# Patient Record
Sex: FEMALE | Race: WHITE | NOT HISPANIC OR LATINO | Employment: OTHER | URBAN - METROPOLITAN AREA
[De-identification: names, ages, dates, MRNs, and addresses within clinical notes are randomized per-mention and may not be internally consistent; named-entity substitution may affect disease eponyms.]

---

## 2017-01-12 ENCOUNTER — HOSPITAL ENCOUNTER (OUTPATIENT)
Dept: ONCOLOGY | Facility: CLINIC | Age: 77
Discharge: HOME | End: 2017-01-12
Attending: INTERNAL MEDICINE | Admitting: INTERNAL MEDICINE

## 2017-02-09 ENCOUNTER — HOSPITAL ENCOUNTER (OUTPATIENT)
Dept: ONCOLOGY | Facility: CLINIC | Age: 77
Discharge: HOME | End: 2017-02-09
Attending: INTERNAL MEDICINE | Admitting: INTERNAL MEDICINE

## 2017-02-09 LAB
ALBUMIN SERPL-MCNC: 3.8 G/DL (ref 3.5–4.8)
ALBUMIN/GLOB SERPL: 1 {RATIO} (ref 1–1.7)
ALP SERPL-CCNC: 69 IU/L (ref 32–91)
ALT SERPL-CCNC: 16 IU/L (ref 14–54)
ANION GAP SERPL CALC-SCNC: 14 MMOL/L (ref 10–20)
AST SERPL-CCNC: 20 IU/L (ref 15–41)
BILIRUB SERPL-MCNC: 0.8 MG/DL (ref 0.3–1.2)
BUN SERPL-MCNC: 11 MG/DL (ref 8–20)
BUN/CREAT SERPL: 11 (ref 5.4–26.2)
CALCIUM SERPL-MCNC: 10 MG/DL (ref 8.9–10.3)
CHLORIDE SERPL-SCNC: 104 MMOL/L (ref 101–111)
CONV CO2: 29 MMOL/L (ref 22–32)
CONV TOTAL PROTEIN: 7.7 G/DL (ref 6.1–7.9)
CREAT UR-MCNC: 1 MG/DL (ref 0.4–1)
GLOBULIN UR ELPH-MCNC: 3.9 G/DL (ref 2.5–3.8)
GLUCOSE SERPL-MCNC: 93 MG/DL (ref 65–99)
MAGNESIUM SERPL-MCNC: 2 MG/DL (ref 1.8–2.5)
POTASSIUM SERPL-SCNC: 4 MMOL/L (ref 3.6–5.1)
SODIUM SERPL-SCNC: 143 MMOL/L (ref 136–144)

## 2017-03-16 ENCOUNTER — HOSPITAL ENCOUNTER (OUTPATIENT)
Dept: ONCOLOGY | Facility: CLINIC | Age: 77
Discharge: HOME | End: 2017-03-16
Attending: NURSE PRACTITIONER | Admitting: NURSE PRACTITIONER

## 2017-03-21 ENCOUNTER — HOSPITAL ENCOUNTER (OUTPATIENT)
Dept: ONCOLOGY | Facility: CLINIC | Age: 77
Discharge: HOME | End: 2017-03-21
Attending: INTERNAL MEDICINE | Admitting: INTERNAL MEDICINE

## 2017-04-05 ENCOUNTER — HOSPITAL ENCOUNTER (OUTPATIENT)
Dept: ONCOLOGY | Facility: CLINIC | Age: 77
Discharge: HOME | End: 2017-04-05
Attending: INTERNAL MEDICINE | Admitting: INTERNAL MEDICINE

## 2017-04-10 ENCOUNTER — HOSPITAL ENCOUNTER (OUTPATIENT)
Dept: ONCOLOGY | Facility: CLINIC | Age: 77
Discharge: HOME | End: 2017-04-10
Attending: INTERNAL MEDICINE | Admitting: INTERNAL MEDICINE

## 2017-04-18 ENCOUNTER — HOSPITAL ENCOUNTER (OUTPATIENT)
Dept: ONCOLOGY | Facility: CLINIC | Age: 77
Discharge: HOME | End: 2017-04-18
Attending: INTERNAL MEDICINE | Admitting: INTERNAL MEDICINE

## 2017-04-21 ENCOUNTER — HOSPITAL ENCOUNTER (OUTPATIENT)
Dept: ONCOLOGY | Facility: CLINIC | Age: 77
Discharge: HOME | End: 2017-04-21
Attending: INTERNAL MEDICINE | Admitting: INTERNAL MEDICINE

## 2017-04-26 ENCOUNTER — HOSPITAL ENCOUNTER (OUTPATIENT)
Dept: ONCOLOGY | Facility: CLINIC | Age: 77
Discharge: HOME | End: 2017-04-26
Attending: INTERNAL MEDICINE | Admitting: INTERNAL MEDICINE

## 2017-05-04 ENCOUNTER — HOSPITAL ENCOUNTER (OUTPATIENT)
Dept: ONCOLOGY | Facility: CLINIC | Age: 77
Discharge: HOME | End: 2017-05-04
Attending: INTERNAL MEDICINE | Admitting: INTERNAL MEDICINE

## 2017-05-11 ENCOUNTER — HOSPITAL ENCOUNTER (OUTPATIENT)
Dept: ONCOLOGY | Facility: CLINIC | Age: 77
Discharge: HOME | End: 2017-05-11
Attending: INTERNAL MEDICINE | Admitting: INTERNAL MEDICINE

## 2017-05-17 ENCOUNTER — HOSPITAL ENCOUNTER (OUTPATIENT)
Dept: ONCOLOGY | Facility: CLINIC | Age: 77
Discharge: HOME | End: 2017-05-17
Attending: INTERNAL MEDICINE | Admitting: INTERNAL MEDICINE

## 2017-05-24 ENCOUNTER — HOSPITAL ENCOUNTER (OUTPATIENT)
Dept: ONCOLOGY | Facility: CLINIC | Age: 77
Discharge: HOME | End: 2017-05-24
Attending: INTERNAL MEDICINE | Admitting: INTERNAL MEDICINE

## 2017-05-31 ENCOUNTER — HOSPITAL ENCOUNTER (OUTPATIENT)
Dept: ONCOLOGY | Facility: CLINIC | Age: 77
Discharge: HOME | End: 2017-05-31
Attending: INTERNAL MEDICINE | Admitting: INTERNAL MEDICINE

## 2017-06-08 ENCOUNTER — HOSPITAL ENCOUNTER (OUTPATIENT)
Dept: ONCOLOGY | Facility: CLINIC | Age: 77
Discharge: HOME | End: 2017-06-08
Attending: INTERNAL MEDICINE | Admitting: INTERNAL MEDICINE

## 2017-07-07 ENCOUNTER — HOSPITAL ENCOUNTER (OUTPATIENT)
Dept: ONCOLOGY | Facility: CLINIC | Age: 77
Discharge: HOME | End: 2017-07-07
Attending: INTERNAL MEDICINE | Admitting: INTERNAL MEDICINE

## 2017-07-20 ENCOUNTER — HOSPITAL ENCOUNTER (OUTPATIENT)
Dept: ONCOLOGY | Facility: CLINIC | Age: 77
Discharge: HOME | End: 2017-07-20
Attending: INTERNAL MEDICINE | Admitting: INTERNAL MEDICINE

## 2017-07-20 LAB
ALBUMIN SERPL-MCNC: 3.8 G/DL (ref 3.5–4.8)
ALBUMIN SERPL-MCNC: 4.3 G/DL (ref 3.5–4.8)
ALBUMIN/GLOB SERPL: 1 {RATIO} (ref 1–1.7)
ALP SERPL-CCNC: 70 IU/L (ref 32–91)
ALPHA1 GLOB FLD ELPH-MCNC: 0.3 GM/DL (ref 0.1–0.4)
ALPHA2 GLOB SERPL ELPH-MCNC: 1.1 GM/DL (ref 0.5–1)
ALT SERPL-CCNC: 14 IU/L (ref 14–54)
ANION GAP SERPL CALC-SCNC: 13.8 MMOL/L (ref 10–20)
AST SERPL-CCNC: 20 IU/L (ref 15–41)
B-GLOBULIN SERPL ELPH-MCNC: 0.9 GM/DL (ref 0.7–1.4)
BILIRUB SERPL-MCNC: 1 MG/DL (ref 0.3–1.2)
BUN SERPL-MCNC: 18 MG/DL (ref 8–20)
BUN/CREAT SERPL: 20 (ref 5.4–26.2)
CALCIUM SERPL-MCNC: 10.2 MG/DL (ref 8.9–10.3)
CHLORIDE SERPL-SCNC: 100 MMOL/L (ref 101–111)
CONV CO2: 26 MMOL/L (ref 22–32)
CONV TOTAL PROTEIN: 7.5 G/DL (ref 6.1–7.9)
CONV TOTAL PROTEIN: 8.4 G/DL (ref 6.1–7.9)
CREAT UR-MCNC: 0.9 MG/DL (ref 0.4–1)
GAMMA GLOB SERPL ELPH-MCNC: 1.5 GM/DL (ref 0.6–1.6)
GLOBULIN UR ELPH-MCNC: 4.1 G/DL (ref 2.5–3.8)
GLUCOSE SERPL-MCNC: 87 MG/DL (ref 65–99)
INSULIN SERPL-ACNC: ABNORMAL U[IU]/ML
INSULIN SERPL-ACNC: ABNORMAL U[IU]/ML
MAGNESIUM SERPL-MCNC: 2.1 MG/DL (ref 1.8–2.5)
POTASSIUM SERPL-SCNC: 3.8 MMOL/L (ref 3.6–5.1)
SODIUM SERPL-SCNC: 136 MMOL/L (ref 136–144)

## 2017-08-17 ENCOUNTER — HOSPITAL ENCOUNTER (OUTPATIENT)
Dept: ONCOLOGY | Facility: CLINIC | Age: 77
Discharge: HOME | End: 2017-08-17
Attending: INTERNAL MEDICINE | Admitting: INTERNAL MEDICINE

## 2017-09-15 ENCOUNTER — CLINICAL SUPPORT (OUTPATIENT)
Dept: ONCOLOGY | Facility: HOSPITAL | Age: 77
End: 2017-09-15

## 2017-09-15 ENCOUNTER — HOSPITAL ENCOUNTER (OUTPATIENT)
Dept: ONCOLOGY | Facility: HOSPITAL | Age: 77
Discharge: HOME | End: 2017-09-15
Attending: INTERNAL MEDICINE | Admitting: INTERNAL MEDICINE

## 2017-09-15 ENCOUNTER — HOSPITAL ENCOUNTER (OUTPATIENT)
Dept: ONCOLOGY | Facility: CLINIC | Age: 77
Setting detail: INFUSION SERIES
Discharge: HOME | End: 2017-09-15
Attending: INTERNAL MEDICINE | Admitting: INTERNAL MEDICINE

## 2017-09-15 NOTE — PROGRESS NOTES
PATIENTS ONCOLOGY RECORD LOCATED IN [facility]      Subjective     Name:  MARS SANCHEZ     Date:  09/15/2017  Address:  [address] [place] [address] [place] [address]  Home: [phone]  :  1940 AGE:  77 y.o.        RECORDS OBTAINED:  Patients Oncology Record is located in [facility]

## 2017-09-22 ENCOUNTER — HOSPITAL ENCOUNTER (OUTPATIENT)
Dept: ONCOLOGY | Facility: HOSPITAL | Age: 77
Discharge: HOME | End: 2017-09-22
Attending: INTERNAL MEDICINE | Admitting: INTERNAL MEDICINE

## 2017-09-22 ENCOUNTER — HOSPITAL ENCOUNTER (OUTPATIENT)
Dept: ONCOLOGY | Facility: CLINIC | Age: 77
Setting detail: INFUSION SERIES
Discharge: HOME | End: 2017-09-22
Attending: INTERNAL MEDICINE | Admitting: INTERNAL MEDICINE

## 2017-09-22 ENCOUNTER — CLINICAL SUPPORT (OUTPATIENT)
Dept: ONCOLOGY | Facility: HOSPITAL | Age: 77
End: 2017-09-22

## 2017-09-22 NOTE — PROGRESS NOTES
PATIENTS ONCOLOGY RECORD LOCATED IN [facility]      Subjective     Name:  MARS SANCHEZ     Date:  2017  Address:  [address] [place] [address] [place] [address]  Home: [phone]  :  1940 AGE:  77 y.o.        RECORDS OBTAINED:  Patients Oncology Record is located in [facility]

## 2017-09-29 ENCOUNTER — HOSPITAL ENCOUNTER (OUTPATIENT)
Dept: ONCOLOGY | Facility: CLINIC | Age: 77
Setting detail: INFUSION SERIES
Discharge: HOME | End: 2017-09-29
Attending: INTERNAL MEDICINE | Admitting: INTERNAL MEDICINE

## 2017-09-29 ENCOUNTER — HOSPITAL ENCOUNTER (OUTPATIENT)
Dept: ONCOLOGY | Facility: HOSPITAL | Age: 77
Discharge: HOME | End: 2017-09-29
Attending: INTERNAL MEDICINE | Admitting: INTERNAL MEDICINE

## 2017-09-29 ENCOUNTER — CLINICAL SUPPORT (OUTPATIENT)
Dept: ONCOLOGY | Facility: HOSPITAL | Age: 77
End: 2017-09-29

## 2017-09-29 NOTE — PROGRESS NOTES
PATIENTS ONCOLOGY RECORD LOCATED IN [facility]      Subjective     Name:  MARS SANCHEZ     Date:  2017  Address:  [address] [place] [address] [place] [address]  Home: [phone]  :  1940 AGE:  77 y.o.        RECORDS OBTAINED:  Patients Oncology Record is located in [facility]

## 2017-10-27 ENCOUNTER — HOSPITAL ENCOUNTER (OUTPATIENT)
Dept: ONCOLOGY | Facility: CLINIC | Age: 77
Setting detail: INFUSION SERIES
Discharge: HOME | End: 2017-10-27
Attending: INTERNAL MEDICINE | Admitting: INTERNAL MEDICINE

## 2017-10-27 ENCOUNTER — HOSPITAL ENCOUNTER (OUTPATIENT)
Dept: ONCOLOGY | Facility: HOSPITAL | Age: 77
Discharge: HOME | End: 2017-10-27
Attending: INTERNAL MEDICINE | Admitting: INTERNAL MEDICINE

## 2017-10-27 ENCOUNTER — CLINICAL SUPPORT (OUTPATIENT)
Dept: ONCOLOGY | Facility: HOSPITAL | Age: 77
End: 2017-10-27

## 2017-10-27 NOTE — PROGRESS NOTES
PATIENTS ONCOLOGY RECORD LOCATED IN [facility]      Subjective     Name:  MARS SANCHEZ     Date:  10/27/2017  Address:  [address] [place] [address] [place] [address]  Home: [phone]  :  1940 AGE:  77 y.o.        RECORDS OBTAINED:  Patients Oncology Record is located in [facility]

## 2017-11-27 ENCOUNTER — CLINICAL SUPPORT (OUTPATIENT)
Dept: ONCOLOGY | Facility: HOSPITAL | Age: 77
End: 2017-11-27

## 2017-11-27 ENCOUNTER — HOSPITAL ENCOUNTER (OUTPATIENT)
Dept: ONCOLOGY | Facility: HOSPITAL | Age: 77
Discharge: HOME | End: 2017-11-27
Attending: INTERNAL MEDICINE | Admitting: INTERNAL MEDICINE

## 2017-11-27 ENCOUNTER — HOSPITAL ENCOUNTER (OUTPATIENT)
Dept: ONCOLOGY | Facility: CLINIC | Age: 77
Setting detail: INFUSION SERIES
Discharge: HOME | End: 2017-11-27
Attending: INTERNAL MEDICINE | Admitting: INTERNAL MEDICINE

## 2017-11-27 NOTE — PROGRESS NOTES
PATIENTS ONCOLOGY RECORD LOCATED IN [facility]      Subjective     Name:  MARS SANCHEZ     Date:  2017  Address:  [address] [place] [address] [place] [address]  Home: [phone]  :  1940 AGE:  77 y.o.        RECORDS OBTAINED:  Patients Oncology Record is located in [facility]

## 2017-12-28 ENCOUNTER — HOSPITAL ENCOUNTER (OUTPATIENT)
Dept: ONCOLOGY | Facility: HOSPITAL | Age: 77
Discharge: HOME | End: 2017-12-28
Attending: INTERNAL MEDICINE | Admitting: INTERNAL MEDICINE

## 2017-12-28 ENCOUNTER — HOSPITAL ENCOUNTER (OUTPATIENT)
Dept: ONCOLOGY | Facility: CLINIC | Age: 77
Setting detail: INFUSION SERIES
Discharge: HOME | End: 2017-12-28
Attending: INTERNAL MEDICINE | Admitting: INTERNAL MEDICINE

## 2017-12-28 ENCOUNTER — CLINICAL SUPPORT (OUTPATIENT)
Dept: ONCOLOGY | Facility: HOSPITAL | Age: 77
End: 2017-12-28

## 2017-12-28 NOTE — PROGRESS NOTES
PATIENTS ONCOLOGY RECORD LOCATED IN [facility]      Subjective     Name:  MARS SANCHEZ     Date:  2017  Address:  [address] [place] [address] [place] [address]  Home: [phone]  :  1940 AGE:  77 y.o.        RECORDS OBTAINED:  Patients Oncology Record is located in [facility]

## 2018-01-25 ENCOUNTER — CLINICAL SUPPORT (OUTPATIENT)
Dept: ONCOLOGY | Facility: HOSPITAL | Age: 78
End: 2018-01-25

## 2018-01-25 ENCOUNTER — HOSPITAL ENCOUNTER (OUTPATIENT)
Dept: ONCOLOGY | Facility: CLINIC | Age: 78
Setting detail: INFUSION SERIES
Discharge: HOME | End: 2018-01-25
Attending: NURSE PRACTITIONER | Admitting: NURSE PRACTITIONER

## 2018-01-25 NOTE — PROGRESS NOTES
PATIENTS ONCOLOGY RECORD LOCATED IN [facility]      Subjective     Name:  MARS SANCHEZ     Date:  2018  Address:  [address] [place] [address] [place] [address]  Home: [phone]  :  1940 AGE:  77 y.o.        RECORDS OBTAINED:  Patients Oncology Record is located in [facility]

## 2018-02-23 ENCOUNTER — HOSPITAL ENCOUNTER (OUTPATIENT)
Dept: ONCOLOGY | Facility: HOSPITAL | Age: 78
Discharge: HOME | End: 2018-02-23
Attending: INTERNAL MEDICINE | Admitting: INTERNAL MEDICINE

## 2018-02-23 ENCOUNTER — HOSPITAL ENCOUNTER (OUTPATIENT)
Dept: ONCOLOGY | Facility: CLINIC | Age: 78
Setting detail: INFUSION SERIES
Discharge: HOME | End: 2018-02-23
Attending: INTERNAL MEDICINE | Admitting: INTERNAL MEDICINE

## 2018-02-23 ENCOUNTER — CLINICAL SUPPORT (OUTPATIENT)
Dept: ONCOLOGY | Facility: HOSPITAL | Age: 78
End: 2018-02-23

## 2018-02-23 LAB
ALBUMIN SERPL-MCNC: 3.8 G/DL (ref 3.5–4.8)
ALBUMIN/GLOB SERPL: 1.1 {RATIO} (ref 1–1.7)
ALP SERPL-CCNC: 58 IU/L (ref 32–91)
ALT SERPL-CCNC: 13 IU/L (ref 14–54)
ANION GAP SERPL CALC-SCNC: 11.3 MMOL/L (ref 10–20)
AST SERPL-CCNC: 18 IU/L (ref 15–41)
BILIRUB SERPL-MCNC: 0.7 MG/DL (ref 0.3–1.2)
BUN SERPL-MCNC: 18 MG/DL (ref 8–20)
BUN/CREAT SERPL: 16.4 (ref 5.4–26.2)
CALCIUM SERPL-MCNC: 9.6 MG/DL (ref 8.9–10.3)
CHLORIDE SERPL-SCNC: 104 MMOL/L (ref 101–111)
CONV CO2: 28 MMOL/L (ref 22–32)
CONV TOTAL PROTEIN: 7.2 G/DL (ref 6.1–7.9)
CREAT UR-MCNC: 1.1 MG/DL (ref 0.4–1)
GLOBULIN UR ELPH-MCNC: 3.4 G/DL (ref 2.5–3.8)
GLUCOSE SERPL-MCNC: 98 MG/DL (ref 65–99)
POTASSIUM SERPL-SCNC: 3.3 MMOL/L (ref 3.6–5.1)
SODIUM SERPL-SCNC: 140 MMOL/L (ref 136–144)

## 2018-02-23 NOTE — PROGRESS NOTES
PATIENTS ONCOLOGY RECORD LOCATED IN [facility]      Subjective     Name:  MARS SANCHEZ     Date:  2018  Address:  [address] [place] [address] [place] [address]  Home: [phone]  :  1940 AGE:  78 y.o.        RECORDS OBTAINED:  Patients Oncology Record is located in [facility]

## 2018-03-23 ENCOUNTER — CLINICAL SUPPORT (OUTPATIENT)
Dept: ONCOLOGY | Facility: HOSPITAL | Age: 78
End: 2018-03-23

## 2018-03-23 ENCOUNTER — HOSPITAL ENCOUNTER (OUTPATIENT)
Dept: ONCOLOGY | Facility: CLINIC | Age: 78
Setting detail: INFUSION SERIES
Discharge: HOME | End: 2018-03-23
Attending: INTERNAL MEDICINE | Admitting: INTERNAL MEDICINE

## 2018-03-23 NOTE — PROGRESS NOTES
PATIENTS ONCOLOGY RECORD LOCATED IN [facility]      Subjective     Name:  MARS SANCHEZ     Date:  2018  Address:  [address] [place] [address] [place] [address]  Home: [phone]  :  1940 AGE:  78 y.o.        RECORDS OBTAINED:  Patients Oncology Record is located in [facility]

## 2018-04-20 ENCOUNTER — HOSPITAL ENCOUNTER (OUTPATIENT)
Dept: ONCOLOGY | Facility: CLINIC | Age: 78
Setting detail: INFUSION SERIES
Discharge: HOME | End: 2018-04-20
Attending: INTERNAL MEDICINE | Admitting: INTERNAL MEDICINE

## 2018-04-20 ENCOUNTER — CLINICAL SUPPORT (OUTPATIENT)
Dept: ONCOLOGY | Facility: HOSPITAL | Age: 78
End: 2018-04-20

## 2018-04-20 NOTE — PROGRESS NOTES
PATIENTS ONCOLOGY RECORD LOCATED IN [facility]      Subjective     Name:  MARS SANCHEZ     Date:  2018  Address:  [address] [place] [address] [place] [address]  Home: [phone]  :  1940 AGE:  78 y.o.        RECORDS OBTAINED:  Patients Oncology Record is located in [facility]

## 2018-05-22 ENCOUNTER — CLINICAL SUPPORT (OUTPATIENT)
Dept: ONCOLOGY | Facility: HOSPITAL | Age: 78
End: 2018-05-22

## 2018-05-22 ENCOUNTER — HOSPITAL ENCOUNTER (OUTPATIENT)
Dept: ONCOLOGY | Facility: CLINIC | Age: 78
Setting detail: INFUSION SERIES
Discharge: HOME | End: 2018-05-22
Attending: INTERNAL MEDICINE | Admitting: INTERNAL MEDICINE

## 2018-05-22 NOTE — PROGRESS NOTES
PATIENTS ONCOLOGY RECORD LOCATED IN [facility]      Subjective     Name:  MARS SANCHEZ     Date:  2018  Address:  [address] [place] [address] [place] [address]  Home: [phone]  :  1940 AGE:  78 y.o.        RECORDS OBTAINED:  Patients Oncology Record is located in [facility]

## 2018-06-21 ENCOUNTER — HOSPITAL ENCOUNTER (OUTPATIENT)
Dept: ONCOLOGY | Facility: CLINIC | Age: 78
Setting detail: INFUSION SERIES
Discharge: HOME | End: 2018-06-21
Attending: INTERNAL MEDICINE | Admitting: INTERNAL MEDICINE

## 2018-06-21 ENCOUNTER — CLINICAL SUPPORT (OUTPATIENT)
Dept: ONCOLOGY | Facility: HOSPITAL | Age: 78
End: 2018-06-21

## 2018-06-21 NOTE — PROGRESS NOTES
PATIENTS ONCOLOGY RECORD LOCATED IN [facility]      Subjective     Name:  MARS SANCHEZ     Date:  2018  Address:  [address] [place] [address] [place] [address]  Home: [phone]  :  1940 AGE:  78 y.o.        RECORDS OBTAINED:  Patients Oncology Record is located in [facility]

## 2018-07-26 ENCOUNTER — HOSPITAL ENCOUNTER (OUTPATIENT)
Dept: ONCOLOGY | Facility: CLINIC | Age: 78
Setting detail: INFUSION SERIES
Discharge: HOME | End: 2018-07-26
Attending: INTERNAL MEDICINE | Admitting: INTERNAL MEDICINE

## 2018-07-26 ENCOUNTER — CLINICAL SUPPORT (OUTPATIENT)
Dept: ONCOLOGY | Facility: HOSPITAL | Age: 78
End: 2018-07-26

## 2018-07-26 ENCOUNTER — HOSPITAL ENCOUNTER (OUTPATIENT)
Dept: ONCOLOGY | Facility: HOSPITAL | Age: 78
Discharge: HOME | End: 2018-07-26
Attending: INTERNAL MEDICINE | Admitting: INTERNAL MEDICINE

## 2018-07-26 LAB
ALBUMIN SERPL-MCNC: 3.8 G/DL (ref 3.5–4.8)
ALBUMIN/GLOB SERPL: 1.2 {RATIO} (ref 1–1.7)
ALP SERPL-CCNC: 70 IU/L (ref 32–91)
ALT SERPL-CCNC: 13 IU/L (ref 14–54)
ANION GAP SERPL CALC-SCNC: 12.4 MMOL/L (ref 10–20)
AST SERPL-CCNC: 17 IU/L (ref 15–41)
BILIRUB SERPL-MCNC: 0.7 MG/DL (ref 0.3–1.2)
BUN SERPL-MCNC: 18 MG/DL (ref 8–20)
BUN/CREAT SERPL: 13.8 (ref 5.4–26.2)
CALCIUM SERPL-MCNC: 9.3 MG/DL (ref 8.9–10.3)
CHLORIDE SERPL-SCNC: 99 MMOL/L (ref 101–111)
CONV CO2: 26 MMOL/L (ref 22–32)
CONV TOTAL PROTEIN: 7 G/DL (ref 6.1–7.9)
CREAT UR-MCNC: 1.3 MG/DL (ref 0.4–1)
GLOBULIN UR ELPH-MCNC: 3.2 G/DL (ref 2.5–3.8)
GLUCOSE SERPL-MCNC: 77 MG/DL (ref 65–99)
MAGNESIUM SERPL-MCNC: 2 MG/DL (ref 1.8–2.5)
POTASSIUM SERPL-SCNC: 3.4 MMOL/L (ref 3.6–5.1)
SODIUM SERPL-SCNC: 134 MMOL/L (ref 136–144)

## 2018-07-26 NOTE — PROGRESS NOTES
PATIENTS ONCOLOGY RECORD LOCATED IN [facility]      Subjective     Name:  MARS SANCHEZ     Date:  2018  Address:  [address] [place] [address] [place] [address]  Home: [phone]  :  1940 AGE:  78 y.o.        RECORDS OBTAINED:  Patients Oncology Record is located in [facility]

## 2018-08-23 ENCOUNTER — HOSPITAL ENCOUNTER (OUTPATIENT)
Dept: ONCOLOGY | Facility: CLINIC | Age: 78
Setting detail: INFUSION SERIES
Discharge: HOME | End: 2018-08-23
Attending: INTERNAL MEDICINE | Admitting: INTERNAL MEDICINE

## 2018-08-23 ENCOUNTER — CLINICAL SUPPORT (OUTPATIENT)
Dept: ONCOLOGY | Facility: HOSPITAL | Age: 78
End: 2018-08-23

## 2018-08-23 ENCOUNTER — HOSPITAL ENCOUNTER (OUTPATIENT)
Dept: ONCOLOGY | Facility: HOSPITAL | Age: 78
Discharge: HOME | End: 2018-08-23
Attending: NURSE PRACTITIONER | Admitting: NURSE PRACTITIONER

## 2018-08-23 LAB
ANION GAP SERPL CALC-SCNC: 12.5 MMOL/L (ref 10–20)
BUN SERPL-MCNC: 20 MG/DL (ref 8–20)
BUN/CREAT SERPL: 16.7 (ref 5.4–26.2)
CALCIUM SERPL-MCNC: 9.8 MG/DL (ref 8.9–10.3)
CHLORIDE SERPL-SCNC: 99 MMOL/L (ref 101–111)
CONV CO2: 28 MMOL/L (ref 22–32)
CREAT UR-MCNC: 1.2 MG/DL (ref 0.4–1)
GLUCOSE SERPL-MCNC: 96 MG/DL (ref 65–99)
POTASSIUM SERPL-SCNC: 3.5 MMOL/L (ref 3.6–5.1)
SODIUM SERPL-SCNC: 136 MMOL/L (ref 136–144)

## 2018-08-23 NOTE — PROGRESS NOTES
PATIENTS ONCOLOGY RECORD LOCATED IN [facility]      Subjective     Name:  MARS SANCHEZ     Date:  2018  Address:  [address] [place] [address] [place] [address]  Home: [phone]  :  1940 AGE:  78 y.o.        RECORDS OBTAINED:  Patients Oncology Record is located in [facility]

## 2018-08-30 ENCOUNTER — CLINICAL SUPPORT (OUTPATIENT)
Dept: ONCOLOGY | Facility: HOSPITAL | Age: 78
End: 2018-08-30

## 2018-08-30 ENCOUNTER — HOSPITAL ENCOUNTER (OUTPATIENT)
Dept: ONCOLOGY | Facility: CLINIC | Age: 78
Setting detail: INFUSION SERIES
Discharge: HOME | End: 2018-08-30
Attending: INTERNAL MEDICINE | Admitting: INTERNAL MEDICINE

## 2018-08-30 NOTE — PROGRESS NOTES
PATIENTS ONCOLOGY RECORD LOCATED IN [facility]      Subjective     Name:  MARS SANCHEZ     Date:  2018  Address:  [address] [place] [address] [place] [address]  Home: [phone]  :  1940 AGE:  78 y.o.        RECORDS OBTAINED:  Patients Oncology Record is located in [facility]

## 2018-09-06 ENCOUNTER — CLINICAL SUPPORT (OUTPATIENT)
Dept: ONCOLOGY | Facility: HOSPITAL | Age: 78
End: 2018-09-06

## 2018-09-06 ENCOUNTER — HOSPITAL ENCOUNTER (OUTPATIENT)
Dept: ONCOLOGY | Facility: CLINIC | Age: 78
Setting detail: INFUSION SERIES
Discharge: HOME | End: 2018-09-06
Attending: INTERNAL MEDICINE | Admitting: INTERNAL MEDICINE

## 2018-10-04 ENCOUNTER — CLINICAL SUPPORT (OUTPATIENT)
Dept: ONCOLOGY | Facility: HOSPITAL | Age: 78
End: 2018-10-04

## 2018-10-04 ENCOUNTER — HOSPITAL ENCOUNTER (OUTPATIENT)
Dept: ONCOLOGY | Facility: CLINIC | Age: 78
Setting detail: INFUSION SERIES
Discharge: HOME | End: 2018-10-04
Attending: INTERNAL MEDICINE | Admitting: INTERNAL MEDICINE

## 2018-10-04 NOTE — PROGRESS NOTES
PATIENTS ONCOLOGY RECORD LOCATED IN [facility]      Subjective     Name:  MARS SANCHEZ     Date:  10/04/2018  Address:  [address] [place] [address] [place] [address]  Home: [phone]  :  1940 AGE:  78 y.o.        RECORDS OBTAINED:  Patients Oncology Record is located in [facility]

## 2018-10-11 ENCOUNTER — CLINICAL SUPPORT (OUTPATIENT)
Dept: ONCOLOGY | Facility: HOSPITAL | Age: 78
End: 2018-10-11

## 2018-10-11 ENCOUNTER — HOSPITAL ENCOUNTER (OUTPATIENT)
Dept: ONCOLOGY | Facility: CLINIC | Age: 78
Setting detail: INFUSION SERIES
Discharge: HOME | End: 2018-10-11
Attending: INTERNAL MEDICINE | Admitting: INTERNAL MEDICINE

## 2018-10-11 NOTE — PROGRESS NOTES
PATIENTS ONCOLOGY RECORD LOCATED IN [facility]      Subjective     Name:  MARS SANCHEZ     Date:  10/11/2018  Address:  [address] [place] [address] [place] [address]  Home: [phone]  :  1940 AGE:  78 y.o.        RECORDS OBTAINED:  Patients Oncology Record is located in [facility]

## 2018-10-18 ENCOUNTER — HOSPITAL ENCOUNTER (OUTPATIENT)
Dept: ONCOLOGY | Facility: CLINIC | Age: 78
Setting detail: INFUSION SERIES
Discharge: HOME | End: 2018-10-18
Attending: INTERNAL MEDICINE | Admitting: INTERNAL MEDICINE

## 2018-10-18 ENCOUNTER — CLINICAL SUPPORT (OUTPATIENT)
Dept: ONCOLOGY | Facility: HOSPITAL | Age: 78
End: 2018-10-18

## 2018-10-18 NOTE — PROGRESS NOTES
PATIENTS ONCOLOGY RECORD LOCATED IN [facility]      Subjective     Name:  MARS SANCHEZ     Date:  10/18/2018  Address:  [address] [place] [address] [place] [address]  Home: [phone]  :  1940 AGE:  78 y.o.        RECORDS OBTAINED:  Patients Oncology Record is located in [facility]

## 2018-10-25 ENCOUNTER — HOSPITAL ENCOUNTER (OUTPATIENT)
Dept: ONCOLOGY | Facility: CLINIC | Age: 78
Setting detail: INFUSION SERIES
Discharge: HOME | End: 2018-10-25
Attending: INTERNAL MEDICINE | Admitting: INTERNAL MEDICINE

## 2018-10-25 ENCOUNTER — CLINICAL SUPPORT (OUTPATIENT)
Dept: ONCOLOGY | Facility: HOSPITAL | Age: 78
End: 2018-10-25

## 2018-10-25 NOTE — PROGRESS NOTES
PATIENTS ONCOLOGY RECORD LOCATED IN [facility]      Subjective     Name:  MARS SANCHEZ     Date:  10/25/2018  Address:  [address] [place] [address] [place] [address]  Home: [phone]  :  1940 AGE:  78 y.o.        RECORDS OBTAINED:  Patients Oncology Record is located in [facility]

## 2018-11-29 ENCOUNTER — HOSPITAL ENCOUNTER (OUTPATIENT)
Dept: ONCOLOGY | Facility: CLINIC | Age: 78
Setting detail: INFUSION SERIES
Discharge: HOME | End: 2018-11-29
Attending: INTERNAL MEDICINE | Admitting: INTERNAL MEDICINE

## 2018-11-29 ENCOUNTER — CLINICAL SUPPORT (OUTPATIENT)
Dept: ONCOLOGY | Facility: HOSPITAL | Age: 78
End: 2018-11-29

## 2018-11-29 NOTE — PROGRESS NOTES
PATIENTS ONCOLOGY RECORD LOCATED IN [facility]      Subjective     Name:  MARS SANCHEZ     Date:  2018  Address:  [address] [place] [address]  Home: [unfilled]  :  1940 AGE:  78 y.o.        RECORDS OBTAINED:  Patients Oncology Record is located in [facility]

## 2018-12-27 ENCOUNTER — HOSPITAL ENCOUNTER (OUTPATIENT)
Dept: ONCOLOGY | Facility: CLINIC | Age: 78
Setting detail: INFUSION SERIES
Discharge: HOME | End: 2018-12-27
Attending: INTERNAL MEDICINE | Admitting: INTERNAL MEDICINE

## 2018-12-27 ENCOUNTER — CLINICAL SUPPORT (OUTPATIENT)
Dept: ONCOLOGY | Facility: HOSPITAL | Age: 78
End: 2018-12-27

## 2018-12-27 NOTE — PROGRESS NOTES
PATIENTS ONCOLOGY RECORD LOCATED IN [facility]      Subjective     Name:  MARS SANCHEZ     Date:  2018  Address:  [address] [place] [address]  Home: [unfilled]  :  1940 AGE:  78 y.o.        RECORDS OBTAINED:  Patients Oncology Record is located in [facility]

## 2019-01-24 ENCOUNTER — CLINICAL SUPPORT (OUTPATIENT)
Dept: ONCOLOGY | Facility: HOSPITAL | Age: 79
End: 2019-01-24

## 2019-01-24 ENCOUNTER — HOSPITAL ENCOUNTER (OUTPATIENT)
Dept: ONCOLOGY | Facility: CLINIC | Age: 79
Setting detail: INFUSION SERIES
Discharge: HOME | End: 2019-01-24
Attending: NURSE PRACTITIONER | Admitting: NURSE PRACTITIONER

## 2019-01-24 ENCOUNTER — HOSPITAL ENCOUNTER (OUTPATIENT)
Dept: ONCOLOGY | Facility: HOSPITAL | Age: 79
Discharge: HOME | End: 2019-01-24
Attending: NURSE PRACTITIONER | Admitting: NURSE PRACTITIONER

## 2019-01-24 LAB
ALBUMIN SERPL-MCNC: 3.9 G/DL (ref 3.5–4.8)
ALBUMIN/GLOB SERPL: 1.3 {RATIO} (ref 1–1.7)
ALP SERPL-CCNC: 66 IU/L (ref 32–91)
ALT SERPL-CCNC: 13 IU/L (ref 14–54)
ANION GAP SERPL CALC-SCNC: 14.6 MMOL/L (ref 10–20)
AST SERPL-CCNC: 18 IU/L (ref 15–41)
BILIRUB SERPL-MCNC: 0.6 MG/DL (ref 0.3–1.2)
BUN SERPL-MCNC: 18 MG/DL (ref 8–20)
BUN/CREAT SERPL: 15 (ref 5.4–26.2)
CALCIUM SERPL-MCNC: 9.5 MG/DL (ref 8.9–10.3)
CHLORIDE SERPL-SCNC: 97 MMOL/L (ref 101–111)
CONV CO2: 28 MMOL/L (ref 22–32)
CONV TOTAL PROTEIN: 6.9 G/DL (ref 6.1–7.9)
CREAT UR-MCNC: 1.2 MG/DL (ref 0.4–1)
GLOBULIN UR ELPH-MCNC: 3 G/DL (ref 2.5–3.8)
GLUCOSE SERPL-MCNC: 91 MG/DL (ref 65–99)
POTASSIUM SERPL-SCNC: 3.6 MMOL/L (ref 3.6–5.1)
SODIUM SERPL-SCNC: 136 MMOL/L (ref 136–144)

## 2019-01-24 NOTE — PROGRESS NOTES
PATIENTS ONCOLOGY RECORD LOCATED IN [facility]      Subjective     Name:  MARS SANCHEZ     Date:  2019  Address:  [address] [place] [address]  Home: [unfilled]  :  1940 AGE:  78 y.o.        RECORDS OBTAINED:  Patients Oncology Record is located in [facility]

## 2019-02-26 ENCOUNTER — HOSPITAL ENCOUNTER (OUTPATIENT)
Dept: ONCOLOGY | Facility: CLINIC | Age: 79
Setting detail: INFUSION SERIES
Discharge: HOME | End: 2019-02-26
Attending: INTERNAL MEDICINE | Admitting: INTERNAL MEDICINE

## 2019-02-26 ENCOUNTER — HOSPITAL ENCOUNTER (OUTPATIENT)
Dept: ONCOLOGY | Facility: HOSPITAL | Age: 79
Discharge: HOME | End: 2019-02-26

## 2019-03-27 ENCOUNTER — CLINICAL SUPPORT (OUTPATIENT)
Dept: ONCOLOGY | Facility: HOSPITAL | Age: 79
End: 2019-03-27

## 2019-03-27 ENCOUNTER — HOSPITAL ENCOUNTER (OUTPATIENT)
Dept: ONCOLOGY | Facility: CLINIC | Age: 79
Setting detail: INFUSION SERIES
Discharge: HOME | End: 2019-03-27
Attending: INTERNAL MEDICINE | Admitting: INTERNAL MEDICINE

## 2019-04-04 ENCOUNTER — HOSPITAL ENCOUNTER (OUTPATIENT)
Dept: ONCOLOGY | Facility: CLINIC | Age: 79
Setting detail: INFUSION SERIES
Discharge: HOME | End: 2019-04-04
Attending: INTERNAL MEDICINE | Admitting: INTERNAL MEDICINE

## 2019-04-04 ENCOUNTER — CLINICAL SUPPORT (OUTPATIENT)
Dept: ONCOLOGY | Facility: HOSPITAL | Age: 79
End: 2019-04-04

## 2019-04-04 NOTE — PROGRESS NOTES
PATIENTS ONCOLOGY RECORD LOCATED IN [facility]      Subjective     Name:  MARS SANCHEZ     Date:  2019  Address:  [address] [place] [address]  Home: [unfilled]  :  1940 AGE:  79 y.o.        RECORDS OBTAINED:  Patients Oncology Record is located in [facility]

## 2019-04-11 ENCOUNTER — HOSPITAL ENCOUNTER (OUTPATIENT)
Dept: ONCOLOGY | Facility: CLINIC | Age: 79
Setting detail: INFUSION SERIES
Discharge: HOME | End: 2019-04-11
Attending: INTERNAL MEDICINE | Admitting: INTERNAL MEDICINE

## 2019-04-11 ENCOUNTER — CLINICAL SUPPORT (OUTPATIENT)
Dept: ONCOLOGY | Facility: HOSPITAL | Age: 79
End: 2019-04-11

## 2019-04-11 NOTE — PROGRESS NOTES
DDDDDDDDDDDDDDDDDDDDDDDDDDDDDDDDDDDDDDDDDDDDDDDDDDDDDPATIENTS ONCOLOGY RECORD LOCATED IN [facility]      Subjective     Name:  MARS SANCHEZ     Date:  2019  Address:  [address] [place] [address]  Home: [unfilled]  :  1940 AGE:  79 y.o.        RECORDS OBTAINED:  Patients Oncology Record is located in [facility]

## 2019-05-21 ENCOUNTER — HOSPITAL ENCOUNTER (OUTPATIENT)
Dept: ONCOLOGY | Facility: CLINIC | Age: 79
Setting detail: INFUSION SERIES
Discharge: HOME | End: 2019-05-21
Attending: INTERNAL MEDICINE | Admitting: INTERNAL MEDICINE

## 2019-05-21 ENCOUNTER — CLINICAL SUPPORT (OUTPATIENT)
Dept: ONCOLOGY | Facility: HOSPITAL | Age: 79
End: 2019-05-21

## 2019-05-21 NOTE — PROGRESS NOTES
PATIENTS ONCOLOGY RECORD LOCATED IN [facility]      Subjective     Name:  MARS SANCHEZ     Date:  2019  Address:  [address] [place] [address]  Home: [unfilled]  :  1940 AGE:  79 y.o.        RECORDS OBTAINED:  Patients Oncology Record is located in [facility]

## 2019-06-11 DIAGNOSIS — I82.513 CHRONIC DEEP VEIN THROMBOSIS (DVT) OF FEMORAL VEIN OF BOTH LOWER EXTREMITIES (HCC): Primary | ICD-10-CM

## 2019-06-20 ENCOUNTER — LAB (OUTPATIENT)
Dept: LAB | Facility: HOSPITAL | Age: 79
End: 2019-06-20

## 2019-06-20 ENCOUNTER — HOSPITAL ENCOUNTER (OUTPATIENT)
Dept: ONCOLOGY | Facility: HOSPITAL | Age: 79
Discharge: HOME | End: 2019-06-20
Admitting: INTERNAL MEDICINE

## 2019-06-20 VITALS
HEIGHT: 64 IN | RESPIRATION RATE: 18 BRPM | HEART RATE: 78 BPM | TEMPERATURE: 97.4 F | SYSTOLIC BLOOD PRESSURE: 211 MMHG | BODY MASS INDEX: 27.31 KG/M2 | DIASTOLIC BLOOD PRESSURE: 91 MMHG | WEIGHT: 160 LBS

## 2019-06-20 DIAGNOSIS — I82.513 CHRONIC DEEP VEIN THROMBOSIS (DVT) OF FEMORAL VEIN OF BOTH LOWER EXTREMITIES (HCC): ICD-10-CM

## 2019-06-20 DIAGNOSIS — I26.99 BILATERAL PULMONARY EMBOLISM (HCC): Primary | ICD-10-CM

## 2019-06-20 LAB
INR PPP: 2.7
PROTHROMBIN TIME: 33.4 SECONDS (ref 11–15)

## 2019-06-20 PROCEDURE — 85610 PROTHROMBIN TIME: CPT

## 2019-07-18 PROBLEM — Z17.0 MALIGNANT NEOPLASM OF LEFT BREAST IN FEMALE, ESTROGEN RECEPTOR POSITIVE: Status: ACTIVE | Noted: 2019-07-18

## 2019-07-18 PROBLEM — C50.912 MALIGNANT NEOPLASM OF LEFT BREAST IN FEMALE, ESTROGEN RECEPTOR POSITIVE: Status: ACTIVE | Noted: 2019-07-18

## 2019-07-24 PROBLEM — E87.6 HYPOKALEMIA: Status: ACTIVE | Noted: 2019-07-24

## 2019-07-24 PROBLEM — N18.30 CKD (CHRONIC KIDNEY DISEASE), STAGE III (HCC): Status: ACTIVE | Noted: 2019-07-24

## 2019-07-24 PROBLEM — M54.50 LOW BACK PAIN: Status: ACTIVE | Noted: 2019-07-24

## 2019-07-24 PROBLEM — I26.99 BILATERAL PULMONARY EMBOLISM: Status: ACTIVE | Noted: 2019-07-24

## 2019-07-24 PROBLEM — I82.403 DEEP VEIN THROMBOSIS OF BILATERAL LOWER EXTREMITIES (HCC): Status: ACTIVE | Noted: 2019-07-24

## 2019-07-24 NOTE — PROGRESS NOTES
Hematology/Oncology Outpatient Follow Up    PATIENT NAME:Gabrielle Lyles  :1940  MRN: [id number]  PRIMARY CARE PHYSICIAN: Paul Granados MD  REFERRING PHYSICIAN: Paul Granados MD    Chief Complaint   Patient presents with   • Follow-up     for breast cancer         HISTORY OF PRESENT ILLNESS:  1. Left upper lobular breast cancer Stage IA diagnosis established 2011.  • She had a mammogram on 6/15/70 at [facility] and was found to have a 6 mm focus of architectural distortion in the left breast superior to the nipple and was called in for additional films. Another mammogram was performed this year on 11 at [facility] where there was scattered fibroglandular densities bilaterally.  A 20 mm asymmetric density in the upper left breast, 6 cm from the nipple, and a 5 mm asymmetric density in the upper outer quadrant of the right breast, 9 cm from the nipple, were seen. The left breast density was thought to have changed slightly on follow up films and ultrasound. In the right breast the density was compatible with a small intramammary lymph node. An MRI of the breast was recommended, which was performed on 10/10/11 revealing a suspicious finding in the left breast corresponding to the mammographic abnormality. The patient was referred to Dr. Jackie Uriostegui and had a stereotactic left breast biopsy performed by Dr. Cande Daniel on 11 revealing invasive lobular carcinoma. She then had wide needle localization of the left breast on 11 followed by a partial mastectomy and left axillary sentinel lymph node injection and biopsy by Dr. Uriostegui with pathology revealing a 1 cm invasive lobular carcinoma with tumor extending to the inferior surgical margin. Four lymph nodes excised were negative for metastatic carcinoma. The tumor was estrogen and progesterone receptor positive and HER-2/inez negative.  The patient claimed to have recovered well from surgery.  She saw   Galen Ramsey on 1/16/12 discussing the role of radiation therapy in this setting and the plan is to treat the patient with a short course of radiation therapy to a total of 42.56 Gy in 16 fractions to the left breast. She was seen at [facility] for further evaluation initially on 1/17/12.  • 1/17/12 - Chest x-ray revealed calcified granulomatous disease is identified.  No acute disease.  • 1/ 17/12 - CYP2D6 revealed responsiveness to the Tamoxifen, CA 27-29 27 (N).  • 1/18/12 - DEXA scan revealed osteopenia in the spine and normal hip.  • 2/6/12 - 2/28/12 - Patient treated with radiation therapy to the left breast. Total dose 42.56 GY.  • 9/13/11 - Mammogram negative.  • 3/21/12 - Order written for patient to start on Tamoxifen 10 mg twice a day.  • 10/1/12 - Mammogram negative.  • 4/24/13 -.  It was discontinued while she was in the hospital in held for 6 months due to it was thought to cause an interaction with the Coumadin. Orders written to restart tamoxifen as directed  • 10/2/13 - Mammogram was negative.  • 12/22/14 - Patient underwent biopsy of the left breast by Dr. Rutledge.  Pathology revealed previous excision site with marked fibrosis, giant cell formation, fat necrosis, and dystrophic calcifications.  No evidence of malignancy.  • 4/7/15 - Basic metabolic panel normal.   • 6/2/15 - Comprehensive metabolic panel normal.   • 12/2/15 - Unilateral left breast mammogram with tomography revealed architectural distortion having the appearance of scar related to excisional biopsy. Follow-up mammogram was recommended in six months.   • 12/29/15 - Bilateral screening mammography with tomography revealed probable postoperative density and distortion in the central left breast.   • 3/17/16 - Comprehensive metabolic panel with potassium 3.5 (3.6-5.1). Potassium replaced orally.    • 7/8/16 - Left diagnostic unilateral mammogram with tomography revealed benign findings.   • 11/17/16 - WBC 9.1,  hemoglobin 14.9, platelet count 176,000. Patient complaining of shortness of breath on exertion. Chest x-ray showed no acute cardiopulmonary abnormality. Findings suspicious for emphysema. CMP unremarkable. Breast cancer index ordered. Insurance coverage denied. Patient refuses to pay for testing which was estimated to be $5000.   • 1/22/17 - Breast cancer index with high likelihood of benefit from extended hormonal therapy with risk of recurrence at 27% reduced to 10.5% with extended endocrine therapy.   • 2/9/17 - CMP unremarkable. Magnesium 2.0 (N).   • 5/18/17 - Screening mammogram with decreased density at the lumpectomy site since 2015 and no mammographic evidence of malignancy in either breast.   • 7/20/17 - WBC 8, hemoglobin 15, platelet count 199,000. Patient complains of bilateral knee pains. Wants a refill on Potassium. Discussed duration of Tamoxifen. Breast cancer index results not available. Will make decision based on that.   • 5/24/18 - Screening bilateral mammogram with tomography BI-RADS 2: Benign findings.   • 7/26/18 - Discussed results of breast cancer index. Patient would like to try different hormonal agent. Prescription given for Arimidex 1 mg p.o. daily. Potassium 3.4. Instructed to increase potassium to 30 mEq p.o. daily. Increase oral fluids for a creatinine of 1.3.   • 8/23/18 - Rechecked BMP that showed a creatinine of 1.2, potassium 3.5.   • 1/24/19 - Patient reports she never started taking her Arimidex that was ordered last visit. She never received it from the pharmacy. She has not been taking her Tamoxifen either. Agreeable to resume Tamoxifen. Tamoxifen 20 mg daily prescribed.   • 5/28/19 Bilateral screening mammogram showed no mammographic signs of malignancy.LEft breast lumpectomy and radiation therapy changes. DEXA showed osteopenia.   2. Osteopenia diagnosis established January 2012.  • 1/18/12 - DEXA scan revealed osteopenia of the spine and normal hip. Order written for  patient to be started on Calcium with Vitamin D 600 mg twice a day.  • 7/16/14 - DEXA scan normal.  • 8/3/16 - DEXA scan normal.   • 3/16/17 - Patient reports she is not taking Calcium Plus Vitamin D. Advised to resume calcium plus vitamin D supplementation.   • 7/20/17 - Bilateral knee pain. X-ray showed no acute osseous abnormal. Mild to moderate degenerative changes are present.   • 5/28/19 DEXA scan with osteopenia.  Max T score -1.1 in left hip total.  3. Anemia diagnosis established May 2012.  • 5/22/12 - Hemoglobin 11.6. Serum protein electrophoresis with no monoclonal proteins detected. Reticulocyte count 0.8% (N), iron 43 (N), TIBC 278 (N), % saturation 15 (N), ferritin 380 (H), vitamin B12 294 (N), serum folate > 24 (N) haptoglobin 173 (N).  • 7/18/12 -  (N).  • 4/2/14 - hemoglobin 12.6, hematocrit 37.8 and MCV 85.3.  • 6/9/14 - 6/15/14 - patient was admitted to [facility] due to acute large bilateral pulmonary emboli.  ferritin 290 (N), haptoglobin 298 (H), iron 22 (L), TIBC 230 (N), iron sat 10 (L), vitamin B12 320 (N), folate 20.7 (N),  (N),   • 08/05/14 - Hemoglobin 13.1, hematocrit 40.2, MCV 85.4.    • 6/2/15 - Hemoglobin 14.5, hematocrit 43.0, MCV 85.7. Anemia resolved.   • 7/20/17 - CMP remarkable for protein 8.4 (H), globulin 4.1 (H). SPEP with increased alpha-2 globulin, non-specific change seen in the setting of acute inflammation.   4.   Bilateral lower extremity DVT’s and bilateral pulmonary emboli diagnosed in March of 2013.  • 3/18/13 - The patient was admitted overnight as presenting to the ER with some tenderness and inflammation of the left lower extremity and was diagnosed with deep vein thrombosis.  The patient was started on Lovenox and Coumadin 5 mg q.d.  • 11/19/13 - The patient states that she is no longer taking a Coumadin and was advised by Dr. Granados and Dr. Cast to stop the Coumadin since it has interaction with the tamoxifen.  • 12/3/13 - Venous  Doppler no thrombus in right lower extremity, residual thrombus in left lower extremity, no acute finding.  • 4/2/14 - d-dimer 6.2 (H)  • 6/9/14 - 6/15/14 - patient was admitted to [facility] due to acute large bilateral pulmonary emboli.  IVC filter placed by Dr. Card.  Echocardiogram showed ejection fraction of 55%.  Abnormal left ventricle diastolic function.  Moderate tricuspid valve insufficiency.  Right ventricle systolic pressure was elevated at greater than 60mmHg. The right ventricle is mildly dilated.  Moderate right ventricle free wall hypokinesia.  Small pericardial effusion.  There was trace mitral regurgitation.  Mild anterior wall hypokinesis, normal left ventricular systolic function.  Normal left ventricular structure in size.  Mitral valve leaflets appear thickened but open well.  Bilateral lower extremity venous Doppler showed deep vein thrombosis involving the right femoral, popliteal, posterior tibial, and gastroc veins.  CT PE protocol showed a bilateral pulmonary emboli present.  Large thrombi were present within the right main and left main pulmonary arteries.  There was extensive clot burden seen in both lungs.  Artherosclerotic vascular calcifications are not including involvement of the coronary arteries.  Emphysema with some mild scarring noted in the pulmonary apices.  There were no peripheral areas of consolidation to indicate pulmonary and fractions.  The patient was started on Lovenox injections while inpatient the hospital.  The INR became therapeutic on 6/14/14 she was then continued on Coumadin.  On 6/15 INR was elevated at 3.2 Coumadin was held and patient was notified to follow up with hematologist the next day.  ATIII 75.8(N), fibrinogen 547 (H), ferritin 290 (N), haptoglobin 298 (H), iron 22 (L), TIBC 230 (N), iron sat 10 (L), vitamin B12 320 (N), folate 20.7 (N), prothrombin gene negative, factor V Leiden 3.1 (N), lupus anticoagulation evaluation: IgG <14 (N),  IgM  <12 (N), DRVVT 49 (H), aPTT LA screen 44 (H),  (N), protein C activity 80.6 (N), protein S activity 83.1 (N)  • 6/16/14 - Patient was in the office for PT/INR.  INR was 2.0, advised patient to restart Coumadin 5 mg daily and follow INR protocol.    • 9/2/14 - Discussed that follow up testing will be done in three months to monitor the DVT and will discussed the Coumadin recommendation in six months. Advised the patient to continue following her INR protocol at this time.    • 9/8/15 - D-dimer 0.7 (>0.45).   • 3/3/15 - Fibrinogen 407 (197 to 425) and a thrombin 3 of 289 at 99% (80 to 120).  • 3/17/16 - INR 2.3 on 5 mg of Coumadin a day. Patient continued on protocol.  • 11/17/16 - INR 2.8 on 5.5 mg Coumadin daily. D-dimer 5.32 (H).     Past Medical History:   Diagnosis Date   • Anemia 05/2012   • History of DVT (deep vein thrombosis) 03/2013    bilateral lower extremity   • History of pneumonia 06/2012    community acquired pneumonia and right pleural effusion;hospitalized at [facility]   • History of pulmonary embolism 03/2013    bilateral PE   • Hypertension 2001   • Insomnia     on Ambien 5mg at [facility]   • Lobular breast cancer, left (CMS/HCC) 11/2011    Stage IA left upper lobular breast cancer   • Osteopenia 2012       Past Surgical History:   Procedure Laterality Date   • CATARACT EXTRACTION  2015   • IVC FILTER RETRIEVAL  06/2014    IVC filter placement by Dr. Card   • MAMMO STEREOTACTIC BREAST BX SURGICAL ADD UNI Left 11/01/2011    invasive lobular carcinoma-Dr. Cande Daniel   • MASTECTOMY, PARTIAL Left 12/01/2011    and left axillary sentinel lymph node biopsy by Dr. Uriostegui   • TUBAL ABDOMINAL LIGATION  1984         Current Outpatient Medications:   •  calcium carbonate-vitamin d (CALCIUM 600+D) 600-400 MG-UNIT per tablet, CALCIUM 600+D 600-400 MG-UNIT TABS, Disp: , Rfl:   •  fluticasone (FLONASE) 50 MCG/ACT nasal spray, 2 sprays into the nostril(s) as directed by provider Daily., Disp: , Rfl:    •  lisinopril-hydrochlorothiazide (PRINZIDE,ZESTORETIC) 20-25 MG per tablet, TK 2 TS PO D, Disp: , Rfl: 2  •  potassium chloride ER (K-TAB) 20 MEQ tablet controlled-release ER tablet, POTASSIUM CHLORIDE ER 20 MEQ CR-TABS, Disp: , Rfl:   •  SYMBICORT 80-4.5 MCG/ACT inhaler, INL 2 PFS PO BID IN THE MORNING AND IN THE JOSAFAT, Disp: , Rfl: 5  •  VENTOLIN  (90 Base) MCG/ACT inhaler, INL 2 PFS PO Q 4 TO 6 H PRN, Disp: , Rfl: 5  •  warfarin (COUMADIN) 1 MG tablet, Take 1 mg by mouth Daily., Disp: , Rfl: 3  •  warfarin (COUMADIN) 4 MG tablet, Take 4 mg by mouth Daily., Disp: , Rfl: 3    No Known Allergies    Family History   Problem Relation Age of Onset   • Lung cancer Brother        Cancer-related family history includes Lung cancer in her brother.    Social History     Tobacco Use   • Smoking status: Former Smoker     Types: Cigarettes     Last attempt to quit:      Years since quittin.5   • Tobacco comment: smoked 6 cigarettes a day from 13 years of age to 56 years of age when she quit in    Substance Use Topics   • Alcohol use: No     Frequency: Never   • Drug use: No       I have reviewed the history of present illness, past medical history, family history, social history, lab results, all notes and other records since the patient was last seen on 19.    SUBJECTIVE: Patient is here for follow up of bilateral lower extremity DVT and PE’s and Stage I breast cancer in the left breast. Reports that she is getting over a cold.      JUNIOR Carreon present during office visit.         REVIEW OF SYSTEMS:  Review of Systems   Constitutional: Negative for chills and fever.   HENT: Negative for ear pain, mouth sores, nosebleeds and sore throat.    Eyes: Negative for photophobia and visual disturbance.   Respiratory: Negative for wheezing and stridor.    Cardiovascular: Negative for chest pain and palpitations.   Gastrointestinal: Negative for abdominal pain, diarrhea, nausea and vomiting.  "  Endocrine: Negative for cold intolerance and heat intolerance.   Genitourinary: Negative for dysuria and hematuria.   Musculoskeletal: Negative for joint swelling and neck stiffness.   Skin: Negative for color change and rash.   Neurological: Negative for seizures and syncope.   Hematological: Negative for adenopathy.        No obvious bleeding   Psychiatric/Behavioral: Negative for agitation, confusion and hallucinations.       OBJECTIVE:    Vitals:    07/25/19 1015   BP: 180/98  Comment: manual bp   Pulse: 75   Resp: 18   Temp: 97.5 °F (36.4 °C)   Weight: 72.1 kg (159 lb)   Height: 162.6 cm (64\")   PainSc: 0-No pain       ECOG  (1) Restricted in physically strenuous activity, ambulatory and able to do work of light nature    Physical Exam   Constitutional: She is oriented to person, place, and time. No distress.   HENT:   Head: Normocephalic and atraumatic.   Dentures.    Eyes: Conjunctivae and EOM are normal. Right eye exhibits no discharge. Left eye exhibits no discharge. No scleral icterus.   Neck: Normal range of motion. Neck supple. No thyromegaly present.   Cardiovascular: Normal rate, regular rhythm and normal heart sounds. Exam reveals no gallop and no friction rub.   Pulmonary/Chest: Effort normal. No stridor. No respiratory distress. She has no wheezes.   3 x 4 cm soft tissue prominence in the left upper chest wall. Scar in upper left breast looks okay. No masses palpable bilaterally.    Abdominal: Soft. Bowel sounds are normal. She exhibits no mass. There is no tenderness. There is no rebound and no guarding.   Musculoskeletal: Normal range of motion. She exhibits no tenderness.   Lymphadenopathy:     She has no cervical adenopathy.   Neurological: She is alert and oriented to person, place, and time. She exhibits normal muscle tone.   Skin: Skin is warm. No rash noted. She is not diaphoretic. No erythema.   Psychiatric: She has a normal mood and affect. Her behavior is normal.   Nursing note and " vitals reviewed.      RECENT LABS  WBC   Date Value Ref Range Status   07/25/2019 10.41 3.40 - 10.80 10*3/mm3 Final     RBC   Date Value Ref Range Status   07/25/2019 5.05 3.77 - 5.28 10*6/mm3 Final     Hemoglobin   Date Value Ref Range Status   07/25/2019 14.3 12.0 - 15.9 g/dL Final     Hematocrit   Date Value Ref Range Status   07/25/2019 44.6 34.0 - 46.6 % Final     MCV   Date Value Ref Range Status   07/25/2019 88.3 79.0 - 97.0 fL Final     MCH   Date Value Ref Range Status   07/25/2019 28.3 26.6 - 33.0 pg Final     MCHC   Date Value Ref Range Status   07/25/2019 32.1 31.5 - 35.7 g/dL Final     RDW   Date Value Ref Range Status   07/25/2019 14.2 12.3 - 15.4 % Final     RDW-SD   Date Value Ref Range Status   07/25/2019 45.2 37.0 - 54.0 fl Final     MPV   Date Value Ref Range Status   07/25/2019 9.9 6.0 - 12.0 fL Final     Platelets   Date Value Ref Range Status   07/25/2019 239 140 - 450 10*3/mm3 Final     Neutrophil %   Date Value Ref Range Status   07/25/2019 72.2 42.7 - 76.0 % Final     Lymphocyte %   Date Value Ref Range Status   07/25/2019 15.2 (L) 19.6 - 45.3 % Final     Monocyte %   Date Value Ref Range Status   07/25/2019 8.5 5.0 - 12.0 % Final     Eosinophil %   Date Value Ref Range Status   07/25/2019 3.5 0.3 - 6.2 % Final     Basophil %   Date Value Ref Range Status   07/25/2019 0.6 0.0 - 1.5 % Final     Neutrophils, Absolute   Date Value Ref Range Status   07/25/2019 7.52 (H) 1.70 - 7.00 10*3/mm3 Final     Lymphocytes, Absolute   Date Value Ref Range Status   07/25/2019 1.58 0.70 - 3.10 10*3/mm3 Final     Monocytes, Absolute   Date Value Ref Range Status   07/25/2019 0.89 0.10 - 0.90 10*3/mm3 Final     Eosinophils, Absolute   Date Value Ref Range Status   07/25/2019 0.36 0.00 - 0.40 10*3/mm3 Final     Basophils, Absolute   Date Value Ref Range Status   07/25/2019 0.06 0.00 - 0.20 10*3/mm3 Final       Lab Results   Component Value Date    GLUCOSE 91 01/24/2019    BUN 18 01/24/2019    CREATININE 1.2 (H)  01/24/2019    BCR 15.0 01/24/2019    K 3.6 01/24/2019    CO2 28 01/24/2019    CALCIUM 9.5 01/24/2019    ALBUMIN 3.9 01/24/2019    LABIL2 1.3 01/24/2019    AST 18 01/24/2019    ALT 13 (L) 01/24/2019         Assessment/Plan     Chronic deep vein thrombosis (DVT) of femoral vein of both lower extremities (CMS/HCC)  - Protime-INR, Fingerstick  - POC INR    Deep vein thrombosis (DVT) of proximal vein of both lower extremities, unspecified chronicity (CMS/HCC)  - CBC & Differential  - CBC Auto Differential  - POC INR    Bilateral pulmonary embolism (CMS/HCC)    Malignant neoplasm of overlapping sites of left breast in female, estrogen receptor positive (CMS/HCC)      ASSESSMENT:  Patient claims to have felt the swelling on the left upper chest wall for a couple of weeks which on examination has a soft feel to it in an area of 3 x 4 cm.  She is taking tamoxifen 20 mg p.o. daily and tolerating it well.  Discussed the results of her DEXA scan.  She is on calcium 600 mg which she forgets to take daily and have encouraged her to take it regularly.  She is also on vitamin D 2000 units p.o. daily.  Her INRs are being monitored and she continues on warfarin.  She is up-to-date on mammogram.    PLAN:  Continue Tamoxifen 20 mg po daily. Will refill today.    Refill Coumadin 5 mg.  Continue Calcium 600 mg p.o. daily and Vitamin D 2000 units p.o. daily.    Ultrasound left chest at [facility].   CMP next visit.         I have reviewed labs results, imaging, vitals, and medications with the patient today. Will follow up in 6 months with NP.    Patient verbalized understanding and is in agreement of the above plan.    I have reviewed and validated the information above.   Rafy Rodriguez M.D., F.A.C.P.    Much of the above report is an electronic transcription//translation of the spoken language to printed text using Dragon Software. As such, the subtleties and finesse of the spoken language may permit erroneous, or at times,  nonsensical words or phrases to be inadvertently transcribed; thus changes may be made at a later date to rectify these errors.

## 2019-07-25 ENCOUNTER — OFFICE VISIT (OUTPATIENT)
Dept: LAB | Facility: HOSPITAL | Age: 79
End: 2019-07-25

## 2019-07-25 ENCOUNTER — OFFICE VISIT (OUTPATIENT)
Dept: ONCOLOGY | Facility: CLINIC | Age: 79
End: 2019-07-25

## 2019-07-25 VITALS
WEIGHT: 159 LBS | HEIGHT: 64 IN | TEMPERATURE: 97.5 F | DIASTOLIC BLOOD PRESSURE: 98 MMHG | HEART RATE: 75 BPM | SYSTOLIC BLOOD PRESSURE: 180 MMHG | BODY MASS INDEX: 27.14 KG/M2 | RESPIRATION RATE: 18 BRPM

## 2019-07-25 DIAGNOSIS — I82.4Y3 DEEP VEIN THROMBOSIS (DVT) OF PROXIMAL VEIN OF BOTH LOWER EXTREMITIES, UNSPECIFIED CHRONICITY (HCC): ICD-10-CM

## 2019-07-25 DIAGNOSIS — I82.4Y3 DEEP VEIN THROMBOSIS (DVT) OF PROXIMAL VEIN OF BOTH LOWER EXTREMITIES, UNSPECIFIED CHRONICITY (HCC): Primary | ICD-10-CM

## 2019-07-25 DIAGNOSIS — Z17.0 MALIGNANT NEOPLASM OF OVERLAPPING SITES OF LEFT BREAST IN FEMALE, ESTROGEN RECEPTOR POSITIVE (HCC): ICD-10-CM

## 2019-07-25 DIAGNOSIS — I26.99 BILATERAL PULMONARY EMBOLISM (HCC): Primary | ICD-10-CM

## 2019-07-25 DIAGNOSIS — C50.812 MALIGNANT NEOPLASM OF OVERLAPPING SITES OF LEFT BREAST IN FEMALE, ESTROGEN RECEPTOR POSITIVE (HCC): ICD-10-CM

## 2019-07-25 DIAGNOSIS — I82.513 CHRONIC DEEP VEIN THROMBOSIS (DVT) OF FEMORAL VEIN OF BOTH LOWER EXTREMITIES (HCC): ICD-10-CM

## 2019-07-25 LAB
BASOPHILS # BLD AUTO: 0.06 10*3/MM3 (ref 0–0.2)
BASOPHILS NFR BLD AUTO: 0.6 % (ref 0–1.5)
DEPRECATED RDW RBC AUTO: 45.2 FL (ref 37–54)
EOSINOPHIL # BLD AUTO: 0.36 10*3/MM3 (ref 0–0.4)
EOSINOPHIL NFR BLD AUTO: 3.5 % (ref 0.3–6.2)
ERYTHROCYTE [DISTWIDTH] IN BLOOD BY AUTOMATED COUNT: 14.2 % (ref 12.3–15.4)
HCT VFR BLD AUTO: 44.6 % (ref 34–46.6)
HGB BLD-MCNC: 14.3 G/DL (ref 12–15.9)
INR PPP: 2.6 (ref 2–3)
INR PPP: 2.6 (ref 2–3)
LYMPHOCYTES # BLD AUTO: 1.58 10*3/MM3 (ref 0.7–3.1)
LYMPHOCYTES NFR BLD AUTO: 15.2 % (ref 19.6–45.3)
MCH RBC QN AUTO: 28.3 PG (ref 26.6–33)
MCHC RBC AUTO-ENTMCNC: 32.1 G/DL (ref 31.5–35.7)
MCV RBC AUTO: 88.3 FL (ref 79–97)
MONOCYTES # BLD AUTO: 0.89 10*3/MM3 (ref 0.1–0.9)
MONOCYTES NFR BLD AUTO: 8.5 % (ref 5–12)
NEUTROPHILS # BLD AUTO: 7.52 10*3/MM3 (ref 1.7–7)
NEUTROPHILS NFR BLD AUTO: 72.2 % (ref 42.7–76)
PLATELET # BLD AUTO: 239 10*3/MM3 (ref 140–450)
PMV BLD AUTO: 9.9 FL (ref 6–12)
RBC # BLD AUTO: 5.05 10*6/MM3 (ref 3.77–5.28)
WBC NRBC COR # BLD: 10.41 10*3/MM3 (ref 3.4–10.8)

## 2019-07-25 PROCEDURE — 85610 PROTHROMBIN TIME: CPT | Performed by: INTERNAL MEDICINE

## 2019-07-25 PROCEDURE — 36416 COLLJ CAPILLARY BLOOD SPEC: CPT | Performed by: INTERNAL MEDICINE

## 2019-07-25 PROCEDURE — 99214 OFFICE O/P EST MOD 30 MIN: CPT | Performed by: INTERNAL MEDICINE

## 2019-07-25 PROCEDURE — 85025 COMPLETE CBC W/AUTO DIFF WBC: CPT | Performed by: INTERNAL MEDICINE

## 2019-07-25 PROCEDURE — 36415 COLL VENOUS BLD VENIPUNCTURE: CPT | Performed by: INTERNAL MEDICINE

## 2019-07-25 RX ORDER — POTASSIUM CHLORIDE 1500 MG/1
TABLET, FILM COATED, EXTENDED RELEASE ORAL
COMMUNITY
Start: 2014-08-07 | End: 2019-11-07 | Stop reason: SDUPTHER

## 2019-07-25 RX ORDER — DILTIAZEM HYDROCHLORIDE 60 MG/1
2 TABLET, FILM COATED ORAL
Refills: 5 | COMMUNITY
Start: 2019-05-14

## 2019-07-25 RX ORDER — ALBUTEROL SULFATE 90 UG/1
2 AEROSOL, METERED RESPIRATORY (INHALATION) EVERY 4 HOURS PRN
Refills: 5 | COMMUNITY
Start: 2019-05-13

## 2019-07-25 RX ORDER — LISINOPRIL AND HYDROCHLOROTHIAZIDE 25; 20 MG/1; MG/1
TABLET ORAL
Refills: 2 | COMMUNITY
Start: 2019-05-31 | End: 2021-09-10

## 2019-07-25 RX ORDER — WARFARIN SODIUM 4 MG/1
4 TABLET ORAL DAILY
Refills: 3 | COMMUNITY
Start: 2019-06-27 | End: 2022-10-27 | Stop reason: HOSPADM

## 2019-07-25 RX ORDER — WARFARIN SODIUM 1 MG/1
1 TABLET ORAL DAILY
Refills: 3 | COMMUNITY
Start: 2019-06-27 | End: 2022-10-25

## 2019-07-25 RX ORDER — TAMOXIFEN CITRATE 20 MG/1
20 TABLET ORAL DAILY
Qty: 90 TABLET | Refills: 3 | Status: SHIPPED | OUTPATIENT
Start: 2019-07-25 | End: 2021-09-10

## 2019-07-25 RX ORDER — FLUTICASONE PROPIONATE 50 MCG
2 SPRAY, SUSPENSION (ML) NASAL DAILY
COMMUNITY
End: 2020-02-01

## 2019-07-25 RX ORDER — WARFARIN SODIUM 5 MG/1
TABLET ORAL
Qty: 30 TABLET | Refills: 3 | Status: SHIPPED | OUTPATIENT
Start: 2019-07-25 | End: 2022-10-25

## 2019-08-22 ENCOUNTER — HOSPITAL ENCOUNTER (OUTPATIENT)
Dept: ONCOLOGY | Facility: HOSPITAL | Age: 79
Discharge: HOME | End: 2019-08-22
Admitting: INTERNAL MEDICINE

## 2019-08-22 ENCOUNTER — APPOINTMENT (OUTPATIENT)
Dept: LAB | Facility: HOSPITAL | Age: 79
End: 2019-08-22

## 2019-08-22 VITALS — HEIGHT: 64 IN | BODY MASS INDEX: 26.98 KG/M2 | RESPIRATION RATE: 18 BRPM | WEIGHT: 158 LBS | TEMPERATURE: 98.1 F

## 2019-08-22 DIAGNOSIS — I82.4Y3 DEEP VEIN THROMBOSIS (DVT) OF PROXIMAL VEIN OF BOTH LOWER EXTREMITIES, UNSPECIFIED CHRONICITY (HCC): Primary | ICD-10-CM

## 2019-08-22 LAB — INR PPP: 5.1

## 2019-08-22 PROCEDURE — 85610 PROTHROMBIN TIME: CPT | Performed by: INTERNAL MEDICINE

## 2019-08-23 ENCOUNTER — HOSPITAL ENCOUNTER (OUTPATIENT)
Dept: ONCOLOGY | Facility: HOSPITAL | Age: 79
Discharge: HOME | End: 2019-08-23
Admitting: INTERNAL MEDICINE

## 2019-08-23 ENCOUNTER — LAB (OUTPATIENT)
Dept: LAB | Facility: HOSPITAL | Age: 79
End: 2019-08-23

## 2019-08-23 VITALS
DIASTOLIC BLOOD PRESSURE: 76 MMHG | HEIGHT: 64 IN | WEIGHT: 158 LBS | SYSTOLIC BLOOD PRESSURE: 140 MMHG | HEART RATE: 76 BPM | RESPIRATION RATE: 18 BRPM | BODY MASS INDEX: 26.98 KG/M2 | TEMPERATURE: 98 F

## 2019-08-23 DIAGNOSIS — I82.513 CHRONIC DEEP VEIN THROMBOSIS (DVT) OF FEMORAL VEIN OF BOTH LOWER EXTREMITIES (HCC): ICD-10-CM

## 2019-08-23 DIAGNOSIS — I82.4Y3 DEEP VEIN THROMBOSIS (DVT) OF PROXIMAL VEIN OF BOTH LOWER EXTREMITIES, UNSPECIFIED CHRONICITY (HCC): Primary | ICD-10-CM

## 2019-08-23 LAB — INR PPP: 3.1

## 2019-08-23 PROCEDURE — 85610 PROTHROMBIN TIME: CPT

## 2019-08-26 ENCOUNTER — LAB (OUTPATIENT)
Dept: LAB | Facility: HOSPITAL | Age: 79
End: 2019-08-26

## 2019-08-26 ENCOUNTER — HOSPITAL ENCOUNTER (OUTPATIENT)
Dept: ONCOLOGY | Facility: HOSPITAL | Age: 79
Discharge: HOME | End: 2019-08-26
Admitting: INTERNAL MEDICINE

## 2019-08-26 VITALS
DIASTOLIC BLOOD PRESSURE: 73 MMHG | HEIGHT: 64 IN | RESPIRATION RATE: 18 BRPM | BODY MASS INDEX: 27.14 KG/M2 | SYSTOLIC BLOOD PRESSURE: 172 MMHG | TEMPERATURE: 97.6 F | WEIGHT: 159 LBS | HEART RATE: 79 BPM

## 2019-08-26 DIAGNOSIS — I82.513 CHRONIC DEEP VEIN THROMBOSIS (DVT) OF FEMORAL VEIN OF BOTH LOWER EXTREMITIES (HCC): ICD-10-CM

## 2019-08-26 DIAGNOSIS — I82.4Y3 DEEP VEIN THROMBOSIS (DVT) OF PROXIMAL VEIN OF BOTH LOWER EXTREMITIES, UNSPECIFIED CHRONICITY (HCC): Primary | ICD-10-CM

## 2019-08-26 LAB — INR PPP: 2.4

## 2019-08-26 PROCEDURE — 85610 PROTHROMBIN TIME: CPT

## 2019-09-09 ENCOUNTER — HOSPITAL ENCOUNTER (OUTPATIENT)
Dept: ONCOLOGY | Facility: HOSPITAL | Age: 79
Discharge: HOME | End: 2019-09-09
Admitting: INTERNAL MEDICINE

## 2019-09-09 ENCOUNTER — LAB (OUTPATIENT)
Dept: LAB | Facility: HOSPITAL | Age: 79
End: 2019-09-09

## 2019-09-09 VITALS
RESPIRATION RATE: 18 BRPM | WEIGHT: 158 LBS | TEMPERATURE: 97.5 F | BODY MASS INDEX: 26.98 KG/M2 | DIASTOLIC BLOOD PRESSURE: 73 MMHG | HEART RATE: 73 BPM | SYSTOLIC BLOOD PRESSURE: 162 MMHG | HEIGHT: 64 IN

## 2019-09-09 DIAGNOSIS — I82.513 CHRONIC DEEP VEIN THROMBOSIS (DVT) OF FEMORAL VEIN OF BOTH LOWER EXTREMITIES (HCC): ICD-10-CM

## 2019-09-09 DIAGNOSIS — I82.4Y3 DEEP VEIN THROMBOSIS (DVT) OF PROXIMAL VEIN OF BOTH LOWER EXTREMITIES, UNSPECIFIED CHRONICITY (HCC): Primary | ICD-10-CM

## 2019-09-09 LAB — INR PPP: 2.4

## 2019-09-09 PROCEDURE — 85610 PROTHROMBIN TIME: CPT

## 2019-10-10 ENCOUNTER — LAB (OUTPATIENT)
Dept: LAB | Facility: HOSPITAL | Age: 79
End: 2019-10-10

## 2019-10-10 ENCOUNTER — HOSPITAL ENCOUNTER (OUTPATIENT)
Dept: ONCOLOGY | Facility: HOSPITAL | Age: 79
Discharge: HOME | End: 2019-10-10
Admitting: INTERNAL MEDICINE

## 2019-10-10 VITALS
RESPIRATION RATE: 18 BRPM | WEIGHT: 158 LBS | HEART RATE: 72 BPM | BODY MASS INDEX: 26.98 KG/M2 | TEMPERATURE: 97.6 F | HEIGHT: 64 IN | SYSTOLIC BLOOD PRESSURE: 160 MMHG | DIASTOLIC BLOOD PRESSURE: 70 MMHG

## 2019-10-10 DIAGNOSIS — I82.513 CHRONIC DEEP VEIN THROMBOSIS (DVT) OF FEMORAL VEIN OF BOTH LOWER EXTREMITIES (HCC): ICD-10-CM

## 2019-10-10 DIAGNOSIS — I82.4Y3 DEEP VEIN THROMBOSIS (DVT) OF PROXIMAL VEIN OF BOTH LOWER EXTREMITIES, UNSPECIFIED CHRONICITY (HCC): Primary | ICD-10-CM

## 2019-10-10 LAB
INR PPP: 2.5
INR PPP: 2.5 (ref 2–3)

## 2019-10-10 PROCEDURE — 85610 PROTHROMBIN TIME: CPT

## 2019-10-10 PROCEDURE — 36416 COLLJ CAPILLARY BLOOD SPEC: CPT

## 2019-11-07 ENCOUNTER — LAB (OUTPATIENT)
Dept: LAB | Facility: HOSPITAL | Age: 79
End: 2019-11-07

## 2019-11-07 ENCOUNTER — HOSPITAL ENCOUNTER (OUTPATIENT)
Dept: ONCOLOGY | Facility: HOSPITAL | Age: 79
Discharge: HOME | End: 2019-11-07
Admitting: INTERNAL MEDICINE

## 2019-11-07 VITALS
WEIGHT: 159 LBS | RESPIRATION RATE: 18 BRPM | DIASTOLIC BLOOD PRESSURE: 80 MMHG | HEART RATE: 82 BPM | BODY MASS INDEX: 27.14 KG/M2 | TEMPERATURE: 97.8 F | HEIGHT: 64 IN | SYSTOLIC BLOOD PRESSURE: 160 MMHG

## 2019-11-07 DIAGNOSIS — I82.4Y3 DEEP VEIN THROMBOSIS (DVT) OF PROXIMAL VEIN OF BOTH LOWER EXTREMITIES, UNSPECIFIED CHRONICITY (HCC): Primary | ICD-10-CM

## 2019-11-07 DIAGNOSIS — I82.513 CHRONIC DEEP VEIN THROMBOSIS (DVT) OF FEMORAL VEIN OF BOTH LOWER EXTREMITIES (HCC): ICD-10-CM

## 2019-11-07 LAB
INR PPP: 2.6
INR PPP: 2.6 (ref 2–3)

## 2019-11-07 PROCEDURE — 85610 PROTHROMBIN TIME: CPT

## 2019-11-07 PROCEDURE — 36416 COLLJ CAPILLARY BLOOD SPEC: CPT

## 2019-11-07 RX ORDER — POTASSIUM CHLORIDE 1500 MG/1
TABLET, FILM COATED, EXTENDED RELEASE ORAL
Qty: 60 TABLET | Refills: 1 | Status: SHIPPED | OUTPATIENT
Start: 2019-11-07 | End: 2019-11-07 | Stop reason: SDUPTHER

## 2019-11-07 RX ORDER — POTASSIUM CHLORIDE 1500 MG/1
TABLET, FILM COATED, EXTENDED RELEASE ORAL
Qty: 90 TABLET | Refills: 1 | Status: SHIPPED | OUTPATIENT
Start: 2019-11-07 | End: 2020-02-01

## 2019-12-05 ENCOUNTER — HOSPITAL ENCOUNTER (OUTPATIENT)
Dept: ONCOLOGY | Facility: HOSPITAL | Age: 79
Discharge: HOME | End: 2019-12-05
Admitting: INTERNAL MEDICINE

## 2019-12-05 ENCOUNTER — LAB (OUTPATIENT)
Dept: LAB | Facility: HOSPITAL | Age: 79
End: 2019-12-05

## 2019-12-05 VITALS
DIASTOLIC BLOOD PRESSURE: 70 MMHG | SYSTOLIC BLOOD PRESSURE: 180 MMHG | TEMPERATURE: 97.8 F | WEIGHT: 157 LBS | HEIGHT: 64 IN | HEART RATE: 72 BPM | BODY MASS INDEX: 26.8 KG/M2

## 2019-12-05 DIAGNOSIS — I82.4Y3 DEEP VEIN THROMBOSIS (DVT) OF PROXIMAL VEIN OF BOTH LOWER EXTREMITIES, UNSPECIFIED CHRONICITY (HCC): Primary | ICD-10-CM

## 2019-12-05 DIAGNOSIS — I82.513 CHRONIC DEEP VEIN THROMBOSIS (DVT) OF FEMORAL VEIN OF BOTH LOWER EXTREMITIES (HCC): ICD-10-CM

## 2019-12-05 LAB — INR PPP: 3.7

## 2019-12-05 PROCEDURE — 85610 PROTHROMBIN TIME: CPT

## 2019-12-06 ENCOUNTER — LAB (OUTPATIENT)
Dept: LAB | Facility: HOSPITAL | Age: 79
End: 2019-12-06

## 2019-12-06 ENCOUNTER — HOSPITAL ENCOUNTER (OUTPATIENT)
Dept: ONCOLOGY | Facility: HOSPITAL | Age: 79
Discharge: HOME | End: 2019-12-06
Admitting: INTERNAL MEDICINE

## 2019-12-06 DIAGNOSIS — I82.513 CHRONIC DEEP VEIN THROMBOSIS (DVT) OF FEMORAL VEIN OF BOTH LOWER EXTREMITIES (HCC): ICD-10-CM

## 2019-12-06 DIAGNOSIS — I82.4Y3 DEEP VEIN THROMBOSIS (DVT) OF PROXIMAL VEIN OF BOTH LOWER EXTREMITIES, UNSPECIFIED CHRONICITY (HCC): Primary | ICD-10-CM

## 2019-12-06 LAB — INR PPP: 2.5

## 2019-12-06 PROCEDURE — 85610 PROTHROMBIN TIME: CPT

## 2019-12-13 ENCOUNTER — HOSPITAL ENCOUNTER (OUTPATIENT)
Dept: ONCOLOGY | Facility: HOSPITAL | Age: 79
Discharge: HOME | End: 2019-12-13
Admitting: NURSE PRACTITIONER

## 2019-12-13 ENCOUNTER — LAB (OUTPATIENT)
Dept: LAB | Facility: HOSPITAL | Age: 79
End: 2019-12-13

## 2019-12-13 VITALS
WEIGHT: 154 LBS | RESPIRATION RATE: 18 BRPM | HEIGHT: 64 IN | BODY MASS INDEX: 26.29 KG/M2 | TEMPERATURE: 97.7 F | HEART RATE: 68 BPM | DIASTOLIC BLOOD PRESSURE: 73 MMHG | SYSTOLIC BLOOD PRESSURE: 166 MMHG

## 2019-12-13 DIAGNOSIS — I82.4Y3 DEEP VEIN THROMBOSIS (DVT) OF PROXIMAL VEIN OF BOTH LOWER EXTREMITIES, UNSPECIFIED CHRONICITY (HCC): Primary | ICD-10-CM

## 2019-12-13 DIAGNOSIS — I82.513 CHRONIC DEEP VEIN THROMBOSIS (DVT) OF FEMORAL VEIN OF BOTH LOWER EXTREMITIES (HCC): Primary | ICD-10-CM

## 2019-12-13 LAB — INR PPP: 1.9

## 2019-12-13 PROCEDURE — 85610 PROTHROMBIN TIME: CPT

## 2019-12-20 ENCOUNTER — LAB (OUTPATIENT)
Dept: LAB | Facility: HOSPITAL | Age: 79
End: 2019-12-20

## 2019-12-20 ENCOUNTER — HOSPITAL ENCOUNTER (OUTPATIENT)
Dept: ONCOLOGY | Facility: HOSPITAL | Age: 79
Discharge: HOME | End: 2019-12-20
Admitting: NURSE PRACTITIONER

## 2019-12-20 VITALS
BODY MASS INDEX: 26.46 KG/M2 | HEART RATE: 73 BPM | RESPIRATION RATE: 18 BRPM | DIASTOLIC BLOOD PRESSURE: 92 MMHG | TEMPERATURE: 97.6 F | SYSTOLIC BLOOD PRESSURE: 184 MMHG | HEIGHT: 64 IN | WEIGHT: 155 LBS

## 2019-12-20 DIAGNOSIS — I82.513 CHRONIC DEEP VEIN THROMBOSIS (DVT) OF FEMORAL VEIN OF BOTH LOWER EXTREMITIES (HCC): ICD-10-CM

## 2019-12-20 DIAGNOSIS — I82.4Y3 DEEP VEIN THROMBOSIS (DVT) OF PROXIMAL VEIN OF BOTH LOWER EXTREMITIES, UNSPECIFIED CHRONICITY (HCC): Primary | ICD-10-CM

## 2019-12-20 LAB — INR PPP: 2.8

## 2019-12-20 PROCEDURE — 85610 PROTHROMBIN TIME: CPT

## 2019-12-26 PROCEDURE — 87086 URINE CULTURE/COLONY COUNT: CPT | Performed by: NURSE PRACTITIONER

## 2019-12-27 ENCOUNTER — LAB (OUTPATIENT)
Dept: LAB | Facility: HOSPITAL | Age: 79
End: 2019-12-27

## 2019-12-27 ENCOUNTER — HOSPITAL ENCOUNTER (OUTPATIENT)
Dept: ONCOLOGY | Facility: HOSPITAL | Age: 79
Discharge: HOME | End: 2019-12-27
Admitting: NURSE PRACTITIONER

## 2019-12-27 VITALS
HEIGHT: 64 IN | BODY MASS INDEX: 26.29 KG/M2 | RESPIRATION RATE: 18 BRPM | SYSTOLIC BLOOD PRESSURE: 160 MMHG | WEIGHT: 154 LBS | TEMPERATURE: 98.1 F | HEART RATE: 84 BPM | DIASTOLIC BLOOD PRESSURE: 84 MMHG

## 2019-12-27 DIAGNOSIS — I82.4Y3 DEEP VEIN THROMBOSIS (DVT) OF PROXIMAL VEIN OF BOTH LOWER EXTREMITIES, UNSPECIFIED CHRONICITY (HCC): Primary | ICD-10-CM

## 2019-12-27 DIAGNOSIS — I82.513 CHRONIC DEEP VEIN THROMBOSIS (DVT) OF FEMORAL VEIN OF BOTH LOWER EXTREMITIES (HCC): ICD-10-CM

## 2019-12-27 LAB — INR PPP: 2.2

## 2019-12-27 PROCEDURE — 85610 PROTHROMBIN TIME: CPT

## 2019-12-28 ENCOUNTER — TELEPHONE (OUTPATIENT)
Dept: URGENT CARE | Facility: CLINIC | Age: 79
End: 2019-12-28

## 2020-01-20 ENCOUNTER — TELEPHONE (OUTPATIENT)
Dept: ONCOLOGY | Facility: CLINIC | Age: 80
End: 2020-01-20

## 2020-01-20 NOTE — TELEPHONE ENCOUNTER
Called and spoke to patient. She stated she had an appt with  NP this coming Thursday and that she was going to follow him  EL

## 2020-01-23 ENCOUNTER — APPOINTMENT (OUTPATIENT)
Dept: ONCOLOGY | Facility: HOSPITAL | Age: 80
End: 2020-01-23

## 2020-01-23 ENCOUNTER — APPOINTMENT (OUTPATIENT)
Dept: LAB | Facility: HOSPITAL | Age: 80
End: 2020-01-23

## 2020-02-01 PROCEDURE — 87086 URINE CULTURE/COLONY COUNT: CPT | Performed by: FAMILY MEDICINE

## 2020-07-16 DIAGNOSIS — I26.99 BILATERAL PULMONARY EMBOLISM (HCC): ICD-10-CM

## 2020-07-16 DIAGNOSIS — I82.513 CHRONIC DEEP VEIN THROMBOSIS (DVT) OF FEMORAL VEIN OF BOTH LOWER EXTREMITIES (HCC): ICD-10-CM

## 2020-07-16 RX ORDER — WARFARIN SODIUM 5 MG/1
TABLET ORAL
Qty: 30 TABLET | Refills: 3 | OUTPATIENT
Start: 2020-07-16

## 2020-07-27 RX ORDER — WARFARIN SODIUM 1 MG/1
TABLET ORAL
Qty: 90 TABLET | OUTPATIENT
Start: 2020-07-27

## 2020-12-23 PROCEDURE — U0003 INFECTIOUS AGENT DETECTION BY NUCLEIC ACID (DNA OR RNA); SEVERE ACUTE RESPIRATORY SYNDROME CORONAVIRUS 2 (SARS-COV-2) (CORONAVIRUS DISEASE [COVID-19]), AMPLIFIED PROBE TECHNIQUE, MAKING USE OF HIGH THROUGHPUT TECHNOLOGIES AS DESCRIBED BY CMS-2020-01-R: HCPCS | Performed by: NURSE PRACTITIONER

## 2021-01-10 PROCEDURE — 87086 URINE CULTURE/COLONY COUNT: CPT | Performed by: FAMILY MEDICINE

## 2021-01-10 PROCEDURE — 87088 URINE BACTERIA CULTURE: CPT | Performed by: FAMILY MEDICINE

## 2021-01-10 PROCEDURE — 87186 SC STD MICRODIL/AGAR DIL: CPT | Performed by: FAMILY MEDICINE

## 2021-06-21 ENCOUNTER — HOSPITAL ENCOUNTER (EMERGENCY)
Facility: HOSPITAL | Age: 81
Discharge: HOME | End: 2021-06-21
Attending: EMERGENCY MEDICINE | Admitting: EMERGENCY MEDICINE

## 2021-06-21 VITALS
OXYGEN SATURATION: 95 % | RESPIRATION RATE: 14 BRPM | HEIGHT: 64 IN | HEART RATE: 75 BPM | SYSTOLIC BLOOD PRESSURE: 140 MMHG | TEMPERATURE: 97.6 F | BODY MASS INDEX: 24.46 KG/M2 | WEIGHT: 143.3 LBS | DIASTOLIC BLOOD PRESSURE: 63 MMHG

## 2021-06-21 DIAGNOSIS — I10 HYPERTENSION, UNSPECIFIED TYPE: ICD-10-CM

## 2021-06-21 DIAGNOSIS — M54.30 SCIATICA, UNSPECIFIED LATERALITY: Primary | ICD-10-CM

## 2021-06-21 LAB
ANION GAP SERPL CALCULATED.3IONS-SCNC: 14 MMOL/L (ref 5–15)
BASOPHILS # BLD AUTO: 0.1 10*3/MM3 (ref 0–0.2)
BASOPHILS NFR BLD AUTO: 1.2 % (ref 0–1.5)
BUN SERPL-MCNC: 14 MG/DL (ref 8–23)
BUN/CREAT SERPL: 14.4 (ref 7–25)
CALCIUM SPEC-SCNC: 10.1 MG/DL (ref 8.6–10.5)
CHLORIDE SERPL-SCNC: 100 MMOL/L (ref 98–107)
CO2 SERPL-SCNC: 25 MMOL/L (ref 22–29)
CREAT SERPL-MCNC: 0.97 MG/DL (ref 0.57–1)
DEPRECATED RDW RBC AUTO: 45.1 FL (ref 37–54)
EOSINOPHIL # BLD AUTO: 0.2 10*3/MM3 (ref 0–0.4)
EOSINOPHIL NFR BLD AUTO: 2.4 % (ref 0.3–6.2)
ERYTHROCYTE [DISTWIDTH] IN BLOOD BY AUTOMATED COUNT: 15 % (ref 12.3–15.4)
GFR SERPL CREATININE-BSD FRML MDRD: 55 ML/MIN/1.73
GLUCOSE SERPL-MCNC: 107 MG/DL (ref 65–99)
HCT VFR BLD AUTO: 46.6 % (ref 34–46.6)
HGB BLD-MCNC: 16 G/DL (ref 12–15.9)
INR PPP: 2.07 (ref 2–3)
LYMPHOCYTES # BLD AUTO: 1.4 10*3/MM3 (ref 0.7–3.1)
LYMPHOCYTES NFR BLD AUTO: 13.2 % (ref 19.6–45.3)
MCH RBC QN AUTO: 29.4 PG (ref 26.6–33)
MCHC RBC AUTO-ENTMCNC: 34.4 G/DL (ref 31.5–35.7)
MCV RBC AUTO: 85.4 FL (ref 79–97)
MONOCYTES # BLD AUTO: 0.8 10*3/MM3 (ref 0.1–0.9)
MONOCYTES NFR BLD AUTO: 8.2 % (ref 5–12)
NEUTROPHILS NFR BLD AUTO: 7.7 10*3/MM3 (ref 1.7–7)
NEUTROPHILS NFR BLD AUTO: 75 % (ref 42.7–76)
NRBC BLD AUTO-RTO: 0.2 /100 WBC (ref 0–0.2)
PLATELET # BLD AUTO: 219 10*3/MM3 (ref 140–450)
PMV BLD AUTO: 7.8 FL (ref 6–12)
POTASSIUM SERPL-SCNC: 3.5 MMOL/L (ref 3.5–5.2)
PROTHROMBIN TIME: 21.8 SECONDS (ref 19.4–28.5)
RBC # BLD AUTO: 5.46 10*6/MM3 (ref 3.77–5.28)
SODIUM SERPL-SCNC: 139 MMOL/L (ref 136–145)
TROPONIN T SERPL-MCNC: <0.01 NG/ML (ref 0–0.03)
TSH SERPL DL<=0.05 MIU/L-ACNC: 6.39 UIU/ML (ref 0.27–4.2)
WBC # BLD AUTO: 10.3 10*3/MM3 (ref 3.4–10.8)

## 2021-06-21 PROCEDURE — 99283 EMERGENCY DEPT VISIT LOW MDM: CPT

## 2021-06-21 PROCEDURE — 93005 ELECTROCARDIOGRAM TRACING: CPT

## 2021-06-21 PROCEDURE — 93005 ELECTROCARDIOGRAM TRACING: CPT | Performed by: EMERGENCY MEDICINE

## 2021-06-21 PROCEDURE — 84484 ASSAY OF TROPONIN QUANT: CPT | Performed by: EMERGENCY MEDICINE

## 2021-06-21 PROCEDURE — 85025 COMPLETE CBC W/AUTO DIFF WBC: CPT | Performed by: EMERGENCY MEDICINE

## 2021-06-21 PROCEDURE — 84443 ASSAY THYROID STIM HORMONE: CPT | Performed by: EMERGENCY MEDICINE

## 2021-06-21 PROCEDURE — 36415 COLL VENOUS BLD VENIPUNCTURE: CPT | Performed by: EMERGENCY MEDICINE

## 2021-06-21 PROCEDURE — 80048 BASIC METABOLIC PNL TOTAL CA: CPT | Performed by: EMERGENCY MEDICINE

## 2021-06-21 PROCEDURE — 85610 PROTHROMBIN TIME: CPT | Performed by: EMERGENCY MEDICINE

## 2021-06-21 RX ORDER — CLONIDINE HYDROCHLORIDE 0.1 MG/1
0.1 TABLET ORAL 2 TIMES DAILY PRN
Qty: 15 TABLET | Refills: 0 | Status: SHIPPED | OUTPATIENT
Start: 2021-06-21

## 2021-06-21 RX ORDER — OXYCODONE HYDROCHLORIDE AND ACETAMINOPHEN 5; 325 MG/1; MG/1
1 TABLET ORAL EVERY 6 HOURS PRN
Qty: 20 TABLET | Refills: 0 | Status: SHIPPED | OUTPATIENT
Start: 2021-06-21 | End: 2021-09-10

## 2021-06-21 RX ORDER — CLONIDINE HYDROCHLORIDE 0.1 MG/1
0.1 TABLET ORAL ONCE
Status: COMPLETED | OUTPATIENT
Start: 2021-06-21 | End: 2021-06-21

## 2021-06-21 RX ORDER — OXYCODONE HYDROCHLORIDE 5 MG/1
5 TABLET ORAL EVERY 4 HOURS PRN
Status: DISCONTINUED | OUTPATIENT
Start: 2021-06-21 | End: 2021-06-21 | Stop reason: HOSPADM

## 2021-06-21 RX ADMIN — CLONIDINE HYDROCHLORIDE 0.1 MG: 0.1 TABLET ORAL at 01:26

## 2021-06-21 RX ADMIN — OXYCODONE 5 MG: 5 TABLET ORAL at 04:42

## 2021-06-21 NOTE — ED PROVIDER NOTES
Subjective   81-year-old female with elevated blood pressure at home.  Patient has felt jittery, otherwise patient has been asymptomatic specifically, no headache, no dizziness, no chest pain or shortness of air.  Patient has some chronic back pain which has been worse here lately which she thinks may be aggravating her blood pressure.  Patient has difficulty controlling her blood pressure at baseline.  No clear aggravating alleviating factors.  Blood pressure elevation is moderate.          Review of Systems   Musculoskeletal: Positive for back pain.   Psychiatric/Behavioral: The patient is nervous/anxious.    All other systems reviewed and are negative.      Past Medical History:   Diagnosis Date   • Anemia 05/2012   • History of DVT (deep vein thrombosis) 03/2013    bilateral lower extremity   • History of pneumonia 06/2012    community acquired pneumonia and right pleural effusion;hospitalized at [facility]   • History of pulmonary embolism 03/2013    bilateral PE   • Hypertension 2001   • Insomnia     on Ambien 5mg at    • Lobular breast cancer, left (CMS/HCC) 11/2011    Stage IA left upper lobular breast cancer   • Osteopenia 2012       No Known Allergies    Past Surgical History:   Procedure Laterality Date   • CATARACT EXTRACTION  2015   • IVC FILTER RETRIEVAL  06/2014    IVC filter placement by Dr. Card   • MAMMO STEREOTACTIC BREAST BX SURGICAL ADD UNI Left 11/01/2011    invasive lobular carcinoma-Dr. Cande Daniel   • MASTECTOMY, PARTIAL Left 12/01/2011    and left axillary sentinel lymph node biopsy by Dr. Uriostegui   • TUBAL ABDOMINAL LIGATION  1984       Family History   Problem Relation Age of Onset   • Lung cancer Brother        Social History     Socioeconomic History   • Marital status:      Spouse name: Not on file   • Number of children: Not on file   • Years of education: Not on file   • Highest education level: Not on file   Tobacco Use   • Smoking status: Former Smoker     Types:  Cigarettes     Quit date:      Years since quittin.4   • Smokeless tobacco: Current User   • Tobacco comment: smoked 6 cigarettes a day from 13 years of age to 56 years of age when she quit in    Vaping Use   • Vaping Use: Never used   Substance and Sexual Activity   • Alcohol use: No   • Drug use: No   • Sexual activity: Defer           Objective   Physical Exam  Constitutional:       Appearance: Normal appearance.   HENT:      Head: Normocephalic and atraumatic.      Mouth/Throat:      Mouth: Mucous membranes are moist.      Pharynx: Oropharynx is clear.   Eyes:      Conjunctiva/sclera: Conjunctivae normal.      Pupils: Pupils are equal, round, and reactive to light.   Cardiovascular:      Rate and Rhythm: Normal rate and regular rhythm.      Heart sounds: Normal heart sounds.   Pulmonary:      Effort: Pulmonary effort is normal.      Breath sounds: Normal breath sounds.   Abdominal:      General: Bowel sounds are normal. There is no distension.      Palpations: Abdomen is soft.      Tenderness: There is no abdominal tenderness.   Musculoskeletal:         General: No swelling.   Skin:     General: Skin is warm and dry.      Capillary Refill: Capillary refill takes less than 2 seconds.   Neurological:      General: No focal deficit present.      Mental Status: She is alert and oriented to person, place, and time.   Psychiatric:         Mood and Affect: Mood normal.         Behavior: Behavior normal.         Procedures           ED Course  ED Course as of  043   0101 EKG interpretation: Normal sinus rhythm, rate 70, no acute ST change    [JR]      ED Course User Index  [JR] Juan C Thomas MD                                           [facility]  Number of Diagnoses or Management Options  Hypertension, unspecified type  Sciatica, unspecified laterality  Diagnosis management comments: Results for orders placed or performed during the hospital encounter of  06/21/21  -Protime-INR  Specimen: Blood       Result                      Value             Ref Range           Protime                     21.8              19.4 - 28.5 *       INR                         2.07              2.00 - 3.00    -Basic Metabolic Panel  Specimen: Blood       Result                      Value             Ref Range           Glucose                     107 (H)           65 - 99 mg/dL       BUN                         14                8 - 23 mg/dL        Creatinine                  0.97              0.57 - 1.00 *       Sodium                      139               136 - 145 mm*       Potassium                   3.5               3.5 - 5.2 mm*       Chloride                    100               98 - 107 mmo*       CO2                         25.0              22.0 - 29.0 *       Calcium                     10.1              8.6 - 10.5 m*       eGFR Non  Amer       55 (L)            >60 mL/min/1*       BUN/Creatinine Ratio        14.4              7.0 - 25.0          Anion Gap                   14.0              5.0 - 15.0 m*  -Troponin  Specimen: Blood       Result                      Value             Ref Range           Troponin T                  <0.010            0.000 - 0.03*  -CBC Auto Differential  Specimen: Blood       Result                      Value             Ref Range           WBC                         10.30             3.40 - 10.80*       RBC                         5.46 (H)          3.77 - 5.28 *       Hemoglobin                  16.0 (H)          12.0 - 15.9 *       Hematocrit                  46.6              34.0 - 46.6 %       MCV                         85.4              79.0 - 97.0 *       MCH                         29.4              26.6 - 33.0 *       MCHC                        34.4              31.5 - 35.7 *       RDW                         15.0              12.3 - 15.4 %       RDW-SD                      45.1              37.0 - 54.0 *       MPV                          7.8               6.0 - 12.0 fL       Platelets                   219               140 - 450 10*       Neutrophil %                75.0              42.7 - 76.0 %       Lymphocyte %                13.2 (L)          19.6 - 45.3 %       Monocyte %                  8.2               5.0 - 12.0 %        Eosinophil %                2.4               0.3 - 6.2 %         Basophil %                  1.2               0.0 - 1.5 %         Neutrophils, Absolute       7.70 (H)          1.70 - 7.00 *       Lymphocytes, Absolute       1.40              0.70 - 3.10 *       Monocytes, Absolute         0.80              0.10 - 0.90 *       Eosinophils, Absolute       0.20              0.00 - 0.40 *       Basophils, Absolute         0.10              0.00 - 0.20 *       nRBC                        0.2               0.0 - 0.2 /1*  -TSH  Specimen: Blood       Result                      Value             Ref Range           TSH                         6.390 (H)         0.270 - 4.20*    Blood pressure improved, patient feels better.  Patient does complain of chronic sciatica pain with flare here recently which may be exacerbating her blood pressure.  Patient has done well on oxycodone in the past.  Inspect reviewed.  Patient will be prescribed several clonidine for as needed exacerbations of blood pressure.  Patient understands to take this only after she is taken her normal blood pressure medicine and given it several hours to take effect.  Otherwise patient is to follow-up closely with her PCP.       Amount and/or Complexity of Data Reviewed  Clinical lab tests: ordered and reviewed  Tests in the medicine section of CPT®: ordered and reviewed  Decide to obtain previous medical records or to obtain history from someone other than the patient: yes        Final diagnoses:   Hypertension, unspecified type   Sciatica, unspecified laterality       ED Disposition  ED Disposition     ED Disposition Condition Comment     Discharge Stable           Paul Granados MD  [address] [place] [address] [place] [address] [phone]    Schedule an appointment as soon as possible for a visit            Medication List      New Prescriptions    cloNIDine 0.1 MG tablet  Commonly known as: CATAPRES  Take 1 tablet by mouth 2 (Two) Times a Day As Needed for High Blood Pressure (SBP greater than 170).     oxyCODONE-acetaminophen 5-325 MG per tablet  Commonly known as: PERCOCET  Take 1 tablet by mouth Every 6 (Six) Hours As Needed for Moderate Pain .           Where to Get Your Medications      These medications were sent to [id number] - [place], [place] - [address] - [phone] PH - [phone] FX  [address] [place] [address]    Phone: [phone]   · cloNIDine 0.1 MG tablet  · oxyCODONE-acetaminophen 5-325 MG per tablet          Juan C Thomas MD  06/21/21 5509

## 2021-06-21 NOTE — ED NOTES
Attempted to draw labs on pt via IV insert but was unsuccessful. Attempted to draw via butterfly unsuccessfully. Phlebotomy was contacted and also unsuccessful. Was advised by  that another technician would attempt again     Jodie De La Torre RN  06/21/21 5138

## 2021-08-19 ENCOUNTER — LAB REQUISITION (OUTPATIENT)
Dept: LAB | Facility: HOSPITAL | Age: 81
End: 2021-08-19

## 2021-08-19 DIAGNOSIS — C50.919 MALIGNANT NEOPLASM OF UNSPECIFIED SITE OF UNSPECIFIED FEMALE BREAST (HCC): ICD-10-CM

## 2021-08-19 LAB
ALBUMIN SERPL-MCNC: 4.7 G/DL (ref 3.5–5.2)
ALBUMIN/GLOB SERPL: 1.5 G/DL
ALP SERPL-CCNC: 71 U/L (ref 39–117)
ALT SERPL W P-5'-P-CCNC: 14 U/L (ref 1–33)
ANION GAP SERPL CALCULATED.3IONS-SCNC: 11 MMOL/L (ref 5–15)
AST SERPL-CCNC: 16 U/L (ref 1–32)
BILIRUB SERPL-MCNC: 0.4 MG/DL (ref 0–1.2)
BUN SERPL-MCNC: 15 MG/DL (ref 8–23)
BUN/CREAT SERPL: 14.6 (ref 7–25)
CALCIUM SPEC-SCNC: 10.1 MG/DL (ref 8.6–10.5)
CHLORIDE SERPL-SCNC: 100 MMOL/L (ref 98–107)
CO2 SERPL-SCNC: 30 MMOL/L (ref 22–29)
CREAT SERPL-MCNC: 1.03 MG/DL (ref 0.57–1)
GFR SERPL CREATININE-BSD FRML MDRD: 52 ML/MIN/1.73
GLOBULIN UR ELPH-MCNC: 3.1 GM/DL
GLUCOSE SERPL-MCNC: 66 MG/DL (ref 65–99)
POTASSIUM SERPL-SCNC: 4.2 MMOL/L (ref 3.5–5.2)
PROT SERPL-MCNC: 7.8 G/DL (ref 6–8.5)
SODIUM SERPL-SCNC: 141 MMOL/L (ref 136–145)

## 2021-08-19 PROCEDURE — 80053 COMPREHEN METABOLIC PANEL: CPT | Performed by: NURSE PRACTITIONER

## 2021-09-07 ENCOUNTER — TELEPHONE (OUTPATIENT)
Dept: FAMILY MEDICINE CLINIC | Facility: CLINIC | Age: 81
End: 2021-09-07

## 2021-09-07 NOTE — TELEPHONE ENCOUNTER
Caller: Gabrielle Lyles    Relationship to patient: Self    Best call back number: [phone]    Patient is needing: PATIENT IS RETURNING A CALL TO [facility] . ABOUT RESCHEDULING APPOINTMENT .

## 2021-09-10 ENCOUNTER — OFFICE VISIT (OUTPATIENT)
Dept: FAMILY MEDICINE CLINIC | Facility: CLINIC | Age: 81
End: 2021-09-10

## 2021-09-10 VITALS
HEART RATE: 72 BPM | TEMPERATURE: 97.7 F | OXYGEN SATURATION: 88 % | SYSTOLIC BLOOD PRESSURE: 183 MMHG | HEIGHT: 64 IN | DIASTOLIC BLOOD PRESSURE: 82 MMHG | WEIGHT: 142 LBS | BODY MASS INDEX: 24.24 KG/M2

## 2021-09-10 DIAGNOSIS — I82.4Y3 DEEP VEIN THROMBOSIS (DVT) OF PROXIMAL VEIN OF BOTH LOWER EXTREMITIES, UNSPECIFIED CHRONICITY (HCC): ICD-10-CM

## 2021-09-10 DIAGNOSIS — M54.16 LUMBAR RADICULOPATHY: Primary | ICD-10-CM

## 2021-09-10 DIAGNOSIS — I10 ESSENTIAL HYPERTENSION: ICD-10-CM

## 2021-09-10 DIAGNOSIS — I26.99 BILATERAL PULMONARY EMBOLISM (HCC): ICD-10-CM

## 2021-09-10 PROBLEM — R11.0 NAUSEA: Status: ACTIVE | Noted: 2017-04-03

## 2021-09-10 PROBLEM — J11.1 INFLUENZA: Status: ACTIVE | Noted: 2017-04-03

## 2021-09-10 PROBLEM — K11.8 PAROTID DUCT OBSTRUCTION: Status: ACTIVE | Noted: 2017-12-19

## 2021-09-10 PROCEDURE — 99204 OFFICE O/P NEW MOD 45 MIN: CPT | Performed by: FAMILY MEDICINE

## 2021-09-10 RX ORDER — AMLODIPINE BESYLATE 5 MG/1
1 TABLET ORAL DAILY
COMMUNITY
Start: 2021-08-26 | End: 2023-02-24 | Stop reason: SDUPTHER

## 2021-09-10 RX ORDER — LOSARTAN POTASSIUM 100 MG/1
100 TABLET ORAL DAILY
Qty: 90 TABLET | Refills: 3 | Status: SHIPPED | OUTPATIENT
Start: 2021-09-10 | End: 2022-09-13 | Stop reason: ALTCHOICE

## 2021-09-10 NOTE — PROGRESS NOTES
Arnulfo Lyles is a 81 y.o. female.     She comes in as a new patient to get established  She is complaining of sciatica for the last year that radiates down the left side into her leg and foot  She says that she has done physical therapy, is seeing a chiropractor, and has done acupuncture with no significant benefit  [facility]ent [facility]   She does a lot of decorating and climbs a stool (against her family's wishes)  Dr. Multani follows her inr and her breast cancer  Daughter is with her today  bp is not staying well controlled even at home  She was prev seeing Dr. Granados       The following portions of the patient's history were reviewed and updated as appropriate: allergies, current medications, past family history, past medical history, past social history, past surgical history, and problem list.  Past Medical History:   Diagnosis Date   • Anemia 05/2012   • History of DVT (deep vein thrombosis) 03/2013    bilateral lower extremity   • History of pneumonia 06/2012    community acquired pneumonia and right pleural effusion;hospitalized at [facility]   • History of pulmonary embolism 03/2013    bilateral PE   • Hypertension 2001   • Insomnia     on Ambien 5mg at    • Lobular breast cancer, left (CMS/HCC) 11/2011    Stage IA left upper lobular breast cancer   • Osteopenia 2012     Past Surgical History:   Procedure Laterality Date   • CATARACT EXTRACTION  2015   • IVC FILTER RETRIEVAL  06/2014    IVC filter placement by Dr. Card   • MAMMO STEREOTACTIC BREAST BX SURGICAL ADD UNI Left 11/01/2011    invasive lobular carcinoma-Dr. Cande Daniel   • MASTECTOMY, PARTIAL Left 12/01/2011    and left axillary sentinel lymph node biopsy by Dr. Uriostegui   • TUBAL ABDOMINAL LIGATION  1984     Family History   Problem Relation Age of Onset   • Lung cancer Brother      Social History     Socioeconomic History   • Marital status:      Spouse name: Not on file   • Number of children: Not on  file   • Years of education: Not on file   • Highest education level: Not on file   Tobacco Use   • Smoking status: Former Smoker     Types: Cigarettes     Quit date:      Years since quittin.7   • Smokeless tobacco: Current User   • Tobacco comment: smoked 6 cigarettes a day from 13 years of age to 56 years of age when she quit in    Vaping Use   • Vaping Use: Never used   Substance and Sexual Activity   • Alcohol use: No   • Drug use: No   • Sexual activity: Defer         Current Outpatient Medications:   •  amLODIPine (NORVASC) 5 MG tablet, Take 1 tablet by mouth Daily., Disp: , Rfl:   •  calcium carbonate-vitamin d (CALCIUM 600+D) 600-400 MG-UNIT per tablet, CALCIUM 600+D 600-400 MG-UNIT TABS, Disp: , Rfl:   •  cloNIDine (CATAPRES) 0.1 MG tablet, Take 1 tablet by mouth 2 (Two) Times a Day As Needed for High Blood Pressure (SBP greater than 170)., Disp: 15 tablet, Rfl: 0  •  fluticasone (FLONASE) 50 MCG/ACT nasal spray, inhale 1 spray by intranasal route  every day in each nostril, Disp: , Rfl:   •  hydroCHLOROthiazide (HYDRODIURIL) 25 MG tablet, , Disp: , Rfl:   •  Potassium 99 MG tablet, Take  by mouth., Disp: , Rfl:   •  SYMBICORT 80-4.5 MCG/ACT inhaler, INL 2 PFS PO BID IN THE MORNING AND IN THE JOSAFAT, Disp: , Rfl: 5  •  VENTOLIN  (90 Base) MCG/ACT inhaler, INL 2 PFS PO Q 4 TO 6 H PRN, Disp: , Rfl: 5  •  warfarin (COUMADIN) 1 MG tablet, Take 1 mg by mouth Daily., Disp: , Rfl: 3  •  warfarin (COUMADIN) 4 MG tablet, Take 4 mg by mouth Daily., Disp: , Rfl: 3  •  warfarin (COUMADIN) 5 MG tablet, 1 po daily, Disp: 30 tablet, Rfl: 3  •  losartan (Cozaar) 100 MG tablet, Take 1 tablet by mouth Daily., Disp: 90 tablet, Rfl: 3    Review of Systems   Constitutional: Negative.    Respiratory: Negative.    Cardiovascular: Negative.    Musculoskeletal: Positive for back pain.   Neurological: Positive for numbness. Negative for syncope, light-headedness and headache.     BP (!) 183/82 (BP Location: Right  "arm, Patient Position: Sitting, Cuff Size: Large Adult)   Pulse 72   Temp 97.7 °F (36.5 °C) (Temporal)   Ht 162.6 cm (64\")   Wt 64.4 kg (142 lb)   SpO2 (!) 88%   Breastfeeding No   BMI 24.37 kg/m²       Objective   Physical Exam  Vitals and nursing note reviewed.   Constitutional:       General: She is not in acute distress.     Appearance: She is well-developed.   HENT:      Head: Normocephalic and atraumatic.   Neck:      Thyroid: No thyromegaly.   Cardiovascular:      Rate and Rhythm: Normal rate and regular rhythm.      Heart sounds: Normal heart sounds. No murmur heard.   No friction rub. No gallop.    Pulmonary:      Effort: Pulmonary effort is normal. No respiratory distress.      Breath sounds: Normal breath sounds. No wheezing or rales.   Musculoskeletal:      Cervical back: Neck supple.      Thoracic back: Normal.      Lumbar back: Normal.      Right lower leg: No edema.      Left lower leg: No edema.      Comments: Tender over the left sciatic outlet   Lymphadenopathy:      Cervical: No cervical adenopathy.   Skin:     General: Skin is warm and dry.   Neurological:      Mental Status: She is alert.      Comments: LE strength and DTRs are normal   Psychiatric:         Mood and Affect: Mood normal.           Assessment/Plan   Problems Addressed this Visit        Cardiac and Vasculature    Hypertension    Relevant Medications    amLODIPine (NORVASC) 5 MG tablet    losartan (Cozaar) 100 MG tablet       Coag and Thromboembolic    Deep vein thrombosis of bilateral lower extremities (CMS/HCC)    Bilateral pulmonary embolism (CMS/HCC)       Neuro    Lumbar radiculopathy - Primary    Relevant Orders    CT Lumbar Spine Without Contrast      Diagnoses       Codes Comments    Lumbar radiculopathy    -  Primary ICD-10-CM: M54.16  ICD-9-CM: 724.4     Essential hypertension     ICD-10-CM: I10  ICD-9-CM: 401.9     Deep vein thrombosis (DVT) of proximal vein of both lower extremities, unspecified chronicity " (CMS/HCC)     ICD-10-CM: I82.4Y3  ICD-9-CM: 453.41     Bilateral pulmonary embolism (CMS/HCC)     ICD-10-CM: I26.99  ICD-9-CM: 415.19         Will order CT lumbar spine to further eval her radiculopathy  She has a IVC filter and cannot have an MRI  She has failed other treatments  Would like to start her on a prednisone taper but her bp is too high  bp is poorly controlled  Will have her stop the 40mg lisinopril and will start her on 100mg losartan  Will see her back in one month  She has a h/o bilat PE/DVT and has her inr followed by Dr. Rodriguez

## 2021-09-17 ENCOUNTER — HOSPITAL ENCOUNTER (OUTPATIENT)
Dept: CT IMAGING | Facility: HOSPITAL | Age: 81
Discharge: HOME | End: 2021-09-17
Admitting: FAMILY MEDICINE

## 2021-09-17 DIAGNOSIS — M54.16 LUMBAR RADICULOPATHY: ICD-10-CM

## 2021-09-17 PROCEDURE — 72131 CT LUMBAR SPINE W/O DYE: CPT

## 2021-09-18 PROBLEM — M51.36 BULGING LUMBAR DISC: Status: ACTIVE | Noted: 2021-09-18

## 2021-09-18 PROBLEM — I71.9 AORTIC ANEURYSM (HCC): Status: ACTIVE | Noted: 2021-09-18

## 2021-09-18 PROBLEM — M48.061 SPINAL STENOSIS OF LUMBAR REGION: Status: ACTIVE | Noted: 2021-09-18

## 2021-09-20 ENCOUNTER — TELEPHONE (OUTPATIENT)
Dept: FAMILY MEDICINE CLINIC | Facility: CLINIC | Age: 81
End: 2021-09-20

## 2021-09-20 DIAGNOSIS — M54.16 LUMBAR RADICULOPATHY: Primary | ICD-10-CM

## 2021-09-20 DIAGNOSIS — M51.36 BULGING LUMBAR DISC: ICD-10-CM

## 2021-09-28 PROCEDURE — 87086 URINE CULTURE/COLONY COUNT: CPT | Performed by: FAMILY MEDICINE

## 2021-10-08 ENCOUNTER — OFFICE VISIT (OUTPATIENT)
Dept: FAMILY MEDICINE CLINIC | Facility: CLINIC | Age: 81
End: 2021-10-08

## 2021-10-08 VITALS
DIASTOLIC BLOOD PRESSURE: 77 MMHG | HEART RATE: 81 BPM | OXYGEN SATURATION: 97 % | WEIGHT: 147 LBS | HEIGHT: 64 IN | SYSTOLIC BLOOD PRESSURE: 187 MMHG | BODY MASS INDEX: 25.1 KG/M2

## 2021-10-08 DIAGNOSIS — I10 PRIMARY HYPERTENSION: Primary | ICD-10-CM

## 2021-10-08 PROCEDURE — 99213 OFFICE O/P EST LOW 20 MIN: CPT | Performed by: FAMILY MEDICINE

## 2021-10-08 RX ORDER — HYDROCHLOROTHIAZIDE 12.5 MG/1
12.5 TABLET ORAL DAILY
Qty: 90 TABLET | Refills: 3 | Status: SHIPPED | OUTPATIENT
Start: 2021-10-08 | End: 2022-10-25

## 2021-10-08 NOTE — PROGRESS NOTES
Arnulfo Lyles is a 81 y.o. female.     For follow-up after her blood pressure was uncontrolled at her previous visit so her lisinopril 40 mg was stopped and she was started on losartan 100 mg in addition to amlodipine 5 mg  Still in pain and has appt with neurosurgeon next month  bp running 140-170 at home  144/76 yest at home  Needs refill on her 12.5 mg hydrochlorothiazide  She says she has 25 mg hydrochlorothiazide at home but never takes it because it is too strong  She has clonidine point 1 mg that she can take if her blood pressure significantly elevated but chooses not to       The following portions of the patient's history were reviewed and updated as appropriate: allergies, current medications, past family history, past medical history, past social history, past surgical history, and problem list.  Past Medical History:   Diagnosis Date   • Anemia 05/2012   • History of DVT (deep vein thrombosis) 03/2013    bilateral lower extremity   • History of pneumonia 06/2012    community acquired pneumonia and right pleural effusion;hospitalized at [facility]   • History of pulmonary embolism 03/2013    bilateral PE   • Hypertension 2001   • Insomnia     on Ambien 5mg at    • Lobular breast cancer, left (HCC) 11/2011    Stage IA left upper lobular breast cancer   • Osteopenia 2012     Past Surgical History:   Procedure Laterality Date   • CATARACT EXTRACTION  2015   • IVC FILTER RETRIEVAL  06/2014    IVC filter placement by Dr. Card   • MAMMO STEREOTACTIC BREAST BX SURGICAL ADD UNI Left 11/01/2011    invasive lobular carcinoma-Dr. Cande Daniel   • MASTECTOMY, PARTIAL Left 12/01/2011    and left axillary sentinel lymph node biopsy by Dr. Uriostegui   • TUBAL ABDOMINAL LIGATION  1984     Family History   Problem Relation Age of Onset   • Lung cancer Brother      Social History     Socioeconomic History   • Marital status:      Spouse name: Not on file   • Number of children: Not on file    • Years of education: Not on file   • Highest education level: Not on file   Tobacco Use   • Smoking status: Former Smoker     Types: Cigarettes     Quit date:      Years since quittin.7   • Smokeless tobacco: Current User   • Tobacco comment: smoked 6 cigarettes a day from 13 years of age to 56 years of age when she quit in    Vaping Use   • Vaping Use: Never used   Substance and Sexual Activity   • Alcohol use: No   • Drug use: No   • Sexual activity: Defer         Current Outpatient Medications:   •  amLODIPine (NORVASC) 5 MG tablet, Take 1 tablet by mouth Daily., Disp: , Rfl:   •  calcium carbonate-vitamin d (CALCIUM 600+D) 600-400 MG-UNIT per tablet, CALCIUM 600+D 600-400 MG-UNIT TABS, Disp: , Rfl:   •  cefdinir (OMNICEF) 300 MG capsule, Take 1 capsule by mouth 2 (Two) Times a Day., Disp: 10 capsule, Rfl: 0  •  cloNIDine (CATAPRES) 0.1 MG tablet, Take 1 tablet by mouth 2 (Two) Times a Day As Needed for High Blood Pressure (SBP greater than 170)., Disp: 15 tablet, Rfl: 0  •  fluconazole (DIFLUCAN) 200 MG tablet, Take 1 tablet by mouth Daily., Disp: 1 tablet, Rfl: 0  •  fluticasone (FLONASE) 50 MCG/ACT nasal spray, inhale 1 spray by intranasal route  every day in each nostril, Disp: , Rfl:   •  hydroCHLOROthiazide (HYDRODIURIL) 12.5 MG tablet, Take 1 tablet by mouth Daily., Disp: 90 tablet, Rfl: 3  •  losartan (Cozaar) 100 MG tablet, Take 1 tablet by mouth Daily., Disp: 90 tablet, Rfl: 3  •  phenazopyridine (Pyridium) 200 MG tablet, 1 tablet p.o. 3 times daily before meals, Disp: 6 tablet, Rfl: 0  •  Potassium 99 MG tablet, Take  by mouth., Disp: , Rfl:   •  SYMBICORT 80-4.5 MCG/ACT inhaler, INL 2 PFS PO BID IN THE MORNING AND IN THE JOSAFAT, Disp: , Rfl: 5  •  VENTOLIN  (90 Base) MCG/ACT inhaler, INL 2 PFS PO Q 4 TO 6 H PRN, Disp: , Rfl: 5  •  warfarin (COUMADIN) 1 MG tablet, Take 1 mg by mouth Daily., Disp: , Rfl: 3  •  warfarin (COUMADIN) 4 MG tablet, Take 4 mg by mouth Daily., Disp: , Rfl:  "3  •  warfarin (COUMADIN) 5 MG tablet, 1 po daily, Disp: 30 tablet, Rfl: 3    Review of Systems   Constitutional: Negative for diaphoresis, fatigue, fever, unexpected weight gain and unexpected weight loss.   Respiratory: Negative for cough, chest tightness and shortness of breath.    Cardiovascular: Negative for chest pain, palpitations and leg swelling.   Gastrointestinal: Negative for nausea and vomiting.   Neurological: Negative for dizziness, syncope and headache.     BP (!) 187/77 (BP Location: Right arm, Patient Position: Sitting, Cuff Size: Adult)   Pulse 81   Ht 162.6 cm (64\")   Wt 66.7 kg (147 lb)   SpO2 97%   BMI 25.23 kg/m²       Objective   Physical Exam  Vitals and nursing note reviewed.   Constitutional:       General: She is not in acute distress.     Appearance: She is well-developed.   HENT:      Head: Normocephalic and atraumatic.   Neck:      Thyroid: No thyromegaly.   Cardiovascular:      Rate and Rhythm: Normal rate and regular rhythm.      Heart sounds: Normal heart sounds. No murmur heard.   No friction rub. No gallop.    Pulmonary:      Effort: Pulmonary effort is normal. No respiratory distress.      Breath sounds: Normal breath sounds. No wheezing or rales.   Musculoskeletal:      Cervical back: Neck supple.      Right lower leg: No edema.      Left lower leg: No edema.   Lymphadenopathy:      Cervical: No cervical adenopathy.   Skin:     General: Skin is warm and dry.   Neurological:      Mental Status: She is alert.           Assessment/Plan   Problems Addressed this Visit        Cardiac and Vasculature    Hypertension - Primary    Relevant Medications    hydroCHLOROthiazide (HYDRODIURIL) 12.5 MG tablet      Diagnoses       Codes Comments    Primary hypertension    -  Primary ICD-10-CM: I10  ICD-9-CM: 401.9         Blood pressure still not adequately controlled the patient does not want me to adjust any of her medications at this time  She will keep her follow-up with the " neurosurgeon for next month with the hope that that will help decrease her pain  I sent a refill on the hydrochlorothiazide 12.5

## 2022-08-29 ENCOUNTER — TRANSCRIBE ORDERS (OUTPATIENT)
Dept: ADMINISTRATIVE | Facility: HOSPITAL | Age: 82
End: 2022-08-29

## 2022-08-29 ENCOUNTER — HOSPITAL ENCOUNTER (OUTPATIENT)
Dept: CARDIOLOGY | Facility: HOSPITAL | Age: 82
Discharge: HOME | End: 2022-08-29
Admitting: INTERNAL MEDICINE

## 2022-08-29 DIAGNOSIS — R00.9 ABNORMAL HEART RATE: Primary | ICD-10-CM

## 2022-08-29 DIAGNOSIS — R00.9 ABNORMAL HEART RATE: ICD-10-CM

## 2022-08-29 PROCEDURE — 93005 ELECTROCARDIOGRAM TRACING: CPT | Performed by: INTERNAL MEDICINE

## 2022-08-29 PROCEDURE — 93010 ELECTROCARDIOGRAM REPORT: CPT | Performed by: INTERNAL MEDICINE

## 2022-09-01 LAB — QT INTERVAL: 375 MS

## 2022-09-07 ENCOUNTER — TRANSCRIBE ORDERS (OUTPATIENT)
Dept: ADMINISTRATIVE | Facility: HOSPITAL | Age: 82
End: 2022-09-07

## 2022-09-07 ENCOUNTER — LAB (OUTPATIENT)
Dept: LAB | Facility: HOSPITAL | Age: 82
End: 2022-09-07

## 2022-09-07 DIAGNOSIS — N18.31 CHRONIC KIDNEY DISEASE (CKD) STAGE G3A/A1, MODERATELY DECREASED GLOMERULAR FILTRATION RATE (GFR) BETWEEN 45-59 ML/MIN/1.73 SQUARE METER AND ALBUMINURIA CREATININE RATIO LESS THAN 30 MG/G (CMS/H*: ICD-10-CM

## 2022-09-07 DIAGNOSIS — I10 ESSENTIAL HYPERTENSION: ICD-10-CM

## 2022-09-07 DIAGNOSIS — I10 ESSENTIAL HYPERTENSION: Primary | ICD-10-CM

## 2022-09-07 DIAGNOSIS — E78.2 MIXED HYPERLIPIDEMIA: ICD-10-CM

## 2022-09-11 NOTE — PROGRESS NOTES
Encounter Date:09/13/2022      Patient ID: Gabrielle Lyles is a 82 y.o. female.    Chief Complaint:      History of Present Illness  82-year-old woman with a history of hypertension, DVT/PE on chronic Anticoagulation, former smoker has been referred to cardiology due to abnormal heart rate and shortness of breath.  She has a history of breast cancer as well as extensive smoking in the past.  She carries a diagnosis of COPD however she has not had any pulmonary function test done in the past.  She reports worsening shortness of breath over the last 2 to 3 months and it is difficult for her to do her normal activities without feeling significant shortness of breath.  She is also noted elevated blood pressures, leg pain.  She has a history of DVT and PE in the past and is on chronic anticoagulation with warfarin. She recently had some blood work done which showed elevated D-dimer.  Which was followed by CT scan of the chest the report of which is not available to me.  She reports palpitations/tachycardia along with shortness of breath.  She is accompanied by her granddaughter who is a critical care nurse.  They believe she may have atrial fibrillation and heart failure.  They would like better blood pressure control and a thorough cardiac work-up.    The following portions of the patient's history were reviewed and updated as appropriate: allergies, current medications, past family history, past medical history, past social history, past surgical history and problem list.    Review of Systems   Constitutional: Negative for fever and malaise/fatigue.   Cardiovascular: Positive for leg swelling. Negative for chest pain, dyspnea on exertion and palpitations.   Respiratory: Negative for cough and shortness of breath.    Skin: Negative for rash.   Gastrointestinal: Negative for abdominal pain, nausea and vomiting.   Neurological: Negative for focal weakness and headaches.   All other systems reviewed and are  negative.        Current Outpatient Medications:   •  amLODIPine (NORVASC) 5 MG tablet, Take 1 tablet by mouth Daily., Disp: , Rfl:   •  calcium carbonate-vitamin d 600-400 MG-UNIT per tablet, CALCIUM 600+D 600-400 MG-UNIT TABS, Disp: , Rfl:   •  fluticasone (FLONASE) 50 MCG/ACT nasal spray, inhale 1 spray by intranasal route  every day in each nostril, Disp: , Rfl:   •  Potassium 99 MG tablet, Take  by mouth., Disp: , Rfl:   •  SYMBICORT 80-4.5 MCG/ACT inhaler, INL 2 PFS PO BID IN THE MORNING AND IN THE JOSAFAT, Disp: , Rfl: 5  •  VENTOLIN  (90 Base) MCG/ACT inhaler, INL 2 PFS PO Q 4 TO 6 H PRN, Disp: , Rfl: 5  •  vitamin B-12 (CYANOCOBALAMIN) 1000 MCG tablet, Take 1,000 mcg by mouth Daily., Disp: , Rfl:   •  warfarin (COUMADIN) 5 MG tablet, 1 po daily, Disp: 30 tablet, Rfl: 3  •  cloNIDine (CATAPRES) 0.1 MG tablet, Take 1 tablet by mouth 2 (Two) Times a Day As Needed for High Blood Pressure (SBP greater than 170)., Disp: 15 tablet, Rfl: 0  •  hydrALAZINE (APRESOLINE) 10 MG tablet, Take 10 mg by mouth 2 (Two) Times a Day With Meals., Disp: , Rfl:   •  hydroCHLOROthiazide (HYDRODIURIL) 12.5 MG tablet, Take 1 tablet by mouth Daily., Disp: 90 tablet, Rfl: 3  •  lisinopril (PRINIVIL,ZESTRIL) 40 MG tablet, , Disp: , Rfl:   •  warfarin (COUMADIN) 1 MG tablet, Take 1 mg by mouth Daily., Disp: , Rfl: 3  •  warfarin (COUMADIN) 4 MG tablet, Take 4 mg by mouth Daily., Disp: , Rfl: 3    Allergies   Allergen Reactions   • Celebrex [Celecoxib] Dizziness     Made her dizziness and fatigue       Family History   Problem Relation Age of Onset   • Lung cancer Brother        Past Surgical History:   Procedure Laterality Date   • CATARACT EXTRACTION  2015   • IVC FILTER RETRIEVAL  06/2014    IVC filter placement by Dr. Card   • MAMMO STEREOTACTIC BREAST BX SURGICAL ADD UNI Left 11/01/2011    invasive lobular carcinoma-Dr. Cande Daniel   • MASTECTOMY, PARTIAL Left 12/01/2011    and left axillary sentinel lymph node biopsy by   "Moody   • TUBAL ABDOMINAL LIGATION         Past Medical History:   Diagnosis Date   • Anemia 2012   • History of DVT (deep vein thrombosis) 2013    bilateral lower extremity   • History of pneumonia 2012    community acquired pneumonia and right pleural effusion;hospitalized at [facility]   • History of pulmonary embolism 2013    bilateral PE   • Hypertension    • Insomnia     on Ambien 5mg at HS   • Lobular breast cancer, left (HCC) 2011    Stage IA left upper lobular breast cancer   • Osteopenia        Family History   Problem Relation Age of Onset   • Lung cancer Brother        Social History     Socioeconomic History   • Marital status:    Tobacco Use   • Smoking status: Former Smoker     Types: Cigarettes     Quit date:      Years since quittin.7   • Smokeless tobacco: Current User   • Tobacco comment: smoked 6 cigarettes a day from 13 years of age to 56 years of age when she quit in    Vaping Use   • Vaping Use: Never used   Substance and Sexual Activity   • Alcohol use: No   • Drug use: No   • Sexual activity: Defer           ECG 12 Lead    Date/Time: 2022 9:39 AM  Performed by: Richardson Willis MD  Authorized by: Richardson Willis MD   Comparison: compared with previous ECG from 2022  Similar to previous ECG  Comments: ECG in the office shows sinus rhythm, right axis deviation  Heart rate 76 bpm, NH interval 189 ms, QRS duration 91 ms,  ms.              Objective:       Physical Exam    BP (!) 186/77   Pulse 86   Ht 162.6 cm (64\")   Wt 64.4 kg (142 lb)   SpO2 90%   BMI 24.37 kg/m²   The patient is alert, oriented and in no distress.    Vital signs as noted above.    Head and neck revealed no carotid bruits or jugular venous distension.  No thyromegaly or lymphadenopathy is present.    Lungs clear.  No wheezing.  Breath sounds are normal bilaterally.    Heart normal first and second heart sounds.  No murmur..  No pericardial rub is present.  " No gallop is present.    Abdomen soft and nontender.  No organomegaly is present.    Extremities revealed good peripheral pulses without any pedal edema.    Skin warm and dry.    Musculoskeletal system is grossly normal.    CNS grossly normal.           Diagnosis Plan   1. Abnormal heart rate  ECG 12 Lead    Mobile Cardiac Outpatient Telemetry   2. SOB (shortness of breath)  ECG 12 Lead    Adult Transthoracic Echo Complete W/ Cont if Necessary Per Protocol    Stress Test With Myocardial Perfusion One Day    [facility] Outpatient Telemetry   3. Primary hypertension     4. Aortic aneurysm without rupture, unspecified portion of aorta (HCC)     5. Deep vein thrombosis (DVT) of proximal vein of both lower extremities, unspecified chronicity (HCC)     6. Stage 3 chronic kidney disease, unspecified whether stage 3a or 3b CKD (HCC)     7. Palpitations  [facility] Cardiac Outpatient Telemetry   LAB RESULTS (LAST 7 DAYS)    CBC        BMP        CMP         BNP        TROPONIN        CoAg        Creatinine Clearance  CrCl cannot be calculated (Patient's most recent lab result is older than the maximum 30 days allowed.).    ABG        Radiology  No radiology results for the last day        Assessment and Plan       Diagnoses and all orders for this visit:    1. Abnormal heart rate (Primary)  -     ECG 12 Lead  -     [facility] Card[facility] Outpatient Telemetry  She is at a risk of atrial fibrillation and I will proceed with M cot for 2 weeks.  If she is diagnosed with atrial fibrillation just I will also start her on a beta-blocker    2. SOB (shortness of breath)  -     ECG 12 Lead  -     Adult Transthoracic Echo Complete W/ Cont if Necessary Per Protocol  -     Stress Test With Myocardial Perfusion One Day  -     [facility] Cardiac Outpatient Telemetry  She has multiple cardiovascular risk factors for coronary disease, HFpEF and HFrEF  I will start with an echocardiogram as well as a stress test.  Referral to pulmonology.  The  granddaughter prefers [facility] for PFTs.    3. Primary hypertension  She is currently on lisinopril 40 mg, hydralazine 10 mg p.o. 3 times daily, amlodipine 5 mg p.o. daily  Ideally I would like to discontinue hydralazine and start Coreg however I will first need to see a blood pressure/heart rate log from the patient.  They will provide me 2 weeks blood pressure/heart rate log after which I will make a decision about her antihypertensive regimen    4. Aortic aneurysm without rupture, unspecified portion of aorta (HCC)  Unclear where this diagnosis came from however I will await the result of CT scan which was performed last week    5. Deep vein thrombosis (DVT) of proximal vein of both lower extremities, unspecified chronicity (HCC)  She has a remote history of DVT/PE in the setting of trauma to her leg.  She also has an IVC filter and is chronically on warfarin.  Follow-up on the result of CT scan     6. Stage 3 chronic kidney disease, unspecified whether stage 3a or 3b CKD (HCC)  avoiding contrast and nephrotoxic medication/agent.  She is tolerating lisinopril well    7. Palpitations  -     Mobile Cardiac Outpatient Telemetry  At risk of atrial fibrillation.  Follow-up on results of MCOT.

## 2022-09-13 ENCOUNTER — OFFICE VISIT (OUTPATIENT)
Dept: CARDIOLOGY | Facility: CLINIC | Age: 82
End: 2022-09-13

## 2022-09-13 VITALS
HEART RATE: 86 BPM | WEIGHT: 142 LBS | SYSTOLIC BLOOD PRESSURE: 186 MMHG | BODY MASS INDEX: 24.24 KG/M2 | DIASTOLIC BLOOD PRESSURE: 77 MMHG | HEIGHT: 64 IN | OXYGEN SATURATION: 90 %

## 2022-09-13 DIAGNOSIS — I71.9 AORTIC ANEURYSM WITHOUT RUPTURE, UNSPECIFIED PORTION OF AORTA: ICD-10-CM

## 2022-09-13 DIAGNOSIS — R00.2 PALPITATIONS: ICD-10-CM

## 2022-09-13 DIAGNOSIS — R06.02 SOB (SHORTNESS OF BREATH): ICD-10-CM

## 2022-09-13 DIAGNOSIS — I82.4Y3 DEEP VEIN THROMBOSIS (DVT) OF PROXIMAL VEIN OF BOTH LOWER EXTREMITIES, UNSPECIFIED CHRONICITY: ICD-10-CM

## 2022-09-13 DIAGNOSIS — I10 PRIMARY HYPERTENSION: ICD-10-CM

## 2022-09-13 DIAGNOSIS — N18.30 STAGE 3 CHRONIC KIDNEY DISEASE, UNSPECIFIED WHETHER STAGE 3A OR 3B CKD: ICD-10-CM

## 2022-09-13 DIAGNOSIS — R00.9 ABNORMAL HEART RATE: Primary | ICD-10-CM

## 2022-09-13 PROCEDURE — 93000 ELECTROCARDIOGRAM COMPLETE: CPT | Performed by: INTERNAL MEDICINE

## 2022-09-13 PROCEDURE — 99204 OFFICE O/P NEW MOD 45 MIN: CPT | Performed by: INTERNAL MEDICINE

## 2022-09-13 RX ORDER — LANOLIN ALCOHOL/MO/W.PET/CERES
1000 CREAM (GRAM) TOPICAL DAILY
COMMUNITY
End: 2022-10-25

## 2022-09-13 RX ORDER — HYDRALAZINE HYDROCHLORIDE 10 MG/1
10 TABLET, FILM COATED ORAL 2 TIMES DAILY WITH MEALS
COMMUNITY
Start: 2022-08-15 | End: 2022-12-13

## 2022-09-13 RX ORDER — LISINOPRIL 40 MG/1
40 TABLET ORAL DAILY
COMMUNITY
Start: 2022-09-06

## 2022-09-14 ENCOUNTER — PATIENT ROUNDING (BHMG ONLY) (OUTPATIENT)
Dept: CARDIOLOGY | Facility: CLINIC | Age: 82
End: 2022-09-14

## 2022-09-14 NOTE — PROGRESS NOTES
A My-Chart message has been sent to the patient [place] [facility] [place]

## 2022-09-17 ENCOUNTER — TELEPHONE (OUTPATIENT)
Dept: CARDIOLOGY | Facility: CLINIC | Age: 82
End: 2022-09-17

## 2022-09-27 ENCOUNTER — TELEPHONE (OUTPATIENT)
Dept: CARDIOLOGY | Facility: CLINIC | Age: 82
End: 2022-09-27

## 2022-09-27 NOTE — TELEPHONE ENCOUNTER
Caller: Gabrielle Lyles    Relationship: Self    Best call back number: [phone]    What is the best time to reach you: ANYTIME     Who are you requesting to speak with (clinical staff, provider,  specific staff member): ANYONE     What was the call regarding: PATIENT WOULD LIKE TO RESCHEDULE TOMORROWS STRESS TEST. STATES SHE HAS A COUGH AND IS NOT FEELING WELL.     Do you require a callback: YES

## 2022-09-28 ENCOUNTER — APPOINTMENT (OUTPATIENT)
Dept: CARDIOLOGY | Facility: HOSPITAL | Age: 82
End: 2022-09-28

## 2022-09-28 NOTE — TELEPHONE ENCOUNTER
DESTINY reviewed. Short runs of A fib, NSR with PACs  She is already on warfarin and beta blocker. No new recommendations. F/u with me as scheduled.

## 2022-10-11 ENCOUNTER — HOSPITAL ENCOUNTER (OUTPATIENT)
Dept: CARDIOLOGY | Facility: HOSPITAL | Age: 82
Discharge: HOME | End: 2022-10-11

## 2022-10-11 ENCOUNTER — HOSPITAL ENCOUNTER (OUTPATIENT)
Dept: CARDIOLOGY | Facility: HOSPITAL | Age: 82
End: 2022-10-11

## 2022-10-11 VITALS
SYSTOLIC BLOOD PRESSURE: 167 MMHG | HEIGHT: 64 IN | DIASTOLIC BLOOD PRESSURE: 114 MMHG | BODY MASS INDEX: 24.24 KG/M2 | WEIGHT: 142 LBS

## 2022-10-11 PROCEDURE — 93306 TTE W/DOPPLER COMPLETE: CPT | Performed by: INTERNAL MEDICINE

## 2022-10-11 PROCEDURE — 93306 TTE W/DOPPLER COMPLETE: CPT

## 2022-10-13 LAB
BH CV ECHO MEAS - AO MAX PG: 10.8 MMHG
BH CV ECHO MEAS - AO MEAN PG: 6.2 MMHG
BH CV ECHO MEAS - AO ROOT DIAM: 2.9 CM
BH CV ECHO MEAS - AO V2 MAX: 164.6 CM/SEC
BH CV ECHO MEAS - AO V2 VTI: 32 CM
BH CV ECHO MEAS - AVA(I,D): 1.54 CM2
BH CV ECHO MEAS - EDV(CUBED): 56 ML
BH CV ECHO MEAS - EDV(MOD-SP4): 40.7 ML
BH CV ECHO MEAS - EF(MOD-BP): 72 %
BH CV ECHO MEAS - EF(MOD-SP4): 71.9 %
BH CV ECHO MEAS - ESV(CUBED): 17.2 ML
BH CV ECHO MEAS - ESV(MOD-SP4): 11.4 ML
BH CV ECHO MEAS - FS: 32.6 %
BH CV ECHO MEAS - IVS/LVPW: 1.09 CM
BH CV ECHO MEAS - IVSD: 1.02 CM
BH CV ECHO MEAS - LA A2CS (ATRIAL LENGTH): 3.7 CM
BH CV ECHO MEAS - LV MASS(C)D: 114.9 GRAMS
BH CV ECHO MEAS - LV MAX PG: 5.6 MMHG
BH CV ECHO MEAS - LV MEAN PG: 3 MMHG
BH CV ECHO MEAS - LV V1 MAX: 117.9 CM/SEC
BH CV ECHO MEAS - LV V1 VTI: 23.8 CM
BH CV ECHO MEAS - LVIDD: 3.8 CM
BH CV ECHO MEAS - LVIDS: 2.6 CM
BH CV ECHO MEAS - LVOT AREA: 2.07 CM2
BH CV ECHO MEAS - LVOT DIAM: 1.62 CM
BH CV ECHO MEAS - LVPWD: 0.94 CM
BH CV ECHO MEAS - MV A MAX VEL: 88.6 CM/SEC
BH CV ECHO MEAS - MV DEC SLOPE: 718.2 CM/SEC2
BH CV ECHO MEAS - MV DEC TIME: 0.15 MSEC
BH CV ECHO MEAS - MV E MAX VEL: 110.7 CM/SEC
BH CV ECHO MEAS - MV E/A: 1.25
BH CV ECHO MEAS - MV MAX PG: 4.8 MMHG
BH CV ECHO MEAS - MV MEAN PG: 2.48 MMHG
BH CV ECHO MEAS - MV V2 VTI: 25.5 CM
BH CV ECHO MEAS - MVA(VTI): 1.94 CM2
BH CV ECHO MEAS - PULM A REVS DUR: 0.14 SEC
BH CV ECHO MEAS - PULM A REVS VEL: 27.8 CM/SEC
BH CV ECHO MEAS - PULM DIAS VEL: 51.5 CM/SEC
BH CV ECHO MEAS - PULM S/D: 1.1
BH CV ECHO MEAS - PULM SYS VEL: 56.4 CM/SEC
BH CV ECHO MEAS - RAP SYSTOLE: 3 MMHG
BH CV ECHO MEAS - RVDD: 2.9 CM
BH CV ECHO MEAS - RVSP: 48.4 MMHG
BH CV ECHO MEAS - SV(LVOT): 49.3 ML
BH CV ECHO MEAS - SV(MOD-SP4): 29.3 ML
BH CV ECHO MEAS - TR MAX PG: 45.4 MMHG
BH CV ECHO MEAS - TR MAX VEL: 336.8 CM/SEC
LV EF 2D ECHO EST: 70 %
MAXIMAL PREDICTED HEART RATE: 139 BPM
STRESS TARGET HR: 118 BPM

## 2022-10-19 ENCOUNTER — HOSPITAL ENCOUNTER (OUTPATIENT)
Dept: NUCLEAR MEDICINE | Facility: HOSPITAL | Age: 82
Discharge: HOME | End: 2022-10-19

## 2022-10-19 LAB
BH CV NUCLEAR PRIOR STUDY: 3
BH CV REST NUCLEAR ISOTOPE DOSE: 8.6 MCI
BH CV STRESS BP STAGE 1: NORMAL
BH CV STRESS COMMENTS STAGE 1: NORMAL
BH CV STRESS DOSE REGADENOSON STAGE 1: 0.4
BH CV STRESS DURATION MIN STAGE 1: 4
BH CV STRESS DURATION SEC STAGE 1: 10
BH CV STRESS HR STAGE 1: 90
BH CV STRESS NUCLEAR ISOTOPE DOSE: 31.7 MCI
BH CV STRESS PROTOCOL 1: NORMAL
BH CV STRESS RECOVERY BP: NORMAL MMHG
BH CV STRESS RECOVERY HR: 86 BPM
BH CV STRESS STAGE 1: 1
LV EF NUC BP: 78 %
MAXIMAL PREDICTED HEART RATE: 139 BPM
PERCENT MAX PREDICTED HR: 64.75 %
STRESS BASELINE BP: NORMAL MMHG
STRESS BASELINE HR: 78 BPM
STRESS PERCENT HR: 76 %
STRESS POST PEAK BP: NORMAL MMHG
STRESS POST PEAK HR: 90 BPM
STRESS TARGET HR: 118 BPM

## 2022-10-19 PROCEDURE — A9502 TC99M TETROFOSMIN: HCPCS | Performed by: INTERNAL MEDICINE

## 2022-10-19 PROCEDURE — 78452 HT MUSCLE IMAGE SPECT MULT: CPT

## 2022-10-19 PROCEDURE — 0 TECHNETIUM TETROFOSMIN KIT: Performed by: INTERNAL MEDICINE

## 2022-10-19 PROCEDURE — 78452 HT MUSCLE IMAGE SPECT MULT: CPT | Performed by: INTERNAL MEDICINE

## 2022-10-19 PROCEDURE — 93018 CV STRESS TEST I&R ONLY: CPT | Performed by: INTERNAL MEDICINE

## 2022-10-19 PROCEDURE — 93017 CV STRESS TEST TRACING ONLY: CPT

## 2022-10-19 PROCEDURE — 25010000002 REGADENOSON 0.4 MG/5ML SOLUTION: Performed by: INTERNAL MEDICINE

## 2022-10-19 RX ADMIN — TETROFOSMIN 1 DOSE: 1.38 INJECTION, POWDER, LYOPHILIZED, FOR SOLUTION INTRAVENOUS at 14:15

## 2022-10-19 RX ADMIN — TETROFOSMIN 1 DOSE: 1.38 INJECTION, POWDER, LYOPHILIZED, FOR SOLUTION INTRAVENOUS at 13:08

## 2022-10-19 RX ADMIN — REGADENOSON 0.4 MG: 0.08 INJECTION, SOLUTION INTRAVENOUS at 14:15

## 2022-10-25 ENCOUNTER — HOSPITAL ENCOUNTER (OUTPATIENT)
Facility: HOSPITAL | Age: 82
Setting detail: OBSERVATION
Discharge: HOME | End: 2022-10-27
Attending: INTERNAL MEDICINE | Admitting: FAMILY MEDICINE

## 2022-10-25 ENCOUNTER — APPOINTMENT (OUTPATIENT)
Dept: CT IMAGING | Facility: HOSPITAL | Age: 82
End: 2022-10-25

## 2022-10-25 ENCOUNTER — APPOINTMENT (OUTPATIENT)
Dept: GENERAL RADIOLOGY | Facility: HOSPITAL | Age: 82
End: 2022-10-25

## 2022-10-25 DIAGNOSIS — G45.9 TIA (TRANSIENT ISCHEMIC ATTACK): Primary | ICD-10-CM

## 2022-10-25 LAB
ALBUMIN SERPL-MCNC: 4.2 G/DL (ref 3.5–5.2)
ALBUMIN/GLOB SERPL: 1.1 G/DL
ALP SERPL-CCNC: 70 U/L (ref 39–117)
ALT SERPL W P-5'-P-CCNC: 14 U/L (ref 1–33)
ANION GAP SERPL CALCULATED.3IONS-SCNC: 15 MMOL/L (ref 5–15)
AST SERPL-CCNC: 13 U/L (ref 1–32)
BACTERIA UR QL AUTO: NORMAL /HPF
BASOPHILS # BLD AUTO: 0.1 10*3/MM3 (ref 0–0.2)
BASOPHILS NFR BLD AUTO: 0.8 % (ref 0–1.5)
BILIRUB SERPL-MCNC: 0.6 MG/DL (ref 0–1.2)
BILIRUB UR QL STRIP: NEGATIVE
BUN SERPL-MCNC: 14 MG/DL (ref 8–23)
BUN/CREAT SERPL: 15.4 (ref 7–25)
CALCIUM SPEC-SCNC: 9.8 MG/DL (ref 8.6–10.5)
CHLORIDE SERPL-SCNC: 101 MMOL/L (ref 98–107)
CLARITY UR: CLEAR
CO2 SERPL-SCNC: 24 MMOL/L (ref 22–29)
COLOR UR: YELLOW
CREAT SERPL-MCNC: 0.91 MG/DL (ref 0.57–1)
CRP SERPL-MCNC: 0.62 MG/DL (ref 0–0.5)
DEPRECATED RDW RBC AUTO: 49 FL (ref 37–54)
EGFRCR SERPLBLD CKD-EPI 2021: 63.5 ML/MIN/1.73
EOSINOPHIL # BLD AUTO: 0.2 10*3/MM3 (ref 0–0.4)
EOSINOPHIL NFR BLD AUTO: 2.5 % (ref 0.3–6.2)
ERYTHROCYTE [DISTWIDTH] IN BLOOD BY AUTOMATED COUNT: 15.8 % (ref 12.3–15.4)
ERYTHROCYTE [SEDIMENTATION RATE] IN BLOOD: 56 MM/HR (ref 0–30)
GLOBULIN UR ELPH-MCNC: 3.9 GM/DL
GLUCOSE BLDC GLUCOMTR-MCNC: 125 MG/DL (ref 70–105)
GLUCOSE SERPL-MCNC: 116 MG/DL (ref 65–99)
GLUCOSE UR STRIP-MCNC: NEGATIVE MG/DL
HCT VFR BLD AUTO: 48.5 % (ref 34–46.6)
HGB BLD-MCNC: 15.4 G/DL (ref 12–15.9)
HGB UR QL STRIP.AUTO: NEGATIVE
HYALINE CASTS UR QL AUTO: NORMAL /LPF
INR PPP: 1.22 (ref 2–3)
KETONES UR QL STRIP: NEGATIVE
LEUKOCYTE ESTERASE UR QL STRIP.AUTO: NEGATIVE
LYMPHOCYTES # BLD AUTO: 1.4 10*3/MM3 (ref 0.7–3.1)
LYMPHOCYTES NFR BLD AUTO: 14.7 % (ref 19.6–45.3)
MCH RBC QN AUTO: 28.7 PG (ref 26.6–33)
MCHC RBC AUTO-ENTMCNC: 31.8 G/DL (ref 31.5–35.7)
MCV RBC AUTO: 90.2 FL (ref 79–97)
MONOCYTES # BLD AUTO: 0.7 10*3/MM3 (ref 0.1–0.9)
MONOCYTES NFR BLD AUTO: 6.9 % (ref 5–12)
NEUTROPHILS NFR BLD AUTO: 7.2 10*3/MM3 (ref 1.7–7)
NEUTROPHILS NFR BLD AUTO: 75.1 % (ref 42.7–76)
NITRITE UR QL STRIP: NEGATIVE
NRBC BLD AUTO-RTO: 0.1 /100 WBC (ref 0–0.2)
PH UR STRIP.AUTO: 6 [PH] (ref 5–8)
PLATELET # BLD AUTO: 355 10*3/MM3 (ref 140–450)
PMV BLD AUTO: 7.5 FL (ref 6–12)
POTASSIUM SERPL-SCNC: 3.7 MMOL/L (ref 3.5–5.2)
PROT SERPL-MCNC: 8.1 G/DL (ref 6–8.5)
PROT UR QL STRIP: ABNORMAL
PROTHROMBIN TIME: 12.4 SECONDS (ref 19.4–28.5)
RBC # BLD AUTO: 5.38 10*6/MM3 (ref 3.77–5.28)
RBC # UR STRIP: NORMAL /HPF
REF LAB TEST METHOD: NORMAL
SODIUM SERPL-SCNC: 140 MMOL/L (ref 136–145)
SP GR UR STRIP: 1.01 (ref 1–1.03)
SQUAMOUS #/AREA URNS HPF: NORMAL /HPF
UROBILINOGEN UR QL STRIP: ABNORMAL
WBC # UR STRIP: NORMAL /HPF
WBC NRBC COR # BLD: 9.6 10*3/MM3 (ref 3.4–10.8)

## 2022-10-25 PROCEDURE — G0378 HOSPITAL OBSERVATION PER HR: HCPCS

## 2022-10-25 PROCEDURE — 70450 CT HEAD/BRAIN W/O DYE: CPT

## 2022-10-25 PROCEDURE — 36415 COLL VENOUS BLD VENIPUNCTURE: CPT

## 2022-10-25 PROCEDURE — P9612 CATHETERIZE FOR URINE SPEC: HCPCS

## 2022-10-25 PROCEDURE — 86140 C-REACTIVE PROTEIN: CPT | Performed by: NURSE PRACTITIONER

## 2022-10-25 PROCEDURE — 85652 RBC SED RATE AUTOMATED: CPT | Performed by: NURSE PRACTITIONER

## 2022-10-25 PROCEDURE — 99285 EMERGENCY DEPT VISIT HI MDM: CPT

## 2022-10-25 PROCEDURE — 93005 ELECTROCARDIOGRAM TRACING: CPT | Performed by: NURSE PRACTITIONER

## 2022-10-25 PROCEDURE — 94761 N-INVAS EAR/PLS OXIMETRY MLT: CPT

## 2022-10-25 PROCEDURE — 83036 HEMOGLOBIN GLYCOSYLATED A1C: CPT | Performed by: NURSE PRACTITIONER

## 2022-10-25 PROCEDURE — 80053 COMPREHEN METABOLIC PANEL: CPT | Performed by: NURSE PRACTITIONER

## 2022-10-25 PROCEDURE — 94799 UNLISTED PULMONARY SVC/PX: CPT

## 2022-10-25 PROCEDURE — 94640 AIRWAY INHALATION TREATMENT: CPT

## 2022-10-25 PROCEDURE — 81001 URINALYSIS AUTO W/SCOPE: CPT | Performed by: NURSE PRACTITIONER

## 2022-10-25 PROCEDURE — 82962 GLUCOSE BLOOD TEST: CPT

## 2022-10-25 PROCEDURE — 84443 ASSAY THYROID STIM HORMONE: CPT | Performed by: NURSE PRACTITIONER

## 2022-10-25 PROCEDURE — 85025 COMPLETE CBC W/AUTO DIFF WBC: CPT | Performed by: NURSE PRACTITIONER

## 2022-10-25 PROCEDURE — 80061 LIPID PANEL: CPT | Performed by: NURSE PRACTITIONER

## 2022-10-25 PROCEDURE — 85610 PROTHROMBIN TIME: CPT | Performed by: NURSE PRACTITIONER

## 2022-10-25 PROCEDURE — 71045 X-RAY EXAM CHEST 1 VIEW: CPT

## 2022-10-25 RX ORDER — WARFARIN SODIUM 4 MG/1
4 TABLET ORAL
Status: DISCONTINUED | OUTPATIENT
Start: 2022-10-25 | End: 2022-10-27

## 2022-10-25 RX ORDER — ALBUTEROL SULFATE 90 UG/1
2 AEROSOL, METERED RESPIRATORY (INHALATION) EVERY 4 HOURS PRN
Status: DISCONTINUED | OUTPATIENT
Start: 2022-10-25 | End: 2022-10-27 | Stop reason: HOSPADM

## 2022-10-25 RX ORDER — ACETAMINOPHEN 500 MG
1000 TABLET ORAL ONCE
Status: COMPLETED | OUTPATIENT
Start: 2022-10-25 | End: 2022-10-25

## 2022-10-25 RX ORDER — LISINOPRIL 20 MG/1
40 TABLET ORAL
Status: DISCONTINUED | OUTPATIENT
Start: 2022-10-25 | End: 2022-10-27 | Stop reason: HOSPADM

## 2022-10-25 RX ORDER — AMLODIPINE BESYLATE 5 MG/1
5 TABLET ORAL DAILY
Status: DISCONTINUED | OUTPATIENT
Start: 2022-10-25 | End: 2022-10-27 | Stop reason: HOSPADM

## 2022-10-25 RX ORDER — SODIUM CHLORIDE 0.9 % (FLUSH) 0.9 %
10 SYRINGE (ML) INJECTION AS NEEDED
Status: DISCONTINUED | OUTPATIENT
Start: 2022-10-25 | End: 2022-10-27 | Stop reason: HOSPADM

## 2022-10-25 RX ORDER — BUDESONIDE AND FORMOTEROL FUMARATE DIHYDRATE 80; 4.5 UG/1; UG/1
2 AEROSOL RESPIRATORY (INHALATION)
Status: DISCONTINUED | OUTPATIENT
Start: 2022-10-25 | End: 2022-10-27 | Stop reason: HOSPADM

## 2022-10-25 RX ORDER — CLONIDINE HYDROCHLORIDE 0.1 MG/1
0.1 TABLET ORAL 2 TIMES DAILY
Status: DISCONTINUED | OUTPATIENT
Start: 2022-10-25 | End: 2022-10-27 | Stop reason: HOSPADM

## 2022-10-25 RX ORDER — HYDRALAZINE HYDROCHLORIDE 10 MG/1
10 TABLET, FILM COATED ORAL 2 TIMES DAILY
Status: DISCONTINUED | OUTPATIENT
Start: 2022-10-25 | End: 2022-10-27 | Stop reason: HOSPADM

## 2022-10-25 RX ADMIN — WARFARIN 4 MG: 4 TABLET ORAL at 22:13

## 2022-10-25 RX ADMIN — HYDRALAZINE HYDROCHLORIDE 10 MG: 10 TABLET, FILM COATED ORAL at 21:37

## 2022-10-25 RX ADMIN — ACETAMINOPHEN 1000 MG: 500 TABLET ORAL at 19:50

## 2022-10-25 RX ADMIN — BUDESONIDE AND FORMOTEROL FUMARATE DIHYDRATE 2 PUFF: 80; 4.5 AEROSOL RESPIRATORY (INHALATION) at 21:18

## 2022-10-26 ENCOUNTER — APPOINTMENT (OUTPATIENT)
Dept: CT IMAGING | Facility: HOSPITAL | Age: 82
End: 2022-10-26

## 2022-10-26 ENCOUNTER — APPOINTMENT (OUTPATIENT)
Dept: MRI IMAGING | Facility: HOSPITAL | Age: 82
End: 2022-10-26

## 2022-10-26 ENCOUNTER — APPOINTMENT (OUTPATIENT)
Dept: CARDIOLOGY | Facility: HOSPITAL | Age: 82
End: 2022-10-26

## 2022-10-26 LAB
BH CV XLRA MEAS LEFT DIST ICA EDV: -26 CM/SEC
BH CV XLRA MEAS LEFT DIST ICA PSV: -86.6 CM/SEC
BH CV XLRA MEAS LEFT PROX CCA EDV: 15.9 CM/SEC
BH CV XLRA MEAS LEFT PROX CCA PSV: 79.6 CM/SEC
BH CV XLRA MEAS LEFT PROX ICA EDV: -32.9 CM/SEC
BH CV XLRA MEAS LEFT PROX ICA PSV: -181 CM/SEC
BH CV XLRA MEAS LEFT VERTEBRAL A EDV: -16.5 CM/SEC
BH CV XLRA MEAS LEFT VERTEBRAL A PSV: -86.4 CM/SEC
BH CV XLRA MEAS RIGHT DIST CCA EDV: 18.2 CM/SEC
BH CV XLRA MEAS RIGHT DIST CCA PSV: 74.2 CM/SEC
BH CV XLRA MEAS RIGHT DIST ICA EDV: -13.3 CM/SEC
BH CV XLRA MEAS RIGHT DIST ICA PSV: -91.1 CM/SEC
BH CV XLRA MEAS RIGHT PROX CCA EDV: 10.3 CM/SEC
BH CV XLRA MEAS RIGHT PROX CCA PSV: 66.8 CM/SEC
BH CV XLRA MEAS RIGHT PROX ECA EDV: -11.5 CM/SEC
BH CV XLRA MEAS RIGHT PROX ECA PSV: -86.7 CM/SEC
BH CV XLRA MEAS RIGHT PROX ICA EDV: -17.6 CM/SEC
BH CV XLRA MEAS RIGHT PROX ICA PSV: -91.1 CM/SEC
BH CV XLRA MEAS RIGHT PROX SCLA PSV: 79.6 CM/SEC
BH CV XLRA MEAS RIGHT VERTEBRAL A EDV: 8.2 CM/SEC
BH CV XLRA MEAS RIGHT VERTEBRAL A PSV: 66.4 CM/SEC
CHOLEST SERPL-MCNC: 238 MG/DL (ref 0–200)
HBA1C MFR BLD: 5.8 % (ref 3.5–5.6)
HDLC SERPL-MCNC: 46 MG/DL (ref 40–60)
INR PPP: 1.33 (ref 2–3)
LDLC SERPL CALC-MCNC: 162 MG/DL (ref 0–100)
LDLC/HDLC SERPL: 3.45 {RATIO}
MAXIMAL PREDICTED HEART RATE: 139 BPM
PROTHROMBIN TIME: 13.5 SECONDS (ref 19.4–28.5)
STRESS TARGET HR: 118 BPM
TRIGL SERPL-MCNC: 166 MG/DL (ref 0–150)
TSH SERPL DL<=0.05 MIU/L-ACNC: 4.85 UIU/ML (ref 0.27–4.2)
VIT B12 BLD-MCNC: 486 PG/ML (ref 211–946)
VLDLC SERPL-MCNC: 30 MG/DL (ref 5–40)

## 2022-10-26 PROCEDURE — 99214 OFFICE O/P EST MOD 30 MIN: CPT | Performed by: NURSE PRACTITIONER

## 2022-10-26 PROCEDURE — 93880 EXTRACRANIAL BILAT STUDY: CPT

## 2022-10-26 PROCEDURE — 94799 UNLISTED PULMONARY SVC/PX: CPT

## 2022-10-26 PROCEDURE — G0378 HOSPITAL OBSERVATION PER HR: HCPCS

## 2022-10-26 PROCEDURE — 25010000002 ENOXAPARIN PER 10 MG: Performed by: FAMILY MEDICINE

## 2022-10-26 PROCEDURE — 96372 THER/PROPH/DIAG INJ SC/IM: CPT

## 2022-10-26 PROCEDURE — 70551 MRI BRAIN STEM W/O DYE: CPT

## 2022-10-26 PROCEDURE — 70544 MR ANGIOGRAPHY HEAD W/O DYE: CPT

## 2022-10-26 PROCEDURE — 82607 VITAMIN B-12: CPT | Performed by: NURSE PRACTITIONER

## 2022-10-26 PROCEDURE — 85610 PROTHROMBIN TIME: CPT | Performed by: FAMILY MEDICINE

## 2022-10-26 RX ORDER — ONDANSETRON 4 MG/1
4 TABLET, FILM COATED ORAL EVERY 6 HOURS PRN
Status: DISCONTINUED | OUTPATIENT
Start: 2022-10-26 | End: 2022-10-27 | Stop reason: HOSPADM

## 2022-10-26 RX ORDER — SODIUM CHLORIDE 0.9 % (FLUSH) 0.9 %
3-10 SYRINGE (ML) INJECTION AS NEEDED
Status: DISCONTINUED | OUTPATIENT
Start: 2022-10-26 | End: 2022-10-27 | Stop reason: HOSPADM

## 2022-10-26 RX ORDER — MAGNESIUM SULFATE HEPTAHYDRATE 40 MG/ML
4 INJECTION, SOLUTION INTRAVENOUS AS NEEDED
Status: DISCONTINUED | OUTPATIENT
Start: 2022-10-26 | End: 2022-10-27 | Stop reason: HOSPADM

## 2022-10-26 RX ORDER — POTASSIUM CHLORIDE 20 MEQ/1
40 TABLET, EXTENDED RELEASE ORAL AS NEEDED
Status: DISCONTINUED | OUTPATIENT
Start: 2022-10-26 | End: 2022-10-27 | Stop reason: HOSPADM

## 2022-10-26 RX ORDER — POTASSIUM CHLORIDE 1.5 G/1.77G
40 POWDER, FOR SOLUTION ORAL AS NEEDED
Status: DISCONTINUED | OUTPATIENT
Start: 2022-10-26 | End: 2022-10-27 | Stop reason: HOSPADM

## 2022-10-26 RX ORDER — SODIUM CHLORIDE 0.9 % (FLUSH) 0.9 %
3 SYRINGE (ML) INJECTION EVERY 12 HOURS SCHEDULED
Status: DISCONTINUED | OUTPATIENT
Start: 2022-10-26 | End: 2022-10-27 | Stop reason: HOSPADM

## 2022-10-26 RX ORDER — MAGNESIUM SULFATE HEPTAHYDRATE 40 MG/ML
2 INJECTION, SOLUTION INTRAVENOUS AS NEEDED
Status: DISCONTINUED | OUTPATIENT
Start: 2022-10-26 | End: 2022-10-27 | Stop reason: HOSPADM

## 2022-10-26 RX ORDER — PANTOPRAZOLE SODIUM 40 MG/10ML
40 INJECTION, POWDER, LYOPHILIZED, FOR SOLUTION INTRAVENOUS
Status: DISCONTINUED | OUTPATIENT
Start: 2022-10-26 | End: 2022-10-27

## 2022-10-26 RX ORDER — ONDANSETRON 2 MG/ML
4 INJECTION INTRAMUSCULAR; INTRAVENOUS EVERY 6 HOURS PRN
Status: DISCONTINUED | OUTPATIENT
Start: 2022-10-26 | End: 2022-10-27 | Stop reason: HOSPADM

## 2022-10-26 RX ORDER — NITROGLYCERIN 0.4 MG/1
0.4 TABLET SUBLINGUAL
Status: DISCONTINUED | OUTPATIENT
Start: 2022-10-26 | End: 2022-10-27 | Stop reason: HOSPADM

## 2022-10-26 RX ORDER — WARFARIN SODIUM 4 MG/1
4 TABLET ORAL ONCE
Status: COMPLETED | OUTPATIENT
Start: 2022-10-26 | End: 2022-10-26

## 2022-10-26 RX ORDER — ROSUVASTATIN CALCIUM 10 MG/1
20 TABLET, COATED ORAL NIGHTLY
Status: DISCONTINUED | OUTPATIENT
Start: 2022-10-26 | End: 2022-10-27 | Stop reason: HOSPADM

## 2022-10-26 RX ORDER — ENOXAPARIN SODIUM 100 MG/ML
60 INJECTION SUBCUTANEOUS 2 TIMES DAILY
Status: DISCONTINUED | OUTPATIENT
Start: 2022-10-26 | End: 2022-10-27

## 2022-10-26 RX ORDER — ACETAMINOPHEN 325 MG/1
650 TABLET ORAL EVERY 4 HOURS PRN
Status: DISCONTINUED | OUTPATIENT
Start: 2022-10-26 | End: 2022-10-27 | Stop reason: HOSPADM

## 2022-10-26 RX ORDER — POTASSIUM CHLORIDE 7.45 MG/ML
10 INJECTION INTRAVENOUS
Status: DISCONTINUED | OUTPATIENT
Start: 2022-10-26 | End: 2022-10-27 | Stop reason: HOSPADM

## 2022-10-26 RX ADMIN — HYDRALAZINE HYDROCHLORIDE 10 MG: 10 TABLET, FILM COATED ORAL at 08:56

## 2022-10-26 RX ADMIN — HYDRALAZINE HYDROCHLORIDE 10 MG: 10 TABLET, FILM COATED ORAL at 21:03

## 2022-10-26 RX ADMIN — ENOXAPARIN SODIUM 60 MG: 60 INJECTION SUBCUTANEOUS at 21:03

## 2022-10-26 RX ADMIN — Medication 3 ML: at 21:03

## 2022-10-26 RX ADMIN — LISINOPRIL 40 MG: 20 TABLET ORAL at 08:56

## 2022-10-26 RX ADMIN — WARFARIN 4 MG: 4 TABLET ORAL at 18:28

## 2022-10-26 RX ADMIN — BUDESONIDE AND FORMOTEROL FUMARATE DIHYDRATE 2 PUFF: 80; 4.5 AEROSOL RESPIRATORY (INHALATION) at 19:08

## 2022-10-26 RX ADMIN — AMLODIPINE BESYLATE 5 MG: 5 TABLET ORAL at 08:56

## 2022-10-27 ENCOUNTER — APPOINTMENT (OUTPATIENT)
Dept: CT IMAGING | Facility: HOSPITAL | Age: 82
End: 2022-10-27

## 2022-10-27 ENCOUNTER — READMISSION MANAGEMENT (OUTPATIENT)
Dept: CALL CENTER | Facility: HOSPITAL | Age: 82
End: 2022-10-27

## 2022-10-27 VITALS
TEMPERATURE: 98 F | HEART RATE: 93 BPM | SYSTOLIC BLOOD PRESSURE: 140 MMHG | RESPIRATION RATE: 19 BRPM | HEIGHT: 64 IN | OXYGEN SATURATION: 94 % | BODY MASS INDEX: 24.35 KG/M2 | WEIGHT: 142.64 LBS | DIASTOLIC BLOOD PRESSURE: 81 MMHG

## 2022-10-27 LAB
ANION GAP SERPL CALCULATED.3IONS-SCNC: 9 MMOL/L (ref 5–15)
BASOPHILS # BLD AUTO: 0.1 10*3/MM3 (ref 0–0.2)
BASOPHILS NFR BLD AUTO: 0.8 % (ref 0–1.5)
BUN SERPL-MCNC: 15 MG/DL (ref 8–23)
BUN/CREAT SERPL: 17.4 (ref 7–25)
CALCIUM SPEC-SCNC: 9.1 MG/DL (ref 8.6–10.5)
CHLORIDE SERPL-SCNC: 106 MMOL/L (ref 98–107)
CO2 SERPL-SCNC: 25 MMOL/L (ref 22–29)
CREAT SERPL-MCNC: 0.86 MG/DL (ref 0.57–1)
D DIMER PPP FEU-MCNC: 0.72 MG/L (FEU) (ref 0–0.59)
DEPRECATED RDW RBC AUTO: 49.9 FL (ref 37–54)
EGFRCR SERPLBLD CKD-EPI 2021: 68 ML/MIN/1.73
EOSINOPHIL # BLD AUTO: 0.4 10*3/MM3 (ref 0–0.4)
EOSINOPHIL NFR BLD AUTO: 4.3 % (ref 0.3–6.2)
ERYTHROCYTE [DISTWIDTH] IN BLOOD BY AUTOMATED COUNT: 15.8 % (ref 12.3–15.4)
GLUCOSE SERPL-MCNC: 104 MG/DL (ref 65–99)
HCT VFR BLD AUTO: 41.2 % (ref 34–46.6)
HGB BLD-MCNC: 13.1 G/DL (ref 12–15.9)
INR PPP: 1.53 (ref 2–3)
LYMPHOCYTES # BLD AUTO: 1.7 10*3/MM3 (ref 0.7–3.1)
LYMPHOCYTES NFR BLD AUTO: 16.4 % (ref 19.6–45.3)
MCH RBC QN AUTO: 28.8 PG (ref 26.6–33)
MCHC RBC AUTO-ENTMCNC: 31.8 G/DL (ref 31.5–35.7)
MCV RBC AUTO: 90.6 FL (ref 79–97)
MONOCYTES # BLD AUTO: 0.9 10*3/MM3 (ref 0.1–0.9)
MONOCYTES NFR BLD AUTO: 8.5 % (ref 5–12)
NEUTROPHILS NFR BLD AUTO: 7.2 10*3/MM3 (ref 1.7–7)
NEUTROPHILS NFR BLD AUTO: 70 % (ref 42.7–76)
NRBC BLD AUTO-RTO: 0 /100 WBC (ref 0–0.2)
PLATELET # BLD AUTO: 299 10*3/MM3 (ref 140–450)
PMV BLD AUTO: 7.7 FL (ref 6–12)
POTASSIUM SERPL-SCNC: 3.9 MMOL/L (ref 3.5–5.2)
PROTHROMBIN TIME: 15.4 SECONDS (ref 19.4–28.5)
RBC # BLD AUTO: 4.54 10*6/MM3 (ref 3.77–5.28)
SODIUM SERPL-SCNC: 140 MMOL/L (ref 136–145)
WBC NRBC COR # BLD: 10.3 10*3/MM3 (ref 3.4–10.8)

## 2022-10-27 PROCEDURE — G0378 HOSPITAL OBSERVATION PER HR: HCPCS

## 2022-10-27 PROCEDURE — 0 IOPAMIDOL PER 1 ML: Performed by: INTERNAL MEDICINE

## 2022-10-27 PROCEDURE — 85025 COMPLETE CBC W/AUTO DIFF WBC: CPT

## 2022-10-27 PROCEDURE — 25010000002 ENOXAPARIN PER 10 MG: Performed by: FAMILY MEDICINE

## 2022-10-27 PROCEDURE — 36415 COLL VENOUS BLD VENIPUNCTURE: CPT | Performed by: FAMILY MEDICINE

## 2022-10-27 PROCEDURE — 85379 FIBRIN DEGRADATION QUANT: CPT | Performed by: NURSE PRACTITIONER

## 2022-10-27 PROCEDURE — 85610 PROTHROMBIN TIME: CPT | Performed by: FAMILY MEDICINE

## 2022-10-27 PROCEDURE — 80048 BASIC METABOLIC PNL TOTAL CA: CPT

## 2022-10-27 PROCEDURE — 96372 THER/PROPH/DIAG INJ SC/IM: CPT

## 2022-10-27 PROCEDURE — 71275 CT ANGIOGRAPHY CHEST: CPT

## 2022-10-27 RX ORDER — ROSUVASTATIN CALCIUM 20 MG/1
20 TABLET, COATED ORAL NIGHTLY
Qty: 90 TABLET | Refills: 0 | Status: SHIPPED | OUTPATIENT
Start: 2022-10-27 | End: 2022-12-13

## 2022-10-27 RX ADMIN — IOPAMIDOL 100 ML: 755 INJECTION, SOLUTION INTRAVENOUS at 13:52

## 2022-10-27 RX ADMIN — LISINOPRIL 40 MG: 20 TABLET ORAL at 09:11

## 2022-10-27 RX ADMIN — HYDRALAZINE HYDROCHLORIDE 10 MG: 10 TABLET, FILM COATED ORAL at 09:11

## 2022-10-27 RX ADMIN — Medication 3 ML: at 09:13

## 2022-10-27 RX ADMIN — AMLODIPINE BESYLATE 5 MG: 5 TABLET ORAL at 09:11

## 2022-10-27 RX ADMIN — ENOXAPARIN SODIUM 60 MG: 60 INJECTION SUBCUTANEOUS at 09:13

## 2022-10-28 NOTE — OUTREACH NOTE
Prep Survey    Flowsheet Row Responses   [facility] facility patient discharged from? [facility]   Is LACE score < 7 ? No   Emergency Room discharge w/ pulse ox? No   Eligibility Readm Mgmt   Discharge diagnosis TIA   Does the patient have one of the following disease processes/diagnoses(primary or secondary)? Stroke   Does the patient have Home health ordered? No   Is there a DME ordered? Yes   What DME was ordered? has O2 through [place]   Prep survey completed? Yes          TR FAGAN - Registered Nurse

## 2022-10-30 LAB — QT INTERVAL: 370 MS

## 2022-10-31 ENCOUNTER — READMISSION MANAGEMENT (OUTPATIENT)
Dept: CALL CENTER | Facility: HOSPITAL | Age: 82
End: 2022-10-31

## 2022-10-31 NOTE — OUTREACH NOTE
Stroke Week 1 Survey    Flowsheet Row Responses   [facility] patient discharged from? [facility]   Does the patient have one of the following disease processes/diagnoses(primary or secondary)? Stroke   Week 1 attempt successful? Yes   Call start time 0954   Call end time 1008   Discharge diagnosis TIA   Medication alerts for this patient ELIQUIS--bleeding precautions advised    Meds reviewed with patient/caregiver? Yes   Is the patient having any side effects they believe may be caused by any medication additions or changes? Yes   Side effects comments  reports she had a nose bleed she felt was caused by Eliquis, called her MD who had her hold Eliquis for one day--has resumed and is tolerating at this time   Does the patient have all medications ordered at discharge? No   What is keeping the patient from filling the prescriptions? --  [Patient declined use of Crestor and did not --reports MD was aware]   Nursing Interventions No intervention needed   Is the patient taking all medications as directed (includes completed medication regime)? Yes   Comments regarding appointments Patient has an oncology appt today 10/31/22   Does the patient have a primary care provider?  Yes   Does the patient have an appointment with their PCP within 7 days of discharge? Greater than 7 days   What is preventing the patient from scheduling follow up appointments within 7 days of discharge? Earlier appointment not available   Nursing Interventions Advised patient to make appointment   Has the patient kept scheduled appointments due by today? N/A   What DME was ordered? has O2 through [place]   DME comments Encouraged to use walker as she discusses she has painful gout to right foot today---v/u   Does the patient require any assistance with activities of daily living such as eating, bathing, dressing, walking, etc.? No   Does the patient have any residual symptoms from stroke/TIA? No   Did the patient receive a copy of their  discharge instructions? Yes   Nursing interventions Reviewed instructions with patient   What is the patient's perception of their health status since discharge? New symptoms unrelated to diagnosis  [Patient reports her stroke type s/s resolved--no issues w/speech.  Reports her concerns today due to painful gout to right foot--will see onvology today and hopeful to obtain medication for relief.]   Nursing interventions Nurse provided patient education   Is the patient/caregiver able to teach back the risk factors for a stroke? High blood pressure-goal below 120/80, History of TIAs   Is the patient/caregiver able to teach back signs and symptoms related to disease process for when to call PCP? Yes   Is the patient/caregiver able to teach back signs and symptoms related to disease process for when to call 911? Yes   If the patient is a current smoker, are they able to teach back resources for cessation? Not a smoker   Is the patient able to teach back FAST for Stroke? B alance: Watch for sudden loss of balance, E yes: Check for vision loss, F ace: Look for an uneven smile, A rm: Check if one arm is weak, S peech: Listen for slurred speech, T fran: Call 9-1-1 right away  [Reviewed]   Week 1 call completed? Yes          GEOVANNY FAGAN - Registered Nurse

## 2022-11-10 ENCOUNTER — READMISSION MANAGEMENT (OUTPATIENT)
Dept: CALL CENTER | Facility: HOSPITAL | Age: 82
End: 2022-11-10

## 2022-11-10 NOTE — OUTREACH NOTE
Stroke Week 2 Survey    Flowsheet Row Responses   [facility] facility patient discharged from? [facility]   Does the patient have one of the following disease processes/diagnoses(primary or secondary)? Stroke   Week 2 attempt successful? No   Unsuccessful attempts Attempt 1          KEEGAN OVALLES - Registered Nurse

## 2022-11-13 PROCEDURE — 87186 SC STD MICRODIL/AGAR DIL: CPT | Performed by: NURSE PRACTITIONER

## 2022-11-13 PROCEDURE — 87077 CULTURE AEROBIC IDENTIFY: CPT | Performed by: NURSE PRACTITIONER

## 2022-11-13 PROCEDURE — 87086 URINE CULTURE/COLONY COUNT: CPT | Performed by: NURSE PRACTITIONER

## 2022-11-14 ENCOUNTER — TELEPHONE (OUTPATIENT)
Dept: URGENT CARE | Facility: CLINIC | Age: 82
End: 2022-11-14

## 2022-11-15 ENCOUNTER — READMISSION MANAGEMENT (OUTPATIENT)
Dept: CALL CENTER | Facility: HOSPITAL | Age: 82
End: 2022-11-15

## 2022-11-15 NOTE — OUTREACH NOTE
Stroke Week 2 Survey    Flowsheet Row Responses   [facility] facility patient discharged from? [facility]   Does the patient have one of the following disease processes/diagnoses(primary or secondary)? Stroke   Week 2 attempt successful? No   Unsuccessful attempts Attempt 2          DUANE LEBLANC - Licensed Nurse

## 2022-11-22 ENCOUNTER — READMISSION MANAGEMENT (OUTPATIENT)
Dept: CALL CENTER | Facility: HOSPITAL | Age: 82
End: 2022-11-22

## 2022-11-22 NOTE — OUTREACH NOTE
Stroke Week 3 Survey    Flowsheet Row Responses   [facility] patient discharged from? [facility]   Does the patient have one of the following disease processes/diagnoses(primary or secondary)? Stroke   Week 3 attempt successful? Yes   Call start time 1705   Call end time 1713   Discharge diagnosis TIA   Person spoke with today (if not patient) and relationship patient   Meds reviewed with patient/caregiver? Yes   Is the patient taking all medications as directed (includes completed medication regime)? Yes   Medication comments Patient concerned over cost of eliquis. She is going to contact [facility] to discuss.    Does the patient have a primary care provider?  Yes   Has the patient kept scheduled appointments due by today? Yes   Has home health visited the patient within 72 hours of discharge? N/A   Does the patient require any assistance with activities of daily living such as eating, bathing, dressing, walking, etc.? No   Does the patient have any residual symptoms from stroke/TIA? No   What is the patient's perception of their health status since discharge? Improving   Is the patient/caregiver able to teach back the risk factors for a stroke? History of Afib, Carotid or other artery disease   Is the patient/caregiver able to teach back signs and symptoms related to disease process for when to call PCP? Yes   Is the patient/caregiver able to teach back signs and symptoms related to disease process for when to call 911? Yes   Is the patient/caregiver able to teach back the hierarchy of who to call/visit for symptoms/problems? PCP, Specialist, Home health nurse, Urgent Care, ED, 911 Yes   Week 3 call completed? Yes          ASHLEY HORTON - Registered Nurse

## 2022-12-12 PROCEDURE — 87086 URINE CULTURE/COLONY COUNT: CPT | Performed by: FAMILY MEDICINE

## 2022-12-12 NOTE — PROGRESS NOTES
Encounter Date:12/13/2022      Patient ID: Gabrielle Lyles is a 82 y.o. female.    Chief Complaint:      History of Present Illness  82-year-old woman with a history of hypertension, DVT/PE on chronic Anticoagulation, former smoker has been referred to cardiology due to abnormal heart rate and shortness of breath.  She has a history of breast cancer as well as extensive smoking in the past.  She carries a diagnosis of COPD however she has not had any pulmonary function test done in the past.  She reports worsening shortness of breath over the last 2 to 3 months and it is difficult for her to do her normal activities without feeling significant shortness of breath.  She is also noted elevated blood pressures, leg pain.  She has a history of DVT and PE in the past and is on chronic anticoagulation with warfarin. She recently had some blood work done which showed elevated D-dimer.  Which was followed by CT scan of the chest the report of which is not available to me.  She reports palpitations/tachycardia along with shortness of breath.  She is accompanied by her granddaughter who is a critical care nurse.  They believe she may have atrial fibrillation and heart failure.      They would like better blood pressure control and a thorough cardiac work-up.    • • 40 events were transmitted. 33 patient triggered; 7 auto triggered • Patient monitored for 10d 22h 42m • AF occurred 47 time(s) with HR range of 50 - 119; Total AF [place] 2% • Heart Block occurred 3 time(s) the most severe 1°; slowest 87 BPM • 14,275 PACs with PAC burden of 1% • 19,228 PVCs with PVC burden of 2%  •   Estimated left ventricular EF = 70% Estimated left ventricular EF was in agreement with the calculated left ventricular EF. Left ventricular systolic function is normal.  •  Left ventricular diastolic function is consistent with (grade II w/high LAP) pseudonormalization.    •  Left ventricular ejection fraction is hyperdynamic (Calculated EF > 70%).  .  •  Myocardial perfusion imaging indicates a normal myocardial perfusion study with no evidence of ischemia.  •  Impressions are consistent with a low risk study.  •  Findings consistent with a normal ECG stress test.    Today she comes in with complaints of shortness of breath.  She denies any chest pain.  She was recently seen by pulmonology where during her 6-minute walk test her oxygen saturation dropped below 88 and she was recommended to use home oxygen.    The following portions of the patient's history were reviewed and updated as appropriate: allergies, current medications, past family history, past medical history, past social history, past surgical history and problem list.    Review of Systems   Constitutional: Negative for malaise/fatigue.   Cardiovascular: Negative for chest pain, leg swelling and palpitations.   Respiratory: Positive for shortness of breath.    Skin: Negative for rash.   Neurological: Negative for dizziness, light-headedness and numbness.         Current Outpatient Medications:   •  amLODIPine (NORVASC) 5 MG tablet, Take 1 tablet by mouth Daily., Disp: , Rfl:   •  apixaban (ELIQUIS) 5 MG tablet tablet, Take 1 tablet by mouth Every 12 (Twelve) Hours. Indications: Other - full anticoagulation, history of DVT/PE, Disp: 60 tablet, Rfl: 2  •  cefdinir (OMNICEF) 300 MG capsule, Take 1 capsule by mouth 2 (Two) Times a Day., Disp: 10 capsule, Rfl: 0  •  Cholecalciferol (Vitamin D3) 50 MCG (2000 UT) capsule, Take 1 capsule by mouth Daily., Disp: , Rfl:   •  cloNIDine (CATAPRES) 0.1 MG tablet, Take 1 tablet by mouth 2 (Two) Times a Day As Needed for High Blood Pressure (SBP greater than 170)., Disp: 15 tablet, Rfl: 0  •  Folbic 2.5-25-2 MG tablet tablet, Take 1 tablet by mouth Daily., Disp: , Rfl:   •  lisinopril (PRINIVIL,ZESTRIL) 40 MG tablet, Take 1 tablet by mouth Daily., Disp: , Rfl:   •  SYMBICORT 80-4.5 MCG/ACT inhaler, Inhale 2 puffs 2 (Two) Times a Day., Disp: , Rfl: 5  •  VENTOLIN   (90 Base) MCG/ACT inhaler, Inhale 2 puffs Every 4 (Four) Hours As Needed., Disp: , Rfl: 5  •  carvedilol (COREG) 12.5 MG tablet, Take 1 tablet by mouth 2 (Two) Times a Day With Meals., Disp: 180 tablet, Rfl: 3  •  famotidine (PEPCID) 20 MG tablet, Take 1 tablet by mouth 2 (Two) Times a Day., Disp: , Rfl:   •  rosuvastatin (CRESTOR) 10 MG tablet, Take 1 tablet by mouth Daily., Disp: 90 tablet, Rfl: 3    Current outpatient and discharge medications have been reconciled for the patient.  Reviewed by: Richardson Willis MD       Allergies   Allergen Reactions   • Celebrex [Celecoxib] Dizziness     Made her dizziness and fatigue       Family History   Problem Relation Age of Onset   • Lung cancer Brother        Past Surgical History:   Procedure Laterality Date   • CATARACT EXTRACTION     • IVC FILTER RETRIEVAL  2014    IVC filter placement by Dr. Card   • MAMMO STEREOTACTIC BREAST BX SURGICAL ADD UNI Left 2011    invasive lobular carcinoma-Dr. Cande Daniel   • MASTECTOMY, PARTIAL Left 2011    and left axillary sentinel lymph node biopsy by Dr. Uriostegui   • TUBAL ABDOMINAL LIGATION         Past Medical History:   Diagnosis Date   • Anemia 2012   • History of DVT (deep vein thrombosis) 2013    bilateral lower extremity   • History of pneumonia 2012    community acquired pneumonia and right pleural effusion;hospitalized at [facility]   • History of pulmonary embolism 2013    bilateral PE   • Hypertension    • Insomnia     on Ambien 5mg at    • Lobular breast cancer, left (HCC) 2011    Stage IA left upper lobular breast cancer   • Osteopenia        Family History   Problem Relation Age of Onset   • Lung cancer Brother        Social History     Socioeconomic History   • Marital status:    Tobacco Use   • Smoking status: Former     Types: Cigarettes     Quit date:      Years since quittin.9     Passive exposure: Never   • Smokeless tobacco: Never   •  "Tobacco comments:     smoked 6 cigarettes a day from 13 years of age to 56 years of age when she quit in 1996   Vaping Use   • Vaping Use: Never used   Substance and Sexual Activity   • Alcohol use: Not Currently   • Drug use: No   • Sexual activity: Not Currently     Partners: Male         Procedures      Objective:       Physical Exam    /71   Pulse 83   Ht 162.6 cm (64\")   Wt 64 kg (141 lb)   SpO2 92%   BMI 24.20 kg/m²   The patient is alert, oriented and in no distress.    Vital signs as noted above.    Head and neck revealed no carotid bruits or jugular venous distension.  No thyromegaly or lymphadenopathy is present.    Lungs clear.  No wheezing.  Breath sounds are normal bilaterally.    Heart normal first and second heart sounds.  No murmur..  No pericardial rub is present.  No gallop is present.    Abdomen soft and nontender.  No organomegaly is present.    Extremities revealed good peripheral pulses without any pedal edema.    Skin warm and dry.    Musculoskeletal system is grossly normal.    CNS grossly normal.           Diagnosis Plan   1. Abnormal heart rate        2. SOB (shortness of breath)        3. Primary hypertension  carvedilol (COREG) 12.5 MG tablet      4. Aortic aneurysm without rupture, unspecified portion of aorta ([facility])        5. Deep vein thrombosis (DVT) of proximal vein of both lower extremities, unspecified chronicity ([facility])        6. Stage 3 chronic kidney disease, unspecified whether stage 3a or 3b CKD ([facility])        7. Palpitations        8. TIA (transient ischemic attack)        9. PAD (peripheral artery disease) ([facility])  rosuvastatin (CRESTOR) 10 MG tablet      10. AF (paroxysmal atrial fibrillation) ([facility])  carvedilol (COREG) 12.5 MG tablet      LAB RESULTS (LAST 7 DAYS)    CBC        BMP        CMP         BNP        TROPONIN        CoAg        Creatinine Clearance  CrCl cannot be calculated (Patient's most recent lab result is older than the maximum 30 days " allowed.).    ABG        Radiology  No radiology results for the last day    Negative for acute pulmonary embolus.  2. Chronic pulmonary embolus involving right lower lobe anterior basilar  segment decreased from prior prior study from 06/09/2014.  3. 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  4. Emphysema and additional chronic findings above.      Assessment and Plan       Diagnoses and all orders for this visit:    1. Abnormal heart rate (Primary)    2. SOB (shortness of breath)    3. Primary hypertension  -     carvedilol (COREG) 12.5 MG tablet; Take 1 tablet by mouth 2 (Two) Times a Day With Meals.  Dispense: 180 tablet; Refill: 3    4. Aortic aneurysm without rupture, unspecified portion of aorta ([facility])    5. Deep vein thrombosis (DVT) of proximal vein of both lower extremities, unspecified chronicity ([facility])    6. Stage 3 chronic kidney disease, unspecified whether stage 3a or 3b CKD ([facility])    7. Palpitations    8. TIA (transient ischemic attack)    9. PAD (peripheral artery disease) ([facility])  -     rosuvastatin (CRESTOR) 10 MG tablet; Take 1 tablet by mouth Daily.  Dispense: 90 tablet; Refill: 3    10. AF (paroxysmal atrial fibrillation) ([facility])  -     carvedilol (COREG) 12.5 MG tablet; Take 1 tablet by mouth 2 (Two) Times a Day With Meals.  Dispense: 180 tablet; Refill: 3         1.  Atrial fibrillation  She was diagnosed with atrial fibrillation on the recent [facility]OT and has been started on full dose anticoagulation   I will start her on a beta-blocker today.  I will start her on Coreg for heart rate and blood pressure control  MCOT showed 2% atrial fibrillation burden    2. SOB (shortness of breath)  Echocardiogram and stress test completed.  She has diastolic heart failure and stress test negative for any ischemia.  EF is 70%, grade  2 diastolic dysfunction  She completed a 6-minute walk test with pulmonology and was recommended to start on oxygen especially at nighttime.  We will use diuretics as needed, she is currently euvolemic.  Chronic pulmonary embolism is probably contributing    3. Primary hypertension  She is currently on lisinopril 40 mg, hydralazine 10 mg p.o. 3 times daily, amlodipine 5 mg p.o. daily.  I will discontinue hydralazine and start her on Coreg 12.5 mg p.o. twice daily.  She will send me a message through [employer] regarding her blood pressure and if it remains elevated I will increase her Coreg to 25 mg p.o. twice daily     4. Aortic aneurysm without rupture, unspecified portion of aorta (HCC)  Unclear where this diagnosis came from  Recent CT scan did not show any evidence of aortic aneurysm     5. Deep vein thrombosis (DVT) of proximal vein of both lower extremities, unspecified chronicity (HCC)  She has a remote history of DVT/PE in the setting of trauma to her leg.  She also has an IVC filter and is chronically on warfarin.  CT did not show any acute PE however chronic PE seen involving the right lower lobe anterior basilar segment decreased when compared to previous study in 2014.     6. Stage 3 chronic kidney disease, unspecified whether stage 3a or 3b CKD (HCC)  avoiding contrast and nephrotoxic medication/agent.  She is tolerating lisinopril well     7. Palpitations/atrial fibrillation  Diagnosis of atrial fibrillation has been confirmed.  Continue Coreg and apixaban for heart rate control and stroke prevention respectively    8.  Peripheral arterial disease   CT scan showed extensive atherosclerotic disease involving coronary arteries, thoracic aortic arch with chronic occlusion of proximal left subclavian artery.  I have prescribed her Crestor.  She is already on Eliquis

## 2022-12-13 ENCOUNTER — OFFICE VISIT (OUTPATIENT)
Dept: CARDIOLOGY | Facility: CLINIC | Age: 82
End: 2022-12-13

## 2022-12-13 VITALS
WEIGHT: 141 LBS | DIASTOLIC BLOOD PRESSURE: 71 MMHG | HEART RATE: 83 BPM | BODY MASS INDEX: 24.07 KG/M2 | SYSTOLIC BLOOD PRESSURE: 168 MMHG | HEIGHT: 64 IN | OXYGEN SATURATION: 92 %

## 2022-12-13 DIAGNOSIS — I82.4Y3 DEEP VEIN THROMBOSIS (DVT) OF PROXIMAL VEIN OF BOTH LOWER EXTREMITIES, UNSPECIFIED CHRONICITY: ICD-10-CM

## 2022-12-13 DIAGNOSIS — N18.30 STAGE 3 CHRONIC KIDNEY DISEASE, UNSPECIFIED WHETHER STAGE 3A OR 3B CKD: ICD-10-CM

## 2022-12-13 DIAGNOSIS — I73.9 PAD (PERIPHERAL ARTERY DISEASE): ICD-10-CM

## 2022-12-13 DIAGNOSIS — G45.9 TIA (TRANSIENT ISCHEMIC ATTACK): ICD-10-CM

## 2022-12-13 DIAGNOSIS — R00.9 ABNORMAL HEART RATE: Primary | ICD-10-CM

## 2022-12-13 DIAGNOSIS — I48.0 AF (PAROXYSMAL ATRIAL FIBRILLATION): ICD-10-CM

## 2022-12-13 DIAGNOSIS — I10 PRIMARY HYPERTENSION: ICD-10-CM

## 2022-12-13 DIAGNOSIS — I71.9 AORTIC ANEURYSM WITHOUT RUPTURE, UNSPECIFIED PORTION OF AORTA: ICD-10-CM

## 2022-12-13 DIAGNOSIS — R06.02 SOB (SHORTNESS OF BREATH): ICD-10-CM

## 2022-12-13 DIAGNOSIS — R00.2 PALPITATIONS: ICD-10-CM

## 2022-12-13 PROCEDURE — 99214 OFFICE O/P EST MOD 30 MIN: CPT | Performed by: INTERNAL MEDICINE

## 2022-12-13 RX ORDER — CARVEDILOL 12.5 MG/1
12.5 TABLET ORAL 2 TIMES DAILY WITH MEALS
Qty: 180 TABLET | Refills: 3 | Status: SHIPPED | OUTPATIENT
Start: 2022-12-13

## 2022-12-13 RX ORDER — ROSUVASTATIN CALCIUM 10 MG/1
10 TABLET, COATED ORAL DAILY
Qty: 90 TABLET | Refills: 3 | Status: SHIPPED | OUTPATIENT
Start: 2022-12-13

## 2022-12-29 ENCOUNTER — TELEPHONE (OUTPATIENT)
Dept: CARDIOLOGY | Facility: CLINIC | Age: 82
End: 2022-12-29
Payer: MEDICARE

## 2022-12-29 NOTE — TELEPHONE ENCOUNTER
Patient sent in a list of B/P readings since being started on Coreg 12.5mg BID at LOV with Dr. Willis on 12-13-22.    12/14 10:30am 140/69; HR-89 (took pills at 10am)   12/14   6:30pm  154/66; HR-71  12/15 9:30am     131/55; HR 66 ( TOOK PILL AT 9AM)  12/16   11AM   123/59; HR 68  12/16    9PM   129/66; HR-66 (was sick with the flu)  12/17  11:30AM 92/46; HR 64  12/17   2:00pm   118/58; HR 63  12/18  10:00AM  118/53; HR 63  12/19  10:35AM   124/54; HR 60  12/19   7:30PM   152/66; HR 59  12/19     7:45PM  99/78; HR-64 (rechecked 15min later)  12/20   1:30PM  127/59; HR 62  12/21   10:30AM   137/67; HR 57  12/22   11:09AM    129/58; HR 58        Per office note from 12/13/22:  Progress Notes  Richardson Willis MD (Physician) • • Cardiology      3. Primary hypertension  She is currently on lisinopril 40 mg, hydralazine 10 mg p.o. 3 times daily, amlodipine 5 mg p.o. daily.  I will discontinue hydralazine and start her on Coreg 12.5 mg p.o. twice daily.  She will send me a message through [employer] regarding her blood pressure and if it remains elevated I will increase her Coreg to 25 mg p.o. twice daily        Please advise,    Thanks Cande

## 2022-12-29 NOTE — TELEPHONE ENCOUNTER
Placed a call to patient and left VM with recommendations per JOAQUÍN Kelley and advised patient to call back with any further questions/concerns.

## 2022-12-29 NOTE — TELEPHONE ENCOUNTER
Please advise patient to continue taking Coreg 12.5 mg twice daily as prescribed at last office visit.

## 2023-02-23 NOTE — PROGRESS NOTES
Encounter Date:02/24/2023        Patient ID: Gabrielle Lyles is a 83 y.o. female.      Chief Complaint:      History of Present Illness  83-year-old woman with a history of hypertension, DVT/PE on chronic Anticoagulation, former smoker initially referred to cardiology due to abnormal heart rate and shortness of breath.  She has a history of breast cancer as well as extensive smoking in the past.  She carries a diagnosis of COPD however she has not had any pulmonary function test done in the past.  She reports worsening shortness of breath over the last 2 to 3 months and it is difficult for her to do her normal activities without feeling significant shortness of breath.  She is also noted elevated blood pressures, leg pain.  She has a history of DVT and PE in the past and is on chronic anticoagulation with warfarin. She recently had some blood work done which showed elevated D-dimer.  Which was followed by CT scan of the chest the report of which is not available to me.  She reports palpitations/tachycardia along with shortness of breath.  Her granddaughter is a critical care nurse.  They believe she may have atrial fibrillation and heart failure.       They would like better blood pressure control and a thorough cardiac work-up.  Holter monitor showed  • • 40 events were transmitted. 33 patient triggered; 7 auto triggered • Patient monitored for 10d 22h 42m • AF occurred 47 time(s) with HR range of 50 - 119; Total AF Dyke 2% • Heart Block occurred 3 time(s) the most severe 1°; slowest 87 BPM • 14,275 PACs with PAC burden of 1% • 19,228 PVCs with PVC burden of 2%    Echocardiogram showed  Estimated left ventricular EF = 70% Estimated left ventricular EF was in agreement with the calculated left ventricular EF. Left ventricular systolic function is normal.  •  Left ventricular diastolic function is consistent with (grade II w/high LAP) pseudonormalization.    nuclear stress test demonstrated  •  Left ventricular  ejection fraction is hyperdynamic (Calculated EF > 70%). .  •  Myocardial perfusion imaging indicates a normal myocardial perfusion study with no evidence of ischemia.  •  Impressions are consistent with a low risk study.  •  Findings consistent with a normal ECG stress test.     Today she comes in with complaints of shortness of breath.  She denies any chest pain.  She was recently seen by pulmonology where during her 6-minute walk test her oxygen saturation dropped below 88 and she was recommended to use home oxygen.  She has been using home oxygen however she continues to be short of breath and now reports significant leg edema.  She also has whitecoat hypertension and reports fluctuation in her blood pressure    The following portions of the patient's history were reviewed and updated as appropriate: allergies, current medications, past family history, past medical history, past social history, past surgical history and problem list.    Review of Systems   Constitutional: Positive for malaise/fatigue.   Cardiovascular: Positive for dyspnea on exertion and leg swelling. Negative for chest pain and palpitations.   Respiratory: Positive for shortness of breath. Negative for cough.    Gastrointestinal: Negative for abdominal pain, nausea and vomiting.   Neurological: Positive for dizziness, light-headedness and numbness. Negative for focal weakness and headaches.   All other systems reviewed and are negative.        Current Outpatient Medications:   •  amLODIPine (NORVASC) 10 MG tablet, Take 1 tablet by mouth Daily., Disp: 90 tablet, Rfl: 3  •  apixaban (ELIQUIS) 5 MG tablet tablet, Take 1 tablet by mouth Every 12 (Twelve) Hours. Indications: Other - full anticoagulation, history of DVT/PE, Disp: 60 tablet, Rfl: 2  •  carvedilol (COREG) 12.5 MG tablet, Take 1 tablet by mouth 2 (Two) Times a Day With Meals., Disp: 180 tablet, Rfl: 3  •  Cholecalciferol (Vitamin D3) 50 MCG (2000 UT) capsule, Take 1 capsule by mouth  Daily., Disp: , Rfl:   •  cloNIDine (CATAPRES) 0.1 MG tablet, Take 1 tablet by mouth 2 (Two) Times a Day As Needed for High Blood Pressure (SBP greater than 170)., Disp: 15 tablet, Rfl: 0  •  famotidine (PEPCID) 20 MG tablet, Take 20 mg by mouth As Needed., Disp: , Rfl:   •  Folbic 2.5-25-2 MG tablet tablet, Take 1 tablet by mouth Daily., Disp: , Rfl:   •  lisinopril (PRINIVIL,ZESTRIL) 40 MG tablet, Take 1 tablet by mouth Daily., Disp: , Rfl:   •  SYMBICORT 80-4.5 MCG/ACT inhaler, Inhale 2 puffs 2 (Two) Times a Day., Disp: , Rfl: 5  •  VENTOLIN  (90 Base) MCG/ACT inhaler, Inhale 2 puffs Every 4 (Four) Hours As Needed., Disp: , Rfl: 5  •  furosemide (LASIX) 40 MG tablet, Take 1 tablet by mouth Daily., Disp: 90 tablet, Rfl: 3  •  potassium chloride (K-DUR,KLOR-CON) 10 MEQ CR tablet, Take 1 tablet by mouth Daily., Disp: 90 tablet, Rfl: 3  •  rosuvastatin (CRESTOR) 10 MG tablet, Take 1 tablet by mouth Daily., Disp: 90 tablet, Rfl: 3    Current outpatient and discharge medications have been reconciled for the patient.  Reviewed by: Richardson Willis MD       Allergies   Allergen Reactions   • Celebrex [Celecoxib] Dizziness     Made her dizziness and fatigue       Family History   Problem Relation Age of Onset   • Lung cancer Brother        Past Surgical History:   Procedure Laterality Date   • CATARACT EXTRACTION  2015   • IVC FILTER RETRIEVAL  06/2014    IVC filter placement by Dr. Card   • MAMMO STEREOTACTIC BREAST BX SURGICAL ADD UNI Left 11/01/2011    invasive lobular carcinoma-Dr. Cande Daniel   • MASTECTOMY, PARTIAL Left 12/01/2011    and left axillary sentinel lymph node biopsy by Dr. Uriostegui   • TUBAL ABDOMINAL LIGATION  1984       Past Medical History:   Diagnosis Date   • Anemia 05/2012   • History of DVT (deep vein thrombosis) 03/2013    bilateral lower extremity   • History of pneumonia 06/2012    community acquired pneumonia and right pleural effusion;hospitalized at [facility]   • History of  "pulmonary embolism 2013    bilateral PE   • Hypertension    • Insomnia     on Ambien 5mg at HS   • Lobular breast cancer, left (HCC) 2011    Stage IA left upper lobular breast cancer   • Osteopenia        Family History   Problem Relation Age of Onset   • Lung cancer Brother        Social History     Socioeconomic History   • Marital status:    Tobacco Use   • Smoking status: Former     Types: Cigarettes     Quit date:      Years since quittin.1     Passive exposure: Never   • Smokeless tobacco: Never   • Tobacco comments:     smoked 6 cigarettes a day from 13 years of age to 56 years of age when she quit in    Vaping Use   • Vaping Use: Never used   Substance and Sexual Activity   • Alcohol use: Not Currently   • Drug use: No   • Sexual activity: Not Currently     Partners: Male               Objective:       Physical Exam    /64 (BP Location: Right arm, Patient Position: Sitting)   Pulse 74   Ht 162.6 cm (64\")   Wt 63.5 kg (140 lb)   SpO2 91% Comment: ON   BMI 24.03 kg/m²   The patient is alert, oriented and in no distress.    Vital signs as noted above.    Head and neck revealed no carotid bruits or jugular venous distension.  No thyromegaly or lymphadenopathy is present.    Lungs clear.  No wheezing.  Breath sounds are normal bilaterally.    Heart normal first and second heart sounds.  No murmur..  No pericardial rub is present.  No gallop is present.    Abdomen soft and nontender.  No organomegaly is present.    Extremities revealed good peripheral pulses without any pedal edema.    Skin warm and dry.    Musculoskeletal system is grossly normal.    CNS grossly normal.           Diagnosis Plan   1. AF (paroxysmal atrial fibrillation) (HCC)        2. SOB (shortness of breath)  furosemide (LASIX) 40 MG tablet    potassium chloride (K-DUR,KLOR-CON) 10 MEQ CR tablet    Case Request Cath Lab: Left and Right Heart Cath with Coronary Angiography    CBC (No Diff)    " Basic Metabolic Panel    XR Chest 2 View    Ambulatory Referral to Pulmonology      3. Deep vein thrombosis (DVT) of proximal vein of both lower extremities, unspecified chronicity (HCC)        4. Aortic aneurysm without rupture, unspecified portion of aorta (HCC)        5. Primary hypertension  amLODIPine (NORVASC) 10 MG tablet      6. Stage 3 chronic kidney disease, unspecified whether stage 3a or 3b CKD (HCC)        7. PAD (peripheral artery disease) ([facility])        8. TIA (transient ischemic attack)        9. Palpitations        10. Bilateral leg edema  furosemide (LASIX) 40 MG tablet    potassium chloride (K-DUR,KLOR-CON) 10 MEQ CR tablet    Case Request Cath Lab: Left and Right Heart Cath with Coronary Angiography    CBC (No Diff)    Basic Metabolic Panel    XR Chest 2 View      LAB RESULTS (LAST 7 DAYS)    CBC        BMP        CMP         BNP        TROPONIN        CoAg        Creatinine Clearance  CrCl cannot be calculated (Patient's most recent lab result is older than the maximum 30 days allowed.).    ABG        Radiology  No radiology results for the last day    EKG  Procedures    Stress test  Results for orders placed in visit on 09/13/22    Stress Test With Myocardial Perfusion One Day    Interpretation Summary  •  Left ventricular ejection fraction is hyperdynamic (Calculated EF > 70%). .  •  Myocardial perfusion imaging indicates a normal myocardial perfusion study with no evidence of ischemia.  •  Impressions are consistent with a low risk study.  •  Findings consistent with a normal ECG stress test.      Echocardiogram  Results for orders placed in visit on 09/13/22    Adult Transthoracic Echo Complete W/ Cont if Necessary Per Protocol    Interpretation Summary  •  Estimated left ventricular EF = 70% Estimated left ventricular EF was in agreement with the calculated left ventricular EF. Left ventricular systolic function is normal.  •  Left ventricular diastolic function is consistent with (grade II  w/high LAP) pseudonormalization.      Cardiac catheterization  No results found for this or any previous visit.          Assessment and Plan       Diagnoses and all orders for this visit:    1. AF (paroxysmal atrial fibrillation) ([facility]) (Primary)    2. SOB (shortness of breath)  -     furosemide (LASIX) 40 MG tablet; Take 1 tablet by mouth Daily.  Dispense: 90 tablet; Refill: 3  -     potassium chloride (K-DUR,KLOR-CON) 10 MEQ CR tablet; Take 1 tablet by mouth Daily.  Dispense: 90 tablet; Refill: 3  -     Case Request Cath Lab: Left and Right Heart Cath with Coronary Angiography  -     CBC (No Diff); Future  -     Basic Metabolic Panel; Future  -     XR Chest 2 View; Future  -     Ambulatory Referral to Pulmonology    3. Deep vein thrombosis (DVT) of proximal vein of both lower extremities, unspecified chronicity ([facility])    4. Aortic aneurysm without rupture, unspecified portion of aorta ([facility])    5. Primary hypertension  -     amLODIPine (NORVASC) 10 MG tablet; Take 1 tablet by mouth Daily.  Dispense: 90 tablet; Refill: 3    6. Stage 3 chronic kidney disease, unspecified whether stage 3a or 3b CKD ([facility])    7. PAD (peripheral artery disease) ([facility])    8. TIA (transient ischemic attack)    9. Palpitations    10. Bilateral leg edema  Overview:  Added automatically from request for surgery [id number]    Orders:  -     furosemide (LASIX) 40 MG tablet; Take 1 tablet by mouth Daily.  Dispense: 90 tablet; Refill: 3  -     potassium chloride (K-DUR,KLOR-CON) 10 MEQ CR tablet; Take 1 tablet by mouth Daily.  Dispense: 90 tablet; Refill: 3  -     Case Request Cath Lab: Left and Right Heart Cath with Coronary Angiography  -     CBC (No Diff); Future  -     Basic Metabolic Panel; Future  -     XR Chest 2 View; Future       1.  Atrial fibrillation  She was diagnosed with atrial fibrillation on the recent MCOT and has been started on full dose anticoagulation   Heart rate is well controlled on Coreg 12.5 mg p.o. twice daily  MCOT showed  2% atrial fibrillation burden     2. SOB (shortness of breath)  She has preserved LV function however she has grade 2 diastolic dysfunction.  Shortness of breath could be secondary to COPD however underlying cardiac issues cannot be ruled out as she has significant volume overload with leg edema.  I will start her on diuretics and potassium supplementation.  We will proceed with right and left heart cath.  She has multiple cardiovascular risk factors including more than 50-pack-year history of smoking.  Ambulatory referral to pulmonology as she is now requiring home oxygen     3. Primary hypertension  She is currently on lisinopril 40 mg, amlodipine 5, Coreg 12.5 mg p.o. twice daily.  I will increase amlodipine to 10 mg p.o. daily.  Blood pressure remains elevated Coreg will be increased to 25 mg p.o. twice daily  She does have whitecoat hypertension.,  And her home readings are much better with systolics in the 140s.     4. Aortic aneurysm without rupture, unspecified portion of aorta (HCC)  Unclear where this diagnosis came from  Recent CT scan did not show any evidence of aortic aneurysm     5. Deep vein thrombosis (DVT) of proximal vein of both lower extremities, unspecified chronicity (HCC)  She has a remote history of DVT/PE in the setting of trauma to her leg.  She also has an IVC filter and is chronically on Eliquis  CT did not show any acute PE however chronic PE seen involving the right lower lobe anterior basilar segment decreased when compared to previous study in 2014.     6. Stage 3 chronic kidney disease, unspecified whether stage 3a or 3b CKD (HCC)  avoiding contrast and nephrotoxic medication/agent.  She is tolerating lisinopril well     7. Palpitations/atrial fibrillation  Diagnosis of atrial fibrillation has been confirmed.  Continue Coreg and apixaban for heart rate control and stroke prevention respectively     8.  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  I have prescribed her Crestor.  She is already on Eliquis

## 2023-02-24 ENCOUNTER — TELEPHONE (OUTPATIENT)
Dept: CARDIOLOGY | Facility: CLINIC | Age: 83
End: 2023-02-24

## 2023-02-24 ENCOUNTER — OFFICE VISIT (OUTPATIENT)
Dept: CARDIOLOGY | Facility: CLINIC | Age: 83
End: 2023-02-24
Payer: MEDICARE

## 2023-02-24 VITALS
HEART RATE: 74 BPM | OXYGEN SATURATION: 91 % | BODY MASS INDEX: 23.9 KG/M2 | SYSTOLIC BLOOD PRESSURE: 150 MMHG | HEIGHT: 64 IN | WEIGHT: 140 LBS | DIASTOLIC BLOOD PRESSURE: 64 MMHG

## 2023-02-24 DIAGNOSIS — R00.2 PALPITATIONS: ICD-10-CM

## 2023-02-24 DIAGNOSIS — R60.0 BILATERAL LEG EDEMA: ICD-10-CM

## 2023-02-24 DIAGNOSIS — I10 PRIMARY HYPERTENSION: ICD-10-CM

## 2023-02-24 DIAGNOSIS — R06.02 SOB (SHORTNESS OF BREATH): ICD-10-CM

## 2023-02-24 DIAGNOSIS — I82.4Y3 DEEP VEIN THROMBOSIS (DVT) OF PROXIMAL VEIN OF BOTH LOWER EXTREMITIES, UNSPECIFIED CHRONICITY: ICD-10-CM

## 2023-02-24 DIAGNOSIS — I48.0 AF (PAROXYSMAL ATRIAL FIBRILLATION): Primary | ICD-10-CM

## 2023-02-24 DIAGNOSIS — G45.9 TIA (TRANSIENT ISCHEMIC ATTACK): ICD-10-CM

## 2023-02-24 DIAGNOSIS — N18.30 STAGE 3 CHRONIC KIDNEY DISEASE, UNSPECIFIED WHETHER STAGE 3A OR 3B CKD: ICD-10-CM

## 2023-02-24 DIAGNOSIS — I71.9 AORTIC ANEURYSM WITHOUT RUPTURE, UNSPECIFIED PORTION OF AORTA: ICD-10-CM

## 2023-02-24 DIAGNOSIS — I73.9 PAD (PERIPHERAL ARTERY DISEASE): ICD-10-CM

## 2023-02-24 PROCEDURE — 99214 OFFICE O/P EST MOD 30 MIN: CPT | Performed by: INTERNAL MEDICINE

## 2023-02-24 RX ORDER — POTASSIUM CHLORIDE 750 MG/1
10 TABLET, EXTENDED RELEASE ORAL DAILY
Qty: 90 TABLET | Refills: 3 | Status: ON HOLD | OUTPATIENT
Start: 2023-02-24

## 2023-02-24 RX ORDER — FUROSEMIDE 40 MG/1
40 TABLET ORAL DAILY
Qty: 90 TABLET | Refills: 3 | Status: ON HOLD | OUTPATIENT
Start: 2023-02-24

## 2023-02-24 RX ORDER — AMLODIPINE BESYLATE 10 MG/1
10 TABLET ORAL DAILY
Qty: 90 TABLET | Refills: 3 | Status: ON HOLD | OUTPATIENT
Start: 2023-02-24

## 2023-02-24 NOTE — TELEPHONE ENCOUNTER
Caller: Gabrielle Lyles    Relationship to patient: Self    Best call back number: [phone]    Patient is needing: PT IS SCHEDULED FOR HEART CATH ON 3.3.23 AND FORGOT TO INFORM DR FERNANDEZ THAT A FEW YEARS AGO THAT SHE HAD BLOT CLOTS IN HER LEG AND SHE IS WORRIED ABOUT THE HEART CATH BECAUSE OF THAT.

## 2023-02-24 NOTE — TELEPHONE ENCOUNTER
Returned call to patient and spoke with patient  informing her that  is aware about blood clots/DVT in her legs noted in his office note today.  Patient verbalizes understanding.    ..........................................................      Progress Notes  Richardson Willis MD (Physician) • • Cardiology  Expand All Collapse All[]Expand All by Default  Encounter Date:02/24/2023        Subjective       Patient ID: Gabrielle Lyles is a 83 y.o. female.        Chief Complaint:        History of Present Illness  83-year-old woman with a history of hypertension, DVT/PE on chronic Anticoagulation, former smoker initially referred to cardiology due to abnormal heart rate and shortness of breath.  She has a history of breast cancer as well as extensive smoking in the past.  She carries a diagnosis of COPD however she has not had any pulmonary function test done in the past.  She reports worsening shortness of breath over the last 2 to 3 months and it is difficult for her to do her normal activities without feeling significant shortness of breath.  She is also noted elevated blood pressures, leg pain.  She has a history of DVT and PE in the past and is on chronic anticoagulation with warfarin. She recently had some blood work done which showed elevated D-dimer.  Which was followed by CT scan of the chest the report of which is not available to me.  She reports palpitations/tachycardia along with shortness of breath.  Her granddaughter is a critical care nurse.  They believe she may have atrial fibrillation and heart failure.

## 2023-02-28 ENCOUNTER — TRANSCRIBE ORDERS (OUTPATIENT)
Dept: ADMINISTRATIVE | Facility: HOSPITAL | Age: 83
End: 2023-02-28
Payer: MEDICARE

## 2023-02-28 DIAGNOSIS — I82.403 DVT, RECURRENT, LOWER EXTREMITY, ACUTE, BILATERAL: ICD-10-CM

## 2023-02-28 DIAGNOSIS — R60.0 BILATERAL LEG EDEMA: Primary | ICD-10-CM

## 2023-03-01 ENCOUNTER — HOSPITAL ENCOUNTER (OUTPATIENT)
Dept: GENERAL RADIOLOGY | Facility: HOSPITAL | Age: 83
Discharge: HOME | End: 2023-03-01
Payer: MEDICARE

## 2023-03-01 ENCOUNTER — TELEPHONE (OUTPATIENT)
Dept: CARDIOLOGY | Facility: CLINIC | Age: 83
End: 2023-03-01
Payer: MEDICARE

## 2023-03-01 ENCOUNTER — HOSPITAL ENCOUNTER (OUTPATIENT)
Dept: CARDIOLOGY | Facility: HOSPITAL | Age: 83
Discharge: HOME | End: 2023-03-01
Payer: MEDICARE

## 2023-03-01 ENCOUNTER — LAB (OUTPATIENT)
Dept: LAB | Facility: HOSPITAL | Age: 83
End: 2023-03-01
Payer: MEDICARE

## 2023-03-01 DIAGNOSIS — R60.0 BILATERAL LEG EDEMA: ICD-10-CM

## 2023-03-01 DIAGNOSIS — I82.403 DVT, RECURRENT, LOWER EXTREMITY, ACUTE, BILATERAL: ICD-10-CM

## 2023-03-01 DIAGNOSIS — R06.02 SOB (SHORTNESS OF BREATH): ICD-10-CM

## 2023-03-01 LAB
ANION GAP SERPL CALCULATED.3IONS-SCNC: 10.8 MMOL/L (ref 5–15)
BH CV LOWER VASCULAR LEFT COMMON FEMORAL AUGMENT: NORMAL
BH CV LOWER VASCULAR LEFT COMMON FEMORAL COMPETENT: NORMAL
BH CV LOWER VASCULAR LEFT COMMON FEMORAL COMPRESS: NORMAL
BH CV LOWER VASCULAR LEFT COMMON FEMORAL PHASIC: NORMAL
BH CV LOWER VASCULAR LEFT COMMON FEMORAL SPONT: NORMAL
BH CV LOWER VASCULAR LEFT DISTAL FEMORAL COMPRESS: NORMAL
BH CV LOWER VASCULAR LEFT GASTRONEMIUS COMPRESS: NORMAL
BH CV LOWER VASCULAR LEFT GREATER SAPH AK COMPRESS: NORMAL
BH CV LOWER VASCULAR LEFT GREATER SAPH BK COMPRESS: NORMAL
BH CV LOWER VASCULAR LEFT LESSER SAPH COMPRESS: NORMAL
BH CV LOWER VASCULAR LEFT MID FEMORAL AUGMENT: NORMAL
BH CV LOWER VASCULAR LEFT MID FEMORAL COMPETENT: NORMAL
BH CV LOWER VASCULAR LEFT MID FEMORAL COMPRESS: NORMAL
BH CV LOWER VASCULAR LEFT MID FEMORAL PHASIC: NORMAL
BH CV LOWER VASCULAR LEFT MID FEMORAL SPONT: NORMAL
BH CV LOWER VASCULAR LEFT PERONEAL COMPRESS: NORMAL
BH CV LOWER VASCULAR LEFT POPLITEAL AUGMENT: NORMAL
BH CV LOWER VASCULAR LEFT POPLITEAL COMPETENT: NORMAL
BH CV LOWER VASCULAR LEFT POPLITEAL COMPRESS: NORMAL
BH CV LOWER VASCULAR LEFT POPLITEAL PHASIC: NORMAL
BH CV LOWER VASCULAR LEFT POPLITEAL SPONT: NORMAL
BH CV LOWER VASCULAR LEFT POSTERIOR TIBIAL COMPRESS: NORMAL
BH CV LOWER VASCULAR LEFT PROXIMAL FEMORAL COMPRESS: NORMAL
BH CV LOWER VASCULAR LEFT SAPHENOFEMORAL JUNCTION COMPRESS: NORMAL
BH CV LOWER VASCULAR RIGHT COMMON FEMORAL AUGMENT: NORMAL
BH CV LOWER VASCULAR RIGHT COMMON FEMORAL COMPETENT: NORMAL
BH CV LOWER VASCULAR RIGHT COMMON FEMORAL COMPRESS: NORMAL
BH CV LOWER VASCULAR RIGHT COMMON FEMORAL PHASIC: NORMAL
BH CV LOWER VASCULAR RIGHT COMMON FEMORAL SPONT: NORMAL
BH CV LOWER VASCULAR RIGHT DISTAL FEMORAL COMPRESS: NORMAL
BH CV LOWER VASCULAR RIGHT GASTRONEMIUS COMPRESS: NORMAL
BH CV LOWER VASCULAR RIGHT GREATER SAPH AK COMPRESS: NORMAL
BH CV LOWER VASCULAR RIGHT GREATER SAPH BK COMPRESS: NORMAL
BH CV LOWER VASCULAR RIGHT LESSER SAPH COMPRESS: NORMAL
BH CV LOWER VASCULAR RIGHT MID FEMORAL AUGMENT: NORMAL
BH CV LOWER VASCULAR RIGHT MID FEMORAL COMPETENT: NORMAL
BH CV LOWER VASCULAR RIGHT MID FEMORAL COMPRESS: NORMAL
BH CV LOWER VASCULAR RIGHT MID FEMORAL PHASIC: NORMAL
BH CV LOWER VASCULAR RIGHT MID FEMORAL SPONT: NORMAL
BH CV LOWER VASCULAR RIGHT PERONEAL COMPRESS: NORMAL
BH CV LOWER VASCULAR RIGHT POPLITEAL AUGMENT: NORMAL
BH CV LOWER VASCULAR RIGHT POPLITEAL COMPETENT: NORMAL
BH CV LOWER VASCULAR RIGHT POPLITEAL COMPRESS: NORMAL
BH CV LOWER VASCULAR RIGHT POPLITEAL PHASIC: NORMAL
BH CV LOWER VASCULAR RIGHT POPLITEAL SPONT: NORMAL
BH CV LOWER VASCULAR RIGHT POSTERIOR TIBIAL COMPRESS: NORMAL
BH CV LOWER VASCULAR RIGHT PROXIMAL FEMORAL COMPRESS: NORMAL
BH CV LOWER VASCULAR RIGHT SAPHENOFEMORAL JUNCTION COMPRESS: NORMAL
BH CV VAS PRELIMINARY FINDINGS SCRIPTING: 1
BUN SERPL-MCNC: 12 MG/DL (ref 8–23)
BUN/CREAT SERPL: 13.3 (ref 7–25)
CALCIUM SPEC-SCNC: 10.7 MG/DL (ref 8.6–10.5)
CHLORIDE SERPL-SCNC: 97 MMOL/L (ref 98–107)
CO2 SERPL-SCNC: 31.2 MMOL/L (ref 22–29)
CREAT SERPL-MCNC: 0.9 MG/DL (ref 0.57–1)
DEPRECATED RDW RBC AUTO: 45.4 FL (ref 37–54)
EGFRCR SERPLBLD CKD-EPI 2021: 64 ML/MIN/1.73
ERYTHROCYTE [DISTWIDTH] IN BLOOD BY AUTOMATED COUNT: 14.4 % (ref 12.3–15.4)
GLUCOSE SERPL-MCNC: 90 MG/DL (ref 65–99)
HCT VFR BLD AUTO: 44.9 % (ref 34–46.6)
HGB BLD-MCNC: 14.9 G/DL (ref 12–15.9)
MAXIMAL PREDICTED HEART RATE: 138 BPM
MCH RBC QN AUTO: 28.9 PG (ref 26.6–33)
MCHC RBC AUTO-ENTMCNC: 33.2 G/DL (ref 31.5–35.7)
MCV RBC AUTO: 87 FL (ref 79–97)
PLATELET # BLD AUTO: 368 10*3/MM3 (ref 140–450)
PMV BLD AUTO: 10 FL (ref 6–12)
POTASSIUM SERPL-SCNC: 4.5 MMOL/L (ref 3.5–5.2)
RBC # BLD AUTO: 5.16 10*6/MM3 (ref 3.77–5.28)
SODIUM SERPL-SCNC: 139 MMOL/L (ref 136–145)
STRESS TARGET HR: 117 BPM
WBC NRBC COR # BLD: 10.17 10*3/MM3 (ref 3.4–10.8)

## 2023-03-01 PROCEDURE — 36415 COLL VENOUS BLD VENIPUNCTURE: CPT

## 2023-03-01 PROCEDURE — 93970 EXTREMITY STUDY: CPT

## 2023-03-01 PROCEDURE — 85027 COMPLETE CBC AUTOMATED: CPT

## 2023-03-01 PROCEDURE — 71046 X-RAY EXAM CHEST 2 VIEWS: CPT

## 2023-03-01 PROCEDURE — 80048 BASIC METABOLIC PNL TOTAL CA: CPT

## 2023-03-01 NOTE — TELEPHONE ENCOUNTER
Caller: Gabrielle Lyles    Relationship to patient: Self    Best call back number: [phone]    Patient is needing: PT CALLED IN SHE HAD LABS CHEST XR AND VENOUS DOPPLER. ADVISED NO BLOOD CLOTS.     SHE DID STAT THAT FOR Friday SHE WANTED A PICK LINE PUT IN BECAUSE IT TOOK THEM 4 TRIES TO GET BLOOD TODAY FROM HER.     CALL WITH ANY QUESTIONS.     THANK YOU.

## 2023-03-01 NOTE — TELEPHONE ENCOUNTER
Returned call to patient and she wanted us to know that she had her labs/CXR ordered by Dr. Willis and a venous doppler(showed no clots) done today.  Patient is scheduled for a cardiac cath by Dr. Willis on Fri 3/3/23 and she said she is a very hard IV stick-patient stated it took 4 sticks today to get her blood and they can only use 1 arm due to prior breast surgery.  Informed patient to notify the preop team when she arrives at the cath lab this Friday that she is a difficult IV stick.  Patient verbalizes understanding.

## 2023-03-03 ENCOUNTER — HOSPITAL ENCOUNTER (OUTPATIENT)
Facility: HOSPITAL | Age: 83
Setting detail: HOSPITAL OUTPATIENT SURGERY
Discharge: HOME | End: 2023-03-03
Attending: INTERNAL MEDICINE | Admitting: INTERNAL MEDICINE
Payer: MEDICARE

## 2023-03-03 VITALS
TEMPERATURE: 97.5 F | HEART RATE: 68 BPM | BODY MASS INDEX: 24.14 KG/M2 | DIASTOLIC BLOOD PRESSURE: 61 MMHG | SYSTOLIC BLOOD PRESSURE: 144 MMHG | RESPIRATION RATE: 19 BRPM | OXYGEN SATURATION: 95 % | WEIGHT: 136.24 LBS | HEIGHT: 63 IN

## 2023-03-03 DIAGNOSIS — R06.02 SOB (SHORTNESS OF BREATH): ICD-10-CM

## 2023-03-03 DIAGNOSIS — R60.0 BILATERAL LEG EDEMA: ICD-10-CM

## 2023-03-03 DIAGNOSIS — I27.20 PULMONARY HYPERTENSION: Primary | ICD-10-CM

## 2023-03-03 LAB
ARTERIAL PATENCY WRIST A: ABNORMAL
ARTERIAL PATENCY WRIST A: ABNORMAL
ATMOSPHERIC PRESS: ABNORMAL MM[HG]
ATMOSPHERIC PRESS: ABNORMAL MM[HG]
BASE EXCESS BLDA CALC-SCNC: 2.4 MMOL/L (ref 0–3)
BASE EXCESS BLDA CALC-SCNC: 2.9 MMOL/L (ref 0–3)
BDY SITE: ABNORMAL
BDY SITE: ABNORMAL
CO2 BLDA-SCNC: 29.1 MMOL/L (ref 22–29)
CO2 BLDA-SCNC: 29.5 MMOL/L (ref 22–29)
HCO3 BLDA-SCNC: 27.7 MMOL/L (ref 21–28)
HCO3 BLDA-SCNC: 28.1 MMOL/L (ref 21–28)
HEMODILUTION: NO
HEMODILUTION: NO
INHALED O2 CONCENTRATION: 28 %
INHALED O2 CONCENTRATION: 28 %
MODALITY: ABNORMAL
MODALITY: ABNORMAL
PCO2 BLDA: 44.2 MM HG (ref 35–48)
PCO2 BLDA: 44.3 MM HG (ref 35–48)
PH BLDA: 7.4 PH UNITS (ref 7.35–7.45)
PH BLDA: 7.41 PH UNITS (ref 7.35–7.45)
PO2 BLDA: 42.5 MM HG (ref 83–108)
PO2 BLDA: 63.7 MM HG (ref 83–108)
SAO2 % BLDCOA: 78.2 % (ref 94–98)
SAO2 % BLDCOA: 91.9 % (ref 94–98)

## 2023-03-03 PROCEDURE — 25510000001 IOPAMIDOL PER 1 ML: Performed by: INTERNAL MEDICINE

## 2023-03-03 PROCEDURE — 0 LIDOCAINE 1 % SOLUTION: Performed by: INTERNAL MEDICINE

## 2023-03-03 PROCEDURE — 99152 MOD SED SAME PHYS/QHP 5/>YRS: CPT | Performed by: INTERNAL MEDICINE

## 2023-03-03 PROCEDURE — C1894 INTRO/SHEATH, NON-LASER: HCPCS | Performed by: INTERNAL MEDICINE

## 2023-03-03 PROCEDURE — 25010000002 MIDAZOLAM PER 1 MG: Performed by: INTERNAL MEDICINE

## 2023-03-03 PROCEDURE — 93460 R&L HRT ART/VENTRICLE ANGIO: CPT | Performed by: INTERNAL MEDICINE

## 2023-03-03 PROCEDURE — C1751 CATH, INF, PER/CENT/MIDLINE: HCPCS | Performed by: INTERNAL MEDICINE

## 2023-03-03 PROCEDURE — C1760 CLOSURE DEV, VASC: HCPCS | Performed by: INTERNAL MEDICINE

## 2023-03-03 PROCEDURE — C1769 GUIDE WIRE: HCPCS | Performed by: INTERNAL MEDICINE

## 2023-03-03 PROCEDURE — 25010000002 FENTANYL CITRATE (PF) 100 MCG/2ML SOLUTION: Performed by: INTERNAL MEDICINE

## 2023-03-03 PROCEDURE — 36600 WITHDRAWAL OF ARTERIAL BLOOD: CPT

## 2023-03-03 PROCEDURE — 82803 BLOOD GASES ANY COMBINATION: CPT

## 2023-03-03 RX ORDER — DIPHENHYDRAMINE HCL 25 MG
25 CAPSULE ORAL EVERY 6 HOURS PRN
Status: DISCONTINUED | OUTPATIENT
Start: 2023-03-03 | End: 2023-03-03 | Stop reason: HOSPADM

## 2023-03-03 RX ORDER — ONDANSETRON 2 MG/ML
4 INJECTION INTRAMUSCULAR; INTRAVENOUS EVERY 6 HOURS PRN
Status: DISCONTINUED | OUTPATIENT
Start: 2023-03-03 | End: 2023-03-03 | Stop reason: HOSPADM

## 2023-03-03 RX ORDER — ACETAMINOPHEN 325 MG/1
650 TABLET ORAL EVERY 4 HOURS PRN
Status: DISCONTINUED | OUTPATIENT
Start: 2023-03-03 | End: 2023-03-03 | Stop reason: HOSPADM

## 2023-03-03 RX ORDER — ONDANSETRON 4 MG/1
4 TABLET, FILM COATED ORAL EVERY 6 HOURS PRN
Status: DISCONTINUED | OUTPATIENT
Start: 2023-03-03 | End: 2023-03-03 | Stop reason: HOSPADM

## 2023-03-03 RX ORDER — SODIUM CHLORIDE 9 MG/ML
30 INJECTION, SOLUTION INTRAVENOUS CONTINUOUS
Status: DISCONTINUED | OUTPATIENT
Start: 2023-03-03 | End: 2023-03-03 | Stop reason: HOSPADM

## 2023-03-03 RX ORDER — MIDAZOLAM HYDROCHLORIDE 1 MG/ML
INJECTION INTRAMUSCULAR; INTRAVENOUS
Status: DISCONTINUED | OUTPATIENT
Start: 2023-03-03 | End: 2023-03-03 | Stop reason: HOSPADM

## 2023-03-03 RX ORDER — LIDOCAINE HYDROCHLORIDE 10 MG/ML
INJECTION, SOLUTION INFILTRATION; PERINEURAL
Status: DISCONTINUED | OUTPATIENT
Start: 2023-03-03 | End: 2023-03-03 | Stop reason: HOSPADM

## 2023-03-03 RX ORDER — SODIUM CHLORIDE 9 MG/ML
INJECTION, SOLUTION INTRAVENOUS
Status: COMPLETED | OUTPATIENT
Start: 2023-03-03 | End: 2023-03-03

## 2023-03-03 RX ORDER — FENTANYL CITRATE 50 UG/ML
INJECTION, SOLUTION INTRAMUSCULAR; INTRAVENOUS
Status: DISCONTINUED | OUTPATIENT
Start: 2023-03-03 | End: 2023-03-03 | Stop reason: HOSPADM

## 2023-03-03 NOTE — DISCHARGE INSTRUCTIONS
Post Cath Instructions    Call Dr. Willis’s office to schedule a follow up appointment at [phone].  Specific Physician Instructions:    Drink plenty of fluids for the next 24 hours.  This helps to eliminate the dye used in your procedure through urination.  You may resume a normal diet; however, try to avoid foods that would cause gas or constipation.    Sedative medication given to you during your catheterization may decrease your judgement and reaction time for up to 24-48 hours.  Therefore:  DO NOT drive or operate hazardous machinery (48 hours)  DO NOT consume alcoholic beverages  DO NOT make any important/legal decisions  Have someone stay with you for at least 24 hours    To allow proper healing and prevent bleeding, the following activities are to be strictly avoided for the next 24-48 hours:  Excessive bending at wound site  Straining (anything that would tense up muscles around the affected puncture site)  Lifting objects greater than 5 pounds, pushing, or pulling for 5 days    For Groin Cases:  Refrain from sexual activity  Refrain from running or vigorous walking  No prolonged sitting or standing  Limit stair climbing as much as possible    Keep the puncture site clean and dry.  You may remove the dressing tomorrow and replace it with a band-aid for at least one additional day.  Gently clean the site with mild soap and water.  No scrubbing/rubbing and lightly pat the area dry.  Showers are acceptable; however, avoid submerging in water (tub baths, hot tubs, swimming pools, dishwater, etc…) for at least one week.  The site should be completely healed before resuming these activities to reduce the risk of infection.  Check the site often.  Watch for signs and symptoms of infection and notify your physician if any of the following occur:  Bleeding or an increase in swelling at the puncture site  Fever  Increased soreness around puncture site  Foul odor or significant drainage from the puncture  site  Swelling, redness, or warmth at the puncture site    **A bruise or small “pea sized” lump under the skin at the puncture site is not unusual.  This should disappear within 3-4 weeks.**  CONTACT YOUR PHYSICIAN OR CALL 911 IF YOU EXPERIENCE ANY OF THE FOLLOWING:  Increased angina (chest pain) or frequent sensations of pressure, burning, pain, or other discomfort in the chest, arm, jaws, or stomach  Lightheadedness, dizziness, faint feeling, sweating, or difficulty breathing  Odd sensation changes like numbness, tingling, coldness, or pain in the arm or leg in which the catheter was inserted  Limb in which the catheter was inserted becomes pale/bluish in color    IMPORTANT:  Although this occurs very rarely, if you should develop bright red or excessive bleeding, feel a “pop” inside at the insertion site, or notice a sudden increase in swelling larger than a walnut, you should call 911.  Hold continuous firm pressure to the access site until emergency personnel arrive.  It is best if someone else can do this for you.

## 2023-03-20 ENCOUNTER — TRANSCRIBE ORDERS (OUTPATIENT)
Dept: ADMINISTRATIVE | Facility: HOSPITAL | Age: 83
End: 2023-03-20
Payer: MEDICARE

## 2023-03-20 DIAGNOSIS — Z12.31 SCREENING MAMMOGRAM, ENCOUNTER FOR: ICD-10-CM

## 2023-03-20 DIAGNOSIS — M85.80 OSTEOPENIA, UNSPECIFIED LOCATION: Primary | ICD-10-CM

## 2023-04-08 ENCOUNTER — HOSPITAL ENCOUNTER (OUTPATIENT)
Facility: HOSPITAL | Age: 83
Setting detail: OBSERVATION
Discharge: HOME | End: 2023-04-10
Attending: EMERGENCY MEDICINE | Admitting: INTERNAL MEDICINE
Payer: MEDICARE

## 2023-04-08 ENCOUNTER — APPOINTMENT (OUTPATIENT)
Dept: GENERAL RADIOLOGY | Facility: HOSPITAL | Age: 83
End: 2023-04-08
Payer: MEDICARE

## 2023-04-08 DIAGNOSIS — R09.02 HYPOXEMIA: ICD-10-CM

## 2023-04-08 DIAGNOSIS — J20.9 ACUTE BRONCHITIS, UNSPECIFIED ORGANISM: ICD-10-CM

## 2023-04-08 DIAGNOSIS — J44.1 ACUTE EXACERBATION OF CHRONIC OBSTRUCTIVE PULMONARY DISEASE (COPD): Primary | ICD-10-CM

## 2023-04-08 DIAGNOSIS — I73.9 PAD (PERIPHERAL ARTERY DISEASE): ICD-10-CM

## 2023-04-08 LAB
ALBUMIN SERPL-MCNC: 4.6 G/DL (ref 3.5–5.2)
ALBUMIN/GLOB SERPL: 1.4 G/DL
ALP SERPL-CCNC: 88 U/L (ref 39–117)
ALT SERPL W P-5'-P-CCNC: 13 U/L (ref 1–33)
ANION GAP SERPL CALCULATED.3IONS-SCNC: 12 MMOL/L (ref 5–15)
AST SERPL-CCNC: 16 U/L (ref 1–32)
B PARAPERT DNA SPEC QL NAA+PROBE: NOT DETECTED
B PERT DNA SPEC QL NAA+PROBE: NOT DETECTED
BASOPHILS # BLD AUTO: 0.1 10*3/MM3 (ref 0–0.2)
BASOPHILS NFR BLD AUTO: 0.8 % (ref 0–1.5)
BILIRUB SERPL-MCNC: 0.7 MG/DL (ref 0–1.2)
BUN SERPL-MCNC: 15 MG/DL (ref 8–23)
BUN/CREAT SERPL: 15.6 (ref 7–25)
C PNEUM DNA NPH QL NAA+NON-PROBE: NOT DETECTED
CALCIUM SPEC-SCNC: 10 MG/DL (ref 8.6–10.5)
CHLORIDE SERPL-SCNC: 96 MMOL/L (ref 98–107)
CO2 SERPL-SCNC: 29 MMOL/L (ref 22–29)
CREAT SERPL-MCNC: 0.96 MG/DL (ref 0.57–1)
D-LACTATE SERPL-SCNC: 0.5 MMOL/L (ref 0.3–2)
DEPRECATED RDW RBC AUTO: 48.6 FL (ref 37–54)
EGFRCR SERPLBLD CKD-EPI 2021: 59.2 ML/MIN/1.73
EOSINOPHIL # BLD AUTO: 0.2 10*3/MM3 (ref 0–0.4)
EOSINOPHIL NFR BLD AUTO: 1.7 % (ref 0.3–6.2)
ERYTHROCYTE [DISTWIDTH] IN BLOOD BY AUTOMATED COUNT: 15.9 % (ref 12.3–15.4)
FLUAV SUBTYP SPEC NAA+PROBE: NOT DETECTED
FLUAV SUBTYP SPEC NAA+PROBE: NOT DETECTED
FLUBV RNA ISLT QL NAA+PROBE: NOT DETECTED
FLUBV RNA ISLT QL NAA+PROBE: NOT DETECTED
GLOBULIN UR ELPH-MCNC: 3.4 GM/DL
GLUCOSE SERPL-MCNC: 112 MG/DL (ref 65–99)
HADV DNA SPEC NAA+PROBE: NOT DETECTED
HCOV 229E RNA SPEC QL NAA+PROBE: NOT DETECTED
HCOV HKU1 RNA SPEC QL NAA+PROBE: NOT DETECTED
HCOV NL63 RNA SPEC QL NAA+PROBE: NOT DETECTED
HCOV OC43 RNA SPEC QL NAA+PROBE: NOT DETECTED
HCT VFR BLD AUTO: 47.7 % (ref 34–46.6)
HGB BLD-MCNC: 15.6 G/DL (ref 12–15.9)
HMPV RNA NPH QL NAA+NON-PROBE: DETECTED
HPIV1 RNA ISLT QL NAA+PROBE: NOT DETECTED
HPIV2 RNA SPEC QL NAA+PROBE: NOT DETECTED
HPIV3 RNA NPH QL NAA+PROBE: NOT DETECTED
HPIV4 P GENE NPH QL NAA+PROBE: NOT DETECTED
LYMPHOCYTES # BLD AUTO: 1.2 10*3/MM3 (ref 0.7–3.1)
LYMPHOCYTES NFR BLD AUTO: 13.6 % (ref 19.6–45.3)
M PNEUMO IGG SER IA-ACNC: NOT DETECTED
MCH RBC QN AUTO: 28.9 PG (ref 26.6–33)
MCHC RBC AUTO-ENTMCNC: 32.8 G/DL (ref 31.5–35.7)
MCV RBC AUTO: 88.1 FL (ref 79–97)
MONOCYTES # BLD AUTO: 0.6 10*3/MM3 (ref 0.1–0.9)
MONOCYTES NFR BLD AUTO: 7.1 % (ref 5–12)
NEUTROPHILS NFR BLD AUTO: 6.8 10*3/MM3 (ref 1.7–7)
NEUTROPHILS NFR BLD AUTO: 76.8 % (ref 42.7–76)
NRBC BLD AUTO-RTO: 0.1 /100 WBC (ref 0–0.2)
NT-PROBNP SERPL-MCNC: 836.7 PG/ML (ref 0–1800)
PLATELET # BLD AUTO: 265 10*3/MM3 (ref 140–450)
PMV BLD AUTO: 8.1 FL (ref 6–12)
POTASSIUM SERPL-SCNC: 4 MMOL/L (ref 3.5–5.2)
PROCALCITONIN SERPL-MCNC: 0.05 NG/ML (ref 0–0.25)
PROT SERPL-MCNC: 8 G/DL (ref 6–8.5)
RBC # BLD AUTO: 5.41 10*6/MM3 (ref 3.77–5.28)
RHINOVIRUS RNA SPEC NAA+PROBE: NOT DETECTED
RSV RNA NPH QL NAA+NON-PROBE: NOT DETECTED
SARS-COV-2 RNA NPH QL NAA+NON-PROBE: NOT DETECTED
SARS-COV-2 RNA RESP QL NAA+PROBE: NOT DETECTED
SODIUM SERPL-SCNC: 137 MMOL/L (ref 136–145)
TROPONIN T SERPL HS-MCNC: 9 NG/L
WBC NRBC COR # BLD: 8.8 10*3/MM3 (ref 3.4–10.8)

## 2023-04-08 PROCEDURE — 96365 THER/PROPH/DIAG IV INF INIT: CPT

## 2023-04-08 PROCEDURE — 87040 BLOOD CULTURE FOR BACTERIA: CPT | Performed by: EMERGENCY MEDICINE

## 2023-04-08 PROCEDURE — 84484 ASSAY OF TROPONIN QUANT: CPT | Performed by: EMERGENCY MEDICINE

## 2023-04-08 PROCEDURE — 83605 ASSAY OF LACTIC ACID: CPT

## 2023-04-08 PROCEDURE — 83880 ASSAY OF NATRIURETIC PEPTIDE: CPT | Performed by: EMERGENCY MEDICINE

## 2023-04-08 PROCEDURE — 80053 COMPREHEN METABOLIC PANEL: CPT | Performed by: EMERGENCY MEDICINE

## 2023-04-08 PROCEDURE — 94799 UNLISTED PULMONARY SVC/PX: CPT

## 2023-04-08 PROCEDURE — G0378 HOSPITAL OBSERVATION PER HR: HCPCS

## 2023-04-08 PROCEDURE — 94664 DEMO&/EVAL PT USE INHALER: CPT

## 2023-04-08 PROCEDURE — 25010000002 CEFTRIAXONE PER 250 MG: Performed by: INTERNAL MEDICINE

## 2023-04-08 PROCEDURE — 25010000002 METHYLPREDNISOLONE PER 125 MG: Performed by: EMERGENCY MEDICINE

## 2023-04-08 PROCEDURE — 94640 AIRWAY INHALATION TREATMENT: CPT

## 2023-04-08 PROCEDURE — 0202U NFCT DS 22 TRGT SARS-COV-2: CPT | Performed by: INTERNAL MEDICINE

## 2023-04-08 PROCEDURE — 99285 EMERGENCY DEPT VISIT HI MDM: CPT

## 2023-04-08 PROCEDURE — 87636 SARSCOV2 & INF A&B AMP PRB: CPT | Performed by: EMERGENCY MEDICINE

## 2023-04-08 PROCEDURE — 96375 TX/PRO/DX INJ NEW DRUG ADDON: CPT

## 2023-04-08 PROCEDURE — 84145 PROCALCITONIN (PCT): CPT | Performed by: INTERNAL MEDICINE

## 2023-04-08 PROCEDURE — 36415 COLL VENOUS BLD VENIPUNCTURE: CPT

## 2023-04-08 PROCEDURE — 71045 X-RAY EXAM CHEST 1 VIEW: CPT

## 2023-04-08 PROCEDURE — 85025 COMPLETE CBC W/AUTO DIFF WBC: CPT | Performed by: EMERGENCY MEDICINE

## 2023-04-08 PROCEDURE — C9803 HOPD COVID-19 SPEC COLLECT: HCPCS

## 2023-04-08 PROCEDURE — 93005 ELECTROCARDIOGRAM TRACING: CPT

## 2023-04-08 RX ORDER — IPRATROPIUM BROMIDE AND ALBUTEROL SULFATE 2.5; .5 MG/3ML; MG/3ML
3 SOLUTION RESPIRATORY (INHALATION) ONCE
Status: COMPLETED | OUTPATIENT
Start: 2023-04-08 | End: 2023-04-08

## 2023-04-08 RX ORDER — FUROSEMIDE 40 MG/1
40 TABLET ORAL DAILY
Status: DISCONTINUED | OUTPATIENT
Start: 2023-04-09 | End: 2023-04-10 | Stop reason: HOSPADM

## 2023-04-08 RX ORDER — CHOLECALCIFEROL (VITAMIN D3) 125 MCG
5 CAPSULE ORAL NIGHTLY PRN
Status: DISCONTINUED | OUTPATIENT
Start: 2023-04-08 | End: 2023-04-10 | Stop reason: HOSPADM

## 2023-04-08 RX ORDER — NITROGLYCERIN 0.4 MG/1
0.4 TABLET SUBLINGUAL
Status: DISCONTINUED | OUTPATIENT
Start: 2023-04-08 | End: 2023-04-10 | Stop reason: HOSPADM

## 2023-04-08 RX ORDER — METHYLPREDNISOLONE SODIUM SUCCINATE 125 MG/2ML
125 INJECTION, POWDER, LYOPHILIZED, FOR SOLUTION INTRAMUSCULAR; INTRAVENOUS ONCE
Status: COMPLETED | OUTPATIENT
Start: 2023-04-08 | End: 2023-04-08

## 2023-04-08 RX ORDER — ALBUTEROL SULFATE 90 UG/1
2 AEROSOL, METERED RESPIRATORY (INHALATION) ONCE
Status: COMPLETED | OUTPATIENT
Start: 2023-04-08 | End: 2023-04-08

## 2023-04-08 RX ORDER — AMLODIPINE BESYLATE 5 MG/1
10 TABLET ORAL DAILY
Status: DISCONTINUED | OUTPATIENT
Start: 2023-04-09 | End: 2023-04-10 | Stop reason: HOSPADM

## 2023-04-08 RX ORDER — LISINOPRIL 20 MG/1
40 TABLET ORAL DAILY
Status: DISCONTINUED | OUTPATIENT
Start: 2023-04-09 | End: 2023-04-10 | Stop reason: HOSPADM

## 2023-04-08 RX ORDER — SODIUM CHLORIDE 0.9 % (FLUSH) 0.9 %
3 SYRINGE (ML) INJECTION EVERY 12 HOURS SCHEDULED
Status: DISCONTINUED | OUTPATIENT
Start: 2023-04-08 | End: 2023-04-10 | Stop reason: HOSPADM

## 2023-04-08 RX ORDER — ONDANSETRON 2 MG/ML
4 INJECTION INTRAMUSCULAR; INTRAVENOUS EVERY 6 HOURS PRN
Status: DISCONTINUED | OUTPATIENT
Start: 2023-04-08 | End: 2023-04-10 | Stop reason: HOSPADM

## 2023-04-08 RX ORDER — POTASSIUM CHLORIDE 20 MEQ/1
10 TABLET, EXTENDED RELEASE ORAL DAILY
Status: DISCONTINUED | OUTPATIENT
Start: 2023-04-09 | End: 2023-04-10 | Stop reason: HOSPADM

## 2023-04-08 RX ORDER — CARVEDILOL 6.25 MG/1
12.5 TABLET ORAL 2 TIMES DAILY WITH MEALS
Status: DISCONTINUED | OUTPATIENT
Start: 2023-04-08 | End: 2023-04-10 | Stop reason: HOSPADM

## 2023-04-08 RX ORDER — IPRATROPIUM BROMIDE AND ALBUTEROL SULFATE 2.5; .5 MG/3ML; MG/3ML
3 SOLUTION RESPIRATORY (INHALATION)
Status: DISCONTINUED | OUTPATIENT
Start: 2023-04-08 | End: 2023-04-10 | Stop reason: HOSPADM

## 2023-04-08 RX ORDER — SODIUM CHLORIDE 0.9 % (FLUSH) 0.9 %
3-10 SYRINGE (ML) INJECTION AS NEEDED
Status: DISCONTINUED | OUTPATIENT
Start: 2023-04-08 | End: 2023-04-10 | Stop reason: HOSPADM

## 2023-04-08 RX ORDER — SODIUM CHLORIDE 9 MG/ML
40 INJECTION, SOLUTION INTRAVENOUS AS NEEDED
Status: DISCONTINUED | OUTPATIENT
Start: 2023-04-08 | End: 2023-04-10 | Stop reason: HOSPADM

## 2023-04-08 RX ORDER — BUDESONIDE AND FORMOTEROL FUMARATE DIHYDRATE 160; 4.5 UG/1; UG/1
2 AEROSOL RESPIRATORY (INHALATION)
Status: DISCONTINUED | OUTPATIENT
Start: 2023-04-08 | End: 2023-04-10 | Stop reason: HOSPADM

## 2023-04-08 RX ORDER — ROSUVASTATIN CALCIUM 10 MG/1
10 TABLET, COATED ORAL DAILY
Status: DISCONTINUED | OUTPATIENT
Start: 2023-04-08 | End: 2023-04-10 | Stop reason: HOSPADM

## 2023-04-08 RX ORDER — GUAIFENESIN 600 MG/1
600 TABLET, EXTENDED RELEASE ORAL EVERY 12 HOURS SCHEDULED
Status: DISCONTINUED | OUTPATIENT
Start: 2023-04-08 | End: 2023-04-10 | Stop reason: HOSPADM

## 2023-04-08 RX ORDER — DOXYCYCLINE 100 MG/1
100 TABLET ORAL EVERY 12 HOURS SCHEDULED
Status: DISCONTINUED | OUTPATIENT
Start: 2023-04-09 | End: 2023-04-09

## 2023-04-08 RX ORDER — FAMOTIDINE 20 MG/1
20 TABLET, FILM COATED ORAL DAILY
Status: DISCONTINUED | OUTPATIENT
Start: 2023-04-09 | End: 2023-04-10 | Stop reason: HOSPADM

## 2023-04-08 RX ORDER — ACETAMINOPHEN 325 MG/1
650 TABLET ORAL EVERY 4 HOURS PRN
Status: DISCONTINUED | OUTPATIENT
Start: 2023-04-08 | End: 2023-04-10 | Stop reason: HOSPADM

## 2023-04-08 RX ADMIN — ALBUTEROL SULFATE 2 PUFF: 108 INHALANT RESPIRATORY (INHALATION) at 18:25

## 2023-04-08 RX ADMIN — CEFTRIAXONE 1 G: 1 INJECTION, POWDER, FOR SOLUTION INTRAMUSCULAR; INTRAVENOUS at 20:19

## 2023-04-08 RX ADMIN — BUDESONIDE AND FORMOTEROL FUMARATE DIHYDRATE 2 PUFF: 160; 4.5 AEROSOL RESPIRATORY (INHALATION) at 20:30

## 2023-04-08 RX ADMIN — GUAIFENESIN 600 MG: 600 TABLET, EXTENDED RELEASE ORAL at 20:21

## 2023-04-08 RX ADMIN — IPRATROPIUM BROMIDE AND ALBUTEROL SULFATE 3 ML: 2.5; .5 SOLUTION RESPIRATORY (INHALATION) at 19:55

## 2023-04-08 RX ADMIN — METHYLPREDNISOLONE SODIUM SUCCINATE 125 MG: 125 INJECTION, POWDER, FOR SOLUTION INTRAMUSCULAR; INTRAVENOUS at 17:44

## 2023-04-08 RX ADMIN — DOXYCYCLINE 100 MG: 100 INJECTION, POWDER, LYOPHILIZED, FOR SOLUTION INTRAVENOUS at 22:23

## 2023-04-08 RX ADMIN — NITROGLYCERIN 1 INCH: 20 OINTMENT TOPICAL at 17:40

## 2023-04-08 RX ADMIN — Medication 3 ML: at 20:21

## 2023-04-08 RX ADMIN — APIXABAN 5 MG: 5 TABLET, FILM COATED ORAL at 22:23

## 2023-04-08 NOTE — ED PROVIDER NOTES
Subjective   History of Present Illness  83-year-old female with the onset of increasing shortness of breath in the last 72 hours.  She reports that she has not had orthopnea but has had a cough productive of greenish sputum and has had some wheezing.  She reports that she has a history of COPD and is generally on 4 L at home continuously.  She reports that she had a cardiac ablation for A-fib within the last couple of months she states she has had some lower extremity puffiness.  She reports subjective fever but no chills in the last 24 hours.  She reports that she has had some pleuritic chest discomfort.        Review of Systems   Constitutional: Positive for fatigue. Negative for chills and fever.   HENT: Negative for sore throat and trouble swallowing.    Eyes: Negative for discharge.   Respiratory: Positive for cough, chest tightness and shortness of breath.    Cardiovascular: Positive for chest pain, palpitations and leg swelling.   Gastrointestinal: Negative for nausea.   Skin: Negative for rash.   Hematological: Does not bruise/bleed easily.   All other systems reviewed and are negative.      Past Medical History:   Diagnosis Date   • Anemia 05/2012   • History of DVT (deep vein thrombosis) 03/2013    bilateral lower extremity   • History of pneumonia 06/2012    community acquired pneumonia and right pleural effusion;hospitalized at [facility]   • History of pulmonary embolism 03/2013    bilateral PE   • Hypertension 2001   • Insomnia     on Ambien 5mg at    • Lobular breast cancer, left 11/2011    Stage IA left upper lobular breast cancer   • Osteopenia 2012       Allergies   Allergen Reactions   • Celebrex [Celecoxib] Dizziness     Made her dizziness and fatigue       Past Surgical History:   Procedure Laterality Date   • CARDIAC CATHETERIZATION N/A 3/3/2023    Procedure: Left and Right Heart Cath with Coronary Angiography;  Surgeon: Richardson Willis MD;  Location: [facility] INVASIVE LOCATION;   Service: Cardiovascular;  Laterality: N/A;   • CATARACT EXTRACTION     • IVC FILTER RETRIEVAL  2014    IVC filter placement by Dr. Card   • MAMMO STEREOTACTIC BREAST BX SURGICAL ADD UNI Left 2011    invasive lobular carcinoma-Dr. Cande Daniel   • MASTECTOMY, PARTIAL Left 2011    and left axillary sentinel lymph node biopsy by Dr. Uriostegui   • TUBAL ABDOMINAL LIGATION         Family History   Problem Relation Age of Onset   • Lung cancer Brother        Social History     Socioeconomic History   • Marital status:    Tobacco Use   • Smoking status: Former     Types: Cigarettes     Quit date:      Years since quittin.2     Passive exposure: Never   • Smokeless tobacco: Never   • Tobacco comments:     smoked 6 cigarettes a day from 13 years of age to 56 years of age when she quit in    Vaping Use   • Vaping Use: Never used   Substance and Sexual Activity   • Alcohol use: Not Currently   • Drug use: No   • Sexual activity: Not Currently     Partners: Male     No recent unusual food water travel or activity      Objective   Physical Exam  Alert Gia Coma Scale 15   HEENT: Pupils equal and reactive to light. Conjunctivae are not injected. Normal tympanic membranes. Oropharynx and nares are normal.   Neck: Supple. Midline trachea. No JVD. No goiter.   Chest: Extensive rhonchi and expiratory wheezes bilaterally and equal breath sounds bilaterally, regular rate and rhythm without murmur or rub.  Sinus rhythm to sinus tachycardia   Abdomen: Positive bowel sounds, nontender, nondistended. No rebound or peritoneal signs. No CVA tenderness.   Extremities no clubbing. cyanosis trace of pedal puffiness. Motor sensory exam is normal. The full range of motion is intact   Skin: Warm and dry, no rashes or petechia.   Lymphatic: No regional lymphadenopathy. No calf pain, swelling or Homans sign    Procedures           ED Course      Labs Reviewed   COMPREHENSIVE METABOLIC PANEL - Abnormal;  Notable for the following components:       Result Value    Glucose 112 (*)     Chloride 96 (*)     eGFR 59.2 (*)     All other components within normal limits    Narrative:     GFR Normal >60  Chronic Kidney Disease <60  Kidney Failure <15    The GFR formula is only valid for adults with stable renal function between ages 18 and 70.   CBC WITH AUTO DIFFERENTIAL - Abnormal; Notable for the following components:    RBC 5.41 (*)     Hematocrit 47.7 (*)     RDW 15.9 (*)     Neutrophil % 76.8 (*)     Lymphocyte % 13.6 (*)     All other components within normal limits   COVID-19 AND FLU A/B, NP SWAB IN TRANSPORT MEDIA 8-12 HR TAT - Normal    Narrative:     Fact sheet for providers: https://www.fda.gov/media/954992/download    Fact sheet for patients: https://www.fda.gov/media/236572/download    Test performed by PCR.   BNP (IN-HOUSE) - Normal    Narrative:     Among patients with dyspnea, NT-proBNP is highly sensitive for the detection of acute congestive heart failure. In addition NT-proBNP of <300 pg/ml effectively rules out acute congestive heart failure with 99% negative predictive value.     SINGLE HSTROPONIN T - Normal    Narrative:     High Sensitive Troponin T Reference Range:  <10.0 ng/L- Negative Female for AMI  <15.0 ng/L- Negative Male for AMI  >=10 - Abnormal Female indicating possible myocardial injury.  >=15 - Abnormal Male indicating possible myocardial injury.   Clinicians would have to utilize clinical acumen, EKG, Troponin, and serial changes to determine if it is an Acute Myocardial Infarction or myocardial injury due to an underlying chronic condition.        POC LACTATE - Normal   BLOOD CULTURE   BLOOD CULTURE   POC LACTATE   CBC AND DIFFERENTIAL    Narrative:     The following orders were created for panel order CBC & Differential.  Procedure                               Abnormality         Status                     ---------                               -----------         ------                      CBC Auto Differential[[id number]]        Abnormal            Final result                 Please view results for these tests on the individual orders.     Medications   cefTRIAXone (ROCEPHIN) 1 g in sodium chloride 0.9 % 100 mL IVPB (has no administration in time range)   doxycycline (VIBRAMYCIN) 100 mg in sodium chloride 0.9 % 100 mL IVPB (has no administration in time range)   ipratropium-albuterol (DUO-NEB) nebulizer solution 3 mL (has no administration in time range)   albuterol sulfate HFA (PROVENTIL HFA;VENTOLIN HFA;PROAIR HFA) inhaler 2 puff (2 puffs Inhalation Given 4/8/23 1825)   methylPREDNISolone sodium succinate (SOLU-Medrol) injection 125 mg (125 mg Intravenous Given 4/8/23 1744)   nitroglycerin (NITROSTAT) ointment 1 inch (1 inch Topical Given 4/8/23 1740)     Chest x-ray shows right pleural effusion and a chronic scarring.  Early left lower lobe infiltrate cannot be excluded                                     Medical Decision Making  The patient was sent from hospitals urgent care [place].  The patient will be admitted we will continue intravenous antibiotics and steroids as well as inhaled bronchodilators.  The patient was agreeable to this plan of treatment there did not appear r to be significant pulmonary edema identified    Amount and/or Complexity of Data Reviewed  Independent Historian:      Details: Family  External Data Reviewed: labs and notes.  Labs: ordered. Decision-making details documented in ED Course.  Radiology: ordered and independent interpretation performed.     Details: Left pleural effusion and chronic scarring were identified and early left lower lobe infiltrate cannot be excluded      Risk  Prescription drug management.  Decision regarding hospitalization.    Risk Details: Risk of progression to respiratory failure exists        Final diagnoses:   Acute exacerbation of chronic obstructive pulmonary disease (COPD)   Hypoxemia   Acute bronchitis, unspecified organism       ED  Disposition  ED Disposition     ED Disposition   Decision to Admit    Condition   --    Comment   Level of Care: Telemetry [5]   Diagnosis: Acute exacerbation of chronic obstructive pulmonary disease (COPD) [184884]   Admitting Physician: RUHS WATERMAN [[id number]]   Attending Physician: RUSH WATERMAN [[id number]]               No follow-up provider specified.       Medication List      No changes were made to your prescriptions during this visit.          Alan Baumann MD  04/08/23 1943

## 2023-04-09 LAB
BACTERIA SPEC RESP CULT: NORMAL
GRAM STN SPEC: NORMAL
QT INTERVAL: 445 MS

## 2023-04-09 PROCEDURE — 87205 SMEAR GRAM STAIN: CPT | Performed by: INTERNAL MEDICINE

## 2023-04-09 PROCEDURE — 63710000001 PREDNISONE PER 1 MG: Performed by: INTERNAL MEDICINE

## 2023-04-09 PROCEDURE — 97162 PT EVAL MOD COMPLEX 30 MIN: CPT

## 2023-04-09 PROCEDURE — 94799 UNLISTED PULMONARY SVC/PX: CPT

## 2023-04-09 PROCEDURE — G0378 HOSPITAL OBSERVATION PER HR: HCPCS

## 2023-04-09 PROCEDURE — 94761 N-INVAS EAR/PLS OXIMETRY MLT: CPT

## 2023-04-09 PROCEDURE — 80048 BASIC METABOLIC PNL TOTAL CA: CPT | Performed by: INTERNAL MEDICINE

## 2023-04-09 RX ORDER — PREDNISONE 20 MG/1
40 TABLET ORAL DAILY
Status: DISCONTINUED | OUTPATIENT
Start: 2023-04-09 | End: 2023-04-10 | Stop reason: HOSPADM

## 2023-04-09 RX ADMIN — LISINOPRIL 40 MG: 20 TABLET ORAL at 08:28

## 2023-04-09 RX ADMIN — APIXABAN 5 MG: 5 TABLET, FILM COATED ORAL at 08:29

## 2023-04-09 RX ADMIN — BUDESONIDE AND FORMOTEROL FUMARATE DIHYDRATE 2 PUFF: 160; 4.5 AEROSOL RESPIRATORY (INHALATION) at 07:49

## 2023-04-09 RX ADMIN — APIXABAN 5 MG: 5 TABLET, FILM COATED ORAL at 20:53

## 2023-04-09 RX ADMIN — GUAIFENESIN 600 MG: 600 TABLET, EXTENDED RELEASE ORAL at 08:28

## 2023-04-09 RX ADMIN — BUDESONIDE AND FORMOTEROL FUMARATE DIHYDRATE 2 PUFF: 160; 4.5 AEROSOL RESPIRATORY (INHALATION) at 20:12

## 2023-04-09 RX ADMIN — FAMOTIDINE 20 MG: 20 TABLET, FILM COATED ORAL at 08:28

## 2023-04-09 RX ADMIN — IPRATROPIUM BROMIDE AND ALBUTEROL SULFATE 3 ML: 2.5; .5 SOLUTION RESPIRATORY (INHALATION) at 20:09

## 2023-04-09 RX ADMIN — DOXYCYCLINE 100 MG: 100 TABLET ORAL at 08:28

## 2023-04-09 RX ADMIN — GUAIFENESIN 600 MG: 600 TABLET, EXTENDED RELEASE ORAL at 20:53

## 2023-04-09 RX ADMIN — Medication 3 ML: at 08:29

## 2023-04-09 RX ADMIN — FUROSEMIDE 40 MG: 40 TABLET ORAL at 08:28

## 2023-04-09 RX ADMIN — AMLODIPINE BESYLATE 10 MG: 5 TABLET ORAL at 08:29

## 2023-04-09 RX ADMIN — POTASSIUM CHLORIDE 10 MEQ: 1500 TABLET, EXTENDED RELEASE ORAL at 08:29

## 2023-04-09 RX ADMIN — Medication 3 ML: at 20:53

## 2023-04-09 RX ADMIN — PREDNISONE 40 MG: 20 TABLET ORAL at 11:53

## 2023-04-09 RX ADMIN — IPRATROPIUM BROMIDE AND ALBUTEROL SULFATE 3 ML: 2.5; .5 SOLUTION RESPIRATORY (INHALATION) at 07:53

## 2023-04-09 RX ADMIN — IPRATROPIUM BROMIDE AND ALBUTEROL SULFATE 3 ML: 2.5; .5 SOLUTION RESPIRATORY (INHALATION) at 15:17

## 2023-04-09 RX ADMIN — CARVEDILOL 12.5 MG: 6.25 TABLET, FILM COATED ORAL at 08:29

## 2023-04-09 RX ADMIN — CARVEDILOL 12.5 MG: 6.25 TABLET, FILM COATED ORAL at 17:49

## 2023-04-09 NOTE — PLAN OF CARE
Goal Outcome Evaluation:  Plan of Care Reviewed With: patient        Progress: improving  Outcome Evaluation: Patient shows no s/s of distress and vitals are stable. Pt voiced no concerns. Patient remains on home dose of 4L NC. Call light within reach. Will continue to observe.

## 2023-04-09 NOTE — H&P
Patient Care Team:  Shaylee Mcdaniels MD as PCP - General (Family Medicine)  Richardson Willis MD as Cardiologist (Cardiology)  Rafy Rodriguez MD as Consulting Physician (Hematology and Oncology)    Chief complaint shortness of breath    Subjective     Patient is a 83 y.o. female with COPD who presents with worsening shortness of breath for 1 week.  She had nasal congestion and cough.  Symptoms progressed so that she had extensive wheezing that was unrelieved with breathing treatments at home.  She was seen at [facility] yesterday and referred here due to persistent hypoxemia and significant wheezing on exam.  Since admission she has tested positive for human metapneumovirus.  She feels better this morning after having received steroids, supplemental oxygen and bronchodilators overnight.  She has home oxygen that previously she used only as needed but has used it fairly consistently within the last week.    Review of Systems   Constitutional: Positive for activity change, appetite change and fatigue. Negative for chills, diaphoresis, fever and unexpected weight change.   HENT: Negative for facial swelling.    Eyes: Negative for visual disturbance.   Respiratory: Positive for cough, shortness of breath and wheezing.    Cardiovascular: Negative for chest pain, palpitations and leg swelling.   Gastrointestinal: Negative for abdominal pain, constipation, diarrhea, nausea and vomiting.   Endocrine: Negative for polyuria.   Genitourinary: Negative for dysuria.   Musculoskeletal: Negative for arthralgias, gait problem and joint swelling.   Skin: Negative for rash and wound.   Neurological: Negative for dizziness, tremors, weakness, light-headedness and headaches.   Psychiatric/Behavioral: Negative for confusion.          History  Past Medical History:   Diagnosis Date   • COPD (chronic obstructive pulmonary disease)    • History of DVT (deep vein thrombosis) 03/2013    bilateral lower extremity   • History of  pneumonia 2012    community acquired pneumonia and right pleural effusion;hospitalized at [facility]   • History of pulmonary embolism 2013    bilateral PE   • Hypertension    • Insomnia     on Ambien 5mg at    • Lobular breast cancer, left 2011    Stage IA left upper lobular breast cancer   • Osteopenia      Past Surgical History:   Procedure Laterality Date   • CARDIAC CATHETERIZATION N/A 3/3/2023    Procedure: Left and Right Heart Cath with Coronary Angiography;  Surgeon: Richardson Willis MD;  Location: [facility] INVASIVE LOCATION;  Service: Cardiovascular;  Laterality: N/A;   • CATARACT EXTRACTION     • IVC FILTER RETRIEVAL  2014    IVC filter placement by Dr. Card   • MAMMO STEREOTACTIC BREAST BX SURGICAL ADD UNI Left 2011    invasive lobular carcinoma-Dr. Cande Daniel   • MASTECTOMY, PARTIAL Left 2011    and left axillary sentinel lymph node biopsy by Dr. Uriostegui   • TUBAL ABDOMINAL LIGATION       Family History   Problem Relation Age of Onset   • Lung cancer Brother      Social History     Tobacco Use   • Smoking status: Former     Types: Cigarettes     Quit date:      Years since quittin.2     Passive exposure: Never   • Smokeless tobacco: Never   • Tobacco comments:     smoked 6 cigarettes a day from 13 years of age to 56 years of age when she quit in    Vaping Use   • Vaping Use: Never used   Substance Use Topics   • Alcohol use: Not Currently   • Drug use: No     Medications Prior to Admission   Medication Sig Dispense Refill Last Dose   • amLODIPine (NORVASC) 10 MG tablet Take 1 tablet by mouth Daily. 90 tablet 3 2023   • apixaban (ELIQUIS) 5 MG tablet tablet Take 1 tablet by mouth Every 12 (Twelve) Hours. Indications: Other - full anticoagulation, history of DVT/PE 60 tablet 2 2023   • carvedilol (COREG) 12.5 MG tablet Take 1 tablet by mouth 2 (Two) Times a Day With Meals. 180 tablet 3 2023   • Cholecalciferol (Vitamin D3) 50 MCG  (2000 UT) capsule Take 1 capsule by mouth Daily.   4/8/2023   • cloNIDine (CATAPRES) 0.1 MG tablet Take 1 tablet by mouth 2 (Two) Times a Day As Needed for High Blood Pressure (SBP greater than 170). (Patient taking differently: Take 1 tablet by mouth As Needed for High Blood Pressure (SBP greater than 170).) 15 tablet 0 Past Month   • famotidine (PEPCID) 20 MG tablet Take 1 tablet by mouth As Needed.   Past Month   • Folbic 2.5-25-2 MG tablet tablet Take 1 tablet by mouth Daily.   4/8/2023   • furosemide (LASIX) 40 MG tablet Take 1 tablet by mouth Daily. 90 tablet 3 4/8/2023   • lisinopril (PRINIVIL,ZESTRIL) 40 MG tablet Take 1 tablet by mouth Daily.   4/8/2023   • potassium chloride (K-DUR,KLOR-CON) 10 MEQ CR tablet Take 1 tablet by mouth Daily. 90 tablet 3 4/8/2023   • SYMBICORT 80-4.5 MCG/ACT inhaler Inhale 2 puffs 2 (Two) Times a Day.  5 4/8/2023   • VENTOLIN  (90 Base) MCG/ACT inhaler Inhale 2 puffs Every 4 (Four) Hours As Needed.  5 4/8/2023     Allergies:  Patient has no known allergies.    Objective     Vital Signs  Temp:  [97.1 °F (36.2 °C)-98 °F (36.7 °C)] 98 °F (36.7 °C)  Heart Rate:  [58-85] 84  Resp:  [15-28] 15  BP: (132-154)/(46-84) 148/53     Physical Exam:      General Appearance:    Alert, cooperative, in no acute distress   Head:    Normocephalic, without obvious abnormality, atraumatic   Eyes:            Lids and lashes normal, conjunctivae and sclerae normal, no   icterus, no pallor, corneas clear, PERRLA   Ears:    Ears appear intact with no abnormalities noted   Throat:   No oral lesions, no thrush, oral mucosa moist   Neck:   No adenopathy, supple, trachea midline, no thyromegaly, no   carotid bruit, no JVD   Lungs:    Mild diffuse wheezing    Heart:    Regular rhythm and normal rate, normal S1 and S2, no            murmur, no gallop, no rub, no click   Chest Wall:    No abnormalities observed   Abdomen:     Normal bowel sounds, no masses, no organomegaly, soft        non-tender,  non-distended, no guarding, no rebound                tenderness   Extremities:   Moves all extremities well, no edema, no cyanosis, no             redness   Pulses:   Pulses palpable and equal bilaterally   Skin:   No bleeding, bruising or rash   Lymph nodes:   No palpable adenopathy   Neurologic:   Cranial nerves 2 - 12 grossly intact, sensation intact, DTR       present and equal bilaterally       Results Review:     Imaging Results (Last 24 Hours)     Procedure Component Value Units Date/Time    XR Chest 1 View [[id number]] Collected: 04/08/23 1942     Updated: 04/08/23 1946    Narrative:      XR CHEST 1 VW    Date of Exam: 4/8/2023 7:04 PM EDT    Indication: dyspnea.    Comparison: 3/1/2023 and prior    Findings:  Study is limited by overlying support and monitoring apparatus. Heart size is within normal limits. Coarsening of interstitial markings noted diffusely. More focal patchy opacity is noted dependently at the left lung base. Osseous structures are   unremarkable      Impression:      Impression:  Dependent opacity at the left lung base which may represent atelectasis, asymmetric pulmonary edema or pneumonia.    Electronically Signed: Ronan Medina    4/8/2023 7:43 PM EDT    Workstation ID: [id number]           Lab Results (last 24 hours)     Procedure Component Value Units Date/Time    Respiratory Culture - Sputum, Cough [[id number]] Collected: 04/09/23 0832    Specimen: Sputum from Cough Updated: 04/09/23 1044     Respiratory Culture Rejected     Gram Stain Moderate (3+) WBCs per low power field      Moderate (3+) Epithelial cells per low power field      Moderate (3+) Mixed bacterial morphotypes seen on Gram Stain    Narrative:      Specimen rejected due to oropharyngeal contamination. Please reorder and recollect specimen if clinically necessary.    Respiratory Panel PCR w/COVID-19(SARS-CoV-2) MARIA ELENA/SARIAH/BRANDIN/PAD/COR/MAD/LILLIAN In-House, NP Swab in UTM/VTM, 3-4 HR TAT - Swab, Nasopharynx [[id number]]   (Abnormal) Collected: 04/08/23 2042    Specimen: Swab from Nasopharynx Updated: 04/08/23 2140     ADENOVIRUS, PCR Not Detected     Coronavirus 229E Not Detected     Coronavirus HKU1 Not Detected     Coronavirus NL63 Not Detected     Coronavirus OC43 Not Detected     COVID19 Not Detected     Human Metapneumovirus Detected     Human Rhinovirus/Enterovirus Not Detected     Influenza A PCR Not Detected     Influenza B PCR Not Detected     Parainfluenza Virus 1 Not Detected     Parainfluenza Virus 2 Not Detected     Parainfluenza Virus 3 Not Detected     Parainfluenza Virus 4 Not Detected     RSV, PCR Not Detected     Bordetella pertussis pcr Not Detected     Bordetella parapertussis PCR Not Detected     Chlamydophila pneumoniae PCR Not Detected     Mycoplasma pneumo by PCR Not Detected    Narrative:      In the setting of a positive respiratory panel with a viral infection PLUS a negative procalcitonin without other underlying concern for bacterial infection, consider observing off antibiotics or discontinuation of antibiotics and continue supportive care. If the respiratory panel is positive for atypical bacterial infection (Bordetella pertussis, Chlamydophila pneumoniae, or Mycoplasma pneumoniae), consider antibiotic de-escalation to target atypical bacterial infection.    Procalcitonin [[id number]]  (Normal) Collected: 04/08/23 1743    Specimen: Blood Updated: 04/08/23 2045     Procalcitonin 0.05 ng/mL     Narrative:      As a Marker for Sepsis (Non-Neonates):    1. <0.5 ng/mL represents a low risk of severe sepsis and/or septic shock.  2. >2 ng/mL represents a high risk of severe sepsis and/or septic shock.    As a Marker for Lower Respiratory Tract Infections that require antibiotic therapy:    PCT on Admission    Antibiotic Therapy       6-12 Hrs later    >0.5                Strongly Recommended  >0.25 - <0.5        Recommended   0.1 - 0.25          Discouraged              Remeasure/reassess PCT  <0.1               "  Strongly Discouraged     Remeasure/reassess PCT    As 28 day mortality risk marker: \"Change in Procalcitonin Result\" (>80% or <=80%) if Day 0 (or Day 1) and Day 4 values are available. Refer to http://www.[facility]-pct-calculator.com    Change in PCT <=80%  A decrease of PCT levels below or equal to 80% defines a positive change in PCT test result representing a higher risk for 28-day all-cause mortality of patients diagnosed with severe sepsis for septic shock.    Change in PCT >80%  A decrease of PCT levels of more than 80% defines a negative change in PCT result representing a lower risk for 28-day all-cause mortality of patients diagnosed with severe sepsis or septic shock.       Blood Culture - Blood, Arm, Right [[id number]] Collected: 04/08/23 1902    Specimen: Blood from Arm, Right Updated: 04/08/23 1904    Comprehensive Metabolic Panel [[id number]]  (Abnormal) Collected: 04/08/23 1743    Specimen: Blood Updated: 04/08/23 1815     Glucose 112 mg/dL      BUN 15 mg/dL      Creatinine 0.96 mg/dL      Sodium 137 mmol/L      Potassium 4.0 mmol/L      Chloride 96 mmol/L      CO2 29.0 mmol/L      Calcium 10.0 mg/dL      Total Protein 8.0 g/dL      Albumin 4.6 g/dL      ALT (SGPT) 13 U/L      AST (SGOT) 16 U/L      Alkaline Phosphatase 88 U/L      Total Bilirubin 0.7 mg/dL      Globulin 3.4 gm/dL      A/G Ratio 1.4 g/dL      BUN/Creatinine Ratio 15.6     Anion Gap 12.0 mmol/L      eGFR 59.2 mL/min/1.73     Narrative:      GFR Normal >60  Chronic Kidney Disease <60  Kidney Failure <15    The GFR formula is only valid for adults with stable renal function between ages 18 and 70.    BNP [[id number]]  (Normal) Collected: 04/08/23 1743    Specimen: Blood Updated: 04/08/23 1815     proBNP 836.7 pg/mL     Narrative:      Among patients with dyspnea, NT-proBNP is highly sensitive for the detection of acute congestive heart failure. In addition NT-proBNP of <300 pg/ml effectively rules out acute congestive heart failure with 99% " negative predictive value.      Single High Sensitivity Troponin T [[id number]]  (Normal) Collected: 04/08/23 1743    Specimen: Blood Updated: 04/08/23 1815     HS Troponin T 9 ng/L     Narrative:      High Sensitive Troponin T Reference Range:  <10.0 ng/L- Negative Female for AMI  <15.0 ng/L- Negative Male for AMI  >=10 - Abnormal Female indicating possible myocardial injury.  >=15 - Abnormal Male indicating possible myocardial injury.   Clinicians would have to utilize clinical acumen, EKG, Troponin, and serial changes to determine if it is an Acute Myocardial Infarction or myocardial injury due to an underlying chronic condition.         COVID-19 and FLU A/B PCR - Swab, Nasopharynx [[id number]]  (Normal) Collected: 04/08/23 1739    Specimen: Swab from Nasopharynx Updated: 04/08/23 1812     COVID19 Not Detected     Influenza A PCR Not Detected     Influenza B PCR Not Detected    Narrative:      Fact sheet for providers: https://www.fda.gov/media/397855/download    Fact sheet for patients: https://www.fda.gov/media/039443/download    Test performed by PCR.    CBC & Differential [[id number]]  (Abnormal) Collected: 04/08/23 1743    Specimen: Blood Updated: 04/08/23 1756    Narrative:      The following orders were created for panel order CBC & Differential.  Procedure                               Abnormality         Status                     ---------                               -----------         ------                     CBC Auto Differential[[id number]]        Abnormal            Final result                 Please view results for these tests on the individual orders.    CBC Auto Differential [[id number]]  (Abnormal) Collected: 04/08/23 1743    Specimen: Blood Updated: 04/08/23 1756     WBC 8.80 10*3/mm3      RBC 5.41 10*6/mm3      Hemoglobin 15.6 g/dL      Hematocrit 47.7 %      MCV 88.1 fL      MCH 28.9 pg      MCHC 32.8 g/dL      RDW 15.9 %      RDW-SD 48.6 fl      MPV 8.1 fL      Platelets 265 10*3/mm3       Neutrophil % 76.8 %      Lymphocyte % 13.6 %      Monocyte % 7.1 %      Eosinophil % 1.7 %      Basophil % 0.8 %      Neutrophils, Absolute 6.80 10*3/mm3      Lymphocytes, Absolute 1.20 10*3/mm3      Monocytes, Absolute 0.60 10*3/mm3      Eosinophils, Absolute 0.20 10*3/mm3      Basophils, Absolute 0.10 10*3/mm3      nRBC 0.1 /100 WBC     POC Lactate [[id number]]  (Normal) Collected: 04/08/23 1752    Specimen: Blood Updated: 04/08/23 1754     Lactate 0.5 mmol/L      Comment: Serial Number: [id number]:  [id number]       Blood Culture - Blood, Arm, Right [[id number]] Collected: 04/08/23 1743    Specimen: Blood from Arm, Right Updated: 04/08/23 1751           I reviewed the patient's new clinical results.    Assessment & Plan       Acute exacerbation of chronic obstructive pulmonary disease (COPD)  Viral URI -treat symptomatically with steroids, bronchodilators, Mucinex, pulmonary toilet  Acute hypoxemia-wean oxygen as tolerated  History of bilateral pulmonary embolism-continue Eliquis  Essential hypertension-continue amlodipine, carvedilol, lisinopril  Chronic diastolic congestive heart failure-continue Lasix, lisinopril  History of breast cancer  GERD-famotidine  Hyperlipidemia-Crestor      I discussed the patient's findings and my recommendations with patient.     Rosamaria Jin MD  04/09/23  11:38 EDT

## 2023-04-10 ENCOUNTER — READMISSION MANAGEMENT (OUTPATIENT)
Dept: CALL CENTER | Facility: HOSPITAL | Age: 83
End: 2023-04-10
Payer: MEDICARE

## 2023-04-10 VITALS
RESPIRATION RATE: 18 BRPM | HEART RATE: 90 BPM | BODY MASS INDEX: 24.2 KG/M2 | HEIGHT: 64 IN | OXYGEN SATURATION: 99 % | TEMPERATURE: 97.4 F | DIASTOLIC BLOOD PRESSURE: 58 MMHG | WEIGHT: 141.76 LBS | SYSTOLIC BLOOD PRESSURE: 130 MMHG

## 2023-04-10 LAB
ANION GAP SERPL CALCULATED.3IONS-SCNC: 9 MMOL/L (ref 5–15)
BASOPHILS # BLD AUTO: 0 10*3/MM3 (ref 0–0.2)
BASOPHILS NFR BLD AUTO: 0.3 % (ref 0–1.5)
BUN SERPL-MCNC: 25 MG/DL (ref 8–23)
BUN/CREAT SERPL: 22.3 (ref 7–25)
CALCIUM SPEC-SCNC: 9.8 MG/DL (ref 8.6–10.5)
CHLORIDE SERPL-SCNC: 99 MMOL/L (ref 98–107)
CO2 SERPL-SCNC: 29 MMOL/L (ref 22–29)
CREAT SERPL-MCNC: 1.12 MG/DL (ref 0.57–1)
DEPRECATED RDW RBC AUTO: 47.3 FL (ref 37–54)
EGFRCR SERPLBLD CKD-EPI 2021: 49.2 ML/MIN/1.73
EOSINOPHIL # BLD AUTO: 0 10*3/MM3 (ref 0–0.4)
EOSINOPHIL NFR BLD AUTO: 0 % (ref 0.3–6.2)
ERYTHROCYTE [DISTWIDTH] IN BLOOD BY AUTOMATED COUNT: 15.5 % (ref 12.3–15.4)
GLUCOSE SERPL-MCNC: 148 MG/DL (ref 65–99)
HCT VFR BLD AUTO: 43.8 % (ref 34–46.6)
HGB BLD-MCNC: 13.9 G/DL (ref 12–15.9)
LYMPHOCYTES # BLD AUTO: 0.8 10*3/MM3 (ref 0.7–3.1)
LYMPHOCYTES NFR BLD AUTO: 4.5 % (ref 19.6–45.3)
MCH RBC QN AUTO: 28.1 PG (ref 26.6–33)
MCHC RBC AUTO-ENTMCNC: 31.8 G/DL (ref 31.5–35.7)
MCV RBC AUTO: 88.2 FL (ref 79–97)
MONOCYTES # BLD AUTO: 0.6 10*3/MM3 (ref 0.1–0.9)
MONOCYTES NFR BLD AUTO: 3.4 % (ref 5–12)
NEUTROPHILS NFR BLD AUTO: 16.1 10*3/MM3 (ref 1.7–7)
NEUTROPHILS NFR BLD AUTO: 91.8 % (ref 42.7–76)
NRBC BLD AUTO-RTO: 0 /100 WBC (ref 0–0.2)
PLATELET # BLD AUTO: 275 10*3/MM3 (ref 140–450)
PMV BLD AUTO: 8.4 FL (ref 6–12)
POTASSIUM SERPL-SCNC: 4.3 MMOL/L (ref 3.5–5.2)
RBC # BLD AUTO: 4.96 10*6/MM3 (ref 3.77–5.28)
SODIUM SERPL-SCNC: 137 MMOL/L (ref 136–145)
WBC NRBC COR # BLD: 17.5 10*3/MM3 (ref 3.4–10.8)

## 2023-04-10 PROCEDURE — 94761 N-INVAS EAR/PLS OXIMETRY MLT: CPT

## 2023-04-10 PROCEDURE — 94799 UNLISTED PULMONARY SVC/PX: CPT

## 2023-04-10 PROCEDURE — 63710000001 PREDNISONE PER 1 MG: Performed by: INTERNAL MEDICINE

## 2023-04-10 PROCEDURE — G0378 HOSPITAL OBSERVATION PER HR: HCPCS

## 2023-04-10 PROCEDURE — 94664 DEMO&/EVAL PT USE INHALER: CPT

## 2023-04-10 PROCEDURE — 85025 COMPLETE CBC W/AUTO DIFF WBC: CPT | Performed by: INTERNAL MEDICINE

## 2023-04-10 RX ORDER — PREDNISONE 20 MG/1
40 TABLET ORAL DAILY
Qty: 2 TABLET | Refills: 0 | Status: SHIPPED | OUTPATIENT
Start: 2023-04-11

## 2023-04-10 RX ORDER — GUAIFENESIN 600 MG/1
600 TABLET, EXTENDED RELEASE ORAL EVERY 12 HOURS SCHEDULED
Qty: 30 TABLET | Refills: 1 | Status: SHIPPED | OUTPATIENT
Start: 2023-04-10

## 2023-04-10 RX ORDER — ROSUVASTATIN CALCIUM 10 MG/1
10 TABLET, COATED ORAL DAILY
Qty: 90 TABLET | Refills: 3 | Status: SHIPPED | OUTPATIENT
Start: 2023-04-10 | End: 2023-04-10

## 2023-04-10 RX ADMIN — PREDNISONE 40 MG: 20 TABLET ORAL at 09:00

## 2023-04-10 RX ADMIN — FUROSEMIDE 40 MG: 40 TABLET ORAL at 09:00

## 2023-04-10 RX ADMIN — GUAIFENESIN 600 MG: 600 TABLET, EXTENDED RELEASE ORAL at 09:00

## 2023-04-10 RX ADMIN — IPRATROPIUM BROMIDE AND ALBUTEROL SULFATE 3 ML: 2.5; .5 SOLUTION RESPIRATORY (INHALATION) at 07:50

## 2023-04-10 RX ADMIN — POTASSIUM CHLORIDE 10 MEQ: 1500 TABLET, EXTENDED RELEASE ORAL at 09:00

## 2023-04-10 RX ADMIN — APIXABAN 5 MG: 5 TABLET, FILM COATED ORAL at 09:00

## 2023-04-10 RX ADMIN — AMLODIPINE BESYLATE 10 MG: 5 TABLET ORAL at 09:00

## 2023-04-10 RX ADMIN — CARVEDILOL 12.5 MG: 6.25 TABLET, FILM COATED ORAL at 09:00

## 2023-04-10 RX ADMIN — Medication 3 ML: at 09:02

## 2023-04-10 RX ADMIN — IPRATROPIUM BROMIDE AND ALBUTEROL SULFATE 3 ML: 2.5; .5 SOLUTION RESPIRATORY (INHALATION) at 11:00

## 2023-04-10 RX ADMIN — LISINOPRIL 40 MG: 20 TABLET ORAL at 09:00

## 2023-04-10 RX ADMIN — BUDESONIDE AND FORMOTEROL FUMARATE DIHYDRATE 2 PUFF: 160; 4.5 AEROSOL RESPIRATORY (INHALATION) at 07:50

## 2023-04-10 NOTE — THERAPY EVALUATION
Patient Name: Gabrielle Lyles  : 1940    MRN: [id number]                              Today's Date: 2023       Admit Date: 2023    Visit Dx:     ICD-10-CM ICD-9-CM   1. Acute exacerbation of chronic obstructive pulmonary disease (COPD)  J44.1 491.21   2. Hypoxemia  R09.02 799.02   3. Acute bronchitis, unspecified organism  J20.9 466.0     Patient Active Problem List   Diagnosis   • Malignant neoplasm of left breast in female, estrogen receptor positive   • Deep vein thrombosis of bilateral lower extremities   • Bilateral pulmonary embolism   • Hypokalemia   • CKD (chronic kidney disease), stage III   • Low back pain   • Osteopenia   • Allergic reaction   • Chronic obstructive pulmonary disease   • Gastroesophageal reflux disease   • Gout   • History of pulmonary embolism   • Hypertension   • Influenza   • Lumbar radiculopathy   • Nausea   • Parotid duct obstruction   • Personal history of malignant neoplasm of breast   • Shortness of breath   • Aortic aneurysm   • Bulging lumbar disc   • Spinal stenosis of lumbar region   • TIA (transient ischemic attack)   • Bilateral leg edema   • Acute exacerbation of chronic obstructive pulmonary disease (COPD)     Past Medical History:   Diagnosis Date   • COPD (chronic obstructive pulmonary disease)    • History of DVT (deep vein thrombosis) 2013    bilateral lower extremity   • History of pneumonia 2012    community acquired pneumonia and right pleural effusion;hospitalized at [facility]   • History of pulmonary embolism 2013    bilateral PE   • Hypertension    • Insomnia     on Ambien 5mg at    • Lobular breast cancer, left 2011    Stage IA left upper lobular breast cancer   • Osteopenia      Past Surgical History:   Procedure Laterality Date   • CARDIAC CATHETERIZATION N/A 3/3/2023    Procedure: Left and Right Heart Cath with Coronary Angiography;  Surgeon: Richardson Willis MD;  Location: [facility] INVASIVE LOCATION;  Service:  Cardiovascular;  Laterality: N/A;   • CATARACT EXTRACTION  2015   • IVC FILTER RETRIEVAL  06/2014    IVC filter placement by Dr. Card   • MAMMO STEREOTACTIC BREAST BX SURGICAL ADD UNI Left 11/01/2011    invasive lobular carcinoma-Dr. Cande Daniel   • MASTECTOMY, PARTIAL Left 12/01/2011    and left axillary sentinel lymph node biopsy by Dr. Uriostegui   • TUBAL ABDOMINAL LIGATION  1984      General Information     Row Name 04/09/23 2139          Physical Therapy Time and Intention    Document Type evaluation  -PG     Mode of Treatment physical therapy  -PG     Row Name 04/09/23 2139          General Information    Patient Profile Reviewed yes  -PG     Prior Level of Function independent:  -PG     Existing Precautions/Restrictions oxygen therapy device and L/min  -PG     Barriers to Rehab none identified  -PG     Row Name 04/09/23 2139          Living Environment    People in Home alone  -PG     Row Name 04/09/23 2139          Cognition    Orientation Status (Cognition) oriented x 4  -PG     Row Name 04/09/23 2139          Safety Issues, Functional Mobility    Safety Issues Affecting Function (Mobility) safety precaution awareness  -PG     Impairments Affecting Function (Mobility) balance;postural/trunk control;endurance/activity tolerance  -PG           User Key  (r) = Recorded By, (t) = Taken By, (c) = Cosigned By    Initials Name Provider Type    PG Nataliya Hebert, PT Physical Therapist               Mobility     Row Name 04/09/23 2140          Bed Mobility    Bed Mobility bed mobility (all) activities  -PG     All Activities, [place] (Bed Mobility) modified independence  -PG     Row Name 04/09/23 2140          Sit-Stand Transfer    Sit-Stand [place] (Transfers) independent  -PG     Row Name 04/09/23 2140          Gait/Stairs (Locomotion)    [place] Level (Gait) independent  -PG     Distance in Feet (Gait) 20ft  -PG           User Key  (r) = Recorded By, (t) = Taken By, (c) = Cosigned By    Initials  Name Provider Type    Nataliya Wells PT Physical Therapist               Obj/Interventions     Row Name 04/09/23 2140          Range of Motion Comprehensive    General Range of Motion bilateral upper extremity ROM WFL;bilateral lower extremity ROM WFL  -PG     Row Name 04/09/23 2140          Strength Comprehensive (MMT)    General Manual Muscle Testing (MMT) Assessment no strength deficits identified  -PG     Row Name 04/09/23 2140          Balance    Balance Assessment sitting static balance;sitting dynamic balance;sit to stand dynamic balance;standing dynamic balance  -PG     Static Sitting Balance modified independence  -PG     Dynamic Sitting Balance modified independence  -PG     Position, Sitting Balance sitting edge of bed;unsupported  -PG     Sit to Stand Dynamic Balance independent  -PG     Dynamic Standing Balance independent  -PG           User Key  (r) = Recorded By, (t) = Taken By, (c) = Cosigned By    Initials Name Provider Type    Nataliya Wells PT Physical Therapist               Goals/Plan     Row Name 04/09/23 2145          Bed Mobility Goal 1 (PT)    Activity/Assistive Device (Bed Mobility Goal 1, PT) bed mobility activities, all  -PG     [place] Level/Cues Needed (Bed Mobility Goal 1, PT) independent  -PG     Time Frame (Bed Mobility Goal 1, PT) long term goal (LTG);2 weeks  -PG     Row Name 04/09/23 2145          Gait Training Goal 1 (PT)    Activity/Assistive Device (Gait Training Goal 1, PT) gait (walking locomotion)  -PG     [place] Level (Gait Training Goal 1, PT) independent  -PG     Distance (Gait Training Goal 1, PT) 200ft  -PG     Time Frame (Gait Training Goal 1, PT) long term goal (LTG);2 weeks  -PG     Row Name 04/09/23 2145          Stairs Goal 1 (PT)    Activity/Assistive Device (Stairs Goal 1, PT) stairs, all skills  -PG     [place] Level/Cues Needed (Stairs Goal 1, PT) modified independence  -PG     Number of Stairs (Stairs Goal 1, PT) 2  -PG     Time  Frame (Stairs Goal 1, PT) long term goal (LTG);2 weeks  -PG     Row Name 04/09/23 2145          Therapy Assessment/Plan (PT)    Planned Therapy Interventions (PT) balance training;bed mobility training;gait training;home exercise program;neuromuscular re-education;patient/family education;postural re-education;transfer training;stretching;strengthening;stair training;ROM (range of motion)  -PG           User Key  (r) = Recorded By, (t) = Taken By, (c) = Cosigned By    Initials Name Provider Type    PG Nataliya Hebert, PT Physical Therapist               Clinical Impression     Row Name 04/09/23 2140          Pain    Pretreatment Pain Rating 0/10 - no pain  -PG     Posttreatment Pain Rating 0/10 - no pain  -PG     Row Name 04/09/23 2140          Plan of Care Review    Plan of Care Reviewed With patient  -PG     Outcome Evaluation Patient 84 yo female admitted to the hospital with the complaint of shortness of breath. Recent Dx includes Acute exacerbation COPD,  Hypoxemia,  Acute bronchitis, Viral URI, Acute hypoxemia. At baseline, patient resides alone in a house and is independent in mobility  and ADLs. As per today's evaluation, patient is Mod Ind in Bed Mob, Ind in transfers and ambulation of around 20ft. Patient was short of breath at the end of session. Once medically stable, PT recommend HHPT at d/c.  -PG     Row Name 04/09/23 2140          Therapy Assessment/Plan (PT)    Patient/Family Therapy Goals Statement (PT) to improve endurance  -PG     Rehab Potential (PT) good, to achieve stated therapy goals  -PG     Criteria for Skilled Interventions Met (PT) yes;skilled treatment is necessary  -PG     Therapy Frequency (PT) 2 times/wk  -PG     Predicted Duration of Therapy Intervention (PT) until d/c  -PG     Row Name 04/09/23 2140          Vital Signs    Pretreatment Heart Rate (beats/min) 97  -PG     Posttreatment Heart Rate (beats/min) 86  -PG     Pre SpO2 (%) 90  -PG     O2 Delivery Pre Treatment nasal cannula   -PG     Post SpO2 (%) 88  -PG     O2 Delivery Post Treatment nasal cannula  -PG     Row Name 04/09/23 2140          Positioning and Restraints    Pre-Treatment Position in bed  -PG     Post Treatment Position bed  -PG     In Bed notified nsg;call light within reach;encouraged to call for assist  -PG           User Key  (r) = Recorded By, (t) = Taken By, (c) = Cosigned By    Initials Name Provider Type    Nataliya Wells, ROGELIO Physical Therapist               Outcome Measures     Row Name 04/09/23 2146 04/09/23 2015       How much help from another person do you currently need...    Turning from your back to your side while in flat bed without using bedrails? 4  -PG 4  -AH    Moving from lying on back to sitting on the side of a flat bed without bedrails? 3  -PG 4  -AH    Moving to and from a bed to a chair (including a wheelchair)? 4  -PG 4  -AH    Standing up from a chair using your arms (e.g., wheelchair, bedside chair)? 4  -PG 4  -AH    Climbing 3-5 steps with a railing? 3  -PG 3  -AH    To walk in hospital room? 4  -PG 3  -AH    AM-PAC 6 Clicks Score (PT) 22  -PG 22  -AH    Highest level of mobility 7 --> Walked 25 feet or more  -PG 7 --> Walked 25 feet or more  -AH    Row Name 04/09/23 2146          Functional Assessment    Outcome Measure Options AM-PAC 6 Clicks Basic Mobility (PT)  -PG           User Key  (r) = Recorded By, (t) = Taken By, (c) = Cosigned By    Initials Name Provider Type     Jeimy Giron LPN Licensed Nurse    Nataliya Wells, PT Physical Therapist                             Physical Therapy Education     Title: PT OT SLP Therapies (Done)     Topic: Physical Therapy (Done)     Point: Mobility training (Done)     Learning Progress Summary           Patient Acceptance, E, VU by PG at 4/9/2023 2147                   Point: Precautions (Done)     Learning Progress Summary           Patient Acceptance, E, VU by PG at 4/9/2023 2147                               User Key     Initials  Effective Dates Name Provider Type Discipline    PG 09/01/22 -  Nataliya Hebert PT Physical Therapist PT              PT Recommendation and Plan  Planned Therapy Interventions (PT): balance training, bed mobility training, gait training, home exercise program, neuromuscular re-education, patient/family education, postural re-education, transfer training, stretching, strengthening, stair training, ROM (range of motion)  Plan of Care Reviewed With: patient  Outcome Evaluation: Patient 82 yo female admitted to the hospital with the complaint of shortness of breath. Recent Dx includes Acute exacerbation COPD,  Hypoxemia,  Acute bronchitis, Viral URI, Acute hypoxemia. At baseline, patient resides alone in a house and is independent in mobility  and ADLs. As per today's evaluation, patient is Mod Ind in Bed Mob, Ind in transfers and ambulation of around 20ft. Patient was short of breath at the end of session. Once medically stable, PT recommend HHPT at d/c.     Time Calculation:    PT Charges     Row Name 04/09/23 2147             Time Calculation    Start Time 1714  -PG      Stop Time 1724  -PG      Time Calculation (min) 10 min  -PG      PT Received On 04/09/23  -PG      PT - Next Appointment 04/10/23  -PG      PT Goal Re-Cert Due Date 04/23/23  -PG         Time Calculation- PT    Total Timed Code Minutes- PT 0 minute(s)  -PG            User Key  (r) = Recorded By, (t) = Taken By, (c) = Cosigned By    Initials Name Provider Type    PG Nataliya Hebert PT Physical Therapist              Therapy Charges for Today     Code Description Service Date Service Provider Modifiers Qty    63378886227 HC PT EVAL MOD COMPLEXITY 3 4/9/2023 Nataliya Hebert PT GP 1          PT G-Codes  Outcome Measure Options: AM-PAC 6 Clicks Basic Mobility (PT)  AM-PAC 6 Clicks Score (PT): 22  PT Discharge Summary  Anticipated Discharge Disposition (PT): home with assist, home with home health    Nataliya Hebert PT  4/9/2023

## 2023-04-10 NOTE — PLAN OF CARE
Problem: Adult Inpatient Plan of Care  Goal: Plan of Care Review  Outcome: Ongoing, Not Progressing  Flowsheets (Taken 4/10/2023 0218)  Progress: no change  Plan of Care Reviewed With: patient  Goal: Patient-Specific Goal (Individualized)  Outcome: Ongoing, Not Progressing  Goal: Absence of Hospital-Acquired Illness or Injury  Outcome: Ongoing, Not Progressing  Intervention: Identify and Manage Fall Risk  Recent Flowsheet Documentation  Taken 4/10/2023 0215 by Jeimy Giron LPN  Safety Promotion/Fall Prevention:   assistive device/personal items within reach   clutter free environment maintained   nonskid shoes/slippers when out of bed   room organization consistent   safety round/check completed  Taken 4/10/2023 0015 by Jeimy Giron LPN  Safety Promotion/Fall Prevention:   assistive device/personal items within reach   clutter free environment maintained   nonskid shoes/slippers when out of bed   room organization consistent   safety round/check completed  Taken 4/9/2023 2230 by Jeimy Giron LPN  Safety Promotion/Fall Prevention:   assistive device/personal items within reach   clutter free environment maintained   nonskid shoes/slippers when out of bed   room organization consistent   safety round/check completed  Taken 4/9/2023 2015 by Jeimy Giron LPN  Safety Promotion/Fall Prevention:   assistive device/personal items within reach   clutter free environment maintained   nonskid shoes/slippers when out of bed   room organization consistent   safety round/check completed  Intervention: Prevent Skin Injury  Recent Flowsheet Documentation  Taken 4/9/2023 2015 by Jeimy Giron LPN  Body Position:   position changed independently   supine  Intervention: Prevent and Manage VTE (Venous Thromboembolism) Risk  Recent Flowsheet Documentation  Taken 4/9/2023 2015 by Jeimy Giron LPN  Activity Management: up ad maribell  VTE Prevention/Management: sequential compression devices off  Range of Motion: active ROM  (range of motion) encouraged  Intervention: Prevent Infection  Recent Flowsheet Documentation  Taken 4/10/2023 0215 by Jeimy Giron LPN  Infection Prevention:   environmental surveillance performed   hand hygiene promoted   personal protective equipment utilized   rest/sleep promoted   single patient room provided  Taken 4/10/2023 0015 by Jeimy Giron LPN  Infection Prevention:   environmental surveillance performed   hand hygiene promoted   personal protective equipment utilized   single patient room provided  Taken 4/9/2023 2230 by Jeimy Giron LPN  Infection Prevention:   environmental surveillance performed   hand hygiene promoted   personal protective equipment utilized   single patient room provided  Taken 4/9/2023 2015 by Jeimy Giron LPN  Infection Prevention:   environmental surveillance performed   hand hygiene promoted   personal protective equipment utilized   single patient room provided   visitors restricted/screened  Goal: Optimal Comfort and Wellbeing  Outcome: Ongoing, Not Progressing  Intervention: Provide Person-Centered Care  Recent Flowsheet Documentation  Taken 4/9/2023 2015 by Jeimy Giron LPN  Trust Relationship/Rapport:   care explained   choices provided   questions answered  Goal: Readiness for Transition of Care  Outcome: Ongoing, Not Progressing     Problem: Fall Injury Risk  Goal: Absence of Fall and Fall-Related Injury  Outcome: Ongoing, Not Progressing  Intervention: Identify and Manage Contributors  Recent Flowsheet Documentation  Taken 4/10/2023 0015 by Jeimy Giron LPN  Medication Review/Management:   medications reviewed   high-risk medications identified  Taken 4/9/2023 2230 by Jeimy Giron LPN  Medication Review/Management:   medications reviewed   high-risk medications identified  Taken 4/9/2023 2015 by Jeimy Giron LPN  Medication Review/Management:   medications reviewed   high-risk medications identified  Self-Care Promotion: independence  encouraged  Intervention: Promote Injury-Free Environment  Recent Flowsheet Documentation  Taken 4/10/2023 0215 by Jeimy Giron LPN  Safety Promotion/Fall Prevention:   assistive device/personal items within reach   clutter free environment maintained   nonskid shoes/slippers when out of bed   room organization consistent   safety round/check completed  Taken 4/10/2023 0015 by Jeimy Giron LPN  Safety Promotion/Fall Prevention:   assistive device/personal items within reach   clutter free environment maintained   nonskid shoes/slippers when out of bed   room organization consistent   safety round/check completed  Taken 4/9/2023 2230 by Jeimy Giron LPN  Safety Promotion/Fall Prevention:   assistive device/personal items within reach   clutter free environment maintained   nonskid shoes/slippers when out of bed   room organization consistent   safety round/check completed  Taken 4/9/2023 2015 by Jeimy Giron LPN  Safety Promotion/Fall Prevention:   assistive device/personal items within reach   clutter free environment maintained   nonskid shoes/slippers when out of bed   room organization consistent   safety round/check completed     Problem: Adjustment to Illness COPD (Chronic Obstructive Pulmonary Disease)  Goal: Optimal Chronic Illness Coping  Outcome: Ongoing, Not Progressing  Intervention: Support and Optimize Psychosocial Response  Recent Flowsheet Documentation  Taken 4/9/2023 2015 by Jeimy Giron LPN  Supportive Measures: active listening utilized  Family/Support System Care:   self-care encouraged   support provided     Problem: Functional Ability Impaired COPD (Chronic Obstructive Pulmonary Disease)  Goal: Optimal Level of Functional [place]  Outcome: Ongoing, Not Progressing  Intervention: Optimize Functional Ability  Recent Flowsheet Documentation  Taken 4/9/2023 2015 by Jeimy Giron LPN  Activity Management: up ad maribell  Environmental Support: calm environment promoted  Self-Care  Promotion: independence encouraged     Problem: Infection COPD (Chronic Obstructive Pulmonary Disease)  Goal: Absence of Infection Signs and Symptoms  Outcome: Ongoing, Not Progressing  Intervention: Prevent or Manage Infection  Recent Flowsheet Documentation  Taken 4/10/2023 0215 by Jeimy Giron LPN  Isolation Precautions:   precautions maintained   contact   droplet  Taken 4/10/2023 0015 by Jeimy Giron LPN  Isolation Precautions:   precautions maintained   contact   droplet  Taken 4/9/2023 2230 by Jeimy Giron LPN  Isolation Precautions:   precautions maintained   contact   droplet  Taken 4/9/2023 2015 by Jeimy Giron LPN  Isolation Precautions:   precautions maintained   contact   droplet     Problem: Oral Intake Inadequate COPD (Chronic Obstructive Pulmonary Disease)  Goal: Improved Nutrition Intake  Outcome: Ongoing, Not Progressing     Problem: Respiratory Compromise COPD (Chronic Obstructive Pulmonary Disease)  Goal: Effective Oxygenation and Ventilation  Outcome: Ongoing, Not Progressing  Intervention: Promote Airway Secretion Clearance  Recent Flowsheet Documentation  Taken 4/10/2023 0015 by Jeimy Giron LPN  Breathing Techniques/Airway Clearance: deep/controlled cough encouraged  Cough And Deep Breathing: done independently per patient  Taken 4/9/2023 2015 by Jeimy Giron LPN  Activity Management: up ad maribell  Breathing Techniques/Airway Clearance: deep/controlled cough encouraged  Cough And Deep Breathing: done independently per patient  Intervention: Optimize Oxygenation and Ventilation  Recent Flowsheet Documentation  Taken 4/10/2023 0015 by Jeimy Giron LPN  Airway/Ventilation Management: airway patency maintained  Taken 4/9/2023 2015 by Jeimy Giron LPN  Head of Bed (HOB) Positioning: HOB at 60-90 degrees  Airway/Ventilation Management: airway patency maintained     Problem: COPD (Chronic Obstructive Pulmonary Disease) Comorbidity  Goal: Maintenance of COPD Symptom  Control  Outcome: Ongoing, Not Progressing  Intervention: Maintain COPD-Symptom Control  Recent Flowsheet Documentation  Taken 4/10/2023 0015 by Jeimy Giron LPN  Medication Review/Management:   medications reviewed   high-risk medications identified  Taken 4/9/2023 2230 by Jeimy Giron LPN  Medication Review/Management:   medications reviewed   high-risk medications identified  Taken 4/9/2023 2015 by Jeimy Giron LPN  Supportive Measures: active listening utilized  Medication Review/Management:   medications reviewed   high-risk medications identified   Goal Outcome Evaluation:  Plan of Care Reviewed With: patient        Progress: no change   Alert and oriented x 4. Able to verbalize needs and wants. Takes medication whole and tolerates well. Continent of bowel and bladder. Independent for transfers/ambulation. O2 therapy titrated from 4 LPM via NC to 3 LPM via NC which is baseline for patient. No c/o pain/discomfort/SOB noted. Currently in bed, awake. Call bell in reach.

## 2023-04-10 NOTE — PLAN OF CARE
Goal Outcome Evaluation:  Plan of Care Reviewed With: patient           Outcome Evaluation: Patient 82 yo female admitted to the hospital with the complaint of shortness of breath. Recent Dx includes Acute exacerbation COPD,  Hypoxemia,  Acute bronchitis, Viral URI, Acute hypoxemia. At baseline, patient resides alone in a house and is independent in mobility  and ADLs. As per today's evaluation, patient is Mod Ind in Bed Mob, Ind in transfers and ambulation of around 20ft. Patient was short of breath at the end of session. Once medically stable, PT recommend HHPT at d/c.

## 2023-04-10 NOTE — DISCHARGE SUMMARY
Date of Discharge:  4/10/2023    Discharge Diagnosis:   Acute exacerbation of chronic obstructive pulmonary disease  Viral upper respiratory infection  Acute hypoxemia  History of bilateral pulmonary emboli on Eliquis  Essential hypertension  Chronic diastolic congestive heart failure  History of breast cancer  GERD  Hyperlipidemia    Presenting Problem/History of Present Illness  Active Hospital Problems    Diagnosis  POA   • **Acute exacerbation of chronic obstructive pulmonary disease (COPD) [J44.1]  Yes      Resolved Hospital Problems   No resolved problems to display.          Hospital Course    Patient is a 83 y.o. female presented with COPD who presents with worsening shortness of breath for 1 week. Symptoms progressed so that she had extensive wheezing that was unrelieved with breathing treatments at home. She tested positive for human metapneumovirus. She is feeling much improved after supportive care with steroids, supplemental oxygen and bronchodilators. She has home oxygen that previously she used only as needed but has used it fairly consistently within the last week. She will follow up with PCP next week.     Procedures Performed         Consults:   Consults     No orders found from 3/10/2023 to 4/9/2023.          Pertinent Test Results:    Lab Results (most recent)     Procedure Component Value Units Date/Time    Basic Metabolic Panel [[id number]]  (Abnormal) Collected: 04/09/23 2336    Specimen: Blood Updated: 04/10/23 0517     Glucose 148 mg/dL      BUN 25 mg/dL      Creatinine 1.12 mg/dL      Sodium 137 mmol/L      Potassium 4.3 mmol/L      Comment: Slight hemolysis detected by analyzer. Results may be affected.        Chloride 99 mmol/L      CO2 29.0 mmol/L      Calcium 9.8 mg/dL      BUN/Creatinine Ratio 22.3     Anion Gap 9.0 mmol/L      eGFR 49.2 mL/min/1.73     Narrative:      GFR Normal >60  Chronic Kidney Disease <60  Kidney Failure <15    The GFR formula is only valid for adults with  stable renal function between ages 18 and 70.    CBC & Differential [[id number]]  (Abnormal) Collected: 04/10/23 0349    Specimen: Blood Updated: 04/10/23 0449    Narrative:      The following orders were created for panel order CBC & Differential.  Procedure                               Abnormality         Status                     ---------                               -----------         ------                     CBC Auto Differential[[id number]]        Abnormal            Final result                 Please view results for these tests on the individual orders.    CBC Auto Differential [[id number]]  (Abnormal) Collected: 04/10/23 0349    Specimen: Blood Updated: 04/10/23 0449     WBC 17.50 10*3/mm3      RBC 4.96 10*6/mm3      Hemoglobin 13.9 g/dL      Hematocrit 43.8 %      MCV 88.2 fL      MCH 28.1 pg      MCHC 31.8 g/dL      RDW 15.5 %      RDW-SD 47.3 fl      MPV 8.4 fL      Platelets 275 10*3/mm3      Neutrophil % 91.8 %      Lymphocyte % 4.5 %      Monocyte % 3.4 %      Eosinophil % 0.0 %      Basophil % 0.3 %      Neutrophils, Absolute 16.10 10*3/mm3      Lymphocytes, Absolute 0.80 10*3/mm3      Monocytes, Absolute 0.60 10*3/mm3      Eosinophils, Absolute 0.00 10*3/mm3      Basophils, Absolute 0.00 10*3/mm3      nRBC 0.0 /100 WBC     Blood Culture - Blood, Arm, Right [[id number]]  (Normal) Collected: 04/08/23 1902    Specimen: Blood from Arm, Right Updated: 04/09/23 1916     Blood Culture No growth at 24 hours    Blood Culture - Blood, Arm, Right [[id number]]  (Normal) Collected: 04/08/23 1743    Specimen: Blood from Arm, Right Updated: 04/09/23 1801     Blood Culture No growth at 24 hours    Respiratory Culture - Sputum, Cough [[id number]] Collected: 04/09/23 0832    Specimen: Sputum from Cough Updated: 04/09/23 1044     Respiratory Culture Rejected     Gram Stain Moderate (3+) WBCs per low power field      Moderate (3+) Epithelial cells per low power field      Moderate (3+) Mixed bacterial morphotypes  seen on Gram Stain    Narrative:      Specimen rejected due to oropharyngeal contamination. Please reorder and recollect specimen if clinically necessary.    Respiratory Panel PCR w/COVID-19(SARS-CoV-2) MARIA ELENA/SARIAH/BRANDIN/PAD/COR/MAD/LILLIAN In-House, NP Swab in UTM/VTM, 3-4 HR TAT - Swab, Nasopharynx [[id number]]  (Abnormal) Collected: 04/08/23 2042    Specimen: Swab from Nasopharynx Updated: 04/08/23 2140     ADENOVIRUS, PCR Not Detected     Coronavirus 229E Not Detected     Coronavirus HKU1 Not Detected     Coronavirus NL63 Not Detected     Coronavirus OC43 Not Detected     COVID19 Not Detected     Human Metapneumovirus Detected     Human Rhinovirus/Enterovirus Not Detected     Influenza A PCR Not Detected     Influenza B PCR Not Detected     Parainfluenza Virus 1 Not Detected     Parainfluenza Virus 2 Not Detected     Parainfluenza Virus 3 Not Detected     Parainfluenza Virus 4 Not Detected     RSV, PCR Not Detected     Bordetella pertussis pcr Not Detected     Bordetella parapertussis PCR Not Detected     Chlamydophila pneumoniae PCR Not Detected     Mycoplasma pneumo by PCR Not Detected    Narrative:      In the setting of a positive respiratory panel with a viral infection PLUS a negative procalcitonin without other underlying concern for bacterial infection, consider observing off antibiotics or discontinuation of antibiotics and continue supportive care. If the respiratory panel is positive for atypical bacterial infection (Bordetella pertussis, Chlamydophila pneumoniae, or Mycoplasma pneumoniae), consider antibiotic de-escalation to target atypical bacterial infection.    Procalcitonin [[id number]]  (Normal) Collected: 04/08/23 1743    Specimen: Blood Updated: 04/08/23 2045     Procalcitonin 0.05 ng/mL     Narrative:      As a Marker for Sepsis (Non-Neonates):    1. <0.5 ng/mL represents a low risk of severe sepsis and/or septic shock.  2. >2 ng/mL represents a high risk of severe sepsis and/or septic shock.    As a  "Marker for Lower Respiratory Tract Infections that require antibiotic therapy:    PCT on Admission    Antibiotic Therapy       6-12 Hrs later    >0.5                Strongly Recommended  >0.25 - <0.5        Recommended   0.1 - 0.25          Discouraged              Remeasure/reassess PCT  <0.1                Strongly Discouraged     Remeasure/reassess PCT    As 28 day mortality risk marker: \"Change in Procalcitonin Result\" (>80% or <=80%) if Day 0 (or Day 1) and Day 4 values are available. Refer to http://www.[facility] [place]    Change in PCT <=80%  A decrease of PCT levels below or equal to 80% defines a positive change in PCT test result representing a higher risk for 28-day all-cause mortality of patients diagnosed with severe sepsis for septic shock.    Change in PCT >80%  A decrease of PCT levels of more than 80% defines a negative change in PCT result representing a lower risk for 28-day all-cause mortality of patients diagnosed with severe sepsis or septic shock.       Comprehensive Metabolic Panel [[id number]]  (Abnormal) Collected: 04/08/23 1743    Specimen: Blood Updated: 04/08/23 1815     Glucose 112 mg/dL      BUN 15 mg/dL      Creatinine 0.96 mg/dL      Sodium 137 mmol/L      Potassium 4.0 mmol/L      Chloride 96 mmol/L      CO2 29.0 mmol/L      Calcium 10.0 mg/dL      Total Protein 8.0 g/dL      Albumin 4.6 g/dL      ALT (SGPT) 13 U/L      AST (SGOT) 16 U/L      Alkaline Phosphatase 88 U/L      Total Bilirubin 0.7 mg/dL      Globulin 3.4 gm/dL      A/G Ratio 1.4 g/dL      BUN/Creatinine Ratio 15.6     Anion Gap 12.0 mmol/L      eGFR 59.2 mL/min/1.73     Narrative:      GFR Normal >60  Chronic Kidney Disease <60  Kidney Failure <15    The GFR formula is only valid for adults with stable renal function between ages 18 and 70.    BNP [[id number]]  (Normal) Collected: 04/08/23 1743    Specimen: Blood Updated: 04/08/23 1815     proBNP 836.7 pg/mL     Narrative:      Among patients with dyspnea, " NT-proBNP is highly sensitive for the detection of acute congestive heart failure. In addition NT-proBNP of <300 pg/ml effectively rules out acute congestive heart failure with 99% negative predictive value.      Single High Sensitivity Troponin T [[id number]]  (Normal) Collected: 04/08/23 1743    Specimen: Blood Updated: 04/08/23 1815     HS Troponin T 9 ng/L     Narrative:      High Sensitive Troponin T Reference Range:  <10.0 ng/L- Negative Female for AMI  <15.0 ng/L- Negative Male for AMI  >=10 - Abnormal Female indicating possible myocardial injury.  >=15 - Abnormal Male indicating possible myocardial injury.   Clinicians would have to utilize clinical acumen, EKG, Troponin, and serial changes to determine if it is an Acute Myocardial Infarction or myocardial injury due to an underlying chronic condition.         COVID-19 and FLU A/B PCR - Swab, Nasopharynx [[id number]]  (Normal) Collected: 04/08/23 1739    Specimen: Swab from Nasopharynx Updated: 04/08/23 1812     COVID19 Not Detected     Influenza A PCR Not Detected     Influenza B PCR Not Detected    Narrative:      Fact sheet for providers: https://www.fda.gov/media/722487/download    Fact sheet for patients: https://www.fda.gov/media/542684/download    Test performed by PCR.    CBC & Differential [[id number]]  (Abnormal) Collected: 04/08/23 1743    Specimen: Blood Updated: 04/08/23 1756    Narrative:      The following orders were created for panel order CBC & Differential.  Procedure                               Abnormality         Status                     ---------                               -----------         ------                     CBC Auto Differential[[id number]]        Abnormal            Final result                 Please view results for these tests on the individual orders.    CBC Auto Differential [[id number]]  (Abnormal) Collected: 04/08/23 1743    Specimen: Blood Updated: 04/08/23 1756     WBC 8.80 10*3/mm3      RBC 5.41 10*6/mm3       Hemoglobin 15.6 g/dL      Hematocrit 47.7 %      MCV 88.1 fL      MCH 28.9 pg      MCHC 32.8 g/dL      RDW 15.9 %      RDW-SD 48.6 fl      MPV 8.1 fL      Platelets 265 10*3/mm3      Neutrophil % 76.8 %      Lymphocyte % 13.6 %      Monocyte % 7.1 %      Eosinophil % 1.7 %      Basophil % 0.8 %      Neutrophils, Absolute 6.80 10*3/mm3      Lymphocytes, Absolute 1.20 10*3/mm3      Monocytes, Absolute 0.60 10*3/mm3      Eosinophils, Absolute 0.20 10*3/mm3      Basophils, Absolute 0.10 10*3/mm3      nRBC 0.1 /100 WBC     POC Lactate [[id number]]  (Normal) Collected: 04/08/23 1752    Specimen: Blood Updated: 04/08/23 1754     Lactate 0.5 mmol/L      Comment: Serial Number: [id number]:  [id number]              Results for orders placed in visit on 09/13/22    Adult Transthoracic Echo Complete W/ Cont if Necessary Per Protocol    Interpretation Summary  •  Estimated left ventricular EF = 70% Estimated left ventricular EF was in agreement with the calculated left ventricular EF. Left ventricular systolic function is normal.  •  Left ventricular diastolic function is consistent with (grade II w/high LAP) pseudonormalization.         Condition on Discharge:  Stable    Vital Signs  Temp:  [97.4 °F (36.3 °C)-98.2 °F (36.8 °C)] 97.4 °F (36.3 °C)  Heart Rate:  [59-90] 90  Resp:  [16-25] 18  BP: (122-143)/(52-67) 130/58      Physical Exam  Vitals reviewed.   Constitutional:       Appearance: She is not ill-appearing.   HENT:      Head: Normocephalic and atraumatic.      Right Ear: External ear normal.      Left Ear: External ear normal.      Nose: Nose normal.      Mouth/Throat:      Mouth: Mucous membranes are moist.   Eyes:      General:         Right eye: No discharge.         Left eye: No discharge.   Cardiovascular:      Rate and Rhythm: Normal rate and regular rhythm.      Pulses: Normal pulses.      Heart sounds: Normal heart sounds.   Pulmonary:      Effort: Pulmonary effort is normal.      Breath sounds: Normal  breath sounds.   Abdominal:      General: Bowel sounds are normal.      Palpations: Abdomen is soft.   Musculoskeletal:         General: Normal range of motion.      Cervical back: Normal range of motion.   Skin:     General: Skin is warm and dry.   Neurological:      Mental Status: She is alert and oriented to person, place, and time.   Psychiatric:         Behavior: Behavior normal.              Discharge Disposition  Home or Self Care    Discharge Medications     Discharge Medications      New Medications      Instructions Start Date   guaiFENesin 600 MG 12 hr tablet  Commonly known as: MUCINEX   600 mg, Oral, Every 12 Hours Scheduled      predniSONE 20 MG tablet  Commonly known as: DELTASONE   40 mg, Oral, Daily   Start Date: April 11, 2023        Changes to Medications      Instructions Start Date   cloNIDine 0.1 MG tablet  Commonly known as: CATAPRES  What changed: when to take this   0.1 mg, Oral, 2 Times Daily PRN         Continue These Medications      Instructions Start Date   amLODIPine 10 MG tablet  Commonly known as: NORVASC   10 mg, Oral, Daily      carvedilol 12.5 MG tablet  Commonly known as: COREG   12.5 mg, Oral, 2 Times Daily With Meals      Eliquis 5 MG tablet tablet  Generic drug: apixaban   5 mg, Oral, Every 12 Hours Scheduled      famotidine 20 MG tablet  Commonly known as: PEPCID   20 mg, Oral, As Needed      Folbic 2.5-25-2 MG tablet tablet  Generic drug: folic acid-pyridoxine-cyanocobalamin   1 tablet, Oral, Daily      furosemide 40 MG tablet  Commonly known as: LASIX   40 mg, Oral, Daily      lisinopril 40 MG tablet  Commonly known as: PRINIVIL,ZESTRIL   40 mg, Oral, Daily      potassium chloride 10 MEQ CR tablet  Commonly known as: K-DUR,KLOR-CON   10 mEq, Oral, Daily      rosuvastatin 10 MG tablet  Commonly known as: CRESTOR   10 mg, Oral, Daily      Symbicort 80-4.5 MCG/ACT inhaler  Generic drug: budesonide-formoterol   Inhale 2 puffs 2 (Two) Times a Day.      Ventolin  (90 Base)  MCG/ACT inhaler  Generic drug: albuterol sulfate HFA   Inhale 2 puffs Every 4 (Four) Hours As Needed.      Vitamin D3 50 MCG (2000 UT) capsule   2,000 Units, Oral, Daily             Discharge Diet:   Diet Instructions     Diet: Cardiac Diets; Healthy Heart (2-3 Na+); Regular Texture (IDDSI 7); Thin (IDDSI 0)      Discharge Diet: Cardiac Diets    Cardiac Diet: Healthy Heart (2-3 Na+)    Texture: Regular Texture (IDDSI 7)    Fluid Consistency: Thin (IDDSI 0)          Activity at Discharge:   Activity Instructions     Gradually Increase Activity Until at Pre-Hospitalization Level            Follow-up Appointments  Future Appointments   Date Time Provider Department Center   8/25/2023 12:45 PM Richardson Willis MD [facility]     Additional Instructions for the Follow-ups that You Need to Schedule     Discharge Follow-up with PCP   As directed       Currently Documented PCP:    Shaylee Mcdaniels MD    PCP Phone Number:    [phone]     Follow Up Details: 4/12 at 1pm               Test Results Pending at Discharge  Pending Labs     Order Current Status    Blood Culture - Blood, Arm, Right Preliminary result    Blood Culture - Blood, Arm, Right Preliminary result           JOAQUÍN Rogers  04/10/23  13:04 EDT    Time: Discharge 25 min

## 2023-04-10 NOTE — CASE MANAGEMENT/SOCIAL WORK
Case Management Discharge Note      Final Note: Routine home.    Selected Continued Care - Discharged on 4/10/2023 Admission date: 4/8/2023 - Discharge disposition: Home or Self Care     Transportation Services  Private: Car    Final Discharge Disposition Code: 01 - home or self-care

## 2023-04-10 NOTE — CASE MANAGEMENT/SOCIAL WORK
Discharge Planning Assessment  [facility]     Patient Name: Gabrielle Lyles  MRN: [id number]  Today's Date: 4/10/2023    Admit Date: 4/8/2023    Plan: Routine home. Home O2 PRN w/ [facility].   Discharge Needs Assessment     Row Name 04/10/23 1420       Living Environment    People in Home alone    Current Living Arrangements home    Potentially Unsafe Housing Conditions none    Primary Care Provided by self    Provides Primary Care For no one    Family Caregiver if Needed child(blake), adult    Family Caregiver Names DaughterRula    Quality of Family Relationships helpful;involved;supportive    Able to Return to Prior Arrangements yes       Resource/Environmental Concerns    Resource/Environmental Concerns none    Transportation Concerns none       Transition Planning    Patient/Family Anticipates Transition to home;home with family    Patient/Family Anticipated Services at Transition none    Transportation Anticipated car, drives self;family or friend will provide       Discharge Needs Assessment    Readmission Within the Last 30 Days no previous admission in last 30 days    Equipment Currently Used at Home bp cuff;pulse ox;oxygen    Concerns to be Addressed denies needs/concerns at this time;no discharge needs identified    Anticipated Changes Related to Illness none    Equipment Needed After Discharge none    Provided Post Acute Provider List? N/A    Provided Post Acute Provider Quality & Resource List? N/A               Discharge Plan     Row Name 04/10/23 1431       Plan    Plan Routine home. Home O2 PRN w/ [facility].    Patient/Family in Agreement with Plan yes    Plan Comments CM met with patient at bedside. Pt lives at home alone, drives, and is independent with ADL's. PCP and pharmacy confirmed. DME includes BP cuff, pulse ox, inhaler, and 3L O2 PRN supplied by [facility]. Pt is not current with any HHC/PT services. No issues affording medications. Demetrio Pastor to provide transportation at discharge.               Expected Discharge Date and Time     Expected Discharge Date Expected Discharge Time    Apr 10, 2023          Demographic Summary     Row Name 04/10/23 1420       General Information    Admission Type observation    Arrived From emergency department    Referral Source admission list    Reason for Consult discharge planning;care coordination/care conference    Preferred Language English       Contact Information    Permission Granted to Share Info With                Functional Status     Row Name 04/10/23 1420       Functional Status    Usual Activity Tolerance good    Current Activity Tolerance good       Functional Status, IADL    Medications independent    Meal Preparation independent    Housekeeping independent    Laundry independent    Shopping independent              Met with patient in room wearing PPE: mask.    Maintained distance greater than six feet and spent less than 15 minutes in the room.      Megan Naegele, RN      Office Phone: [phone]  Office Cell: [phone]

## 2023-04-11 NOTE — OUTREACH NOTE
Prep Survey    Flowsheet Row Responses   [facility]? [facility]   Is LACE score < 7 ? No   Eligibility Readm Mgmt   Discharge diagnosis COPD   Does the patient have one of the following disease processes/diagnoses(primary or secondary)? COPD   Does the patient have Home health ordered? No   Is there a DME ordered? Yes   What DME was ordered? has O2 - [facility]   Prep survey completed? Yes          Fanny ABDULLAHI - Registered Nurse

## 2023-04-12 ENCOUNTER — READMISSION MANAGEMENT (OUTPATIENT)
Dept: CALL CENTER | Facility: HOSPITAL | Age: 83
End: 2023-04-12
Payer: MEDICARE

## 2023-04-12 NOTE — OUTREACH NOTE
COPD/PN Week 1 Survey    Flowsheet Row Responses   [facility] patient discharged from? [facility]   Does the patient have one of the following disease processes/diagnoses(primary or secondary)? COPD   Week 1 attempt successful? Yes   Call start time 0920   Call end time 0930   General alerts for this patient Granddaughter is an RN   Discharge diagnosis COPD   Meds reviewed with patient/caregiver? Yes   Does the patient have all medications ordered at discharge? Yes   Is the patient taking all medications as directed (includes completed medication regime)? Yes   Does the patient have a primary care provider?  Yes   Does the patient have an appointment with their PCP or specialist within 7 days of discharge? Yes   Comments regarding PCP 4/12/23   Has the patient kept scheduled appointments due by today? N/A   Pulse Ox monitoring Intermittent   O2 Sat comments 95% on 2.5 L of O2    Psychosocial issues? No   Did the patient receive a copy of their discharge instructions? Yes   Nursing interventions Reviewed instructions with patient   What is the patient's perception of their health status since discharge? Same   Are the patient's immunizations up to date?  Yes   Nursing interventions Advised patient to discuss with provider at next visit   Is the patient/caregiver able to teach back the hierarchy of who to call/visit for symptoms/problems? PCP, Specialist, Home health nurse, Urgent Care, ED, 911 Yes   Patient reports what zone on this call? Green Zone   Green Zone Reports doing well   Green Zone interventions: At all times avoid cigarette smoking, vaping and inhaled irritants, Take daily medications, Use oxygen as prescribed   Week 1 call completed? Yes   Is the patient interested in additional calls from an ambulatory ?  NOTE:  applies to high risk patients requiring additional follow-up. No          Mary GUSMAN - Registered Nurse

## 2023-04-13 LAB
BACTERIA SPEC AEROBE CULT: NORMAL
BACTERIA SPEC AEROBE CULT: NORMAL

## 2023-05-02 ENCOUNTER — HOSPITAL ENCOUNTER (INPATIENT)
Facility: HOSPITAL | Age: 83
LOS: 8 days | Discharge: HOME | DRG: 190 | End: 2023-05-12
Attending: EMERGENCY MEDICINE | Admitting: INTERNAL MEDICINE
Payer: MEDICARE

## 2023-05-02 ENCOUNTER — APPOINTMENT (OUTPATIENT)
Dept: GENERAL RADIOLOGY | Facility: HOSPITAL | Age: 83
DRG: 190 | End: 2023-05-02
Payer: MEDICARE

## 2023-05-02 DIAGNOSIS — I27.20 SEVERE PULMONARY HYPERTENSION: ICD-10-CM

## 2023-05-02 DIAGNOSIS — R09.02 HYPOXEMIA: ICD-10-CM

## 2023-05-02 DIAGNOSIS — J20.9 ACUTE BRONCHITIS, UNSPECIFIED ORGANISM: ICD-10-CM

## 2023-05-02 DIAGNOSIS — J44.1 ACUTE EXACERBATION OF CHRONIC OBSTRUCTIVE PULMONARY DISEASE (COPD): Primary | ICD-10-CM

## 2023-05-02 DIAGNOSIS — J96.21 ACUTE ON CHRONIC RESPIRATORY FAILURE WITH HYPOXIA: ICD-10-CM

## 2023-05-02 DIAGNOSIS — J44.1 CHRONIC OBSTRUCTIVE PULMONARY DISEASE WITH ACUTE EXACERBATION: Chronic | ICD-10-CM

## 2023-05-02 LAB
ALBUMIN SERPL-MCNC: 3.9 G/DL (ref 3.5–5.2)
ALBUMIN/GLOB SERPL: 1.1 G/DL
ALP SERPL-CCNC: 83 U/L (ref 39–117)
ALT SERPL W P-5'-P-CCNC: 17 U/L (ref 1–33)
ANION GAP SERPL CALCULATED.3IONS-SCNC: 13 MMOL/L (ref 5–15)
AST SERPL-CCNC: 14 U/L (ref 1–32)
BASOPHILS # BLD AUTO: 0.1 10*3/MM3 (ref 0–0.2)
BASOPHILS NFR BLD AUTO: 0.9 % (ref 0–1.5)
BILIRUB SERPL-MCNC: 0.7 MG/DL (ref 0–1.2)
BUN SERPL-MCNC: 18 MG/DL (ref 8–23)
BUN/CREAT SERPL: 19.4 (ref 7–25)
CALCIUM SPEC-SCNC: 10.1 MG/DL (ref 8.6–10.5)
CHLORIDE SERPL-SCNC: 100 MMOL/L (ref 98–107)
CO2 SERPL-SCNC: 27 MMOL/L (ref 22–29)
CREAT SERPL-MCNC: 0.93 MG/DL (ref 0.57–1)
D DIMER PPP FEU-MCNC: 0.49 MG/L (FEU) (ref 0–0.82)
D-LACTATE SERPL-SCNC: 0.7 MMOL/L (ref 0.3–2)
DEPRECATED RDW RBC AUTO: 53.8 FL (ref 37–54)
EGFRCR SERPLBLD CKD-EPI 2021: 61.5 ML/MIN/1.73
EOSINOPHIL # BLD AUTO: 0.3 10*3/MM3 (ref 0–0.4)
EOSINOPHIL NFR BLD AUTO: 2.3 % (ref 0.3–6.2)
ERYTHROCYTE [DISTWIDTH] IN BLOOD BY AUTOMATED COUNT: 16.5 % (ref 12.3–15.4)
FLUAV SUBTYP SPEC NAA+PROBE: NOT DETECTED
FLUBV RNA ISLT QL NAA+PROBE: NOT DETECTED
GLOBULIN UR ELPH-MCNC: 3.7 GM/DL
GLUCOSE SERPL-MCNC: 100 MG/DL (ref 65–99)
HCT VFR BLD AUTO: 46.2 % (ref 34–46.6)
HGB BLD-MCNC: 15.3 G/DL (ref 12–15.9)
HOLD SPECIMEN: NORMAL
LYMPHOCYTES # BLD AUTO: 1.5 10*3/MM3 (ref 0.7–3.1)
LYMPHOCYTES NFR BLD AUTO: 12 % (ref 19.6–45.3)
MCH RBC QN AUTO: 29 PG (ref 26.6–33)
MCHC RBC AUTO-ENTMCNC: 33.1 G/DL (ref 31.5–35.7)
MCV RBC AUTO: 87.7 FL (ref 79–97)
MONOCYTES # BLD AUTO: 0.7 10*3/MM3 (ref 0.1–0.9)
MONOCYTES NFR BLD AUTO: 5.7 % (ref 5–12)
NEUTROPHILS NFR BLD AUTO: 79.1 % (ref 42.7–76)
NEUTROPHILS NFR BLD AUTO: 9.8 10*3/MM3 (ref 1.7–7)
NRBC BLD AUTO-RTO: 0.1 /100 WBC (ref 0–0.2)
NT-PROBNP SERPL-MCNC: 1133 PG/ML (ref 0–1800)
PLATELET # BLD AUTO: 199 10*3/MM3 (ref 140–450)
PMV BLD AUTO: 8 FL (ref 6–12)
POTASSIUM SERPL-SCNC: 3.5 MMOL/L (ref 3.5–5.2)
PROT SERPL-MCNC: 7.6 G/DL (ref 6–8.5)
RBC # BLD AUTO: 5.26 10*6/MM3 (ref 3.77–5.28)
SARS-COV-2 RNA RESP QL NAA+PROBE: NOT DETECTED
SODIUM SERPL-SCNC: 140 MMOL/L (ref 136–145)
TROPONIN T SERPL HS-MCNC: 12 NG/L
WBC NRBC COR # BLD: 12.4 10*3/MM3 (ref 3.4–10.8)

## 2023-05-02 PROCEDURE — 71045 X-RAY EXAM CHEST 1 VIEW: CPT

## 2023-05-02 PROCEDURE — G0378 HOSPITAL OBSERVATION PER HR: HCPCS

## 2023-05-02 PROCEDURE — 94761 N-INVAS EAR/PLS OXIMETRY MLT: CPT

## 2023-05-02 PROCEDURE — 25010000002 CEFTRIAXONE PER 250 MG: Performed by: EMERGENCY MEDICINE

## 2023-05-02 PROCEDURE — 80053 COMPREHEN METABOLIC PANEL: CPT | Performed by: EMERGENCY MEDICINE

## 2023-05-02 PROCEDURE — 25010000002 METHYLPREDNISOLONE PER 125 MG: Performed by: EMERGENCY MEDICINE

## 2023-05-02 PROCEDURE — 85379 FIBRIN DEGRADATION QUANT: CPT | Performed by: EMERGENCY MEDICINE

## 2023-05-02 PROCEDURE — 94799 UNLISTED PULMONARY SVC/PX: CPT

## 2023-05-02 PROCEDURE — 94640 AIRWAY INHALATION TREATMENT: CPT

## 2023-05-02 PROCEDURE — 93005 ELECTROCARDIOGRAM TRACING: CPT

## 2023-05-02 PROCEDURE — 94664 DEMO&/EVAL PT USE INHALER: CPT

## 2023-05-02 PROCEDURE — 99285 EMERGENCY DEPT VISIT HI MDM: CPT

## 2023-05-02 PROCEDURE — 84484 ASSAY OF TROPONIN QUANT: CPT | Performed by: EMERGENCY MEDICINE

## 2023-05-02 PROCEDURE — 87040 BLOOD CULTURE FOR BACTERIA: CPT | Performed by: EMERGENCY MEDICINE

## 2023-05-02 PROCEDURE — 87636 SARSCOV2 & INF A&B AMP PRB: CPT | Performed by: EMERGENCY MEDICINE

## 2023-05-02 PROCEDURE — 83880 ASSAY OF NATRIURETIC PEPTIDE: CPT | Performed by: EMERGENCY MEDICINE

## 2023-05-02 PROCEDURE — 83605 ASSAY OF LACTIC ACID: CPT

## 2023-05-02 PROCEDURE — 85025 COMPLETE CBC W/AUTO DIFF WBC: CPT | Performed by: EMERGENCY MEDICINE

## 2023-05-02 RX ORDER — BISACODYL 10 MG
10 SUPPOSITORY, RECTAL RECTAL DAILY PRN
Status: DISCONTINUED | OUTPATIENT
Start: 2023-05-02 | End: 2023-05-11

## 2023-05-02 RX ORDER — FUROSEMIDE 40 MG/1
40 TABLET ORAL DAILY
Status: DISCONTINUED | OUTPATIENT
Start: 2023-05-03 | End: 2023-05-03

## 2023-05-02 RX ORDER — SODIUM CHLORIDE 9 MG/ML
40 INJECTION, SOLUTION INTRAVENOUS AS NEEDED
Status: DISCONTINUED | OUTPATIENT
Start: 2023-05-02 | End: 2023-05-12 | Stop reason: HOSPADM

## 2023-05-02 RX ORDER — ROSUVASTATIN CALCIUM 10 MG/1
10 TABLET, COATED ORAL DAILY
Status: DISCONTINUED | OUTPATIENT
Start: 2023-05-03 | End: 2023-05-12 | Stop reason: HOSPADM

## 2023-05-02 RX ORDER — ONDANSETRON 2 MG/ML
4 INJECTION INTRAMUSCULAR; INTRAVENOUS EVERY 6 HOURS PRN
Status: DISCONTINUED | OUTPATIENT
Start: 2023-05-02 | End: 2023-05-12 | Stop reason: HOSPADM

## 2023-05-02 RX ORDER — POTASSIUM CHLORIDE 750 MG/1
10 TABLET, FILM COATED, EXTENDED RELEASE ORAL DAILY
Status: DISCONTINUED | OUTPATIENT
Start: 2023-05-03 | End: 2023-05-12 | Stop reason: HOSPADM

## 2023-05-02 RX ORDER — ACETAMINOPHEN 325 MG/1
650 TABLET ORAL EVERY 4 HOURS PRN
Status: DISCONTINUED | OUTPATIENT
Start: 2023-05-02 | End: 2023-05-12 | Stop reason: HOSPADM

## 2023-05-02 RX ORDER — IPRATROPIUM BROMIDE AND ALBUTEROL SULFATE 2.5; .5 MG/3ML; MG/3ML
3 SOLUTION RESPIRATORY (INHALATION) ONCE
Status: COMPLETED | OUTPATIENT
Start: 2023-05-02 | End: 2023-05-02

## 2023-05-02 RX ORDER — METHYLPREDNISOLONE SODIUM SUCCINATE 125 MG/2ML
125 INJECTION, POWDER, LYOPHILIZED, FOR SOLUTION INTRAMUSCULAR; INTRAVENOUS ONCE
Status: COMPLETED | OUTPATIENT
Start: 2023-05-02 | End: 2023-05-02

## 2023-05-02 RX ORDER — SODIUM CHLORIDE 0.9 % (FLUSH) 0.9 %
10 SYRINGE (ML) INJECTION EVERY 12 HOURS SCHEDULED
Status: DISCONTINUED | OUTPATIENT
Start: 2023-05-02 | End: 2023-05-12 | Stop reason: HOSPADM

## 2023-05-02 RX ORDER — POLYETHYLENE GLYCOL 3350 17 G/17G
17 POWDER, FOR SOLUTION ORAL DAILY PRN
Status: DISCONTINUED | OUTPATIENT
Start: 2023-05-02 | End: 2023-05-11

## 2023-05-02 RX ORDER — LISINOPRIL 20 MG/1
40 TABLET ORAL DAILY
Status: DISCONTINUED | OUTPATIENT
Start: 2023-05-03 | End: 2023-05-12 | Stop reason: HOSPADM

## 2023-05-02 RX ORDER — AMOXICILLIN 250 MG
2 CAPSULE ORAL 2 TIMES DAILY
Status: DISCONTINUED | OUTPATIENT
Start: 2023-05-02 | End: 2023-05-11

## 2023-05-02 RX ORDER — AZITHROMYCIN 250 MG/1
500 TABLET, FILM COATED ORAL ONCE
Status: COMPLETED | OUTPATIENT
Start: 2023-05-02 | End: 2023-05-02

## 2023-05-02 RX ORDER — ALBUTEROL SULFATE 90 UG/1
2 AEROSOL, METERED RESPIRATORY (INHALATION) EVERY 4 HOURS PRN
Status: DISCONTINUED | OUTPATIENT
Start: 2023-05-02 | End: 2023-05-12 | Stop reason: HOSPADM

## 2023-05-02 RX ORDER — IPRATROPIUM BROMIDE AND ALBUTEROL SULFATE 2.5; .5 MG/3ML; MG/3ML
3 SOLUTION RESPIRATORY (INHALATION)
Status: DISCONTINUED | OUTPATIENT
Start: 2023-05-02 | End: 2023-05-08 | Stop reason: SDUPTHER

## 2023-05-02 RX ORDER — BISACODYL 5 MG/1
5 TABLET, DELAYED RELEASE ORAL DAILY PRN
Status: DISCONTINUED | OUTPATIENT
Start: 2023-05-02 | End: 2023-05-11

## 2023-05-02 RX ORDER — METHYLPREDNISOLONE SODIUM SUCCINATE 40 MG/ML
40 INJECTION, POWDER, LYOPHILIZED, FOR SOLUTION INTRAMUSCULAR; INTRAVENOUS EVERY 12 HOURS
Status: DISCONTINUED | OUTPATIENT
Start: 2023-05-03 | End: 2023-05-03

## 2023-05-02 RX ORDER — CLONIDINE HYDROCHLORIDE 0.1 MG/1
0.1 TABLET ORAL 2 TIMES DAILY PRN
Status: DISCONTINUED | OUTPATIENT
Start: 2023-05-02 | End: 2023-05-12 | Stop reason: HOSPADM

## 2023-05-02 RX ORDER — CARVEDILOL 6.25 MG/1
12.5 TABLET ORAL 2 TIMES DAILY WITH MEALS
Status: DISCONTINUED | OUTPATIENT
Start: 2023-05-02 | End: 2023-05-12 | Stop reason: HOSPADM

## 2023-05-02 RX ORDER — ONDANSETRON 4 MG/1
4 TABLET, FILM COATED ORAL EVERY 6 HOURS PRN
Status: DISCONTINUED | OUTPATIENT
Start: 2023-05-02 | End: 2023-05-12 | Stop reason: HOSPADM

## 2023-05-02 RX ORDER — SODIUM CHLORIDE 0.9 % (FLUSH) 0.9 %
10 SYRINGE (ML) INJECTION AS NEEDED
Status: DISCONTINUED | OUTPATIENT
Start: 2023-05-02 | End: 2023-05-12 | Stop reason: HOSPADM

## 2023-05-02 RX ORDER — AMLODIPINE BESYLATE 5 MG/1
10 TABLET ORAL DAILY
Status: DISCONTINUED | OUTPATIENT
Start: 2023-05-03 | End: 2023-05-12 | Stop reason: HOSPADM

## 2023-05-02 RX ORDER — BUDESONIDE AND FORMOTEROL FUMARATE DIHYDRATE 160; 4.5 UG/1; UG/1
2 AEROSOL RESPIRATORY (INHALATION)
Status: DISCONTINUED | OUTPATIENT
Start: 2023-05-02 | End: 2023-05-08

## 2023-05-02 RX ADMIN — APIXABAN 5 MG: 5 TABLET, FILM COATED ORAL at 21:04

## 2023-05-02 RX ADMIN — IPRATROPIUM BROMIDE AND ALBUTEROL SULFATE 3 ML: 2.5; .5 SOLUTION RESPIRATORY (INHALATION) at 20:10

## 2023-05-02 RX ADMIN — METHYLPREDNISOLONE SODIUM SUCCINATE 125 MG: 125 INJECTION, POWDER, FOR SOLUTION INTRAMUSCULAR; INTRAVENOUS at 15:03

## 2023-05-02 RX ADMIN — CEFTRIAXONE 2 G: 2 INJECTION, POWDER, FOR SOLUTION INTRAMUSCULAR; INTRAVENOUS at 18:53

## 2023-05-02 RX ADMIN — DOCUSATE SODIUM 50 MG AND SENNOSIDES 8.6 MG 2 TABLET: 8.6; 5 TABLET, FILM COATED ORAL at 21:04

## 2023-05-02 RX ADMIN — Medication 10 ML: at 21:05

## 2023-05-02 RX ADMIN — IPRATROPIUM BROMIDE AND ALBUTEROL SULFATE 3 ML: .5; 3 SOLUTION RESPIRATORY (INHALATION) at 13:50

## 2023-05-02 RX ADMIN — AZITHROMYCIN MONOHYDRATE 500 MG: 250 TABLET ORAL at 18:53

## 2023-05-02 RX ADMIN — IPRATROPIUM BROMIDE AND ALBUTEROL SULFATE 3 ML: .5; 3 SOLUTION RESPIRATORY (INHALATION) at 17:18

## 2023-05-02 RX ADMIN — CARVEDILOL 12.5 MG: 6.25 TABLET, FILM COATED ORAL at 21:04

## 2023-05-02 RX ADMIN — BUDESONIDE AND FORMOTEROL FUMARATE DIHYDRATE 2 PUFF: 160; 4.5 AEROSOL RESPIRATORY (INHALATION) at 20:10

## 2023-05-02 NOTE — ED NOTES
Have tried to get IV with ultrasound machine twice. Patient now refusing for anyone to try minus IV team.

## 2023-05-02 NOTE — ED NOTES
Pt. Has been wearing 3L Nasal Canula since getting sick in November.    Spoke with Jamilah in Endocrine Office [phone] who states there is nothing sooner than patient's 6/17/2021 appointment.  She has added him to the wait list also.   Thanks.       (order was changed to stat instead of asap but next appt is still the same)   Please advise if okay to wait until 06/17/2021

## 2023-05-02 NOTE — ED PROVIDER NOTES
Subjective   History of Present Illness  83-year-old female presents with increasing shortness of breath and dyspnea on exertion over the last 2 days she reports that after feeling better from recent pneumonia she has now developed feelings of subjective fever and chills again.  She states her cough is generally nonproductive but if she coughs really hard she can bring up some yellowish sputum.  She reports no leg pain or swelling.  She denies night sweats or hemoptysis.  The patient states she is kept her home oxygen at 3 L although she does not think it is helping very much and reports that she does not have a home nebulizer        Review of Systems   Constitutional: Positive for chills, fatigue and fever.   Respiratory: Positive for cough, chest tightness and shortness of breath.    Hematological: Does not bruise/bleed easily.       Past Medical History:   Diagnosis Date   • COPD (chronic obstructive pulmonary disease)    • History of DVT (deep vein thrombosis) 03/2013    bilateral lower extremity   • History of pneumonia 06/2012    community acquired pneumonia and right pleural effusion;hospitalized at [facility]   • History of pulmonary embolism 03/2013    bilateral PE   • Hypertension 2001   • Insomnia     on Ambien 5mg at    • Lobular breast cancer, left 11/2011    Stage IA left upper lobular breast cancer   • Osteopenia 2012       No Known Allergies    Past Surgical History:   Procedure Laterality Date   • CARDIAC CATHETERIZATION N/A 3/3/2023    Procedure: Left and Right Heart Cath with Coronary Angiography;  Surgeon: Richardson Willis MD;  Location: [facility] INVASIVE LOCATION;  Service: Cardiovascular;  Laterality: N/A;   • CATARACT EXTRACTION  2015   • IVC FILTER RETRIEVAL  06/2014    IVC filter placement by Dr. Card   • MAMMO STEREOTACTIC BREAST BX SURGICAL ADD UNI Left 11/01/2011    invasive lobular carcinoma-Dr. Cande Daniel   • MASTECTOMY, PARTIAL Left 12/01/2011    and left axillary sentinel  lymph node biopsy by Dr. Uriostegui   • TUBAL ABDOMINAL LIGATION         Family History   Problem Relation Age of Onset   • Lung cancer Brother        Social History     Socioeconomic History   • Marital status:    Tobacco Use   • Smoking status: Former     Types: Cigarettes     Quit date:      Years since quittin.3     Passive exposure: Never   • Smokeless tobacco: Never   • Tobacco comments:     smoked 6 cigarettes a day from 13 years of age to 56 years of age when she quit in    Vaping Use   • Vaping Use: Never used   Substance and Sexual Activity   • Alcohol use: Not Currently   • Drug use: No   • Sexual activity: Not Currently     Partners: Male     Does not have home nebulizer  Objective   Physical Exam  Alert Gia Coma Scale 15   HEENT: Pupils equal and reactive to light. Conjunctivae are not injected. Normal tympanic membranes. Oropharynx and nares are normal.   Neck: Supple. Midline trachea. No JVD. No goiter.   Chest: Since the rhonchi expiratory wheezes are noted bilaterally and equal breath sounds bilaterally, regular rate and rhythm without murmur or rub.   Abdomen: Positive bowel sounds, nontender, nondistended. No rebound or peritoneal signs. No CVA tenderness.   Extremities no clubbing. cyanosis or edema. Motor sensory exam is normal. The full range of motion is intact   Skin: Warm and dry, no rashes or petechia.   Lymphatic: No regional lymphadenopathy. No calf pain, swelling or Homans sign    Procedures           ED Course  ED Course as of 23 1713   e May 02, 2023   1414 At 1 hour and 21 minutes none of the laboratory studies have been collected [TH]   1657 ER stay protracted by patient's refusal to allow additional IV access   [TH]      ED Course User Index  [TH] Alan Baumann MD           O2 saturations were consistently in the 91 to 93% on 3 L.  However when the patient stood to do the transfer to the bedside commode the patient saturations rapidly decreased  into the middle 80s on 3 L                      Labs Reviewed   COMPREHENSIVE METABOLIC PANEL - Abnormal; Notable for the following components:       Result Value    Glucose 100 (*)     All other components within normal limits    Narrative:     GFR Normal >60  Chronic Kidney Disease <60  Kidney Failure <15    The GFR formula is only valid for adults with stable renal function between ages 18 and 70.   SINGLE HSTROPONIN T - Abnormal; Notable for the following components:    HS Troponin T 12 (*)     All other components within normal limits    Narrative:     High Sensitive Troponin T Reference Range:  <10.0 ng/L- Negative Female for AMI  <15.0 ng/L- Negative Male for AMI  >=10 - Abnormal Female indicating possible myocardial injury.  >=15 - Abnormal Male indicating possible myocardial injury.   Clinicians would have to utilize clinical acumen, EKG, Troponin, and serial changes to determine if it is an Acute Myocardial Infarction or myocardial injury due to an underlying chronic condition.        CBC WITH AUTO DIFFERENTIAL - Abnormal; Notable for the following components:    WBC 12.40 (*)     RDW 16.5 (*)     Neutrophil % 79.1 (*)     Lymphocyte % 12.0 (*)     Neutrophils, Absolute 9.80 (*)     All other components within normal limits   COVID-19 AND FLU A/B, NP SWAB IN TRANSPORT MEDIA 8-12 HR TAT - Normal    Narrative:     Fact sheet for providers: https://www.fda.gov/media/561252/download    Fact sheet for patients: https://www.fda.gov/media/802283/download    Test performed by PCR.   D-DIMER, QUANTITATIVE - Normal    Narrative:     According to the assay 's published package insert, a normal (<0.50 mg/L (FEU)) D-dimer result in conjunction with a non-high clinical probability assessment, excludes deep vein thrombosis (DVT) and pulmonary embolism (PE) with high sensitivity.    D-dimer values increase with age and this can make VTE exclusion of an older population difficult. To address this, the American  "College of Physicians, based on best available evidence and recent guidelines, recommends that clinicians use age-adjusted D-dimer thresholds in patients greater than 50 years of age with: a) a low probability of PE who do not meet all Pulmonary Embolism Rule Out Criteria, or b) in those with intermediate probability of PE.   The formula for an age-adjusted D-dimer cut-off is \"age/100\".  For example, a 60 year old patient would have an age-adjusted cut-off of 0.60 mg/L (FEU) and an 80 year old 0.80 mg/L (FEU).   BNP (IN-HOUSE) - Normal    Narrative:     Among patients with dyspnea, NT-proBNP is highly sensitive for the detection of acute congestive heart failure. In addition NT-proBNP of <300 pg/ml effectively rules out acute congestive heart failure with 99% negative predictive value.    Results may be falsely decreased if patient taking Biotin.     POC LACTATE - Normal   BLOOD CULTURE   BLOOD CULTURE   POC LACTATE   CBC AND DIFFERENTIAL    Narrative:     The following orders were created for panel order CBC & Differential.  Procedure                               Abnormality         Status                     ---------                               -----------         ------                     CBC Auto Differential[[id number]]        Abnormal            Final result                 Please view results for these tests on the individual orders.   EXTRA TUBES    Narrative:     The following orders were created for panel order Extra Tubes.  Procedure                               Abnormality         Status                     ---------                               -----------         ------                     Gold Top - SST[[id number]]                                   Final result                 Please view results for these tests on the individual orders.   GOLD TOP - SST     Medications   ipratropium-albuterol (DUO-NEB) nebulizer solution 3 mL (has no administration in time range)   methylPREDNISolone sodium " succinate (SOLU-Medrol) injection 40 mg (has no administration in time range)   ipratropium-albuterol (DUO-NEB) nebulizer solution 3 mL (3 mL Nebulization Given 5/2/23 1350)   methylPREDNISolone sodium succinate (SOLU-Medrol) injection 125 mg (125 mg Intravenous Given 5/2/23 1503)     XR Chest 1 View    Result Date: 5/2/2023  Impression: 1. Near complete resolution of left base opacity and left effusion. Electronically Signed: Daniel Riddle  5/2/2023 2:46 PM EDT  Workstation ID: [id number]              Medical Decision Making  Amount and/or Complexity of Data Reviewed  Labs: ordered.  Radiology: ordered.  Discussion of management or test interpretation with external provider(s): Case was discussed with on-call [facility] primary care    Risk  OTC drugs.  Prescription drug management.  Decision regarding hospitalization.    Risk Details: Risk of respiratory failure risk of sepsis        Final diagnoses:   Acute exacerbation of chronic obstructive pulmonary disease (COPD)   Acute bronchitis, unspecified organism   Hypoxemia       ED Disposition  ED Disposition     ED Disposition   Decision to Admit    Condition   --    Comment   --             No follow-up provider specified.       Medication List      No changes were made to your prescriptions during this visit.          Alan Baumann MD  05/02/23 1438

## 2023-05-02 NOTE — ED NOTES
PT got up to bedside toilet, oxygen dropped into the upper 80's, I mentioned to the PT about using a purewick and she declined.

## 2023-05-03 LAB
ANION GAP SERPL CALCULATED.3IONS-SCNC: 12 MMOL/L (ref 5–15)
B PARAPERT DNA SPEC QL NAA+PROBE: NOT DETECTED
B PERT DNA SPEC QL NAA+PROBE: NOT DETECTED
BASOPHILS # BLD AUTO: 0 10*3/MM3 (ref 0–0.2)
BASOPHILS NFR BLD AUTO: 0.3 % (ref 0–1.5)
BUN SERPL-MCNC: 26 MG/DL (ref 8–23)
BUN/CREAT SERPL: 20.8 (ref 7–25)
C PNEUM DNA NPH QL NAA+NON-PROBE: NOT DETECTED
CALCIUM SPEC-SCNC: 9.4 MG/DL (ref 8.6–10.5)
CHLORIDE SERPL-SCNC: 101 MMOL/L (ref 98–107)
CO2 SERPL-SCNC: 28 MMOL/L (ref 22–29)
CREAT SERPL-MCNC: 1.25 MG/DL (ref 0.57–1)
DEPRECATED RDW RBC AUTO: 49.4 FL (ref 37–54)
EGFRCR SERPLBLD CKD-EPI 2021: 43.1 ML/MIN/1.73
EOSINOPHIL # BLD AUTO: 0 10*3/MM3 (ref 0–0.4)
EOSINOPHIL NFR BLD AUTO: 0 % (ref 0.3–6.2)
ERYTHROCYTE [DISTWIDTH] IN BLOOD BY AUTOMATED COUNT: 16.1 % (ref 12.3–15.4)
FLUAV SUBTYP SPEC NAA+PROBE: NOT DETECTED
FLUBV RNA ISLT QL NAA+PROBE: NOT DETECTED
GLUCOSE SERPL-MCNC: 141 MG/DL (ref 65–99)
HADV DNA SPEC NAA+PROBE: NOT DETECTED
HCOV 229E RNA SPEC QL NAA+PROBE: NOT DETECTED
HCOV HKU1 RNA SPEC QL NAA+PROBE: NOT DETECTED
HCOV NL63 RNA SPEC QL NAA+PROBE: NOT DETECTED
HCOV OC43 RNA SPEC QL NAA+PROBE: NOT DETECTED
HCT VFR BLD AUTO: 43 % (ref 34–46.6)
HGB BLD-MCNC: 14.1 G/DL (ref 12–15.9)
HMPV RNA NPH QL NAA+NON-PROBE: NOT DETECTED
HPIV1 RNA ISLT QL NAA+PROBE: NOT DETECTED
HPIV2 RNA SPEC QL NAA+PROBE: NOT DETECTED
HPIV3 RNA NPH QL NAA+PROBE: DETECTED
HPIV4 P GENE NPH QL NAA+PROBE: NOT DETECTED
LYMPHOCYTES # BLD AUTO: 0.6 10*3/MM3 (ref 0.7–3.1)
LYMPHOCYTES NFR BLD AUTO: 8.4 % (ref 19.6–45.3)
M PNEUMO IGG SER IA-ACNC: NOT DETECTED
MCH RBC QN AUTO: 29.4 PG (ref 26.6–33)
MCHC RBC AUTO-ENTMCNC: 32.8 G/DL (ref 31.5–35.7)
MCV RBC AUTO: 89.7 FL (ref 79–97)
MONOCYTES # BLD AUTO: 0.1 10*3/MM3 (ref 0.1–0.9)
MONOCYTES NFR BLD AUTO: 1.1 % (ref 5–12)
NEUTROPHILS NFR BLD AUTO: 6.8 10*3/MM3 (ref 1.7–7)
NEUTROPHILS NFR BLD AUTO: 90.2 % (ref 42.7–76)
NRBC BLD AUTO-RTO: 0 /100 WBC (ref 0–0.2)
PLATELET # BLD AUTO: 189 10*3/MM3 (ref 140–450)
PMV BLD AUTO: 8.5 FL (ref 6–12)
POTASSIUM SERPL-SCNC: 4.1 MMOL/L (ref 3.5–5.2)
QT INTERVAL: 428 MS
RBC # BLD AUTO: 4.8 10*6/MM3 (ref 3.77–5.28)
RHINOVIRUS RNA SPEC NAA+PROBE: NOT DETECTED
RSV RNA NPH QL NAA+NON-PROBE: NOT DETECTED
SARS-COV-2 RNA NPH QL NAA+NON-PROBE: NOT DETECTED
SODIUM SERPL-SCNC: 141 MMOL/L (ref 136–145)
WBC NRBC COR # BLD: 7.5 10*3/MM3 (ref 3.4–10.8)

## 2023-05-03 PROCEDURE — 94664 DEMO&/EVAL PT USE INHALER: CPT

## 2023-05-03 PROCEDURE — 80048 BASIC METABOLIC PNL TOTAL CA: CPT | Performed by: NURSE PRACTITIONER

## 2023-05-03 PROCEDURE — 94799 UNLISTED PULMONARY SVC/PX: CPT

## 2023-05-03 PROCEDURE — 0202U NFCT DS 22 TRGT SARS-COV-2: CPT | Performed by: INTERNAL MEDICINE

## 2023-05-03 PROCEDURE — 25010000002 CEFTRIAXONE PER 250 MG: Performed by: INTERNAL MEDICINE

## 2023-05-03 PROCEDURE — 94761 N-INVAS EAR/PLS OXIMETRY MLT: CPT

## 2023-05-03 PROCEDURE — 25010000002 METHYLPREDNISOLONE PER 40 MG: Performed by: NURSE PRACTITIONER

## 2023-05-03 PROCEDURE — G0378 HOSPITAL OBSERVATION PER HR: HCPCS

## 2023-05-03 PROCEDURE — 85025 COMPLETE CBC W/AUTO DIFF WBC: CPT | Performed by: NURSE PRACTITIONER

## 2023-05-03 RX ORDER — GUAIFENESIN 600 MG/1
600 TABLET, EXTENDED RELEASE ORAL EVERY 12 HOURS SCHEDULED
Status: DISCONTINUED | OUTPATIENT
Start: 2023-05-03 | End: 2023-05-08

## 2023-05-03 RX ORDER — PREDNISONE 20 MG/1
40 TABLET ORAL
Status: DISCONTINUED | OUTPATIENT
Start: 2023-05-04 | End: 2023-05-07

## 2023-05-03 RX ORDER — SODIUM CHLORIDE 9 MG/ML
75 INJECTION, SOLUTION INTRAVENOUS CONTINUOUS
Status: DISCONTINUED | OUTPATIENT
Start: 2023-05-03 | End: 2023-05-04

## 2023-05-03 RX ADMIN — BUDESONIDE AND FORMOTEROL FUMARATE DIHYDRATE 2 PUFF: 160; 4.5 AEROSOL RESPIRATORY (INHALATION) at 18:35

## 2023-05-03 RX ADMIN — POTASSIUM CHLORIDE 10 MEQ: 750 TABLET, EXTENDED RELEASE ORAL at 08:49

## 2023-05-03 RX ADMIN — DOCUSATE SODIUM 50 MG AND SENNOSIDES 8.6 MG 2 TABLET: 8.6; 5 TABLET, FILM COATED ORAL at 08:49

## 2023-05-03 RX ADMIN — FOLIC ACID-PYRIDOXINE-CYANOCOBALAMIN TAB 2.5-25-2 MG 1 TABLET: 2.5-25-2 TAB at 08:49

## 2023-05-03 RX ADMIN — GUAIFENESIN 600 MG: 600 TABLET, EXTENDED RELEASE ORAL at 12:36

## 2023-05-03 RX ADMIN — CARVEDILOL 12.5 MG: 6.25 TABLET, FILM COATED ORAL at 08:49

## 2023-05-03 RX ADMIN — METHYLPREDNISOLONE SODIUM SUCCINATE 40 MG: 40 INJECTION, POWDER, FOR SOLUTION INTRAMUSCULAR; INTRAVENOUS at 04:43

## 2023-05-03 RX ADMIN — GUAIFENESIN 600 MG: 600 TABLET, EXTENDED RELEASE ORAL at 20:31

## 2023-05-03 RX ADMIN — ACETAMINOPHEN 650 MG: 325 TABLET, FILM COATED ORAL at 09:43

## 2023-05-03 RX ADMIN — SODIUM CHLORIDE 75 ML/HR: 9 INJECTION, SOLUTION INTRAVENOUS at 12:36

## 2023-05-03 RX ADMIN — IPRATROPIUM BROMIDE AND ALBUTEROL SULFATE 3 ML: 2.5; .5 SOLUTION RESPIRATORY (INHALATION) at 18:35

## 2023-05-03 RX ADMIN — APIXABAN 5 MG: 5 TABLET, FILM COATED ORAL at 20:31

## 2023-05-03 RX ADMIN — Medication 10 ML: at 20:31

## 2023-05-03 RX ADMIN — CEFTRIAXONE 1 G: 1 INJECTION, POWDER, FOR SOLUTION INTRAMUSCULAR; INTRAVENOUS at 17:52

## 2023-05-03 RX ADMIN — IPRATROPIUM BROMIDE AND ALBUTEROL SULFATE 3 ML: 2.5; .5 SOLUTION RESPIRATORY (INHALATION) at 06:35

## 2023-05-03 RX ADMIN — FUROSEMIDE 40 MG: 40 TABLET ORAL at 08:49

## 2023-05-03 RX ADMIN — BUDESONIDE AND FORMOTEROL FUMARATE DIHYDRATE 2 PUFF: 160; 4.5 AEROSOL RESPIRATORY (INHALATION) at 06:40

## 2023-05-03 RX ADMIN — LISINOPRIL 40 MG: 20 TABLET ORAL at 08:49

## 2023-05-03 RX ADMIN — ROSUVASTATIN 10 MG: 10 TABLET, FILM COATED ORAL at 08:49

## 2023-05-03 RX ADMIN — DOCUSATE SODIUM 50 MG AND SENNOSIDES 8.6 MG 2 TABLET: 8.6; 5 TABLET, FILM COATED ORAL at 20:31

## 2023-05-03 RX ADMIN — CARVEDILOL 12.5 MG: 6.25 TABLET, FILM COATED ORAL at 17:53

## 2023-05-03 RX ADMIN — APIXABAN 5 MG: 5 TABLET, FILM COATED ORAL at 08:49

## 2023-05-03 RX ADMIN — IPRATROPIUM BROMIDE AND ALBUTEROL SULFATE 3 ML: 2.5; .5 SOLUTION RESPIRATORY (INHALATION) at 10:50

## 2023-05-03 RX ADMIN — AMLODIPINE BESYLATE 10 MG: 5 TABLET ORAL at 08:49

## 2023-05-03 RX ADMIN — Medication 10 ML: at 08:50

## 2023-05-03 NOTE — CASE MANAGEMENT/SOCIAL WORK
Discharge Planning Assessment  [facility]     Patient Name: Gabrielle Lyles  MRN: [id number]  Today's Date: 5/3/2023    Admit Date: 5/2/2023    Plan: D/C plan: Anticipate home. Home O2, 3L, [place]. Watch for increased needs.   Discharge Needs Assessment     Row Name 05/03/23 1411       Living Environment    People in Home alone    Current Living Arrangements home    Primary Care Provided by self    Provides Primary Care For no one    Family Caregiver if Needed child(blake), adult    Family Caregiver Names Demetrio Hampton    Quality of Family Relationships unable to assess    Able to Return to Prior Arrangements yes       Resource/Environmental Concerns    Resource/Environmental Concerns none    Transportation Concerns none       Transition Planning    Patient/Family Anticipates Transition to home    Patient/Family Anticipated Services at Transition none    Transportation Anticipated family or friend will provide       Discharge Needs Assessment    Readmission Within the Last 30 Days previous discharge plan unsuccessful    Equipment Currently Used at Home oxygen    Concerns to be Addressed discharge planning    Anticipated Changes Related to Illness none    Equipment Needed After Discharge none    Provided Post Acute Provider List? N/A               Discharge Plan     Row Name 05/03/23 2057       Plan    Plan D/C plan: Anticipate home. Home O2, 3L, [place]. Watch for increased needs.    Patient/Family in Agreement with Plan yes    Plan Comments Met with pt at bedside. Pt lives at home alone, is IADL, including driving. Demetrio Hampton to transport at d/c. Pt uses oxygen at home, 3L, has a concentrator and portable tanks, supplied by [facility]. PCP and pharmacy confirmed. No financial barriers to meds. No current use of HHC.              Continued Care and Services - Admitted Since 5/2/2023     Durable Medical Equipment     Service Provider Request Status Selected Services Address Phone Fax Patient Preferred    [facility] Pending - Request Sent N/A [address], [facility] [address] [phone] --                    Demographic Summary     Row Name 05/03/23 1410       General Information    Admission Type observation    Arrived From emergency department;urgent care    Required Notices Provided Observation Status Notice    Referral Source admission list    Reason for Consult discharge planning    Preferred Language English               Functional Status     Row Name 05/03/23 1411       Functional Status    Usual Activity Tolerance good    Current Activity Tolerance moderate       Functional Status, IADL    Medications independent    Meal Preparation independent    Housekeeping independent    Laundry independent    Shopping independent                      Patient Forms     Row Name 05/03/23 1410       Patient Forms    Important Message from Medicare (IMM) --  LIZ 5/3    Patient Observation Letter Delivered  LIZ 5/3    Delivered to Patient    Method of delivery In person              Met with patient in room     Maintained distance greater than six feet and spent less than 15 minutes in the room.            Tom Sanders RN

## 2023-05-03 NOTE — PROGRESS NOTES
LOS: 0 days   Patient Care Team:  Shaylee Mcdaniels MD as PCP - General (Family Medicine)  Richardson Willis MD as Cardiologist (Cardiology)  Rafy Rodriguez MD as Consulting Physician (Hematology and Oncology)  Christianne Og LPN as Licensed Practical Nurse    Subjective     Interval History: Slightly better,     Patient Complaints: Cough and wheezing have improved    History taken from: patient    Review of Systems   Constitutional: Positive for activity change, appetite change and fatigue. Negative for chills, diaphoresis and fever.   HENT: Negative for facial swelling.    Eyes: Negative for visual disturbance.   Respiratory: Positive for cough, shortness of breath and wheezing.    Cardiovascular: Negative for chest pain, palpitations and leg swelling.   Gastrointestinal: Negative for abdominal pain, constipation, diarrhea, nausea and vomiting.   Genitourinary: Negative for hematuria.   Musculoskeletal: Negative for arthralgias.   Skin: Negative for rash.   Neurological: Positive for weakness.   Psychiatric/Behavioral: Negative for confusion.           Objective     Vital Signs  Temp:  [97.5 °F (36.4 °C)-97.6 °F (36.4 °C)] 97.5 °F (36.4 °C)  Heart Rate:  [68-91] 79  Resp:  [14-20] 16  BP: (119-143)/(49-69) 119/66    Physical Exam:     General Appearance:    Alert, cooperative, in no acute distress,   Head:    Normocephalic, without obvious abnormality, atraumatic   Eyes:            Lids and lashes normal, conjunctivae and sclerae normal, no   icterus, no pallor, corneas clear, PERRLA   Ears:    Ears appear intact with no abnormalities noted   Throat:   No oral lesions, no thrush, oral mucosa moist   Neck:   No adenopathy, supple, trachea midline, no thyromegaly, no   carotid bruit, no JVD   Lungs:     Faint, scattered wheezes    Heart:    Regular rhythm and normal rate, normal S1 and S2, no            murmur, no gallop, no rub, no click   Chest Wall:    No abnormalities observed   Abdomen:     Normal bowel  sounds, no masses, no organomegaly, soft        Non-tender non-distended, no guarding,   Extremities:   Moves all extremities well, no edema, no cyanosis, no             Redness   Pulses:   Pulses palpable and equal bilaterally   Skin:   No bleeding, bruising or rash   Lymph nodes:   No palpable adenopathy   Neurologic:   Cranial nerves 2 - 12 grossly intact, sensation intact, DTR       present and equal bilaterally        Results Review:    Lab Results (last 24 hours)     Procedure Component Value Units Date/Time    Blood Culture - Blood, Arm, Right [[id number]]  (Normal) Collected: 05/02/23 1638    Specimen: Blood from Arm, Right Updated: 05/03/23 1645     Blood Culture No growth at 24 hours    Narrative:      Less than seven (7) mL's of blood was collected.  Insufficient quantity may yield false negative results.    Blood Culture - Blood, Arm, Right [[id number]]  (Normal) Collected: 05/02/23 1456    Specimen: Blood from Arm, Right Updated: 05/03/23 1515     Blood Culture No growth at 24 hours    Narrative:      Less than seven (7) mL's of blood was collected.  Insufficient quantity may yield false negative results.    Respiratory Panel PCR w/COVID-19(SARS-CoV-2) [facility]/SARIAH/BRANDIN/PAD/COR/MAD/LILLIAN In-House, NP Swab in UTM/VTM, 3-4 HR TAT - Swab, Nasopharynx [[id number]]  (Abnormal) Collected: 05/03/23 1028    Specimen: Swab from Nasopharynx Updated: 05/03/23 1137     ADENOVIRUS, PCR Not Detected     Coronavirus 229E Not Detected     Coronavirus HKU1 Not Detected     Coronavirus NL63 Not Detected     Coronavirus OC43 Not Detected     COVID19 Not Detected     Human Metapneumovirus Not Detected     Human Rhinovirus/Enterovirus Not Detected     Influenza A PCR Not Detected     Influenza B PCR Not Detected     Parainfluenza Virus 1 Not Detected     Parainfluenza Virus 2 Not Detected     Parainfluenza Virus 3 Detected     Parainfluenza Virus 4 Not Detected     RSV, PCR Not Detected     Bordetella pertussis pcr Not Detected      Bordetella parapertussis PCR Not Detected     Chlamydophila pneumoniae PCR Not Detected     Mycoplasma pneumo by PCR Not Detected    Narrative:      In the setting of a positive respiratory panel with a viral infection PLUS a negative procalcitonin without other underlying concern for bacterial infection, consider observing off antibiotics or discontinuation of antibiotics and continue supportive care. If the respiratory panel is positive for atypical bacterial infection (Bordetella pertussis, Chlamydophila pneumoniae, or Mycoplasma pneumoniae), consider antibiotic de-escalation to target atypical bacterial infection.    Basic Metabolic Panel [[id number]]  (Abnormal) Collected: 05/03/23 0413    Specimen: Blood from Arm, Right Updated: 05/03/23 0636     Glucose 141 mg/dL      BUN 26 mg/dL      Creatinine 1.25 mg/dL      Sodium 141 mmol/L      Potassium 4.1 mmol/L      Chloride 101 mmol/L      CO2 28.0 mmol/L      Calcium 9.4 mg/dL      BUN/Creatinine Ratio 20.8     Anion Gap 12.0 mmol/L      eGFR 43.1 mL/min/1.73     Narrative:      GFR Normal >60  Chronic Kidney Disease <60  Kidney Failure <15    The GFR formula is only valid for adults with stable renal function between ages 18 and 70.    CBC & Differential [[id number]]  (Abnormal) Collected: 05/03/23 0413    Specimen: Blood from Arm, Right Updated: 05/03/23 0608    Narrative:      The following orders were created for panel order CBC & Differential.  Procedure                               Abnormality         Status                     ---------                               -----------         ------                     CBC Auto Differential[[id number]]        Abnormal            Final result                 Please view results for these tests on the individual orders.    CBC Auto Differential [[id number]]  (Abnormal) Collected: 05/03/23 0413    Specimen: Blood from Arm, Right Updated: 05/03/23 0608     WBC 7.50 10*3/mm3      RBC 4.80 10*6/mm3      Hemoglobin 14.1  g/dL      Hematocrit 43.0 %      MCV 89.7 fL      MCH 29.4 pg      MCHC 32.8 g/dL      RDW 16.1 %      RDW-SD 49.4 fl      MPV 8.5 fL      Platelets 189 10*3/mm3      Neutrophil % 90.2 %      Lymphocyte % 8.4 %      Monocyte % 1.1 %      Eosinophil % 0.0 %      Basophil % 0.3 %      Neutrophils, Absolute 6.80 10*3/mm3      Lymphocytes, Absolute 0.60 10*3/mm3      Monocytes, Absolute 0.10 10*3/mm3      Eosinophils, Absolute 0.00 10*3/mm3      Basophils, Absolute 0.00 10*3/mm3      nRBC 0.0 /100 WBC            Imaging Results (Last 24 Hours)     ** No results found for the last 24 hours. **               I reviewed the patient's new clinical results.    Medication Review:   Scheduled Meds:amLODIPine, 10 mg, Oral, Daily  apixaban, 5 mg, Oral, Q12H  budesonide-formoterol, 2 puff, Inhalation, BID - RT  carvedilol, 12.5 mg, Oral, BID With Meals  cefTRIAXone, 1 g, Intravenous, Q24H  folic acid-pyridoxine-cyanocobalamin, 1 tablet, Oral, Daily  guaiFENesin, 600 mg, Oral, Q12H  ipratropium-albuterol, 3 mL, Nebulization, Q6H While Awake - RT  lisinopril, 40 mg, Oral, Daily  potassium chloride, 10 mEq, Oral, Daily  [START ON 5/4/2023] predniSONE, 40 mg, Oral, Daily With Breakfast  rosuvastatin, 10 mg, Oral, Daily  senna-docusate sodium, 2 tablet, Oral, BID  sodium chloride, 10 mL, Intravenous, Q12H      Continuous Infusions:sodium chloride, 75 mL/hr, Last Rate: 75 mL/hr (05/03/23 1236)      PRN Meds:.•  acetaminophen  •  albuterol sulfate HFA  •  senna-docusate sodium **AND** polyethylene glycol **AND** bisacodyl **AND** bisacodyl  •  cloNIDine  •  ondansetron **OR** ondansetron  •  sodium chloride  •  sodium chloride     Assessment & Plan       Acute exacerbation of chronic obstructive pulmonary disease (COPD)  - continue steroids, bronchodilators, supplemental oxygen, Mucinex; continue Rocephin for possible secondary pneumonia; ask pulmonary service to see as this is her second COPD exacerbation requiring hospitalization this  year.  She is followed as outpatient by Dr. Flynn.  Acute viral URI (parainfluenza)- supportive care  Elevated creatinine - may be due to recent steroids; avoid nephrotoxins as able; gently hydration  HTN - amlodipine, carvedilol, lisinopril.  H/o DVT/PE - Eliquis  HLD - Crestor        Plan for disposition:Likely home at discharge    Rosamaria Jin MD  05/03/23  19:12 EDT

## 2023-05-03 NOTE — DISCHARGE PLACEMENT REQUEST
"Gabrielle Sanchez (83 y.o. Female)     Date of Birth   1940    Social Security Number       Address   [address] [place] [address] [place] [address]    Home Phone   [phone]    MRN   [id number]       Episcopal   [employer]    Marital Status                               Admission Date   5/2/23    Admission Type   Emergency    Admitting Provider   Rosamaria Jin MD    Attending Provider   Rosamaria Jin MD    Department, Room/Bed   [facility] 3A MEDICAL INPATIENT, 303/1       Discharge Date       Discharge Disposition       Discharge Destination                               Attending Provider: Rosamaria Jin MD    Allergies: No Known Allergies    Isolation: Contact Drop   Infection: Other (05/03/23)   Code Status: CPR    Ht: 162.6 cm (64\")   Wt: 61.5 kg (135 lb 9.3 oz)    Admission Cmt: None   Principal Problem: Acute exacerbation of chronic obstructive pulmonary disease (COPD) [J44.1]                 Active Insurance as of 5/2/2023     Primary Coverage     Payor Plan Insurance Group Employer/Plan Group    HUMANA MEDICARE REPLACEMENT HU[employer]NA MEDICARE REPLACEMENT [id number]     Payor Plan Address Payor Plan Phone Number Payor Plan Fax Number Effective Dates    [address] [address] [phone]  1/1/2018 - None Entered    [facility] [address]       Subscriber Name Subscriber Birth Date Member ID       GABRIELLE SANCHEZ 1940 [id number]                 Emergency Contacts      (Rel.) Home Phone Work Phone Mobile Phone    MARVIN DURANT (Daughter) [phone] -- --              "

## 2023-05-03 NOTE — CONSULTS
Group: [facility]         CONSULT NOTE    Patient Identification:  Gabrielle Lyles  83 y.o.  female  1940  [id number]            Requesting physician: Attending physician    Reason for Consultation:   COPD      History of Present Illness:     83-year-old female presented with increasing shortness of breath and dyspnea on exertion recent treatment for pneumonia, complaining of subjective fever and chills and nonproductive cough,     past medical history significant for COPD   Chronic kidney disease stage 3   GERD   Hypertension  Breast cancer  Pulmonary embolism and DVT    Assessment:    Severe pulmonary hypertension mean PA pressure 44   RHC March 2023  RA 6/5, 4 mmHg  RV 74/3, 5 mmHg  PA 71/25, 44 mmHg  PCW 12 mmHg  AO Sat 92%  PA Sat 78%  Jose CO: 7.89 L/min  Jose CI: 4.69 L/min/m²  LHC   LV: 134/3, 20 mmHg  AO: 131/56, 87 mmHg  No significant gradient across the aortic valve during pullback of JR4 catheter.     Severe calcified tortuous nonobstructive coronary artery disease     Patient had CT with IV contrast in October 2022 showed no pulmonary embolism  COPD   Chronic kidney disease stage 3   GERD   Hypertension  Breast cancer  Pulmonary embolism and DVT      Recommendations:    Work-up for pulmonary hypertension   antibiotics  Rocphine   prednisone 40 mg daily   Oxygen titration           Review of Sytems:  Review of Systems   Respiratory: Positive for cough and shortness of breath. Negative for wheezing and stridor.    Cardiovascular: Negative for chest pain, palpitations and leg swelling.       Past Medical History:  Past Medical History:   Diagnosis Date   • COPD (chronic obstructive pulmonary disease)    • History of DVT (deep vein thrombosis) 03/2013    bilateral lower extremity   • History of pneumonia 06/2012    community acquired pneumonia and right pleural effusion;hospitalized at [facility]   • History of pulmonary embolism 03/2013    bilateral PE   • Hypertension 2001   •  Insomnia     on Ambien 5mg at HS   • Lobular breast cancer, left 11/2011    Stage IA left upper lobular breast cancer   • Osteopenia 2012       Past Surgical History:  Past Surgical History:   Procedure Laterality Date   • CARDIAC CATHETERIZATION N/A 3/3/2023    Procedure: Left and Right Heart Cath with Coronary Angiography;  Surgeon: Richardson Willis MD;  Location: [facility] CATH INVASIVE LOCATION;  Service: Cardiovascular;  Laterality: N/A;   • CATARACT EXTRACTION  2015   • IVC FILTER RETRIEVAL  06/2014    IVC filter placement by Dr. Card   • MAMMO STEREOTACTIC BREAST BX SURGICAL ADD UNI Left 11/01/2011    invasive lobular carcinoma-Dr. Cande Daniel   • MASTECTOMY, PARTIAL Left 12/01/2011    and left axillary sentinel lymph node biopsy by Dr. Uriostegui   • TUBAL ABDOMINAL LIGATION  1984        Home Meds:  Medications Prior to Admission   Medication Sig Dispense Refill Last Dose   • amLODIPine (NORVASC) 10 MG tablet Take 1 tablet by mouth Daily. 90 tablet 3 5/2/2023   • apixaban (ELIQUIS) 5 MG tablet tablet Take 1 tablet by mouth Every 12 (Twelve) Hours. Indications: Other - full anticoagulation, history of DVT/PE 60 tablet 2 5/2/2023   • carvedilol (COREG) 12.5 MG tablet Take 1 tablet by mouth 2 (Two) Times a Day With Meals. 180 tablet 3 5/2/2023   • Cholecalciferol (Vitamin D3) 50 MCG (2000 UT) capsule Take 1 capsule by mouth Daily.   5/2/2023   • famotidine (PEPCID) 20 MG tablet Take 1 tablet by mouth As Needed.   Past Month   • Folbic 2.5-25-2 MG tablet tablet Take 1 tablet by mouth Daily.   5/2/2023   • furosemide (LASIX) 40 MG tablet Take 1 tablet by mouth Daily. 90 tablet 3 5/2/2023   • lisinopril (PRINIVIL,ZESTRIL) 40 MG tablet Take 1 tablet by mouth Daily.   5/2/2023   • potassium chloride (K-DUR,KLOR-CON) 10 MEQ CR tablet Take 1 tablet by mouth Daily. 90 tablet 3 5/2/2023   • SYMBICORT 80-4.5 MCG/ACT inhaler Inhale 2 puffs 2 (Two) Times a Day.  5 5/2/2023   • VENTOLIN  (90 Base) MCG/ACT inhaler Inhale 2  "puffs Every 4 (Four) Hours As Needed.  5 2023   • cloNIDine (CATAPRES) 0.1 MG tablet Take 1 tablet by mouth 2 (Two) Times a Day As Needed for High Blood Pressure (SBP greater than 170). 15 tablet 0 More than a month       Allergies:  No Known Allergies    Social History:   Social History     Socioeconomic History   • Marital status:    Tobacco Use   • Smoking status: Former     Types: Cigarettes     Quit date:      Years since quittin.3     Passive exposure: Never   • Smokeless tobacco: Never   • Tobacco comments:     smoked 6 cigarettes a day from 13 years of age to 56 years of age when she quit in    Vaping Use   • Vaping Use: Never used   Substance and Sexual Activity   • Alcohol use: Not Currently   • Drug use: No   • Sexual activity: Not Currently     Partners: Male       Family History:  Family History   Problem Relation Age of Onset   • Lung cancer Brother        Physical Exam:  /64 (BP Location: Right arm, Patient Position: Lying)   Pulse 75   Temp 97.5 °F (36.4 °C) (Oral)   Resp 16   Ht 162.6 cm (64\")   Wt 61.5 kg (135 lb 9.3 oz)   SpO2 91%   BMI 23.27 kg/m²  Body mass index is 23.27 kg/m². 91% 61.5 kg (135 lb 9.3 oz)  Physical Exam  Cardiovascular:      Heart sounds: No murmur heard.    No gallop.   Pulmonary:      Effort: No respiratory distress.      Breath sounds: No stridor. Rhonchi and rales present. No wheezing.   Chest:      Chest wall: No tenderness.         LABS:  Lab Results   Component Value Date    CALCIUM 9.4 2023     Results from last 7 days   Lab Units 23  0413 23  1456   SODIUM mmol/L 141 140   POTASSIUM mmol/L 4.1 3.5   CHLORIDE mmol/L 101 100   CO2 mmol/L 28.0 27.0   BUN mg/dL 26* 18   CREATININE mg/dL 1.25* 0.93   GLUCOSE mg/dL 141* 100*   CALCIUM mg/dL 9.4 10.1   WBC 10*3/mm3 7.50 12.40*   HEMOGLOBIN g/dL 14.1 15.3   PLATELETS 10*3/mm3 189 199   ALT (SGPT) U/L  --  17   AST (SGOT) U/L  --  14   PROBNP pg/mL  --  1,133.0     Lab " Results   Component Value Date    TROPONINT 12 (H) 05/02/2023     Results from last 7 days   Lab Units 05/02/23  1456   HSTROP T ng/L 12*         Results from last 7 days   Lab Units 05/02/23  1500   LACTATE mmol/L 0.7         Results from last 7 days   Lab Units 05/03/23  1028   ADENOVIRUS DETECTION BY PCR  Not Detected   CORONAVIRUS 229E  Not Detected   CORONAVIRUS HKU1  Not Detected   CORONAVIRUS NL63  Not Detected   CORONAVIRUS OC43  Not Detected   HUMAN METAPNEUMOVIRUS  Not Detected   HUMAN RHINOVIRUS/ENTEROVIRUS  Not Detected   INFLUENZA B PCR  Not Detected   PARAINFLUENZA 1  Not Detected   PARAINFLUENZA VIRUS 2  Not Detected   PARAINFLUENZA VIRUS 3  Detected*   PARAINFLUENZA VIRUS 4  Not Detected   BORDETELLA PERTUSSIS PCR  Not Detected   CHLAMYDOPHILA PNEUMONIAE PCR  Not Detected   MYCOPLAMA PNEUMO PCR  Not Detected   INFLUENZA A PCR  Not Detected   RSV, PCR  Not Detected             Lab Results   Component Value Date    TSH 4.850 (H) 10/25/2022     Estimated Creatinine Clearance: 33.7 mL/min (A) (by C-G formula based on SCr of 1.25 mg/dL (H)).         Imaging:  Imaging Results (Last 24 Hours)     Procedure Component Value Units Date/Time    XR Chest 1 View [[id number]] Collected: 05/02/23 1443     Updated: 05/02/23 1448    Narrative:      XR CHEST 1 VW    Date of Exam: 5/2/2023 2:25 PM EDT    Indication: dyspnea    Comparison: 4/8/2023    Findings:  Interval decrease in left base consolidation. There is minimal residual left base consolidation and a small left effusion. Right lung is clear. Cardiac and mediastinal contours are within normal limits. Regional skeleton is unremarkable.      Impression:      Impression:    1. Near complete resolution of left base opacity and left effusion.      Electronically Signed: Daniel Riddle    5/2/2023 2:46 PM EDT    Workstation ID: [id number]            Current Meds:   SCHEDULE  amLODIPine, 10 mg, Oral, Daily  apixaban, 5 mg, Oral, Q12H  budesonide-formoterol, 2 puff,  Inhalation, BID - RT  carvedilol, 12.5 mg, Oral, BID With Meals  cefTRIAXone, 1 g, Intravenous, Q24H  folic acid-pyridoxine-cyanocobalamin, 1 tablet, Oral, Daily  guaiFENesin, 600 mg, Oral, Q12H  ipratropium-albuterol, 3 mL, Nebulization, Q6H While Awake - RT  lisinopril, 40 mg, Oral, Daily  methylPREDNISolone sodium succinate, 40 mg, Intravenous, Q12H  potassium chloride, 10 mEq, Oral, Daily  rosuvastatin, 10 mg, Oral, Daily  senna-docusate sodium, 2 tablet, Oral, BID  sodium chloride, 10 mL, Intravenous, Q12H      Infusions  sodium chloride, 75 mL/hr, Last Rate: 75 mL/hr (05/03/23 1236)      PRNs  •  acetaminophen  •  albuterol sulfate HFA  •  senna-docusate sodium **AND** polyethylene glycol **AND** bisacodyl **AND** bisacodyl  •  cloNIDine  •  ondansetron **OR** ondansetron  •  sodium chloride  •  sodium chloride        Carlie Roy MD  5/3/2023  14:38 EDT      Much of this encounter note is an electronic transcription/translation of spoken language to printed text using Dragon Software.

## 2023-05-03 NOTE — H&P
Patient Care Team:  Shaylee Mcdaniels MD as PCP - General (Family Medicine)  Richardson Willis MD as Cardiologist (Cardiology)  Rafy Rodriguez MD as Consulting Physician (Hematology and Oncology)  Christianne Og LPN as Licensed Practical Nurse    Chief complaint shortness of breath     Subjective     Patient is a 83 y.o. female with history of COPD, CKD stage 3, GERD, HTN, breast cancer, pulmonary emboli and DVT who presents with complaints of increased shortness of breath, worse with exertion over the last 2 days. She reports recently getting over a bout of pneumonia, and now feels like she has had a fever again along with chills. Complains of nonproductive cough. Denies any leg pain or swelling, chest pian, dizziness, syncope, diaphoresis. She wears 3L nasal cannula at home and has not increased although she doesn't feel like it's helping much.     In the ER EKG showed sinus rd rate 57, CXR with near complete resolution of left base opacity and left effusion, Flu and COVID 19 negative, started on Azithomycin and rocephin IV started.       Onset of symptoms was 2 days    Review of Systems   Constitutional: Positive for fatigue and fever.   HENT: Negative.    Eyes: Negative.    Respiratory: Positive for cough and shortness of breath.    Cardiovascular: Negative.    Gastrointestinal: Negative.    Endocrine: Negative.    Genitourinary: Negative.    Musculoskeletal: Negative.    Skin: Negative.    Neurological: Negative.    Psychiatric/Behavioral: Negative.           History  Past Medical History:   Diagnosis Date   • COPD (chronic obstructive pulmonary disease)    • History of DVT (deep vein thrombosis) 03/2013    bilateral lower extremity   • History of pneumonia 06/2012    community acquired pneumonia and right pleural effusion;hospitalized at [facility]   • History of pulmonary embolism 03/2013    bilateral PE   • Hypertension 2001   • Insomnia     on Ambien 5mg at    • Lobular breast cancer, left  2011    Stage IA left upper lobular breast cancer   • Osteopenia      Past Surgical History:   Procedure Laterality Date   • CARDIAC CATHETERIZATION N/A 3/3/2023    Procedure: Left and Right Heart Cath with Coronary Angiography;  Surgeon: Richardson Willis MD;  Location: [facility] CATH INVASIVE LOCATION;  Service: Cardiovascular;  Laterality: N/A;   • CATARACT EXTRACTION     • IVC FILTER RETRIEVAL  2014    IVC filter placement by Dr. Card   • MAMMO STEREOTACTIC BREAST BX SURGICAL ADD UNI Left 2011    invasive lobular carcinoma-Dr. Cande Daniel   • MASTECTOMY, PARTIAL Left 2011    and left axillary sentinel lymph node biopsy by Dr. Uriostegui   • TUBAL ABDOMINAL LIGATION       Family History   Problem Relation Age of Onset   • Lung cancer Brother      Social History     Tobacco Use   • Smoking status: Former     Types: Cigarettes     Quit date:      Years since quittin.3     Passive exposure: Never   • Smokeless tobacco: Never   • Tobacco comments:     smoked 6 cigarettes a day from 13 years of age to 56 years of age when she quit in    Vaping Use   • Vaping Use: Never used   Substance Use Topics   • Alcohol use: Not Currently   • Drug use: No     Medications Prior to Admission   Medication Sig Dispense Refill Last Dose   • amLODIPine (NORVASC) 10 MG tablet Take 1 tablet by mouth Daily. 90 tablet 3 2023   • apixaban (ELIQUIS) 5 MG tablet tablet Take 1 tablet by mouth Every 12 (Twelve) Hours. Indications: Other - full anticoagulation, history of DVT/PE 60 tablet 2 2023   • carvedilol (COREG) 12.5 MG tablet Take 1 tablet by mouth 2 (Two) Times a Day With Meals. 180 tablet 3 2023   • Cholecalciferol (Vitamin D3) 50 MCG (2000 UT) capsule Take 1 capsule by mouth Daily.   2023   • famotidine (PEPCID) 20 MG tablet Take 1 tablet by mouth As Needed.   Past Month   • Folbic 2.5-25-2 MG tablet tablet Take 1 tablet by mouth Daily.   2023   • furosemide (LASIX) 40 MG tablet  Take 1 tablet by mouth Daily. 90 tablet 3 5/2/2023   • lisinopril (PRINIVIL,ZESTRIL) 40 MG tablet Take 1 tablet by mouth Daily.   5/2/2023   • potassium chloride (K-DUR,KLOR-CON) 10 MEQ CR tablet Take 1 tablet by mouth Daily. 90 tablet 3 5/2/2023   • SYMBICORT 80-4.5 MCG/ACT inhaler Inhale 2 puffs 2 (Two) Times a Day.  5 5/2/2023   • VENTOLIN  (90 Base) MCG/ACT inhaler Inhale 2 puffs Every 4 (Four) Hours As Needed.  5 5/2/2023   • cloNIDine (CATAPRES) 0.1 MG tablet Take 1 tablet by mouth 2 (Two) Times a Day As Needed for High Blood Pressure (SBP greater than 170). 15 tablet 0 More than a month     Allergies:  Patient has no known allergies.    Objective     Vital Signs  Temp:  [97.6 °F (36.4 °C)-98.3 °F (36.8 °C)] 97.6 °F (36.4 °C)  Heart Rate:  [55-91] 91  Resp:  [16-23] 20  BP: (120-152)/(49-70) 136/63     Physical Exam:      General Appearance:    Alert, cooperative, in no acute distress   Head:    Normocephalic, without obvious abnormality, atraumatic   Eyes:            Lids and lashes normal, conjunctivae and sclerae normal, no   icterus, no pallor, corneas clear, PERRLA   Ears:    Ears appear intact with no abnormalities noted   Throat:   No oral lesions, no thrush, oral mucosa moist   Neck:   No adenopathy, supple, trachea midline, no thyromegaly, no   carotid bruit, no JVD   Lungs:     Rhonchi and expiratory wheezing present ,respirations regular, even and unlabored    Heart:    Regular rhythm and normal rate, normal S1 and S2, no            murmur, no gallop, no rub, no click   Chest Wall:    No abnormalities observed   Abdomen:     Normal bowel sounds, no masses, no organomegaly, soft        non-tender, non-distended, no guarding, no rebound                tenderness   Extremities:   Moves all extremities well, no edema, no cyanosis, no             redness   Pulses:   Pulses palpable and equal bilaterally   Skin:   No bleeding, bruising or rash   Lymph nodes:   No palpable adenopathy   Neurologic:    No focal deficits noted       Results Review:     Imaging Results (Last 24 Hours)     Procedure Component Value Units Date/Time    XR Chest 1 View [[id number]] Collected: 05/02/23 1443     Updated: 05/02/23 1448    Narrative:      XR CHEST 1 VW    Date of Exam: 5/2/2023 2:25 PM EDT    Indication: dyspnea    Comparison: 4/8/2023    Findings:  Interval decrease in left base consolidation. There is minimal residual left base consolidation and a small left effusion. Right lung is clear. Cardiac and mediastinal contours are within normal limits. Regional skeleton is unremarkable.      Impression:      Impression:    1. Near complete resolution of left base opacity and left effusion.      Electronically Signed: Daniel Riddle    5/2/2023 2:46 PM EDT    Workstation ID: [id number]           Lab Results (last 24 hours)     Procedure Component Value Units Date/Time    Blood Culture - Blood, Arm, Right [[id number]] Collected: 05/02/23 1638    Specimen: Blood from Arm, Right Updated: 05/02/23 1642    Extra Tubes [[id number]] Collected: 05/02/23 1456    Specimen: Blood, Venous Line Updated: 05/02/23 1600    Narrative:      The following orders were created for panel order Extra Tubes.  Procedure                               Abnormality         Status                     ---------                               -----------         ------                     Gold Top - SST[[id number]]                                   Final result                 Please view results for these tests on the individual orders.    Gold Top - SST [[id number]] Collected: 05/02/23 1456    Specimen: Blood Updated: 05/02/23 1600     Extra Tube Hold for add-ons.     Comment: Auto resulted.       COVID-19 and FLU A/B PCR - Swab, Nasopharynx [[id number]]  (Normal) Collected: 05/02/23 1509    Specimen: Swab from Nasopharynx Updated: 05/02/23 1541     COVID19 Not Detected     Influenza A PCR Not Detected     Influenza B PCR Not Detected    Narrative:      Fact sheet for  providers: https://www.fda.gov/media/900614/download    Fact sheet for patients: https://www.fda.gov/media/551801/download    Test performed by PCR.    Comprehensive Metabolic Panel [[id number]]  (Abnormal) Collected: 05/02/23 1456    Specimen: Blood Updated: 05/02/23 1539     Glucose 100 mg/dL      BUN 18 mg/dL      Creatinine 0.93 mg/dL      Sodium 140 mmol/L      Potassium 3.5 mmol/L      Comment: Slight hemolysis detected by analyzer. Results may be affected.        Chloride 100 mmol/L      CO2 27.0 mmol/L      Calcium 10.1 mg/dL      Total Protein 7.6 g/dL      Albumin 3.9 g/dL      ALT (SGPT) 17 U/L      AST (SGOT) 14 U/L      Alkaline Phosphatase 83 U/L      Total Bilirubin 0.7 mg/dL      Globulin 3.7 gm/dL      A/G Ratio 1.1 g/dL      BUN/Creatinine Ratio 19.4     Anion Gap 13.0 mmol/L      eGFR 61.5 mL/min/1.73     Narrative:      GFR Normal >60  Chronic Kidney Disease <60  Kidney Failure <15    The GFR formula is only valid for adults with stable renal function between ages 18 and 70.    BNP [[id number]]  (Normal) Collected: 05/02/23 1456    Specimen: Blood Updated: 05/02/23 1539     proBNP 1,133.0 pg/mL     Narrative:      Among patients with dyspnea, NT-proBNP is highly sensitive for the detection of acute congestive heart failure. In addition NT-proBNP of <300 pg/ml effectively rules out acute congestive heart failure with 99% negative predictive value.    Results may be falsely decreased if patient taking Biotin.      Single High Sensitivity Troponin T [[id number]]  (Abnormal) Collected: 05/02/23 1456    Specimen: Blood Updated: 05/02/23 1539     HS Troponin T 12 ng/L     Narrative:      High Sensitive Troponin T Reference Range:  <10.0 ng/L- Negative Female for AMI  <15.0 ng/L- Negative Male for AMI  >=10 - Abnormal Female indicating possible myocardial injury.  >=15 - Abnormal Male indicating possible myocardial injury.   Clinicians would have to utilize clinical acumen, EKG, Troponin, and serial  "changes to determine if it is an Acute Myocardial Infarction or myocardial injury due to an underlying chronic condition.         D-dimer, Quantitative [[id number]]  (Normal) Collected: 05/02/23 1456    Specimen: Blood Updated: 05/02/23 1523     D-Dimer, Quantitative 0.49 mg/L (FEU)     Narrative:      According to the assay 's published package insert, a normal (<0.50 mg/L (FEU)) D-dimer result in conjunction with a non-high clinical probability assessment, excludes deep vein thrombosis (DVT) and pulmonary embolism (PE) with high sensitivity.    D-dimer values increase with age and this can make VTE exclusion of an older population difficult. To address this, the [employer] College [employer], based on best available evidence and recent guidelines, recommends that clinicians use age-adjusted D-dimer thresholds in patients greater than 50 years of age with: a) a low probability of PE who do not meet all Pulmonary Embolism Rule Out Criteria, or b) in those with intermediate probability of PE.   The formula for an age-adjusted D-dimer cut-off is \"age/100\".  For example, a 60 year old patient would have an age-adjusted cut-off of 0.60 mg/L (FEU) and an 80 year old 0.80 mg/L (FEU).    CBC & Differential [[id number]]  (Abnormal) Collected: 05/02/23 1456    Specimen: Blood Updated: 05/02/23 1509    Narrative:      The following orders were created for panel order CBC & Differential.  Procedure                               Abnormality         Status                     ---------                               -----------         ------                     CBC Auto Differential[[id number]]        Abnormal            Final result                 Please view results for these tests on the individual orders.    CBC Auto Differential [[id number]]  (Abnormal) Collected: 05/02/23 1456    Specimen: Blood Updated: 05/02/23 1509     WBC 12.40 10*3/mm3      RBC 5.26 10*6/mm3      Hemoglobin 15.3 g/dL      Hematocrit 46.2 %  "     MCV 87.7 fL      MCH 29.0 pg      MCHC 33.1 g/dL      RDW 16.5 %      RDW-SD 53.8 fl      MPV 8.0 fL      Platelets 199 10*3/mm3      Neutrophil % 79.1 %      Lymphocyte % 12.0 %      Monocyte % 5.7 %      Eosinophil % 2.3 %      Basophil % 0.9 %      Neutrophils, Absolute 9.80 10*3/mm3      Lymphocytes, Absolute 1.50 10*3/mm3      Monocytes, Absolute 0.70 10*3/mm3      Eosinophils, Absolute 0.30 10*3/mm3      Basophils, Absolute 0.10 10*3/mm3      nRBC 0.1 /100 WBC     Blood Culture - Blood, Arm, Right [[id number]] Collected: 05/02/23 1456    Specimen: Blood from Arm, Right Updated: 05/02/23 1505    POC Lactate [[id number]]  (Normal) Collected: 05/02/23 1500    Specimen: Blood Updated: 05/02/23 1501     Lactate 0.7 mmol/L      Comment: Serial Number: [id number]:  [id number]              I reviewed the patient's new clinical results.    Assessment & Plan       Acute exacerbation of chronic obstructive pulmonary disease (COPD)  -CXR  -BNP 1,133  -EKG sinus rd rate 57  -given lasix, nebs, solumedrol  -HS troponin 12    Acute bronchitis  -started on Azithromycin and rocephin  Hypoxemia  -wears 3L NC at home, on 5L now  -titrate as needed  -WBC 12.4  -COVID 19 and flu negative      DVT prophylaxis- SCD's  GI prophylaxis- protonix    I discussed the patient's findings and my recommendations with patient.     Tina Alvarez, JOAQUÍN  05/02/23  23:22 EDT

## 2023-05-04 PROCEDURE — 63710000001 PREDNISONE PER 1 MG: Performed by: INTERNAL MEDICINE

## 2023-05-04 PROCEDURE — 94799 UNLISTED PULMONARY SVC/PX: CPT

## 2023-05-04 PROCEDURE — 94761 N-INVAS EAR/PLS OXIMETRY MLT: CPT

## 2023-05-04 PROCEDURE — 94664 DEMO&/EVAL PT USE INHALER: CPT

## 2023-05-04 PROCEDURE — 25010000002 CEFTRIAXONE PER 250 MG: Performed by: INTERNAL MEDICINE

## 2023-05-04 RX ORDER — SILDENAFIL CITRATE 20 MG/1
20 TABLET ORAL EVERY 8 HOURS SCHEDULED
Status: DISCONTINUED | OUTPATIENT
Start: 2023-05-04 | End: 2023-05-12 | Stop reason: HOSPADM

## 2023-05-04 RX ADMIN — CARVEDILOL 12.5 MG: 6.25 TABLET, FILM COATED ORAL at 17:48

## 2023-05-04 RX ADMIN — CARVEDILOL 12.5 MG: 6.25 TABLET, FILM COATED ORAL at 08:52

## 2023-05-04 RX ADMIN — GUAIFENESIN 600 MG: 600 TABLET, EXTENDED RELEASE ORAL at 08:52

## 2023-05-04 RX ADMIN — DOCUSATE SODIUM 50 MG AND SENNOSIDES 8.6 MG 2 TABLET: 8.6; 5 TABLET, FILM COATED ORAL at 08:52

## 2023-05-04 RX ADMIN — APIXABAN 5 MG: 5 TABLET, FILM COATED ORAL at 08:53

## 2023-05-04 RX ADMIN — IPRATROPIUM BROMIDE AND ALBUTEROL SULFATE 3 ML: 2.5; .5 SOLUTION RESPIRATORY (INHALATION) at 11:36

## 2023-05-04 RX ADMIN — IPRATROPIUM BROMIDE AND ALBUTEROL SULFATE 3 ML: 2.5; .5 SOLUTION RESPIRATORY (INHALATION) at 21:00

## 2023-05-04 RX ADMIN — Medication 10 ML: at 08:53

## 2023-05-04 RX ADMIN — POTASSIUM CHLORIDE 10 MEQ: 750 TABLET, EXTENDED RELEASE ORAL at 08:53

## 2023-05-04 RX ADMIN — BUDESONIDE AND FORMOTEROL FUMARATE DIHYDRATE 2 PUFF: 160; 4.5 AEROSOL RESPIRATORY (INHALATION) at 07:20

## 2023-05-04 RX ADMIN — FOLIC ACID-PYRIDOXINE-CYANOCOBALAMIN TAB 2.5-25-2 MG 1 TABLET: 2.5-25-2 TAB at 08:51

## 2023-05-04 RX ADMIN — SILDENAFIL CITRATE 20 MG: 20 TABLET ORAL at 09:02

## 2023-05-04 RX ADMIN — AMLODIPINE BESYLATE 10 MG: 5 TABLET ORAL at 08:52

## 2023-05-04 RX ADMIN — GUAIFENESIN 600 MG: 600 TABLET, EXTENDED RELEASE ORAL at 21:06

## 2023-05-04 RX ADMIN — IPRATROPIUM BROMIDE AND ALBUTEROL SULFATE 3 ML: 2.5; .5 SOLUTION RESPIRATORY (INHALATION) at 07:16

## 2023-05-04 RX ADMIN — BUDESONIDE AND FORMOTEROL FUMARATE DIHYDRATE 2 PUFF: 160; 4.5 AEROSOL RESPIRATORY (INHALATION) at 21:06

## 2023-05-04 RX ADMIN — LISINOPRIL 40 MG: 20 TABLET ORAL at 08:52

## 2023-05-04 RX ADMIN — SILDENAFIL CITRATE 20 MG: 20 TABLET ORAL at 21:06

## 2023-05-04 RX ADMIN — CEFTRIAXONE 1 G: 1 INJECTION, POWDER, FOR SOLUTION INTRAMUSCULAR; INTRAVENOUS at 17:49

## 2023-05-04 RX ADMIN — APIXABAN 5 MG: 5 TABLET, FILM COATED ORAL at 21:06

## 2023-05-04 RX ADMIN — Medication 10 ML: at 21:07

## 2023-05-04 RX ADMIN — PREDNISONE 40 MG: 20 TABLET ORAL at 08:51

## 2023-05-04 NOTE — CASE MANAGEMENT/SOCIAL WORK
Continued Stay Note   Kenny     Patient Name: Gabrielle Lyles  MRN: [id number]  Today's Date: 5/4/2023    Admit Date: 5/2/2023    Plan: D/C plan: Anticipate home. Home O2 3L, [place]. Watch for increased oxygen needs.   Discharge Plan     Row Name 05/04/23 1029       Plan    Plan D/C plan: Anticipate home. Home O2 3L, [place]. Watch for increased oxygen needs.    Patient/Family in Agreement with Plan yes    Plan Comments Barrier to d/c: 5L O2, IVF, Nebs, IV abx.              Phone communication or documentation only - no physical contact with patient or family.      Tom Sanders RN

## 2023-05-04 NOTE — PLAN OF CARE
Goal Outcome Evaluation:              Outcome Evaluation: Patient pleasant and cooperative. IVF infusing. Remains on 5L O2. No complaints at this time. Possible discharge tomorrow. Patient refusing labs to be taken. MD aware.

## 2023-05-04 NOTE — PLAN OF CARE
Goal Outcome Evaluation:  Plan of Care Reviewed With: patient        Progress: no change   Pt currently resting abed. No complaints or issues this shift. VSS will cont to monitor.

## 2023-05-04 NOTE — PROGRESS NOTES
LOS: 0 days   Patient Care Team:  Shaylee Mcdaniels MD as PCP - General (Family Medicine)  Richardson Willis MD as Cardiologist (Cardiology)  Rafy Rodriguez MD as Consulting Physician (Hematology and Oncology)  Christianne Og LPN as Licensed Practical Nurse    Subjective     Patient continues with shortness of breath    Review of Systems   Constitutional: Positive for activity change and fatigue.   HENT: Negative.    Respiratory: Positive for shortness of breath.    Cardiovascular: Negative.    Gastrointestinal: Negative.    Genitourinary: Negative.    Musculoskeletal: Negative.    Neurological: Negative.    Psychiatric/Behavioral: Negative.            Objective     Vital Signs  Temp:  [97.4 °F (36.3 °C)-98 °F (36.7 °C)] 98 °F (36.7 °C)  Heart Rate:  [61-83] 66  Resp:  [14-20] 18  BP: (112-142)/(55-66) 131/64      Physical Exam  Vitals reviewed.   Constitutional:       Appearance: She is not ill-appearing.   HENT:      Head: Normocephalic and atraumatic.      Right Ear: External ear normal.      Left Ear: External ear normal.      Nose: Nose normal.      Mouth/Throat:      Mouth: Mucous membranes are moist.   Eyes:      General:         Right eye: No discharge.         Left eye: No discharge.   Cardiovascular:      Rate and Rhythm: Normal rate and regular rhythm.      Pulses: Normal pulses.      Heart sounds: Normal heart sounds.   Pulmonary:      Effort: Pulmonary effort is normal.      Breath sounds: Normal breath sounds.   Abdominal:      General: Bowel sounds are normal.      Palpations: Abdomen is soft.   Musculoskeletal:         General: Normal range of motion.      Cervical back: Normal range of motion.   Skin:     General: Skin is warm.   Neurological:      Mental Status: She is alert and oriented to person, place, and time.   Psychiatric:         Behavior: Behavior normal.              Results Review:    Lab Results (last 24 hours)     Procedure Component Value Units Date/Time    Blood Culture -  Blood, Arm, Right [[id number]]  (Normal) Collected: 05/02/23 1638    Specimen: Blood from Arm, Right Updated: 05/03/23 1645     Blood Culture No growth at 24 hours    Narrative:      Less than seven (7) mL's of blood was collected.  Insufficient quantity may yield false negative results.    Blood Culture - Blood, Arm, Right [[id number]]  (Normal) Collected: 05/02/23 1456    Specimen: Blood from Arm, Right Updated: 05/03/23 1515     Blood Culture No growth at 24 hours    Narrative:      Less than seven (7) mL's of blood was collected.  Insufficient quantity may yield false negative results.           Imaging Results (Last 24 Hours)     ** No results found for the last 24 hours. **               I reviewed the patient's new clinical results.    Medication Review:   Scheduled Meds:amLODIPine, 10 mg, Oral, Daily  apixaban, 5 mg, Oral, Q12H  budesonide-formoterol, 2 puff, Inhalation, BID - RT  carvedilol, 12.5 mg, Oral, BID With Meals  cefTRIAXone, 1 g, Intravenous, Q24H  folic acid-pyridoxine-cyanocobalamin, 1 tablet, Oral, Daily  guaiFENesin, 600 mg, Oral, Q12H  ipratropium-albuterol, 3 mL, Nebulization, Q6H While Awake - RT  lisinopril, 40 mg, Oral, Daily  potassium chloride, 10 mEq, Oral, Daily  predniSONE, 40 mg, Oral, Daily With Breakfast  rosuvastatin, 10 mg, Oral, Daily  senna-docusate sodium, 2 tablet, Oral, BID  sildenafil, 20 mg, Oral, Q8H  sodium chloride, 10 mL, Intravenous, Q12H      Continuous Infusions:sodium chloride, 75 mL/hr, Last Rate: 75 mL/hr (05/03/23 2036)      PRN Meds:.•  acetaminophen  •  albuterol sulfate HFA  •  senna-docusate sodium **AND** polyethylene glycol **AND** bisacodyl **AND** bisacodyl  •  cloNIDine  •  ondansetron **OR** ondansetron  •  sodium chloride  •  sodium chloride     Interval History:    Assessment & Plan     Severe pulmonary hypertension mean PA pressure 44  -started on revation today  Acute exacerbation of chronic obstructive pulmonary disease (COPD)  - continue steroids,  bronchodilators, supplemental oxygen, Mucinex; continue Rocephin   Acute viral URI (parainfluenza)- supportive care  Elevated creatinine - may be due to recent steroids; avoid nephrotoxins as able; gently hydration  HTN - amlodipine, carvedilol, lisinopril.  H/o DVT/PE - Eliquis  HLD - Crestor        Plan for disposition:Home poss tomorrow    Mansi Becerril, JOAQUÍN  05/04/23  11:51 EDT

## 2023-05-04 NOTE — PROGRESS NOTES
Daily Progress Note        Acute exacerbation of chronic obstructive pulmonary disease (COPD)         Assessment:     Severe pulmonary hypertension mean PA pressure 44   RHC March 2023  RA 6/5, 4 mmHg  RV 74/3, 5 mmHg  PA 71/25, 44 mmHg  PCW 12 mmHg  AO Sat 92%  PA Sat 78%  Jose CO: 7.89 L/min  Jose CI: 4.69 L/min/m²  LHC   LV: 134/3, 20 mmHg  AO: 131/56, 87 mmHg  No significant gradient across the aortic valve during pullback of JR4 catheter.     Severe calcified tortuous nonobstructive coronary artery disease    Patient had CT with IV contrast in October 2022 showed no pulmonary embolism    Chronic kidney disease stage 3   GERD   Hypertension  Breast cancer  Pulmonary embolism and DVT        Recommendations:     Work-up for pulmonary hypertension will include  Work-up for connective tissue disease, REN, ANCA, scleroderma panel  Pulmonary function test  Sleep apnea test    Initiate Revatio and monitor hemodynamics  Avoid nitrates    antibiotics  Rocphine   prednisone 40 mg daily   Oxygen titration              LOS: 0 days     Subjective         Objective     Vital signs for last 24 hours:  Vitals:    05/04/23 0719 05/04/23 0720 05/04/23 0722 05/04/23 0741   BP:    131/64   BP Location:       Patient Position:       Pulse: 74 73 69    Resp: 18 18 18 14   Temp:    98 °F (36.7 °C)   TempSrc:       SpO2: 91% 91% 92% 91%   Weight:       Height:           Intake/Output last 3 shifts:  I/O last 3 completed shifts:  In: 1920 [P.O.:1920]  Out: -   Intake/Output this shift:  I/O this shift:  In: 480 [P.O.:480]  Out: -       Radiology  Imaging Results (Last 24 Hours)     ** No results found for the last 24 hours. **          Labs:  Results from last 7 days   Lab Units 05/03/23  0413   WBC 10*3/mm3 7.50   HEMOGLOBIN g/dL 14.1   HEMATOCRIT % 43.0   PLATELETS 10*3/mm3 189     Results from last 7 days   Lab Units 05/03/23  0413 05/02/23  1456   SODIUM mmol/L 141 140   POTASSIUM mmol/L 4.1 3.5   CHLORIDE mmol/L 101 100   CO2  mmol/L 28.0 27.0   BUN mg/dL 26* 18   CREATININE mg/dL 1.25* 0.93   CALCIUM mg/dL 9.4 10.1   BILIRUBIN mg/dL  --  0.7   ALK PHOS U/L  --  83   ALT (SGPT) U/L  --  17   AST (SGOT) U/L  --  14   GLUCOSE mg/dL 141* 100*         Results from last 7 days   Lab Units 05/02/23  1456   ALBUMIN g/dL 3.9     Results from last 7 days   Lab Units 05/02/23  1456   HSTROP T ng/L 12*                           Meds:   SCHEDULE  amLODIPine, 10 mg, Oral, Daily  apixaban, 5 mg, Oral, Q12H  budesonide-formoterol, 2 puff, Inhalation, BID - RT  carvedilol, 12.5 mg, Oral, BID With Meals  cefTRIAXone, 1 g, Intravenous, Q24H  folic acid-pyridoxine-cyanocobalamin, 1 tablet, Oral, Daily  guaiFENesin, 600 mg, Oral, Q12H  ipratropium-albuterol, 3 mL, Nebulization, Q6H While Awake - RT  lisinopril, 40 mg, Oral, Daily  potassium chloride, 10 mEq, Oral, Daily  predniSONE, 40 mg, Oral, Daily With Breakfast  rosuvastatin, 10 mg, Oral, Daily  senna-docusate sodium, 2 tablet, Oral, BID  sodium chloride, 10 mL, Intravenous, Q12H      Infusions  sodium chloride, 75 mL/hr, Last Rate: 75 mL/hr (05/03/23 2036)      PRNs  •  acetaminophen  •  albuterol sulfate HFA  •  senna-docusate sodium **AND** polyethylene glycol **AND** bisacodyl **AND** bisacodyl  •  cloNIDine  •  ondansetron **OR** ondansetron  •  sodium chloride  •  sodium chloride    Physical Exam:  Physical Exam  Cardiovascular:      Heart sounds: Murmur heard.     No gallop.   Pulmonary:      Effort: No respiratory distress.      Breath sounds: No stridor. Rhonchi and rales present. No wheezing.   Chest:      Chest wall: No tenderness.         ROS  Review of Systems   Respiratory: Positive for cough and shortness of breath. Negative for wheezing and stridor.    Cardiovascular: Negative for chest pain, palpitations and leg swelling.             Total time spent with patient greater than: 45 Minutes

## 2023-05-05 LAB
ANION GAP SERPL CALCULATED.3IONS-SCNC: 13 MMOL/L (ref 5–15)
BASOPHILS # BLD AUTO: 0.1 10*3/MM3 (ref 0–0.2)
BASOPHILS NFR BLD AUTO: 0.9 % (ref 0–1.5)
BUN SERPL-MCNC: 25 MG/DL (ref 8–23)
BUN/CREAT SERPL: 25.8 (ref 7–25)
CALCIUM SPEC-SCNC: 9.7 MG/DL (ref 8.6–10.5)
CHLORIDE SERPL-SCNC: 101 MMOL/L (ref 98–107)
CO2 SERPL-SCNC: 24 MMOL/L (ref 22–29)
CREAT SERPL-MCNC: 0.97 MG/DL (ref 0.57–1)
DEPRECATED RDW RBC AUTO: 56.4 FL (ref 37–54)
EGFRCR SERPLBLD CKD-EPI 2021: 58.5 ML/MIN/1.73
EOSINOPHIL # BLD AUTO: 0.2 10*3/MM3 (ref 0–0.4)
EOSINOPHIL NFR BLD AUTO: 1.7 % (ref 0.3–6.2)
ERYTHROCYTE [DISTWIDTH] IN BLOOD BY AUTOMATED COUNT: 17.3 % (ref 12.3–15.4)
GLUCOSE SERPL-MCNC: 172 MG/DL (ref 65–99)
HCT VFR BLD AUTO: 47.7 % (ref 34–46.6)
HGB BLD-MCNC: 15 G/DL (ref 12–15.9)
LYMPHOCYTES # BLD AUTO: 0.6 10*3/MM3 (ref 0.7–3.1)
LYMPHOCYTES NFR BLD AUTO: 4.3 % (ref 19.6–45.3)
MCH RBC QN AUTO: 29.1 PG (ref 26.6–33)
MCHC RBC AUTO-ENTMCNC: 31.5 G/DL (ref 31.5–35.7)
MCV RBC AUTO: 92.3 FL (ref 79–97)
MONOCYTES # BLD AUTO: 0.2 10*3/MM3 (ref 0.1–0.9)
MONOCYTES NFR BLD AUTO: 1.2 % (ref 5–12)
NEUTROPHILS NFR BLD AUTO: 12.7 10*3/MM3 (ref 1.7–7)
NEUTROPHILS NFR BLD AUTO: 91.9 % (ref 42.7–76)
NRBC BLD AUTO-RTO: 0 /100 WBC (ref 0–0.2)
PLATELET # BLD AUTO: 248 10*3/MM3 (ref 140–450)
PMV BLD AUTO: 8.6 FL (ref 6–12)
POTASSIUM SERPL-SCNC: 4.5 MMOL/L (ref 3.5–5.2)
RBC # BLD AUTO: 5.17 10*6/MM3 (ref 3.77–5.28)
SODIUM SERPL-SCNC: 138 MMOL/L (ref 136–145)
WBC NRBC COR # BLD: 13.8 10*3/MM3 (ref 3.4–10.8)

## 2023-05-05 PROCEDURE — 80048 BASIC METABOLIC PNL TOTAL CA: CPT | Performed by: NURSE PRACTITIONER

## 2023-05-05 PROCEDURE — 94799 UNLISTED PULMONARY SVC/PX: CPT

## 2023-05-05 PROCEDURE — 25010000002 CEFTRIAXONE PER 250 MG: Performed by: INTERNAL MEDICINE

## 2023-05-05 PROCEDURE — 36415 COLL VENOUS BLD VENIPUNCTURE: CPT | Performed by: NURSE PRACTITIONER

## 2023-05-05 PROCEDURE — 85025 COMPLETE CBC W/AUTO DIFF WBC: CPT | Performed by: NURSE PRACTITIONER

## 2023-05-05 PROCEDURE — 94664 DEMO&/EVAL PT USE INHALER: CPT

## 2023-05-05 PROCEDURE — 94761 N-INVAS EAR/PLS OXIMETRY MLT: CPT

## 2023-05-05 PROCEDURE — 63710000001 PREDNISONE PER 1 MG: Performed by: INTERNAL MEDICINE

## 2023-05-05 RX ORDER — MONTELUKAST SODIUM 10 MG/1
10 TABLET ORAL NIGHTLY
Status: DISCONTINUED | OUTPATIENT
Start: 2023-05-05 | End: 2023-05-12 | Stop reason: HOSPADM

## 2023-05-05 RX ADMIN — GUAIFENESIN 600 MG: 600 TABLET, EXTENDED RELEASE ORAL at 08:59

## 2023-05-05 RX ADMIN — CEFTRIAXONE 1 G: 1 INJECTION, POWDER, FOR SOLUTION INTRAMUSCULAR; INTRAVENOUS at 17:51

## 2023-05-05 RX ADMIN — FOLIC ACID-PYRIDOXINE-CYANOCOBALAMIN TAB 2.5-25-2 MG 1 TABLET: 2.5-25-2 TAB at 08:59

## 2023-05-05 RX ADMIN — APIXABAN 5 MG: 5 TABLET, FILM COATED ORAL at 22:31

## 2023-05-05 RX ADMIN — SILDENAFIL CITRATE 20 MG: 20 TABLET ORAL at 13:22

## 2023-05-05 RX ADMIN — LISINOPRIL 40 MG: 20 TABLET ORAL at 08:59

## 2023-05-05 RX ADMIN — SILDENAFIL CITRATE 20 MG: 20 TABLET ORAL at 22:31

## 2023-05-05 RX ADMIN — CARVEDILOL 12.5 MG: 6.25 TABLET, FILM COATED ORAL at 17:51

## 2023-05-05 RX ADMIN — APIXABAN 5 MG: 5 TABLET, FILM COATED ORAL at 08:59

## 2023-05-05 RX ADMIN — POTASSIUM CHLORIDE 10 MEQ: 750 TABLET, EXTENDED RELEASE ORAL at 08:59

## 2023-05-05 RX ADMIN — AMLODIPINE BESYLATE 10 MG: 5 TABLET ORAL at 08:59

## 2023-05-05 RX ADMIN — IPRATROPIUM BROMIDE AND ALBUTEROL SULFATE 3 ML: 2.5; .5 SOLUTION RESPIRATORY (INHALATION) at 11:08

## 2023-05-05 RX ADMIN — CARVEDILOL 12.5 MG: 6.25 TABLET, FILM COATED ORAL at 08:59

## 2023-05-05 RX ADMIN — PREDNISONE 40 MG: 20 TABLET ORAL at 08:59

## 2023-05-05 RX ADMIN — BUDESONIDE AND FORMOTEROL FUMARATE DIHYDRATE 2 PUFF: 160; 4.5 AEROSOL RESPIRATORY (INHALATION) at 19:00

## 2023-05-05 RX ADMIN — MONTELUKAST 10 MG: 10 TABLET, FILM COATED ORAL at 22:31

## 2023-05-05 RX ADMIN — GUAIFENESIN 600 MG: 600 TABLET, EXTENDED RELEASE ORAL at 22:31

## 2023-05-05 RX ADMIN — Medication 10 ML: at 22:31

## 2023-05-05 RX ADMIN — SILDENAFIL CITRATE 20 MG: 20 TABLET ORAL at 06:20

## 2023-05-05 RX ADMIN — Medication 10 ML: at 09:00

## 2023-05-05 RX ADMIN — IPRATROPIUM BROMIDE AND ALBUTEROL SULFATE 3 ML: 2.5; .5 SOLUTION RESPIRATORY (INHALATION) at 18:50

## 2023-05-05 NOTE — CASE MANAGEMENT/SOCIAL WORK
Continued Stay Note  LIDYA Cox     Patient Name: Gabrielle Lyles  MRN: [id number]  Today's Date: 5/5/2023    Admit Date: 5/2/2023    Plan: D/C plan: Anticipate home. Baseline O2 3L, [place]. Watch for increased needs.   Discharge Plan     Row Name 05/05/23 1443       Plan    Plan D/C plan: Anticipate home. Baseline O2 3L, [place]. Watch for increased needs.    Patient/Family in Agreement with Plan yes    Plan Comments Barrier to d/c: 5L O2, IV abx, nebs.                   Phone communication or documentation only - no physical contact with patient or family.      Tom Sanders RN

## 2023-05-05 NOTE — PLAN OF CARE
Problem: Adult Inpatient Plan of Care  Goal: Plan of Care Review  Outcome: Ongoing, Progressing  Goal: Patient-Specific Goal (Individualized)  Outcome: Ongoing, Progressing  Goal: Absence of Hospital-Acquired Illness or Injury  Outcome: Ongoing, Progressing  Intervention: Identify and Manage Fall Risk  Recent Flowsheet Documentation  Taken 5/5/2023 1836 by Layne Juarez, RN  Safety Promotion/Fall Prevention: safety round/check completed  Taken 5/5/2023 1600 by Layne Juarez, RN  Safety Promotion/Fall Prevention: safety round/check completed  Goal: Optimal Comfort and Wellbeing  Outcome: Ongoing, Progressing  Goal: Readiness for Transition of Care  Outcome: Ongoing, Progressing     Problem: COPD (Chronic Obstructive Pulmonary Disease) Comorbidity  Goal: Maintenance of COPD Symptom Control  Outcome: Ongoing, Progressing     Problem: Hypertension Comorbidity  Goal: Blood Pressure in Desired Range  Outcome: Ongoing, Progressing   Goal Outcome Evaluation:

## 2023-05-05 NOTE — PLAN OF CARE
Goal Outcome Evaluation:  Plan of Care Reviewed With: patient        Progress: no change   Pt rested on and off through the night. Pt back to 5LNC she was on 3L but was sat in 87. No complaints or distress noted. VSS will cont to monitor.

## 2023-05-05 NOTE — PROGRESS NOTES
Daily Progress Note        Acute exacerbation of chronic obstructive pulmonary disease (COPD)         Assessment:     Severe pulmonary hypertension mean PA pressure 44   RHC March 2023  RA 6/5, 4 mmHg  RV 74/3, 5 mmHg  PA 71/25, 44 mmHg  PCW 12 mmHg  AO Sat 92%  PA Sat 78%  Jose CO: 7.89 L/min  Jose CI: 4.69 L/min/m²  LHC   LV: 134/3, 20 mmHg  AO: 131/56, 87 mmHg  No significant gradient across the aortic valve during pullback of JR4 catheter.     Severe calcified tortuous nonobstructive coronary artery disease    Patient had CT with IV contrast in October 2022 showed no pulmonary embolism    Chronic kidney disease stage 3   GERD   Hypertension  Breast cancer  Pulmonary embolism and DVT        Recommendations:     cont Revatio and monitor hemodynamics  Avoid nitrates    Work-up for pulmonary hypertension ongoing  Work-up for connective tissue disease, REN, ANCA, scleroderma panel  Pulmonary function test  Sleep apnea test    Singulair    antibiotics  Rocphine   prednisone 40 mg daily   Oxygen titration              LOS: 1 day     Subjective         Objective     Vital signs for last 24 hours:  Vitals:    05/04/23 2113 05/04/23 2338 05/05/23 0315 05/05/23 0800   BP:  132/58 136/54 119/47   BP Location:  Left arm Right arm    Patient Position:  Lying Sitting    Pulse: 72 83 66 72   Resp: 18 14 17 20   Temp:  97.7 °F (36.5 °C)  97.6 °F (36.4 °C)   TempSrc:  Oral  Oral   SpO2: 94% 95% 92% 92%   Weight:       Height:           Intake/Output last 3 shifts:  I/O last 3 completed shifts:  In: 1080 [P.O.:1080]  Out: -   Intake/Output this shift:  No intake/output data recorded.      Radiology  Imaging Results (Last 24 Hours)     ** No results found for the last 24 hours. **          Labs:  Results from last 7 days   Lab Units 05/03/23  0413   WBC 10*3/mm3 7.50   HEMOGLOBIN g/dL 14.1   HEMATOCRIT % 43.0   PLATELETS 10*3/mm3 189     Results from last 7 days   Lab Units 05/03/23  0413 05/02/23  1456   SODIUM mmol/L 141 140    POTASSIUM mmol/L 4.1 3.5   CHLORIDE mmol/L 101 100   CO2 mmol/L 28.0 27.0   BUN mg/dL 26* 18   CREATININE mg/dL 1.25* 0.93   CALCIUM mg/dL 9.4 10.1   BILIRUBIN mg/dL  --  0.7   ALK PHOS U/L  --  83   ALT (SGPT) U/L  --  17   AST (SGOT) U/L  --  14   GLUCOSE mg/dL 141* 100*         Results from last 7 days   Lab Units 05/02/23  1456   ALBUMIN g/dL 3.9     Results from last 7 days   Lab Units 05/02/23  1456   HSTROP T ng/L 12*                           Meds:   SCHEDULE  amLODIPine, 10 mg, Oral, Daily  apixaban, 5 mg, Oral, Q12H  budesonide-formoterol, 2 puff, Inhalation, BID - RT  carvedilol, 12.5 mg, Oral, BID With Meals  cefTRIAXone, 1 g, Intravenous, Q24H  folic acid-pyridoxine-cyanocobalamin, 1 tablet, Oral, Daily  guaiFENesin, 600 mg, Oral, Q12H  ipratropium-albuterol, 3 mL, Nebulization, Q6H While Awake - RT  lisinopril, 40 mg, Oral, Daily  potassium chloride, 10 mEq, Oral, Daily  predniSONE, 40 mg, Oral, Daily With Breakfast  rosuvastatin, 10 mg, Oral, Daily  senna-docusate sodium, 2 tablet, Oral, BID  sildenafil, 20 mg, Oral, Q8H  sodium chloride, 10 mL, Intravenous, Q12H      Infusions     PRNs  •  acetaminophen  •  albuterol sulfate HFA  •  senna-docusate sodium **AND** polyethylene glycol **AND** bisacodyl **AND** bisacodyl  •  cloNIDine  •  ondansetron **OR** ondansetron  •  sodium chloride  •  sodium chloride    Physical Exam:  Physical Exam  Cardiovascular:      Heart sounds: Murmur heard.     No gallop.   Pulmonary:      Effort: No respiratory distress.      Breath sounds: No stridor. Rhonchi and rales present. No wheezing.   Chest:      Chest wall: No tenderness.         ROS  Review of Systems   Respiratory: Positive for cough and shortness of breath. Negative for wheezing and stridor.    Cardiovascular: Negative for chest pain, palpitations and leg swelling.             Total time spent with patient greater than: 45 Minutes

## 2023-05-05 NOTE — PROGRESS NOTES
LOS: 1 day   Patient Care Team:  Shaylee Mcdaniels MD as PCP - General (Family Medicine)  Richardson Willis MD as Cardiologist (Cardiology)  Rafy Rodriguez MD as Consulting Physician (Hematology and Oncology)  Christianne Og LPN as Licensed Practical Nurse    Subjective     Patient continues with shortness of breath    Review of Systems   Constitutional: Positive for activity change and fatigue.   HENT: Negative.    Respiratory: Positive for shortness of breath.    Cardiovascular: Negative.    Gastrointestinal: Negative.    Genitourinary: Negative.    Musculoskeletal: Negative.    Neurological: Negative.    Psychiatric/Behavioral: Negative.            Objective     Vital Signs  Temp:  [97.6 °F (36.4 °C)-97.7 °F (36.5 °C)] 97.6 °F (36.4 °C)  Heart Rate:  [61-83] 72  Resp:  [13-20] 20  BP: (119-136)/(47-58) 119/47      Physical Exam  Vitals reviewed.   Constitutional:       Appearance: She is not ill-appearing.   HENT:      Head: Normocephalic and atraumatic.      Right Ear: External ear normal.      Left Ear: External ear normal.      Nose: Nose normal.      Mouth/Throat:      Mouth: Mucous membranes are moist.   Eyes:      General:         Right eye: No discharge.         Left eye: No discharge.   Cardiovascular:      Rate and Rhythm: Normal rate and regular rhythm.      Pulses: Normal pulses.      Heart sounds: Normal heart sounds.   Pulmonary:      Effort: Pulmonary effort is normal.      Breath sounds: Normal breath sounds.   Abdominal:      General: Bowel sounds are normal.      Palpations: Abdomen is soft.   Musculoskeletal:         General: Normal range of motion.      Cervical back: Normal range of motion.   Skin:     General: Skin is warm.   Neurological:      Mental Status: She is alert and oriented to person, place, and time.   Psychiatric:         Behavior: Behavior normal.              Results Review:    Lab Results (last 24 hours)     Procedure Component Value Units Date/Time    Basic Metabolic  Panel [[id number]] Updated: 05/05/23 1020    Specimen: Blood     CBC & Differential [[id number]] Collected: 05/05/23 0923    Specimen: Blood Updated: 05/05/23 1009    Narrative:      The following orders were created for panel order CBC & Differential.  Procedure                               Abnormality         Status                     ---------                               -----------         ------                     CBC Auto Differential[[id number]]                                                       Scan Slide[[id number]]                                                                    Please view results for these tests on the individual orders.    CBC Auto Differential [[id number]] Updated: 05/05/23 1009    Specimen: Blood     Blood Culture - Blood, Arm, Right [[id number]]  (Normal) Collected: 05/02/23 1638    Specimen: Blood from Arm, Right Updated: 05/04/23 1645     Blood Culture No growth at 2 days    Narrative:      Less than seven (7) mL's of blood was collected.  Insufficient quantity may yield false negative results.    Blood Culture - Blood, Arm, Right [[id number]]  (Normal) Collected: 05/02/23 1456    Specimen: Blood from Arm, Right Updated: 05/04/23 1515     Blood Culture No growth at 2 days    Narrative:      Less than seven (7) mL's of blood was collected.  Insufficient quantity may yield false negative results.           Imaging Results (Last 24 Hours)     ** No results found for the last 24 hours. **               I reviewed the patient's new clinical results.    Medication Review:   Scheduled Meds:amLODIPine, 10 mg, Oral, Daily  apixaban, 5 mg, Oral, Q12H  budesonide-formoterol, 2 puff, Inhalation, BID - RT  carvedilol, 12.5 mg, Oral, BID With Meals  cefTRIAXone, 1 g, Intravenous, Q24H  folic acid-pyridoxine-cyanocobalamin, 1 tablet, Oral, Daily  guaiFENesin, 600 mg, Oral, Q12H  ipratropium-albuterol, 3 mL, Nebulization, Q6H While Awake - RT  lisinopril, 40 mg, Oral, Daily  potassium chloride,  10 mEq, Oral, Daily  predniSONE, 40 mg, Oral, Daily With Breakfast  rosuvastatin, 10 mg, Oral, Daily  senna-docusate sodium, 2 tablet, Oral, BID  sildenafil, 20 mg, Oral, Q8H  sodium chloride, 10 mL, Intravenous, Q12H      Continuous Infusions:   PRN Meds:.•  acetaminophen  •  albuterol sulfate HFA  •  senna-docusate sodium **AND** polyethylene glycol **AND** bisacodyl **AND** bisacodyl  •  cloNIDine  •  ondansetron **OR** ondansetron  •  sodium chloride  •  sodium chloride     Interval History:    Assessment & Plan     Severe pulmonary hypertension mean PA pressure 44  -started on revation today  -Avoid nitrates     Work-up for pulmonary hypertension ongoing  Work-up for connective tissue disease, REN, ANCA, scleroderma panel  Pulmonary function test  Sleep apnea test  Acute exacerbation of chronic obstructive pulmonary disease (COPD)  - continue steroids, bronchodilators, supplemental oxygen, Mucinex; continue Rocephin   Acute viral URI (parainfluenza)- supportive care  Elevated creatinine - may be due to recent steroids; avoid nephrotoxins as able; gently hydration  HTN - amlodipine, carvedilol, lisinopril.  H/o DVT/PE - Eliquis  HLD - Crestor        Plan for disposition:Home poss tomorrow    JOAQUÍN Rogers  05/05/23  11:15 EDT

## 2023-05-06 LAB
BASOPHILS # BLD AUTO: 0 10*3/MM3 (ref 0–0.2)
BASOPHILS NFR BLD AUTO: 0.1 % (ref 0–1.5)
DEPRECATED RDW RBC AUTO: 52.9 FL (ref 37–54)
EOSINOPHIL # BLD AUTO: 0 10*3/MM3 (ref 0–0.4)
EOSINOPHIL NFR BLD AUTO: 0 % (ref 0.3–6.2)
ERYTHROCYTE [DISTWIDTH] IN BLOOD BY AUTOMATED COUNT: 16.9 % (ref 12.3–15.4)
HCT VFR BLD AUTO: 46.6 % (ref 34–46.6)
HGB BLD-MCNC: 14.8 G/DL (ref 12–15.9)
LYMPHOCYTES # BLD AUTO: 0.6 10*3/MM3 (ref 0.7–3.1)
LYMPHOCYTES NFR BLD AUTO: 5.3 % (ref 19.6–45.3)
MCH RBC QN AUTO: 28.9 PG (ref 26.6–33)
MCHC RBC AUTO-ENTMCNC: 31.8 G/DL (ref 31.5–35.7)
MCV RBC AUTO: 91.1 FL (ref 79–97)
MONOCYTES # BLD AUTO: 0.3 10*3/MM3 (ref 0.1–0.9)
MONOCYTES NFR BLD AUTO: 2.5 % (ref 5–12)
NEUTROPHILS NFR BLD AUTO: 9.9 10*3/MM3 (ref 1.7–7)
NEUTROPHILS NFR BLD AUTO: 92.1 % (ref 42.7–76)
NRBC BLD AUTO-RTO: 0.1 /100 WBC (ref 0–0.2)
PLATELET # BLD AUTO: 268 10*3/MM3 (ref 140–450)
PMV BLD AUTO: 7.9 FL (ref 6–12)
RBC # BLD AUTO: 5.12 10*6/MM3 (ref 3.77–5.28)
WBC NRBC COR # BLD: 10.8 10*3/MM3 (ref 3.4–10.8)

## 2023-05-06 PROCEDURE — 94799 UNLISTED PULMONARY SVC/PX: CPT

## 2023-05-06 PROCEDURE — 63710000001 PREDNISONE PER 1 MG: Performed by: INTERNAL MEDICINE

## 2023-05-06 PROCEDURE — 25010000002 CEFTRIAXONE PER 250 MG: Performed by: INTERNAL MEDICINE

## 2023-05-06 PROCEDURE — 85025 COMPLETE CBC W/AUTO DIFF WBC: CPT | Performed by: NURSE PRACTITIONER

## 2023-05-06 PROCEDURE — 94761 N-INVAS EAR/PLS OXIMETRY MLT: CPT

## 2023-05-06 PROCEDURE — 94664 DEMO&/EVAL PT USE INHALER: CPT

## 2023-05-06 RX ORDER — FAMOTIDINE 20 MG/1
20 TABLET, FILM COATED ORAL DAILY
Status: DISCONTINUED | OUTPATIENT
Start: 2023-05-06 | End: 2023-05-12 | Stop reason: HOSPADM

## 2023-05-06 RX ORDER — FUROSEMIDE 20 MG/1
20 TABLET ORAL DAILY
Status: DISCONTINUED | OUTPATIENT
Start: 2023-05-06 | End: 2023-05-06

## 2023-05-06 RX ORDER — POTASSIUM CHLORIDE 750 MG/1
10 TABLET, FILM COATED, EXTENDED RELEASE ORAL DAILY
Status: DISCONTINUED | OUTPATIENT
Start: 2023-05-06 | End: 2023-05-06

## 2023-05-06 RX ORDER — FUROSEMIDE 20 MG/1
20 TABLET ORAL DAILY
Status: DISCONTINUED | OUTPATIENT
Start: 2023-05-07 | End: 2023-05-12 | Stop reason: HOSPADM

## 2023-05-06 RX ADMIN — APIXABAN 5 MG: 5 TABLET, FILM COATED ORAL at 07:26

## 2023-05-06 RX ADMIN — FOLIC ACID-PYRIDOXINE-CYANOCOBALAMIN TAB 2.5-25-2 MG 1 TABLET: 2.5-25-2 TAB at 07:26

## 2023-05-06 RX ADMIN — Medication 10 ML: at 20:50

## 2023-05-06 RX ADMIN — SILDENAFIL CITRATE 20 MG: 20 TABLET ORAL at 13:51

## 2023-05-06 RX ADMIN — BUDESONIDE AND FORMOTEROL FUMARATE DIHYDRATE 2 PUFF: 160; 4.5 AEROSOL RESPIRATORY (INHALATION) at 18:42

## 2023-05-06 RX ADMIN — CARVEDILOL 12.5 MG: 6.25 TABLET, FILM COATED ORAL at 07:26

## 2023-05-06 RX ADMIN — Medication 10 ML: at 07:26

## 2023-05-06 RX ADMIN — CEFTRIAXONE 1 G: 1 INJECTION, POWDER, FOR SOLUTION INTRAMUSCULAR; INTRAVENOUS at 17:07

## 2023-05-06 RX ADMIN — IPRATROPIUM BROMIDE AND ALBUTEROL SULFATE 3 ML: 2.5; .5 SOLUTION RESPIRATORY (INHALATION) at 11:35

## 2023-05-06 RX ADMIN — LISINOPRIL 40 MG: 20 TABLET ORAL at 07:26

## 2023-05-06 RX ADMIN — IPRATROPIUM BROMIDE AND ALBUTEROL SULFATE 3 ML: 2.5; .5 SOLUTION RESPIRATORY (INHALATION) at 18:38

## 2023-05-06 RX ADMIN — APIXABAN 5 MG: 5 TABLET, FILM COATED ORAL at 20:50

## 2023-05-06 RX ADMIN — MONTELUKAST 10 MG: 10 TABLET, FILM COATED ORAL at 20:50

## 2023-05-06 RX ADMIN — AMLODIPINE BESYLATE 10 MG: 5 TABLET ORAL at 07:25

## 2023-05-06 RX ADMIN — CARVEDILOL 12.5 MG: 6.25 TABLET, FILM COATED ORAL at 17:08

## 2023-05-06 RX ADMIN — SILDENAFIL CITRATE 20 MG: 20 TABLET ORAL at 05:41

## 2023-05-06 RX ADMIN — SILDENAFIL CITRATE 20 MG: 20 TABLET ORAL at 20:50

## 2023-05-06 RX ADMIN — PREDNISONE 40 MG: 20 TABLET ORAL at 07:26

## 2023-05-06 RX ADMIN — GUAIFENESIN 600 MG: 600 TABLET, EXTENDED RELEASE ORAL at 20:50

## 2023-05-06 RX ADMIN — POTASSIUM CHLORIDE 10 MEQ: 750 TABLET, EXTENDED RELEASE ORAL at 07:25

## 2023-05-06 RX ADMIN — IPRATROPIUM BROMIDE AND ALBUTEROL SULFATE 3 ML: 2.5; .5 SOLUTION RESPIRATORY (INHALATION) at 06:48

## 2023-05-06 RX ADMIN — GUAIFENESIN 600 MG: 600 TABLET, EXTENDED RELEASE ORAL at 07:26

## 2023-05-06 RX ADMIN — BUDESONIDE AND FORMOTEROL FUMARATE DIHYDRATE 2 PUFF: 160; 4.5 AEROSOL RESPIRATORY (INHALATION) at 06:53

## 2023-05-06 NOTE — PLAN OF CARE
Goal Outcome Evaluation:               Spoke to Dr. Roy in regards to plans. He states the patients new medication will take a few days to work and the patient is okay to go home. Patient and family given the above information. Patient is not comfortable going home tonight and states she does not have a way home. Spoke with Kasey who wants to check a cbc and is okay with patient going home tomorrow. Patient remains stable without new complaints.

## 2023-05-06 NOTE — PROGRESS NOTES
Daily Progress Note        Acute exacerbation of chronic obstructive pulmonary disease (COPD)         Assessment:     Severe pulmonary hypertension mean PA pressure 44   RHC March 2023  RA 6/5, 4 mmHg  RV 74/3, 5 mmHg  PA 71/25, 44 mmHg  PCW 12 mmHg  AO Sat 92%  PA Sat 78%  Jose CO: 7.89 L/min  Jose CI: 4.69 L/min/m²  LHC   LV: 134/3, 20 mmHg  AO: 131/56, 87 mmHg  No significant gradient across the aortic valve during pullback of JR4 catheter.     Severe calcified tortuous nonobstructive coronary artery disease    Patient had CT with IV contrast in October 2022 showed no pulmonary embolism    Chronic kidney disease stage 3   GERD   Hypertension  Breast cancer  Pulmonary embolism and DVT        Recommendations:     cont Revatio and monitor hemodynamics  Avoid nitrates    Work-up for pulmonary hypertension ongoing  Work-up for connective tissue disease, REN, ANCA, scleroderma panel  Pulmonary function test  Sleep apnea test    Singulair    antibiotics  Rocphine   prednisone 40 mg daily   Oxygen titration              LOS: 2 days     Subjective         Objective     Vital signs for last 24 hours:  Vitals:    05/06/23 0652 05/06/23 0653 05/06/23 0656 05/06/23 0811   BP:    164/44   BP Location:    Right arm   Patient Position:    Lying   Pulse: 67 71 77 75   Resp: 16 16 16 21   Temp:    97.3 °F (36.3 °C)   TempSrc:    Oral   SpO2: 94% 97% 93% 91%   Weight:       Height:           Intake/Output last 3 shifts:  I/O last 3 completed shifts:  In: 840 [P.O.:840]  Out: -   Intake/Output this shift:  I/O this shift:  In: 240 [P.O.:240]  Out: -       Radiology  Imaging Results (Last 24 Hours)     ** No results found for the last 24 hours. **          Labs:  Results from last 7 days   Lab Units 05/05/23  1546   WBC 10*3/mm3 13.80*   HEMOGLOBIN g/dL 15.0   HEMATOCRIT % 47.7*   PLATELETS 10*3/mm3 248     Results from last 7 days   Lab Units 05/05/23  1737 05/03/23  0413 05/02/23  1456   SODIUM mmol/L 138   < > 140   POTASSIUM  mmol/L 4.5   < > 3.5   CHLORIDE mmol/L 101   < > 100   CO2 mmol/L 24.0   < > 27.0   BUN mg/dL 25*   < > 18   CREATININE mg/dL 0.97   < > 0.93   CALCIUM mg/dL 9.7   < > 10.1   BILIRUBIN mg/dL  --   --  0.7   ALK PHOS U/L  --   --  83   ALT (SGPT) U/L  --   --  17   AST (SGOT) U/L  --   --  14   GLUCOSE mg/dL 172*   < > 100*    < > = values in this interval not displayed.         Results from last 7 days   Lab Units 05/02/23  1456   ALBUMIN g/dL 3.9     Results from last 7 days   Lab Units 05/02/23  1456   HSTROP T ng/L 12*                           Meds:   SCHEDULE  amLODIPine, 10 mg, Oral, Daily  apixaban, 5 mg, Oral, Q12H  budesonide-formoterol, 2 puff, Inhalation, BID - RT  carvedilol, 12.5 mg, Oral, BID With Meals  cefTRIAXone, 1 g, Intravenous, Q24H  folic acid-pyridoxine-cyanocobalamin, 1 tablet, Oral, Daily  guaiFENesin, 600 mg, Oral, Q12H  ipratropium-albuterol, 3 mL, Nebulization, Q6H While Awake - RT  lisinopril, 40 mg, Oral, Daily  montelukast, 10 mg, Oral, Nightly  potassium chloride, 10 mEq, Oral, Daily  predniSONE, 40 mg, Oral, Daily With Breakfast  rosuvastatin, 10 mg, Oral, Daily  senna-docusate sodium, 2 tablet, Oral, BID  sildenafil, 20 mg, Oral, Q8H  sodium chloride, 10 mL, Intravenous, Q12H      Infusions     PRNs  •  acetaminophen  •  albuterol sulfate HFA  •  senna-docusate sodium **AND** polyethylene glycol **AND** bisacodyl **AND** bisacodyl  •  cloNIDine  •  ondansetron **OR** ondansetron  •  sodium chloride  •  sodium chloride    Physical Exam:  Physical Exam  Cardiovascular:      Heart sounds: Murmur heard.     No gallop.   Pulmonary:      Effort: No respiratory distress.      Breath sounds: No stridor. Rhonchi and rales present. No wheezing.   Chest:      Chest wall: No tenderness.         ROS  Review of Systems   Respiratory: Positive for cough and shortness of breath. Negative for wheezing and stridor.    Cardiovascular: Negative for chest pain, palpitations and leg swelling.              Total time spent with patient greater than: 45 Minutes

## 2023-05-06 NOTE — PROGRESS NOTES
"     LOS: 2 days   Patient Care Team:  Shaylee Mcdaniels MD as PCP - General (Family Medicine)  Richardson Willis MD as Cardiologist (Cardiology)  Rafy Rodriguez MD as Consulting Physician (Hematology and Oncology)  Christianne Og LPN as Licensed Practical Nurse    Subjective:  Pt seen and examined at bedside.   No new complaints and has had symptom improvement.   Pt states she will not discharge today because she has no way home because all her family is at a [place] party, and 'Dr Roy did not say anything to me about discharging today.\"    Explained to pt I spoke with Dr Roy and he felt she was fine for discharge today.     Will hold DC today and try again Sunday.       Review of Systems:   Review of Systems   Constitutional: Positive for activity change. Negative for appetite change, fatigue and fever.   HENT: Negative for trouble swallowing.    Eyes: Negative for visual disturbance.   Respiratory: Positive for cough and shortness of breath.    Cardiovascular: Negative.    Gastrointestinal: Negative.    Endocrine: Negative.    Genitourinary: Negative for dysuria.   Musculoskeletal: Negative.    Skin: Negative.    Neurological: Positive for weakness.   Hematological: Negative.    Psychiatric/Behavioral: Positive for agitation.           Vital Signs  Temp:  [97.3 °F (36.3 °C)-97.9 °F (36.6 °C)] 97.3 °F (36.3 °C)  Heart Rate:  [] 97  Resp:  [15-22] 20  BP: (119-164)/(44-70) 139/70    Physical Exam:  Physical Exam  Constitutional:       General: She is not in acute distress.     Appearance: She is not ill-appearing.   HENT:      Head: Normocephalic and atraumatic.      Nose: Nose normal.      Mouth/Throat:      Mouth: Mucous membranes are moist.      Pharynx: Oropharynx is clear.   Eyes:      General: No scleral icterus.     Conjunctiva/sclera: Conjunctivae normal.   Cardiovascular:      Rate and Rhythm: Normal rate and regular rhythm.      Pulses: Normal pulses.      Heart sounds: Normal heart sounds. "   Pulmonary:      Effort: Pulmonary effort is normal. No respiratory distress.      Breath sounds: Normal breath sounds. No wheezing, rhonchi or rales.   Abdominal:      General: Bowel sounds are normal. There is no distension.      Palpations: Abdomen is soft.      Tenderness: There is no abdominal tenderness. There is no guarding.   Musculoskeletal:      Cervical back: Normal range of motion and neck supple.      Right lower leg: No edema.      Left lower leg: No edema.      Comments: Able to move all extremities at side of bed   Skin:     General: Skin is warm and dry.      Capillary Refill: Capillary refill takes less than 2 seconds.   Neurological:      Mental Status: She is alert and oriented to person, place, and time.   Psychiatric:         Mood and Affect: Mood normal.         Behavior: Behavior normal.          Radiology:  XR Chest 1 View    Result Date: 5/2/2023  Impression: 1. Near complete resolution of left base opacity and left effusion. Electronically Signed: Daniel Riddle  5/2/2023 2:46 PM EDT  Workstation ID: [id number]         Results Review:     I reviewed the patient's new clinical results.  I reviewed the patient's new imaging results and agree with the interpretation.    Medication Review:   Scheduled Meds:amLODIPine, 10 mg, Oral, Daily  apixaban, 5 mg, Oral, Q12H  budesonide-formoterol, 2 puff, Inhalation, BID - RT  carvedilol, 12.5 mg, Oral, BID With Meals  cefTRIAXone, 1 g, Intravenous, Q24H  folic acid-pyridoxine-cyanocobalamin, 1 tablet, Oral, Daily  guaiFENesin, 600 mg, Oral, Q12H  ipratropium-albuterol, 3 mL, Nebulization, Q6H While Awake - RT  lisinopril, 40 mg, Oral, Daily  montelukast, 10 mg, Oral, Nightly  potassium chloride, 10 mEq, Oral, Daily  predniSONE, 40 mg, Oral, Daily With Breakfast  rosuvastatin, 10 mg, Oral, Daily  senna-docusate sodium, 2 tablet, Oral, BID  sildenafil, 20 mg, Oral, Q8H  sodium chloride, 10 mL, Intravenous, Q12H      Continuous Infusions:   PRN Meds:.•   acetaminophen  •  albuterol sulfate HFA  •  senna-docusate sodium **AND** polyethylene glycol **AND** bisacodyl **AND** bisacodyl  •  cloNIDine  •  ondansetron **OR** ondansetron  •  sodium chloride  •  sodium chloride    Labs:    CBC    Results from last 7 days   Lab Units 05/05/23  1546 05/03/23  0413 05/02/23  1456   WBC 10*3/mm3 13.80* 7.50 12.40*   HEMOGLOBIN g/dL 15.0 14.1 15.3   PLATELETS 10*3/mm3 248 189 199     BMP   Results from last 7 days   Lab Units 05/05/23  1737 05/03/23  0413 05/02/23  1456   SODIUM mmol/L 138 141 140   POTASSIUM mmol/L 4.5 4.1 3.5   CHLORIDE mmol/L 101 101 100   CO2 mmol/L 24.0 28.0 27.0   BUN mg/dL 25* 26* 18   CREATININE mg/dL 0.97 1.25* 0.93   GLUCOSE mg/dL 172* 141* 100*     Cr Clearance Estimated Creatinine Clearance: 45.2 mL/min (by C-G formula based on SCr of 0.97 mg/dL).  Coag     HbA1C   Lab Results   Component Value Date    HGBA1C 5.8 (H) 10/25/2022     Blood Glucose No results found for: POCGLU  Infection   Results from last 7 days   Lab Units 05/02/23  1638 05/02/23  1456   BLOODCX  No growth at 3 days No growth at 4 days     CMP   Results from last 7 days   Lab Units 05/05/23  1737 05/03/23  0413 05/02/23  1456   SODIUM mmol/L 138 141 140   POTASSIUM mmol/L 4.5 4.1 3.5   CHLORIDE mmol/L 101 101 100   CO2 mmol/L 24.0 28.0 27.0   BUN mg/dL 25* 26* 18   CREATININE mg/dL 0.97 1.25* 0.93   GLUCOSE mg/dL 172* 141* 100*   ALBUMIN g/dL  --   --  3.9   BILIRUBIN mg/dL  --   --  0.7   ALK PHOS U/L  --   --  83   AST (SGOT) U/L  --   --  14   ALT (SGPT) U/L  --   --  17     UA      Radiology(recent) No radiology results for the last day   Assessment/Plan:    Leukocytosis:   -WBC 13.8 5/5/2023- likely due to steroid use  -No labs as of yet for 5/6/2023.     COPD Exacerbation:   -Appreciate input from pulmonary  -Continue current treatment  -Improved    Severe Pulmonary Hypertension:   -sildenafil per pulmonary  -work-up on-going per pulmonary    Acute Parainfluenza:    -stable  -Improved    HTN:   Stable    HLD:   -tolerates stating    History of DVT/PE:   -longterm Eliquis    VTE Prophylaxis:  On Eliquis therapy longterm  GI Prophylaxis: Pepcid    DC PLAN:  Routine to home.  Patient declined discharge today and agreeable to DC on 5/7/2023.           Kasey Diane, APRN  05/06/23  16:19 EDT

## 2023-05-06 NOTE — PLAN OF CARE
Pt resting well. Pt very pleasant. Pt requests not to have crestor in the morning or stool softeners at night. Will pass this along to dayshift nurse. Vitals are stable. Will continue to monitor.   Problem: Adult Inpatient Plan of Care  Goal: Absence of Hospital-Acquired Illness or Injury  Intervention: Identify and Manage Fall Risk  Recent Flowsheet Documentation  Taken 5/6/2023 0200 by Guanakito Card LPN  Safety Promotion/Fall Prevention: safety round/check completed  Taken 5/6/2023 0000 by Guanakito Card LPN  Safety Promotion/Fall Prevention:   activity supervised   assistive device/personal items within reach   clutter free environment maintained   fall prevention program maintained   lighting adjusted   mobility aid in reach   nonskid shoes/slippers when out of bed   room organization consistent  Taken 5/5/2023 2200 by Guanakito Card LPN  Safety Promotion/Fall Prevention: safety round/check completed  Taken 5/5/2023 2000 by Guanakito Card LPN  Safety Promotion/Fall Prevention:   activity supervised   assistive device/personal items within reach   clutter free environment maintained   fall prevention program maintained   mobility aid in reach   lighting adjusted   nonskid shoes/slippers when out of bed   room organization consistent   safety round/check completed  Intervention: Prevent Skin Injury  Recent Flowsheet Documentation  Taken 5/6/2023 0000 by Guanakito Card LPN  Body Position: position changed independently  Taken 5/5/2023 2000 by Guanakito Card LPN  Body Position: position changed independently  Intervention: Prevent and Manage VTE (Venous Thromboembolism) Risk  Recent Flowsheet Documentation  Taken 5/5/2023 2000 by Guanakito Card LPN  Activity Management: up ad maribell  Intervention: Prevent Infection  Recent Flowsheet Documentation  Taken 5/6/2023 0000 by Guanakito Card LPN  Infection Prevention:   visitors restricted/screened   single patient room provided   rest/sleep  promoted   personal protective equipment utilized   hand hygiene promoted  Taken 5/5/2023 2000 by Guanakito Card LPN  Infection Prevention:   visitors restricted/screened   single patient room provided   rest/sleep promoted   hand hygiene promoted   personal protective equipment utilized  Goal: Optimal Comfort and Wellbeing  Intervention: Monitor Pain and Promote Comfort  Recent Flowsheet Documentation  Taken 5/6/2023 0000 by Guanakito Card LPN  Pain Management Interventions:   see MAR   quiet environment facilitated   pain management plan reviewed with patient/caregiver  Taken 5/5/2023 2000 by Guanakito Card LPN  Pain Management Interventions:   see MAR   quiet environment facilitated   pain management plan reviewed with patient/caregiver  Intervention: Provide Person-Centered Care  Recent Flowsheet Documentation  Taken 5/5/2023 2000 by Guanakito Card LPN  Trust Relationship/Rapport:   care explained   questions answered   questions encouraged   Goal Outcome Evaluation:

## 2023-05-07 PROBLEM — B34.8 PARAINFLUENZA INFECTION: Status: ACTIVE | Noted: 2023-05-03

## 2023-05-07 PROBLEM — J11.1 INFLUENZA: Status: RESOLVED | Noted: 2017-04-03 | Resolved: 2023-05-07

## 2023-05-07 PROBLEM — I26.99 BILATERAL PULMONARY EMBOLISM: Status: RESOLVED | Noted: 2019-07-24 | Resolved: 2023-05-07

## 2023-05-07 PROBLEM — Z86.73 HISTORY OF TIA (TRANSIENT ISCHEMIC ATTACK): Status: ACTIVE | Noted: 2022-10-25

## 2023-05-07 PROBLEM — R60.0 BILATERAL LEG EDEMA: Status: RESOLVED | Noted: 2023-02-24 | Resolved: 2023-05-07

## 2023-05-07 PROBLEM — I27.20 SEVERE PULMONARY HYPERTENSION: Status: ACTIVE | Noted: 2023-03-03

## 2023-05-07 PROBLEM — J96.21 ACUTE ON CHRONIC RESPIRATORY FAILURE WITH HYPOXIA: Status: ACTIVE | Noted: 2023-05-07

## 2023-05-07 PROBLEM — Z17.0 MALIGNANT NEOPLASM OF LEFT BREAST IN FEMALE, ESTROGEN RECEPTOR POSITIVE: Status: RESOLVED | Noted: 2019-07-18 | Resolved: 2023-05-07

## 2023-05-07 PROBLEM — N18.31 STAGE 3A CHRONIC KIDNEY DISEASE: Chronic | Status: ACTIVE | Noted: 2019-07-24

## 2023-05-07 PROBLEM — C50.912 MALIGNANT NEOPLASM OF LEFT BREAST IN FEMALE, ESTROGEN RECEPTOR POSITIVE: Status: RESOLVED | Noted: 2019-07-18 | Resolved: 2023-05-07

## 2023-05-07 PROBLEM — I10 PRIMARY HYPERTENSION: Chronic | Status: ACTIVE | Noted: 2021-09-10

## 2023-05-07 PROBLEM — E87.6 HYPOKALEMIA: Status: RESOLVED | Noted: 2019-07-24 | Resolved: 2023-05-07

## 2023-05-07 PROBLEM — I82.403: Status: RESOLVED | Noted: 2019-07-24 | Resolved: 2023-05-07

## 2023-05-07 LAB
BACTERIA SPEC AEROBE CULT: NORMAL
BACTERIA SPEC AEROBE CULT: NORMAL

## 2023-05-07 PROCEDURE — 94799 UNLISTED PULMONARY SVC/PX: CPT

## 2023-05-07 PROCEDURE — 99223 1ST HOSP IP/OBS HIGH 75: CPT | Performed by: INTERNAL MEDICINE

## 2023-05-07 PROCEDURE — 94761 N-INVAS EAR/PLS OXIMETRY MLT: CPT

## 2023-05-07 PROCEDURE — 25010000002 CEFTRIAXONE PER 250 MG: Performed by: INTERNAL MEDICINE

## 2023-05-07 PROCEDURE — 94664 DEMO&/EVAL PT USE INHALER: CPT

## 2023-05-07 PROCEDURE — 63710000001 ONDANSETRON PER 8 MG: Performed by: NURSE PRACTITIONER

## 2023-05-07 PROCEDURE — 63710000001 PREDNISONE PER 1 MG: Performed by: INTERNAL MEDICINE

## 2023-05-07 RX ORDER — PREDNISONE 10 MG/1
10 TABLET ORAL DAILY
Status: DISCONTINUED | OUTPATIENT
Start: 2023-05-12 | End: 2023-05-11

## 2023-05-07 RX ORDER — PREDNISONE 20 MG/1
20 TABLET ORAL DAILY
Status: COMPLETED | OUTPATIENT
Start: 2023-05-10 | End: 2023-05-11

## 2023-05-07 RX ADMIN — IPRATROPIUM BROMIDE AND ALBUTEROL SULFATE 3 ML: 2.5; .5 SOLUTION RESPIRATORY (INHALATION) at 07:03

## 2023-05-07 RX ADMIN — CEFTRIAXONE 1 G: 1 INJECTION, POWDER, FOR SOLUTION INTRAMUSCULAR; INTRAVENOUS at 17:09

## 2023-05-07 RX ADMIN — CARVEDILOL 12.5 MG: 6.25 TABLET, FILM COATED ORAL at 07:23

## 2023-05-07 RX ADMIN — IPRATROPIUM BROMIDE AND ALBUTEROL SULFATE 3 ML: 2.5; .5 SOLUTION RESPIRATORY (INHALATION) at 11:10

## 2023-05-07 RX ADMIN — POTASSIUM CHLORIDE 10 MEQ: 750 TABLET, EXTENDED RELEASE ORAL at 07:23

## 2023-05-07 RX ADMIN — PREDNISONE 40 MG: 20 TABLET ORAL at 07:22

## 2023-05-07 RX ADMIN — CARVEDILOL 12.5 MG: 6.25 TABLET, FILM COATED ORAL at 17:09

## 2023-05-07 RX ADMIN — SILDENAFIL CITRATE 20 MG: 20 TABLET ORAL at 20:30

## 2023-05-07 RX ADMIN — MONTELUKAST 10 MG: 10 TABLET, FILM COATED ORAL at 20:30

## 2023-05-07 RX ADMIN — SILDENAFIL CITRATE 20 MG: 20 TABLET ORAL at 05:23

## 2023-05-07 RX ADMIN — IPRATROPIUM BROMIDE AND ALBUTEROL SULFATE 3 ML: 2.5; .5 SOLUTION RESPIRATORY (INHALATION) at 20:41

## 2023-05-07 RX ADMIN — BUDESONIDE AND FORMOTEROL FUMARATE DIHYDRATE 2 PUFF: 160; 4.5 AEROSOL RESPIRATORY (INHALATION) at 07:07

## 2023-05-07 RX ADMIN — FOLIC ACID-PYRIDOXINE-CYANOCOBALAMIN TAB 2.5-25-2 MG 1 TABLET: 2.5-25-2 TAB at 07:21

## 2023-05-07 RX ADMIN — ONDANSETRON HYDROCHLORIDE 4 MG: 4 TABLET, FILM COATED ORAL at 05:26

## 2023-05-07 RX ADMIN — SILDENAFIL CITRATE 20 MG: 20 TABLET ORAL at 13:20

## 2023-05-07 RX ADMIN — LISINOPRIL 40 MG: 20 TABLET ORAL at 07:23

## 2023-05-07 RX ADMIN — APIXABAN 5 MG: 5 TABLET, FILM COATED ORAL at 20:30

## 2023-05-07 RX ADMIN — Medication 10 ML: at 07:21

## 2023-05-07 RX ADMIN — Medication 10 ML: at 20:30

## 2023-05-07 RX ADMIN — BUDESONIDE AND FORMOTEROL FUMARATE DIHYDRATE 2 PUFF: 160; 4.5 AEROSOL RESPIRATORY (INHALATION) at 20:50

## 2023-05-07 RX ADMIN — APIXABAN 5 MG: 5 TABLET, FILM COATED ORAL at 07:23

## 2023-05-07 RX ADMIN — FUROSEMIDE 20 MG: 20 TABLET ORAL at 07:22

## 2023-05-07 RX ADMIN — GUAIFENESIN 600 MG: 600 TABLET, EXTENDED RELEASE ORAL at 20:30

## 2023-05-07 RX ADMIN — GUAIFENESIN 600 MG: 600 TABLET, EXTENDED RELEASE ORAL at 07:22

## 2023-05-07 RX ADMIN — AMLODIPINE BESYLATE 10 MG: 5 TABLET ORAL at 07:22

## 2023-05-07 NOTE — PROGRESS NOTES
LOS: 3 days   Patient Care Team:  Shaylee Mcdaniels MD as PCP - General (Family Medicine)  Richardson Willis MD as Cardiologist (Cardiology)  Rafy Rodriguez MD as Consulting Physician (Hematology and Oncology)  Christianne Og LPN as Licensed Practical Nurse    Subjective:  Pt seen and examined at bedside.  Dgt and granddaughter are present.   Pt continues to be stable and improved.   Reports she is not able to discharge home today due to no electricity.   Plan will be to discharge home on Monday.     Cardiology was consulted due to patient concerns of her heart.  Prior cardiac cath showed non-obstructive CAD and severe pulmonary hypertension, for which cardio instructed for pulmonary follow up.  10/2022 ECHO:  LVEF 70%    Pulmonary, Dr Roy, continues to be ok with pt discharge to home as is cardiology.        Review of Systems:   Review of Systems   Constitutional: Positive for activity change. Negative for appetite change, fatigue and fever.   HENT: Negative for trouble swallowing.    Eyes: Negative for visual disturbance.   Respiratory: Positive for shortness of breath.    Cardiovascular: Negative.    Gastrointestinal: Negative.    Endocrine: Negative.    Genitourinary: Negative for dysuria.   Musculoskeletal: Positive for gait problem.   Skin: Negative.    Neurological: Positive for weakness.   Hematological: Bruises/bleeds easily.        Chronic anticoagulation   Psychiatric/Behavioral: Negative.            Vital Signs  Temp:  [97.1 °F (36.2 °C)-98.2 °F (36.8 °C)] 97.1 °F (36.2 °C)  Heart Rate:  [] 76  Resp:  [17-23] 18  BP: ()/(54-84) 98/55    Physical Exam:  Physical Exam  Constitutional:       General: She is not in acute distress.     Comments: Thin  Frail  Chronically-ill appearing   HENT:      Head: Normocephalic and atraumatic.      Nose: Nose normal.      Mouth/Throat:      Mouth: Mucous membranes are moist.      Pharynx: Oropharynx is clear.   Eyes:      General: No scleral  icterus.     Conjunctiva/sclera: Conjunctivae normal.   Cardiovascular:      Rate and Rhythm: Normal rate and regular rhythm.      Pulses: Normal pulses.      Heart sounds: Normal heart sounds. No murmur heard.    No friction rub. No gallop.   Pulmonary:      Effort: Pulmonary effort is normal. No respiratory distress.      Breath sounds: Normal breath sounds. No wheezing, rhonchi or rales.      Comments: No conversational dyspnea  CTA bilateral  No cough or SOA noted during exam  Abdominal:      General: Bowel sounds are normal. There is no distension.      Palpations: Abdomen is soft.      Tenderness: There is no abdominal tenderness. There is no guarding.   Musculoskeletal:      Cervical back: Normal range of motion and neck supple.      Right lower leg: No edema.      Left lower leg: No edema.      Comments: Able to move all extremities on side of bed and in bed   Skin:     General: Skin is warm and dry.      Capillary Refill: Capillary refill takes less than 2 seconds.   Neurological:      Mental Status: She is alert and oriented to person, place, and time.   Psychiatric:         Mood and Affect: Mood normal.         Behavior: Behavior normal.          Radiology:  XR Chest 1 View    Result Date: 5/2/2023  Impression: 1. Near complete resolution of left base opacity and left effusion. Electronically Signed: Daniel Riddle  5/2/2023 2:46 PM EDT  Workstation ID: [id number]         Results Review:     I reviewed the patient's new clinical results.  I reviewed the patient's new imaging results and agree with the interpretation.    Medication Review:   Scheduled Meds:amLODIPine, 10 mg, Oral, Daily  apixaban, 5 mg, Oral, Q12H  budesonide-formoterol, 2 puff, Inhalation, BID - RT  carvedilol, 12.5 mg, Oral, BID With Meals  cefTRIAXone, 1 g, Intravenous, Q24H  famotidine, 20 mg, Oral, Daily  folic acid-pyridoxine-cyanocobalamin, 1 tablet, Oral, Daily  furosemide, 20 mg, Oral, Daily  guaiFENesin, 600 mg, Oral,  Q12H  ipratropium-albuterol, 3 mL, Nebulization, Q6H While Awake - RT  lisinopril, 40 mg, Oral, Daily  montelukast, 10 mg, Oral, Nightly  potassium chloride, 10 mEq, Oral, Daily  [START ON 5/8/2023] predniSONE, 30 mg, Oral, Daily   Followed by  [START ON 5/10/2023] predniSONE, 20 mg, Oral, Daily   Followed by  [START ON 5/12/2023] predniSONE, 10 mg, Oral, Daily  rosuvastatin, 10 mg, Oral, Daily  senna-docusate sodium, 2 tablet, Oral, BID  sildenafil, 20 mg, Oral, Q8H  sodium chloride, 10 mL, Intravenous, Q12H      Continuous Infusions:   PRN Meds:.•  acetaminophen  •  albuterol sulfate HFA  •  senna-docusate sodium **AND** polyethylene glycol **AND** bisacodyl **AND** bisacodyl  •  cloNIDine  •  ondansetron **OR** ondansetron  •  sodium chloride  •  sodium chloride    Labs:    CBC    Results from last 7 days   Lab Units 05/06/23  1640 05/05/23  1546 05/03/23  0413 05/02/23  1456   WBC 10*3/mm3 10.80 13.80* 7.50 12.40*   HEMOGLOBIN g/dL 14.8 15.0 14.1 15.3   PLATELETS 10*3/mm3 268 248 189 199     BMP   Results from last 7 days   Lab Units 05/05/23  1737 05/03/23  0413 05/02/23  1456   SODIUM mmol/L 138 141 140   POTASSIUM mmol/L 4.5 4.1 3.5   CHLORIDE mmol/L 101 101 100   CO2 mmol/L 24.0 28.0 27.0   BUN mg/dL 25* 26* 18   CREATININE mg/dL 0.97 1.25* 0.93   GLUCOSE mg/dL 172* 141* 100*     Cr Clearance Estimated Creatinine Clearance: 45.2 mL/min (by C-G formula based on SCr of 0.97 mg/dL).  Coag     HbA1C   Lab Results   Component Value Date    HGBA1C 5.8 (H) 10/25/2022     Blood Glucose No results found for: POCGLU  Infection   Results from last 7 days   Lab Units 05/02/23  1638 05/02/23  1456   BLOODCX  No growth at 4 days No growth at 5 days     CMP   Results from last 7 days   Lab Units 05/05/23  1737 05/03/23  0413 05/02/23  1456   SODIUM mmol/L 138 141 140   POTASSIUM mmol/L 4.5 4.1 3.5   CHLORIDE mmol/L 101 101 100   CO2 mmol/L 24.0 28.0 27.0   BUN mg/dL 25* 26* 18   CREATININE mg/dL 0.97 1.25* 0.93   GLUCOSE  mg/dL 172* 141* 100*   ALBUMIN g/dL  --   --  3.9   BILIRUBIN mg/dL  --   --  0.7   ALK PHOS U/L  --   --  83   AST (SGOT) U/L  --   --  14   ALT (SGPT) U/L  --   --  17     UA      Radiology(recent) No radiology results for the last day   Assessment/Plan:  Leukocytosis:   -Resolved and felt due to steroid therapy  -Remains afebrile and stable     COPD Exacerbation (parainfluenza virus):   -Appreciate input from pulmonary  -Continue current treatment  -Improved  -Pt requested nebulizer and nebulized mediations that pulmonary, Dr Roy, has recommended.  Will need to order at time of discharge to home.  Uses [facility] for DME.      Severe Pulmonary Hypertension:   -This is chronic in nature and stable.   -sildenafil per pulmonary  -work-up on-going per pulmonary     Acute Parainfluenza:   -stable  -Improved    Afib:   -anticoagulated on Eliquis  -Asymptomatic  -continue follow up with cardio as outpatient  -h/o IVC filter     HTN:   Stable    HFpEF:   -Stable    CKD 3A:   -Stable    CAD:   -tolerates statin therapy  -non-obstructive disease per last heart cath     HLD:   -tolerates stating     History of DVT/PE:   -longterm Eliquis  -h/o IVC filter     VTE Prophylaxis:  On Eliquis therapy longterm  GI Prophylaxis: Pepcid     DC PLAN:  Routine to home.  Patient declined discharge today and agreeable to DC on 5/8/2023.  Pt reports she has no electricity and she is not able to stay with her dgt or granddaughter.  Pt will need nebulizer (I ordered on 5/7/2023), and nebulized medications for home use.   Current with [facility] for DME.             Kasey Diane, APRN  05/07/23  15:56 EDT

## 2023-05-07 NOTE — PROGRESS NOTES
Daily Progress Note        Acute exacerbation of chronic obstructive pulmonary disease (COPD)         Assessment:     Severe pulmonary hypertension mean PA pressure 44   RHC March 2023  RA 6/5, 4 mmHg  RV 74/3, 5 mmHg  PA 71/25, 44 mmHg  PCW 12 mmHg  AO Sat 92%  PA Sat 78%  Jose CO: 7.89 L/min  Jose CI: 4.69 L/min/m²  LHC   LV: 134/3, 20 mmHg  AO: 131/56, 87 mmHg  No significant gradient across the aortic valve during pullback of JR4 catheter.     Severe calcified tortuous nonobstructive coronary artery disease    Patient had CT with IV contrast in October 2022 showed no pulmonary embolism    Chronic kidney disease stage 3   GERD   Hypertension  Breast cancer  Pulmonary embolism and DVT        Recommendations:     cont Revatio , Avoid nitrates    Work-up for pulmonary hypertension ongoing  Work-up for connective tissue disease, REN, ANCA, scleroderma panel ordered, pending results  Pulmonary function test as an outpatient  Sleep apnea test as an outpatient  Singulair    antibiotics  Rocphine will be completed on 5/8/2023    Steroids wean, DC prednisone 40 mg daily, start 6-day taper    Oxygen titration, patient has home oxygen    As an outpatient other treatment for pulmonary hypertension will be considered such as Uptravi              LOS: 3 days     Subjective         Objective     Vital signs for last 24 hours:  Vitals:    05/07/23 0706 05/07/23 0707 05/07/23 0708 05/07/23 0752   BP:    147/54   BP Location:    Right arm   Patient Position:    Lying   Pulse: 82 74 75 74   Resp: 18 18 18 18   Temp:    97.1 °F (36.2 °C)   TempSrc:    Oral   SpO2: 92% 96% 98% 98%   Weight:       Height:           Intake/Output last 3 shifts:  I/O last 3 completed shifts:  In: 1440 [P.O.:1440]  Out: -   Intake/Output this shift:  No intake/output data recorded.      Radiology  Imaging Results (Last 24 Hours)     ** No results found for the last 24 hours. **          Labs:  Results from last 7 days   Lab Units 05/06/23  1640   WBC  10*3/mm3 10.80   HEMOGLOBIN g/dL 14.8   HEMATOCRIT % 46.6   PLATELETS 10*3/mm3 268     Results from last 7 days   Lab Units 05/05/23  1737 05/03/23  0413 05/02/23  1456   SODIUM mmol/L 138   < > 140   POTASSIUM mmol/L 4.5   < > 3.5   CHLORIDE mmol/L 101   < > 100   CO2 mmol/L 24.0   < > 27.0   BUN mg/dL 25*   < > 18   CREATININE mg/dL 0.97   < > 0.93   CALCIUM mg/dL 9.7   < > 10.1   BILIRUBIN mg/dL  --   --  0.7   ALK PHOS U/L  --   --  83   ALT (SGPT) U/L  --   --  17   AST (SGOT) U/L  --   --  14   GLUCOSE mg/dL 172*   < > 100*    < > = values in this interval not displayed.         Results from last 7 days   Lab Units 05/02/23  1456   ALBUMIN g/dL 3.9     Results from last 7 days   Lab Units 05/02/23  1456   HSTROP T ng/L 12*                           Meds:   SCHEDULE  amLODIPine, 10 mg, Oral, Daily  apixaban, 5 mg, Oral, Q12H  budesonide-formoterol, 2 puff, Inhalation, BID - RT  carvedilol, 12.5 mg, Oral, BID With Meals  cefTRIAXone, 1 g, Intravenous, Q24H  famotidine, 20 mg, Oral, Daily  folic acid-pyridoxine-cyanocobalamin, 1 tablet, Oral, Daily  furosemide, 20 mg, Oral, Daily  guaiFENesin, 600 mg, Oral, Q12H  ipratropium-albuterol, 3 mL, Nebulization, Q6H While Awake - RT  lisinopril, 40 mg, Oral, Daily  montelukast, 10 mg, Oral, Nightly  potassium chloride, 10 mEq, Oral, Daily  predniSONE, 40 mg, Oral, Daily With Breakfast  rosuvastatin, 10 mg, Oral, Daily  senna-docusate sodium, 2 tablet, Oral, BID  sildenafil, 20 mg, Oral, Q8H  sodium chloride, 10 mL, Intravenous, Q12H      Infusions     PRNs  •  acetaminophen  •  albuterol sulfate HFA  •  senna-docusate sodium **AND** polyethylene glycol **AND** bisacodyl **AND** bisacodyl  •  cloNIDine  •  ondansetron **OR** ondansetron  •  sodium chloride  •  sodium chloride    Physical Exam:  Physical Exam  Cardiovascular:      Heart sounds: Murmur heard.     No gallop.   Pulmonary:      Effort: No respiratory distress.      Breath sounds: No stridor. Rhonchi and rales  present. No wheezing.   Chest:      Chest wall: No tenderness.         ROS  Review of Systems   Respiratory: Positive for cough and shortness of breath. Negative for wheezing and stridor.    Cardiovascular: Negative for chest pain, palpitations and leg swelling.             Total time spent with patient greater than: 45 Minutes

## 2023-05-07 NOTE — CONSULTS
Referring Provider: Rosamaria Jin MD    Reason for Consultation: Pulmonary hypertension      Patient Care Team:  Shaylee Mcdaniels MD as PCP - General (Family Medicine)  Richardson Willis MD as Cardiologist (Cardiology)  Rafy Rodriguez MD as Consulting Physician (Hematology and Oncology)  Christianne Og LPN as Licensed Practical Nurse      SUBJECTIVE     Chief Complaint: Dyspnea with exertion and shortness of breath    History of present illness:  Gabrielle Lyles is a 83 y.o. female with history of breast cancer, pulmonary embolism, DVT on chronic anticoagulation, chronic kidney disease stage IIIa, COPD, chronic hypoxic respiratory failure and severe pulmonary hypertension who presented to the emergency room with complaints of shortness of breath and dyspnea on exertion.  Work-up in the emergency room revealed acute COPD exacerbation with acute bronchitis and hypoxemia.  Cardiology has been consulted in association to work with pulmonology.  The patient currently denies any chest pain however she reports that her shortness of breath persist.  She also reports dyspnea on exertion which has been getting worse.  She is having to wear oxygen continuously now.  She denies any other complaints.  Her daughter and granddaughter are at bedside.    REVIEW OF SYSTEMS:    Constitutional: No weakness,fatigue, fever, rigors, chills   Eyes: No vision changes, eye pain   ENT/oropharynx: No difficulty swallowing, sore throat, epistaxis, changes in hearing   Cardiovascular: No chest pain, chest tightness, palpitations, paroxysmal nocturnal dyspnea, orthopnea, diaphoresis, dizziness / syncopal episode   Respiratory: + shortness of breath, + dyspnea on exertion, cough, wheezing hemoptysis   Gastrointestinal: No abdominal pain, nausea, vomiting, diarrhea, bloody stools   Genitourinary: No hematuria, dysuria   Neurological: No headache, tremors, numbness,  one-sided weakness    Musculoskeletal: No cramps, myalgias,  joint pain,  joint swelling   Integument: No rash, edema       Personal History:      Past Medical History:   Diagnosis Date   • COPD (chronic obstructive pulmonary disease)    • Deep vein thrombosis of bilateral lower extremities 2019   • History of DVT (deep vein thrombosis) 2013    bilateral lower extremity   • History of pneumonia 2012    community acquired pneumonia and right pleural effusion;hospitalized at [facility]   • History of pulmonary embolism 2013    bilateral PE   • Hypertension    • Insomnia     on Ambien 5mg at    • Lobular breast cancer, left 2011    Stage IA left upper lobular breast cancer   • Malignant neoplasm of left breast in female, estrogen receptor positive 2019   • Osteopenia    • Severe pulmonary hypertension 3/3/2023   • Stage 3a chronic kidney disease 2019   • TIA (transient ischemic attack) 10/25/2022       Past Surgical History:   Procedure Laterality Date   • CARDIAC CATHETERIZATION N/A 3/3/2023    Procedure: Left and Right Heart Cath with Coronary Angiography;  Surgeon: Richardson Willis MD;  Location: [facility] INVASIVE LOCATION;  Service: Cardiovascular;  Laterality: N/A;   • CATARACT EXTRACTION     • IVC FILTER RETRIEVAL  2014    IVC filter placement by Dr. Card   • MAMMO STEREOTACTIC BREAST BX SURGICAL ADD UNI Left 2011    invasive lobular carcinoma-Dr. Cande Daniel   • MASTECTOMY, PARTIAL Left 2011    and left axillary sentinel lymph node biopsy by Dr. Uriostegui   • TUBAL ABDOMINAL LIGATION         Family History   Problem Relation Age of Onset   • Lung cancer Brother        Social History     Tobacco Use   • Smoking status: Former     Types: Cigarettes     Quit date:      Years since quittin.3     Passive exposure: Never   • Smokeless tobacco: Never   • Tobacco comments:     smoked 6 cigarettes a day from 13 years of age to 56 years of age when she quit in    Vaping Use   • Vaping Use: Never used   Substance Use  Topics   • Alcohol use: Not Currently   • Drug use: No        Medications Prior to Admission   Medication Sig Dispense Refill Last Dose   • amLODIPine (NORVASC) 10 MG tablet Take 1 tablet by mouth Daily. 90 tablet 3 5/2/2023   • apixaban (ELIQUIS) 5 MG tablet tablet Take 1 tablet by mouth Every 12 (Twelve) Hours. Indications: Other - full anticoagulation, history of DVT/PE 60 tablet 2 5/2/2023   • carvedilol (COREG) 12.5 MG tablet Take 1 tablet by mouth 2 (Two) Times a Day With Meals. 180 tablet 3 5/2/2023   • Cholecalciferol (Vitamin D3) 50 MCG (2000 UT) capsule Take 1 capsule by mouth Daily.   5/2/2023   • famotidine (PEPCID) 20 MG tablet Take 1 tablet by mouth As Needed.   Past Month   • Folbic 2.5-25-2 MG tablet tablet Take 1 tablet by mouth Daily.   5/2/2023   • furosemide (LASIX) 40 MG tablet Take 1 tablet by mouth Daily. 90 tablet 3 5/2/2023   • lisinopril (PRINIVIL,ZESTRIL) 40 MG tablet Take 1 tablet by mouth Daily.   5/2/2023   • potassium chloride (K-DUR,KLOR-CON) 10 MEQ CR tablet Take 1 tablet by mouth Daily. 90 tablet 3 5/2/2023   • SYMBICORT 80-4.5 MCG/ACT inhaler Inhale 2 puffs 2 (Two) Times a Day.  5 5/2/2023   • VENTOLIN  (90 Base) MCG/ACT inhaler Inhale 2 puffs Every 4 (Four) Hours As Needed.  5 5/2/2023   • cloNIDine (CATAPRES) 0.1 MG tablet Take 1 tablet by mouth 2 (Two) Times a Day As Needed for High Blood Pressure (SBP greater than 170). 15 tablet 0 More than a month        Allergies:     Patient has no known allergies.    Scheduled Meds:amLODIPine, 10 mg, Oral, Daily  apixaban, 5 mg, Oral, Q12H  budesonide-formoterol, 2 puff, Inhalation, BID - RT  carvedilol, 12.5 mg, Oral, BID With Meals  cefTRIAXone, 1 g, Intravenous, Q24H  famotidine, 20 mg, Oral, Daily  folic acid-pyridoxine-cyanocobalamin, 1 tablet, Oral, Daily  furosemide, 20 mg, Oral, Daily  guaiFENesin, 600 mg, Oral, Q12H  ipratropium-albuterol, 3 mL, Nebulization, Q6H While Awake - RT  lisinopril, 40 mg, Oral,  "Daily  montelukast, 10 mg, Oral, Nightly  potassium chloride, 10 mEq, Oral, Daily  [START ON 5/8/2023] predniSONE, 30 mg, Oral, Daily   Followed by  [START ON 5/10/2023] predniSONE, 20 mg, Oral, Daily   Followed by  [START ON 5/12/2023] predniSONE, 10 mg, Oral, Daily  rosuvastatin, 10 mg, Oral, Daily  senna-docusate sodium, 2 tablet, Oral, BID  sildenafil, 20 mg, Oral, Q8H  sodium chloride, 10 mL, Intravenous, Q12H      Continuous Infusions:   PRN Meds:•  acetaminophen  •  albuterol sulfate HFA  •  senna-docusate sodium **AND** polyethylene glycol **AND** bisacodyl **AND** bisacodyl  •  cloNIDine  •  ondansetron **OR** ondansetron  •  sodium chloride  •  sodium chloride      OBJECTIVE    Vital Signs  Vitals:    05/07/23 0708 05/07/23 0752 05/07/23 1110 05/07/23 1115   BP:  147/54     BP Location:  Right arm     Patient Position:  Lying     Pulse: 75 74 74 76   Resp: 18 18 18 18   Temp:  97.1 °F (36.2 °C)     TempSrc:  Oral     SpO2: 98% 98% 92% 97%   Weight:       Height:           Flowsheet Rows    Flowsheet Row First Filed Value   Admission Height 162.6 cm (64\") Documented at 05/02/2023 1310   Admission Weight 61.5 kg (135 lb 9.3 oz) Documented at 05/02/2023 1310            Intake/Output Summary (Last 24 hours) at 5/7/2023 1223  Last data filed at 5/6/2023 2013  Gross per 24 hour   Intake 720 ml   Output --   Net 720 ml        Telemetry: Sinus rhythm    Physical Exam:  The patient is alert, oriented and in no distress.  Vital signs as noted above.  Head and neck revealed no carotid bruits or jugular venous distention.  No thyromegaly or lymph adenopathy is present  Distant breath sounds, on 4 L nasal cannula.  Heart normal first and second heart sounds.No murmur.  No precordial rub is present.  No gallop is present.  Abdomen soft and nontender.  No organomegaly is present.  Extremities with good peripheral pulses without any pedal edema.  Skin warm and dry.  Musculoskeletal system is grossly normal  CNS grossly " normal    The patient is alert, oriented and in no distress.  Vital signs as noted above.  Head and neck revealed no carotid bruits or jugular venous distention.  No thyromegaly or lymph adenopathy is present  Breathing unlabored.  Bilateral lower lobes diminished.  No wheezing.  O2 @ 4 L per NC.  Heart normal first and second heart sounds. No murmur.  No precordial rub is present.  No gallop is present.  Abdomen soft and nontender.  No organomegaly is present.  Extremities with good peripheral pulses without any pedal edema.  Skin warm and dry.  Musculoskeletal system is grossly normal.  CNS grossly normal.       Results Review:  I have personally reviewed the results from the time of this admission to 5/7/2023 12:23 EDT and agree with these findings:  [x]  Laboratory  [x]  Microbiology  [x]  Radiology  [x]  EKG/Telemetry   [x]  Cardiology/Vascular   []  Pathology  [x]  Old records  []  Other:    Most notable findings include:     Lab Results (last 24 hours)     Procedure Component Value Units Date/Time    CBC & Differential [[id number]]  (Abnormal) Collected: 05/06/23 1640    Specimen: Blood Updated: 05/06/23 1701    Narrative:      The following orders were created for panel order CBC & Differential.  Procedure                               Abnormality         Status                     ---------                               -----------         ------                     CBC Auto Differential[[id number]]        Abnormal            Final result                 Please view results for these tests on the individual orders.    CBC Auto Differential [[id number]]  (Abnormal) Collected: 05/06/23 1640    Specimen: Blood Updated: 05/06/23 1701     WBC 10.80 10*3/mm3      RBC 5.12 10*6/mm3      Hemoglobin 14.8 g/dL      Hematocrit 46.6 %      MCV 91.1 fL      MCH 28.9 pg      MCHC 31.8 g/dL      RDW 16.9 %      RDW-SD 52.9 fl      MPV 7.9 fL      Platelets 268 10*3/mm3      Neutrophil % 92.1 %      Lymphocyte % 5.3 %       Monocyte % 2.5 %      Eosinophil % 0.0 %      Basophil % 0.1 %      Neutrophils, Absolute 9.90 10*3/mm3      Lymphocytes, Absolute 0.60 10*3/mm3      Monocytes, Absolute 0.30 10*3/mm3      Eosinophils, Absolute 0.00 10*3/mm3      Basophils, Absolute 0.00 10*3/mm3      nRBC 0.1 /100 WBC     Blood Culture - Blood, Arm, Right [[id number]]  (Normal) Collected: 05/02/23 1638    Specimen: Blood from Arm, Right Updated: 05/06/23 1645     Blood Culture No growth at 4 days    Narrative:      Less than seven (7) mL's of blood was collected.  Insufficient quantity may yield false negative results.    Blood Culture - Blood, Arm, Right [[id number]]  (Normal) Collected: 05/02/23 1456    Specimen: Blood from Arm, Right Updated: 05/06/23 1515     Blood Culture No growth at 4 days    Narrative:      Less than seven (7) mL's of blood was collected.  Insufficient quantity may yield false negative results.          Imaging Results (Last 24 Hours)     ** No results found for the last 24 hours. **          LAB RESULTS (LAST 7 DAYS)    CBC  Results from last 7 days   Lab Units 05/06/23  1640 05/05/23  1546 05/03/23  0413 05/02/23  1456   WBC 10*3/mm3 10.80 13.80* 7.50 12.40*   RBC 10*6/mm3 5.12 5.17 4.80 5.26   HEMOGLOBIN g/dL 14.8 15.0 14.1 15.3   HEMATOCRIT % 46.6 47.7* 43.0 46.2   MCV fL 91.1 92.3 89.7 87.7   PLATELETS 10*3/mm3 268 248 189 199       BMP  Results from last 7 days   Lab Units 05/05/23  1737 05/03/23  0413 05/02/23  1456   SODIUM mmol/L 138 141 140   POTASSIUM mmol/L 4.5 4.1 3.5   CHLORIDE mmol/L 101 101 100   CO2 mmol/L 24.0 28.0 27.0   BUN mg/dL 25* 26* 18   CREATININE mg/dL 0.97 1.25* 0.93   GLUCOSE mg/dL 172* 141* 100*       CMP   Results from last 7 days   Lab Units 05/05/23  1737 05/03/23  0413 05/02/23  1456   SODIUM mmol/L 138 141 140   POTASSIUM mmol/L 4.5 4.1 3.5   CHLORIDE mmol/L 101 101 100   CO2 mmol/L 24.0 28.0 27.0   BUN mg/dL 25* 26* 18   CREATININE mg/dL 0.97 1.25* 0.93   GLUCOSE mg/dL 172* 141* 100*    ALBUMIN g/dL  --   --  3.9   BILIRUBIN mg/dL  --   --  0.7   ALK PHOS U/L  --   --  83   AST (SGOT) U/L  --   --  14   ALT (SGPT) U/L  --   --  17       BNP        TROPONIN  Results from last 7 days   Lab Units 05/02/23  1456   HSTROP T ng/L 12*       CoAg        Creatinine Clearance  Estimated Creatinine Clearance: 45.2 mL/min (by C-G formula based on SCr of 0.97 mg/dL).    ABG          Radiology  No radiology results for the last day      EKG  I personally viewed and interpreted the patient's EKG/Telemetry data:  ECG 12 Lead Dyspnea   Final Result   HEART RATE= 57  bpm   RR Interval= 1044  ms   MT Interval= 193  ms   P Horizontal Axis= 119  deg   P Front Axis= 0  deg   QRSD Interval= 95  ms   QT Interval= 428  ms   QRS Axis= 94  deg   T Wave Axis= 61  deg   - OTHERWISE NORMAL ECG -   Sinus bradycardia- previous sinus 4-8-23    Right axis deviation   Low voltage, precordial leads   When compared with ECG of 08-Apr-2023 17:03:30,   non specific ST-T wave changes   Artifact   Electronically Signed By: Alan Baumann (Tin) 03-May-2023 05:46:13   Date and Time of Study: 2023-05-02 13:23:59      SCANNED - TELEMETRY     Final Result      SCANNED - TELEMETRY     Final Result      SCANNED - TELEMETRY     Final Result      SCANNED - TELEMETRY     Final Result      SCANNED - TELEMETRY     Final Result      SCANNED - TELEMETRY     Final Result      SCANNED - TELEMETRY     Final Result      SCANNED - TELEMETRY     Final Result      SCANNED - TELEMETRY     Final Result      SCANNED - TELEMETRY     Final Result      SCANNED - TELEMETRY     Final Result            Echocardiogram:    Results for orders placed in visit on 09/13/22    Adult Transthoracic Echo Complete W/ Cont if Necessary Per Protocol    Interpretation Summary  •  Estimated left ventricular EF = 70% Estimated left ventricular EF was in agreement with the calculated left ventricular EF. Left ventricular systolic function is normal.  •  Left ventricular diastolic  function is consistent with (grade II w/high LAP) pseudonormalization.        Stress Test:  Results for orders placed in visit on 09/13/22    Stress Test With Myocardial Perfusion One Day    Interpretation Summary  •  Left ventricular ejection fraction is hyperdynamic (Calculated EF > 70%). .  •  Myocardial perfusion imaging indicates a normal myocardial perfusion study with no evidence of ischemia.  •  Impressions are consistent with a low risk study.  •  Findings consistent with a normal ECG stress test.        Cardiac Catheterization:  Results for orders placed during the hospital encounter of 03/03/23    Cardiac Catheterization/Vascular Study    Narrative  OPERATORS  Richardson Willis M.D. (Attending Cardiologist)      PROCEDURE PERFORMED  Ultrasound guided vascular access  Right heart catheterization  Coronary Angiogram  Left Heart Catheterization 25840  Moderate Sedation    INDICATIONS FOR PROCEDURE  83-year-old woman with multiple cardiovascular risk factors, abnormal stress test presented with worsening shortness of breath.  After discussing the risk and benefit of the procedure she was brought in for elective right and left heart cath.    PROCEDURE IN DETAIL  Informed consent was obtained from the patient after explaining the risks, benefits, and alternative options of the procedure. After obtaining informed consent, the patient was brought to the cath lab and was prepped in a sterile fashion. Lidocaine 2% was used for local anesthesia into the right femoral venous access site. Right femoral vein was accessed using the micropuncture needle under ultrasound guidance and micropuncture wire advanced under flouroscopy. A 7 [place] vascular sheath was put into place percutaneously over guide-wire. Guide wires were removed. A 6Fr swan sheryl catheter was advanced to wedge position. RA, RV and PA and wedge pressures were recorded.  PA sat and arterial sats recorded.  The patient tolerated the procedure well without any  complications.    Lidocaine 2% was used for local anesthesia into the right femoral arterial access site. The right femoral artery was accessed with a micropuncture needle via modified Seldinger technique under ultrasound guidance. A 6F was inserted successfully.  Afterwards, 6F JR4 and JL4 diagnostic catheters were advanced over a wire into the ascending aorta and were used to engage the ostia of the left main and RCA respectively. JR4 used to cross the AV and obtain LV pressures and gradient across the AV measured via pullback technique. Images of the right and left coronary systems were obtained. All the catheters were exchanged over a wire and subsequently removed. Angiogram of the femoral access site was obtained and did not show complications. The patient tolerated the procedure well without any complications. The pictures were reviewed at the end of the procedure. A Mynx closure device was applied.    HEMODYNAMICS    RHC  RA 6/5, 4 mmHg  RV 74/3, 5 mmHg  PA 71/25, 44 mmHg  PCW 12 mmHg  AO Sat 92%  PA Sat 78%    Jose CO: 7.89 L/min    Jose CI: 4.69 L/min/m²    LHC  LV: 134/3, 20 mmHg  AO: 131/56, 87 mmHg  No significant gradient across the aortic valve during pullback of JR4 catheter.    FINDINGS  Coronary Angiogram  All vessels are heavily calcified  She also has heavily calcified peripheral arterial disease.  Right dominant circulation    Left main: Left main is a large caliber vessel which gives rise to the Left Anterior Descending and the Left circumflex.  Left main is angiographically free from any significant disease.    Left Anterior Descending Artery: LAD is a heavily calcified medium caliber vessel which gives rise to diagonals.  The vessel is highly tortuous and has 50 to 60% mid vessel stenosis best seen in French cranial view.    Left Circumflex: Heavily calcified vessel with 50% proximal segment stenosis.    Right Coronary Artery: The RCA is a large caliber vessel which is heavily calcified and  tortuous.  It has diffuse luminal irregularities and 30 to 40% stenosis in the midsegment around the bend.    ESTIMATED BLOOD LOSS:  10 ml    COMPLICATIONS:  None    PROCEDURE DATA:  Sedation Time: 25 minutes    IMPRESSIONS  Heavily calcified, tortuous nonobstructive coronary disease  Severe pulmonary hypertension with PA systolic pressure in the 70s  Mean PA pressure 44 mmHg    RECOMMENDATIONS  -Continue aggressive medical management of CAD  -Referral to pulmonology for treatment of pulmonary hypertension        Other:      ASSESSMENT & PLAN:    Principal Problem:    Acute exacerbation of chronic obstructive pulmonary disease (COPD)  Active Problems:    Stage 3a chronic kidney disease    History of venous thrombosis and embolism    Primary hypertension    History of TIA (transient ischemic attack)    Parainfluenza infection    Severe pulmonary hypertension    Acute on chronic respiratory failure with hypoxia    Acute exacerbation of chronic obstructive pulmonary disease   Parainfluenza infection  Pulmonology consultation.  She has been started on bronchodilators and steroids, Singulair.  She has been started on antibiotics    Acute on chronic respiratory failure with hypoxia  Acute exacerbation of COPD with acute bronchitis secondary to parainfluenza.  Respiratory status is tenuous due to severe pulmonary hypertension.  Continue supplemental oxygen and treatment of COPD exacerbation as noted above.    Severe pulmonary hypertension  She has been started on Aubagio during this admission.  Work-up has been initiated for connective tissue disease.  Plan to obtain PFTs and sleep study.  She may benefit from Uptravi if deemed suitable by pulmonology.    Stage 3a chronic kidney disease  Close eye on the renal function  Current creatinine less than 1    Atrial fibrillation  MCOT review as an outpatient showed evidence of atrial fibrillation.  She will continue to be on full dose anticoagulation with Eliquis for A-fib and  for history of DVT/PE.  Rate and rhythm controlled with beta-blocker.    History of venous thrombosis and embolism  Continue Eliquis 5 mg p.o. twice daily  She also has an IVC filter in place    Primary hypertension, chronic  Blood pressures well controlled.  Previous echocardiogram showed preserved LV function with grade 2 diastolic dysfunction.  Continue home antihypertensives including amlodipine, Coreg, lisinopril.  Lasix for HFpEF.  Monitor I's and O's and replace electrolytes as needed    Coronary artery disease  Continue high intensity statin and beta-blocker  No aspirin as she is already on anticoagulation.  Previous cardiac catheterization showed heavily calcified coronaries but nonobstructive.    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

## 2023-05-07 NOTE — PLAN OF CARE
Pt  resting tonight. Pt at the beginning of shift expressed concerns about being discharged. Will pass this information along to day shift. Vitals are stable. Will continue to monitor.   Problem: Adult Inpatient Plan of Care  Goal: Absence of Hospital-Acquired Illness or Injury  Intervention: Identify and Manage Fall Risk  Recent Flowsheet Documentation  Taken 5/7/2023 0419 by Guanakito Card LPN  Safety Promotion/Fall Prevention:   activity supervised   assistive device/personal items within reach   clutter free environment maintained   fall prevention program maintained   lighting adjusted   mobility aid in reach   nonskid shoes/slippers when out of bed   room organization consistent   safety round/check completed  Taken 5/7/2023 0200 by Guanakito Card LPN  Safety Promotion/Fall Prevention: safety round/check completed  Taken 5/7/2023 0000 by Guanakito Card LPN  Safety Promotion/Fall Prevention:   activity supervised   assistive device/personal items within reach   clutter free environment maintained   fall prevention program maintained   lighting adjusted   mobility aid in reach   nonskid shoes/slippers when out of bed   room organization consistent   safety round/check completed  Taken 5/6/2023 2200 by Guanakito Card LPN  Safety Promotion/Fall Prevention: safety round/check completed  Taken 5/6/2023 2000 by Guanakito Card LPN  Safety Promotion/Fall Prevention:   activity supervised   assistive device/personal items within reach   clutter free environment maintained   fall prevention program maintained   lighting adjusted   mobility aid in reach   nonskid shoes/slippers when out of bed   room organization consistent   safety round/check completed  Intervention: Prevent Skin Injury  Recent Flowsheet Documentation  Taken 5/7/2023 0419 by Guanakito Card LPN  Body Position: position changed independently  Taken 5/7/2023 0000 by Guanakito Card LPN  Body Position: position changed  independently  Taken 5/6/2023 2000 by Guanakito Card LPN  Body Position: position changed independently  Intervention: Prevent and Manage VTE (Venous Thromboembolism) Risk  Recent Flowsheet Documentation  Taken 5/6/2023 2000 by Guanakito Card LPN  Activity Management: up ad maribell  Intervention: Prevent Infection  Recent Flowsheet Documentation  Taken 5/7/2023 0419 by Guanakito Card LPN  Infection Prevention:   visitors restricted/screened   single patient room provided   rest/sleep promoted   personal protective equipment utilized   hand hygiene promoted  Taken 5/7/2023 0000 by Guanakito Card LPN  Infection Prevention:   visitors restricted/screened   single patient room provided   rest/sleep promoted   personal protective equipment utilized   hand hygiene promoted  Taken 5/6/2023 2000 by Guanakito Card LPN  Infection Prevention:   visitors restricted/screened   single patient room provided   rest/sleep promoted   personal protective equipment utilized   hand hygiene promoted  Goal: Optimal Comfort and Wellbeing  Intervention: Monitor Pain and Promote Comfort  Recent Flowsheet Documentation  Taken 5/7/2023 0419 by Guanakito Card LPN  Pain Management Interventions:   quiet environment facilitated   pain management plan reviewed with patient/caregiver  Taken 5/7/2023 0000 by Guanakito Card LPN  Pain Management Interventions:   see MAR   quiet environment facilitated   pain management plan reviewed with patient/caregiver  Taken 5/6/2023 2000 by Guanakito Card LPN  Pain Management Interventions:   see MAR   quiet environment facilitated   pain management plan reviewed with patient/caregiver  Intervention: Provide Person-Centered Care  Recent Flowsheet Documentation  Taken 5/6/2023 2000 by Guanakito Card LPN  Trust Relationship/Rapport:   care explained   questions answered   questions encouraged   Goal Outcome Evaluation:

## 2023-05-07 NOTE — DISCHARGE PLACEMENT REQUEST
"[facility] 3A MEDICAL INPATIENT  [address] [place] [address]  Dept. Phone:  [phone]  Dept. Fax:  [phone] Date Ordered: May 7, 2023         Patient:  Gabrielle Lyles MRN:  [id number]   [address] [facility] [place] [address] :  1940  SSN:    Phone: [phone] Sex:  F     Weight: 64 kg (141 lb 1.5 oz)         Ht Readings from Last 1 Encounters:   23 162.6 cm (64\")         Home Nebulizer          (Order ID: [id number])    Diagnosis:  Acute on chronic respiratory failure with hypoxia (J96.21 [ICD-10-CM] 518.84,799.02 [ICD-9-CM])  Severe pulmonary hypertension (I27.20 [ICD-10-CM] 416.8 [ICD-9-CM])  Chronic obstructive pulmonary disease with acute exacerbation (J44.1 [ICD-10-CM] 491.21 [ICD-9-CM])   Quantity:  1  Comments: Pt will need home nebulizer machine through [employer].    She will need nebulized medications sent to pharmacy at time of discharge.   This was recommended per Dr Roy     Nebulizer Equipment:  Nebulizer w/ Compressor  Nebulizer Accessories:  Nebulizer Kit - Administration Set, Non-Disposable  The face to face evaluation was performed on: 2023  Additional providers who completed a face to face evaluation of the patient: ROSIE ROY [[id number]]  Length of Need (99 Months = Lifetime): 99 Months = Lifetime        Authorizing Provider's Phone: [phone]  Authorizing Provider:Kasey Diane APRN  Authorizing Provider's NPI: [id number]  Order Entered By: Kasey Diane APRN 2023  4:10 PM     Electronically signed by: Kasey Diane APRN 2023  4:10 PM         Gabrielle Lyles (83 y.o. Female)     Date of Birth   1940    Social Security Number       Address   [address] [place] [address]    Home Phone   [phone]    [facility]   [id number]       Sikh   Lutheran    Marital Status                               Admission Date   23    Admission Type   Emergency    Admitting Provider " "  Rosamaria Jin MD    Attending Provider   Rosamaria Jin MD    Department, Room/Bed   [facility] 3A MEDICAL INPATIENT, 303/1       Discharge Date       Discharge Disposition       Discharge Destination                               Attending Provider: Rosamaria Jin MD    Allergies: No Known Allergies    Isolation: Contact Drop   Infection: Other (05/03/23)   Code Status: CPR    Ht: 162.6 cm (64\")   Wt: 64 kg (141 lb 1.5 oz)    Admission Cmt: None   Principal Problem: Acute exacerbation of chronic obstructive pulmonary disease (COPD) [J44.1]                 Active Insurance as of 5/2/2023     Primary Coverage     Payor Plan Insurance Group Employer/Plan Group    HUMANA MEDICARE REPLACEMENT HUMANA MEDICARE REPLACEMENT [id number]     Payor Plan Address Payor Plan Phone Number Payor Plan Fax Number Effective Dates    [address] [address] [phone]  1/1/2018 - None Entered    [facility] [address]       Subscriber Name Subscriber Birth Date Member ID       MARS SANCHEZ 1940 [id number]                 Emergency Contacts      (Rel.) Home Phone Work Phone Mobile Phone    MARVIN DURANT (Daughter) [phone] -- --               History & Physical      Tina Alvarez, APRN at 05/02/23 2322     Attestation signed by Rosamaria Jin MD at 05/03/23 1911    I have reviewed this documentation and agree.                      Patient Care Team:  Shaylee Mcdaniels MD as PCP - General (Family Medicine)  Richardson Willis MD as Cardiologist (Cardiology)  Rafy Rodriguez MD as Consulting Physician (Hematology and Oncology)  Christianne Og LPN as Licensed Practical Nurse    Chief complaint shortness of breath     Subjective      Patient is a 83 y.o. female with history of COPD, CKD stage 3, GERD, HTN, breast cancer, pulmonary emboli and DVT who presents with complaints of increased shortness of breath, worse with exertion over the last 2 days. She reports recently getting over a bout of pneumonia, and now feels " like she has had a fever again along with chills. Complains of nonproductive cough. Denies any leg pain or swelling, chest pian, dizziness, syncope, diaphoresis. She wears 3L nasal cannula at home and has not increased although she doesn't feel like it's helping much.     In the ER EKG showed sinus rd rate 57, CXR with near complete resolution of left base opacity and left effusion, Flu and COVID 19 negative, started on Azithomycin and rocephin IV started.       Onset of symptoms was 2 days    Review of Systems   Constitutional: Positive for fatigue and fever.   HENT: Negative.    Eyes: Negative.    Respiratory: Positive for cough and shortness of breath.    Cardiovascular: Negative.    Gastrointestinal: Negative.    Endocrine: Negative.    Genitourinary: Negative.    Musculoskeletal: Negative.    Skin: Negative.    Neurological: Negative.    Psychiatric/Behavioral: Negative.           History  Past Medical History:   Diagnosis Date   • COPD (chronic obstructive pulmonary disease)    • History of DVT (deep vein thrombosis) 03/2013    bilateral lower extremity   • History of pneumonia 06/2012    community acquired pneumonia and right pleural effusion;hospitalized at [facility]   • History of pulmonary embolism 03/2013    bilateral PE   • Hypertension 2001   • Insomnia     on Ambien 5mg at    • Lobular breast cancer, left 11/2011    Stage IA left upper lobular breast cancer   • Osteopenia 2012     Past Surgical History:   Procedure Laterality Date   • CARDIAC CATHETERIZATION N/A 3/3/2023    Procedure: Left and Right Heart Cath with Coronary Angiography;  Surgeon: Richardson Willis MD;  Location: [facility] INVASIVE LOCATION;  Service: Cardiovascular;  Laterality: N/A;   • CATARACT EXTRACTION  2015   • IVC FILTER RETRIEVAL  06/2014    IVC filter placement by Dr. Card   • MAMMO STEREOTACTIC BREAST BX SURGICAL ADD UNI Left 11/01/2011    invasive lobular carcinoma-Dr. Cande Daniel   • MASTECTOMY, PARTIAL Left  2011    and left axillary sentinel lymph node biopsy by Dr. Uriostegui   • TUBAL ABDOMINAL LIGATION       Family History   Problem Relation Age of Onset   • Lung cancer Brother      Social History     Tobacco Use   • Smoking status: Former     Types: Cigarettes     Quit date:      Years since quittin.3     Passive exposure: Never   • Smokeless tobacco: Never   • Tobacco comments:     smoked 6 cigarettes a day from 13 years of age to 56 years of age when she quit in    Vaping Use   • Vaping Use: Never used   Substance Use Topics   • Alcohol use: Not Currently   • Drug use: No     Medications Prior to Admission   Medication Sig Dispense Refill Last Dose   • amLODIPine (NORVASC) 10 MG tablet Take 1 tablet by mouth Daily. 90 tablet 3 2023   • apixaban (ELIQUIS) 5 MG tablet tablet Take 1 tablet by mouth Every 12 (Twelve) Hours. Indications: Other - full anticoagulation, history of DVT/PE 60 tablet 2 2023   • carvedilol (COREG) 12.5 MG tablet Take 1 tablet by mouth 2 (Two) Times a Day With Meals. 180 tablet 3 2023   • Cholecalciferol (Vitamin D3) 50 MCG (2000 UT) capsule Take 1 capsule by mouth Daily.   2023   • famotidine (PEPCID) 20 MG tablet Take 1 tablet by mouth As Needed.   Past Month   • Folbic 2.5-25-2 MG tablet tablet Take 1 tablet by mouth Daily.   2023   • furosemide (LASIX) 40 MG tablet Take 1 tablet by mouth Daily. 90 tablet 3 2023   • lisinopril (PRINIVIL,ZESTRIL) 40 MG tablet Take 1 tablet by mouth Daily.   2023   • potassium chloride (K-DUR,KLOR-CON) 10 MEQ CR tablet Take 1 tablet by mouth Daily. 90 tablet 3 2023   • SYMBICORT 80-4.5 MCG/ACT inhaler Inhale 2 puffs 2 (Two) Times a Day.  5 2023   • VENTOLIN  (90 Base) MCG/ACT inhaler Inhale 2 puffs Every 4 (Four) Hours As Needed.  5 2023   • cloNIDine (CATAPRES) 0.1 MG tablet Take 1 tablet by mouth 2 (Two) Times a Day As Needed for High Blood Pressure (SBP greater than 170). 15 tablet 0 More  than a month     Allergies:  Patient has no known allergies.    Objective      Vital Signs  Temp:  [97.6 °F (36.4 °C)-98.3 °F (36.8 °C)] 97.6 °F (36.4 °C)  Heart Rate:  [55-91] 91  Resp:  [16-23] 20  BP: (120-152)/(49-70) 136/63     Physical Exam:      General Appearance:    Alert, cooperative, in no acute distress   Head:    Normocephalic, without obvious abnormality, atraumatic   Eyes:            Lids and lashes normal, conjunctivae and sclerae normal, no   icterus, no pallor, corneas clear, PERRLA   Ears:    Ears appear intact with no abnormalities noted   Throat:   No oral lesions, no thrush, oral mucosa moist   Neck:   No adenopathy, supple, trachea midline, no thyromegaly, no   carotid bruit, no JVD   Lungs:     Rhonchi and expiratory wheezing present ,respirations regular, even and unlabored    Heart:    Regular rhythm and normal rate, normal S1 and S2, no            murmur, no gallop, no rub, no click   Chest Wall:    No abnormalities observed   Abdomen:     Normal bowel sounds, no masses, no organomegaly, soft        non-tender, non-distended, no guarding, no rebound                tenderness   Extremities:   Moves all extremities well, no edema, no cyanosis, no             redness   Pulses:   Pulses palpable and equal bilaterally   Skin:   No bleeding, bruising or rash   Lymph nodes:   No palpable adenopathy   Neurologic:   No focal deficits noted       Results Review:     Imaging Results (Last 24 Hours)     Procedure Component Value Units Date/Time    XR Chest 1 View [[id number]] Collected: 05/02/23 1443     Updated: 05/02/23 1448    Narrative:      XR CHEST 1 VW    Date of Exam: 5/2/2023 2:25 PM EDT    Indication: dyspnea    Comparison: 4/8/2023    Findings:  Interval decrease in left base consolidation. There is minimal residual left base consolidation and a small left effusion. Right lung is clear. Cardiac and mediastinal contours are within normal limits. Regional skeleton is unremarkable.       Impression:      Impression:    1. Near complete resolution of left base opacity and left effusion.      Electronically Signed: Daniel Riddle    5/2/2023 2:46 PM EDT    Workstation ID: [id number]           Lab Results (last 24 hours)     Procedure Component Value Units Date/Time    Blood Culture - Blood, Arm, Right [[id number]] Collected: 05/02/23 1638    Specimen: Blood from Arm, Right Updated: 05/02/23 1642    Extra Tubes [[id number]] Collected: 05/02/23 1456    Specimen: Blood, Venous Line Updated: 05/02/23 1600    Narrative:      The following orders were created for panel order Extra Tubes.  Procedure                               Abnormality         Status                     ---------                               -----------         ------                     Gold Top - SST[[id number]]                                   Final result                 Please view results for these tests on the individual orders.    Gold Top - SST [[id number]] Collected: 05/02/23 1456    Specimen: Blood Updated: 05/02/23 1600     Extra Tube Hold for add-ons.     Comment: Auto resulted.       COVID-19 and FLU A/B PCR - Swab, Nasopharynx [[id number]]  (Normal) Collected: 05/02/23 1509    Specimen: Swab from Nasopharynx Updated: 05/02/23 1541     COVID19 Not Detected     Influenza A PCR Not Detected     Influenza B PCR Not Detected    Narrative:      Fact sheet for providers: https://www.fda.gov/media/610740/download    Fact sheet for patients: https://www.fda.gov/media/289812/download    Test performed by PCR.    Comprehensive Metabolic Panel [[id number]]  (Abnormal) Collected: 05/02/23 1456    Specimen: Blood Updated: 05/02/23 1539     Glucose 100 mg/dL      BUN 18 mg/dL      Creatinine 0.93 mg/dL      Sodium 140 mmol/L      Potassium 3.5 mmol/L      Comment: Slight hemolysis detected by analyzer. Results may be affected.        Chloride 100 mmol/L      CO2 27.0 mmol/L      Calcium 10.1 mg/dL      Total Protein 7.6 g/dL      Albumin 3.9  g/dL      ALT (SGPT) 17 U/L      AST (SGOT) 14 U/L      Alkaline Phosphatase 83 U/L      Total Bilirubin 0.7 mg/dL      Globulin 3.7 gm/dL      A/G Ratio 1.1 g/dL      BUN/Creatinine Ratio 19.4     Anion Gap 13.0 mmol/L      eGFR 61.5 mL/min/1.73     Narrative:      GFR Normal >60  Chronic Kidney Disease <60  Kidney Failure <15    The GFR formula is only valid for adults with stable renal function between ages 18 and 70.    BNP [[id number]]  (Normal) Collected: 05/02/23 1456    Specimen: Blood Updated: 05/02/23 1539     proBNP 1,133.0 pg/mL     Narrative:      Among patients with dyspnea, NT-proBNP is highly sensitive for the detection of acute congestive heart failure. In addition NT-proBNP of <300 pg/ml effectively rules out acute congestive heart failure with 99% negative predictive value.    Results may be falsely decreased if patient taking Biotin.      Single High Sensitivity Troponin T [[id number]]  (Abnormal) Collected: 05/02/23 1456    Specimen: Blood Updated: 05/02/23 1539     HS Troponin T 12 ng/L     Narrative:      High Sensitive Troponin T Reference Range:  <10.0 ng/L- Negative Female for AMI  <15.0 ng/L- Negative Male for AMI  >=10 - Abnormal Female indicating possible myocardial injury.  >=15 - Abnormal Male indicating possible myocardial injury.   Clinicians would have to utilize clinical acumen, EKG, Troponin, and serial changes to determine if it is an Acute Myocardial Infarction or myocardial injury due to an underlying chronic condition.         D-dimer, Quantitative [[id number]]  (Normal) Collected: 05/02/23 1456    Specimen: Blood Updated: 05/02/23 1523     D-Dimer, Quantitative 0.49 mg/L (FEU)     Narrative:      According to the assay 's published package insert, a normal (<0.50 mg/L (FEU)) D-dimer result in conjunction with a non-high clinical probability assessment, excludes deep vein thrombosis (DVT) and pulmonary embolism (PE) with high sensitivity.    D-dimer values increase  "with age and this can make VTE exclusion of an older population difficult. To address this, the American College of Physicians, based on best available evidence and recent guidelines, recommends that clinicians use age-adjusted D-dimer thresholds in patients greater than 50 years of age with: a) a low probability of PE who do not meet all Pulmonary Embolism Rule Out Criteria, or b) in those with intermediate probability of PE.   The formula for an age-adjusted D-dimer cut-off is \"age/100\".  For example, a 60 year old patient would have an age-adjusted cut-off of 0.60 mg/L (FEU) and an 80 year old 0.80 mg/L (FEU).    CBC & Differential [[id number]]  (Abnormal) Collected: 05/02/23 1456    Specimen: Blood Updated: 05/02/23 1509    Narrative:      The following orders were created for panel order CBC & Differential.  Procedure                               Abnormality         Status                     ---------                               -----------         ------                     CBC Auto Differential[[id number]]        Abnormal            Final result                 Please view results for these tests on the individual orders.    CBC Auto Differential [[id number]]  (Abnormal) Collected: 05/02/23 1456    Specimen: Blood Updated: 05/02/23 1509     WBC 12.40 10*3/mm3      RBC 5.26 10*6/mm3      Hemoglobin 15.3 g/dL      Hematocrit 46.2 %      MCV 87.7 fL      MCH 29.0 pg      MCHC 33.1 g/dL      RDW 16.5 %      RDW-SD 53.8 fl      MPV 8.0 fL      Platelets 199 10*3/mm3      Neutrophil % 79.1 %      Lymphocyte % 12.0 %      Monocyte % 5.7 %      Eosinophil % 2.3 %      Basophil % 0.9 %      Neutrophils, Absolute 9.80 10*3/mm3      Lymphocytes, Absolute 1.50 10*3/mm3      Monocytes, Absolute 0.70 10*3/mm3      Eosinophils, Absolute 0.30 10*3/mm3      Basophils, Absolute 0.10 10*3/mm3      nRBC 0.1 /100 WBC     Blood Culture - Blood, Arm, Right [[id number]] Collected: 05/02/23 1456    Specimen: Blood from Arm, Right " Updated: 05/02/23 1505    POC Lactate [[id number]]  (Normal) Collected: 05/02/23 1500    Specimen: Blood Updated: 05/02/23 1501     Lactate 0.7 mmol/L      Comment: Serial Number: [id number]:  [id number]              I reviewed the patient's new clinical results.    Assessment & Plan       Acute exacerbation of chronic obstructive pulmonary disease (COPD)  -CXR  -BNP 1,133  -EKG sinus rd rate 57  -given lasix, nebs, solumedrol  -HS troponin 12    Acute bronchitis  -started on Azithromycin and rocephin  Hypoxemia  -wears 3L NC at home, on 5L now  -titrate as needed  -WBC 12.4  -COVID 19 and flu negative      DVT prophylaxis- SCD's  GI prophylaxis- protonix    I discussed the patient's findings and my recommendations with patient.     Tina Alvarez, APRN  05/02/23  23:22 EDT        Electronically signed by Rosamaria Jin MD at 05/03/23 1773

## 2023-05-07 NOTE — CASE MANAGEMENT/SOCIAL WORK
Continued Stay Note  [facility]     Patient Name: Gabrielle Lyles  MRN: [id number]  Today's Date: 5/7/2023    Admit Date: 5/2/2023    Plan: home, baseline o2 3L via castillo.  NEED NEBULIZER, order in.   Discharge Plan     Row Name 05/07/23 1641       Plan    Plan home, baseline o2 3L via castillo.  NEED NEBULIZER, order in.    Plan Comments noted order for neb.  added goulds to inbasket with order.  will need delivered prior to d/c                Expected Discharge Date and Time     Expected Discharge Date Expected Discharge Time    May 8, 2023             Saira Barone RN

## 2023-05-07 NOTE — PLAN OF CARE
Goal Outcome Evaluation:               Patient has improved, cleared by pulmonary and cardiology to DC. Plan to DC tomorrow. Family aware, patient is set up for med to bed.

## 2023-05-08 ENCOUNTER — APPOINTMENT (OUTPATIENT)
Dept: CT IMAGING | Facility: HOSPITAL | Age: 83
DRG: 190 | End: 2023-05-08
Payer: MEDICARE

## 2023-05-08 PROCEDURE — C1751 CATH, INF, PER/CENT/MIDLINE: HCPCS

## 2023-05-08 PROCEDURE — 94799 UNLISTED PULMONARY SVC/PX: CPT

## 2023-05-08 PROCEDURE — 99232 SBSQ HOSP IP/OBS MODERATE 35: CPT | Performed by: INTERNAL MEDICINE

## 2023-05-08 PROCEDURE — 94664 DEMO&/EVAL PT USE INHALER: CPT

## 2023-05-08 PROCEDURE — 71275 CT ANGIOGRAPHY CHEST: CPT

## 2023-05-08 PROCEDURE — 63710000001 PREDNISONE PER 1 MG: Performed by: INTERNAL MEDICINE

## 2023-05-08 PROCEDURE — 36410 VNPNXR 3YR/> PHY/QHP DX/THER: CPT

## 2023-05-08 PROCEDURE — 25510000001 IOPAMIDOL PER 1 ML: Performed by: INTERNAL MEDICINE

## 2023-05-08 PROCEDURE — 94761 N-INVAS EAR/PLS OXIMETRY MLT: CPT

## 2023-05-08 PROCEDURE — 05HY33Z INSERTION OF INFUSION DEVICE INTO UPPER VEIN, PERCUTANEOUS APPROACH: ICD-10-PCS | Performed by: INTERNAL MEDICINE

## 2023-05-08 PROCEDURE — 63710000001 PREDNISONE PER 5 MG: Performed by: INTERNAL MEDICINE

## 2023-05-08 RX ORDER — GUAIFENESIN 600 MG/1
1200 TABLET, EXTENDED RELEASE ORAL EVERY 12 HOURS SCHEDULED
Status: DISCONTINUED | OUTPATIENT
Start: 2023-05-08 | End: 2023-05-12 | Stop reason: HOSPADM

## 2023-05-08 RX ORDER — IPRATROPIUM BROMIDE AND ALBUTEROL SULFATE 2.5; .5 MG/3ML; MG/3ML
3 SOLUTION RESPIRATORY (INHALATION)
Status: DISCONTINUED | OUTPATIENT
Start: 2023-05-08 | End: 2023-05-12

## 2023-05-08 RX ORDER — ARFORMOTEROL TARTRATE 15 UG/2ML
15 SOLUTION RESPIRATORY (INHALATION)
Status: DISCONTINUED | OUTPATIENT
Start: 2023-05-08 | End: 2023-05-11

## 2023-05-08 RX ORDER — BUDESONIDE 0.5 MG/2ML
1 INHALANT ORAL
Status: DISCONTINUED | OUTPATIENT
Start: 2023-05-08 | End: 2023-05-11

## 2023-05-08 RX ADMIN — FUROSEMIDE 20 MG: 20 TABLET ORAL at 08:43

## 2023-05-08 RX ADMIN — PREDNISONE 30 MG: 20 TABLET ORAL at 08:42

## 2023-05-08 RX ADMIN — IPRATROPIUM BROMIDE AND ALBUTEROL SULFATE 3 ML: .5; 3 SOLUTION RESPIRATORY (INHALATION) at 15:18

## 2023-05-08 RX ADMIN — FAMOTIDINE 20 MG: 20 TABLET ORAL at 08:42

## 2023-05-08 RX ADMIN — GUAIFENESIN 600 MG: 600 TABLET, EXTENDED RELEASE ORAL at 08:42

## 2023-05-08 RX ADMIN — IOPAMIDOL 100 ML: 755 INJECTION, SOLUTION INTRAVENOUS at 15:13

## 2023-05-08 RX ADMIN — SILDENAFIL CITRATE 20 MG: 20 TABLET ORAL at 05:24

## 2023-05-08 RX ADMIN — FOLIC ACID-PYRIDOXINE-CYANOCOBALAMIN TAB 2.5-25-2 MG 1 TABLET: 2.5-25-2 TAB at 08:42

## 2023-05-08 RX ADMIN — IPRATROPIUM BROMIDE AND ALBUTEROL SULFATE 3 ML: 2.5; .5 SOLUTION RESPIRATORY (INHALATION) at 06:46

## 2023-05-08 RX ADMIN — SILDENAFIL CITRATE 20 MG: 20 TABLET ORAL at 16:14

## 2023-05-08 RX ADMIN — CARVEDILOL 12.5 MG: 6.25 TABLET, FILM COATED ORAL at 08:42

## 2023-05-08 RX ADMIN — LISINOPRIL 40 MG: 20 TABLET ORAL at 08:42

## 2023-05-08 RX ADMIN — AMLODIPINE BESYLATE 10 MG: 5 TABLET ORAL at 08:42

## 2023-05-08 RX ADMIN — Medication 10 ML: at 08:43

## 2023-05-08 RX ADMIN — APIXABAN 5 MG: 5 TABLET, FILM COATED ORAL at 20:56

## 2023-05-08 RX ADMIN — APIXABAN 5 MG: 5 TABLET, FILM COATED ORAL at 08:42

## 2023-05-08 RX ADMIN — GUAIFENESIN 1200 MG: 600 TABLET, EXTENDED RELEASE ORAL at 20:55

## 2023-05-08 RX ADMIN — CARVEDILOL 12.5 MG: 6.25 TABLET, FILM COATED ORAL at 17:54

## 2023-05-08 RX ADMIN — ARFORMOTEROL TARTRATE 15 MCG: 15 SOLUTION RESPIRATORY (INHALATION) at 19:01

## 2023-05-08 RX ADMIN — BUDESONIDE AND FORMOTEROL FUMARATE DIHYDRATE 2 PUFF: 160; 4.5 AEROSOL RESPIRATORY (INHALATION) at 06:50

## 2023-05-08 RX ADMIN — IPRATROPIUM BROMIDE AND ALBUTEROL SULFATE 3 ML: 2.5; .5 SOLUTION RESPIRATORY (INHALATION) at 11:26

## 2023-05-08 RX ADMIN — POTASSIUM CHLORIDE 10 MEQ: 750 TABLET, EXTENDED RELEASE ORAL at 08:42

## 2023-05-08 RX ADMIN — BUDESONIDE 1 MG: 0.5 INHALANT RESPIRATORY (INHALATION) at 19:06

## 2023-05-08 RX ADMIN — SILDENAFIL CITRATE 20 MG: 20 TABLET ORAL at 22:44

## 2023-05-08 RX ADMIN — MONTELUKAST 10 MG: 10 TABLET, FILM COATED ORAL at 20:56

## 2023-05-08 RX ADMIN — Medication 10 ML: at 20:57

## 2023-05-08 NOTE — PLAN OF CARE
Goal Outcome Evaluation:   Patient has current cough and increased oxygen needs delaying discharge.  Will monitor.

## 2023-05-08 NOTE — CONSULTS
powerglide midline placed in right upper arm under u/s guidance. Flushes easily and blood return noted.  RN notified ok to use.

## 2023-05-08 NOTE — PROGRESS NOTES
LOS: 4 days   Patient Care Team:  Shaylee Mcdaniels MD as PCP - General (Family Medicine)  Richardson Willis MD as Cardiologist (Cardiology)  Rafy Rodriguez MD as Consulting Physician (Hematology and Oncology)  Christianne Og LPN as Licensed Practical Nurse    Subjective     Interval History: Worsening SOA.  Currently has sats of 88-90 on 5 lpm, above her baseline of 4 lpm.    Patient Complaints: SOA, takes longer to recover from mild exertion.    History taken from: patient    Review of Systems   Constitutional: Positive for activity change. Negative for appetite change, chills and fatigue.   HENT: Negative for facial swelling.    Eyes: Negative for visual disturbance.   Respiratory: Positive for cough and shortness of breath. Negative for stridor.    Cardiovascular: Negative for chest pain, palpitations and leg swelling.   Gastrointestinal: Negative for abdominal pain, constipation, diarrhea, nausea and vomiting.   Genitourinary: Negative for hematuria.   Musculoskeletal: Negative for arthralgias.   Skin: Negative for rash and wound.   Neurological: Negative for weakness.   Psychiatric/Behavioral: Negative for confusion.           Objective     Vital Signs  Temp:  [97.4 °F (36.3 °C)-97.6 °F (36.4 °C)] 97.4 °F (36.3 °C)  Heart Rate:  [62-93] 85  Resp:  [13-22] 16  BP: (111-160)/(61-76) 111/72    Physical Exam:     General Appearance:    Alert, cooperative, in no acute distress,   Head:    Normocephalic, without obvious abnormality, atraumatic   Eyes:            Lids and lashes normal, conjunctivae and sclerae normal, no   icterus, no pallor, corneas clear, PERRLA   Ears:    Ears appear intact with no abnormalities noted   Throat:   No oral lesions, no thrush, oral mucosa moist   Neck:   No adenopathy, supple, trachea midline, no thyromegaly, no   carotid bruit, no JVD   Lungs:     Distant, scattered wheezes    Heart:    Regular rhythm and normal rate, normal S1 and S2, no            murmur, no gallop, no  rub, no click   Chest Wall:    No abnormalities observed   Abdomen:     Normal bowel sounds, no masses, no organomegaly, soft        Non-tender non-distended, no guarding,   Extremities:   Moves all extremities well, no edema, no cyanosis, no             Redness   Pulses:   Pulses palpable and equal bilaterally   Skin:   No bleeding, bruising or rash   Lymph nodes:   No palpable adenopathy   Neurologic:   Cranial nerves 2 - 12 grossly intact, sensation intact, DTR       present and equal bilaterally        Results Review:    Lab Results (last 24 hours)     Procedure Component Value Units Date/Time    Blood Culture - Blood, Arm, Right [[id number]]  (Normal) Collected: 05/02/23 1638    Specimen: Blood from Arm, Right Updated: 05/07/23 1645     Blood Culture No growth at 5 days    Narrative:      Less than seven (7) mL's of blood was collected.  Insufficient quantity may yield false negative results.           Imaging Results (Last 24 Hours)     Procedure Component Value Units Date/Time    CT Angiogram Chest Pulmonary Embolism [[id number]] Collected: 05/08/23 1516     Updated: 05/08/23 1529    Narrative:      CT ANGIOGRAM CHEST PULMONARY EMBOLISM    Date of Exam: 5/8/2023 3:00 PM EDT    Indication: Pulmonary embolism (PE) suspected, high prob.    Comparison: Chest radiograph dated 5/2/2023    Technique: Axial CT images were obtained of the chest after the uneventful intravenous administration of iodinated contrast utilizing pulmonary embolism protocol.  Sagittal and coronal reconstructions were performed.  Automated exposure control and   iterative reconstruction methods were used.      Findings:  The visualized soft tissue structures at the base of the neck including the thyroid appear within normal limits. There is no lower cervical or axillary adenopathy. There are postsurgical changes of the superior left breast.    The heart size is normal. There is no pericardial effusion. The aorta is normal in caliber without  evidence of aneurysm formation. There is extensive aortic atherosclerotic plaque. There is poor opacification of the left subclavian artery which may be   partially occluded. The main pulmonary artery appears normal in caliber. There are no filling defects within the pulmonary arterial system to suggest presence of underlying pulmonary embolism. There is no mediastinal or hilar lymphadenopathy by size   criteria.    There are secretions within the left lower lobe which causes medial basal atelectasis. There is no pleural effusion or pneumothorax. There is upper lobe predominant moderate centrilobular emphysema. There is a small area of groundglass opacity within the   periphery of the right upper lobe and posterior aspect of the left upper lobe, favor mild infectious or inflammatory process.     The esophagus is normal in course and caliber. Visualized portions of the upper abdomen demonstrate atherosclerotic plaque involving the aorta and branch vasculature. There is partial visualization of a presumed IVC filter with metallic density partially   visualized. The liver has a nodular contour which can be seen with underlying chronic liver disease.    There is degenerative disc disease of the thoracic spine. No suspicious lytic or sclerotic osseous lesion.      Impression:      Impression:  1. No evidence of pulmonary embolism.  2. Secretions within the left lower lobe airways with mucous plugging and partial left lower lobe atelectasis.  3. Extensive aortic atherosclerotic plaque with suspected occlusion of the left subclavian artery, unchanged from prior.  4. Moderate upper lobe predominant centrilobular emphysema. Recommend correlation with patient's smoking history and risk factors to see if they qualify for annual low-dose lung cancer screening.        Electronically Signed: Param Johansen    5/8/2023 3:27 PM EDT    Workstation ID: [id number]               I reviewed the patient's new clinical  results.    Medication Review:   Scheduled Meds:amLODIPine, 10 mg, Oral, Daily  apixaban, 5 mg, Oral, Q12H  arformoterol, 15 mcg, Nebulization, BID - RT  budesonide, 1 mg, Nebulization, BID - RT  carvedilol, 12.5 mg, Oral, BID With Meals  famotidine, 20 mg, Oral, Daily  folic acid-pyridoxine-cyanocobalamin, 1 tablet, Oral, Daily  furosemide, 20 mg, Oral, Daily  guaiFENesin, 600 mg, Oral, Q12H  ipratropium-albuterol, 3 mL, Nebulization, 4x Daily - RT  lisinopril, 40 mg, Oral, Daily  montelukast, 10 mg, Oral, Nightly  potassium chloride, 10 mEq, Oral, Daily  predniSONE, 30 mg, Oral, Daily   Followed by  [START ON 5/10/2023] predniSONE, 20 mg, Oral, Daily   Followed by  [START ON 5/12/2023] predniSONE, 10 mg, Oral, Daily  rosuvastatin, 10 mg, Oral, Daily  senna-docusate sodium, 2 tablet, Oral, BID  sildenafil, 20 mg, Oral, Q8H  sodium chloride, 10 mL, Intravenous, Q12H      Continuous Infusions:   PRN Meds:.•  acetaminophen  •  albuterol sulfate HFA  •  senna-docusate sodium **AND** polyethylene glycol **AND** bisacodyl **AND** bisacodyl  •  cloNIDine  •  ondansetron **OR** ondansetron  •  sodium chloride  •  sodium chloride     Assessment & Plan       Acute exacerbation of chronic obstructive pulmonary disease (COPD)    Stage 3a chronic kidney disease    History of venous thrombosis and embolism    Primary hypertension    History of TIA (transient ischemic attack)    Parainfluenza infection    Severe pulmonary hypertension    Acute on chronic respiratory failure with hypoxia    Clinically worse today with increasing oxygen requirements - ct scan shows mucus plugging - continue aggressive pulmonary toilet.  Defer to pulmonary service as to whether bronchoscopy is advised.    Plan for disposition:Home at discharge    Rosamaria Jin MD  05/08/23  16:39 EDT

## 2023-05-08 NOTE — CASE MANAGEMENT/SOCIAL WORK
Continued Stay Note  [facility]     Patient Name: Gabrielle Lyles  MRN: [id number]  Today's Date: 5/8/2023    Admit Date: 5/2/2023    Plan: D/C plan: Anticipate home. Baseline O2 3L, [place].   Discharge Plan     Row Name 05/08/23 1302       Plan    Plan D/C plan: Anticipate home. Baseline O2 3L, [place].    Patient/Family in Agreement with Plan yes    Plan Comments CM requested [place] liaison to deliver nebulizer to pt room. CM later confirmed delivery.            Met with patient in room  Maintained distance greater than six feet and spent less than 15 minutes in the room.          Tom Sanders RN

## 2023-05-08 NOTE — PROGRESS NOTES
Referring Provider: Rosamaria Jin MD    Reason for follow-up: Shortness of breath, coronary artery disease, pulmonary hypertension     Patient Care Team:  Shaylee Mcdaniels MD as PCP - General (Family Medicine)  Richardson Willsi MD as Cardiologist (Cardiology)  Rafy Rodriguez MD as Consulting Physician (Hematology and Oncology)  Christianne Og LPN as Licensed Practical Nurse      SUBJECTIVE  Continues to have shortness of breath however significantly improved     ROS  Review of all systems negative except as indicated.    Since I have last seen, the patient has been without any chest discomfort, shortness of breath, palpitations, dizziness or syncope.  Denies having any headache, abdominal pain, nausea, vomiting, diarrhea, constipation, loss of weight or loss of appetite.  Denies having any excessive bruising, hematuria or blood in the stool.  ROS      Personal History:    Past Medical History:   Diagnosis Date   • COPD (chronic obstructive pulmonary disease)    • Deep vein thrombosis of bilateral lower extremities 7/24/2019   • History of DVT (deep vein thrombosis) 03/2013    bilateral lower extremity   • History of pneumonia 06/2012    community acquired pneumonia and right pleural effusion;hospitalized at [facility]   • History of pulmonary embolism 03/2013    bilateral PE   • Hypertension 2001   • Insomnia     on Ambien 5mg at    • Lobular breast cancer, left 11/2011    Stage IA left upper lobular breast cancer   • Malignant neoplasm of left breast in female, estrogen receptor positive 7/18/2019   • Osteopenia 2012   • Severe pulmonary hypertension 3/3/2023   • Stage 3a chronic kidney disease 7/24/2019   • TIA (transient ischemic attack) 10/25/2022       Past Surgical History:   Procedure Laterality Date   • CARDIAC CATHETERIZATION N/A 3/3/2023    Procedure: Left and Right Heart Cath with Coronary Angiography;  Surgeon: Richardson Willis MD;  Location: [facility] INVASIVE LOCATION;  Service: Cardiovascular;   Laterality: N/A;   • CATARACT EXTRACTION     • IVC FILTER RETRIEVAL  2014    IVC filter placement by Dr. Card   • MAMMO STEREOTACTIC BREAST BX SURGICAL ADD UNI Left 2011    invasive lobular carcinoma-Dr. Cande Daniel   • MASTECTOMY, PARTIAL Left 2011    and left axillary sentinel lymph node biopsy by Dr. Uriostegui   • TUBAL ABDOMINAL LIGATION         Family History   Problem Relation Age of Onset   • Lung cancer Brother        Social History     Tobacco Use   • Smoking status: Former     Types: Cigarettes     Quit date:      Years since quittin.3     Passive exposure: Never   • Smokeless tobacco: Never   • Tobacco comments:     smoked 6 cigarettes a day from 13 years of age to 56 years of age when she quit in    Vaping Use   • Vaping Use: Never used   Substance Use Topics   • Alcohol use: Not Currently   • Drug use: No        Medications Prior to Admission   Medication Sig Dispense Refill Last Dose   • amLODIPine (NORVASC) 10 MG tablet Take 1 tablet by mouth Daily. 90 tablet 3 2023   • apixaban (ELIQUIS) 5 MG tablet tablet Take 1 tablet by mouth Every 12 (Twelve) Hours. Indications: Other - full anticoagulation, history of DVT/PE 60 tablet 2 2023   • carvedilol (COREG) 12.5 MG tablet Take 1 tablet by mouth 2 (Two) Times a Day With Meals. 180 tablet 3 2023   • Cholecalciferol (Vitamin D3) 50 MCG (2000 UT) capsule Take 1 capsule by mouth Daily.   2023   • famotidine (PEPCID) 20 MG tablet Take 1 tablet by mouth As Needed.   Past Month   • Folbic 2.5-25-2 MG tablet tablet Take 1 tablet by mouth Daily.   2023   • furosemide (LASIX) 40 MG tablet Take 1 tablet by mouth Daily. 90 tablet 3 2023   • lisinopril (PRINIVIL,ZESTRIL) 40 MG tablet Take 1 tablet by mouth Daily.   2023   • potassium chloride (K-DUR,KLOR-CON) 10 MEQ CR tablet Take 1 tablet by mouth Daily. 90 tablet 3 2023   • SYMBICORT 80-4.5 MCG/ACT inhaler Inhale 2 puffs 2 (Two) Times a Day.  5  "5/2/2023   • VENTOLIN  (90 Base) MCG/ACT inhaler Inhale 2 puffs Every 4 (Four) Hours As Needed.  5 5/2/2023   • cloNIDine (CATAPRES) 0.1 MG tablet Take 1 tablet by mouth 2 (Two) Times a Day As Needed for High Blood Pressure (SBP greater than 170). 15 tablet 0 More than a month       Allergies:  Patient has no known allergies.    Scheduled Meds:amLODIPine, 10 mg, Oral, Daily  apixaban, 5 mg, Oral, Q12H  budesonide-formoterol, 2 puff, Inhalation, BID - RT  carvedilol, 12.5 mg, Oral, BID With Meals  famotidine, 20 mg, Oral, Daily  folic acid-pyridoxine-cyanocobalamin, 1 tablet, Oral, Daily  furosemide, 20 mg, Oral, Daily  guaiFENesin, 600 mg, Oral, Q12H  ipratropium-albuterol, 3 mL, Nebulization, Q6H While Awake - RT  lisinopril, 40 mg, Oral, Daily  montelukast, 10 mg, Oral, Nightly  potassium chloride, 10 mEq, Oral, Daily  predniSONE, 30 mg, Oral, Daily   Followed by  [START ON 5/10/2023] predniSONE, 20 mg, Oral, Daily   Followed by  [START ON 5/12/2023] predniSONE, 10 mg, Oral, Daily  rosuvastatin, 10 mg, Oral, Daily  senna-docusate sodium, 2 tablet, Oral, BID  sildenafil, 20 mg, Oral, Q8H  sodium chloride, 10 mL, Intravenous, Q12H      Continuous Infusions:   PRN Meds:.•  acetaminophen  •  albuterol sulfate HFA  •  senna-docusate sodium **AND** polyethylene glycol **AND** bisacodyl **AND** bisacodyl  •  cloNIDine  •  ondansetron **OR** ondansetron  •  sodium chloride  •  sodium chloride      OBJECTIVE    Vital Signs  Vitals:    05/08/23 0649 05/08/23 0650 05/08/23 0653 05/08/23 0749   BP:    136/67   BP Location:    Right arm   Patient Position:    Sitting   Pulse: 90 85 78 93   Resp: 16 16 16 22   Temp:    97.6 °F (36.4 °C)   TempSrc:    Oral   SpO2: 92% 92% 92% 90%   Weight:       Height:           Flowsheet Rows    Flowsheet Row First Filed Value   Admission Height 162.6 cm (64\") Documented at 05/02/2023 1310   Admission Weight 61.5 kg (135 lb 9.3 oz) Documented at 05/02/2023 1310            Intake/Output " Summary (Last 24 hours) at 5/8/2023 0940  Last data filed at 5/8/2023 0529  Gross per 24 hour   Intake 1200 ml   Output --   Net 1200 ml          Telemetry: Sinus rhythm with heart rate in the 70s and 80s    Physical Exam:  The patient is alert, oriented and in no distress.  Vital signs as noted above.  Head and neck revealed no carotid bruits or jugular venous distention.  No thyromegaly or lymphadenopathy is present  Lungs clear.  No wheezing.  Breath sounds are normal bilaterally.  On 4 L nasal cannula  Heart normal first and second heart sounds.  No murmur. No precordial rub is present.  No gallop is present.  Abdomen soft and nontender.  No organomegaly is present.  Extremities with good peripheral pulses without any pedal edema.  Skin warm and dry.  Musculoskeletal system is grossly normal.  CNS grossly normal.       Results Review:  I have personally reviewed the results from the time of this admission to 5/8/2023 09:40 EDT and agree with these findings:  []  Laboratory  []  Microbiology  []  Radiology  []  EKG/Telemetry   []  Cardiology/Vascular   []  Pathology  []  Old records  []  Other:    Most notable findings include:    Lab Results (last 24 hours)     Procedure Component Value Units Date/Time    Blood Culture - Blood, Arm, Right [[id number]]  (Normal) Collected: 05/02/23 1638    Specimen: Blood from Arm, Right Updated: 05/07/23 1645     Blood Culture No growth at 5 days    Narrative:      Less than seven (7) mL's of blood was collected.  Insufficient quantity may yield false negative results.    Blood Culture - Blood, Arm, Right [[id number]]  (Normal) Collected: 05/02/23 1456    Specimen: Blood from Arm, Right Updated: 05/07/23 1515     Blood Culture No growth at 5 days    Narrative:      Less than seven (7) mL's of blood was collected.  Insufficient quantity may yield false negative results.          Imaging Results (Last 24 Hours)     ** No results found for the last 24 hours. **          LAB RESULTS  (LAST 7 DAYS)    CBC  Results from last 7 days   Lab Units 05/06/23  1640 05/05/23  1546 05/03/23  0413 05/02/23  1456   WBC 10*3/mm3 10.80 13.80* 7.50 12.40*   RBC 10*6/mm3 5.12 5.17 4.80 5.26   HEMOGLOBIN g/dL 14.8 15.0 14.1 15.3   HEMATOCRIT % 46.6 47.7* 43.0 46.2   MCV fL 91.1 92.3 89.7 87.7   PLATELETS 10*3/mm3 268 248 189 199       BMP  Results from last 7 days   Lab Units 05/05/23  1737 05/03/23  0413 05/02/23  1456   SODIUM mmol/L 138 141 140   POTASSIUM mmol/L 4.5 4.1 3.5   CHLORIDE mmol/L 101 101 100   CO2 mmol/L 24.0 28.0 27.0   BUN mg/dL 25* 26* 18   CREATININE mg/dL 0.97 1.25* 0.93   GLUCOSE mg/dL 172* 141* 100*       CMP   Results from last 7 days   Lab Units 05/05/23  1737 05/03/23  0413 05/02/23  1456   SODIUM mmol/L 138 141 140   POTASSIUM mmol/L 4.5 4.1 3.5   CHLORIDE mmol/L 101 101 100   CO2 mmol/L 24.0 28.0 27.0   BUN mg/dL 25* 26* 18   CREATININE mg/dL 0.97 1.25* 0.93   GLUCOSE mg/dL 172* 141* 100*   ALBUMIN g/dL  --   --  3.9   BILIRUBIN mg/dL  --   --  0.7   ALK PHOS U/L  --   --  83   AST (SGOT) U/L  --   --  14   ALT (SGPT) U/L  --   --  17       BNP        TROPONIN  Results from last 7 days   Lab Units 05/02/23  1456   HSTROP T ng/L 12*       CoAg        Creatinine Clearance  Estimated Creatinine Clearance: 45.2 mL/min (by C-G formula based on SCr of 0.97 mg/dL).    ABG        Radiology  No radiology results for the last day      EKG  I personally viewed and interpreted the patient's EKG/Telemetry data:  ECG 12 Lead Dyspnea   Final Result   HEART RATE= 57  bpm   RR Interval= 1044  ms   PA Interval= 193  ms   P Horizontal Axis= 119  deg   P Front Axis= 0  deg   QRSD Interval= 95  ms   QT Interval= 428  ms   QRS Axis= 94  deg   T Wave Axis= 61  deg   - OTHERWISE NORMAL ECG -   Sinus bradycardia- previous sinus 4-8-23    Right axis deviation   Low voltage, precordial leads   When compared with ECG of 08-Apr-2023 17:03:30,   non specific ST-T wave changes   Artifact   Electronically Signed By:  Alan Baumann ([facility]) 03-May-2023 05:46:13   Date and Time of Study: 2023-05-02 13:23:59      SCANNED - TELEMETRY     Final Result      SCANNED - TELEMETRY     Final Result      SCANNED - TELEMETRY     Final Result      SCANNED - TELEMETRY     Final Result      SCANNED - TELEMETRY     Final Result      SCANNED - TELEMETRY     Final Result      SCANNED - TELEMETRY     Final Result      SCANNED - TELEMETRY     Final Result      SCANNED - TELEMETRY     Final Result      SCANNED - TELEMETRY     Final Result      SCANNED - TELEMETRY     Final Result      SCANNED - TELEMETRY     Final Result      SCANNED - TELEMETRY     Final Result      SCANNED - TELEMETRY     Final Result            Echocardiogram:    Results for orders placed in visit on 09/13/22    Adult Transthoracic Echo Complete W/ Cont if Necessary Per Protocol    Interpretation Summary  •  Estimated left ventricular EF = 70% Estimated left ventricular EF was in agreement with the calculated left ventricular EF. Left ventricular systolic function is normal.  •  Left ventricular diastolic function is consistent with (grade II w/high LAP) pseudonormalization.        Stress Test:  Results for orders placed in visit on 09/13/22    Stress Test With Myocardial Perfusion One Day    Interpretation Summary  •  Left ventricular ejection fraction is hyperdynamic (Calculated EF > 70%). .  •  Myocardial perfusion imaging indicates a normal myocardial perfusion study with no evidence of ischemia.  •  Impressions are consistent with a low risk study.  •  Findings consistent with a normal ECG stress test.         Cardiac Catheterization:  Results for orders placed during the hospital encounter of 03/03/23    Cardiac Catheterization/Vascular Study    Narrative  OPERATORS  Richardson Willis M.D. (Attending Cardiologist)      PROCEDURE PERFORMED  Ultrasound guided vascular access  Right heart catheterization  Coronary Angiogram  Left Heart Catheterization 35736  Moderate  Sedation    INDICATIONS FOR PROCEDURE  83-year-old woman with multiple cardiovascular risk factors, abnormal stress test presented with worsening shortness of breath.  After discussing the risk and benefit of the procedure she was brought in for elective right and left heart cath.    PROCEDURE IN DETAIL  Informed consent was obtained from the patient after explaining the risks, benefits, and alternative options of the procedure. After obtaining informed consent, the patient was brought to the cath lab and was prepped in a sterile fashion. Lidocaine 2% was used for local anesthesia into the right femoral venous access site. Right femoral vein was accessed using the micropuncture needle under ultrasound guidance and micropuncture wire advanced under flouroscopy. A 7 Maltese vascular sheath was put into place percutaneously over guide-wire. Guide wires were removed. A 6Fr swan sheryl catheter was advanced to wedge position. RA, RV and PA and wedge pressures were recorded.  PA sat and arterial sats recorded.  The patient tolerated the procedure well without any complications.    Lidocaine 2% was used for local anesthesia into the right femoral arterial access site. The right femoral artery was accessed with a micropuncture needle via modified Seldinger technique under ultrasound guidance. A 6F was inserted successfully.  Afterwards, 6F JR4 and JL4 diagnostic catheters were advanced over a wire into the ascending aorta and were used to engage the ostia of the left main and RCA respectively. JR4 used to cross the AV and obtain LV pressures and gradient across the AV measured via pullback technique. Images of the right and left coronary systems were obtained. All the catheters were exchanged over a wire and subsequently removed. Angiogram of the femoral access site was obtained and did not show complications. The patient tolerated the procedure well without any complications. The pictures were reviewed at the end of the  procedure. A Mynx closure device was applied.    HEMODYNAMICS    RHC  RA 6/5, 4 mmHg  RV 74/3, 5 mmHg  PA 71/25, 44 mmHg  PCW 12 mmHg  AO Sat 92%  PA Sat 78%    Jose CO: 7.89 L/min    Jose CI: 4.69 L/min/m²    LHC  LV: 134/3, 20 mmHg  AO: 131/56, 87 mmHg  No significant gradient across the aortic valve during pullback of JR4 catheter.    FINDINGS  Coronary Angiogram  All vessels are heavily calcified  She also has heavily calcified peripheral arterial disease.  Right dominant circulation    Left main: Left main is a large caliber vessel which gives rise to the Left Anterior Descending and the Left circumflex.  Left main is angiographically free from any significant disease.    Left Anterior Descending Artery: LAD is a heavily calcified medium caliber vessel which gives rise to diagonals.  The vessel is highly tortuous and has 50 to 60% mid vessel stenosis best seen in Lithuanian cranial view.    Left Circumflex: Heavily calcified vessel with 50% proximal segment stenosis.    Right Coronary Artery: The RCA is a large caliber vessel which is heavily calcified and tortuous.  It has diffuse luminal irregularities and 30 to 40% stenosis in the midsegment around the bend.    ESTIMATED BLOOD LOSS:  10 ml    COMPLICATIONS:  None    PROCEDURE DATA:  Sedation Time: 25 minutes    IMPRESSIONS  Heavily calcified, tortuous nonobstructive coronary disease  Severe pulmonary hypertension with PA systolic pressure in the 70s  Mean PA pressure 44 mmHg    RECOMMENDATIONS  -Continue aggressive medical management of CAD  -Referral to pulmonology for treatment of pulmonary hypertension         Other:         ASSESSMENT & PLAN:    Principal Problem:    Acute exacerbation of chronic obstructive pulmonary disease (COPD)  Active Problems:    Stage 3a chronic kidney disease    History of venous thrombosis and embolism    Primary hypertension    History of TIA (transient ischemic attack)    Parainfluenza infection    Severe pulmonary hypertension     Acute on chronic respiratory failure with hypoxia    Acute exacerbation of chronic obstructive pulmonary disease   Parainfluenza infection  Pulmonology team is managing.  She is currently on bronchodilators, steroids and Singulair.  On antibiotics     Acute on chronic respiratory failure with hypoxia  Exacerbation of COPD secondary to parainfluenza.  Respiratory status is tenuous due to severe pulmonary hypertension.  Continue supplemental oxygen and treatment of COPD exacerbation.     Severe pulmonary hypertension  He has been started on sildenafil during this admission  Work-up has been initiated for connective tissue disease.  Plan is to obtain PFTs and sleep study later  She may benefit from Uptravi if deemed suitable by pulmonology.     Stage 3a chronic kidney disease  Close eye on the renal function  Current creatinine less than 1     Atrial fibrillation  MCOT review as an outpatient showed evidence of atrial fibrillation.  She will continue to be on full dose anticoagulation with Eliquis for A-fib and for history of DVT/PE.  Rate and rhythm are controlled with beta-blocker     History of venous thrombosis and embolism  Continue full dose anticoagulation with Eliquis 5 mg p.o. twice daily  She also has an IVC filter in place     Primary hypertension, chronic  Blood pressures well controlled.  Previous echocardiogram showed preserved LV function with grade 2 diastolic dysfunction.  Continue home antihypertensives including amlodipine, Coreg and lisinopril  Lasix for HFpEF.  Monitor I's and O's and replace electrolytes as needed     Coronary artery disease  Continue high intensity statin and beta-blocker  No aspirin as she is already on anticoagulation.  Previous cardiac catheterization showed heavily calcified coronaries but nonobstructive.     History of TIA (transient ischemic attack)/peripheral arterial disease  She has extensive atherosclerotic disease involving coronaries, thoracic aortic arch with chronic  occlusion of proximal left subclavian artery  Continue anticoagulation and high intensity statin      Richardson Willis MD  05/08/23  09:40 EDT

## 2023-05-08 NOTE — PROGRESS NOTES
"PULMONARY CRITICAL CARE PROGRESS  NOTE      PATIENT IDENTIFICATION:  Name: Gabrielle Lyles  MRN: [id number]  :  1940     Age: 83 y.o.  Sex: female    DATE OF Note:  2023   Referring Physician: Rosamaria Jin MD                  Subjective:   Still hypoxic still shortness of breath with any activity, still a dry cough no chest pain, no nausea or vomiting, no change in bowel habit, no dysuria,  no new  skin rash or itching.      Objective:  tMax 24 hrs: Temp (24hrs), Av.5 °F (36.4 °C), Min:97.4 °F (36.3 °C), Max:97.6 °F (36.4 °C)      Vitals Ranges:   Temp:  [97.4 °F (36.3 °C)-97.6 °F (36.4 °C)] 97.6 °F (36.4 °C)  Heart Rate:  [62-93] 80  Resp:  [13-22] 16  BP: ()/(55-76) 136/67    Intake and Output Last 3 Shifts:   I/O last 3 completed shifts:  In: 1440 [P.O.:1440]  Out: -     Exam:  /67 (BP Location: Right arm, Patient Position: Sitting)   Pulse 80   Temp 97.6 °F (36.4 °C) (Oral)   Resp 16   Ht 162.6 cm (64\")   Wt 64 kg (141 lb 1.5 oz)   SpO2 (!) 86%   BMI 24.22 kg/m²     General Appearance: Alert awake not in distress her O2 sat on 8 L is 86%  HEENT:  Normocephalic, without obvious abnormality. Conjunctivae/corneas clear.  Normal external ear canals. Nares normal, no drainage     Neck:  Supple, symmetrical, trachea midline. No JVD.  Lungs /Chest wall:   Bilateral basal rhonchi, respirations unlabored, symmetrical wall movement.     Heart:  Regular rate and rhythm, systolic murmur PMI left sternal border  Abdomen: Soft, nontender, no masses, no organomegaly.    Extremities: Trace edema, no clubbing or cyanosis        Medications:  amLODIPine, 10 mg, Oral, Daily  apixaban, 5 mg, Oral, Q12H  budesonide-formoterol, 2 puff, Inhalation, BID - RT  carvedilol, 12.5 mg, Oral, BID With Meals  famotidine, 20 mg, Oral, Daily  folic acid-pyridoxine-cyanocobalamin, 1 tablet, Oral, Daily  furosemide, 20 mg, Oral, Daily  guaiFENesin, 600 mg, Oral, Q12H  ipratropium-albuterol, 3 mL, Nebulization, " Q6H While Awake - RT  lisinopril, 40 mg, Oral, Daily  montelukast, 10 mg, Oral, Nightly  potassium chloride, 10 mEq, Oral, Daily  predniSONE, 30 mg, Oral, Daily   Followed by  [START ON 5/10/2023] predniSONE, 20 mg, Oral, Daily   Followed by  [START ON 5/12/2023] predniSONE, 10 mg, Oral, Daily  rosuvastatin, 10 mg, Oral, Daily  senna-docusate sodium, 2 tablet, Oral, BID  sildenafil, 20 mg, Oral, Q8H  sodium chloride, 10 mL, Intravenous, Q12H        Infusion:        PRN:  •  acetaminophen  •  albuterol sulfate HFA  •  senna-docusate sodium **AND** polyethylene glycol **AND** bisacodyl **AND** bisacodyl  •  cloNIDine  •  ondansetron **OR** ondansetron  •  sodium chloride  •  sodium chloride  Data Review:  All labs (24hrs): No results found for this or any previous visit (from the past 24 hour(s)).     Imaging:  XR Chest 1 View  Narrative: XR CHEST 1 VW    Date of Exam: 5/2/2023 2:25 PM EDT    Indication: dyspnea    Comparison: 4/8/2023    Findings:  Interval decrease in left base consolidation. There is minimal residual left base consolidation and a small left effusion. Right lung is clear. Cardiac and mediastinal contours are within normal limits. Regional skeleton is unremarkable.  Impression: Impression:    1. Near complete resolution of left base opacity and left effusion.    Electronically Signed: Daniel Riddle    5/2/2023 2:46 PM EDT    Workstation ID: [id number]       ASSESSMENT:  Acute over chronic respiratory failure with hypoxia patient currently on 6 L nasal cannula  acute exacerbation of chronic obstructive pulmonary disease (COPD)    Stage 3a chronic kidney disease    History of venous thrombosis and embolism    Primary hypertension    History of TIA (transient ischemic attack)    Parainfluenza infection  History of PE  Severe pulmonary hypertension       PLAN:  Repeat CT chest PE protocol  Continue with oxygen supplement  Continue anticoagulation we will continue with  Bronchodilator  Inhaled  corticosteroids  Electrolytes/ glycemic control  DVT and GI prophylaxis.  Discussed with primary care  Total Critical care time in direct medical management (   ) minutes. This time specifically excludes time spent performing procedures.  Jerry Solorzano MD. D, ABSM.     5/8/2023  11:46 EDT

## 2023-05-09 PROCEDURE — 94799 UNLISTED PULMONARY SVC/PX: CPT

## 2023-05-09 PROCEDURE — 63710000001 PREDNISONE PER 1 MG: Performed by: INTERNAL MEDICINE

## 2023-05-09 PROCEDURE — 94761 N-INVAS EAR/PLS OXIMETRY MLT: CPT

## 2023-05-09 PROCEDURE — 94664 DEMO&/EVAL PT USE INHALER: CPT

## 2023-05-09 PROCEDURE — 63710000001 PREDNISONE PER 5 MG: Performed by: INTERNAL MEDICINE

## 2023-05-09 PROCEDURE — 99232 SBSQ HOSP IP/OBS MODERATE 35: CPT | Performed by: INTERNAL MEDICINE

## 2023-05-09 RX ORDER — ACETYLCYSTEINE 200 MG/ML
3 SOLUTION ORAL; RESPIRATORY (INHALATION)
Status: DISCONTINUED | OUTPATIENT
Start: 2023-05-09 | End: 2023-05-11

## 2023-05-09 RX ADMIN — LISINOPRIL 40 MG: 20 TABLET ORAL at 09:18

## 2023-05-09 RX ADMIN — FOLIC ACID-PYRIDOXINE-CYANOCOBALAMIN TAB 2.5-25-2 MG 1 TABLET: 2.5-25-2 TAB at 09:17

## 2023-05-09 RX ADMIN — ARFORMOTEROL TARTRATE 15 MCG: 15 SOLUTION RESPIRATORY (INHALATION) at 18:42

## 2023-05-09 RX ADMIN — APIXABAN 5 MG: 5 TABLET, FILM COATED ORAL at 20:37

## 2023-05-09 RX ADMIN — SILDENAFIL CITRATE 20 MG: 20 TABLET ORAL at 15:03

## 2023-05-09 RX ADMIN — BUDESONIDE 0.5 MG: 0.5 INHALANT RESPIRATORY (INHALATION) at 08:09

## 2023-05-09 RX ADMIN — SILDENAFIL CITRATE 20 MG: 20 TABLET ORAL at 05:50

## 2023-05-09 RX ADMIN — FAMOTIDINE 20 MG: 20 TABLET ORAL at 09:18

## 2023-05-09 RX ADMIN — CARVEDILOL 12.5 MG: 6.25 TABLET, FILM COATED ORAL at 09:17

## 2023-05-09 RX ADMIN — AMLODIPINE BESYLATE 10 MG: 5 TABLET ORAL at 09:17

## 2023-05-09 RX ADMIN — POTASSIUM CHLORIDE 10 MEQ: 750 TABLET, EXTENDED RELEASE ORAL at 09:17

## 2023-05-09 RX ADMIN — ACETYLCYSTEINE 3 ML: 200 SOLUTION ORAL; RESPIRATORY (INHALATION) at 18:42

## 2023-05-09 RX ADMIN — Medication 10 ML: at 09:17

## 2023-05-09 RX ADMIN — GUAIFENESIN 1200 MG: 600 TABLET, EXTENDED RELEASE ORAL at 09:17

## 2023-05-09 RX ADMIN — SILDENAFIL CITRATE 20 MG: 20 TABLET ORAL at 22:07

## 2023-05-09 RX ADMIN — ARFORMOTEROL TARTRATE 15 MCG: 15 SOLUTION RESPIRATORY (INHALATION) at 08:05

## 2023-05-09 RX ADMIN — Medication 10 ML: at 20:40

## 2023-05-09 RX ADMIN — MONTELUKAST 10 MG: 10 TABLET, FILM COATED ORAL at 20:37

## 2023-05-09 RX ADMIN — FUROSEMIDE 20 MG: 20 TABLET ORAL at 09:18

## 2023-05-09 RX ADMIN — APIXABAN 5 MG: 5 TABLET, FILM COATED ORAL at 09:17

## 2023-05-09 RX ADMIN — GUAIFENESIN 1200 MG: 600 TABLET, EXTENDED RELEASE ORAL at 20:37

## 2023-05-09 RX ADMIN — CARVEDILOL 12.5 MG: 6.25 TABLET, FILM COATED ORAL at 16:40

## 2023-05-09 RX ADMIN — BUDESONIDE 1 MG: 0.5 INHALANT RESPIRATORY (INHALATION) at 18:48

## 2023-05-09 RX ADMIN — PREDNISONE 30 MG: 20 TABLET ORAL at 09:17

## 2023-05-09 NOTE — PROGRESS NOTES
LOS: 5 days   Patient Care Team:  Shaylee Mcdaniels MD as PCP - General (Family Medicine)  Richardson Willis MD as Cardiologist (Cardiology)  Rafy Rodriguez MD as Consulting Physician (Hematology and Oncology)  Christianne Og LPN as Licensed Practical Nurse    Subjective     Patient continues with shortness of breath    Review of Systems   Constitutional: Positive for activity change and fatigue.   HENT: Negative.    Respiratory: Positive for shortness of breath.    Cardiovascular: Negative.    Gastrointestinal: Negative.    Genitourinary: Negative.    Musculoskeletal: Negative.    Neurological: Negative.    Psychiatric/Behavioral: Negative.            Objective     Vital Signs  Temp:  [97.4 °F (36.3 °C)-97.7 °F (36.5 °C)] 97.7 °F (36.5 °C)  Heart Rate:  [75-86] 83  Resp:  [16-23] 16  BP: (100-155)/(59-79) 140/73      Physical Exam  Vitals reviewed.   Constitutional:       Appearance: She is not ill-appearing.   HENT:      Head: Normocephalic and atraumatic.      Right Ear: External ear normal.      Left Ear: External ear normal.      Nose: Nose normal.      Mouth/Throat:      Mouth: Mucous membranes are moist.   Eyes:      General:         Right eye: No discharge.         Left eye: No discharge.   Cardiovascular:      Rate and Rhythm: Normal rate and regular rhythm.      Pulses: Normal pulses.      Heart sounds: Normal heart sounds.   Pulmonary:      Effort: Pulmonary effort is normal.      Breath sounds: Normal breath sounds.   Abdominal:      General: Bowel sounds are normal.      Palpations: Abdomen is soft.   Musculoskeletal:         General: Normal range of motion.      Cervical back: Normal range of motion.   Skin:     General: Skin is warm.   Neurological:      Mental Status: She is alert and oriented to person, place, and time.   Psychiatric:         Behavior: Behavior normal.              Results Review:    Lab Results (last 24 hours)     ** No results found for the last 24 hours. **            Imaging Results (Last 24 Hours)     Procedure Component Value Units Date/Time    CT Angiogram Chest Pulmonary Embolism [[id number]] Collected: 05/08/23 1516     Updated: 05/08/23 1529    Narrative:      CT ANGIOGRAM CHEST PULMONARY EMBOLISM    Date of Exam: 5/8/2023 3:00 PM EDT    Indication: Pulmonary embolism (PE) suspected, high prob.    Comparison: Chest radiograph dated 5/2/2023    Technique: Axial CT images were obtained of the chest after the uneventful intravenous administration of iodinated contrast utilizing pulmonary embolism protocol.  Sagittal and coronal reconstructions were performed.  Automated exposure control and   iterative reconstruction methods were used.      Findings:  The visualized soft tissue structures at the base of the neck including the thyroid appear within normal limits. There is no lower cervical or axillary adenopathy. There are postsurgical changes of the superior left breast.    The heart size is normal. There is no pericardial effusion. The aorta is normal in caliber without evidence of aneurysm formation. There is extensive aortic atherosclerotic plaque. There is poor opacification of the left subclavian artery which may be   partially occluded. The main pulmonary artery appears normal in caliber. There are no filling defects within the pulmonary arterial system to suggest presence of underlying pulmonary embolism. There is no mediastinal or hilar lymphadenopathy by size   criteria.    There are secretions within the left lower lobe which causes medial basal atelectasis. There is no pleural effusion or pneumothorax. There is upper lobe predominant moderate centrilobular emphysema. There is a small area of groundglass opacity within the   periphery of the right upper lobe and posterior aspect of the left upper lobe, favor mild infectious or inflammatory process.     The esophagus is normal in course and caliber. Visualized portions of the upper abdomen demonstrate  atherosclerotic plaque involving the aorta and branch vasculature. There is partial visualization of a presumed IVC filter with metallic density partially   visualized. The liver has a nodular contour which can be seen with underlying chronic liver disease.    There is degenerative disc disease of the thoracic spine. No suspicious lytic or sclerotic osseous lesion.      Impression:      Impression:  1. No evidence of pulmonary embolism.  2. Secretions within the left lower lobe airways with mucous plugging and partial left lower lobe atelectasis.  3. Extensive aortic atherosclerotic plaque with suspected occlusion of the left subclavian artery, unchanged from prior.  4. Moderate upper lobe predominant centrilobular emphysema. Recommend correlation with patient's smoking history and risk factors to see if they qualify for annual low-dose lung cancer screening.        Electronically Signed: Param Johansen    5/8/2023 3:27 PM EDT    Workstation ID: [id number]               I reviewed the patient's new clinical results.    Medication Review:   Scheduled Meds:acetylcysteine, 3 mL, Nebulization, TID - RT  amLODIPine, 10 mg, Oral, Daily  apixaban, 5 mg, Oral, Q12H  arformoterol, 15 mcg, Nebulization, BID - RT  budesonide, 1 mg, Nebulization, BID - RT  carvedilol, 12.5 mg, Oral, BID With Meals  famotidine, 20 mg, Oral, Daily  folic acid-pyridoxine-cyanocobalamin, 1 tablet, Oral, Daily  furosemide, 20 mg, Oral, Daily  guaiFENesin, 1,200 mg, Oral, Q12H  ipratropium-albuterol, 3 mL, Nebulization, 4x Daily - RT  lisinopril, 40 mg, Oral, Daily  montelukast, 10 mg, Oral, Nightly  potassium chloride, 10 mEq, Oral, Daily  [START ON 5/10/2023] predniSONE, 20 mg, Oral, Daily   Followed by  [START ON 5/12/2023] predniSONE, 10 mg, Oral, Daily  rosuvastatin, 10 mg, Oral, Daily  senna-docusate sodium, 2 tablet, Oral, BID  sildenafil, 20 mg, Oral, Q8H  sodium chloride, 10 mL, Intravenous, Q12H      Continuous Infusions:   PRN Meds:.•   acetaminophen  •  albuterol sulfate HFA  •  senna-docusate sodium **AND** polyethylene glycol **AND** bisacodyl **AND** bisacodyl  •  cloNIDine  •  ondansetron **OR** ondansetron  •  sodium chloride  •  sodium chloride     Interval History:    5/9 oxygen needs increased; ct with mucus plugs may need bronchoscopy    Assessment & Plan     Severe pulmonary hypertension mean PA pressure 44  -started on revation today  -Avoid nitrates     Work-up for pulmonary hypertension ongoing  Work-up for connective tissue disease, REN, ANCA, scleroderma panel  Pulmonary function test  Sleep apnea test    Acute exacerbation of chronic obstructive pulmonary disease (COPD)  - continue steroids, bronchodilators, supplemental oxygen, Mucinex; continue Rocephin   Acute viral URI (parainfluenza)- supportive care  Elevated creatinine - may be due to recent steroids; avoid nephrotoxins as able; gently hydration  HTN - amlodipine, carvedilol, lisinopril.  H/o DVT/PE - Eliquis  HLD - Crestor        Plan for disposition:Home poss tomorrow    Mansi Becerril, JOAQUÍN  05/09/23  11:42 EDT

## 2023-05-09 NOTE — CASE MANAGEMENT/SOCIAL WORK
Continued Stay Note  LIDYA Cox     Patient Name: Gabrielle Lyles  MRN: [id number]  Today's Date: 5/9/2023    Admit Date: 5/2/2023    Plan: D/C plan: Anticipate home. Baseline O2 3L, [place]. Watch for increased needs at d/c.   Discharge Plan     Row Name 05/09/23 1023       Plan    Plan D/C plan: Anticipate home. Baseline O2 3L, [place]. Watch for increased needs at d/c.    Patient/Family in Agreement with Plan yes    Plan Comments Barrier to d/c: 6L, cardiology and pulm following.                   Phone communication or documentation only - no physical contact with patient or family.      Tom Sanders RN

## 2023-05-09 NOTE — PROGRESS NOTES
Referring Provider: Rosamaria Jin MD    Reason for follow-up: Shortness of breath, coronary artery disease, pulmonary hypertension     Patient Care Team:  Shaylee Mcdaniels MD as PCP - General (Family Medicine)  Richardson Willis MD as Cardiologist (Cardiology)  Rafy Rodriguez MD as Consulting Physician (Hematology and Oncology)  Christianne Og LPN as Licensed Practical Nurse      SUBJECTIVE  Shortness of breath is improved however she is on 6 L of oxygen.     ROS  Review of all systems negative except as indicated.    Since I have last seen, the patient has been without any chest discomfort, shortness of breath, palpitations, dizziness or syncope.  Denies having any headache, abdominal pain, nausea, vomiting, diarrhea, constipation, loss of weight or loss of appetite.  Denies having any excessive bruising, hematuria or blood in the stool.  ROS      Personal History:    Past Medical History:   Diagnosis Date   • COPD (chronic obstructive pulmonary disease)    • Deep vein thrombosis of bilateral lower extremities 7/24/2019   • History of DVT (deep vein thrombosis) 03/2013    bilateral lower extremity   • History of pneumonia 06/2012    community acquired pneumonia and right pleural effusion;hospitalized at [facility]   • History of pulmonary embolism 03/2013    bilateral PE   • Hypertension 2001   • Insomnia     on Ambien 5mg at    • Lobular breast cancer, left 11/2011    Stage IA left upper lobular breast cancer   • Malignant neoplasm of left breast in female, estrogen receptor positive 7/18/2019   • Osteopenia 2012   • Severe pulmonary hypertension 3/3/2023   • Stage 3a chronic kidney disease 7/24/2019   • TIA (transient ischemic attack) 10/25/2022       Past Surgical History:   Procedure Laterality Date   • CARDIAC CATHETERIZATION N/A 3/3/2023    Procedure: Left and Right Heart Cath with Coronary Angiography;  Surgeon: Richardson Willis MD;  Location: [facility] INVASIVE LOCATION;  Service: Cardiovascular;   Laterality: N/A;   • CATARACT EXTRACTION     • IVC FILTER RETRIEVAL  2014    IVC filter placement by Dr. Card   • MAMMO STEREOTACTIC BREAST BX SURGICAL ADD UNI Left 2011    invasive lobular carcinoma-Dr. Cande Daniel   • MASTECTOMY, PARTIAL Left 2011    and left axillary sentinel lymph node biopsy by Dr. Uriostegui   • TUBAL ABDOMINAL LIGATION         Family History   Problem Relation Age of Onset   • Lung cancer Brother        Social History     Tobacco Use   • Smoking status: Former     Types: Cigarettes     Quit date:      Years since quittin.3     Passive exposure: Never   • Smokeless tobacco: Never   • Tobacco comments:     smoked 6 cigarettes a day from 13 years of age to 56 years of age when she quit in    Vaping Use   • Vaping Use: Never used   Substance Use Topics   • Alcohol use: Not Currently   • Drug use: No        Medications Prior to Admission   Medication Sig Dispense Refill Last Dose   • amLODIPine (NORVASC) 10 MG tablet Take 1 tablet by mouth Daily. 90 tablet 3 2023   • apixaban (ELIQUIS) 5 MG tablet tablet Take 1 tablet by mouth Every 12 (Twelve) Hours. Indications: Other - full anticoagulation, history of DVT/PE 60 tablet 2 2023   • carvedilol (COREG) 12.5 MG tablet Take 1 tablet by mouth 2 (Two) Times a Day With Meals. 180 tablet 3 2023   • Cholecalciferol (Vitamin D3) 50 MCG (2000 UT) capsule Take 1 capsule by mouth Daily.   2023   • famotidine (PEPCID) 20 MG tablet Take 1 tablet by mouth As Needed.   Past Month   • Folbic 2.5-25-2 MG tablet tablet Take 1 tablet by mouth Daily.   2023   • furosemide (LASIX) 40 MG tablet Take 1 tablet by mouth Daily. 90 tablet 3 2023   • lisinopril (PRINIVIL,ZESTRIL) 40 MG tablet Take 1 tablet by mouth Daily.   2023   • potassium chloride (K-DUR,KLOR-CON) 10 MEQ CR tablet Take 1 tablet by mouth Daily. 90 tablet 3 2023   • SYMBICORT 80-4.5 MCG/ACT inhaler Inhale 2 puffs 2 (Two) Times a Day.  5  "5/2/2023   • VENTOLIN  (90 Base) MCG/ACT inhaler Inhale 2 puffs Every 4 (Four) Hours As Needed.  5 5/2/2023   • cloNIDine (CATAPRES) 0.1 MG tablet Take 1 tablet by mouth 2 (Two) Times a Day As Needed for High Blood Pressure (SBP greater than 170). 15 tablet 0 More than a month       Allergies:  Patient has no known allergies.    Scheduled Meds:acetylcysteine, 3 mL, Nebulization, TID - RT  amLODIPine, 10 mg, Oral, Daily  apixaban, 5 mg, Oral, Q12H  arformoterol, 15 mcg, Nebulization, BID - RT  budesonide, 1 mg, Nebulization, BID - RT  carvedilol, 12.5 mg, Oral, BID With Meals  famotidine, 20 mg, Oral, Daily  folic acid-pyridoxine-cyanocobalamin, 1 tablet, Oral, Daily  furosemide, 20 mg, Oral, Daily  guaiFENesin, 1,200 mg, Oral, Q12H  ipratropium-albuterol, 3 mL, Nebulization, 4x Daily - RT  lisinopril, 40 mg, Oral, Daily  montelukast, 10 mg, Oral, Nightly  potassium chloride, 10 mEq, Oral, Daily  predniSONE, 30 mg, Oral, Daily   Followed by  [START ON 5/10/2023] predniSONE, 20 mg, Oral, Daily   Followed by  [START ON 5/12/2023] predniSONE, 10 mg, Oral, Daily  rosuvastatin, 10 mg, Oral, Daily  senna-docusate sodium, 2 tablet, Oral, BID  sildenafil, 20 mg, Oral, Q8H  sodium chloride, 10 mL, Intravenous, Q12H      Continuous Infusions:   PRN Meds:.•  acetaminophen  •  albuterol sulfate HFA  •  senna-docusate sodium **AND** polyethylene glycol **AND** bisacodyl **AND** bisacodyl  •  cloNIDine  •  ondansetron **OR** ondansetron  •  sodium chloride  •  sodium chloride      OBJECTIVE    Vital Signs  Vitals:    05/09/23 0807 05/09/23 0808 05/09/23 0809 05/09/23 0811   BP: 140/73      BP Location: Right arm      Patient Position: Lying      Pulse: 81 77 76 83   Resp: 16 16 16 16   Temp: 97.7 °F (36.5 °C)      TempSrc: Oral      SpO2: 92% 90% 91% 95%   Weight:       Height:           Flowsheet Rows    Flowsheet Row First Filed Value   Admission Height 162.6 cm (64\") Documented at 05/02/2023 1310   Admission Weight 61.5 " kg (135 lb 9.3 oz) Documented at 05/02/2023 1310            Intake/Output Summary (Last 24 hours) at 5/9/2023 0856  Last data filed at 5/8/2023 1700  Gross per 24 hour   Intake 480 ml   Output --   Net 480 ml          Telemetry: Sinus rhythm with heart rate in the 70s and 80s    Physical Exam:  The patient is alert, oriented and in no distress.  Vital signs as noted above.  Head and neck revealed no carotid bruits or jugular venous distention.  No thyromegaly or lymphadenopathy is present  Lungs clear.  No wheezing.  Breath sounds are normal bilaterally.  On 6 L nasal cannula  Heart normal first and second heart sounds.  No murmur. No precordial rub is present.  No gallop is present.  Abdomen soft and nontender.  No organomegaly is present.  Extremities with good peripheral pulses without any pedal edema.  Skin warm and dry.  Musculoskeletal system is grossly normal.  CNS grossly normal.       Results Review:  I have personally reviewed the results from the time of this admission to 5/9/2023 08:56 EDT and agree with these findings:  []  Laboratory  []  Microbiology  []  Radiology  []  EKG/Telemetry   []  Cardiology/Vascular   []  Pathology  []  Old records  []  Other:    Most notable findings include:    Lab Results (last 24 hours)     ** No results found for the last 24 hours. **          Imaging Results (Last 24 Hours)     Procedure Component Value Units Date/Time    CT Angiogram Chest Pulmonary Embolism [[id number]] Collected: 05/08/23 1516     Updated: 05/08/23 1529    Narrative:      CT ANGIOGRAM CHEST PULMONARY EMBOLISM    Date of Exam: 5/8/2023 3:00 PM EDT    Indication: Pulmonary embolism (PE) suspected, high prob.    Comparison: Chest radiograph dated 5/2/2023    Technique: Axial CT images were obtained of the chest after the uneventful intravenous administration of iodinated contrast utilizing pulmonary embolism protocol.  Sagittal and coronal reconstructions were performed.  Automated exposure control and    iterative reconstruction methods were used.      Findings:  The visualized soft tissue structures at the base of the neck including the thyroid appear within normal limits. There is no lower cervical or axillary adenopathy. There are postsurgical changes of the superior left breast.    The heart size is normal. There is no pericardial effusion. The aorta is normal in caliber without evidence of aneurysm formation. There is extensive aortic atherosclerotic plaque. There is poor opacification of the left subclavian artery which may be   partially occluded. The main pulmonary artery appears normal in caliber. There are no filling defects within the pulmonary arterial system to suggest presence of underlying pulmonary embolism. There is no mediastinal or hilar lymphadenopathy by size   criteria.    There are secretions within the left lower lobe which causes medial basal atelectasis. There is no pleural effusion or pneumothorax. There is upper lobe predominant moderate centrilobular emphysema. There is a small area of groundglass opacity within the   periphery of the right upper lobe and posterior aspect of the left upper lobe, favor mild infectious or inflammatory process.     The esophagus is normal in course and caliber. Visualized portions of the upper abdomen demonstrate atherosclerotic plaque involving the aorta and branch vasculature. There is partial visualization of a presumed IVC filter with metallic density partially   visualized. The liver has a nodular contour which can be seen with underlying chronic liver disease.    There is degenerative disc disease of the thoracic spine. No suspicious lytic or sclerotic osseous lesion.      Impression:      Impression:  1. No evidence of pulmonary embolism.  2. Secretions within the left lower lobe airways with mucous plugging and partial left lower lobe atelectasis.  3. Extensive aortic atherosclerotic plaque with suspected occlusion of the left subclavian artery,  unchanged from prior.  4. Moderate upper lobe predominant centrilobular emphysema. Recommend correlation with patient's smoking history and risk factors to see if they qualify for annual low-dose lung cancer screening.        Electronically Signed: Param Toelor    5/8/2023 3:27 PM EDT    Workstation ID: [id number]          LAB RESULTS (LAST 7 DAYS)    CBC  Results from last 7 days   Lab Units 05/06/23  1640 05/05/23  1546 05/03/23  0413 05/02/23  1456   WBC 10*3/mm3 10.80 13.80* 7.50 12.40*   RBC 10*6/mm3 5.12 5.17 4.80 5.26   HEMOGLOBIN g/dL 14.8 15.0 14.1 15.3   HEMATOCRIT % 46.6 47.7* 43.0 46.2   MCV fL 91.1 92.3 89.7 87.7   PLATELETS 10*3/mm3 268 248 189 199       BMP  Results from last 7 days   Lab Units 05/05/23  1737 05/03/23  0413 05/02/23  1456   SODIUM mmol/L 138 141 140   POTASSIUM mmol/L 4.5 4.1 3.5   CHLORIDE mmol/L 101 101 100   CO2 mmol/L 24.0 28.0 27.0   BUN mg/dL 25* 26* 18   CREATININE mg/dL 0.97 1.25* 0.93   GLUCOSE mg/dL 172* 141* 100*       CMP   Results from last 7 days   Lab Units 05/05/23  1737 05/03/23  0413 05/02/23  1456   SODIUM mmol/L 138 141 140   POTASSIUM mmol/L 4.5 4.1 3.5   CHLORIDE mmol/L 101 101 100   CO2 mmol/L 24.0 28.0 27.0   BUN mg/dL 25* 26* 18   CREATININE mg/dL 0.97 1.25* 0.93   GLUCOSE mg/dL 172* 141* 100*   ALBUMIN g/dL  --   --  3.9   BILIRUBIN mg/dL  --   --  0.7   ALK PHOS U/L  --   --  83   AST (SGOT) U/L  --   --  14   ALT (SGPT) U/L  --   --  17       BNP        TROPONIN  Results from last 7 days   Lab Units 05/02/23  1456   HSTROP T ng/L 12*       CoAg        Creatinine Clearance  Estimated Creatinine Clearance: 45.9 mL/min (by C-G formula based on SCr of 0.97 mg/dL).    ABG        Radiology  CT Angiogram Chest Pulmonary Embolism    Result Date: 5/8/2023  Impression: 1. No evidence of pulmonary embolism. 2. Secretions within the left lower lobe airways with mucous plugging and partial left lower lobe atelectasis. 3. Extensive aortic atherosclerotic plaque with  suspected occlusion of the left subclavian artery, unchanged from prior. 4. Moderate upper lobe predominant centrilobular emphysema. Recommend correlation with patient's smoking history and risk factors to see if they qualify for annual low-dose lung cancer screening. Electronically Signed: Param Johansen  5/8/2023 3:27 PM EDT  Workstation ID: [id number]        EKG  I personally viewed and interpreted the patient's EKG/Telemetry data:  ECG 12 Lead Dyspnea   Final Result   HEART RATE= 57  bpm   RR Interval= 1044  ms   VT Interval= 193  ms   P Horizontal Axis= 119  deg   P Front Axis= 0  deg   QRSD Interval= 95  ms   QT Interval= 428  ms   QRS Axis= 94  deg   T Wave Axis= 61  deg   - OTHERWISE NORMAL ECG -   Sinus bradycardia- previous sinus 4-8-23    Right axis deviation   Low voltage, precordial leads   When compared with ECG of 08-Apr-2023 17:03:30,   non specific ST-T wave changes   Artifact   Electronically Signed By: Alan Baumann (Tin) 03-May-2023 05:46:13   Date and Time of Study: 2023-05-02 13:23:59      SCANNED - TELEMETRY     Final Result      SCANNED - TELEMETRY     Final Result      SCANNED - TELEMETRY     Final Result      SCANNED - TELEMETRY     Final Result      SCANNED - TELEMETRY     Final Result      SCANNED - TELEMETRY     Final Result      SCANNED - TELEMETRY     Final Result      SCANNED - TELEMETRY     Final Result      SCANNED - TELEMETRY     Final Result      SCANNED - TELEMETRY     Final Result      SCANNED - TELEMETRY     Final Result      SCANNED - TELEMETRY     Final Result      SCANNED - TELEMETRY     Final Result      SCANNED - TELEMETRY     Final Result      SCANNED - TELEMETRY     Final Result      SCANNED - TELEMETRY     Final Result      SCANNED - TELEMETRY     Final Result      SCANNED - TELEMETRY     Final Result      SCANNED - TELEMETRY     Final Result      SCANNED - TELEMETRY     Final Result            Echocardiogram:    Results for orders placed in visit on 09/13/22    Adult  Transthoracic Echo Complete W/ Cont if Necessary Per Protocol    Interpretation Summary  •  Estimated left ventricular EF = 70% Estimated left ventricular EF was in agreement with the calculated left ventricular EF. Left ventricular systolic function is normal.  •  Left ventricular diastolic function is consistent with (grade II w/high LAP) pseudonormalization.        Stress Test:  Results for orders placed in visit on 09/13/22    Stress Test With Myocardial Perfusion One Day    Interpretation Summary  •  Left ventricular ejection fraction is hyperdynamic (Calculated EF > 70%). .  •  Myocardial perfusion imaging indicates a normal myocardial perfusion study with no evidence of ischemia.  •  Impressions are consistent with a low risk study.  •  Findings consistent with a normal ECG stress test.         Cardiac Catheterization:  Results for orders placed during the hospital encounter of 03/03/23    Cardiac Catheterization/Vascular Study    Narrative  OPERATORS  Richardson Willis M.D. (Attending Cardiologist)      PROCEDURE PERFORMED  Ultrasound guided vascular access  Right heart catheterization  Coronary Angiogram  Left Heart Catheterization 08449  Moderate Sedation    INDICATIONS FOR PROCEDURE  83-year-old woman with multiple cardiovascular risk factors, abnormal stress test presented with worsening shortness of breath.  After discussing the risk and benefit of the procedure she was brought in for elective right and left heart cath.    PROCEDURE IN DETAIL  Informed consent was obtained from the patient after explaining the risks, benefits, and alternative options of the procedure. After obtaining informed consent, the patient was brought to the cath lab and was prepped in a sterile fashion. Lidocaine 2% was used for local anesthesia into the right femoral venous access site. Right femoral vein was accessed using the micropuncture needle under ultrasound guidance and micropuncture wire advanced under flouroscopy. A 7  Amharic vascular sheath was put into place percutaneously over guide-wire. Guide wires were removed. A 6Fr swan sheryl catheter was advanced to wedge position. RA, RV and PA and wedge pressures were recorded.  PA sat and arterial sats recorded.  The patient tolerated the procedure well without any complications.    Lidocaine 2% was used for local anesthesia into the right femoral arterial access site. The right femoral artery was accessed with a micropuncture needle via modified Seldinger technique under ultrasound guidance. A 6F was inserted successfully.  Afterwards, 6F JR4 and JL4 diagnostic catheters were advanced over a wire into the ascending aorta and were used to engage the ostia of the left main and RCA respectively. JR4 used to cross the AV and obtain LV pressures and gradient across the AV measured via pullback technique. Images of the right and left coronary systems were obtained. All the catheters were exchanged over a wire and subsequently removed. Angiogram of the femoral access site was obtained and did not show complications. The patient tolerated the procedure well without any complications. The pictures were reviewed at the end of the procedure. A Mynx closure device was applied.    HEMODYNAMICS    RHC  RA 6/5, 4 mmHg  RV 74/3, 5 mmHg  PA 71/25, 44 mmHg  PCW 12 mmHg  AO Sat 92%  PA Sat 78%    Jose CO: 7.89 L/min    Jose CI: 4.69 L/min/m²    LHC  LV: 134/3, 20 mmHg  AO: 131/56, 87 mmHg  No significant gradient across the aortic valve during pullback of JR4 catheter.    FINDINGS  Coronary Angiogram  All vessels are heavily calcified  She also has heavily calcified peripheral arterial disease.  Right dominant circulation    Left main: Left main is a large caliber vessel which gives rise to the Left Anterior Descending and the Left circumflex.  Left main is angiographically free from any significant disease.    Left Anterior Descending Artery: LAD is a heavily calcified medium caliber vessel which gives  rise to diagonals.  The vessel is highly tortuous and has 50 to 60% mid vessel stenosis best seen in [place]i cranial view.    Left Circumflex: Heavily calcified vessel with 50% proximal segment stenosis.    Right Coronary Artery: The RCA is a large caliber vessel which is heavily calcified and tortuous.  It has diffuse luminal irregularities and 30 to 40% stenosis in the midsegment around the bend.    ESTIMATED BLOOD LOSS:  10 ml    COMPLICATIONS:  None    PROCEDURE DATA:  Sedation Time: 25 minutes    IMPRESSIONS  Heavily calcified, tortuous nonobstructive coronary disease  Severe pulmonary hypertension with PA systolic pressure in the 70s  Mean PA pressure 44 mmHg    RECOMMENDATIONS  -Continue aggressive medical management of CAD  -Referral to pulmonology for treatment of pulmonary hypertension         Other:         ASSESSMENT & PLAN:    Principal Problem:    Acute exacerbation of chronic obstructive pulmonary disease (COPD)  Active Problems:    Stage 3a chronic kidney disease    History of venous thrombosis and embolism    Primary hypertension    History of TIA (transient ischemic attack)    Parainfluenza infection    Severe pulmonary hypertension    Acute on chronic respiratory failure with hypoxia    Acute exacerbation of chronic obstructive pulmonary disease   Parainfluenza infection  Pulmonology team is managing.  She is currently on bronchodilators, steroids and Singulair.  CTA shows secretions within the left lower lobe airway with mucous plugging  Plan is for bronchoscopy versus conservative management     Acute on chronic respiratory failure with hypoxia  Exacerbation of COPD secondary to parainfluenza.  Respiratory status is tenuous due to severe pulmonary hypertension.  Currently on 6 L of nasal cannula of oxygen.  Wean off as tolerated     Severe pulmonary hypertension  He has been started on sildenafil during this admission  Work-up has been initiated for connective tissue disease.  Plan is to obtain PFTs  and sleep study later  She may benefit from Uptravi if deemed suitable by pulmonology.     Stage 3a chronic kidney disease  Close eye on the renal function  Current creatinine less than 1     Atrial fibrillation  MCOT review as an outpatient showed evidence of atrial fibrillation.  She will continue to be on full dose anticoagulation with Eliquis for A-fib and for history of DVT/PE.  Rate and rhythm are controlled with beta-blocker     History of venous thrombosis and embolism  Continue full dose anticoagulation with Eliquis 5 mg p.o. twice daily  She also has an IVC filter in place     Primary hypertension, chronic  Blood pressures well controlled.  Previous echocardiogram showed preserved LV function with grade 2 diastolic dysfunction.  Continue home antihypertensives including amlodipine, Coreg and lisinopril  On stable doses of Lasix for HFpEF  Replace electrolytes including potassium and magnesium as needed     Coronary artery disease  On beta-blocker and high intensity statin  No aspirin as she is already on anticoagulation.  Previous cardiac catheterization showed heavily calcified coronaries but nonobstructive.  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

## 2023-05-09 NOTE — CASE MANAGEMENT/SOCIAL WORK
Social Work Assessment  [facility]     Patient Name: Gabrielle Lyles  MRN: [id number]  Today's Date: 5/9/2023    Admit Date: 5/2/2023     Psychosocial     Row Name 05/09/23 1135       Values/Beliefs    Spiritual, Cultural Beliefs, Restorationist Practices, Values that Affect Care no    Cultural/Restorationist Practices Patient Routinely Participates In ceremony;prayer    Values/Beliefs Comment [employer]       Behavior WDL    Behavior WDL WDL       Emotion Mood WDL    Emotion/Mood/Affect WDL WDL       Mental Health    Little Interest or Pleasure in Doing Things 0-->not at all    Feeling Down, Depressed or Hopeless 1-->several days    Do you feel stress - tense, restless, nervous, or anxious, or unable to sleep at night because your mind is troubled all the time - these days? Not at all       Speech WDL    Speech WDL WDL       Perceptual State WDL    Perceptual State WDL WDL       Thought Process WDL    Thought Process WDL WDL       Intellectual Performance WDL    Intellectual Performance WDL WDL    Level of Consciousness Alert       Coping/Stress    Major Change/Loss/Stressor medical condition/diagnosis    Patient Personal Strengths expressive of emotions;expressive of needs;positive attitude;able to adapt    Sources of Support adult child(blake);other family members;friend(s)    Techniques to [place] with Loss/Stress/Change diversional activities  Likes to go out shopping with dtr and play with her cat and dog.    Reaction to Health Status adjusting    Understanding of Condition and Treatment adequate understanding of medical condition;adequate understanding of treatment       Developmental Stage (Eriksson's)    Developmental Stage Stage 8 (66 years-death/Late Adulthood) Integrity vs. Despair       C-SSRS (Recent)    Q1 Wished to be Dead (Past Month) no    Q2 Suicidal Thoughts (Past Month) no    Q6 Suicide Behavior (Lifetime) no       Violence Risk    Feels Like Hurting Others no    Previous Attempt to Harm Others no                Abuse/Neglect     Row Name 05/09/23 1139       Personal Safety    Feels Unsafe at Home or Work/School no    Feels Threatened by Someone no    Does Anyone Try to Keep You From Having Contact with Others or Doing Things Outside Your Home? no    Physical Signs of Abuse Present no    Personal Safety Comments Pt denies. She resides at home alone.               Legal     Row Name 05/09/23 1141       Financial Resource Strain    How hard is it for you to pay for the very basics like food, housing, medical care, and heating? Not hard       Financial/Legal    Source of Income social security    Who Manages Finances if Patient Unable Nobody.    Finance Comments Pt denies financial issues/concerns.       Legal    Criminal Activity/Legal Involvement none               Substance Abuse     Row Name 05/09/23 1140       Substance Use    Substance Use Comment Quit smoking about 25 years ago. Denies etoh or illicit substance use.       AUDIT-C (Alcohol Use Disorders ID Test)    Q1: How often do you have a drink containing alcohol? Never    Q2: How many drinks containing alcohol do you have on a typical day when you are drinking? None    Q3: How often do you have six or more drinks on one occasion? Never    Audit-C Score 0       Family Member Substance Use (#4)    Family History of Substance Use none              WALLY Gonzalez, LSW  Medical Social Worker  Ph [phone]  Fax [phone]  [email or web address]     negative No oral lesions; no gross abnormalities

## 2023-05-09 NOTE — PLAN OF CARE
Goal Outcome Evaluation:              Outcome Evaluation: Patient currently abed watching TV, denies pain or discomfort. PAtient on 6L via NC

## 2023-05-09 NOTE — PROGRESS NOTES
"PULMONARY CRITICAL CARE PROGRESS  NOTE      PATIENT IDENTIFICATION:  Name: Gabrielle Lyles  MRN: [id number]  :  1940     Age: 83 y.o.  Sex: female    DATE OF Note:  2023   Referring Physician: Rosamaria Jin MD                  Subjective:   Feeling a little better, still shortness of breath with any activity,on 6 L O2,  dry cough, no chest or abd pain, no bowel or bladder issues       Objective:  tMax 24 hrs: Temp (24hrs), Av.6 °F (36.4 °C), Min:97.5 °F (36.4 °C), Max:97.7 °F (36.5 °C)      Vitals Ranges:   Temp:  [97.5 °F (36.4 °C)-97.7 °F (36.5 °C)] 97.5 °F (36.4 °C)  Heart Rate:  [69-86] 69  Resp:  [16-23] 22  BP: (100-155)/(59-79) 124/59    Intake and Output Last 3 Shifts:   I/O last 3 completed shifts:  In: 960 [P.O.:960]  Out: -     Exam:  /59 (BP Location: Right arm, Patient Position: Sitting)   Pulse 69   Temp 97.5 °F (36.4 °C) (Oral)   Resp 22   Ht 162.6 cm (64\")   Wt 65 kg (143 lb 4.8 oz)   SpO2 90%   BMI 24.60 kg/m²     General Appearance: Alert awake not in distress her O2 sat on 8 L is 86%  HEENT:  Normocephalic, without obvious abnormality. Conjunctivae/corneas clear.  Normal external ear canals. Nares normal, no drainage     Neck:  Supple, symmetrical, trachea midline. No JVD.  Lungs /Chest wall:   Bilateral basal rhonchi, respirations unlabored, symmetrical wall movement.     Heart:  Regular rate and rhythm, systolic murmur PMI left sternal border  Abdomen: Soft, nontender, no masses, no organomegaly.    Extremities: Trace edema, no clubbing or cyanosis        Medications:  acetylcysteine, 3 mL, Nebulization, TID - RT  amLODIPine, 10 mg, Oral, Daily  apixaban, 5 mg, Oral, Q12H  arformoterol, 15 mcg, Nebulization, BID - RT  budesonide, 1 mg, Nebulization, BID - RT  carvedilol, 12.5 mg, Oral, BID With Meals  famotidine, 20 mg, Oral, Daily  folic acid-pyridoxine-cyanocobalamin, 1 tablet, Oral, Daily  furosemide, 20 mg, Oral, Daily  guaiFENesin, 1,200 mg, Oral, " Addended by: BRENTON RICO on: 1/16/2023 04:41 PM     Modules accepted: Orders     Q12H  ipratropium-albuterol, 3 mL, Nebulization, 4x Daily - RT  lisinopril, 40 mg, Oral, Daily  montelukast, 10 mg, Oral, Nightly  potassium chloride, 10 mEq, Oral, Daily  [START ON 5/10/2023] predniSONE, 20 mg, Oral, Daily   Followed by  [START ON 5/12/2023] predniSONE, 10 mg, Oral, Daily  rosuvastatin, 10 mg, Oral, Daily  senna-docusate sodium, 2 tablet, Oral, BID  sildenafil, 20 mg, Oral, Q8H  sodium chloride, 10 mL, Intravenous, Q12H        Infusion:        PRN:  •  acetaminophen  •  albuterol sulfate HFA  •  senna-docusate sodium **AND** polyethylene glycol **AND** bisacodyl **AND** bisacodyl  •  cloNIDine  •  ondansetron **OR** ondansetron  •  sodium chloride  •  sodium chloride  Data Review:  All labs (24hrs): No results found for this or any previous visit (from the past 24 hour(s)).     Imaging:  CT Angiogram Chest Pulmonary Embolism  Narrative: CT ANGIOGRAM CHEST PULMONARY EMBOLISM    Date of Exam: 5/8/2023 3:00 PM EDT    Indication: Pulmonary embolism (PE) suspected, high prob.    Comparison: Chest radiograph dated 5/2/2023    Technique: Axial CT images were obtained of the chest after the uneventful intravenous administration of iodinated contrast utilizing pulmonary embolism protocol.  Sagittal and coronal reconstructions were performed.  Automated exposure control and   iterative reconstruction methods were used.    Findings:  The visualized soft tissue structures at the base of the neck including the thyroid appear within normal limits. There is no lower cervical or axillary adenopathy. There are postsurgical changes of the superior left breast.    The heart size is normal. There is no pericardial effusion. The aorta is normal in caliber without evidence of aneurysm formation. There is extensive aortic atherosclerotic plaque. There is poor opacification of the left subclavian artery which may be   partially occluded. The main pulmonary artery appears normal in caliber. There are no filling defects  within the pulmonary arterial system to suggest presence of underlying pulmonary embolism. There is no mediastinal or hilar lymphadenopathy by size   criteria.    There are secretions within the left lower lobe which causes medial basal atelectasis. There is no pleural effusion or pneumothorax. There is upper lobe predominant moderate centrilobular emphysema. There is a small area of groundglass opacity within the   periphery of the right upper lobe and posterior aspect of the left upper lobe, favor mild infectious or inflammatory process.     The esophagus is normal in course and caliber. Visualized portions of the upper abdomen demonstrate atherosclerotic plaque involving the aorta and branch vasculature. There is partial visualization of a presumed IVC filter with metallic density partially   visualized. The liver has a nodular contour which can be seen with underlying chronic liver disease.    There is degenerative disc disease of the thoracic spine. No suspicious lytic or sclerotic osseous lesion.  Impression: Impression:  1. No evidence of pulmonary embolism.  2. Secretions within the left lower lobe airways with mucous plugging and partial left lower lobe atelectasis.  3. Extensive aortic atherosclerotic plaque with suspected occlusion of the left subclavian artery, unchanged from prior.  4. Moderate upper lobe predominant centrilobular emphysema. Recommend correlation with patient's smoking history and risk factors to see if they qualify for annual low-dose lung cancer screening.    Electronically Signed: Param Johansen    5/8/2023 3:27 PM EDT    Workstation ID: [id number]       ASSESSMENT:  Acute/Chronic hypoxic respiratory failure  AE COPD  Severe pulmonary HTN  Parainfluenza   CKD 3  HX PE/DVT  HTN  Hx TIA    PLAN:  Continue with oxygen supplement, decrease to keep sats > 86%  PT/OT  OOB daily   Antibiotics  Bronchodilators  Inhaled corticosteroids  Mucomyst   Diuretics and monitor MEETA's  Electrolytes/  glycemic control  DVT and GI prophylaxis-Apixaban      Discussed with JOAQUÍN Daly     5/9/2023  14:41 EDT     I personally have examined  and interviewed the patient. I have reviewed the history, data, problems, assessment and plan with our NP.  Total Critical care time in direct medical management (   ) minutes, This time specifically excludes time spent performing procedures.    Jerry Solorzano MD   5/9/2023  15:20 EDT

## 2023-05-10 PROCEDURE — 99232 SBSQ HOSP IP/OBS MODERATE 35: CPT | Performed by: INTERNAL MEDICINE

## 2023-05-10 PROCEDURE — 63710000001 PREDNISONE PER 1 MG: Performed by: INTERNAL MEDICINE

## 2023-05-10 PROCEDURE — 94799 UNLISTED PULMONARY SVC/PX: CPT

## 2023-05-10 PROCEDURE — 94761 N-INVAS EAR/PLS OXIMETRY MLT: CPT

## 2023-05-10 RX ADMIN — APIXABAN 5 MG: 5 TABLET, FILM COATED ORAL at 08:05

## 2023-05-10 RX ADMIN — GUAIFENESIN 1200 MG: 600 TABLET, EXTENDED RELEASE ORAL at 08:05

## 2023-05-10 RX ADMIN — ARFORMOTEROL TARTRATE 15 MCG: 15 SOLUTION RESPIRATORY (INHALATION) at 19:02

## 2023-05-10 RX ADMIN — CARVEDILOL 12.5 MG: 6.25 TABLET, FILM COATED ORAL at 17:33

## 2023-05-10 RX ADMIN — Medication 10 ML: at 20:51

## 2023-05-10 RX ADMIN — SILDENAFIL CITRATE 20 MG: 20 TABLET ORAL at 13:51

## 2023-05-10 RX ADMIN — GUAIFENESIN 1200 MG: 600 TABLET, EXTENDED RELEASE ORAL at 20:55

## 2023-05-10 RX ADMIN — ACETYLCYSTEINE 3 ML: 200 SOLUTION ORAL; RESPIRATORY (INHALATION) at 19:02

## 2023-05-10 RX ADMIN — ACETYLCYSTEINE 3 ML: 200 SOLUTION ORAL; RESPIRATORY (INHALATION) at 08:06

## 2023-05-10 RX ADMIN — POTASSIUM CHLORIDE 10 MEQ: 750 TABLET, EXTENDED RELEASE ORAL at 08:05

## 2023-05-10 RX ADMIN — CARVEDILOL 12.5 MG: 6.25 TABLET, FILM COATED ORAL at 08:05

## 2023-05-10 RX ADMIN — LISINOPRIL 40 MG: 20 TABLET ORAL at 08:05

## 2023-05-10 RX ADMIN — IPRATROPIUM BROMIDE AND ALBUTEROL SULFATE 3 ML: .5; 3 SOLUTION RESPIRATORY (INHALATION) at 11:47

## 2023-05-10 RX ADMIN — MONTELUKAST 10 MG: 10 TABLET, FILM COATED ORAL at 20:59

## 2023-05-10 RX ADMIN — SILDENAFIL CITRATE 20 MG: 20 TABLET ORAL at 20:57

## 2023-05-10 RX ADMIN — SILDENAFIL CITRATE 20 MG: 20 TABLET ORAL at 05:42

## 2023-05-10 RX ADMIN — FAMOTIDINE 20 MG: 20 TABLET ORAL at 08:05

## 2023-05-10 RX ADMIN — ARFORMOTEROL TARTRATE 15 MCG: 15 SOLUTION RESPIRATORY (INHALATION) at 08:02

## 2023-05-10 RX ADMIN — AMLODIPINE BESYLATE 10 MG: 5 TABLET ORAL at 08:05

## 2023-05-10 RX ADMIN — PREDNISONE 20 MG: 20 TABLET ORAL at 08:05

## 2023-05-10 RX ADMIN — Medication 10 ML: at 08:06

## 2023-05-10 RX ADMIN — BUDESONIDE 0.5 MG: 0.5 INHALANT RESPIRATORY (INHALATION) at 07:58

## 2023-05-10 RX ADMIN — BUDESONIDE 1 MG: 0.5 INHALANT RESPIRATORY (INHALATION) at 19:02

## 2023-05-10 RX ADMIN — APIXABAN 5 MG: 5 TABLET, FILM COATED ORAL at 20:55

## 2023-05-10 RX ADMIN — FOLIC ACID-PYRIDOXINE-CYANOCOBALAMIN TAB 2.5-25-2 MG 1 TABLET: 2.5-25-2 TAB at 08:05

## 2023-05-10 RX ADMIN — FUROSEMIDE 20 MG: 20 TABLET ORAL at 08:05

## 2023-05-10 NOTE — CASE MANAGEMENT/SOCIAL WORK
Continued Stay Note  [facility]     Patient Name: Gabrielle Lyles  MRN: [id number]  Today's Date: 5/10/2023    Admit Date: 5/2/2023    Plan: D/C plan: Anticipate home. Baseline O2 3L, [place]. Watch for increased needs.   Discharge Plan     Row Name 05/10/23 1143       Plan    Plan D/C plan: Anticipate home. Baseline O2 3L, [place]. Watch for increased needs.    Patient/Family in Agreement with Plan yes    Plan Comments Barrier to d/c: 4L, nebs, cardiology, pulm following                   Phone communication or documentation only - no physical contact with patient or family.      Tom aSnders RN

## 2023-05-10 NOTE — PLAN OF CARE
Problem: Adult Inpatient Plan of Care  Goal: Plan of Care Review  Outcome: Ongoing, Progressing  Flowsheets (Taken 5/10/2023 1755)  Progress: improving  Goal: Patient-Specific Goal (Individualized)  Outcome: Ongoing, Progressing  Goal: Absence of Hospital-Acquired Illness or Injury  Outcome: Ongoing, Progressing  Intervention: Identify and Manage Fall Risk  Recent Flowsheet Documentation  Taken 5/10/2023 1400 by Lu Portillo RN  Safety Promotion/Fall Prevention:   assistive device/personal items within reach   clutter free environment maintained   safety round/check completed   nonskid shoes/slippers when out of bed  Taken 5/10/2023 1000 by Lu Portillo RN  Safety Promotion/Fall Prevention:   assistive device/personal items within reach   clutter free environment maintained   safety round/check completed   nonskid shoes/slippers when out of bed  Taken 5/10/2023 0810 by Lu Portillo RN  Safety Promotion/Fall Prevention:   assistive device/personal items within reach   clutter free environment maintained   safety round/check completed   nonskid shoes/slippers when out of bed  Intervention: Prevent Skin Injury  Recent Flowsheet Documentation  Taken 5/10/2023 1000 by Lu Portillo RN  Body Position: position changed independently  Taken 5/10/2023 0810 by Lu Portillo RN  Body Position: position changed independently  Intervention: Prevent and Manage VTE (Venous Thromboembolism) Risk  Recent Flowsheet Documentation  Taken 5/10/2023 1600 by Lu Portillo RN  Activity Management: up ad maribell  Taken 5/10/2023 1400 by Lu Portillo RN  Activity Management: up ad maribell  Taken 5/10/2023 1200 by Lu Portillo RN  Activity Management: up ad maribell  Taken 5/10/2023 0810 by Lu Portillo RN  Activity Management: ambulated to bathroom  Goal: Optimal Comfort and Wellbeing  Outcome: Ongoing, Progressing  Intervention: Provide Person-Centered Care  Recent Flowsheet  Documentation  Taken 5/10/2023 0810 by Lu Portillo, RN  Trust Relationship/Rapport: care explained  Goal: Readiness for Transition of Care  Outcome: Ongoing, Progressing     Problem: COPD (Chronic Obstructive Pulmonary Disease) Comorbidity  Goal: Maintenance of COPD Symptom Control  Outcome: Ongoing, Progressing     Problem: Hypertension Comorbidity  Goal: Blood Pressure in Desired Range  Outcome: Ongoing, Progressing   Goal Outcome Evaluation:           Progress: improving

## 2023-05-10 NOTE — PROGRESS NOTES
LOS: 6 days   Patient Care Team:  Shaylee Mcdaniels MD as PCP - General (Family Medicine)  Richardson Willis MD as Cardiologist (Cardiology)  Rafy Rodriguez MD as Consulting Physician (Hematology and Oncology)  Christianne Og LPN as Licensed Practical Nurse    Subjective:  Pt seen and examined at bedside today.  She is sitting at side of bed.  No family present.   She is in good spirits today.   She reports the treatment that was initiated yesterday is helping her.   If she continues to improve we will look at discharge tomorrow.       Review of Systems:   Review of Systems   Constitutional: Positive for activity change and fatigue.   HENT: Negative for trouble swallowing.    Respiratory: Positive for cough, shortness of breath and stridor. Negative for choking, chest tightness and wheezing.         Improvements reported by pt in SOA and cough   Cardiovascular: Negative.    Gastrointestinal: Negative.    Endocrine: Negative.    Genitourinary: Negative for dysuria.   Musculoskeletal: Positive for gait problem.   Skin: Negative.    Neurological: Positive for weakness.   Hematological: Bruises/bleeds easily.   Psychiatric/Behavioral: Negative.            Vital Signs  Temp:  [97.4 °F (36.3 °C)-98.7 °F (37.1 °C)] 97.6 °F (36.4 °C)  Heart Rate:  [63-84] 67  Resp:  [16-22] 16  BP: (100-126)/(53-78) 111/60    Physical Exam:  Physical Exam  Constitutional:       General: She is not in acute distress.     Comments: Thin  Frail  [id number]   HENT:      Head: Normocephalic and atraumatic.      Nose: Nose normal.      Mouth/Throat:      Mouth: Mucous membranes are moist.      Pharynx: Oropharynx is clear.   Eyes:      General: No scleral icterus.     Conjunctiva/sclera: Conjunctivae normal.   Cardiovascular:      Rate and Rhythm: Normal rate and regular rhythm.      Pulses: Normal pulses.      Heart sounds: Normal heart sounds.   Pulmonary:      Effort: Pulmonary effort is normal. No respiratory distress.       Breath sounds: Rhonchi present. No wheezing or rales.      Comments: Rhonchi was able to clear with induced cough  Rhonchi is scattered bilaterally but mostly noted to left side- but did improve with induce cough  No conversational dyspnea  Abdominal:      General: Bowel sounds are normal. There is no distension.      Palpations: Abdomen is soft.      Tenderness: There is no abdominal tenderness. There is no guarding.   Musculoskeletal:      Cervical back: Normal range of motion and neck supple.      Right lower leg: No edema.      Left lower leg: No edema.   Skin:     General: Skin is warm and dry.      Capillary Refill: Capillary refill takes less than 2 seconds.      Coloration: Skin is pale.   Neurological:      Mental Status: She is alert and oriented to person, place, and time.   Psychiatric:         Mood and Affect: Mood normal.         Behavior: Behavior normal.          Radiology:  XR Chest 1 View    Result Date: 5/2/2023  Impression: 1. Near complete resolution of left base opacity and left effusion. Electronically Signed: Daniel Riddle  5/2/2023 2:46 PM EDT  Workstation ID: [id number]    CT Angiogram Chest Pulmonary Embolism    Result Date: 5/8/2023  Impression: 1. No evidence of pulmonary embolism. 2. Secretions within the left lower lobe airways with mucous plugging and partial left lower lobe atelectasis. 3. Extensive aortic atherosclerotic plaque with suspected occlusion of the left subclavian artery, unchanged from prior. 4. Moderate upper lobe predominant centrilobular emphysema. Recommend correlation with patient's smoking history and risk factors to see if they qualify for annual low-dose lung cancer screening. Electronically Signed: Param Johansen  5/8/2023 3:27 PM EDT  Workstation ID: [id number]         Results Review:     I reviewed the patient's new clinical results.  I reviewed the patient's new imaging results and agree with the interpretation.    Medication Review:   Scheduled  Meds:acetylcysteine, 3 mL, Nebulization, TID - RT  amLODIPine, 10 mg, Oral, Daily  apixaban, 5 mg, Oral, Q12H  arformoterol, 15 mcg, Nebulization, BID - RT  budesonide, 1 mg, Nebulization, BID - RT  carvedilol, 12.5 mg, Oral, BID With Meals  famotidine, 20 mg, Oral, Daily  folic acid-pyridoxine-cyanocobalamin, 1 tablet, Oral, Daily  furosemide, 20 mg, Oral, Daily  guaiFENesin, 1,200 mg, Oral, Q12H  ipratropium-albuterol, 3 mL, Nebulization, 4x Daily - RT  lisinopril, 40 mg, Oral, Daily  montelukast, 10 mg, Oral, Nightly  potassium chloride, 10 mEq, Oral, Daily  predniSONE, 20 mg, Oral, Daily   Followed by  [START ON 5/12/2023] predniSONE, 10 mg, Oral, Daily  rosuvastatin, 10 mg, Oral, Daily  senna-docusate sodium, 2 tablet, Oral, BID  sildenafil, 20 mg, Oral, Q8H  sodium chloride, 10 mL, Intravenous, Q12H      Continuous Infusions:   PRN Meds:.•  acetaminophen  •  albuterol sulfate HFA  •  senna-docusate sodium **AND** polyethylene glycol **AND** bisacodyl **AND** bisacodyl  •  cloNIDine  •  ondansetron **OR** ondansetron  •  sodium chloride  •  sodium chloride    Labs:    CBC    Results from last 7 days   Lab Units 05/06/23  1640 05/05/23  1546   WBC 10*3/mm3 10.80 13.80*   HEMOGLOBIN g/dL 14.8 15.0   PLATELETS 10*3/mm3 268 248     BMP   Results from last 7 days   Lab Units 05/05/23  1737   SODIUM mmol/L 138   POTASSIUM mmol/L 4.5   CHLORIDE mmol/L 101   CO2 mmol/L 24.0   BUN mg/dL 25*   CREATININE mg/dL 0.97   GLUCOSE mg/dL 172*     Cr Clearance Estimated Creatinine Clearance: 44.3 mL/min (by C-G formula based on SCr of 0.97 mg/dL).  Coag     HbA1C   Lab Results   Component Value Date    HGBA1C 5.8 (H) 10/25/2022     Blood Glucose No results found for: POCGLU  Infection     CMP   Results from last 7 days   Lab Units 05/05/23  1737   SODIUM mmol/L 138   POTASSIUM mmol/L 4.5   CHLORIDE mmol/L 101   CO2 mmol/L 24.0   BUN mg/dL 25*   CREATININE mg/dL 0.97   GLUCOSE mg/dL 172*     UA      Radiology(recent) CT Angiogram  Chest Pulmonary Embolism    Result Date: 5/8/2023  Impression: 1. No evidence of pulmonary embolism. 2. Secretions within the left lower lobe airways with mucous plugging and partial left lower lobe atelectasis. 3. Extensive aortic atherosclerotic plaque with suspected occlusion of the left subclavian artery, unchanged from prior. 4. Moderate upper lobe predominant centrilobular emphysema. Recommend correlation with patient's smoking history and risk factors to see if they qualify for annual low-dose lung cancer screening. Electronically Signed: Param Johansen  5/8/2023 3:27 PM EDT  Workstation ID: [id number]     Assessment/Plan:  COPD Exacerbation:   -improving  -noted to have mucus plugs. Prefers Dr Roy to perform bronch, therefore we are treating conservatively at this time.    -Continue aggressive pulmonary toileting    Parainfluenza Virus:   -improving     Acute on Chronic Respiratory Failure:   -Appreciate input from pulmonary    Severe Pulmonary Hypertension:   -Sildenafil  -Will need PFT and sleep study as outpatient- pulmonary to set up    CKD 3:   -Continue to monitor renal function    HLD:   -statin    History of DVT/PE and TIA    VTE Prophylaxis:  Eliquis  GI Prophylaxis:  Pepcid    MARTY PLAN:  home    Kasey Diane, APRN  05/10/23  14:04 EDT

## 2023-05-10 NOTE — PROGRESS NOTES
Referring Provider: Rosamaria Jin MD    Reason for follow-up: Shortness of breath, coronary artery disease, pulmonary hypertension     Patient Care Team:  Shaylee Mcdaniels MD as PCP - General (Family Medicine)  Richardson Willis MD as Cardiologist (Cardiology)  Rafy Rodriguez MD as Consulting Physician (Hematology and Oncology)  Christianne Og LPN as Licensed Practical Nurse      SUBJECTIVE  Shortness of breath is improved.  She is now on 4 L of nasal cannula.     ROS  Review of all systems negative except as indicated.    Since I have last seen, the patient has been without any chest discomfort, shortness of breath, palpitations, dizziness or syncope.  Denies having any headache, abdominal pain, nausea, vomiting, diarrhea, constipation, loss of weight or loss of appetite.  Denies having any excessive bruising, hematuria or blood in the stool.  ROS      Personal History:    Past Medical History:   Diagnosis Date   • COPD (chronic obstructive pulmonary disease)    • Deep vein thrombosis of bilateral lower extremities 7/24/2019   • History of DVT (deep vein thrombosis) 03/2013    bilateral lower extremity   • History of pneumonia 06/2012    community acquired pneumonia and right pleural effusion;hospitalized at [facility]   • History of pulmonary embolism 03/2013    bilateral PE   • Hypertension 2001   • Insomnia     on Ambien 5mg at    • Lobular breast cancer, left 11/2011    Stage IA left upper lobular breast cancer   • Malignant neoplasm of left breast in female, estrogen receptor positive 7/18/2019   • Osteopenia 2012   • Severe pulmonary hypertension 3/3/2023   • Stage 3a chronic kidney disease 7/24/2019   • TIA (transient ischemic attack) 10/25/2022       Past Surgical History:   Procedure Laterality Date   • CARDIAC CATHETERIZATION N/A 3/3/2023    Procedure: Left and Right Heart Cath with Coronary Angiography;  Surgeon: Richardson Willis MD;  Location: [facility] INVASIVE LOCATION;  Service:  Cardiovascular;  Laterality: N/A;   • CATARACT EXTRACTION     • IVC FILTER RETRIEVAL  2014    IVC filter placement by Dr. Card   • MAMMO STEREOTACTIC BREAST BX SURGICAL ADD UNI Left 2011    invasive lobular carcinoma-Dr. Cande Daniel   • MASTECTOMY, PARTIAL Left 2011    and left axillary sentinel lymph node biopsy by Dr. Uriostegui   • TUBAL ABDOMINAL LIGATION         Family History   Problem Relation Age of Onset   • Lung cancer Brother        Social History     Tobacco Use   • Smoking status: Former     Types: Cigarettes     Quit date:      Years since quittin.3     Passive exposure: Never   • Smokeless tobacco: Never   • Tobacco comments:     smoked 6 cigarettes a day from 13 years of age to 56 years of age when she quit in    Vaping Use   • Vaping Use: Never used   Substance Use Topics   • Alcohol use: Not Currently   • Drug use: No        Medications Prior to Admission   Medication Sig Dispense Refill Last Dose   • amLODIPine (NORVASC) 10 MG tablet Take 1 tablet by mouth Daily. 90 tablet 3 2023   • apixaban (ELIQUIS) 5 MG tablet tablet Take 1 tablet by mouth Every 12 (Twelve) Hours. Indications: Other - full anticoagulation, history of DVT/PE 60 tablet 2 2023   • carvedilol (COREG) 12.5 MG tablet Take 1 tablet by mouth 2 (Two) Times a Day With Meals. 180 tablet 3 2023   • Cholecalciferol (Vitamin D3) 50 MCG (2000 UT) capsule Take 1 capsule by mouth Daily.   2023   • famotidine (PEPCID) 20 MG tablet Take 1 tablet by mouth As Needed.   Past Month   • Folbic 2.5-25-2 MG tablet tablet Take 1 tablet by mouth Daily.   2023   • furosemide (LASIX) 40 MG tablet Take 1 tablet by mouth Daily. 90 tablet 3 2023   • lisinopril (PRINIVIL,ZESTRIL) 40 MG tablet Take 1 tablet by mouth Daily.   2023   • potassium chloride (K-DUR,KLOR-CON) 10 MEQ CR tablet Take 1 tablet by mouth Daily. 90 tablet 3 2023   • SYMBICORT 80-4.5 MCG/ACT inhaler Inhale 2 puffs 2 (Two)  "Times a Day.  5 5/2/2023   • VENTOLIN  (90 Base) MCG/ACT inhaler Inhale 2 puffs Every 4 (Four) Hours As Needed.  5 5/2/2023   • cloNIDine (CATAPRES) 0.1 MG tablet Take 1 tablet by mouth 2 (Two) Times a Day As Needed for High Blood Pressure (SBP greater than 170). 15 tablet 0 More than a month       Allergies:  Patient has no known allergies.    Scheduled Meds:acetylcysteine, 3 mL, Nebulization, TID - RT  amLODIPine, 10 mg, Oral, Daily  apixaban, 5 mg, Oral, Q12H  arformoterol, 15 mcg, Nebulization, BID - RT  budesonide, 1 mg, Nebulization, BID - RT  carvedilol, 12.5 mg, Oral, BID With Meals  famotidine, 20 mg, Oral, Daily  folic acid-pyridoxine-cyanocobalamin, 1 tablet, Oral, Daily  furosemide, 20 mg, Oral, Daily  guaiFENesin, 1,200 mg, Oral, Q12H  ipratropium-albuterol, 3 mL, Nebulization, 4x Daily - RT  lisinopril, 40 mg, Oral, Daily  montelukast, 10 mg, Oral, Nightly  potassium chloride, 10 mEq, Oral, Daily  predniSONE, 20 mg, Oral, Daily   Followed by  [START ON 5/12/2023] predniSONE, 10 mg, Oral, Daily  rosuvastatin, 10 mg, Oral, Daily  senna-docusate sodium, 2 tablet, Oral, BID  sildenafil, 20 mg, Oral, Q8H  sodium chloride, 10 mL, Intravenous, Q12H      Continuous Infusions:   PRN Meds:.•  acetaminophen  •  albuterol sulfate HFA  •  senna-docusate sodium **AND** polyethylene glycol **AND** bisacodyl **AND** bisacodyl  •  cloNIDine  •  ondansetron **OR** ondansetron  •  sodium chloride  •  sodium chloride      OBJECTIVE    Vital Signs  Vitals:    05/10/23 0802 05/10/23 0805 05/10/23 0806 05/10/23 0809   BP:       BP Location:       Patient Position:       Pulse: 72 72 72 80   Resp: 18 18 18 18   Temp:       TempSrc:       SpO2: 91% 91% 91% 95%   Weight:       Height:           Flowsheet Rows    Flowsheet Row First Filed Value   Admission Height 162.6 cm (64\") Documented at 05/02/2023 1310   Admission Weight 61.5 kg (135 lb 9.3 oz) Documented at 05/02/2023 1310            Intake/Output Summary (Last 24 " hours) at 5/10/2023 0839  Last data filed at 5/9/2023 2010  Gross per 24 hour   Intake 720 ml   Output --   Net 720 ml          Telemetry: Sinus rhythm heart rate is in the 70s.  Oxygen saturation in the low 90s.    Physical Exam:  The patient is alert, oriented and in no distress.  Vital signs as noted above.  Head and neck revealed no carotid bruits or jugular venous distention.  No thyromegaly or lymphadenopathy is present  Lungs clear.  No wheezing.  Breath sounds are normal bilaterally.  On 6 L nasal cannula  Heart normal first and second heart sounds.  No murmur. No precordial rub is present.  No gallop is present.  Abdomen soft and nontender.  No organomegaly is present.  Extremities with good peripheral pulses without any pedal edema.  Skin warm and dry.  Musculoskeletal system is grossly normal.  CNS grossly normal.       Results Review:  I have personally reviewed the results from the time of this admission to 5/10/2023 08:39 EDT and agree with these findings:  []  Laboratory  []  Microbiology  []  Radiology  []  EKG/Telemetry   []  Cardiology/Vascular   []  Pathology  []  Old records  []  Other:    Most notable findings include:    Lab Results (last 24 hours)     ** No results found for the last 24 hours. **          Imaging Results (Last 24 Hours)     ** No results found for the last 24 hours. **          LAB RESULTS (LAST 7 DAYS)    CBC  Results from last 7 days   Lab Units 05/06/23  1640 05/05/23  1546   WBC 10*3/mm3 10.80 13.80*   RBC 10*6/mm3 5.12 5.17   HEMOGLOBIN g/dL 14.8 15.0   HEMATOCRIT % 46.6 47.7*   MCV fL 91.1 92.3   PLATELETS 10*3/mm3 268 248       BMP  Results from last 7 days   Lab Units 05/05/23  1737   SODIUM mmol/L 138   POTASSIUM mmol/L 4.5   CHLORIDE mmol/L 101   CO2 mmol/L 24.0   BUN mg/dL 25*   CREATININE mg/dL 0.97   GLUCOSE mg/dL 172*       CMP   Results from last 7 days   Lab Units 05/05/23  1737   SODIUM mmol/L 138   POTASSIUM mmol/L 4.5   CHLORIDE mmol/L 101   CO2 mmol/L 24.0    BUN mg/dL 25*   CREATININE mg/dL 0.97   GLUCOSE mg/dL 172*       BNP        TROPONIN        CoAg        Creatinine Clearance  Estimated Creatinine Clearance: 44.3 mL/min (by C-G formula based on SCr of 0.97 mg/dL).    ABG        Radiology  CT Angiogram Chest Pulmonary Embolism    Result Date: 5/8/2023  Impression: 1. No evidence of pulmonary embolism. 2. Secretions within the left lower lobe airways with mucous plugging and partial left lower lobe atelectasis. 3. Extensive aortic atherosclerotic plaque with suspected occlusion of the left subclavian artery, unchanged from prior. 4. Moderate upper lobe predominant centrilobular emphysema. Recommend correlation with patient's smoking history and risk factors to see if they qualify for annual low-dose lung cancer screening. Electronically Signed: Param Johansen  5/8/2023 3:27 PM EDT  Workstation ID: [id number]        EKG  I personally viewed and interpreted the patient's EKG/Telemetry data:  ECG 12 Lead Dyspnea   Final Result   HEART RATE= 57  bpm   RR Interval= 1044  ms   MS Interval= 193  ms   P Horizontal Axis= 119  deg   P Front Axis= 0  deg   QRSD Interval= 95  ms   QT Interval= 428  ms   QRS Axis= 94  deg   T Wave Axis= 61  deg   - OTHERWISE NORMAL ECG -   Sinus bradycardia- previous sinus 4-8-23    Right axis deviation   Low voltage, precordial leads   When compared with ECG of 08-Apr-2023 17:03:30,   non specific ST-T wave changes   Artifact   Electronically Signed By: Alan Baumann (Tin) 03-May-2023 05:46:13   Date and Time of Study: 2023-05-02 13:23:59      SCANNED - TELEMETRY     Final Result      SCANNED - TELEMETRY     Final Result      SCANNED - TELEMETRY     Final Result      SCANNED - TELEMETRY     Final Result      SCANNED - TELEMETRY     Final Result      SCANNED - TELEMETRY     Final Result      SCANNED - TELEMETRY     Final Result      SCANNED - TELEMETRY     Final Result      SCANNED - TELEMETRY     Final Result      SCANNED - TELEMETRY     Final  Result      SCANNED - TELEMETRY     Final Result      SCANNED - TELEMETRY     Final Result      SCANNED - TELEMETRY     Final Result      SCANNED - TELEMETRY     Final Result      SCANNED - TELEMETRY     Final Result      SCANNED - TELEMETRY     Final Result      SCANNED - TELEMETRY     Final Result      SCANNED - TELEMETRY     Final Result      SCANNED - TELEMETRY     Final Result      SCANNED - TELEMETRY     Final Result      SCANNED - TELEMETRY     Final Result      SCANNED - TELEMETRY     Final Result      SCANNED - TELEMETRY     Final Result      SCANNED - TELEMETRY     Final Result      SCANNED - TELEMETRY     Final Result            Echocardiogram:    Results for orders placed in visit on 09/13/22    Adult Transthoracic Echo Complete W/ Cont if Necessary Per Protocol    Interpretation Summary  •  Estimated left ventricular EF = 70% Estimated left ventricular EF was in agreement with the calculated left ventricular EF. Left ventricular systolic function is normal.  •  Left ventricular diastolic function is consistent with (grade II w/high LAP) pseudonormalization.        Stress Test:  Results for orders placed in visit on 09/13/22    Stress Test With Myocardial Perfusion One Day    Interpretation Summary  •  Left ventricular ejection fraction is hyperdynamic (Calculated EF > 70%). .  •  Myocardial perfusion imaging indicates a normal myocardial perfusion study with no evidence of ischemia.  •  Impressions are consistent with a low risk study.  •  Findings consistent with a normal ECG stress test.         Cardiac Catheterization:  Results for orders placed during the hospital encounter of 03/03/23    Cardiac Catheterization/Vascular Study    Narrative  OPERATORS  Richardson Willis M.D. (Attending Cardiologist)      PROCEDURE PERFORMED  Ultrasound guided vascular access  Right heart catheterization  Coronary Angiogram  Left Heart Catheterization 53457  Moderate Sedation    INDICATIONS FOR PROCEDURE  83-year-old woman  with multiple cardiovascular risk factors, abnormal stress test presented with worsening shortness of breath.  After discussing the risk and benefit of the procedure she was brought in for elective right and left heart cath.    PROCEDURE IN DETAIL  Informed consent was obtained from the patient after explaining the risks, benefits, and alternative options of the procedure. After obtaining informed consent, the patient was brought to the cath lab and was prepped in a sterile fashion. Lidocaine 2% was used for local anesthesia into the right femoral venous access site. Right femoral vein was accessed using the micropuncture needle under ultrasound guidance and micropuncture wire advanced under flouroscopy. A 7 Egyptian vascular sheath was put into place percutaneously over guide-wire. Guide wires were removed. A 6Fr swan sheryl catheter was advanced to wedge position. RA, RV and PA and wedge pressures were recorded.  PA sat and arterial sats recorded.  The patient tolerated the procedure well without any complications.    Lidocaine 2% was used for local anesthesia into the right femoral arterial access site. The right femoral artery was accessed with a micropuncture needle via modified Seldinger technique under ultrasound guidance. A 6F was inserted successfully.  Afterwards, 6F JR4 and JL4 diagnostic catheters were advanced over a wire into the ascending aorta and were used to engage the ostia of the left main and RCA respectively. JR4 used to cross the AV and obtain LV pressures and gradient across the AV measured via pullback technique. Images of the right and left coronary systems were obtained. All the catheters were exchanged over a wire and subsequently removed. Angiogram of the femoral access site was obtained and did not show complications. The patient tolerated the procedure well without any complications. The pictures were reviewed at the end of the procedure. A Mynx closure device was  applied.    HEMODYNAMICS    RHC  RA 6/5, 4 mmHg  RV 74/3, 5 mmHg  PA 71/25, 44 mmHg  PCW 12 mmHg  AO Sat 92%  PA Sat 78%    Jose CO: 7.89 L/min    Jose CI: 4.69 L/min/m²    LHC  LV: 134/3, 20 mmHg  AO: 131/56, 87 mmHg  No significant gradient across the aortic valve during pullback of JR4 catheter.    FINDINGS  Coronary Angiogram  All vessels are heavily calcified  She also has heavily calcified peripheral arterial disease.  Right dominant circulation    Left main: Left main is a large caliber vessel which gives rise to the Left Anterior Descending and the Left circumflex.  Left main is angiographically free from any significant disease.    Left Anterior Descending Artery: LAD is a heavily calcified medium caliber vessel which gives rise to diagonals.  The vessel is highly tortuous and has 50 to 60% mid vessel stenosis best seen in VERÓNICA cranial view.    Left Circumflex: Heavily calcified vessel with 50% proximal segment stenosis.    Right Coronary Artery: The RCA is a large caliber vessel which is heavily calcified and tortuous.  It has diffuse luminal irregularities and 30 to 40% stenosis in the midsegment around the bend.    ESTIMATED BLOOD LOSS:  10 ml    COMPLICATIONS:  None    PROCEDURE DATA:  Sedation Time: 25 minutes    IMPRESSIONS  Heavily calcified, tortuous nonobstructive coronary disease  Severe pulmonary hypertension with PA systolic pressure in the 70s  Mean PA pressure 44 mmHg    RECOMMENDATIONS  -Continue aggressive medical management of CAD  -Referral to pulmonology for treatment of pulmonary hypertension         Other:         ASSESSMENT & PLAN:    Principal Problem:    Acute exacerbation of chronic obstructive pulmonary disease (COPD)  Active Problems:    Stage 3a chronic kidney disease    History of venous thrombosis and embolism    Primary hypertension    History of TIA (transient ischemic attack)    Parainfluenza infection    Severe pulmonary hypertension    Acute on chronic respiratory failure  with hypoxia    Acute exacerbation of chronic obstructive pulmonary disease   Parainfluenza infection  Pulmonology team is managing.  She is currently on bronchodilators, steroids and Singulair.  CTA with evidence of lower lobe airway obstruction with mucous plugging.  Breathing has improved.  Oxygen saturation in the high 90s  Decision on bronchoscopy by pulmonology.     Acute on chronic respiratory failure with hypoxia  Exacerbation of COPD secondary to parainfluenza.  Respiratory status is tenuous due to severe pulmonary hypertension.  Currently on 4 L of nasal cannula of oxygen.  Wean off as tolerated.  O2 sats more than 95     Severe pulmonary hypertension  He has been started on sildenafil during this admission  Work-up has been initiated for connective tissue disease.  Plan is to obtain PFTs and sleep study later  She may benefit from Uptravi if deemed suitable by pulmonology.     Stage 3a chronic kidney disease  Close eye on the renal function  Current creatinine less than 1     Atrial fibrillation  MCOT review as an outpatient showed evidence of atrial fibrillation.  She will continue to be on full dose anticoagulation with Eliquis for A-fib and for history of DVT/PE.  Rate and rhythm are controlled with beta-blocker     History of venous thrombosis and embolism  On anticoagulation with Eliquis 5 mg p.o. twice daily  She also has an IVC filter in place     Primary hypertension, chronic  Blood pressures well controlled.  Previous echocardiogram showed preserved LV function with grade 2 diastolic dysfunction.  Continue home antihypertensives including amlodipine, Coreg and lisinopril  On stable doses of Lasix for HFpEF  Replace electrolytes including potassium and magnesium as needed     Coronary artery disease  Continue beta-blocker and high intensity statin  She is on anticoagulation and therefore no aspirin has been added  Previous cardiac catheterization showed heavily calcified coronaries but  nonobstructive.  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      Richardson Willis MD  05/10/23  08:39 EDT

## 2023-05-11 PROCEDURE — 99232 SBSQ HOSP IP/OBS MODERATE 35: CPT | Performed by: INTERNAL MEDICINE

## 2023-05-11 PROCEDURE — 97162 PT EVAL MOD COMPLEX 30 MIN: CPT

## 2023-05-11 PROCEDURE — 94664 DEMO&/EVAL PT USE INHALER: CPT

## 2023-05-11 PROCEDURE — 94799 UNLISTED PULMONARY SVC/PX: CPT

## 2023-05-11 PROCEDURE — 63710000001 PREDNISONE PER 1 MG: Performed by: INTERNAL MEDICINE

## 2023-05-11 PROCEDURE — 94761 N-INVAS EAR/PLS OXIMETRY MLT: CPT

## 2023-05-11 RX ORDER — MONTELUKAST SODIUM 10 MG/1
10 TABLET ORAL NIGHTLY
Qty: 30 TABLET | Refills: 1 | Status: SHIPPED | OUTPATIENT
Start: 2023-05-11

## 2023-05-11 RX ORDER — ACETYLCYSTEINE 200 MG/ML
2 SOLUTION ORAL; RESPIRATORY (INHALATION)
Status: DISCONTINUED | OUTPATIENT
Start: 2023-05-11 | End: 2023-05-12 | Stop reason: HOSPADM

## 2023-05-11 RX ORDER — FUROSEMIDE 20 MG/1
20 TABLET ORAL DAILY
Qty: 30 TABLET | Refills: 1 | Status: SHIPPED | OUTPATIENT
Start: 2023-05-12

## 2023-05-11 RX ORDER — SILDENAFIL CITRATE 20 MG/1
20 TABLET ORAL EVERY 8 HOURS SCHEDULED
Qty: 90 TABLET | Refills: 1 | Status: SHIPPED | OUTPATIENT
Start: 2023-05-11

## 2023-05-11 RX ORDER — IPRATROPIUM BROMIDE AND ALBUTEROL SULFATE 2.5; .5 MG/3ML; MG/3ML
3 SOLUTION RESPIRATORY (INHALATION)
Qty: 360 ML | Refills: 12 | Status: SHIPPED | OUTPATIENT
Start: 2023-05-11 | End: 2023-05-12 | Stop reason: HOSPADM

## 2023-05-11 RX ORDER — ROSUVASTATIN CALCIUM 10 MG/1
10 TABLET, COATED ORAL DAILY
Qty: 90 TABLET | Refills: 1 | Status: SHIPPED | OUTPATIENT
Start: 2023-05-11

## 2023-05-11 RX ORDER — GUAIFENESIN 600 MG/1
1200 TABLET, EXTENDED RELEASE ORAL EVERY 12 HOURS SCHEDULED
Qty: 60 TABLET | Refills: 1 | Status: SHIPPED | OUTPATIENT
Start: 2023-05-11

## 2023-05-11 RX ORDER — BUDESONIDE 0.5 MG/2ML
0.5 INHALANT ORAL
Status: DISCONTINUED | OUTPATIENT
Start: 2023-05-11 | End: 2023-05-12 | Stop reason: HOSPADM

## 2023-05-11 RX ADMIN — CARVEDILOL 12.5 MG: 6.25 TABLET, FILM COATED ORAL at 08:53

## 2023-05-11 RX ADMIN — LISINOPRIL 40 MG: 20 TABLET ORAL at 08:53

## 2023-05-11 RX ADMIN — FUROSEMIDE 20 MG: 20 TABLET ORAL at 08:52

## 2023-05-11 RX ADMIN — ACETYLCYSTEINE 3 ML: 200 SOLUTION ORAL; RESPIRATORY (INHALATION) at 06:30

## 2023-05-11 RX ADMIN — PREDNISONE 20 MG: 20 TABLET ORAL at 08:53

## 2023-05-11 RX ADMIN — BUDESONIDE 1 MG: 0.5 INHALANT RESPIRATORY (INHALATION) at 06:35

## 2023-05-11 RX ADMIN — GUAIFENESIN 1200 MG: 600 TABLET, EXTENDED RELEASE ORAL at 20:32

## 2023-05-11 RX ADMIN — Medication 10 ML: at 20:47

## 2023-05-11 RX ADMIN — IPRATROPIUM BROMIDE AND ALBUTEROL SULFATE 3 ML: .5; 3 SOLUTION RESPIRATORY (INHALATION) at 10:56

## 2023-05-11 RX ADMIN — IPRATROPIUM BROMIDE AND ALBUTEROL SULFATE 3 ML: .5; 3 SOLUTION RESPIRATORY (INHALATION) at 19:10

## 2023-05-11 RX ADMIN — SILDENAFIL CITRATE 20 MG: 20 TABLET ORAL at 14:30

## 2023-05-11 RX ADMIN — SILDENAFIL CITRATE 20 MG: 20 TABLET ORAL at 06:13

## 2023-05-11 RX ADMIN — IPRATROPIUM BROMIDE AND ALBUTEROL SULFATE 3 ML: .5; 3 SOLUTION RESPIRATORY (INHALATION) at 15:13

## 2023-05-11 RX ADMIN — GUAIFENESIN 1200 MG: 600 TABLET, EXTENDED RELEASE ORAL at 08:53

## 2023-05-11 RX ADMIN — MONTELUKAST 10 MG: 10 TABLET, FILM COATED ORAL at 20:33

## 2023-05-11 RX ADMIN — POTASSIUM CHLORIDE 10 MEQ: 750 TABLET, EXTENDED RELEASE ORAL at 08:52

## 2023-05-11 RX ADMIN — ACETYLCYSTEINE 2 ML: 200 INHALANT RESPIRATORY (INHALATION) at 19:10

## 2023-05-11 RX ADMIN — APIXABAN 5 MG: 5 TABLET, FILM COATED ORAL at 08:53

## 2023-05-11 RX ADMIN — Medication 10 ML: at 08:54

## 2023-05-11 RX ADMIN — FAMOTIDINE 20 MG: 20 TABLET ORAL at 08:53

## 2023-05-11 RX ADMIN — ARFORMOTEROL TARTRATE 15 MCG: 15 SOLUTION RESPIRATORY (INHALATION) at 06:30

## 2023-05-11 RX ADMIN — APIXABAN 5 MG: 5 TABLET, FILM COATED ORAL at 20:33

## 2023-05-11 RX ADMIN — BUDESONIDE 0.5 MG: 0.5 INHALANT RESPIRATORY (INHALATION) at 19:14

## 2023-05-11 RX ADMIN — AMLODIPINE BESYLATE 10 MG: 5 TABLET ORAL at 08:53

## 2023-05-11 RX ADMIN — FOLIC ACID-PYRIDOXINE-CYANOCOBALAMIN TAB 2.5-25-2 MG 1 TABLET: 2.5-25-2 TAB at 08:52

## 2023-05-11 NOTE — PROGRESS NOTES
Referring Provider: Rosamaria Jin MD    Reason for follow-up: Shortness of breath, coronary artery disease, pulmonary hypertension     Patient Care Team:  Shaylee Mcdaniels MD as PCP - General (Family Medicine)  Richardson Willis MD as Cardiologist (Cardiology)  Rafy Rodriguez MD as Consulting Physician (Hematology and Oncology)  Christianne Og LPN as Licensed Practical Nurse      SUBJECTIVE  Shortness of breath is improved.  She is now on 3 L of nasal cannula of oxygen.     ROS  Review of all systems negative except as indicated.    Since I have last seen, the patient has been without any chest discomfort, shortness of breath, palpitations, dizziness or syncope.  Denies having any headache, abdominal pain, nausea, vomiting, diarrhea, constipation, loss of weight or loss of appetite.  Denies having any excessive bruising, hematuria or blood in the stool.  ROS      Personal History:    Past Medical History:   Diagnosis Date   • COPD (chronic obstructive pulmonary disease)    • Deep vein thrombosis of bilateral lower extremities 7/24/2019   • History of DVT (deep vein thrombosis) 03/2013    bilateral lower extremity   • History of pneumonia 06/2012    community acquired pneumonia and right pleural effusion;hospitalized at [facility]   • History of pulmonary embolism 03/2013    bilateral PE   • Hypertension 2001   • Insomnia     on Ambien 5mg at    • Lobular breast cancer, left 11/2011    Stage IA left upper lobular breast cancer   • Malignant neoplasm of left breast in female, estrogen receptor positive 7/18/2019   • Osteopenia 2012   • Severe pulmonary hypertension 3/3/2023   • Stage 3a chronic kidney disease 7/24/2019   • TIA (transient ischemic attack) 10/25/2022       Past Surgical History:   Procedure Laterality Date   • CARDIAC CATHETERIZATION N/A 3/3/2023    Procedure: Left and Right Heart Cath with Coronary Angiography;  Surgeon: Richardson Willis MD;  Location: [facility] INVASIVE LOCATION;  Service:  Cardiovascular;  Laterality: N/A;   • CATARACT EXTRACTION     • IVC FILTER RETRIEVAL  2014    IVC filter placement by Dr. Card   • MAMMO STEREOTACTIC BREAST BX SURGICAL ADD UNI Left 2011    invasive lobular carcinoma-Dr. Cande Daniel   • MASTECTOMY, PARTIAL Left 2011    and left axillary sentinel lymph node biopsy by Dr. Uriostegui   • TUBAL ABDOMINAL LIGATION         Family History   Problem Relation Age of Onset   • Lung cancer Brother        Social History     Tobacco Use   • Smoking status: Former     Types: Cigarettes     Quit date:      Years since quittin.3     Passive exposure: Never   • Smokeless tobacco: Never   • Tobacco comments:     smoked 6 cigarettes a day from 13 years of age to 56 years of age when she quit in    Vaping Use   • Vaping Use: Never used   Substance Use Topics   • Alcohol use: Not Currently   • Drug use: No        Medications Prior to Admission   Medication Sig Dispense Refill Last Dose   • amLODIPine (NORVASC) 10 MG tablet Take 1 tablet by mouth Daily. 90 tablet 3 2023   • apixaban (ELIQUIS) 5 MG tablet tablet Take 1 tablet by mouth Every 12 (Twelve) Hours. Indications: Other - full anticoagulation, history of DVT/PE 60 tablet 2 2023   • carvedilol (COREG) 12.5 MG tablet Take 1 tablet by mouth 2 (Two) Times a Day With Meals. 180 tablet 3 2023   • Cholecalciferol (Vitamin D3) 50 MCG (2000 UT) capsule Take 1 capsule by mouth Daily.   2023   • famotidine (PEPCID) 20 MG tablet Take 1 tablet by mouth As Needed.   Past Month   • Folbic 2.5-25-2 MG tablet tablet Take 1 tablet by mouth Daily.   2023   • furosemide (LASIX) 40 MG tablet Take 1 tablet by mouth Daily. 90 tablet 3 2023   • lisinopril (PRINIVIL,ZESTRIL) 40 MG tablet Take 1 tablet by mouth Daily.   2023   • potassium chloride (K-DUR,KLOR-CON) 10 MEQ CR tablet Take 1 tablet by mouth Daily. 90 tablet 3 2023   • SYMBICORT 80-4.5 MCG/ACT inhaler Inhale 2 puffs 2 (Two)  "Times a Day.  5 5/2/2023   • VENTOLIN  (90 Base) MCG/ACT inhaler Inhale 2 puffs Every 4 (Four) Hours As Needed.  5 5/2/2023   • cloNIDine (CATAPRES) 0.1 MG tablet Take 1 tablet by mouth 2 (Two) Times a Day As Needed for High Blood Pressure (SBP greater than 170). 15 tablet 0 More than a month       Allergies:  Patient has no known allergies.    Scheduled Meds:acetylcysteine, 3 mL, Nebulization, TID - RT  amLODIPine, 10 mg, Oral, Daily  apixaban, 5 mg, Oral, Q12H  arformoterol, 15 mcg, Nebulization, BID - RT  budesonide, 1 mg, Nebulization, BID - RT  carvedilol, 12.5 mg, Oral, BID With Meals  famotidine, 20 mg, Oral, Daily  folic acid-pyridoxine-cyanocobalamin, 1 tablet, Oral, Daily  furosemide, 20 mg, Oral, Daily  guaiFENesin, 1,200 mg, Oral, Q12H  ipratropium-albuterol, 3 mL, Nebulization, 4x Daily - RT  lisinopril, 40 mg, Oral, Daily  montelukast, 10 mg, Oral, Nightly  potassium chloride, 10 mEq, Oral, Daily  [START ON 5/12/2023] predniSONE, 10 mg, Oral, Daily  rosuvastatin, 10 mg, Oral, Daily  senna-docusate sodium, 2 tablet, Oral, BID  sildenafil, 20 mg, Oral, Q8H  sodium chloride, 10 mL, Intravenous, Q12H      Continuous Infusions:   PRN Meds:.•  acetaminophen  •  albuterol sulfate HFA  •  senna-docusate sodium **AND** polyethylene glycol **AND** bisacodyl **AND** bisacodyl  •  cloNIDine  •  ondansetron **OR** ondansetron  •  sodium chloride  •  sodium chloride      OBJECTIVE    Vital Signs  Vitals:    05/11/23 0638 05/11/23 0815 05/11/23 1056 05/11/23 1100   BP:  110/94     BP Location:  Right arm     Patient Position:  Lying     Pulse: 68 71 62 67   Resp: 16 16 16 16   Temp:  97.4 °F (36.3 °C)     TempSrc:  Oral     SpO2: 96% 94% 93% 92%   Weight:       Height:           Flowsheet Rows    Flowsheet Row First Filed Value   Admission Height 162.6 cm (64\") Documented at 05/02/2023 1310   Admission Weight 61.5 kg (135 lb 9.3 oz) Documented at 05/02/2023 1310            Intake/Output Summary (Last 24 hours) " at 5/11/2023 1140  Last data filed at 5/11/2023 0815  Gross per 24 hour   Intake 920 ml   Output --   Net 920 ml          Telemetry: Sinus rhythm with heart rate in the 60s and 70s.    Physical Exam:  The patient is alert, oriented and in no distress.  Vital signs as noted above.  Head and neck revealed no carotid bruits or jugular venous distention.  No thyromegaly or lymphadenopathy is present  Lungs clear.  No wheezing.  Breath sounds are normal bilaterally.  On 3 L nasal cannula  Heart normal first and second heart sounds.  No murmur. No precordial rub is present.  No gallop is present.  Abdomen soft and nontender.  No organomegaly is present.  Extremities with good peripheral pulses without any pedal edema.  Skin warm and dry.  Musculoskeletal system is grossly normal.  CNS grossly normal.       Results Review:  I have personally reviewed the results from the time of this admission to 5/11/2023 11:40 EDT and agree with these findings:  []  Laboratory  []  Microbiology  []  Radiology  []  EKG/Telemetry   []  Cardiology/Vascular   []  Pathology  []  Old records  []  Other:    Most notable findings include:    Lab Results (last 24 hours)     ** No results found for the last 24 hours. **          Imaging Results (Last 24 Hours)     ** No results found for the last 24 hours. **          LAB RESULTS (LAST 7 DAYS)    CBC  Results from last 7 days   Lab Units 05/06/23  1640 05/05/23  1546   WBC 10*3/mm3 10.80 13.80*   RBC 10*6/mm3 5.12 5.17   HEMOGLOBIN g/dL 14.8 15.0   HEMATOCRIT % 46.6 47.7*   MCV fL 91.1 92.3   PLATELETS 10*3/mm3 268 248       BMP  Results from last 7 days   Lab Units 05/05/23  1737   SODIUM mmol/L 138   POTASSIUM mmol/L 4.5   CHLORIDE mmol/L 101   CO2 mmol/L 24.0   BUN mg/dL 25*   CREATININE mg/dL 0.97   GLUCOSE mg/dL 172*       CMP   Results from last 7 days   Lab Units 05/05/23  1737   SODIUM mmol/L 138   POTASSIUM mmol/L 4.5   CHLORIDE mmol/L 101   CO2 mmol/L 24.0   BUN mg/dL 25*   CREATININE  mg/dL 0.97   GLUCOSE mg/dL 172*       BNP        TROPONIN        CoAg        Creatinine Clearance  Estimated Creatinine Clearance: 44.3 mL/min (by C-G formula based on SCr of 0.97 mg/dL).    ABG        Radiology  No radiology results for the last day      EKG  I personally viewed and interpreted the patient's EKG/Telemetry data:  ECG 12 Lead Dyspnea   Final Result   HEART RATE= 57  bpm   RR Interval= 1044  ms   CA Interval= 193  ms   P Horizontal Axis= 119  deg   P Front Axis= 0  deg   QRSD Interval= 95  ms   QT Interval= 428  ms   QRS Axis= 94  deg   T Wave Axis= 61  deg   - OTHERWISE NORMAL ECG -   Sinus bradycardia- previous sinus 4-8-23    Right axis deviation   Low voltage, precordial leads   When compared with ECG of 08-Apr-2023 17:03:30,   non specific ST-T wave changes   Artifact   Electronically Signed By: Alan Baumann (Tin) 03-May-2023 05:46:13   Date and Time of Study: 2023-05-02 13:23:59      SCANNED - TELEMETRY     Final Result      SCANNED - TELEMETRY     Final Result      SCANNED - TELEMETRY     Final Result      SCANNED - TELEMETRY     Final Result      SCANNED - TELEMETRY     Final Result      SCANNED - TELEMETRY     Final Result      SCANNED - TELEMETRY     Final Result      SCANNED - TELEMETRY     Final Result      SCANNED - TELEMETRY     Final Result      SCANNED - TELEMETRY     Final Result      SCANNED - TELEMETRY     Final Result      SCANNED - TELEMETRY     Final Result      SCANNED - TELEMETRY     Final Result      SCANNED - TELEMETRY     Final Result      SCANNED - TELEMETRY     Final Result      SCANNED - TELEMETRY     Final Result      SCANNED - TELEMETRY     Final Result      SCANNED - TELEMETRY     Final Result      SCANNED - TELEMETRY     Final Result      SCANNED - TELEMETRY     Final Result      SCANNED - TELEMETRY     Final Result      SCANNED - TELEMETRY     Final Result      SCANNED - TELEMETRY     Final Result      SCANNED - TELEMETRY     Final Result      SCANNED - TELEMETRY      Final Result      SCANNED - TELEMETRY     Final Result      SCANNED - TELEMETRY     Final Result      SCANNED - TELEMETRY     Final Result      SCANNED - TELEMETRY     Final Result            Echocardiogram:    Results for orders placed in visit on 09/13/22    Adult Transthoracic Echo Complete W/ Cont if Necessary Per Protocol    Interpretation Summary  •  Estimated left ventricular EF = 70% Estimated left ventricular EF was in agreement with the calculated left ventricular EF. Left ventricular systolic function is normal.  •  Left ventricular diastolic function is consistent with (grade II w/high LAP) pseudonormalization.        Stress Test:  Results for orders placed in visit on 09/13/22    Stress Test With Myocardial Perfusion One Day    Interpretation Summary  •  Left ventricular ejection fraction is hyperdynamic (Calculated EF > 70%). .  •  Myocardial perfusion imaging indicates a normal myocardial perfusion study with no evidence of ischemia.  •  Impressions are consistent with a low risk study.  •  Findings consistent with a normal ECG stress test.         Cardiac Catheterization:  Results for orders placed during the hospital encounter of 03/03/23    Cardiac Catheterization/Vascular Study    Narrative  OPERATORS  Rihcardson Willis M.D. (Attending Cardiologist)      PROCEDURE PERFORMED  Ultrasound guided vascular access  Right heart catheterization  Coronary Angiogram  Left Heart Catheterization 38097  Moderate Sedation    INDICATIONS FOR PROCEDURE  83-year-old woman with multiple cardiovascular risk factors, abnormal stress test presented with worsening shortness of breath.  After discussing the risk and benefit of the procedure she was brought in for elective right and left heart cath.    PROCEDURE IN DETAIL  Informed consent was obtained from the patient after explaining the risks, benefits, and alternative options of the procedure. After obtaining informed consent, the patient was brought to the cath lab and  was prepped in a sterile fashion. Lidocaine 2% was used for local anesthesia into the right femoral venous access site. Right femoral vein was accessed using the micropuncture needle under ultrasound guidance and micropuncture wire advanced under flouroscopy. A 7 Persian vascular sheath was put into place percutaneously over guide-wire. Guide wires were removed. A 6Fr swan sheryl catheter was advanced to wedge position. RA, RV and PA and wedge pressures were recorded.  PA sat and arterial sats recorded.  The patient tolerated the procedure well without any complications.    Lidocaine 2% was used for local anesthesia into the right femoral arterial access site. The right femoral artery was accessed with a micropuncture needle via modified Seldinger technique under ultrasound guidance. A 6F was inserted successfully.  Afterwards, 6F JR4 and JL4 diagnostic catheters were advanced over a wire into the ascending aorta and were used to engage the ostia of the left main and RCA respectively. JR4 used to cross the AV and obtain LV pressures and gradient across the AV measured via pullback technique. Images of the right and left coronary systems were obtained. All the catheters were exchanged over a wire and subsequently removed. Angiogram of the femoral access site was obtained and did not show complications. The patient tolerated the procedure well without any complications. The pictures were reviewed at the end of the procedure. A Mynx closure device was applied.    HEMODYNAMICS    RHC  RA 6/5, 4 mmHg  RV 74/3, 5 mmHg  PA 71/25, 44 mmHg  PCW 12 mmHg  AO Sat 92%  PA Sat 78%    Jose CO: 7.89 L/min    Jose CI: 4.69 L/min/m²    LHC  LV: 134/3, 20 mmHg  AO: 131/56, 87 mmHg  No significant gradient across the aortic valve during pullback of JR4 catheter.    FINDINGS  Coronary Angiogram  All vessels are heavily calcified  She also has heavily calcified peripheral arterial disease.  Right dominant circulation    Left main: Left main  is a large caliber vessel which gives rise to the Left Anterior Descending and the Left circumflex.  Left main is angiographically free from any significant disease.    Left Anterior Descending Artery: LAD is a heavily calcified medium caliber vessel which gives rise to diagonals.  The vessel is highly tortuous and has 50 to 60% mid vessel stenosis best seen in English cranial view.    Left Circumflex: Heavily calcified vessel with 50% proximal segment stenosis.    Right Coronary Artery: The RCA is a large caliber vessel which is heavily calcified and tortuous.  It has diffuse luminal irregularities and 30 to 40% stenosis in the midsegment around the bend.    ESTIMATED BLOOD LOSS:  10 ml    COMPLICATIONS:  None    PROCEDURE DATA:  Sedation Time: 25 minutes    IMPRESSIONS  Heavily calcified, tortuous nonobstructive coronary disease  Severe pulmonary hypertension with PA systolic pressure in the 70s  Mean PA pressure 44 mmHg    RECOMMENDATIONS  -Continue aggressive medical management of CAD  -Referral to pulmonology for treatment of pulmonary hypertension         Other:         ASSESSMENT & PLAN:    Principal Problem:    Acute exacerbation of chronic obstructive pulmonary disease (COPD)  Active Problems:    Stage 3a chronic kidney disease    History of venous thrombosis and embolism    Primary hypertension    History of TIA (transient ischemic attack)    Parainfluenza infection    Severe pulmonary hypertension    Acute on chronic respiratory failure with hypoxia    Acute exacerbation of chronic obstructive pulmonary disease   Parainfluenza infection  Pulmonology team is managing.  Continue bronchodilators, steroids and Singulair  CTA with evidence of lower lobe airway obstruction with mucous plugging.  Oxygenation has improved and she is down to 3 L of nasal cannula and saturating in the 90s.  Decision on bronchoscopy per pulmonology     Acute on chronic respiratory failure with hypoxia  Exacerbation of COPD secondary to  parainfluenza.  Respiratory status is tenuous due to severe pulmonary hypertension.  Down to 3 L of nasal cannula of oxygen.  Wean off as tolerated.  O2 sats more than 95     Severe pulmonary hypertension  Tolerating sildenafil  Connective tissue disease work-up per pulmonology  Needs PFTs and sleep study  She may benefit from Uptravi if deemed suitable by pulmonology.     Stage 3a chronic kidney disease  Close eye on the renal function  Current creatinine less than 1     Atrial fibrillation  MCOT review as an outpatient showed evidence of atrial fibrillation.  She will continue to be on full dose anticoagulation with Eliquis for A-fib and for history of DVT/PE.  Rate and rhythm are controlled with beta-blocker.     History of venous thrombosis and embolism  Continue full dose anticoagulation with Eliquis 5 mg p.o. twice daily  She has an IVC filter in place as well     Primary hypertension, chronic  Blood pressures well controlled.  Previous echocardiogram showed preserved LV function with grade 2 diastolic dysfunction.  Continue home antihypertensives including amlodipine, Coreg and lisinopril  On stable doses of Lasix for HFpEF  Replace electrolytes including potassium and magnesium as needed     Coronary artery disease  Continue high intensity statin and beta-blocker  We have not added aspirin as she is on full anticoagulation.  Previous cardiac catheterization showed heavily calcified coronaries but nonobstructive.  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      Richardson Willis MD  05/11/23  11:40 EDT

## 2023-05-11 NOTE — PROGRESS NOTES
"PULMONARY CRITICAL CARE PROGRESS  NOTE      PATIENT IDENTIFICATION:  Name: Gabrielle Lyles  MRN: [id number]  :  1940     Age: 83 y.o.  Sex: female    DATE OF Note:  5/10/2023   Referring Physician: Rosamaria Jin MD                  Subjective:   Feeling a little better, decreased to 3 L O2, still dry cough, no chest or abd pain, no bowel or bladder issues       Objective:  tMax 24 hrs: Temp (24hrs), Av.7 °F (36.5 °C), Min:97.4 °F (36.3 °C), Max:98.1 °F (36.7 °C)      Vitals Ranges:   Temp:  [97.4 °F (36.3 °C)-98.1 °F (36.7 °C)] 98.1 °F (36.7 °C)  Heart Rate:  [62-90] 69  Resp:  [16-20] 18  BP: (100-123)/(53-99) 117/59    Intake and Output Last 3 Shifts:   I/O last 3 completed shifts:  In: 960 [P.O.:960]  Out: -     Exam:  /59 (BP Location: Right arm, Patient Position: Lying)   Pulse 69   Temp 98.1 °F (36.7 °C) (Oral)   Resp 18   Ht 162.6 cm (64\")   Wt 62.8 kg (138 lb 7.2 oz)   SpO2 90%   BMI 23.76 kg/m²     General Appearance: Alert awake not in distress her O2 sat on 8 L is 86%  HEENT:  Normocephalic, without obvious abnormality. Conjunctivae/corneas clear.  Normal external ear canals. Nares normal, no drainage     Neck:  Supple, symmetrical, trachea midline. No JVD.  Lungs /Chest wall:   Bilateral basal rhonchi, respirations unlabored, symmetrical wall movement.     Heart:  Regular rate and rhythm, systolic murmur PMI left sternal border  Abdomen: Soft, nontender, no masses, no organomegaly.    Extremities: Trace edema, no clubbing or cyanosis        Medications:  acetylcysteine, 3 mL, Nebulization, TID - RT  amLODIPine, 10 mg, Oral, Daily  apixaban, 5 mg, Oral, Q12H  arformoterol, 15 mcg, Nebulization, BID - RT  budesonide, 1 mg, Nebulization, BID - RT  carvedilol, 12.5 mg, Oral, BID With Meals  famotidine, 20 mg, Oral, Daily  folic acid-pyridoxine-cyanocobalamin, 1 tablet, Oral, Daily  furosemide, 20 mg, Oral, Daily  guaiFENesin, 1,200 mg, Oral, Q12H  ipratropium-albuterol, 3 mL, " Nebulization, 4x Daily - RT  lisinopril, 40 mg, Oral, Daily  montelukast, 10 mg, Oral, Nightly  potassium chloride, 10 mEq, Oral, Daily  predniSONE, 20 mg, Oral, Daily   Followed by  [START ON 5/12/2023] predniSONE, 10 mg, Oral, Daily  rosuvastatin, 10 mg, Oral, Daily  senna-docusate sodium, 2 tablet, Oral, BID  sildenafil, 20 mg, Oral, Q8H  sodium chloride, 10 mL, Intravenous, Q12H        Infusion:        PRN:  •  acetaminophen  •  albuterol sulfate HFA  •  senna-docusate sodium **AND** polyethylene glycol **AND** bisacodyl **AND** bisacodyl  •  cloNIDine  •  ondansetron **OR** ondansetron  •  sodium chloride  •  sodium chloride  Data Review:  All labs (24hrs): No results found for this or any previous visit (from the past 24 hour(s)).     Imaging:  CT Angiogram Chest Pulmonary Embolism  Narrative: CT ANGIOGRAM CHEST PULMONARY EMBOLISM    Date of Exam: 5/8/2023 3:00 PM EDT    Indication: Pulmonary embolism (PE) suspected, high prob.    Comparison: Chest radiograph dated 5/2/2023    Technique: Axial CT images were obtained of the chest after the uneventful intravenous administration of iodinated contrast utilizing pulmonary embolism protocol.  Sagittal and coronal reconstructions were performed.  Automated exposure control and   iterative reconstruction methods were used.    Findings:  The visualized soft tissue structures at the base of the neck including the thyroid appear within normal limits. There is no lower cervical or axillary adenopathy. There are postsurgical changes of the superior left breast.    The heart size is normal. There is no pericardial effusion. The aorta is normal in caliber without evidence of aneurysm formation. There is extensive aortic atherosclerotic plaque. There is poor opacification of the left subclavian artery which may be   partially occluded. The main pulmonary artery appears normal in caliber. There are no filling defects within the pulmonary arterial system to suggest presence of  underlying pulmonary embolism. There is no mediastinal or hilar lymphadenopathy by size   criteria.    There are secretions within the left lower lobe which causes medial basal atelectasis. There is no pleural effusion or pneumothorax. There is upper lobe predominant moderate centrilobular emphysema. There is a small area of groundglass opacity within the   periphery of the right upper lobe and posterior aspect of the left upper lobe, favor mild infectious or inflammatory process.     The esophagus is normal in course and caliber. Visualized portions of the upper abdomen demonstrate atherosclerotic plaque involving the aorta and branch vasculature. There is partial visualization of a presumed IVC filter with metallic density partially   visualized. The liver has a nodular contour which can be seen with underlying chronic liver disease.    There is degenerative disc disease of the thoracic spine. No suspicious lytic or sclerotic osseous lesion.  Impression: Impression:  1. No evidence of pulmonary embolism.  2. Secretions within the left lower lobe airways with mucous plugging and partial left lower lobe atelectasis.  3. Extensive aortic atherosclerotic plaque with suspected occlusion of the left subclavian artery, unchanged from prior.  4. Moderate upper lobe predominant centrilobular emphysema. Recommend correlation with patient's smoking history and risk factors to see if they qualify for annual low-dose lung cancer screening.    Electronically Signed: Param Johansen    5/8/2023 3:27 PM EDT    Workstation ID: [id number]       ASSESSMENT:  Acute/Chronic hypoxic respiratory failure  AE COPD  Severe pulmonary HTN  Parainfluenza   CKD 3  HX PE/DVT  HTN  Hx TIA    PLAN:  Continue to decrease to keep sats > 86%  PT/OT  OOB daily   Antibiotics  Bronchodilators  Inhaled corticosteroids  Mucomyst   Diuretics and monitor MEETA's  Electrolytes/ glycemic control  DVT and GI prophylaxis-Apixaban      Discussed with Dr Solorzano      Bhumi Cruz, JOAQUÍN     5/10/2023  23:03 EDT         I personally have examined  and interviewed the patient. I have reviewed the history, data, problems, assessment and plan with our NP.  Total Critical care time in direct medical management (   ) minutes, This time specifically excludes time spent performing procedures.    Jerry Solorzano MD   5/10/2023  23:21 EDT

## 2023-05-11 NOTE — THERAPY EVALUATION
Patient Name: Gabrielle Lyles  : 1940    MRN: [id number]                              Today's Date: 2023       Admit Date: 2023    Visit Dx:     ICD-10-CM ICD-9-CM   1. Acute exacerbation of chronic obstructive pulmonary disease (COPD)  J44.1 491.21   2. Acute bronchitis, unspecified organism  J20.9 466.0   3. Hypoxemia  R09.02 799.02   4. Acute on chronic respiratory failure with hypoxia  J96.21 518.84     799.02   5. Severe pulmonary hypertension  I27.20 416.8   6. Chronic obstructive pulmonary disease with acute exacerbation  J44.1 491.21     Patient Active Problem List   Diagnosis   • Stage 3a chronic kidney disease   • Low back pain   • Osteopenia   • Chronic obstructive pulmonary disease   • Gastroesophageal reflux disease   • Gout   • History of venous thrombosis and embolism   • Primary hypertension   • Lumbar radiculopathy   • Nausea   • Parotid duct obstruction   • Personal history of malignant neoplasm of breast   • Shortness of breath   • Aortic aneurysm   • Bulging lumbar disc   • Spinal stenosis of lumbar region   • History of TIA (transient ischemic attack)   • Acute exacerbation of chronic obstructive pulmonary disease (COPD)   • Parainfluenza infection   • Severe pulmonary hypertension   • Acute on chronic respiratory failure with hypoxia     Past Medical History:   Diagnosis Date   • COPD (chronic obstructive pulmonary disease)    • Deep vein thrombosis of bilateral lower extremities 2019   • History of DVT (deep vein thrombosis) 2013    bilateral lower extremity   • History of pneumonia 2012    community acquired pneumonia and right pleural effusion;hospitalized at [facility]   • History of pulmonary embolism 2013    bilateral PE   • Hypertension    • Insomnia     on Ambien 5mg at    • Lobular breast cancer, left 2011    Stage IA left upper lobular breast cancer   • Malignant neoplasm of left breast in female, estrogen receptor positive 2019   •  Osteopenia 2012   • Severe pulmonary hypertension 3/3/2023   • Stage 3a chronic kidney disease 7/24/2019   • TIA (transient ischemic attack) 10/25/2022     Past Surgical History:   Procedure Laterality Date   • CARDIAC CATHETERIZATION N/A 3/3/2023    Procedure: Left and Right Heart Cath with Coronary Angiography;  Surgeon: Richardson Willis MD;  Location: [facility] CATH INVASIVE LOCATION;  Service: Cardiovascular;  Laterality: N/A;   • CATARACT EXTRACTION  2015   • IVC FILTER RETRIEVAL  06/2014    IVC filter placement by Dr. Card   • MAMMO STEREOTACTIC BREAST BX SURGICAL ADD UNI Left 11/01/2011    invasive lobular carcinoma-Dr. Cande Daniel   • MASTECTOMY, PARTIAL Left 12/01/2011    and left axillary sentinel lymph node biopsy by Dr. Uriostegui   • TUBAL ABDOMINAL LIGATION  1984      General Information     Row Name 05/11/23 1010          Physical Therapy Time and Intention    Document Type evaluation  -     Mode of Treatment physical therapy  -     Row Name 05/11/23 1010          General Information    Patient Profile Reviewed yes  -JR     Prior Level of Function independent:  -JR     Existing Precautions/Restrictions oxygen therapy device and L/min  -     Row Name 05/11/23 1010          Living Environment    People in Home alone  -     Row Name 05/11/23 1010          Cognition    Orientation Status (Cognition) oriented x 4  -JR     Row Name 05/11/23 1010          Safety Issues, Functional Mobility    Impairments Affecting Function (Mobility) shortness of breath  -           User Key  (r) = Recorded By, (t) = Taken By, (c) = Cosigned By    Initials Name Provider Type    JR Gifty Flaherty PT Physical Therapist               Mobility     Row Name 05/11/23 1011          Bed Mobility    Bed Mobility bed mobility (all) activities  -     All Activities, [place] (Bed Mobility) independent  -     Row Name 05/11/23 1011          Bed-Chair Transfer    Bed-Chair [place] (Transfers) independent  -     Row  Name 05/11/23 1011          Sit-Stand Transfer    Sit-Stand [place] (Transfers) independent  -JR     Row Name 05/11/23 1011          Gait/Stairs (Locomotion)    [place] Level (Gait) independent  -JR     Distance in Feet (Gait) 30'  -JR           User Key  (r) = Recorded By, (t) = Taken By, (c) = Cosigned By    Initials Name Provider Type    JR Gifty Flaherty PT Physical Therapist               Obj/Interventions     Row Name 05/11/23 1012          Range of Motion Comprehensive    General Range of Motion no range of motion deficits identified  -JR     Row Name 05/11/23 1012          Strength Comprehensive (MMT)    General Manual Muscle Testing (MMT) Assessment no strength deficits identified  -JR     Row Name 05/11/23 1012          Balance    Balance Assessment sitting static balance;sitting dynamic balance;standing static balance;standing dynamic balance  -JR     Static Sitting Balance independent  -JR     Dynamic Sitting Balance independent  -JR     Static Standing Balance independent  -JR     Dynamic Standing Balance independent  -JR           User Key  (r) = Recorded By, (t) = Taken By, (c) = Cosigned By    Initials Name Provider Type    JR Gifty Flaherty, ROGELIO Physical Therapist               Goals/Plan    No documentation.                Clinical Impression     Row Name 05/11/23 1012          Pain    Pretreatment Pain Rating 2/10  -JR     Posttreatment Pain Rating 2/10  -JR     Pain Location - back  -JR     Row Name 05/11/23 1012          Plan of Care Review    Plan of Care Reviewed With patient  -JR     Progress improving  -JR     Outcome Evaluation Patient states she is feeling better each day. States her breathing has been getting better with decrease of 02 to 3 l at this time without issue.  She is able to I unhook all wires to amb to and from the bathroom without assist.  She demonstrates good use of both breathing apparatus and pulmonary exercises.  She states she lives alone but has 3  alexy nearby who help her when needed.  She states she does not feel like she needs PT at  but will let nurses know if anything changes.  She is planning to go home with family assist and this should be a good choice pending complications.  Eval only at this time  -     Row Name 05/11/23 1012          Therapy Assessment/Plan (PT)    Criteria for Skilled Interventions Met (PT) no;no problems identified which require skilled intervention  -     Therapy Frequency (PT) evaluation only  -     Row Name 05/11/23 1012          Positioning and Restraints    Pre-Treatment Position in bed  -JR     Post Treatment Position bed  -JR     In Bed call light within reach;sitting EOB;notified nsg  -           User Key  (r) = Recorded By, (t) = Taken By, (c) = Cosigned By    Initials Name Provider Type    Gifty Clemente PT Physical Therapist               Outcome Measures     Row Name 05/11/23 1016          How much help from another person do you currently need...    Turning from your back to your side while in flat bed without using bedrails? 4  -JR     Moving from lying on back to sitting on the side of a flat bed without bedrails? 4  -JR     Moving to and from a bed to a chair (including a wheelchair)? 4  -JR     Standing up from a chair using your arms (e.g., wheelchair, bedside chair)? 4  -JR     Climbing 3-5 steps with a railing? 3  -JR     To walk in hospital room? 4  -JR     AM-PAC 6 Clicks Score (PT) 23  -JR     Highest level of mobility 7 --> Walked 25 feet or more  -           User Key  (r) = Recorded By, (t) = Taken By, (c) = Cosigned By    Initials Name Provider Type    Gifty Clemente PT Physical Therapist                             Physical Therapy Education     Title: PT OT SLP Therapies (Done)     Topic: Physical Therapy (Done)     Point: Mobility training (Done)     Learning Progress Summary           Patient Acceptance, E,TB,D, DU by  at 5/11/2023 1016    Comment: good knowledge and  demonstration of pulmonary exercises and safety with gait.                               User Key     Initials Effective Dates Name Provider Type Discipline     06/16/21 -  Gifty Flaherty, PT Physical Therapist PT              PT Recommendation and Plan     Plan of Care Reviewed With: patient  Progress: improving  Outcome Evaluation: Patient states she is feeling better each day. States her breathing has been getting better with decrease of 02 to 3 l at this time without issue.  She is able to I unhook all wires to amb to and from the bathroom without assist.  She demonstrates good use of both breathing apparatus and pulmonary exercises.  She states she lives alone but has 3 daugthers nearby who help her when needed.  She states she does not feel like she needs PT at  but will let nurses know if anything changes.  She is planning to go home with family assist and this should be a good choice pending complications.  Eval only at this time     Time Calculation:    PT Charges     Row Name 05/11/23 1017             Time Calculation    Start Time 0925  -      Stop Time 0945  -      Time Calculation (min) 20 min  -      PT Received On 05/11/23  -            User Key  (r) = Recorded By, (t) = Taken By, (c) = Cosigned By    Initials Name Provider Type     Gifty Flaherty, PT Physical Therapist              Therapy Charges for Today     Code Description Service Date Service Provider Modifiers Qty    08896770193 HC PT EVAL MOD COMPLEXITY 4 5/11/2023 Gifty Flaherty, PT GP 1          PT G-Codes  AM-PAC 6 Clicks Score (PT): 23       Gifty Flaherty PT  5/11/2023

## 2023-05-11 NOTE — PLAN OF CARE
Goal Outcome Evaluation:  Plan of Care Reviewed With: patient        Progress: improving  Outcome Evaluation: Patient states she is feeling better each day. States her breathing has been getting better with decrease of 02 to 3 l at this time without issue.  She is able to I unhook all wires to amb to and from the bathroom without assist.  She demonstrates good use of both breathing apparatus and pulmonary exercises.  She states she lives alone but has 3 daugthers nearby who help her when needed.  She states she does not feel like she needs PT at  but will let nurses know if anything changes.  She is planning to go home with family assist and this should be a good choice pending complications.  Eval only at this time

## 2023-05-11 NOTE — PLAN OF CARE
Goal Outcome Evaluation:  Plan of Care Reviewed With: patient, daughter           Outcome Evaluation: patient is feeling much better and was going to be discharged today, but requested to stay another night in order to recieve her last 2 mucomyst breathing treatments.

## 2023-05-11 NOTE — PROGRESS NOTES
"PULMONARY CRITICAL CARE PROGRESS  NOTE      PATIENT IDENTIFICATION:  Name: Gabrielle Lyles  MRN: [id number]  :  1940     Age: 83 y.o.  Sex: female    DATE OF Note:  2023   Referring Physician: Rosamaria Jin MD                  Subjective:   Feeling a little better, on  3 L O2, still dry cough, no chest or abd pain, no bowel or bladder issues       Objective:  tMax 24 hrs: Temp (24hrs), Av °F (36.7 °C), Min:97.4 °F (36.3 °C), Max:98.9 °F (37.2 °C)      Vitals Ranges:   Temp:  [97.4 °F (36.3 °C)-98.9 °F (37.2 °C)] 98.9 °F (37.2 °C)  Heart Rate:  [61-90] 73  Resp:  [14-20] 14  BP: ()/(54-99) 92/56    Intake and Output Last 3 Shifts:   I/O last 3 completed shifts:  In: 720 [P.O.:720]  Out: -     Exam:  BP 92/56 (BP Location: Right arm, Patient Position: Sitting)   Pulse 73   Temp 98.9 °F (37.2 °C) (Oral)   Resp 14   Ht 162.6 cm (64\")   Wt 62.8 kg (138 lb 7.2 oz)   SpO2 94%   BMI 23.76 kg/m²     General Appearance: Alert awake not in distress her O2 sat on 8 L is 86%  HEENT:  Normocephalic, without obvious abnormality. Conjunctivae/corneas clear.  Normal external ear canals. Nares normal, no drainage     Neck:  Supple, symmetrical, trachea midline. No JVD.  Lungs /Chest wall:   Bilateral basal rhonchi, respirations unlabored, symmetrical wall movement.     Heart:  Regular rate and rhythm, systolic murmur PMI left sternal border  Abdomen: Soft, nontender, no masses, no organomegaly.    Extremities: Trace edema, no clubbing or cyanosis        Medications:  acetylcysteine, 3 mL, Nebulization, TID - RT  amLODIPine, 10 mg, Oral, Daily  apixaban, 5 mg, Oral, Q12H  arformoterol, 15 mcg, Nebulization, BID - RT  budesonide, 1 mg, Nebulization, BID - RT  carvedilol, 12.5 mg, Oral, BID With Meals  famotidine, 20 mg, Oral, Daily  folic acid-pyridoxine-cyanocobalamin, 1 tablet, Oral, Daily  furosemide, 20 mg, Oral, Daily  guaiFENesin, 1,200 mg, Oral, Q12H  ipratropium-albuterol, 3 mL, Nebulization, 4x " Daily - RT  lisinopril, 40 mg, Oral, Daily  montelukast, 10 mg, Oral, Nightly  potassium chloride, 10 mEq, Oral, Daily  [START ON 5/12/2023] predniSONE, 10 mg, Oral, Daily  rosuvastatin, 10 mg, Oral, Daily  senna-docusate sodium, 2 tablet, Oral, BID  sildenafil, 20 mg, Oral, Q8H  sodium chloride, 10 mL, Intravenous, Q12H        Infusion:        PRN:  •  acetaminophen  •  albuterol sulfate HFA  •  senna-docusate sodium **AND** polyethylene glycol **AND** bisacodyl **AND** bisacodyl  •  cloNIDine  •  ondansetron **OR** ondansetron  •  sodium chloride  •  sodium chloride  Data Review:  All labs (24hrs): No results found for this or any previous visit (from the past 24 hour(s)).     Imaging:  CT Angiogram Chest Pulmonary Embolism  Narrative: CT ANGIOGRAM CHEST PULMONARY EMBOLISM    Date of Exam: 5/8/2023 3:00 PM EDT    Indication: Pulmonary embolism (PE) suspected, high prob.    Comparison: Chest radiograph dated 5/2/2023    Technique: Axial CT images were obtained of the chest after the uneventful intravenous administration of iodinated contrast utilizing pulmonary embolism protocol.  Sagittal and coronal reconstructions were performed.  Automated exposure control and   iterative reconstruction methods were used.    Findings:  The visualized soft tissue structures at the base of the neck including the thyroid appear within normal limits. There is no lower cervical or axillary adenopathy. There are postsurgical changes of the superior left breast.    The heart size is normal. There is no pericardial effusion. The aorta is normal in caliber without evidence of aneurysm formation. There is extensive aortic atherosclerotic plaque. There is poor opacification of the left subclavian artery which may be   partially occluded. The main pulmonary artery appears normal in caliber. There are no filling defects within the pulmonary arterial system to suggest presence of underlying pulmonary embolism. There is no mediastinal or  hilar lymphadenopathy by size   criteria.    There are secretions within the left lower lobe which causes medial basal atelectasis. There is no pleural effusion or pneumothorax. There is upper lobe predominant moderate centrilobular emphysema. There is a small area of groundglass opacity within the   periphery of the right upper lobe and posterior aspect of the left upper lobe, favor mild infectious or inflammatory process.     The esophagus is normal in course and caliber. Visualized portions of the upper abdomen demonstrate atherosclerotic plaque involving the aorta and branch vasculature. There is partial visualization of a presumed IVC filter with metallic density partially   visualized. The liver has a nodular contour which can be seen with underlying chronic liver disease.    There is degenerative disc disease of the thoracic spine. No suspicious lytic or sclerotic osseous lesion.  Impression: Impression:  1. No evidence of pulmonary embolism.  2. Secretions within the left lower lobe airways with mucous plugging and partial left lower lobe atelectasis.  3. Extensive aortic atherosclerotic plaque with suspected occlusion of the left subclavian artery, unchanged from prior.  4. Moderate upper lobe predominant centrilobular emphysema. Recommend correlation with patient's smoking history and risk factors to see if they qualify for annual low-dose lung cancer screening.    Electronically Signed: Param Johansen    5/8/2023 3:27 PM EDT    Workstation ID: [id number]       ASSESSMENT:  Acute/Chronic hypoxic respiratory failure  AE COPD  Severe pulmonary HTN  Parainfluenza   CKD 3  HX PE/DVT  HTN  Hx TIA    PLAN:  Out of bed daily  Monitor intake  Continue to decrease to keep sats > 86%  PT/OT  OOB daily   Antibiotics  Bronchodilators  Inhaled corticosteroids  Mucomyst   Diuretics and monitor MEETA's  Electrolytes/ glycemic control  DVT and GI prophylaxis-Apixaban      Discussed with Dr Jeanine Cruz, APRN      5/11/2023  12:27 EDT          I personally have examined  and interviewed the patient. I have reviewed the history, data, problems, assessment and plan with our NP.  Critical care time in direct medical management (   ) minutes  Electronically signed by Jerry Solorzano MD. D, ABSM.

## 2023-05-11 NOTE — PLAN OF CARE
Goal Outcome Evaluation:              Outcome Evaluation: Patient abed, stating she feels better.

## 2023-05-11 NOTE — PROGRESS NOTES
LOS: 7 days   Patient Care Team:  Shaylee Mcdaniels MD as PCP - General (Family Medicine)  Richardson Willis MD as Cardiologist (Cardiology)  Rafy Rodriguez MD as Consulting Physician (Hematology and Oncology)  Christianne Og LPN as Licensed Practical Nurse    Subjective     Patient is feeling much better    Review of Systems   Constitutional: Positive for activity change. Negative for fatigue.   HENT: Negative.    Respiratory: Negative for shortness of breath.    Cardiovascular: Negative.    Gastrointestinal: Negative.    Genitourinary: Negative.    Musculoskeletal: Negative.    Neurological: Negative.    Psychiatric/Behavioral: Negative.            Objective     Vital Signs  Temp:  [97.4 °F (36.3 °C)-98.9 °F (37.2 °C)] 98.9 °F (37.2 °C)  Heart Rate:  [61-90] 73  Resp:  [14-20] 14  BP: ()/(54-99) 92/56      Physical Exam  Vitals reviewed.   Constitutional:       Appearance: She is not ill-appearing.   HENT:      Head: Normocephalic and atraumatic.      Right Ear: External ear normal.      Left Ear: External ear normal.      Nose: Nose normal.      Mouth/Throat:      Mouth: Mucous membranes are moist.   Eyes:      General:         Right eye: No discharge.         Left eye: No discharge.   Cardiovascular:      Rate and Rhythm: Normal rate and regular rhythm.      Pulses: Normal pulses.      Heart sounds: Normal heart sounds.   Pulmonary:      Effort: Pulmonary effort is normal.      Breath sounds: Normal breath sounds.   Abdominal:      General: Bowel sounds are normal.      Palpations: Abdomen is soft.   Musculoskeletal:         General: Normal range of motion.      Cervical back: Normal range of motion.   Skin:     General: Skin is warm.   Neurological:      Mental Status: She is alert and oriented to person, place, and time.   Psychiatric:         Behavior: Behavior normal.              Results Review:    Lab Results (last 24 hours)     ** No results found for the last 24 hours. **           Imaging  Results (Last 24 Hours)     ** No results found for the last 24 hours. **               I reviewed the patient's new clinical results.    Medication Review:   Scheduled Meds:acetylcysteine, 2 mL, Nebulization, BID - RT  amLODIPine, 10 mg, Oral, Daily  apixaban, 5 mg, Oral, Q12H  budesonide, 0.5 mg, Nebulization, BID - RT  carvedilol, 12.5 mg, Oral, BID With Meals  famotidine, 20 mg, Oral, Daily  folic acid-pyridoxine-cyanocobalamin, 1 tablet, Oral, Daily  furosemide, 20 mg, Oral, Daily  guaiFENesin, 1,200 mg, Oral, Q12H  ipratropium-albuterol, 3 mL, Nebulization, 4x Daily - RT  lisinopril, 40 mg, Oral, Daily  montelukast, 10 mg, Oral, Nightly  potassium chloride, 10 mEq, Oral, Daily  rosuvastatin, 10 mg, Oral, Daily  sildenafil, 20 mg, Oral, Q8H  sodium chloride, 10 mL, Intravenous, Q12H      Continuous Infusions:   PRN Meds:.•  acetaminophen  •  albuterol sulfate HFA  •  cloNIDine  •  ondansetron **OR** ondansetron  •  sodium chloride  •  sodium chloride     Interval History:    5/11 patient back to baseline of 3 liters, but wants to stay today because she wants further breathing treatment; she was encouraged to discharge and will have nebulizer at home    Assessment & Plan     Severe pulmonary hypertension mean PA pressure 44  -started on revation today  -Avoid nitrates     Work-up for pulmonary hypertension ongoing  Work-up for connective tissue disease, REN, ANCA, scleroderma panel  Pulmonary function test  Sleep apnea test    Acute exacerbation of chronic obstructive pulmonary disease (COPD)  - continue steroids, bronchodilators, supplemental oxygen, Mucinex; continue Rocephin   Acute viral URI (parainfluenza)- supportive care  Elevated creatinine - may be due to recent steroids; avoid nephrotoxins as able; gently hydration  HTN - amlodipine, carvedilol, lisinopril.  H/o DVT/PE - Eliquis  HLD - Crestor        Plan for disposition:Home      Mansi Becerril, APRN  05/11/23  13:55 EDT

## 2023-05-12 ENCOUNTER — READMISSION MANAGEMENT (OUTPATIENT)
Dept: CALL CENTER | Facility: HOSPITAL | Age: 83
End: 2023-05-12
Payer: MEDICARE

## 2023-05-12 VITALS
TEMPERATURE: 97.5 F | WEIGHT: 139 LBS | HEIGHT: 64 IN | HEART RATE: 82 BPM | SYSTOLIC BLOOD PRESSURE: 122 MMHG | RESPIRATION RATE: 16 BRPM | OXYGEN SATURATION: 87 % | DIASTOLIC BLOOD PRESSURE: 50 MMHG | BODY MASS INDEX: 23.73 KG/M2

## 2023-05-12 PROCEDURE — 99232 SBSQ HOSP IP/OBS MODERATE 35: CPT | Performed by: INTERNAL MEDICINE

## 2023-05-12 PROCEDURE — 94799 UNLISTED PULMONARY SVC/PX: CPT

## 2023-05-12 PROCEDURE — 94761 N-INVAS EAR/PLS OXIMETRY MLT: CPT

## 2023-05-12 PROCEDURE — 94664 DEMO&/EVAL PT USE INHALER: CPT

## 2023-05-12 RX ORDER — IPRATROPIUM BROMIDE AND ALBUTEROL SULFATE 2.5; .5 MG/3ML; MG/3ML
3 SOLUTION RESPIRATORY (INHALATION) EVERY 6 HOURS PRN
Status: DISCONTINUED | OUTPATIENT
Start: 2023-05-12 | End: 2023-05-12 | Stop reason: HOSPADM

## 2023-05-12 RX ORDER — IPRATROPIUM BROMIDE AND ALBUTEROL SULFATE 2.5; .5 MG/3ML; MG/3ML
3 SOLUTION RESPIRATORY (INHALATION) EVERY 6 HOURS PRN
Qty: 360 ML | Refills: 12 | Status: SHIPPED | OUTPATIENT
Start: 2023-05-12

## 2023-05-12 RX ADMIN — CARVEDILOL 12.5 MG: 6.25 TABLET, FILM COATED ORAL at 08:26

## 2023-05-12 RX ADMIN — FUROSEMIDE 20 MG: 20 TABLET ORAL at 08:27

## 2023-05-12 RX ADMIN — FOLIC ACID-PYRIDOXINE-CYANOCOBALAMIN TAB 2.5-25-2 MG 1 TABLET: 2.5-25-2 TAB at 08:27

## 2023-05-12 RX ADMIN — BUDESONIDE 0.5 MG: 0.5 INHALANT RESPIRATORY (INHALATION) at 08:18

## 2023-05-12 RX ADMIN — POTASSIUM CHLORIDE 10 MEQ: 750 TABLET, EXTENDED RELEASE ORAL at 08:27

## 2023-05-12 RX ADMIN — LISINOPRIL 40 MG: 20 TABLET ORAL at 08:27

## 2023-05-12 RX ADMIN — GUAIFENESIN 1200 MG: 600 TABLET, EXTENDED RELEASE ORAL at 08:27

## 2023-05-12 RX ADMIN — ACETYLCYSTEINE 2 ML: 200 INHALANT RESPIRATORY (INHALATION) at 08:18

## 2023-05-12 RX ADMIN — FAMOTIDINE 20 MG: 20 TABLET ORAL at 08:27

## 2023-05-12 RX ADMIN — SILDENAFIL CITRATE 20 MG: 20 TABLET ORAL at 06:11

## 2023-05-12 RX ADMIN — APIXABAN 5 MG: 5 TABLET, FILM COATED ORAL at 08:28

## 2023-05-12 RX ADMIN — IPRATROPIUM BROMIDE AND ALBUTEROL SULFATE 3 ML: .5; 3 SOLUTION RESPIRATORY (INHALATION) at 08:18

## 2023-05-12 RX ADMIN — Medication 10 ML: at 08:29

## 2023-05-12 RX ADMIN — AMLODIPINE BESYLATE 10 MG: 5 TABLET ORAL at 08:27

## 2023-05-12 NOTE — PLAN OF CARE
Goal Outcome Evaluation:  Plan of Care Reviewed With: patient         Pt rested well most of the night.  Pt able to make needs known.  Pt is ready to go home this am.  Pt has been up to the bathroom 3 times in the night.  States she is feeling better.  States the mucomyst has really helped.  Pt very pleasant and cooperative.

## 2023-05-12 NOTE — PROGRESS NOTES
Referring Provider: Rosamaria Jin MD    Reason for follow-up: Shortness of breath, coronary artery disease, pulmonary hypertension     Patient Care Team:  Shaylee Mcdaniels MD as PCP - General (Family Medicine)  Richardson Willis MD as Cardiologist (Cardiology)  Rafy Rodriguez MD as Consulting Physician (Hematology and Oncology)  Christianne Og LPN as Licensed Practical Nurse      SUBJECTIVE  Shortness of breath is improved.  She is now at baseline of 2 to 3 L of nasal cannula of oxygen     ROS  Review of all systems negative except as indicated.    Since I have last seen, the patient has been without any chest discomfort, shortness of breath, palpitations, dizziness or syncope.  Denies having any headache, abdominal pain, nausea, vomiting, diarrhea, constipation, loss of weight or loss of appetite.  Denies having any excessive bruising, hematuria or blood in the stool.  ROS      Personal History:    Past Medical History:   Diagnosis Date   • COPD (chronic obstructive pulmonary disease)    • Deep vein thrombosis of bilateral lower extremities 7/24/2019   • History of DVT (deep vein thrombosis) 03/2013    bilateral lower extremity   • History of pneumonia 06/2012    community acquired pneumonia and right pleural effusion;hospitalized at [facility]   • History of pulmonary embolism 03/2013    bilateral PE   • Hypertension 2001   • Insomnia     on Ambien 5mg at    • Lobular breast cancer, left 11/2011    Stage IA left upper lobular breast cancer   • Malignant neoplasm of left breast in female, estrogen receptor positive 7/18/2019   • Osteopenia 2012   • Severe pulmonary hypertension 3/3/2023   • Stage 3a chronic kidney disease 7/24/2019   • TIA (transient ischemic attack) 10/25/2022       Past Surgical History:   Procedure Laterality Date   • CARDIAC CATHETERIZATION N/A 3/3/2023    Procedure: Left and Right Heart Cath with Coronary Angiography;  Surgeon: Richardson Willis MD;  Location: [facility] INVASIVE  LOCATION;  Service: Cardiovascular;  Laterality: N/A;   • CATARACT EXTRACTION     • IVC FILTER RETRIEVAL  2014    IVC filter placement by Dr. Card   • MAMMO STEREOTACTIC BREAST BX SURGICAL ADD UNI Left 2011    invasive lobular carcinoma-Dr. Cande Dainel   • MASTECTOMY, PARTIAL Left 2011    and left axillary sentinel lymph node biopsy by Dr. Uriostegui   • TUBAL ABDOMINAL LIGATION         Family History   Problem Relation Age of Onset   • Lung cancer Brother        Social History     Tobacco Use   • Smoking status: Former     Types: Cigarettes     Quit date:      Years since quittin.3     Passive exposure: Never   • Smokeless tobacco: Never   • Tobacco comments:     smoked 6 cigarettes a day from 13 years of age to 56 years of age when she quit in    Vaping Use   • Vaping Use: Never used   Substance Use Topics   • Alcohol use: Not Currently   • Drug use: No        Medications Prior to Admission   Medication Sig Dispense Refill Last Dose   • amLODIPine (NORVASC) 10 MG tablet Take 1 tablet by mouth Daily. 90 tablet 3 2023   • apixaban (ELIQUIS) 5 MG tablet tablet Take 1 tablet by mouth Every 12 (Twelve) Hours. Indications: Other - full anticoagulation, history of DVT/PE 60 tablet 2 2023   • carvedilol (COREG) 12.5 MG tablet Take 1 tablet by mouth 2 (Two) Times a Day With Meals. 180 tablet 3 2023   • Cholecalciferol (Vitamin D3) 50 MCG (2000 UT) capsule Take 1 capsule by mouth Daily.   2023   • famotidine (PEPCID) 20 MG tablet Take 1 tablet by mouth As Needed.   Past Month   • Folbic 2.5-25-2 MG tablet tablet Take 1 tablet by mouth Daily.   2023   • furosemide (LASIX) 40 MG tablet Take 1 tablet by mouth Daily. 90 tablet 3 2023   • lisinopril (PRINIVIL,ZESTRIL) 40 MG tablet Take 1 tablet by mouth Daily.   2023   • potassium chloride (K-DUR,KLOR-CON) 10 MEQ CR tablet Take 1 tablet by mouth Daily. 90 tablet 3 2023   • SYMBICORT 80-4.5 MCG/ACT inhaler Inhale  "2 puffs 2 (Two) Times a Day.  5 5/2/2023   • VENTOLIN  (90 Base) MCG/ACT inhaler Inhale 2 puffs Every 4 (Four) Hours As Needed.  5 5/2/2023   • cloNIDine (CATAPRES) 0.1 MG tablet Take 1 tablet by mouth 2 (Two) Times a Day As Needed for High Blood Pressure (SBP greater than 170). 15 tablet 0 More than a month   • [DISCONTINUED] rosuvastatin (CRESTOR) 10 MG tablet Take 1 tablet by mouth Daily.          Allergies:  Patient has no known allergies.    Scheduled Meds:acetylcysteine, 2 mL, Nebulization, BID - RT  amLODIPine, 10 mg, Oral, Daily  apixaban, 5 mg, Oral, Q12H  budesonide, 0.5 mg, Nebulization, BID - RT  carvedilol, 12.5 mg, Oral, BID With Meals  famotidine, 20 mg, Oral, Daily  folic acid-pyridoxine-cyanocobalamin, 1 tablet, Oral, Daily  furosemide, 20 mg, Oral, Daily  guaiFENesin, 1,200 mg, Oral, Q12H  lisinopril, 40 mg, Oral, Daily  montelukast, 10 mg, Oral, Nightly  potassium chloride, 10 mEq, Oral, Daily  rosuvastatin, 10 mg, Oral, Daily  sildenafil, 20 mg, Oral, Q8H  sodium chloride, 10 mL, Intravenous, Q12H      Continuous Infusions:   PRN Meds:.•  acetaminophen  •  albuterol sulfate HFA  •  cloNIDine  •  ipratropium-albuterol  •  ondansetron **OR** ondansetron  •  sodium chloride  •  sodium chloride      OBJECTIVE    Vital Signs  Vitals:    05/11/23 1919 05/11/23 2022 05/12/23 0445 05/12/23 0634   BP:  97/70 144/47    BP Location:  Right arm Right arm    Patient Position:  Sitting Lying    Pulse: 73 84 76    Resp: 18 16 19    Temp:  97.5 °F (36.4 °C) 98.1 °F (36.7 °C)    TempSrc:  Oral Oral    SpO2: 96% 90% 93%    Weight:    63 kg (139 lb)   Height:           Flowsheet Rows    Flowsheet Row First Filed Value   Admission Height 162.6 cm (64\") Documented at 05/02/2023 1310   Admission Weight 61.5 kg (135 lb 9.3 oz) Documented at 05/02/2023 1310            Intake/Output Summary (Last 24 hours) at 5/12/2023 0749  Last data filed at 5/11/2023 1807  Gross per 24 hour   Intake 1160 ml   Output --   Net " 1160 ml          Telemetry: Sinus rhythm with heart rate in the 80s    Physical Exam:  The patient is alert, oriented and in no distress.  Vital signs as noted above.  Head and neck revealed no carotid bruits or jugular venous distention.  No thyromegaly or lymphadenopathy is present  Lungs clear.  No wheezing.  Breath sounds are normal bilaterally.  Back on 3 L nasal cannula  Heart normal first and second heart sounds.  No murmur. No precordial rub is present.  No gallop is present.  Abdomen soft and nontender.  No organomegaly is present.  Extremities with good peripheral pulses without any pedal edema.  Skin warm and dry.  Musculoskeletal system is grossly normal.  CNS grossly normal.       Results Review:  I have personally reviewed the results from the time of this admission to 5/12/2023 07:49 EDT and agree with these findings:  []  Laboratory  []  Microbiology  []  Radiology  []  EKG/Telemetry   []  Cardiology/Vascular   []  Pathology  []  Old records  []  Other:    Most notable findings include:    Lab Results (last 24 hours)     ** No results found for the last 24 hours. **          Imaging Results (Last 24 Hours)     ** No results found for the last 24 hours. **          LAB RESULTS (LAST 7 DAYS)    CBC  Results from last 7 days   Lab Units 05/06/23  1640 05/05/23  1546   WBC 10*3/mm3 10.80 13.80*   RBC 10*6/mm3 5.12 5.17   HEMOGLOBIN g/dL 14.8 15.0   HEMATOCRIT % 46.6 47.7*   MCV fL 91.1 92.3   PLATELETS 10*3/mm3 268 248       BMP  Results from last 7 days   Lab Units 05/05/23  1737   SODIUM mmol/L 138   POTASSIUM mmol/L 4.5   CHLORIDE mmol/L 101   CO2 mmol/L 24.0   BUN mg/dL 25*   CREATININE mg/dL 0.97   GLUCOSE mg/dL 172*       CMP   Results from last 7 days   Lab Units 05/05/23  1737   SODIUM mmol/L 138   POTASSIUM mmol/L 4.5   CHLORIDE mmol/L 101   CO2 mmol/L 24.0   BUN mg/dL 25*   CREATININE mg/dL 0.97   GLUCOSE mg/dL 172*       BNP        TROPONIN        CoAg        Creatinine Clearance  Estimated  Creatinine Clearance: 44.5 mL/min (by C-G formula based on SCr of 0.97 mg/dL).    ABG        Radiology  No radiology results for the last day      EKG  I personally viewed and interpreted the patient's EKG/Telemetry data:  ECG 12 Lead Dyspnea   Final Result   HEART RATE= 57  bpm   RR Interval= 1044  ms   TX Interval= 193  ms   P Horizontal Axis= 119  deg   P Front Axis= 0  deg   QRSD Interval= 95  ms   QT Interval= 428  ms   QRS Axis= 94  deg   T Wave Axis= 61  deg   - OTHERWISE NORMAL ECG -   Sinus bradycardia- previous sinus 4-8-23    Right axis deviation   Low voltage, precordial leads   When compared with ECG of 08-Apr-2023 17:03:30,   non specific ST-T wave changes   Artifact   Electronically Signed By: Alan Baumann ([facility]) 03-May-2023 05:46:13   Date and Time of Study: 2023-05-02 13:23:59      SCANNED - TELEMETRY     Final Result      SCANNED - TELEMETRY     Final Result      SCANNED - TELEMETRY     Final Result      SCANNED - TELEMETRY     Final Result      SCANNED - TELEMETRY     Final Result      SCANNED - TELEMETRY     Final Result      SCANNED - TELEMETRY     Final Result      SCANNED - TELEMETRY     Final Result      SCANNED - TELEMETRY     Final Result      SCANNED - TELEMETRY     Final Result      SCANNED - TELEMETRY     Final Result      SCANNED - TELEMETRY     Final Result      SCANNED - TELEMETRY     Final Result      SCANNED - TELEMETRY     Final Result      SCANNED - TELEMETRY     Final Result      SCANNED - TELEMETRY     Final Result      SCANNED - TELEMETRY     Final Result      SCANNED - TELEMETRY     Final Result      SCANNED - TELEMETRY     Final Result      SCANNED - TELEMETRY     Final Result      SCANNED - TELEMETRY     Final Result      SCANNED - TELEMETRY     Final Result      SCANNED - TELEMETRY     Final Result      SCANNED - TELEMETRY     Final Result      SCANNED - TELEMETRY     Final Result      SCANNED - TELEMETRY     Final Result      SCANNED - TELEMETRY     Final Result       SCANNED - TELEMETRY     Final Result      SCANNED - TELEMETRY     Final Result            Echocardiogram:    Results for orders placed in visit on 09/13/22    Adult Transthoracic Echo Complete W/ Cont if Necessary Per Protocol    Interpretation Summary  •  Estimated left ventricular EF = 70% Estimated left ventricular EF was in agreement with the calculated left ventricular EF. Left ventricular systolic function is normal.  •  Left ventricular diastolic function is consistent with (grade II w/high LAP) pseudonormalization.        Stress Test:  Results for orders placed in visit on 09/13/22    Stress Test With Myocardial Perfusion One Day    Interpretation Summary  •  Left ventricular ejection fraction is hyperdynamic (Calculated EF > 70%). .  •  Myocardial perfusion imaging indicates a normal myocardial perfusion study with no evidence of ischemia.  •  Impressions are consistent with a low risk study.  •  Findings consistent with a normal ECG stress test.         Cardiac Catheterization:  Results for orders placed during the hospital encounter of 03/03/23    Cardiac Catheterization/Vascular Study    Narrative  OPERATORS  Richardson Willis M.D. (Attending Cardiologist)      PROCEDURE PERFORMED  Ultrasound guided vascular access  Right heart catheterization  Coronary Angiogram  Left Heart Catheterization 14311  Moderate Sedation    INDICATIONS FOR PROCEDURE  83-year-old woman with multiple cardiovascular risk factors, abnormal stress test presented with worsening shortness of breath.  After discussing the risk and benefit of the procedure she was brought in for elective right and left heart cath.    PROCEDURE IN DETAIL  Informed consent was obtained from the patient after explaining the risks, benefits, and alternative options of the procedure. After obtaining informed consent, the patient was brought to the cath lab and was prepped in a sterile fashion. Lidocaine 2% was used for local anesthesia into the right femoral  venous access site. Right femoral vein was accessed using the micropuncture needle under ultrasound guidance and micropuncture wire advanced under flouroscopy. A 7 Armenian vascular sheath was put into place percutaneously over guide-wire. Guide wires were removed. A 6Fr swan sheryl catheter was advanced to wedge position. RA, RV and PA and wedge pressures were recorded.  PA sat and arterial sats recorded.  The patient tolerated the procedure well without any complications.    Lidocaine 2% was used for local anesthesia into the right femoral arterial access site. The right femoral artery was accessed with a micropuncture needle via modified Seldinger technique under ultrasound guidance. A 6F was inserted successfully.  Afterwards, 6F JR4 and JL4 diagnostic catheters were advanced over a wire into the ascending aorta and were used to engage the ostia of the left main and RCA respectively. JR4 used to cross the AV and obtain LV pressures and gradient across the AV measured via pullback technique. Images of the right and left coronary systems were obtained. All the catheters were exchanged over a wire and subsequently removed. Angiogram of the femoral access site was obtained and did not show complications. The patient tolerated the procedure well without any complications. The pictures were reviewed at the end of the procedure. A Mynx closure device was applied.    HEMODYNAMICS    RHC  RA 6/5, 4 mmHg  RV 74/3, 5 mmHg  PA 71/25, 44 mmHg  PCW 12 mmHg  AO Sat 92%  PA Sat 78%    Jose CO: 7.89 L/min    Jose CI: 4.69 L/min/m²    LHC  LV: 134/3, 20 mmHg  AO: 131/56, 87 mmHg  No significant gradient across the aortic valve during pullback of JR4 catheter.    FINDINGS  Coronary Angiogram  All vessels are heavily calcified  She also has heavily calcified peripheral arterial disease.  Right dominant circulation    Left main: Left main is a large caliber vessel which gives rise to the Left Anterior Descending and the Left circumflex.   Left main is angiographically free from any significant disease.    Left Anterior Descending Artery: LAD is a heavily calcified medium caliber vessel which gives rise to diagonals.  The vessel is highly tortuous and has 50 to 60% mid vessel stenosis best seen in VERÓNICA cranial view.    Left Circumflex: Heavily calcified vessel with 50% proximal segment stenosis.    Right Coronary Artery: The RCA is a large caliber vessel which is heavily calcified and tortuous.  It has diffuse luminal irregularities and 30 to 40% stenosis in the midsegment around the bend.    ESTIMATED BLOOD LOSS:  10 ml    COMPLICATIONS:  None    PROCEDURE DATA:  Sedation Time: 25 minutes    IMPRESSIONS  Heavily calcified, tortuous nonobstructive coronary disease  Severe pulmonary hypertension with PA systolic pressure in the 70s  Mean PA pressure 44 mmHg    RECOMMENDATIONS  -Continue aggressive medical management of CAD  -Referral to pulmonology for treatment of pulmonary hypertension         Other:         ASSESSMENT & PLAN:    Principal Problem:    Acute exacerbation of chronic obstructive pulmonary disease (COPD)  Active Problems:    Stage 3a chronic kidney disease    History of venous thrombosis and embolism    Primary hypertension    History of TIA (transient ischemic attack)    Parainfluenza infection    Severe pulmonary hypertension    Acute on chronic respiratory failure with hypoxia    Acute exacerbation of chronic obstructive pulmonary disease   Parainfluenza infection  Pulmonology team is managing.  Continue bronchodilators, steroids and Singulair  CTA with evidence of lower lobe airway obstruction with mucous plugging.  Oxygenation has significantly improved and she is now at baseline breathing status at 2 to 3 L of nasal cannula.  Oxygen saturation more than 90%.  She is able to ambulate     Acute on chronic respiratory failure with hypoxia  Exacerbation of COPD secondary to parainfluenza  Respiratory status is tenuous due to severe  pulmonary hypertension.  Down to 3 L of nasal cannula of oxygen.  She is at baseline respiratory status     Severe pulmonary hypertension  Tolerating sildenafil  Connective tissue disease work-up per pulmonology  Needs outpatient PFTs and sleep study  She may benefit from Uptravi if deemed suitable by pulmonology.     Stage 3a chronic kidney disease  Close eye on the renal function  Current creatinine less than 1     Atrial fibrillation  MCOT review as an outpatient showed evidence of atrial fibrillation.  She will continue to be on full dose anticoagulation with Eliquis for A-fib and for history of DVT/PE.  Rate and rhythm are controlled with beta-blocker.     History of venous thrombosis and embolism  Continue Eliquis 5 mg p.o. twice daily  She has an IVC filter in place as well     Primary hypertension, chronic  Blood pressures well controlled.  Preserved LV function with grade 2 diastolic dysfunction  Resume home antihypertensives including amlodipine, lisinopril and Coreg  Continue oral Lasix for HFpEF  Replace electrolytes including potassium and magnesium as needed     Coronary artery disease  Continue high intensity statin and beta-blocker  We have not added aspirin as she is on full anticoagulation.  Previous cardiac catheterization showed heavily calcified coronaries but nonobstructive.  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 twice daily      Richardson Willis MD  05/12/23  07:49 EDT

## 2023-05-12 NOTE — CASE MANAGEMENT/SOCIAL WORK
Case Management Discharge Note      Final Note: Home    Provided Post Acute Provider List?: N/A    Selected Continued Care - Discharged on 5/12/2023 Admission date: 5/2/2023 - Discharge disposition: Home or Self Care            Transportation Services  Private: Car    Final Discharge Disposition Code: 01 - home or self-care

## 2023-05-12 NOTE — DISCHARGE SUMMARY
Date of Discharge:  5/12/2023    Discharge Diagnosis:   Acute exacerbation of chronic obstructive pulmonary disease  Acute on chronic respiratory failure with hypoxia  Acute viral infection parainfluenza  Chronic kidney disease stage III  Atrial fibs  Severe pulmonary hypertension  Hypertension  Hyperlipidemia  History of DVT and PE  History of TIA  History of breast cancer  Presenting Problem/History of Present Illness  Active Hospital Problems    Diagnosis  POA   • **Acute exacerbation of chronic obstructive pulmonary disease (COPD) [J44.1]  Yes   • Acute on chronic respiratory failure with hypoxia [J96.21]  Yes   • Parainfluenza infection [B34.8]  Yes   • Severe pulmonary hypertension [I27.20]  Yes   • History of TIA (transient ischemic attack) [Z86.73]  Not Applicable   • Primary hypertension [I10]  Yes   • Stage 3a chronic kidney disease [N18.31]  Yes   • History of venous thrombosis and embolism [Z86.718]  Not Applicable      Resolved Hospital Problems   No resolved problems to display.          Hospital Course    Patient is a 83 y.o. female presented with history of breast cancer, pulmonary embolism, DVT on chronic anticoagulation, chronic kidney disease stage IIIa, COPD, chronic hypoxic respiratory failure and severe pulmonary hypertension who presented to the emergency room with complaints of shortness of breath and dyspnea on exertion.  Patient was a found to have an acute COPD exacerbation with hypoxia most likely related to upper viral infection of parainfluenza.  On this admission patient was found to have severe pulmonary hypertension and was started on Revatio.  She will need to avoid nitrates.  She was treated with nebulizers and supportive care.  She is back to baseline of 3 L of oxygen and feeling improved.  Cardiology was consulted and she did have outpatient monitoring that showed evidence of atrial fibs however she was already anticoagulated on Eliquis for previous DVT and PE with a controlled  rate on a beta-blocker.  Pulmonology followed along and is recommending pulmonary function test and sleep apnea test as an outpatient.  She has a follow-up appointment with her PCP and will need to follow-up with Dr. Roy in 2 weeks.  She is agreeable this plan and discharge.    Procedures Performed         Consults:   Consults     Date and Time Order Name Status Description    5/7/2023 10:11 AM Inpatient Cardiology Consult Completed     5/3/2023 12:09 PM Inpatient Pulmonology Consult Completed           Pertinent Test Results:    Lab Results (most recent)     Procedure Component Value Units Date/Time    Blood Culture - Blood, Arm, Right [[id number]]  (Normal) Collected: 05/02/23 1638    Specimen: Blood from Arm, Right Updated: 05/07/23 1645     Blood Culture No growth at 5 days    Narrative:      Less than seven (7) mL's of blood was collected.  Insufficient quantity may yield false negative results.    Blood Culture - Blood, Arm, Right [[id number]]  (Normal) Collected: 05/02/23 1456    Specimen: Blood from Arm, Right Updated: 05/07/23 1515     Blood Culture No growth at 5 days    Narrative:      Less than seven (7) mL's of blood was collected.  Insufficient quantity may yield false negative results.    CBC & Differential [[id number]]  (Abnormal) Collected: 05/06/23 1640    Specimen: Blood Updated: 05/06/23 1701    Narrative:      The following orders were created for panel order CBC & Differential.  Procedure                               Abnormality         Status                     ---------                               -----------         ------                     CBC Auto Differential[[id number]]        Abnormal            Final result                 Please view results for these tests on the individual orders.    CBC Auto Differential [[id number]]  (Abnormal) Collected: 05/06/23 1640    Specimen: Blood Updated: 05/06/23 1701     WBC 10.80 10*3/mm3      RBC 5.12 10*6/mm3      Hemoglobin 14.8 g/dL       Hematocrit 46.6 %      MCV 91.1 fL      MCH 28.9 pg      MCHC 31.8 g/dL      RDW 16.9 %      RDW-SD 52.9 fl      MPV 7.9 fL      Platelets 268 10*3/mm3      Neutrophil % 92.1 %      Lymphocyte % 5.3 %      Monocyte % 2.5 %      Eosinophil % 0.0 %      Basophil % 0.1 %      Neutrophils, Absolute 9.90 10*3/mm3      Lymphocytes, Absolute 0.60 10*3/mm3      Monocytes, Absolute 0.30 10*3/mm3      Eosinophils, Absolute 0.00 10*3/mm3      Basophils, Absolute 0.00 10*3/mm3      nRBC 0.1 /100 WBC     Basic Metabolic Panel [[id number]]  (Abnormal) Collected: 05/05/23 1737    Specimen: Blood Updated: 05/05/23 1820     Glucose 172 mg/dL      BUN 25 mg/dL      Creatinine 0.97 mg/dL      Sodium 138 mmol/L      Potassium 4.5 mmol/L      Comment: Slight hemolysis detected by analyzer. Results may be affected.        Chloride 101 mmol/L      CO2 24.0 mmol/L      Calcium 9.7 mg/dL      BUN/Creatinine Ratio 25.8     Anion Gap 13.0 mmol/L      eGFR 58.5 mL/min/1.73     Narrative:      GFR Normal >60  Chronic Kidney Disease <60  Kidney Failure <15    The GFR formula is only valid for adults with stable renal function between ages 18 and 70.    CBC & Differential [[id number]]  (Abnormal) Collected: 05/05/23 0923    Specimen: Blood Updated: 05/05/23 1619    Narrative:      The following orders were created for panel order CBC & Differential.  Procedure                               Abnormality         Status                     ---------                               -----------         ------                     CBC Auto Differential[[id number]]        Abnormal            Final result               Scan Slide[[id number]]                                                                    Please view results for these tests on the individual orders.    CBC Auto Differential [[id number]]  (Abnormal) Collected: 05/05/23 1546    Specimen: Blood Updated: 05/05/23 1619     WBC 13.80 10*3/mm3      RBC 5.17 10*6/mm3      Hemoglobin 15.0 g/dL       Hematocrit 47.7 %      MCV 92.3 fL      MCH 29.1 pg      MCHC 31.5 g/dL      RDW 17.3 %      RDW-SD 56.4 fl      MPV 8.6 fL      Platelets 248 10*3/mm3      Neutrophil % 91.9 %      Lymphocyte % 4.3 %      Monocyte % 1.2 %      Eosinophil % 1.7 %      Basophil % 0.9 %      Neutrophils, Absolute 12.70 10*3/mm3      Lymphocytes, Absolute 0.60 10*3/mm3      Monocytes, Absolute 0.20 10*3/mm3      Eosinophils, Absolute 0.20 10*3/mm3      Basophils, Absolute 0.10 10*3/mm3      nRBC 0.0 /100 WBC     Respiratory Panel PCR w/COVID-19(SARS-CoV-2) MARIA ELENA/SARIAH/BRANDIN/PAD/COR/MAD/LILLIAN In-House, NP Swab in UTM/VTM, 3-4 HR TAT - Swab, Nasopharynx [[id number]]  (Abnormal) Collected: 05/03/23 1028    Specimen: Swab from Nasopharynx Updated: 05/03/23 1137     ADENOVIRUS, PCR Not Detected     Coronavirus 229E Not Detected     Coronavirus HKU1 Not Detected     Coronavirus NL63 Not Detected     Coronavirus OC43 Not Detected     COVID19 Not Detected     Human Metapneumovirus Not Detected     Human Rhinovirus/Enterovirus Not Detected     Influenza A PCR Not Detected     Influenza B PCR Not Detected     Parainfluenza Virus 1 Not Detected     Parainfluenza Virus 2 Not Detected     Parainfluenza Virus 3 Detected     Parainfluenza Virus 4 Not Detected     RSV, PCR Not Detected     Bordetella pertussis pcr Not Detected     Bordetella parapertussis PCR Not Detected     Chlamydophila pneumoniae PCR Not Detected     Mycoplasma pneumo by PCR Not Detected    Narrative:      In the setting of a positive respiratory panel with a viral infection PLUS a negative procalcitonin without other underlying concern for bacterial infection, consider observing off antibiotics or discontinuation of antibiotics and continue supportive care. If the respiratory panel is positive for atypical bacterial infection (Bordetella pertussis, Chlamydophila pneumoniae, or Mycoplasma pneumoniae), consider antibiotic de-escalation to target atypical bacterial infection.    Basic  Metabolic Panel [[id number]]  (Abnormal) Collected: 05/03/23 0413    Specimen: Blood from Arm, Right Updated: 05/03/23 0636     Glucose 141 mg/dL      BUN 26 mg/dL      Creatinine 1.25 mg/dL      Sodium 141 mmol/L      Potassium 4.1 mmol/L      Chloride 101 mmol/L      CO2 28.0 mmol/L      Calcium 9.4 mg/dL      BUN/Creatinine Ratio 20.8     Anion Gap 12.0 mmol/L      eGFR 43.1 mL/min/1.73     Narrative:      GFR Normal >60  Chronic Kidney Disease <60  Kidney Failure <15    The GFR formula is only valid for adults with stable renal function between ages 18 and 70.    Extra Tubes [[id number]] Collected: 05/02/23 1456    Specimen: Blood, Venous Line Updated: 05/02/23 1600    Narrative:      The following orders were created for panel order Extra Tubes.  Procedure                               Abnormality         Status                     ---------                               -----------         ------                     Gold Top - SST[[id number]]                                   Final result                 Please view results for these tests on the individual orders.    Gold Top - SST [[id number]] Collected: 05/02/23 1456    Specimen: Blood Updated: 05/02/23 1600     Extra Tube Hold for add-ons.     Comment: Auto resulted.       COVID-19 and FLU A/B PCR - Swab, Nasopharynx [[id number]]  (Normal) Collected: 05/02/23 1509    Specimen: Swab from Nasopharynx Updated: 05/02/23 1541     COVID19 Not Detected     Influenza A PCR Not Detected     Influenza B PCR Not Detected    Narrative:      Fact sheet for providers: https://www.fda.gov/media/308675/download    Fact sheet for patients: https://www.fda.gov/media/279600/download    Test performed by PCR.    Comprehensive Metabolic Panel [[id number]]  (Abnormal) Collected: 05/02/23 1456    Specimen: Blood Updated: 05/02/23 1539     Glucose 100 mg/dL      BUN 18 mg/dL      Creatinine 0.93 mg/dL      Sodium 140 mmol/L      Potassium 3.5 mmol/L      Comment: Slight hemolysis  detected by analyzer. Results may be affected.        Chloride 100 mmol/L      CO2 27.0 mmol/L      Calcium 10.1 mg/dL      Total Protein 7.6 g/dL      Albumin 3.9 g/dL      ALT (SGPT) 17 U/L      AST (SGOT) 14 U/L      Alkaline Phosphatase 83 U/L      Total Bilirubin 0.7 mg/dL      Globulin 3.7 gm/dL      A/G Ratio 1.1 g/dL      BUN/Creatinine Ratio 19.4     Anion Gap 13.0 mmol/L      eGFR 61.5 mL/min/1.73     Narrative:      GFR Normal >60  Chronic Kidney Disease <60  Kidney Failure <15    The GFR formula is only valid for adults with stable renal function between ages 18 and 70.    BNP [[id number]]  (Normal) Collected: 05/02/23 1456    Specimen: Blood Updated: 05/02/23 1539     proBNP 1,133.0 pg/mL     Narrative:      Among patients with dyspnea, NT-proBNP is highly sensitive for the detection of acute congestive heart failure. In addition NT-proBNP of <300 pg/ml effectively rules out acute congestive heart failure with 99% negative predictive value.    Results may be falsely decreased if patient taking Biotin.      Single High Sensitivity Troponin T [[id number]]  (Abnormal) Collected: 05/02/23 1456    Specimen: Blood Updated: 05/02/23 1539     HS Troponin T 12 ng/L     Narrative:      High Sensitive Troponin T Reference Range:  <10.0 ng/L- Negative Female for AMI  <15.0 ng/L- Negative Male for AMI  >=10 - Abnormal Female indicating possible myocardial injury.  >=15 - Abnormal Male indicating possible myocardial injury.   Clinicians would have to utilize clinical acumen, EKG, Troponin, and serial changes to determine if it is an Acute Myocardial Infarction or myocardial injury due to an underlying chronic condition.         D-dimer, Quantitative [[id number]]  (Normal) Collected: 05/02/23 1456    Specimen: Blood Updated: 05/02/23 1523     D-Dimer, Quantitative 0.49 mg/L (FEU)     Narrative:      According to the assay 's published package insert, a normal (<0.50 mg/L (FEU)) D-dimer result in conjunction  "with a non-high clinical probability assessment, excludes deep vein thrombosis (DVT) and pulmonary embolism (PE) with high sensitivity.    D-dimer values increase with age and this can make VTE exclusion of an older population difficult. To address this, the [employer], based on best available evidence and recent guidelines, recommends that clinicians use age-adjusted D-dimer thresholds in patients greater than 50 years of age with: a) a low probability of PE who do not meet all Pulmonary Embolism Rule Out Criteria, or b) in those with intermediate probability of PE.   The formula for an age-adjusted D-dimer cut-off is \"age/100\".  For example, a 60 year old patient would have an age-adjusted cut-off of 0.60 mg/L (FEU) and an 80 year old 0.80 mg/L (FEU).    POC Lactate [[id number]]  (Normal) Collected: 05/02/23 1500    Specimen: Blood Updated: 05/02/23 1501     Lactate 0.7 mmol/L      Comment: Serial Number: [id number]:  [id number]              Results for orders placed in visit on 09/13/22    Adult Transthoracic Echo Complete W/ Cont if Necessary Per Protocol    Interpretation Summary  •  Estimated left ventricular EF = 70% Estimated left ventricular EF was in agreement with the calculated left ventricular EF. Left ventricular systolic function is normal.  •  Left ventricular diastolic function is consistent with (grade II w/high LAP) pseudonormalization.         Condition on Discharge:  Stable    Vital Signs  Temp:  [97.5 °F (36.4 °C)-98.9 °F (37.2 °C)] 97.5 °F (36.4 °C)  Heart Rate:  [62-84] 82  Resp:  [14-19] 16  BP: ()/(47-70) 122/50      Physical Exam  Vitals reviewed.   Constitutional:       Appearance: She is not ill-appearing.   HENT:      Head: Normocephalic and atraumatic.      Right Ear: External ear normal.      Left Ear: External ear normal.      Nose: Nose normal.      Mouth/Throat:      Mouth: Mucous membranes are moist.   Eyes:      General:         Right eye: No " discharge.         Left eye: No discharge.   Cardiovascular:      Rate and Rhythm: Normal rate and regular rhythm.      Pulses: Normal pulses.      Heart sounds: Normal heart sounds.   Pulmonary:      Effort: Pulmonary effort is normal.      Breath sounds: Normal breath sounds.   Abdominal:      General: Bowel sounds are normal.      Palpations: Abdomen is soft.   Musculoskeletal:         General: Normal range of motion.      Cervical back: Normal range of motion.   Skin:     General: Skin is warm and dry.   Neurological:      Mental Status: She is alert and oriented to person, place, and time.   Psychiatric:         Behavior: Behavior normal.              Discharge Disposition  Home or Self Care    Discharge Medications     Discharge Medications      New Medications      Instructions Start Date   guaiFENesin 600 MG 12 hr tablet  Commonly known as: MUCINEX   1,200 mg, Oral, Every 12 Hours Scheduled      ipratropium-albuterol 0.5-2.5 mg/3 ml nebulizer  Commonly known as: DUO-NEB   3 mL, Nebulization, Every 6 Hours PRN      montelukast 10 MG tablet  Commonly known as: SINGULAIR   10 mg, Oral, Nightly      sildenafil 20 MG tablet  Commonly known as: REVATIO   20 mg, Oral, Every 8 Hours Scheduled         Changes to Medications      Instructions Start Date   furosemide 20 MG tablet  Commonly known as: LASIX  What changed:   · medication strength  · how much to take   20 mg, Oral, Daily         Continue These Medications      Instructions Start Date   amLODIPine 10 MG tablet  Commonly known as: NORVASC   10 mg, Oral, Daily      carvedilol 12.5 MG tablet  Commonly known as: COREG   12.5 mg, Oral, 2 Times Daily With Meals      cloNIDine 0.1 MG tablet  Commonly known as: CATAPRES   0.1 mg, Oral, 2 Times Daily PRN      Eliquis 5 MG tablet tablet  Generic drug: apixaban   5 mg, Oral, Every 12 Hours Scheduled      famotidine 20 MG tablet  Commonly known as: PEPCID   20 mg, Oral, As Needed      Folbic 2.5-25-2 MG tablet  tablet  Generic drug: folic acid-pyridoxine-cyanocobalamin   1 tablet, Oral, Daily      lisinopril 40 MG tablet  Commonly known as: PRINIVIL,ZESTRIL   40 mg, Oral, Daily      potassium chloride 10 MEQ CR tablet  Commonly known as: K-DUR,KLOR-CON   10 mEq, Oral, Daily      rosuvastatin 10 MG tablet  Commonly known as: CRESTOR   10 mg, Oral, Daily      Symbicort 80-4.5 MCG/ACT inhaler  Generic drug: budesonide-formoterol   Inhale 2 puffs 2 (Two) Times a Day.      Ventolin  (90 Base) MCG/ACT inhaler  Generic drug: albuterol sulfate HFA   Inhale 2 puffs Every 4 (Four) Hours As Needed.      Vitamin D3 50 MCG (2000 UT) capsule   2,000 Units, Oral, Daily             Discharge Diet:   Diet Instructions     Diet: Cardiac Diets; Healthy Heart (2-3 Na+); Regular Texture (IDDSI 7); Thin (IDDSI 0)      Discharge Diet: Cardiac Diets    Cardiac Diet: Healthy Heart (2-3 Na+)    Texture: Regular Texture (IDDSI 7)    Fluid Consistency: Thin (IDDSI 0)    Diet: Regular/House Diet; Regular Texture (IDDSI 7); Thin (IDDSI 0)      Discharge Diet: Regular/House Diet    Texture: Regular Texture (IDDSI 7)    Fluid Consistency: Thin (IDDSI 0)          Activity at Discharge:   Activity Instructions     Gradually Increase Activity Until at Pre-Hospitalization Level      Gradually Increase Activity Until at Pre-Hospitalization Level            Follow-up Appointments  Future Appointments   Date Time Provider Department [place]   8/25/2023 12:45 PM Richardson Willis MD [facility]     Additional Instructions for the Follow-ups that You Need to Schedule     Discharge Follow-up with PCP   As directed       Currently Documented PCP:    Shaylee Mcdaniels MD    PCP Phone Number:    [phone]     Follow Up Details: 5/19 at 1:20         Discharge Follow-up with PCP   As directed       Currently Documented PCP:    Shaylee Mcdaniels MD    PCP Phone Number:    [phone]     Follow Up Details: Appoint with Dr. Hagen 5/19 at 1:20 PM          Discharge Follow-up with Specified Provider: Dr. Roy in 2 weeks   As directed      To: Dr. Roy in 2 weeks         Discharge Follow-up with Specified Provider: Dr. Manuela chaparro appt; 2 Weeks   As directed      To: Dr. Manuela chaparro appt    Follow Up: 2 Weeks               Test Results Pending at Discharge       JOAQUÍN Rogers  05/12/23  10:53 EDT    Time: Discharge 25 min

## 2023-05-13 NOTE — OUTREACH NOTE
Prep Survey    Flowsheet Row Responses   [facility] patient discharged from? [facility]   Is LACE score < 7 ? No   Eligibility Readm Mgmt   Discharge diagnosis Acute exacerbation of chronic obstructive pulmonary disease (COPD   Does the patient have one of the following disease processes/diagnoses(primary or secondary)? COPD   Does the patient have Home health ordered? No   Is there a DME ordered? Yes   What DME was ordered? nebulizer   Medication alerts for this patient see AVS   Prep survey completed? Yes          Yumiko OVALLES - Registered Nurse

## 2023-05-16 ENCOUNTER — READMISSION MANAGEMENT (OUTPATIENT)
Dept: CALL CENTER | Facility: HOSPITAL | Age: 83
End: 2023-05-16
Payer: MEDICARE

## 2023-05-16 ENCOUNTER — TELEPHONE (OUTPATIENT)
Dept: CARDIOLOGY | Facility: CLINIC | Age: 83
End: 2023-05-16

## 2023-05-16 NOTE — TELEPHONE ENCOUNTER
Caller: Gabrielle Lyles     Relationship: SELF    Best call back number: [phone]    What is your medical concern? PT IS CONCERNED WITH BP BEING LOW. THINKS SHE MAY BE ON TOO MUCH MEDICATION. LAST BP READ WAS 97/43. PLEASE FOLLOW UP AND ADVISE.     How long has this issue been going on? SINCE FRIDAY    Is your provider already aware of this issue? NO    Have you been treated for this issue?NO

## 2023-05-16 NOTE — OUTREACH NOTE
COPD/PN Week 1 Survey    Flowsheet Row Responses   [facility] patient discharged from? [facility]   Does the patient have one of the following disease processes/diagnoses(primary or secondary)? COPD   Week 1 attempt successful? Yes   Call start time 1720   Call end time 1726   General alerts for this patient Granddaughter is an RN   Discharge diagnosis Acute exacerbation of chronic obstructive pulmonary disease (COPD   DME comments Pt has nebulizer and neb med at home, wearing 3L O2 continuous.   Pulse Ox monitoring Intermittent   Pulse Ox device source Patient   O2 Sat comments 95% 3L O2.   Psychosocial issues? No   Comments Pt reports BP low-87/43, has called Cardio they are adjusting her meds. Pt reports her SOA has improved. She is feeling improved other than her BP issue.   Did the patient receive a copy of their discharge instructions? Yes   Nursing interventions Reviewed instructions with patient   What is the patient's perception of their health status since discharge? Improving   Nursing Interventions Nurse provided patient education   If the patient is a current smoker, are they able to teach back resources for cessation? Not a smoker   Is the patient/caregiver able to teach back the hierarchy of who to call/visit for symptoms/problems? PCP, Specialist, Home health nurse, Urgent Care, ED, 911 Yes   Patient reports what zone on this call? Yellow Zone   Yellow Zone I have less energy for my daily activities, Using quick relief inhaler/nebulizer more often   Yellow interventions Continue taking daily medications, Use quick relief inhaler, Use oxygen as prescribed   Week 1 call completed? Yes          Cori OVALLES - Registered Nurse

## 2023-05-16 NOTE — TELEPHONE ENCOUNTER
Spoke with the patient, she had talked to her PCP today & they are having her hold the amlodipine and carvedilol today & call them with her bp results in the morning. She also has an appointment on Thursday with PCP.

## 2023-05-17 NOTE — TELEPHONE ENCOUNTER
Spoke to the patient, she has spoken to her PCP, since they were the ones that adjusted her medication. She sees her PCP tomorrow to see if her bp is coming up

## 2023-05-17 NOTE — TELEPHONE ENCOUNTER
Carvedilol she is not taking, should she be taking this medication or wait until bp rises?  She has several questions, has a call into pcp as well, please advise

## 2023-05-25 ENCOUNTER — READMISSION MANAGEMENT (OUTPATIENT)
Dept: CALL CENTER | Facility: HOSPITAL | Age: 83
End: 2023-05-25
Payer: MEDICARE

## 2023-05-25 NOTE — OUTREACH NOTE
COPD/PN Week 2 Survey    Flowsheet Row Responses   [facility] patient discharged from? [facility]   Does the patient have one of the following disease processes/diagnoses(primary or secondary)? COPD   Week 2 attempt successful? Yes   Call start time 1534   Call end time 1541   General alerts for this patient Granddaughter is an RN   Discharge diagnosis Acute exacerbation of chronic obstructive pulmonary disease (COPD   Meds reviewed with patient/caregiver? Yes   Is the patient having any side effects they believe may be caused by any medication additions or changes? No   Is the patient taking all medications as directed (includes completed medication regime)? Yes   Does the patient have a primary care provider?  Yes   Does the patient have an appointment with their PCP or specialist within 7 days of discharge? Yes   Has the patient kept scheduled appointments due by today? Yes   Has all DME been delivered? Yes   DME comments home oxygen, wears 3LO2 prn/ with exertion   Pulse Ox monitoring Intermittent   Pulse Ox device source Patient   O2 Sat comments in the 90's on O2, 86% on RA   Psychosocial issues? No   What is the patient's perception of their health status since discharge? Improving  [brittany LE edema]   Nursing Interventions Nurse provided patient education   If the patient is a current smoker, are they able to teach back resources for cessation? Not a smoker   Is the patient/caregiver able to teach back the hierarchy of who to call/visit for symptoms/problems? PCP, Specialist, Home health nurse, Urgent Care, ED, 911 Yes   Is the patient able to teach back COPD zones? No   Patient reports what zone on this call? Green Zone   Green Zone Reports doing well, Breathing without shortness of breath   Green Zone interventions: Take daily medications, Use oxygen as prescribed   Week 2 call completed? Yes          Ora ABDULLAHI - Registered Nurse

## 2023-06-05 ENCOUNTER — READMISSION MANAGEMENT (OUTPATIENT)
Dept: CALL CENTER | Facility: HOSPITAL | Age: 83
End: 2023-06-05
Payer: MEDICARE

## 2023-06-05 NOTE — OUTREACH NOTE
COPD/PN Week 3 Survey      Flowsheet Row Responses   [facility] patient discharged from? [facility]   Does the patient have one of the following disease processes/diagnoses(primary or secondary)? COPD   Week 3 attempt successful? Yes   Call start time 1604   Call end time 1606   Person spoke with today (if not patient) and relationship Grand daughter   Meds reviewed with patient/caregiver? Yes   Prescription comments Wanting 90 day supply of sidenafil referred to pcp for order   Is the patient taking all medications as directed (includes completed medication regime)? Yes   Does the patient have a primary care provider?  Yes   Comments regarding PCP Has seen her pcp   Has the patient kept scheduled appointments due by today? N/A   Nursing interventions Reviewed instructions with patient   What is the patient's perception of their health status since discharge? Improving   Week 3 call completed? Yes   Wrap up additional comments Patient is doing very well per grand daughter. No concerns or questions noted            Cande ESPINOZA - Registered Nurse

## 2023-06-08 ENCOUNTER — TELEPHONE (OUTPATIENT)
Dept: CARDIOLOGY | Facility: CLINIC | Age: 83
End: 2023-06-08
Payer: MEDICARE

## 2023-06-08 DIAGNOSIS — I10 PRIMARY HYPERTENSION: ICD-10-CM

## 2023-06-08 RX ORDER — LISINOPRIL 40 MG/1
40 TABLET ORAL DAILY
Qty: 90 TABLET | Refills: 3 | Status: SHIPPED | OUTPATIENT
Start: 2023-06-08

## 2023-06-08 RX ORDER — AMLODIPINE BESYLATE 10 MG/1
10 TABLET ORAL DAILY
Qty: 90 TABLET | Refills: 3 | Status: SHIPPED | OUTPATIENT
Start: 2023-06-08

## 2023-06-08 NOTE — TELEPHONE ENCOUNTER
Placed call to patient and informed her that JOAQUÍN Kelley has sent in prescipitons for both Lisinopril and Amlodipine and for patient to keep a daily log of her B/P and report back to us in a week.  Patient verbalizes understanding.

## 2023-06-08 NOTE — TELEPHONE ENCOUNTER
Okay to restart lisinopril 40 mg daily and amlodipine 10 mg daily. New prescriptions sent to pharmacy on file. Please advise patient to keep a daily log of her BP and report back to us after 1 week.

## 2023-06-08 NOTE — TELEPHONE ENCOUNTER
Placed a call to patient and she said she was and IP at [facility] in May and they Dc'd  Lisinopril and Amlodipine because her B/P was low and has only been taking Coreg 12.5mg BID, and Lasix 20mg QD.   Since then her B/P has been going up and was 189/91 this morning and patient took her Lisinopril 40mg about 30 min ago and was planning on taking Amlodipine 10mg later today if her B/P does not go down. Patient rechecked her B/P while on he phone 181/88.  Informed patient that Amlodipine (ordered by Dr. Willis 2/24/23) and Lisinopril are still on patient's med list on [facility], but patient said they were Dc'd at the hospital and patient said she has never taken Catapress (on med list) ordered last year.   Patient wanted to update you and said she is planning on restarting Lisinopril and Amlodipine to bring down her B/P.    Please advise,    Thanks Cande

## 2023-08-16 ENCOUNTER — TELEPHONE (OUTPATIENT)
Dept: CARDIAC REHAB | Facility: HOSPITAL | Age: 83
End: 2023-08-16
Payer: MEDICARE

## 2023-08-16 NOTE — TELEPHONE ENCOUNTER
Co-insurance 4%  OOP left 97.89  Once OOP met covered @ 100%  No visit limits as long as medically necessary  Ref #: [id number]  ~misael

## 2023-08-17 ENCOUNTER — TRANSCRIBE ORDERS (OUTPATIENT)
Dept: CARDIAC REHAB | Facility: HOSPITAL | Age: 83
End: 2023-08-17
Payer: MEDICARE

## 2023-08-17 DIAGNOSIS — J84.9 INTERSTITIAL LUNG DISEASE: Primary | ICD-10-CM

## 2023-08-24 NOTE — PROGRESS NOTES
Encounter Date:08/25/2023        Patient ID: Gabrielle Lyles is a 83 y.o. female.      Chief Complaint:      History of Present Illness  83-year-old woman with a history of hypertension, DVT/PE on chronic Anticoagulation, former smoker comes to cardiology clinic for follow-up.   She has a history of breast cancer as well as extensive smoking in the past.  She carries a diagnosis of COPD.  She has moderate to severe pulmonary arterial hypertension.  She reports worsening shortness of breath over the last 2 to 3 months and it is difficult for her to do her normal activities without feeling significant shortness of breath.  Recent hospital admission with acute exacerbation of COPD.  She is on home oxygen.  Also on sildenafil.    She recently saw Dr. Roy.  She also thinks she has a UTI and is complaining of right flank pain.  Her blood pressure is elevated because she is in pain.  She wants to know if she needs antibiotics.    The following portions of the patient's history were reviewed and updated as appropriate: allergies, current medications, past family history, past medical history, past social history, past surgical history, and problem list.    Review of Systems   Constitutional: Negative for malaise/fatigue.   Cardiovascular:  Positive for dyspnea on exertion, leg swelling and palpitations. Negative for chest pain.   Respiratory:  Positive for shortness of breath. Negative for cough.    Gastrointestinal:  Positive for nausea. Negative for abdominal pain and vomiting.   Neurological:  Positive for dizziness and light-headedness. Negative for focal weakness, headaches and numbness.   All other systems reviewed and are negative.      Current Outpatient Medications:     allopurinol (ZYLOPRIM) 100 MG tablet, Take 1 tablet by mouth Daily As Needed., Disp: , Rfl:     amLODIPine (NORVASC) 10 MG tablet, Take 1 tablet by mouth Daily., Disp: 90 tablet, Rfl: 3    apixaban (ELIQUIS) 5 MG tablet tablet, Take 1 tablet by  mouth Every 12 (Twelve) Hours. Indications: Other - full anticoagulation, history of DVT/PE, Disp: 60 tablet, Rfl: 2    carvedilol (COREG) 12.5 MG tablet, Take 1 tablet by mouth 2 (Two) Times a Day With Meals., Disp: 180 tablet, Rfl: 3    Cholecalciferol (Vitamin D3) 50 MCG (2000 UT) capsule, Take 1 capsule by mouth Daily., Disp: , Rfl:     Folbic 2.5-25-2 MG tablet tablet, Take 1 tablet by mouth Daily., Disp: , Rfl:     furosemide (LASIX) 20 MG tablet, Take 1 tablet by mouth Daily., Disp: 30 tablet, Rfl: 1    ipratropium-albuterol (DUO-NEB) 0.5-2.5 mg/3 ml nebulizer, Take 3 mL by nebulization Every 6 (Six) Hours As Needed for Shortness of Air., Disp: 360 mL, Rfl: 12    levothyroxine (SYNTHROID, LEVOTHROID) 50 MCG tablet, Take 1 tablet by mouth Daily., Disp: , Rfl:     lisinopril (PRINIVIL,ZESTRIL) 40 MG tablet, Take 1 tablet by mouth Daily., Disp: 90 tablet, Rfl: 3    montelukast (SINGULAIR) 10 MG tablet, Take 1 tablet by mouth Every Night., Disp: 30 tablet, Rfl: 1    potassium chloride (K-DUR,KLOR-CON) 10 MEQ CR tablet, Take 1 tablet by mouth Daily., Disp: 90 tablet, Rfl: 3    sildenafil (REVATIO) 20 MG tablet, Take 1 tablet by mouth Every 8 (Eight) Hours., Disp: 90 tablet, Rfl: 1    SYMBICORT 80-4.5 MCG/ACT inhaler, Inhale 2 puffs 2 (Two) Times a Day., Disp: , Rfl: 5    VENTOLIN  (90 Base) MCG/ACT inhaler, Inhale 2 puffs Every 4 (Four) Hours As Needed., Disp: , Rfl: 5    Current outpatient and discharge medications have been reconciled for the patient.  Reviewed by: Richardson Willis MD       No Known Allergies    Family History   Problem Relation Age of Onset    Lung cancer Brother        Past Surgical History:   Procedure Laterality Date    CARDIAC CATHETERIZATION N/A 3/3/2023    Procedure: Left and Right Heart Cath with Coronary Angiography;  Surgeon: Richardson Willis MD;  Location: [facility] CATH INVASIVE LOC[facility];  Service: Cardiovascular;  Laterality: N/A;    CATARACT EXTRACTION  2015    IVC FILTER RETRIEVAL   "2014    IVC filter placement by Dr. Cadr    MAMMO STEREOTACTIC BREAST BX SURGICAL ADD UNI Left 2011    invasive lobular carcinoma-Dr. Cande Daniel    MASTECTOMY, PARTIAL Left 2011    and left axillary sentinel lymph node biopsy by Dr. Uriostegui    TUBAL ABDOMINAL LIGATION         Past Medical History:   Diagnosis Date    COPD (chronic obstructive pulmonary disease)     Deep vein thrombosis of bilateral lower extremities 2019    History of DVT (deep vein thrombosis) 2013    bilateral lower extremity    History of pneumonia 2012    community acquired pneumonia and right pleural effusion;hospitalized at [facility]    History of pulmonary embolism 2013    bilateral PE    Hypertension     Insomnia     on Ambien 5mg at     Lobular breast cancer, left 2011    Stage IA left upper lobular breast cancer    Malignant neoplasm of left breast in female, estrogen receptor positive 2019    Osteopenia     Severe pulmonary hypertension 3/3/2023    Stage 3a chronic kidney disease 2019    TIA (transient ischemic attack) 10/25/2022       Family History   Problem Relation Age of Onset    Lung cancer Brother        Social History     Socioeconomic History    Marital status:    Tobacco Use    Smoking status: Former     Types: Cigarettes     Quit date:      Years since quittin.6     Passive exposure: Never    Smokeless tobacco: Never    Tobacco comments:     smoked 6 cigarettes a day from 13 years of age to 56 years of age when she quit in    Vaping Use    Vaping Use: Never used   Substance and Sexual Activity    Alcohol use: Not Currently    Drug use: No    Sexual activity: Not Currently     Partners: Male               Objective:       Physical Exam    /60   Ht 162.6 cm (64\")   Wt 64.2 kg (141 lb 8 oz)   SpO2 91% Comment: On  per n/c  BMI 24.29 kg/mý   The patient is alert, oriented and in no distress.    Vital signs as noted above.    Head and " neck revealed no carotid bruits or jugular venous distension.  No thyromegaly or lymphadenopathy is present.    Lungs clear.  No wheezing.  Breath sounds are normal bilaterally.    Heart normal first and second heart sounds.  No murmur..  No pericardial rub is present.  No gallop is present.    Abdomen soft and nontender.  No organomegaly is present.    Extremities revealed good peripheral pulses without any pedal edema.    Skin warm and dry.    Musculoskeletal system is grossly normal.    CNS grossly normal.           Diagnosis Plan   1. Pulmonary hypertension        2. Bilateral leg edema        3. Primary hypertension        4. DVT, recurrent, lower extremity, acute, bilateral        5. SOB (shortness of breath)        6. AF (paroxysmal atrial fibrillation)        7. Deep vein thrombosis (DVT) of proximal vein of both lower extremities, unspecified chronicity        8. Aortic aneurysm without rupture, unspecified portion of aorta        9. Stage 3 chronic kidney disease, unspecified whether stage 3a or 3b CKD        10. PAD (peripheral artery disease)        11. Palpitations        12. TIA (transient ischemic attack)        13. Abnormal heart rate        LAB RESULTS (LAST 7 DAYS)    CBC        BMP        CMP         BNP        TROPONIN        CoAg        Creatinine Clearance  CrCl cannot be calculated (Patient's most recent lab result is older than the maximum 30 days allowed.).    ABG        Radiology  No radiology results for the last day    EKG  Procedures    Stress test  Results for orders placed in visit on 09/13/22    Stress Test With Myocardial Perfusion One Day    Interpretation Summary    Left ventricular ejection fraction is hyperdynamic (Calculated EF > 70%). .    Myocardial perfusion imaging indicates a normal myocardial perfusion study with no evidence of ischemia.    Impressions are consistent with a low risk study.    Findings consistent with a normal ECG stress test.      Echocardiogram  Results  for orders placed in visit on 09/13/22    Adult Transthoracic Echo Complete W/ Cont if Necessary Per Protocol    Interpretation Summary    Estimated left ventricular EF = 70% Estimated left ventricular EF was in agreement with the calculated left ventricular EF. Left ventricular systolic function is normal.    Left ventricular diastolic function is consistent with (grade II w/high LAP) pseudonormalization.      Cardiac catheterization  Results for orders placed during the hospital encounter of 03/03/23    Cardiac Catheterization/Vascular Study    Narrative  OPERATORS  Richardson Willis M.D. (Attending Cardiologist)      PROCEDURE PERFORMED  Ultrasound guided vascular access  Right heart catheterization  Coronary Angiogram  Left Heart Catheterization 15895  Moderate Sedation    INDICATIONS FOR PROCEDURE  83-year-old woman with multiple cardiovascular risk factors, abnormal stress test presented with worsening shortness of breath.  After discussing the risk and benefit of the procedure she was brought in for elective right and left heart cath.    PROCEDURE IN DETAIL  Informed consent was obtained from the patient after explaining the risks, benefits, and alternative options of the procedure. After obtaining informed consent, the patient was brought to the cath lab and was prepped in a sterile fashion. Lidocaine 2% was used for local anesthesia into the right femoral venous access site. Right femoral vein was accessed using the micropuncture needle under ultrasound guidance and micropuncture wire advanced under flouroscopy. A 7 Amharic vascular sheath was put into place percutaneously over guide-wire. Guide wires were removed. A 6Fr swan sheryl catheter was advanced to wedge position. RA, RV and PA and wedge pressures were recorded.  PA sat and arterial sats recorded.  The patient tolerated the procedure well without any complications.    Lidocaine 2% was used for local anesthesia into the right femoral arterial access site.  The right femoral artery was accessed with a micropuncture needle via modified Seldinger technique under ultrasound guidance. A 6F was inserted successfully.  Afterwards, 6F JR4 and JL4 diagnostic catheters were advanced over a wire into the ascending aorta and were used to engage the ostia of the left main and RCA respectively. JR4 used to cross the AV and obtain LV pressures and gradient across the AV measured via pullback technique. Images of the right and left coronary systems were obtained. All the catheters were exchanged over a wire and subsequently removed. Angiogram of the femoral access site was obtained and did not show complications. The patient tolerated the procedure well without any complications. The pictures were reviewed at the end of the procedure. A Mynx closure device was applied.    HEMODYNAMICS    RHC  RA 6/5, 4 mmHg  RV 74/3, 5 mmHg  PA 71/25, 44 mmHg  PCW 12 mmHg  AO Sat 92%  PA Sat 78%    Jose CO: 7.89 L/min    Jose CI: 4.69 L/min/mý    LHC  LV: 134/3, 20 mmHg  AO: 131/56, 87 mmHg  No significant gradient across the aortic valve during pullback of JR4 catheter.    FINDINGS  Coronary Angiogram  All vessels are heavily calcified  She also has heavily calcified peripheral arterial disease.  Right dominant circulation    Left main: Left main is a large caliber vessel which gives rise to the Left Anterior Descending and the Left circumflex.  Left main is angiographically free from any significant disease.    Left Anterior Descending Artery: LAD is a heavily calcified medium caliber vessel which gives rise to diagonals.  The vessel is highly tortuous and has 50 to 60% mid vessel stenosis best seen in VERÓNICA cranial view.    Left Circumflex: Heavily calcified vessel with 50% proximal segment stenosis.    Right Coronary Artery: The RCA is a large caliber vessel which is heavily calcified and tortuous.  It has diffuse luminal irregularities and 30 to 40% stenosis in the midsegment around the  roberto carlos.    ESTIMATED BLOOD LOSS:  10 ml    COMPLICATIONS:  None    PROCEDURE DATA:  Sedation Time: 25 minutes    IMPRESSIONS  Heavily calcified, tortuous nonobstructive coronary disease  Severe pulmonary hypertension with PA systolic pressure in the 70s  Mean PA pressure 44 mmHg    RECOMMENDATIONS  -Continue aggressive medical management of CAD  -Referral to pulmonology for treatment of pulmonary hypertension          Assessment and Plan       Diagnoses and all orders for this visit:    1. Pulmonary hypertension (Primary)    2. Bilateral leg edema    3. Primary hypertension    4. DVT, recurrent, lower extremity, acute, bilateral    5. SOB (shortness of breath)    6. AF (paroxysmal atrial fibrillation)    7. Deep vein thrombosis (DVT) of proximal vein of both lower extremities, unspecified chronicity    8. Aortic aneurysm without rupture, unspecified portion of aorta    9. Stage 3 chronic kidney disease, unspecified whether stage 3a or 3b CKD    10. PAD (peripheral artery disease)    11. Palpitations    12. TIA (transient ischemic attack)    13. Abnormal heart rate         Acute exacerbation of chronic obstructive pulmonary disease   Parainfluenza infection  Pulmonology team is managing.  Continue bronchodilators, steroids and Singulair.  CTA with evidence of lower lobe airway obstruction with mucous plugging.  Oxygenation has significantly improved and she is now at baseline breathing status at 2 to 3 L of nasal cannula.  Oxygen saturation more than 90%.  She is able to ambulate but with significant shortness of breath     Acute on chronic respiratory failure with hypoxia  Exacerbation of COPD secondary to parainfluenza.  Respiratory status is tenuous due to severe pulmonary hypertension.  Down to 3 L of nasal cannula of oxygen.  She is at baseline respiratory status.     Severe pulmonary hypertension  RA 6/5, 4 mmHg  RV 74/3, 5 mmHg  PA 71/25, 44 mmHg  PCW 12 mmHg  Tolerating sildenafil.  Connective tissue disease  work-up per pulmonology.  She may benefit from Uptravi if deemed suitable by pulmonology.  Needs treatment for pulmonary hypertension which is contributing to her symptoms.     Stage 3a chronic kidney disease  Close eye on the renal function.  Current creatinine less than 1.     Atrial fibrillation  MCOT review as an outpatient showed evidence of atrial fibrillation.  She will continue to be on full dose anticoagulation with Eliquis for A-fib and for history of DVT/PE.  Rate and rhythm are controlled with beta-blocker.     History of venous thrombosis and embolism  Continue Eliquis 5 mg p.o. twice daily.  She has an IVC filter in place as well.     Primary hypertension, chronic  Blood pressure elevated today as she is in pain from UTI?  Preserved LV function with grade 2 diastolic dysfunction.  Continue antihypertensives including amlodipine, lisinopril and Coreg.  Continue oral Lasix for HFpEF.  Replace electrolytes including potassium and magnesium as needed.     Coronary artery disease  Continue high intensity statin and beta-blocker.  We have not added aspirin as she is on full anticoagulation.  Previous cardiac catheterization showed heavily calcified coronaries but nonobstructive.  No chest pain and stable from cardiac standpoint.     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  Continue high intensity statin and anticoagulation with Eliquis 5 mg p.o. twice daily.    I have encouraged her to go to an urgent care center to get checked out for possible UTI and ongoing flank pain.

## 2023-08-25 ENCOUNTER — OFFICE VISIT (OUTPATIENT)
Dept: CARDIOLOGY | Facility: CLINIC | Age: 83
End: 2023-08-25
Payer: MEDICARE

## 2023-08-25 VITALS
DIASTOLIC BLOOD PRESSURE: 60 MMHG | SYSTOLIC BLOOD PRESSURE: 135 MMHG | OXYGEN SATURATION: 91 % | HEIGHT: 64 IN | WEIGHT: 141.5 LBS | BODY MASS INDEX: 24.16 KG/M2

## 2023-08-25 DIAGNOSIS — I48.0 AF (PAROXYSMAL ATRIAL FIBRILLATION): ICD-10-CM

## 2023-08-25 DIAGNOSIS — N18.30 STAGE 3 CHRONIC KIDNEY DISEASE, UNSPECIFIED WHETHER STAGE 3A OR 3B CKD: ICD-10-CM

## 2023-08-25 DIAGNOSIS — R06.02 SOB (SHORTNESS OF BREATH): ICD-10-CM

## 2023-08-25 DIAGNOSIS — I82.4Y3 DEEP VEIN THROMBOSIS (DVT) OF PROXIMAL VEIN OF BOTH LOWER EXTREMITIES, UNSPECIFIED CHRONICITY: ICD-10-CM

## 2023-08-25 DIAGNOSIS — I71.9 AORTIC ANEURYSM WITHOUT RUPTURE, UNSPECIFIED PORTION OF AORTA: ICD-10-CM

## 2023-08-25 DIAGNOSIS — I27.20 PULMONARY HYPERTENSION: Primary | ICD-10-CM

## 2023-08-25 DIAGNOSIS — G45.9 TIA (TRANSIENT ISCHEMIC ATTACK): ICD-10-CM

## 2023-08-25 DIAGNOSIS — R00.2 PALPITATIONS: ICD-10-CM

## 2023-08-25 DIAGNOSIS — R00.9 ABNORMAL HEART RATE: ICD-10-CM

## 2023-08-25 DIAGNOSIS — R60.0 BILATERAL LEG EDEMA: ICD-10-CM

## 2023-08-25 DIAGNOSIS — I73.9 PAD (PERIPHERAL ARTERY DISEASE): ICD-10-CM

## 2023-08-25 DIAGNOSIS — I10 PRIMARY HYPERTENSION: ICD-10-CM

## 2023-08-25 DIAGNOSIS — I82.403 DVT, RECURRENT, LOWER EXTREMITY, ACUTE, BILATERAL: ICD-10-CM

## 2023-08-25 RX ORDER — ALLOPURINOL 100 MG/1
1 TABLET ORAL DAILY PRN
COMMUNITY
Start: 2023-07-11

## 2023-08-25 RX ORDER — LEVOTHYROXINE SODIUM 0.05 MG/1
1 TABLET ORAL DAILY
COMMUNITY
Start: 2023-08-07

## 2023-09-19 ENCOUNTER — TELEPHONE (OUTPATIENT)
Dept: CARDIOLOGY | Facility: CLINIC | Age: 83
End: 2023-09-19
Payer: MEDICARE

## 2023-09-19 NOTE — TELEPHONE ENCOUNTER
Caller: Gabrielle Lyles    Relationship to patient: Self    Best call back number: [phone]     Chief complaint: PATIENT STATES THAT SHE IS CONCERNED ABOUT HER HEART FLUTTERING THE LAST THREE DAYS. (NOT ACTIVELY EXPERIENCING)     Type of visit: FOLLOW UP     Requested date: NEXT AVAILABLE      If rescheduling, when is the original appointment: 2/26/24     Additional notes:PATIENT WOULD LIKE A CALL BACK TO SCHEDULE

## 2023-09-24 ENCOUNTER — APPOINTMENT (OUTPATIENT)
Dept: GENERAL RADIOLOGY | Facility: HOSPITAL | Age: 83
End: 2023-09-24
Payer: MEDICARE

## 2023-09-24 ENCOUNTER — HOSPITAL ENCOUNTER (OUTPATIENT)
Facility: HOSPITAL | Age: 83
Setting detail: OBSERVATION
Discharge: NURSING, REHAB OR LONG-TERM CARE | End: 2023-09-28
Attending: EMERGENCY MEDICINE | Admitting: EMERGENCY MEDICINE
Payer: MEDICARE

## 2023-09-24 DIAGNOSIS — L03.116 CELLULITIS OF LEFT FOOT: Primary | ICD-10-CM

## 2023-09-24 LAB
ANION GAP SERPL CALCULATED.3IONS-SCNC: 12 MMOL/L (ref 5–15)
BASOPHILS # BLD AUTO: 0.1 10*3/MM3 (ref 0–0.2)
BASOPHILS NFR BLD AUTO: 0.3 % (ref 0–1.5)
BUN SERPL-MCNC: 40 MG/DL (ref 8–23)
BUN/CREAT SERPL: 32.8 (ref 7–25)
CALCIUM SPEC-SCNC: 9.8 MG/DL (ref 8.6–10.5)
CHLORIDE SERPL-SCNC: 105 MMOL/L (ref 98–107)
CO2 SERPL-SCNC: 24 MMOL/L (ref 22–29)
CREAT SERPL-MCNC: 1.22 MG/DL (ref 0.57–1)
CRP SERPL-MCNC: 25.41 MG/DL (ref 0–0.5)
D-LACTATE SERPL-SCNC: 1.1 MMOL/L (ref 0.3–2)
DEPRECATED RDW RBC AUTO: 52.1 FL (ref 37–54)
EGFRCR SERPLBLD CKD-EPI 2021: 44.4 ML/MIN/1.73
EOSINOPHIL # BLD AUTO: 0 10*3/MM3 (ref 0–0.4)
EOSINOPHIL NFR BLD AUTO: 0 % (ref 0.3–6.2)
ERYTHROCYTE [DISTWIDTH] IN BLOOD BY AUTOMATED COUNT: 16.6 % (ref 12.3–15.4)
ERYTHROCYTE [SEDIMENTATION RATE] IN BLOOD: 44 MM/HR (ref 0–30)
GLUCOSE SERPL-MCNC: 155 MG/DL (ref 65–99)
HCT VFR BLD AUTO: 41.2 % (ref 34–46.6)
HGB BLD-MCNC: 13.4 G/DL (ref 12–15.9)
LYMPHOCYTES # BLD AUTO: 0.5 10*3/MM3 (ref 0.7–3.1)
LYMPHOCYTES NFR BLD AUTO: 3 % (ref 19.6–45.3)
MCH RBC QN AUTO: 28 PG (ref 26.6–33)
MCHC RBC AUTO-ENTMCNC: 32.5 G/DL (ref 31.5–35.7)
MCV RBC AUTO: 86.2 FL (ref 79–97)
MONOCYTES # BLD AUTO: 2.1 10*3/MM3 (ref 0.1–0.9)
MONOCYTES NFR BLD AUTO: 11.8 % (ref 5–12)
NEUTROPHILS NFR BLD AUTO: 14.8 10*3/MM3 (ref 1.7–7)
NEUTROPHILS NFR BLD AUTO: 84.9 % (ref 42.7–76)
NRBC BLD AUTO-RTO: 0.1 /100 WBC (ref 0–0.2)
PLATELET # BLD AUTO: 227 10*3/MM3 (ref 140–450)
PMV BLD AUTO: 8.3 FL (ref 6–12)
POTASSIUM SERPL-SCNC: 4.5 MMOL/L (ref 3.5–5.2)
RBC # BLD AUTO: 4.78 10*6/MM3 (ref 3.77–5.28)
SODIUM SERPL-SCNC: 141 MMOL/L (ref 136–145)
URATE SERPL-MCNC: 9.7 MG/DL (ref 2.4–5.7)
WBC NRBC COR # BLD: 17.4 10*3/MM3 (ref 3.4–10.8)

## 2023-09-24 PROCEDURE — 80048 BASIC METABOLIC PNL TOTAL CA: CPT | Performed by: NURSE PRACTITIONER

## 2023-09-24 PROCEDURE — 83605 ASSAY OF LACTIC ACID: CPT

## 2023-09-24 PROCEDURE — 25010000002 KETOROLAC TROMETHAMINE PER 15 MG: Performed by: NURSE PRACTITIONER

## 2023-09-24 PROCEDURE — 96361 HYDRATE IV INFUSION ADD-ON: CPT

## 2023-09-24 PROCEDURE — 73630 X-RAY EXAM OF FOOT: CPT

## 2023-09-24 PROCEDURE — 99284 EMERGENCY DEPT VISIT MOD MDM: CPT

## 2023-09-24 PROCEDURE — 25010000002 METHYLPREDNISOLONE PER 125 MG: Performed by: NURSE PRACTITIONER

## 2023-09-24 PROCEDURE — G0378 HOSPITAL OBSERVATION PER HR: HCPCS

## 2023-09-24 PROCEDURE — 36415 COLL VENOUS BLD VENIPUNCTURE: CPT

## 2023-09-24 PROCEDURE — 86140 C-REACTIVE PROTEIN: CPT | Performed by: NURSE PRACTITIONER

## 2023-09-24 PROCEDURE — 96375 TX/PRO/DX INJ NEW DRUG ADDON: CPT

## 2023-09-24 PROCEDURE — 96365 THER/PROPH/DIAG IV INF INIT: CPT

## 2023-09-24 PROCEDURE — 85025 COMPLETE CBC W/AUTO DIFF WBC: CPT | Performed by: NURSE PRACTITIONER

## 2023-09-24 PROCEDURE — 96372 THER/PROPH/DIAG INJ SC/IM: CPT

## 2023-09-24 PROCEDURE — 87040 BLOOD CULTURE FOR BACTERIA: CPT | Performed by: NURSE PRACTITIONER

## 2023-09-24 PROCEDURE — 25010000002 CEFTRIAXONE PER 250 MG: Performed by: NURSE PRACTITIONER

## 2023-09-24 PROCEDURE — 84550 ASSAY OF BLOOD/URIC ACID: CPT | Performed by: NURSE PRACTITIONER

## 2023-09-24 PROCEDURE — 85652 RBC SED RATE AUTOMATED: CPT | Performed by: NURSE PRACTITIONER

## 2023-09-24 PROCEDURE — 0 LIDOCAINE 1 % SOLUTION 5 ML VIAL: Performed by: NURSE PRACTITIONER

## 2023-09-24 RX ORDER — KETOROLAC TROMETHAMINE 30 MG/ML
15 INJECTION, SOLUTION INTRAMUSCULAR; INTRAVENOUS ONCE
Status: COMPLETED | OUTPATIENT
Start: 2023-09-24 | End: 2023-09-24

## 2023-09-24 RX ORDER — SODIUM CHLORIDE 9 MG/ML
40 INJECTION, SOLUTION INTRAVENOUS AS NEEDED
Status: DISCONTINUED | OUTPATIENT
Start: 2023-09-24 | End: 2023-09-28 | Stop reason: HOSPADM

## 2023-09-24 RX ORDER — SODIUM CHLORIDE 0.9 % (FLUSH) 0.9 %
10 SYRINGE (ML) INJECTION EVERY 12 HOURS SCHEDULED
Status: DISCONTINUED | OUTPATIENT
Start: 2023-09-24 | End: 2023-09-28 | Stop reason: HOSPADM

## 2023-09-24 RX ORDER — BISACODYL 10 MG
10 SUPPOSITORY, RECTAL RECTAL DAILY PRN
Status: DISCONTINUED | OUTPATIENT
Start: 2023-09-24 | End: 2023-09-28 | Stop reason: HOSPADM

## 2023-09-24 RX ORDER — ONDANSETRON 4 MG/1
4 TABLET, FILM COATED ORAL EVERY 6 HOURS PRN
Status: DISCONTINUED | OUTPATIENT
Start: 2023-09-24 | End: 2023-09-28 | Stop reason: HOSPADM

## 2023-09-24 RX ORDER — ONDANSETRON 2 MG/ML
4 INJECTION INTRAMUSCULAR; INTRAVENOUS EVERY 6 HOURS PRN
Status: DISCONTINUED | OUTPATIENT
Start: 2023-09-24 | End: 2023-09-28 | Stop reason: HOSPADM

## 2023-09-24 RX ORDER — SODIUM CHLORIDE 0.9 % (FLUSH) 0.9 %
10 SYRINGE (ML) INJECTION AS NEEDED
Status: DISCONTINUED | OUTPATIENT
Start: 2023-09-24 | End: 2023-09-28 | Stop reason: HOSPADM

## 2023-09-24 RX ORDER — PREDNISONE 20 MG/1
40 TABLET ORAL
Status: DISCONTINUED | OUTPATIENT
Start: 2023-09-25 | End: 2023-09-27

## 2023-09-24 RX ORDER — GABAPENTIN 300 MG/1
300 CAPSULE ORAL ONCE
Status: COMPLETED | OUTPATIENT
Start: 2023-09-24 | End: 2023-09-24

## 2023-09-24 RX ORDER — GABAPENTIN 300 MG/1
300 CAPSULE ORAL 2 TIMES DAILY
COMMUNITY

## 2023-09-24 RX ORDER — METHYLPREDNISOLONE SODIUM SUCCINATE 125 MG/2ML
80 INJECTION, POWDER, LYOPHILIZED, FOR SOLUTION INTRAMUSCULAR; INTRAVENOUS ONCE
Status: COMPLETED | OUTPATIENT
Start: 2023-09-24 | End: 2023-09-24

## 2023-09-24 RX ORDER — POLYETHYLENE GLYCOL 3350 17 G/17G
17 POWDER, FOR SOLUTION ORAL DAILY PRN
Status: DISCONTINUED | OUTPATIENT
Start: 2023-09-24 | End: 2023-09-28 | Stop reason: HOSPADM

## 2023-09-24 RX ORDER — CARVEDILOL 6.25 MG/1
12.5 TABLET ORAL ONCE
Status: COMPLETED | OUTPATIENT
Start: 2023-09-24 | End: 2023-09-24

## 2023-09-24 RX ORDER — AMOXICILLIN 250 MG
2 CAPSULE ORAL 2 TIMES DAILY
Status: DISCONTINUED | OUTPATIENT
Start: 2023-09-24 | End: 2023-09-28 | Stop reason: HOSPADM

## 2023-09-24 RX ORDER — SODIUM CHLORIDE, SODIUM LACTATE, POTASSIUM CHLORIDE, CALCIUM CHLORIDE 600; 310; 30; 20 MG/100ML; MG/100ML; MG/100ML; MG/100ML
100 INJECTION, SOLUTION INTRAVENOUS CONTINUOUS
Status: DISCONTINUED | OUTPATIENT
Start: 2023-09-24 | End: 2023-09-25

## 2023-09-24 RX ORDER — CHOLECALCIFEROL (VITAMIN D3) 125 MCG
5 CAPSULE ORAL NIGHTLY PRN
Status: DISCONTINUED | OUTPATIENT
Start: 2023-09-24 | End: 2023-09-28 | Stop reason: HOSPADM

## 2023-09-24 RX ORDER — BISACODYL 5 MG/1
5 TABLET, DELAYED RELEASE ORAL DAILY PRN
Status: DISCONTINUED | OUTPATIENT
Start: 2023-09-24 | End: 2023-09-28 | Stop reason: HOSPADM

## 2023-09-24 RX ORDER — MONTELUKAST SODIUM 10 MG/1
10 TABLET ORAL ONCE
Status: COMPLETED | OUTPATIENT
Start: 2023-09-24 | End: 2023-09-24

## 2023-09-24 RX ORDER — SILDENAFIL CITRATE 20 MG/1
20 TABLET ORAL 3 TIMES DAILY
Status: DISCONTINUED | OUTPATIENT
Start: 2023-09-24 | End: 2023-09-25

## 2023-09-24 RX ORDER — FUROSEMIDE 40 MG/1
40 TABLET ORAL DAILY
COMMUNITY

## 2023-09-24 RX ORDER — HYDROCODONE BITARTRATE AND ACETAMINOPHEN 5; 325 MG/1; MG/1
1 TABLET ORAL EVERY 6 HOURS PRN
Status: DISCONTINUED | OUTPATIENT
Start: 2023-09-24 | End: 2023-09-28 | Stop reason: HOSPADM

## 2023-09-24 RX ADMIN — CEFTRIAXONE 1000 MG: 1 INJECTION, POWDER, FOR SOLUTION INTRAMUSCULAR; INTRAVENOUS at 17:01

## 2023-09-24 RX ADMIN — MONTELUKAST 10 MG: 10 TABLET, FILM COATED ORAL at 22:59

## 2023-09-24 RX ADMIN — APIXABAN 5 MG: 5 TABLET, FILM COATED ORAL at 22:59

## 2023-09-24 RX ADMIN — SODIUM CHLORIDE, SODIUM LACTATE, POTASSIUM CHLORIDE, AND CALCIUM CHLORIDE 500 ML: .6; .31; .03; .02 INJECTION, SOLUTION INTRAVENOUS at 18:20

## 2023-09-24 RX ADMIN — Medication 10 ML: at 23:03

## 2023-09-24 RX ADMIN — KETOROLAC TROMETHAMINE 15 MG: 30 INJECTION, SOLUTION INTRAMUSCULAR; INTRAVENOUS at 14:54

## 2023-09-24 RX ADMIN — SILDENAFIL CITRATE 20 MG: 20 TABLET ORAL at 22:59

## 2023-09-24 RX ADMIN — CEFTRIAXONE SODIUM 1 G: 1 INJECTION, POWDER, FOR SOLUTION INTRAMUSCULAR; INTRAVENOUS at 16:33

## 2023-09-24 RX ADMIN — METHYLPREDNISOLONE SODIUM SUCCINATE 80 MG: 125 INJECTION, POWDER, FOR SOLUTION INTRAMUSCULAR; INTRAVENOUS at 16:59

## 2023-09-24 RX ADMIN — HYDROCODONE BITARTRATE AND ACETAMINOPHEN 1 TABLET: 5; 325 TABLET ORAL at 18:19

## 2023-09-24 RX ADMIN — CARVEDILOL 12.5 MG: 6.25 TABLET, FILM COATED ORAL at 22:59

## 2023-09-24 RX ADMIN — SODIUM CHLORIDE, POTASSIUM CHLORIDE, SODIUM LACTATE AND CALCIUM CHLORIDE 100 ML/HR: 600; 310; 30; 20 INJECTION, SOLUTION INTRAVENOUS at 18:19

## 2023-09-24 RX ADMIN — GABAPENTIN 300 MG: 300 CAPSULE ORAL at 22:59

## 2023-09-24 NOTE — PLAN OF CARE
Goal Outcome Evaluation:  Plan of Care Reviewed With: patient, family        Progress: improving  Outcome Evaluation: Patient states that she is not in any pain when moving, when patient moves the pain is radiating bilaterally in the knees and rates the pain at 8/10. Patient is on 3L NC. Patient is alert and oriented. Patient has mottled bilateral lower extremities. Patient states that she has a history of GOUT and that she has never felt this bad. Patient lives alone.

## 2023-09-24 NOTE — ED PROVIDER NOTES
Subjective   History of Present Illness  Patient is an 83-year-old white female history of oxygen-dependent COPD and hypertension.  She has a history of DVTs she is currently on Eliquis.  She has a history of chronic kidney disease.  She presents today from home accompanied by her family with complaints of pain and swelling to the left foot and lower leg.  She states that started yesterday with no known injury or trauma.  States it feels similar to gout that she had in the past.  She denies any fever chills nausea vomiting chest pain or shortness of breath.    Review of Systems   Constitutional:  Negative for chills and fever.   Respiratory:  Negative for shortness of breath.    Cardiovascular:  Negative for chest pain.   Gastrointestinal:  Negative for nausea and vomiting.   Musculoskeletal:         Left foot and lower leg  pain and swelling     Past Medical History:   Diagnosis Date    COPD (chronic obstructive pulmonary disease)     Deep vein thrombosis of bilateral lower extremities 7/24/2019    History of DVT (deep vein thrombosis) 03/2013    bilateral lower extremity    History of pneumonia 06/2012    community acquired pneumonia and right pleural effusion;hospitalized at [facility]    History of pulmonary embolism 03/2013    bilateral PE    Hypertension 2001    Insomnia     on Ambien 5mg at     Lobular breast cancer, left 11/2011    Stage IA left upper lobular breast cancer    Malignant neoplasm of left breast in female, estrogen receptor positive 7/18/2019    Osteopenia 2012    Severe pulmonary hypertension 3/3/2023    Stage 3a chronic kidney disease 7/24/2019    TIA (transient ischemic attack) 10/25/2022       No Known Allergies    Past Surgical History:   Procedure Laterality Date    CARDIAC CATHETERIZATION N/A 3/3/2023    Procedure: Left and Right Heart Cath with Coronary Angiography;  Surgeon: Richardson Willis MD;  Location: [facility] INVASIVE LOCATION;  Service: Cardiovascular;  Laterality: N/A;     CATARACT EXTRACTION      IVC FILTER RETRIEVAL  2014    IVC filter placement by Dr. Card    MAMMO STEREOTACTIC BREAST BX SURGICAL ADD UNI Left 2011    invasive lobular carcinoma-Dr. Cande Daniel    MASTECTOMY, PARTIAL Left 2011    and left axillary sentinel lymph node biopsy by Dr. Uriostegui    TUBAL ABDOMINAL LIGATION  1984       Family History   Problem Relation Age of Onset    Lung cancer Brother        Social History     Socioeconomic History    Marital status:    Tobacco Use    Smoking status: Former     Types: Cigarettes     Quit date:      Years since quittin.7     Passive exposure: Past    Smokeless tobacco: Never    Tobacco comments:     smoked 6 cigarettes a day from 13 years of age to 56 years of age when she quit in    Vaping Use    Vaping Use: Never used   Substance and Sexual Activity    Alcohol use: Not Currently    Drug use: No    Sexual activity: Not Currently     Partners: Male           Objective   Physical Exam  Vital signs and triage nurse note reviewed.  Constitutional: Awake, alert; well-developed and well-nourished. No acute distress is noted.  Cardiovascular: Regular rate and rhythm  Pulmonary: Respiratory effort regular nonlabored  Musculoskeletal: Independent range of motion of all extremities.  Tenderness over the left fourth and fifth toes with mild erythema.  There is pitting edema noted throughout the left foot with trace edema noted in the lower leg.  There is no streaking.  There is mild erythema to the dorsum of the left foot.  There are no open wounds.  No calf tenderness.  Negative Daly.  No crepitus.  Neuro: Alert oriented x3, speech is clear and appropriate, GCS 15.    Skin: Flesh tone, warm, dry, intact; no erythematous or petechial rash or lesion.    Procedures           ED Course      Labs Reviewed   BASIC METABOLIC PANEL - Abnormal; Notable for the following components:       Result Value    Glucose 155 (*)     BUN 40 (*)     Creatinine  1.22 (*)     BUN/Creatinine Ratio 32.8 (*)     eGFR 44.4 (*)     All other components within normal limits    Narrative:     GFR Normal >60  Chronic Kidney Disease <60  Kidney Failure <15    The GFR formula is only valid for adults with stable renal function between ages 18 and 70.   SEDIMENTATION RATE - Abnormal; Notable for the following components:    Sed Rate 44 (*)     All other components within normal limits   C-REACTIVE PROTEIN - Abnormal; Notable for the following components:    C-Reactive Protein 25.41 (*)     All other components within normal limits   URIC ACID - Abnormal; Notable for the following components:    Uric Acid 9.7 (*)     All other components within normal limits   CBC WITH AUTO DIFFERENTIAL - Abnormal; Notable for the following components:    WBC 17.40 (*)     RDW 16.6 (*)     Neutrophil % 84.9 (*)     Lymphocyte % 3.0 (*)     Eosinophil % 0.0 (*)     Neutrophils, Absolute 14.80 (*)     Lymphocytes, Absolute 0.50 (*)     Monocytes, Absolute 2.10 (*)     All other components within normal limits   BLOOD CULTURE   BLOOD CULTURE   POC LACTATE   CBC AND DIFFERENTIAL    Narrative:     The following orders were created for panel order CBC & Differential.  Procedure                               Abnormality         Status                     ---------                               -----------         ------                     CBC Auto Differential[[id number]]        Abnormal            Final result                 Please view results for these tests on the individual orders.     XR Foot 3+ View Left    Result Date: 9/24/2023  Impression: 1. Negative for acute osseous abnormality. 2. Nonspecific soft tissue swelling. Electronically Signed: Randall Zarco MD  9/24/2023 3:54 PM EDT  Workstation ID: [id number]   Medications   sodium chloride 0.9 % flush 10 mL (has no administration in time range)   methylPREDNISolone sodium succinate (SOLU-Medrol) injection 80 mg (has no administration in time range)    cefTRIAXone (ROCEPHIN) 1,000 mg in sodium chloride 0.9 % 100 mL IVPB (has no administration in time range)   ketorolac (TORADOL) injection 15 mg (15 mg Intravenous Given 9/24/23 9274)                                          Medical Decision Making  Patient presents today with the above complaint.    She had the above exam and evaluation.  IV was established.  Labs and x-ray were obtained.  She was given Toradol for pain.    Work-up: CBC significant for WBC of 17.4, 84% neutrophils, no bands.  Metabolic panel significant for glucose 155, BUN 40, creatinine 1.22.  Sed rate 44.  CRP 25.41.  Uric acid 9.7.  Blood cultures were obtained as well as a POC lactate was found to be  X-ray of the left foot shows no acute osseous abnormality, nonspecific soft tissue swelling.    Patient given dose of IV Rocephin for possible infection given elevated white count.  She was also given dose of IV Solu-Medrol for potential gout.  Doubt DVT at this time as patient has no calf tenderness.  She has a negative Daly.  She is anticoagulated with Eliquis and states she has been compliant with this.    On reexamination patient is resting quietly and in no distress.  She has no new complaints.  She remains well-appearing afebrile and hemodynamically stable.    Findings were discussed with patient and family at bedside.  We discussed discharge home with antibiotics and steroids to cover for potential infection and gout however patient states that she has not been able to get out of bed today and ambulate due to pain.  She states she did urinate on herself earlier today because she could not get out of bed and walk on her foot because it hurt.    Patient will be placed in observation unit for IV medications and PT consult.    Amount and/or Complexity of Data Reviewed  Labs: ordered.  Radiology: ordered.    Risk  Prescription drug management.        Final diagnoses:   Cellulitis of left foot       ED Disposition  ED Disposition       ED  Disposition   Decision to Admit    Condition   --    Comment   --               No follow-up provider specified.       Medication List      No changes were made to your prescriptions during this visit.            Nevaeh Benoit, APRN  09/24/23 6733

## 2023-09-24 NOTE — ED NOTES
Nursing report ED to floor  Gabrielle Lyles  83 y.o.  female    HPI:   Chief Complaint   Patient presents with    Leg Pain     Left leg pain and swelling, pt was supposed to be taking medications, but just started it.            Admitting doctor:   Juan C Merlos MD    Admitting diagnosis:   The encounter diagnosis was Cellulitis of left foot.    Code status:   Current Code Status       Date Active Code Status Order ID Comments User Context       9/24/2023 1640 CPR (Attempt to Resuscitate) [id number]  Dimitris Soares PA-C ED        Question Answer    Code Status (Patient has no pulse and is not breathing) CPR (Attempt to Resuscitate)    Medical Interventions (Patient has pulse or is breathing) Full Support                    Allergies:   Patient has no known allergies.    Isolation:  No active isolations     Fall Risk:  Fall Risk Assessment was completed, and patient is at high risk for falls.   Predictive Model Details         16 (Low) Factor Value    Calculated 9/24/2023 14:06 Age 83    Risk of Fall Model Musculoskeletal Assessment WDL     Respiratory Rate 20     Diastolic BP 45     Skin Assessment WDL     Financial Class Other     Magnesium not on file     Drug Use No     Tobacco Use Quit     Yaw Scale not on file     Peripheral Vascular Assessment WDL     Calcium not on file     Gastrointestinal Assessment WDL     Albumin not on file     Cardiac Assessment WDL     Total Bilirubin not on file     Chloride not on file     Potassium not on file     Days after Admission 0.04     Creatinine not on file     ALT not on file         Weight:       09/24/23  1305   Weight: 64 kg (141 lb)       Intake and Output  No intake or output data in the 24 hours ending 09/24/23 1711    Diet:        Most recent vitals:   Vitals:    09/24/23 1304 09/24/23 1305 09/24/23 1307   BP:   101/45   Pulse: 73     Resp: 20     Temp:   97.6 °F (36.4 °C)   TempSrc: Oral     SpO2: (!) 82%     Weight:  64 kg (141 lb)    Height:  162.6 cm  "(64\")        Active LDAs/IV Access:   Lines, Drains & Airways       Active LDAs       Name Placement date Placement time Site Days    Peripheral IV 09/24/23 1448 Anterior;Right;Upper Arm 09/24/23  1448  Arm  less than 1    Peripheral IV 09/24/23 1656 Anterior;Right Forearm 09/24/23 1656  Forearm  less than 1                    Skin Condition:   Skin Assessments (last day)       None             Labs (abnormal labs have a star):   Labs Reviewed   BASIC METABOLIC PANEL - Abnormal; Notable for the following components:       Result Value    Glucose 155 (*)     BUN 40 (*)     Creatinine 1.22 (*)     BUN/Creatinine Ratio 32.8 (*)     eGFR 44.4 (*)     All other components within normal limits    Narrative:     GFR Normal >60  Chronic Kidney Disease <60  Kidney Failure <15    The GFR formula is only valid for adults with stable renal function between ages 18 and 70.   SEDIMENTATION RATE - Abnormal; Notable for the following components:    Sed Rate 44 (*)     All other components within normal limits   C-REACTIVE PROTEIN - Abnormal; Notable for the following components:    C-Reactive Protein 25.41 (*)     All other components within normal limits   URIC ACID - Abnormal; Notable for the following components:    Uric Acid 9.7 (*)     All other components within normal limits   CBC WITH AUTO DIFFERENTIAL - Abnormal; Notable for the following components:    WBC 17.40 (*)     RDW 16.6 (*)     Neutrophil % 84.9 (*)     Lymphocyte % 3.0 (*)     Eosinophil % 0.0 (*)     Neutrophils, Absolute 14.80 (*)     Lymphocytes, Absolute 0.50 (*)     Monocytes, Absolute 2.10 (*)     All other components within normal limits   POC LACTATE - Normal   BLOOD CULTURE   BLOOD CULTURE   POC LACTATE   CBC AND DIFFERENTIAL    Narrative:     The following orders were created for panel order CBC & Differential.  Procedure                               Abnormality         Status                     ---------                               -----------    "      ------                     CBC Auto Differential[[id number]]        Abnormal            Final result                 Please view results for these tests on the individual orders.       LOC: Person, Place, Time, and Situation    Telemetry:  Observation Unit    Cardiac Monitoring Ordered: no    EKG:   No orders to display       Medications Given in the ED:   Medications   sodium chloride 0.9 % flush 10 mL (has no administration in time range)   cefTRIAXone (ROCEPHIN) 1,000 mg in sodium chloride 0.9 % 100 mL IVPB (1,000 mg Intravenous New Bag 23 1701)   ketorolac (TORADOL) injection 15 mg (15 mg Intravenous Given 23 1454)   methylPREDNISolone sodium succinate (SOLU-Medrol) injection 80 mg (80 mg Intravenous Given 23 1659)       Imaging results:  XR Foot 3+ View Left    Result Date: 2023  Impression: 1. Negative for acute osseous abnormality. 2. Nonspecific soft tissue swelling. Electronically Signed: Randall Zarco MD  2023 3:54 PM EDT  Workstation ID: [id number]     Social issues:   Social History     Socioeconomic History    Marital status:    Tobacco Use    Smoking status: Former     Types: Cigarettes     Quit date:      Years since quittin.7     Passive exposure: Past    Smokeless tobacco: Never    Tobacco comments:     smoked 6 cigarettes a day from 13 years of age to 56 years of age when she quit in    Vaping Use    Vaping Use: Never used   Substance and Sexual Activity    Alcohol use: Not Currently    Drug use: No    Sexual activity: Not Currently     Partners: Male       NIH Stroke Scale:  Interval: (not recorded)  1a. Level of Consciousness: (not recorded)  1b. LOC Questions: (not recorded)  1c. LOC Commands: (not recorded)  2. Best Gaze: (not recorded)  3. Visual: (not recorded)  4. Facial Palsy: (not recorded)  5a. Motor Arm, Left: (not recorded)  5b. Motor Arm, Right: (not recorded)  6a. Motor Leg, Left: (not recorded)  6b. Motor Leg, Right: (not recorded)  7.  Limb Ataxia: (not recorded)  8. Sensory: (not recorded)  9. Best Language: (not recorded)  10. Dysarthria: (not recorded)  11. Extinction and Inattention (formerly Neglect): (not recorded)    Total (NIH Stroke Scale): (not recorded)     Additional notable assessment information:     Nursing report ED to floor:  MIPS 2B    Heidi Kan RN   09/24/23 17:11 EDT

## 2023-09-25 LAB
ANION GAP SERPL CALCULATED.3IONS-SCNC: 13 MMOL/L (ref 5–15)
BASOPHILS # BLD AUTO: 0.1 10*3/MM3 (ref 0–0.2)
BASOPHILS NFR BLD AUTO: 0.9 % (ref 0–1.5)
BUN SERPL-MCNC: 46 MG/DL (ref 8–23)
BUN/CREAT SERPL: 34.6 (ref 7–25)
CALCIUM SPEC-SCNC: 9.4 MG/DL (ref 8.6–10.5)
CHLORIDE SERPL-SCNC: 99 MMOL/L (ref 98–107)
CO2 SERPL-SCNC: 25 MMOL/L (ref 22–29)
CREAT SERPL-MCNC: 1.33 MG/DL (ref 0.57–1)
DEPRECATED RDW RBC AUTO: 53.4 FL (ref 37–54)
EGFRCR SERPLBLD CKD-EPI 2021: 40 ML/MIN/1.73
EOSINOPHIL # BLD AUTO: 0 10*3/MM3 (ref 0–0.4)
EOSINOPHIL NFR BLD AUTO: 0 % (ref 0.3–6.2)
ERYTHROCYTE [DISTWIDTH] IN BLOOD BY AUTOMATED COUNT: 16.7 % (ref 12.3–15.4)
GLUCOSE SERPL-MCNC: 115 MG/DL (ref 65–99)
HCT VFR BLD AUTO: 41.6 % (ref 34–46.6)
HGB BLD-MCNC: 13.7 G/DL (ref 12–15.9)
LYMPHOCYTES # BLD AUTO: 0.4 10*3/MM3 (ref 0.7–3.1)
LYMPHOCYTES NFR BLD AUTO: 3.3 % (ref 19.6–45.3)
MAGNESIUM SERPL-MCNC: 2.3 MG/DL (ref 1.6–2.4)
MCH RBC QN AUTO: 28.6 PG (ref 26.6–33)
MCHC RBC AUTO-ENTMCNC: 33 G/DL (ref 31.5–35.7)
MCV RBC AUTO: 86.6 FL (ref 79–97)
MONOCYTES # BLD AUTO: 0.4 10*3/MM3 (ref 0.1–0.9)
MONOCYTES NFR BLD AUTO: 3.7 % (ref 5–12)
NEUTROPHILS NFR BLD AUTO: 10.1 10*3/MM3 (ref 1.7–7)
NEUTROPHILS NFR BLD AUTO: 92.1 % (ref 42.7–76)
NRBC BLD AUTO-RTO: 0 /100 WBC (ref 0–0.2)
PLATELET # BLD AUTO: 198 10*3/MM3 (ref 140–450)
PMV BLD AUTO: 9 FL (ref 6–12)
POTASSIUM SERPL-SCNC: 4.1 MMOL/L (ref 3.5–5.2)
RBC # BLD AUTO: 4.8 10*6/MM3 (ref 3.77–5.28)
SODIUM SERPL-SCNC: 137 MMOL/L (ref 136–145)
WBC NRBC COR # BLD: 10.9 10*3/MM3 (ref 3.4–10.8)

## 2023-09-25 PROCEDURE — 80048 BASIC METABOLIC PNL TOTAL CA: CPT | Performed by: PHYSICIAN ASSISTANT

## 2023-09-25 PROCEDURE — 97165 OT EVAL LOW COMPLEX 30 MIN: CPT

## 2023-09-25 PROCEDURE — 96361 HYDRATE IV INFUSION ADD-ON: CPT

## 2023-09-25 PROCEDURE — 36415 COLL VENOUS BLD VENIPUNCTURE: CPT | Performed by: PHYSICIAN ASSISTANT

## 2023-09-25 PROCEDURE — 83735 ASSAY OF MAGNESIUM: CPT | Performed by: PHYSICIAN ASSISTANT

## 2023-09-25 PROCEDURE — 85025 COMPLETE CBC W/AUTO DIFF WBC: CPT | Performed by: PHYSICIAN ASSISTANT

## 2023-09-25 PROCEDURE — G0378 HOSPITAL OBSERVATION PER HR: HCPCS

## 2023-09-25 PROCEDURE — 63710000001 PREDNISONE PER 1 MG: Performed by: PHYSICIAN ASSISTANT

## 2023-09-25 PROCEDURE — 97162 PT EVAL MOD COMPLEX 30 MIN: CPT

## 2023-09-25 RX ORDER — LISINOPRIL 20 MG/1
40 TABLET ORAL
Status: DISCONTINUED | OUTPATIENT
Start: 2023-09-25 | End: 2023-09-28 | Stop reason: HOSPADM

## 2023-09-25 RX ORDER — SILDENAFIL CITRATE 20 MG/1
20 TABLET ORAL 3 TIMES DAILY
Status: DISCONTINUED | OUTPATIENT
Start: 2023-09-25 | End: 2023-09-28 | Stop reason: HOSPADM

## 2023-09-25 RX ORDER — ALLOPURINOL 100 MG/1
100 TABLET ORAL DAILY
Status: DISCONTINUED | OUTPATIENT
Start: 2023-09-25 | End: 2023-09-28 | Stop reason: HOSPADM

## 2023-09-25 RX ORDER — LEVOTHYROXINE SODIUM 0.05 MG/1
50 TABLET ORAL DAILY
Status: DISCONTINUED | OUTPATIENT
Start: 2023-09-25 | End: 2023-09-28 | Stop reason: HOSPADM

## 2023-09-25 RX ORDER — FUROSEMIDE 40 MG/1
40 TABLET ORAL DAILY
Status: DISCONTINUED | OUTPATIENT
Start: 2023-09-25 | End: 2023-09-25

## 2023-09-25 RX ORDER — FUROSEMIDE 40 MG/1
40 TABLET ORAL DAILY
Status: DISCONTINUED | OUTPATIENT
Start: 2023-09-26 | End: 2023-09-28 | Stop reason: HOSPADM

## 2023-09-25 RX ORDER — CARVEDILOL 6.25 MG/1
12.5 TABLET ORAL 2 TIMES DAILY WITH MEALS
Status: DISCONTINUED | OUTPATIENT
Start: 2023-09-25 | End: 2023-09-28 | Stop reason: HOSPADM

## 2023-09-25 RX ORDER — GABAPENTIN 300 MG/1
300 CAPSULE ORAL 2 TIMES DAILY
Status: DISCONTINUED | OUTPATIENT
Start: 2023-09-25 | End: 2023-09-28 | Stop reason: HOSPADM

## 2023-09-25 RX ORDER — POTASSIUM CHLORIDE 750 MG/1
10 TABLET, FILM COATED, EXTENDED RELEASE ORAL DAILY
Status: DISCONTINUED | OUTPATIENT
Start: 2023-09-25 | End: 2023-09-28 | Stop reason: HOSPADM

## 2023-09-25 RX ORDER — FAMOTIDINE 20 MG/1
20 TABLET, FILM COATED ORAL DAILY
Status: DISCONTINUED | OUTPATIENT
Start: 2023-09-25 | End: 2023-09-28 | Stop reason: HOSPADM

## 2023-09-25 RX ORDER — MONTELUKAST SODIUM 10 MG/1
10 TABLET ORAL NIGHTLY
Status: DISCONTINUED | OUTPATIENT
Start: 2023-09-25 | End: 2023-09-28 | Stop reason: HOSPADM

## 2023-09-25 RX ORDER — AMLODIPINE BESYLATE 5 MG/1
10 TABLET ORAL DAILY
Status: DISCONTINUED | OUTPATIENT
Start: 2023-09-25 | End: 2023-09-28 | Stop reason: HOSPADM

## 2023-09-25 RX ADMIN — LEVOTHYROXINE SODIUM 50 MCG: 0.05 TABLET ORAL at 08:22

## 2023-09-25 RX ADMIN — Medication 10 ML: at 22:35

## 2023-09-25 RX ADMIN — CARVEDILOL 12.5 MG: 6.25 TABLET, FILM COATED ORAL at 17:37

## 2023-09-25 RX ADMIN — CARVEDILOL 12.5 MG: 6.25 TABLET, FILM COATED ORAL at 08:23

## 2023-09-25 RX ADMIN — PREDNISONE 40 MG: 20 TABLET ORAL at 08:23

## 2023-09-25 RX ADMIN — AMLODIPINE BESYLATE 10 MG: 5 TABLET ORAL at 08:23

## 2023-09-25 RX ADMIN — SILDENAFIL 20 MG: 20 TABLET, FILM COATED ORAL at 22:35

## 2023-09-25 RX ADMIN — SILDENAFIL 20 MG: 20 TABLET, FILM COATED ORAL at 17:37

## 2023-09-25 RX ADMIN — APIXABAN 5 MG: 5 TABLET, FILM COATED ORAL at 08:23

## 2023-09-25 RX ADMIN — GABAPENTIN 300 MG: 300 CAPSULE ORAL at 22:34

## 2023-09-25 RX ADMIN — Medication 10 ML: at 08:23

## 2023-09-25 RX ADMIN — SENNOSIDES AND DOCUSATE SODIUM 2 TABLET: 50; 8.6 TABLET ORAL at 08:22

## 2023-09-25 RX ADMIN — APIXABAN 5 MG: 5 TABLET, FILM COATED ORAL at 22:34

## 2023-09-25 RX ADMIN — SODIUM CHLORIDE, POTASSIUM CHLORIDE, SODIUM LACTATE AND CALCIUM CHLORIDE 100 ML/HR: 600; 310; 30; 20 INJECTION, SOLUTION INTRAVENOUS at 10:11

## 2023-09-25 RX ADMIN — MONTELUKAST 10 MG: 10 TABLET, FILM COATED ORAL at 22:35

## 2023-09-25 RX ADMIN — SODIUM CHLORIDE, POTASSIUM CHLORIDE, SODIUM LACTATE AND CALCIUM CHLORIDE 100 ML/HR: 600; 310; 30; 20 INJECTION, SOLUTION INTRAVENOUS at 00:13

## 2023-09-25 RX ADMIN — POTASSIUM CHLORIDE 10 MEQ: 1500 TABLET, EXTENDED RELEASE ORAL at 11:52

## 2023-09-25 RX ADMIN — SILDENAFIL 20 MG: 20 TABLET, FILM COATED ORAL at 08:23

## 2023-09-25 RX ADMIN — LISINOPRIL 40 MG: 20 TABLET ORAL at 11:52

## 2023-09-25 RX ADMIN — GABAPENTIN 300 MG: 300 CAPSULE ORAL at 08:22

## 2023-09-25 RX ADMIN — ALLOPURINOL 100 MG: 100 TABLET ORAL at 11:52

## 2023-09-25 RX ADMIN — FAMOTIDINE 20 MG: 20 TABLET ORAL at 11:52

## 2023-09-25 NOTE — THERAPY EVALUATION
Patient Name: Gabrielle Lyles  : 1940    MRN: [id number]                              Today's Date: 2023       Admit Date: 2023    Visit Dx:     ICD-10-CM ICD-9-CM   1. Cellulitis of left foot  L03.116 682.7     Patient Active Problem List   Diagnosis    Stage 3a chronic kidney disease    Low back pain    Osteopenia    Chronic obstructive pulmonary disease    Gastroesophageal reflux disease    Gout    History of venous thrombosis and embolism    Primary hypertension    Lumbar radiculopathy    Nausea    Parotid duct obstruction    Personal history of malignant neoplasm of breast    Shortness of breath    Aortic aneurysm    Bulging lumbar disc    Spinal stenosis of lumbar region    History of TIA (transient ischemic attack)    Acute exacerbation of chronic obstructive pulmonary disease (COPD)    Parainfluenza infection    Severe pulmonary hypertension    Acute on chronic respiratory failure with hypoxia    Cellulitis of left foot     Past Medical History:   Diagnosis Date    COPD (chronic obstructive pulmonary disease)     Deep vein thrombosis of bilateral lower extremities 2019    History of DVT (deep vein thrombosis) 2013    bilateral lower extremity    History of pneumonia 2012    community acquired pneumonia and right pleural effusion;hospitalized at [facility]    History of pulmonary embolism 2013    bilateral PE    Hypertension 2001    Insomnia     on Ambien 5mg at     Lobular breast cancer, left 2011    Stage IA left upper lobular breast cancer    Malignant neoplasm of left breast in female, estrogen receptor positive 2019    Osteopenia     Severe pulmonary hypertension 3/3/2023    Stage 3a chronic kidney disease 2019    TIA (transient ischemic attack) 10/25/2022     Past Surgical History:   Procedure Laterality Date    CARDIAC CATHETERIZATION N/A 3/3/2023    Procedure: Left and Right Heart Cath with Coronary Angiography;  Surgeon: Richardson Willis MD;   Location: [facility] CATH INVASIVE LOCATION;  Service: Cardiovascular;  Laterality: N/A;    CATARACT EXTRACTION  2015    IVC FILTER RETRIEVAL  06/2014    IVC filter placement by Dr. Card    MAMMO STEREOTACTIC BREAST BX SURGICAL ADD UNI Left 11/01/2011    invasive lobular carcinoma-Dr. Cande Daniel    MASTECTOMY, PARTIAL Left 12/01/2011    and left axillary sentinel lymph node biopsy by Dr. Uriostegui    TUBAL ABDOMINAL LIGATION  1984      General Information       Row Name 09/25/23 1302          Physical Therapy Time and Intention    Document Type evaluation  -AM     Mode of Treatment physical therapy  -AM       Row Name 09/25/23 1302          General Information    Patient Profile Reviewed yes  -AM     Prior Level of Function independent:;all household mobility;community mobility;gait;transfer;bed mobility;using stairs  -AM     Existing Precautions/Restrictions fall  -AM     Barriers to Rehab none identified  -AM       Row Name 09/25/23 1302          Living Environment    People in Home alone  -AM       Row Name 09/25/23 1302          Home Main Entrance    Number of Stairs, Main Entrance two  -AM     Stair Railings, Main Entrance railings safe and in good condition  -AM       Row Name 09/25/23 1302          Stairs Within Home, Primary    Number of Stairs, Within Home, Primary none  -AM       Row Name 09/25/23 1302          Cognition    Orientation Status (Cognition) oriented x 4  -AM       Row Name 09/25/23 1302          Safety Issues, Functional Mobility    Impairments Affecting Function (Mobility) balance;endurance/activity tolerance;pain  -AM     Comment, Safety Issues/Impairments (Mobility) gait belt utilized  -AM               User Key  (r) = Recorded By, (t) = Taken By, (c) = Cosigned By      Initials Name Provider Type    AM Jose Gregory, PT Physical Therapist                   Mobility       Row Name 09/25/23 1303          Bed Mobility    Bed Mobility supine-sit  -AM     Supine-Sit [place] (Bed Mobility)  "modified independence  -AM     Assistive Device (Bed Mobility) bed rails;head of bed elevated  -AM       Row Name 09/25/23 1303          Sit-Stand Transfer    Sit-Stand [place] (Transfers) contact guard;1 person assist  -AM     Assistive Device (Sit-Stand Transfers) walker, front-wheeled  -AM     Comment, (Sit-Stand Transfer) x 2 attempts. 2nd attempt- pt with posterior lean with transition to standing  -AM       Row Name 09/25/23 1303          Gait/Stairs (Locomotion)    [place] Level (Gait) contact guard;1 person assist  -AM     Assistive Device (Gait) walker, front-wheeled  -AM     Distance in Feet (Gait) 20'  -AM     Comment, (Gait/Stairs) decreased stance time on LLE.  Pt with c/o dizziness \"6/10\" with ambulation.  Chair brought behind pt to sit.  /89  -AM               User Key  (r) = Recorded By, (t) = Taken By, (c) = Cosigned By      Initials Name Provider Type    AM Jose Gregory, PT Physical Therapist                   Obj/Interventions       Row Name 09/25/23 1305          Range of Motion Comprehensive    General Range of Motion bilateral lower extremity ROM WFL  -AM     Comment, General Range of Motion Defer BUE ROM to OT  -AM       Row Name 09/25/23 1305          Strength Comprehensive (MMT)    Comment, General Manual Muscle Testing (MMT) Assessment Defer BUE MMT to OT.  4+/5 BLEs  -AM       Row Name 09/25/23 1305          Motor Skills    Motor Skills functional endurance  -AM     Functional Endurance fair -  -AM       Row Name 09/25/23 1305          Balance    Balance Assessment sitting static balance;sitting dynamic balance;sit to stand dynamic balance;standing static balance;standing dynamic balance  -AM     Static Sitting Balance independent  -AM     Dynamic Sitting Balance independent  -AM     Position, Sitting Balance unsupported;sitting edge of bed  -AM     Sit to Stand Dynamic Balance contact guard  -AM     Static Standing Balance contact guard  -AM     Dynamic Standing Balance " contact guard  -AM     Position/Device Used, Standing Balance walker, front-wheeled  -AM       Row Name 09/25/23 1305          Sensory Assessment (Somatosensory)    Sensory Assessment (Somatosensory) sensation intact  -AM               User Key  (r) = Recorded By, (t) = Taken By, (c) = Cosigned By      Initials Name Provider Type    AM Jose Gregory, PT Physical Therapist                   Goals/Plan       Row Name 09/25/23 1311          Transfer Goal 1 (PT)    Activity/Assistive Device (Transfer Goal 1, PT) transfers, all  -AM     [place] Level/Cues Needed (Transfer Goal 1, PT) modified independence  -AM     Time Frame (Transfer Goal 1, PT) long term goal (LTG)  -AM       Row Name 09/25/23 1311          Gait Training Goal 1 (PT)    Activity/Assistive Device (Gait Training Goal 1, PT) gait (walking locomotion);walker, rolling  -AM     [place] Level (Gait Training Goal 1, PT) modified independence  -AM     Distance (Gait Training Goal 1, PT) 150'  -AM     Time Frame (Gait Training Goal 1, PT) long term goal (LTG)  -AM       Row Name 09/25/23 1311          Stairs Goal 1 (PT)    Activity/Assistive Device (Stairs Goal 1, PT) stairs, all skills  -AM     [place] Level/Cues Needed (Stairs Goal 1, PT) modified independence  -AM     Number of Stairs (Stairs Goal 1, PT) 2  -AM     Time Frame (Stairs Goal 1, PT) long term goal (LTG)  -AM       Row Name 09/25/23 1311          Therapy Assessment/Plan (PT)    Planned Therapy Interventions (PT) balance training;gait training;strengthening;stair training;patient/family education;transfer training  -AM               User Key  (r) = Recorded By, (t) = Taken By, (c) = Cosigned By      Initials Name Provider Type    AM Jose Gregory, PT Physical Therapist                   Clinical Impression       Row Name 09/25/23 1306          Pain    Pretreatment Pain Rating 4/10  -AM     Posttreatment Pain Rating 4/10  -AM     Pain Location - Side/Orientation Left  -AM     Pain  Location - foot  -AM     Pain Intervention(s) Repositioned;Emotional support  -AM       Row Name 09/25/23 1306          Plan of Care Review    Plan of Care Reviewed With patient  -AM     Outcome Evaluation Pt is a 84 y/o female with c/o pain and swelling in L foot and LE.  Pt reports pain is similar to gout pain which she has had in the past.  PMH:  O2 dependent COPD.  L foot X-ray: (-).  Pt reports she lives alone in a 1 story home with 2 steps to enter into home.  Pt was independent wtih all functional mobility tasks and did not use an assistive device prior to hospitalization.  Pt on 3L O2, telemetry and IV this date.  Pt was mod I for bed mobility, CGA for transfers with RW.  2nd attempt of sit to stand pt with posterior lean that she was able to self-correct with mod verbal cues.  Pt ambulated 20' with RW with limited WBing/stance time on LLE with CGA.  Pt became dizzy during ambulation and chair brought behind pt.  BP after ambulation: 139/89.  Recommend use of RW for home use (already owns) and HHPT.  -AM       Row Name 09/25/23 1306          Therapy Assessment/Plan (PT)    Patient/Family Therapy Goals Statement (PT) To go home  -AM     Rehab Potential (PT) good, to achieve stated therapy goals  -AM     Criteria for Skilled Interventions Met (PT) yes  -AM     Therapy Frequency (PT) 3 times/wk  -AM     Predicted Duration of Therapy Intervention (PT) until d/c  -AM       Row Name 09/25/23 1306          Vital Signs    Pre Systolic BP Rehab 121  -AM     Pre Treatment Diastolic BP 81  -AM     Intra Systolic BP Rehab 1.56  -AM     O2 Delivery Pre Treatment nasal cannula  3L O2  -AM     O2 Delivery Intra Treatment nasal cannula  -AM     O2 Delivery Post Treatment nasal cannula  -AM     Pre Patient Position Supine  -AM     Intra Patient Position Standing  -AM     Post Patient Position Supine  -AM       Row Name 09/25/23 1306          Positioning and Restraints    Pre-Treatment Position in bed  -AM     Post Treatment  Position bed  -AM     In Bed notified nsg;supine;call light within reach;encouraged to call for assist;exit alarm on  -AM               User Key  (r) = Recorded By, (t) = Taken By, (c) = Cosigned By      Initials Name Provider Type    AM Jose Gregory, PT Physical Therapist                   Outcome Measures       Row Name 09/25/23 1311          How much help from another person do you currently need...    Turning from your back to your side while in flat bed without using bedrails? 4  -AM     Moving from lying on back to sitting on the side of a flat bed without bedrails? 4  -AM     Moving to and from a bed to a chair (including a wheelchair)? 3  -AM     Standing up from a chair using your arms (e.g., wheelchair, bedside chair)? 3  -AM     Climbing 3-5 steps with a railing? 2  -AM     To walk in hospital room? 3  -AM     AM-PAC 6 Clicks Score (PT) 19  -AM     Highest level of mobility 6 --> Walked 10 steps or more  -AM       Row Name 09/25/23 1311          Functional Assessment    Outcome Measure Options AM-PAC 6 Clicks Basic Mobility (PT)  -AM               User Key  (r) = Recorded By, (t) = Taken By, (c) = Cosigned By      Initials Name Provider Type    AM Jose Gregory, PT Physical Therapist                                 Physical Therapy Education       Title: PT OT SLP Therapies (Done)       Topic: Physical Therapy (Done)       Point: Mobility training (Done)       Learning Progress Summary             Patient Acceptance, E,TB, VU by AM at 9/25/2023 1312                         Point: Body mechanics (Done)       Learning Progress Summary             Patient Acceptance, E,TB, VU by AM at 9/25/2023 1312                         Point: Precautions (Done)       Learning Progress Summary             Patient Acceptance, E,TB, VU by AM at 9/25/2023 1312                                         User Key       Initials Effective Dates Name Provider Type Discipline    AM 05/10/21 -  Jose Gregory, PT Physical  Therapist PT                  PT Recommendation and Plan  Planned Therapy Interventions (PT): balance training, gait training, strengthening, stair training, patient/family education, transfer training  Plan of Care Reviewed With: patient  Outcome Evaluation: Pt is a 82 y/o female with c/o pain and swelling in L foot and LE.  Pt reports pain is similar to gout pain which she has had in the past.  PMH:  O2 dependent COPD.  L foot X-ray: (-).  Pt reports she lives alone in a 1 story home with 2 steps to enter into home.  Pt was independent wtih all functional mobility tasks and did not use an assistive device prior to hospitalization.  Pt on 3L O2, telemetry and IV this date.  Pt was mod I for bed mobility, CGA for transfers with RW.  2nd attempt of sit to stand pt with posterior lean that she was able to self-correct with mod verbal cues.  Pt ambulated 20' with RW with limited WBing/stance time on LLE with CGA.  Pt became dizzy during ambulation and chair brought behind pt.  BP after ambulation: 139/89.  Recommend use of RW for home use (already owns) and HHPT.     Time Calculation:         PT Charges       Row Name 09/25/23 1314             Time Calculation    Start Time 0823  -AM      Stop Time 0847  -AM      Time Calculation (min) 24 min  -AM      PT Received On 09/25/23  -AM      PT - Next Appointment 09/27/23  -AM      PT Goal Re-Cert Due Date 10/09/23  -AM                User Key  (r) = Recorded By, (t) = Taken By, (c) = Cosigned By      Initials Name Provider Type    AM Jose Gregory, PT Physical Therapist                  Therapy Charges for Today       Code Description Service Date Service Provider Modifiers Qty    [id number]  PT EVAL MOD COMPLEXITY 3 9/25/2023 Jose Gregory, PT GP 1            PT G-Codes  Outcome Measure Options: AM-PAC 6 Clicks Basic Mobility (PT)  AM-PAC 6 Clicks Score (PT): 19  PT Discharge Summary  Anticipated Discharge Disposition (PT): home with home health    Jose Gregory  PT  9/25/2023

## 2023-09-25 NOTE — SIGNIFICANT NOTE
09/25/23 0915   OTHER   Discipline occupational therapist   Rehab Time/Intention   Session Not Performed patient unavailable for evaluation  (Pt out of room at time of arrival. OT will follow up another date/time.)   Recommendation   OT - Next Appointment 09/26/23

## 2023-09-25 NOTE — PLAN OF CARE
Goal Outcome Evaluation:  Plan of Care Reviewed With: patient           Outcome Evaluation: 84 y/o female with c/o pain and swelling in L foot and LE. Pt reports pain is similar to gout pain which she has had in the past. PMH: O2 dependent COPD. L foot X-ray: (-). Pt lives alone at home and reports she is independent with all ADLs and functional mobility without an AD. At time of evaluation, pt able to don her scks, get out of bed, stand, and walk without any physical assistance. No further IP OT warranted. OT recommending pt return home.      Anticipated Discharge Disposition (OT): home

## 2023-09-25 NOTE — H&P
Patient Care Team:  Shaylee Mcdaniels MD as PCP - General (Family Medicine)  Richardson Willis MD as Cardiologist (Cardiology)  Rafy Rodriguez MD as Consulting Physician (Hematology and Oncology)  Christianne Phillips, RN as Licensed Practical Nurse    Chief complaint Left ankle pain, redness, swelling    Subjective     Patient is a 83 y.o. female with h/o gout, COPD, pulmonary embolism, CKD who had sudden onset of severe left ankle pain and swelling the day prior to admission.  She was previously on allopurinol but had stopped it several weeks ago.  She denied fever/chills or injury to left ankle.  She was unable to ambulate due to pain.    Overnight the symptoms have nearly resolved.  She received Solu-medrol and ceftriaxone in ER.   She is now able to ambulate with PT.       Review of Systems   Constitutional:  Positive for activity change. Negative for appetite change, chills, diaphoresis, fatigue, fever and unexpected weight change.   HENT:  Negative for facial swelling.    Eyes:  Negative for visual disturbance.   Respiratory:  Negative for cough, shortness of breath, wheezing and stridor.    Cardiovascular:  Positive for leg swelling. Negative for chest pain and palpitations.   Gastrointestinal:  Negative for abdominal pain, constipation, diarrhea, nausea and vomiting.   Endocrine: Negative for polyuria.   Genitourinary:  Negative for dysuria.   Musculoskeletal:  Positive for arthralgias, gait problem and joint swelling. Negative for myalgias, neck pain and neck stiffness.   Skin:  Negative for rash and wound.   Neurological:  Negative for dizziness, weakness, light-headedness and headaches.   Psychiatric/Behavioral:  Negative for confusion.         History  Past Medical History:   Diagnosis Date    COPD (chronic obstructive pulmonary disease)     Deep vein thrombosis of bilateral lower extremities 7/24/2019    History of DVT (deep vein thrombosis) 03/2013    bilateral lower extremity    History of pneumonia  2012    community acquired pneumonia and right pleural effusion;hospitalized at [facility]    History of pulmonary embolism 2013    bilateral PE    Hypertension     Insomnia     on Ambien 5mg at     Lobular breast cancer, left 2011    Stage IA left upper lobular breast cancer    Malignant neoplasm of left breast in female, estrogen receptor positive 2019    Osteopenia     Severe pulmonary hypertension 3/3/2023    Stage 3a chronic kidney disease 2019    TIA (transient ischemic attack) 10/25/2022     Past Surgical History:   Procedure Laterality Date    CARDIAC CATHETERIZATION N/A 3/3/2023    Procedure: Left and Right Heart Cath with Coronary Angiography;  Surgeon: Richardson Willis MD;  Location: [facility] INVASIVE LOCATION;  Service: Cardiovascular;  Laterality: N/A;    CATARACT EXTRACTION      IVC FILTER RETRIEVAL  2014    IVC filter placement by Dr. Card    MAMMO STEREOTACTIC BREAST BX SURGICAL ADD UNI Left 2011    invasive lobular carcinoma-Dr. Cande Daniel    MASTECTOMY, PARTIAL Left 2011    and left axillary sentinel lymph node biopsy by Dr. Uriostegui    TUBAL ABDOMINAL LIGATION       Family History   Problem Relation Age of Onset    Lung cancer Brother      Social History     Tobacco Use    Smoking status: Former     Types: Cigarettes     Quit date:      Years since quittin.7     Passive exposure: Past    Smokeless tobacco: Never    Tobacco comments:     smoked 6 cigarettes a day from 13 years of age to 56 years of age when she quit in    Vaping Use    Vaping Use: Never used   Substance Use Topics    Alcohol use: Not Currently    Drug use: No     Medications Prior to Admission   Medication Sig Dispense Refill Last Dose    allopurinol (ZYLOPRIM) 100 MG tablet Take 1 tablet by mouth Daily As Needed.   2023    amLODIPine (NORVASC) 10 MG tablet Take 1 tablet by mouth Daily. 90 tablet 3 Past Month    apixaban (ELIQUIS) 5 MG tablet tablet Take 1  tablet by mouth Every 12 (Twelve) Hours. Indications: Other - full anticoagulation, history of DVT/PE 60 tablet 2 9/24/2023    carvedilol (COREG) 12.5 MG tablet Take 1 tablet by mouth 2 (Two) Times a Day With Meals. 180 tablet 3 9/24/2023    Cholecalciferol (Vitamin D3) 50 MCG (2000 UT) capsule Take 1 capsule by mouth Daily.   9/24/2023    Folbic 2.5-25-2 MG tablet tablet Take 1 tablet by mouth Daily.   9/24/2023    furosemide (LASIX) 40 MG tablet Take 1 tablet by mouth Daily.   Past Week    gabapentin (NEURONTIN) 300 MG capsule Take 1 capsule by mouth 2 (Two) Times a Day.   Past Week    levothyroxine (SYNTHROID, LEVOTHROID) 50 MCG tablet Take 1 tablet by mouth Daily.   9/24/2023    lisinopril (PRINIVIL,ZESTRIL) 40 MG tablet Take 1 tablet by mouth Daily. 90 tablet 3 9/24/2023    montelukast (SINGULAIR) 10 MG tablet Take 1 tablet by mouth Every Night. 30 tablet 1 Past Week    potassium chloride (K-DUR,KLOR-CON) 10 MEQ CR tablet Take 1 tablet by mouth Daily. 90 tablet 3 Past Week    sildenafil (REVATIO) 20 MG tablet Take 1 tablet by mouth Every 8 (Eight) Hours. 90 tablet 1 9/24/2023     Allergies:  Patient has no known allergies.    Objective     Vital Signs  Temp:  [97.5 °F (36.4 °C)-98.6 °F (37 °C)] 98.6 °F (37 °C)  Heart Rate:  [73-80] 75  Resp:  [16-20] 20  BP: (101-153)/() 153/102     Physical Exam:      General Appearance:    Alert, cooperative, in no acute distress   Head:    Normocephalic, without obvious abnormality, atraumatic   Eyes:            Lids and lashes normal, conjunctivae and sclerae normal, no   icterus, no pallor, corneas clear, PERRLA   Ears:    Ears appear intact with no abnormalities noted   Throat:   No oral lesions, no thrush, oral mucosa moist   Neck:   No adenopathy, supple, trachea midline, no thyromegaly, no   carotid bruit, no JVD   Lungs:     Clear to auscultation,respirations regular, even and                  unlabored    Heart:    Regular rhythm and normal rate, normal S1 and  S2, no            murmur, no gallop, no rub, no click   Chest Wall:    No abnormalities observed   Abdomen:     Normal bowel sounds, no masses, no organomegaly, soft        non-tender, non-distended, no guarding, no rebound                tenderness   Extremities:   Left ankle - no erythema, mild STS.  Good ROM, no cellulitis changes   Pulses:   Pulses palpable and equal bilaterally   Skin:   No bleeding, bruising or rash   Lymph nodes:   No palpable adenopathy   Neurologic:   Cranial nerves 2 - 12 grossly intact, sensation intact, DTR       present and equal bilaterally       Results Review:     Imaging Results (Last 24 Hours)       Procedure Component Value Units Date/Time    XR Foot 3+ View Left [[id number]] Collected: 09/24/23 1553     Updated: 09/24/23 1556    Narrative:      XR FOOT 3+ VW LEFT    Date of Exam: 9/24/2023 3:15 PM EDT    Indication: pain redness swelling    Comparison: None available.    Findings:  No fracture or dislocation. There is soft tissue swelling diffusely at the left foot. No discrete osseous erosion. Plantar calcaneal bone spur. Enthesopathy at the Achilles insertion on the calcaneus. No radiodense foreign body.      Impression:      Impression:  1. Negative for acute osseous abnormality.  2. Nonspecific soft tissue swelling.        Electronically Signed: Randall Zarco MD    9/24/2023 3:54 PM EDT    Workstation ID: [id number]             Lab Results (last 24 hours)       Procedure Component Value Units Date/Time    Magnesium [[id number]] Updated: 09/25/23 0900    Specimen: Blood     Basic Metabolic Panel [[id number]] Updated: 09/25/23 0900    Specimen: Blood     CBC & Differential [[id number]]  (Abnormal) Collected: 09/25/23 0718    Specimen: Blood Updated: 09/25/23 0834    Narrative:      The following orders were created for panel order CBC & Differential.  Procedure                               Abnormality         Status                     ---------                                -----------         ------                     CBC Auto Differential[[id number]]        Abnormal            Final result                 Please view results for these tests on the individual orders.    CBC Auto Differential [[id number]]  (Abnormal) Collected: 09/25/23 0718    Specimen: Blood Updated: 09/25/23 0834     WBC 10.90 10*3/mm3      RBC 4.80 10*6/mm3      Hemoglobin 13.7 g/dL      Hematocrit 41.6 %      MCV 86.6 fL      MCH 28.6 pg      MCHC 33.0 g/dL      RDW 16.7 %      RDW-SD 53.4 fl      MPV 9.0 fL      Platelets 198 10*3/mm3      Neutrophil % 92.1 %      Lymphocyte % 3.3 %      Monocyte % 3.7 %      Eosinophil % 0.0 %      Basophil % 0.9 %      Neutrophils, Absolute 10.10 10*3/mm3      Lymphocytes, Absolute 0.40 10*3/mm3      Monocytes, Absolute 0.40 10*3/mm3      Eosinophils, Absolute 0.00 10*3/mm3      Basophils, Absolute 0.10 10*3/mm3      nRBC 0.0 /100 WBC     POC Lactate [[id number]]  (Normal) Collected: 09/24/23 1709    Specimen: Blood Updated: 09/24/23 1711     Lactate 1.1 mmol/L      Comment: Serial Number: [id number]:  [id number]       Blood Culture - Blood, Hand, Right [[id number]] Collected: 09/24/23 1655    Specimen: Blood from Hand, Right Updated: 09/24/23 1706    Blood Culture - Blood, Arm, Right [[id number]] Collected: 09/24/23 1632    Specimen: Blood from Arm, Right Updated: 09/24/23 1637    Basic Metabolic Panel [[id number]]  (Abnormal) Collected: 09/24/23 1448    Specimen: Blood Updated: 09/24/23 1523     Glucose 155 mg/dL      BUN 40 mg/dL      Creatinine 1.22 mg/dL      Sodium 141 mmol/L      Potassium 4.5 mmol/L      Comment: Slight hemolysis detected by analyzer. Results may be affected.        Chloride 105 mmol/L      CO2 24.0 mmol/L      Calcium 9.8 mg/dL      BUN/Creatinine Ratio 32.8     Anion Gap 12.0 mmol/L      eGFR 44.4 mL/min/1.73     Narrative:      GFR Normal >60  Chronic Kidney Disease <60  Kidney Failure <15    The GFR formula is only valid for adults with stable  renal function between ages 18 and 70.    C-reactive Protein [[id number]]  (Abnormal) Collected: 09/24/23 1448    Specimen: Blood Updated: 09/24/23 1523     C-Reactive Protein 25.41 mg/dL     Uric Acid [[id number]]  (Abnormal) Collected: 09/24/23 1448    Specimen: Blood Updated: 09/24/23 1523     Uric Acid 9.7 mg/dL     Sedimentation Rate [[id number]]  (Abnormal) Collected: 09/24/23 1448    Specimen: Blood Updated: 09/24/23 1458     Sed Rate 44 mm/hr     CBC & Differential [[id number]]  (Abnormal) Collected: 09/24/23 1448    Specimen: Blood Updated: 09/24/23 1454    Narrative:      The following orders were created for panel order CBC & Differential.  Procedure                               Abnormality         Status                     ---------                               -----------         ------                     CBC Auto Differential[[id number]]        Abnormal            Final result                 Please view results for these tests on the individual orders.    CBC Auto Differential [[id number]]  (Abnormal) Collected: 09/24/23 1448    Specimen: Blood Updated: 09/24/23 1454     WBC 17.40 10*3/mm3      RBC 4.78 10*6/mm3      Hemoglobin 13.4 g/dL      Hematocrit 41.2 %      MCV 86.2 fL      MCH 28.0 pg      MCHC 32.5 g/dL      RDW 16.6 %      RDW-SD 52.1 fl      MPV 8.3 fL      Platelets 227 10*3/mm3      Neutrophil % 84.9 %      Lymphocyte % 3.0 %      Monocyte % 11.8 %      Eosinophil % 0.0 %      Basophil % 0.3 %      Neutrophils, Absolute 14.80 10*3/mm3      Lymphocytes, Absolute 0.50 10*3/mm3      Monocytes, Absolute 2.10 10*3/mm3      Eosinophils, Absolute 0.00 10*3/mm3      Basophils, Absolute 0.10 10*3/mm3      nRBC 0.1 /100 WBC              I reviewed the patient's new clinical results.    Assessment & Plan       Cellulitis of left foot  Left ankle pain  Acute gouty arthritis  Pulmonary hypertension - Revatio  CKD stage 3 - creatinine is stable  COPD - Singulair,   Essential hypertension - amlodipine,  carvedilol, lisinopril  H/O PE - continue Eliquis  DDD lumbar spine - gabapentin  Hypothyroidism - levothyroxine    Clinical picture is consistent with acute gouty arthritis, especially give near-complete resolution within hours of steroids.  There is no visible sign of cellulitis this morning, so antibiotics have been discontinued..  Will restart allopurinol and complete a short steroid taper.  She has follow up appointment arranged with Dr. Mcdaniels, who can recheck uric acid level.    PT eval.    Home soon.    I discussed the patient's findings and my recommendations with patient.     Rosamaria Jin MD  09/25/23  11:23 EDT

## 2023-09-25 NOTE — DISCHARGE PLACEMENT REQUEST
"Gabrielle Lyles (83 y.o. Female)       Date of Birth   1940    Social Security Number       Address   [address] [place] [address] [place] [address]    Home Phone   [phone]    MRN   [id number]       Alevism   Shinto    Marital Status                               Admission Date   9/24/23    Admission Type   Emergency    Admitting Provider   Juan C Merlos MD    Attending Provider   Rosamaria Jin MD    Department, Room/Bed   [facility] OBSERVATION, 221/1       Discharge Date       Discharge Disposition       Discharge Destination                                 Attending Provider: Rosamaria Jin MD    Allergies: No Known Allergies    Isolation: None   Infection: Other (05/03/23)   Code Status: CPR    Ht: 162.6 cm (64\")   Wt: 66.8 kg (147 lb 4.3 oz)    Admission Cmt: None   Principal Problem: Cellulitis of left foot [L03.116]                   Active Insurance as of 9/24/2023       Primary Coverage       Payor Plan Insurance Group Employer/Plan Group    HUMANA MEDICARE REPLACEMENT HU[employer]NA MEDICARE REPLACEMENT [id number]       Payor Plan Address Payor Plan Phone Number Payor Plan Fax Number Effective Dates    [address] [address] [phone]  1/1/2018 - None Entered    [facility] [id number]         Subscriber Name Subscriber Birth Date Member ID       GABRIELLE LYLES 1940 [id number]                     Emergency Contacts        (Rel.) Home Phone Work Phone Mobile Phone    MARVIN DURANT (Daughter) [phone] -- --                "

## 2023-09-25 NOTE — PLAN OF CARE
Problem: Adult Inpatient Plan of Care  Goal: Plan of Care Review  Outcome: Ongoing, Progressing  Goal: Patient-Specific Goal (Individualized)  Outcome: Ongoing, Progressing  Goal: Absence of Hospital-Acquired Illness or Injury  Outcome: Ongoing, Progressing  Intervention: Identify and Manage Fall Risk  Recent Flowsheet Documentation  Taken 9/25/2023 1630 by Ramila Butler RN  Safety Promotion/Fall Prevention:   safety round/check completed   toileting scheduled   room organization consistent   nonskid shoes/slippers when out of bed   lighting adjusted   fall prevention program maintained   clutter free environment maintained   assistive device/personal items within reach   activity supervised  Taken 9/25/2023 1402 by Ramila Butler RN  Safety Promotion/Fall Prevention:   safety round/check completed   toileting scheduled   room organization consistent   nonskid shoes/slippers when out of bed   lighting adjusted   fall prevention program maintained   clutter free environment maintained   assistive device/personal items within reach   activity supervised  Taken 9/25/2023 1205 by Ramila Butler, AMARILIS  Safety Promotion/Fall Prevention:   safety round/check completed   toileting scheduled   room organization consistent   nonskid shoes/slippers when out of bed   lighting adjusted   fall prevention program maintained   clutter free environment maintained   assistive device/personal items within reach   activity supervised  Taken 9/25/2023 1000 by Ramila Butler, RN  Safety Promotion/Fall Prevention:   safety round/check completed   toileting scheduled   room organization consistent   nonskid shoes/slippers when out of bed   lighting adjusted   fall prevention program maintained   clutter free environment maintained   assistive device/personal items within reach   activity supervised  Taken 9/25/2023 0844 by Ramila Butler, RN  Safety Promotion/Fall Prevention:   safety round/check completed   toileting scheduled   room organization  consistent   nonskid shoes/slippers when out of bed   lighting adjusted   fall prevention program maintained   clutter free environment maintained   assistive device/personal items within reach   activity supervised  Taken 9/25/2023 0724 by Ramila Butler RN  Safety Promotion/Fall Prevention:   safety round/check completed   toileting scheduled   room organization consistent   nonskid shoes/slippers when out of bed   lighting adjusted   fall prevention program maintained   clutter free environment maintained   assistive device/personal items within reach   activity supervised  Intervention: Prevent Skin Injury  Recent Flowsheet Documentation  Taken 9/25/2023 1630 by Ramila Butler RN  Body Position:   position changed independently   weight shifting  Taken 9/25/2023 1402 by Ramila Butler RN  Body Position:   position changed independently   weight shifting  Taken 9/25/2023 1205 by Ramila Butler RN  Body Position:   position changed independently   weight shifting  Taken 9/25/2023 1000 by Ramila Butler RN  Body Position:   position changed independently   weight shifting  Taken 9/25/2023 0844 by Ramila Butler RN  Body Position:   position changed independently   weight shifting  Taken 9/25/2023 0724 by Ramila Butler RN  Body Position:   position changed independently   weight shifting  Skin Protection: adhesive use limited  Intervention: Prevent and Manage VTE (Venous Thromboembolism) Risk  Recent Flowsheet Documentation  Taken 9/25/2023 1630 by Ramila Butler RN  Activity Management: activity encouraged  Taken 9/25/2023 1402 by Ramila Butler RN  Activity Management:   activity encouraged   up in chair  Taken 9/25/2023 1205 by Ramila Butler RN  Activity Management:   activity encouraged   up in chair  Taken 9/25/2023 1000 by Ramila Butler RN  Activity Management: activity encouraged  Taken 9/25/2023 0844 by Ramila Butler RN  Activity Management: activity encouraged  Taken 9/25/2023 0724 by Ramila Butler RN  Activity Management:  activity encouraged  VTE Prevention/Management:   bilateral   sequential compression devices off   patient refused intervention  Range of Motion: active ROM (range of motion) encouraged  Goal: Optimal Comfort and Wellbeing  Outcome: Ongoing, Progressing  Intervention: Monitor Pain and Promote Comfort  Recent Flowsheet Documentation  Taken 9/25/2023 1205 by Ramila Butler RN  Pain Management Interventions: position adjusted  Intervention: Provide Person-Centered Care  Recent Flowsheet Documentation  Taken 9/25/2023 0724 by Ramila Butler RN  Trust Relationship/Rapport:   care explained   choices provided  Goal: Readiness for Transition of Care  Outcome: Ongoing, Progressing     Problem: Skin Injury Risk Increased  Goal: Skin Health and Integrity  Outcome: Ongoing, Progressing  Intervention: Optimize Skin Protection  Recent Flowsheet Documentation  Taken 9/25/2023 1630 by Ramila Butler RN  Head of Bed (HOB) Positioning: HOB at 30 degrees  Taken 9/25/2023 1000 by Ramila Butler RN  Head of Bed (HOB) Positioning: HOB at 20-30 degrees  Taken 9/25/2023 0844 by Ramila Butler RN  Head of Bed (HOB) Positioning: HOB at 30-45 degrees  Taken 9/25/2023 0724 by Ramila Butler RN  Pressure Reduction Techniques: heels elevated off bed  Head of Bed (HOB) Positioning: HOB at 30-45 degrees  Pressure Reduction Devices: positioning supports utilized  Skin Protection: adhesive use limited     Problem: Fall Injury Risk  Goal: Absence of Fall and Fall-Related Injury  Outcome: Ongoing, Progressing  Intervention: Identify and Manage Contributors  Recent Flowsheet Documentation  Taken 9/25/2023 1630 by Ramila Butler, RN  Medication Review/Management: medications reviewed  Taken 9/25/2023 1402 by Ramila Butler RN  Medication Review/Management: medications reviewed  Taken 9/25/2023 1205 by Ramila Butler, RN  Medication Review/Management: medications reviewed  Taken 9/25/2023 1000 by Ramila Butler, RN  Medication Review/Management: medications reviewed  Taken  9/25/2023 0844 by Ramila Butler, RN  Medication Review/Management: medications reviewed  Taken 9/25/2023 0724 by Ramila Butler RN  Medication Review/Management: medications reviewed  Intervention: Promote Injury-Free Environment  Recent Flowsheet Documentation  Taken 9/25/2023 1630 by Ramila Butler, RN  Safety Promotion/Fall Prevention:   safety round/check completed   toileting scheduled   room organization consistent   nonskid shoes/slippers when out of bed   lighting adjusted   fall prevention program maintained   clutter free environment maintained   assistive device/personal items within reach   activity supervised  Taken 9/25/2023 1402 by Ramila Butler, AMARILIS  Safety Promotion/Fall Prevention:   safety round/check completed   toileting scheduled   room organization consistent   nonskid shoes/slippers when out of bed   lighting adjusted   fall prevention program maintained   clutter free environment maintained   assistive device/personal items within reach   activity supervised  Taken 9/25/2023 1205 by Ramila Butler, RN  Safety Promotion/Fall Prevention:   safety round/check completed   toileting scheduled   room organization consistent   nonskid shoes/slippers when out of bed   lighting adjusted   fall prevention program maintained   clutter free environment maintained   assistive device/personal items within reach   activity supervised  Taken 9/25/2023 1000 by Ramila Butler, RN  Safety Promotion/Fall Prevention:   safety round/check completed   toileting scheduled   room organization consistent   nonskid shoes/slippers when out of bed   lighting adjusted   fall prevention program maintained   clutter free environment maintained   assistive device/personal items within reach   activity supervised  Taken 9/25/2023 0844 by Ramila Butler, RN  Safety Promotion/Fall Prevention:   safety round/check completed   toileting scheduled   room organization consistent   nonskid shoes/slippers when out of bed   lighting adjusted   fall  prevention program maintained   clutter free environment maintained   assistive device/personal items within reach   activity supervised  Taken 9/25/2023 0724 by Ramila Butler RN  Safety Promotion/Fall Prevention:   safety round/check completed   toileting scheduled   room organization consistent   nonskid shoes/slippers when out of bed   lighting adjusted   fall prevention program maintained   clutter free environment maintained   assistive device/personal items within reach   activity supervised     Problem: Pain Acute  Goal: Acceptable Pain Control and Functional Ability  Outcome: Ongoing, Progressing  Intervention: Prevent or Manage Pain  Recent Flowsheet Documentation  Taken 9/25/2023 1630 by Ramila Butler RN  Medication Review/Management: medications reviewed  Taken 9/25/2023 1402 by Ramila Butler RN  Medication Review/Management: medications reviewed  Taken 9/25/2023 1205 by Ramila Butler RN  Medication Review/Management: medications reviewed  Taken 9/25/2023 1000 by Ramila Butler RN  Medication Review/Management: medications reviewed  Taken 9/25/2023 0844 by Ramila Butler RN  Medication Review/Management: medications reviewed  Taken 9/25/2023 0724 by Ramila Butler RN  Bowel Elimination Promotion: adequate fluid intake promoted  Medication Review/Management: medications reviewed  Intervention: Develop Pain Management Plan  Recent Flowsheet Documentation  Taken 9/25/2023 1205 by Ramila Butler RN  Pain Management Interventions: position adjusted  Intervention: Optimize Psychosocial Wellbeing  Recent Flowsheet Documentation  Taken 9/25/2023 0724 by Ramila Butler RN  Diversional Activities: television     Problem: Adjustment to Illness (Sepsis/Septic Shock)  Goal: Optimal Coping  Outcome: Ongoing, Progressing     Problem: Bleeding (Sepsis/Septic Shock)  Goal: Absence of Bleeding  Outcome: Ongoing, Progressing     Problem: Glycemic Control Impaired (Sepsis/Septic Shock)  Goal: Blood Glucose Level Within Desired  Range  Outcome: Ongoing, Progressing     Problem: Infection Progression (Sepsis/Septic Shock)  Goal: Absence of Infection Signs and Symptoms  Outcome: Ongoing, Progressing  Intervention: Promote Recovery  Recent Flowsheet Documentation  Taken 9/25/2023 1630 by Ramila Butler RN  Activity Management: activity encouraged  Taken 9/25/2023 1402 by Ramila Butler RN  Activity Management:   activity encouraged   up in chair  Taken 9/25/2023 1205 by Ramila Butler RN  Activity Management:   activity encouraged   up in chair  Taken 9/25/2023 1000 by Ramila Butler RN  Activity Management: activity encouraged  Taken 9/25/2023 0844 by Ramila Butler RN  Activity Management: activity encouraged  Taken 9/25/2023 0724 by Ramila Butler RN  Activity Management: activity encouraged     Problem: Nutrition Impaired (Sepsis/Septic Shock)  Goal: Optimal Nutrition Intake  Outcome: Ongoing, Progressing     Problem: Skin or Soft Tissue Infection  Goal: Absence of Infection Signs and Symptoms  Outcome: Ongoing, Progressing   Goal Outcome Evaluation:   Plan of care discussed with patient who is agreeable. Plan is to discharge home tomorrow.

## 2023-09-25 NOTE — THERAPY EVALUATION
Patient Name: Gabrielle Lyles  : 1940    MRN: [id number]                              Today's Date: 2023       Admit Date: 2023    Visit Dx:     ICD-10-CM ICD-9-CM   1. Cellulitis of left foot  L03.116 682.7     Patient Active Problem List   Diagnosis    Stage 3a chronic kidney disease    Low back pain    Osteopenia    Chronic obstructive pulmonary disease    Gastroesophageal reflux disease    Gout    History of venous thrombosis and embolism    Primary hypertension    Lumbar radiculopathy    Nausea    Parotid duct obstruction    Personal history of malignant neoplasm of breast    Shortness of breath    Aortic aneurysm    Bulging lumbar disc    Spinal stenosis of lumbar region    History of TIA (transient ischemic attack)    Acute exacerbation of chronic obstructive pulmonary disease (COPD)    Parainfluenza infection    Severe pulmonary hypertension    Acute on chronic respiratory failure with hypoxia    Cellulitis of left foot     Past Medical History:   Diagnosis Date    COPD (chronic obstructive pulmonary disease)     Deep vein thrombosis of bilateral lower extremities 2019    History of DVT (deep vein thrombosis) 2013    bilateral lower extremity    History of pneumonia 2012    community acquired pneumonia and right pleural effusion;hospitalized at [facility]    History of pulmonary embolism 2013    bilateral PE    Hypertension 2001    Insomnia     on Ambien 5mg at     Lobular breast cancer, left 2011    Stage IA left upper lobular breast cancer    Malignant neoplasm of left breast in female, estrogen receptor positive 2019    Osteopenia     Severe pulmonary hypertension 3/3/2023    Stage 3a chronic kidney disease 2019    TIA (transient ischemic attack) 10/25/2022     Past Surgical History:   Procedure Laterality Date    CARDIAC CATHETERIZATION N/A 3/3/2023    Procedure: Left and Right Heart Cath with Coronary Angiography;  Surgeon: Richardson Willis MD;   Location: [facility] CATH INVASIVE LOCATION;  Service: Cardiovascular;  Laterality: N/A;    CATARACT EXTRACTION  2015    IVC FILTER RETRIEVAL  06/2014    IVC filter placement by Dr. Card    MAMMO STEREOTACTIC BREAST BX SURGICAL ADD UNI Left 11/01/2011    invasive lobular carcinoma-Dr. Cande Daniel    MASTECTOMY, PARTIAL Left 12/01/2011    and left axillary sentinel lymph node biopsy by Dr. Uriostegui    TUBAL ABDOMINAL LIGATION  1984      General Information       Row Name 09/25/23 1348          General Information    Patient Profile Reviewed yes  -BL     Prior Level of Function independent:;all household mobility;bed mobility  -     Existing Precautions/Restrictions fall  -BL     Barriers to Rehab none identified  -BL       Row Name 09/25/23 1348          Living Environment    People in Home alone  -BL       Row Name 09/25/23 1348          Home Main Entrance    Number of Stairs, Main Entrance two  -BL     Stair Railings, Main Entrance railings safe and in good condition  -BL       Row Name 09/25/23 1348          Stairs Within Home, Primary    Number of Stairs, Within Home, Primary none  -BL       Row Name 09/25/23 1348          Cognition    Orientation Status (Cognition) oriented x 4  -BL       Row Name 09/25/23 1348          Safety Issues, Functional Mobility    Impairments Affecting Function (Mobility) balance;endurance/activity tolerance;pain  -BL               User Key  (r) = Recorded By, (t) = Taken By, (c) = Cosigned By      Initials Name Provider Type     Karen Nelson OT Occupational Therapist                     Mobility/ADL's       Row Name 09/25/23 1351          Bed Mobility    Bed Mobility supine-sit  -BL     Supine-Sit Vance (Bed Mobility) modified independence  -     Assistive Device (Bed Mobility) bed rails;head of bed elevated  -       Row Name 09/25/23 1351          Sit-Stand Transfer    Sit-Stand Vance (Transfers) contact guard;1 person assist  -     Assistive Device  (Sit-Stand Transfers) walker, front-wheeled  -       Row Name 09/25/23 1351          Functional Mobility    Functional Mobility- Ind. Level standby assist  -     Functional Mobility- Device walker, front-wheeled  -       Row Name 09/25/23 135          Activities of Daily Living    BADL Assessment/Intervention lower body dressing  -       Row Name 09/25/23 [facility]          Lower Body Dressing Assessment/Training    Jones Level (Lower Body Dressing) don;socks;set up  -               User Key  (r) = Recorded By, (t) = Taken By, (c) = Cosigned By      Initials Name Provider Type     Karen Nelson OT Occupational Therapist                   Obj/Interventions       Row Name 09/25/23 [facility]          Sensory Assessment (Somatosensory)    Sensory Assessment (Somatosensory) sensation intact  -[facility] Name 09/25/23 [facility]          Vision Assessment/Intervention    Visual Impairment/Limitations WFL  -[facility] Name 09/25/23 [facility]          Range of Motion Comprehensive    Comment, General Range of Motion L shoulder limited 50%  -[facility] Name 09/25/23 [facility]          Strength Comprehensive (MMT)    Comment, General Manual Muscle Testing (MMT) Assessment LUE 2-/5; RUE 3/5  -[facility] Name 09/25/23 1354          Motor Skills    Motor Skills functional endurance  -     Functional Endurance fair  -       Row Name 09/25/23 1354          Balance    Balance Assessment sitting static balance;standing static balance;standing dynamic balance;sitting dynamic balance  -BL     Static Sitting Balance independent  -BL     Dynamic Sitting Balance independent  -BL     Position, Sitting Balance unsupported  -BL     Static Standing Balance contact guard  -BL     Dynamic Standing Balance contact guard  -BL     Position/Device Used, Standing Balance walker, front-wheeled  -               User Key  (r) = Recorded By, (t) = Taken By, (c) = Cosigned By      Initials Name Provider Type    BL Karen Nelson, OT  Occupational Therapist                   Goals/Plan    No documentation.                  Clinical Impression       Row Name 09/25/23 1356          Plan of Care Review    Plan of Care Reviewed With patient  -BL     Outcome Evaluation 84 y/o female with c/o pain and swelling in L foot and LE. Pt reports pain is similar to gout pain which she has had in the past. PMH: O2 dependent COPD. L foot X-ray: (-). Pt lives alone at home and reports she is independent with all ADLs and functional mobility without an AD. At time of evaluation, pt able to don her scks, get out of bed, stand, and walk without any physical assistance. No further IP OT warranted. OT recommending pt return home.  -BL       Row Name 09/25/23 1356          Therapy Assessment/Plan (OT)    Criteria for Skilled Therapeutic Interventions Met (OT) no  -BL     Therapy Frequency (OT) evaluation only  -BL       Row Name 09/25/23 1356          Therapy Plan Review/Discharge Plan (OT)    Anticipated Discharge Disposition (OT) home  -[facility] Name 09/25/23 1356          Vital Signs    Recovery Time VSS  -BL       Row Name 09/25/23 1356          Positioning and Restraints    Pre-Treatment Position in bed  -BL     Post Treatment Position bed  -BL     In Bed notified nsg;call light within reach;encouraged to call for assist  -BL               User Key  (r) = Recorded By, (t) = Taken By, (c) = Cosigned By      Initials Name Provider Type    Karen Kline, OT Occupational Therapist                   Outcome Measures       Row Name 09/25/23 1311          How much help from another person do you currently need...    Turning from your back to your side while in flat bed without using bedrails? 4  -AM     Moving from lying on back to sitting on the side of a flat bed without bedrails? 4  -AM     Moving to and from a bed to a chair (including a wheelchair)? 3  -AM     Standing up from a chair using your arms (e.g., wheelchair, bedside chair)? 3  -AM     Climbing  3-5 steps with a railing? 2  -AM     To walk in hospital room? 3  -AM     AM-PAC 6 Clicks Score (PT) 19  -AM     Highest level of mobility 6 --> Walked 10 steps or more  -AM       Row Name 09/25/23 1311          Functional Assessment    Outcome Measure Options AM-PAC 6 Clicks Basic Mobility (PT)  -AM               User Key  (r) = Recorded By, (t) = Taken By, (c) = Cosigned By      Initials Name Provider Type    AM Jose Gregory, PT Physical Therapist                    Occupational Therapy Education       Title: PT OT SLP Therapies (Done)       Topic: Occupational Therapy (Done)       Point: ADL training (Done)       Description:   Instruct learner(s) on proper safety adaptation and remediation techniques during self care or transfers.   Instruct in proper use of assistive devices.                  Learning Progress Summary             Patient Acceptance, E,TB, VU by BL at 9/25/2023 1400                                         User Key       Initials Effective Dates Name Provider Type Discipline     09/22/22 -  Karen Nelson OT Occupational Therapist OT                  OT Recommendation and Plan  Therapy Frequency (OT): evaluation only  Plan of Care Review  Plan of Care Reviewed With: patient  Outcome Evaluation: 84 y/o female with c/o pain and swelling in L foot and LE. Pt reports pain is similar to gout pain which she has had in the past. PMH: O2 dependent COPD. L foot X-ray: (-). Pt lives alone at home and reports she is independent with all ADLs and functional mobility without an AD. At time of evaluation, pt able to don her scks, get out of bed, stand, and walk without any physical assistance. No further IP OT warranted. OT recommending pt return home.     Time Calculation:         Time Calculation- OT       Row Name 09/25/23 1400 09/25/23 0915          Time Calculation- OT    OT Start Time 0826  -BL --     OT Stop Time 0847  -BL --     OT Time Calculation (min) 21 min  -BL --     OT Received On  09/25/23  - --     OT - Next Appointment -- 09/26/23  -               User Key  (r) = Recorded By, (t) = Taken By, (c) = Cosigned By      Initials Name Provider Type    Karen Kline OT Occupational Therapist                  Therapy Charges for Today       Code Description Service Date Service Provider Modifiers Qty    [id number]  OT EVAL LOW COMPLEXITY 4 9/25/2023 Karen Nelson OT GO 1                 Karen Nelson OT  9/25/2023

## 2023-09-25 NOTE — CASE MANAGEMENT/SOCIAL WORK
Discharge Planning Assessment  [facility]     Patient Name: Gabrielle Lyles  MRN: [id number]  Today's Date: 9/25/2023    Admit Date: 9/24/2023    Plan: Return home alone. Has Oxygen through [facility]. Referred to [facility]: acceptance pending. Referred to [facility] for rollator walker. Will need hh and dme order   Discharge Needs Assessment       Row Name 09/25/23 1346       Living Environment    People in Home alone    Current Living Arrangements home    Potentially Unsafe Housing Conditions none    Primary Care Provided by self    Provides Primary Care For no one, unable/limited ability to care for self    Family Caregiver if Needed child(blake), adult    Family Caregiver Names Darby ratliff    Quality of Family Relationships helpful;involved;supportive    Able to Return to Prior Arrangements yes       Resource/Environmental Concerns    Transportation Concerns none       Transition Planning    Patient/Family Anticipates Transition to home    Patient/Family Anticipated Services at Transition home health care    Transportation Anticipated family or friend will provide;car, drives self       Discharge Needs Assessment    Readmission Within the Last 30 Days no previous admission in last 30 days    Equipment Currently Used at Home walker, rolling;oxygen    Concerns to be Addressed discharge planning    Anticipated Changes Related to Illness none    Equipment Needed After Discharge none    Outpatient/Agency/Support Group Needs homecare agency    Provided Post Acute Provider List? Yes    Post Acute Provider List Home Health    Delivered To Patient    Method of Delivery In person    Patient's Choice of Community Agency(s) [facility]                   Discharge Plan       Row Name 09/25/23 6855       Plan    Plan Return home alone. Has Oxygen through [facility]. Referred to [facility]: acceptance pending. Referred to [facility] for rollator walker. Will need hh and dme order    Patient/Family in Agreement with Plan yes    Plan Comments  Barriers: Pain, med changes. CM met with Mrs. Lyles and her daughter. She lives alone,drives and is mostly independent. Her daughter lives close by and assists as needed. It is a burden to get out of her home for OP Pulmonary Rehab and  she prefers home health. CM referred to Cande with [facility] for pt, ot, nursing with pulmonary program. She uses continuous oxygen at 2.5-3 liters through [facility]. She requested a rollator walker and CM referred to Althea with [facility] and added to Epic. Verified PCP and Pharmacy                  Continued Care and Services - Admitted Since 9/24/2023       Durable Medical Equipment       Service Provider Request Status Selected Services Address Phone Fax Patient Preferred    [facility] Pending - Request Sent N/A [id number], [facility] [address] [phone] --       Internal Comment last updated by Lucero Waller 9/25/2023 1330    For rollator walker                         Home Medical Care       Service Provider Request Status Selected Services Address Phone Fax Patient Preferred    [facility] Pending - Request Sent N/A [id number] [facility] [place] [address] [phone] --                  Expected Discharge Date and Time       Expected Discharge Date Expected Discharge Time    Sep 25, 2023            Demographic Summary       Row Name 09/25/23 1345       General Information    Admission Type observation    Arrived From emergency department    Referral Source admission list    Reason for Consult discharge planning    Preferred Language English       Contact Information    Permission Granted to Share Info With                    Functional Status       Row Name 09/25/23 1345       Functional Status    Usual Activity Tolerance good    Current Activity Tolerance moderate       Functional Status, IADL    Medications independent    Meal Preparation independent    Housekeeping assistive person    Laundry  assistive person    Shopping assistive person    IADL Comments independent                     Maintained distance greater than six feet and spent less than 15 minutes in the room.     Lucero RAMAN,RN Case Manager  [facility]  Phone: desk- [phone] cell- [phone]

## 2023-09-25 NOTE — PLAN OF CARE
Goal Outcome Evaluation:  Plan of Care Reviewed With: patient           Outcome Evaluation: Pt is a 84 y/o female with c/o pain and swelling in L foot and LE.  Pt reports pain is similar to gout pain which she has had in the past.  PMH:  O2 dependent COPD.  L foot X-ray: (-).  Pt reports she lives alone in a 1 story home with 2 steps to enter into home.  Pt was independent wtih all functional mobility tasks and did not use an assistive device prior to hospitalization.  Pt on 3L O2, telemetry and IV this date.  Pt was mod I for bed mobility, CGA for transfers with RW.  2nd attempt of sit to stand pt with posterior lean that she was able to self-correct with mod verbal cues.  Pt ambulated 20' with RW with limited WBing/stance time on LLE with CGA.  Pt became dizzy during ambulation and chair brought behind pt.  BP after ambulation: 139/89.  Recommend use of RW for home use (already owns) and HHPT.      Anticipated Discharge Disposition (PT): home with home health

## 2023-09-26 ENCOUNTER — DOCUMENTATION (OUTPATIENT)
Dept: HOME HEALTH SERVICES | Facility: HOME HEALTHCARE | Age: 83
End: 2023-09-26
Payer: MEDICARE

## 2023-09-26 ENCOUNTER — APPOINTMENT (OUTPATIENT)
Dept: CT IMAGING | Facility: HOSPITAL | Age: 83
End: 2023-09-26
Payer: MEDICARE

## 2023-09-26 LAB
ANION GAP SERPL CALCULATED.3IONS-SCNC: 10 MMOL/L (ref 5–15)
BASOPHILS # BLD AUTO: 0 10*3/MM3 (ref 0–0.2)
BASOPHILS NFR BLD AUTO: 0.1 % (ref 0–1.5)
BUN SERPL-MCNC: 55 MG/DL (ref 8–23)
BUN/CREAT SERPL: 37.7 (ref 7–25)
CALCIUM SPEC-SCNC: 9.1 MG/DL (ref 8.6–10.5)
CHLORIDE SERPL-SCNC: 102 MMOL/L (ref 98–107)
CO2 SERPL-SCNC: 24 MMOL/L (ref 22–29)
CREAT SERPL-MCNC: 1.46 MG/DL (ref 0.57–1)
DEPRECATED RDW RBC AUTO: 51.6 FL (ref 37–54)
EGFRCR SERPLBLD CKD-EPI 2021: 35.8 ML/MIN/1.73
EOSINOPHIL # BLD AUTO: 0 10*3/MM3 (ref 0–0.4)
EOSINOPHIL NFR BLD AUTO: 0 % (ref 0.3–6.2)
ERYTHROCYTE [DISTWIDTH] IN BLOOD BY AUTOMATED COUNT: 16.5 % (ref 12.3–15.4)
GLUCOSE SERPL-MCNC: 144 MG/DL (ref 65–99)
HCT VFR BLD AUTO: 36.7 % (ref 34–46.6)
HGB BLD-MCNC: 12.1 G/DL (ref 12–15.9)
LYMPHOCYTES # BLD AUTO: 0.4 10*3/MM3 (ref 0.7–3.1)
LYMPHOCYTES NFR BLD AUTO: 2.5 % (ref 19.6–45.3)
MAGNESIUM SERPL-MCNC: 2.2 MG/DL (ref 1.6–2.4)
MCH RBC QN AUTO: 28.3 PG (ref 26.6–33)
MCHC RBC AUTO-ENTMCNC: 33 G/DL (ref 31.5–35.7)
MCV RBC AUTO: 85.8 FL (ref 79–97)
MONOCYTES # BLD AUTO: 0.6 10*3/MM3 (ref 0.1–0.9)
MONOCYTES NFR BLD AUTO: 3.9 % (ref 5–12)
NEUTROPHILS NFR BLD AUTO: 13.5 10*3/MM3 (ref 1.7–7)
NEUTROPHILS NFR BLD AUTO: 93.5 % (ref 42.7–76)
NRBC BLD AUTO-RTO: 0 /100 WBC (ref 0–0.2)
PLATELET # BLD AUTO: 200 10*3/MM3 (ref 140–450)
PMV BLD AUTO: 8.8 FL (ref 6–12)
POTASSIUM SERPL-SCNC: 4.7 MMOL/L (ref 3.5–5.2)
RBC # BLD AUTO: 4.28 10*6/MM3 (ref 3.77–5.28)
SODIUM SERPL-SCNC: 136 MMOL/L (ref 136–145)
WBC NRBC COR # BLD: 14.4 10*3/MM3 (ref 3.4–10.8)

## 2023-09-26 PROCEDURE — 72125 CT NECK SPINE W/O DYE: CPT

## 2023-09-26 PROCEDURE — 80048 BASIC METABOLIC PNL TOTAL CA: CPT | Performed by: PHYSICIAN ASSISTANT

## 2023-09-26 PROCEDURE — G0378 HOSPITAL OBSERVATION PER HR: HCPCS

## 2023-09-26 PROCEDURE — 85025 COMPLETE CBC W/AUTO DIFF WBC: CPT | Performed by: PHYSICIAN ASSISTANT

## 2023-09-26 PROCEDURE — 70450 CT HEAD/BRAIN W/O DYE: CPT

## 2023-09-26 PROCEDURE — 83735 ASSAY OF MAGNESIUM: CPT | Performed by: PHYSICIAN ASSISTANT

## 2023-09-26 PROCEDURE — 63710000001 PREDNISONE PER 1 MG: Performed by: PHYSICIAN ASSISTANT

## 2023-09-26 RX ADMIN — SILDENAFIL 20 MG: 20 TABLET, FILM COATED ORAL at 20:35

## 2023-09-26 RX ADMIN — POTASSIUM CHLORIDE 10 MEQ: 1500 TABLET, EXTENDED RELEASE ORAL at 08:11

## 2023-09-26 RX ADMIN — GABAPENTIN 300 MG: 300 CAPSULE ORAL at 20:35

## 2023-09-26 RX ADMIN — Medication 10 ML: at 08:52

## 2023-09-26 RX ADMIN — CARVEDILOL 12.5 MG: 6.25 TABLET, FILM COATED ORAL at 17:06

## 2023-09-26 RX ADMIN — APIXABAN 5 MG: 5 TABLET, FILM COATED ORAL at 08:12

## 2023-09-26 RX ADMIN — FUROSEMIDE 40 MG: 40 TABLET ORAL at 08:12

## 2023-09-26 RX ADMIN — HYDROCODONE BITARTRATE AND ACETAMINOPHEN 1 TABLET: 5; 325 TABLET ORAL at 17:10

## 2023-09-26 RX ADMIN — LEVOTHYROXINE SODIUM 50 MCG: 0.05 TABLET ORAL at 08:11

## 2023-09-26 RX ADMIN — ALLOPURINOL 100 MG: 100 TABLET ORAL at 08:11

## 2023-09-26 RX ADMIN — HYDROCODONE BITARTRATE AND ACETAMINOPHEN 1 TABLET: 5; 325 TABLET ORAL at 11:00

## 2023-09-26 RX ADMIN — SILDENAFIL 20 MG: 20 TABLET, FILM COATED ORAL at 17:06

## 2023-09-26 RX ADMIN — AMLODIPINE BESYLATE 10 MG: 5 TABLET ORAL at 08:11

## 2023-09-26 RX ADMIN — PREDNISONE 40 MG: 20 TABLET ORAL at 08:12

## 2023-09-26 RX ADMIN — Medication 10 ML: at 20:35

## 2023-09-26 RX ADMIN — SILDENAFIL 20 MG: 20 TABLET, FILM COATED ORAL at 08:11

## 2023-09-26 RX ADMIN — CARVEDILOL 12.5 MG: 6.25 TABLET, FILM COATED ORAL at 08:11

## 2023-09-26 RX ADMIN — LISINOPRIL 40 MG: 20 TABLET ORAL at 08:11

## 2023-09-26 RX ADMIN — GABAPENTIN 300 MG: 300 CAPSULE ORAL at 08:11

## 2023-09-26 RX ADMIN — MONTELUKAST 10 MG: 10 TABLET, FILM COATED ORAL at 20:35

## 2023-09-26 RX ADMIN — FAMOTIDINE 20 MG: 20 TABLET ORAL at 08:11

## 2023-09-26 NOTE — PROGRESS NOTES
LOS: 0 days   Patient Care Team:  Shaylee Mcdaniels MD as PCP - General (Family Medicine)  Richardson Willis MD as Cardiologist (Cardiology)  Rafy Rodriguez MD as Consulting Physician (Hematology and Oncology)  Christianne Phillips, RN as Licensed Practical Nurse    Subjective     Patient c/o of back pain from fall    Review of Systems   Constitutional:  Positive for activity change and fatigue.   HENT: Negative.     Respiratory: Negative.     Cardiovascular: Negative.    Gastrointestinal: Negative.    Genitourinary: Negative.    Musculoskeletal:  Positive for back pain and gait problem.   Neurological:  Positive for weakness.   Psychiatric/Behavioral: Negative.           Objective     Vital Signs  Temp:  [97.4 °F (36.3 °C)-98.7 °F (37.1 °C)] 98.7 °F (37.1 °C)  Heart Rate:  [58-77] 77  Resp:  [13-22] 22  BP: (103-167)/(58-95) 167/70      Physical Exam  Vitals reviewed.   Constitutional:       Appearance: She is not ill-appearing.   HENT:      Head: Normocephalic and atraumatic.      Right Ear: External ear normal.      Left Ear: External ear normal.      Nose: Nose normal.      Mouth/Throat:      Mouth: Mucous membranes are moist.   Eyes:      General:         Right eye: No discharge.         Left eye: No discharge.   Cardiovascular:      Rate and Rhythm: Normal rate and regular rhythm.      Pulses: Normal pulses.      Heart sounds: Normal heart sounds.   Pulmonary:      Effort: Pulmonary effort is normal.      Breath sounds: Normal breath sounds.   Abdominal:      General: Bowel sounds are normal.      Palpations: Abdomen is soft.   Musculoskeletal:         General: Normal range of motion.      Cervical back: Normal range of motion.   Skin:     General: Skin is warm.      Coloration: Skin is pale.   Neurological:      Mental Status: She is alert and oriented to person, place, and time.   Psychiatric:         Behavior: Behavior normal.            Results Review:    Lab Results (last 24 hours)       Procedure Component  Value Units Date/Time    Magnesium [[id number]]  (Normal) Collected: 09/26/23 0320    Specimen: Blood Updated: 09/26/23 0438     Magnesium 2.2 mg/dL     Basic Metabolic Panel [[id number]]  (Abnormal) Collected: 09/26/23 0320    Specimen: Blood Updated: 09/26/23 0438     Glucose 144 mg/dL      BUN 55 mg/dL      Creatinine 1.46 mg/dL      Sodium 136 mmol/L      Potassium 4.7 mmol/L      Chloride 102 mmol/L      CO2 24.0 mmol/L      Calcium 9.1 mg/dL      BUN/Creatinine Ratio 37.7     Anion Gap 10.0 mmol/L      eGFR 35.8 mL/min/1.73     Narrative:      GFR Normal >60  Chronic Kidney Disease <60  Kidney Failure <15    The GFR formula is only valid for adults with stable renal function between ages 18 and 70.    CBC & Differential [[id number]]  (Abnormal) Collected: 09/26/23 0320    Specimen: Blood Updated: 09/26/23 0412    Narrative:      The following orders were created for panel order CBC & Differential.  Procedure                               Abnormality         Status                     ---------                               -----------         ------                     CBC Auto Differential[[id number]]        Abnormal            Final result                 Please view results for these tests on the individual orders.    CBC Auto Differential [[id number]]  (Abnormal) Collected: 09/26/23 0320    Specimen: Blood Updated: 09/26/23 0412     WBC 14.40 10*3/mm3      RBC 4.28 10*6/mm3      Hemoglobin 12.1 g/dL      Hematocrit 36.7 %      MCV 85.8 fL      MCH 28.3 pg      MCHC 33.0 g/dL      RDW 16.5 %      RDW-SD 51.6 fl      MPV 8.8 fL      Platelets 200 10*3/mm3      Neutrophil % 93.5 %      Lymphocyte % 2.5 %      Monocyte % 3.9 %      Eosinophil % 0.0 %      Basophil % 0.1 %      Neutrophils, Absolute 13.50 10*3/mm3      Lymphocytes, Absolute 0.40 10*3/mm3      Monocytes, Absolute 0.60 10*3/mm3      Eosinophils, Absolute 0.00 10*3/mm3      Basophils, Absolute 0.00 10*3/mm3      nRBC 0.0 /100 WBC     Blood Culture -  Blood, Hand, Right [[id number]]  (Normal) Collected: 09/24/23 1655    Specimen: Blood from Hand, Right Updated: 09/25/23 1715     Blood Culture No growth at 24 hours    Blood Culture - Blood, Arm, Right [[id number]]  (Normal) Collected: 09/24/23 1632    Specimen: Blood from Arm, Right Updated: 09/25/23 1645     Blood Culture No growth at 24 hours    Magnesium [[id number]]  (Normal) Collected: 09/25/23 1130    Specimen: Blood Updated: 09/25/23 1254     Magnesium 2.3 mg/dL     Basic Metabolic Panel [[id number]]  (Abnormal) Collected: 09/25/23 1130    Specimen: Blood Updated: 09/25/23 1254     Glucose 115 mg/dL      BUN 46 mg/dL      Creatinine 1.33 mg/dL      Sodium 137 mmol/L      Potassium 4.1 mmol/L      Chloride 99 mmol/L      CO2 25.0 mmol/L      Calcium 9.4 mg/dL      BUN/Creatinine Ratio 34.6     Anion Gap 13.0 mmol/L      eGFR 40.0 mL/min/1.73     Narrative:      GFR Normal >60  Chronic Kidney Disease <60  Kidney Failure <15    The GFR formula is only valid for adults with stable renal function between ages 18 and 70.             Imaging Results (Last 24 Hours)       Procedure Component Value Units Date/Time    CT Head Without Contrast [[id number]] Collected: 09/26/23 1042     Updated: 09/26/23 1051    Narrative:      CT HEAD WO CONTRAST    Date of Exam: 9/26/2023 10:27 AM EDT    Indication: Head trauma, minor (Age >= 65y).    Comparison: CT head without contrast 10/25/2022, MRI brain 2/26/2022    Technique: Axial CT images were obtained of the head without contrast administration.  Coronal reconstructions were performed.  Automated exposure control and iterative reconstruction methods were used.    Findings:  There is a scalp contusion/hematoma involving the right posterior occipital region. No underlying fracture. Scalp hematoma at the right frontal region measures 3.3 x 0.9 cm (2/35). No intracranial hemorrhage. No mass effect or midline shift. Mild   cerebral atrophy with mild white matter findings of chronic  microvascular disease. No intraventricular hemorrhage. Mild cerebellar atrophy, stable. Posterior fossa without acute abnormality.    The globes are symmetric. Thinning of the lens noted bilaterally. No retro-orbital hematoma. The mastoid air cells are well-aerated. No sinus fluid level or hemorrhage. Negative for calvarial fracture.      Impression:      Impression:  1. No intracranial hemorrhage.  2. Scalp hematoma involving the right posterior occipital region and right frontal region. No underlying fracture.  3. Mild cerebral atrophy with white matter findings of chronic microvascular disease.        Electronically Signed: Randall Zarco MD    9/26/2023 10:49 AM EDT    Workstation ID: [id number]    CT Cervical Spine Without Contrast [[id number]] Collected: 09/26/23 1042     Updated: 09/26/23 1050    Narrative:        CT CERVICAL SPINE WO CONTRAST    Date of Exam: 9/26/2023 10:27 AM EDT    Indication: Neck trauma (Age >= 65y).    Comparison: None available.    Technique: Axial CT images were obtained of the cervical spine without contrast administration.  Sagittal and coronal reconstructions were performed.  Automated exposure control and iterative reconstruction methods were used.      Findings:  Craniocervical alignment is normal. There are degenerative changes of the C2 dens/anterior C1 ring junction. There is minimal 1 mm anterolisthesis C4 upon C5. There is mild reversal of cervical lordosis centered at C5-6. There is moderate diminished disc   height at C5-6 and C6-7. Prominent anterior osteophytes are present from the C3 through the C7 vertebral levels. No acute cervical spine fracture is seen. A few of the images are mildly motion degraded.    At C2-3, there is mild bilateral facet arthropathy. No significant disc bulge, canal or foraminal stenosis is seen.    At C3-4, mild posterior disc osteophyte formation is present. There is mild left and mild to moderate right facet arthropathy. There is no significant  canal stenosis. There is mild right neural foraminal narrowing. Left neural foramen is patent.    At C4-5, posterior disc osteophyte formation is present. There is mild bilateral facet arthropathy, there is mild bilateral facet arthropathy and right greater than left uncovertebral spurring. No significant central canal stenosis is seen. There is mild   left neural foraminal narrowing. There is severe right neural foraminal stenosis    At C5-6, posterior disc osteophyte formation is present with mild central canal stenosis. There is moderate left and mild right facet arthropathy. Right greater than left uncovertebral spurring is present. There is moderate bilateral neural foraminal   stenosis, slightly greatest on the right.    At C6-7, broad-based posterior disc osteophyte formation is present with moderate canal stenosis. There is bilateral uncovertebral spurring. Mild bilateral facet arthropathy. There is severe right neural foraminal stenosis. There is moderate left neural   foraminal stenosis    At C7-T1, no significant disc bulge or canal stenosis is seen. There is mild bilateral facet arthropathy. There is mild right neural foraminal stenosis. No significant left neural foraminal stenosis.    There is mild scarring in the lung apices. Dense carotid bulb calcifications are present.        Impression:      Impression:  Degenerative changes in the cervical spine. No acute cervical spine findings.      Electronically Signed: Tiffanie Cheung MD    9/26/2023 10:48 AM EDT    Workstation ID: [id number]                 I reviewed the patient's new clinical results.    Medication Review:   Scheduled Meds:allopurinol, 100 mg, Oral, Daily  amLODIPine, 10 mg, Oral, Daily  apixaban, 5 mg, Oral, Q12H  carvedilol, 12.5 mg, Oral, BID With Meals  famotidine, 20 mg, Oral, Daily  furosemide, 40 mg, Oral, Daily  gabapentin, 300 mg, Oral, BID  levothyroxine, 50 mcg, Oral, Daily  lisinopril, 40 mg, Oral, Q24H  montelukast, 10 mg, Oral,  Nightly  potassium chloride, 10 mEq, Oral, Daily  predniSONE, 40 mg, Oral, Daily With Breakfast  senna-docusate sodium, 2 tablet, Oral, BID  sildenafil, 20 mg, Oral, TID  sodium chloride, 10 mL, Intravenous, Q12H      Continuous Infusions:   PRN Meds:.  senna-docusate sodium **AND** polyethylene glycol **AND** bisacodyl **AND** bisacodyl    Calcium Replacement - Follow Nurse / BPA Driven Protocol    HYDROcodone-acetaminophen    Magnesium Standard Dose Replacement - Follow Nurse / BPA Driven Protocol    melatonin    ondansetron **OR** ondansetron    Phosphorus Replacement - Follow Nurse / BPA Driven Protocol    Potassium Replacement - Follow Nurse / BPA Driven Protocol    [COMPLETED] Insert Peripheral IV **AND** sodium chloride    sodium chloride    sodium chloride     Interval History:    Assessment & Plan     Left ankle pain  Acute gouty arthritis  Pulmonary hypertension - Revatio  CKD stage 3 - creatinine is stable  COPD - Singulair,   Essential hypertension - amlodipine, carvedilol, lisinopril  H/O PE - continue Eliquis  DDD lumbar spine - gabapentin  Hypothyroidism - levothyroxine     Clinical picture is consistent with acute gouty arthritis, especially give near-complete resolution within hours of steroids. On allopurinol and complete a short steroid taper. She has follow up appointment arranged with Dr. Mcdaniels, who can recheck uric acid level.    Patient fell this morning and has moderate right forehead hematoma and small lac on back of head. CT head with no bleed and ct cspine with no acute findings. She states she had difficulty moving her affected foot and fell backwards. She does continue with bilateral lower ext edema. Will monitor overnight and have PT re-evaluate. Will hold evening dose of eliquis and it can be re-started tomorrow if no further complications.    Plan for disposition:DIANNE Becerril, APRN  09/26/23  11:14 EDT

## 2023-09-26 NOTE — CASE MANAGEMENT/SOCIAL WORK
[facility]     Patient Name: Gabrielle Lyles  MRN: [id number]  Today's Date: 9/26/2023    Admit Date: 9/24/2023    Plan: Return home alone. Has Oxygen through [facility]. Referred to [facility]: accepted. Referred to [facility] for rollator walker. Will need hh and dme order   Discharge Plan       Row Name 09/26/23 1309       Plan    Plan Return home alone. Has Oxygen through [facility]. Referred to [facility]: accepted. Referred to [facility] for rollator walker. Will need hh and dme order    Plan Comments Barriers: Fall in bathroom this morning with small head lac. CT head no acute finding. Eliquis  put on hold. PT to re-evaluate for discharge recommendations. CM will follow up.                             Phone communication or documentation only - no physical contact with patient or family.   Lucero RAMAN,RN Case Manager  [facility]  Phone: Desk- [phone] cell- [phone]

## 2023-09-26 NOTE — PROGRESS NOTES
Verified demographics. PT is agreeable and has no other agency    Nursing and PT     will be the following provider

## 2023-09-26 NOTE — SIGNIFICANT NOTE
Post Fall Note    Patient: Gabrielle Lyles   Location: 221/1    Time of fall: 0915    Date of fall: 09/26/2023     Location of the fall: Room 221-Bathroom    Describe the Fall: Patient was washing hands in bathroom after using restroom and turned to walk to door. Patient said her feet and legs locked up and wouldn't move. She then fell backwards into the shower. Once she was getting assistance getting up from the ground by RN and NA, she grabbed the shower seat which then fell down and slammed her on the right side of her head.    Interventions in place prior to fall: Call Light Within Reach, Encourage Use of Call Light, Bed Side Rails x1, x2, x3, Bed/Chair in Lowest Position, Bed/Chair Locked, Bed Alarm Set, Falls Wrist Band Applied, Falls Gown Applied, Fall Risk Care Plan Active, Non-Skid Footwear, Items within Reach, Room Clutter Free, Adequate Lighting Provided, and Fall Risk Signage Present    Was the fall witnessed? No    Where was the patient found?: On the ground    Patient position when found?: On Back    If witnessed, what part of the body made contact with the floor or other object? Back and back of Head    Injury: Yes     Is the patient having any pain?: Yes    Level of Consciousness: awake, alert, and oriented     Post fall assessment: 21    Physician notified: Yes    Name of physician: Dr. Jin    Family notified: No    Name of family member notified: Kaitlin    Post fall precautions in place: Patient/ Family Educated, Call Light Within Reach, Encourage Use of Call Light, and Pharmacy Review of Medication     Teaching provided: Instructed patient to always use call light when needing to ambulate, even in the bathroom.           Electronically signed by Giorgio Johnson RN, 09/26/23, 11:12 EDT.

## 2023-09-26 NOTE — PLAN OF CARE
Problem: Adult Inpatient Plan of Care  Goal: Plan of Care Review  Outcome: Ongoing, Progressing  Flowsheets (Taken 9/26/2023 0121)  Plan of Care Reviewed With: patient  Goal: Patient-Specific Goal (Individualized)  Outcome: Ongoing, Progressing  Goal: Absence of Hospital-Acquired Illness or Injury  Outcome: Ongoing, Progressing  Intervention: Identify and Manage Fall Risk  Recent Flowsheet Documentation  Taken 9/26/2023 0000 by Bony Paige RN  Safety Promotion/Fall Prevention: safety round/check completed  Taken 9/25/2023 1930 by Bony Paige RN  Safety Promotion/Fall Prevention: safety round/check completed  Intervention: Prevent Skin Injury  Recent Flowsheet Documentation  Taken 9/26/2023 0000 by Bony Paige RN  Body Position: position changed independently  Taken 9/25/2023 1930 by Bony Paige RN  Body Position: position changed independently  Skin Protection: adhesive use limited  Intervention: Prevent and Manage VTE (Venous Thromboembolism) Risk  Recent Flowsheet Documentation  Taken 9/26/2023 0000 by Bony Paige RN  Activity Management: activity encouraged  Taken 9/25/2023 1930 by Bony Paige RN  Activity Management: activity encouraged  VTE Prevention/Management: patient refused intervention  Range of Motion: active ROM (range of motion) encouraged  Intervention: Prevent Infection  Recent Flowsheet Documentation  Taken 9/26/2023 0000 by Bony Paige RN  Infection Prevention:   rest/sleep promoted   single patient room provided  Taken 9/25/2023 1930 by Bony Paige RN  Infection Prevention:   rest/sleep promoted   single patient room provided  Goal: Optimal Comfort and Wellbeing  Outcome: Ongoing, Progressing  Intervention: Monitor Pain and Promote Comfort  Recent Flowsheet Documentation  Taken 9/26/2023 0000 by Bony Paige RN  Pain Management Interventions: care clustered  Taken 9/25/2023 1930 by Bony Paige RN  Pain Management Interventions:   care clustered   quiet environment  facilitated  Intervention: Provide Person-Centered Care  Recent Flowsheet Documentation  Taken 9/25/2023 1930 by Bony Paige RN  Trust Relationship/Rapport:   care explained   choices provided  Goal: Readiness for Transition of Care  Outcome: Ongoing, Progressing     Problem: Skin Injury Risk Increased  Goal: Skin Health and Integrity  Outcome: Ongoing, Progressing  Intervention: Optimize Skin Protection  Recent Flowsheet Documentation  Taken 9/26/2023 0000 by Bony Paige RN  Head of Bed (HOB) Positioning: HOB at 20-30 degrees  Taken 9/25/2023 1930 by Bony Paige RN  Pressure Reduction Techniques: frequent weight shift encouraged  Head of Bed (HOB) Positioning: HOB at 30 degrees  Pressure Reduction Devices: positioning supports utilized  Skin Protection: adhesive use limited     Problem: Fall Injury Risk  Goal: Absence of Fall and Fall-Related Injury  Outcome: Ongoing, Progressing  Intervention: Identify and Manage Contributors  Recent Flowsheet Documentation  Taken 9/26/2023 0000 by Bony Paige RN  Medication Review/Management: medications reviewed  Taken 9/25/2023 1930 by Bony Paige RN  Medication Review/Management: medications reviewed  Intervention: Promote Injury-Free Environment  Recent Flowsheet Documentation  Taken 9/26/2023 0000 by Bony Paige RN  Safety Promotion/Fall Prevention: safety round/check completed  Taken 9/25/2023 1930 by Bony Paige RN  Safety Promotion/Fall Prevention: safety round/check completed     Problem: Pain Acute  Goal: Acceptable Pain Control and Functional Ability  Outcome: Ongoing, Progressing  Intervention: Prevent or Manage Pain  Recent Flowsheet Documentation  Taken 9/26/2023 0000 by Bony Paige RN  Medication Review/Management: medications reviewed  Taken 9/25/2023 1930 by Bony Paige RN  Sensory Stimulation Regulation:   lighting decreased   care clustered  Bowel Elimination Promotion: adequate fluid intake promoted  Sleep/Rest Enhancement: awakenings  minimized  Medication Review/Management: medications reviewed  Intervention: Develop Pain Management Plan  Recent Flowsheet Documentation  Taken 9/26/2023 0000 by Bony Paige RN  Pain Management Interventions: care clustered  Taken 9/25/2023 1930 by Bony Paige RN  Pain Management Interventions:   care clustered   quiet environment facilitated  Intervention: Optimize Psychosocial Wellbeing  Recent Flowsheet Documentation  Taken 9/25/2023 1930 by Bony Paige RN  Supportive Measures: active listening utilized  Diversional Activities: television     Problem: Adjustment to Illness (Sepsis/Septic Shock)  Goal: Optimal Coping  Outcome: Ongoing, Progressing  Intervention: Optimize Psychosocial Adjustment to Illness  Recent Flowsheet Documentation  Taken 9/25/2023 1930 by Bony Paige RN  Supportive Measures: active listening utilized  Family/Support System Care: support provided     Problem: Bleeding (Sepsis/Septic Shock)  Goal: Absence of Bleeding  Outcome: Ongoing, Progressing  Intervention: Monitor and Manage Bleeding  Recent Flowsheet Documentation  Taken 9/25/2023 1930 by Bony Paige RN  Bleeding Precautions: blood pressure closely monitored     Problem: Glycemic Control Impaired (Sepsis/Septic Shock)  Goal: Blood Glucose Level Within Desired Range  Outcome: Ongoing, Progressing     Problem: Infection Progression (Sepsis/Septic Shock)  Goal: Absence of Infection Signs and Symptoms  Outcome: Ongoing, Progressing  Intervention: Initiate Sepsis Management  Recent Flowsheet Documentation  Taken 9/26/2023 0000 by Boyn Paige RN  Infection Prevention:   rest/sleep promoted   single patient room provided  Taken 9/25/2023 1930 by Bony Paige RN  Stabilization Measures: legs elevated  Infection Prevention:   rest/sleep promoted   single patient room provided  Intervention: Promote Recovery  Recent Flowsheet Documentation  Taken 9/26/2023 0000 by Bony Paige RN  Activity Management: activity encouraged  Taken 9/25/2023  1930 by Bony Paige, RN  Activity Management: activity encouraged  Sleep/Rest Enhancement: awakenings minimized     Problem: Nutrition Impaired (Sepsis/Septic Shock)  Goal: Optimal Nutrition Intake  Outcome: Ongoing, Progressing     Problem: Skin or Soft Tissue Infection  Goal: Absence of Infection Signs and Symptoms  Outcome: Ongoing, Progressing  Intervention: Minimize and Manage Infection Progression  Recent Flowsheet Documentation  Taken 9/26/2023 0000 by Bony Paige, RN  Infection Prevention:   rest/sleep promoted   single patient room provided  Taken 9/25/2023 1930 by Bony Paige, RN  Infection Prevention:   rest/sleep promoted   single patient room provided   Goal Outcome Evaluation:  Plan of Care Reviewed With: patient

## 2023-09-27 LAB
ANION GAP SERPL CALCULATED.3IONS-SCNC: 10 MMOL/L (ref 5–15)
BASOPHILS # BLD AUTO: 0.1 10*3/MM3 (ref 0–0.2)
BASOPHILS NFR BLD AUTO: 0.4 % (ref 0–1.5)
BUN SERPL-MCNC: 49 MG/DL (ref 8–23)
BUN/CREAT SERPL: 45 (ref 7–25)
CALCIUM SPEC-SCNC: 9.3 MG/DL (ref 8.6–10.5)
CHLORIDE SERPL-SCNC: 100 MMOL/L (ref 98–107)
CO2 SERPL-SCNC: 25 MMOL/L (ref 22–29)
CREAT SERPL-MCNC: 1.09 MG/DL (ref 0.57–1)
DEPRECATED RDW RBC AUTO: 53.4 FL (ref 37–54)
EGFRCR SERPLBLD CKD-EPI 2021: 50.8 ML/MIN/1.73
EOSINOPHIL # BLD AUTO: 0 10*3/MM3 (ref 0–0.4)
EOSINOPHIL NFR BLD AUTO: 0 % (ref 0.3–6.2)
ERYTHROCYTE [DISTWIDTH] IN BLOOD BY AUTOMATED COUNT: 16.8 % (ref 12.3–15.4)
GLUCOSE SERPL-MCNC: 139 MG/DL (ref 65–99)
HCT VFR BLD AUTO: 39 % (ref 34–46.6)
HGB BLD-MCNC: 12.8 G/DL (ref 12–15.9)
LYMPHOCYTES # BLD AUTO: 0.4 10*3/MM3 (ref 0.7–3.1)
LYMPHOCYTES NFR BLD AUTO: 3.1 % (ref 19.6–45.3)
MAGNESIUM SERPL-MCNC: 2.2 MG/DL (ref 1.6–2.4)
MCH RBC QN AUTO: 28.2 PG (ref 26.6–33)
MCHC RBC AUTO-ENTMCNC: 32.9 G/DL (ref 31.5–35.7)
MCV RBC AUTO: 85.8 FL (ref 79–97)
MONOCYTES # BLD AUTO: 0.6 10*3/MM3 (ref 0.1–0.9)
MONOCYTES NFR BLD AUTO: 4.1 % (ref 5–12)
NEUTROPHILS NFR BLD AUTO: 12.9 10*3/MM3 (ref 1.7–7)
NEUTROPHILS NFR BLD AUTO: 92.4 % (ref 42.7–76)
NRBC BLD AUTO-RTO: 0 /100 WBC (ref 0–0.2)
PLATELET # BLD AUTO: 229 10*3/MM3 (ref 140–450)
PMV BLD AUTO: 8.8 FL (ref 6–12)
POTASSIUM SERPL-SCNC: 5.1 MMOL/L (ref 3.5–5.2)
RBC # BLD AUTO: 4.55 10*6/MM3 (ref 3.77–5.28)
SODIUM SERPL-SCNC: 135 MMOL/L (ref 136–145)
WBC NRBC COR # BLD: 14 10*3/MM3 (ref 3.4–10.8)

## 2023-09-27 PROCEDURE — G0378 HOSPITAL OBSERVATION PER HR: HCPCS

## 2023-09-27 PROCEDURE — 80048 BASIC METABOLIC PNL TOTAL CA: CPT | Performed by: PHYSICIAN ASSISTANT

## 2023-09-27 PROCEDURE — 36415 COLL VENOUS BLD VENIPUNCTURE: CPT | Performed by: PHYSICIAN ASSISTANT

## 2023-09-27 PROCEDURE — 97116 GAIT TRAINING THERAPY: CPT

## 2023-09-27 PROCEDURE — 97110 THERAPEUTIC EXERCISES: CPT

## 2023-09-27 PROCEDURE — 63710000001 PREDNISONE PER 1 MG: Performed by: PHYSICIAN ASSISTANT

## 2023-09-27 PROCEDURE — 97168 OT RE-EVAL EST PLAN CARE: CPT

## 2023-09-27 PROCEDURE — 85025 COMPLETE CBC W/AUTO DIFF WBC: CPT | Performed by: PHYSICIAN ASSISTANT

## 2023-09-27 PROCEDURE — 83735 ASSAY OF MAGNESIUM: CPT | Performed by: PHYSICIAN ASSISTANT

## 2023-09-27 PROCEDURE — 97535 SELF CARE MNGMENT TRAINING: CPT

## 2023-09-27 RX ADMIN — SILDENAFIL 20 MG: 20 TABLET, FILM COATED ORAL at 08:31

## 2023-09-27 RX ADMIN — HYDROCODONE BITARTRATE AND ACETAMINOPHEN 1 TABLET: 5; 325 TABLET ORAL at 16:45

## 2023-09-27 RX ADMIN — Medication 10 ML: at 20:42

## 2023-09-27 RX ADMIN — CARVEDILOL 12.5 MG: 6.25 TABLET, FILM COATED ORAL at 17:33

## 2023-09-27 RX ADMIN — LISINOPRIL 40 MG: 20 TABLET ORAL at 08:31

## 2023-09-27 RX ADMIN — FUROSEMIDE 40 MG: 40 TABLET ORAL at 08:31

## 2023-09-27 RX ADMIN — CARVEDILOL 12.5 MG: 6.25 TABLET, FILM COATED ORAL at 07:38

## 2023-09-27 RX ADMIN — POTASSIUM CHLORIDE 10 MEQ: 1500 TABLET, EXTENDED RELEASE ORAL at 08:30

## 2023-09-27 RX ADMIN — Medication 10 ML: at 08:38

## 2023-09-27 RX ADMIN — GABAPENTIN 300 MG: 300 CAPSULE ORAL at 08:30

## 2023-09-27 RX ADMIN — ALLOPURINOL 100 MG: 100 TABLET ORAL at 08:31

## 2023-09-27 RX ADMIN — PREDNISONE 40 MG: 20 TABLET ORAL at 07:38

## 2023-09-27 RX ADMIN — MONTELUKAST 10 MG: 10 TABLET, FILM COATED ORAL at 20:42

## 2023-09-27 RX ADMIN — AMLODIPINE BESYLATE 10 MG: 5 TABLET ORAL at 08:31

## 2023-09-27 RX ADMIN — GABAPENTIN 300 MG: 300 CAPSULE ORAL at 20:42

## 2023-09-27 RX ADMIN — SILDENAFIL 20 MG: 20 TABLET, FILM COATED ORAL at 16:40

## 2023-09-27 RX ADMIN — HYDROCODONE BITARTRATE AND ACETAMINOPHEN 1 TABLET: 5; 325 TABLET ORAL at 10:42

## 2023-09-27 RX ADMIN — SILDENAFIL 20 MG: 20 TABLET, FILM COATED ORAL at 20:42

## 2023-09-27 RX ADMIN — FAMOTIDINE 20 MG: 20 TABLET ORAL at 08:30

## 2023-09-27 RX ADMIN — LEVOTHYROXINE SODIUM 50 MCG: 0.05 TABLET ORAL at 08:30

## 2023-09-27 NOTE — CASE MANAGEMENT/SOCIAL WORK
Case Management/Social Work    Patient Name:  Gabrielle Lyles  YOB: 1940  MRN: [id number]  Admit Date:  9/24/2023 09/27/23 1232   PASRR   PASRR Status Approved/Complete     CM notified via secure chat of PASRR status.     Electronically signed by:  MARRY Min  09/27/23 12:32 EDT

## 2023-09-27 NOTE — THERAPY TREATMENT NOTE
"Subjective: Pt agreeable to therapeutic plan of care. Pt fell with nursing staff yesterday, striking back of head and face with contusions (CT head (-)).    Objective:     Bed mobility - SBA  Transfers - CGA  Ambulation - 40 ft x 4 CGA and with rolling walker    Therapeutic Exercise - Pt [id number] standing: MIP, mini squats, hip abduction, hip extension, heel/toe raises    30 Second Sit to/from Stand Test: 7 reps    Vitals: Desaturates to 80% Sp02 on 3L 02 during gait training and therapeutic exercise, recovers to >90% w/ seated rest break and Vc'ing for technique    Pain: 6 VAS   Location: Head  Intervention for pain: Repositioned    Education: Provided education on the importance of mobility in the acute care setting, Verbal/Tactile Cues, Transfer Training, and Gait Training    Assessment: Gabrielle Lyles presents with functional mobility impairments which indicate the need for skilled intervention. Tolerating session today without incident. Pt fell with nursing staff yesterday, striking back of head and face with contusions (CT head (-)). Pt completed supine to sit transfer requiring SBA, sit to/from stand transfers requiring CGA, gait training 40 ft x 4 with RW requiring CGA, completed 30 Second Sit to/from Stand test for 7 reps and standing BLE therapeutic exercise. Updating d/c recommendations to SNF, as pt is not safe for home alone and at increased risk for continued falls. Will continue to follow and progress as tolerated.     Plan/Recommendations:   Moderate Intensity Therapy recommended post-acute care. This is recommended as therapy feels the patient would require 3-4 days per week and wouldn't tolerate \"3 hour daily\" rehab intensity. SNF would be the preferred choice. If the patient does not agree to SNF, arrange HH or OP depending on home bound status. If patient is medically complex, consider LTACH.. Pt requires no DME at discharge.     Pt desires Skilled Rehab placement at discharge. Pt " cooperative; agreeable to therapeutic recommendations and plan of care.         Basic Mobility 6-click:  Rollin = Total, A lot = 2, A little = 3; 4 = None  Supine>Sit:   1 = Total, A lot = 2, A little = 3; 4 = None   Sit>Stand with arms:  1 = Total, A lot = 2, A little = 3; 4 = None  Bed>Chair:   1 = Total, A lot = 2, A little = 3; 4 = None  Ambulate in room:  1 = Total, A lot = 2, A little = 3; 4 = None  3-5 Steps with railin = Total, A lot = 2, A little = 3; 4 = None  Score: 19    Post-Tx Position: Up in Chair, Alarms activated, and Call light and personal items within reach  PPE: gloves

## 2023-09-27 NOTE — PLAN OF CARE
Problem: Adult Inpatient Plan of Care  Goal: Absence of Hospital-Acquired Illness or Injury  Intervention: Identify and Manage Fall Risk  Recent Flowsheet Documentation  Taken 9/27/2023 1419 by Chasity Soto RN  Safety Promotion/Fall Prevention:   safety round/check completed   room organization consistent   clutter free environment maintained   assistive device/personal items within reach  Taken 9/27/2023 1200 by Chasity Soto RN  Safety Promotion/Fall Prevention:   safety round/check completed   assistive device/personal items within reach  Taken 9/27/2023 1000 by Chasity Soto RN  Safety Promotion/Fall Prevention:   safety round/check completed   room organization consistent   patient off unit   nonskid shoes/slippers when out of bed  Taken 9/27/2023 0700 by Chasity Soto RN  Safety Promotion/Fall Prevention:   safety round/check completed   room organization consistent   assistive device/personal items within reach  Intervention: Prevent Skin Injury  Recent Flowsheet Documentation  Taken 9/27/2023 0700 by Chasity Soto RN  Body Position: position changed independently  Skin Protection: adhesive use limited  Intervention: Prevent and Manage VTE (Venous Thromboembolism) Risk  Recent Flowsheet Documentation  Taken 9/27/2023 0700 by Chasity Soto RN  VTE Prevention/Management:   patient refused intervention   sequential compression devices off  Range of Motion: active ROM (range of motion) encouraged  Intervention: Prevent Infection  Recent Flowsheet Documentation  Taken 9/27/2023 1600 by Chasity Soto RN  Infection Prevention: hand hygiene promoted  Taken 9/27/2023 1419 by Chasity Soto RN  Infection Prevention: hand hygiene promoted  Taken 9/27/2023 1200 by Chasity Soto RN  Infection Prevention: hand hygiene promoted  Taken 9/27/2023 1000 by Chasity Soto RN  Infection Prevention: hand hygiene promoted  Taken 9/27/2023 0700 by Chasity Soto RN  Infection Prevention:  rest/sleep promoted  Goal: Optimal Comfort and Wellbeing  Intervention: Provide Person-Centered Care  Recent Flowsheet Documentation  Taken 9/27/2023 0700 by Chasity Soto RN  Trust Relationship/Rapport: care explained   Goal Outcome Evaluation:

## 2023-09-27 NOTE — PROGRESS NOTES
LOS: 0 days   Patient Care Team:  Shaylee cMdaniels MD as PCP - General (Family Medicine)  Richardson Willis MD as Cardiologist (Cardiology)  Rafy Rodriguez MD as Consulting Physician (Hematology and Oncology)  Christianne Phillips, RN as Licensed Practical Nurse    Subjective     Interval History: Stable    Patient Complaints: No recurrence of left ankle pain; feels weak when she walks    History taken from: patient    Review of Systems   Constitutional:  Positive for activity change. Negative for appetite change, chills, diaphoresis, fatigue and fever.   HENT:  Negative for facial swelling.    Eyes:  Negative for visual disturbance.   Respiratory:  Negative for cough, shortness of breath, wheezing and stridor.    Cardiovascular:  Negative for chest pain, palpitations and leg swelling.   Gastrointestinal:  Negative for abdominal pain, blood in stool, constipation, diarrhea, nausea and vomiting.   Endocrine: Negative for polyuria.   Genitourinary:  Negative for dysuria.   Musculoskeletal:  Positive for gait problem. Negative for arthralgias and myalgias.   Skin:  Positive for wound. Negative for rash.   Neurological:  Negative for dizziness, tremors, weakness, light-headedness and headaches.   Psychiatric/Behavioral:  Negative for confusion.          Objective     Vital Signs  Temp:  [97.2 °F (36.2 °C)] 97.2 °F (36.2 °C)  Heart Rate:  [52-78] 73  Resp:  [14-24] 24  BP: ()/(44-84) 128/48    Physical Exam:     General Appearance:    Alert, cooperative, in no acute distress,   Head:    Normocephalic, without obvious abnormality, atraumatic   Eyes:            Lids and lashes normal, conjunctivae and sclerae normal, no   icterus, no pallor, corneas clear, PERRLA   Ears:    Ears appear intact with no abnormalities noted   Throat:   No oral lesions, no thrush, oral mucosa moist   Neck:   No adenopathy, supple, trachea midline, no thyromegaly, no   carotid bruit, no JVD   Lungs:     Clear to auscultation,respirations  regular, even and                  unlabored    Heart:    Regular rhythm and normal rate, normal S1 and S2, no            murmur, no gallop, no rub, no click   Chest Wall:    No abnormalities observed   Abdomen:     Normal bowel sounds, no masses, no organomegaly, soft        Non-tender non-distended, no guarding,   Extremities:   Moves all extremities well, no edema, no cyanosis, no             Redness   Pulses:   Pulses palpable and equal bilaterally   Skin: Contusion right forehead   Lymph nodes:   No palpable adenopathy   Neurologic:   Cranial nerves 2 - 12 grossly intact, sensation intact, DTR       present and equal bilaterally        Results Review:    Lab Results (last 24 hours)       Procedure Component Value Units Date/Time    Blood Culture - Blood, Arm, Right [[id number]]  (Normal) Collected: 09/24/23 1632    Specimen: Blood from Arm, Right Updated: 09/27/23 1645     Blood Culture No growth at 3 days    Basic Metabolic Panel [[id number]] Updated: 09/27/23 1428    Specimen: Blood     Magnesium [[id number]]  (Normal) Collected: 09/27/23 0337    Specimen: Blood Updated: 09/27/23 0451     Magnesium 2.2 mg/dL     CBC & Differential [[id number]]  (Abnormal) Collected: 09/27/23 0337    Specimen: Blood Updated: 09/27/23 0417    Narrative:      The following orders were created for panel order CBC & Differential.  Procedure                               Abnormality         Status                     ---------                               -----------         ------                     CBC Auto Differential[[id number]]        Abnormal            Final result                 Please view results for these tests on the individual orders.    CBC Auto Differential [[id number]]  (Abnormal) Collected: 09/27/23 0337    Specimen: Blood Updated: 09/27/23 0417     WBC 14.00 10*3/mm3      RBC 4.55 10*6/mm3      Hemoglobin 12.8 g/dL      Hematocrit 39.0 %      MCV 85.8 fL      MCH 28.2 pg      MCHC 32.9 g/dL      RDW 16.8 %       RDW-SD 53.4 fl      MPV 8.8 fL      Platelets 229 10*3/mm3      Neutrophil % 92.4 %      Lymphocyte % 3.1 %      Monocyte % 4.1 %      Eosinophil % 0.0 %      Basophil % 0.4 %      Neutrophils, Absolute 12.90 10*3/mm3      Lymphocytes, Absolute 0.40 10*3/mm3      Monocytes, Absolute 0.60 10*3/mm3      Eosinophils, Absolute 0.00 10*3/mm3      Basophils, Absolute 0.10 10*3/mm3      nRBC 0.0 /100 WBC     Blood Culture - Blood, Hand, Right [[id number]]  (Normal) Collected: 09/24/23 1655    Specimen: Blood from Hand, Right Updated: 09/26/23 1715     Blood Culture No growth at 2 days             Imaging Results (Last 24 Hours)       ** No results found for the last 24 hours. **                 I reviewed the patient's new clinical results.    Medication Review:   Scheduled Meds:allopurinol, 100 mg, Oral, Daily  amLODIPine, 10 mg, Oral, Daily  carvedilol, 12.5 mg, Oral, BID With Meals  famotidine, 20 mg, Oral, Daily  furosemide, 40 mg, Oral, Daily  gabapentin, 300 mg, Oral, BID  levothyroxine, 50 mcg, Oral, Daily  lisinopril, 40 mg, Oral, Q24H  montelukast, 10 mg, Oral, Nightly  potassium chloride, 10 mEq, Oral, Daily  [START ON 9/28/2023] predniSONE, 30 mg, Oral, Daily With Breakfast  senna-docusate sodium, 2 tablet, Oral, BID  sildenafil, 20 mg, Oral, TID  sodium chloride, 10 mL, Intravenous, Q12H      Continuous Infusions:   PRN Meds:.  senna-docusate sodium **AND** polyethylene glycol **AND** bisacodyl **AND** bisacodyl    Calcium Replacement - Follow Nurse / BPA Driven Protocol    HYDROcodone-acetaminophen    Magnesium Standard Dose Replacement - Follow Nurse / BPA Driven Protocol    melatonin    ondansetron **OR** ondansetron    Phosphorus Replacement - Follow Nurse / BPA Driven Protocol    Potassium Replacement - Follow Nurse / BPA Driven Protocol    [COMPLETED] Insert Peripheral IV **AND** sodium chloride    sodium chloride    sodium chloride     Assessment & Plan     Acute gouty arthritis -resolved with prednisone;  allopurinol restarted; will taper prednisone.  Pulmonary hypertension - Revatio  CKD stage 3 - creatinine is stable  COPD - Singulair,   Essential hypertension - amlodipine, carvedilol, lisinopril  H/O PE - continue Eliquis  DDD lumbar spine - gabapentin  Hypothyroidism - levothyroxine  Closed head injury-contusion; no adverse effects  General deconditioning-in need of skilled rehab  Chronic hypoxemia-supplemental oxygen        Plan for disposition: Transfer to skilled rehab when arrangements have been made    Rosamaria Jin MD  09/27/23  17:10 EDT

## 2023-09-27 NOTE — DISCHARGE PLACEMENT REQUEST
"Gabrielle Lyles (83 y.o. Female)       Date of Birth   1940    Social Security Number       Address   [address] [place] [address] [place] [address]    Home Phone   [phone]    MRN   [id number]       Episcopal   Buddhism    Marital Status                               Admission Date   9/24/23    Admission Type   Emergency    Admitting Provider   Juan C Merlos MD    Attending Provider   Rosamaria Jin MD    Department, Room/Bed   [facility] OBSERVATION, 221/1       Discharge Date       Discharge Disposition       Discharge Destination                                 Attending Provider: Rosamaria Jin MD    Allergies: No Known Allergies    Isolation: None   Infection: Other (05/03/23)   Code Status: CPR    Ht: 162.6 cm (64\")   Wt: 66.8 kg (147 lb 4.3 oz)    Admission Cmt: None   Principal Problem: Cellulitis of left foot [L03.116]                   Active Insurance as of 9/24/2023       Primary Coverage       Payor Plan Insurance Group Employer/Plan Group    HUMANA MEDICARE REPLACEMENT HUMANA MEDICARE REPLACEMENT [id number]       Payor Plan Address Payor Plan Phone Number Payor Plan Fax Number Effective Dates    [address] [address] [phone]  1/1/2018 - None Entered    [facility] [id number]         Subscriber Name Subscriber Birth Date Member ID       GABRIELLE LYLES 1940 [id number]                     Emergency Contacts        (Rel.) Home Phone Work Phone Mobile Phone    MARVIN DURANT (Daughter) [phone] -- --                 History & Physical        Rosamaria Jin MD at 09/25/23 1123              Patient Care Team:  Shaylee Mcdaniels MD as PCP - General (Family Medicine)  Richardson Willis MD as Cardiologist (Cardiology)  Rafy Rodriguez MD as Consulting Physician (Hematology and Oncology)  Christianne Phillips, RN as Licensed Practical Nurse    Chief complaint Left ankle pain, redness, swelling    Subjective     Patient is a 83 y.o. female with h/o gout, COPD, pulmonary " embolism, CKD who had sudden onset of severe left ankle pain and swelling the day prior to admission.  She was previously on allopurinol but had stopped it several weeks ago.  She denied fever/chills or injury to left ankle.  She was unable to ambulate due to pain.    Overnight the symptoms have nearly resolved.  She received Solu-medrol and ceftriaxone in ER.   She is now able to ambulate with PT.       Review of Systems   Constitutional:  Positive for activity change. Negative for appetite change, chills, diaphoresis, fatigue, fever and unexpected weight change.   HENT:  Negative for facial swelling.    Eyes:  Negative for visual disturbance.   Respiratory:  Negative for cough, shortness of breath, wheezing and stridor.    Cardiovascular:  Positive for leg swelling. Negative for chest pain and palpitations.   Gastrointestinal:  Negative for abdominal pain, constipation, diarrhea, nausea and vomiting.   Endocrine: Negative for polyuria.   Genitourinary:  Negative for dysuria.   Musculoskeletal:  Positive for arthralgias, gait problem and joint swelling. Negative for myalgias, neck pain and neck stiffness.   Skin:  Negative for rash and wound.   Neurological:  Negative for dizziness, weakness, light-headedness and headaches.   Psychiatric/Behavioral:  Negative for confusion.         History  Past Medical History:   Diagnosis Date    COPD (chronic obstructive pulmonary disease)     Deep vein thrombosis of bilateral lower extremities 7/24/2019    History of DVT (deep vein thrombosis) 03/2013    bilateral lower extremity    History of pneumonia 06/2012    community acquired pneumonia and right pleural effusion;hospital[facility] at [facility]    History of pulmonary embolism 03/2013    bilateral PE    Hypertension 2001    Insomnia     on Ambien 5mg at     Lobular breast cancer, left 11/2011    Stage IA left upper lobular breast cancer    Malignant neoplasm of left breast in female, estrogen receptor positive 7/18/2019     Osteopenia     Severe pulmonary hypertension 3/3/2023    Stage 3a chronic kidney disease 2019    TIA (transient ischemic attack) 10/25/2022     Past Surgical History:   Procedure Laterality Date    CARDIAC CATHETERIZATION N/A 3/3/2023    Procedure: Left and Right Heart Cath with Coronary Angiography;  Surgeon: Richardson Willis MD;  [facility];  Service: Cardiovascular;  Laterality: N/A;    CATARACT EXTRACTION      IVC FILTER RETRIEVAL  2014    IVC filter placement by Dr. Card    MAMMO STEREOTACTIC BREAST BX SURGICAL ADD UNI Left 2011    invasive lobular carcinoma-Dr. Castellon Colomb    MASTECTOMY, PARTIAL Left 2011    and left axillary sentinel lymph node biopsy by Dr. Uriostegui    TUBAL ABDOMINAL LIGATION       Family History   Problem Relation Age of Onset    Lung cancer Brother      Social History     Tobacco Use    Smoking status: Former     Types: Cigarettes     Quit date:      Years since quittin.7     Passive exposure: Past    Smokeless tobacco: Never    Tobacco comments:     smoked 6 cigarettes a day from 13 years of age to 56 years of age when she quit in    Vaping Use    Vaping Use: Never used   Substance Use Topics    Alcohol use: Not Currently    Drug use: No     Medications Prior to Admission   Medication Sig Dispense Refill Last Dose    allopurinol (ZYLOPRIM) 100 MG tablet Take 1 tablet by mouth Daily As Needed.   2023    amLODIPine (NORVASC) 10 MG tablet Take 1 tablet by mouth Daily. 90 tablet 3 Past Month    apixaban (ELIQUIS) 5 MG tablet tablet Take 1 tablet by mouth Every 12 (Twelve) Hours. Indications: Other - full anticoagulation, history of DVT/PE 60 tablet 2 2023    carvedilol (COREG) 12.5 MG tablet Take 1 tablet by mouth 2 (Two) Times a Day With Meals. 180 tablet 3 2023    Cholecalciferol (Vitamin D3) 50 MCG ( UT) capsule Take 1 capsule by mouth Daily.   2023    Folbic 2.5-25-2 MG tablet tablet Take  1 tablet by mouth Daily.   9/24/2023    furosemide (LASIX) 40 MG tablet Take 1 tablet by mouth Daily.   Past Week    gabapentin (NEURONTIN) 300 MG capsule Take 1 capsule by mouth 2 (Two) Times a Day.   Past Week    levothyroxine (SYNTHROID, LEVOTHROID) 50 MCG tablet Take 1 tablet by mouth Daily.   9/24/2023    lisinopril (PRINIVIL,ZESTRIL) 40 MG tablet Take 1 tablet by mouth Daily. 90 tablet 3 9/24/2023    montelukast (SINGULAIR) 10 MG tablet Take 1 tablet by mouth Every Night. 30 tablet 1 Past Week    potassium chloride (K-DUR,KLOR-CON) 10 MEQ CR tablet Take 1 tablet by mouth Daily. 90 tablet 3 Past Week    sildenafil (REVATIO) 20 MG tablet Take 1 tablet by mouth Every 8 (Eight) Hours. 90 tablet 1 9/24/2023     Allergies:  Patient has no known allergies.    Objective     Vital Signs  Temp:  [97.5 °F (36.4 °C)-98.6 °F (37 °C)] 98.6 °F (37 °C)  Heart Rate:  [73-80] 75  Resp:  [16-20] 20  BP: (101-153)/() 153/102     Physical Exam:      General Appearance:    Alert, cooperative, in no acute distress   Head:    Normocephalic, without obvious abnormality, atraumatic   Eyes:            Lids and lashes normal, conjunctivae and sclerae normal, no   icterus, no pallor, corneas clear, PERRLA   Ears:    Ears appear intact with no abnormalities noted   Throat:   No oral lesions, no thrush, oral mucosa moist   Neck:   No adenopathy, supple, trachea midline, no thyromegaly, no   carotid bruit, no JVD   Lungs:     Clear to auscultation,respirations regular, even and                  unlabored    Heart:    Regular rhythm and normal rate, normal S1 and S2, no            murmur, no gallop, no rub, no click   Chest Wall:    No abnormalities observed   Abdomen:     Normal bowel sounds, no masses, no organomegaly, soft        non-tender, non-distended, no guarding, no rebound                tenderness   Extremities:   Left ankle - no erythema, mild STS.  Good ROM, no cellulitis changes   Pulses:   Pulses palpable and equal  bilaterally   Skin:   No bleeding, bruising or rash   Lymph nodes:   No palpable adenopathy   Neurologic:   Cranial nerves 2 - 12 grossly intact, sensation intact, DTR       present and equal bilaterally       Results Review:     Imaging Results (Last 24 Hours)       Procedure Component Value Units Date/Time    XR Foot 3+ View Left [[id number]] Collected: 09/24/23 1553     Updated: 09/24/23 1556    Narrative:      XR FOOT 3+ VW LEFT    Date of Exam: 9/24/2023 3:15 PM EDT    Indication: pain redness swelling    Comparison: None available.    Findings:  No fracture or dislocation. There is soft tissue swelling diffusely at the left foot. No discrete osseous erosion. Plantar calcaneal bone spur. Enthesopathy at the Achilles insertion on the calcaneus. No radiodense foreign body.      Impression:      Impression:  1. Negative for acute osseous abnormality.  2. Nonspecific soft tissue swelling.        Electronically Signed: Randall Zarco MD    9/24/2023 3:54 PM EDT    Workstation ID: [id number]             Lab Results (last 24 hours)       Procedure Component Value Units Date/Time    Magnesium [[id number]] Updated: 09/25/23 0900    Specimen: Blood     Basic Metabolic Panel [[id number]] Updated: 09/25/23 0900    Specimen: Blood     CBC & Differential [[id number]]  (Abnormal) Collected: 09/25/23 0718    Specimen: Blood Updated: 09/25/23 0834    Narrative:      The following orders were created for panel order CBC & Differential.  Procedure                               Abnormality         Status                     ---------                               -----------         ------                     CBC Auto Differential[[id number]]        Abnormal            Final result                 Please view results for these tests on the individual orders.    CBC Auto Differential [[id number]]  (Abnormal) Collected: 09/25/23 0718    Specimen: Blood Updated: 09/25/23 0834     WBC 10.90 10*3/mm3      RBC 4.80 10*6/mm3      Hemoglobin 13.7  g/dL      Hematocrit 41.6 %      MCV 86.6 fL      MCH 28.6 pg      MCHC 33.0 g/dL      RDW 16.7 %      RDW-SD 53.4 fl      MPV 9.0 fL      Platelets 198 10*3/mm3      Neutrophil % 92.1 %      Lymphocyte % 3.3 %      Monocyte % 3.7 %      Eosinophil % 0.0 %      Basophil % 0.9 %      Neutrophils, Absolute 10.10 10*3/mm3      Lymphocytes, Absolute 0.40 10*3/mm3      Monocytes, Absolute 0.40 10*3/mm3      Eosinophils, Absolute 0.00 10*3/mm3      Basophils, Absolute 0.10 10*3/mm3      nRBC 0.0 /100 WBC     POC Lactate [[id number]]  (Normal) Collected: 09/24/23 1709    Specimen: Blood Updated: 09/24/23 1711     Lactate 1.1 mmol/L      Comment: Serial Number: [id number]:  [id number]       Blood Culture - Blood, Hand, Right [[id number]] Collected: 09/24/23 1655    Specimen: Blood from Hand, Right Updated: 09/24/23 1706    Blood Culture - Blood, Arm, Right [[id number]] Collected: 09/24/23 1632    Specimen: Blood from Arm, Right Updated: 09/24/23 1637    Basic Metabolic Panel [[id number]]  (Abnormal) Collected: 09/24/23 1448    Specimen: Blood Updated: 09/24/23 1523     Glucose 155 mg/dL      BUN 40 mg/dL      Creatinine 1.22 mg/dL      Sodium 141 mmol/L      Potassium 4.5 mmol/L      Comment: Slight hemolysis detected by analyzer. Results may be affected.        Chloride 105 mmol/L      CO2 24.0 mmol/L      Calcium 9.8 mg/dL      BUN/Creatinine Ratio 32.8     Anion Gap 12.0 mmol/L      eGFR 44.4 mL/min/1.73     Narrative:      GFR Normal >60  Chronic Kidney Disease <60  Kidney Failure <15    The GFR formula is only valid for adults with stable renal function between ages 18 and 70.    C-reactive Protein [[id number]]  (Abnormal) Collected: 09/24/23 1448    Specimen: Blood Updated: 09/24/23 1523     C-Reactive Protein 25.41 mg/dL     Uric Acid [[id number]]  (Abnormal) Collected: 09/24/23 1448    Specimen: Blood Updated: 09/24/23 1523     Uric Acid 9.7 mg/dL     Sedimentation Rate [[id number]]  (Abnormal) Collected: 09/24/23  1448    Specimen: Blood Updated: 09/24/23 1458     Sed Rate 44 mm/hr     CBC & Differential [[id number]]  (Abnormal) Collected: 09/24/23 1448    Specimen: Blood Updated: 09/24/23 1454    Narrative:      The following orders were created for panel order CBC & Differential.  Procedure                               Abnormality         Status                     ---------                               -----------         ------                     CBC Auto Differential[[id number]]        Abnormal            Final result                 Please view results for these tests on the individual orders.    CBC Auto Differential [[id number]]  (Abnormal) Collected: 09/24/23 1448    Specimen: Blood Updated: 09/24/23 1454     WBC 17.40 10*3/mm3      RBC 4.78 10*6/mm3      Hemoglobin 13.4 g/dL      Hematocrit 41.2 %      MCV 86.2 fL      MCH 28.0 pg      MCHC 32.5 g/dL      RDW 16.6 %      RDW-SD 52.1 fl      MPV 8.3 fL      Platelets 227 10*3/mm3      Neutrophil % 84.9 %      Lymphocyte % 3.0 %      Monocyte % 11.8 %      Eosinophil % 0.0 %      Basophil % 0.3 %      Neutrophils, Absolute 14.80 10*3/mm3      Lymphocytes, Absolute 0.50 10*3/mm3      Monocytes, Absolute 2.10 10*3/mm3      Eosinophils, Absolute 0.00 10*3/mm3      Basophils, Absolute 0.10 10*3/mm3      nRBC 0.1 /100 WBC              I reviewed the patient's new clinical results.    Assessment & Plan       Cellulitis of left foot  Left ankle pain  Acute gouty arthritis  Pulmonary hypertension - Revatio  CKD stage 3 - creatinine is stable  COPD - Singulair,   Essential hypertension - amlodipine, carvedilol, lisinopril  H/O PE - continue Eliquis  DDD lumbar spine - gabapentin  Hypothyroidism - levothyroxine    Clinical picture is consistent with acute gouty arthritis, especially give near-complete resolution within hours of steroids.  There is no visible sign of cellulitis this morning, so antibiotics have been discontinued..  Will restart allopurinol and complete a short  steroid taper.  She has follow up appointment arranged with Dr. Mcdaniels, who can recheck uric acid level.    PT eval.    Home soon.    I discussed the patient's findings and my recommendations with patient.     Rosamaria Jin MD  09/25/23  11:23 EDT          Electronically signed by Rosamaria Jin MD at 09/25/23 1134          Physician Progress Notes (most recent note)        Mansi Becerril, APRN at 09/26/23 1114       Attestation signed by Rosamaria Jin MD at 09/26/23 1318    I have reviewed this documentation and agree.                       LOS: 0 days   Patient Care Team:  Shaylee Mcdaniels MD as PCP - General (Family Medicine)  Richardson Willis MD as Cardiologist (Cardiology)  Rafy Rodriguez MD as Consulting Physician (Hematology and Oncology)  Christianne Phillips, AMARILIS as Licensed Practical Nurse    Subjective     Patient c/o of back pain from fall    Review of Systems   Constitutional:  Positive for activity change and fatigue.   HENT: Negative.     Respiratory: Negative.     Cardiovascular: Negative.    Gastrointestinal: Negative.    Genitourinary: Negative.    Musculoskeletal:  Positive for back pain and gait problem.   Neurological:  Positive for weakness.   Psychiatric/Behavioral: Negative.           Objective     Vital Signs  Temp:  [97.4 °F (36.3 °C)-98.7 °F (37.1 °C)] 98.7 °F (37.1 °C)  Heart Rate:  [58-77] 77  Resp:  [13-22] 22  BP: (103-167)/(58-95) 167/70      Physical Exam  Vitals reviewed.   Constitutional:       Appearance: She is not ill-appearing.   HENT:      Head: Normocephalic and atraumatic.      Right Ear: External ear normal.      Left Ear: External ear normal.      Nose: Nose normal.      Mouth/Throat:      Mouth: Mucous membranes are moist.   Eyes:      General:         Right eye: No discharge.         Left eye: No discharge.   Cardiovascular:      Rate and Rhythm: Normal rate and regular rhythm.      Pulses: Normal pulses.      Heart sounds: Normal heart sounds.   Pulmonary:      Effort: Pulmonary  effort is normal.      Breath sounds: Normal breath sounds.   Abdominal:      General: Bowel sounds are normal.      Palpations: Abdomen is soft.   Musculoskeletal:         General: Normal range of motion.      Cervical back: Normal range of motion.   Skin:     General: Skin is warm.      Coloration: Skin is pale.   Neurological:      Mental Status: She is alert and oriented to person, place, and time.   Psychiatric:         Behavior: Behavior normal.            Results Review:    Lab Results (last 24 hours)       Procedure Component Value Units Date/Time    Magnesium [[id number]]  (Normal) Collected: 09/26/23 0320    Specimen: Blood Updated: 09/26/23 0438     Magnesium 2.2 mg/dL     Basic Metabolic Panel [[id number]]  (Abnormal) Collected: 09/26/23 0320    Specimen: Blood Updated: 09/26/23 0438     Glucose 144 mg/dL      BUN 55 mg/dL      Creatinine 1.46 mg/dL      Sodium 136 mmol/L      Potassium 4.7 mmol/L      Chloride 102 mmol/L      CO2 24.0 mmol/L      Calcium 9.1 mg/dL      BUN/Creatinine Ratio 37.7     Anion Gap 10.0 mmol/L      eGFR 35.8 mL/min/1.73     Narrative:      GFR Normal >60  Chronic Kidney Disease <60  Kidney Failure <15    The GFR formula is only valid for adults with stable renal function between ages 18 and 70.    CBC & Differential [[id number]]  (Abnormal) Collected: 09/26/23 0320    Specimen: Blood Updated: 09/26/23 0412    Narrative:      The following orders were created for panel order CBC & Differential.  Procedure                               Abnormality         Status                     ---------                               -----------         ------                     CBC Auto Differential[[id number]]        Abnormal            Final result                 Please view results for these tests on the individual orders.    CBC Auto Differential [[id number]]  (Abnormal) Collected: 09/26/23 0320    Specimen: Blood Updated: 09/26/23 0412     WBC 14.40 10*3/mm3      RBC 4.28 10*6/mm3       Hemoglobin 12.1 g/dL      Hematocrit 36.7 %      MCV 85.8 fL      MCH 28.3 pg      MCHC 33.0 g/dL      RDW 16.5 %      RDW-SD 51.6 fl      MPV 8.8 fL      Platelets 200 10*3/mm3      Neutrophil % 93.5 %      Lymphocyte % 2.5 %      Monocyte % 3.9 %      Eosinophil % 0.0 %      Basophil % 0.1 %      Neutrophils, Absolute 13.50 10*3/mm3      Lymphocytes, Absolute 0.40 10*3/mm3      Monocytes, Absolute 0.60 10*3/mm3      Eosinophils, Absolute 0.00 10*3/mm3      Basophils, Absolute 0.00 10*3/mm3      nRBC 0.0 /100 WBC     Blood Culture - Blood, Hand, Right [[id number]]  (Normal) Collected: 09/24/23 1655    Specimen: Blood from Hand, Right Updated: 09/25/23 1715     Blood Culture No growth at 24 hours    Blood Culture - Blood, Arm, Right [[id number]]  (Normal) Collected: 09/24/23 1632    Specimen: Blood from Arm, Right Updated: 09/25/23 1645     Blood Culture No growth at 24 hours    Magnesium [[id number]]  (Normal) Collected: 09/25/23 1130    Specimen: Blood Updated: 09/25/23 1254     Magnesium 2.3 mg/dL     Basic Metabolic Panel [[id number]]  (Abnormal) Collected: 09/25/23 1130    Specimen: Blood Updated: 09/25/23 1254     Glucose 115 mg/dL      BUN 46 mg/dL      Creatinine 1.33 mg/dL      Sodium 137 mmol/L      Potassium 4.1 mmol/L      Chloride 99 mmol/L      CO2 25.0 mmol/L      Calcium 9.4 mg/dL      BUN/Creatinine Ratio 34.6     Anion Gap 13.0 mmol/L      eGFR 40.0 mL/min/1.73     Narrative:      GFR Normal >60  Chronic Kidney Disease <60  Kidney Failure <15    The GFR formula is only valid for adults with stable renal function between ages 18 and 70.             Imaging Results (Last 24 Hours)       Procedure Component Value Units Date/Time    CT Head Without Contrast [[id number]] Collected: 09/26/23 1042     Updated: 09/26/23 1051    Narrative:      CT HEAD WO CONTRAST    Date of Exam: 9/26/2023 10:27 AM EDT    Indication: Head trauma, minor (Age >= 65y).    Comparison: CT head without contrast 10/25/2022, MRI brain  2/26/2022    Technique: Axial CT images were obtained of the head without contrast administration.  Coronal reconstructions were performed.  Automated exposure control and iterative reconstruction methods were used.    Findings:  There is a scalp contusion/hematoma involving the right posterior occipital region. No underlying fracture. Scalp hematoma at the right frontal region measures 3.3 x 0.9 cm (2/35). No intracranial hemorrhage. No mass effect or midline shift. Mild   cerebral atrophy with mild white matter findings of chronic microvascular disease. No intraventricular hemorrhage. Mild cerebellar atrophy, stable. Posterior fossa without acute abnormality.    The globes are symmetric. Thinning of the lens noted bilaterally. No retro-orbital hematoma. The mastoid air cells are well-aerated. No sinus fluid level or hemorrhage. Negative for calvarial fracture.      Impression:      Impression:  1. No intracranial hemorrhage.  2. Scalp hematoma involving the right posterior occipital region and right frontal region. No underlying fracture.  3. Mild cerebral atrophy with white matter findings of chronic microvascular disease.        Electronically Signed: Randall Zarco MD    9/26/2023 10:49 AM EDT    Workstation ID: [id number]    CT Cervical Spine Without Contrast [[id number]] Collected: 09/26/23 1042     Updated: 09/26/23 1050    Narrative:        CT CERVICAL SPINE WO CONTRAST    Date of Exam: 9/26/2023 10:27 AM EDT    Indication: Neck trauma (Age >= 65y).    Comparison: None available.    Technique: Axial CT images were obtained of the cervical spine without contrast administration.  Sagittal and coronal reconstructions were performed.  Automated exposure control and iterative reconstruction methods were used.      Findings:  Craniocervical alignment is normal. There are degenerative changes of the C2 dens/anterior C1 ring junction. There is minimal 1 mm anterolisthesis C4 upon C5. There is mild reversal of cervical  lordosis centered at C5-6. There is moderate diminished disc   height at C5-6 and C6-7. Prominent anterior osteophytes are present from the C3 through the C7 vertebral levels. No acute cervical spine fracture is seen. A few of the images are mildly motion degraded.    At C2-3, there is mild bilateral facet arthropathy. No significant disc bulge, canal or foraminal stenosis is seen.    At C3-4, mild posterior disc osteophyte formation is present. There is mild left and mild to moderate right facet arthropathy. There is no significant canal stenosis. There is mild right neural foraminal narrowing. Left neural foramen is patent.    At C4-5, posterior disc osteophyte formation is present. There is mild bilateral facet arthropathy, there is mild bilateral facet arthropathy and right greater than left uncovertebral spurring. No significant central canal stenosis is seen. There is mild   left neural foraminal narrowing. There is severe right neural foraminal stenosis    At C5-6, posterior disc osteophyte formation is present with mild central canal stenosis. There is moderate left and mild right facet arthropathy. Right greater than left uncovertebral spurring is present. There is moderate bilateral neural foraminal   stenosis, slightly greatest on the right.    At C6-7, broad-based posterior disc osteophyte formation is present with moderate canal stenosis. There is bilateral uncovertebral spurring. Mild bilateral facet arthropathy. There is severe right neural foraminal stenosis. There is moderate left neural   foraminal stenosis    At C7-T1, no significant disc bulge or canal stenosis is seen. There is mild bilateral facet arthropathy. There is mild right neural foraminal stenosis. No significant left neural foraminal stenosis.    There is mild scarring in the lung apices. Dense carotid bulb calcifications are present.        Impression:      Impression:  Degenerative changes in the cervical spine. No acute cervical  spine findings.      Electronically Signed: Tiffanie Cheung MD    9/26/2023 10:48 AM EDT    Workstation ID: [id number]                 I reviewed the patient's new clinical results.    Medication Review:   Scheduled Meds:allopurinol, 100 mg, Oral, Daily  amLODIPine, 10 mg, Oral, Daily  apixaban, 5 mg, Oral, Q12H  carvedilol, 12.5 mg, Oral, BID With Meals  famotidine, 20 mg, Oral, Daily  furosemide, 40 mg, Oral, Daily  gabapentin, 300 mg, Oral, BID  levothyroxine, 50 mcg, Oral, Daily  lisinopril, 40 mg, Oral, Q24H  montelukast, 10 mg, Oral, Nightly  potassium chloride, 10 mEq, Oral, Daily  predniSONE, 40 mg, Oral, Daily With Breakfast  senna-docusate sodium, 2 tablet, Oral, BID  sildenafil, 20 mg, Oral, TID  sodium chloride, 10 mL, Intravenous, Q12H      Continuous Infusions:   PRN Meds:.  senna-docusate sodium **AND** polyethylene glycol **AND** bisacodyl **AND** bisacodyl    Calcium Replacement - Follow Nurse / BPA Driven Protocol    HYDROcodone-acetaminophen    Magnesium Standard Dose Replacement - Follow Nurse / BPA Driven Protocol    melatonin    ondansetron **OR** ondansetron    Phosphorus Replacement - Follow Nurse / BPA Driven Protocol    Potassium Replacement - Follow Nurse / BPA Driven Protocol    [COMPLETED] Insert Peripheral IV **AND** sodium chloride    sodium chloride    sodium chloride     Interval History:    Assessment & Plan     Left ankle pain  Acute gouty arthritis  Pulmonary hypertension - Revatio  CKD stage 3 - creatinine is stable  COPD - Singulair,   Essential hypertension - amlodipine, carvedilol, lisinopril  H/O PE - continue Eliquis  DDD lumbar spine - gabapentin  Hypothyroidism - levothyroxine     Clinical picture is consistent with acute gouty arthritis, especially give near-complete resolution within hours of steroids. On allopurinol and complete a short steroid taper. She has follow up appointment arranged with Dr. Mcdaniels, who can recheck uric acid level.    Patient fell this morning and  has moderate right forehead hematoma and small lac on back of head. CT head with no bleed and ct cspine with no acute findings. She states she had difficulty moving her affected foot and fell backwards. She does continue with bilateral lower ext edema. Will monitor overnight and have PT re-evaluate. Will hold evening dose of eliquis and it can be re-started tomorrow if no further complications.    Plan for disposition:[facility]    MansiJOAQUÍN Chou  09/26/23  11:14 EDT          Electronically signed by Rosamaria Jin MD at 09/26/23 1318          Consult Notes (most recent note)        Cornel Oconnor at 09/25/23 1150        Consult Orders    1. Inpatient Spiritual Care Consult [[id number]] ordered by Juan C Merlos MD at 09/24/23 2017              Summary:Pt support                 Administered the [facility].    Electronically signed by Cornel Oconnor at 09/25/23 1151          Physical Therapy Notes (most recent note)        Emma Torres PT at 09/27/23 1136  Version 1 of 1         Goal Outcome Evaluation:         Gabrielle Lyles presents with functional mobility impairments which indicate the need for skilled intervention. Tolerating session today without incident. Pt fell with nursing staff yesterday, striking back of head and face with contusions (CT head (-)). Pt completed supine to sit transfer requiring SBA, sit to/from stand transfers requiring CGA, gait training 40 ft x 4 with RW requiring CGA, completed 30 Second Sit to/from Stand test for 7 reps and standing BLE therapeutic exercise. Updating d/c recommendations to SNF, as pt is not safe for home alone and at increased risk for continued falls. Will continue to follow and progress as tolerated.       Emma Torres PT, DPT           Electronically signed by Emma Torres PT at 09/27/23 1136          Occupational Therapy Notes (most recent note)        Behzad Brink, OT at 09/27/23 3956           Patient Name: Gabrielle Lyles  : 1940    MRN: [id number]                              Today's Date: 2023       Admit Date: 2023    Visit Dx:     ICD-10-CM ICD-9-CM   1. Cellulitis of left foot  L03.116 682.7     Patient Active Problem List   Diagnosis    Stage 3a chronic kidney disease    Low back pain    Osteopenia    Chronic obstructive pulmonary disease    Gastroesophageal reflux disease    Gout    History of venous thrombosis and embolism    Primary hypertension    Lumbar radiculopathy    Nausea    Parotid duct obstruction    Personal history of malignant neoplasm of breast    Shortness of breath    Aortic aneurysm    Bulging lumbar disc    Spinal stenosis of lumbar region    History of TIA (transient ischemic attack)    Acute exacerbation of chronic obstructive pulmonary disease (COPD)    Parainfluenza infection    Severe pulmonary hypertension    Acute on chronic respiratory failure with hypoxia    Cellulitis of left foot     Past Medical History:   Diagnosis Date    COPD (chronic obstructive pulmonary disease)     Deep vein thrombosis of bilateral lower extremities 2019    History of DVT (deep vein thrombosis) 2013    bilateral lower extremity    History of pneumonia 2012    community acquired pneumonia and right pleural effusion;hospitalized at [facility]    History of pulmonary embolism 2013    bilateral PE    Hypertension 2001    Insomnia     on Ambien 5mg at     Lobular breast cancer, left 2011    Stage IA left upper lobular breast cancer    Malignant neoplasm of left breast in female, estrogen receptor positive 2019    Osteopenia     Severe pulmonary hypertension 3/3/2023    Stage 3a chronic kidney disease 2019    TIA (transient ischemic attack) 10/25/2022     Past Surgical History:   Procedure Laterality Date    CARDIAC CATHETERIZATION N/A 3/3/2023    Procedure: Left and Right Heart Cath with Coronary Angiography;  Surgeon: Richardson Willis MD;   Location: [facility] CATH INVASIVE LOCATION;  Service: Cardiovascular;  Laterality: N/A;    CATARACT EXTRACTION  2015    IVC FILTER RETRIEVAL  06/2014    IVC filter placement by Dr. Card    MAMMO STEREOTACTIC BREAST BX SURGICAL ADD UNI Left 11/01/2011    invasive lobular carcinoma-Dr. Cande Daniel    MASTECTOMY, PARTIAL Left 12/01/2011    and left axillary sentinel lymph node biopsy by Dr. Uriostegui    TUBAL ABDOMINAL LIGATION  1984      General Information       Row Name 09/27/23 1424          OT Time and Intention    Document Type re-evaluation  -     Mode of Treatment occupational therapy  -       Row Name 09/27/23 1424          General Information    Patient Profile Reviewed yes  -LS     Prior Level of Function independent:;ADL's;all household mobility  -     Existing Precautions/Restrictions fall  -LS       Row Name 09/27/23 1424          Living Environment    People in Home alone  -       Row Name 09/27/23 1424          Home Main Entrance    Number of Stairs, Main Entrance two  -LS     Stair Railings, Main Entrance railings safe and in good condition  -LS       Row Name 09/27/23 1424          Cognition    Orientation Status (Cognition) oriented x 4  -       Row Name 09/27/23 1424          Safety Issues, Functional Mobility    Impairments Affecting Function (Mobility) balance;endurance/activity tolerance;pain  -LS               User Key  (r) = Recorded By, (t) = Taken By, (c) = Cosigned By      Initials Name Provider Type    LS Behzad Brink OT Occupational Therapist                     Mobility/ADL's       Row Name 09/27/23 1424          Sit-Stand Transfer    Sit-Stand [place] (Transfers) contact guard;1 person assist  -     Assistive Device (Sit-Stand Transfers) walker, front-wheeled  -       Row Name 09/27/23 1424          Functional Mobility    Functional Mobility- Ind. Level standby assist  -     Functional Mobility- Device walker, front-wheeled  -       Row Name 09/27/23 1424           Activities of Daily Living    BADL Assessment/Intervention lower body dressing;toileting  -       Row Name 09/27/23 1424          Lower Body Dressing Assessment/Training    [place] Level (Lower Body Dressing) doff;don;socks;standby assist  -       Row Name 09/27/23 1424          Toileting Assessment/Training    [place] Level (Toileting) toileting skills;minimum assist (75% patient effort)  -               User Key  (r) = Recorded By, (t) = Taken By, (c) = Cosigned By      Initials Name Provider Type     Behzad Brink OT Occupational Therapist                   Obj/Interventions       Row Name 09/27/23 1425          Sensory Assessment (Somatosensory)    Sensory Assessment BLE peripheral neuropathy  -       Row Name 09/27/23 1425          Range of Motion Comprehensive    General Range of Motion bilateral upper extremity ROM WFL  -       Row Name 09/27/23 1425          Strength Comprehensive (MMT)    Comment, General Manual Muscle Testing (MMT) Assessment BUEs grossly 3/5  -       Row Name 09/27/23 1425          Balance    Balance Assessment sitting static balance;sitting dynamic balance;standing static balance;standing dynamic balance  -LS     Static Sitting Balance independent  -LS     Dynamic Sitting Balance independent  -LS     Position, Sitting Balance sitting in chair  -LS     Static Standing Balance contact guard  -LS     Dynamic Standing Balance contact guard  -LS     Position/Device Used, Standing Balance walker, front-wheeled  -               User Key  (r) = Recorded By, (t) = Taken By, (c) = Cosigned By      Initials Name Provider Type    Behzad Rivers OT Occupational Therapist                   Goals/Plan       Row Name 09/27/23 1432          Bed Mobility Goal 1 (OT)    Activity/Assistive Device (Bed Mobility Goal 1, OT) bed mobility activities, all  -LS     [place] Level/Cues Needed (Bed Mobility Goal 1, OT) modified independence  -     Time Frame (Bed Mobility Goal 1,  OT) long term goal (LTG);2 weeks  -LS       Row Name 09/27/23 1432          Transfer Goal 1 (OT)    Activity/Assistive Device (Transfer Goal 1, OT) transfers, all;walker, rolling  -LS     [place] Level/Cues Needed (Transfer Goal 1, OT) modified independence  -LS     Time Frame (Transfer Goal 1, OT) long term goal (LTG);2 weeks  -LS       Row Name 09/27/23 1432          Dressing Goal 1 (OT)    Activity/Device (Dressing Goal 1, OT) dressing skills, all  -LS     [place]/Cues Needed (Dressing Goal 1, OT) modified independence  -LS     Time Frame (Dressing Goal 1, OT) long term goal (LTG);2 weeks  -LS       Row Name 09/27/23 1432          Toileting Goal 1 (OT)    Activity/Device (Toileting Goal 1, OT) toileting skills, all  -LS     [place] Level/Cues Needed (Toileting Goal 1, OT) modified independence  -LS     Time Frame (Toileting Goal 1, OT) long term goal (LTG);2 weeks  -LS       Row Name 09/27/23 1432          Therapy Assessment/Plan (OT)    Planned Therapy Interventions (OT) activity tolerance training;BADL retraining;functional balance retraining;IADL retraining;occupation/activity based interventions;ROM/therapeutic exercise;strengthening exercise;transfer/mobility retraining;neuromuscular control/coordination retraining  -LS               User Key  (r) = Recorded By, (t) = Taken By, (c) = Cosigned By      Initials Name Provider Type    LS Behzad Brink OT Occupational Therapist                   Clinical Impression       Row Name 09/27/23 1429          Pain Assessment    Pretreatment Pain Rating 5/10  -LS     Posttreatment Pain Rating 5/10  -LS     Pain Location - head  -LS     Pain Intervention(s) Therapeutic presence  -       Row Name 09/27/23 1422          Plan of Care Review    Plan of Care Reviewed With patient  -LS     Outcome Evaluation 84 yo female being re-evaluated this date after having a fall while here at the hospital. Originally, pt was admitted with gout flare up in the LLE, which  she has also had in the past. After fall, CT (-) for brain bleed. This date, patient in chair on arrival with daughter present. Pt oriented x4 and a willing participant in OT evaluation. She completed sit to stand and ambulation in the hallway with CGA and on 3L of O2 while using RW. Pt requires setup with lower body dressing and Min A with toileting primarily for balance upon standing. Patient has had minor changes in function since fall, now unsafe to return home alone. OT recommends SNF prior to return home in order to return to PLOF and for increased safety in the home environment. OT will follow.  -       Row Name 09/27/23 1425          Therapy Assessment/Plan (OT)    Rehab Potential (OT) good, to achieve stated therapy goals  -LS     Criteria for Skilled Therapeutic Interventions Met (OT) yes;skilled treatment is necessary  -LS     Therapy Frequency (OT) 3 times/wk  -LS     Predicted Duration of Therapy Intervention (OT) until dc  -       Row Name 09/27/23 1425          Therapy Plan Review/Discharge Plan (OT)    Anticipated Discharge Disposition (OT) skilled nursing facility  -       Row Name 09/27/23 1425          Vital Signs    Pre SpO2 (%) 94  -LS     O2 Delivery Pre Treatment nasal cannula  3L  -LS     Intra SpO2 (%) 84  -LS     O2 Delivery Intra Treatment nasal cannula  -LS     Post SpO2 (%) 93  -LS     O2 Delivery Post Treatment nasal cannula  -LS     Pre Patient Position Sitting  -LS     Intra Patient Position Standing  -LS     Post Patient Position Sitting  -       Row Name 09/27/23 1425          Positioning and Restraints    Pre-Treatment Position sitting in chair/recliner  -LS     Post Treatment Position chair  -LS     In Chair sitting;call light within reach;encouraged to call for assist;exit alarm on;with family/caregiver  -LS               User Key  (r) = Recorded By, (t) = Taken By, (c) = Cosigned By      Initials Name Provider Type    Behzad Rivers, KAMINI Occupational Therapist                    Outcome Measures       Row Name 09/27/23 1433          How much help from another is currently needed...    Putting on and taking off regular lower body clothing? 3  -LS     Bathing (including washing, rinsing, and drying) 3  -LS     Toileting (which includes using toilet bed pan or urinal) 3  -LS     Putting on and taking off regular upper body clothing 3  -LS     Taking care of personal grooming (such as brushing teeth) 3  -LS     Eating meals 4  -LS     AM-PAC 6 Clicks Score (OT) 19  -LS       Row Name 09/27/23 0700          How much help from another person do you currently need...    Turning from your back to your side while in flat bed without using bedrails? 4  -HH     Moving from lying on back to sitting on the side of a flat bed without bedrails? 4  -HH     Moving to and from a bed to a chair (including a wheelchair)? 3  -HH     Standing up from a chair using your arms (e.g., wheelchair, bedside chair)? 3  -HH     Climbing 3-5 steps with a railing? 2  -HH     To walk in hospital room? 3  -HH     AM-PAC 6 Clicks Score (PT) 19  -HH     Highest level of mobility 6 --> Walked 10 steps or more  -       Row Name 09/27/23 1433          Functional Assessment    Outcome Measure Options AM-PAC 6 Clicks Daily Activity (OT)  -LS               User Key  (r) = Recorded By, (t) = Taken By, (c) = Cosigned By      Initials Name Provider Type    Behzad Rivers OT Occupational Therapist     Chasity Soto, RN Registered Nurse                    Occupational Therapy Education       Title: PT OT SLP Therapies (Done)       Topic: Occupational Therapy (Done)       Point: ADL training (Done)       Description:   Instruct learner(s) on proper safety adaptation and remediation techniques during self care or transfers.   Instruct in proper use of assistive devices.                  Learning Progress Summary             Patient Acceptance, E, VU by HH at 9/27/2023 0751    Acceptance, E,TB, VU by  at 9/25/2023 1400                                          User Key       Initials Effective Dates Name Provider Type Discipline    BL 09/22/22 -  Karen Nelson OT Occupational Therapist OT     09/06/23 -  Chasity Soto RN Registered Nurse Nurse                  OT Recommendation and Plan  Planned Therapy Interventions (OT): activity tolerance training, BADL retraining, functional balance retraining, IADL retraining, occupation/activity based interventions, ROM/therapeutic exercise, strengthening exercise, transfer/mobility retraining, neuromuscular control/coordination retraining  Therapy Frequency (OT): 3 times/wk  Plan of Care Review  Plan of Care Reviewed With: patient  Outcome Evaluation: 82 yo female being re-evaluated this date after having a fall while here at the hospital. Originally, pt was admitted with gout flare up in the LLE, which she has also had in the past. After fall, CT (-) for brain bleed. This date, patient in chair on arrival with daughter present. Pt oriented x4 and a willing participant in OT evaluation. She completed sit to stand and ambulation in the hallway with CGA and on 3L of O2 while using RW. Pt requires setup with lower body dressing and Min A with toileting primarily for balance upon standing. Patient has had minor changes in function since fall, now unsafe to return home alone. OT recommends SNF prior to return home in order to return to PLOF and for increased safety in the home environment. OT will follow.     Time Calculation:         Time Calculation- OT       Row Name 09/27/23 1434             Time Calculation- OT    OT Start Time 1341  -      OT Stop Time 1410  -      OT Time Calculation (min) 29 min  -      Total Timed Code Minutes- OT 10 minute(s)  -      OT Received On 09/27/23  -      OT - Next Appointment 09/29/23  -      OT Goal Re-Cert Due Date 10/11/23  -         Timed Charges    91820 - OT Self Care/Mgmt Minutes 10  -         Untimed Charges    OT Eval/Re-eval  Minutes 19  -LS         Total Minutes    Timed Charges Total Minutes 10  -LS      Untimed Charges Total Minutes 19  -LS       Total Minutes 29  -LS                User Key  (r) = Recorded By, (t) = Taken By, (c) = Cosigned By      Initials Name Provider Type    Behzad Rivers OT Occupational Therapist                  Therapy Charges for Today       Code Description Service Date Service Provider Modifiers Qty    [id number]  OT SELF CARE/MGMT/TRAIN EA 15 MIN 9/27/2023 Behzad Brink OT GO 1    [id number]  OT RE-EVAL 2 9/27/2023 Behzad Brink OT GO 1                 Behzad Brink OT  9/27/2023    Electronically signed by Behzad Brink OT at 09/27/23 5116

## 2023-09-27 NOTE — CASE MANAGEMENT/SOCIAL WORK
Continued Stay Note  [facility]     Patient Name: Gabrielle Lyles  MRN: [id number]  Today's Date: 9/27/2023    Admit Date: 9/24/2023    Plan: From home alone. Referred to [facility] and accepted with ready bed. PASRR completed. Precert to be started today. Pharmacy [facility]   Discharge Plan       Row Name 09/27/23 1554       Plan    Plan From home alone. Referred to [facility] and accepted with ready bed. PASRR completed. Precert to be started today. Pharmacy [facility]    Plan Comments Per Corie with [facility] ; accepted. New PT and OT evals are in and CM asked CMA to start precert. CM informed Mrs. Lyles and she agrees with [facility] placement      Row Name 09/27/23 1357       Plan    Plan From home alone. Referred to [facility] and accepted with ready  bed. PASRR completed. Will need precert started after OT re-evaluates. Pharmacy [facility]    Plan Comments Barriers: PT changed their discharge recommendation to SNF. CM asked Dr. Jin to write an OT re-eval so that precert can be attempted from [facility]. CM met with Mrs. Lyles and her daughter , Darby and Mrs. Lyles agrees with SNF recommendation. CM reviewed and gave them a written list of area SNFs with CM contact #. First choice was [facility] and CM checked with Fadia but no beds available. Second choice was [facility] and CM referred to Corie and added to [facility]. Corie with [facility] said they can accept and have ready  bed and she will call patient and/ or daughter Darby. Precert will be started once OT eval completed and documented.                    Maintained distance greater than six feet and spent less than 15 minutes in the room.     Lucero RAMAN,RN Case Manager  [facility]  Phone: Desk- [phone] cell- [phone]

## 2023-09-27 NOTE — PLAN OF CARE
Goal Outcome Evaluation:         Gabrielle Lyles presents with functional mobility impairments which indicate the need for skilled intervention. Tolerating session today without incident. Pt fell with nursing staff yesterday, striking back of head and face with contusions (CT head (-)). Pt completed supine to sit transfer requiring SBA, sit to/from stand transfers requiring CGA, gait training 40 ft x 4 with RW requiring CGA, completed 30 Second Sit to/from Stand test for 7 reps and standing BLE therapeutic exercise. Updating d/c recommendations to SNF, as pt is not safe for home alone and at increased risk for continued falls. Will continue to follow and progress as tolerated.       Emma Torres, PT, DPT

## 2023-09-27 NOTE — DISCHARGE PLACEMENT REQUEST
"Gabrielle Lyles (83 y.o. Female)       Date of Birth   1940    Social Security Number       Address   [address] [place] [address] [place] [address]    Home Phone   [phone]    MRN   [id number]       Faith   Oriental orthodox    Marital Status                               Admission Date   9/24/23    Admission Type   Emergency    Admitting Provider   Juan C Merlos MD    Attending Provider   Rosamaria Jin MD    Department, Room/Bed   [facility] OBSERVATION, 221/1       Discharge Date       Discharge Disposition       Discharge Destination                                 Attending Provider: Rosamaria Jin MD    Allergies: No Known Allergies    Isolation: None   Infection: Other (05/03/23)   Code Status: CPR    Ht: 162.6 cm (64\")   Wt: 66.8 kg (147 lb 4.3 oz)    Admission Cmt: None   Principal Problem: Cellulitis of left foot [L03.116]                   Active Insurance as of 9/24/2023       Primary Coverage       Payor Plan Insurance Group Employer/Plan Group    HUMANA MEDICARE REPLACEMENT HUMANA MEDICARE REPLACEMENT [id number]       Payor Plan Address Payor Plan Phone Number Payor Plan Fax Number Effective Dates    [address] [address] [phone]  1/1/2018 - None Entered    [facility] [id number]         Subscriber Name Subscriber Birth Date Member ID       GABRIELLE LYLES 1940 [id number]                     Emergency Contacts        (Rel.) Home Phone Work Phone Mobile Phone    MARVIN DURANT (Daughter) [phone] -- --                "

## 2023-09-27 NOTE — SIGNIFICANT NOTE
Case Management/Social Work    Patient Name:  Gabrielle Lyles  YOB: 1940  MRN: [id number]  Admit Date:  9/24/2023 09/27/23 1610   Post Acute Pre-Cert Documentation   Request Submitted by Facility - Type: Hospital   Post-Acute Authorization Type Submitted: SNF   Date Post Acute Pre-Cert Inititated per Facility 09/27/23   Accepting Facility [facility] Discharge Date Requested 09/28/23   Had Accepting Facility at Time of Submission Yes   Response Communicated to:    Authorization Number: [id number]   Post Acute Pre-Cert Initiated Comment CM submitted pre-cert via [employer] portal. Pre-cert pending as of 9/27. Auth ID#:[id number]. Dx code: M10.9. CM updated 2B CM.           Electronically signed by:  Kristel Willson RN  09/27/23 16:12 EDT

## 2023-09-27 NOTE — PLAN OF CARE
Goal Outcome Evaluation:  Plan of Care Reviewed With: patient           Outcome Evaluation: 82 yo female being re-evaluated this date after having a fall while here at the hospital. Originally, pt was admitted with gout flare up in the LLE, which she has also had in the past. After fall, CT (-) for brain bleed. This date, patient in chair on arrival with daughter present. Pt oriented x4 and a willing participant in OT evaluation. She completed sit to stand and ambulation in the hallway with CGA and on 3L of O2 while using RW. Pt requires setup with lower body dressing and Min A with toileting primarily for balance upon standing. Patient has had minor changes in function since fall, now unsafe to return home alone. OT recommends SNF prior to return home in order to return to PLOF and for increased safety in the home environment. OT will follow.      Anticipated Discharge Disposition (OT): skilled nursing facility

## 2023-09-27 NOTE — CASE MANAGEMENT/SOCIAL WORK
Continued Stay Note  [facility]     Patient Name: Gabrielle Lyles  MRN: [id number]  Today's Date: 9/27/2023    Admit Date: 9/24/2023    Plan: From home alone. [facility] and accepted with ready  bed. PASRR completed. Will need precert started after OT re-evaluates. [facility]acy [facility]   Discharge Plan       Row Name 09/27/23 1357       Plan    Plan From home alone. Referred to [facility] and accepted with ready  bed. PASRR completed. Will need precert started after OT re-evaluates. [facility]acy [facility]    Plan Comments Barriers: PT changed their discharge recommendation to SNF. CM asked Dr. Jin to write an OT re-eval so that precert can be attempted from [facility]. CM met with Mrs. Lyles and her daughter , Darby and Mrs. Lyles agrees with SNF recommendation. CM reviewed and gave them a written list of area SNFs with CM contact #. First choice was [facility] and CM checked with Fadia but no beds available. Second choice was [facility] and CM referred to Corie and added to [email or web address]. Corie with [facility] said they can accept and have ready  bed and she will call patient and/ or daughter Darby. Precert will be started once OT eval completed and documented.                    Maintained distance greater than six feet and spent less than 15 minutes in the room.     Lucero RAMAN,RN Case Manager  [facility]  Phone: Desk- [phone]  Cell- [phone]

## 2023-09-27 NOTE — PLAN OF CARE
Problem: Adult Inpatient Plan of Care  Goal: Plan of Care Review  Outcome: Ongoing, Progressing  Flowsheets (Taken 9/26/2023 2302)  Progress: improving  Plan of Care Reviewed With: patient  Goal: Patient-Specific Goal (Individualized)  Outcome: Ongoing, Progressing  Goal: Absence of Hospital-Acquired Illness or Injury  Outcome: Ongoing, Progressing  Intervention: Identify and Manage Fall Risk  Recent Flowsheet Documentation  Taken 9/26/2023 1915 by Bony Paige RN  Safety Promotion/Fall Prevention:   activity supervised   room organization consistent   safety round/check completed  Intervention: Prevent Skin Injury  Recent Flowsheet Documentation  Taken 9/26/2023 1915 by Bony Paige RN  Body Position: position changed independently  Skin Protection: adhesive use limited  Intervention: Prevent and Manage VTE (Venous Thromboembolism) Risk  Recent Flowsheet Documentation  Taken 9/26/2023 1915 by Bony Paige RN  VTE Prevention/Management: patient refused intervention  Intervention: Prevent Infection  Recent Flowsheet Documentation  Taken 9/26/2023 1915 by Bony Paige RN  Infection Prevention: rest/sleep promoted  Goal: Optimal Comfort and Wellbeing  Outcome: Ongoing, Progressing  Intervention: Provide Person-Centered Care  Recent Flowsheet Documentation  Taken 9/26/2023 1915 by Bony Paige RN  Trust Relationship/Rapport:   care explained   choices provided  Goal: Readiness for Transition of Care  Outcome: Ongoing, Progressing     Problem: Skin Injury Risk Increased  Goal: Skin Health and Integrity  Outcome: Ongoing, Progressing  Intervention: Optimize Skin Protection  Recent Flowsheet Documentation  Taken 9/26/2023 1915 by Bony Paige RN  Pressure Reduction Techniques: frequent weight shift encouraged  Head of Bed (HOB) Positioning: HOB at 20-30 degrees  Pressure Reduction Devices: positioning supports utilized  Skin Protection: adhesive use limited     Problem: Fall Injury Risk  Goal: Absence of Fall and  Fall-Related Injury  Outcome: Ongoing, Progressing  Intervention: Identify and Manage Contributors  Recent Flowsheet Documentation  Taken 9/26/2023 1915 by Bony Paige RN  Medication Review/Management: medications reviewed  Intervention: Promote Injury-Free Environment  Recent Flowsheet Documentation  Taken 9/26/2023 1915 by Bony Paige RN  Safety Promotion/Fall Prevention:   activity supervised   room organization consistent   safety round/check completed    Bed alarm on. Will be assisted to bathroom when needed     Problem: Pain Acute  Goal: Acceptable Pain Control and Functional Ability  Outcome: Ongoing, Progressing  Intervention: Prevent or Manage Pain  Recent Flowsheet Documentation  Taken 9/26/2023 1915 by Bony Paige RN  Sensory Stimulation Regulation:   lighting decreased   care clustered  Bowel Elimination Promotion: adequate fluid intake promoted  Sleep/Rest Enhancement: awakenings minimized  Medication Review/Management: medications reviewed  Intervention: Optimize Psychosocial Wellbeing  Recent Flowsheet Documentation  Taken 9/26/2023 1915 by Bnoy Paige RN  Supportive Measures: active listening utilized  Diversional Activities: television     Problem: Adjustment to Illness (Sepsis/Septic Shock)  Goal: Optimal Coping  Outcome: Ongoing, Progressing  Intervention: Optimize Psychosocial Adjustment to Illness  Recent Flowsheet Documentation  Taken 9/26/2023 1915 by Bony Paige RN  Supportive Measures: active listening utilized  Family/Support System Care: support provided     Problem: Bleeding (Sepsis/Septic Shock)  Goal: Absence of Bleeding  Outcome: Ongoing, Progressing  Intervention: Monitor and Manage Bleeding  Recent Flowsheet Documentation  Taken 9/26/2023 1915 by Bony Paige RN  Bleeding Precautions: blood pressure closely monitored     Problem: Glycemic Control Impaired (Sepsis/Septic Shock)  Goal: Blood Glucose Level Within Desired Range  Outcome: Ongoing, Progressing     Problem: Infection  Progression (Sepsis/Septic Shock)  Goal: Absence of Infection Signs and Symptoms  Outcome: Ongoing, Progressing  Intervention: Initiate Sepsis Management  Recent Flowsheet Documentation  Taken 9/26/2023 1915 by Bony Paige RN  Stabilization Measures: legs elevated  Infection Prevention: rest/sleep promoted  Intervention: Promote Recovery  Recent Flowsheet Documentation  Taken 9/26/2023 1915 by Bony Paige RN  Sleep/Rest Enhancement: awakenings minimized     Problem: Nutrition Impaired (Sepsis/Septic Shock)  Goal: Optimal Nutrition Intake  Outcome: Ongoing, Progressing     Problem: Skin or Soft Tissue Infection  Goal: Absence of Infection Signs and Symptoms  Outcome: Ongoing, Progressing  Intervention: Minimize and Manage Infection Progression  Recent Flowsheet Documentation  Taken 9/26/2023 1915 by Bony Paige RN  Infection Prevention: rest/sleep promoted   Goal Outcome Evaluation:  Plan of Care Reviewed With: patient        Progress: improving

## 2023-09-27 NOTE — THERAPY RE-EVALUATION
Patient Name: Gabrielle Lyles  : 1940    MRN: [id number]                              Today's Date: 2023       Admit Date: 2023    Visit Dx:     ICD-10-CM ICD-9-CM   1. Cellulitis of left foot  L03.116 682.7     Patient Active Problem List   Diagnosis    Stage 3a chronic kidney disease    Low back pain    Osteopenia    Chronic obstructive pulmonary disease    Gastroesophageal reflux disease    Gout    History of venous thrombosis and embolism    Primary hypertension    Lumbar radiculopathy    Nausea    Parotid duct obstruction    Personal history of malignant neoplasm of breast    Shortness of breath    Aortic aneurysm    Bulging lumbar disc    Spinal stenosis of lumbar region    History of TIA (transient ischemic attack)    Acute exacerbation of chronic obstructive pulmonary disease (COPD)    Parainfluenza infection    Severe pulmonary hypertension    Acute on chronic respiratory failure with hypoxia    Cellulitis of left foot     Past Medical History:   Diagnosis Date    COPD (chronic obstructive pulmonary disease)     Deep vein thrombosis of bilateral lower extremities 2019    History of DVT (deep vein thrombosis) 2013    bilateral lower extremity    History of pneumonia 2012    community acquired pneumonia and right pleural effusion;hospitalized at [facility]    History of pulmonary embolism 2013    bilateral PE    Hypertension 2001    Insomnia     on Ambien 5mg at     Lobular breast cancer, left 2011    Stage IA left upper lobular breast cancer    Malignant neoplasm of left breast in female, estrogen receptor positive 2019    Osteopenia     Severe pulmonary hypertension 3/3/2023    Stage 3a chronic kidney disease 2019    TIA (transient ischemic attack) 10/25/2022     Past Surgical History:   Procedure Laterality Date    CARDIAC CATHETERIZATION N/A 3/3/2023    Procedure: Left and Right Heart Cath with Coronary Angiography;  Surgeon: Richardson Willis MD;   Location: [facility] CATH INVASIVE LOCATION;  Service: Cardiovascular;  Laterality: N/A;    CATARACT EXTRACTION  2015    IVC FILTER RETRIEVAL  06/2014    IVC filter placement by Dr. Card    MAMMO STEREOTACTIC BREAST BX SURGICAL ADD UNI Left 11/01/2011    invasive lobular carcinoma-Dr. Cande Daniel    MASTECTOMY, PARTIAL Left 12/01/2011    and left axillary sentinel lymph node biopsy by Dr. Uriostegui    TUBAL ABDOMINAL LIGATION  1984      General Information       Row Name 09/27/23 1424          OT Time and Intention    Document Type re-evaluation  -     Mode of Treatment occupational therapy  -       Row Name 09/27/23 1424          General Information    Patient Profile Reviewed yes  -LS     Prior Level of Function independent:;ADL's;all household mobility  -     Existing Precautions/Restrictions fall  -LS       Row Name 09/27/23 1424          Living Environment    People in Home alone  -       Row Name 09/27/23 1424          Home Main Entrance    Number of Stairs, Main Entrance two  -LS     Stair Railings, Main Entrance railings safe and in good condition  -LS       Row Name 09/27/23 1424          Cognition    Orientation Status (Cognition) oriented x 4  -       Row Name 09/27/23 1424          Safety Issues, Functional Mobility    Impairments Affecting Function (Mobility) balance;endurance/activity tolerance;pain  -LS               User Key  (r) = Recorded By, (t) = Taken By, (c) = Cosigned By      Initials Name Provider Type    LS Behzad Brink OT Occupational Therapist                     Mobility/ADL's       Row Name 09/27/23 1424          Sit-Stand Transfer    Sit-Stand [place] (Transfers) contact guard;1 person assist  -     Assistive Device (Sit-Stand Transfers) walker, front-wheeled  -       Row Name 09/27/23 1424          Functional Mobility    Functional Mobility- Ind. Level standby assist  -     Functional Mobility- Device walker, front-wheeled  -       Row Name 09/27/23 1424           Activities of Daily Living    BADL Assessment/Intervention lower body dressing;toileting  -       Row Name 09/27/23 1424          Lower Body Dressing Assessment/Training    [place] Level (Lower Body Dressing) doff;don;socks;standby assist  -       Row Name 09/27/23 1424          Toileting Assessment/Training    [place] Level (Toileting) toileting skills;minimum assist (75% patient effort)  -               User Key  (r) = Recorded By, (t) = Taken By, (c) = Cosigned By      Initials Name Provider Type     Behzad Brink OT Occupational Therapist                   Obj/Interventions       Row Name 09/27/23 1425          Sensory Assessment (Somatosensory)    Sensory Assessment BLE peripheral neuropathy  -       Row Name 09/27/23 1425          Range of Motion Comprehensive    General Range of Motion bilateral upper extremity ROM WFL  -       Row Name 09/27/23 1425          Strength Comprehensive (MMT)    Comment, General Manual Muscle Testing (MMT) Assessment BUEs grossly 3/5  -       Row Name 09/27/23 1425          Balance    Balance Assessment sitting static balance;sitting dynamic balance;standing static balance;standing dynamic balance  -LS     Static Sitting Balance independent  -LS     Dynamic Sitting Balance independent  -LS     Position, Sitting Balance sitting in chair  -LS     Static Standing Balance contact guard  -LS     Dynamic Standing Balance contact guard  -LS     Position/Device Used, Standing Balance walker, front-wheeled  -               User Key  (r) = Recorded By, (t) = Taken By, (c) = Cosigned By      Initials Name Provider Type    Behzad Rivers OT Occupational Therapist                   Goals/Plan       Row Name 09/27/23 1432          Bed Mobility Goal 1 (OT)    Activity/Assistive Device (Bed Mobility Goal 1, OT) bed mobility activities, all  -LS     [place] Level/Cues Needed (Bed Mobility Goal 1, OT) modified independence  -     Time Frame (Bed Mobility Goal 1,  OT) long term goal (LTG);2 weeks  -LS       Row Name 09/27/23 1432          Transfer Goal 1 (OT)    Activity/Assistive Device (Transfer Goal 1, OT) transfers, all;walker, rolling  -LS     [place] Level/Cues Needed (Transfer Goal 1, OT) modified independence  -LS     Time Frame (Transfer Goal 1, OT) long term goal (LTG);2 weeks  -LS       Row Name 09/27/23 1432          Dressing Goal 1 (OT)    Activity/Device (Dressing Goal 1, OT) dressing skills, all  -LS     [place]/Cues Needed (Dressing Goal 1, OT) modified independence  -LS     Time Frame (Dressing Goal 1, OT) long term goal (LTG);2 weeks  -LS       Row Name 09/27/23 1432          Toileting Goal 1 (OT)    Activity/Device (Toileting Goal 1, OT) toileting skills, all  -LS     [place] Level/Cues Needed (Toileting Goal 1, OT) modified independence  -LS     Time Frame (Toileting Goal 1, OT) long term goal (LTG);2 weeks  -LS       Row Name 09/27/23 1432          Therapy Assessment/Plan (OT)    Planned Therapy Interventions (OT) activity tolerance training;BADL retraining;functional balance retraining;IADL retraining;occupation/activity based interventions;ROM/therapeutic exercise;strengthening exercise;transfer/mobility retraining;neuromuscular control/coordination retraining  -LS               User Key  (r) = Recorded By, (t) = Taken By, (c) = Cosigned By      Initials Name Provider Type    LS Behzad Brink OT Occupational Therapist                   Clinical Impression       Row Name 09/27/23 1426          Pain Assessment    Pretreatment Pain Rating 5/10  -LS     Posttreatment Pain Rating 5/10  -LS     Pain Location - head  -LS     Pain Intervention(s) Therapeutic presence  -       Row Name 09/27/23 1421          Plan of Care Review    Plan of Care Reviewed With patient  -LS     Outcome Evaluation 84 yo female being re-evaluated this date after having a fall while here at the hospital. Originally, pt was admitted with gout flare up in the LLE, which  she has also had in the past. After fall, CT (-) for brain bleed. This date, patient in chair on arrival with daughter present. Pt oriented x4 and a willing participant in OT evaluation. She completed sit to stand and ambulation in the hallway with CGA and on 3L of O2 while using RW. Pt requires setup with lower body dressing and Min A with toileting primarily for balance upon standing. Patient has had minor changes in function since fall, now unsafe to return home alone. OT recommends SNF prior to return home in order to return to PLOF and for increased safety in the home environment. OT will follow.  -       Row Name 09/27/23 1425          Therapy Assessment/Plan (OT)    Rehab Potential (OT) good, to achieve stated therapy goals  -LS     Criteria for Skilled Therapeutic Interventions Met (OT) yes;skilled treatment is necessary  -LS     Therapy Frequency (OT) 3 times/wk  -LS     Predicted Duration of Therapy Intervention (OT) until dc  -       Row Name 09/27/23 1425          Therapy Plan Review/Discharge Plan (OT)    Anticipated Discharge Disposition (OT) skilled nursing facility  -       Row Name 09/27/23 1425          Vital Signs    Pre SpO2 (%) 94  -LS     O2 Delivery Pre Treatment nasal cannula  3L  -LS     Intra SpO2 (%) 84  -LS     O2 Delivery Intra Treatment nasal cannula  -LS     Post SpO2 (%) 93  -LS     O2 Delivery Post Treatment nasal cannula  -LS     Pre Patient Position Sitting  -LS     Intra Patient Position Standing  -LS     Post Patient Position Sitting  -       Row Name 09/27/23 1425          Positioning and Restraints    Pre-Treatment Position sitting in chair/recliner  -LS     Post Treatment Position chair  -LS     In Chair sitting;call light within reach;encouraged to call for assist;exit alarm on;with family/caregiver  -LS               User Key  (r) = Recorded By, (t) = Taken By, (c) = Cosigned By      Initials Name Provider Type    Behzad Rivers, KAMINI Occupational Therapist                    Outcome Measures       Row Name 09/27/23 1433          How much help from another is currently needed...    Putting on and taking off regular lower body clothing? 3  -LS     Bathing (including washing, rinsing, and drying) 3  -LS     Toileting (which includes using toilet bed pan or urinal) 3  -LS     Putting on and taking off regular upper body clothing 3  -LS     Taking care of personal grooming (such as brushing teeth) 3  -LS     Eating meals 4  -LS     AM-PAC 6 Clicks Score (OT) 19  -LS       Row Name 09/27/23 0700          How much help from another person do you currently need...    Turning from your back to your side while in flat bed without using bedrails? 4  -HH     Moving from lying on back to sitting on the side of a flat bed without bedrails? 4  -HH     Moving to and from a bed to a chair (including a wheelchair)? 3  -HH     Standing up from a chair using your arms (e.g., wheelchair, bedside chair)? 3  -HH     Climbing 3-5 steps with a railing? 2  -HH     To walk in hospital room? 3  -HH     AM-PAC 6 Clicks Score (PT) 19  -HH     Highest level of mobility 6 --> Walked 10 steps or more  -       Row Name 09/27/23 1433          Functional Assessment    Outcome Measure Options AM-PAC 6 Clicks Daily Activity (OT)  -LS               User Key  (r) = Recorded By, (t) = Taken By, (c) = Cosigned By      Initials Name Provider Type    Behzad Rivers OT Occupational Therapist     Chasity Soto, RN Registered Nurse                    Occupational Therapy Education       Title: PT OT SLP Therapies (Done)       Topic: Occupational Therapy (Done)       Point: ADL training (Done)       Description:   Instruct learner(s) on proper safety adaptation and remediation techniques during self care or transfers.   Instruct in proper use of assistive devices.                  Learning Progress Summary             Patient Acceptance, E, VU by HH at 9/27/2023 0751    Acceptance, E,TB, VU by  at 9/25/2023 1400                                          User Key       Initials Effective Dates Name Provider Type Discipline    BL 09/22/22 -  Karen Nelson OT Occupational Therapist OT     09/06/23 -  Chasity Soto RN Registered Nurse Nurse                  OT Recommendation and Plan  Planned Therapy Interventions (OT): activity tolerance training, BADL retraining, functional balance retraining, IADL retraining, occupation/activity based interventions, ROM/therapeutic exercise, strengthening exercise, transfer/mobility retraining, neuromuscular control/coordination retraining  Therapy Frequency (OT): 3 times/wk  Plan of Care Review  Plan of Care Reviewed With: patient  Outcome Evaluation: 82 yo female being re-evaluated this date after having a fall while here at the hospital. Originally, pt was admitted with gout flare up in the LLE, which she has also had in the past. After fall, CT (-) for brain bleed. This date, patient in chair on arrival with daughter present. Pt oriented x4 and a willing participant in OT evaluation. She completed sit to stand and ambulation in the hallway with CGA and on 3L of O2 while using RW. Pt requires setup with lower body dressing and Min A with toileting primarily for balance upon standing. Patient has had minor changes in function since fall, now unsafe to return home alone. OT recommends SNF prior to return home in order to return to PLOF and for increased safety in the home environment. OT will follow.     Time Calculation:         Time Calculation- OT       Row Name 09/27/23 1434             Time Calculation- OT    OT Start Time 1341  -      OT Stop Time 1410  -      OT Time Calculation (min) 29 min  -      Total Timed Code Minutes- OT 10 minute(s)  -      OT Received On 09/27/23  -      OT - Next Appointment 09/29/23  -      OT Goal Re-Cert Due Date 10/11/23  -         Timed Charges    51506 - OT Self Care/Mgmt Minutes 10  -         Untimed Charges    OT Eval/Re-eval  Minutes 19  -LS         Total Minutes    Timed Charges Total Minutes 10  -LS      Untimed Charges Total Minutes 19  -LS       Total Minutes 29  -LS                User Key  (r) = Recorded By, (t) = Taken By, (c) = Cosigned By      Initials Name Provider Type    Behzad Rivers OT Occupational Therapist                  Therapy Charges for Today       Code Description Service Date Service Provider Modifiers Qty    [id number]  OT SELF CARE/MGMT/TRAIN EA 15 MIN 9/27/2023 Behzad Brink OT GO 1    [id number]  OT RE-EVAL 2 9/27/2023 Behzad Brink OT GO 1                 Behzad Brink OT  9/27/2023

## 2023-09-28 ENCOUNTER — HOME HEALTH ADMISSION (OUTPATIENT)
Dept: HOME HEALTH SERVICES | Facility: HOME HEALTHCARE | Age: 83
End: 2023-09-28
Payer: MEDICARE

## 2023-09-28 VITALS
BODY MASS INDEX: 25.14 KG/M2 | HEART RATE: 69 BPM | OXYGEN SATURATION: 96 % | WEIGHT: 147.27 LBS | DIASTOLIC BLOOD PRESSURE: 53 MMHG | TEMPERATURE: 97.7 F | RESPIRATION RATE: 17 BRPM | SYSTOLIC BLOOD PRESSURE: 129 MMHG | HEIGHT: 64 IN

## 2023-09-28 PROCEDURE — 97116 GAIT TRAINING THERAPY: CPT

## 2023-09-28 PROCEDURE — 63710000001 PREDNISONE PER 1 MG: Performed by: INTERNAL MEDICINE

## 2023-09-28 PROCEDURE — 97110 THERAPEUTIC EXERCISES: CPT

## 2023-09-28 PROCEDURE — 97530 THERAPEUTIC ACTIVITIES: CPT

## 2023-09-28 PROCEDURE — G0378 HOSPITAL OBSERVATION PER HR: HCPCS

## 2023-09-28 PROCEDURE — 63710000001 PREDNISONE PER 5 MG: Performed by: INTERNAL MEDICINE

## 2023-09-28 RX ORDER — HYDROCODONE BITARTRATE AND ACETAMINOPHEN 5; 325 MG/1; MG/1
1 TABLET ORAL EVERY 6 HOURS PRN
Qty: 20 TABLET | Refills: 0 | Status: SHIPPED | OUTPATIENT
Start: 2023-09-28 | End: 2023-10-01

## 2023-09-28 RX ORDER — ALLOPURINOL 100 MG/1
100 TABLET ORAL DAILY
Qty: 30 TABLET | Refills: 1 | Status: SHIPPED | OUTPATIENT
Start: 2023-09-29

## 2023-09-28 RX ORDER — PREDNISONE 10 MG/1
30 TABLET ORAL
Qty: 15 TABLET | Refills: 0 | Status: SHIPPED | OUTPATIENT
Start: 2023-09-29

## 2023-09-28 RX ADMIN — APIXABAN 5 MG: 5 TABLET, FILM COATED ORAL at 08:50

## 2023-09-28 RX ADMIN — LEVOTHYROXINE SODIUM 50 MCG: 0.05 TABLET ORAL at 08:13

## 2023-09-28 RX ADMIN — HYDROCODONE BITARTRATE AND ACETAMINOPHEN 1 TABLET: 5; 325 TABLET ORAL at 05:00

## 2023-09-28 RX ADMIN — FUROSEMIDE 40 MG: 40 TABLET ORAL at 08:12

## 2023-09-28 RX ADMIN — FAMOTIDINE 20 MG: 20 TABLET ORAL at 08:13

## 2023-09-28 RX ADMIN — PREDNISONE 30 MG: 20 TABLET ORAL at 08:12

## 2023-09-28 RX ADMIN — SILDENAFIL 20 MG: 20 TABLET, FILM COATED ORAL at 08:13

## 2023-09-28 RX ADMIN — POTASSIUM CHLORIDE 10 MEQ: 1500 TABLET, EXTENDED RELEASE ORAL at 08:14

## 2023-09-28 RX ADMIN — Medication 10 ML: at 08:50

## 2023-09-28 RX ADMIN — CARVEDILOL 12.5 MG: 6.25 TABLET, FILM COATED ORAL at 08:12

## 2023-09-28 RX ADMIN — ALLOPURINOL 100 MG: 100 TABLET ORAL at 08:12

## 2023-09-28 RX ADMIN — GABAPENTIN 300 MG: 300 CAPSULE ORAL at 08:12

## 2023-09-28 RX ADMIN — LISINOPRIL 40 MG: 20 TABLET ORAL at 08:12

## 2023-09-28 RX ADMIN — AMLODIPINE BESYLATE 10 MG: 5 TABLET ORAL at 08:13

## 2023-09-28 NOTE — CASE MANAGEMENT/SOCIAL WORK
Continued Stay Note   [facility]     Patient Name: Gabrielle Lyles  MRN: [id number]  Today's Date: 9/28/2023    Admit Date: 9/24/2023    Plan: From home alone. Referred to [facility] and accepted with ready bed. PASRR completed. Precert completed.  Ph[facility]   Discharge Plan       Row Name 09/28/23 1057       Plan    Plan From home alone. Referred to [facility] and accepted with ready bed. PASRR completed. Precert completed.  [facility]    Plan Comments Precert completed from [facility] for [facility]. Corie with [facility] visited Mrs. Lyles today and their van will pick her  up at 1:00 PM. CM informed nursing.                        Phone communication or documentation only - no physical contact with patient or family.   Lucero RAMAN,RN Case Manager  [facility]  Phone: Desk- [phone] cell- [phone]

## 2023-09-28 NOTE — PLAN OF CARE
"Assessment: Gabrielle Lyles presents with functional mobility impairments which indicate the need for skilled intervention. Pt completed bed mobility supine to sit EOB with SBA. Functional STS transfers from EOB and toilet with SBA. Pt amb to bathroom w/ FWW and able to navigate AD and complete toileting tasks with close SBA. Further gait training completed within facility with FWW and SBA 50ft x2. Pt able to employ breathing strategies without cuing. Tolerating session today without incident. Will continue to follow and progress as tolerated.     Plan/Recommendations:   Moderate Intensity Therapy recommended post-acute care. This is recommended as therapy feels the patient would require 3-4 days per week and wouldn't tolerate \"3 hour daily\" rehab intensity. SNF would be the preferred choice. If the patient does not agree to SNF, arrange HH or OP depending on home bound status. If patient is medically complex, consider LTACH.. Pt requires no DME at discharge.     Pt desires Skilled Rehab placement at discharge. Pt cooperative; agreeable to therapeutic recommendations and plan of care.   "

## 2023-09-28 NOTE — SIGNIFICANT NOTE
09/28/23 0925   Post Acute Pre-Cert Documentation   Date Post Acute Pre-Cert Completed 09/28/23   All Clinicals Submitted? Yes   Response Received from Insurance? Approval   Response Communicated to:    Authorization Number: [id number]   Post Acute Pre-Cert Initiated Comment Per [facility] pre-cert approved, valid 9/28-10/02. Auth ID#:[id number]. CM updated 2B CM.

## 2023-09-28 NOTE — NURSING NOTE
9/28/ report called to [facility] and given to Azalia. Patient and family notified of transfer. Awaiting transport

## 2023-09-28 NOTE — DISCHARGE SUMMARY
Date of Discharge:  9/28/2023    Discharge Diagnosis:   Left ankle pain  Left ankle acute gouty arthritis  Pulmonary hypertension  Right forehead hematoma  Chronic kidney disease stage III  COPD  Essential hypertension  History of PE  Degenerative disc disease  Hypothyroid    Presenting Problem/History of Present Illness  Active Hospital Problems    Diagnosis  POA    **Cellulitis of left foot [L03.116]  Yes      Resolved Hospital Problems   No resolved problems to display.          Hospital Course    Patient is a 83 y.o. female presented with with h/o gout, COPD, pulmonary embolism, CKD who had sudden onset of severe left ankle pain and swelling the day prior to admission. She was previously on allopurinol but had stopped it several weeks ago.  Clinical picture was consistent with acute gouty arthritis resolution with steroids restarted on allopurinol.  Patient did have a fall with a moderate right forehead hematoma and a small lack on back of the head.  CT head with no bleed CT spine with no acute findings.  She continues with bilateral lower extremity edema restarted on diuretics.  She has been hemodynamically stable and will go to rehab.  She will need to follow-up PCP on discharge.    Procedures Performed         Consults:   Consults       Date and Time Order Name Status Description    9/25/2023  7:47 AM Inpatient Family Practice Consult              Pertinent Test Results:    Lab Results (most recent)       Procedure Component Value Units Date/Time    Basic Metabolic Panel [[id number]]  (Abnormal) Collected: 09/27/23 1727    Specimen: Blood Updated: 09/27/23 1852     Glucose 139 mg/dL      BUN 49 mg/dL      Creatinine 1.09 mg/dL      Sodium 135 mmol/L      Potassium 5.1 mmol/L      Comment: Slight hemolysis detected by analyzer. Results may be affected.        Chloride 100 mmol/L      CO2 25.0 mmol/L      Calcium 9.3 mg/dL      BUN/Creatinine Ratio 45.0     Anion Gap 10.0 mmol/L      eGFR 50.8 mL/min/1.73      Narrative:      GFR Normal >60  Chronic Kidney Disease <60  Kidney Failure <15    The GFR formula is only valid for adults with stable renal function between ages 18 and 70.    Blood Culture - Blood, Hand, Right [[id number]]  (Normal) Collected: 09/24/23 1655    Specimen: Blood from Hand, Right Updated: 09/27/23 1715     Blood Culture No growth at 3 days    Blood Culture - Blood, Arm, Right [[id number]]  (Normal) Collected: 09/24/23 1632    Specimen: Blood from Arm, Right Updated: 09/27/23 1645     Blood Culture No growth at 3 days    Magnesium [[id number]]  (Normal) Collected: 09/27/23 0337    Specimen: Blood Updated: 09/27/23 0451     Magnesium 2.2 mg/dL     CBC & Differential [[id number]]  (Abnormal) Collected: 09/27/23 0337    Specimen: Blood Updated: 09/27/23 0417    Narrative:      The following orders were created for panel order CBC & Differential.  Procedure                               Abnormality         Status                     ---------                               -----------         ------                     CBC Auto Differential[[id number]]        Abnormal            Final result                 Please view results for these tests on the individual orders.    CBC Auto Differential [[id number]]  (Abnormal) Collected: 09/27/23 0337    Specimen: Blood Updated: 09/27/23 0417     WBC 14.00 10*3/mm3      RBC 4.55 10*6/mm3      Hemoglobin 12.8 g/dL      Hematocrit 39.0 %      MCV 85.8 fL      MCH 28.2 pg      MCHC 32.9 g/dL      RDW 16.8 %      RDW-SD 53.4 fl      MPV 8.8 fL      Platelets 229 10*3/mm3      Neutrophil % 92.4 %      Lymphocyte % 3.1 %      Monocyte % 4.1 %      Eosinophil % 0.0 %      Basophil % 0.4 %      Neutrophils, Absolute 12.90 10*3/mm3      Lymphocytes, Absolute 0.40 10*3/mm3      Monocytes, Absolute 0.60 10*3/mm3      Eosinophils, Absolute 0.00 10*3/mm3      Basophils, Absolute 0.10 10*3/mm3      nRBC 0.0 /100 WBC     Magnesium [[id number]]  (Normal) Collected: 09/26/23 0320     Specimen: Blood Updated: 09/26/23 0438     Magnesium 2.2 mg/dL     Basic Metabolic Panel [[id number]]  (Abnormal) Collected: 09/26/23 0320    Specimen: Blood Updated: 09/26/23 0438     Glucose 144 mg/dL      BUN 55 mg/dL      Creatinine 1.46 mg/dL      Sodium 136 mmol/L      Potassium 4.7 mmol/L      Chloride 102 mmol/L      CO2 24.0 mmol/L      Calcium 9.1 mg/dL      BUN/Creatinine Ratio 37.7     Anion Gap 10.0 mmol/L      eGFR 35.8 mL/min/1.73     Narrative:      GFR Normal >60  Chronic Kidney Disease <60  Kidney Failure <15    The GFR formula is only valid for adults with stable renal function between ages 18 and 70.    CBC & Differential [[id number]]  (Abnormal) Collected: 09/26/23 0320    Specimen: Blood Updated: 09/26/23 0412    Narrative:      The following orders were created for panel order CBC & Differential.  Procedure                               Abnormality         Status                     ---------                               -----------         ------                     CBC Auto Differential[[id number]]        Abnormal            Final result                 Please view results for these tests on the individual orders.    CBC Auto Differential [[id number]]  (Abnormal) Collected: 09/26/23 0320    Specimen: Blood Updated: 09/26/23 0412     WBC 14.40 10*3/mm3      RBC 4.28 10*6/mm3      Hemoglobin 12.1 g/dL      Hematocrit 36.7 %      MCV 85.8 fL      MCH 28.3 pg      MCHC 33.0 g/dL      RDW 16.5 %      RDW-SD 51.6 fl      MPV 8.8 fL      Platelets 200 10*3/mm3      Neutrophil % 93.5 %      Lymphocyte % 2.5 %      Monocyte % 3.9 %      Eosinophil % 0.0 %      Basophil % 0.1 %      Neutrophils, Absolute 13.50 10*3/mm3      Lymphocytes, Absolute 0.40 10*3/mm3      Monocytes, Absolute 0.60 10*3/mm3      Eosinophils, Absolute 0.00 10*3/mm3      Basophils, Absolute 0.00 10*3/mm3      nRBC 0.0 /100 WBC     POC Lactate [[id number]]  (Normal) Collected: 09/24/23 1709    Specimen: Blood Updated: 09/24/23 1711      Lactate 1.1 mmol/L      Comment: Serial Number: [id number]:  [id number]       C-reactive Protein [[id number]]  (Abnormal) Collected: 09/24/23 1448    Specimen: Blood Updated: 09/24/23 1523     C-Reactive Protein 25.41 mg/dL     Uric Acid [[id number]]  (Abnormal) Collected: 09/24/23 1448    Specimen: Blood Updated: 09/24/23 1523     Uric Acid 9.7 mg/dL     Sedimentation Rate [[id number]]  (Abnormal) Collected: 09/24/23 1448    Specimen: Blood Updated: 09/24/23 1458     Sed Rate 44 mm/hr              Results for orders placed in visit on 09/13/22    Adult Transthoracic Echo Complete W/ Cont if Necessary Per Protocol    Interpretation Summary    Estimated left ventricular EF = 70% Estimated left ventricular EF was in agreement with the calculated left ventricular EF. Left ventricular systolic function is normal.    Left ventricular diastolic function is consistent with (grade II w/high LAP) pseudonormalization.         Condition on Discharge:  Stable    Vital Signs  Temp:  [97.3 °F (36.3 °C)-97.5 °F (36.4 °C)] 97.3 °F (36.3 °C)  Heart Rate:  [45-93] 57  Resp:  [13-24] 16  BP: ()/() 145/52      Physical Exam  Vitals reviewed.   Constitutional:       Appearance: She is not ill-appearing.   HENT:      Head: Normocephalic and atraumatic.      Right Ear: External ear normal.      Left Ear: External ear normal.      Nose: Nose normal.      Mouth/Throat:      Mouth: Mucous membranes are moist.   Eyes:      General:         Right eye: No discharge.         Left eye: No discharge.   Cardiovascular:      Rate and Rhythm: Normal rate and regular rhythm.      Pulses: Normal pulses.      Heart sounds: Normal heart sounds.   Pulmonary:      Effort: Pulmonary effort is normal.      Breath sounds: Normal breath sounds.   Abdominal:      General: Bowel sounds are normal.      Palpations: Abdomen is soft.   Musculoskeletal:         General: Normal range of motion.      Cervical back: Normal range of motion.   Skin:      General: Skin is warm and dry.   Neurological:      Mental Status: She is alert and oriented to person, place, and time.   Psychiatric:         Behavior: Behavior normal.            Discharge Disposition      Discharge Medications     Discharge Medications        New Medications        Instructions Start Date   HYDROcodone-acetaminophen 5-325 MG per tablet  Commonly known as: NORCO   1 tablet, Oral, Every 6 Hours PRN      predniSONE 10 MG tablet  Commonly known as: DELTASONE   30 mg, Oral, Daily With Breakfast   Start Date: September 29, 2023            Changes to Medications        Instructions Start Date   allopurinol 100 MG tablet  Commonly known as: ZYLOPRIM  What changed:   when to take this  reasons to take this   100 mg, Oral, Daily   Start Date: September 29, 2023            Continue These Medications        Instructions Start Date   amLODIPine 10 MG tablet  Commonly known as: NORVASC   10 mg, Oral, Daily      carvedilol 12.5 MG tablet  Commonly known as: COREG   12.5 mg, Oral, 2 Times Daily With Meals      Eliquis 5 MG tablet tablet  Generic drug: apixaban   5 mg, Oral, Every 12 Hours Scheduled      Folbic 2.5-25-2 MG tablet tablet  Generic drug: folic acid-pyridoxine-cyanocobalamin   1 tablet, Oral, Daily      furosemide 40 MG tablet  Commonly known as: LASIX   40 mg, Oral, Daily      gabapentin 300 MG capsule  Commonly known as: NEURONTIN   300 mg, Oral, 2 Times Daily      levothyroxine 50 MCG tablet  Commonly known as: SYNTHROID, LEVOTHROID   1 tablet, Oral, Daily      lisinopril 40 MG tablet  Commonly known as: PRINIVIL,ZESTRIL   40 mg, Oral, Daily      montelukast 10 MG tablet  Commonly known as: SINGULAIR   10 mg, Oral, Nightly      potassium chloride 10 MEQ CR tablet  Commonly known as: K-DUR,KLOR-CON   10 mEq, Oral, Daily      sildenafil 20 MG tablet  Commonly known as: REVATIO   20 mg, Oral, Every 8 Hours Scheduled      Vitamin D3 50 MCG (2000 UT) capsule   2,000 Units, Oral, Daily                Discharge Diet:   Diet Instructions       Diet: Regular/House Diet; Regular Texture (IDDSI 7); Thin (IDDSI 0)      Discharge Diet: Regular/House Diet    Texture: Regular Texture (IDDSI 7)    Fluid Consistency: Thin (IDDSI 0)            Activity at Discharge:   Activity Instructions       Gradually Increase Activity Until at Pre-Hospitalization Level              Follow-up Appointments  Future Appointments   Date Time Provider Department Center   2/26/2024  1:15 PM Richardson Willis MD [facility]     Additional Instructions for the Follow-ups that You Need to Schedule       Ambulatory Referral to Home Health (Hospital)   As directed      Face to Face Visit Date: 9/26/2023   Follow-up provider for Plan of Care?: I treated the patient in an acute care facility and will not continue treatment after discharge.   Follow-up provider: PATI CERDA [[id number]]   Reason/Clinical Findings: post hosp   Describe mobility limitations that make leaving home difficult: weak   Nursing/Therapeutic Services Requested: Skilled Nursing   Skilled nursing orders: Medication education Pain management   Frequency: 1 Week 1        Discharge Follow-up with PCP   As directed       Currently Documented PCP:    Pati Cerda MD    PCP Phone Number:    [phone]     Follow Up Details: make appointment after rehab                Test Results Pending at Discharge  Pending Labs       Order Current Status    Blood Culture - Blood, Arm, Right Preliminary result    Blood Culture - Blood, Hand, Right Preliminary result             JOAQUÍN Rogers  09/28/23  10:38 EDT    Time: Discharge 25 min

## 2023-09-28 NOTE — THERAPY TREATMENT NOTE
"Subjective: Pt agreeable to therapeutic plan of care.    Objective:     Bed mobility - SBA  Transfers - SBA  Ambulation - 50 x2 feet SBA and with rolling walker    Therapeutic Exercise - 10 Reps B LE AROM supported sitting / chair    Vitals: WNL and Desaturates on 3L NC at baseline. Desat 88-89% but recovers well with rest     Pain: 3 VAS   Location: head contusion  Intervention for pain: N/A    Education: Provided education on the importance of mobility in the acute care setting, Verbal/Tactile Cues, and Transfer Training    Assessment: Gabrielle Lyles presents with functional mobility impairments which indicate the need for skilled intervention. Pt completed bed mobility supine to sit EOB with SBA. Functional STS transfers from EOB and toilet with SBA. Pt amb to bathroom w/ FWW and able to navigate AD and complete toileting tasks with close SBA. Further gait training completed within facility with FWW and SBA 50ft x2. Pt able to employ breathing strategies without cuing. Tolerating session today without incident. Will continue to follow and progress as tolerated.     Plan/Recommendations:   Moderate Intensity Therapy recommended post-acute care. This is recommended as therapy feels the patient would require 3-4 days per week and wouldn't tolerate \"3 hour daily\" rehab intensity. SNF would be the preferred choice. If the patient does not agree to SNF, arrange HH or OP depending on home bound status. If patient is medically complex, consider LTACH.. Pt requires no DME at discharge.     Pt desires Skilled Rehab placement at discharge. Pt cooperative; agreeable to therapeutic recommendations and plan of care.         Basic Mobility 6-click:  Rollin = Total, A lot = 2, A little = 3; 4 = None  Supine>Sit:   1 = Total, A lot = 2, A little = 3; 4 = None   Sit>Stand with arms:  1 = Total, A lot = 2, A little = 3; 4 = None  Bed>Chair:   1 = Total, A lot = 2, A little = 3; 4 = None  Ambulate in room:  1 = Total, " A lot = 2, A little = 3; 4 = None  3-5 Steps with railin = Total, A lot = 2, A little = 3; 4 = None  Score: 19    Modified Deann: N/A = No pre-op stroke/TIA    Post-Tx Position: Up in Chair, Alarms activated, and Call light and personal items within reach  PPE: gloves

## 2023-09-28 NOTE — CASE MANAGEMENT/SOCIAL WORK
Case Management Discharge Note      Final Note: [facility]    Provided Post Acute Provider List?: Yes  Post Acute Provider List: Home Health  Delivered To: Patient  Method of Delivery: In person    Selected Continued Care - Discharged on 9/28/2023 Admission date: 9/24/2023 - Discharge disposition: Rehab Facility or Unit (DC - External)      Destination Coordination complete.      Service Provider Selected Services Address Phone Fax Patient Preferred    [facility] [place] [facility] [address] [place] [address] [place] [address] [phone] --                     Transportation Services  W/C Van: Skilled Nursing Facility Van    Final Discharge Disposition Code: 03 - skilled nursing facility (SNF)

## 2023-09-28 NOTE — PLAN OF CARE
Problem: Adult Inpatient Plan of Care  Goal: Plan of Care Review  Outcome: Ongoing, Progressing  Flowsheets (Taken 9/27/2023 2322)  Progress: improving  Plan of Care Reviewed With: patient  Outcome Evaluation: Plan to go to skilled nursing facility tomorrow.  Goal: Patient-Specific Goal (Individualized)  Outcome: Ongoing, Progressing  Goal: Absence of Hospital-Acquired Illness or Injury  Outcome: Ongoing, Progressing  Intervention: Identify and Manage Fall Risk  Recent Flowsheet Documentation  Taken 9/27/2023 1905 by Bony Paige RN  Safety Promotion/Fall Prevention:   clutter free environment maintained   room organization consistent   safety round/check completed  Intervention: Prevent Skin Injury  Recent Flowsheet Documentation  Taken 9/27/2023 1905 by Bony Paige RN  Body Position: position changed independently  Skin Protection: adhesive use limited  Intervention: Prevent and Manage VTE (Venous Thromboembolism) Risk  Recent Flowsheet Documentation  Taken 9/27/2023 1905 by Bony Paige RN  Activity Management: ambulated to bathroom  VTE Prevention/Management: patient refused intervention  Intervention: Prevent Infection  Recent Flowsheet Documentation  Taken 9/27/2023 1905 by Bony Paige RN  Infection Prevention:   hand hygiene promoted   rest/sleep promoted  Goal: Optimal Comfort and Wellbeing  Outcome: Ongoing, Progressing  Intervention: Monitor Pain and Promote Comfort  Recent Flowsheet Documentation  Taken 9/27/2023 1905 by Bony Paige RN  Pain Management Interventions:   care clustered   see MAR  Intervention: Provide Person-Centered Care  Recent Flowsheet Documentation  Taken 9/27/2023 1905 by Bony Paige RN  Trust Relationship/Rapport:   care explained   choices provided  Goal: Readiness for Transition of Care  Outcome: Ongoing, Progressing     Problem: Skin Injury Risk Increased  Goal: Skin Health and Integrity  Outcome: Ongoing, Progressing  Intervention: Optimize Skin Protection  Recent Flowsheet  Documentation  Taken 9/27/2023 1905 by Bony Paige RN  Pressure Reduction Techniques: frequent weight shift encouraged  Head of Bed (HOB) Positioning: HOB at 30-45 degrees  Pressure Reduction Devices: positioning supports utilized  Skin Protection: adhesive use limited     Problem: Fall Injury Risk  Goal: Absence of Fall and Fall-Related Injury  Outcome: Ongoing, Progressing  Intervention: Identify and Manage Contributors  Recent Flowsheet Documentation  Taken 9/27/2023 1905 by Bony Paige RN  Medication Review/Management: medications reviewed  Intervention: Promote Injury-Free Environment  Recent Flowsheet Documentation  Taken 9/27/2023 1905 by Bony Paige RN  Safety Promotion/Fall Prevention:   clutter free environment maintained   room organization consistent   safety round/check completed     Problem: Pain Acute  Goal: Acceptable Pain Control and Functional Ability  Outcome: Ongoing, Progressing  Intervention: Prevent or Manage Pain  Recent Flowsheet Documentation  Taken 9/27/2023 1905 by Bony Paige RN  Sensory Stimulation Regulation:   lighting decreased   care clustered  Bowel Elimination Promotion: adequate fluid intake promoted  Sleep/Rest Enhancement:   room darkened   relaxation techniques promoted  Medication Review/Management: medications reviewed  Intervention: Develop Pain Management Plan  Recent Flowsheet Documentation  Taken 9/27/2023 1905 by Bony Paige RN  Pain Management Interventions:   care clustered   see MAR     Problem: Adjustment to Illness (Sepsis/Septic Shock)  Goal: Optimal Coping  Outcome: Ongoing, Progressing     Problem: Bleeding (Sepsis/Septic Shock)  Goal: Absence of Bleeding  Outcome: Ongoing, Progressing  Intervention: Monitor and Manage Bleeding  Recent Flowsheet Documentation  Taken 9/27/2023 1905 by Bony Paige RN  Bleeding Precautions: blood pressure closely monitored     Problem: Glycemic Control Impaired (Sepsis/Septic Shock)  Goal: Blood Glucose Level Within Desired  Range  Outcome: Ongoing, Progressing     Problem: Infection Progression (Sepsis/Septic Shock)  Goal: Absence of Infection Signs and Symptoms  Outcome: Ongoing, Progressing  Intervention: Initiate Sepsis Management  Recent Flowsheet Documentation  Taken 9/27/2023 1905 by Bony Paige, RN  Stabilization Measures: legs elevated  Infection Prevention:   hand hygiene promoted   rest/sleep promoted  Intervention: Promote Recovery  Recent Flowsheet Documentation  Taken 9/27/2023 1905 by Bony Paige, RN  Activity Management: ambulated to bathroom  Sleep/Rest Enhancement:   room darkened   relaxation techniques promoted     Problem: Nutrition Impaired (Sepsis/Septic Shock)  Goal: Optimal Nutrition Intake  Outcome: Ongoing, Progressing     Problem: Skin or Soft Tissue Infection  Goal: Absence of Infection Signs and Symptoms  Outcome: Ongoing, Progressing  Intervention: Minimize and Manage Infection Progression  Recent Flowsheet Documentation  Taken 9/27/2023 1905 by Bony Paige RN  Infection Prevention:   hand hygiene promoted   rest/sleep promoted     Problem: Nausea and Vomiting  Goal: Fluid and Electrolyte Balance  Outcome: Ongoing, Progressing   Goal Outcome Evaluation:  Plan of Care Reviewed With: patient        Progress: improving  Outcome Evaluation: Plan to go to skilled nursing facility tomorrow.

## 2023-09-29 LAB
BACTERIA SPEC AEROBE CULT: NORMAL
BACTERIA SPEC AEROBE CULT: NORMAL

## 2023-12-26 DIAGNOSIS — I48.0 AF (PAROXYSMAL ATRIAL FIBRILLATION): ICD-10-CM

## 2023-12-26 DIAGNOSIS — I10 PRIMARY HYPERTENSION: ICD-10-CM

## 2023-12-26 RX ORDER — CARVEDILOL 12.5 MG/1
12.5 TABLET ORAL 2 TIMES DAILY WITH MEALS
Qty: 180 TABLET | Refills: 3 | Status: SHIPPED | OUTPATIENT
Start: 2023-12-26

## 2024-01-01 ENCOUNTER — HOSPITAL ENCOUNTER (INPATIENT)
Facility: HOSPITAL | Age: 84
LOS: 5 days | End: 2024-08-21
Attending: FAMILY MEDICINE | Admitting: FAMILY MEDICINE
Payer: MEDICARE

## 2024-01-01 ENCOUNTER — APPOINTMENT (OUTPATIENT)
Dept: GENERAL RADIOLOGY | Facility: HOSPITAL | Age: 84
End: 2024-01-01
Payer: MEDICARE

## 2024-01-01 ENCOUNTER — TELEPHONE (OUTPATIENT)
Dept: CARDIOLOGY | Facility: CLINIC | Age: 84
End: 2024-01-01
Payer: MEDICARE

## 2024-01-01 ENCOUNTER — APPOINTMENT (OUTPATIENT)
Dept: ULTRASOUND IMAGING | Facility: HOSPITAL | Age: 84
End: 2024-01-01
Payer: MEDICARE

## 2024-01-01 VITALS
BODY MASS INDEX: 26.8 KG/M2 | SYSTOLIC BLOOD PRESSURE: 90 MMHG | HEIGHT: 64 IN | TEMPERATURE: 94.4 F | RESPIRATION RATE: 20 BRPM | HEART RATE: 84 BPM | OXYGEN SATURATION: 87 % | DIASTOLIC BLOOD PRESSURE: 42 MMHG | WEIGHT: 156.97 LBS

## 2024-01-01 DIAGNOSIS — I46.9 CARDIOPULMONARY ARREST WITH SUCCESSFUL RESUSCITATION: ICD-10-CM

## 2024-01-01 DIAGNOSIS — I46.9 CARDIOPULMONARY ARREST: Primary | ICD-10-CM

## 2024-01-01 LAB
ALBUMIN SERPL-MCNC: 3.5 G/DL (ref 3.5–5.2)
ALBUMIN SERPL-MCNC: 3.6 G/DL (ref 3.5–5.2)
ALBUMIN SERPL-MCNC: 3.6 G/DL (ref 3.5–5.2)
ALBUMIN SERPL-MCNC: 3.8 G/DL (ref 3.5–5.2)
ALBUMIN/GLOB SERPL: 1.4 G/DL
ALBUMIN/GLOB SERPL: 1.4 G/DL
ALBUMIN/GLOB SERPL: 1.5 G/DL
ALP SERPL-CCNC: 106 U/L (ref 39–117)
ALP SERPL-CCNC: 85 U/L (ref 39–117)
ALP SERPL-CCNC: 86 U/L (ref 39–117)
ALP SERPL-CCNC: 91 U/L (ref 39–117)
ALT SERPL W P-5'-P-CCNC: 207 U/L (ref 1–33)
ALT SERPL W P-5'-P-CCNC: 217 U/L (ref 1–33)
ALT SERPL W P-5'-P-CCNC: 234 U/L (ref 1–33)
ALT SERPL W P-5'-P-CCNC: 438 U/L (ref 1–33)
AMMONIA BLD-SCNC: 180 UMOL/L (ref 11–51)
ANION GAP SERPL CALCULATED.3IONS-SCNC: 12.9 MMOL/L (ref 5–15)
ANION GAP SERPL CALCULATED.3IONS-SCNC: 13.6 MMOL/L (ref 5–15)
ANION GAP SERPL CALCULATED.3IONS-SCNC: 14.8 MMOL/L (ref 5–15)
ANION GAP SERPL CALCULATED.3IONS-SCNC: 15.5 MMOL/L (ref 5–15)
ANION GAP SERPL CALCULATED.3IONS-SCNC: 16.9 MMOL/L (ref 5–15)
ANION GAP SERPL CALCULATED.3IONS-SCNC: 28.8 MMOL/L (ref 5–15)
ARTERIAL PATENCY WRIST A: POSITIVE
ARTERIAL PATENCY WRIST A: POSITIVE
AST SERPL-CCNC: 204 U/L (ref 1–32)
AST SERPL-CCNC: 214 U/L (ref 1–32)
AST SERPL-CCNC: 304 U/L (ref 1–32)
AST SERPL-CCNC: 628 U/L (ref 1–32)
ATMOSPHERIC PRESS: ABNORMAL MM[HG]
ATMOSPHERIC PRESS: ABNORMAL MM[HG]
BACTERIA SPEC AEROBE CULT: NORMAL
BACTERIA UR QL AUTO: ABNORMAL /HPF
BACTERIA UR QL AUTO: ABNORMAL /HPF
BASE EXCESS BLDA CALC-SCNC: -19.9 MMOL/L (ref 0–3)
BASE EXCESS BLDA CALC-SCNC: -9.5 MMOL/L (ref 0–3)
BASOPHILS # BLD AUTO: 0.09 10*3/MM3 (ref 0–0.2)
BASOPHILS # BLD AUTO: 0.12 10*3/MM3 (ref 0–0.2)
BASOPHILS NFR BLD AUTO: 1 % (ref 0–1.5)
BASOPHILS NFR BLD AUTO: 1.1 % (ref 0–1.5)
BDY SITE: ABNORMAL
BDY SITE: ABNORMAL
BILIRUB CONJ SERPL-MCNC: 3.5 MG/DL (ref 0–0.3)
BILIRUB INDIRECT SERPL-MCNC: 2.3 MG/DL
BILIRUB SERPL-MCNC: 2.6 MG/DL (ref 0–1.2)
BILIRUB SERPL-MCNC: 3.2 MG/DL (ref 0–1.2)
BILIRUB SERPL-MCNC: 3.4 MG/DL (ref 0–1.2)
BILIRUB SERPL-MCNC: 5.8 MG/DL (ref 0–1.2)
BILIRUB UR QL STRIP: ABNORMAL
BILIRUB UR QL STRIP: NEGATIVE
BUN SERPL-MCNC: 44 MG/DL (ref 8–23)
BUN SERPL-MCNC: 47 MG/DL (ref 8–23)
BUN SERPL-MCNC: 49 MG/DL (ref 8–23)
BUN SERPL-MCNC: 50 MG/DL (ref 8–23)
BUN SERPL-MCNC: 53 MG/DL (ref 8–23)
BUN SERPL-MCNC: 63 MG/DL (ref 8–23)
BUN/CREAT SERPL: 17.2 (ref 7–25)
BUN/CREAT SERPL: 19.7 (ref 7–25)
BUN/CREAT SERPL: 22 (ref 7–25)
BUN/CREAT SERPL: 22.4 (ref 7–25)
BUN/CREAT SERPL: 23.4 (ref 7–25)
BUN/CREAT SERPL: 23.4 (ref 7–25)
C AURIS DNA SPEC QL NAA+NON-PROBE: NOT DETECTED
CA-I BLDA-SCNC: 1.14 MMOL/L (ref 1.15–1.33)
CA-I BLDA-SCNC: 1.95 MMOL/L (ref 1.15–1.33)
CA-I SERPL ISE-MCNC: 1.16 MMOL/L (ref 1.15–1.3)
CALCIUM SPEC-SCNC: 9.1 MG/DL (ref 8.6–10.5)
CALCIUM SPEC-SCNC: 9.1 MG/DL (ref 8.6–10.5)
CALCIUM SPEC-SCNC: 9.6 MG/DL (ref 8.6–10.5)
CALCIUM SPEC-SCNC: 9.7 MG/DL (ref 8.6–10.5)
CALCIUM SPEC-SCNC: 9.7 MG/DL (ref 8.6–10.5)
CALCIUM SPEC-SCNC: 9.9 MG/DL (ref 8.6–10.5)
CHLORIDE SERPL-SCNC: 101 MMOL/L (ref 98–107)
CHLORIDE SERPL-SCNC: 101 MMOL/L (ref 98–107)
CHLORIDE SERPL-SCNC: 103 MMOL/L (ref 98–107)
CHLORIDE SERPL-SCNC: 106 MMOL/L (ref 98–107)
CK SERPL-CCNC: 129 U/L (ref 20–180)
CLARITY UR: ABNORMAL
CLARITY UR: ABNORMAL
CO2 SERPL-SCNC: 11.2 MMOL/L (ref 22–29)
CO2 SERPL-SCNC: 18.1 MMOL/L (ref 22–29)
CO2 SERPL-SCNC: 20.5 MMOL/L (ref 22–29)
CO2 SERPL-SCNC: 22.2 MMOL/L (ref 22–29)
CO2 SERPL-SCNC: 25.1 MMOL/L (ref 22–29)
CO2 SERPL-SCNC: 26.4 MMOL/L (ref 22–29)
COLOR UR: ABNORMAL
COLOR UR: YELLOW
CREAT BLDA-MCNC: 3.07 MG/DL (ref 0.6–1.3)
CREAT SERPL-MCNC: 2 MG/DL (ref 0.57–1)
CREAT SERPL-MCNC: 2.01 MG/DL (ref 0.57–1)
CREAT SERPL-MCNC: 2.14 MG/DL (ref 0.57–1)
CREAT SERPL-MCNC: 2.19 MG/DL (ref 0.57–1)
CREAT SERPL-MCNC: 2.69 MG/DL (ref 0.57–1)
CREAT SERPL-MCNC: 3.67 MG/DL (ref 0.57–1)
D-LACTATE SERPL-SCNC: 16.7 MMOL/L (ref 0.2–2)
DEPRECATED RDW RBC AUTO: 61.1 FL (ref 37–54)
DEPRECATED RDW RBC AUTO: 61.5 FL (ref 37–54)
DEPRECATED RDW RBC AUTO: 62.8 FL (ref 37–54)
DEPRECATED RDW RBC AUTO: 62.8 FL (ref 37–54)
DEPRECATED RDW RBC AUTO: 64.6 FL (ref 37–54)
DEPRECATED RDW RBC AUTO: 65.6 FL (ref 37–54)
EGFRCR SERPLBLD CKD-EPI 2021: 11.8 ML/MIN/1.73
EGFRCR SERPLBLD CKD-EPI 2021: 14.6 ML/MIN/1.73
EGFRCR SERPLBLD CKD-EPI 2021: 17.1 ML/MIN/1.73
EGFRCR SERPLBLD CKD-EPI 2021: 21.9 ML/MIN/1.73
EGFRCR SERPLBLD CKD-EPI 2021: 22.5 ML/MIN/1.73
EGFRCR SERPLBLD CKD-EPI 2021: 24.2 ML/MIN/1.73
EGFRCR SERPLBLD CKD-EPI 2021: 24.4 ML/MIN/1.73
EOSINOPHIL # BLD AUTO: 0.1 10*3/MM3 (ref 0–0.4)
EOSINOPHIL # BLD AUTO: 0.26 10*3/MM3 (ref 0–0.4)
EOSINOPHIL NFR BLD AUTO: 0.9 % (ref 0.3–6.2)
EOSINOPHIL NFR BLD AUTO: 2.8 % (ref 0.3–6.2)
EOSINOPHIL SPEC QL MICRO: 0 % EOS/100 CELLS (ref 0–0)
ERYTHROCYTE [DISTWIDTH] IN BLOOD BY AUTOMATED COUNT: 20.1 % (ref 12.3–15.4)
ERYTHROCYTE [DISTWIDTH] IN BLOOD BY AUTOMATED COUNT: 20.3 % (ref 12.3–15.4)
ERYTHROCYTE [DISTWIDTH] IN BLOOD BY AUTOMATED COUNT: 20.4 % (ref 12.3–15.4)
ERYTHROCYTE [DISTWIDTH] IN BLOOD BY AUTOMATED COUNT: 21.1 % (ref 12.3–15.4)
ERYTHROCYTE [DISTWIDTH] IN BLOOD BY AUTOMATED COUNT: 21.2 % (ref 12.3–15.4)
ERYTHROCYTE [DISTWIDTH] IN BLOOD BY AUTOMATED COUNT: 21.6 % (ref 12.3–15.4)
FERRITIN SERPL-MCNC: 31.4 NG/ML (ref 13–150)
FLUAV RNA RESP QL NAA+PROBE: NOT DETECTED
FLUBV RNA RESP QL NAA+PROBE: NOT DETECTED
FOLATE SERPL-MCNC: >20 NG/ML (ref 4.78–24.2)
GEN 5 2HR TROPONIN T REFLEX: 35 NG/L
GLOBULIN UR ELPH-MCNC: 2.5 GM/DL
GLOBULIN UR ELPH-MCNC: 2.6 GM/DL
GLOBULIN UR ELPH-MCNC: 2.6 GM/DL
GLUCOSE BLDC GLUCOMTR-MCNC: 112 MG/DL (ref 70–105)
GLUCOSE BLDC GLUCOMTR-MCNC: 193 MG/DL (ref 74–100)
GLUCOSE BLDC GLUCOMTR-MCNC: 193 MG/DL (ref 74–100)
GLUCOSE BLDC GLUCOMTR-MCNC: 85 MG/DL (ref 70–105)
GLUCOSE BLDC GLUCOMTR-MCNC: 86 MG/DL (ref 70–105)
GLUCOSE BLDC GLUCOMTR-MCNC: 91 MG/DL (ref 70–105)
GLUCOSE SERPL-MCNC: 118 MG/DL (ref 65–99)
GLUCOSE SERPL-MCNC: 130 MG/DL (ref 65–99)
GLUCOSE SERPL-MCNC: 173 MG/DL (ref 65–99)
GLUCOSE SERPL-MCNC: 91 MG/DL (ref 65–99)
GLUCOSE SERPL-MCNC: 93 MG/DL (ref 65–99)
GLUCOSE SERPL-MCNC: 96 MG/DL (ref 65–99)
GLUCOSE UR STRIP-MCNC: NEGATIVE MG/DL
GLUCOSE UR STRIP-MCNC: NEGATIVE MG/DL
HCO3 BLDA-SCNC: 13.2 MMOL/L (ref 21–28)
HCO3 BLDA-SCNC: 14.5 MMOL/L (ref 21–28)
HCT VFR BLD AUTO: 30.5 % (ref 34–46.6)
HCT VFR BLD AUTO: 31.6 % (ref 34–46.6)
HCT VFR BLD AUTO: 32 % (ref 34–46.6)
HCT VFR BLD AUTO: 33.9 % (ref 34–46.6)
HCT VFR BLD AUTO: 34.9 % (ref 34–46.6)
HCT VFR BLD AUTO: 35.5 % (ref 34–46.6)
HCT VFR BLDA CALC: 37 % (ref 38–51)
HCT VFR BLDA CALC: 41 % (ref 38–51)
HEMODILUTION: NO
HEMODILUTION: NO
HGB BLD-MCNC: 10.1 G/DL (ref 12–15.9)
HGB BLD-MCNC: 10.3 G/DL (ref 12–15.9)
HGB BLD-MCNC: 10.4 G/DL (ref 12–15.9)
HGB BLD-MCNC: 9.2 G/DL (ref 12–15.9)
HGB BLD-MCNC: 9.6 G/DL (ref 12–15.9)
HGB BLD-MCNC: 9.7 G/DL (ref 12–15.9)
HGB BLDA-MCNC: 12.7 G/DL (ref 12–17)
HGB BLDA-MCNC: 14 G/DL (ref 12–17)
HGB UR QL STRIP.AUTO: ABNORMAL
HGB UR QL STRIP.AUTO: NEGATIVE
HYALINE CASTS UR QL AUTO: ABNORMAL /LPF
HYALINE CASTS UR QL AUTO: ABNORMAL /LPF
IMM GRANULOCYTES # BLD AUTO: 0.03 10*3/MM3 (ref 0–0.05)
IMM GRANULOCYTES # BLD AUTO: 0.06 10*3/MM3 (ref 0–0.05)
IMM GRANULOCYTES NFR BLD AUTO: 0.3 % (ref 0–0.5)
IMM GRANULOCYTES NFR BLD AUTO: 0.5 % (ref 0–0.5)
INHALED O2 CONCENTRATION: 100 %
INHALED O2 CONCENTRATION: 32 %
IRON 24H UR-MRATE: 23 MCG/DL (ref 37–145)
IRON SATN MFR SERPL: 5 % (ref 20–50)
KETONES UR QL STRIP: NEGATIVE
KETONES UR QL STRIP: NEGATIVE
LEUKOCYTE ESTERASE UR QL STRIP.AUTO: ABNORMAL
LEUKOCYTE ESTERASE UR QL STRIP.AUTO: ABNORMAL
LYMPHOCYTES # BLD AUTO: 0.96 10*3/MM3 (ref 0.7–3.1)
LYMPHOCYTES # BLD AUTO: 0.96 10*3/MM3 (ref 0.7–3.1)
LYMPHOCYTES NFR BLD AUTO: 10.2 % (ref 19.6–45.3)
LYMPHOCYTES NFR BLD AUTO: 8.6 % (ref 19.6–45.3)
Lab: ABNORMAL
MAGNESIUM SERPL-MCNC: 2.2 MG/DL (ref 1.6–2.4)
MAGNESIUM SERPL-MCNC: 2.2 MG/DL (ref 1.6–2.4)
MAGNESIUM SERPL-MCNC: 2.4 MG/DL (ref 1.6–2.4)
MCH RBC QN AUTO: 25.4 PG (ref 26.6–33)
MCH RBC QN AUTO: 25.8 PG (ref 26.6–33)
MCH RBC QN AUTO: 26.1 PG (ref 26.6–33)
MCH RBC QN AUTO: 26.4 PG (ref 26.6–33)
MCHC RBC AUTO-ENTMCNC: 29 G/DL (ref 31.5–35.7)
MCHC RBC AUTO-ENTMCNC: 29.8 G/DL (ref 31.5–35.7)
MCHC RBC AUTO-ENTMCNC: 29.8 G/DL (ref 31.5–35.7)
MCHC RBC AUTO-ENTMCNC: 30 G/DL (ref 31.5–35.7)
MCHC RBC AUTO-ENTMCNC: 30.2 G/DL (ref 31.5–35.7)
MCHC RBC AUTO-ENTMCNC: 30.7 G/DL (ref 31.5–35.7)
MCV RBC AUTO: 84.7 FL (ref 79–97)
MCV RBC AUTO: 85.2 FL (ref 79–97)
MCV RBC AUTO: 86.5 FL (ref 79–97)
MCV RBC AUTO: 87.4 FL (ref 79–97)
MCV RBC AUTO: 87.5 FL (ref 79–97)
MCV RBC AUTO: 90.1 FL (ref 79–97)
MODALITY: ABNORMAL
MODALITY: ABNORMAL
MONOCYTES # BLD AUTO: 1.15 10*3/MM3 (ref 0.1–0.9)
MONOCYTES # BLD AUTO: 1.54 10*3/MM3 (ref 0.1–0.9)
MONOCYTES NFR BLD AUTO: 12.2 % (ref 5–12)
MONOCYTES NFR BLD AUTO: 13.7 % (ref 5–12)
MUCOUS THREADS URNS QL MICRO: ABNORMAL /HPF
NEUTROPHILS NFR BLD AUTO: 6.95 10*3/MM3 (ref 1.7–7)
NEUTROPHILS NFR BLD AUTO: 73.5 % (ref 42.7–76)
NEUTROPHILS NFR BLD AUTO: 75.2 % (ref 42.7–76)
NEUTROPHILS NFR BLD AUTO: 8.44 10*3/MM3 (ref 1.7–7)
NITRITE UR QL STRIP: NEGATIVE
NITRITE UR QL STRIP: POSITIVE
NOTIFIED WHO: ABNORMAL
NRBC BLD AUTO-RTO: 0.3 /100 WBC (ref 0–0.2)
NRBC BLD AUTO-RTO: 1.1 /100 WBC (ref 0–0.2)
PCO2 BLDA: 26.5 MM HG (ref 35–48)
PCO2 BLDA: 65.7 MM HG (ref 35–48)
PEEP RESPIRATORY: 5 CM[H2O]
PH BLDA: 6.91 PH UNITS (ref 7.35–7.45)
PH BLDA: 7.34 PH UNITS (ref 7.35–7.45)
PH UR STRIP.AUTO: 5.5 [PH] (ref 5–8)
PH UR STRIP.AUTO: <=5 [PH] (ref 5–8)
PHOSPHATE SERPL-MCNC: 3.7 MG/DL (ref 2.5–4.5)
PHOSPHATE SERPL-MCNC: 3.9 MG/DL (ref 2.5–4.5)
PHOSPHATE SERPL-MCNC: 4.8 MG/DL (ref 2.5–4.5)
PHOSPHATE SERPL-MCNC: 8.7 MG/DL (ref 2.5–4.5)
PLATELET # BLD AUTO: 220 10*3/MM3 (ref 140–450)
PLATELET # BLD AUTO: 229 10*3/MM3 (ref 140–450)
PLATELET # BLD AUTO: 231 10*3/MM3 (ref 140–450)
PLATELET # BLD AUTO: 241 10*3/MM3 (ref 140–450)
PLATELET # BLD AUTO: 249 10*3/MM3 (ref 140–450)
PLATELET # BLD AUTO: 267 10*3/MM3 (ref 140–450)
PMV BLD AUTO: 10.3 FL (ref 6–12)
PMV BLD AUTO: 10.6 FL (ref 6–12)
PMV BLD AUTO: 10.7 FL (ref 6–12)
PMV BLD AUTO: 11.2 FL (ref 6–12)
PMV BLD AUTO: 11.7 FL (ref 6–12)
PMV BLD AUTO: 11.9 FL (ref 6–12)
PO2 BLD: 224 MM[HG] (ref 0–500)
PO2 BLD: 84 MM[HG] (ref 0–500)
PO2 BLDA: 71.6 MM HG (ref 83–108)
PO2 BLDA: 84.4 MM HG (ref 83–108)
POTASSIUM BLDA-SCNC: 5 MMOL/L (ref 3.5–4.5)
POTASSIUM BLDA-SCNC: 5.2 MMOL/L (ref 3.5–4.5)
POTASSIUM SERPL-SCNC: 3.9 MMOL/L (ref 3.5–5.2)
POTASSIUM SERPL-SCNC: 4 MMOL/L (ref 3.5–5.2)
POTASSIUM SERPL-SCNC: 4.2 MMOL/L (ref 3.5–5.2)
POTASSIUM SERPL-SCNC: 4.4 MMOL/L (ref 3.5–5.2)
POTASSIUM SERPL-SCNC: 4.7 MMOL/L (ref 3.5–5.2)
POTASSIUM SERPL-SCNC: 6 MMOL/L (ref 3.5–5.2)
PROT SERPL-MCNC: 5.9 G/DL (ref 6–8.5)
PROT SERPL-MCNC: 6.2 G/DL (ref 6–8.5)
PROT SERPL-MCNC: 6.2 G/DL (ref 6–8.5)
PROT SERPL-MCNC: 6.3 G/DL (ref 6–8.5)
PROT UR QL STRIP: ABNORMAL
PROT UR QL STRIP: ABNORMAL
RBC # BLD AUTO: 3.49 10*6/MM3 (ref 3.77–5.28)
RBC # BLD AUTO: 3.71 10*6/MM3 (ref 3.77–5.28)
RBC # BLD AUTO: 3.78 10*6/MM3 (ref 3.77–5.28)
RBC # BLD AUTO: 3.92 10*6/MM3 (ref 3.77–5.28)
RBC # BLD AUTO: 3.94 10*6/MM3 (ref 3.77–5.28)
RBC # BLD AUTO: 3.99 10*6/MM3 (ref 3.77–5.28)
RBC # UR STRIP: ABNORMAL /HPF
RBC # UR STRIP: ABNORMAL /HPF
READ BACK: YES
REF LAB TEST METHOD: ABNORMAL
REF LAB TEST METHOD: ABNORMAL
RESPIRATORY RATE: 16
RSV RNA RESP QL NAA+PROBE: NOT DETECTED
SAO2 % BLDCOA: 85.7 % (ref 94–98)
SAO2 % BLDCOA: 93.7 % (ref 94–98)
SARS-COV-2 RNA RESP QL NAA+PROBE: NOT DETECTED
SODIUM BLD-SCNC: 136 MMOL/L (ref 138–146)
SODIUM BLD-SCNC: 141 MMOL/L (ref 138–146)
SODIUM SERPL-SCNC: 138 MMOL/L (ref 136–145)
SODIUM SERPL-SCNC: 139 MMOL/L (ref 136–145)
SODIUM SERPL-SCNC: 141 MMOL/L (ref 136–145)
SODIUM SERPL-SCNC: 143 MMOL/L (ref 136–145)
SODIUM UR-SCNC: 20 MMOL/L
SP GR UR STRIP: 1.02 (ref 1–1.03)
SP GR UR STRIP: 1.02 (ref 1–1.03)
SQUAMOUS #/AREA URNS HPF: ABNORMAL /HPF
SQUAMOUS #/AREA URNS HPF: ABNORMAL /HPF
TIBC SERPL-MCNC: 486 MCG/DL (ref 298–536)
TRANSFERRIN SERPL-MCNC: 326 MG/DL (ref 200–360)
TROPONIN T DELTA: 2 NG/L
TROPONIN T SERPL HS-MCNC: 33 NG/L
TSH SERPL DL<=0.05 MIU/L-ACNC: 3.96 UIU/ML (ref 0.27–4.2)
URATE SERPL-MCNC: 8.2 MG/DL (ref 2.4–5.7)
UROBILINOGEN UR QL STRIP: ABNORMAL
UROBILINOGEN UR QL STRIP: ABNORMAL
VENTILATOR MODE: AC
VIT B12 BLD-MCNC: >2000 PG/ML (ref 211–946)
VT ON VENT VENT: 450 ML
WBC # UR STRIP: ABNORMAL /HPF
WBC # UR STRIP: ABNORMAL /HPF
WBC NRBC COR # BLD AUTO: 10.42 10*3/MM3 (ref 3.4–10.8)
WBC NRBC COR # BLD AUTO: 11.22 10*3/MM3 (ref 3.4–10.8)
WBC NRBC COR # BLD AUTO: 11.28 10*3/MM3 (ref 3.4–10.8)
WBC NRBC COR # BLD AUTO: 11.49 10*3/MM3 (ref 3.4–10.8)
WBC NRBC COR # BLD AUTO: 12.07 10*3/MM3 (ref 3.4–10.8)
WBC NRBC COR # BLD AUTO: 9.44 10*3/MM3 (ref 3.4–10.8)

## 2024-01-01 PROCEDURE — 94799 UNLISTED PULMONARY SVC/PX: CPT

## 2024-01-01 PROCEDURE — 80076 HEPATIC FUNCTION PANEL: CPT | Performed by: INTERNAL MEDICINE

## 2024-01-01 PROCEDURE — 84466 ASSAY OF TRANSFERRIN: CPT | Performed by: INTERNAL MEDICINE

## 2024-01-01 PROCEDURE — 84100 ASSAY OF PHOSPHORUS: CPT | Performed by: INTERNAL MEDICINE

## 2024-01-01 PROCEDURE — 82948 REAGENT STRIP/BLOOD GLUCOSE: CPT

## 2024-01-01 PROCEDURE — 31500 INSERT EMERGENCY AIRWAY: CPT | Performed by: NURSE PRACTITIONER

## 2024-01-01 PROCEDURE — 84550 ASSAY OF BLOOD/URIC ACID: CPT | Performed by: INTERNAL MEDICINE

## 2024-01-01 PROCEDURE — 71045 X-RAY EXAM CHEST 1 VIEW: CPT

## 2024-01-01 PROCEDURE — 25010000002 NA FERRIC GLUC CPLX PER 12.5 MG: Performed by: INTERNAL MEDICINE

## 2024-01-01 PROCEDURE — 36600 WITHDRAWAL OF ARTERIAL BLOOD: CPT

## 2024-01-01 PROCEDURE — 84484 ASSAY OF TROPONIN QUANT: CPT | Performed by: FAMILY MEDICINE

## 2024-01-01 PROCEDURE — 80048 BASIC METABOLIC PNL TOTAL CA: CPT | Performed by: INTERNAL MEDICINE

## 2024-01-01 PROCEDURE — 80053 COMPREHEN METABOLIC PANEL: CPT | Performed by: FAMILY MEDICINE

## 2024-01-01 PROCEDURE — 94761 N-INVAS EAR/PLS OXIMETRY MLT: CPT

## 2024-01-01 PROCEDURE — 76775 US EXAM ABDO BACK WALL LIM: CPT

## 2024-01-01 PROCEDURE — 97165 OT EVAL LOW COMPLEX 30 MIN: CPT

## 2024-01-01 PROCEDURE — 82803 BLOOD GASES ANY COMBINATION: CPT | Performed by: FAMILY MEDICINE

## 2024-01-01 PROCEDURE — 82746 ASSAY OF FOLIC ACID SERUM: CPT | Performed by: INTERNAL MEDICINE

## 2024-01-01 PROCEDURE — 25010000002 PROCHLORPERAZINE 10 MG/2ML SOLUTION

## 2024-01-01 PROCEDURE — 83735 ASSAY OF MAGNESIUM: CPT | Performed by: INTERNAL MEDICINE

## 2024-01-01 PROCEDURE — 80053 COMPREHEN METABOLIC PANEL: CPT | Performed by: INTERNAL MEDICINE

## 2024-01-01 PROCEDURE — 84443 ASSAY THYROID STIM HORMONE: CPT | Performed by: INTERNAL MEDICINE

## 2024-01-01 PROCEDURE — 87205 SMEAR GRAM STAIN: CPT | Performed by: INTERNAL MEDICINE

## 2024-01-01 PROCEDURE — 85027 COMPLETE CBC AUTOMATED: CPT | Performed by: FAMILY MEDICINE

## 2024-01-01 PROCEDURE — 25810000003 SODIUM CHLORIDE 0.9 % SOLUTION: Performed by: INTERNAL MEDICINE

## 2024-01-01 PROCEDURE — 25010000002 EPOETIN ALFA-EPBX 20000 UNIT/ML SOLUTION: Performed by: INTERNAL MEDICINE

## 2024-01-01 PROCEDURE — 25010000002 ONDANSETRON PER 1 MG: Performed by: FAMILY MEDICINE

## 2024-01-01 PROCEDURE — 63710000001 INSULIN REGULAR HUMAN PER 5 UNITS: Performed by: NURSE PRACTITIONER

## 2024-01-01 PROCEDURE — 85025 COMPLETE CBC W/AUTO DIFF WBC: CPT | Performed by: FAMILY MEDICINE

## 2024-01-01 PROCEDURE — 87481 CANDIDA DNA AMP PROBE: CPT | Performed by: FAMILY MEDICINE

## 2024-01-01 PROCEDURE — 87086 URINE CULTURE/COLONY COUNT: CPT | Performed by: INTERNAL MEDICINE

## 2024-01-01 PROCEDURE — 85018 HEMOGLOBIN: CPT

## 2024-01-01 PROCEDURE — 80053 COMPREHEN METABOLIC PANEL: CPT

## 2024-01-01 PROCEDURE — 25010000002 METHYLPREDNISOLONE PER 40 MG: Performed by: FAMILY MEDICINE

## 2024-01-01 PROCEDURE — 36600 WITHDRAWAL OF ARTERIAL BLOOD: CPT | Performed by: FAMILY MEDICINE

## 2024-01-01 PROCEDURE — 82550 ASSAY OF CK (CPK): CPT | Performed by: INTERNAL MEDICINE

## 2024-01-01 PROCEDURE — 5A1935Z RESPIRATORY VENTILATION, LESS THAN 24 CONSECUTIVE HOURS: ICD-10-PCS | Performed by: FAMILY MEDICINE

## 2024-01-01 PROCEDURE — 87637 SARSCOV2&INF A&B&RSV AMP PRB: CPT | Performed by: FAMILY MEDICINE

## 2024-01-01 PROCEDURE — 97116 GAIT TRAINING THERAPY: CPT

## 2024-01-01 PROCEDURE — 25010000002 EPINEPHRINE 1 MG/ML SOLUTION: Performed by: NURSE PRACTITIONER

## 2024-01-01 PROCEDURE — 94664 DEMO&/EVAL PT USE INHALER: CPT

## 2024-01-01 PROCEDURE — 82330 ASSAY OF CALCIUM: CPT | Performed by: INTERNAL MEDICINE

## 2024-01-01 PROCEDURE — 82140 ASSAY OF AMMONIA: CPT | Performed by: FAMILY MEDICINE

## 2024-01-01 PROCEDURE — 93005 ELECTROCARDIOGRAM TRACING: CPT | Performed by: FAMILY MEDICINE

## 2024-01-01 PROCEDURE — 97162 PT EVAL MOD COMPLEX 30 MIN: CPT

## 2024-01-01 PROCEDURE — 97530 THERAPEUTIC ACTIVITIES: CPT

## 2024-01-01 PROCEDURE — 94002 VENT MGMT INPAT INIT DAY: CPT

## 2024-01-01 PROCEDURE — 83540 ASSAY OF IRON: CPT | Performed by: INTERNAL MEDICINE

## 2024-01-01 PROCEDURE — 25810000003 SODIUM CHLORIDE 0.9 % SOLUTION: Performed by: NURSE PRACTITIONER

## 2024-01-01 PROCEDURE — 25010000002 AMIODARONE PER 30 MG: Performed by: NURSE PRACTITIONER

## 2024-01-01 PROCEDURE — 81001 URINALYSIS AUTO W/SCOPE: CPT | Performed by: INTERNAL MEDICINE

## 2024-01-01 PROCEDURE — 94003 VENT MGMT INPAT SUBQ DAY: CPT

## 2024-01-01 PROCEDURE — 63710000001 ONDANSETRON ODT 4 MG TABLET DISPERSIBLE: Performed by: FAMILY MEDICINE

## 2024-01-01 PROCEDURE — 85025 COMPLETE CBC W/AUTO DIFF WBC: CPT

## 2024-01-01 PROCEDURE — 85027 COMPLETE CBC AUTOMATED: CPT | Performed by: INTERNAL MEDICINE

## 2024-01-01 PROCEDURE — 25010000002 EPINEPHRINE 1 MG/10ML SOLUTION PREFILLED SYRINGE: Performed by: NURSE PRACTITIONER

## 2024-01-01 PROCEDURE — 80051 ELECTROLYTE PANEL: CPT

## 2024-01-01 PROCEDURE — 82607 VITAMIN B-12: CPT | Performed by: INTERNAL MEDICINE

## 2024-01-01 PROCEDURE — 94640 AIRWAY INHALATION TREATMENT: CPT

## 2024-01-01 PROCEDURE — G0378 HOSPITAL OBSERVATION PER HR: HCPCS

## 2024-01-01 PROCEDURE — 92950 HEART/LUNG RESUSCITATION CPR: CPT

## 2024-01-01 PROCEDURE — 82728 ASSAY OF FERRITIN: CPT | Performed by: INTERNAL MEDICINE

## 2024-01-01 PROCEDURE — 25010000002 ATROPINE SULFATE: Performed by: NURSE PRACTITIONER

## 2024-01-01 PROCEDURE — 82803 BLOOD GASES ANY COMBINATION: CPT

## 2024-01-01 PROCEDURE — 84300 ASSAY OF URINE SODIUM: CPT | Performed by: INTERNAL MEDICINE

## 2024-01-01 PROCEDURE — 0BH17EZ INSERTION OF ENDOTRACHEAL AIRWAY INTO TRACHEA, VIA NATURAL OR ARTIFICIAL OPENING: ICD-10-PCS | Performed by: NURSE PRACTITIONER

## 2024-01-01 PROCEDURE — 83605 ASSAY OF LACTIC ACID: CPT

## 2024-01-01 PROCEDURE — 82565 ASSAY OF CREATININE: CPT

## 2024-01-01 PROCEDURE — 93010 ELECTROCARDIOGRAM REPORT: CPT | Performed by: INTERNAL MEDICINE

## 2024-01-01 PROCEDURE — 5A12012 PERFORMANCE OF CARDIAC OUTPUT, SINGLE, MANUAL: ICD-10-PCS | Performed by: FAMILY MEDICINE

## 2024-01-01 PROCEDURE — 02HV33Z INSERTION OF INFUSION DEVICE INTO SUPERIOR VENA CAVA, PERCUTANEOUS APPROACH: ICD-10-PCS | Performed by: NURSE PRACTITIONER

## 2024-01-01 PROCEDURE — 99222 1ST HOSP IP/OBS MODERATE 55: CPT | Performed by: INTERNAL MEDICINE

## 2024-01-01 PROCEDURE — 82330 ASSAY OF CALCIUM: CPT

## 2024-01-01 PROCEDURE — 81001 URINALYSIS AUTO W/SCOPE: CPT | Performed by: FAMILY MEDICINE

## 2024-01-01 PROCEDURE — 99232 SBSQ HOSP IP/OBS MODERATE 35: CPT | Performed by: INTERNAL MEDICINE

## 2024-01-01 RX ORDER — SODIUM BICARBONATE 650 MG/1
1300 TABLET ORAL 3 TIMES DAILY
Status: DISCONTINUED | OUTPATIENT
Start: 2024-01-01 | End: 2024-01-01 | Stop reason: HOSPADM

## 2024-01-01 RX ORDER — CARVEDILOL 3.12 MG/1
3.12 TABLET ORAL 2 TIMES DAILY WITH MEALS
Status: DISCONTINUED | OUTPATIENT
Start: 2024-01-01 | End: 2024-01-01

## 2024-01-01 RX ORDER — METOPROLOL SUCCINATE 25 MG/1
25 TABLET, EXTENDED RELEASE ORAL EVERY 12 HOURS SCHEDULED
Status: DISCONTINUED | OUTPATIENT
Start: 2024-01-01 | End: 2024-01-01 | Stop reason: HOSPADM

## 2024-01-01 RX ORDER — SODIUM BICARBONATE 650 MG/1
650 TABLET ORAL DAILY
Status: DISCONTINUED | OUTPATIENT
Start: 2024-01-01 | End: 2024-01-01

## 2024-01-01 RX ORDER — ALBUTEROL SULFATE 0.83 MG/ML
2.5 SOLUTION RESPIRATORY (INHALATION) EVERY 6 HOURS PRN
Status: DISCONTINUED | OUTPATIENT
Start: 2024-01-01 | End: 2024-01-01

## 2024-01-01 RX ORDER — SODIUM CHLORIDE 9 MG/ML
60 INJECTION, SOLUTION INTRAVENOUS CONTINUOUS
Status: DISCONTINUED | OUTPATIENT
Start: 2024-01-01 | End: 2024-01-01

## 2024-01-01 RX ORDER — CALCIUM CHLORIDE 100 MG/ML
INJECTION INTRAVENOUS; INTRAVENTRICULAR
Status: COMPLETED | OUTPATIENT
Start: 2024-01-01 | End: 2024-01-01

## 2024-01-01 RX ORDER — DILTIAZEM HCL/D5W 125 MG/125
5-15 PLASTIC BAG, INJECTION (ML) INTRAVENOUS
Status: DISCONTINUED | OUTPATIENT
Start: 2024-01-01 | End: 2024-01-01

## 2024-01-01 RX ORDER — NOREPINEPHRINE BITARTRATE 0.03 MG/ML
INJECTION, SOLUTION INTRAVENOUS
Status: DISCONTINUED
Start: 2024-01-01 | End: 2024-01-01 | Stop reason: HOSPADM

## 2024-01-01 RX ORDER — PROCHLORPERAZINE EDISYLATE 5 MG/ML
5 INJECTION INTRAMUSCULAR; INTRAVENOUS EVERY 6 HOURS PRN
Status: DISCONTINUED | OUTPATIENT
Start: 2024-01-01 | End: 2024-01-01

## 2024-01-01 RX ORDER — AMIODARONE HYDROCHLORIDE 150 MG/3ML
INJECTION, SOLUTION INTRAVENOUS
Status: COMPLETED | OUTPATIENT
Start: 2024-01-01 | End: 2024-01-01

## 2024-01-01 RX ORDER — DEXTROSE MONOHYDRATE 25 G/50ML
INJECTION, SOLUTION INTRAVENOUS
Status: COMPLETED | OUTPATIENT
Start: 2024-01-01 | End: 2024-01-01

## 2024-01-01 RX ORDER — ALLOPURINOL 100 MG/1
200 TABLET ORAL DAILY
Status: DISCONTINUED | OUTPATIENT
Start: 2024-01-01 | End: 2024-01-01 | Stop reason: HOSPADM

## 2024-01-01 RX ORDER — DEXTROSE MONOHYDRATE 25 G/50ML
50 INJECTION, SOLUTION INTRAVENOUS
Status: DISCONTINUED | OUTPATIENT
Start: 2024-01-01 | End: 2024-01-01 | Stop reason: HOSPADM

## 2024-01-01 RX ORDER — METHYLPREDNISOLONE SODIUM SUCCINATE 40 MG/ML
20 INJECTION, POWDER, LYOPHILIZED, FOR SOLUTION INTRAMUSCULAR; INTRAVENOUS ONCE
Status: COMPLETED | OUTPATIENT
Start: 2024-01-01 | End: 2024-01-01

## 2024-01-01 RX ORDER — IPRATROPIUM BROMIDE AND ALBUTEROL SULFATE 2.5; .5 MG/3ML; MG/3ML
3 SOLUTION RESPIRATORY (INHALATION)
Status: DISCONTINUED | OUTPATIENT
Start: 2024-01-01 | End: 2024-01-01 | Stop reason: HOSPADM

## 2024-01-01 RX ORDER — ALBUTEROL SULFATE 0.83 MG/ML
2.5 SOLUTION RESPIRATORY (INHALATION) 3 TIMES DAILY
Status: DISCONTINUED | OUTPATIENT
Start: 2024-01-01 | End: 2024-01-01

## 2024-01-01 RX ORDER — ONDANSETRON 2 MG/ML
4 INJECTION INTRAMUSCULAR; INTRAVENOUS EVERY 6 HOURS PRN
Status: DISCONTINUED | OUTPATIENT
Start: 2024-01-01 | End: 2024-01-01 | Stop reason: HOSPADM

## 2024-01-01 RX ORDER — SODIUM CHLORIDE 9 MG/ML
100 INJECTION, SOLUTION INTRAVENOUS CONTINUOUS
Status: DISCONTINUED | OUTPATIENT
Start: 2024-01-01 | End: 2024-01-01 | Stop reason: HOSPADM

## 2024-01-01 RX ORDER — LEVOTHYROXINE SODIUM 75 UG/1
75 TABLET ORAL
Status: DISCONTINUED | OUTPATIENT
Start: 2024-01-01 | End: 2024-01-01 | Stop reason: HOSPADM

## 2024-01-01 RX ORDER — LACTULOSE 10 G/15ML
30 SOLUTION ORAL ONCE
Status: COMPLETED | OUTPATIENT
Start: 2024-01-01 | End: 2024-01-01

## 2024-01-01 RX ORDER — DEXTROSE MONOHYDRATE 25 G/50ML
INJECTION, SOLUTION INTRAVENOUS
Status: COMPLETED
Start: 2024-01-01 | End: 2024-01-01

## 2024-01-01 RX ORDER — ONDANSETRON 4 MG/1
4 TABLET, ORALLY DISINTEGRATING ORAL EVERY 6 HOURS PRN
Status: DISCONTINUED | OUTPATIENT
Start: 2024-01-01 | End: 2024-01-01 | Stop reason: HOSPADM

## 2024-01-01 RX ORDER — ALLOPURINOL 100 MG/1
100 TABLET ORAL DAILY
Status: DISCONTINUED | OUTPATIENT
Start: 2024-01-01 | End: 2024-01-01

## 2024-01-01 RX ORDER — MIDODRINE HYDROCHLORIDE 5 MG/1
10 TABLET ORAL
Status: DISCONTINUED | OUTPATIENT
Start: 2024-01-01 | End: 2024-01-01 | Stop reason: HOSPADM

## 2024-01-01 RX ORDER — PANTOPRAZOLE SODIUM 40 MG/1
40 TABLET, DELAYED RELEASE ORAL
Status: DISCONTINUED | OUTPATIENT
Start: 2024-01-01 | End: 2024-01-01 | Stop reason: HOSPADM

## 2024-01-01 RX ORDER — ACETAMINOPHEN 325 MG/1
650 TABLET ORAL EVERY 6 HOURS PRN
Status: DISCONTINUED | OUTPATIENT
Start: 2024-01-01 | End: 2024-01-01 | Stop reason: HOSPADM

## 2024-01-01 RX ORDER — MIDODRINE HYDROCHLORIDE 5 MG/1
5 TABLET ORAL
Status: DISCONTINUED | OUTPATIENT
Start: 2024-01-01 | End: 2024-01-01

## 2024-01-01 RX ADMIN — ATROPINE SULFATE 1 MG: 0.1 INJECTION PARENTERAL at 06:08

## 2024-01-01 RX ADMIN — EPINEPHRINE 1 MG: 0.1 INJECTION, SOLUTION ENDOTRACHEAL; INTRACARDIAC; INTRAVENOUS at 06:42

## 2024-01-01 RX ADMIN — EPINEPHRINE 1 MG: 0.1 INJECTION, SOLUTION ENDOTRACHEAL; INTRACARDIAC; INTRAVENOUS at 06:32

## 2024-01-01 RX ADMIN — APIXABAN 2.5 MG: 2.5 TABLET, FILM COATED ORAL at 20:47

## 2024-01-01 RX ADMIN — LEVOTHYROXINE SODIUM 75 MCG: 0.07 TABLET ORAL at 05:56

## 2024-01-01 RX ADMIN — PANTOPRAZOLE SODIUM 40 MG: 40 TABLET, DELAYED RELEASE ORAL at 08:08

## 2024-01-01 RX ADMIN — SODIUM BICARBONATE 650 MG: 650 TABLET ORAL at 07:59

## 2024-01-01 RX ADMIN — MIDODRINE HYDROCHLORIDE 10 MG: 5 TABLET ORAL at 08:59

## 2024-01-01 RX ADMIN — METOPROLOL SUCCINATE 25 MG: 25 TABLET, EXTENDED RELEASE ORAL at 20:44

## 2024-01-01 RX ADMIN — SODIUM CHLORIDE 60 ML/HR: 9 INJECTION, SOLUTION INTRAVENOUS at 15:48

## 2024-01-01 RX ADMIN — IPRATROPIUM BROMIDE AND ALBUTEROL SULFATE 3 ML: .5; 3 SOLUTION RESPIRATORY (INHALATION) at 19:25

## 2024-01-01 RX ADMIN — SODIUM CHLORIDE 125 MG: 9 INJECTION, SOLUTION INTRAVENOUS at 10:36

## 2024-01-01 RX ADMIN — ONDANSETRON 4 MG: 4 TABLET, ORALLY DISINTEGRATING ORAL at 05:41

## 2024-01-01 RX ADMIN — Medication 5 MG/HR: at 20:44

## 2024-01-01 RX ADMIN — Medication 0.5 MCG/KG/MIN: at 06:40

## 2024-01-01 RX ADMIN — APIXABAN 2.5 MG: 2.5 TABLET, FILM COATED ORAL at 08:10

## 2024-01-01 RX ADMIN — ALLOPURINOL 200 MG: 100 TABLET ORAL at 08:08

## 2024-01-01 RX ADMIN — Medication 1 TABLET: at 07:56

## 2024-01-01 RX ADMIN — EPINEPHRINE 1 MG: 0.1 INJECTION, SOLUTION ENDOTRACHEAL; INTRACARDIAC; INTRAVENOUS at 06:28

## 2024-01-01 RX ADMIN — SODIUM CHLORIDE 60 ML/HR: 9 INJECTION, SOLUTION INTRAVENOUS at 20:52

## 2024-01-01 RX ADMIN — METOPROLOL SUCCINATE 25 MG: 25 TABLET, EXTENDED RELEASE ORAL at 07:57

## 2024-01-01 RX ADMIN — METOPROLOL SUCCINATE 25 MG: 25 TABLET, EXTENDED RELEASE ORAL at 07:58

## 2024-01-01 RX ADMIN — ONDANSETRON 4 MG: 2 INJECTION INTRAMUSCULAR; INTRAVENOUS at 21:49

## 2024-01-01 RX ADMIN — Medication 1 TABLET: at 08:08

## 2024-01-01 RX ADMIN — METOPROLOL SUCCINATE 25 MG: 25 TABLET, EXTENDED RELEASE ORAL at 20:46

## 2024-01-01 RX ADMIN — LEVOTHYROXINE SODIUM 75 MCG: 0.07 TABLET ORAL at 07:07

## 2024-01-01 RX ADMIN — SODIUM CHLORIDE 125 MG: 9 INJECTION, SOLUTION INTRAVENOUS at 10:30

## 2024-01-01 RX ADMIN — INSULIN HUMAN 10 UNITS: 100 INJECTION, SOLUTION PARENTERAL at 07:04

## 2024-01-01 RX ADMIN — DEXTROSE MONOHYDRATE 50 ML: 25 INJECTION, SOLUTION INTRAVENOUS at 07:04

## 2024-01-01 RX ADMIN — EPINEPHRINE 1 MG: 0.1 INJECTION, SOLUTION ENDOTRACHEAL; INTRACARDIAC; INTRAVENOUS at 06:58

## 2024-01-01 RX ADMIN — METOPROLOL SUCCINATE 25 MG: 25 TABLET, EXTENDED RELEASE ORAL at 20:05

## 2024-01-01 RX ADMIN — MIDODRINE HYDROCHLORIDE 10 MG: 5 TABLET ORAL at 13:54

## 2024-01-01 RX ADMIN — PANTOPRAZOLE SODIUM 40 MG: 40 TABLET, DELAYED RELEASE ORAL at 19:10

## 2024-01-01 RX ADMIN — DEXTROSE MONOHYDRATE 50 ML: 25 INJECTION, SOLUTION INTRAVENOUS at 04:37

## 2024-01-01 RX ADMIN — SODIUM CHLORIDE 60 ML/HR: 9 INJECTION, SOLUTION INTRAVENOUS at 10:37

## 2024-01-01 RX ADMIN — PANTOPRAZOLE SODIUM 40 MG: 40 TABLET, DELAYED RELEASE ORAL at 08:59

## 2024-01-01 RX ADMIN — ALLOPURINOL 200 MG: 100 TABLET ORAL at 08:59

## 2024-01-01 RX ADMIN — PANTOPRAZOLE SODIUM 40 MG: 40 TABLET, DELAYED RELEASE ORAL at 17:08

## 2024-01-01 RX ADMIN — LEVOTHYROXINE SODIUM 75 MCG: 0.07 TABLET ORAL at 06:02

## 2024-01-01 RX ADMIN — EPINEPHRINE 1 MG: 0.1 INJECTION, SOLUTION ENDOTRACHEAL; INTRACARDIAC; INTRAVENOUS at 07:01

## 2024-01-01 RX ADMIN — APIXABAN 2.5 MG: 2.5 TABLET, FILM COATED ORAL at 20:44

## 2024-01-01 RX ADMIN — SODIUM BICARBONATE 50 MEQ: 84 INJECTION, SOLUTION INTRAVENOUS at 07:01

## 2024-01-01 RX ADMIN — IPRATROPIUM BROMIDE AND ALBUTEROL SULFATE 3 ML: .5; 3 SOLUTION RESPIRATORY (INHALATION) at 14:55

## 2024-01-01 RX ADMIN — MIDODRINE HYDROCHLORIDE 5 MG: 5 TABLET ORAL at 17:13

## 2024-01-01 RX ADMIN — SODIUM BICARBONATE 100 MEQ: 84 INJECTION, SOLUTION INTRAVENOUS at 06:59

## 2024-01-01 RX ADMIN — PANTOPRAZOLE SODIUM 40 MG: 40 TABLET, DELAYED RELEASE ORAL at 18:22

## 2024-01-01 RX ADMIN — LACTULOSE 30 G: 10 SOLUTION ORAL at 20:27

## 2024-01-01 RX ADMIN — ACETAMINOPHEN 650 MG: 325 TABLET, FILM COATED ORAL at 08:16

## 2024-01-01 RX ADMIN — EPINEPHRINE 1 MG: 0.1 INJECTION, SOLUTION ENDOTRACHEAL; INTRACARDIAC; INTRAVENOUS at 06:21

## 2024-01-01 RX ADMIN — IPRATROPIUM BROMIDE AND ALBUTEROL SULFATE 3 ML: .5; 3 SOLUTION RESPIRATORY (INHALATION) at 12:03

## 2024-01-01 RX ADMIN — IPRATROPIUM BROMIDE AND ALBUTEROL SULFATE 3 ML: .5; 3 SOLUTION RESPIRATORY (INHALATION) at 15:04

## 2024-01-01 RX ADMIN — METOPROLOL SUCCINATE 25 MG: 25 TABLET, EXTENDED RELEASE ORAL at 20:27

## 2024-01-01 RX ADMIN — MIDODRINE HYDROCHLORIDE 5 MG: 5 TABLET ORAL at 17:08

## 2024-01-01 RX ADMIN — IPRATROPIUM BROMIDE AND ALBUTEROL SULFATE 3 ML: .5; 3 SOLUTION RESPIRATORY (INHALATION) at 11:15

## 2024-01-01 RX ADMIN — METHYLPREDNISOLONE SODIUM SUCCINATE 20 MG: 40 INJECTION, POWDER, FOR SOLUTION INTRAMUSCULAR; INTRAVENOUS at 08:59

## 2024-01-01 RX ADMIN — MIDODRINE HYDROCHLORIDE 10 MG: 5 TABLET ORAL at 12:54

## 2024-01-01 RX ADMIN — IPRATROPIUM BROMIDE AND ALBUTEROL SULFATE 3 ML: .5; 3 SOLUTION RESPIRATORY (INHALATION) at 19:35

## 2024-01-01 RX ADMIN — IPRATROPIUM BROMIDE AND ALBUTEROL SULFATE 3 ML: .5; 3 SOLUTION RESPIRATORY (INHALATION) at 19:14

## 2024-01-01 RX ADMIN — LEVOTHYROXINE SODIUM 75 MCG: 0.07 TABLET ORAL at 05:41

## 2024-01-01 RX ADMIN — LEVOTHYROXINE SODIUM 75 MCG: 0.07 TABLET ORAL at 05:14

## 2024-01-01 RX ADMIN — MIDODRINE HYDROCHLORIDE 5 MG: 5 TABLET ORAL at 08:10

## 2024-01-01 RX ADMIN — PANTOPRAZOLE SODIUM 40 MG: 40 TABLET, DELAYED RELEASE ORAL at 20:44

## 2024-01-01 RX ADMIN — CARVEDILOL 3.12 MG: 3.12 TABLET, FILM COATED ORAL at 20:43

## 2024-01-01 RX ADMIN — Medication 1 TABLET: at 07:58

## 2024-01-01 RX ADMIN — IPRATROPIUM BROMIDE AND ALBUTEROL SULFATE 3 ML: .5; 3 SOLUTION RESPIRATORY (INHALATION) at 08:00

## 2024-01-01 RX ADMIN — MIDODRINE HYDROCHLORIDE 10 MG: 5 TABLET ORAL at 19:10

## 2024-01-01 RX ADMIN — IPRATROPIUM BROMIDE AND ALBUTEROL SULFATE 3 ML: .5; 3 SOLUTION RESPIRATORY (INHALATION) at 11:05

## 2024-01-01 RX ADMIN — APIXABAN 2.5 MG: 2.5 TABLET, FILM COATED ORAL at 08:59

## 2024-01-01 RX ADMIN — APIXABAN 2.5 MG: 2.5 TABLET, FILM COATED ORAL at 07:56

## 2024-01-01 RX ADMIN — METOPROLOL SUCCINATE 25 MG: 25 TABLET, EXTENDED RELEASE ORAL at 08:10

## 2024-01-01 RX ADMIN — METOPROLOL SUCCINATE 25 MG: 25 TABLET, EXTENDED RELEASE ORAL at 08:59

## 2024-01-01 RX ADMIN — APIXABAN 2.5 MG: 2.5 TABLET, FILM COATED ORAL at 09:57

## 2024-01-01 RX ADMIN — IPRATROPIUM BROMIDE AND ALBUTEROL SULFATE 3 ML: .5; 3 SOLUTION RESPIRATORY (INHALATION) at 07:55

## 2024-01-01 RX ADMIN — METOPROLOL SUCCINATE 25 MG: 25 TABLET, EXTENDED RELEASE ORAL at 22:37

## 2024-01-01 RX ADMIN — DEXTROSE MONOHYDRATE 50 ML: 25 INJECTION, SOLUTION INTRAVENOUS at 06:08

## 2024-01-01 RX ADMIN — AMIODARONE HYDROCHLORIDE 300 MG: 50 INJECTION, SOLUTION INTRAVENOUS at 06:33

## 2024-01-01 RX ADMIN — EPOETIN ALFA-EPBX 20000 UNITS: 20000 INJECTION, SOLUTION INTRAVENOUS; SUBCUTANEOUS at 12:48

## 2024-01-01 RX ADMIN — MIDODRINE HYDROCHLORIDE 5 MG: 5 TABLET ORAL at 11:58

## 2024-01-01 RX ADMIN — PANTOPRAZOLE SODIUM 40 MG: 40 TABLET, DELAYED RELEASE ORAL at 07:58

## 2024-01-01 RX ADMIN — PROCHLORPERAZINE EDISYLATE 5 MG: 5 INJECTION INTRAMUSCULAR; INTRAVENOUS at 17:44

## 2024-01-01 RX ADMIN — Medication 1 TABLET: at 08:59

## 2024-01-01 RX ADMIN — PANTOPRAZOLE SODIUM 40 MG: 40 TABLET, DELAYED RELEASE ORAL at 07:57

## 2024-01-01 RX ADMIN — SODIUM BICARBONATE 100 MEQ: 84 INJECTION, SOLUTION INTRAVENOUS at 06:29

## 2024-01-01 RX ADMIN — IPRATROPIUM BROMIDE AND ALBUTEROL SULFATE 3 ML: .5; 3 SOLUTION RESPIRATORY (INHALATION) at 07:31

## 2024-01-01 RX ADMIN — IPRATROPIUM BROMIDE AND ALBUTEROL SULFATE 3 ML: .5; 3 SOLUTION RESPIRATORY (INHALATION) at 10:50

## 2024-01-01 RX ADMIN — PANTOPRAZOLE SODIUM 40 MG: 40 TABLET, DELAYED RELEASE ORAL at 17:13

## 2024-01-01 RX ADMIN — ALLOPURINOL 100 MG: 100 TABLET ORAL at 07:58

## 2024-01-01 RX ADMIN — SODIUM BICARBONATE 650 MG: 650 TABLET ORAL at 08:59

## 2024-01-01 RX ADMIN — CALCIUM CHLORIDE 1 G: 100 INJECTION, SOLUTION INTRAVENOUS at 06:31

## 2024-01-01 RX ADMIN — ATROPINE SULFATE 1 MG: 0.1 INJECTION PARENTERAL at 06:15

## 2024-01-01 RX ADMIN — SODIUM CHLORIDE 125 MG: 9 INJECTION, SOLUTION INTRAVENOUS at 07:57

## 2024-01-01 RX ADMIN — DEXTROSE MONOHYDRATE 50 ML: 25 INJECTION, SOLUTION INTRAVENOUS at 07:03

## 2024-01-01 RX ADMIN — PANTOPRAZOLE SODIUM 40 MG: 40 TABLET, DELAYED RELEASE ORAL at 08:10

## 2024-01-01 RX ADMIN — APIXABAN 5 MG: 5 TABLET, FILM COATED ORAL at 23:33

## 2024-01-01 RX ADMIN — PROCHLORPERAZINE EDISYLATE 5 MG: 5 INJECTION INTRAMUSCULAR; INTRAVENOUS at 13:00

## 2024-01-01 RX ADMIN — PANTOPRAZOLE SODIUM 40 MG: 40 TABLET, DELAYED RELEASE ORAL at 17:44

## 2024-01-01 RX ADMIN — MIDODRINE HYDROCHLORIDE 10 MG: 5 TABLET ORAL at 17:44

## 2024-01-01 RX ADMIN — IPRATROPIUM BROMIDE AND ALBUTEROL SULFATE 3 ML: .5; 3 SOLUTION RESPIRATORY (INHALATION) at 18:36

## 2024-01-01 RX ADMIN — MIDODRINE HYDROCHLORIDE 5 MG: 5 TABLET ORAL at 07:56

## 2024-01-01 RX ADMIN — ALLOPURINOL 200 MG: 100 TABLET ORAL at 08:10

## 2024-01-01 RX ADMIN — SODIUM BICARBONATE 1300 MG: 650 TABLET ORAL at 20:27

## 2024-01-01 RX ADMIN — ALLOPURINOL 200 MG: 100 TABLET ORAL at 07:58

## 2024-01-01 RX ADMIN — SODIUM CHLORIDE 100 ML/HR: 9 INJECTION, SOLUTION INTRAVENOUS at 18:22

## 2024-01-01 RX ADMIN — MIDODRINE HYDROCHLORIDE 5 MG: 5 TABLET ORAL at 12:16

## 2024-01-01 RX ADMIN — APIXABAN 2.5 MG: 2.5 TABLET, FILM COATED ORAL at 20:05

## 2024-01-01 RX ADMIN — APIXABAN 2.5 MG: 2.5 TABLET, FILM COATED ORAL at 22:36

## 2024-01-01 RX ADMIN — MIDODRINE HYDROCHLORIDE 5 MG: 5 TABLET ORAL at 08:08

## 2024-01-01 RX ADMIN — APIXABAN 2.5 MG: 2.5 TABLET, FILM COATED ORAL at 08:08

## 2024-01-01 RX ADMIN — APIXABAN 2.5 MG: 2.5 TABLET, FILM COATED ORAL at 20:27

## 2024-01-01 RX ADMIN — IPRATROPIUM BROMIDE AND ALBUTEROL SULFATE 3 ML: .5; 3 SOLUTION RESPIRATORY (INHALATION) at 19:01

## 2024-01-01 RX ADMIN — Medication 1 TABLET: at 08:10

## 2024-01-08 ENCOUNTER — TELEPHONE (OUTPATIENT)
Dept: CARDIOLOGY | Facility: CLINIC | Age: 84
End: 2024-01-08
Payer: MEDICARE

## 2024-01-08 NOTE — TELEPHONE ENCOUNTER
Caller: Gabrielle Lyles    Relationship to patient: Self    Best call back number: [phone]    Chief complaint: SOB    Type of visit: FOLLOW UP    Requested date: ASAP     Additional notes: PT IS CALLING TO SEE IF SHE CAN GET A SOONER APPT BECAUSE SHE'S BEEN HAVING SOB WHEN WALKING. SHE SAID ITS HARD FOR HER TO SLEEP AT NIGHT TOO WHEN LYING DOWN

## 2024-02-02 ENCOUNTER — TELEPHONE (OUTPATIENT)
Dept: CARDIAC REHAB | Facility: HOSPITAL | Age: 84
End: 2024-02-02
Payer: MEDICARE

## 2024-02-02 NOTE — TELEPHONE ENCOUNTER
Attempted calling pt to see if she would like to reschedule initial appt. No answer & keep ringing.    Leia Armando, RRT

## 2024-02-05 ENCOUNTER — APPOINTMENT (OUTPATIENT)
Dept: CT IMAGING | Facility: HOSPITAL | Age: 84
DRG: 683 | End: 2024-02-05
Payer: MEDICARE

## 2024-02-05 ENCOUNTER — APPOINTMENT (OUTPATIENT)
Dept: GENERAL RADIOLOGY | Facility: HOSPITAL | Age: 84
DRG: 683 | End: 2024-02-05
Payer: MEDICARE

## 2024-02-05 ENCOUNTER — HOSPITAL ENCOUNTER (INPATIENT)
Facility: HOSPITAL | Age: 84
LOS: 10 days | Discharge: NURSING, REHAB OR LONG-TERM CARE | DRG: 683 | End: 2024-02-17
Attending: EMERGENCY MEDICINE | Admitting: INTERNAL MEDICINE
Payer: MEDICARE

## 2024-02-05 DIAGNOSIS — N17.9 AKI (ACUTE KIDNEY INJURY): ICD-10-CM

## 2024-02-05 DIAGNOSIS — E87.6 HYPOKALEMIA: ICD-10-CM

## 2024-02-05 DIAGNOSIS — R41.82 ALTERED MENTAL STATUS, UNSPECIFIED ALTERED MENTAL STATUS TYPE: Primary | ICD-10-CM

## 2024-02-05 LAB
ALBUMIN SERPL-MCNC: 3.9 G/DL (ref 3.5–5.2)
ALBUMIN/GLOB SERPL: 1.2 G/DL
ALP SERPL-CCNC: 69 U/L (ref 39–117)
ALT SERPL W P-5'-P-CCNC: 10 U/L (ref 1–33)
AMMONIA BLD-SCNC: 25 UMOL/L (ref 11–51)
AMPHET+METHAMPHET UR QL: NEGATIVE
ANION GAP SERPL CALCULATED.3IONS-SCNC: 13 MMOL/L (ref 5–15)
AST SERPL-CCNC: 19 U/L (ref 1–32)
ATMOSPHERIC PRESS: ABNORMAL MM[HG]
B PARAPERT DNA SPEC QL NAA+PROBE: NOT DETECTED
B PERT DNA SPEC QL NAA+PROBE: NOT DETECTED
BACTERIA UR QL AUTO: ABNORMAL /HPF
BARBITURATES UR QL SCN: NEGATIVE
BASE EXCESS BLDV CALC-SCNC: 5.5 MMOL/L (ref -2–2)
BASOPHILS # BLD AUTO: 0.1 10*3/MM3 (ref 0–0.2)
BASOPHILS NFR BLD AUTO: 1.1 % (ref 0–1.5)
BDY SITE: ABNORMAL
BENZODIAZ UR QL SCN: NEGATIVE
BILIRUB SERPL-MCNC: 1.1 MG/DL (ref 0–1.2)
BILIRUB UR QL STRIP: NEGATIVE
BUN SERPL-MCNC: 19 MG/DL (ref 8–23)
BUN/CREAT SERPL: 14.6 (ref 7–25)
C PNEUM DNA NPH QL NAA+NON-PROBE: NOT DETECTED
CALCIUM SPEC-SCNC: 9.5 MG/DL (ref 8.6–10.5)
CANNABINOIDS SERPL QL: NEGATIVE
CHLORIDE SERPL-SCNC: 94 MMOL/L (ref 98–107)
CLARITY UR: CLEAR
CO2 BLDA-SCNC: 32.3 MMOL/L (ref 22–29)
CO2 SERPL-SCNC: 30 MMOL/L (ref 22–29)
COCAINE UR QL: NEGATIVE
COHGB MFR BLD: 1.7 % (ref 0–3)
COLOR UR: YELLOW
CREAT SERPL-MCNC: 1.3 MG/DL (ref 0.57–1)
D-LACTATE SERPL-SCNC: 1.4 MMOL/L (ref 0.3–2)
DEPRECATED RDW RBC AUTO: 47.7 FL (ref 37–54)
EGFRCR SERPLBLD CKD-EPI 2021: 41.1 ML/MIN/1.73
EOSINOPHIL # BLD AUTO: 0.4 10*3/MM3 (ref 0–0.4)
EOSINOPHIL NFR BLD AUTO: 4.2 % (ref 0.3–6.2)
ERYTHROCYTE [DISTWIDTH] IN BLOOD BY AUTOMATED COUNT: 16 % (ref 12.3–15.4)
ETHANOL UR QL: <0.01 %
FLUAV RNA RESP QL NAA+PROBE: NOT DETECTED
FLUAV SUBTYP SPEC NAA+PROBE: NOT DETECTED
FLUBV RNA ISLT QL NAA+PROBE: NOT DETECTED
FLUBV RNA RESP QL NAA+PROBE: NOT DETECTED
GLOBULIN UR ELPH-MCNC: 3.2 GM/DL
GLUCOSE SERPL-MCNC: 104 MG/DL (ref 65–99)
GLUCOSE UR STRIP-MCNC: NEGATIVE MG/DL
HADV DNA SPEC NAA+PROBE: NOT DETECTED
HCO3 BLDV-SCNC: 30.9 MMOL/L (ref 22–26)
HCOV 229E RNA SPEC QL NAA+PROBE: NOT DETECTED
HCOV HKU1 RNA SPEC QL NAA+PROBE: NOT DETECTED
HCOV NL63 RNA SPEC QL NAA+PROBE: NOT DETECTED
HCOV OC43 RNA SPEC QL NAA+PROBE: NOT DETECTED
HCT VFR BLD AUTO: 40.5 % (ref 34–46.6)
HGB BLD-MCNC: 12.8 G/DL (ref 12–15.9)
HGB UR QL STRIP.AUTO: ABNORMAL
HMPV RNA NPH QL NAA+NON-PROBE: NOT DETECTED
HOLD SPECIMEN: NORMAL
HPIV1 RNA ISLT QL NAA+PROBE: NOT DETECTED
HPIV2 RNA SPEC QL NAA+PROBE: NOT DETECTED
HPIV3 RNA NPH QL NAA+PROBE: NOT DETECTED
HPIV4 P GENE NPH QL NAA+PROBE: NOT DETECTED
HYALINE CASTS UR QL AUTO: ABNORMAL /LPF
INHALED O2 CONCENTRATION: 32 %
KETONES UR QL STRIP: ABNORMAL
LEUKOCYTE ESTERASE UR QL STRIP.AUTO: NEGATIVE
LYMPHOCYTES # BLD AUTO: 0.4 10*3/MM3 (ref 0.7–3.1)
LYMPHOCYTES NFR BLD AUTO: 4.1 % (ref 19.6–45.3)
M PNEUMO IGG SER IA-ACNC: NOT DETECTED
MAGNESIUM SERPL-MCNC: 2.2 MG/DL (ref 1.6–2.4)
MCH RBC QN AUTO: 27.1 PG (ref 26.6–33)
MCHC RBC AUTO-ENTMCNC: 31.7 G/DL (ref 31.5–35.7)
MCV RBC AUTO: 85.6 FL (ref 79–97)
METHADONE UR QL SCN: NEGATIVE
MODALITY: ABNORMAL
MONOCYTES # BLD AUTO: 0.8 10*3/MM3 (ref 0.1–0.9)
MONOCYTES NFR BLD AUTO: 8.4 % (ref 5–12)
MRSA DNA SPEC QL NAA+PROBE: NORMAL
NEUTROPHILS NFR BLD AUTO: 7.5 10*3/MM3 (ref 1.7–7)
NEUTROPHILS NFR BLD AUTO: 82.2 % (ref 42.7–76)
NITRITE UR QL STRIP: NEGATIVE
NRBC BLD AUTO-RTO: 0.1 /100 WBC (ref 0–0.2)
OPIATES UR QL: NEGATIVE
OXYCODONE UR QL SCN: NEGATIVE
PCO2 BLDV: 46.6 MM HG (ref 42–51)
PH BLDV: 7.43 PH UNITS (ref 7.32–7.43)
PH UR STRIP.AUTO: 6.5 [PH] (ref 5–8)
PLATELET # BLD AUTO: 220 10*3/MM3 (ref 140–450)
PMV BLD AUTO: 8.6 FL (ref 6–12)
PO2 BLDV: 32.9 MM HG (ref 40–42)
POTASSIUM SERPL-SCNC: 3.1 MMOL/L (ref 3.5–5.2)
PROCALCITONIN SERPL-MCNC: 0.06 NG/ML (ref 0–0.25)
PROT SERPL-MCNC: 7.1 G/DL (ref 6–8.5)
PROT UR QL STRIP: ABNORMAL
RBC # BLD AUTO: 4.73 10*6/MM3 (ref 3.77–5.28)
RBC # UR STRIP: ABNORMAL /HPF
REF LAB TEST METHOD: ABNORMAL
RHINOVIRUS RNA SPEC NAA+PROBE: NOT DETECTED
RSV RNA NPH QL NAA+NON-PROBE: NOT DETECTED
RSV RNA RESP QL NAA+PROBE: NOT DETECTED
SAO2 % BLDCOV: 64.4 % (ref 45–75)
SARS-COV-2 RNA NPH QL NAA+NON-PROBE: NOT DETECTED
SARS-COV-2 RNA RESP QL NAA+PROBE: NOT DETECTED
SODIUM SERPL-SCNC: 137 MMOL/L (ref 136–145)
SP GR UR STRIP: 1.02 (ref 1–1.03)
SQUAMOUS #/AREA URNS HPF: ABNORMAL /HPF
TSH SERPL DL<=0.05 MIU/L-ACNC: 1.64 UIU/ML (ref 0.27–4.2)
UROBILINOGEN UR QL STRIP: ABNORMAL
WBC # UR STRIP: ABNORMAL /HPF
WBC NRBC COR # BLD AUTO: 9.1 10*3/MM3 (ref 3.4–10.8)
WHOLE BLOOD HOLD COAG: NORMAL

## 2024-02-05 PROCEDURE — 85025 COMPLETE CBC W/AUTO DIFF WBC: CPT | Performed by: EMERGENCY MEDICINE

## 2024-02-05 PROCEDURE — 94799 UNLISTED PULMONARY SVC/PX: CPT

## 2024-02-05 PROCEDURE — 82607 VITAMIN B-12: CPT | Performed by: NURSE PRACTITIONER

## 2024-02-05 PROCEDURE — 83605 ASSAY OF LACTIC ACID: CPT

## 2024-02-05 PROCEDURE — G0378 HOSPITAL OBSERVATION PER HR: HCPCS

## 2024-02-05 PROCEDURE — 93005 ELECTROCARDIOGRAM TRACING: CPT

## 2024-02-05 PROCEDURE — 82375 ASSAY CARBOXYHB QUANT: CPT | Performed by: EMERGENCY MEDICINE

## 2024-02-05 PROCEDURE — 71045 X-RAY EXAM CHEST 1 VIEW: CPT

## 2024-02-05 PROCEDURE — 80307 DRUG TEST PRSMV CHEM ANLYZR: CPT | Performed by: EMERGENCY MEDICINE

## 2024-02-05 PROCEDURE — C1751 CATH, INF, PER/CENT/MIDLINE: HCPCS

## 2024-02-05 PROCEDURE — 0202U NFCT DS 22 TRGT SARS-COV-2: CPT | Performed by: INTERNAL MEDICINE

## 2024-02-05 PROCEDURE — 84443 ASSAY THYROID STIM HORMONE: CPT | Performed by: EMERGENCY MEDICINE

## 2024-02-05 PROCEDURE — 82077 ASSAY SPEC XCP UR&BREATH IA: CPT | Performed by: EMERGENCY MEDICINE

## 2024-02-05 PROCEDURE — 94640 AIRWAY INHALATION TREATMENT: CPT

## 2024-02-05 PROCEDURE — 36415 COLL VENOUS BLD VENIPUNCTURE: CPT | Performed by: EMERGENCY MEDICINE

## 2024-02-05 PROCEDURE — 70450 CT HEAD/BRAIN W/O DYE: CPT

## 2024-02-05 PROCEDURE — 83735 ASSAY OF MAGNESIUM: CPT | Performed by: EMERGENCY MEDICINE

## 2024-02-05 PROCEDURE — 99285 EMERGENCY DEPT VISIT HI MDM: CPT

## 2024-02-05 PROCEDURE — 83970 ASSAY OF PARATHORMONE: CPT | Performed by: INTERNAL MEDICINE

## 2024-02-05 PROCEDURE — 81001 URINALYSIS AUTO W/SCOPE: CPT | Performed by: EMERGENCY MEDICINE

## 2024-02-05 PROCEDURE — P9612 CATHETERIZE FOR URINE SPEC: HCPCS

## 2024-02-05 PROCEDURE — 87040 BLOOD CULTURE FOR BACTERIA: CPT | Performed by: EMERGENCY MEDICINE

## 2024-02-05 PROCEDURE — 82803 BLOOD GASES ANY COMBINATION: CPT | Performed by: INTERNAL MEDICINE

## 2024-02-05 PROCEDURE — 84145 PROCALCITONIN (PCT): CPT | Performed by: EMERGENCY MEDICINE

## 2024-02-05 PROCEDURE — 82140 ASSAY OF AMMONIA: CPT | Performed by: EMERGENCY MEDICINE

## 2024-02-05 PROCEDURE — 05HY33Z INSERTION OF INFUSION DEVICE INTO UPPER VEIN, PERCUTANEOUS APPROACH: ICD-10-PCS | Performed by: INTERNAL MEDICINE

## 2024-02-05 PROCEDURE — 25010000002 ONDANSETRON PER 1 MG: Performed by: INTERNAL MEDICINE

## 2024-02-05 PROCEDURE — 80053 COMPREHEN METABOLIC PANEL: CPT | Performed by: EMERGENCY MEDICINE

## 2024-02-05 PROCEDURE — 87637 SARSCOV2&INF A&B&RSV AMP PRB: CPT | Performed by: EMERGENCY MEDICINE

## 2024-02-05 PROCEDURE — 25810000003 SODIUM CHLORIDE 0.9 % SOLUTION: Performed by: INTERNAL MEDICINE

## 2024-02-05 PROCEDURE — 87641 MR-STAPH DNA AMP PROBE: CPT | Performed by: INTERNAL MEDICINE

## 2024-02-05 PROCEDURE — 93005 ELECTROCARDIOGRAM TRACING: CPT | Performed by: EMERGENCY MEDICINE

## 2024-02-05 RX ORDER — SODIUM CHLORIDE 0.9 % (FLUSH) 0.9 %
10 SYRINGE (ML) INJECTION EVERY 12 HOURS SCHEDULED
Status: DISCONTINUED | OUTPATIENT
Start: 2024-02-05 | End: 2024-02-17 | Stop reason: HOSPADM

## 2024-02-05 RX ORDER — IPRATROPIUM BROMIDE AND ALBUTEROL SULFATE 2.5; .5 MG/3ML; MG/3ML
3 SOLUTION RESPIRATORY (INHALATION) EVERY 4 HOURS PRN
Status: DISCONTINUED | OUTPATIENT
Start: 2024-02-05 | End: 2024-02-14

## 2024-02-05 RX ORDER — SODIUM CHLORIDE 9 MG/ML
40 INJECTION, SOLUTION INTRAVENOUS AS NEEDED
Status: DISCONTINUED | OUTPATIENT
Start: 2024-02-05 | End: 2024-02-05 | Stop reason: SDUPTHER

## 2024-02-05 RX ORDER — SODIUM CHLORIDE 0.9 % (FLUSH) 0.9 %
10 SYRINGE (ML) INJECTION AS NEEDED
Status: DISCONTINUED | OUTPATIENT
Start: 2024-02-05 | End: 2024-02-17 | Stop reason: HOSPADM

## 2024-02-05 RX ORDER — ALLOPURINOL 100 MG/1
100 TABLET ORAL DAILY
Status: DISCONTINUED | OUTPATIENT
Start: 2024-02-06 | End: 2024-02-17 | Stop reason: HOSPADM

## 2024-02-05 RX ORDER — SODIUM CHLORIDE 9 MG/ML
40 INJECTION, SOLUTION INTRAVENOUS AS NEEDED
Status: DISCONTINUED | OUTPATIENT
Start: 2024-02-05 | End: 2024-02-17 | Stop reason: HOSPADM

## 2024-02-05 RX ORDER — BUDESONIDE AND FORMOTEROL FUMARATE DIHYDRATE 80; 4.5 UG/1; UG/1
2 AEROSOL RESPIRATORY (INHALATION)
COMMUNITY

## 2024-02-05 RX ORDER — SODIUM CHLORIDE 9 MG/ML
100 INJECTION, SOLUTION INTRAVENOUS CONTINUOUS
Status: DISCONTINUED | OUTPATIENT
Start: 2024-02-05 | End: 2024-02-06

## 2024-02-05 RX ORDER — ONDANSETRON 2 MG/ML
4 INJECTION INTRAMUSCULAR; INTRAVENOUS EVERY 6 HOURS PRN
Status: DISCONTINUED | OUTPATIENT
Start: 2024-02-05 | End: 2024-02-17 | Stop reason: HOSPADM

## 2024-02-05 RX ORDER — BUDESONIDE AND FORMOTEROL FUMARATE DIHYDRATE 160; 4.5 UG/1; UG/1
2 AEROSOL RESPIRATORY (INHALATION)
Status: DISCONTINUED | OUTPATIENT
Start: 2024-02-05 | End: 2024-02-07

## 2024-02-05 RX ORDER — LEVOTHYROXINE SODIUM 0.05 MG/1
50 TABLET ORAL
Status: DISCONTINUED | OUTPATIENT
Start: 2024-02-06 | End: 2024-02-17 | Stop reason: HOSPADM

## 2024-02-05 RX ORDER — AMOXICILLIN AND CLAVULANATE POTASSIUM 875; 125 MG/1; MG/1
1 TABLET, FILM COATED ORAL 2 TIMES DAILY
COMMUNITY
Start: 2024-02-02 | End: 2024-02-17 | Stop reason: HOSPADM

## 2024-02-05 RX ORDER — ALLOPURINOL 100 MG/1
100 TABLET ORAL DAILY
COMMUNITY

## 2024-02-05 RX ORDER — ACETAMINOPHEN 325 MG/1
650 TABLET ORAL EVERY 4 HOURS PRN
Status: DISCONTINUED | OUTPATIENT
Start: 2024-02-05 | End: 2024-02-17 | Stop reason: HOSPADM

## 2024-02-05 RX ORDER — CARVEDILOL 6.25 MG/1
12.5 TABLET ORAL 2 TIMES DAILY WITH MEALS
Status: DISCONTINUED | OUTPATIENT
Start: 2024-02-05 | End: 2024-02-08

## 2024-02-05 RX ORDER — SODIUM CHLORIDE 0.9 % (FLUSH) 0.9 %
20 SYRINGE (ML) INJECTION AS NEEDED
Status: DISCONTINUED | OUTPATIENT
Start: 2024-02-05 | End: 2024-02-17 | Stop reason: HOSPADM

## 2024-02-05 RX ORDER — SILDENAFIL CITRATE 20 MG/1
20 TABLET ORAL 3 TIMES DAILY
Status: DISCONTINUED | OUTPATIENT
Start: 2024-02-05 | End: 2024-02-17 | Stop reason: HOSPADM

## 2024-02-05 RX ADMIN — SODIUM CHLORIDE 100 ML/HR: 9 INJECTION, SOLUTION INTRAVENOUS at 22:23

## 2024-02-05 RX ADMIN — CARVEDILOL 12.5 MG: 6.25 TABLET, FILM COATED ORAL at 21:59

## 2024-02-05 RX ADMIN — ONDANSETRON 4 MG: 2 INJECTION INTRAMUSCULAR; INTRAVENOUS at 22:00

## 2024-02-05 RX ADMIN — SILDENAFIL CITRATE 20 MG: 20 TABLET ORAL at 21:59

## 2024-02-05 RX ADMIN — BUDESONIDE AND FORMOTEROL FUMARATE DIHYDRATE 2 PUFF: 160; 4.5 AEROSOL RESPIRATORY (INHALATION) at 20:56

## 2024-02-05 RX ADMIN — APIXABAN 5 MG: 5 TABLET, FILM COATED ORAL at 21:59

## 2024-02-05 RX ADMIN — SODIUM CHLORIDE 500 ML: 9 INJECTION, SOLUTION INTRAVENOUS at 17:19

## 2024-02-05 NOTE — ED PROVIDER NOTES
Subjective   History of Present Illness  83-year-old female who lives alone who daughter noted she had some mental status changes this morning after being on some increased dose of diuretic over the last few days due to some bilateral leg edema.  The edema has improved.  Daughter notes some urinary frequency over the weekend due to increased diuretic use.  This morning patient was much more confused.  Daughter noted mild confusion gradually worsening over the weekend.  There is no reported trauma.  There is no reported fevers or chills or vomiting.  Patient reports no chest or abdominal pain.  There is no reported focal numbness or weakness or blurry vision.  Review of Systems    Past Medical History:   Diagnosis Date    COPD (chronic obstructive pulmonary disease)     Deep vein thrombosis of bilateral lower extremities 7/24/2019    History of DVT (deep vein thrombosis) 03/2013    bilateral lower extremity    History of pneumonia 06/2012    community acquired pneumonia and right pleural effusion;hospitalized at [facility]    History of pulmonary embolism 03/2013    bilateral PE    Hypertension 2001    Insomnia     on Ambien 5mg at     Lobular breast cancer, left 11/2011    Stage IA left upper lobular breast cancer    Malignant neoplasm of left breast in female, estrogen receptor positive 7/18/2019    Osteopenia 2012    Severe pulmonary hypertension 3/3/2023    Stage 3a chronic kidney disease 7/24/2019    TIA (transient ischemic attack) 10/25/2022       No Known Allergies    Past Surgical History:   Procedure Laterality Date    CARDIAC CATHETERIZATION N/A 3/3/2023    Procedure: Left and Right Heart Cath with Coronary Angiography;  Surgeon: Richardson Willis MD;  Location: [facility] INVASIVE LOCATION;  Service: Cardiovascular;  Laterality: N/A;    CATARACT EXTRACTION  2015    IVC FILTER RETRIEVAL  06/2014    IVC filter placement by Dr. Card    MAMMO STEREOTACTIC BREAST BX SURGICAL ADD UNI Left 11/01/2011     invasive lobular carcinoma-Dr. Cande Daniel    MASTECTOMY, PARTIAL Left 2011    and left axillary sentinel lymph node biopsy by Dr. Uriostegui    TUBAL ABDOMINAL LIGATION         Family History   Problem Relation Age of Onset    Lung cancer Brother        Social History     Socioeconomic History    Marital status:    Tobacco Use    Smoking status: Former     Types: Cigarettes     Quit date:      Years since quittin.1     Passive exposure: Past    Smokeless tobacco: Never    Tobacco comments:     smoked 6 cigarettes a day from 13 years of age to 56 years of age when she quit in    Vaping Use    Vaping Use: Never used   Substance and Sexual Activity    Alcohol use: Not Currently    Drug use: No    Sexual activity: Not Currently     Partners: Male     Prior to Admission medications    Medication Sig Start Date End Date Taking? Authorizing Provider   allopurinol (ZYLOPRIM) 100 MG tablet Take 1 tablet by mouth Daily. 23   Mansi Becerril APRN   amLODIPine (NORVASC) 10 MG tablet Take 1 tablet by mouth Daily. 23   Velma Ascencio APRN   apixaban (ELIQUIS) 5 MG tablet tablet Take 1 tablet by mouth Every 12 (Twelve) Hours. Indications: Other - full anticoagulation, history of DVT/PE 10/27/22   Shaylee Mcdaniels MD   carvedilol (COREG) 12.5 MG tablet TAKE 1 TABLET BY MOUTH TWICE DAILY WITH MEALS 23   Richardson Willis MD   Cholecalciferol (Vitamin D3) 50 MCG (2000 UT) capsule Take 1 capsule by mouth Daily. 10/31/22   Jaylyn Golden MD   Folbic 2.5-25-2 MG tablet tablet Take 1 tablet by mouth Daily. 22   Jaylyn Golden MD   furosemide (LASIX) 40 MG tablet Take 1 tablet by mouth Daily.    Jaylyn Golden MD   gabapentin (NEURONTIN) 300 MG capsule Take 1 capsule by mouth 2 (Two) Times a Day.    Jaylyn Golden MD   levothyroxine (SYNTHROID, LEVOTHROID) 50 MCG tablet Take 1 tablet by mouth Daily. 23   Jaylyn Golden MD   lisinopril (PRINIVIL,ZESTRIL)  "40 MG tablet Take 1 tablet by mouth Daily. 6/8/23   Velma Ascencio APRN   montelukast (SINGULAIR) 10 MG tablet Take 1 tablet by mouth Every Night. 5/11/23   Mansi Becerril APRN   potassium chloride (K-DUR,KLOR-CON) 10 MEQ CR tablet Take 1 tablet by mouth Daily. 2/24/23   Richardson Willis MD   predniSONE (DELTASONE) 10 MG tablet Take 3 tablets by mouth Daily With Breakfast. 9/29/23   Mansi Becerril APRN   sildenafil (REVATIO) 20 MG tablet Take 1 tablet by mouth Every 8 (Eight) Hours. 5/11/23   Mansi Becerril APRN     /83   Pulse 78   Temp 100 °F (37.8 °C) (Oral)   Resp 18   Ht 162.6 cm (64\")   Wt 66.7 kg (147 lb)   SpO2 91%   BMI 25.23 kg/m²         Objective   Physical Exam  General: Well-developed elderly female awake and alert, pleasantly confused  Eyes: Pupils round and equal, sclera nonicteric  HEENT: Mucous membranes somewhat dry, no mucosal swelling  Neck: Supple, no nuchal rigidity, no JVD  Respirations: Respirations nonlabored, equal breath sounds bilaterally, clear lungs  Heart regular rate and rhythm, no murmurs rubs or gallops,   Abdomen soft nontender nondistended, no hepatosplenomegaly, no hernia, no mass, normal bowel sounds, no CVA tenderness  Extremities no clubbing cyanosis or edema, calves are symmetric and nontender  Neuro cranial nerves grossly intact, no focal limb deficits, confused, difficulty answering questions, following simple commands appropriately  Skin no rash, brisk cap refill  Procedures           ED Course      Results for orders placed or performed during the hospital encounter of 02/05/24   COVID-19, FLU A/B, RSV PCR 1 HR TAT - Swab, Nasopharynx    Specimen: Nasopharynx; Swab   Result Value Ref Range    COVID19 Not Detected Not Detected - Ref. Range    Influenza A PCR Not Detected Not Detected    Influenza B PCR Not Detected Not Detected    RSV, PCR Not Detected Not Detected   Comprehensive Metabolic Panel    Specimen: Blood   Result Value Ref Range    Glucose 104 " (H) 65 - 99 mg/dL    BUN 19 8 - 23 mg/dL    Creatinine 1.30 (H) 0.57 - 1.00 mg/dL    Sodium 137 136 - 145 mmol/L    Potassium 3.1 (L) 3.5 - 5.2 mmol/L    Chloride 94 (L) 98 - 107 mmol/L    CO2 30.0 (H) 22.0 - 29.0 mmol/L    Calcium 9.5 8.6 - 10.5 mg/dL    Total Protein 7.1 6.0 - 8.5 g/dL    Albumin 3.9 3.5 - 5.2 g/dL    ALT (SGPT) 10 1 - 33 U/L    AST (SGOT) 19 1 - 32 U/L    Alkaline Phosphatase 69 39 - 117 U/L    Total Bilirubin 1.1 0.0 - 1.2 mg/dL    Globulin 3.2 gm/dL    A/G Ratio 1.2 g/dL    BUN/Creatinine Ratio 14.6 7.0 - 25.0    Anion Gap 13.0 5.0 - 15.0 mmol/L    eGFR 41.1 (L) >60.0 mL/min/1.73   Urinalysis With Culture If Indicated - Straight Cath    Specimen: Straight Cath; Urine   Result Value Ref Range    Color, UA Yellow Yellow, Straw    Appearance, UA Clear Clear    pH, UA 6.5 5.0 - 8.0    Specific Gravity, UA 1.022 1.005 - 1.030    Glucose, UA Negative Negative    Ketones, UA Trace (A) Negative    Bilirubin, UA Negative Negative    Blood, UA Small (1+) (A) Negative    Protein,  mg/dL (2+) (A) Negative    Leuk Esterase, UA Negative Negative    Nitrite, UA Negative Negative    Urobilinogen, UA 0.2 E.U./dL 0.2 - 1.0 E.U./dL   Procalcitonin    Specimen: Blood   Result Value Ref Range    Procalcitonin 0.06 0.00 - 0.25 ng/mL   Ethanol    Specimen: Blood   Result Value Ref Range    Ethanol % <0.010 %   Urine Drug Screen - Straight Cath    Specimen: Straight Cath; Urine   Result Value Ref Range    Amphet/Methamphet, Screen Negative Negative    Barbiturates Screen, Urine Negative Negative    Benzodiazepine Screen, Urine Negative Negative    Cocaine Screen, Urine Negative Negative    Opiate Screen Negative Negative    THC, Screen, Urine Negative Negative    Methadone Screen, Urine Negative Negative    Oxycodone Screen, Urine Negative Negative   TSH    Specimen: Blood   Result Value Ref Range    TSH 1.640 0.270 - 4.200 uIU/mL   Magnesium    Specimen: Blood   Result Value Ref Range    Magnesium 2.2 1.6 - 2.4  mg/dL   Ammonia    Specimen: Arm, Left; Blood   Result Value Ref Range    Ammonia 25 11 - 51 umol/L   Carbon Monoxide, Blood    Specimen: Arm, Left; Blood   Result Value Ref Range    Carbon Monoxide, Blood 1.7 0 - 3 %   CBC Auto Differential    Specimen: Blood   Result Value Ref Range    WBC 9.10 3.40 - 10.80 10*3/mm3    RBC 4.73 3.77 - 5.28 10*6/mm3    Hemoglobin 12.8 12.0 - 15.9 g/dL    Hematocrit 40.5 34.0 - 46.6 %    MCV 85.6 79.0 - 97.0 fL    MCH 27.1 26.6 - 33.0 pg    MCHC 31.7 31.5 - 35.7 g/dL    RDW 16.0 (H) 12.3 - 15.4 %    RDW-SD 47.7 37.0 - 54.0 fl    MPV 8.6 6.0 - 12.0 fL    Platelets 220 140 - 450 10*3/mm3    Neutrophil % 82.2 (H) 42.7 - 76.0 %    Lymphocyte % 4.1 (L) 19.6 - 45.3 %    Monocyte % 8.4 5.0 - 12.0 %    Eosinophil % 4.2 0.3 - 6.2 %    Basophil % 1.1 0.0 - 1.5 %    Neutrophils, Absolute 7.50 (H) 1.70 - 7.00 10*3/mm3    Lymphocytes, Absolute 0.40 (L) 0.70 - 3.10 10*3/mm3    Monocytes, Absolute 0.80 0.10 - 0.90 10*3/mm3    Eosinophils, Absolute 0.40 0.00 - 0.40 10*3/mm3    Basophils, Absolute 0.10 0.00 - 0.20 10*3/mm3    nRBC 0.1 0.0 - 0.2 /100 WBC   Urinalysis, Microscopic Only - Straight Cath    Specimen: Straight Cath; Urine   Result Value Ref Range    RBC, UA 3-5 (A) None Seen, 0-2 /HPF    WBC, UA 0-2 None Seen, 0-2 /HPF    Bacteria, UA Trace (A) None Seen /HPF    Squamous Epithelial Cells, UA 0-2 None Seen, 0-2 /HPF    Hyaline Casts, UA 0-2 None Seen /LPF    Methodology Manual Light Microscopy    POC Lactate    Specimen: Blood   Result Value Ref Range    Lactate 1.4 0.3 - 2.0 mmol/L   ECG 12 Lead Other; Weakness   Result Value Ref Range    QT Interval 398 ms    QTC Interval 429 ms   Gold Top - SST   Result Value Ref Range    Extra Tube Hold for add-ons.    Light Blue Top   Result Value Ref Range    Extra Tube Hold for add-ons.      CT Head Without Contrast    Result Date: 2/5/2024  1.Examination is limited due to motion artifact. 2.Given this limitation, no acute intracranial abnormality  is identified. 3.Findings compatible with chronic microvascular ischemic change and diffuse cortical atrophy. Electronically Signed: Parker Mireles MD  2/5/2024 12:26 PM EST  Workstation ID: [id number]    XR Chest 1 View    Result Date: 2/5/2024  Impression: Mild cardiomegaly. Mild chronic findings of the lung fields. Electronically Signed: Mary Ivan MD  2/5/2024 12:06 PM EST  Workstation ID: [id number]                                          Medical Decision Making  Patient presents with altered mental status differential diagnose including CVA, metabolic encephalopathy, sepsis    Patient no focal deficits to suggest CVA.  Patient has a benign abdominal examination and is in no respiratory distress.  Her lactate was normal.  CBC shows no leukocytosis.  Comprehensive metabolic panel shows some mild renal insufficiency and low potassium she was ordered potassium replacement.  COVID and flu screen negative.  No clear cause of her mental status change was identified during the emergency room course.  Case and findings discussed with Mansi with the [facility] service for admission.  Patient and family advised of findings and agreeable plan of admission.    Amount and/or Complexity of Data Reviewed  Labs: ordered. Decision-making details documented in ED Course.  Radiology: ordered and independent interpretation performed.     Details: My independent rotation of CT head image no apparent acute hemorrhage, my chest x-ray interpretation cardiomegaly, no acute infiltrate  ECG/medicine tests: ordered and independent interpretation performed.     Details: My EKG interpretation sinus rhythm rate of 75, no acute ST or T wave abnormality PVCs    Risk  Prescription drug management.        Final diagnoses:   Altered mental status, unspecified altered mental status type   Hypokalemia       ED Disposition  ED Disposition       ED Disposition   Decision to Admit    Condition   --    Comment   Level of Care: Telemetry [5]   Admitting  Physician: RUSH WATERMAN [[id number]]   Attending Physician: RUSH WATERMAN [[id number]]                 No follow-up provider specified.       Medication List      No changes were made to your prescriptions during this visit.            Pankaj Mccallum MD  02/05/24 [id number]

## 2024-02-05 NOTE — H&P
Patient Care Team:  Shaylee Mcdaniels MD as PCP - General (Family Medicine)  Richardson Willis MD as Cardiologist (Cardiology)  Rafy Rodriguez MD as Consulting Physician (Hematology and Oncology)  Christianne Phillips, RN as Licensed Practical Nurse    Chief complaint confusion    Subjective     Patient is a 83 y.o. female with history of COPD, DVT, breast cancer, chronic kidney disease and pulmonary hypertension who presents with progressive confusion over the last 3 days.  She was seen in her PCP office with bilateral lower extremity swelling and swelling and discomfort in her left breast.  Dr. Mcdaniels was concerned about possible mastitis in the left breast.  She started Augmentin and advised patient to increase dose of furosemide from 40 mg a day to 80 mg a day through the weekend.  Daughter noticed mild confusion on Saturday afternoon, somewhat worse on Sunday and then this morning she was significantly worse.  She is oriented to person only, which is a significant change from baseline..  There was no reported fever at home but her temperature on arrival to the emergency room was 100 degrees.  She has had poor oral intake over the last few days.  There is no reported vomiting or diarrhea.  Swelling in the breast and legs has resolved.    Review of Systems   Constitutional:  Positive for activity change, appetite change and fever. Negative for chills, diaphoresis, fatigue and unexpected weight change.   HENT:  Negative for facial swelling and hearing loss.    Eyes:  Negative for visual disturbance.   Respiratory:  Negative for cough, shortness of breath, wheezing and stridor.    Cardiovascular:  Negative for chest pain, palpitations and leg swelling.   Gastrointestinal:  Negative for abdominal pain, constipation, diarrhea, nausea and vomiting.   Endocrine: Positive for polyuria.   Genitourinary:  Negative for dysuria and urgency.   Musculoskeletal:  Positive for gait problem. Negative for arthralgias and back pain.    Skin:  Positive for wound. Negative for rash.   Neurological:  Positive for weakness. Negative for dizziness, tremors, light-headedness and headaches.   Psychiatric/Behavioral:  Positive for confusion.           History  Past Medical History:   Diagnosis Date    COPD (chronic obstructive pulmonary disease)     Deep vein thrombosis of bilateral lower extremities 2019    History of DVT (deep vein thrombosis) 2013    bilateral lower extremity    History of pneumonia 2012    community acquired pneumonia and right pleural effusion;hospitalized at [facility]    History of pulmonary embolism 2013    bilateral PE    Hypertension     Insomnia     on Ambien 5mg at     Lobular breast cancer, left 2011    Stage IA left upper lobular breast cancer    Malignant neoplasm of left breast in female, estrogen receptor positive 2019    Osteopenia     Severe pulmonary hypertension 3/3/2023    Stage 3a chronic kidney disease 2019    TIA (transient ischemic attack) 10/25/2022     Past Surgical History:   Procedure Laterality Date    CARDIAC CATHETERIZATION N/A 3/3/2023    Procedure: Left and Right Heart Cath with Coronary Angiography;  Surgeon: Richardson Willis MD;  Location: [facility] INVASIVE LOCATION;  Service: Cardiovascular;  Laterality: N/A;    CATARACT EXTRACTION      IVC FILTER RETRIEVAL  2014    IVC filter placement by Dr. Card    MAMMO STEREOTACTIC BREAST BX SURGICAL ADD UNI Left 2011    invasive lobular carcinoma-Dr. Cande Daniel    MASTECTOMY, PARTIAL Left 2011    and left axillary sentinel lymph node biopsy by Dr. Uriostegui    TUBAL ABDOMINAL LIGATION       Family History   Problem Relation Age of Onset    Lung cancer Brother      Social History     Tobacco Use    Smoking status: Former     Types: Cigarettes     Quit date:      Years since quittin.1     Passive exposure: Past    Smokeless tobacco: Never    Tobacco comments:     smoked 6 cigarettes a day  from 13 years of age to 56 years of age when she quit in 1996   Vaping Use    Vaping Use: Never used   Substance Use Topics    Alcohol use: Not Currently    Drug use: No     (Not in a hospital admission)    Allergies:  Patient has no known allergies.    Objective     Vital Signs  Temp:  [99.5 °F (37.5 °C)-100 °F (37.8 °C)] 99.5 °F (37.5 °C)  Heart Rate:  [73-87] 74  Resp:  [18] 18  BP: (142-146)/(80-83) 146/83     Physical Exam:      General Appearance:    Alert, disheveled, oriented to person only, speech is clear   Head:    Normocephalic, without obvious abnormality, atraumatic   Eyes:            Lids and lashes normal, conjunctivae and sclerae normal, no   icterus, no pallor, corneas clear, PERRLA   Ears:    Ears appear intact with no abnormalities noted   Throat:   No oral lesions, no thrush, oral mucosa moist   Neck:   No adenopathy, supple, trachea midline, no thyromegaly, no   carotid bruit, no JVD   Lungs:     Clear to auscultation,respirations regular, even and                  unlabored    Heart:    Regular rhythm and normal rate, normal S1 and S2, no            murmur, no gallop, no rub, no click   Chest Wall:  Left breast-no visible or palpable abnormalities   Abdomen:     Normal bowel sounds, no masses, no organomegaly, soft        non-tender, non-distended, no guarding, no rebound                tenderness   Extremities: No edema   Pulses:   Pulses palpable and equal bilaterally   Skin:   No bleeding, bruising or rash   Lymph nodes:   No palpable adenopathy   Neurologic: No localizing neurologic deficits; gait not assessed; mental status far below baseline       Results Review:     Imaging Results (Last 24 Hours)       Procedure Component Value Units Date/Time    CT Head Without Contrast [[id number]] Collected: 02/05/24 1223     Updated: 02/05/24 1228    Narrative:      CT HEAD WO CONTRAST    Date of Exam: 2/5/2024 12:12 PM EST    Indication: confusion.    Comparison: Head CT dated  9/26/2023    Technique: Axial CT images were obtained of the head without contrast administration.  Coronal reconstructions were performed.  Automated exposure control and iterative reconstruction methods were used.      FINDINGS:    Examination is limited due to motion artifact.    Foci of periventricular and subcortical white matter hypoattenuation are consistent with chronic, microvascular ischemic change. There is age-related loss of brain parenchymal volume with prominence of sulcation and ventriculomegaly. No significant mass   effect, midline shift, intracranial hemorrhage, or hydrocephalus is identified. No extra-axial fluid collection is identified.   The calvarium and overlying soft tissues are unremarkable. The paranasal sinuses and bilateral mastoid air cells are   adequately aerated. The visualized bony orbits, globes, and retrobulbar soft tissues are unremarkable.      Impression:      1.Examination is limited due to motion artifact.  2.Given this limitation, no acute intracranial abnormality is identified.  3.Findings compatible with chronic microvascular ischemic change and diffuse cortical atrophy.    Electronically Signed: Parker Mireles MD    2/5/2024 12:26 PM EST    Workstation ID: [id number]    XR Chest 1 View [[id number]] Collected: 02/05/24 1205     Updated: 02/05/24 1208    Narrative:      XR CHEST 1 VW    Date of Exam: 2/5/2024 12:00 PM EST    Indication: weakness    Comparison: 5/2/2023.    Findings:  There is mild cardiomegaly with increased heart size. Mildly prominent interstitial pattern appears stable and chronic. There is a prominent skinfold over the right chest. There is no definite pneumothorax. There is no effusion.      Impression:      Impression:  Mild cardiomegaly. Mild chronic findings of the lung fields.      Electronically Signed: Mary Ivan MD    2/5/2024 12:06 PM EST    Workstation ID: [id number]             Lab Results (last 24 hours)       Procedure Component Value  Units Date/Time    COVID-19, FLU A/B, RSV PCR 1 HR TAT - Swab, Nasopharynx [[id number]]  (Normal) Collected: 02/05/24 1235    Specimen: Swab from Nasopharynx Updated: 02/05/24 1325     COVID19 Not Detected     Influenza A PCR Not Detected     Influenza B PCR Not Detected     RSV, PCR Not Detected    Narrative:      Fact sheet for providers: https://www.fda.gov/media/248156/download    Fact sheet for patients: https://www.fda.gov/media/298895/download    Test performed by PCR.    Extra Tubes [[id number]] Collected: 02/05/24 1150    Specimen: Blood, Venous Line Updated: 02/05/24 1302    Narrative:      The following orders were created for panel order Extra Tubes.  Procedure                               Abnormality         Status                     ---------                               -----------         ------                     Gold Top - SST[[id number]]                                   Final result               Light Blue Top[[id number]]                                   Final result                 Please view results for these tests on the individual orders.    Gold Top - SST [[id number]] Collected: 02/05/24 1150    Specimen: Blood Updated: 02/05/24 1302     Extra Tube Hold for add-ons.     Comment: Auto resulted.       Light Blue Top [[id number]] Collected: 02/05/24 1150    Specimen: Blood Updated: 02/05/24 1302     Extra Tube Hold for add-ons.     Comment: Auto resulted       Ammonia [[id number]]  (Normal) Collected: 02/05/24 1235    Specimen: Blood from Arm, Left Updated: 02/05/24 1301     Ammonia 25 umol/L     Carbon Monoxide, Blood [[id number]]  (Normal) Collected: 02/05/24 1235    Specimen: Blood from Arm, Left Updated: 02/05/24 1242     Carbon Monoxide, Blood 1.7 %     Procalcitonin [[id number]]  (Normal) Collected: 02/05/24 1150    Specimen: Blood Updated: 02/05/24 1232     Procalcitonin 0.06 ng/mL     Narrative:      As a Marker for Sepsis (Non-Neonates):    1. <0.5 ng/mL represents a low risk of severe  "sepsis and/or septic shock.  2. >2 ng/mL represents a high risk of severe sepsis and/or septic shock.    As a Marker for Lower Respiratory Tract Infections that require antibiotic therapy:    PCT on Admission    Antibiotic Therapy       6-12 Hrs later    >0.5                Strongly Recommended  >0.25 - <0.5        Recommended   0.1 - 0.25          Discouraged              Remeasure/reassess PCT  <0.1                Strongly Discouraged     Remeasure/reassess PCT    As 28 day mortality risk marker: \"Change in Procalcitonin Result\" (>80% or <=80%) if Day 0 (or Day 1) and Day 4 values are available. Refer to http://www.[facility]-pct-calculator.com    Change in PCT <=80%  A decrease of PCT levels below or equal to 80% defines a positive change in PCT test result representing a higher risk for 28-day all-cause mortality of patients diagnosed with severe sepsis for septic shock.    Change in PCT >80%  A decrease of PCT levels of more than 80% defines a negative change in PCT result representing a lower risk for 28-day all-cause mortality of patients diagnosed with severe sepsis or septic shock.       TSH [[id number]]  (Normal) Collected: 02/05/24 1150    Specimen: Blood Updated: 02/05/24 1232     TSH 1.640 uIU/mL     Magnesium [[id number]]  (Normal) Collected: 02/05/24 1150    Specimen: Blood Updated: 02/05/24 1226     Magnesium 2.2 mg/dL     Urinalysis, Microscopic Only - Straight Cath [[id number]]  (Abnormal) Collected: 02/05/24 1146    Specimen: Urine from Straight Cath Updated: 02/05/24 1226     RBC, UA 3-5 /HPF      WBC, UA 0-2 /HPF      Comment: Urine culture not indicated.        Bacteria, UA Trace /HPF      Squamous Epithelial Cells, UA 0-2 /HPF      Hyaline Casts, UA 0-2 /LPF      Methodology Manual Light Microscopy    Comprehensive Metabolic Panel [[id number]]  (Abnormal) Collected: 02/05/24 1150    Specimen: Blood Updated: 02/05/24 1226     Glucose 104 mg/dL      BUN 19 mg/dL      Creatinine 1.30 mg/dL      Sodium " 137 mmol/L      Potassium 3.1 mmol/L      Comment: Slight hemolysis detected by analyzer. Result may be falsely elevated.        Chloride 94 mmol/L      CO2 30.0 mmol/L      Calcium 9.5 mg/dL      Total Protein 7.1 g/dL      Albumin 3.9 g/dL      ALT (SGPT) 10 U/L      AST (SGOT) 19 U/L      Alkaline Phosphatase 69 U/L      Total Bilirubin 1.1 mg/dL      Globulin 3.2 gm/dL      A/G Ratio 1.2 g/dL      BUN/Creatinine Ratio 14.6     Anion Gap 13.0 mmol/L      eGFR 41.1 mL/min/1.73     Narrative:      GFR Normal >60  Chronic Kidney Disease <60  Kidney Failure <15    The GFR formula is only valid for adults with stable renal function between ages 18 and 70.    Ethanol [[id number]] Collected: 02/05/24 1150    Specimen: Blood Updated: 02/05/24 1226     Ethanol % <0.010 %     Narrative:      Plasma Ethanol Clinical Symptoms:    ETOH (%)               Clinical Symptom  .01-.05              No apparent influence  .03-.12              Euphoria, Diminished judgment and attention   .09-.25              Impaired comprehension, Muscle incoordination  .18-.30              Confusion, Staggered gait, Slurred speech  .25-.40              Markedly decreased response to stimuli, unable to stand or                        walk, vomitting, sleep or stupor  .35-.50              Comatose, Anesthesia, Subnormal body temperature        Urine Drug Screen - Straight Cath [[id number]]  (Normal) Collected: 02/05/24 1149    Specimen: Urine from Straight Cath Updated: 02/05/24 1222     Amphet/Methamphet, Screen Negative     Barbiturates Screen, Urine Negative     Benzodiazepine Screen, Urine Negative     Cocaine Screen, Urine Negative     Opiate Screen Negative     THC, Screen, Urine Negative     Methadone Screen, Urine Negative     Oxycodone Screen, Urine Negative    Narrative:      Negative Thresholds Per Drugs Screened:    Amphetamines                 500 ng/ml  Barbiturates                 200 ng/ml  Benzodiazepines              100  ng/ml  Cocaine                      300 ng/ml  Methadone                    300 ng/ml  Opiates                      300 ng/ml  Oxycodone                    100 ng/ml  THC                           50 ng/ml    The Normal Value for all drugs tested is negative. This report includes final unconfirmed screening results to be used for medical treatment purposes only. Unconfirmed results must not be used for non-medical purposes such as employment or legal testing. Clinical consideration should be applied to any drug of abuse test, particularly when unconfirmed results are used.          All urine drugs of abuse requests without chain of custody are for medical screening purposes only.  False positives are possible.      Urinalysis With Culture If Indicated - Straight Cath [[id number]]  (Abnormal) Collected: 02/05/24 1146    Specimen: Urine from Straight Cath Updated: 02/05/24 1218     Color, UA Yellow     Appearance, UA Clear     pH, UA 6.5     Specific Gravity, UA 1.022     Glucose, UA Negative     Ketones, UA Trace     Bilirubin, UA Negative     Blood, UA Small (1+)     Protein,  mg/dL (2+)     Leuk Esterase, UA Negative     Nitrite, UA Negative     Urobilinogen, UA 0.2 E.U./dL    Narrative:      In absence of clinical symptoms, the presence of pyuria, bacteria, and/or nitrites on the urinalysis result does not correlate with infection.    CBC & Differential [[id number]]  (Abnormal) Collected: 02/05/24 1150    Specimen: Blood Updated: 02/05/24 1159    Narrative:      The following orders were created for panel order CBC & Differential.  Procedure                               Abnormality         Status                     ---------                               -----------         ------                     CBC Auto Differential[[id number]]        Abnormal            Final result                 Please view results for these tests on the individual orders.    CBC Auto Differential [[id number]]  (Abnormal) Collected:  02/05/24 1150    Specimen: Blood Updated: 02/05/24 1159     WBC 9.10 10*3/mm3      RBC 4.73 10*6/mm3      Hemoglobin 12.8 g/dL      Hematocrit 40.5 %      MCV 85.6 fL      MCH 27.1 pg      MCHC 31.7 g/dL      RDW 16.0 %      RDW-SD 47.7 fl      MPV 8.6 fL      Platelets 220 10*3/mm3      Neutrophil % 82.2 %      Lymphocyte % 4.1 %      Monocyte % 8.4 %      Eosinophil % 4.2 %      Basophil % 1.1 %      Neutrophils, Absolute 7.50 10*3/mm3      Lymphocytes, Absolute 0.40 10*3/mm3      Monocytes, Absolute 0.80 10*3/mm3      Eosinophils, Absolute 0.40 10*3/mm3      Basophils, Absolute 0.10 10*3/mm3      nRBC 0.1 /100 WBC     Blood Culture - Blood, Arm, Right [[id number]] Collected: 02/05/24 1150    Specimen: Blood from Arm, Right Updated: 02/05/24 1155    POC Lactate [[id number]]  (Normal) Collected: 02/05/24 1136    Specimen: Blood Updated: 02/05/24 1137     Lactate 1.4 mmol/L      Comment: Serial Number: [id number]:  [id number]                I reviewed the patient's new clinical results.    Assessment & Plan       Altered mental status  -Patient is encephalopathic; etiology is unclear; she may be slightly dehydrated from her increased diuretics and poor intake through the weekend although the low-grade fever makes me concerned for infection that we have not yet identified.  Respiratory panel is negative for COVID, flu, RSV.  Urinalysis is clear.  Chest x-ray is unremarkable.  Will check full respiratory viral panel.  Blood cultures are pending.  I am holding off on antibiotics at this point as I do not have an identified source of infection and she is not clinically septic.  Monitor closely for any emerging clinical signs of infection.    Elevated creatinine-creatinine is slightly above baseline.  Will give small fluid bolus now and hydrate overnight.  Recheck renal function in the morning.  Avoid nephrotoxins.    Hypokalemia-replacing  History of DVT-continue Eliquis  COPD-appears well compensated.  Continue  Symbicort  Chronic hypoxemia-oxygen requirement is at her baseline.  Pulmonary hypertension-continue sildenafil  Hyperuricemia-continue allopurinol    Patient is fully anticoagulated so no additional DVT prophylaxis is needed.  Famotidine for stress ulcer prophylaxis        I discussed the patient's findings and my recommendations with patient and daughter, who was present in the room.     Rosamaria Jin MD  02/05/24  17:10 EST

## 2024-02-05 NOTE — Clinical Note
Level of Care: Telemetry [5]   Admitting Physician: RUSH WATERMAN [[id number]]   Attending Physician: RUSH WATERMAN [[id number]]

## 2024-02-06 ENCOUNTER — APPOINTMENT (OUTPATIENT)
Dept: CT IMAGING | Facility: HOSPITAL | Age: 84
DRG: 683 | End: 2024-02-06
Payer: MEDICARE

## 2024-02-06 LAB
ALBUMIN SERPL-MCNC: 3.3 G/DL (ref 3.5–5.2)
ALBUMIN/GLOB SERPL: 1.3 G/DL
ALP SERPL-CCNC: 58 U/L (ref 39–117)
ALT SERPL W P-5'-P-CCNC: 16 U/L (ref 1–33)
ANION GAP SERPL CALCULATED.3IONS-SCNC: 12 MMOL/L (ref 5–15)
ANION GAP SERPL CALCULATED.3IONS-SCNC: 9 MMOL/L (ref 5–15)
AST SERPL-CCNC: 21 U/L (ref 1–32)
BACTERIA UR QL AUTO: ABNORMAL /HPF
BASOPHILS # BLD AUTO: 0.1 10*3/MM3 (ref 0–0.2)
BASOPHILS NFR BLD AUTO: 0.9 % (ref 0–1.5)
BILIRUB SERPL-MCNC: 0.6 MG/DL (ref 0–1.2)
BILIRUB UR QL STRIP: NEGATIVE
BUN SERPL-MCNC: 22 MG/DL (ref 8–23)
BUN SERPL-MCNC: 32 MG/DL (ref 8–23)
BUN/CREAT SERPL: 20.6 (ref 7–25)
BUN/CREAT SERPL: 21.3 (ref 7–25)
CALCIUM SPEC-SCNC: 8.4 MG/DL (ref 8.6–10.5)
CALCIUM SPEC-SCNC: 8.7 MG/DL (ref 8.6–10.5)
CHLORIDE SERPL-SCNC: 96 MMOL/L (ref 98–107)
CHLORIDE SERPL-SCNC: 98 MMOL/L (ref 98–107)
CLARITY UR: CLEAR
CO2 SERPL-SCNC: 28 MMOL/L (ref 22–29)
CO2 SERPL-SCNC: 30 MMOL/L (ref 22–29)
COLOR UR: YELLOW
CREAT SERPL-MCNC: 1.07 MG/DL (ref 0.57–1)
CREAT SERPL-MCNC: 1.5 MG/DL (ref 0.57–1)
DEPRECATED RDW RBC AUTO: 48.6 FL (ref 37–54)
EGFRCR SERPLBLD CKD-EPI 2021: 34.6 ML/MIN/1.73
EGFRCR SERPLBLD CKD-EPI 2021: 52 ML/MIN/1.73
EOSINOPHIL # BLD AUTO: 0.1 10*3/MM3 (ref 0–0.4)
EOSINOPHIL NFR BLD AUTO: 1.3 % (ref 0.3–6.2)
ERYTHROCYTE [DISTWIDTH] IN BLOOD BY AUTOMATED COUNT: 15.7 % (ref 12.3–15.4)
GLOBULIN UR ELPH-MCNC: 2.6 GM/DL
GLUCOSE SERPL-MCNC: 105 MG/DL (ref 65–99)
GLUCOSE SERPL-MCNC: 82 MG/DL (ref 65–99)
GLUCOSE UR STRIP-MCNC: NEGATIVE MG/DL
HCT VFR BLD AUTO: 34.9 % (ref 34–46.6)
HGB BLD-MCNC: 11.2 G/DL (ref 12–15.9)
HGB UR QL STRIP.AUTO: ABNORMAL
HYALINE CASTS UR QL AUTO: ABNORMAL /LPF
KETONES UR QL STRIP: ABNORMAL
LEUKOCYTE ESTERASE UR QL STRIP.AUTO: ABNORMAL
LYMPHOCYTES # BLD AUTO: 0.6 10*3/MM3 (ref 0.7–3.1)
LYMPHOCYTES NFR BLD AUTO: 8.2 % (ref 19.6–45.3)
MCH RBC QN AUTO: 26.8 PG (ref 26.6–33)
MCHC RBC AUTO-ENTMCNC: 32 G/DL (ref 31.5–35.7)
MCV RBC AUTO: 83.6 FL (ref 79–97)
MONOCYTES # BLD AUTO: 0.9 10*3/MM3 (ref 0.1–0.9)
MONOCYTES NFR BLD AUTO: 12.8 % (ref 5–12)
NEUTROPHILS NFR BLD AUTO: 5.6 10*3/MM3 (ref 1.7–7)
NEUTROPHILS NFR BLD AUTO: 76.8 % (ref 42.7–76)
NITRITE UR QL STRIP: NEGATIVE
NRBC BLD AUTO-RTO: 0 /100 WBC (ref 0–0.2)
PH UR STRIP.AUTO: 6.5 [PH] (ref 5–8)
PLATELET # BLD AUTO: 177 10*3/MM3 (ref 140–450)
PMV BLD AUTO: 8.2 FL (ref 6–12)
POTASSIUM SERPL-SCNC: 2.9 MMOL/L (ref 3.5–5.2)
POTASSIUM SERPL-SCNC: 5.1 MMOL/L (ref 3.5–5.2)
PROT SERPL-MCNC: 5.9 G/DL (ref 6–8.5)
PROT UR QL STRIP: ABNORMAL
QT INTERVAL: 398 MS
QTC INTERVAL: 429 MS
RBC # BLD AUTO: 4.18 10*6/MM3 (ref 3.77–5.28)
RBC # UR STRIP: ABNORMAL /HPF
REF LAB TEST METHOD: ABNORMAL
SODIUM SERPL-SCNC: 135 MMOL/L (ref 136–145)
SODIUM SERPL-SCNC: 138 MMOL/L (ref 136–145)
SP GR UR STRIP: 1.02 (ref 1–1.03)
SQUAMOUS #/AREA URNS HPF: ABNORMAL /HPF
UROBILINOGEN UR QL STRIP: ABNORMAL
WBC # UR STRIP: ABNORMAL /HPF
WBC NRBC COR # BLD AUTO: 7.3 10*3/MM3 (ref 3.4–10.8)

## 2024-02-06 PROCEDURE — 82550 ASSAY OF CK (CPK): CPT | Performed by: INTERNAL MEDICINE

## 2024-02-06 PROCEDURE — 94762 N-INVAS EAR/PLS OXIMTRY CONT: CPT

## 2024-02-06 PROCEDURE — 25810000003 SODIUM CHLORIDE 0.9 % SOLUTION: Performed by: NURSE PRACTITIONER

## 2024-02-06 PROCEDURE — 81001 URINALYSIS AUTO W/SCOPE: CPT | Performed by: NURSE PRACTITIONER

## 2024-02-06 PROCEDURE — 97162 PT EVAL MOD COMPLEX 30 MIN: CPT

## 2024-02-06 PROCEDURE — G0378 HOSPITAL OBSERVATION PER HR: HCPCS

## 2024-02-06 PROCEDURE — 80053 COMPREHEN METABOLIC PANEL: CPT | Performed by: INTERNAL MEDICINE

## 2024-02-06 PROCEDURE — 74176 CT ABD & PELVIS W/O CONTRAST: CPT

## 2024-02-06 PROCEDURE — 94799 UNLISTED PULMONARY SVC/PX: CPT

## 2024-02-06 PROCEDURE — 94761 N-INVAS EAR/PLS OXIMETRY MLT: CPT

## 2024-02-06 PROCEDURE — 85025 COMPLETE CBC W/AUTO DIFF WBC: CPT | Performed by: INTERNAL MEDICINE

## 2024-02-06 PROCEDURE — 94664 DEMO&/EVAL PT USE INHALER: CPT

## 2024-02-06 PROCEDURE — 71250 CT THORAX DX C-: CPT

## 2024-02-06 PROCEDURE — 84550 ASSAY OF BLOOD/URIC ACID: CPT | Performed by: INTERNAL MEDICINE

## 2024-02-06 RX ORDER — SODIUM CHLORIDE 9 MG/ML
60 INJECTION, SOLUTION INTRAVENOUS CONTINUOUS
Status: DISCONTINUED | OUTPATIENT
Start: 2024-02-06 | End: 2024-02-09

## 2024-02-06 RX ORDER — SODIUM CHLORIDE 9 MG/ML
100 INJECTION, SOLUTION INTRAVENOUS CONTINUOUS
Status: DISCONTINUED | OUTPATIENT
Start: 2024-02-06 | End: 2024-02-06

## 2024-02-06 RX ORDER — POTASSIUM CHLORIDE 20 MEQ/1
40 TABLET, EXTENDED RELEASE ORAL EVERY 4 HOURS
Status: COMPLETED | OUTPATIENT
Start: 2024-02-06 | End: 2024-02-06

## 2024-02-06 RX ORDER — CEPHALEXIN 250 MG/1
250 CAPSULE ORAL DAILY
Qty: 5 CAPSULE | Refills: 0 | Status: DISCONTINUED | OUTPATIENT
Start: 2024-02-06 | End: 2024-02-07

## 2024-02-06 RX ADMIN — ALLOPURINOL 100 MG: 100 TABLET ORAL at 07:33

## 2024-02-06 RX ADMIN — POTASSIUM CHLORIDE 40 MEQ: 1500 TABLET, EXTENDED RELEASE ORAL at 13:40

## 2024-02-06 RX ADMIN — POTASSIUM CHLORIDE 40 MEQ: 1500 TABLET, EXTENDED RELEASE ORAL at 05:12

## 2024-02-06 RX ADMIN — Medication 10 ML: at 21:05

## 2024-02-06 RX ADMIN — BUDESONIDE AND FORMOTEROL FUMARATE DIHYDRATE 2 PUFF: 160; 4.5 AEROSOL RESPIRATORY (INHALATION) at 07:48

## 2024-02-06 RX ADMIN — ACETAMINOPHEN 650 MG: 325 TABLET, FILM COATED ORAL at 15:18

## 2024-02-06 RX ADMIN — BUDESONIDE AND FORMOTEROL FUMARATE DIHYDRATE 2 PUFF: 160; 4.5 AEROSOL RESPIRATORY (INHALATION) at 19:26

## 2024-02-06 RX ADMIN — CEPHALEXIN 250 MG: 250 CAPSULE ORAL at 21:00

## 2024-02-06 RX ADMIN — APIXABAN 5 MG: 5 TABLET, FILM COATED ORAL at 07:33

## 2024-02-06 RX ADMIN — Medication 10 ML: at 07:33

## 2024-02-06 RX ADMIN — LEVOTHYROXINE SODIUM 50 MCG: 0.05 TABLET ORAL at 05:12

## 2024-02-06 RX ADMIN — SODIUM CHLORIDE 100 ML/HR: 9 INJECTION, SOLUTION INTRAVENOUS at 23:48

## 2024-02-06 RX ADMIN — SODIUM CHLORIDE 500 ML: 9 INJECTION, SOLUTION INTRAVENOUS at 16:42

## 2024-02-06 RX ADMIN — POTASSIUM CHLORIDE 40 MEQ: 1500 TABLET, EXTENDED RELEASE ORAL at 10:05

## 2024-02-06 RX ADMIN — SODIUM CHLORIDE 75 ML/HR: 9 INJECTION, SOLUTION INTRAVENOUS at 12:00

## 2024-02-06 RX ADMIN — Medication 10 ML: at 21:10

## 2024-02-06 RX ADMIN — APIXABAN 5 MG: 5 TABLET, FILM COATED ORAL at 21:05

## 2024-02-06 NOTE — PLAN OF CARE
Problem: Adult Inpatient Plan of Care  Goal: Absence of Hospital-Acquired Illness or Injury  Intervention: Identify and Manage Fall Risk  Description: Perform standard risk assessment on admission using a validated tool or comprehensive approach appropriate to the patient; reassess fall risk frequently, with change in status or transfer to another level of care.  Communicate fall injury risk to interprofessional healthcare team.  Determine need for increased observation, equipment and environmental modification, such as low bed, signage and supportive, nonskid footwear.  Adjust safety measures to individual developmental age, stage and identified risk factors.  Reinforce the importance of safety and physical activity with patient and family.  Perform regular intentional rounding to assess need for position change, pain assessment and personal needs, including assistance with toileting.  Recent Flowsheet Documentation  Taken 2/6/2024 0200 by Galen Wade LPN  Safety Promotion/Fall Prevention:   safety round/check completed   room organization consistent   nonskid shoes/slippers when out of bed   muscle strengthening facilitated   mobility aid in reach   lighting adjusted   fall prevention program maintained   clutter free environment maintained   assistive device/personal items within reach   activity supervised  Taken 2/6/2024 0015 by Galen Wade LPN  Safety Promotion/Fall Prevention:   safety round/check completed   room organization consistent   nonskid shoes/slippers when out of bed   muscle strengthening facilitated   mobility aid in reach   lighting adjusted   fall prevention program maintained   clutter free environment maintained   assistive device/personal items within reach   activity supervised  Taken 2/5/2024 2200 by Galen Wade LPN  Safety Promotion/Fall Prevention:   safety round/check completed   room organization consistent   nonskid shoes/slippers when out of bed   muscle  strengthening facilitated   mobility aid in reach   lighting adjusted   fall prevention program maintained   clutter free environment maintained   assistive device/personal items within reach   activity supervised  Intervention: Prevent Skin Injury  Description: Perform a screening for skin injury risk, such as pressure or moisture associated skin damage on admission and at regular intervals throughout hospital stay.  Keep all areas of skin (especially folds) clean and dry.  Maintain adequate skin hydration.  Relieve and redistribute pressure and protect bony prominences; implement measures based on patient-specific risk factors.  Match turning and repositioning schedule to clinical condition.  Encourage weight shift frequently; assist with reposition if unable to complete independently.  Float heels off bed; avoid pressure on the Achilles tendon.  Keep skin free from extended contact with medical devices.  Encourage functional activity and mobility, as early as tolerated.  Use aids (e.g., slide boards, mechanical lift) during transfer.  Recent Flowsheet Documentation  Taken 2/6/2024 0200 by Galen Wade LPN  Body Position: weight shifting  Taken 2/6/2024 0015 by Galen Wade LPN  Body Position: weight shifting  Taken 2/5/2024 2300 by Galen Wade LPN  Body Position:   weight shifting   patient/family refused  Taken 2/5/2024 2200 by Galen Wade LPN  Body Position: weight shifting  Intervention: Prevent and Manage VTE (Venous Thromboembolism) Risk  Description: Assess for VTE (venous thromboembolism) risk.  Encourage and assist with early ambulation.  Initiate and maintain compression or other therapy, as indicated, based on identified risk in accordance with organizational protocol and provider order.  Encourage both active and passive leg exercises while in bed, if unable to ambulate.  Recent Flowsheet Documentation  Taken 2/6/2024 0200 by Galen Wade LPN  Activity  Management: activity encouraged  Taken 2/6/2024 0015 by Galen Wade LPN  Activity Management:   activity encouraged   bedrest  Taken 2/5/2024 2200 by Galen Wade LPN  Activity Management: activity encouraged  Intervention: Prevent Infection  Description: Maintain skin and mucous membrane integrity; promote hand, oral and pulmonary hygiene.  Optimize fluid balance, nutrition, sleep and glycemic control to maximize infection resistance.  Identify potential sources of infection early to prevent or mitigate progression of infection (e.g., wound, lines, devices).  Evaluate ongoing need for invasive devices; remove promptly when no longer indicated.  Recent Flowsheet Documentation  Taken 2/6/2024 0200 by Galen Wade LPN  Infection Prevention:   visitors restricted/screened   single patient room provided   rest/sleep promoted   personal protective equipment utilized   hand hygiene promoted  Taken 2/6/2024 0015 by Galen Wade LPN  Infection Prevention:   visitors restricted/screened   single patient room provided   rest/sleep promoted   personal protective equipment utilized   hand hygiene promoted   equipment surfaces disinfected     Problem: Fall Injury Risk  Goal: Absence of Fall and Fall-Related Injury  Intervention: Identify and Manage Contributors  Description: Develop a fall prevention plan with the patient and caregiver/family.  Provide reorientation, appropriate sensory stimulation and routines with changes in mental status to decrease risk of fall.  Promote use of personal vision and auditory aids.  Assess assistance level required for safe and effective self-care; provide support as needed, such as toileting, mobilization. For age 65 and older, implement timed toileting with assistance.  Encourage physical activity, such as performance of mobility and self-care at highest level of patient ability, multicomponent exercise program and provision of appropriate assistive  devices.  If fall occurs, assess the severity of injury; implement fall injury protocol. Determine the cause and revise fall injury prevention plan.  Regularly review medication contribution to fall risk; adjust medication administration times to minimize risk of falling.  Consider risk related to polypharmacy and age.  Balance adequate pain management with potential for oversedation.  Recent Flowsheet Documentation  Taken 2/6/2024 0015 by Galen Wade LPN  Medication Review/Management: medications reviewed  Taken 2/5/2024 2200 by Galen Waed LPN  Medication Review/Management: medications reviewed  Intervention: Promote Injury-Free Environment  Description: Provide a safe, barrier-free environment that encourages independent activity.  Keep care area uncluttered and well-lighted.  Determine need for increased observation or monitoring.  Avoid use of devices that minimize mobility, such as restraints or indwelling urinary catheter.  Recent Flowsheet Documentation  Taken 2/6/2024 0200 by Galen Wade LPN  Safety Promotion/Fall Prevention:   safety round/check completed   room organization consistent   nonskid shoes/slippers when out of bed   muscle strengthening facilitated   mobility aid in reach   lighting adjusted   fall prevention program maintained   clutter free environment maintained   assistive device/personal items within reach   activity supervised  Taken 2/6/2024 0015 by Galen Wade LPN  Safety Promotion/Fall Prevention:   safety round/check completed   room organization consistent   nonskid shoes/slippers when out of bed   muscle strengthening facilitated   mobility aid in reach   lighting adjusted   fall prevention program maintained   clutter free environment maintained   assistive device/personal items within reach   activity supervised  Taken 2/5/2024 2200 by Galen Wade LPN  Safety Promotion/Fall Prevention:   safety round/check completed   room organization  consistent   nonskid shoes/slippers when out of bed   muscle strengthening facilitated   mobility aid in reach   lighting adjusted   fall prevention program maintained   clutter free environment maintained   assistive device/personal items within reach   activity supervised     Problem: Skin Injury Risk Increased  Goal: Skin Health and Integrity  Intervention: Optimize Skin Protection  Description: Perform a full pressure injury risk assessment, as indicated by screening, upon admission to care unit.  Reassess skin (injury risk, full inspection) frequently (e.g., scheduled interval, with change in condition) to provide optimal early detection and prevention.  Maintain adequate tissue perfusion (e.g., encourage fluid balance; avoid crossing legs, constrictive clothing or devices) to promote tissue oxygenation.  Maintain head of bed at lowest degree of elevation tolerated, considering medical condition and other restrictions.  Avoid positioning onto an area that remains reddened.  Minimize incontinence and moisture (e.g., toileting schedule; moisture-wicking pad, diaper or incontinence collection device; skin moisture barrier).  Cleanse skin promptly and gently when soiled utilizing a pH-balanced cleanser.  Relieve and redistribute pressure (e.g., scheduled position changes, weight shifts, use of support surface, medical device repositioning, protective dressing application, use of positioning device, microclimate control, use of pressure-injury-monitor  Encourage increased activity, such as sitting in a chair at the bedside or early mobilization, when able to tolerate.  Recent Flowsheet Documentation  Taken 2/6/2024 0200 by Galen Wade LPN  Head of Bed (HOB) Positioning:   HOB elevated   HOB at 30-45 degrees  Taken 2/6/2024 0015 by Galen Wade LPN  Head of Bed (HOB) Positioning: HOB at 20-30 degrees  Taken 2/5/2024 2300 by Galen Wade LPN  Head of Bed (HOB) Positioning: HOB at 20-30  degrees  Taken 2/5/2024 2200 by Galen Wade LPN  Head of Bed (HOB) Positioning: HOB at 20-30 degrees     Problem: Hypertension Comorbidity  Goal: Blood Pressure in Desired Range  Intervention: Maintain Blood Pressure Management  Description: Evaluate adherence to home antihypertensive regimen (e.g., exercise and activity, diet modification, medication).  Provide scheduled antihypertensive medication; consider administration time and effects (e.g., avoid giving diuretic prior to bedtime).  Monitor response to antihypertensive medication therapy (e.g., blood pressure, electrolyte levels, medication effects).  Minimize risk of orthostatic hypotension; encourage caution with position changes, particularly if elderly.  Recent Flowsheet Documentation  Taken 2/6/2024 0015 by Galen Wade LPN  Medication Review/Management: medications reviewed  Taken 2/5/2024 2200 by Galen Wade LPN  Medication Review/Management: medications reviewed   Goal Outcome Evaluation  Plan of care on going, oxygen increase to 5L HF humified, PICC place IVF infusing will obtain lab from [facility] . Pt resting with family at bedside, incont of bowel and bladder, o2 is 3 LNC at home will monitor pt progress toward goal

## 2024-02-06 NOTE — THERAPY EVALUATION
Patient Name: Gabrielle Lyles  : 1940    MRN: [id number]                              Today's Date: 2024       Admit Date: 2024    Visit Dx:     ICD-10-CM ICD-9-CM   1. Altered mental status, unspecified altered mental status type  R41.82 780.97   2. Hypokalemia  E87.6 276.8     Patient Active Problem List   Diagnosis    Stage 3a chronic kidney disease    Low back pain    Osteopenia    Chronic obstructive pulmonary disease    Gastroesophageal reflux disease    Gout    History of venous thrombosis and embolism    Primary hypertension    Lumbar radiculopathy    Nausea    Parotid duct obstruction    Personal history of malignant neoplasm of breast    Shortness of breath    Aortic aneurysm    Bulging lumbar disc    Spinal stenosis of lumbar region    History of TIA (transient ischemic attack)    Acute exacerbation of chronic obstructive pulmonary disease (COPD)    Parainfluenza infection    Severe pulmonary hypertension    Acute on chronic respiratory failure with hypoxia    Cellulitis of left foot    Altered mental status     Past Medical History:   Diagnosis Date    COPD (chronic obstructive pulmonary disease)     Deep vein thrombosis of bilateral lower extremities 2019    History of DVT (deep vein thrombosis) 2013    bilateral lower extremity    History of pneumonia 2012    community acquired pneumonia and right pleural effusion;hospitalized at [facility]    History of pulmonary embolism 2013    bilateral PE    Hypertension     Insomnia     on Ambien 5mg at     Lobular breast cancer, left 2011    Stage IA left upper lobular breast cancer    Malignant neoplasm of left breast in female, estrogen receptor positive 2019    Osteopenia     Severe pulmonary hypertension 3/3/2023    Stage 3a chronic kidney disease 2019    TIA (transient ischemic attack) 10/25/2022     Past Surgical History:   Procedure Laterality Date    CARDIAC CATHETERIZATION N/A 3/3/2023     Procedure: Left and Right Heart Cath with Coronary Angiography;  Surgeon: Richardson Willis MD;  Location: [facility] CATH INVASIVE LOCATION;  Service: Cardiovascular;  Laterality: N/A;    CATARACT EXTRACTION  2015    IVC FILTER RETRIEVAL  06/2014    IVC filter placement by Dr. Card    MAMMO STEREOTACTIC BREAST BX SURGICAL ADD UNI Left 11/01/2011    invasive lobular carcinoma-Dr. Cande Daniel    MASTECTOMY, PARTIAL Left 12/01/2011    and left axillary sentinel lymph node biopsy by Dr. Uriostegui    TUBAL ABDOMINAL LIGATION  1984      General Information       Row Name 02/06/24 1025          Physical Therapy Time and Intention    Document Type evaluation  -CR     Mode of Treatment physical therapy  -CR       Row Name 02/06/24 1025          General Information    Patient Profile Reviewed yes  -CR     Prior Level of Function independent:;all household mobility  -CR     Existing Precautions/Restrictions fall  -CR     Barriers to Rehab cognitive status  -CR       Row Name 02/06/24 1025          Living Environment    People in Home alone  -CR       Row Name 02/06/24 1025          Home Main Entrance    Number of Stairs, Main Entrance two  -CR       Row Name 02/06/24 1025          Stairs Within Home, Primary    Number of Stairs, Within Home, Primary none  -CR       Row Name 02/06/24 1025          Cognition    Orientation Status (Cognition) unable/difficult to assess  only able to state her name  -CR       Row Name 02/06/24 1025          Safety Issues, Functional Mobility    Safety Issues Affecting Function (Mobility) ability to follow commands;at risk behavior observed  -CR     Impairments Affecting Function (Mobility) balance;cognition;pain;range of motion (ROM);strength  -CR               User Key  (r) = Recorded By, (t) = Taken By, (c) = Cosigned By      Initials Name Provider Type    CR Reyes, Carmela, PT Physical Therapist                   Mobility       Row Name 02/06/24 1029          Bed Mobility    Bed Mobility  supine-sit-supine  -CR     Supine-Sit-Supine [place] (Bed Mobility) maximum assist (25% patient effort);moderate assist (50% patient effort)  -CR     Assistive Device (Bed Mobility) draw sheet;head of bed elevated  -CR       Row Name 02/06/24 1029          Transfers    Comment, (Transfers) attempted sit to stand, could not complete, patient not assisting  -CR               User Key  (r) = Recorded By, (t) = Taken By, (c) = Cosigned By      Initials Name Provider Type    CR Reyes, Carmela, PT Physical Therapist                   Obj/Interventions       Row Name 02/06/24 1030          Range of Motion Comprehensive    Comment, General Range of Motion did not follow commands to actively move UE/LE. PROM BUE/LE wfl with grimacing noted during LLE ROM  -CR       Row Name 02/06/24 1030          Strength Comprehensive (MMT)    Comment, General Manual Muscle Testing (MMT) Assessment could not assess well  -CR       Row Name 02/06/24 1030          Balance    Balance Assessment sitting static balance  -CR     Static Sitting Balance standby assist  -CR     Position, Sitting Balance sitting edge of bed  -CR               User Key  (r) = Recorded By, (t) = Taken By, (c) = Cosigned By      Initials Name Provider Type    CR Reyes, Carmela, PT Physical Therapist                   Goals/Plan       Row Name 02/06/24 1035          Transfer Goal 1 (PT)    Activity/Assistive Device (Transfer Goal 1, PT) transfers, all;walker, rolling  -CR     [place] Level/Cues Needed (Transfer Goal 1, PT) contact guard required  -CR     Time Frame (Transfer Goal 1, PT) long term goal (LTG);1 week  -CR       Row Name 02/06/24 1035          Gait Training Goal 1 (PT)    Activity/Assistive Device (Gait Training Goal 1, PT) gait (walking locomotion);assistive device use;decrease fall risk;diminish gait deviation;forward stepping;walker, rolling  -CR     [place] Level (Gait Training Goal 1, PT) contact guard required  -CR     Distance (Gait  Training Goal 1, PT) 50 x 2  -CR     Time Frame (Gait Training Goal 1, PT) 2 weeks  -CR       Row Name 02/06/24 1035          Stairs Goal 1 (PT)    Activity/Assistive Device (Stairs Goal 1, PT) stairs, all skills  -CR     [place] Level/Cues Needed (Stairs Goal 1, PT) contact guard required  -CR     Number of Stairs (Stairs Goal 1, PT) 2  -CR     Time Frame (Stairs Goal 1, PT) long term goal (LTG);2 weeks  -CR       Row Name 02/06/24 1035          Therapy Assessment/Plan (PT)    Planned Therapy Interventions (PT) balance training;gait training;home exercise program;patient/family education;transfer training;neuromuscular re-education;strengthening  -CR               User Key  (r) = Recorded By, (t) = Taken By, (c) = Cosigned By      Initials Name Provider Type    CR Reyes, Carmela, PT Physical Therapist                   Clinical Impression       Row Name 02/06/24 1031          Pain    Additional Documentation Pain Scale: FACES Pre/Post-Treatment (Group)  -CR       Row Name 02/06/24 1031          Pain Scale: FACES Pre/Post-Treatment    Pain: FACES Scale, Pretreatment 0-->no hurt  -CR     Posttreatment Pain Rating 6-->hurts even more  -CR     Pain Location - Side/Orientation Left  -CR     Pain Location lower  -CR     Pain Location - extremity  -CR     Pre/Posttreatment Pain Comment RN made aware  -CR       Row Name 02/06/24 1031          Plan of Care Review    Plan of Care Reviewed With patient  -CR     Outcome Evaluation 82 y/o female brought in hospital on 2/5 due to confusion x 3 days with BLE edema and L breast swelling. PMH Includes breast Ca, hx of DVT/PE, pulmonary htn. Patient lives alone and normally does not use an a.d. for mobility. At time of eval, patient confused, only able to state her name and does not follow commands well, mostly stating NO to requests. Patient needed mod/max A for supine <>sit activity. Patient was able to sit EOB unsupported however when standing attempted was unable to  complete mostly due to patient not making any effort to stand. Patient currently on 4LHF with sats 95% during activity. RN made aware. Patient currently below her baseline and unsafe to return home, will need SNF at discharge.  -CR       Row Name 02/06/24 1031          Therapy Assessment/Plan (PT)    Patient/Family Therapy Goals Statement (PT) na  -CR     Rehab Potential (PT) good, to achieve stated therapy goals  -CR     Criteria for Skilled Interventions Met (PT) yes;skilled treatment is necessary  -CR     Therapy Frequency (PT) 5 times/wk  -CR     Predicted Duration of Therapy Intervention (PT) dc  -CR               User Key  (r) = Recorded By, (t) = Taken By, (c) = Cosigned By      Initials Name Provider Type    CR Reyes, Carmela, PT Physical Therapist                   Outcome Measures       Row Name 02/06/24 1036 02/06/24 0805       How much help from another person do you currently need...    Turning from your back to your side while in flat bed without using bedrails? 2  -CR 3  -JESUS    Moving from lying on back to sitting on the side of a flat bed without bedrails? 2  -CR 3  -JESUS    Moving to and from a bed to a chair (including a wheelchair)? 2  -CR 2  -JESUS    Standing up from a chair using your arms (e.g., wheelchair, bedside chair)? 2  -CR 2  -JESUS    Climbing 3-5 steps with a railing? 1  -CR 2  -JESUS    To walk in hospital room? 1  -CR 2  -JESUS    AM-PAC 6 Clicks Score (PT) 10  -CR 14  -JESUS    Highest Level of Mobility Goal 4 --> Transfer to chair/commode  -CR 4 --> Transfer to chair/commode  -JESUS              User Key  (r) = Recorded By, (t) = Taken By, (c) = Cosigned By      Initials Name Provider Type    CR Reyes, Carmela, ROGELIO Physical Therapist    Azalia Amos RN Registered Nurse                                 Physical Therapy Education       Title: PT OT SLP Therapies (In Progress)       Topic: Physical Therapy (In Progress)       Point: Mobility training (In Progress)       Learning Progress Summary              Patient Nonacceptance, E, NR by  at 2/6/2024 1036    Acceptance, E,TB, VU by  at 2/6/2024 0823    Acceptance, E,TB,D, VU,DU,NR by  at 2/6/2024 0236   Family Acceptance, E,TB, VU by  at 2/6/2024 0823                         Point: Body mechanics (Done)       Learning Progress Summary             Patient Acceptance, E,TB, VU by  at 2/6/2024 0823    Acceptance, E,TB,D, VU,DU,NR by  at 2/6/2024 0236   Family Acceptance, E,TB, VU by  at 2/6/2024 0823                         Point: Precautions (Done)       Learning Progress Summary             Patient Acceptance, E,TB, VU by  at 2/6/2024 0823    Acceptance, E,TB,D, VU,DU,NR by  at 2/6/2024 0236   Family Acceptance, E,TB, VU by  at 2/6/2024 0823                                         User Key       Initials Effective Dates Name Provider Type Discipline     06/16/21 -  Reyes, Carmela, PT Physical Therapist PT     08/31/21 -  Azalia [place] RN Registered Nurse Nurse     06/16/21 -  Galen Wade LPN Licensed Nurse Nurse                  PT Recommendation and Plan  Planned Therapy Interventions (PT): balance training, gait training, home exercise program, patient/family education, transfer training, neuromuscular re-education, strengthening  Plan of Care Reviewed With: patient  Outcome Evaluation: 84 y/o female brought in hospital on 2/5 due to confusion x 3 days with BLE edema and L breast swelling. PMH Includes breast Ca, hx of DVT/PE, pulmonary htn. Patient lives alone and normally does not use an a.d. for mobility. At time of eval, patient confused, only able to state her name and does not follow commands well, mostly stating NO to requests. Patient needed mod/max A for supine <>sit activity. Patient was able to sit EOB unsupported however when standing attempted was unable to complete mostly due to patient not making any effort to stand. Patient currently on 4LHF with sats 95% during activity. RN made aware. Patient  currently below her baseline and unsafe to return home, will need SNF at discharge.     Time Calculation:         PT Charges       Row Name 02/06/24 1037             Time Calculation    Start Time 0952  -CR      Stop Time 1018  -CR      Time Calculation (min) 26 min  -CR      PT Received On 02/06/24  -CR      PT - Next Appointment 02/07/24  -CR      PT Goal Re-Cert Due Date 02/20/24  -CR         Time Calculation- PT    Total Timed Code Minutes- PT 0 minute(s)  -CR                User Key  (r) = Recorded By, (t) = Taken By, (c) = Cosigned By      Initials Name Provider Type    CR Reyes, Carmela, PT Physical Therapist                  Therapy Charges for Today       Code Description Service Date Service Provider Modifiers Qty    [id number] HC PT EVAL MOD COMPLEXITY 4 2/6/2024 Reyes, Carmela, PT GP 1            PT G-Codes  AM-PAC 6 Clicks Score (PT): 10  PT Discharge Summary  Anticipated Discharge Disposition (PT): skilled nursing facility    Carmela Reyes, PT  2/6/2024

## 2024-02-06 NOTE — CONSULTS
PICC Line Insertion Procedure Note    Procedure: Insertion of #4 FR/18G PICC    Indications:  Poor Access, Pt./ Preference    Active Time Out:  Correct patient: Yes  Correct procedure: Yes  Correct site: Yes  Verified with: Pt's RN    Procedure Details:  Informed consent was obtained for the procedure.  Risk include, but are not limited to infection, air embolism, catheter tip moving, catheter blockage and phlebitis.     Maximum sterile technique was used including antiseptics, cap, gloves, gown, hand hygiene, mask, and sheet.    Ultrasound Guidance: Yes    #4 FR/18G PICC inserted to the R Basilic vein per hospital protocol by Keenan Bee RN.   Non-pulsatile blood return: yes    Lot #: [id number]  Expiration date: 2024-12-31    Complications:  none    Findings:  Catheter inserted to 41 cm, with 0 cm exposed.   Mid upper arm circumference is 33 cm.   Catheter was flushed with 10 cc NS and sterile dressing applied.  Patient tolerated procedure well.  PICC tip verified by:       [x] [employer] 3cg       [] Chest X-ray    Recommendations:  Verbal and/or written Care/Maintenance instructions provided to patient.   Primary nurse notified.    Dimitris Bee RN  02/05/24  21:59 EST

## 2024-02-06 NOTE — PLAN OF CARE
Goal Outcome Evaluation:  Pt is having intermittent confusion. Pt's family at bedside. Safety/falls prevention precautions in place.

## 2024-02-06 NOTE — DISCHARGE PLACEMENT REQUEST
"Gabrielle Lyles (83 y.o. Female)       Date of Birth   1940    Social Security Number       Address   [address] [place] [address] [place] [address]    Home Phone   [phone]    MRN   [id number]       Jainism   Confucianist    Marital Status                               Admission Date   2/5/24    Admission Type   Emergency    Admitting Provider   Rosamaria Jin MD    Attending Provider   Rosamaria Jin MD    Department, Room/Bed   [facility] 2D, 268/1       Discharge Date       Discharge Disposition       Discharge Destination                                 Attending Provider: Rosamaria Jin MD    Allergies: No Known Allergies    Isolation: None   Infection: None   Code Status: CPR    Ht: 162.6 cm (64\")   Wt: 66.9 kg (147 lb 7.8 oz)    Admission Cmt: None   Principal Problem: Altered mental status [R41.82]                   Active Insurance as of 2/5/2024       Primary Coverage       Payor Plan Insurance Group Employer/Plan Group    HUMANA MEDICARE REPLACEMENT HUMANA MED ADV GROUP [id number]       Payor Plan Address Payor Plan Phone Number Payor Plan Fax Number Effective Dates    [address] [address] [phone]  1/1/2018 - None Entered    [facility] [id number]         Subscriber Name Subscriber Birth Date Member ID       GABRIELLE LYLES 1940 [id number]                     Emergency Contacts        (Rel.) Home Phone Work Phone Mobile Phone    MARVIN DURANT (Daughter) [phone] -- --                 History & Physical        Rosamaria Jin MD at 02/05/24 1710              Patient Care Team:  Shaylee Mcdaniels MD as PCP - General (Family Medicine)  Richardson Willis MD as Cardiologist (Cardiology)  Rafy Rodriguez MD as Consulting Physician (Hematology and Oncology)  Christianne Phillips, AMARILIS as Licensed Practical Nurse    Chief complaint confusion    Subjective    Patient is a 83 y.o. female with history of COPD, DVT, breast cancer, chronic kidney disease and pulmonary hypertension who " presents with progressive confusion over the last 3 days.  She was seen in her PCP office with bilateral lower extremity swelling and swelling and discomfort in her left breast.  Dr. Mcdaniels was concerned about possible mastitis in the left breast.  She started Augmentin and advised patient to increase dose of furosemide from 40 mg a day to 80 mg a day through the weekend.  Daughter noticed mild confusion on Saturday afternoon, somewhat worse on Sunday and then this morning she was significantly worse.  She is oriented to person only, which is a significant change from baseline..  There was no reported fever at home but her temperature on arrival to the emergency room was 100 degrees.  She has had poor oral intake over the last few days.  There is no reported vomiting or diarrhea.  Swelling in the breast and legs has resolved.    Review of Systems   Constitutional:  Positive for activity change, appetite change and fever. Negative for chills, diaphoresis, fatigue and unexpected weight change.   HENT:  Negative for facial swelling and hearing loss.    Eyes:  Negative for visual disturbance.   Respiratory:  Negative for cough, shortness of breath, wheezing and stridor.    Cardiovascular:  Negative for chest pain, palpitations and leg swelling.   Gastrointestinal:  Negative for abdominal pain, constipation, diarrhea, nausea and vomiting.   Endocrine: Positive for polyuria.   Genitourinary:  Negative for dysuria and urgency.   Musculoskeletal:  Positive for gait problem. Negative for arthralgias and back pain.   Skin:  Positive for wound. Negative for rash.   Neurological:  Positive for weakness. Negative for dizziness, tremors, light-headedness and headaches.   Psychiatric/Behavioral:  Positive for confusion.           History  Past Medical History:   Diagnosis Date    COPD (chronic obstructive pulmonary disease)     Deep vein thrombosis of bilateral lower extremities 7/24/2019    History of DVT (deep vein thrombosis)  2013    bilateral lower extremity    History of pneumonia 2012    community acquired pneumonia and right pleural effusion;hospitalized at [facility]    History of pulmonary embolism 2013    bilateral PE    Hypertension     Insomnia     on Ambien 5mg at     Lobular breast cancer, left 2011    Stage IA left upper lobular breast cancer    Malignant neoplasm of left breast in female, estrogen receptor positive 2019    Osteopenia     Severe pulmonary hypertension 3/3/2023    Stage 3a chronic kidney disease 2019    TIA (transient ischemic attack) 10/25/2022     Past Surgical History:   Procedure Laterality Date    CARDIAC CATHETERIZATION N/A 3/3/2023    Procedure: Left and Right Heart Cath with Coronary Angiography;  Surgeon: Richardson Willis MD;  Location: [facility] INVASIVE LOCATION;  Service: Cardiovascular;  Laterality: N/A;    CATARACT EXTRACTION      IVC FILTER RETRIEVAL  2014    IVC filter placement by Dr. Card    MAMMO STEREOTACTIC BREAST BX SURGICAL ADD UNI Left 2011    invasive lobular carcinoma-Dr. Cande Daniel    MASTECTOMY, PARTIAL Left 2011    and left axillary sentinel lymph node biopsy by Dr. Uriostegui    TUBAL ABDOMINAL LIGATION       Family History   Problem Relation Age of Onset    Lung cancer Brother      Social History     Tobacco Use    Smoking status: Former     Types: Cigarettes     Quit date:      Years since quittin.1     Passive exposure: Past    Smokeless tobacco: Never    Tobacco comments:     smoked 6 cigarettes a day from 13 years of age to 56 years of age when she quit in    Vaping Use    Vaping Use: Never used   Substance Use Topics    Alcohol use: Not Currently    Drug use: No     (Not in a hospital admission)    Allergies:  Patient has no known allergies.    Objective    Vital Signs  Temp:  [99.5 °F (37.5 °C)-100 °F (37.8 °C)] 99.5 °F (37.5 °C)  Heart Rate:  [73-87] 74  Resp:  [18] 18  BP: (142-146)/(80-83) 146/83      Physical Exam:      General Appearance:    Alert, disheveled, oriented to person only, speech is clear   Head:    Normocephalic, without obvious abnormality, atraumatic   Eyes:            Lids and lashes normal, conjunctivae and sclerae normal, no   icterus, no pallor, corneas clear, PERRLA   Ears:    Ears appear intact with no abnormalities noted   Throat:   No oral lesions, no thrush, oral mucosa moist   Neck:   No adenopathy, supple, trachea midline, no thyromegaly, no   carotid bruit, no JVD   Lungs:     Clear to auscultation,respirations regular, even and                  unlabored    Heart:    Regular rhythm and normal rate, normal S1 and S2, no            murmur, no gallop, no rub, no click   Chest Wall:  Left breast-no visible or palpable abnormalities   Abdomen:     Normal bowel sounds, no masses, no organomegaly, soft        non-tender, non-distended, no guarding, no rebound                tenderness   Extremities: No edema   Pulses:   Pulses palpable and equal bilaterally   Skin:   No bleeding, bruising or rash   Lymph nodes:   No palpable adenopathy   Neurologic: No localizing neurologic deficits; gait not assessed; mental status far below baseline       Results Review:     Imaging Results (Last 24 Hours)       Procedure Component Value Units Date/Time    CT Head Without Contrast [[id number]] Collected: 02/05/24 1223     Updated: 02/05/24 1228    Narrative:      CT HEAD WO CONTRAST    Date of Exam: 2/5/2024 12:12 PM EST    Indication: confusion.    Comparison: Head CT dated 9/26/2023    Technique: Axial CT images were obtained of the head without contrast administration.  Coronal reconstructions were performed.  Automated exposure control and iterative reconstruction methods were used.      FINDINGS:    Examination is limited due to motion artifact.    Foci of periventricular and subcortical white matter hypoattenuation are consistent with chronic, microvascular ischemic change. There is age-related  loss of brain parenchymal volume with prominence of sulcation and ventriculomegaly. No significant mass   effect, midline shift, intracranial hemorrhage, or hydrocephalus is identified. No extra-axial fluid collection is identified.   The calvarium and overlying soft tissues are unremarkable. The paranasal sinuses and bilateral mastoid air cells are   adequately aerated. The visualized bony orbits, globes, and retrobulbar soft tissues are unremarkable.      Impression:      1.Examination is limited due to motion artifact.  2.Given this limitation, no acute intracranial abnormality is identified.  3.Findings compatible with chronic microvascular ischemic change and diffuse cortical atrophy.    Electronically Signed: Parker Mireles MD    2/5/2024 12:26 PM EST    Workstation ID: [id number]    XR Chest 1 View [[id number]] Collected: 02/05/24 1205     Updated: 02/05/24 1208    Narrative:      XR CHEST 1 VW    Date of Exam: 2/5/2024 12:00 PM EST    Indication: weakness    Comparison: 5/2/2023.    Findings:  There is mild cardiomegaly with increased heart size. Mildly prominent interstitial pattern appears stable and chronic. There is a prominent skinfold over the right chest. There is no definite pneumothorax. There is no effusion.      Impression:      Impression:  Mild cardiomegaly. Mild chronic findings of the lung fields.      Electronically Signed: Mary Ivan MD    2/5/2024 12:06 PM EST    Workstation ID: [id number]             Lab Results (last 24 hours)       Procedure Component Value Units Date/Time    COVID-19, FLU A/B, RSV PCR 1 HR TAT - Swab, Nasopharynx [[id number]]  (Normal) Collected: 02/05/24 1235    Specimen: Swab from Nasopharynx Updated: 02/05/24 1325     COVID19 Not Detected     Influenza A PCR Not Detected     Influenza B PCR Not Detected     RSV, PCR Not Detected    Narrative:      Fact sheet for providers: https://www.fda.gov/media/499984/download    Fact sheet for patients:  https://www.fda.gov/media/589538/download    Test performed by PCR.    Extra Tubes [[id number]] Collected: 02/05/24 1150    Specimen: Blood, Venous Line Updated: 02/05/24 1302    Narrative:      The following orders were created for panel order Extra Tubes.  Procedure                               Abnormality         Status                     ---------                               -----------         ------                     Gold Top - SST[[id number]]                                   Final result               Light Blue Top[[id number]]                                   Final result                 Please view results for these tests on the individual orders.    Gold Top - SST [[id number]] Collected: 02/05/24 1150    Specimen: Blood Updated: 02/05/24 1302     Extra Tube Hold for add-ons.     Comment: Auto resulted.       Light Blue Top [[id number]] Collected: 02/05/24 1150    Specimen: Blood Updated: 02/05/24 1302     Extra Tube Hold for add-ons.     Comment: Auto resulted       Ammonia [[id number]]  (Normal) Collected: 02/05/24 1235    Specimen: Blood from Arm, Left Updated: 02/05/24 1301     Ammonia 25 umol/L     Carbon Monoxide, Blood [[id number]]  (Normal) Collected: 02/05/24 1235    Specimen: Blood from Arm, Left Updated: 02/05/24 1242     Carbon Monoxide, Blood 1.7 %     Procalcitonin [[id number]]  (Normal) Collected: 02/05/24 1150    Specimen: Blood Updated: 02/05/24 1232     Procalcitonin 0.06 ng/mL     Narrative:      As a Marker for Sepsis (Non-Neonates):    1. <0.5 ng/mL represents a low risk of severe sepsis and/or septic shock.  2. >2 ng/mL represents a high risk of severe sepsis and/or septic shock.    As a Marker for Lower Respiratory Tract Infections that require antibiotic therapy:    PCT on Admission    Antibiotic Therapy       6-12 Hrs later    >0.5                Strongly Recommended  >0.25 - <0.5        Recommended   0.1 - 0.25          Discouraged              Remeasure/reassess PCT  <0.1           "      Strongly Discouraged     Remeasure/reassess PCT    As 28 day mortality risk marker: \"Change in Procalcitonin Result\" (>80% or <=80%) if Day 0 (or Day 1) and Day 4 values are available. Refer to http://www.[facility]-pct-calculator.com    Change in PCT <=80%  A decrease of PCT levels below or equal to 80% defines a positive change in PCT test result representing a higher risk for 28-day all-cause mortality of patients diagnosed with severe sepsis for septic shock.    Change in PCT >80%  A decrease of PCT levels of more than 80% defines a negative change in PCT result representing a lower risk for 28-day all-cause mortality of patients diagnosed with severe sepsis or septic shock.       TSH [[id number]]  (Normal) Collected: 02/05/24 1150    Specimen: Blood Updated: 02/05/24 1232     TSH 1.640 uIU/mL     Magnesium [[id number]]  (Normal) Collected: 02/05/24 1150    Specimen: Blood Updated: 02/05/24 1226     Magnesium 2.2 mg/dL     Urinalysis, Microscopic Only - Straight Cath [[id number]]  (Abnormal) Collected: 02/05/24 1146    Specimen: Urine from Straight Cath Updated: 02/05/24 1226     RBC, UA 3-5 /HPF      WBC, UA 0-2 /HPF      Comment: Urine culture not indicated.        Bacteria, UA Trace /HPF      Squamous Epithelial Cells, UA 0-2 /HPF      Hyaline Casts, UA 0-2 /LPF      Methodology Manual Light Microscopy    Comprehensive Metabolic Panel [[id number]]  (Abnormal) Collected: 02/05/24 1150    Specimen: Blood Updated: 02/05/24 1226     Glucose 104 mg/dL      BUN 19 mg/dL      Creatinine 1.30 mg/dL      Sodium 137 mmol/L      Potassium 3.1 mmol/L      Comment: Slight hemolysis detected by analyzer. Result may be falsely elevated.        Chloride 94 mmol/L      CO2 30.0 mmol/L      Calcium 9.5 mg/dL      Total Protein 7.1 g/dL      Albumin 3.9 g/dL      ALT (SGPT) 10 U/L      AST (SGOT) 19 U/L      Alkaline Phosphatase 69 U/L      Total Bilirubin 1.1 mg/dL      Globulin 3.2 gm/dL      A/G Ratio 1.2 g/dL      " BUN/Creatinine Ratio 14.6     Anion Gap 13.0 mmol/L      eGFR 41.1 mL/min/1.73     Narrative:      GFR Normal >60  Chronic Kidney Disease <60  Kidney Failure <15    The GFR formula is only valid for adults with stable renal function between ages 18 and 70.    Ethanol [[id number]] Collected: 02/05/24 1150    Specimen: Blood Updated: 02/05/24 1226     Ethanol % <0.010 %     Narrative:      Plasma Ethanol Clinical Symptoms:    ETOH (%)               Clinical Symptom  .01-.05              No apparent influence  .03-.12              Euphoria, Diminished judgment and attention   .09-.25              Impaired comprehension, Muscle incoordination  .18-.30              Confusion, Staggered gait, Slurred speech  .25-.40              Markedly decreased response to stimuli, unable to stand or                        walk, vomitting, sleep or stupor  .35-.50              Comatose, Anesthesia, Subnormal body temperature        Urine Drug Screen - Straight Cath [[id number]]  (Normal) Collected: 02/05/24 1149    Specimen: Urine from Straight Cath Updated: 02/05/24 1222     Amphet/Methamphet, Screen Negative     Barbiturates Screen, Urine Negative     Benzodiazepine Screen, Urine Negative     Cocaine Screen, Urine Negative     Opiate Screen Negative     THC, Screen, Urine Negative     Methadone Screen, Urine Negative     Oxycodone Screen, Urine Negative    Narrative:      Negative Thresholds Per Drugs Screened:    Amphetamines                 500 ng/ml  Barbiturates                 200 ng/ml  Benzodiazepines              100 ng/ml  Cocaine                      300 ng/ml  Methadone                    300 ng/ml  Opiates                      300 ng/ml  Oxycodone                    100 ng/ml  THC                           50 ng/ml    The Normal Value for all drugs tested is negative. This report includes final unconfirmed screening results to be used for medical treatment purposes only. Unconfirmed results must not be used for non-medical  purposes such as employment or legal testing. Clinical consideration should be applied to any drug of abuse test, particularly when unconfirmed results are used.          All urine drugs of abuse requests without chain of custody are for medical screening purposes only.  False positives are possible.      Urinalysis With Culture If Indicated - Straight Cath [[id number]]  (Abnormal) Collected: 02/05/24 1146    Specimen: Urine from Straight Cath Updated: 02/05/24 1218     Color, UA Yellow     Appearance, UA Clear     pH, UA 6.5     Specific Gravity, UA 1.022     Glucose, UA Negative     Ketones, UA Trace     Bilirubin, UA Negative     Blood, UA Small (1+)     Protein,  mg/dL (2+)     Leuk Esterase, UA Negative     Nitrite, UA Negative     Urobilinogen, UA 0.2 E.U./dL    Narrative:      In absence of clinical symptoms, the presence of pyuria, bacteria, and/or nitrites on the urinalysis result does not correlate with infection.    CBC & Differential [[id number]]  (Abnormal) Collected: 02/05/24 1150    Specimen: Blood Updated: 02/05/24 1159    Narrative:      The following orders were created for panel order CBC & Differential.  Procedure                               Abnormality         Status                     ---------                               -----------         ------                     CBC Auto Differential[[id number]]        Abnormal            Final result                 Please view results for these tests on the individual orders.    CBC Auto Differential [[id number]]  (Abnormal) Collected: 02/05/24 1150    Specimen: Blood Updated: 02/05/24 1159     WBC 9.10 10*3/mm3      RBC 4.73 10*6/mm3      Hemoglobin 12.8 g/dL      Hematocrit 40.5 %      MCV 85.6 fL      MCH 27.1 pg      MCHC 31.7 g/dL      RDW 16.0 %      RDW-SD 47.7 fl      MPV 8.6 fL      Platelets 220 10*3/mm3      Neutrophil % 82.2 %      Lymphocyte % 4.1 %      Monocyte % 8.4 %      Eosinophil % 4.2 %      Basophil % 1.1 %      Neutrophils,  Absolute 7.50 10*3/mm3      Lymphocytes, Absolute 0.40 10*3/mm3      Monocytes, Absolute 0.80 10*3/mm3      Eosinophils, Absolute 0.40 10*3/mm3      Basophils, Absolute 0.10 10*3/mm3      nRBC 0.1 /100 WBC     Blood Culture - Blood, Arm, Right [[id number]] Collected: 02/05/24 1150    Specimen: Blood from Arm, Right Updated: 02/05/24 1155    POC Lactate [[id number]]  (Normal) Collected: 02/05/24 1136    Specimen: Blood Updated: 02/05/24 1137     Lactate 1.4 mmol/L      Comment: Serial Number: [id number]:  [id number]                I reviewed the patient's new clinical results.    Assessment & Plan      Altered mental status  -Patient is encephalopathic; etiology is unclear; she may be slightly dehydrated from her increased diuretics and poor intake through the weekend although the low-grade fever makes me concerned for infection that we have not yet identified.  Respiratory panel is negative for COVID, flu, RSV.  Urinalysis is clear.  Chest x-ray is unremarkable.  Will check full respiratory viral panel.  Blood cultures are pending.  I am holding off on antibiotics at this point as I do not have an identified source of infection and she is not clinically septic.  Monitor closely for any emerging clinical signs of infection.    Elevated creatinine-creatinine is slightly above baseline.  Will give small fluid bolus now and hydrate overnight.  Recheck renal function in the morning.  Avoid nephrotoxins.    Hypokalemia-replacing  History of DVT-continue Eliquis  COPD-appears well compensated.  Continue Symbicort  Chronic hypoxemia-oxygen requirement is at her baseline.  Pulmonary hypertension-continue sildenafil  Hyperuricemia-continue allopurinol    Patient is fully anticoagulated so no additional DVT prophylaxis is needed.  Famotidine for stress ulcer prophylaxis        I discussed the patient's findings and my recommendations with patient and daughter, who was present in the room.     Rosamaria Jin,  MD  02/05/24  17:10 EST        Electronically signed by Rosamaria Jin MD at 02/05/24 2759          Physician Progress Notes (last 24 hours)        Jone Mansi M, APRN at 02/06/24 0902       Attestation signed by Rosamaria Jin MD at 02/06/24 1342    I have reviewed this documentation and agree.                       LOS: 0 days   Patient Care Team:  Shaylee Mcdaniels MD as PCP - General (Family Medicine)  Richardson Willis MD as Cardiologist (Cardiology)  Rafy Rodriguez MD as Consulting Physician (Hematology and Oncology)  Christianne Phillips, AMARILIS as Licensed Practical Nurse    Subjective     Patient is back to baseline with mentation with no complaints    Review of Systems   Constitutional:  Positive for activity change and fatigue.   HENT: Negative.     Respiratory: Negative.     Cardiovascular: Negative.    Gastrointestinal: Negative.    Genitourinary: Negative.    Musculoskeletal: Negative.    Neurological:  Positive for weakness.   Psychiatric/Behavioral: Negative.             Objective     Vital Signs  Temp:  [97 °F (36.1 °C)-100 °F (37.8 °C)] 98.2 °F (36.8 °C)  Heart Rate:  [66-89] 66  Resp:  [18-27] 27  BP: (104-174)/(56-94) 104/56      Physical Exam  Constitutional:       Appearance: She is not ill-appearing.   HENT:      Head: Normocephalic and atraumatic.      Right Ear: External ear normal.      Left Ear: External ear normal.      Nose: Nose normal.      Mouth/Throat:      Mouth: Mucous membranes are moist.   Eyes:      General:         Right eye: No discharge.         Left eye: No discharge.   Cardiovascular:      Rate and Rhythm: Normal rate and regular rhythm.      Pulses: Normal pulses.      Heart sounds: Normal heart sounds.   Pulmonary:      Effort: Pulmonary effort is normal.      Breath sounds: Normal breath sounds.   Abdominal:      General: Bowel sounds are normal.      Palpations: Abdomen is soft.   Musculoskeletal:         General: Normal range of motion.      Cervical back: Normal range of motion.    Skin:     General: Skin is warm.      Coloration: Skin is pale.   Neurological:      Mental Status: She is alert and oriented to person, place, and time.   Psychiatric:         Behavior: Behavior normal.              Results Review:    Lab Results (last 24 hours)       Procedure Component Value Units Date/Time    Basic Metabolic Panel [[id number]]  (Abnormal) Collected: 02/06/24 0351    Specimen: Blood Updated: 02/06/24 0425     Glucose 82 mg/dL      BUN 22 mg/dL      Creatinine 1.07 mg/dL      Sodium 138 mmol/L      Potassium 2.9 mmol/L      Chloride 96 mmol/L      CO2 30.0 mmol/L      Calcium 8.7 mg/dL      BUN/Creatinine Ratio 20.6     Anion Gap 12.0 mmol/L      eGFR 52.0 mL/min/1.73     Narrative:      GFR Normal >60  Chronic Kidney Disease <60  Kidney Failure <15    The GFR formula is only valid for adults with stable renal function between ages 18 and 70.    CBC & Differential [[id number]]  (Abnormal) Collected: 02/06/24 0351    Specimen: Blood Updated: 02/06/24 0404    Narrative:      The following orders were created for panel order CBC & Differential.  Procedure                               Abnormality         Status                     ---------                               -----------         ------                     CBC Auto Differential[[id number]]        Abnormal            Final result                 Please view results for these tests on the individual orders.    CBC Auto Differential [[id number]]  (Abnormal) Collected: 02/06/24 0351    Specimen: Blood Updated: 02/06/24 0404     WBC 7.30 10*3/mm3      RBC 4.18 10*6/mm3      Hemoglobin 11.2 g/dL      Hematocrit 34.9 %      MCV 83.6 fL      MCH 26.8 pg      MCHC 32.0 g/dL      RDW 15.7 %      RDW-SD 48.6 fl      MPV 8.2 fL      Platelets 177 10*3/mm3      Neutrophil % 76.8 %      Lymphocyte % 8.2 %      Monocyte % 12.8 %      Eosinophil % 1.3 %      Basophil % 0.9 %      Neutrophils, Absolute 5.60 10*3/mm3      Lymphocytes, Absolute 0.60 10*3/mm3       Monocytes, Absolute 0.90 10*3/mm3      Eosinophils, Absolute 0.10 10*3/mm3      Basophils, Absolute 0.10 10*3/mm3      nRBC 0.0 /100 WBC     Blood Gas, Venous - [[id number]]  (Abnormal) Collected: 02/05/24 2202    Specimen: Venous Blood Updated: 02/05/24 2215     Site Right Brachial     pH, Venous 7.430 pH Units      pCO2, Venous 46.6 mm Hg      pO2, Venous 32.9 mm Hg      HCO3, Venous 30.9 mmol/L      Base Excess, Venous 5.5 mmol/L      Comment: Serial Number: [id number]:  [id number]        O2 Saturation, Venous 64.4 %      CO2 Content 32.3 mmol/L      Barometric Pressure for Blood Gas --     Comment: N/A        Modality Cannula     FIO2 32 %     MRSA Screen, PCR (Inpatient) - Swab, Nares [[id number]]  (Normal) Collected: 02/05/24 1724    Specimen: Swab from Nares Updated: 02/05/24 1843     MRSA PCR No MRSA Detected    Narrative:      The negative predictive value of this diagnostic test is high and should only be used to consider de-escalating anti-MRSA therapy. A positive result may indicate colonization with MRSA and must be correlated clinically.    Blood Culture - Blood, Wrist, Right [[id number]] Collected: 02/05/24 1627    Specimen: Blood from Wrist, Right Updated: 02/05/24 1832    Respiratory Panel PCR w/COVID-19(SARS-CoV-2) MARIA ELENA/SARIAH/BRANDIN/PAD/COR/LILLIAN In-House, NP Swab in UTM/VTM, 2 HR TAT - Swab, Nasopharynx [[id number]]  (Normal) Collected: 02/05/24 1724    Specimen: Swab from Nasopharynx Updated: 02/05/24 1820     ADENOVIRUS, PCR Not Detected     Coronavirus 229E Not Detected     Coronavirus HKU1 Not Detected     Coronavirus NL63 Not Detected     Coronavirus OC43 Not Detected     COVID19 Not Detected     Human Metapneumovirus Not Detected     Human Rhinovirus/Enterovirus Not Detected     Influenza A PCR Not Detected     Influenza B PCR Not Detected     Parainfluenza Virus 1 Not Detected     Parainfluenza Virus 2 Not Detected     Parainfluenza Virus 3 Not Detected     Parainfluenza Virus 4 Not Detected      RSV, PCR Not Detected     Bordetella pertussis pcr Not Detected     Bordetella parapertussis PCR Not Detected     Chlamydophila pneumoniae PCR Not Detected     Mycoplasma pneumo by PCR Not Detected    Narrative:      In the setting of a positive respiratory panel with a viral infection PLUS a negative procalcitonin without other underlying concern for bacterial infection, consider observing off antibiotics or discontinuation of antibiotics and continue supportive care. If the respiratory panel is positive for atypical bacterial infection (Bordetella pertussis, Chlamydophila pneumoniae, or Mycoplasma pneumoniae), consider antibiotic de-escalation to target atypical bacterial infection.    COVID-19, FLU A/B, RSV PCR 1 HR TAT - Swab, Nasopharynx [[id number]]  (Normal) Collected: 02/05/24 1235    Specimen: Swab from Nasopharynx Updated: 02/05/24 1325     COVID19 Not Detected     Influenza A PCR Not Detected     Influenza B PCR Not Detected     RSV, PCR Not Detected    Narrative:      Fact sheet for providers: https://www.fda.gov/media/645283/download    Fact sheet for patients: https://www.fda.gov/media/546011/download    Test performed by PCR.    Extra Tubes [[id number]] Collected: 02/05/24 1150    Specimen: Blood, Venous Line Updated: 02/05/24 1302    Narrative:      The following orders were created for panel order Extra Tubes.  Procedure                               Abnormality         Status                     ---------                               -----------         ------                     Gold Top - SST[[id number]]                                   Final result               Light Blue Top[[id number]]                                   Final result                 Please view results for these tests on the individual orders.    Gold Top - SST [[id number]] Collected: 02/05/24 1150    Specimen: Blood Updated: 02/05/24 1302     Extra Tube Hold for add-ons.     Comment: Auto resulted.       Light Blue Top  "[[id number]] Collected: 02/05/24 1150    Specimen: Blood Updated: 02/05/24 1302     Extra Tube Hold for add-ons.     Comment: Auto resulted       Ammonia [[id number]]  (Normal) Collected: 02/05/24 1235    Specimen: Blood from Arm, Left Updated: 02/05/24 1301     Ammonia 25 umol/L     Carbon Monoxide, Blood [[id number]]  (Normal) Collected: 02/05/24 1235    Specimen: Blood from Arm, Left Updated: 02/05/24 1242     Carbon Monoxide, Blood 1.7 %     Procalcitonin [[id number]]  (Normal) Collected: 02/05/24 1150    Specimen: Blood Updated: 02/05/24 1232     Procalcitonin 0.06 ng/mL     Narrative:      As a Marker for Sepsis (Non-Neonates):    1. <0.5 ng/mL represents a low risk of severe sepsis and/or septic shock.  2. >2 ng/mL represents a high risk of severe sepsis and/or septic shock.    As a Marker for Lower Respiratory Tract Infections that require antibiotic therapy:    PCT on Admission    Antibiotic Therapy       6-12 Hrs later    >0.5                Strongly Recommended  >0.25 - <0.5        Recommended   0.1 - 0.25          Discouraged              Remeasure/reassess PCT  <0.1                Strongly Discouraged     Remeasure/reassess PCT    As 28 day mortality risk marker: \"Change in Procalcitonin Result\" (>80% or <=80%) if Day 0 (or Day 1) and Day 4 values are available. Refer to http://www.[facility]-pct-calculator.com    Change in PCT <=80%  A decrease of PCT levels below or equal to 80% defines a positive change in PCT test result representing a higher risk for 28-day all-cause mortality of patients diagnosed with severe sepsis for septic shock.    Change in PCT >80%  A decrease of PCT levels of more than 80% defines a negative change in PCT result representing a lower risk for 28-day all-cause mortality of patients diagnosed with severe sepsis or septic shock.       TSH [[id number]]  (Normal) Collected: 02/05/24 1150    Specimen: Blood Updated: 02/05/24 1232     TSH 1.640 uIU/mL     Magnesium [[id number]]  (Normal) " Collected: 02/05/24 1150    Specimen: Blood Updated: 02/05/24 1226     Magnesium 2.2 mg/dL     Urinalysis, Microscopic Only - Straight Cath [[id number]]  (Abnormal) Collected: 02/05/24 1146    Specimen: Urine from Straight Cath Updated: 02/05/24 1226     RBC, UA 3-5 /HPF      WBC, UA 0-2 /HPF      Comment: Urine culture not indicated.        Bacteria, UA Trace /HPF      Squamous Epithelial Cells, UA 0-2 /HPF      Hyaline Casts, UA 0-2 /LPF      Methodology Manual Light Microscopy    Comprehensive Metabolic Panel [[id number]]  (Abnormal) Collected: 02/05/24 1150    Specimen: Blood Updated: 02/05/24 1226     Glucose 104 mg/dL      BUN 19 mg/dL      Creatinine 1.30 mg/dL      Sodium 137 mmol/L      Potassium 3.1 mmol/L      Comment: Slight hemolysis detected by analyzer. Result may be falsely elevated.        Chloride 94 mmol/L      CO2 30.0 mmol/L      Calcium 9.5 mg/dL      Total Protein 7.1 g/dL      Albumin 3.9 g/dL      ALT (SGPT) 10 U/L      AST (SGOT) 19 U/L      Alkaline Phosphatase 69 U/L      Total Bilirubin 1.1 mg/dL      Globulin 3.2 gm/dL      A/G Ratio 1.2 g/dL      BUN/Creatinine Ratio 14.6     Anion Gap 13.0 mmol/L      eGFR 41.1 mL/min/1.73     Narrative:      GFR Normal >60  Chronic Kidney Disease <60  Kidney Failure <15    The GFR formula is only valid for adults with stable renal function between ages 18 and 70.    Ethanol [[id number]] Collected: 02/05/24 1150    Specimen: Blood Updated: 02/05/24 1226     Ethanol % <0.010 %     Narrative:      Plasma Ethanol Clinical Symptoms:    ETOH (%)               Clinical Symptom  .01-.05              No apparent influence  .03-.12              Euphoria, Diminished judgment and attention   .09-.25              Impaired comprehension, Muscle incoordination  .18-.30              Confusion, Staggered gait, Slurred speech  .25-.40              Markedly decreased response to stimuli, unable to stand or                        walk, vomitting, sleep or stupor  .35-.50               Comatose, Anesthesia, Subnormal body temperature        Urine Drug Screen - Straight Cath [[id number]]  (Normal) Collected: 02/05/24 1149    Specimen: Urine from Straight Cath Updated: 02/05/24 1222     Amphet/Methamphet, Screen Negative     Barbiturates Screen, Urine Negative     Benzodiazepine Screen, Urine Negative     Cocaine Screen, Urine Negative     Opiate Screen Negative     THC, Screen, Urine Negative     Methadone Screen, Urine Negative     Oxycodone Screen, Urine Negative    Narrative:      Negative Thresholds Per Drugs Screened:    Amphetamines                 500 ng/ml  Barbiturates                 200 ng/ml  Benzodiazepines              100 ng/ml  Cocaine                      300 ng/ml  Methadone                    300 ng/ml  Opiates                      300 ng/ml  Oxycodone                    100 ng/ml  THC                           50 ng/ml    The Normal Value for all drugs tested is negative. This report includes final unconfirmed screening results to be used for medical treatment purposes only. Unconfirmed results must not be used for non-medical purposes such as employment or legal testing. Clinical consideration should be applied to any drug of abuse test, particularly when unconfirmed results are used.          All urine drugs of abuse requests without chain of custody are for medical screening purposes only.  False positives are possible.      Urinalysis With Culture If Indicated - Straight Cath [[id number]]  (Abnormal) Collected: 02/05/24 1146    Specimen: Urine from Straight Cath Updated: 02/05/24 1218     Color, UA Yellow     Appearance, UA Clear     pH, UA 6.5     Specific Gravity, UA 1.022     Glucose, UA Negative     Ketones, UA Trace     Bilirubin, UA Negative     Blood, UA Small (1+)     Protein,  mg/dL (2+)     Leuk Esterase, UA Negative     Nitrite, UA Negative     Urobilinogen, UA 0.2 E.U./dL    Narrative:      In absence of clinical symptoms, the presence of pyuria,  bacteria, and/or nitrites on the urinalysis result does not correlate with infection.    CBC & Differential [[id number]]  (Abnormal) Collected: 02/05/24 1150    Specimen: Blood Updated: 02/05/24 1159    Narrative:      The following orders were created for panel order CBC & Differential.  Procedure                               Abnormality         Status                     ---------                               -----------         ------                     CBC Auto Differential[[id number]]        Abnormal            Final result                 Please view results for these tests on the individual orders.    CBC Auto Differential [[id number]]  (Abnormal) Collected: 02/05/24 1150    Specimen: Blood Updated: 02/05/24 1159     WBC 9.10 10*3/mm3      RBC 4.73 10*6/mm3      Hemoglobin 12.8 g/dL      Hematocrit 40.5 %      MCV 85.6 fL      MCH 27.1 pg      MCHC 31.7 g/dL      RDW 16.0 %      RDW-SD 47.7 fl      MPV 8.6 fL      Platelets 220 10*3/mm3      Neutrophil % 82.2 %      Lymphocyte % 4.1 %      Monocyte % 8.4 %      Eosinophil % 4.2 %      Basophil % 1.1 %      Neutrophils, Absolute 7.50 10*3/mm3      Lymphocytes, Absolute 0.40 10*3/mm3      Monocytes, Absolute 0.80 10*3/mm3      Eosinophils, Absolute 0.40 10*3/mm3      Basophils, Absolute 0.10 10*3/mm3      nRBC 0.1 /100 WBC     Blood Culture - Blood, Arm, Right [[id number]] Collected: 02/05/24 1150    Specimen: Blood from Arm, Right Updated: 02/05/24 1155    POC Lactate [[id number]]  (Normal) Collected: 02/05/24 1136    Specimen: Blood Updated: 02/05/24 1137     Lactate 1.4 mmol/L      Comment: Serial Number: [id number]:  [id number]                Imaging Results (Last 24 Hours)       Procedure Component Value Units Date/Time    CT Head Without Contrast [[id number]] Collected: 02/05/24 1223     Updated: 02/05/24 1228    Narrative:      CT HEAD WO CONTRAST    Date of Exam: 2/5/2024 12:12 PM EST    Indication: confusion.    Comparison: Head CT dated  9/26/2023    Technique: Axial CT images were obtained of the head without contrast administration.  Coronal reconstructions were performed.  Automated exposure control and iterative reconstruction methods were used.      FINDINGS:    Examination is limited due to motion artifact.    Foci of periventricular and subcortical white matter hypoattenuation are consistent with chronic, microvascular ischemic change. There is age-related loss of brain parenchymal volume with prominence of sulcation and ventriculomegaly. No significant mass   effect, midline shift, intracranial hemorrhage, or hydrocephalus is identified. No extra-axial fluid collection is identified.   The calvarium and overlying soft tissues are unremarkable. The paranasal sinuses and bilateral mastoid air cells are   adequately aerated. The visualized bony orbits, globes, and retrobulbar soft tissues are unremarkable.      Impression:      1.Examination is limited due to motion artifact.  2.Given this limitation, no acute intracranial abnormality is identified.  3.Findings compatible with chronic microvascular ischemic change and diffuse cortical atrophy.    Electronically Signed: Parker Mireles MD    2/5/2024 12:26 PM EST    Workstation ID: [id number]    XR Chest 1 View [[id number]] Collected: 02/05/24 1205     Updated: 02/05/24 1208    Narrative:      XR CHEST 1 VW    Date of Exam: 2/5/2024 12:00 PM EST    Indication: weakness    Comparison: 5/2/2023.    Findings:  There is mild cardiomegaly with increased heart size. Mildly prominent interstitial pattern appears stable and chronic. There is a prominent skinfold over the right chest. There is no definite pneumothorax. There is no effusion.      Impression:      Impression:  Mild cardiomegaly. Mild chronic findings of the lung fields.      Electronically Signed: Mary Ivan MD    2/5/2024 12:06 PM EST    Workstation ID: [id number]                 I reviewed the patient's new clinical  results.    Medication Review:   Scheduled Meds:allopurinol, 100 mg, Oral, Daily  apixaban, 5 mg, Oral, Q12H  budesonide-formoterol, 2 puff, Inhalation, BID - RT  carvedilol, 12.5 mg, Oral, BID With Meals  levothyroxine, 50 mcg, Oral, Q AM  potassium chloride ER, 40 mEq, Oral, Q4H  sildenafil, 20 mg, Oral, TID  sodium chloride, 10 mL, Intravenous, Q12H  sodium chloride, 10 mL, Intravenous, Q12H      Continuous Infusions:sodium chloride, 100 mL/hr, Last Rate: 100 mL/hr (02/06/24 0144)      PRN Meds:.  acetaminophen    Calcium Replacement - Follow Nurse / BPA Driven Protocol    ipratropium-albuterol    Magnesium Standard Dose Replacement - Follow Nurse / BPA Driven Protocol    ondansetron    Phosphorus Replacement - Follow Nurse / BPA Driven Protocol    Potassium Replacement - Follow Nurse / BPA Driven Protocol    [COMPLETED] Insert Peripheral IV **AND** sodium chloride    sodium chloride    sodium chloride    sodium chloride    sodium chloride     Interval History:    Assessment & Plan      Altered mental status-resolved  -most likely dehydrated from her increased diuretics and poor intake through the weekend although the low-grade fever makes me concerned for infection that we have not yet identified  -Respiratory panel is negative for COVID, flu, RSV  -Urinalysis is clear  -Chest x-ray is unremarkable  -full respiratory viral panel negative  -Blood cultures are pending  -holding off on antibiotics at this point as I do not have an identified source of infection and she is not clinically septic  -low grade temps on admission     Elevated creatinine  -creatinine is slightly above baseline  -improved with IV fluids and bolus  -will complete this bag of fluids and discontinue  -encourage oral fluid     Hypokalemia  -worse this am replacing    History of DVT-continue Eliquis  COPD-appears well compensated.  Continue Symbicort  Chronic hypoxemia-oxygen requirement is at her baseline.  Pulmonary hypertension-continue  sildenafil  Hyperuricemia-continue allopurinol     Patient is fully anticoagulated so no additional DVT prophylaxis is needed.  Famotidine for stress ulcer prophylaxis    Plan of care  Improving; monitor today; labs in am; up and moving in room; if doing well home tomorrow     Plan for disposition:Home    Mansi Becerril APRNERI  24  09:46 EST          Electronically signed by Rosamaria Jin MD at 24 1342       Consult Notes (last 24 hours)  Notes from 24 1444 through 24 1444   No notes of this type exist for this encounter.          Physical Therapy Notes (last 24 hours)        Reyes, Carmela, PT at 24 1036  Version 1 of 1         Goal Outcome Evaluation:  Plan of Care Reviewed With: patient           Outcome Evaluation: 82 y/o female brought in hospital on  due to confusion x 3 days with BLE edema and L breast swelling. PMH Includes breast Ca, hx of DVT/PE, pulmonary htn. Patient lives alone and normally does not use an a.d. for mobility. At time of eval, patient confused, only able to state her name and does not follow commands well, mostly stating NO to requests. Patient needed mod/max A for supine <>sit activity. Patient was able to sit EOB unsupported however when standing attempted was unable to complete mostly due to patient not making any effort to stand. Patient currently on 4LHF with sats 95% during activity. RN made aware. Patient currently below her baseline and unsafe to return home, will need SNF at discharge.      Anticipated Discharge Disposition (PT): skilled nursing facility                          Electronically signed by Reyes, Carmela, PT at 24 1037       Reyes, Carmela, PT at 24 1038  Version 1 of 1         Patient Name: Gabrielle Lyles  : 1940    MRN: [id number]                              Today's Date: 2024       Admit Date: 2024    Visit Dx:     ICD-10-CM ICD-9-CM   1. Altered mental status, unspecified altered mental status type   R41.82 780.97   2. Hypokalemia  E87.6 276.8     Patient Active Problem List   Diagnosis    Stage 3a chronic kidney disease    Low back pain    Osteopenia    Chronic obstructive pulmonary disease    Gastroesophageal reflux disease    Gout    History of venous thrombosis and embolism    Primary hypertension    Lumbar radiculopathy    Nausea    Parotid duct obstruction    Personal history of malignant neoplasm of breast    Shortness of breath    Aortic aneurysm    Bulging lumbar disc    Spinal stenosis of lumbar region    History of TIA (transient ischemic attack)    Acute exacerbation of chronic obstructive pulmonary disease (COPD)    Parainfluenza infection    Severe pulmonary hypertension    Acute on chronic respiratory failure with hypoxia    Cellulitis of left foot    Altered mental status     Past Medical History:   Diagnosis Date    COPD (chronic obstructive pulmonary disease)     Deep vein thrombosis of bilateral lower extremities 7/24/2019    History of DVT (deep vein thrombosis) 03/2013    bilateral lower extremity    History of pneumonia 06/2012    community acquired pneumonia and right pleural effusion;hospital[facility] at [facility]    History of pulmonary embolism 03/2013    bilateral PE    Hypertension 2001    Insomnia     on Ambien 5mg at     Lobular breast cancer, left 11/2011    Stage IA left upper lobular breast cancer    Malignant neoplasm of left breast in female, estrogen receptor positive 7/18/2019    Osteopenia 2012    Severe pulmonary hypertension 3/3/2023    Stage 3a chronic kidney disease 7/24/2019    TIA (transient ischemic attack) 10/25/2022     Past Surgical History:   Procedure Laterality Date    CARDIAC CATHETERIZATION N/A 3/3/2023    Procedure: Left and Right Heart Cath with Coronary Angiography;  Surgeon: Richardson Willis MD;  Location: [facility] INVASIVE LOCATION;  Service: Cardiovascular;  Laterality: N/A;    CATARACT EXTRACTION  2015    IVC FILTER RETRIEVAL  06/2014    IVC filter  placement by Dr. Card    MAMMO STEREOTACTIC BREAST BX SURGICAL ADD UNI Left 11/01/2011    invasive lobular carcinoma-Dr. Cande Daniel    MASTECTOMY, PARTIAL Left 12/01/2011    and left axillary sentinel lymph node biopsy by Dr. Uriostegui    TUBAL ABDOMINAL LIGATION  1984      General Information       Row Name 02/06/24 1025          Physical Therapy Time and Intention    Document Type evaluation  -CR     Mode of Treatment physical therapy  -CR       Row Name 02/06/24 1025          General Information    Patient Profile Reviewed yes  -CR     Prior Level of Function independent:;all household mobility  -CR     Existing Precautions/Restrictions fall  -CR     Barriers to Rehab cognitive status  -CR       Row Name 02/06/24 1025          Living Environment    People in Home alone  -CR       Row Name 02/06/24 1025          Home Main Entrance    Number of Stairs, Main Entrance two  -CR       Row Name 02/06/24 1025          Stairs Within Home, Primary    Number of Stairs, Within Home, Primary none  -CR       Row Name 02/06/24 1025          Cognition    Orientation Status (Cognition) unable/difficult to assess  only able to state her name  -CR       Row Name 02/06/24 1025          Safety Issues, Functional Mobility    Safety Issues Affecting Function (Mobility) ability to follow commands;at risk behavior observed  -CR     Impairments Affecting Function (Mobility) balance;cognition;pain;range of motion (ROM);strength  -CR               User Key  (r) = Recorded By, (t) = Taken By, (c) = Cosigned By      Initials Name Provider Type    CR Reyes, Carmela, PT Physical Therapist                   Mobility       Row Name 02/06/24 1029          Bed Mobility    Bed Mobility supine-sit-supine  -CR     Supine-Sit-Supine [place] (Bed Mobility) maximum assist (25% patient effort);moderate assist (50% patient effort)  -CR     Assistive Device (Bed Mobility) draw sheet;head of bed elevated  -CR       Row Name 02/06/24 1022           Transfers    Comment, (Transfers) attempted sit to stand, could not complete, patient not assisting  -CR               User Key  (r) = Recorded By, (t) = Taken By, (c) = Cosigned By      Initials Name Provider Type    CR Reyes, Carmela, PT Physical Therapist                   Obj/Interventions       Row Name 02/06/24 1030          Range of Motion Comprehensive    Comment, General Range of Motion did not follow commands to actively move UE/LE. PROM BUE/LE wfl with grimacing noted during LLE ROM  -CR       Row Name 02/06/24 1030          Strength Comprehensive (MMT)    Comment, General Manual Muscle Testing (MMT) Assessment could not assess well  -CR       Row Name 02/06/24 1030          Balance    Balance Assessment sitting static balance  -CR     Static Sitting Balance standby assist  -CR     Position, Sitting Balance sitting edge of bed  -CR               User Key  (r) = Recorded By, (t) = Taken By, (c) = Cosigned By      Initials Name Provider Type    CR Reyes, Carmela, PT Physical Therapist                   Goals/Plan       Row Name 02/06/24 1035          Transfer Goal 1 (PT)    Activity/Assistive Device (Transfer Goal 1, PT) transfers, all;walker, rolling  -CR     [place] Level/Cues Needed (Transfer Goal 1, PT) contact guard required  -CR     Time Frame (Transfer Goal 1, PT) long term goal (LTG);1 week  -CR       Row Name 02/06/24 1035          Gait Training Goal 1 (PT)    Activity/Assistive Device (Gait Training Goal 1, PT) gait (walking locomotion);assistive device use;decrease fall risk;diminish gait deviation;forward stepping;walker, rolling  -CR     [place] Level (Gait Training Goal 1, PT) contact guard required  -CR     Distance (Gait Training Goal 1, PT) 50 x 2  -CR     Time Frame (Gait Training Goal 1, PT) 2 weeks  -CR       Row Name 02/06/24 1035          Stairs Goal 1 (PT)    Activity/Assistive Device (Stairs Goal 1, PT) stairs, all skills  -CR     [place] Level/Cues Needed (Stairs Goal  1, PT) contact guard required  -CR     Number of Stairs (Stairs Goal 1, PT) 2  -CR     Time Frame (Stairs Goal 1, PT) long term goal (LTG);2 weeks  -CR       Row Name 02/06/24 1035          Therapy Assessment/Plan (PT)    Planned Therapy Interventions (PT) balance training;gait training;home exercise program;patient/family education;transfer training;neuromuscular re-education;strengthening  -CR               User Key  (r) = Recorded By, (t) = Taken By, (c) = Cosigned By      Initials Name Provider Type    CR Reyes, Carmela, PT Physical Therapist                   Clinical Impression       Row Name 02/06/24 1031          Pain    Additional Documentation Pain Scale: FACES Pre/Post-Treatment (Group)  -CR       Row Name 02/06/24 1031          Pain Scale: FACES Pre/Post-Treatment    Pain: FACES Scale, Pretreatment 0-->no hurt  -CR     Posttreatment Pain Rating 6-->hurts even more  -CR     Pain Location - Side/Orientation Left  -CR     Pain Location lower  -CR     Pain Location - extremity  -CR     Pre/Posttreatment Pain Comment RN made aware  -CR       Row Name 02/06/24 1031          Plan of Care Review    Plan of Care Reviewed With patient  -CR     Outcome Evaluation 82 y/o female brought in hospital on 2/5 due to confusion x 3 days with BLE edema and L breast swelling. PMH Includes breast Ca, hx of DVT/PE, pulmonary htn. Patient lives alone and normally does not use an a.d. for mobility. At time of eval, patient confused, only able to state her name and does not follow commands well, mostly stating NO to requests. Patient needed mod/max A for supine <>sit activity. Patient was able to sit EOB unsupported however when standing attempted was unable to complete mostly due to patient not making any effort to stand. Patient currently on 4LHF with sats 95% during activity. RN made aware. Patient currently below her baseline and unsafe to return home, will need SNF at discharge.  -CR       Row Name 02/06/24 1039           Therapy Assessment/Plan (PT)    Patient/Family Therapy Goals Statement (PT) na  -CR     Rehab Potential (PT) good, to achieve stated therapy goals  -CR     Criteria for Skilled Interventions Met (PT) yes;skilled treatment is necessary  -CR     Therapy Frequency (PT) 5 times/wk  -CR     Predicted Duration of Therapy Intervention (PT) dc  -CR               User Key  (r) = Recorded By, (t) = Taken By, (c) = Cosigned By      Initials Name Provider Type    CR Reyes, Carmela, PT Physical Therapist                   Outcome Measures       Row Name 02/06/24 1036 02/06/24 0805       How much help from another person do you currently need...    Turning from your back to your side while in flat bed without using bedrails? 2  -CR 3  -JESUS    Moving from lying on back to sitting on the side of a flat bed without bedrails? 2  -CR 3  -JESUS    Moving to and from a bed to a chair (including a wheelchair)? 2  -CR 2  -JESUS    Standing up from a chair using your arms (e.g., wheelchair, bedside chair)? 2  -CR 2  -JESUS    Climbing 3-5 steps with a railing? 1  -CR 2  -JESUS    To walk in hospital room? 1  -CR 2  -JESUS    AM-PAC 6 Clicks Score (PT) 10  -CR 14  -JESUS    Highest Level of Mobility Goal 4 --> Transfer to chair/commode  -CR 4 --> Transfer to chair/commode  -JESUS              User Key  (r) = Recorded By, (t) = Taken By, (c) = Cosigned By      Initials Name Provider Type    CR Reyes, Carmela, ROGELIO Physical Therapist    Azalia Amos RN Registered Nurse                                 Physical Therapy Education       Title: PT OT SLP Therapies (In Progress)       Topic: Physical Therapy (In Progress)       Point: Mobility training (In Progress)       Learning Progress Summary             Patient Nonacceptance, E, NR by CR at 2/6/2024 1036    Acceptance, E,TB, VU by JESUS at 2/6/2024 0823    Acceptance, E,TB,D, VU,DU,NR by  at 2/6/2024 0236   Family Acceptance, E,TB, VU by JESUS at 2/6/2024 0823                         Point: Body mechanics (Done)        Learning Progress Summary             Patient Acceptance, E,TB, VU by  at 2/6/2024 0823    Acceptance, E,TB,D, VU,DU,NR by  at 2/6/2024 0236   Family Acceptance, E,TB, VU by  at 2/6/2024 0823                         Point: Precautions (Done)       Learning Progress Summary             Patient Acceptance, E,TB, VU by  at 2/6/2024 0823    Acceptance, E,TB,D, VU,DU,NR by  at 2/6/2024 0236   Family Acceptance, E,TB, VU by  at 2/6/2024 0823                                         User Key       Initials Effective Dates Name Provider Type Discipline     06/16/21 -  Reyes, Carmela, PT Physical Therapist PT     08/31/21 -  Azalia Houston RN Registered Nurse Nurse     06/16/21 -  Galen Wade LPN Licensed Nurse Nurse                  PT Recommendation and Plan  Planned Therapy Interventions (PT): balance training, gait training, home exercise program, patient/family education, transfer training, neuromuscular re-education, strengthening  Plan of Care Reviewed With: patient  Outcome Evaluation: 82 y/o female brought in hospital on 2/5 due to confusion x 3 days with BLE edema and L breast swelling. PMH Includes breast Ca, hx of DVT/PE, pulmonary htn. Patient lives alone and normally does not use an a.d. for mobility. At time of eval, patient confused, only able to state her name and does not follow commands well, mostly stating NO to requests. Patient needed mod/max A for supine <>sit activity. Patient was able to sit EOB unsupported however when standing attempted was unable to complete mostly due to patient not making any effort to stand. Patient currently on 4LHF with sats 95% during activity. RN made aware. Patient currently below her baseline and unsafe to return home, will need SNF at discharge.     Time Calculation:         PT Charges       Row Name 02/06/24 1037             Time Calculation    Start Time 0952  -CR      Stop Time 1018  -CR      Time Calculation (min) 26 min  -CR      PT  Received On 02/06/24  -CR      PT - Next Appointment 02/07/24  -CR      PT Goal Re-Cert Due Date 02/20/24  -CR         Time Calculation- PT    Total Timed Code Minutes- PT 0 minute(s)  -CR                User Key  (r) = Recorded By, (t) = Taken By, (c) = Cosigned By      Initials Name Provider Type    CR Reyes, Carmela, PT Physical Therapist                  Therapy Charges for Today       Code Description Service Date Service Provider Modifiers Qty    53160434408 HC PT EVAL MOD COMPLEXITY 4 2/6/2024 Reyes, Carmela, PT GP 1            PT G-Codes  AM-PAC 6 Clicks Score (PT): 10  PT Discharge Summary  Anticipated Discharge Disposition (PT): skilled nursing facility    Carmela Reyes, PT  2/6/2024      Electronically signed by Reyes, Carmela, PT at 02/06/24 1038       Occupational Therapy Notes (last 24 hours)  Notes from 02/05/24 1444 through 02/06/24 1444   No notes exist for this encounter.

## 2024-02-06 NOTE — PROGRESS NOTES
LOS: 0 days   Patient Care Team:  Shaylee Mcdaniels MD as PCP - General (Family Medicine)  Richardson Willis MD as Cardiologist (Cardiology)  Rafy Rodriguez MD as Consulting Physician (Hematology and Oncology)  Christianne Phillips, RN as Licensed Practical Nurse    Subjective     Patient is back to baseline with mentation with no complaints    Review of Systems   Constitutional:  Positive for activity change and fatigue.   HENT: Negative.     Respiratory: Negative.     Cardiovascular: Negative.    Gastrointestinal: Negative.    Genitourinary: Negative.    Musculoskeletal: Negative.    Neurological:  Positive for weakness.   Psychiatric/Behavioral: Negative.             Objective     Vital Signs  Temp:  [97 °F (36.1 °C)-100 °F (37.8 °C)] 98.2 °F (36.8 °C)  Heart Rate:  [66-89] 66  Resp:  [18-27] 27  BP: (104-174)/(56-94) 104/56      Physical Exam  Constitutional:       Appearance: She is not ill-appearing.   HENT:      Head: Normocephalic and atraumatic.      Right Ear: External ear normal.      Left Ear: External ear normal.      Nose: Nose normal.      Mouth/Throat:      Mouth: Mucous membranes are moist.   Eyes:      General:         Right eye: No discharge.         Left eye: No discharge.   Cardiovascular:      Rate and Rhythm: Normal rate and regular rhythm.      Pulses: Normal pulses.      Heart sounds: Normal heart sounds.   Pulmonary:      Effort: Pulmonary effort is normal.      Breath sounds: Normal breath sounds.   Abdominal:      General: Bowel sounds are normal.      Palpations: Abdomen is soft.   Musculoskeletal:         General: Normal range of motion.      Cervical back: Normal range of motion.   Skin:     General: Skin is warm.      Coloration: Skin is pale.   Neurological:      Mental Status: She is alert and oriented to person, place, and time.   Psychiatric:         Behavior: Behavior normal.              Results Review:    Lab Results (last 24 hours)       Procedure Component Value Units Date/Time     Basic Metabolic Panel [[id number]]  (Abnormal) Collected: 02/06/24 0351    Specimen: Blood Updated: 02/06/24 0425     Glucose 82 mg/dL      BUN 22 mg/dL      Creatinine 1.07 mg/dL      Sodium 138 mmol/L      Potassium 2.9 mmol/L      Chloride 96 mmol/L      CO2 30.0 mmol/L      Calcium 8.7 mg/dL      BUN/Creatinine Ratio 20.6     Anion Gap 12.0 mmol/L      eGFR 52.0 mL/min/1.73     Narrative:      GFR Normal >60  Chronic Kidney Disease <60  Kidney Failure <15    The GFR formula is only valid for adults with stable renal function between ages 18 and 70.    CBC & Differential [[id number]]  (Abnormal) Collected: 02/06/24 0351    Specimen: Blood Updated: 02/06/24 0404    Narrative:      The following orders were created for panel order CBC & Differential.  Procedure                               Abnormality         Status                     ---------                               -----------         ------                     CBC Auto Differential[[id number]]        Abnormal            Final result                 Please view results for these tests on the individual orders.    CBC Auto Differential [[id number]]  (Abnormal) Collected: 02/06/24 0351    Specimen: Blood Updated: 02/06/24 0404     WBC 7.30 10*3/mm3      RBC 4.18 10*6/mm3      Hemoglobin 11.2 g/dL      Hematocrit 34.9 %      MCV 83.6 fL      MCH 26.8 pg      MCHC 32.0 g/dL      RDW 15.7 %      RDW-SD 48.6 fl      MPV 8.2 fL      Platelets 177 10*3/mm3      Neutrophil % 76.8 %      Lymphocyte % 8.2 %      Monocyte % 12.8 %      Eosinophil % 1.3 %      Basophil % 0.9 %      Neutrophils, Absolute 5.60 10*3/mm3      Lymphocytes, Absolute 0.60 10*3/mm3      Monocytes, Absolute 0.90 10*3/mm3      Eosinophils, Absolute 0.10 10*3/mm3      Basophils, Absolute 0.10 10*3/mm3      nRBC 0.0 /100 WBC     Blood Gas, Venous - [[id number]]  (Abnormal) Collected: 02/05/24 2202    Specimen: Venous Blood Updated: 02/05/24 2215     Site Right Brachial     pH, Venous 7.430 pH  Units      pCO2, Venous 46.6 mm Hg      pO2, Venous 32.9 mm Hg      HCO3, Venous 30.9 mmol/L      Base Excess, Venous 5.5 mmol/L      Comment: Serial Number: [id number]:  [id number]        O2 Saturation, Venous 64.4 %      CO2 Content 32.3 mmol/L      Barometric Pressure for Blood Gas --     Comment: N/A        Modality Cannula     FIO2 32 %     MRSA Screen, PCR (Inpatient) - Swab, Nares [[id number]]  (Normal) Collected: 02/05/24 1724    Specimen: Swab from Nares Updated: 02/05/24 1843     MRSA PCR No MRSA Detected    Narrative:      The negative predictive value of this diagnostic test is high and should only be used to consider de-escalating anti-MRSA therapy. A positive result may indicate colonization with MRSA and must be correlated clinically.    Blood Culture - Blood, Wrist, Right [[id number]] Collected: 02/05/24 1627    Specimen: Blood from Wrist, Right Updated: 02/05/24 1832    Respiratory Panel PCR w/COVID-19(SARS-CoV-2) MARIA ELENA/SARIAH/BRANDIN/PAD/COR/LILLIAN In-House, NP Swab in UTM/VTM, 2 HR TAT - Swab, Nasopharynx [[id number]]  (Normal) Collected: 02/05/24 1724    Specimen: Swab from Nasopharynx Updated: 02/05/24 1820     ADENOVIRUS, PCR Not Detected     Coronavirus 229E Not Detected     Coronavirus HKU1 Not Detected     Coronavirus NL63 Not Detected     Coronavirus OC43 Not Detected     COVID19 Not Detected     Human Metapneumovirus Not Detected     Human Rhinovirus/Enterovirus Not Detected     Influenza A PCR Not Detected     Influenza B PCR Not Detected     Parainfluenza Virus 1 Not Detected     Parainfluenza Virus 2 Not Detected     Parainfluenza Virus 3 Not Detected     Parainfluenza Virus 4 Not Detected     RSV, PCR Not Detected     Bordetella pertussis pcr Not Detected     Bordetella parapertussis PCR Not Detected     Chlamydophila pneumoniae PCR Not Detected     Mycoplasma pneumo by PCR Not Detected    Narrative:      In the setting of a positive respiratory panel with a viral infection PLUS a negative  procalcitonin without other underlying concern for bacterial infection, consider observing off antibiotics or discontinuation of antibiotics and continue supportive care. If the respiratory panel is positive for atypical bacterial infection (Bordetella pertussis, Chlamydophila pneumoniae, or Mycoplasma pneumoniae), consider antibiotic de-escalation to target atypical bacterial infection.    COVID-19, FLU A/B, RSV PCR 1 HR TAT - Swab, Nasopharynx [[id number]]  (Normal) Collected: 02/05/24 1235    Specimen: Swab from Nasopharynx Updated: 02/05/24 1325     COVID19 Not Detected     Influenza A PCR Not Detected     Influenza B PCR Not Detected     RSV, PCR Not Detected    Narrative:      Fact sheet for providers: https://www.fda.gov/media/551069/download    Fact sheet for patients: https://www.fda.gov/media/136723/download    Test performed by PCR.    Extra Tubes [[id number]] Collected: 02/05/24 1150    Specimen: Blood, Venous Line Updated: 02/05/24 1302    Narrative:      The following orders were created for panel order Extra Tubes.  Procedure                               Abnormality         Status                     ---------                               -----------         ------                     Gold Top - SST[[id number]]                                   Final result               Light Blue Top[[id number]]                                   Final result                 Please view results for these tests on the individual orders.    Gold Top - SST [[id number]] Collected: 02/05/24 1150    Specimen: Blood Updated: 02/05/24 1302     Extra Tube Hold for add-ons.     Comment: Auto resulted.       Light Blue Top [[id number]] Collected: 02/05/24 1150    Specimen: Blood Updated: 02/05/24 1302     Extra Tube Hold for add-ons.     Comment: Auto resulted       Ammonia [[id number]]  (Normal) Collected: 02/05/24 1235    Specimen: Blood from Arm, Left Updated: 02/05/24 1301     Ammonia 25 umol/L     Carbon Monoxide, Blood  "[[id number]]  (Normal) Collected: 02/05/24 1235    Specimen: Blood from Arm, Left Updated: 02/05/24 1242     Carbon Monoxide, Blood 1.7 %     Procalcitonin [[id number]]  (Normal) Collected: 02/05/24 1150    Specimen: Blood Updated: 02/05/24 1232     Procalcitonin 0.06 ng/mL     Narrative:      As a Marker for Sepsis (Non-Neonates):    1. <0.5 ng/mL represents a low risk of severe sepsis and/or septic shock.  2. >2 ng/mL represents a high risk of severe sepsis and/or septic shock.    As a Marker for Lower Respiratory Tract Infections that require antibiotic therapy:    PCT on Admission    Antibiotic Therapy       6-12 Hrs later    >0.5                Strongly Recommended  >0.25 - <0.5        Recommended   0.1 - 0.25          Discouraged              Remeasure/reassess PCT  <0.1                Strongly Discouraged     Remeasure/reassess PCT    As 28 day mortality risk marker: \"Change in Procalcitonin Result\" (>80% or <=80%) if Day 0 (or Day 1) and Day 4 values are available. Refer to http://www.[employer]-pct-calculator.com    Change in PCT <=80%  A decrease of PCT levels below or equal to 80% defines a positive change in PCT test result representing a higher risk for 28-day all-cause mortality of patients diagnosed with severe sepsis for septic shock.    Change in PCT >80%  A decrease of PCT levels of more than 80% defines a negative change in PCT result representing a lower risk for 28-day all-cause mortality of patients diagnosed with severe sepsis or septic shock.       TSH [[id number]]  (Normal) Collected: 02/05/24 1150    Specimen: Blood Updated: 02/05/24 1232     TSH 1.640 uIU/mL     Magnesium [[id number]]  (Normal) Collected: 02/05/24 1150    Specimen: Blood Updated: 02/05/24 1226     Magnesium 2.2 mg/dL     Urinalysis, Microscopic Only - Straight Cath [[id number]]  (Abnormal) Collected: 02/05/24 1146    Specimen: Urine from Straight Cath Updated: 02/05/24 1226     RBC, UA 3-5 /HPF      WBC, UA 0-2 /HPF      Comment: " Urine culture not indicated.        Bacteria, UA Trace /HPF      Squamous Epithelial Cells, UA 0-2 /HPF      Hyaline Casts, UA 0-2 /LPF      Methodology Manual Light Microscopy    Comprehensive Metabolic Panel [[id number]]  (Abnormal) Collected: 02/05/24 1150    Specimen: Blood Updated: 02/05/24 1226     Glucose 104 mg/dL      BUN 19 mg/dL      Creatinine 1.30 mg/dL      Sodium 137 mmol/L      Potassium 3.1 mmol/L      Comment: Slight hemolysis detected by analyzer. Result may be falsely elevated.        Chloride 94 mmol/L      CO2 30.0 mmol/L      Calcium 9.5 mg/dL      Total Protein 7.1 g/dL      Albumin 3.9 g/dL      ALT (SGPT) 10 U/L      AST (SGOT) 19 U/L      Alkaline Phosphatase 69 U/L      Total Bilirubin 1.1 mg/dL      Globulin 3.2 gm/dL      A/G Ratio 1.2 g/dL      BUN/Creatinine Ratio 14.6     Anion Gap 13.0 mmol/L      eGFR 41.1 mL/min/1.73     Narrative:      GFR Normal >60  Chronic Kidney Disease <60  Kidney Failure <15    The GFR formula is only valid for adults with stable renal function between ages 18 and 70.    Ethanol [[id number]] Collected: 02/05/24 1150    Specimen: Blood Updated: 02/05/24 1226     Ethanol % <0.010 %     Narrative:      Plasma Ethanol Clinical Symptoms:    ETOH (%)               Clinical Symptom  .01-.05              No apparent influence  .03-.12              Euphoria, Diminished judgment and attention   .09-.25              Impaired comprehension, Muscle incoordination  .18-.30              Confusion, Staggered gait, Slurred speech  .25-.40              Markedly decreased response to stimuli, unable to stand or                        walk, vomitting, sleep or stupor  .35-.50              Comatose, Anesthesia, Subnormal body temperature        Urine Drug Screen - Straight Cath [[id number]]  (Normal) Collected: 02/05/24 1149    Specimen: Urine from Straight Cath Updated: 02/05/24 1222     Amphet/Methamphet, Screen Negative     Barbiturates Screen, Urine Negative     Benzodiazepine  Screen, Urine Negative     Cocaine Screen, Urine Negative     Opiate Screen Negative     THC, Screen, Urine Negative     Methadone Screen, Urine Negative     Oxycodone Screen, Urine Negative    Narrative:      Negative Thresholds Per Drugs Screened:    Amphetamines                 500 ng/ml  Barbiturates                 200 ng/ml  Benzodiazepines              100 ng/ml  Cocaine                      300 ng/ml  Methadone                    300 ng/ml  Opiates                      300 ng/ml  Oxycodone                    100 ng/ml  THC                           50 ng/ml    The Normal Value for all drugs tested is negative. This report includes final unconfirmed screening results to be used for medical treatment purposes only. Unconfirmed results must not be used for non-medical purposes such as employment or legal testing. Clinical consideration should be applied to any drug of abuse test, particularly when unconfirmed results are used.          All urine drugs of abuse requests without chain of custody are for medical screening purposes only.  False positives are possible.      Urinalysis With Culture If Indicated - Straight Cath [[id number]]  (Abnormal) Collected: 02/05/24 1146    Specimen: Urine from Straight Cath Updated: 02/05/24 1218     Color, UA Yellow     Appearance, UA Clear     pH, UA 6.5     Specific Gravity, UA 1.022     Glucose, UA Negative     Ketones, UA Trace     Bilirubin, UA Negative     Blood, UA Small (1+)     Protein,  mg/dL (2+)     Leuk Esterase, UA Negative     Nitrite, UA Negative     Urobilinogen, UA 0.2 E.U./dL    Narrative:      In absence of clinical symptoms, the presence of pyuria, bacteria, and/or nitrites on the urinalysis result does not correlate with infection.    CBC & Differential [[id number]]  (Abnormal) Collected: 02/05/24 1150    Specimen: Blood Updated: 02/05/24 1159    Narrative:      The following orders were created for panel order CBC & Differential.  Procedure                                Abnormality         Status                     ---------                               -----------         ------                     CBC Auto Differential[[id number]]        Abnormal            Final result                 Please view results for these tests on the individual orders.    CBC Auto Differential [[id number]]  (Abnormal) Collected: 02/05/24 1150    Specimen: Blood Updated: 02/05/24 1159     WBC 9.10 10*3/mm3      RBC 4.73 10*6/mm3      Hemoglobin 12.8 g/dL      Hematocrit 40.5 %      MCV 85.6 fL      MCH 27.1 pg      MCHC 31.7 g/dL      RDW 16.0 %      RDW-SD 47.7 fl      MPV 8.6 fL      Platelets 220 10*3/mm3      Neutrophil % 82.2 %      Lymphocyte % 4.1 %      Monocyte % 8.4 %      Eosinophil % 4.2 %      Basophil % 1.1 %      Neutrophils, Absolute 7.50 10*3/mm3      Lymphocytes, Absolute 0.40 10*3/mm3      Monocytes, Absolute 0.80 10*3/mm3      Eosinophils, Absolute 0.40 10*3/mm3      Basophils, Absolute 0.10 10*3/mm3      nRBC 0.1 /100 WBC     Blood Culture - Blood, Arm, Right [[id number]] Collected: 02/05/24 1150    Specimen: Blood from Arm, Right Updated: 02/05/24 1155    POC Lactate [[id number]]  (Normal) Collected: 02/05/24 1136    Specimen: Blood Updated: 02/05/24 1137     Lactate 1.4 mmol/L      Comment: Serial Number: [id number]:  [id number]                Imaging Results (Last 24 Hours)       Procedure Component Value Units Date/Time    CT Head Without Contrast [[id number]] Collected: 02/05/24 1223     Updated: 02/05/24 1228    Narrative:      CT HEAD WO CONTRAST    Date of Exam: 2/5/2024 12:12 PM EST    Indication: confusion.    Comparison: Head CT dated 9/26/2023    Technique: Axial CT images were obtained of the head without contrast administration.  Coronal reconstructions were performed.  Automated exposure control and iterative reconstruction methods were used.      FINDINGS:    Examination is limited due to motion artifact.    Foci of periventricular and  subcortical white matter hypoattenuation are consistent with chronic, microvascular ischemic change. There is age-related loss of brain parenchymal volume with prominence of sulcation and ventriculomegaly. No significant mass   effect, midline shift, intracranial hemorrhage, or hydrocephalus is identified. No extra-axial fluid collection is identified.   The calvarium and overlying soft tissues are unremarkable. The paranasal sinuses and bilateral mastoid air cells are   adequately aerated. The visualized bony orbits, globes, and retrobulbar soft tissues are unremarkable.      Impression:      1.Examination is limited due to motion artifact.  2.Given this limitation, no acute intracranial abnormality is identified.  3.Findings compatible with chronic microvascular ischemic change and diffuse cortical atrophy.    Electronically Signed: Parker Mireles MD    2/5/2024 12:26 PM EST    Workstation ID: [id number]    XR Chest 1 View [[id number]] Collected: 02/05/24 1205     Updated: 02/05/24 1208    Narrative:      XR CHEST 1 VW    Date of Exam: 2/5/2024 12:00 PM EST    Indication: weakness    Comparison: 5/2/2023.    Findings:  There is mild cardiomegaly with increased heart size. Mildly prominent interstitial pattern appears stable and chronic. There is a prominent skinfold over the right chest. There is no definite pneumothorax. There is no effusion.      Impression:      Impression:  Mild cardiomegaly. Mild chronic findings of the lung fields.      Electronically Signed: Mary Ivan MD    2/5/2024 12:06 PM EST    Workstation ID: [id number]                 I reviewed the patient's new clinical results.    Medication Review:   Scheduled Meds:allopurinol, 100 mg, Oral, Daily  apixaban, 5 mg, Oral, Q12H  budesonide-formoterol, 2 puff, Inhalation, BID - RT  carvedilol, 12.5 mg, Oral, BID With Meals  levothyroxine, 50 mcg, Oral, Q AM  potassium chloride ER, 40 mEq, Oral, Q4H  sildenafil, 20 mg, Oral, TID  sodium chloride, 10  mL, Intravenous, Q12H  sodium chloride, 10 mL, Intravenous, Q12H      Continuous Infusions:sodium chloride, 100 mL/hr, Last Rate: 100 mL/hr (02/06/24 0144)      PRN Meds:.  acetaminophen    Calcium Replacement - Follow Nurse / BPA Driven Protocol    ipratropium-albuterol    Magnesium Standard Dose Replacement - Follow Nurse / BPA Driven Protocol    ondansetron    Phosphorus Replacement - Follow Nurse / BPA Driven Protocol    Potassium Replacement - Follow Nurse / BPA Driven Protocol    [COMPLETED] Insert Peripheral IV **AND** sodium chloride    sodium chloride    sodium chloride    sodium chloride    sodium chloride     Interval History:    2/6 patient examined this am and back to baseline son sylvia dewey at bedside. Few hours later patient became confused and cussing. She underwent CT a/p and chest which revealed new Lesion of the sternal manubrium;  The infrarenal abdominal aorta measures 3.9 cm transversely as compared to 2.6 cm previously  Dtr and grandtr state that the patient has had moments of inability to find words and describe word salad they also state that she had difficulty using spoon.   On further examination left breast with history of breast cancer is painful to touch with heaviness. They state doctor had started her on antibiotics prior to admission for skin infection around breast. No infection or discoloring noted.     Assessment & Plan      Altered mental status-resolved  -most likely dehydrated from her increased diuretics and poor intake through the weekend although the low-grade fever makes me concerned for infection that we have not yet identified  -Respiratory panel is negative for COVID, flu, RSV  -Urinalysis is clear  -Chest x-ray is unremarkable  -full respiratory viral panel negative  -Blood cultures are pending  -holding off on antibiotics at this point as I do not have an identified source of infection and she is not clinically septic  -low grade temps on admission     Elevated  creatinine  -creatinine is slightly above baseline  -improved with IV fluids and bolus  -will continue iv fluids  -encourage oral fluid     Hypokalemia  -worse this am replacing    History of DVT-continue Eliquis  COPD-appears well compensated.  Continue Symbicort  Chronic hypoxemia-oxygen requirement is at her baseline.  Pulmonary hypertension-continue sildenafil  Hyperuricemia-continue allopurinol     Patient is fully anticoagulated so no additional DVT prophylaxis is needed.  Famotidine for stress ulcer prophylaxis    Plan of care  Will monitor bp as it low; anchor lopez catheter and monitor I & O; still dehydrated with lower bp will give bolus obtain MRI brain and ultrasound of left breast can not be completed due to insurance issues this admission. Discussed with family and pt at bedside. She has been taking her eliquis routinely at home. Will ask Dr. Willis to follow for directions on diuretics     Plan for disposition:DIANNE Becerril, JOAQUÍN  02/06/24  09:46 EST

## 2024-02-06 NOTE — PLAN OF CARE
Goal Outcome Evaluation:  Plan of Care Reviewed With: patient           Outcome Evaluation: 84 y/o female brought in hospital on 2/5 due to confusion x 3 days with BLE edema and L breast swelling. PMH Includes breast Ca, hx of DVT/PE, pulmonary htn. Patient lives alone and normally does not use an a.d. for mobility. At time of eval, patient confused, only able to state her name and does not follow commands well, mostly stating NO to requests. Patient needed mod/max A for supine <>sit activity. Patient was able to sit EOB unsupported however when standing attempted was unable to complete mostly due to patient not making any effort to stand. Patient currently on 4LHF with sats 95% during activity. RN made aware. Patient currently below her baseline and unsafe to return home, will need SNF at discharge.      Anticipated Discharge Disposition (PT): skilled nursing facility

## 2024-02-06 NOTE — CASE MANAGEMENT/SOCIAL WORK
Discharge Planning Assessment   Kenny     Patient Name: Gabrielle Lyles  MRN: [id number]  Today's Date: 2/6/2024    Admit Date: 2/5/2024    Plan: Referral to [facility], pending acceptance. Will need precert and PASRR. From home alone. Current [place] 2.5-3L.   Discharge Needs Assessment       Row Name 02/06/24 1550       Living Environment    People in Home alone    Current Living Arrangements home    Potentially Unsafe Housing Conditions none    In the past 12 months has the electric, gas, oil, or water company threatened to shut off services in your home? No    Primary Care Provided by self    Provides Primary Care For no one    Quality of Family Relationships helpful;involved;supportive    Able to Return to Prior Arrangements yes       Resource/Environmental Concerns    Resource/Environmental Concerns none    Transportation Concerns none       Transportation Needs    In the past 12 months, has lack of transportation kept you from medical appointments or from getting medications? no    In the past 12 months, has lack of transportation kept you from meetings, work, or from getting things needed for daily living? No       Food Insecurity    Within the past 12 months, you worried that your food would run out before you got the money to buy more. Never true    Within the past 12 months, the food you bought just didn't last and you didn't have money to get more. Never true       Transition Planning    Patient/Family Anticipates Transition to home    Patient/Family Anticipated Services at Transition none    Transportation Anticipated family or friend will provide       Discharge Needs Assessment    Readmission Within the Last 30 Days no previous admission in last 30 days    Equipment Currently Used at Home bp cuff;pulse ox;walker, standard;oxygen;nebulizer    Anticipated Changes Related to Illness none    Equipment Needed After Discharge none                   Discharge Plan       Row Name 02/06/24 3272        Plan    Plan Referral to [facility], pending acceptance. Will need precert and PASRR. From home alone. Current [place] 2.5-3L.    Patient/Family in Agreement with Plan yes    Plan Comments Met with patient (confused), daughter, and daughter in law (who is a nurse) at bedside. Lives at home alone at baseline.  IADL , A+Ox4 at baseline.  Uses walker, cane and O2 thru [place] at home.  Verified PCP. Will update pharmacy when accepted to facility.   Discussed PT recc for SNF.  Prior admit patient had gone to [facility], agreeable to [facility] again. Referral sent.  Awaiting acceptance.  Barriers to discharge: 4L [place].  Confusion.  Pscy following.  CT abdomen/ pelvis done.  CT head 2/5.                  Continued Care and Services - Admitted Since 2/5/2024       Destination       Service Provider Request Status Selected Services Address Phone Fax Patient Preferred    [facility] [place] Pending - Request Sent N/A [address] [place] [address] [phone] --                  Expected Discharge Date and Time       Expected Discharge Date Expected Discharge Time    Feb 8, 2024            Demographic Summary       Row Name 02/06/24 1550       General Information    Admission Type observation    Arrived From emergency department    Required Notices Provided Observation Status Notice    Referral Source admission list    Reason for Consult discharge planning    Preferred Language English                   Functional Status       Row Name 02/06/24 1550       Functional Status    Usual Activity Tolerance good    Current Activity Tolerance good       Functional Status, IADL    Medications independent    Meal Preparation independent    Housekeeping independent    Laundry independent    Shopping independent       Mental Status    General Appearance WDL WDL       Mental Status Summary    Recent Changes in Mental Status/Cognitive Functioning no changes           Met with patient in  room wearing PPE: mask    Maintained distance greater than six feet and spent less than 15 minutes in the room.  Brook [place] RN    Office Phone [phone]  Office Cell [phone]

## 2024-02-07 ENCOUNTER — APPOINTMENT (OUTPATIENT)
Dept: ULTRASOUND IMAGING | Facility: HOSPITAL | Age: 84
DRG: 683 | End: 2024-02-07
Payer: MEDICARE

## 2024-02-07 ENCOUNTER — APPOINTMENT (OUTPATIENT)
Dept: MRI IMAGING | Facility: HOSPITAL | Age: 84
DRG: 683 | End: 2024-02-07
Payer: MEDICARE

## 2024-02-07 PROBLEM — B34.8 PARAINFLUENZA INFECTION: Status: RESOLVED | Noted: 2023-05-03 | Resolved: 2024-02-07

## 2024-02-07 PROBLEM — N17.9 AKI (ACUTE KIDNEY INJURY): Status: ACTIVE | Noted: 2024-02-07

## 2024-02-07 PROBLEM — E87.6 ACUTE HYPOKALEMIA: Status: ACTIVE | Noted: 2024-02-05

## 2024-02-07 PROBLEM — I27.20 SEVERE PULMONARY HYPERTENSION: Chronic | Status: ACTIVE | Noted: 2023-03-03

## 2024-02-07 PROBLEM — J96.11 CHRONIC RESPIRATORY FAILURE WITH HYPOXIA: Chronic | Status: ACTIVE | Noted: 2023-05-07

## 2024-02-07 PROBLEM — K11.8 PAROTID DUCT OBSTRUCTION: Status: RESOLVED | Noted: 2017-12-19 | Resolved: 2024-02-07

## 2024-02-07 PROBLEM — J44.1 ACUTE EXACERBATION OF CHRONIC OBSTRUCTIVE PULMONARY DISEASE (COPD): Status: RESOLVED | Noted: 2023-04-08 | Resolved: 2024-02-07

## 2024-02-07 LAB
ANION GAP SERPL CALCULATED.3IONS-SCNC: 10 MMOL/L (ref 5–15)
BASOPHILS # BLD AUTO: 0.1 10*3/MM3 (ref 0–0.2)
BASOPHILS NFR BLD AUTO: 1.1 % (ref 0–1.5)
BUN SERPL-MCNC: 32 MG/DL (ref 8–23)
BUN/CREAT SERPL: 25.4 (ref 7–25)
CALCIUM SPEC-SCNC: 8.8 MG/DL (ref 8.6–10.5)
CHLORIDE SERPL-SCNC: 102 MMOL/L (ref 98–107)
CK SERPL-CCNC: 64 U/L (ref 20–180)
CO2 SERPL-SCNC: 24 MMOL/L (ref 22–29)
CREAT SERPL-MCNC: 1.26 MG/DL (ref 0.57–1)
DEPRECATED RDW RBC AUTO: 48.6 FL (ref 37–54)
EGFRCR SERPLBLD CKD-EPI 2021: 42.7 ML/MIN/1.73
EOSINOPHIL # BLD AUTO: 0.5 10*3/MM3 (ref 0–0.4)
EOSINOPHIL NFR BLD AUTO: 5.1 % (ref 0.3–6.2)
EOSINOPHIL SPEC QL MICRO: 0 % EOS/100 CELLS (ref 0–0)
ERYTHROCYTE [DISTWIDTH] IN BLOOD BY AUTOMATED COUNT: 16 % (ref 12.3–15.4)
GLUCOSE SERPL-MCNC: 100 MG/DL (ref 65–99)
HCT VFR BLD AUTO: 37.4 % (ref 34–46.6)
HGB BLD-MCNC: 11.8 G/DL (ref 12–15.9)
LYMPHOCYTES # BLD AUTO: 1 10*3/MM3 (ref 0.7–3.1)
LYMPHOCYTES NFR BLD AUTO: 10.8 % (ref 19.6–45.3)
MCH RBC QN AUTO: 27.1 PG (ref 26.6–33)
MCHC RBC AUTO-ENTMCNC: 31.5 G/DL (ref 31.5–35.7)
MCV RBC AUTO: 86.3 FL (ref 79–97)
MONOCYTES # BLD AUTO: 0.9 10*3/MM3 (ref 0.1–0.9)
MONOCYTES NFR BLD AUTO: 10 % (ref 5–12)
NEUTROPHILS NFR BLD AUTO: 6.7 10*3/MM3 (ref 1.7–7)
NEUTROPHILS NFR BLD AUTO: 73 % (ref 42.7–76)
NRBC BLD AUTO-RTO: 0 /100 WBC (ref 0–0.2)
PLATELET # BLD AUTO: 200 10*3/MM3 (ref 140–450)
PMV BLD AUTO: 8.4 FL (ref 6–12)
POTASSIUM SERPL-SCNC: 4.9 MMOL/L (ref 3.5–5.2)
PTH-INTACT SERPL-MCNC: 86.8 PG/ML (ref 15–65)
RBC # BLD AUTO: 4.33 10*6/MM3 (ref 3.77–5.28)
SODIUM SERPL-SCNC: 136 MMOL/L (ref 136–145)
SODIUM UR-SCNC: <20 MMOL/L
URATE SERPL-MCNC: 7.9 MG/DL (ref 2.4–5.7)
VIT B12 BLD-MCNC: 942 PG/ML (ref 211–946)
WBC NRBC COR # BLD AUTO: 9.2 10*3/MM3 (ref 3.4–10.8)

## 2024-02-07 PROCEDURE — 85025 COMPLETE CBC W/AUTO DIFF WBC: CPT | Performed by: INTERNAL MEDICINE

## 2024-02-07 PROCEDURE — 70551 MRI BRAIN STEM W/O DYE: CPT

## 2024-02-07 PROCEDURE — 25810000003 SODIUM CHLORIDE 0.9 % SOLUTION: Performed by: INTERNAL MEDICINE

## 2024-02-07 PROCEDURE — 25010000002 GADOTERIDOL PER 1 ML: Performed by: INTERNAL MEDICINE

## 2024-02-07 PROCEDURE — 70552 MRI BRAIN STEM W/DYE: CPT

## 2024-02-07 PROCEDURE — 97166 OT EVAL MOD COMPLEX 45 MIN: CPT

## 2024-02-07 PROCEDURE — 76642 ULTRASOUND BREAST LIMITED: CPT

## 2024-02-07 PROCEDURE — 25010000002 DIAZEPAM PER 5 MG: Performed by: INTERNAL MEDICINE

## 2024-02-07 PROCEDURE — 97530 THERAPEUTIC ACTIVITIES: CPT

## 2024-02-07 PROCEDURE — 87205 SMEAR GRAM STAIN: CPT | Performed by: INTERNAL MEDICINE

## 2024-02-07 PROCEDURE — 99223 1ST HOSP IP/OBS HIGH 75: CPT | Performed by: NURSE PRACTITIONER

## 2024-02-07 PROCEDURE — 70553 MRI BRAIN STEM W/O & W/DYE: CPT

## 2024-02-07 PROCEDURE — A9579 GAD-BASE MR CONTRAST NOS,1ML: HCPCS | Performed by: INTERNAL MEDICINE

## 2024-02-07 PROCEDURE — 80048 BASIC METABOLIC PNL TOTAL CA: CPT | Performed by: INTERNAL MEDICINE

## 2024-02-07 PROCEDURE — 99222 1ST HOSP IP/OBS MODERATE 55: CPT | Performed by: INTERNAL MEDICINE

## 2024-02-07 PROCEDURE — 84300 ASSAY OF URINE SODIUM: CPT | Performed by: INTERNAL MEDICINE

## 2024-02-07 PROCEDURE — 25010000002 CEFTRIAXONE PER 250 MG: Performed by: NURSE PRACTITIONER

## 2024-02-07 RX ORDER — CHOLECALCIFEROL (VITAMIN D3) 125 MCG
5 CAPSULE ORAL NIGHTLY
Status: DISCONTINUED | OUTPATIENT
Start: 2024-02-07 | End: 2024-02-17 | Stop reason: HOSPADM

## 2024-02-07 RX ORDER — HYDRALAZINE HYDROCHLORIDE 25 MG/1
25 TABLET, FILM COATED ORAL EVERY 8 HOURS SCHEDULED
Status: DISCONTINUED | OUTPATIENT
Start: 2024-02-07 | End: 2024-02-08

## 2024-02-07 RX ORDER — DIAZEPAM 5 MG/ML
5 INJECTION, SOLUTION INTRAMUSCULAR; INTRAVENOUS ONCE
Status: COMPLETED | OUTPATIENT
Start: 2024-02-07 | End: 2024-02-07

## 2024-02-07 RX ORDER — BUDESONIDE 0.5 MG/2ML
0.5 INHALANT ORAL
Status: DISCONTINUED | OUTPATIENT
Start: 2024-02-07 | End: 2024-02-17 | Stop reason: HOSPADM

## 2024-02-07 RX ORDER — CEPHALEXIN 250 MG/1
250 CAPSULE ORAL EVERY 8 HOURS SCHEDULED
Qty: 12 CAPSULE | Refills: 0 | Status: DISCONTINUED | OUTPATIENT
Start: 2024-02-07 | End: 2024-02-07

## 2024-02-07 RX ORDER — IPRATROPIUM BROMIDE AND ALBUTEROL SULFATE 2.5; .5 MG/3ML; MG/3ML
3 SOLUTION RESPIRATORY (INHALATION)
Status: DISCONTINUED | OUTPATIENT
Start: 2024-02-07 | End: 2024-02-11

## 2024-02-07 RX ORDER — FAMOTIDINE 20 MG/1
20 TABLET, FILM COATED ORAL DAILY
Status: DISCONTINUED | OUTPATIENT
Start: 2024-02-07 | End: 2024-02-17 | Stop reason: HOSPADM

## 2024-02-07 RX ORDER — LORAZEPAM 1 MG/1
1 TABLET ORAL EVERY 6 HOURS PRN
Status: DISCONTINUED | OUTPATIENT
Start: 2024-02-07 | End: 2024-02-07

## 2024-02-07 RX ADMIN — GADOTERIDOL 15 ML: 279.3 INJECTION, SOLUTION INTRAVENOUS at 19:08

## 2024-02-07 RX ADMIN — SILDENAFIL CITRATE 20 MG: 20 TABLET ORAL at 16:57

## 2024-02-07 RX ADMIN — LEVOTHYROXINE SODIUM 50 MCG: 0.05 TABLET ORAL at 06:04

## 2024-02-07 RX ADMIN — FAMOTIDINE 20 MG: 20 TABLET, FILM COATED ORAL at 12:26

## 2024-02-07 RX ADMIN — Medication 10 ML: at 23:54

## 2024-02-07 RX ADMIN — APIXABAN 5 MG: 5 TABLET, FILM COATED ORAL at 09:19

## 2024-02-07 RX ADMIN — CEPHALEXIN 250 MG: 250 CAPSULE ORAL at 14:06

## 2024-02-07 RX ADMIN — LEVOTHYROXINE SODIUM 50 MCG: 0.05 TABLET ORAL at 06:03

## 2024-02-07 RX ADMIN — Medication 10 ML: at 09:19

## 2024-02-07 RX ADMIN — DIAZEPAM 5 MG: 5 INJECTION INTRAMUSCULAR; INTRAVENOUS at 18:15

## 2024-02-07 RX ADMIN — CEPHALEXIN 250 MG: 250 CAPSULE ORAL at 09:19

## 2024-02-07 RX ADMIN — CEFTRIAXONE 2000 MG: 2 INJECTION, POWDER, FOR SOLUTION INTRAMUSCULAR; INTRAVENOUS at 20:02

## 2024-02-07 RX ADMIN — HYDRALAZINE HYDROCHLORIDE 25 MG: 25 TABLET, FILM COATED ORAL at 14:06

## 2024-02-07 RX ADMIN — SODIUM CHLORIDE 60 ML/HR: 9 INJECTION, SOLUTION INTRAVENOUS at 12:26

## 2024-02-07 RX ADMIN — CARVEDILOL 12.5 MG: 6.25 TABLET, FILM COATED ORAL at 09:19

## 2024-02-07 RX ADMIN — CARVEDILOL 12.5 MG: 6.25 TABLET, FILM COATED ORAL at 16:57

## 2024-02-07 NOTE — CONSULTS
Infectious Diseases Consult Note    Referring Provider: Rosamaria Jin MD    Reason for Consultation: Encephalitis    Patient Care Team:  Shaylee Mcdaniels MD as PCP - General (Family Medicine)  Richardson Willis MD as Cardiologist (Cardiology)  Rafy Rodriguez MD as Consulting Physician (Hematology and Oncology)  Christianne Phillips, RN as Licensed Practical Nurse  Salome Fleming MD as Consulting Physician (Nephrology)    Chief complaint acute mental status change    Subjective     The patient has been afebrile for the last 24 hours.  The patient is on  2 L of oxygen by nasal cannula, hemodynamically stable, and is tolerating antimicrobial therapy.    History of present illness:      This is AN 83-year-old female presents to the hospital on 2/5/2024 with complaints of confusion that is been worsening over 3 days.  Family states that the patient was seen at her primary care office recently for bilateral lower leg swelling and left breast swelling.  She was given Augmentin and most of the swelling has gone down.  States that patient did have some complaints of nausea and headaches but no significant shortness of breath or cough.  Denies vomiting or diarrhea or any urinary symptoms.  Patient is not able to answer any of my questions    Review of Systems   Review of Systems   Unable to perform ROS: Mental status change       Medications  Medications Prior to Admission   Medication Sig Dispense Refill Last Dose    allopurinol (ZYLOPRIM) 100 MG tablet Take 1 tablet by mouth Daily.       amoxicillin-clavulanate (AUGMENTIN) 875-125 MG per tablet Take 1 tablet by mouth 2 (Two) Times a Day.   02/04/2024 at 2100    apixaban (ELIQUIS) 5 MG tablet tablet Take 1 tablet by mouth Every 12 (Twelve) Hours. Indications: Other - full anticoagulation, history of DVT/PE 60 tablet 2 02/05/2024 at 0900    budesonide-formoterol (SYMBICORT) 80-4.5 MCG/ACT inhaler Inhale 2 puffs 2 (Two) Times a Day.       carvedilol (COREG) 12.5 MG  tablet TAKE 1 TABLET BY MOUTH TWICE DAILY WITH MEALS 180 tablet 3     Cholecalciferol (Vitamin D3) 50 MCG (2000 UT) capsule Take 1 capsule by mouth Daily.       furosemide (LASIX) 40 MG tablet Take 1 tablet by mouth Daily.       gabapentin (NEURONTIN) 300 MG capsule Take 1 capsule by mouth 2 (Two) Times a Day.       levothyroxine (SYNTHROID, LEVOTHROID) 50 MCG tablet Take 1 tablet by mouth Daily.       lisinopril (PRINIVIL,ZESTRIL) 40 MG tablet Take 1 tablet by mouth Daily. 90 tablet 3     potassium chloride (K-DUR,KLOR-CON) 10 MEQ CR tablet Take 1 tablet by mouth Daily. 90 tablet 3     sildenafil (REVATIO) 20 MG tablet Take 1 tablet by mouth Every 8 (Eight) Hours. 90 tablet 1        History  Past Medical History:   Diagnosis Date    Acute exacerbation of chronic obstructive pulmonary disease (COPD)     COPD (chronic obstructive pulmonary disease)     Deep vein thrombosis of bilateral lower extremities 07/24/2019    3/2013    History of pneumonia 06/2012    community acquired pneumonia and right pleural effusion;hospitalized at [facility]    History of pulmonary embolism 03/2013    bilateral PE    Hypertension 2001    Insomnia     on Ambien 5mg at     Lobular breast cancer, left 11/2011    Stage IA left upper lobular breast cancer    Malignant neoplasm of left breast in female, estrogen receptor positive 07/18/2019    Osteopenia 2012    Parainfluenza infection     Parotid duct obstruction     Severe pulmonary hypertension 03/03/2023    Stage 3a chronic kidney disease 07/24/2019    TIA (transient ischemic attack) 10/25/2022     Past Surgical History:   Procedure Laterality Date    CARDIAC CATHETERIZATION N/A 3/3/2023    Procedure: Left and Right Heart Cath with Coronary Angiography;  Surgeon: Richardson Willis MD;  Location: [facility] INVASIVE LOCATION;  Service: Cardiovascular;  Laterality: N/A;    CATARACT EXTRACTION  2015    IVC FILTER RETRIEVAL  06/2014    IVC filter placement by Dr. Stephie TAY  STEREOTACTIC BREAST BX SURGICAL ADD UNI Left 11/01/2011    invasive lobular carcinoma-Dr. Cande Daniel    MASTECTOMY, PARTIAL Left 12/01/2011    and left axillary sentinel lymph node biopsy by Dr. Uriostegui    TUBAL ABDOMINAL LIGATION  1984       Family History  Family History   Problem Relation Age of Onset    Lung cancer Brother        Social History   reports that she quit smoking about 28 years ago. Her smoking use included cigarettes. She has been exposed to tobacco smoke. She has never used smokeless tobacco. She reports that she does not currently use alcohol. She reports that she does not use drugs.    Allergies  Patient has no known allergies.    Objective     Vital Signs   Vital Signs (last 24 hours)         02/06 0700  02/07 0659 02/07 0700 02/07 1723   Most Recent      Temp (°F) 97.7 -  98.3      96.4     96.4 (35.8) 02/07 0725    Heart Rate 63 -  76       76 02/07 0411    Resp 17 -  31      19     19 02/07 0725    BP 90/61 -  155/80      172/99     172/99 02/07 0725    SpO2 (%) 93 -  99       96 02/07 0411    Flow (L/min) 3 -  4    2 -  3     2 02/07 1549            Physical Exam:  Physical Exam  Vitals and nursing note reviewed.   Constitutional:       General: She is not in acute distress.     Appearance: She is well-developed and normal weight. She is ill-appearing. She is not diaphoretic.   HENT:      Head: Normocephalic and atraumatic.   Eyes:      General: No scleral icterus.     Extraocular Movements: Extraocular movements intact.      Conjunctiva/sclera: Conjunctivae normal.      Pupils: Pupils are equal, round, and reactive to light.   Cardiovascular:      Rate and Rhythm: Normal rate and regular rhythm.      Heart sounds: Normal heart sounds, S1 normal and S2 normal. No murmur heard.  Pulmonary:      Effort: Pulmonary effort is normal. No respiratory distress.      Breath sounds: Normal breath sounds. No stridor. No wheezing or rales.   Chest:      Chest wall: No tenderness.   Abdominal:       General: Bowel sounds are normal. There is no distension.      Palpations: Abdomen is soft. There is no mass.      Tenderness: There is abdominal tenderness. There is no guarding.      Comments: Significant right lower quadrant abdominal tenderness   Musculoskeletal:         General: No swelling, tenderness or deformity.      Cervical back: Neck supple. No rigidity or tenderness.   Skin:     General: Skin is warm and dry.      Coloration: Skin is not pale.      Findings: No bruising, erythema or rash.      Comments: Tenderness and what appears to be a lump to the left breast.  No significant erythema and no open wound    Erythema to the right upper arm under the PICC line site   Neurological:      Mental Status: She is alert. She is disoriented.         Microbiology  Microbiology Results (last 10 days)       Procedure Component Value - Date/Time    Eosinophil Smear - Urine, Urine, Clean Catch [[id number]]  (Normal) Collected: 02/07/24 0725    Lab Status: Final result Specimen: Urine, Clean Catch Updated: 02/07/24 0907     Eosinophil Smear 0 % EOS/100 Cells     MRSA Screen, PCR (Inpatient) - Swab, Nares [[id number]]  (Normal) Collected: 02/05/24 1724    Lab Status: Final result Specimen: Swab from Nares Updated: 02/05/24 1843     MRSA PCR No MRSA Detected    Narrative:      The negative predictive value of this diagnostic test is high and should only be used to consider de-escalating anti-MRSA therapy. A positive result may indicate colonization with MRSA and must be correlated clinically.    Respiratory Panel PCR w/COVID-19(SARS-CoV-2) MARIA ELENA/SARIAH/BRANDIN/PAD/COR/LILLIAN In-House, NP Swab in UTM/VTM, 2 HR TAT - Swab, Nasopharynx [[id number]]  (Normal) Collected: 02/05/24 1724    Lab Status: Final result Specimen: Swab from Nasopharynx Updated: 02/05/24 1820     ADENOVIRUS, PCR Not Detected     Coronavirus 229E Not Detected     Coronavirus HKU1 Not Detected     Coronavirus NL63 Not Detected     Coronavirus OC43 Not Detected      COVID19 Not Detected     Human Metapneumovirus Not Detected     Human Rhinovirus/Enterovirus Not Detected     Influenza A PCR Not Detected     Influenza B PCR Not Detected     Parainfluenza Virus 1 Not Detected     Parainfluenza Virus 2 Not Detected     Parainfluenza Virus 3 Not Detected     Parainfluenza Virus 4 Not Detected     RSV, PCR Not Detected     Bordetella pertussis pcr Not Detected     Bordetella parapertussis PCR Not Detected     Chlamydophila pneumoniae PCR Not Detected     Mycoplasma pneumo by PCR Not Detected    Narrative:      In the setting of a positive respiratory panel with a viral infection PLUS a negative procalcitonin without other underlying concern for bacterial infection, consider observing off antibiotics or discontinuation of antibiotics and continue supportive care. If the respiratory panel is positive for atypical bacterial infection (Bordetella pertussis, Chlamydophila pneumoniae, or Mycoplasma pneumoniae), consider antibiotic de-escalation to target atypical bacterial infection.    Blood Culture - Blood, Wrist, Right [[id number]]  (Normal) Collected: 02/05/24 1627    Lab Status: Preliminary result Specimen: Blood from Wrist, Right Updated: 02/06/24 1846     Blood Culture No growth at 24 hours    Narrative:      Less than seven (7) mL's of blood was collected.  Insufficient quantity may yield false negative results.    COVID-19, FLU A/B, RSV PCR 1 HR TAT - Swab, Nasopharynx [[id number]]  (Normal) Collected: 02/05/24 1235    Lab Status: Final result Specimen: Swab from Nasopharynx Updated: 02/05/24 1325     COVID19 Not Detected     Influenza A PCR Not Detected     Influenza B PCR Not Detected     RSV, PCR Not Detected    Narrative:      Fact sheet for providers: https://www.fda.gov/media/222216/download    Fact sheet for patients: https://www.fda.gov/media/596964/download    Test performed by PCR.    Blood Culture - Blood, Arm, Right [[id number]]  (Normal) Collected: 02/05/24 1150    Lab  Status: Preliminary result Specimen: Blood from Arm, Right Updated: 02/07/24 1200     Blood Culture No growth at 2 days    Narrative:      Less than seven (7) mL's of blood was collected.  Insufficient quantity may yield false negative results.            Laboratory  Results from last 7 days   Lab Units 02/07/24  1013   WBC 10*3/mm3 9.20   HEMOGLOBIN g/dL 11.8*   HEMATOCRIT % 37.4   PLATELETS 10*3/mm3 200     Results from last 7 days   Lab Units 02/07/24  1013   SODIUM mmol/L 136   POTASSIUM mmol/L 4.9   CHLORIDE mmol/L 102   CO2 mmol/L 24.0   BUN mg/dL 32*   CREATININE mg/dL 1.26*   GLUCOSE mg/dL 100*   CALCIUM mg/dL 8.8     Results from last 7 days   Lab Units 02/07/24  1013   SODIUM mmol/L 136   POTASSIUM mmol/L 4.9   CHLORIDE mmol/L 102   CO2 mmol/L 24.0   BUN mg/dL 32*   CREATININE mg/dL 1.26*   GLUCOSE mg/dL 100*   CALCIUM mg/dL 8.8     Results from last 7 days   Lab Units 02/06/24  1806   CK TOTAL U/L 64               Radiology  Imaging Results (Last 72 Hours)       Procedure Component Value Units Date/Time    MRI Brain Without Contrast [[id number]] Collected: 02/07/24 0858     Updated: 02/07/24 0902    Narrative:      MRI BRAIN WO CONTRAST    Date of Exam: 2/7/2024 8:30 AM EST    Indication: Mental status change, unknown cause.     Comparison: CT February 5, 2024    Technique:  Routine multiplanar/multisequence sequence images of the brain were obtained without contrast administration.      Findings:  No acute infarct is present on diffusion weighted sequences. Midline structures are normal and the craniocervical junction appears satisfactory. Age-related changes are present with moderate generalized volume loss and mild typical T2 hyperintense   subcortical and pontine white matter changes, nonspecific but favored to reflect sequela of chronic microvascular ischemia. There is otherwise no evidence of intracranial hemorrhage, mass or mass effect. There is mild associated ex vacuo prominence of   the  ventricles correlating with surrounding volume loss. The orbits appear normal. The paranasal sinuses are grossly clear.      Impression:      Impression:  Mildly motion-degraded exam demonstrating expected age-related changes, without evidence of acute infarct, hemorrhage, mass or mass effect.        Electronically Signed: Andres Olvera MD    2/7/2024 9:00 AM EST    Workstation ID: [id number]    CT Abdomen Pelvis Without Contrast [[id number]] Collected: 02/06/24 1518     Updated: 02/06/24 1537    Narrative:      CT CHEST WO CONTRAST DIAGNOSTIC, CT ABDOMEN PELVIS WO CONTRAST    Date of Exam: 2/6/2024 3:14 PM EST    Indication: COPD, exacerbation. History of breast cancer    Comparison: CT chest of 5/8/2023. Prior CT abdomen pelvis of 11/10/2010    Technique: Axial CT images were obtained of the chest without contrast administration.  Sagittal and coronal reconstructions were performed.  Automated exposure control and iterative reconstruction methods were used.      Findings: CT chest:  There are moderate changes of centrilobular emphysema. There is chronic atelectasis of the left lower lobe posteriorly and medially. There are no pulmonary nodules or masses. There is moderately severe cardiomegaly, similar. There are coronary   calcifications. There is atherosclerotic disease of the thoracic aorta. There are calcified nodes in the right hilum. There are no enlarged mediastinal nodes. There are moderately severe degenerative changes in the lower C-spine and mild degenerative   changes in the thoracic spine. There is a new sclerotic focus of the mid manubrium with some overlying cortical thickening which may represent subacute or old fracture although metastatic disease is not excluded.    CT abdomen and pelvis: The infrarenal abdominal aorta measures 3.9 cm transversely as compared to 2.6 cm previously. There is air in the urinary bladder which is partially distended. There is a small amount of free pelvic fluid. There  is diverticulosis   without evidence of acute diverticulitis. There is respiratory motion on the exam. There is no large or small bowel dilatation. The liver contour is mildly nodular, increased from the prior exam. The liver size is within normal limits. The other   nonopacified solid organs are within normal limits in size. There are no renal stones or hydronephrosis. There are cholecystectomy clips. There are moderately severe degenerative changes in the lumbar spine.      Impression:      Impression:  Chronic atelectasis of the left lower lobe. Moderately severe emphysema. Lesion of the sternal manubrium as detailed, which could represent subacute or old fracture although metastatic disease is not excluded. Correlation with whole-body bone scan may be   useful.    Aneurysmal dilatation of the abdominal aorta.    Small amount of free pelvic fluid, nonspecific.    Nodular liver contour suggests cirrhosis.    Electronically Signed: Mary Ivan MD    2/6/2024 3:29 PM EST    Workstation ID: [id number]    CT Chest Without Contrast Diagnostic [[id number]] Collected: 02/06/24 1518     Updated: 02/06/24 1537    Narrative:      CT CHEST WO CONTRAST DIAGNOSTIC, CT ABDOMEN PELVIS WO CONTRAST    Date of Exam: 2/6/2024 3:14 PM EST    Indication: COPD, exacerbation. History of breast cancer    Comparison: CT chest of 5/8/2023. Prior CT abdomen pelvis of 11/10/2010    Technique: Axial CT images were obtained of the chest without contrast administration.  Sagittal and coronal reconstructions were performed.  Automated exposure control and iterative reconstruction methods were used.      Findings: CT chest:  There are moderate changes of centrilobular emphysema. There is chronic atelectasis of the left lower lobe posteriorly and medially. There are no pulmonary nodules or masses. There is moderately severe cardiomegaly, similar. There are coronary   calcifications. There is atherosclerotic disease of the thoracic aorta. There  are calcified nodes in the right hilum. There are no enlarged mediastinal nodes. There are moderately severe degenerative changes in the lower C-spine and mild degenerative   changes in the thoracic spine. There is a new sclerotic focus of the mid manubrium with some overlying cortical thickening which may represent subacute or old fracture although metastatic disease is not excluded.    CT abdomen and pelvis: The infrarenal abdominal aorta measures 3.9 cm transversely as compared to 2.6 cm previously. There is air in the urinary bladder which is partially distended. There is a small amount of free pelvic fluid. There is diverticulosis   without evidence of acute diverticulitis. There is respiratory motion on the exam. There is no large or small bowel dilatation. The liver contour is mildly nodular, increased from the prior exam. The liver size is within normal limits. The other   nonopacified solid organs are within normal limits in size. There are no renal stones or hydronephrosis. There are cholecystectomy clips. There are moderately severe degenerative changes in the lumbar spine.      Impression:      Impression:  Chronic atelectasis of the left lower lobe. Moderately severe emphysema. Lesion of the sternal manubrium as detailed, which could represent subacute or old fracture although metastatic disease is not excluded. Correlation with whole-body bone scan may be   useful.    Aneurysmal dilatation of the abdominal aorta.    Small amount of free pelvic fluid, nonspecific.    Nodular liver contour suggests cirrhosis.    Electronically Signed: Mary Ivan MD    2/6/2024 3:29 PM EST    Workstation ID: [id number]    CT Head Without Contrast [[id number]] Collected: 02/05/24 1223     Updated: 02/05/24 1228    Narrative:      CT HEAD WO CONTRAST    Date of Exam: 2/5/2024 12:12 PM EST    Indication: confusion.    Comparison: Head CT dated 9/26/2023    Technique: Axial CT images were obtained of the head without  contrast administration.  Coronal reconstructions were performed.  Automated exposure control and iterative reconstruction methods were used.      FINDINGS:    Examination is limited due to motion artifact.    Foci of periventricular and subcortical white matter hypoattenuation are consistent with chronic, microvascular ischemic change. There is age-related loss of brain parenchymal volume with prominence of sulcation and ventriculomegaly. No significant mass   effect, midline shift, intracranial hemorrhage, or hydrocephalus is identified. No extra-axial fluid collection is identified.   The calvarium and overlying soft tissues are unremarkable. The paranasal sinuses and bilateral mastoid air cells are   adequately aerated. The visualized bony orbits, globes, and retrobulbar soft tissues are unremarkable.      Impression:      1.Examination is limited due to motion artifact.  2.Given this limitation, no acute intracranial abnormality is identified.  3.Findings compatible with chronic microvascular ischemic change and diffuse cortical atrophy.    Electronically Signed: Parker Mireles MD    2/5/2024 12:26 PM EST    Workstation ID: [id number]    XR Chest 1 View [[id number]] Collected: 02/05/24 1205     Updated: 02/05/24 1208    Narrative:      XR CHEST 1 VW    Date of Exam: 2/5/2024 12:00 PM EST    Indication: weakness    Comparison: 5/2/2023.    Findings:  There is mild cardiomegaly with increased heart size. Mildly prominent interstitial pattern appears stable and chronic. There is a prominent skinfold over the right chest. There is no definite pneumothorax. There is no effusion.      Impression:      Impression:  Mild cardiomegaly. Mild chronic findings of the lung fields.      Electronically Signed: Mary Ivan MD    2/5/2024 12:06 PM EST    Workstation ID: [id number]            Cardiology      Results Review:  I have reviewed all clinical data, test, lab, and imaging results.       Schedule Meds  allopurinol, 100  mg, Oral, Daily  apixaban, 5 mg, Oral, Q12H  budesonide, 0.5 mg, Nebulization, BID - RT  carvedilol, 12.5 mg, Oral, BID With Meals  cephalexin, 250 mg, Oral, Q8H  famotidine, 20 mg, Oral, Daily  hydrALAZINE, 25 mg, Oral, Q8H  ipratropium-albuterol, 3 mL, Nebulization, 4x Daily - RT  levothyroxine, 50 mcg, Oral, Q AM  melatonin, 5 mg, Oral, Nightly  sildenafil, 20 mg, Oral, TID  sodium chloride, 10 mL, Intravenous, Q12H  sodium chloride, 10 mL, Intravenous, Q12H        Infusion Meds  sodium chloride, 60 mL/hr, Last Rate: 60 mL/hr (02/07/24 1226)        PRN Meds    acetaminophen    Calcium Replacement - Follow Nurse / BPA Driven Protocol    ipratropium-albuterol    Magnesium Standard Dose Replacement - Follow Nurse / BPA Driven Protocol    ondansetron    Phosphorus Replacement - Follow Nurse / BPA Driven Protocol    Potassium Replacement - Follow Nurse / BPA Driven Protocol    [COMPLETED] Insert Peripheral IV **AND** sodium chloride    sodium chloride    sodium chloride    sodium chloride    sodium chloride      Assessment & Plan       Assessment    Left breast induration with tenderness.  Symptoms have been present prior to admission patient was put on p.o. Augmentin.  We need to rule out breast abscess.    Right arm erythema noted after IV insertion.  Currently with no clinical signs of infection    Severe confusion started 3 days prior to admission.  Etiology is not clear.  Patient has a supple neck.  But I cannot rule out encephalitis completely.  MRI of the brain with and without contrast and showed no acute findings    History of  breast cancer in 2011 in remission    History of DVT/PE    COPD-normally on 2 to 3 L of oxygen by nasal cannula    Acute kidney injury on chronic kidney disease    Plan    Discontinue p.o. Keflex   Start IV Rocephin 2 g every 24 hours  MRI of  the brain with contrast, patient already have MRI without contrast  Ultrasound of the left breast  Doppler of the right arm  Keep right arm  elevated  Continue supportive care   a.m. labs  Discussed with patient's daughter and son-in-law at bedside  Case discussed with RN          Chasity Purcell, JOAQUÍN  02/07/24  17:23 EST    Note is dictated utilizing voice recognition software/Dragon

## 2024-02-07 NOTE — PLAN OF CARE
Problem: Adult Inpatient Plan of Care  Goal: Absence of Hospital-Acquired Illness or Injury  Intervention: Identify and Manage Fall Risk  Description: Perform standard risk assessment on admission using a validated tool or comprehensive approach appropriate to the patient; reassess fall risk frequently, with change in status or transfer to another level of care.  Communicate fall injury risk to interprofessional healthcare team.  Determine need for increased observation, equipment and environmental modification, such as low bed, signage and supportive, nonskid footwear.  Adjust safety measures to individual developmental age, stage and identified risk factors.  Reinforce the importance of safety and physical activity with patient and family.  Perform regular intentional rounding to assess need for position change, pain assessment and personal needs, including assistance with toileting.  Recent Flowsheet Documentation  Taken 2/7/2024 0200 by Galen Wade LPN  Safety Promotion/Fall Prevention:   safety round/check completed   room organization consistent   nonskid shoes/slippers when out of bed   muscle strengthening facilitated   mobility aid in reach   lighting adjusted   fall prevention program maintained   clutter free environment maintained   assistive device/personal items within reach   activity supervised  Taken 2/6/2024 2200 by Galen Wade LPN  Safety Promotion/Fall Prevention:   safety round/check completed   room organization consistent   nonskid shoes/slippers when out of bed   muscle strengthening facilitated   mobility aid in reach   lighting adjusted   fall prevention program maintained   clutter free environment maintained   activity supervised   assistive device/personal items within reach  Taken 2/6/2024 2000 by Galen Wade LPN  Safety Promotion/Fall Prevention:   toileting scheduled   safety round/check completed   room organization consistent   nonskid shoes/slippers  when out of bed   muscle strengthening facilitated   mobility aid in reach   lighting adjusted   fall prevention program maintained   clutter free environment maintained   assistive device/personal items within reach   activity supervised  Intervention: Prevent Skin Injury  Description: Perform a screening for skin injury risk, such as pressure or moisture associated skin damage on admission and at regular intervals throughout hospital stay.  Keep all areas of skin (especially folds) clean and dry.  Maintain adequate skin hydration.  Relieve and redistribute pressure and protect bony prominences; implement measures based on patient-specific risk factors.  Match turning and repositioning schedule to clinical condition.  Encourage weight shift frequently; assist with reposition if unable to complete independently.  Float heels off bed; avoid pressure on the Achilles tendon.  Keep skin free from extended contact with medical devices.  Encourage functional activity and mobility, as early as tolerated.  Use aids (e.g., slide boards, mechanical lift) during transfer.  Recent Flowsheet Documentation  Taken 2/7/2024 0200 by Galen Wade LPN  Body Position: weight shifting  Taken 2/7/2024 0100 by Galen Wade LPN  Body Position:   turned   supine  Taken 2/6/2024 2200 by Galen Wade LPN  Body Position: weight shifting  Taken 2/6/2024 2000 by Galen Wade LPN  Body Position: weight shifting  Intervention: Prevent and Manage VTE (Venous Thromboembolism) Risk  Description: Assess for VTE (venous thromboembolism) risk.  Encourage and assist with early ambulation.  Initiate and maintain compression or other therapy, as indicated, based on identified risk in accordance with organizational protocol and provider order.  Encourage both active and passive leg exercises while in bed, if unable to ambulate.  Recent Flowsheet Documentation  Taken 2/6/2024 2200 by Galen Wade LPN  Activity  Management: activity encouraged  Taken 2/6/2024 2000 by Galen Wade LPN  Activity Management: activity encouraged  Intervention: Prevent Infection  Description: Maintain skin and mucous membrane integrity; promote hand, oral and pulmonary hygiene.  Optimize fluid balance, nutrition, sleep and glycemic control to maximize infection resistance.  Identify potential sources of infection early to prevent or mitigate progression of infection (e.g., wound, lines, devices).  Evaluate ongoing need for invasive devices; remove promptly when no longer indicated.  Recent Flowsheet Documentation  Taken 2/7/2024 0200 by Galen Wade LPN  Infection Prevention:   visitors restricted/screened   single patient room provided   rest/sleep promoted   personal protective equipment utilized   hand hygiene promoted  Taken 2/6/2024 2200 by Galen Wade LPN  Infection Prevention:   visitors restricted/screened   single patient room provided   rest/sleep promoted   hand hygiene promoted   personal protective equipment utilized  Taken 2/6/2024 2000 by Galen Wade LPN  Infection Prevention:   visitors restricted/screened   single patient room provided   rest/sleep promoted   personal protective equipment utilized   hand hygiene promoted  Goal: Optimal Comfort and Wellbeing  Intervention: Monitor Pain and Promote Comfort  Description: Assess pain level, treatment efficacy and patient response at regular intervals using a consistent pain scale.  Consider the presence and impact of preexisting chronic pain.  Encourage patient and caregiver involvement in pain assessment, interventions and safety measures.  Recent Flowsheet Documentation  Taken 2/6/2024 2000 by Galen Wade LPN  Pain Management Interventions:   see MAR   quiet environment facilitated   position adjusted   pain management plan reviewed with patient/caregiver     Problem: Fall Injury Risk  Goal: Absence of Fall and Fall-Related  Injury  Intervention: Identify and Manage Contributors  Description: Develop a fall prevention plan with the patient and caregiver/family.  Provide reorientation, appropriate sensory stimulation and routines with changes in mental status to decrease risk of fall.  Promote use of personal vision and auditory aids.  Assess assistance level required for safe and effective self-care; provide support as needed, such as toileting, mobilization. For age 65 and older, implement timed toileting with assistance.  Encourage physical activity, such as performance of mobility and self-care at highest level of patient ability, multicomponent exercise program and provision of appropriate assistive devices.  If fall occurs, assess the severity of injury; implement fall injury protocol. Determine the cause and revise fall injury prevention plan.  Regularly review medication contribution to fall risk; adjust medication administration times to minimize risk of falling.  Consider risk related to polypharmacy and age.  Balance adequate pain management with potential for oversedation.  Recent Flowsheet Documentation  Taken 2/7/2024 0200 by Galen Wade LPN  Medication Review/Management: medications reviewed  Taken 2/6/2024 2000 by Galen Wade LPN  Medication Review/Management: medications reviewed  Intervention: Promote Injury-Free Environment  Description: Provide a safe, barrier-free environment that encourages independent activity.  Keep care area uncluttered and well-lighted.  Determine need for increased observation or monitoring.  Avoid use of devices that minimize mobility, such as restraints or indwelling urinary catheter.  Recent Flowsheet Documentation  Taken 2/7/2024 0200 by Galen Wade LPN  Safety Promotion/Fall Prevention:   safety round/check completed   room organization consistent   nonskid shoes/slippers when out of bed   muscle strengthening facilitated   mobility aid in reach   lighting  adjusted   fall prevention program maintained   clutter free environment maintained   assistive device/personal items within reach   activity supervised  Taken 2/6/2024 2200 by Galen Wade LPN  Safety Promotion/Fall Prevention:   safety round/check completed   room organization consistent   nonskid shoes/slippers when out of bed   muscle strengthening facilitated   mobility aid in reach   lighting adjusted   fall prevention program maintained   clutter free environment maintained   activity supervised   assistive device/personal items within reach  Taken 2/6/2024 2000 by Galen Wade LPN  Safety Promotion/Fall Prevention:   toileting scheduled   safety round/check completed   room organization consistent   nonskid shoes/slippers when out of bed   muscle strengthening facilitated   mobility aid in reach   lighting adjusted   fall prevention program maintained   clutter free environment maintained   assistive device/personal items within reach   activity supervised     Problem: Skin Injury Risk Increased  Goal: Skin Health and Integrity  Intervention: Optimize Skin Protection  Description: Perform a full pressure injury risk assessment, as indicated by screening, upon admission to care unit.  Reassess skin (injury risk, full inspection) frequently (e.g., scheduled interval, with change in condition) to provide optimal early detection and prevention.  Maintain adequate tissue perfusion (e.g., encourage fluid balance; avoid crossing legs, constrictive clothing or devices) to promote tissue oxygenation.  Maintain head of bed at lowest degree of elevation tolerated, considering medical condition and other restrictions.  Avoid positioning onto an area that remains reddened.  Minimize incontinence and moisture (e.g., toileting schedule; moisture-wicking pad, diaper or incontinence collection device; skin moisture barrier).  Cleanse skin promptly and gently when soiled utilizing a pH-balanced  cleanser.  Relieve and redistribute pressure (e.g., scheduled position changes, weight shifts, use of support surface, medical device repositioning, protective dressing application, use of positioning device, microclimate control, use of pressure-injury-monitor  Encourage increased activity, such as sitting in a chair at the bedside or early mobilization, when able to tolerate.  Recent Flowsheet Documentation  Taken 2/7/2024 0200 by Galen Wade LPN  Head of Bed (HOB) Positioning: HOB at 20-30 degrees  Taken 2/6/2024 2200 by Galen Wade LPN  Head of Bed (HOB) Positioning: HOB at 20-30 degrees  Taken 2/6/2024 2000 by Galen Wade LPN  Pressure Reduction Techniques: frequent weight shift encouraged  Head of Bed (HOB) Positioning: HOB at 20-30 degrees  Pressure Reduction Devices: pressure-redistributing mattress utilized     Problem: Hypertension Comorbidity  Goal: Blood Pressure in Desired Range  Intervention: Maintain Blood Pressure Management  Description: Evaluate adherence to home antihypertensive regimen (e.g., exercise and activity, diet modification, medication).  Provide scheduled antihypertensive medication; consider administration time and effects (e.g., avoid giving diuretic prior to bedtime).  Monitor response to antihypertensive medication therapy (e.g., blood pressure, electrolyte levels, medication effects).  Minimize risk of orthostatic hypotension; encourage caution with position changes, particularly if elderly.  Recent Flowsheet Documentation  Taken 2/7/2024 0200 by Galen Wade LPN  Medication Review/Management: medications reviewed  Taken 2/6/2024 2000 by Galen Wade LPN  Medication Review/Management: medications reviewed     Problem: Confusion Acute  Goal: Optimal Cognitive Function  Intervention: Minimize Contributing Factors  Description: Establish baseline and ongoing mental status using a validated tool.  Investigate potential physiologic factors for  confusion (e.g., hypoglycemia, hypoxia, fluid imbalance, sleep deprivation, malnutrition); advocate for and provide treatment.  Minimize stimulation; provide a quiet and calm environment; enlist the help of familiar family or caregiver.  Consider use of complementary and alternative medicine modalities (e.g., therapeutic touch, massage, aromatherapy).  Facilitate safe, barrier-free surroundings; keep needed items within reach (e.g., call light, personal belongings).  Promote use of personal vision and auditory aids.  Provide familiar items and orientation cues (e.g., calendar, clock, pictures).  Establish familiarity using a structured routine congruent with home schedule, when possible (e.g., consistent caregiver, sleep and meal schedule, toileting schedule).  Use communication techniques that avoid confrontation and correction; approach each interaction as a new episode of engagement.  Recent Flowsheet Documentation  Taken 2/6/2024 2000 by Galen Wade LPN  Reorientation Measures: clock in view   Goal Outcome Evaluation:PLAN of care on going, noted intermitted confusion & agitation reported, not observed this shift. Family remain at beside, fall precaution in place near nurse station, call light within reach bed alarm on and bed in low position . MRI and US in AM progress toward goal cont to be monitor

## 2024-02-07 NOTE — PLAN OF CARE
Assessment: Gabrielle Lyles presents with functional mobility impairments which indicate the need for skilled intervention. Pt remains confused and mostly needing assist with initiating movement, otherwise pt only requiring CGA-min A for mobility this date. Will continue to require SNF d/t decreased safety awareness/impaired cognition. Tolerating session today without incident. Will continue to follow and progress as tolerated.

## 2024-02-07 NOTE — CONSULTS
PULMONARY CRITICAL CARE CONSULT NOTE      PATIENT IDENTIFICATION:  Name: Gabrielle Lyles  MRN: [id number]  :  1940     Age: 83 y.o.  Sex: female        DATE OF CONSULTATION:  2024  PRIMARY CARE PHYSICIAN    Shaylee Mcdaniels MD                  CHIEF COMPLAINT: SOB     History of Present Illness:   Gabrielle Lyles is a 83 y.o. female with a history of COPD, Ex-smoker, Hx PE/DVT, HTN, Insomnia, CAD, Hx left breast cancer, Hx TIA, and Severe Pulmonary HTN who came to the ER with complaints of AMS and BLE edema. She saw her PCP who had her increase her Lasix through the weekend and then became confused. In the ER her temperature was 100 and she has had poor oral intake over the last several days. Admitted for further treatment.       Review of Systems:   Constitutional: As above   Eyes: negative   ENT/oropharynx: negative   Cardiovascular: As above    Respiratory: As above    Gastrointestinal: negative   Genitourinary: negative   Neurological: negative   Musculoskeletal: negative   Integument/breast: negative   Endocrine: negative   Allergic/Immunologic: negative     Past Medical History:  Past Medical History:   Diagnosis Date    Acute exacerbation of chronic obstructive pulmonary disease (COPD)     COPD (chronic obstructive pulmonary disease)     Deep vein thrombosis of bilateral lower extremities 2019    3/2013    History of pneumonia 2012    community acquired pneumonia and right pleural effusion;hospitalized at [facility]    History of pulmonary embolism 2013    bilateral PE    Hypertension     Insomnia     on Ambien 5mg at     Lobular breast cancer, left 2011    Stage IA left upper lobular breast cancer    Malignant neoplasm of left breast in female, estrogen receptor positive 2019    Osteopenia 2012    Parainfluenza infection     Parotid duct obstruction     Severe pulmonary hypertension 2023    Stage 3a chronic kidney disease 2019    TIA (transient  ischemic attack) 10/25/2022       Past Surgical History:  Past Surgical History:   Procedure Laterality Date    CARDIAC CATHETERIZATION N/A 3/3/2023    Procedure: Left and Right Heart Cath with Coronary Angiography;  Surgeon: Richardson Willis MD;  Location: [facility] CATH INVASIVE LOCATION;  Service: Cardiovascular;  Laterality: N/A;    CATARACT EXTRACTION      IVC FILTER RETRIEVAL  2014    IVC filter placement by Dr. Card    MAMMO STEREOTACTIC BREAST BX SURGICAL ADD UNI Left 2011    invasive lobular carcinoma-Dr. Cande Daniel    MASTECTOMY, PARTIAL Left 2011    and left axillary sentinel lymph node biopsy by Dr. Uriostegui    TUBAL ABDOMINAL LIGATION          Family History:  Family History   Problem Relation Age of Onset    Lung cancer Brother         Social History:   Social History     Tobacco Use    Smoking status: Former     Types: Cigarettes     Quit date:      Years since quittin.     Passive exposure: Past    Smokeless tobacco: Never    Tobacco comments:     smoked 6 cigarettes a day from 13 years of age to 56 years of age when she quit in    Substance Use Topics    Alcohol use: Not Currently        Allergies:  No Known Allergies    Home Meds:  Medications Prior to Admission   Medication Sig Dispense Refill Last Dose    allopurinol (ZYLOPRIM) 100 MG tablet Take 1 tablet by mouth Daily.       amoxicillin-clavulanate (AUGMENTIN) 875-125 MG per tablet Take 1 tablet by mouth 2 (Two) Times a Day.   2024 at 2100    apixaban (ELIQUIS) 5 MG tablet tablet Take 1 tablet by mouth Every 12 (Twelve) Hours. Indications: Other - full anticoagulation, history of DVT/PE 60 tablet 2 2024 at 0900    budesonide-formoterol (SYMBICORT) 80-4.5 MCG/ACT inhaler Inhale 2 puffs 2 (Two) Times a Day.       carvedilol (COREG) 12.5 MG tablet TAKE 1 TABLET BY MOUTH TWICE DAILY WITH MEALS 180 tablet 3     Cholecalciferol (Vitamin D3) 50 MCG ( UT) capsule Take 1 capsule by mouth Daily.        "furosemide (LASIX) 40 MG tablet Take 1 tablet by mouth Daily.       gabapentin (NEURONTIN) 300 MG capsule Take 1 capsule by mouth 2 (Two) Times a Day.       levothyroxine (SYNTHROID, LEVOTHROID) 50 MCG tablet Take 1 tablet by mouth Daily.       lisinopril (PRINIVIL,ZESTRIL) 40 MG tablet Take 1 tablet by mouth Daily. 90 tablet 3     potassium chloride (K-DUR,KLOR-CON) 10 MEQ CR tablet Take 1 tablet by mouth Daily. 90 tablet 3     sildenafil (REVATIO) 20 MG tablet Take 1 tablet by mouth Every 8 (Eight) Hours. 90 tablet 1        Objective:  tMax 24 hrs: Temp (24hrs), Av.6 °F (36.4 °C), Min:96.4 °F (35.8 °C), Max:98.3 °F (36.8 °C)      Vitals Ranges:   Temp:  [96.4 °F (35.8 °C)-98.3 °F (36.8 °C)] 96.4 °F (35.8 °C)  Heart Rate:  [63-76] 76  Resp:  [17-31] 19  BP: ()/(49-99) 172/99    Intake and Output Last 3 Shifts:   I/O last 3 completed shifts:  In: 1912 [P.O.:360; I.V.:1552]  Out: 280 [Urine:280]    Exam:  /99 (BP Location: Right arm, Patient Position: Lying)   Pulse 76   Temp 96.4 °F (35.8 °C) (Axillary)   Resp 19   Ht 162.6 cm (64\")   Wt 69 kg (152 lb 1.9 oz)   SpO2 96%   BMI 26.11 kg/m²       24  0415 24  0411   Weight: 66.9 kg (147 lb 7.8 oz) 69 kg (152 lb 1.9 oz)     General Appearance:      HEENT:  Normocephalic, without obvious abnormality, atraumatic. Conjunctivae/corneas clear.  Normal external ear canals. Nares normal, no drainage.  Neck:  Supple, symmetrical, trachea midline. No JVD.  Lungs /Chest wall:   Bilateral basal rhonchi, respirations unlabored, symmetrical wall movement.     Heart:  Regular rate and rhythm, systolic murmur PMI left sternal border  Abdomen: Soft, nontender, no masses, no organomegaly.    Extremities: Trace edema, no clubbing or cyanosis        Data Review:  All labs (24hrs):   Recent Results (from the past 24 hour(s))   Comprehensive Metabolic Panel    Collection Time: 24  6:06 PM    Specimen: Blood   Result Value Ref Range    Glucose 105 (H) " 65 - 99 mg/dL    BUN 32 (H) 8 - 23 mg/dL    Creatinine 1.50 (H) 0.57 - 1.00 mg/dL    Sodium 135 (L) 136 - 145 mmol/L    Potassium 5.1 3.5 - 5.2 mmol/L    Chloride 98 98 - 107 mmol/L    CO2 28.0 22.0 - 29.0 mmol/L    Calcium 8.4 (L) 8.6 - 10.5 mg/dL    Total Protein 5.9 (L) 6.0 - 8.5 g/dL    Albumin 3.3 (L) 3.5 - 5.2 g/dL    ALT (SGPT) 16 1 - 33 U/L    AST (SGOT) 21 1 - 32 U/L    Alkaline Phosphatase 58 39 - 117 U/L    Total Bilirubin 0.6 0.0 - 1.2 mg/dL    Globulin 2.6 gm/dL    A/G Ratio 1.3 g/dL    BUN/Creatinine Ratio 21.3 7.0 - 25.0    Anion Gap 9.0 5.0 - 15.0 mmol/L    eGFR 34.6 (L) >60.0 mL/min/1.73   CK    Collection Time: 02/06/24  6:06 PM    Specimen: Blood   Result Value Ref Range    Creatine Kinase 64 20 - 180 U/L   Uric Acid    Collection Time: 02/06/24  6:06 PM    Specimen: Blood   Result Value Ref Range    Uric Acid 7.9 (H) 2.4 - 5.7 mg/dL   Sodium, Urine, Random - Urine, Clean Catch    Collection Time: 02/07/24  7:25 AM    Specimen: Urine, Clean Catch   Result Value Ref Range    Sodium, Urine <20 mmol/L   Eosinophil Smear - Urine, Urine, Clean Catch    Collection Time: 02/07/24  7:25 AM    Specimen: Urine, Clean Catch   Result Value Ref Range    Eosinophil Smear 0 0 - 0 % EOS/100 Cells   Basic Metabolic Panel    Collection Time: 02/07/24 10:13 AM    Specimen: Blood   Result Value Ref Range    Glucose 100 (H) 65 - 99 mg/dL    BUN 32 (H) 8 - 23 mg/dL    Creatinine 1.26 (H) 0.57 - 1.00 mg/dL    Sodium 136 136 - 145 mmol/L    Potassium 4.9 3.5 - 5.2 mmol/L    Chloride 102 98 - 107 mmol/L    CO2 24.0 22.0 - 29.0 mmol/L    Calcium 8.8 8.6 - 10.5 mg/dL    BUN/Creatinine Ratio 25.4 (H) 7.0 - 25.0    Anion Gap 10.0 5.0 - 15.0 mmol/L    eGFR 42.7 (L) >60.0 mL/min/1.73   CBC Auto Differential    Collection Time: 02/07/24 10:13 AM    Specimen: Blood   Result Value Ref Range    WBC 9.20 3.40 - 10.80 10*3/mm3    RBC 4.33 3.77 - 5.28 10*6/mm3    Hemoglobin 11.8 (L) 12.0 - 15.9 g/dL    Hematocrit 37.4 34.0 - 46.6 %     MCV 86.3 79.0 - 97.0 fL    MCH 27.1 26.6 - 33.0 pg    MCHC 31.5 31.5 - 35.7 g/dL    RDW 16.0 (H) 12.3 - 15.4 %    RDW-SD 48.6 37.0 - 54.0 fl    MPV 8.4 6.0 - 12.0 fL    Platelets 200 140 - 450 10*3/mm3    Neutrophil % 73.0 42.7 - 76.0 %    Lymphocyte % 10.8 (L) 19.6 - 45.3 %    Monocyte % 10.0 5.0 - 12.0 %    Eosinophil % 5.1 0.3 - 6.2 %    Basophil % 1.1 0.0 - 1.5 %    Neutrophils, Absolute 6.70 1.70 - 7.00 10*3/mm3    Lymphocytes, Absolute 1.00 0.70 - 3.10 10*3/mm3    Monocytes, Absolute 0.90 0.10 - 0.90 10*3/mm3    Eosinophils, Absolute 0.50 (H) 0.00 - 0.40 10*3/mm3    Basophils, Absolute 0.10 0.00 - 0.20 10*3/mm3    nRBC 0.0 0.0 - 0.2 /100 WBC        Imaging:  MRI Brain Without Contrast    Result Date: 2/7/2024  MRI BRAIN WO CONTRAST Date of Exam: 2/7/2024 8:30 AM EST Indication: Mental status change, unknown cause.  Comparison: CT February 5, 2024 Technique:  Routine multiplanar/multisequence sequence images of the brain were obtained without contrast administration. Findings: No acute infarct is present on diffusion weighted sequences. Midline structures are normal and the craniocervical junction appears satisfactory. Age-related changes are present with moderate generalized volume loss and mild typical T2 hyperintense subcortical and pontine white matter changes, nonspecific but favored to reflect sequela of chronic microvascular ischemia. There is otherwise no evidence of intracranial hemorrhage, mass or mass effect. There is mild associated ex vacuo prominence of the ventricles correlating with surrounding volume loss. The orbits appear normal. The paranasal sinuses are grossly clear.     Impression: Mildly motion-degraded exam demonstrating expected age-related changes, without evidence of acute infarct, hemorrhage, mass or mass effect. Electronically Signed: Andres Olvera MD  2/7/2024 9:00 AM EST  Workstation ID: [id number]    CT Abdomen Pelvis Without Contrast    Result Date: 2/6/2024  CT CHEST WO  CONTRAST DIAGNOSTIC, CT ABDOMEN PELVIS WO CONTRAST Date of Exam: 2/6/2024 3:14 PM EST Indication: COPD, exacerbation. History of breast cancer Comparison: CT chest of 5/8/2023. Prior CT abdomen pelvis of 11/10/2010 Technique: Axial CT images were obtained of the chest without contrast administration.  Sagittal and coronal reconstructions were performed.  Automated exposure control and iterative reconstruction methods were used. Findings: CT chest: There are moderate changes of centrilobular emphysema. There is chronic atelectasis of the left lower lobe posteriorly and medially. There are no pulmonary nodules or masses. There is moderately severe cardiomegaly, similar. There are coronary calcifications. There is atherosclerotic disease of the thoracic aorta. There are calcified nodes in the right hilum. There are no enlarged mediastinal nodes. There are moderately severe degenerative changes in the lower C-spine and mild degenerative changes in the thoracic spine. There is a new sclerotic focus of the mid manubrium with some overlying cortical thickening which may represent subacute or old fracture although metastatic disease is not excluded. CT abdomen and pelvis: The infrarenal abdominal aorta measures 3.9 cm transversely as compared to 2.6 cm previously. There is air in the urinary bladder which is partially distended. There is a small amount of free pelvic fluid. There is diverticulosis without evidence of acute diverticulitis. There is respiratory motion on the exam. There is no large or small bowel dilatation. The liver contour is mildly nodular, increased from the prior exam. The liver size is within normal limits. The other nonopacified solid organs are within normal limits in size. There are no renal stones or hydronephrosis. There are cholecystectomy clips. There are moderately severe degenerative changes in the lumbar spine.     Impression: Chronic atelectasis of the left lower lobe. Moderately severe  emphysema. Lesion of the sternal manubrium as detailed, which could represent subacute or old fracture although metastatic disease is not excluded. Correlation with whole-body bone scan may be  useful. Aneurysmal dilatation of the abdominal aorta. Small amount of free pelvic fluid, nonspecific. Nodular liver contour suggests cirrhosis. Electronically Signed: Mary Ivan MD  2/6/2024 3:29 PM EST  Workstation ID: [id number]    CT Chest Without Contrast Diagnostic    Result Date: 2/6/2024  CT CHEST WO CONTRAST DIAGNOSTIC, CT ABDOMEN PELVIS WO CONTRAST Date of Exam: 2/6/2024 3:14 PM EST Indication: COPD, exacerbation. History of breast cancer Comparison: CT chest of 5/8/2023. Prior CT abdomen pelvis of 11/10/2010 Technique: Axial CT images were obtained of the chest without contrast administration.  Sagittal and coronal reconstructions were performed.  Automated exposure control and iterative reconstruction methods were used. Findings: CT chest: There are moderate changes of centrilobular emphysema. There is chronic atelectasis of the left lower lobe posteriorly and medially. There are no pulmonary nodules or masses. There is moderately severe cardiomegaly, similar. There are coronary calcifications. There is atherosclerotic disease of the thoracic aorta. There are calcified nodes in the right hilum. There are no enlarged mediastinal nodes. There are moderately severe degenerative changes in the lower C-spine and mild degenerative changes in the thoracic spine. There is a new sclerotic focus of the mid manubrium with some overlying cortical thickening which may represent subacute or old fracture although metastatic disease is not excluded. CT abdomen and pelvis: The infrarenal abdominal aorta measures 3.9 cm transversely as compared to 2.6 cm previously. There is air in the urinary bladder which is partially distended. There is a small amount of free pelvic fluid. There is diverticulosis without evidence of acute  diverticulitis. There is respiratory motion on the exam. There is no large or small bowel dilatation. The liver contour is mildly nodular, increased from the prior exam. The liver size is within normal limits. The other nonopacified solid organs are within normal limits in size. There are no renal stones or hydronephrosis. There are cholecystectomy clips. There are moderately severe degenerative changes in the lumbar spine.     Impression: Chronic atelectasis of the left lower lobe. Moderately severe emphysema. Lesion of the sternal manubrium as detailed, which could represent subacute or old fracture although metastatic disease is not excluded. Correlation with whole-body bone scan may be  useful. Aneurysmal dilatation of the abdominal aorta. Small amount of free pelvic fluid, nonspecific. Nodular liver contour suggests cirrhosis. Electronically Signed: Mary Ivan MD  2/6/2024 3:29 PM EST  Workstation ID: [id number]    CT Head Without Contrast    Result Date: 2/5/2024  CT HEAD WO CONTRAST Date of Exam: 2/5/2024 12:12 PM EST Indication: confusion. Comparison: Head CT dated 9/26/2023 Technique: Axial CT images were obtained of the head without contrast administration.  Coronal reconstructions were performed.  Automated exposure control and iterative reconstruction methods were used. FINDINGS:  Examination is limited due to motion artifact. Foci of periventricular and subcortical white matter hypoattenuation are consistent with chronic, microvascular ischemic change. There is age-related loss of brain parenchymal volume with prominence of sulcation and ventriculomegaly. No significant mass effect, midline shift, intracranial hemorrhage, or hydrocephalus is identified. No extra-axial fluid collection is identified.   The calvarium and overlying soft tissues are unremarkable. The paranasal sinuses and bilateral mastoid air cells are adequately aerated. The visualized bony orbits, globes, and retrobulbar soft tissues  are unremarkable.     1.Examination is limited due to motion artifact. 2.Given this limitation, no acute intracranial abnormality is identified. 3.Findings compatible with chronic microvascular ischemic change and diffuse cortical atrophy. Electronically Signed: Parker Mireles MD  2/5/2024 12:26 PM EST  Workstation ID: [id number]    XR Chest 1 View    Result Date: 2/5/2024  XR CHEST 1 VW Date of Exam: 2/5/2024 12:00 PM EST Indication: weakness Comparison: 5/2/2023. Findings: There is mild cardiomegaly with increased heart size. Mildly prominent interstitial pattern appears stable and chronic. There is a prominent skinfold over the right chest. There is no definite pneumothorax. There is no effusion.     Impression: Mild cardiomegaly. Mild chronic findings of the lung fields. Electronically Signed: Mary Ivan MD  2/5/2024 12:06 PM EST  Workstation ID: [id number]       Current:  allopurinol, 100 mg, Oral, Daily  apixaban, 5 mg, Oral, Q12H  budesonide-formoterol, 2 puff, Inhalation, BID - RT  carvedilol, 12.5 mg, Oral, BID With Meals  cephalexin, 250 mg, Oral, Q8H  hydrALAZINE, 25 mg, Oral, Q8H  levothyroxine, 50 mcg, Oral, Q AM  [Held by provider] sildenafil, 20 mg, Oral, TID  sodium chloride, 10 mL, Intravenous, Q12H  sodium chloride, 10 mL, Intravenous, Q12H        Infusion:  sodium chloride, 60 mL/hr, Last Rate: 60 mL/hr (02/07/24 1010)         PRN:    acetaminophen    Calcium Replacement - Follow Nurse / BPA Driven Protocol    ipratropium-albuterol    Magnesium Standard Dose Replacement - Follow Nurse / BPA Driven Protocol    ondansetron    Phosphorus Replacement - Follow Nurse / BPA Driven Protocol    Potassium Replacement - Follow Nurse / BPA Driven Protocol    [COMPLETED] Insert Peripheral IV **AND** sodium chloride    sodium chloride    sodium chloride    sodium chloride    sodium chloride    ASSESSMENT:  AMS  Chronic   Severe Pulmonary HTN  Hypokalemia  CKD 3  HTN  Hx PE/DVT  Hx TIA           PLAN:  Will  order ECHO to evaluate   Bronchodilators  Inhaled corticosteroids  Incentive spirometer  Cardiology following   Electrolytes/ glycemic control  DVT and GI prophylaxis-Apixaban     Discussed with Dr Jeanine Cruz, APRN   2/7/2024  11:50 EST      I personally have examined  and interviewed the patient. I have reviewed the history, data, problems, assessment and plan with our NP.  Total Critical care time in direct medical management (   ) minutes, This time specifically excludes time spent performing procedures.    Jerry Solorzano MD   2/7/2024  22:45 EST

## 2024-02-07 NOTE — PLAN OF CARE
Goal Outcome Evaluation:              Outcome Evaluation: Pt is an 82 yo female brought to ED 2/5/24 with c/o AMS. MRI pending. CT Chest (+) Chronic atelectasis LLL, lesion of sternal manubrium. CTH limited due to motion. CT Abd pelvis suggestive of cirrhosis, aneurysmal dilation of abdominal aorta.  Dx   PMHx significant for CKD III, COPD, TIA, breast CA, DVT, PE, IVC Filter.    At baseline, pt resides alone in [facility] with 2 TAYLOR. She is independent, driving short distances.  Pt has good social support of daughters. Pt oriented to person, with poor attention and difficulty responding to most questions. She does state her name and her daughter's name.  Pt requires A for intitiation of all tasks, but then funtionally moves well. She is limited in following commands, requiring tactile cues to initaite. Patient requires significant assist due to impaired cognition, and will require SNF rehab at [place].

## 2024-02-07 NOTE — PLAN OF CARE
Goal Outcome Evaluation:Patient pleasant and cooperative. Family at bedside today. ID and Neurology now on board. Right arm outside of PICC dressing red in color. ID aware and wonders if cellulitis. Also having ECHO done.

## 2024-02-07 NOTE — THERAPY TREATMENT NOTE
"Subjective: Pt agreeable to therapeutic plan of care. Pt confused and family member in room encouraging patient to work with PT/OT.     Objective:     Bed mobility - Mod-A and Assist x 2 supine>sit - pt requires assist for initiation mostly, otherwise SBA for sit>supine  Transfers - CGA, Min-A, Assist x 2, and with rolling walker  Ambulation - 3 lateral feet CGA and with rolling walker - shuffled steps, decreased step length      Vitals: WNL    Pain: Pt does not report any pain; unable to rate due to confusion   Location: NA  Intervention for pain: N/A    Education: Provided education on the importance of mobility in the acute care setting, Verbal/Tactile Cues, and Transfer Training    Assessment: Gabrielle Lyles presents with functional mobility impairments which indicate the need for skilled intervention. Pt remains confused and mostly needing assist with initiating movement, otherwise pt only requiring CGA-min A for mobility this date. Will continue to require SNF d/t decreased safety awareness/impaired cognition. Tolerating session today without incident. Will continue to follow and progress as tolerated.     Plan/Recommendations:   If medically appropriate, Moderate Intensity Therapy recommended post-acute care. This is recommended as therapy feels the patient would require 3-4 days per week and wouldn't tolerate \"3 hour daily\" rehab intensity. SNF would be the preferred choice. If the patient does not agree to SNF, arrange HH or OP depending on home bound status. If patient is medically complex, consider LTACH. Pt requires no DME at discharge.     Pt desires Skilled Rehab placement at discharge. Pt cooperative; agreeable to therapeutic recommendations and plan of care.         Basic Mobility 6-click:  Rollin = Total, A lot = 2, A little = 3; 4 = None  Supine>Sit:   1 = Total, A lot = 2, A little = 3; 4 = None   Sit>Stand with arms:  1 = Total, A lot = 2, A little = 3; 4 = None  Bed>Chair:   1 = " Total, A lot = 2, A little = 3; 4 = None  Ambulate in room:  1 = Total, A lot = 2, A little = 3; 4 = None  3-5 Steps with railin = Total, A lot = 2, A little = 3; 4 = None  Score: 12    Modified Deann: N/A = No pre-op stroke/TIA    Post-Tx Position: Supine with HOB Elevated, Alarms activated, and Call light and personal items within reach  PPE: gloves

## 2024-02-07 NOTE — PROGRESS NOTES
"     LOS: 0 days   Patient Care Team:  Shaylee Mcdaniels MD as PCP - General (Family Medicine)  Richardson Willis MD as Cardiologist (Cardiology)  Rafy Rodriguez MD as Consulting Physician (Hematology and Oncology)  Christianne Phillips, AMARILIS as Licensed Practical Nurse  Salome Fleming MD as Consulting Physician (Nephrology)    Subjective     Interval History: Pt's mental status was clear and at baseline yesterday morning.  After PT, family reports more confusion (\"like someone flipped a switch\"), which has persisted overnight and this morning.  She is oriented to person only.  Low grade fever that was present on admission has resolved.      Patient Complaints: Headache intermittently    History taken from: patient    Review of Systems   Constitutional:  Positive for activity change, appetite change and fatigue. Negative for chills, diaphoresis and fever.   HENT:  Negative for facial swelling, postnasal drip and rhinorrhea.    Eyes:  Negative for visual disturbance.   Respiratory:  Positive for shortness of breath. Negative for cough, wheezing and stridor.    Cardiovascular:  Negative for chest pain, palpitations and leg swelling.   Gastrointestinal:  Negative for abdominal pain, constipation, diarrhea, nausea and vomiting.   Endocrine: Negative for polyuria.   Genitourinary:  Negative for dysuria and urgency.   Musculoskeletal:  Positive for arthralgias and gait problem.   Skin:  Positive for wound. Negative for rash.   Neurological:  Negative for dizziness, tremors, seizures, weakness and headaches.   Psychiatric/Behavioral:  Positive for confusion and dysphoric mood.            Objective     Vital Signs  Temp:  [96.4 °F (35.8 °C)-98.3 °F (36.8 °C)] 96.4 °F (35.8 °C)  Heart Rate:  [63-76] 76  Resp:  [17-31] 19  BP: ()/(49-99) 172/99    Physical Exam:     General Appearance:    Alert, cooperative, irritable, o x 1   Head:    Normocephalic, without obvious abnormality, atraumatic   Eyes:            Lids " and lashes normal, conjunctivae and sclerae normal, no   icterus, no pallor, corneas clear, PERRLA   Ears:    Ears appear intact with no abnormalities noted   Throat:   No oral lesions, no thrush, oral mucosa moist   Neck:   No adenopathy, supple, trachea midline, no thyromegaly, no   carotid bruit, no JVD   Lungs:     Clear to auscultation,respirations regular, even and                  unlabored    Heart:    Regular rhythm and normal rate, normal S1 and S2, no            murmur, no gallop, no rub, no click   Chest Wall:    No abnormalities observed   Abdomen:     Normal bowel sounds, no masses, no organomegaly, soft        Non-tender non-distended, no guarding,   Extremities:   Moves all extremities well, no edema, no cyanosis, no             Redness   Pulses:   Pulses palpable and equal bilaterally   Skin:   Superficial scratch right forearm, no cellulitic changes; right antecubital fossa - erythema, STS from IV infiltration   Lymph nodes:   No palpable adenopathy   Neurologic:   No localizing deficits   Left breast - mild tenderness in inferior aspect, no erythema     Results Review:    Lab Results (last 24 hours)       Procedure Component Value Units Date/Time    Basic Metabolic Panel [[id number]]  (Abnormal) Collected: 02/07/24 1013    Specimen: Blood Updated: 02/07/24 1055     Glucose 100 mg/dL      BUN 32 mg/dL      Creatinine 1.26 mg/dL      Sodium 136 mmol/L      Potassium 4.9 mmol/L      Chloride 102 mmol/L      CO2 24.0 mmol/L      Calcium 8.8 mg/dL      BUN/Creatinine Ratio 25.4     Anion Gap 10.0 mmol/L      eGFR 42.7 mL/min/1.73     Narrative:      GFR Normal >60  Chronic Kidney Disease <60  Kidney Failure <15    The GFR formula is only valid for adults with stable renal function between ages 18 and 70.    CBC & Differential [[id number]]  (Abnormal) Collected: 02/07/24 1013    Specimen: Blood Updated: 02/07/24 1037    Narrative:      The following orders were created for panel order CBC &  Differential.  Procedure                               Abnormality         Status                     ---------                               -----------         ------                     CBC Auto Differential[[id number]]        Abnormal            Final result                 Please view results for these tests on the individual orders.    CBC Auto Differential [[id number]]  (Abnormal) Collected: 02/07/24 1013    Specimen: Blood Updated: 02/07/24 1037     WBC 9.20 10*3/mm3      RBC 4.33 10*6/mm3      Hemoglobin 11.8 g/dL      Hematocrit 37.4 %      MCV 86.3 fL      MCH 27.1 pg      MCHC 31.5 g/dL      RDW 16.0 %      RDW-SD 48.6 fl      MPV 8.4 fL      Platelets 200 10*3/mm3      Neutrophil % 73.0 %      Lymphocyte % 10.8 %      Monocyte % 10.0 %      Eosinophil % 5.1 %      Basophil % 1.1 %      Neutrophils, Absolute 6.70 10*3/mm3      Lymphocytes, Absolute 1.00 10*3/mm3      Monocytes, Absolute 0.90 10*3/mm3      Eosinophils, Absolute 0.50 10*3/mm3      Basophils, Absolute 0.10 10*3/mm3      nRBC 0.0 /100 WBC     Uric Acid [[id number]]  (Abnormal) Collected: 02/06/24 1806    Specimen: Blood Updated: 02/07/24 0945     Uric Acid 7.9 mg/dL     Eosinophil Smear - Urine, Urine, Clean Catch [[id number]]  (Normal) Collected: 02/07/24 0725    Specimen: Urine, Clean Catch Updated: 02/07/24 0907     Eosinophil Smear 0 % EOS/100 Cells     Sodium, Urine, Random - Urine, Clean Catch [[id number]] Collected: 02/07/24 0725    Specimen: Urine, Clean Catch Updated: 02/07/24 0840     Sodium, Urine <20 mmol/L     Narrative:      Reference intervals for random urine have not been established.  Clinical usage is dependent upon physician's interpretation in combination with other laboratory tests.       CK [[id number]]  (Normal) Collected: 02/06/24 1806    Specimen: Blood Updated: 02/07/24 0748     Creatine Kinase 64 U/L     PTH, Intact [[id number]]  (Abnormal) Collected: 02/05/24 1150    Specimen: Blood Updated: 02/07/24 0745     PTH,  Intact 86.8 pg/mL     Narrative:      Results may be falsely decreased if patient taking Biotin.      Comprehensive Metabolic Panel [[id number]]  (Abnormal) Collected: 02/06/24 1806    Specimen: Blood Updated: 02/06/24 1855     Glucose 105 mg/dL      BUN 32 mg/dL      Creatinine 1.50 mg/dL      Sodium 135 mmol/L      Potassium 5.1 mmol/L      Chloride 98 mmol/L      CO2 28.0 mmol/L      Calcium 8.4 mg/dL      Total Protein 5.9 g/dL      Albumin 3.3 g/dL      ALT (SGPT) 16 U/L      AST (SGOT) 21 U/L      Alkaline Phosphatase 58 U/L      Total Bilirubin 0.6 mg/dL      Globulin 2.6 gm/dL      A/G Ratio 1.3 g/dL      BUN/Creatinine Ratio 21.3     Anion Gap 9.0 mmol/L      eGFR 34.6 mL/min/1.73     Narrative:      GFR Normal >60  Chronic Kidney Disease <60  Kidney Failure <15    The GFR formula is only valid for adults with stable renal function between ages 18 and 70.    Blood Culture - Blood, Wrist, Right [[id number]]  (Normal) Collected: 02/05/24 1627    Specimen: Blood from Wrist, Right Updated: 02/06/24 1846     Blood Culture No growth at 24 hours    Narrative:      Less than seven (7) mL's of blood was collected.  Insufficient quantity may yield false negative results.    Blood Culture - Blood, Arm, Right [[id number]]  (Normal) Collected: 02/05/24 1150    Specimen: Blood from Arm, Right Updated: 02/06/24 1201     Blood Culture No growth at 24 hours    Narrative:      Less than seven (7) mL's of blood was collected.  Insufficient quantity may yield false negative results.             Imaging Results (Last 24 Hours)       Procedure Component Value Units Date/Time    MRI Brain Without Contrast [[id number]] Collected: 02/07/24 0858     Updated: 02/07/24 0902    Narrative:      MRI BRAIN WO CONTRAST    Date of Exam: 2/7/2024 8:30 AM EST    Indication: Mental status change, unknown cause.     Comparison: CT February 5, 2024    Technique:  Routine multiplanar/multisequence sequence images of the brain were obtained without  contrast administration.      Findings:  No acute infarct is present on diffusion weighted sequences. Midline structures are normal and the craniocervical junction appears satisfactory. Age-related changes are present with moderate generalized volume loss and mild typical T2 hyperintense   subcortical and pontine white matter changes, nonspecific but favored to reflect sequela of chronic microvascular ischemia. There is otherwise no evidence of intracranial hemorrhage, mass or mass effect. There is mild associated ex vacuo prominence of   the ventricles correlating with surrounding volume loss. The orbits appear normal. The paranasal sinuses are grossly clear.      Impression:      Impression:  Mildly motion-degraded exam demonstrating expected age-related changes, without evidence of acute infarct, hemorrhage, mass or mass effect.        Electronically Signed: Andres Olvera MD    2/7/2024 9:00 AM EST    Workstation ID: [id number]    CT Abdomen Pelvis Without Contrast [[id number]] Collected: 02/06/24 1518     Updated: 02/06/24 1537    Narrative:      CT CHEST WO CONTRAST DIAGNOSTIC, CT ABDOMEN PELVIS WO CONTRAST    Date of Exam: 2/6/2024 3:14 PM EST    Indication: COPD, exacerbation. History of breast cancer    Comparison: CT chest of 5/8/2023. Prior CT abdomen pelvis of 11/10/2010    Technique: Axial CT images were obtained of the chest without contrast administration.  Sagittal and coronal reconstructions were performed.  Automated exposure control and iterative reconstruction methods were used.      Findings: CT chest:  There are moderate changes of centrilobular emphysema. There is chronic atelectasis of the left lower lobe posteriorly and medially. There are no pulmonary nodules or masses. There is moderately severe cardiomegaly, similar. There are coronary   calcifications. There is atherosclerotic disease of the thoracic aorta. There are calcified nodes in the right hilum. There are no enlarged mediastinal  nodes. There are moderately severe degenerative changes in the lower C-spine and mild degenerative   changes in the thoracic spine. There is a new sclerotic focus of the mid manubrium with some overlying cortical thickening which may represent subacute or old fracture although metastatic disease is not excluded.    CT abdomen and pelvis: The infrarenal abdominal aorta measures 3.9 cm transversely as compared to 2.6 cm previously. There is air in the urinary bladder which is partially distended. There is a small amount of free pelvic fluid. There is diverticulosis   without evidence of acute diverticulitis. There is respiratory motion on the exam. There is no large or small bowel dilatation. The liver contour is mildly nodular, increased from the prior exam. The liver size is within normal limits. The other   nonopacified solid organs are within normal limits in size. There are no renal stones or hydronephrosis. There are cholecystectomy clips. There are moderately severe degenerative changes in the lumbar spine.      Impression:      Impression:  Chronic atelectasis of the left lower lobe. Moderately severe emphysema. Lesion of the sternal manubrium as detailed, which could represent subacute or old fracture although metastatic disease is not excluded. Correlation with whole-body bone scan may be   useful.    Aneurysmal dilatation of the abdominal aorta.    Small amount of free pelvic fluid, nonspecific.    Nodular liver contour suggests cirrhosis.    Electronically Signed: Mary Ivan MD    2/6/2024 3:29 PM EST    Workstation ID: [id number]    CT Chest Without Contrast Diagnostic [[id number]] Collected: 02/06/24 1518     Updated: 02/06/24 1537    Narrative:      CT CHEST WO CONTRAST DIAGNOSTIC, CT ABDOMEN PELVIS WO CONTRAST    Date of Exam: 2/6/2024 3:14 PM EST    Indication: COPD, exacerbation. History of breast cancer    Comparison: CT chest of 5/8/2023. Prior CT abdomen pelvis of 11/10/2010    Technique: Axial  CT images were obtained of the chest without contrast administration.  Sagittal and coronal reconstructions were performed.  Automated exposure control and iterative reconstruction methods were used.      Findings: CT chest:  There are moderate changes of centrilobular emphysema. There is chronic atelectasis of the left lower lobe posteriorly and medially. There are no pulmonary nodules or masses. There is moderately severe cardiomegaly, similar. There are coronary   calcifications. There is atherosclerotic disease of the thoracic aorta. There are calcified nodes in the right hilum. There are no enlarged mediastinal nodes. There are moderately severe degenerative changes in the lower C-spine and mild degenerative   changes in the thoracic spine. There is a new sclerotic focus of the mid manubrium with some overlying cortical thickening which may represent subacute or old fracture although metastatic disease is not excluded.    CT abdomen and pelvis: The infrarenal abdominal aorta measures 3.9 cm transversely as compared to 2.6 cm previously. There is air in the urinary bladder which is partially distended. There is a small amount of free pelvic fluid. There is diverticulosis   without evidence of acute diverticulitis. There is respiratory motion on the exam. There is no large or small bowel dilatation. The liver contour is mildly nodular, increased from the prior exam. The liver size is within normal limits. The other   nonopacified solid organs are within normal limits in size. There are no renal stones or hydronephrosis. There are cholecystectomy clips. There are moderately severe degenerative changes in the lumbar spine.      Impression:      Impression:  Chronic atelectasis of the left lower lobe. Moderately severe emphysema. Lesion of the sternal manubrium as detailed, which could represent subacute or old fracture although metastatic disease is not excluded. Correlation with whole-body bone scan may be    useful.    Aneurysmal dilatation of the abdominal aorta.    Small amount of free pelvic fluid, nonspecific.    Nodular liver contour suggests cirrhosis.    Electronically Signed: Mary Ivan MD    2/6/2024 3:29 PM EST    Workstation ID: [id number]                 I reviewed the patient's new clinical results.    Medication Review:   Scheduled Meds:allopurinol, 100 mg, Oral, Daily  apixaban, 5 mg, Oral, Q12H  budesonide-formoterol, 2 puff, Inhalation, BID - RT  carvedilol, 12.5 mg, Oral, BID With Meals  cephalexin, 250 mg, Oral, Q8H  hydrALAZINE, 25 mg, Oral, Q8H  levothyroxine, 50 mcg, Oral, Q AM  [Held by provider] sildenafil, 20 mg, Oral, TID  sodium chloride, 10 mL, Intravenous, Q12H  sodium chloride, 10 mL, Intravenous, Q12H      Continuous Infusions:sodium chloride, 60 mL/hr, Last Rate: 60 mL/hr (02/07/24 1010)      PRN Meds:.  acetaminophen    Calcium Replacement - Follow Nurse / BPA Driven Protocol    ipratropium-albuterol    Magnesium Standard Dose Replacement - Follow Nurse / BPA Driven Protocol    ondansetron    Phosphorus Replacement - Follow Nurse / BPA Driven Protocol    Potassium Replacement - Follow Nurse / BPA Driven Protocol    [COMPLETED] Insert Peripheral IV **AND** sodium chloride    sodium chloride    sodium chloride    sodium chloride    sodium chloride     Assessment & Plan       Altered mental status    Acute hypokalemia    Stage 3a chronic kidney disease    Chronic obstructive pulmonary disease    History of venous thrombosis and embolism    Primary hypertension    History of TIA (transient ischemic attack)    Severe pulmonary hypertension    Chronic respiratory failure with hypoxia    JULIAN (acute kidney injury)    Delirium - etiology is unclear.  There is a question of mastitis in left breast but no evidence of abscess.  Workup has been unrevealing, including ammonia, carbon monoxide, tox screen, TFT's, blood cultures, respiratory viral panel. UA, head ct, head MRI, CT chest/abd/pelvis.   No witnessed seizure activity.  Low grade fever and headache may suggest viral etiology.  Will consult neurology and infectious disease services.    COPD - well compensated  HTN - hyrdalazine, carvedilol; holding lisinoprl  CKD - Dr. Fleming following; avoid nephrotoxine  Mastitis - cephalexin  Hypothyroidism - levothyroxine  H/o PE - Eliquis  Stess ulcer prophy - famotidine  Pulm htn - sildenafil  Chronic diastolic CHF - appears well compensated    Plan for disposition:TBD    Rosamaria Jin MD  02/07/24  11:42 EST

## 2024-02-07 NOTE — CASE MANAGEMENT/SOCIAL WORK
Continued Stay Note   K[facility]     Patient Name: Gabrielle Lyles  MRN: [id number]  Today's Date: 2/7/2024    Admit Date: 2/5/2024    Plan: [facility], accepted.   Will need precert and PASRR.  From home alone.,  Current [place] 2.5-3L.   Discharge Plan       Row Name 02/07/24 1615       Plan    Plan [facility], accepted.   Will need precert and PASRR.  From home alone.,  Current [place] 2.5-3L.    Plan Comments Notified that [facility] has accepted.   Notified family at bedside.  Reviewed IMM with family.  Barriers to discharge:4 L NC. Confusion. Psych following.  ID and neuro consulted.             Expected Discharge Date and Time       Expected Discharge Date Expected Discharge Time    Feb 10, 2024           Brook Carolina RN     Office Phone [phone]  Office Cell [phone]

## 2024-02-07 NOTE — THERAPY EVALUATION
Patient Name: Gabrielle Lyles  : 1940    MRN: [id number]                              Today's Date: 2024       Admit Date: 2024    Visit Dx:     ICD-10-CM ICD-9-CM   1. Altered mental status, unspecified altered mental status type  R41.82 780.97   2. Hypokalemia  E87.6 276.8     Patient Active Problem List   Diagnosis    Acute hypokalemia    Stage 3a chronic kidney disease    Low back pain    Osteopenia    Chronic obstructive pulmonary disease    Gastroesophageal reflux disease    Gout    History of venous thrombosis and embolism    Primary hypertension    Lumbar radiculopathy    Nausea    Personal history of malignant neoplasm of breast    Shortness of breath    Aortic aneurysm    Bulging lumbar disc    Spinal stenosis of lumbar region    History of TIA (transient ischemic attack)    Severe pulmonary hypertension    Chronic respiratory failure with hypoxia    Cellulitis of left foot    Altered mental status    JULIAN (acute kidney injury)     Past Medical History:   Diagnosis Date    Acute exacerbation of chronic obstructive pulmonary disease (COPD)     COPD (chronic obstructive pulmonary disease)     Deep vein thrombosis of bilateral lower extremities 2019    3/2013    History of pneumonia 2012    community acquired pneumonia and right pleural effusion;hospitalized at [facility]    History of pulmonary embolism 2013    bilateral PE    Hypertension 2001    Insomnia     on Ambien 5mg at     Lobular breast cancer, left 2011    Stage IA left upper lobular breast cancer    Malignant neoplasm of left breast in female, estrogen receptor positive 2019    Osteopenia 2012    Parainfluenza infection     Parotid duct obstruction     Severe pulmonary hypertension 2023    Stage 3a chronic kidney disease 2019    TIA (transient ischemic attack) 10/25/2022     Past Surgical History:   Procedure Laterality Date    CARDIAC CATHETERIZATION N/A 3/3/2023    Procedure: Left and Right  Heart Cath with Coronary Angiography;  Surgeon: Richardson Willis MD;  Location: [facility] CATH INVASIVE LOCATION;  Service: Cardiovascular;  Laterality: N/A;    CATARACT EXTRACTION  2015    IVC FILTER RETRIEVAL  06/2014    IVC filter placement by Dr. Card    MAMMO STEREOTACTIC BREAST BX SURGICAL ADD UNI Left 11/01/2011    invasive lobular carcinoma-Dr. Cande Daniel    MASTECTOMY, PARTIAL Left 12/01/2011    and left axillary sentinel lymph node biopsy by Dr. Uriostegui    TUBAL ABDOMINAL LIGATION  1984      General Information       Row Name 02/07/24 1542          OT Time and Intention    Document Type evaluation  -ES     Mode of Treatment occupational therapy  -ES       Row Name 02/07/24 1542          General Information    Patient Profile Reviewed yes  -ES     Existing Precautions/Restrictions fall  -ES       Row Name 02/07/24 1542          Occupational Profile    Reason for Services/Referral (Occupational Profile) Pt is an 84 yo female brought to ED 2/5/24 with c/o AMS. MRI pending. CT Chest (+) Chronic atelectasis LLL, lesion of sternal manubrium. CTH limited due to motion. CT Abd pelvis suggestive of cirrhosis, aneurysmal dilation of abdominal aorta.  Dx   PMHx significant for CKD III, COPD, TIA, breast CA, DVT, PE, IVC Filter.    At baseline, pt resides alone in [facility] with 2 TAYLOR. She is independent, driving short distances.  Pt has good social support of daughters.  -ES       Row Name 02/07/24 1542          Home Main Entrance    Number of Stairs, Main Entrance two  -ES       Row Name 02/07/24 1542          Stairs Within Home, Primary    Number of Stairs, Within Home, Primary none  -ES       Row Name 02/07/24 1542          Cognition    Orientation Status (Cognition) oriented to;person;unable/difficult to assess  only able to state her name  -ES       Row Name 02/07/24 1542          Safety Issues, Functional Mobility    Impairments Affecting Function (Mobility) balance;cognition;pain;range of motion (ROM);strength  -ES      Cognitive Impairments, Mobility Safety/Performance attention;impulsivity;insight into deficits/self-awareness;problem-solving/reasoning;safety precaution awareness;safety precaution follow-through  -ES               User Key  (r) = Recorded By, (t) = Taken By, (c) = Cosigned By      Initials Name Provider Type    Tahmina Coreas OT Occupational Therapist                     Mobility/ADL's       Row Name 02/07/24 1543          Bed Mobility    Bed Mobility supine-sit-supine  -ES     Supine-Sit-Supine [place] (Bed Mobility) moderate assist (50% patient effort);2 person assist  -ES     Assistive Device (Bed Mobility) draw sheet;head of bed elevated  -ES       Row Name 02/07/24 1543          Transfers    Transfers bed-chair transfer;sit-stand transfer  -ES       Row Name 02/07/24 1543          Bed-Chair Transfer    Bed-Chair [place] (Transfers) minimum assist (75% patient effort);2 person assist  -ES       Row Name 02/07/24 1543          Sit-Stand Transfer    Sit-Stand [place] (Transfers) minimum assist (75% patient effort);2 person assist  -ES       Row Name 02/07/24 1543          Functional Mobility    Functional Mobility- Ind. Level unable to perform  -ES       Row Name 02/07/24 1543          Activities of Daily Living    BADL Assessment/Intervention upper body dressing;lower body dressing;toileting  -ES       Row Name 02/07/24 1543          Upper Body Dressing Assessment/Training    [place] Level (Upper Body Dressing) moderate assist (50% patient effort)  -ES       Row Name 02/07/24 1543          Lower Body Dressing Assessment/Training    [place] Level (Lower Body Dressing) maximum assist (25% patient effort)  -ES       Row Name 02/07/24 1543          Toileting Assessment/Training    [place] Level (Toileting) maximum assist (25% patient effort)  -ES               User Key  (r) = Recorded By, (t) = Taken By, (c) = Cosigned By      Initials Name Provider Type    MANDY Talbert  Tahmina GALVEZ OT Occupational Therapist                   Obj/Interventions       [facility] Name 02/07/24 1544          Sensory Assessment (Somatosensory)    Sensory Assessment (Somatosensory) unable/difficult to assess  -ES       Row Name 02/07/24 1544          Vision Assessment/Intervention    Visual Impairment/Limitations corrective lenses for reading  -ES       [facility] Name 02/07/24 1544          Range of Motion Comprehensive    General Range of Motion no range of motion deficits identified  -ES     Comment, General Range of Motion Grossly WFL  -ES       Row Name 02/07/24 1544          Strength Comprehensive (MMT)    Comment, General Manual Muscle Testing (MMT) Assessment Unable to formally assess. Grossly WFL  -ES       [facility] Name 02/07/24 1544          Balance    Balance Assessment sitting static balance;sitting dynamic balance;standing static balance  -ES     Static Sitting Balance supervision  -ES     Dynamic Sitting Balance supervision  -ES     Static Standing Balance minimal assist  -ES     Balance Interventions sitting;standing;static;dynamic  -ES               User Key  (r) = Recorded By, (t) = Taken By, (c) = Cosigned By      Initials Name Provider Type    ES Tahmina Talbert OT Occupational Therapist                   Goals/Plan       Row Name 02/07/24 1554          Toileting Goal 1 (OT)    Activity/Device (Toileting Goal 1, OT) toileting skills, all  -ES     [place] Level/Cues Needed (Toileting Goal 1, OT) minimum assist (75% or more patient effort)  -ES     Time Frame (Toileting Goal 1, OT) 2 weeks  -ES       [facility] Name 02/07/24 1554          Grooming Goal 1 (OT)    Activity/Device (Grooming Goal 1, OT) grooming skills, all  -ES     [place] (Grooming Goal 1, OT) minimum assist (75% or more patient effort)  -ES     Time Frame (Grooming Goal 1, OT) 2 weeks  -ES       [facility] Name 02/07/24 1554          Problem Specific Goal 1 (OT)    Problem Specific Goal 1 (OT) 3 minute standing activity tolerance  -ES      Time Frame (Problem Specific Goal 1, OT) 2 weeks  -ES       Row Name 02/07/24 6651          Therapy Assessment/Plan (OT)    Planned Therapy Interventions (OT) activity tolerance training;BADL retraining;cognitive/visual perception retraining;neuromuscular control/coordination retraining;occupation/activity based interventions;patient/caregiver education/training;ROM/therapeutic exercise;strengthening exercise;functional balance retraining  -ES               User Key  (r) = Recorded By, (t) = Taken By, (c) = Cosigned By      Initials Name Provider Type    Tahmina Coreas OT Occupational Therapist                   Clinical Impression       Row Name 02/07/24 8377          Pain Assessment    Pretreatment Pain Rating 0/10 - no pain  -ES     Posttreatment Pain Rating 0/10 - no pain  -ES       Row Name 02/07/24 4342          Plan of Care Review    Outcome Evaluation Pt is an 84 yo female brought to ED 2/5/24 with c/o AMS. MRI pending. CT Chest (+) Chronic atelectasis LLL, lesion of sternal manubrium. CTH limited due to motion. CT Abd pelvis suggestive of cirrhosis, aneurysmal dilation of abdominal aorta.  Dx   PMHx significant for CKD III, COPD, TIA, breast CA, DVT, PE, IVC Filter.    At baseline, pt resides alone in [facility] with 2 TAYLOR. She is independent, driving short distances.  Pt has good social support of daughters. Pt oriented to person, with poor attention and difficulty responding to most questions. She does state her name and her daughter's name.  Pt requires A for intitiation of all tasks, but then funtionally moves well. She is limited in following commands, requiring tactile cues to initaite. Patient requires significant assist due to impaired cognition, and will require SNF rehab at [place].  -ES       Row Name 02/07/24 6525          Therapy Assessment/Plan (OT)    Rehab Potential (OT) good, to achieve stated therapy goals  -ES     Criteria for Skilled Therapeutic Interventions Met (OT) yes;skilled treatment  is necessary  -ES     Therapy Frequency (OT) 5 times/wk  -ES     Predicted Duration of Therapy Intervention (OT) until dc  -ES       Row Name 02/07/24 1552          Vital Signs    Pre Patient Position Supine  -ES     Intra Patient Position Standing  -ES     Post Patient Position Supine  -ES       Row Name 02/07/24 1552          Positioning and Restraints    Pre-Treatment Position in bed  -ES     Post Treatment Position bed  -ES     In Bed notified nsg;call light within reach;encouraged to call for assist;exit alarm on  -ES               User Key  (r) = Recorded By, (t) = Taken By, (c) = Cosigned By      Initials Name Provider Type    Tahmina Coreas OT Occupational Therapist                   Outcome Measures       Row Name 02/07/24 1555          How much help from another is currently needed...    Putting on and taking off regular lower body clothing? 2  -ES     Bathing (including washing, rinsing, and drying) 2  -ES     Toileting (which includes using toilet bed pan or urinal) 2  -ES     Putting on and taking off regular upper body clothing 2  -ES     Taking care of personal grooming (such as brushing teeth) 3  -ES     Eating meals 3  -ES     AM-PAC 6 Clicks Score (OT) 14  -ES       Row Name 02/07/24 1555          Functional Assessment    Outcome Measure Options AM-PAC 6 Clicks Daily Activity (OT)  -ES               User Key  (r) = Recorded By, (t) = Taken By, (c) = Cosigned By      Initials Name Provider Type    Tahmina Coreas OT Occupational Therapist                    Occupational Therapy Education       Title: PT OT SLP Therapies (Done)       Topic: Occupational Therapy (Done)       Point: ADL training (Done)       Description:   Instruct learner(s) on proper safety adaptation and remediation techniques during self care or transfers.   Instruct in proper use of assistive devices.                  Learning Progress Summary             Patient Acceptance, E,TB,D, VU,DU,NR by  at 2/7/2024 3527     Acceptance, E,TB, VU by  at 2/6/2024 0823    Acceptance, E,TB,D, VU,DU,NR by  at 2/6/2024 0236   Family Acceptance, E,TB, VU by  at 2/6/2024 0823                                         User Key       Initials Effective Dates Name Provider Type Discipline    JESUS 08/31/21 -  Azalia Houston RN Registered Nurse Nurse     06/16/21 -  Galen Wade LPN Licensed Nurse Nurse                  OT Recommendation and Plan  Planned Therapy Interventions (OT): activity tolerance training, BADL retraining, cognitive/visual perception retraining, neuromuscular control/coordination retraining, occupation/activity based interventions, patient/caregiver education/training, ROM/therapeutic exercise, strengthening exercise, functional balance retraining  Therapy Frequency (OT): 5 times/wk  Plan of Care Review  Outcome Evaluation: Pt is an 82 yo female brought to ED 2/5/24 with c/o AMS. MRI pending. CT Chest (+) Chronic atelectasis LLL, lesion of sternal manubrium. CTH limited due to motion. CT Abd pelvis suggestive of cirrhosis, aneurysmal dilation of abdominal aorta.  Dx   PMHx significant for CKD III, COPD, TIA, breast CA, DVT, PE, IVC Filter.    At baseline, pt resides alone in [facility] with 2 TAYLOR. She is independent, driving short distances.  Pt has good social support of daughters. Pt oriented to person, with poor attention and difficulty responding to most questions. She does state her name and her daughter's name.  Pt requires A for intitiation of all tasks, but then funtionally moves well. She is limited in following commands, requiring tactile cues to initaite. Patient requires significant assist due to impaired cognition, and will require SNF rehab at [place].     Time Calculation:         Time Calculation- OT       Row Name 02/07/24 1556             Time Calculation- OT    OT Start Time 0930  -ES      OT Stop Time 1000  -ES      OT Time Calculation (min) 30 min  -ES      Total Timed Code Minutes- OT 0 minute(s)  -ES       OT Received On 02/07/24  -ES      OT - Next Appointment 02/08/24  -ES      OT Goal Re-Cert Due Date 02/21/24  -ES                User Key  (r) = Recorded By, (t) = Taken By, (c) = Cosigned By      Initials Name Provider Type    Tahmina Coreas OT Occupational Therapist                           Tahmina Talbert OT  2/7/2024

## 2024-02-07 NOTE — CONSULTS
Referring Provider: Rosamaria Jin MD    Reason for Consultation: Management of blood pressure and diuretics      Patient Care Team:  Shaylee Mcdaniels MD as PCP - General (Family Medicine)  Richardson Willis MD as Cardiologist (Cardiology)  Rafy Rodriguez MD as Consulting Physician (Hematology and Oncology)  Christianne Phillips, AMARILIS as Licensed Practical Nurse  Salome Fleming MD as Consulting Physician (Nephrology)      SUBJECTIVE     Chief Complaint: Altered mental state    History of present illness:  Gabrielle Lyles is a 83 y.o. female with history of COPD, severe pulmonary hypertension, chronic respiratory failure on oxygen, TIA, DVT, PE, chronic kidney disease stage IIIa, hypertension who presented to the hospital with altered mental state.  Workup revealed hyperkalemia, JULIAN.    After admission nephrology was consulted for management of JULIAN. Cardiology was consulted for blood pressure and diuretic management.     The patient's daughter is at the bedside and helped provide the history. The patient is alert and oriented to person only. She reports shortness of breath and states it is worse than her baseline. The patient's daughter states last week the patient had increased swelling in her legs, so her PCP increased her Lasix to 80 mg daily for a couple of days. She states the patient developed some intermittent confusion in the following days. She states she tripped and fell at home, but denies any known injury. She states the patient's confusion worsened over the weekend.       Review of systems:  Limited due to confusion  Constitutional: No weakness, fatigue, fever, rigors, chills   Eyes: No vision changes, eye pain   ENT/oropharynx: No difficulty swallowing, sore throat, epistaxis, changes in hearing   Cardiovascular: No chest pain, chest tightness, palpitations, paroxysmal nocturnal dyspnea, orthopnea, diaphoresis, dizziness / syncopal episode   Respiratory: + shortness of breath.  No cough,  wheezing, hemoptysis   Gastrointestinal: No abdominal pain, nausea, vomiting, diarrhea, bloody stools   Genitourinary: No hematuria, dysuria   Neurological: No headache, tremors, numbness, one-sided weakness    Musculoskeletal: No cramps, myalgias, joint pain, joint swelling   Integument: No rash, edema        Personal History:      Past Medical History:   Diagnosis Date    Acute exacerbation of chronic obstructive pulmonary disease (COPD)     COPD (chronic obstructive pulmonary disease)     Deep vein thrombosis of bilateral lower extremities 07/24/2019    3/2013    History of pneumonia 06/2012    community acquired pneumonia and right pleural effusion;hospitalized at [facility]    History of pulmonary embolism 03/2013    bilateral PE    Hypertension 2001    Insomnia     on Ambien 5mg at     Lobular breast cancer, left 11/2011    Stage IA left upper lobular breast cancer    Malignant neoplasm of left breast in female, estrogen receptor positive 07/18/2019    Osteopenia 2012    Parainfluenza infection     Parotid duct obstruction     Severe pulmonary hypertension 03/03/2023    Stage 3a chronic kidney disease 07/24/2019    TIA (transient ischemic attack) 10/25/2022       Past Surgical History:   Procedure Laterality Date    CARDIAC CATHETERIZATION N/A 3/3/2023    Procedure: Left and Right Heart Cath with Coronary Angiography;  Surgeon: Richardson Willis MD;  Location: [facility] INVASIVE LOCATION;  Service: Cardiovascular;  Laterality: N/A;    CATARACT EXTRACTION  2015    IVC FILTER RETRIEVAL  06/2014    IVC filter placement by Dr. Card    MAMMO STEREOTACTIC BREAST BX SURGICAL ADD UNI Left 11/01/2011    invasive lobular carcinoma-Dr. Cande Daniel    MASTECTOMY, PARTIAL Left 12/01/2011    and left axillary sentinel lymph node biopsy by Dr. Uriostegui    TUBAL ABDOMINAL LIGATION  1984       Family History   Problem Relation Age of Onset    Lung cancer Brother        Social History     Tobacco Use    Smoking status:  Former     Types: Cigarettes     Quit date:      Years since quittin.1     Passive exposure: Past    Smokeless tobacco: Never    Tobacco comments:     smoked 6 cigarettes a day from 13 years of age to 56 years of age when she quit in    Vaping Use    Vaping Use: Never used   Substance Use Topics    Alcohol use: Not Currently    Drug use: No        Home meds:  Prior to Admission medications    Medication Sig Start Date End Date Taking? Authorizing Provider   allopurinol (ZYLOPRIM) 100 MG tablet Take 1 tablet by mouth Daily.   Yes Jaylyn Golden MD   amoxicillin-clavulanate (AUGMENTIN) 875-125 MG per tablet Take 1 tablet by mouth 2 (Two) Times a Day. 24 Yes Jaylyn Golden MD   apixaban (ELIQUIS) 5 MG tablet tablet Take 1 tablet by mouth Every 12 (Twelve) Hours. Indications: Other - full anticoagulation, history of DVT/PE 10/27/22  Yes Shaylee Mcdaniels MD   budesonide-formoterol (SYMBICORT) 80-4.5 MCG/ACT inhaler Inhale 2 puffs 2 (Two) Times a Day.   Yes ProviderJaylyn MD   carvedilol (COREG) 12.5 MG tablet TAKE 1 TABLET BY MOUTH TWICE DAILY WITH MEALS 23  Yes Richardson Willis MD   Cholecalciferol (Vitamin D3) 50 MCG (2000 UT) capsule Take 1 capsule by mouth Daily.   Yes Jaylyn Golden MD   furosemide (LASIX) 40 MG tablet Take 1 tablet by mouth Daily.   Yes Jaylyn Golden MD   gabapentin (NEURONTIN) 300 MG capsule Take 1 capsule by mouth 2 (Two) Times a Day.   Yes Jaylyn Golden MD   levothyroxine (SYNTHROID, LEVOTHROID) 50 MCG tablet Take 1 tablet by mouth Daily.   Yes Jaylyn Golden MD   lisinopril (PRINIVIL,ZESTRIL) 40 MG tablet Take 1 tablet by mouth Daily. 23  Yes Velma Ascencio APRN   potassium chloride (K-DUR,KLOR-CON) 10 MEQ CR tablet Take 1 tablet by mouth Daily. 23  Yes Richardson Willis MD   sildenafil (REVATIO) 20 MG tablet Take 1 tablet by mouth Every 8 (Eight) Hours. 23  Yes Mansi Becerril APRN       Allergies:    "  Patient has no known allergies.    Scheduled Meds:allopurinol, 100 mg, Oral, Daily  apixaban, 5 mg, Oral, Q12H  budesonide, 0.5 mg, Nebulization, BID - RT  carvedilol, 12.5 mg, Oral, BID With Meals  cephalexin, 250 mg, Oral, Q8H  famotidine, 20 mg, Oral, Daily  hydrALAZINE, 25 mg, Oral, Q8H  ipratropium-albuterol, 3 mL, Nebulization, 4x Daily - RT  levothyroxine, 50 mcg, Oral, Q AM  sildenafil, 20 mg, Oral, TID  sodium chloride, 10 mL, Intravenous, Q12H  sodium chloride, 10 mL, Intravenous, Q12H      Continuous Infusions:sodium chloride, 60 mL/hr, Last Rate: 60 mL/hr (02/07/24 1226)      PRN Meds:  acetaminophen    Calcium Replacement - Follow Nurse / BPA Driven Protocol    ipratropium-albuterol    Magnesium Standard Dose Replacement - Follow Nurse / BPA Driven Protocol    ondansetron    Phosphorus Replacement - Follow Nurse / BPA Driven Protocol    Potassium Replacement - Follow Nurse / BPA Driven Protocol    [COMPLETED] Insert Peripheral IV **AND** sodium chloride    sodium chloride    sodium chloride    sodium chloride    sodium chloride      OBJECTIVE    Vital Signs  Vitals:    02/06/24 1950 02/06/24 2345 02/07/24 0411 02/07/24 0725   BP: 90/61 134/80 155/80 172/99   BP Location: Right arm Right arm Right arm Right arm   Patient Position: Lying Lying Lying Lying   Pulse: 65 70 76    Resp: 18 (!) 31 27 19   Temp:  97.7 °F (36.5 °C) 98.3 °F (36.8 °C) 96.4 °F (35.8 °C)   TempSrc:  Axillary Axillary Axillary   SpO2: 95% 99% 96%    Weight:   69 kg (152 lb 1.9 oz)    Height:           Flowsheet Rows      Flowsheet Row First Filed Value   Admission Height 162.6 cm (64\") Documented at 02/05/2024 1046   Admission Weight 66.7 kg (147 lb) Documented at 02/05/2024 1046              Intake/Output Summary (Last 24 hours) at 2/7/2024 1244  Last data filed at 2/7/2024 0741  Gross per 24 hour   Intake 1912 ml   Output 100 ml   Net 1812 ml        Telemetry: Sinus rhythm with frequent PACs.    Physical Exam:  The patient is " alert and oriented to person only.  Ill-appearing  Vital signs as noted above.  Head and neck revealed no carotid bruits or jugular venous distention.  No thyromegaly or lymph adenopathy is present  Distant and diminished breath sounds.  On 3 L of oxygen via nasal cannula.  Heart normal first and second heart sounds.No murmur.  No precordial rub is present.  No gallop is present.  Abdomen soft and nontender.  No organomegaly is present.  Extremities with good peripheral pulses without any pedal edema.  Skin warm and dry.  Musculoskeletal system is grossly normal  CNS grossly normal         Results Review:  I have personally reviewed the results from the time of this admission to 2/7/2024 12:44 EST and agree with these findings:  [x]  Laboratory  [x]  Microbiology  [x]  Radiology  [x]  EKG/Telemetry   [x]  Cardiology/Vascular   []  Pathology  [x]  Old records  []  Other:    Most notable findings include:     Lab Results (last 24 hours)       Procedure Component Value Units Date/Time    Blood Culture - Blood, Arm, Right [[id number]]  (Normal) Collected: 02/05/24 1150    Specimen: Blood from Arm, Right Updated: 02/07/24 1200     Blood Culture No growth at 2 days    Narrative:      Less than seven (7) mL's of blood was collected.  Insufficient quantity may yield false negative results.    Basic Metabolic Panel [[id number]]  (Abnormal) Collected: 02/07/24 1013    Specimen: Blood Updated: 02/07/24 1055     Glucose 100 mg/dL      BUN 32 mg/dL      Creatinine 1.26 mg/dL      Sodium 136 mmol/L      Potassium 4.9 mmol/L      Chloride 102 mmol/L      CO2 24.0 mmol/L      Calcium 8.8 mg/dL      BUN/Creatinine Ratio 25.4     Anion Gap 10.0 mmol/L      eGFR 42.7 mL/min/1.73     Narrative:      GFR Normal >60  Chronic Kidney Disease <60  Kidney Failure <15    The GFR formula is only valid for adults with stable renal function between ages 18 and 70.    CBC & Differential [[id number]]  (Abnormal) Collected: 02/07/24 1013    Specimen:  Blood Updated: 02/07/24 1037    Narrative:      The following orders were created for panel order CBC & Differential.  Procedure                               Abnormality         Status                     ---------                               -----------         ------                     CBC Auto Differential[[id number]]        Abnormal            Final result                 Please view results for these tests on the individual orders.    CBC Auto Differential [[id number]]  (Abnormal) Collected: 02/07/24 1013    Specimen: Blood Updated: 02/07/24 1037     WBC 9.20 10*3/mm3      RBC 4.33 10*6/mm3      Hemoglobin 11.8 g/dL      Hematocrit 37.4 %      MCV 86.3 fL      MCH 27.1 pg      MCHC 31.5 g/dL      RDW 16.0 %      RDW-SD 48.6 fl      MPV 8.4 fL      Platelets 200 10*3/mm3      Neutrophil % 73.0 %      Lymphocyte % 10.8 %      Monocyte % 10.0 %      Eosinophil % 5.1 %      Basophil % 1.1 %      Neutrophils, Absolute 6.70 10*3/mm3      Lymphocytes, Absolute 1.00 10*3/mm3      Monocytes, Absolute 0.90 10*3/mm3      Eosinophils, Absolute 0.50 10*3/mm3      Basophils, Absolute 0.10 10*3/mm3      nRBC 0.0 /100 WBC     Uric Acid [[id number]]  (Abnormal) Collected: 02/06/24 1806    Specimen: Blood Updated: 02/07/24 0945     Uric Acid 7.9 mg/dL     Eosinophil Smear - Urine, Urine, Clean Catch [[id number]]  (Normal) Collected: 02/07/24 0725    Specimen: Urine, Clean Catch Updated: 02/07/24 0907     Eosinophil Smear 0 % EOS/100 Cells     Sodium, Urine, Random - Urine, Clean Catch [[id number]] Collected: 02/07/24 0725    Specimen: Urine, Clean Catch Updated: 02/07/24 0840     Sodium, Urine <20 mmol/L     Narrative:      Reference intervals for random urine have not been established.  Clinical usage is dependent upon physician's interpretation in combination with other laboratory tests.       CK [[id number]]  (Normal) Collected: 02/06/24 1806    Specimen: Blood Updated: 02/07/24 0748     Creatine Kinase 64 U/L     PTH, Intact  [[id number]]  (Abnormal) Collected: 02/05/24 1150    Specimen: Blood Updated: 02/07/24 0745     PTH, Intact 86.8 pg/mL     Narrative:      Results may be falsely decreased if patient taking Biotin.      Comprehensive Metabolic Panel [[id number]]  (Abnormal) Collected: 02/06/24 1806    Specimen: Blood Updated: 02/06/24 1855     Glucose 105 mg/dL      BUN 32 mg/dL      Creatinine 1.50 mg/dL      Sodium 135 mmol/L      Potassium 5.1 mmol/L      Chloride 98 mmol/L      CO2 28.0 mmol/L      Calcium 8.4 mg/dL      Total Protein 5.9 g/dL      Albumin 3.3 g/dL      ALT (SGPT) 16 U/L      AST (SGOT) 21 U/L      Alkaline Phosphatase 58 U/L      Total Bilirubin 0.6 mg/dL      Globulin 2.6 gm/dL      A/G Ratio 1.3 g/dL      BUN/Creatinine Ratio 21.3     Anion Gap 9.0 mmol/L      eGFR 34.6 mL/min/1.73     Narrative:      GFR Normal >60  Chronic Kidney Disease <60  Kidney Failure <15    The GFR formula is only valid for adults with stable renal function between ages 18 and 70.    Blood Culture - Blood, Wrist, Right [[id number]]  (Normal) Collected: 02/05/24 1627    Specimen: Blood from Wrist, Right Updated: 02/06/24 1846     Blood Culture No growth at 24 hours    Narrative:      Less than seven (7) mL's of blood was collected.  Insufficient quantity may yield false negative results.            Imaging Results (Last 24 Hours)       Procedure Component Value Units Date/Time    MRI Brain Without Contrast [[id number]] Collected: 02/07/24 0858     Updated: 02/07/24 0902    Narrative:      MRI BRAIN WO CONTRAST    Date of Exam: 2/7/2024 8:30 AM EST    Indication: Mental status change, unknown cause.     Comparison: CT February 5, 2024    Technique:  Routine multiplanar/multisequence sequence images of the brain were obtained without contrast administration.      Findings:  No acute infarct is present on diffusion weighted sequences. Midline structures are normal and the craniocervical junction appears satisfactory. Age-related changes are  present with moderate generalized volume loss and mild typical T2 hyperintense   subcortical and pontine white matter changes, nonspecific but favored to reflect sequela of chronic microvascular ischemia. There is otherwise no evidence of intracranial hemorrhage, mass or mass effect. There is mild associated ex vacuo prominence of   the ventricles correlating with surrounding volume loss. The orbits appear normal. The paranasal sinuses are grossly clear.      Impression:      Impression:  Mildly motion-degraded exam demonstrating expected age-related changes, without evidence of acute infarct, hemorrhage, mass or mass effect.        Electronically Signed: Andres Olvera MD    2/7/2024 9:00 AM EST    Workstation ID: [id number]    CT Abdomen Pelvis Without Contrast [[id number]] Collected: 02/06/24 1518     Updated: 02/06/24 1537    Narrative:      CT CHEST WO CONTRAST DIAGNOSTIC, CT ABDOMEN PELVIS WO CONTRAST    Date of Exam: 2/6/2024 3:14 PM EST    Indication: COPD, exacerbation. History of breast cancer    Comparison: CT chest of 5/8/2023. Prior CT abdomen pelvis of 11/10/2010    Technique: Axial CT images were obtained of the chest without contrast administration.  Sagittal and coronal reconstructions were performed.  Automated exposure control and iterative reconstruction methods were used.      Findings: CT chest:  There are moderate changes of centrilobular emphysema. There is chronic atelectasis of the left lower lobe posteriorly and medially. There are no pulmonary nodules or masses. There is moderately severe cardiomegaly, similar. There are coronary   calcifications. There is atherosclerotic disease of the thoracic aorta. There are calcified nodes in the right hilum. There are no enlarged mediastinal nodes. There are moderately severe degenerative changes in the lower C-spine and mild degenerative   changes in the thoracic spine. There is a new sclerotic focus of the mid manubrium with some overlying cortical  thickening which may represent subacute or old fracture although metastatic disease is not excluded.    CT abdomen and pelvis: The infrarenal abdominal aorta measures 3.9 cm transversely as compared to 2.6 cm previously. There is air in the urinary bladder which is partially distended. There is a small amount of free pelvic fluid. There is diverticulosis   without evidence of acute diverticulitis. There is respiratory motion on the exam. There is no large or small bowel dilatation. The liver contour is mildly nodular, increased from the prior exam. The liver size is within normal limits. The other   nonopacified solid organs are within normal limits in size. There are no renal stones or hydronephrosis. There are cholecystectomy clips. There are moderately severe degenerative changes in the lumbar spine.      Impression:      Impression:  Chronic atelectasis of the left lower lobe. Moderately severe emphysema. Lesion of the sternal manubrium as detailed, which could represent subacute or old fracture although metastatic disease is not excluded. Correlation with whole-body bone scan may be   useful.    Aneurysmal dilatation of the abdominal aorta.    Small amount of free pelvic fluid, nonspecific.    Nodular liver contour suggests cirrhosis.    Electronically Signed: Mary Ivan MD    2/6/2024 3:29 PM EST    Workstation ID: [id number]    CT Chest Without Contrast Diagnostic [[id number]] Collected: 02/06/24 1518     Updated: 02/06/24 1537    Narrative:      CT CHEST WO CONTRAST DIAGNOSTIC, CT ABDOMEN PELVIS WO CONTRAST    Date of Exam: 2/6/2024 3:14 PM EST    Indication: COPD, exacerbation. History of breast cancer    Comparison: CT chest of 5/8/2023. Prior CT abdomen pelvis of 11/10/2010    Technique: Axial CT images were obtained of the chest without contrast administration.  Sagittal and coronal reconstructions were performed.  Automated exposure control and iterative reconstruction methods were  used.      Findings: CT chest:  There are moderate changes of centrilobular emphysema. There is chronic atelectasis of the left lower lobe posteriorly and medially. There are no pulmonary nodules or masses. There is moderately severe cardiomegaly, similar. There are coronary   calcifications. There is atherosclerotic disease of the thoracic aorta. There are calcified nodes in the right hilum. There are no enlarged mediastinal nodes. There are moderately severe degenerative changes in the lower C-spine and mild degenerative   changes in the thoracic spine. There is a new sclerotic focus of the mid manubrium with some overlying cortical thickening which may represent subacute or old fracture although metastatic disease is not excluded.    CT abdomen and pelvis: The infrarenal abdominal aorta measures 3.9 cm transversely as compared to 2.6 cm previously. There is air in the urinary bladder which is partially distended. There is a small amount of free pelvic fluid. There is diverticulosis   without evidence of acute diverticulitis. There is respiratory motion on the exam. There is no large or small bowel dilatation. The liver contour is mildly nodular, increased from the prior exam. The liver size is within normal limits. The other   nonopacified solid organs are within normal limits in size. There are no renal stones or hydronephrosis. There are cholecystectomy clips. There are moderately severe degenerative changes in the lumbar spine.      Impression:      Impression:  Chronic atelectasis of the left lower lobe. Moderately severe emphysema. Lesion of the sternal manubrium as detailed, which could represent subacute or old fracture although metastatic disease is not excluded. Correlation with whole-body bone scan may be   useful.    Aneurysmal dilatation of the abdominal aorta.    Small amount of free pelvic fluid, nonspecific.    Nodular liver contour suggests cirrhosis.    Electronically Signed: Mary Ivan MD     2/6/2024 3:29 PM EST    Workstation ID: [id number]            LAB RESULTS (LAST 7 DAYS)    CBC  Results from last 7 days   Lab Units 02/07/24  1013 02/06/24  0351 02/05/24  1150   WBC 10*3/mm3 9.20 7.30 9.10   RBC 10*6/mm3 4.33 4.18 4.73   HEMOGLOBIN g/dL 11.8* 11.2* 12.8   HEMATOCRIT % 37.4 34.9 40.5   MCV fL 86.3 83.6 85.6   PLATELETS 10*3/mm3 200 177 220       BMP  Results from last 7 days   Lab Units 02/07/24  1013 02/06/24  1806 02/06/24  0351 02/05/24  1150   SODIUM mmol/L 136 135* 138 137   POTASSIUM mmol/L 4.9 5.1 2.9* 3.1*   CHLORIDE mmol/L 102 98 96* 94*   CO2 mmol/L 24.0 28.0 30.0* 30.0*   BUN mg/dL 32* 32* 22 19   CREATININE mg/dL 1.26* 1.50* 1.07* 1.30*   GLUCOSE mg/dL 100* 105* 82 104*   MAGNESIUM mg/dL  --   --   --  2.2       CMP   Results from last 7 days   Lab Units 02/07/24  1013 02/06/24  1806 02/06/24  0351 02/05/24  1235 02/05/24  1150   SODIUM mmol/L 136 135* 138  --  137   POTASSIUM mmol/L 4.9 5.1 2.9*  --  3.1*   CHLORIDE mmol/L 102 98 96*  --  94*   CO2 mmol/L 24.0 28.0 30.0*  --  30.0*   BUN mg/dL 32* 32* 22  --  19   CREATININE mg/dL 1.26* 1.50* 1.07*  --  1.30*   GLUCOSE mg/dL 100* 105* 82  --  104*   ALBUMIN g/dL  --  3.3*  --   --  3.9   BILIRUBIN mg/dL  --  0.6  --   --  1.1   ALK PHOS U/L  --  58  --   --  69   AST (SGOT) U/L  --  21  --   --  19   ALT (SGPT) U/L  --  16  --   --  10   AMMONIA umol/L  --   --   --  25  --        BNP        TROPONIN  Results from last 7 days   Lab Units 02/06/24  1806   CK TOTAL U/L 64       CoAg        Creatinine Clearance  Estimated Creatinine Clearance: 32.8 mL/min (A) (by C-G formula based on SCr of 1.26 mg/dL (H)).    ABG          Radiology  MRI Brain Without Contrast    Result Date: 2/7/2024  Impression: Mildly motion-degraded exam demonstrating expected age-related changes, without evidence of acute infarct, hemorrhage, mass or mass effect. Electronically Signed: Andres Olvera MD  2/7/2024 9:00 AM EST  Workstation ID: [id number]    CT Abdomen  Pelvis Without Contrast    Result Date: 2/6/2024  Impression: Chronic atelectasis of the left lower lobe. Moderately severe emphysema. Lesion of the sternal manubrium as detailed, which could represent subacute or old fracture although metastatic disease is not excluded. Correlation with whole-body bone scan may be  useful. Aneurysmal dilatation of the abdominal aorta. Small amount of free pelvic fluid, nonspecific. Nodular liver contour suggests cirrhosis. Electronically Signed: Mary Ivan MD  2/6/2024 3:29 PM EST  Workstation ID: [id number]    CT Chest Without Contrast Diagnostic    Result Date: 2/6/2024  Impression: Chronic atelectasis of the left lower lobe. Moderately severe emphysema. Lesion of the sternal manubrium as detailed, which could represent subacute or old fracture although metastatic disease is not excluded. Correlation with whole-body bone scan may be  useful. Aneurysmal dilatation of the abdominal aorta. Small amount of free pelvic fluid, nonspecific. Nodular liver contour suggests cirrhosis. Electronically Signed: Mary Ivan MD  2/6/2024 3:29 PM EST  Workstation ID: [id number]       EKG  I personally viewed and interpreted the patient's EKG/Telemetry data:  ECG 12 Lead Other; Weakness   Final Result   HEART RATE= 75  bpm   RR Interval= 860  ms   MO Interval= 176  ms   P Horizontal Axis= 201  deg   P Front Axis= 11  deg   QRSD Interval= 99  ms   QT Interval= 398  ms   QTcB= 429  ms   QRS Axis= 117  deg   T Wave Axis= -6  deg   - ABNORMAL ECG -   Sinus rhythm   Multiple premature complexes, vent & supraven   Right axis deviation   Low voltage, precordial leads   Nonspecific T abnormalities, anterior leads   Electronically Signed By: Pankaj Mccallum ([facility]) 06-Feb-2024 07:40:13   Date and Time of Study: 2024-02-05 11:04:44      SCANNED - TELEMETRY     Final Result      SCANNED - TELEMETRY     Final Result      SCANNED - TELEMETRY     Final Result      SCANNED - TELEMETRY     Final Result       SCANNED - TELEMETRY     Final Result      SCANNED - TELEMETRY     Final Result      SCANNED - TELEMETRY     Final Result      SCANNED - TELEMETRY     Final Result      SCANNED - TELEMETRY     Final Result      ECG 12 Lead Altered Mental Status    (Results Pending)         Echocardiogram:    Results for orders placed in visit on 09/13/22    Adult Transthoracic Echo Complete W/ Cont if Necessary Per Protocol    Interpretation Summary    Estimated left ventricular EF = 70% Estimated left ventricular EF was in agreement with the calculated left ventricular EF. Left ventricular systolic function is normal.    Left ventricular diastolic function is consistent with (grade II w/high LAP) pseudonormalization.        Stress Test:  Results for orders placed in visit on 09/13/22    Stress Test With Myocardial Perfusion One Day    Interpretation Summary    Left ventricular ejection fraction is hyperdynamic (Calculated EF > 70%). .    Myocardial perfusion imaging indicates a normal myocardial perfusion study with no evidence of ischemia.    Impressions are consistent with a low risk study.    Findings consistent with a normal ECG stress test.        Cardiac Catheterization:  Results for orders placed during the hospital encounter of 03/03/23    Cardiac Catheterization/Vascular Study    Narrative  OPERATORS  Richardson Willis M.D. (Attending Cardiologist)      PROCEDURE PERFORMED  Ultrasound guided vascular access  Right heart catheterization  Coronary Angiogram  Left Heart Catheterization [id number]  Moderate Sedation    INDICATIONS FOR PROCEDURE  83-year-old woman with multiple cardiovascular risk factors, abnormal stress test presented with worsening shortness of breath.  After discussing the risk and benefit of the procedure she was brought in for elective right and left heart cath.    PROCEDURE IN DETAIL  Informed consent was obtained from the patient after explaining the risks, benefits, and alternative options of the procedure.  After obtaining informed consent, the patient was brought to the cath lab and was prepped in a sterile fashion. Lidocaine 2% was used for local anesthesia into the right femoral venous access site. Right femoral vein was accessed using the micropuncture needle under ultrasound guidance and micropuncture wire advanced under flouroscopy. A 7 Uzbek vascular sheath was put into place percutaneously over guide-wire. Guide wires were removed. A 6Fr swan sheryl catheter was advanced to wedge position. RA, RV and PA and wedge pressures were recorded.  PA sat and arterial sats recorded.  The patient tolerated the procedure well without any complications.    Lidocaine 2% was used for local anesthesia into the right femoral arterial access site. The right femoral artery was accessed with a micropuncture needle via modified Seldinger technique under ultrasound guidance. A 6F was inserted successfully.  Afterwards, 6F JR4 and JL4 diagnostic catheters were advanced over a wire into the ascending aorta and were used to engage the ostia of the left main and RCA respectively. JR4 used to cross the AV and obtain LV pressures and gradient across the AV measured via pullback technique. Images of the right and left coronary systems were obtained. All the catheters were exchanged over a wire and subsequently removed. Angiogram of the femoral access site was obtained and did not show complications. The patient tolerated the procedure well without any complications. The pictures were reviewed at the end of the procedure. A Mynx closure device was applied.    HEMODYNAMICS    RHC  RA 6/5, 4 mmHg  RV 74/3, 5 mmHg  PA 71/25, 44 mmHg  PCW 12 mmHg  AO Sat 92%  PA Sat 78%    Jose CO: 7.89 L/min    Jose CI: 4.69 L/min/m²    LHC  LV: 134/3, 20 mmHg  AO: 131/56, 87 mmHg  No significant gradient across the aortic valve during pullback of JR4 catheter.    FINDINGS  Coronary Angiogram  All vessels are heavily calcified  She also has heavily calcified  peripheral arterial disease.  Right dominant circulation    Left main: Left main is a large caliber vessel which gives rise to the Left Anterior Descending and the Left circumflex.  Left main is angiographically free from any significant disease.    Left Anterior Descending Artery: LAD is a heavily calcified medium caliber vessel which gives rise to diagonals.  The vessel is highly tortuous and has 50 to 60% mid vessel stenosis best seen in [place] cranial view.    Left Circumflex: Heavily calcified vessel with 50% proximal segment stenosis.    Right Coronary Artery: The RCA is a large caliber vessel which is heavily calcified and tortuous.  It has diffuse luminal irregularities and 30 to 40% stenosis in the midsegment around the bend.    ESTIMATED BLOOD LOSS:  10 ml    COMPLICATIONS:  None    PROCEDURE DATA:  Sedation Time: 25 minutes    IMPRESSIONS  Heavily calcified, tortuous nonobstructive coronary disease  Severe pulmonary hypertension with PA systolic pressure in the 70s  Mean PA pressure 44 mmHg    RECOMMENDATIONS  -Continue aggressive medical management of CAD  -Referral to pulmonology for treatment of pulmonary hypertension        Other:      ASSESSMENT & PLAN:    Principal Problem:    Altered mental status  Active Problems:    Acute hypokalemia    Stage 3a chronic kidney disease    Chronic obstructive pulmonary disease    History of venous thrombosis and embolism    Primary hypertension    History of TIA (transient ischemic attack)    Severe pulmonary hypertension    Chronic respiratory failure with hypoxia    JULIAN (acute kidney injury)      Altered mental status  Ongoing workup by neurology and ID  CT head and MRI of the brain unremarkable with no acute findings  UTI has been ruled out and ammonia level is normal  White count and lactate levels are normal    Acute hypokalemia  Likely secondary to diuretic use  Will back off    JULIAN on Stage 3a chronic kidney disease  Creatinine 1.5, GFR is 34.6  Nephrology is  following  Diuretics have been held  IV hydration  Closely monitor renal function and respiratory status     COPD/Chronic respiratory failure  Severe pulmonary hypertension  On 3 L of oxygen via nasal cannula at baseline.  Has known pulmonary hypertension  RA 6/5, 4 mmHg  RV 74/3, 5 mmHg  PA 71/25, 44 mmHg  PCW 12 mmHg  Continue sildenafil  Managed by pulmonology     Atrial fibrillation  MCOT review as an outpatient showed evidence of atrial fibrillation.  Continue Eliquis for atrial fibrillation as well as for history of DVT/PE  Rate and rhythm are controlled with beta-blocker.     History of venous thrombosis and embolism  Continue Eliquis 5 mg p.o. twice daily.  She has an IVC filter in place as well.     Primary hypertension, chronic  Blood pressure is elevated  Echo shows preserved LV function with grade 2 diastolic dysfunction  Continue amlodipine, lisinopril and Coreg  Diuretics as needed     Coronary artery disease  Continue high intensity statin and beta-blocker.  No need for aspirin while she is on anticoagulation  Previous cardiac catheterization showed heavily calcified coronaries but nonobstructive.  No chest pain and stable from cardiac standpoint.     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  Continue high intensity statin and anticoagulation with Eliquis 5 mg p.o. twice daily.

## 2024-02-07 NOTE — CONSULTS
NEPHROLOGY CONSULTATION-----[facility]    [facility]  [phone]  Luke Fleming MD    Patient Care Team:  Shaylee Mcdaniels MD as PCP - General (Family Medicine)  Richardson Willis MD as Cardiologist (Cardiology)  Rafy Rodriguez MD as Consulting Physician (Hematology and Oncology)  Christianne Phillips, AMARILIS as Licensed Practical Nurse  Salome Fleming MD as Consulting Physician (Nephrology)    CC/REASON FOR CONSULTATION: RENAL FAILURE/ELEVATED SERUM CREATININE    PHYSICIAN REQUESTING CONSULTATION:     History of Present Illness    HPI    Patient is a 83 y.o. WF whom I was asked to see in consultation for evaluation and management of renal failure/elevated serum creatinine. Patient was admitted with MS Changes.  Patient denies prior knowledge of functional kidney disease, proteinuria, or hematuria. No NSAIDs or recent IV dye exposure. No known h/o hepatitis, TB, rheumatic fever, jaundice, SLE. Does bleed/bruise easily as she is chronically anticoagulated on Eliquis.  No urinary sx. No edema or fluid retention.  +Compliance with home meds. Was on diuretics in the form of  Lasix prior to admission. Was on ACE-I/ARB in the form of  Zestril prior to admission. No herbal med use.    Review of Systems   Constitutional:  Positive for activity change, appetite change and fatigue. Negative for chills, diaphoresis, fever and unexpected weight change.   HENT:  Negative for congestion, dental problem, drooling, ear discharge, ear pain, facial swelling, hearing loss, mouth sores, nosebleeds, postnasal drip, rhinorrhea, sinus pressure, sinus pain, sneezing, sore throat, tinnitus, trouble swallowing and voice change.    Eyes:  Negative for photophobia, pain, discharge, redness, itching and visual disturbance.   Respiratory:  Positive for shortness of breath. Negative for apnea, cough, choking, chest tightness, wheezing and stridor.    Cardiovascular:   Negative for chest pain, palpitations and leg swelling.   Gastrointestinal:  Negative for abdominal distention, abdominal pain, anal bleeding, blood in stool, constipation, diarrhea, nausea, rectal pain and vomiting.   Endocrine: Negative for cold intolerance, heat intolerance, polydipsia, polyphagia and polyuria.   Genitourinary:  Positive for dysuria. Negative for decreased urine volume, difficulty urinating, enuresis, flank pain, frequency, genital sores, hematuria and urgency.   Musculoskeletal:  Positive for arthralgias and back pain. Negative for gait problem, joint swelling, myalgias, neck pain and neck stiffness.   Skin:  Negative for color change, pallor, rash and wound.   Allergic/Immunologic: Negative for environmental allergies, food allergies and immunocompromised state.   Neurological:  Positive for weakness. Negative for dizziness, tremors, seizures, syncope, facial asymmetry, speech difficulty, light-headedness, numbness and headaches.   Hematological:  Negative for adenopathy. Does not bruise/bleed easily.   Psychiatric/Behavioral:  Positive for confusion. Negative for agitation, behavioral problems, decreased concentration, dysphoric mood, hallucinations, self-injury, sleep disturbance and suicidal ideas. The patient is not nervous/anxious and is not hyperactive.           Past Medical History:   Diagnosis Date    COPD (chronic obstructive pulmonary disease)     Deep vein thrombosis of bilateral lower extremities 7/24/2019    History of DVT (deep vein thrombosis) 03/2013    bilateral lower extremity    History of pneumonia 06/2012    community acquired pneumonia and right pleural effusion;hospitalized at [facility]    History of pulmonary embolism 03/2013    bilateral PE    Hypertension 2001    Insomnia     on Ambien 5mg at     Lobular breast cancer, left 11/2011    Stage IA left upper lobular breast cancer    Malignant neoplasm of left breast in female, estrogen receptor positive 7/18/2019     Osteopenia     Severe pulmonary hypertension 3/3/2023    Stage 3a chronic kidney disease 2019    TIA (transient ischemic attack) 10/25/2022       Past Surgical History:   Procedure Laterality Date    CARDIAC CATHETERIZATION N/A 3/3/2023    Procedure: Left and Right Heart Cath with Coronary Angiography;  Surgeon: Richardson Willis MD;  Location: [facility] CATH INVASIVE LOCATION;  Service: Cardiovascular;  Laterality: N/A;    CATARACT EXTRACTION      IVC FILTER RETRIEVAL  2014    IVC filter placement by Dr. Card    MAMMO STEREOTACTIC BREAST BX SURGICAL ADD UNI Left 2011    invasive lobular carcinoma-Dr. Castellon Colomb    MASTECTOMY, PARTIAL Left 2011    and left axillary sentinel lymph node biopsy by Dr. Uriostegui    TUBAL ABDOMINAL LIGATION         Family History   Problem Relation Age of Onset    Lung cancer Brother        Social History     Tobacco Use    Smoking status: Former     Types: Cigarettes     Quit date:      Years since quittin.1     Passive exposure: Past    Smokeless tobacco: Never    Tobacco comments:     smoked 6 cigarettes a day from 13 years of age to 56 years of age when she quit in    Vaping Use    Vaping Use: Never used   Substance Use Topics    Alcohol use: Not Currently    Drug use: No       Home Meds:   Medications Prior to Admission   Medication Sig Dispense Refill Last Dose    allopurinol (ZYLOPRIM) 100 MG tablet Take 1 tablet by mouth Daily.       amoxicillin-clavulanate (AUGMENTIN) 875-125 MG per tablet Take 1 tablet by mouth 2 (Two) Times a Day.   2024 at 2100    apixaban (ELIQUIS) 5 MG tablet tablet Take 1 tablet by mouth Every 12 (Twelve) Hours. Indications: Other - full anticoagulation, history of DVT/PE 60 tablet 2 2024 at 0900    budesonide-formoterol (SYMBICORT) 80-4.5 MCG/ACT inhaler Inhale 2 puffs 2 (Two) Times a Day.       carvedilol (COREG) 12.5 MG tablet TAKE 1 TABLET BY MOUTH TWICE DAILY WITH MEALS 180 tablet 3      Cholecalciferol (Vitamin D3) 50 MCG (2000 UT) capsule Take 1 capsule by mouth Daily.       furosemide (LASIX) 40 MG tablet Take 1 tablet by mouth Daily.       gabapentin (NEURONTIN) 300 MG capsule Take 1 capsule by mouth 2 (Two) Times a Day.       levothyroxine (SYNTHROID, LEVOTHROID) 50 MCG tablet Take 1 tablet by mouth Daily.       lisinopril (PRINIVIL,ZESTRIL) 40 MG tablet Take 1 tablet by mouth Daily. 90 tablet 3     potassium chloride (K-DUR,KLOR-CON) 10 MEQ CR tablet Take 1 tablet by mouth Daily. 90 tablet 3     sildenafil (REVATIO) 20 MG tablet Take 1 tablet by mouth Every 8 (Eight) Hours. 90 tablet 1        Scheduled Meds:  allopurinol, 100 mg, Oral, Daily  apixaban, 5 mg, Oral, Q12H  budesonide-formoterol, 2 puff, Inhalation, BID - RT  carvedilol, 12.5 mg, Oral, BID With Meals  cephalexin, 250 mg, Oral, Daily  levothyroxine, 50 mcg, Oral, Q AM  [Held by provider] sildenafil, 20 mg, Oral, TID  sodium chloride, 10 mL, Intravenous, Q12H  sodium chloride, 10 mL, Intravenous, Q12H        Continuous Infusions:  sodium chloride, 100 mL/hr, Last Rate: 100 mL/hr (02/06/24 7603)        PRN Meds:    acetaminophen    Calcium Replacement - Follow Nurse / BPA Driven Protocol    ipratropium-albuterol    Magnesium Standard Dose Replacement - Follow Nurse / BPA Driven Protocol    ondansetron    Phosphorus Replacement - Follow Nurse / BPA Driven Protocol    Potassium Replacement - Follow Nurse / BPA Driven Protocol    [COMPLETED] Insert Peripheral IV **AND** sodium chloride    sodium chloride    sodium chloride    sodium chloride    sodium chloride    Allergies:  Patient has no known allergies.    OBJECTIVE    Vital Signs  Temp:  [97.7 °F (36.5 °C)-98.3 °F (36.8 °C)] 98.3 °F (36.8 °C)  Heart Rate:  [63-76] 76  Resp:  [17-31] 27  BP: ()/(49-80) 155/80    No intake/output data recorded.  I/O last 3 completed shifts:  In: 1912 [P.O.:360; I.V.:1552]  Out: 280 [Urine:280]    Physical Exam:  General Appearance: alert,  "appears stated age and cooperative  Head: normocephalic, without obvious abnormality and atraumatic  Eyes: conjunctivae and sclerae normal and no icterus  Neck: supple and no JVD  Lungs: clear to auscultation and respirations regular  Heart: regular rhythm & normal rate and normal S1, S2 +TAYE  Chest Wall: no abnormalities observed  Abdomen: normal bowel sounds and soft, nontender +OBESITY  Extremities: moves extremities well, no edema, no cyanosis  Skin: no bleeding, bruising or rash  Neurologic: Alert, and oriented. No focal deficits    Results Review:    I reviewed the patient's new clinical results.    WBC WBC   Date Value Ref Range Status   02/06/2024 7.30 3.40 - 10.80 10*3/mm3 Final   02/05/2024 9.10 3.40 - 10.80 10*3/mm3 Final      HGB Hemoglobin   Date Value Ref Range Status   02/06/2024 11.2 (L) 12.0 - 15.9 g/dL Final   02/05/2024 12.8 12.0 - 15.9 g/dL Final      HCT Hematocrit   Date Value Ref Range Status   02/06/2024 34.9 34.0 - 46.6 % Final   02/05/2024 40.5 34.0 - 46.6 % Final      Platelets No results found for: \"LABPLAT\"   MCV MCV   Date Value Ref Range Status   02/06/2024 83.6 79.0 - 97.0 fL Final   02/05/2024 85.6 79.0 - 97.0 fL Final          Sodium Sodium   Date Value Ref Range Status   02/06/2024 135 (L) 136 - 145 mmol/L Final   02/06/2024 138 136 - 145 mmol/L Final   02/05/2024 137 136 - 145 mmol/L Final      Potassium Potassium   Date Value Ref Range Status   02/06/2024 5.1 3.5 - 5.2 mmol/L Final   02/06/2024 2.9 (L) 3.5 - 5.2 mmol/L Final   02/05/2024 3.1 (L) 3.5 - 5.2 mmol/L Final     Comment:     Slight hemolysis detected by analyzer. Result may be falsely elevated.      Chloride Chloride   Date Value Ref Range Status   02/06/2024 98 98 - 107 mmol/L Final   02/06/2024 96 (L) 98 - 107 mmol/L Final   02/05/2024 94 (L) 98 - 107 mmol/L Final      CO2 CO2   Date Value Ref Range Status   02/06/2024 28.0 22.0 - 29.0 mmol/L Final   02/06/2024 30.0 (H) 22.0 - 29.0 mmol/L Final   02/05/2024 30.0 (H) " "22.0 - 29.0 mmol/L Final      BUN BUN   Date Value Ref Range Status   02/06/2024 32 (H) 8 - 23 mg/dL Final   02/06/2024 22 8 - 23 mg/dL Final   02/05/2024 19 8 - 23 mg/dL Final      Creatinine Creatinine   Date Value Ref Range Status   02/06/2024 1.50 (H) 0.57 - 1.00 mg/dL Final   02/06/2024 1.07 (H) 0.57 - 1.00 mg/dL Final   02/05/2024 1.30 (H) 0.57 - 1.00 mg/dL Final      Calcium Calcium   Date Value Ref Range Status   02/06/2024 8.4 (L) 8.6 - 10.5 mg/dL Final   02/06/2024 8.7 8.6 - 10.5 mg/dL Final   02/05/2024 9.5 8.6 - 10.5 mg/dL Final      PO4 No results found for: \"CAPO4\"   Albumin Albumin   Date Value Ref Range Status   02/06/2024 3.3 (L) 3.5 - 5.2 g/dL Final   02/05/2024 3.9 3.5 - 5.2 g/dL Final      Magnesium Magnesium   Date Value Ref Range Status   02/05/2024 2.2 1.6 - 2.4 mg/dL Final      Uric Acid No results found for: \"URICACID\"       Imaging Results (Last 72 Hours)       Procedure Component Value Units Date/Time    CT Abdomen Pelvis Without Contrast [[id number]] Collected: 02/06/24 1518     Updated: 02/06/24 1537    Narrative:      CT CHEST WO CONTRAST DIAGNOSTIC, CT ABDOMEN PELVIS WO CONTRAST    Date of Exam: 2/6/2024 3:14 PM EST    Indication: COPD, exacerbation. History of breast cancer    Comparison: CT chest of 5/8/2023. Prior CT abdomen pelvis of 11/10/2010    Technique: Axial CT images were obtained of the chest without contrast administration.  Sagittal and coronal reconstructions were performed.  Automated exposure control and iterative reconstruction methods were used.      Findings: CT chest:  There are moderate changes of centrilobular emphysema. There is chronic atelectasis of the left lower lobe posteriorly and medially. There are no pulmonary nodules or masses. There is moderately severe cardiomegaly, similar. There are coronary   calcifications. There is atherosclerotic disease of the thoracic aorta. There are calcified nodes in the right hilum. There are no enlarged mediastinal nodes. " There are moderately severe degenerative changes in the lower C-spine and mild degenerative   changes in the thoracic spine. There is a new sclerotic focus of the mid manubrium with some overlying cortical thickening which may represent subacute or old fracture although metastatic disease is not excluded.    CT abdomen and pelvis: The infrarenal abdominal aorta measures 3.9 cm transversely as compared to 2.6 cm previously. There is air in the urinary bladder which is partially distended. There is a small amount of free pelvic fluid. There is diverticulosis   without evidence of acute diverticulitis. There is respiratory motion on the exam. There is no large or small bowel dilatation. The liver contour is mildly nodular, increased from the prior exam. The liver size is within normal limits. The other   nonopacified solid organs are within normal limits in size. There are no renal stones or hydronephrosis. There are cholecystectomy clips. There are moderately severe degenerative changes in the lumbar spine.      Impression:      Impression:  Chronic atelectasis of the left lower lobe. Moderately severe emphysema. Lesion of the sternal manubrium as detailed, which could represent subacute or old fracture although metastatic disease is not excluded. Correlation with whole-body bone scan may be   useful.    Aneurysmal dilatation of the abdominal aorta.    Small amount of free pelvic fluid, nonspecific.    Nodular liver contour suggests cirrhosis.    Electronically Signed: Mary Ivan MD    2/6/2024 3:29 PM EST    Workstation ID: [id number]    CT Chest Without Contrast Diagnostic [[id number]] Collected: 02/06/24 1518     Updated: 02/06/24 1537    Narrative:      CT CHEST WO CONTRAST DIAGNOSTIC, CT ABDOMEN PELVIS WO CONTRAST    Date of Exam: 2/6/2024 3:14 PM EST    Indication: COPD, exacerbation. History of breast cancer    Comparison: CT chest of 5/8/2023. Prior CT abdomen pelvis of 11/10/2010    Technique: Axial CT  images were obtained of the chest without contrast administration.  Sagittal and coronal reconstructions were performed.  Automated exposure control and iterative reconstruction methods were used.      Findings: CT chest:  There are moderate changes of centrilobular emphysema. There is chronic atelectasis of the left lower lobe posteriorly and medially. There are no pulmonary nodules or masses. There is moderately severe cardiomegaly, similar. There are coronary   calcifications. There is atherosclerotic disease of the thoracic aorta. There are calcified nodes in the right hilum. There are no enlarged mediastinal nodes. There are moderately severe degenerative changes in the lower C-spine and mild degenerative   changes in the thoracic spine. There is a new sclerotic focus of the mid manubrium with some overlying cortical thickening which may represent subacute or old fracture although metastatic disease is not excluded.    CT abdomen and pelvis: The infrarenal abdominal aorta measures 3.9 cm transversely as compared to 2.6 cm previously. There is air in the urinary bladder which is partially distended. There is a small amount of free pelvic fluid. There is diverticulosis   without evidence of acute diverticulitis. There is respiratory motion on the exam. There is no large or small bowel dilatation. The liver contour is mildly nodular, increased from the prior exam. The liver size is within normal limits. The other   nonopacified solid organs are within normal limits in size. There are no renal stones or hydronephrosis. There are cholecystectomy clips. There are moderately severe degenerative changes in the lumbar spine.      Impression:      Impression:  Chronic atelectasis of the left lower lobe. Moderately severe emphysema. Lesion of the sternal manubrium as detailed, which could represent subacute or old fracture although metastatic disease is not excluded. Correlation with whole-body bone scan may be    useful.    Aneurysmal dilatation of the abdominal aorta.    Small amount of free pelvic fluid, nonspecific.    Nodular liver contour suggests cirrhosis.    Electronically Signed: Mray Ivan MD    2/6/2024 3:29 PM EST    Workstation ID: [id number]    CT Head Without Contrast [[id number]] Collected: 02/05/24 1223     Updated: 02/05/24 1228    Narrative:      CT HEAD WO CONTRAST    Date of Exam: 2/5/2024 12:12 PM EST    Indication: confusion.    Comparison: Head CT dated 9/26/2023    Technique: Axial CT images were obtained of the head without contrast administration.  Coronal reconstructions were performed.  Automated exposure control and iterative reconstruction methods were used.      FINDINGS:    Examination is limited due to motion artifact.    Foci of periventricular and subcortical white matter hypoattenuation are consistent with chronic, microvascular ischemic change. There is age-related loss of brain parenchymal volume with prominence of sulcation and ventriculomegaly. No significant mass   effect, midline shift, intracranial hemorrhage, or hydrocephalus is identified. No extra-axial fluid collection is identified.   The calvarium and overlying soft tissues are unremarkable. The paranasal sinuses and bilateral mastoid air cells are   adequately aerated. The visualized bony orbits, globes, and retrobulbar soft tissues are unremarkable.      Impression:      1.Examination is limited due to motion artifact.  2.Given this limitation, no acute intracranial abnormality is identified.  3.Findings compatible with chronic microvascular ischemic change and diffuse cortical atrophy.    Electronically Signed: Parker Mireles MD    2/5/2024 12:26 PM EST    Workstation ID: [id number]    XR Chest 1 View [[id number]] Collected: 02/05/24 1205     Updated: 02/05/24 1208    Narrative:      XR CHEST 1 VW    Date of Exam: 2/5/2024 12:00 PM EST    Indication: weakness    Comparison: 5/2/2023.    Findings:  There is mild  cardiomegaly with increased heart size. Mildly prominent interstitial pattern appears stable and chronic. There is a prominent skinfold over the right chest. There is no definite pneumothorax. There is no effusion.      Impression:      Impression:  Mild cardiomegaly. Mild chronic findings of the lung fields.      Electronically Signed: Mary Ivan MD    2/5/2024 12:06 PM EST    Workstation ID: [id number]              Results for orders placed during the hospital encounter of 02/05/24    XR Chest 1 View    Narrative  XR CHEST 1 VW    Date of Exam: 2/5/2024 12:00 PM EST    Indication: weakness    Comparison: 5/2/2023.    Findings:  There is mild cardiomegaly with increased heart size. Mildly prominent interstitial pattern appears stable and chronic. There is a prominent skinfold over the right chest. There is no definite pneumothorax. There is no effusion.    Impression  Impression:  Mild cardiomegaly. Mild chronic findings of the lung fields.      Electronically Signed: Mary Ivan MD  2/5/2024 12:06 PM EST  Workstation ID: [id number]      Results for orders placed during the hospital encounter of 09/24/23    XR Foot 3+ View Left    Narrative  XR FOOT 3+ VW LEFT    Date of Exam: 9/24/2023 3:15 PM EDT    Indication: pain redness swelling    Comparison: None available.    Findings:  No fracture or dislocation. There is soft tissue swelling diffusely at the left foot. No discrete osseous erosion. Plantar calcaneal bone spur. Enthesopathy at the Achilles insertion on the calcaneus. No radiodense foreign body.    Impression  Impression:  1. Negative for acute osseous abnormality.  2. Nonspecific soft tissue swelling.        Electronically Signed: Randall Zarco MD  9/24/2023 3:54 PM EDT  Workstation ID: [id number]      Results for orders placed during the hospital encounter of 05/02/23    XR Chest 1 View    Narrative  XR CHEST 1 VW    Date of Exam: 5/2/2023 2:25 PM EDT    Indication: dyspnea    Comparison:  4/8/2023    Findings:  Interval decrease in left base consolidation. There is minimal residual left base consolidation and a small left effusion. Right lung is clear. Cardiac and mediastinal contours are within normal limits. Regional skeleton is unremarkable.    Impression  Impression:    1. Near complete resolution of left base opacity and left effusion.      Electronically Signed: Daniel Riddle  5/2/2023 2:46 PM EDT  Workstation ID: [id number]        Results for orders placed during the hospital encounter of 03/01/23    Duplex venous lower extremity bilateral CAR    Interpretation Summary    Normal bilateral lower extremity venous duplex scan.      ASSESSMENT / PLAN      Altered mental status      RENAL FAILURE------Nonoliguric. +ARF/JULIAN on top of CRF/CKD STG 3A with a baseline serum  Creatinine of about 1.1. Unknown if patient has baseline proteinuria or hematuria. CRF/CKD STG 3A most likely secondary to HTN NS. +ARF/JULIAN is secondary to prerenal state/intravascular volume depletion. Gentle hydration. Avoid hypotension. Check urine and serum studies and renal US and PVR. No NSAIDs or IV dye. Dose meds for CrCl less than 10 cc/min    2. ANEMIA------Check Fe and SPEP    3. HTN WITH CKD-----Avoid hypotension. No ACE/ARB/DRI/diuretic for now    4. OA/DJD------No NSAIDs. Check uric acid level    5. DELIRIUM-------?Secondary to UTI    6. UTI-----C and S pending. On Abx    7. HYPOCALCEMIA------Replace IV. Follow ionized levels    8. KETONURIA/DEHYDRATION------Secondary to recent outpatient increase in Lasix. Hold Lasix. Gentle IVFs given Pulmonary HTN    9. PULMONARY HTN--------Per , Pulmonary    10. HYPOKALEMIA-------Replace po    11. CAD-----per , Cardiology      I discussed the patient's findings and my recommendations with patient, family, and nursing staff    Will follow along closely. Thank you for allowing us to see this patient in renal consultation.    [facility]  [phone]  MD Luke Barton MD  02/07/24  07:10 EST

## 2024-02-07 NOTE — CONSULTS
"Primary Care Provider: Shaylee Mcdaniels MD     Consult requested by: Dr. Jin    Reason for Consultation: Neurological evaluation, altered mental status    History taken from: chart family    Chief complaint: Altered mental status       SUBJECTIVE:    History of present illness: Background per H&P: Gabrielle Lyles is a 83 y.o. female with history of COPD, DVT, breast cancer, chronic kidney disease and pulmonary hypertension who presents with progressive confusion over the last 3 days.  She was seen in her PCP office with bilateral lower extremity swelling and swelling and discomfort in her left breast.  Dr. Mcdaniels was concerned about possible mastitis in the left breast.  She started Augmentin and advised patient to increase dose of furosemide from 40 mg a day to 80 mg a day through the weekend.  Daughter noticed mild confusion on Saturday afternoon, somewhat worse on Sunday and then this morning she was significantly worse.  She is oriented to person only, which is a significant change from baseline..  There was no reported fever at home but her temperature on arrival to the emergency room was 100 degrees.  She has had poor oral intake over the last few days.  There is no reported vomiting or diarrhea.  Swelling in the breast and legs has resolved.    - Portions of the above HPI were copied from previous encounters and edited as appropriate. PMH as detailed below.     History obtained from patient's daughter and patient's  at bedside.  Patient was very altered when she came in on arrival, very restless trying to get out of bed but had a much better day the day after.  And then yesterday midmorning she \"flipped a switch\" according to her  and was altered afterwards.  Since then she has some periods of lucidness but also very restless and confused.  Family states last night she did not sleep well at all and maybe slept a total of 45 minutes.  There has not been any seizure type activity according to " the family, no history of seizures, no facial weakness or nothing to suggest CVA.  No known fever, no neck stiffness.  No new medication changes other than the furosemide.    Chart reviewed    Patient known to neurology team and was seen by myself in 2022 for TIA.  Normally patient is independent, oriented and able to care for self.  She is currently on Eliquis for stroke prevention.    Review of Systems   Unable to perform ROS: Mental status change        PATIENT HISTORY:  Past Medical History:   Diagnosis Date    Acute exacerbation of chronic obstructive pulmonary disease (COPD)     COPD (chronic obstructive pulmonary disease)     Deep vein thrombosis of bilateral lower extremities 07/24/2019    3/2013    History of pneumonia 06/2012    community acquired pneumonia and right pleural effusion;hospitalized at [facility]    History of pulmonary embolism 03/2013    bilateral PE    Hypertension 2001    Insomnia     on Ambien 5mg at     Lobular breast cancer, left 11/2011    Stage IA left upper lobular breast cancer    Malignant neoplasm of left breast in female, estrogen receptor positive 07/18/2019    Osteopenia 2012    Parainfluenza infection     Parotid duct obstruction     Severe pulmonary hypertension 03/03/2023    Stage 3a chronic kidney disease 07/24/2019    TIA (transient ischemic attack) 10/25/2022   ,   Past Surgical History:   Procedure Laterality Date    CARDIAC CATHETERIZATION N/A 3/3/2023    Procedure: Left and Right Heart Cath with Coronary Angiography;  Surgeon: Richardson Willis MD;  Location: [facility] INVASIVE LOCATION;  Service: Cardiovascular;  Laterality: N/A;    CATARACT EXTRACTION  2015    IVC FILTER RETRIEVAL  06/2014    IVC filter placement by Dr. Card    MAMMO STEREOTACTIC BREAST BX SURGICAL ADD UNI Left 11/01/2011    invasive lobular carcinoma-Dr. Cande Daniel    MASTECTOMY, PARTIAL Left 12/01/2011    and left axillary sentinel lymph node biopsy by Dr. Uriostegui    TUBAL ABDOMINAL  LIGATION  1984   ,   Family History   Problem Relation Age of Onset    Lung cancer Brother    ,   Social History     Tobacco Use    Smoking status: Former     Types: Cigarettes     Quit date:      Years since quittin.1     Passive exposure: Past    Smokeless tobacco: Never    Tobacco comments:     smoked 6 cigarettes a day from 13 years of age to 56 years of age when she quit in    Vaping Use    Vaping Use: Never used   Substance Use Topics    Alcohol use: Not Currently    Drug use: No   ,   Prior to Admission medications    Medication Sig Start Date End Date Taking? Authorizing Provider   allopurinol (ZYLOPRIM) 100 MG tablet Take 1 tablet by mouth Daily.   Yes Jaylyn Golden MD   amoxicillin-clavulanate (AUGMENTIN) 875-125 MG per tablet Take 1 tablet by mouth 2 (Two) Times a Day. 24 Yes Jaylyn Golden MD   apixaban (ELIQUIS) 5 MG tablet tablet Take 1 tablet by mouth Every 12 (Twelve) Hours. Indications: Other - full anticoagulation, history of DVT/PE 10/27/22  Yes Shaylee Mcdaniels MD   budesonide-formoterol (SYMBICORT) 80-4.5 MCG/ACT inhaler Inhale 2 puffs 2 (Two) Times a Day.   Yes ProviderJaylyn MD   carvedilol (COREG) 12.5 MG tablet TAKE 1 TABLET BY MOUTH TWICE DAILY WITH MEALS 23  Yes Richardson Willis MD   Cholecalciferol (Vitamin D3) 50 MCG (2000 UT) capsule Take 1 capsule by mouth Daily.   Yes ProviderJaylyn MD   furosemide (LASIX) 40 MG tablet Take 1 tablet by mouth Daily.   Yes ProviderJaylyn MD   gabapentin (NEURONTIN) 300 MG capsule Take 1 capsule by mouth 2 (Two) Times a Day.   Yes Jaylyn Golden MD   levothyroxine (SYNTHROID, LEVOTHROID) 50 MCG tablet Take 1 tablet by mouth Daily.   Yes Jaylyn Golden MD   lisinopril (PRINIVIL,ZESTRIL) 40 MG tablet Take 1 tablet by mouth Daily. 23  Yes Velma Ascencio APRN   potassium chloride (K-DUR,KLOR-CON) 10 MEQ CR tablet Take 1 tablet by mouth Daily. 23  Yes Richardson Willis MD    sildenafil (REVATIO) 20 MG tablet Take 1 tablet by mouth Every 8 (Eight) Hours. 5/11/23  Yes Mansi Becerril APRN    Allergies:  Patient has no known allergies.    Current Facility-Administered Medications   Medication Dose Route Frequency Provider Last Rate Last Admin    acetaminophen (TYLENOL) tablet 650 mg  650 mg Oral Q4H PRN Rosamaria Jin MD   650 mg at 02/06/24 1518    allopurinol (ZYLOPRIM) tablet 100 mg  100 mg Oral Daily Rosamaria Jin MD   100 mg at 02/06/24 0733    apixaban (ELIQUIS) tablet 5 mg  5 mg Oral Q12H Rosamaria Jin MD   5 mg at 02/07/24 0919    budesonide (PULMICORT) nebulizer solution 0.5 mg  0.5 mg Nebulization BID - RT Bhumi Cruz APRN        Calcium Replacement - Follow Nurse / BPA Driven Protocol   Does not apply PRN Rosamaria Jin MD        carvedilol (COREG) tablet 12.5 mg  12.5 mg Oral BID With Meals Rosamaria Jin MD   12.5 mg at 02/07/24 0919    cephalexin (KEFLEX) capsule 250 mg  250 mg Oral Q8H Mansi Becerril APRN   250 mg at 02/07/24 0919    famotidine (PEPCID) tablet 20 mg  20 mg Oral Daily Rosamaria Jin MD   20 mg at 02/07/24 1226    hydrALAZINE (APRESOLINE) tablet 25 mg  25 mg Oral Q8H Rosamaria Jin MD        ipratropium-albuterol (DUO-NEB) nebulizer solution 3 mL  3 mL Nebulization Q4H PRN Rosamaria Jin MD        ipratropium-albuterol (DUO-NEB) nebulizer solution 3 mL  3 mL Nebulization 4x Daily - RT Bhumi Cruz APRN        levothyroxine (SYNTHROID, LEVOTHROID) tablet 50 mcg  50 mcg Oral Q AM Rosamaria Jin MD   50 mcg at 02/07/24 0604    Magnesium Standard Dose Replacement - Follow Nurse / BPA Driven Protocol   Does not apply PRN Rosamaria Jin MD        ondansetron (ZOFRAN) injection 4 mg  4 mg Intravenous Q6H PRN Rosamaria Jin MD   4 mg at 02/05/24 2200    Phosphorus Replacement - Follow Nurse / BPA Driven Protocol   Does not apply PRN Rosamaria Jin MD        Potassium Replacement - Follow Nurse / BPA Driven Protocol   Does not apply PRN Rosamaria Jin MD        sildenafil  (REVATIO) tablet 20 mg  20 mg Oral TID Rosamaria Jin MD   20 mg at 02/05/24 2159    sodium chloride 0.9 % flush 10 mL  10 mL Intravenous PRN Pankaj Mccallum MD        sodium chloride 0.9 % flush 10 mL  10 mL Intravenous Q12H Rosamaria Jin MD   10 mL at 02/07/24 0919    sodium chloride 0.9 % flush 10 mL  10 mL Intravenous PRN Rosamaria Jin MD        sodium chloride 0.9 % flush 10 mL  10 mL Intravenous Q12H Rosamaria Jin MD   10 mL at 02/07/24 0919    sodium chloride 0.9 % flush 10 mL  10 mL Intravenous PRN Rosamaria Jin MD        sodium chloride 0.9 % flush 20 mL  20 mL Intravenous PRN Rosamaria Jin MD        sodium chloride 0.9 % infusion 40 mL  40 mL Intravenous PRN Rosamaria Jin MD        sodium chloride 0.9 % infusion  60 mL/hr Intravenous Continuous Salome Fleming MD 60 mL/hr at 02/07/24 1226 60 mL/hr at 02/07/24 1226        ________________________________________________________        OBJECTIVE:    PHYSICAL EXAM:    Constitutional: The patient is restless, encephalopathic     Head: Normocephalic, atraumatic.     Chest: No respiratory distress.    Cardiac: Regular rate and rhythm.     Extremities:  No clubbing, cyanosis, or edema.     NEUROLOGICAL:    Cognition:   Encephalopathic  Opens eyes to physical stimulation  Follows simple commands by squeezing hands and wiggling toes    Cranial nerves;  No apparent facial asymmetry   Pupils equal and reactive  Tracking   Exam limited given mental status    Motor: Antigravity in all extremities. Withdraws to pain in all extremities. Exam limited given mental status    Neck is supple  Physical exam performed by MICHAEL Ochoa.     ________________________________________________________   RESULTS REVIEW:    VITAL SIGNS:   Temp:  [96.4 °F (35.8 °C)-98.3 °F (36.8 °C)] 96.4 °F (35.8 °C)  Heart Rate:  [65-76] 76  Resp:  [18-31] 19  BP: ()/(61-99) 172/99     LABS:      Lab 02/07/24  1013 02/06/24  0351 02/05/24  1150 02/05/24  1136   WBC 9.20 7.30 9.10  --     HEMOGLOBIN 11.8* 11.2* 12.8  --    HEMATOCRIT 37.4 34.9 40.5  --    PLATELETS 200 177 220  --    NEUTROS ABS 6.70 5.60 7.50*  --    LYMPHS ABS 1.00 0.60* 0.40*  --    MONOS ABS 0.90 0.90 0.80  --    EOS ABS 0.50* 0.10 0.40  --    MCV 86.3 83.6 85.6  --    PROCALCITONIN  --   --  0.06  --    LACTATE  --   --   --  1.4         Lab 02/07/24  1013 02/06/24  1806 02/06/24  0351 02/05/24  1150   SODIUM 136 135* 138 137   POTASSIUM 4.9 5.1 2.9* 3.1*   CHLORIDE 102 98 96* 94*   CO2 24.0 28.0 30.0* 30.0*   ANION GAP 10.0 9.0 12.0 13.0   BUN 32* 32* 22 19   CREATININE 1.26* 1.50* 1.07* 1.30*   EGFR 42.7* 34.6* 52.0* 41.1*   GLUCOSE 100* 105* 82 104*   CALCIUM 8.8 8.4* 8.7 9.5   MAGNESIUM  --   --   --  2.2   TSH  --   --   --  1.640         Lab 02/06/24  1806 02/05/24  1150   TOTAL PROTEIN 5.9* 7.1   ALBUMIN 3.3* 3.9   GLOBULIN 2.6 3.2   ALT (SGPT) 16 10   AST (SGOT) 21 19   BILIRUBIN 0.6 1.1   ALK PHOS 58 69                     Lab 02/05/24  2202   FIO2 32     UA          8/25/2023    14:33 2/5/2024    11:46 2/6/2024    10:54   Urinalysis   Squamous Epithelial Cells, UA  0-2  3-6    Specific Gravity, UA  1.022  1.018    Ketones, UA Negative  Trace  Trace    Blood, UA  Small (1+)  Moderate (2+)    Leukocytes, UA Trace  Negative  Small (1+)    Nitrite, UA  Negative  Negative    RBC, UA  3-5  3-5    WBC, UA  0-2  0-2    Bacteria, UA  Trace  1+        Lab Results   Component Value Date    TSH 1.640 02/05/2024     (H) 10/25/2022    HGBA1C 5.8 (H) 10/25/2022    [id number] 486 10/26/2022       IMAGING STUDIES:  MRI Brain Without Contrast    Result Date: 2/7/2024  Impression: Mildly motion-degraded exam demonstrating expected age-related changes, without evidence of acute infarct, hemorrhage, mass or mass effect. Electronically Signed: Andres Olvera MD  2/7/2024 9:00 AM EST  Workstation ID: [id number]    CT Abdomen Pelvis Without Contrast    Result Date: 2/6/2024  Impression: Chronic atelectasis of the left lower lobe.  Moderately severe emphysema. Lesion of the sternal manubrium as detailed, which could represent subacute or old fracture although metastatic disease is not excluded. Correlation with whole-body bone scan may be  useful. Aneurysmal dilatation of the abdominal aorta. Small amount of free pelvic fluid, nonspecific. Nodular liver contour suggests cirrhosis. Electronically Signed: Mary Ivan MD  2/6/2024 3:29 PM EST  Workstation ID: [id number]    CT Chest Without Contrast Diagnostic    Result Date: 2/6/2024  Impression: Chronic atelectasis of the left lower lobe. Moderately severe emphysema. Lesion of the sternal manubrium as detailed, which could represent subacute or old fracture although metastatic disease is not excluded. Correlation with whole-body bone scan may be  useful. Aneurysmal dilatation of the abdominal aorta. Small amount of free pelvic fluid, nonspecific. Nodular liver contour suggests cirrhosis. Electronically Signed: Mary Ivan MD  2/6/2024 3:29 PM EST  Workstation ID: [id number]     I reviewed the patient's new clinical results.    ________________________________________________________     PROBLEM LIST:    Altered mental status    Acute hypokalemia    Stage 3a chronic kidney disease    Chronic obstructive pulmonary disease    History of venous thrombosis and embolism    Primary hypertension    History of TIA (transient ischemic attack)    Severe pulmonary hypertension    Chronic respiratory failure with hypoxia    JULIAN (acute kidney injury)            ASSESSMENT/PLAN:    Acute encephalopathy, delirium due to infection, possible mastitis, ARF/JULIAN and sleep deprivation   - CT head reviewed   -  MRI brain personally reviewed- motion artifact but no acute findings   - EKG: SR rate 75 premature complexes   - Labs: B12: P, folate P , TSH: 1.640, UA 1+ bacteria, Ammonia: 25, Cr 1.26, Na 136, CK 64  - Tmax 100   - Trial Melatonin 5 mg QHS- sleep deprivation   - Medications & labs reviewed   - Continue  to treat underlying conditions   - Will follow     Possible underlying viral infection with negative workup-  Primary consult ID for recommendations  Concern for mastitis- abx started   ARF/JULIAN- hx of CKD 3A- per Renal     Will follow.     I discussed the patient's findings and my recommendations with family    JOAQUÍN Bacon  02/07/24  14:00 EST

## 2024-02-08 ENCOUNTER — APPOINTMENT (OUTPATIENT)
Dept: CARDIOLOGY | Facility: HOSPITAL | Age: 84
DRG: 683 | End: 2024-02-08
Payer: MEDICARE

## 2024-02-08 LAB
ALBUMIN SERPL-MCNC: 3.5 G/DL (ref 3.5–5.2)
ALBUMIN/GLOB SERPL: 1.2 G/DL
ALP SERPL-CCNC: 60 U/L (ref 39–117)
ALT SERPL W P-5'-P-CCNC: 32 U/L (ref 1–33)
ANION GAP SERPL CALCULATED.3IONS-SCNC: 11 MMOL/L (ref 5–15)
AST SERPL-CCNC: 35 U/L (ref 1–32)
BASOPHILS # BLD AUTO: 0.1 10*3/MM3 (ref 0–0.2)
BASOPHILS NFR BLD AUTO: 1.3 % (ref 0–1.5)
BH CV ECHO MEAS - AO MAX PG: 8.8 MMHG
BH CV ECHO MEAS - AO MEAN PG: 5 MMHG
BH CV ECHO MEAS - AO ROOT DIAM: 3.1 CM
BH CV ECHO MEAS - AO V2 MAX: 148 CM/SEC
BH CV ECHO MEAS - AO V2 VTI: 23.6 CM
BH CV ECHO MEAS - AVA(I,D): 1.51 CM2
BH CV ECHO MEAS - EDV(CUBED): 27 ML
BH CV ECHO MEAS - EDV(MOD-SP4): 32.8 ML
BH CV ECHO MEAS - EF(MOD-BP): 68 %
BH CV ECHO MEAS - EF(MOD-SP4): 67.7 %
BH CV ECHO MEAS - ESV(CUBED): 8 ML
BH CV ECHO MEAS - ESV(MOD-SP4): 10.6 ML
BH CV ECHO MEAS - FS: 33.3 %
BH CV ECHO MEAS - IVS/LVPW: 0.91 CM
BH CV ECHO MEAS - IVSD: 1 CM
BH CV ECHO MEAS - LA DIMENSION: 3.3 CM
BH CV ECHO MEAS - LV MASS(C)D: 88.5 GRAMS
BH CV ECHO MEAS - LV MAX PG: 2.7 MMHG
BH CV ECHO MEAS - LV MEAN PG: 1 MMHG
BH CV ECHO MEAS - LV V1 MAX: 82.1 CM/SEC
BH CV ECHO MEAS - LV V1 VTI: 14 CM
BH CV ECHO MEAS - LVIDD: 3 CM
BH CV ECHO MEAS - LVIDS: 2 CM
BH CV ECHO MEAS - LVOT AREA: 2.5 CM2
BH CV ECHO MEAS - LVOT DIAM: 1.8 CM
BH CV ECHO MEAS - LVPWD: 1.1 CM
BH CV ECHO MEAS - MR MAX PG: 110.7 MMHG
BH CV ECHO MEAS - MR MAX VEL: 526 CM/SEC
BH CV ECHO MEAS - MV E MAX VEL: 111.3 CM/SEC
BH CV ECHO MEAS - MV MAX PG: 6.2 MMHG
BH CV ECHO MEAS - MV MEAN PG: 3 MMHG
BH CV ECHO MEAS - MV V2 VTI: 13.5 CM
BH CV ECHO MEAS - MVA(VTI): 2.6 CM2
BH CV ECHO MEAS - PA V2 MAX: 61.1 CM/SEC
BH CV ECHO MEAS - RAP SYSTOLE: 15 MMHG
BH CV ECHO MEAS - RV MAX PG: 0.34 MMHG
BH CV ECHO MEAS - RV V1 MAX: 29 CM/SEC
BH CV ECHO MEAS - RV V1 VTI: 5.4 CM
BH CV ECHO MEAS - RVSP: 44.2 MMHG
BH CV ECHO MEAS - SV(LVOT): 35.6 ML
BH CV ECHO MEAS - SV(MOD-SP4): 22.2 ML
BH CV ECHO MEAS - TAPSE (>1.6): 1.37 CM
BH CV ECHO MEAS - TR MAX PG: 29.2 MMHG
BH CV ECHO MEAS - TR MAX VEL: 270 CM/SEC
BH CV UPPER VENOUS LEFT INTERNAL JUGULAR AUGMENT: NORMAL
BH CV UPPER VENOUS LEFT INTERNAL JUGULAR COMPRESS: NORMAL
BH CV UPPER VENOUS LEFT INTERNAL JUGULAR PHASIC: NORMAL
BH CV UPPER VENOUS LEFT INTERNAL JUGULAR SPONT: NORMAL
BH CV UPPER VENOUS LEFT SUBCLAVIAN AUGMENT: NORMAL
BH CV UPPER VENOUS LEFT SUBCLAVIAN COMPRESS: NORMAL
BH CV UPPER VENOUS LEFT SUBCLAVIAN PHASIC: NORMAL
BH CV UPPER VENOUS LEFT SUBCLAVIAN SPONT: NORMAL
BH CV UPPER VENOUS RIGHT AXILLARY AUGMENT: NORMAL
BH CV UPPER VENOUS RIGHT AXILLARY COMPRESS: NORMAL
BH CV UPPER VENOUS RIGHT AXILLARY PHASIC: NORMAL
BH CV UPPER VENOUS RIGHT AXILLARY SPONT: NORMAL
BH CV UPPER VENOUS RIGHT BASILIC FOREARM COMPRESS: NORMAL
BH CV UPPER VENOUS RIGHT BASILIC UPPER COMPRESS: NORMAL
BH CV UPPER VENOUS RIGHT BRACHIAL COMPRESS: NORMAL
BH CV UPPER VENOUS RIGHT CEPHALIC FOREARM COMPRESS: NORMAL
BH CV UPPER VENOUS RIGHT CEPHALIC UPPER COMPRESS: NORMAL
BH CV UPPER VENOUS RIGHT INTERNAL JUGULAR AUGMENT: NORMAL
BH CV UPPER VENOUS RIGHT INTERNAL JUGULAR COMPRESS: NORMAL
BH CV UPPER VENOUS RIGHT INTERNAL JUGULAR PHASIC: NORMAL
BH CV UPPER VENOUS RIGHT INTERNAL JUGULAR SPONT: NORMAL
BH CV UPPER VENOUS RIGHT RADIAL COMPRESS: NORMAL
BH CV UPPER VENOUS RIGHT SUBCLAVIAN AUGMENT: NORMAL
BH CV UPPER VENOUS RIGHT SUBCLAVIAN COMPRESS: NORMAL
BH CV UPPER VENOUS RIGHT SUBCLAVIAN PHASIC: NORMAL
BH CV UPPER VENOUS RIGHT SUBCLAVIAN SPONT: NORMAL
BH CV UPPER VENOUS RIGHT ULNAR COMPRESS: NORMAL
BH CV XLRA - TDI S': 8.5 CM/SEC
BILIRUB SERPL-MCNC: 0.7 MG/DL (ref 0–1.2)
BUN SERPL-MCNC: 32 MG/DL (ref 8–23)
BUN/CREAT SERPL: 28.6 (ref 7–25)
CA-I SERPL ISE-MCNC: 1.28 MMOL/L (ref 1.2–1.3)
CALCIUM SPEC-SCNC: 9.4 MG/DL (ref 8.6–10.5)
CHLORIDE SERPL-SCNC: 102 MMOL/L (ref 98–107)
CO2 SERPL-SCNC: 23 MMOL/L (ref 22–29)
CREAT SERPL-MCNC: 1.12 MG/DL (ref 0.57–1)
DEPRECATED RDW RBC AUTO: 49.9 FL (ref 37–54)
EGFRCR SERPLBLD CKD-EPI 2021: 49.2 ML/MIN/1.73
EOSINOPHIL # BLD AUTO: 0.4 10*3/MM3 (ref 0–0.4)
EOSINOPHIL NFR BLD AUTO: 4.1 % (ref 0.3–6.2)
ERYTHROCYTE [DISTWIDTH] IN BLOOD BY AUTOMATED COUNT: 15.8 % (ref 12.3–15.4)
FOLATE SERPL-MCNC: >20 NG/ML (ref 4.78–24.2)
GLOBULIN UR ELPH-MCNC: 2.9 GM/DL
GLUCOSE SERPL-MCNC: 90 MG/DL (ref 65–99)
HCT VFR BLD AUTO: 40.5 % (ref 34–46.6)
HGB BLD-MCNC: 12.8 G/DL (ref 12–15.9)
LYMPHOCYTES # BLD AUTO: 1.4 10*3/MM3 (ref 0.7–3.1)
LYMPHOCYTES NFR BLD AUTO: 14.3 % (ref 19.6–45.3)
MAGNESIUM SERPL-MCNC: 2 MG/DL (ref 1.6–2.4)
MCH RBC QN AUTO: 26.9 PG (ref 26.6–33)
MCHC RBC AUTO-ENTMCNC: 31.6 G/DL (ref 31.5–35.7)
MCV RBC AUTO: 84.9 FL (ref 79–97)
MONOCYTES # BLD AUTO: 0.9 10*3/MM3 (ref 0.1–0.9)
MONOCYTES NFR BLD AUTO: 9.1 % (ref 5–12)
NEUTROPHILS NFR BLD AUTO: 6.9 10*3/MM3 (ref 1.7–7)
NEUTROPHILS NFR BLD AUTO: 71.2 % (ref 42.7–76)
NRBC BLD AUTO-RTO: 0.1 /100 WBC (ref 0–0.2)
PHOSPHATE SERPL-MCNC: 3.4 MG/DL (ref 2.5–4.5)
PLATELET # BLD AUTO: 216 10*3/MM3 (ref 140–450)
PMV BLD AUTO: 9 FL (ref 6–12)
POTASSIUM SERPL-SCNC: 4.8 MMOL/L (ref 3.5–5.2)
PROT SERPL-MCNC: 6.4 G/DL (ref 6–8.5)
QT INTERVAL: 413 MS
QTC INTERVAL: 435 MS
RBC # BLD AUTO: 4.77 10*6/MM3 (ref 3.77–5.28)
SINUS: 2.7 CM
SODIUM SERPL-SCNC: 136 MMOL/L (ref 136–145)
WBC NRBC COR # BLD AUTO: 9.6 10*3/MM3 (ref 3.4–10.8)

## 2024-02-08 PROCEDURE — 93306 TTE W/DOPPLER COMPLETE: CPT | Performed by: INTERNAL MEDICINE

## 2024-02-08 PROCEDURE — 94761 N-INVAS EAR/PLS OXIMETRY MLT: CPT

## 2024-02-08 PROCEDURE — 85025 COMPLETE CBC W/AUTO DIFF WBC: CPT | Performed by: INTERNAL MEDICINE

## 2024-02-08 PROCEDURE — 94799 UNLISTED PULMONARY SVC/PX: CPT

## 2024-02-08 PROCEDURE — 25010000002 CEFTRIAXONE PER 250 MG: Performed by: NURSE PRACTITIONER

## 2024-02-08 PROCEDURE — 99233 SBSQ HOSP IP/OBS HIGH 50: CPT | Performed by: INTERNAL MEDICINE

## 2024-02-08 PROCEDURE — 93306 TTE W/DOPPLER COMPLETE: CPT

## 2024-02-08 PROCEDURE — 83735 ASSAY OF MAGNESIUM: CPT | Performed by: INTERNAL MEDICINE

## 2024-02-08 PROCEDURE — 93971 EXTREMITY STUDY: CPT

## 2024-02-08 PROCEDURE — 84100 ASSAY OF PHOSPHORUS: CPT | Performed by: INTERNAL MEDICINE

## 2024-02-08 PROCEDURE — 82746 ASSAY OF FOLIC ACID SERUM: CPT | Performed by: NURSE PRACTITIONER

## 2024-02-08 PROCEDURE — 93010 ELECTROCARDIOGRAM REPORT: CPT | Performed by: INTERNAL MEDICINE

## 2024-02-08 PROCEDURE — 99232 SBSQ HOSP IP/OBS MODERATE 35: CPT | Performed by: NURSE PRACTITIONER

## 2024-02-08 PROCEDURE — 93005 ELECTROCARDIOGRAM TRACING: CPT | Performed by: INTERNAL MEDICINE

## 2024-02-08 PROCEDURE — 80053 COMPREHEN METABOLIC PANEL: CPT | Performed by: INTERNAL MEDICINE

## 2024-02-08 PROCEDURE — 82330 ASSAY OF CALCIUM: CPT | Performed by: INTERNAL MEDICINE

## 2024-02-08 PROCEDURE — 25810000003 SODIUM CHLORIDE 0.9 % SOLUTION: Performed by: INTERNAL MEDICINE

## 2024-02-08 RX ORDER — AMLODIPINE BESYLATE 5 MG/1
5 TABLET ORAL
Status: DISCONTINUED | OUTPATIENT
Start: 2024-02-08 | End: 2024-02-09

## 2024-02-08 RX ORDER — HYDRALAZINE HYDROCHLORIDE 25 MG/1
50 TABLET, FILM COATED ORAL EVERY 8 HOURS SCHEDULED
Status: DISCONTINUED | OUTPATIENT
Start: 2024-02-08 | End: 2024-02-17 | Stop reason: HOSPADM

## 2024-02-08 RX ORDER — CARVEDILOL 25 MG/1
25 TABLET ORAL 2 TIMES DAILY WITH MEALS
Status: DISCONTINUED | OUTPATIENT
Start: 2024-02-08 | End: 2024-02-11

## 2024-02-08 RX ADMIN — SODIUM CHLORIDE 60 ML/HR: 9 INJECTION, SOLUTION INTRAVENOUS at 08:48

## 2024-02-08 RX ADMIN — BUDESONIDE INHALATION 0.5 MG: 0.5 SUSPENSION RESPIRATORY (INHALATION) at 07:53

## 2024-02-08 RX ADMIN — LEVOTHYROXINE SODIUM 50 MCG: 0.05 TABLET ORAL at 05:16

## 2024-02-08 RX ADMIN — Medication 10 ML: at 08:37

## 2024-02-08 RX ADMIN — ALLOPURINOL 100 MG: 100 TABLET ORAL at 08:37

## 2024-02-08 RX ADMIN — SILDENAFIL CITRATE 20 MG: 20 TABLET ORAL at 08:37

## 2024-02-08 RX ADMIN — APIXABAN 5 MG: 5 TABLET, FILM COATED ORAL at 21:15

## 2024-02-08 RX ADMIN — AMLODIPINE BESYLATE 5 MG: 5 TABLET ORAL at 08:48

## 2024-02-08 RX ADMIN — IPRATROPIUM BROMIDE AND ALBUTEROL SULFATE 3 ML: .5; 3 SOLUTION RESPIRATORY (INHALATION) at 11:39

## 2024-02-08 RX ADMIN — Medication 10 ML: at 00:02

## 2024-02-08 RX ADMIN — Medication 5 MG: at 21:15

## 2024-02-08 RX ADMIN — HYDRALAZINE HYDROCHLORIDE 50 MG: 25 TABLET ORAL at 14:39

## 2024-02-08 RX ADMIN — Medication 10 ML: at 21:30

## 2024-02-08 RX ADMIN — HYDRALAZINE HYDROCHLORIDE 25 MG: 25 TABLET, FILM COATED ORAL at 05:16

## 2024-02-08 RX ADMIN — CEFTRIAXONE 2000 MG: 2 INJECTION, POWDER, FOR SOLUTION INTRAMUSCULAR; INTRAVENOUS at 17:30

## 2024-02-08 RX ADMIN — Medication 10 ML: at 21:16

## 2024-02-08 RX ADMIN — SILDENAFIL CITRATE 20 MG: 20 TABLET ORAL at 21:15

## 2024-02-08 RX ADMIN — APIXABAN 5 MG: 5 TABLET, FILM COATED ORAL at 08:37

## 2024-02-08 RX ADMIN — CARVEDILOL 25 MG: 25 TABLET, FILM COATED ORAL at 17:01

## 2024-02-08 RX ADMIN — IPRATROPIUM BROMIDE AND ALBUTEROL SULFATE 3 ML: .5; 3 SOLUTION RESPIRATORY (INHALATION) at 07:49

## 2024-02-08 RX ADMIN — FAMOTIDINE 20 MG: 20 TABLET, FILM COATED ORAL at 08:37

## 2024-02-08 RX ADMIN — HYDRALAZINE HYDROCHLORIDE 50 MG: 25 TABLET ORAL at 23:29

## 2024-02-08 RX ADMIN — IPRATROPIUM BROMIDE AND ALBUTEROL SULFATE 3 ML: .5; 3 SOLUTION RESPIRATORY (INHALATION) at 16:12

## 2024-02-08 RX ADMIN — SILDENAFIL CITRATE 20 MG: 20 TABLET ORAL at 16:59

## 2024-02-08 RX ADMIN — IPRATROPIUM BROMIDE AND ALBUTEROL SULFATE 3 ML: .5; 3 SOLUTION RESPIRATORY (INHALATION) at 19:24

## 2024-02-08 RX ADMIN — CARVEDILOL 25 MG: 25 TABLET, FILM COATED ORAL at 08:48

## 2024-02-08 RX ADMIN — BUDESONIDE INHALATION 0.5 MG: 0.5 SUSPENSION RESPIRATORY (INHALATION) at 19:24

## 2024-02-08 NOTE — PROGRESS NOTES
Infectious Diseases Progress Note      LOS: 1 day   Patient Care Team:  Shaylee Mcdaniels MD as PCP - General (Family Medicine)  Richardson Willis MD as Cardiologist (Cardiology)  Rafy Rodriguez MD as Consulting Physician (Hematology and Oncology)  Christianne Phillips, RN as Licensed Practical Nurse  Salome Fleming MD as Consulting Physician (Nephrology)    Chief Complaint: Acute mental status change    Subjective       The patient has been afebrile for the last 24 hours.  The patient is on 3 L of oxygen by nasal cannula hemodynamically stable, and is tolerating antimicrobial therapy.  Family states the patient's been well more alert and awake earlier today but is fairly lethargic the time of my assessment      Review of Systems:   Review of Systems   Unable to perform ROS: Mental status change        Objective     Vital Signs  Temp:  [96.2 °F (35.7 °C)-97.5 °F (36.4 °C)] 97.2 °F (36.2 °C)  Heart Rate:  [] 97  Resp:  [18-33] 25  BP: (130-181)/() 130/82    Physical Exam:  Physical Exam  Vitals and nursing note reviewed.   Constitutional:       General: She is not in acute distress.     Appearance: She is well-developed and normal weight. She is ill-appearing. She is not diaphoretic.   HENT:      Head: Normocephalic and atraumatic.   Eyes:      General: No scleral icterus.     Extraocular Movements: Extraocular movements intact.      Conjunctiva/sclera: Conjunctivae normal.      Pupils: Pupils are equal, round, and reactive to light.   Cardiovascular:      Rate and Rhythm: Normal rate and regular rhythm.      Heart sounds: Normal heart sounds, S1 normal and S2 normal. No murmur heard.  Pulmonary:      Effort: Pulmonary effort is normal. No respiratory distress.      Breath sounds: Normal breath sounds. No stridor. No wheezing or rales.   Chest:      Chest wall: No tenderness.   Abdominal:      General: Bowel sounds are normal. There is no distension.      Palpations: Abdomen is soft. There is no  mass.      Tenderness: There is no abdominal tenderness. There is no guarding.      Comments: No abdominal tenderness today   Musculoskeletal:         General: No swelling, tenderness or deformity.      Cervical back: Neck supple.   Skin:     General: Skin is warm and dry.      Coloration: Skin is not pale.      Findings: No bruising, erythema or rash.      Comments:  Tenderness and what appears to be a lump to the left breast.  No significant erythema and no open wound     Erythema to the right upper arm under the PICC line site      Neurological:      Mental Status: She is disoriented.      Comments: Very lethargic and not answering any of my questions at this time          Results Review:    I have reviewed all clinical data, test, lab, and imaging results.     Radiology  Duplex Venous Upper Extremity - Right CAR    Result Date: 2/8/2024    Normal right upper extremity venous duplex scan.     US Breast Left Limited    Result Date: 2/8/2024  DATE OF EXAM: 2/7/2024 8:00 PM  PROCEDURE: US BREAST LEFT LIMITED-  INDICATIONS: Hx breast cancer- lump felft -abscess vs recurrence; R41.82-Altered mental status, unspecified; E87.6-Hypokalemia  COMPARISON: Prior breast imaging dated 10/22/2014, 10/8/2014, 10/2/2013  TECHNIQUE: Sonographic grayscale and color Doppler images of the left breast were obtained with representative examples submitted to PACS for interpretation.  FINDINGS: Imaging was performed after hours on an emergent basis to evaluate for abscess. Radiologist was not present for real-time scanning.  Imaging of the left breast at the area of palpable concern at the lumpectomy site corresponding to the 11 o'clock position 4 cm from the nipple demonstrates an irregular hypoechoic shadowing region measuring 1.4 x 2.0 x 2.0 cm.  This has a nonspecific appearance. This could represent patient's scarring at the lumpectomy site, collapsed residual seroma or recurrence. Abscess is felt less likely but not excluded if  there are clinical findings of cellulitis.      IMPRESSION : 1. Nonspecific hypoechoic shadowing region measuring 2.0 cm at the 11 o'clock position 4 cm from the nipple corresponding to patient's lumpectomy site. This could represent scarring at the lumpectomy site, collapsed seroma cavity or recurrence. Abscess is felt less likely but not entirely excluded if there are clinical findings of infection.  Recommend patient return for full outpatient diagnostic work-up including diagnostic mammogram and ultrasound with real-time evaluation by radiologist. Patient also requires import of outside breast imaging more recent than 2014  BI-RADS Final Assessment Category 0: Incomplete-Need Additional Imaging Evaluation Management Recommendation: Recall for additional imaging.   Electronically Signed By-Param Johansen MD On:2/8/2024 8:35 AM      MRI Brain With Contrast    Result Date: 2/7/2024  MRI BRAIN W CONTRAST Date of Exam: 2/7/2024 5:45 PM CST Indication: Rule out encephalopathy?just need contrast.  Comparison: Noncontrast MRI brain 2/7/2024 Technique:  Routine multiplanar/multisequence sequence images of the brain were obtained after the uneventful administration of 14 cc Prohance.  Findings: No abnormal enhancement noted. No abnormal meningeal thickening. No evidence of dural venous thrombosis.     Impression: No abnormality noted on postcontrast imaging. See same-day noncontrast exam report for additional details. Electronically Signed: Alan Rodríguez MD  2/7/2024 6:39 PM CST  Workstation ID: [id number]     Cardiology    Laboratory    Results from last 7 days   Lab Units 02/08/24  0444 02/07/24  1013 02/06/24  0351 02/05/24  1150   WBC 10*3/mm3 9.60 9.20 7.30 9.10   HEMOGLOBIN g/dL 12.8 11.8* 11.2* 12.8   HEMATOCRIT % 40.5 37.4 34.9 40.5   PLATELETS 10*3/mm3 216 200 177 220     Results from last 7 days   Lab Units 02/08/24  0444 02/07/24  1013 02/06/24  1806 02/06/24  0351 02/05/24  1235 02/05/24  1150   SODIUM mmol/L  136 136 135* 138  --  137   POTASSIUM mmol/L 4.8 4.9 5.1 2.9*  --  3.1*   CHLORIDE mmol/L 102 102 98 96*  --  94*   CO2 mmol/L 23.0 24.0 28.0 30.0*  --  30.0*   BUN mg/dL 32* 32* 32* 22  --  19   CREATININE mg/dL 1.12* 1.26* 1.50* 1.07*  --  1.30*   GLUCOSE mg/dL 90 100* 105* 82  --  104*   ALBUMIN g/dL 3.5  --  3.3*  --   --  3.9   BILIRUBIN mg/dL 0.7  --  0.6  --   --  1.1   ALK PHOS U/L 60  --  58  --   --  69   AST (SGOT) U/L 35*  --  21  --   --  19   ALT (SGPT) U/L 32  --  16  --   --  10   AMMONIA umol/L  --   --   --   --  25  --    CALCIUM mg/dL 9.4 8.8 8.4* 8.7  --  9.5     Results from last 7 days   Lab Units 02/06/24  1806   CK TOTAL U/L 64             Microbiology   Microbiology Results (last 10 days)       Procedure Component Value - Date/Time    Eosinophil Smear - Urine, Urine, Clean Catch [[id number]]  (Normal) Collected: 02/07/24 0725    Lab Status: Final result Specimen: Urine, Clean Catch Updated: 02/07/24 0907     Eosinophil Smear 0 % EOS/100 Cells     MRSA Screen, PCR (Inpatient) - Swab, Nares [[id number]]  (Normal) Collected: 02/05/24 1724    Lab Status: Final result Specimen: Swab from Nares Updated: 02/05/24 1843     MRSA PCR No MRSA Detected    Narrative:      The negative predictive value of this diagnostic test is high and should only be used to consider de-escalating anti-MRSA therapy. A positive result may indicate colonization with MRSA and must be correlated clinically.    Respiratory Panel PCR w/COVID-19(SARS-CoV-2) MARIA ELENA/SARIAH/BRANDIN/PAD/COR/LILLIAN In-House, NP Swab in UTM/VTM, 2 HR TAT - Swab, Nasopharynx [[id number]]  (Normal) Collected: 02/05/24 3857    Lab Status: Final result Specimen: Swab from Nasopharynx Updated: 02/05/24 1820     ADENOVIRUS, PCR Not Detected     Coronavirus 229E Not Detected     Coronavirus HKU1 Not Detected     Coronavirus NL63 Not Detected     Coronavirus OC43 Not Detected     COVID19 Not Detected     Human Metapneumovirus Not Detected     Human Rhinovirus/Enterovirus  Not Detected     Influenza A PCR Not Detected     Influenza B PCR Not Detected     Parainfluenza Virus 1 Not Detected     Parainfluenza Virus 2 Not Detected     Parainfluenza Virus 3 Not Detected     Parainfluenza Virus 4 Not Detected     RSV, PCR Not Detected     Bordetella pertussis pcr Not Detected     Bordetella parapertussis PCR Not Detected     Chlamydophila pneumoniae PCR Not Detected     Mycoplasma pneumo by PCR Not Detected    Narrative:      In the setting of a positive respiratory panel with a viral infection PLUS a negative procalcitonin without other underlying concern for bacterial infection, consider observing off antibiotics or discontinuation of antibiotics and continue supportive care. If the respiratory panel is positive for atypical bacterial infection (Bordetella pertussis, Chlamydophila pneumoniae, or Mycoplasma pneumoniae), consider antibiotic de-escalation to target atypical bacterial infection.    Blood Culture - Blood, Wrist, Right [[id number]]  (Normal) Collected: 02/05/24 1627    Lab Status: Preliminary result Specimen: Blood from Wrist, Right Updated: 02/07/24 1845     Blood Culture No growth at 2 days    Narrative:      Less than seven (7) mL's of blood was collected.  Insufficient quantity may yield false negative results.    COVID-19, FLU A/B, RSV PCR 1 HR TAT - Swab, Nasopharynx [[id number]]  (Normal) Collected: 02/05/24 1235    Lab Status: Final result Specimen: Swab from Nasopharynx Updated: 02/05/24 1325     COVID19 Not Detected     Influenza A PCR Not Detected     Influenza B PCR Not Detected     RSV, PCR Not Detected    Narrative:      Fact sheet for providers: https://www.fda.gov/media/573543/download    Fact sheet for patients: https://www.fda.gov/media/138155/download    Test performed by PCR.    Blood Culture - Blood, Arm, Right [[id number]]  (Normal) Collected: 02/05/24 1150    Lab Status: Preliminary result Specimen: Blood from Arm, Right Updated: 02/08/24 1200     Blood  Culture No growth at 3 days    Narrative:      Less than seven (7) mL's of blood was collected.  Insufficient quantity may yield false negative results.            Medication Review:       Schedule Meds  allopurinol, 100 mg, Oral, Daily  amLODIPine, 5 mg, Oral, Q24H  apixaban, 5 mg, Oral, Q12H  budesonide, 0.5 mg, Nebulization, BID - RT  carvedilol, 25 mg, Oral, BID With Meals  cefTRIAXone, 2,000 mg, Intravenous, Q24H  famotidine, 20 mg, Oral, Daily  hydrALAZINE, 50 mg, Oral, Q8H  ipratropium-albuterol, 3 mL, Nebulization, 4x Daily - RT  levothyroxine, 50 mcg, Oral, Q AM  melatonin, 5 mg, Oral, Nightly  sildenafil, 20 mg, Oral, TID  sodium chloride, 10 mL, Intravenous, Q12H  sodium chloride, 10 mL, Intravenous, Q12H        Infusion Meds  sodium chloride, 60 mL/hr, Last Rate: 60 mL/hr (02/08/24 0848)        PRN Meds    acetaminophen    Calcium Replacement - Follow Nurse / BPA Driven Protocol    ipratropium-albuterol    Magnesium Standard Dose Replacement - Follow Nurse / BPA Driven Protocol    ondansetron    Phosphorus Replacement - Follow Nurse / BPA Driven Protocol    Potassium Replacement - Follow Nurse / BPA Driven Protocol    [COMPLETED] Insert Peripheral IV **AND** sodium chloride    sodium chloride    sodium chloride    sodium chloride    sodium chloride        Assessment & Plan       Antimicrobial Therapy   1.  IV Rocephin        2.        3.        4.        5.          Assessment     Left breast induration with tenderness.  Symptoms have been present prior to admission patient was put on p.o. Augmentin.  Left breast ultrasound showed changes possibly related to a previous lumpectomy scar, or seroma or possible recurrence.  Outpatient diagnostic mammogram recommended     Right arm erythema noted after IV insertion.  Currently with no clinical signs of infection.  Doppler was negative for DVT     Severe confusion started 3 days prior to admission.  Etiology is not clear.  Patient has a supple neck.  MRI of  the brain with and without contrast and showed no acute findings indicating the patient unlikely to have encephalitis.     History of  breast cancer in 2011 in remission     History of DVT/PE     COPD-normally on 2 to 3 L of oxygen by nasal cannula     Acute kidney injury on chronic kidney disease     Plan     Continue IV Rocephin 2 g every 24 hours for now  Continue supportive care   a.m. labs  Discussed with patient's family members at bedside      Chasity Purcell, JOAQUÍN  02/08/24  13:32 EST    Note is dictated utilizing voice recognition software/Dragon

## 2024-02-08 NOTE — PROGRESS NOTES
"PULMONARY CRITICAL CARE PROGRESS  NOTE      PATIENT IDENTIFICATION:  Name: Gabrielle Lyles  MRN: [id number]  :  1940     Age: 83 y.o.  Sex: female    DATE OF Note:  2024   Referring Physician: Rosamaria Jin MD                  Subjective:   In bed, on O2 @ 3 L, still very weak and not taking deep breaths, no nausea or vomiting and no bowel or bladder issues reported       Objective:  tMax 24 hrs: Temp (24hrs), Av.1 °F (36.2 °C), Min:96.2 °F (35.7 °C), Max:97.5 °F (36.4 °C)      Vitals Ranges:   Temp:  [96.2 °F (35.7 °C)-97.5 °F (36.4 °C)] 97.2 °F (36.2 °C)  Heart Rate:  [] 97  Resp:  [18-33] 25  BP: (130-181)/() 130/82    Intake and Output Last 3 Shifts:   I/O last 3 completed shifts:  In: 1552 [I.V.:1552]  Out: 100 [Urine:100]    Exam:  /82   Pulse 97   Temp 97.2 °F (36.2 °C) (Axillary)   Resp 25   Ht 162.6 cm (64\")   Wt 69 kg (152 lb 1.9 oz)   SpO2 98%   BMI 26.11 kg/m²     General Appearance:     HEENT:  Normocephalic, without obvious abnormality. Conjunctivae/corneas clear.  Normal external ear canals. Nares normal, no drainage     Neck:  Supple, symmetrical, trachea midline. No JVD.  Lungs /Chest wall:   Bilateral basal rhonchi, respirations unlabored, symmetrical wall movement.     Heart:  Regular rate and rhythm, systolic murmur PMI left sternal border  Abdomen: Soft, nontender, no masses, no organomegaly.    Extremities: Trace edema, no clubbing or cyanosis        Medications:  allopurinol, 100 mg, Oral, Daily  amLODIPine, 5 mg, Oral, Q24H  apixaban, 5 mg, Oral, Q12H  budesonide, 0.5 mg, Nebulization, BID - RT  carvedilol, 25 mg, Oral, BID With Meals  cefTRIAXone, 2,000 mg, Intravenous, Q24H  famotidine, 20 mg, Oral, Daily  hydrALAZINE, 50 mg, Oral, Q8H  ipratropium-albuterol, 3 mL, Nebulization, 4x Daily - RT  levothyroxine, 50 mcg, Oral, Q AM  melatonin, 5 mg, Oral, Nightly  sildenafil, 20 mg, Oral, TID  sodium chloride, 10 mL, Intravenous, Q12H  sodium chloride, " 10 mL, Intravenous, Q12H        Infusion:  sodium chloride, 60 mL/hr, Last Rate: 60 mL/hr (02/08/24 0848)         PRN:    acetaminophen    Calcium Replacement - Follow Nurse / BPA Driven Protocol    ipratropium-albuterol    Magnesium Standard Dose Replacement - Follow Nurse / BPA Driven Protocol    ondansetron    Phosphorus Replacement - Follow Nurse / BPA Driven Protocol    Potassium Replacement - Follow Nurse / BPA Driven Protocol    [COMPLETED] Insert Peripheral IV **AND** sodium chloride    sodium chloride    sodium chloride    sodium chloride    sodium chloride  Data Review:  All labs (24hrs):   Recent Results (from the past 24 hour(s))   ECG 12 Lead Rhythm Change    Collection Time: 02/08/24  1:23 AM   Result Value Ref Range    QT Interval 413 ms    QTC Interval 435 ms   Comprehensive Metabolic Panel    Collection Time: 02/08/24  4:44 AM    Specimen: Blood   Result Value Ref Range    Glucose 90 65 - 99 mg/dL    BUN 32 (H) 8 - 23 mg/dL    Creatinine 1.12 (H) 0.57 - 1.00 mg/dL    Sodium 136 136 - 145 mmol/L    Potassium 4.8 3.5 - 5.2 mmol/L    Chloride 102 98 - 107 mmol/L    CO2 23.0 22.0 - 29.0 mmol/L    Calcium 9.4 8.6 - 10.5 mg/dL    Total Protein 6.4 6.0 - 8.5 g/dL    Albumin 3.5 3.5 - 5.2 g/dL    ALT (SGPT) 32 1 - 33 U/L    AST (SGOT) 35 (H) 1 - 32 U/L    Alkaline Phosphatase 60 39 - 117 U/L    Total Bilirubin 0.7 0.0 - 1.2 mg/dL    Globulin 2.9 gm/dL    A/G Ratio 1.2 g/dL    BUN/Creatinine Ratio 28.6 (H) 7.0 - 25.0    Anion Gap 11.0 5.0 - 15.0 mmol/L    eGFR 49.2 (L) >60.0 mL/min/1.73   Calcium, Ionized    Collection Time: 02/08/24  4:44 AM    Specimen: Blood   Result Value Ref Range    Ionized Calcium 1.28 1.20 - 1.30 mmol/L   Magnesium    Collection Time: 02/08/24  4:44 AM    Specimen: Blood   Result Value Ref Range    Magnesium 2.0 1.6 - 2.4 mg/dL   Phosphorus    Collection Time: 02/08/24  4:44 AM    Specimen: Blood   Result Value Ref Range    Phosphorus 3.4 2.5 - 4.5 mg/dL   Folate    Collection Time:  02/08/24  4:44 AM    Specimen: Blood   Result Value Ref Range    Folate >20.00 4.78 - 24.20 ng/mL   CBC Auto Differential    Collection Time: 02/08/24  4:44 AM    Specimen: Blood   Result Value Ref Range    WBC 9.60 3.40 - 10.80 10*3/mm3    RBC 4.77 3.77 - 5.28 10*6/mm3    Hemoglobin 12.8 12.0 - 15.9 g/dL    Hematocrit 40.5 34.0 - 46.6 %    MCV 84.9 79.0 - 97.0 fL    MCH 26.9 26.6 - 33.0 pg    MCHC 31.6 31.5 - 35.7 g/dL    RDW 15.8 (H) 12.3 - 15.4 %    RDW-SD 49.9 37.0 - 54.0 fl    MPV 9.0 6.0 - 12.0 fL    Platelets 216 140 - 450 10*3/mm3    Neutrophil % 71.2 42.7 - 76.0 %    Lymphocyte % 14.3 (L) 19.6 - 45.3 %    Monocyte % 9.1 5.0 - 12.0 %    Eosinophil % 4.1 0.3 - 6.2 %    Basophil % 1.3 0.0 - 1.5 %    Neutrophils, Absolute 6.90 1.70 - 7.00 10*3/mm3    Lymphocytes, Absolute 1.40 0.70 - 3.10 10*3/mm3    Monocytes, Absolute 0.90 0.10 - 0.90 10*3/mm3    Eosinophils, Absolute 0.40 0.00 - 0.40 10*3/mm3    Basophils, Absolute 0.10 0.00 - 0.20 10*3/mm3    nRBC 0.1 0.0 - 0.2 /100 WBC   Duplex Venous Upper Extremity - Right CAR    Collection Time: 02/08/24  9:55 AM   Result Value Ref Range    Right Internal Jugular Spont Y     Right Internal Jugular Phasic Y     Right Internal Jugular Compress C     Right Internal Jugular Augment Y     Right Subclavian Spont Y     Right Subclavian Phasic Y     Right Subclavian Compress C     Right Subclavian Augment Y     Right Axillary Spont Y     Right Axillary Phasic Y     Right Axillary Compress C     Right Axillary Augment Y     Right Brachial Compress C     Right Radial Compress C     Right Ulnar Compress C     Right Basilic Upper Compress C     Right Basilic Forearm Compress C     Right Cephalic Upper Compress C     Right Cephalic Forearm Compress C     Left Internal Jugular Spont Y     Left Internal Jugular Phasic Y     Left Internal Jugular Compress C     Left Internal Jugular Augment Y     Left Subclavian Spont Y     Left Subclavian Phasic Y     Left Subclavian Compress C      Left Subclavian Augment Y         Imaging:  US Breast Left Limited  Narrative: DATE OF EXAM:   2/7/2024 8:00 PM     PROCEDURE:   US BREAST LEFT LIMITED-     INDICATIONS:   Hx breast cancer- lump felft -abscess vs recurrence; R41.82-Altered  mental status, unspecified; E87.6-Hypokalemia     COMPARISON:  Prior breast imaging dated 10/22/2014, 10/8/2014, 10/2/2013     TECHNIQUE:   Sonographic grayscale and color Doppler images of the left breast were  obtained with representative examples submitted to PACS for  interpretation.     FINDINGS:   Imaging was performed after hours on an emergent basis to evaluate for  abscess. Radiologist was not present for real-time scanning.     Imaging of the left breast at the area of palpable concern at the  lumpectomy site corresponding to the 11 o'clock position 4 cm from the  nipple demonstrates an irregular hypoechoic shadowing region measuring  1.4 x 2.0 x 2.0 cm.     This has a nonspecific appearance. This could represent patient's  scarring at the lumpectomy site, collapsed residual seroma or  recurrence. Abscess is felt less likely but not excluded if there are  clinical findings of cellulitis.     Impression: IMPRESSION :   1. Nonspecific hypoechoic shadowing region measuring 2.0 cm at the 11  o'clock position 4 cm from the nipple corresponding to patient's  lumpectomy site. This could represent scarring at the lumpectomy site,  collapsed seroma cavity or recurrence. Abscess is felt less likely but  not entirely excluded if there are clinical findings of infection.     Recommend patient return for full outpatient diagnostic work-up  including diagnostic mammogram and ultrasound with real-time evaluation  by radiologist. Patient also requires import of outside breast imaging  more recent than 2014     BI-RADS Final Assessment Category 0: Incomplete-Need Additional Imaging  Evaluation  Management Recommendation: Recall for additional imaging.        Electronically Signed  By-Param Johansen MD On:2/8/2024 8:35 AM          ASSESSMENT:  AMS  Chronic   Severe Pulmonary HTN  Hypokalemia  CKD 3  HTN  Hx PE/DVT  Hx TIA              PLAN:  Need to encourage to take deep breaths   OOB daily   Wean O2 to keep sats > 88%  Bronchodilators  Inhaled corticosteroids  Incentive spirometer  Cardiology following   Electrolytes/ glycemic control  DVT and GI prophylaxis-Apixaban      Discussed with Dr Jeanine Cruz, APRN    2/8/2024  11:59 EST    I personally have examined  and interviewed the patient. I have reviewed the history, data, problems, assessment and plan with our NP.  Total Critical care time in direct medical management (   ) minutes, This time specifically excludes time spent performing procedures.    Jerry Solorzano MD   2/8/2024  21:38 EST

## 2024-02-08 NOTE — PLAN OF CARE
Problem: Adult Inpatient Plan of Care  Goal: Absence of Hospital-Acquired Illness or Injury  Intervention: Identify and Manage Fall Risk  Recent Flowsheet Documentation  Taken 2/8/2024 0200 by Tio Mc RN  Safety Promotion/Fall Prevention:   assistive device/personal items within reach   clutter free environment maintained   fall prevention program maintained   nonskid shoes/slippers when out of bed   room organization consistent   safety round/check completed  Taken 2/8/2024 0000 by Tio Mc RN  Safety Promotion/Fall Prevention:   safety round/check completed   room organization consistent   nonskid shoes/slippers when out of bed   assistive device/personal items within reach   clutter free environment maintained   fall prevention program maintained  Taken 2/7/2024 2200 by Tio Mc RN  Safety Promotion/Fall Prevention:   safety round/check completed   room organization consistent   nonskid shoes/slippers when out of bed   assistive device/personal items within reach   clutter free environment maintained   fall prevention program maintained  Taken 2/7/2024 2000 by Tio Mc RN  Safety Promotion/Fall Prevention:   assistive device/personal items within reach   clutter free environment maintained   fall prevention program maintained   nonskid shoes/slippers when out of bed   room organization consistent   safety round/check completed  Intervention: Prevent Skin Injury  Recent Flowsheet Documentation  Taken 2/8/2024 0000 by Tio Mc RN  Body Position: right  Skin Protection:   adhesive use limited   tubing/devices free from skin contact  Taken 2/7/2024 2000 by Tio Mc RN  Body Position: head facing, left  Skin Protection:   adhesive use limited   tubing/devices free from skin contact  Intervention: Prevent and Manage VTE (Venous Thromboembolism) Risk  Recent Flowsheet Documentation  Taken 2/7/2024 2000 by Tio Mc RN  VTE Prevention/Management:    bilateral   sequential compression devices off  Range of Motion: active ROM (range of motion) encouraged  Intervention: Prevent Infection  Recent Flowsheet Documentation  Taken 2/8/2024 0200 by Tio Mc RN  Infection Prevention:   environmental surveillance performed   hand hygiene promoted   personal protective equipment utilized   rest/sleep promoted   single patient room provided  Taken 2/8/2024 0000 by Tio Mc RN  Infection Prevention:   environmental surveillance performed   hand hygiene promoted   personal protective equipment utilized   rest/sleep promoted   single patient room provided  Taken 2/7/2024 2200 by Tio Mc RN  Infection Prevention:   environmental surveillance performed   hand hygiene promoted   personal protective equipment utilized   rest/sleep promoted   single patient room provided  Taken 2/7/2024 2000 by Tio Mc RN  Infection Prevention:   environmental surveillance performed   hand hygiene promoted   personal protective equipment utilized   rest/sleep promoted   single patient room provided     Problem: Adult Inpatient Plan of Care  Goal: Absence of Hospital-Acquired Illness or Injury  Intervention: Prevent Skin Injury  Recent Flowsheet Documentation  Taken 2/8/2024 0000 by Tio Mc RN  Body Position: right  Skin Protection:   adhesive use limited   tubing/devices free from skin contact  Taken 2/7/2024 2000 by Tio Mc RN  Body Position: head facing, left  Skin Protection:   adhesive use limited   tubing/devices free from skin contact     Problem: Adult Inpatient Plan of Care  Goal: Absence of Hospital-Acquired Illness or Injury  Intervention: Prevent Infection  Recent Flowsheet Documentation  Taken 2/8/2024 0200 by Tio Mc RN  Infection Prevention:   environmental surveillance performed   hand hygiene promoted   personal protective equipment utilized   rest/sleep promoted   single patient room provided  Taken 2/8/2024 0000  by Tio Mc RN  Infection Prevention:   environmental surveillance performed   hand hygiene promoted   personal protective equipment utilized   rest/sleep promoted   single patient room provided  Taken 2/7/2024 2200 by Tio Mc RN  Infection Prevention:   environmental surveillance performed   hand hygiene promoted   personal protective equipment utilized   rest/sleep promoted   single patient room provided  Taken 2/7/2024 2000 by Tio Mc RN  Infection Prevention:   environmental surveillance performed   hand hygiene promoted   personal protective equipment utilized   rest/sleep promoted   single patient room provided     Problem: Adult Inpatient Plan of Care  Goal: Optimal Comfort and Wellbeing  Intervention: Provide Person-Centered Care  Recent Flowsheet Documentation  Taken 2/7/2024 2000 by Tio Mc RN  Trust Relationship/Rapport: care explained     Problem: Fall Injury Risk  Goal: Absence of Fall and Fall-Related Injury  Intervention: Identify and Manage Contributors  Recent Flowsheet Documentation  Taken 2/7/2024 2000 by Tio Mc RN  Medication Review/Management: medications reviewed  Intervention: Promote Injury-Free Environment  Recent Flowsheet Documentation  Taken 2/8/2024 0200 by Tio Mc RN  Safety Promotion/Fall Prevention:   assistive device/personal items within reach   clutter free environment maintained   fall prevention program maintained   nonskid shoes/slippers when out of bed   room organization consistent   safety round/check completed  Taken 2/8/2024 0000 by Tio Mc RN  Safety Promotion/Fall Prevention:   safety round/check completed   room organization consistent   nonskid shoes/slippers when out of bed   assistive device/personal items within reach   clutter free environment maintained   fall prevention program maintained  Taken 2/7/2024 2200 by Tio Mc RN  Safety Promotion/Fall Prevention:   safety round/check  completed   room organization consistent   nonskid shoes/slippers when out of bed   assistive device/personal items within reach   clutter free environment maintained   fall prevention program maintained  Taken 2/7/2024 2000 by Tio Mc RN  Safety Promotion/Fall Prevention:   assistive device/personal items within reach   clutter free environment maintained   fall prevention program maintained   nonskid shoes/slippers when out of bed   room organization consistent   safety round/check completed     Problem: Fall Injury Risk  Goal: Absence of Fall and Fall-Related Injury  Intervention: Identify and Manage Contributors  Recent Flowsheet Documentation  Taken 2/7/2024 2000 by Tio Mc, RN  Medication Review/Management: medications reviewed  Intervention: Promote Injury-Free Environment  Recent Flowsheet Documentation  Taken 2/8/2024 0200 by Tio Mc, RN  Safety Promotion/Fall Prevention:   assistive device/personal items within reach   clutter free environment maintained   fall prevention program maintained   nonskid shoes/slippers when out of bed   room organization consistent   safety round/check completed  Taken 2/8/2024 0000 by Tio Mc RN  Safety Promotion/Fall Prevention:   safety round/check completed   room organization consistent   nonskid shoes/slippers when out of bed   assistive device/personal items within reach   clutter free environment maintained   fall prevention program maintained  Taken 2/7/2024 2200 by Tio Mc, RN  Safety Promotion/Fall Prevention:   safety round/check completed   room organization consistent   nonskid shoes/slippers when out of bed   assistive device/personal items within reach   clutter free environment maintained   fall prevention program maintained  Taken 2/7/2024 2000 by Tio Mc, RN  Safety Promotion/Fall Prevention:   assistive device/personal items within reach   clutter free environment maintained   fall prevention program  maintained   nonskid shoes/slippers when out of bed   room organization consistent   safety round/check completed     Problem: Fall Injury Risk  Goal: Absence of Fall and Fall-Related Injury  Intervention: Promote Injury-Free Environment  Recent Flowsheet Documentation  Taken 2/8/2024 0200 by Tio Mc RN  Safety Promotion/Fall Prevention:   assistive device/personal items within reach   clutter free environment maintained   fall prevention program maintained   nonskid shoes/slippers when out of bed   room organization consistent   safety round/check completed  Taken 2/8/2024 0000 by Tio Mc RN  Safety Promotion/Fall Prevention:   safety round/check completed   room organization consistent   nonskid shoes/slippers when out of bed   assistive device/personal items within reach   clutter free environment maintained   fall prevention program maintained  Taken 2/7/2024 2200 by Tio Mc RN  Safety Promotion/Fall Prevention:   safety round/check completed   room organization consistent   nonskid shoes/slippers when out of bed   assistive device/personal items within reach   clutter free environment maintained   fall prevention program maintained  Taken 2/7/2024 2000 by Tio Mc RN  Safety Promotion/Fall Prevention:   assistive device/personal items within reach   clutter free environment maintained   fall prevention program maintained   nonskid shoes/slippers when out of bed   room organization consistent   safety round/check completed     Problem: Skin Injury Risk Increased  Goal: Skin Health and Integrity  Intervention: Optimize Skin Protection  Recent Flowsheet Documentation  Taken 2/8/2024 0000 by Tio Mc RN  Pressure Reduction Techniques: weight shift assistance provided  Head of Bed (HOB) Positioning: HOB elevated  Pressure Reduction Devices: pressure-redistributing mattress utilized  Skin Protection:   adhesive use limited   tubing/devices free from skin contact  Taken  2/7/2024 2000 by Tio Mc RN  Pressure Reduction Techniques:   weight shift assistance provided   frequent weight shift encouraged   pressure points protected  Head of Bed (HOB) Positioning: HOB elevated  Pressure Reduction Devices: pressure-redistributing mattress utilized  Skin Protection:   adhesive use limited   tubing/devices free from skin contact     Problem: Hypertension Comorbidity  Goal: Blood Pressure in Desired Range  Intervention: Maintain Blood Pressure Management  Recent Flowsheet Documentation  Taken 2/8/2024 0000 by Tio Mc RN  Syncope Management: position changed slowly  Taken 2/7/2024 2000 by Tio Mc RN  Syncope Management: position changed slowly  Medication Review/Management: medications reviewed     Problem: Skin Injury Risk Increased  Goal: Skin Health and Integrity  Intervention: Optimize Skin Protection  Recent Flowsheet Documentation  Taken 2/8/2024 0000 by Tio Mc RN  Pressure Reduction Techniques: weight shift assistance provided  Head of Bed (HOB) Positioning: HOB elevated  Pressure Reduction Devices: pressure-redistributing mattress utilized  Skin Protection:   adhesive use limited   tubing/devices free from skin contact  Taken 2/7/2024 2000 by Tio Mc RN  Pressure Reduction Techniques:   weight shift assistance provided   frequent weight shift encouraged   pressure points protected  Head of Bed (HOB) Positioning: HOB elevated  Pressure Reduction Devices: pressure-redistributing mattress utilized  Skin Protection:   adhesive use limited   tubing/devices free from skin contact     Problem: Hypertension Comorbidity  Goal: Blood Pressure in Desired Range  Intervention: Maintain Blood Pressure Management  Recent Flowsheet Documentation  Taken 2/8/2024 0000 by Tio Mc RN  Syncope Management: position changed slowly  Taken 2/7/2024 2000 by Tio Mc RN  Syncope Management: position changed slowly  Medication Review/Management:  medications reviewed     Problem: Confusion Acute  Goal: Optimal Cognitive Function  Intervention: Minimize Contributing Factors  Recent Flowsheet Documentation  Taken 2/7/2024 2000 by Tio Mc RN  Sensory Stimulation Regulation: care clustered  Reorientation Measures: clock in view   Goal Outcome Evaluation:      When the patient arrived back on the unit her oxygen saturation was reading low in the 60s. on the monitor with a poor pleth wave. Patient's blood pressure was taken by transport and it read over 210 systolic and 160 diastolic. Family was very worried about the patient condition and called out for the nurse. The patient's blood pressure was retaken and it was 1158/83. Patient's finger probe was changed and the patients oxygen saturation was in the 90s. Patient was very lethargic upon arrival to the unit after her MRI. Patient would not wake up to take her medications. Patient's eyes opened to voice. Patient has been resting over night. Patient was able to take her 0600 medications with sips of water.Patient's blood pressure was 181/105 this morning patient was treated with hydralazine. Her last BP was taken at 0558 and was 150/76.    Patient's Heart monitor was reading A-fib last night. An EKG was ordered and put in the chart.

## 2024-02-08 NOTE — PROGRESS NOTES
"NEPHROLOGY PROGRESS NOTE------[facility]    [facility]  [phone]  Luke Fleming MD      Patient Care Team:  Shaylee Mcdaniels MD as PCP - General (Family Medicine)  Richardson Willis MD as Cardiologist (Cardiology)  Rafy Rodriguez MD as Consulting Physician (Hematology and Oncology)  Christianne Phillips RN as Licensed Practical Nurse  Salome Fleming MD as Consulting Physician (Nephrology)      Provider:  Luke Fleming MD  Patient Name: Gabrielle Lyles  :  1940    SUBJECTIVE:    F/U ARF/JULIAN/CRF/CKD    Weak. Alert. Family in room. Mild SOB. No angina. No dysuria.       Medication:  allopurinol, 100 mg, Oral, Daily  apixaban, 5 mg, Oral, Q12H  budesonide, 0.5 mg, Nebulization, BID - RT  carvedilol, 12.5 mg, Oral, BID With Meals  cefTRIAXone, 2,000 mg, Intravenous, Q24H  famotidine, 20 mg, Oral, Daily  hydrALAZINE, 25 mg, Oral, Q8H  ipratropium-albuterol, 3 mL, Nebulization, 4x Daily - RT  levothyroxine, 50 mcg, Oral, Q AM  melatonin, 5 mg, Oral, Nightly  sildenafil, 20 mg, Oral, TID  sodium chloride, 10 mL, Intravenous, Q12H  sodium chloride, 10 mL, Intravenous, Q12H      sodium chloride, 60 mL/hr, Last Rate: 60 mL/hr (24 1226)        OBJECTIVE    Vital Sign Min/Max for last 24 hours  Temp  Min: 96.2 °F (35.7 °C)  Max: 97.3 °F (36.3 °C)   BP  Min: 137/93  Max: 181/105   Pulse  Min: 69  Max: 77   Resp  Min: 18  Max: 33   SpO2  Min: 93 %  Max: 96 %   No data recorded   No data recorded     Flowsheet Rows      Flowsheet Row First Filed Value   Admission Height 162.6 cm (64\") Documented at 2024 1046   Admission Weight 66.7 kg (147 lb) Documented at 2024 1046            No intake/output data recorded.  I/O last 3 completed shifts:  In: 1552 [I.V.:1552]  Out: 100 [Urine:100]    Physical Exam:  General Appearance: alert, appears stated age and cooperative  Head: normocephalic, without obvious abnormality and " "atraumatic  Eyes: conjunctivae and sclerae normal and no icterus  Neck: supple and no JVD  Lungs: +SCATTERED RHONCHI  Heart: regular rhythm & normal rate and normal S1, S2 +TAYE  Chest Wall: no abnormalities observed  Abdomen: normal bowel sounds and soft, nontender +OBESITY  Extremities: moves extremities well, no edema, no cyanosis  Skin: no bleeding, bruising or rash  Neurologic: Alert, and oriented. No focal deficits    Labs:    WBC WBC   Date Value Ref Range Status   02/08/2024 9.60 3.40 - 10.80 10*3/mm3 Final   02/07/2024 9.20 3.40 - 10.80 10*3/mm3 Final   02/06/2024 7.30 3.40 - 10.80 10*3/mm3 Final   02/05/2024 9.10 3.40 - 10.80 10*3/mm3 Final      HGB Hemoglobin   Date Value Ref Range Status   02/08/2024 12.8 12.0 - 15.9 g/dL Final   02/07/2024 11.8 (L) 12.0 - 15.9 g/dL Final   02/06/2024 11.2 (L) 12.0 - 15.9 g/dL Final   02/05/2024 12.8 12.0 - 15.9 g/dL Final      HCT Hematocrit   Date Value Ref Range Status   02/08/2024 40.5 34.0 - 46.6 % Final   02/07/2024 37.4 34.0 - 46.6 % Final   02/06/2024 34.9 34.0 - 46.6 % Final   02/05/2024 40.5 34.0 - 46.6 % Final      Platelets No results found for: \"LABPLAT\"   MCV MCV   Date Value Ref Range Status   02/08/2024 84.9 79.0 - 97.0 fL Final   02/07/2024 86.3 79.0 - 97.0 fL Final   02/06/2024 83.6 79.0 - 97.0 fL Final   02/05/2024 85.6 79.0 - 97.0 fL Final          Sodium Sodium   Date Value Ref Range Status   02/08/2024 136 136 - 145 mmol/L Final   02/07/2024 136 136 - 145 mmol/L Final   02/06/2024 135 (L) 136 - 145 mmol/L Final   02/06/2024 138 136 - 145 mmol/L Final   02/05/2024 137 136 - 145 mmol/L Final      Potassium Potassium   Date Value Ref Range Status   02/08/2024 4.8 3.5 - 5.2 mmol/L Final   02/07/2024 4.9 3.5 - 5.2 mmol/L Final   02/06/2024 5.1 3.5 - 5.2 mmol/L Final   02/06/2024 2.9 (L) 3.5 - 5.2 mmol/L Final   02/05/2024 3.1 (L) 3.5 - 5.2 mmol/L Final     Comment:     Slight hemolysis detected by analyzer. Result may be falsely elevated.      Chloride " "Chloride   Date Value Ref Range Status   02/08/2024 102 98 - 107 mmol/L Final   02/07/2024 102 98 - 107 mmol/L Final   02/06/2024 98 98 - 107 mmol/L Final   02/06/2024 96 (L) 98 - 107 mmol/L Final   02/05/2024 94 (L) 98 - 107 mmol/L Final      CO2 CO2   Date Value Ref Range Status   02/08/2024 23.0 22.0 - 29.0 mmol/L Final   02/07/2024 24.0 22.0 - 29.0 mmol/L Final   02/06/2024 28.0 22.0 - 29.0 mmol/L Final   02/06/2024 30.0 (H) 22.0 - 29.0 mmol/L Final   02/05/2024 30.0 (H) 22.0 - 29.0 mmol/L Final      BUN BUN   Date Value Ref Range Status   02/08/2024 32 (H) 8 - 23 mg/dL Final   02/07/2024 32 (H) 8 - 23 mg/dL Final   02/06/2024 32 (H) 8 - 23 mg/dL Final   02/06/2024 22 8 - 23 mg/dL Final   02/05/2024 19 8 - 23 mg/dL Final      Creatinine Creatinine   Date Value Ref Range Status   02/08/2024 1.12 (H) 0.57 - 1.00 mg/dL Final   02/07/2024 1.26 (H) 0.57 - 1.00 mg/dL Final   02/06/2024 1.50 (H) 0.57 - 1.00 mg/dL Final   02/06/2024 1.07 (H) 0.57 - 1.00 mg/dL Final   02/05/2024 1.30 (H) 0.57 - 1.00 mg/dL Final      Calcium Calcium   Date Value Ref Range Status   02/08/2024 9.4 8.6 - 10.5 mg/dL Final   02/07/2024 8.8 8.6 - 10.5 mg/dL Final   02/06/2024 8.4 (L) 8.6 - 10.5 mg/dL Final   02/06/2024 8.7 8.6 - 10.5 mg/dL Final   02/05/2024 9.5 8.6 - 10.5 mg/dL Final      PO4 No components found for: \"PO4\"   Albumin Albumin   Date Value Ref Range Status   02/08/2024 3.5 3.5 - 5.2 g/dL Final   02/06/2024 3.3 (L) 3.5 - 5.2 g/dL Final   02/05/2024 3.9 3.5 - 5.2 g/dL Final      Magnesium Magnesium   Date Value Ref Range Status   02/08/2024 2.0 1.6 - 2.4 mg/dL Final   02/05/2024 2.2 1.6 - 2.4 mg/dL Final      Uric Acid No components found for: \"URIC ACID\"     Imaging Results (Last 72 Hours)       Procedure Component Value Units Date/Time    US Breast Left Limited [[id number]] Resulted: 02/07/24 1810     Updated: 02/07/24 2038    MRI Brain With Contrast [[id number]] Collected: 02/07/24 1935     Updated: 02/07/24 1942    Narrative:   "    MRI BRAIN W CONTRAST    Date of Exam: 2/7/2024 5:45 PM CST    Indication: Rule out encephalopathy?just need contrast.     Comparison: Noncontrast MRI brain 2/7/2024    Technique:  Routine multiplanar/multisequence sequence images of the brain were obtained after the uneventful administration of 14 cc Prohance.        Findings:  No abnormal enhancement noted.    No abnormal meningeal thickening.    No evidence of dural venous thrombosis.      Impression:      Impression:  No abnormality noted on postcontrast imaging. See same-day noncontrast exam report for additional details.        Electronically Signed: Alan Rodríguez MD    2/7/2024 6:39 PM CST    Workstation ID: [id number]    MRI Brain Without Contrast [[id number]] Collected: 02/07/24 0858     Updated: 02/07/24 0902    Narrative:      MRI BRAIN WO CONTRAST    Date of Exam: 2/7/2024 8:30 AM EST    Indication: Mental status change, unknown cause.     Comparison: CT February 5, 2024    Technique:  Routine multiplanar/multisequence sequence images of the brain were obtained without contrast administration.      Findings:  No acute infarct is present on diffusion weighted sequences. Midline structures are normal and the craniocervical junction appears satisfactory. Age-related changes are present with moderate generalized volume loss and mild typical T2 hyperintense   subcortical and pontine white matter changes, nonspecific but favored to reflect sequela of chronic microvascular ischemia. There is otherwise no evidence of intracranial hemorrhage, mass or mass effect. There is mild associated ex vacuo prominence of   the ventricles correlating with surrounding volume loss. The orbits appear normal. The paranasal sinuses are grossly clear.      Impression:      Impression:  Mildly motion-degraded exam demonstrating expected age-related changes, without evidence of acute infarct, hemorrhage, mass or mass effect.        Electronically Signed: Andres Olvera MD    2/7/2024  9:00 AM EST    Workstation ID: [id number]    CT Abdomen Pelvis Without Contrast [[id number]] Collected: 02/06/24 1518     Updated: 02/06/24 1537    Narrative:      CT CHEST WO CONTRAST DIAGNOSTIC, CT ABDOMEN PELVIS WO CONTRAST    Date of Exam: 2/6/2024 3:14 PM EST    Indication: COPD, exacerbation. History of breast cancer    Comparison: CT chest of 5/8/2023. Prior CT abdomen pelvis of 11/10/2010    Technique: Axial CT images were obtained of the chest without contrast administration.  Sagittal and coronal reconstructions were performed.  Automated exposure control and iterative reconstruction methods were used.      Findings: CT chest:  There are moderate changes of centrilobular emphysema. There is chronic atelectasis of the left lower lobe posteriorly and medially. There are no pulmonary nodules or masses. There is moderately severe cardiomegaly, similar. There are coronary   calcifications. There is atherosclerotic disease of the thoracic aorta. There are calcified nodes in the right hilum. There are no enlarged mediastinal nodes. There are moderately severe degenerative changes in the lower C-spine and mild degenerative   changes in the thoracic spine. There is a new sclerotic focus of the mid manubrium with some overlying cortical thickening which may represent subacute or old fracture although metastatic disease is not excluded.    CT abdomen and pelvis: The infrarenal abdominal aorta measures 3.9 cm transversely as compared to 2.6 cm previously. There is air in the urinary bladder which is partially distended. There is a small amount of free pelvic fluid. There is diverticulosis   without evidence of acute diverticulitis. There is respiratory motion on the exam. There is no large or small bowel dilatation. The liver contour is mildly nodular, increased from the prior exam. The liver size is within normal limits. The other   nonopacified solid organs are within normal limits in size. There are no renal stones or  hydronephrosis. There are cholecystectomy clips. There are moderately severe degenerative changes in the lumbar spine.      Impression:      Impression:  Chronic atelectasis of the left lower lobe. Moderately severe emphysema. Lesion of the sternal manubrium as detailed, which could represent subacute or old fracture although metastatic disease is not excluded. Correlation with whole-body bone scan may be   useful.    Aneurysmal dilatation of the abdominal aorta.    Small amount of free pelvic fluid, nonspecific.    Nodular liver contour suggests cirrhosis.    Electronically Signed: Mary Ivan MD    2/6/2024 3:29 PM EST    Workstation ID: [id number]    CT Chest Without Contrast Diagnostic [[id number]] Collected: 02/06/24 1518     Updated: 02/06/24 1537    Narrative:      CT CHEST WO CONTRAST DIAGNOSTIC, CT ABDOMEN PELVIS WO CONTRAST    Date of Exam: 2/6/2024 3:14 PM EST    Indication: COPD, exacerbation. History of breast cancer    Comparison: CT chest of 5/8/2023. Prior CT abdomen pelvis of 11/10/2010    Technique: Axial CT images were obtained of the chest without contrast administration.  Sagittal and coronal reconstructions were performed.  Automated exposure control and iterative reconstruction methods were used.      Findings: CT chest:  There are moderate changes of centrilobular emphysema. There is chronic atelectasis of the left lower lobe posteriorly and medially. There are no pulmonary nodules or masses. There is moderately severe cardiomegaly, similar. There are coronary   calcifications. There is atherosclerotic disease of the thoracic aorta. There are calcified nodes in the right hilum. There are no enlarged mediastinal nodes. There are moderately severe degenerative changes in the lower C-spine and mild degenerative   changes in the thoracic spine. There is a new sclerotic focus of the mid manubrium with some overlying cortical thickening which may represent subacute or old fracture although  metastatic disease is not excluded.    CT abdomen and pelvis: The infrarenal abdominal aorta measures 3.9 cm transversely as compared to 2.6 cm previously. There is air in the urinary bladder which is partially distended. There is a small amount of free pelvic fluid. There is diverticulosis   without evidence of acute diverticulitis. There is respiratory motion on the exam. There is no large or small bowel dilatation. The liver contour is mildly nodular, increased from the prior exam. The liver size is within normal limits. The other   nonopacified solid organs are within normal limits in size. There are no renal stones or hydronephrosis. There are cholecystectomy clips. There are moderately severe degenerative changes in the lumbar spine.      Impression:      Impression:  Chronic atelectasis of the left lower lobe. Moderately severe emphysema. Lesion of the sternal manubrium as detailed, which could represent subacute or old fracture although metastatic disease is not excluded. Correlation with whole-body bone scan may be   useful.    Aneurysmal dilatation of the abdominal aorta.    Small amount of free pelvic fluid, nonspecific.    Nodular liver contour suggests cirrhosis.    Electronically Signed: Mary Ivan MD    2/6/2024 3:29 PM EST    Workstation ID: [id number]    CT Head Without Contrast [[id number]] Collected: 02/05/24 1223     Updated: 02/05/24 1228    Narrative:      CT HEAD WO CONTRAST    Date of Exam: 2/5/2024 12:12 PM EST    Indication: confusion.    Comparison: Head CT dated 9/26/2023    Technique: Axial CT images were obtained of the head without contrast administration.  Coronal reconstructions were performed.  Automated exposure control and iterative reconstruction methods were used.      FINDINGS:    Examination is limited due to motion artifact.    Foci of periventricular and subcortical white matter hypoattenuation are consistent with chronic, microvascular ischemic change. There is  age-related loss of brain parenchymal volume with prominence of sulcation and ventriculomegaly. No significant mass   effect, midline shift, intracranial hemorrhage, or hydrocephalus is identified. No extra-axial fluid collection is identified.   The calvarium and overlying soft tissues are unremarkable. The paranasal sinuses and bilateral mastoid air cells are   adequately aerated. The visualized bony orbits, globes, and retrobulbar soft tissues are unremarkable.      Impression:      1.Examination is limited due to motion artifact.  2.Given this limitation, no acute intracranial abnormality is identified.  3.Findings compatible with chronic microvascular ischemic change and diffuse cortical atrophy.    Electronically Signed: Parker Mireles MD    2/5/2024 12:26 PM EST    Workstation ID: [id number]    XR Chest 1 View [[id number]] Collected: 02/05/24 1205     Updated: 02/05/24 1208    Narrative:      XR CHEST 1 VW    Date of Exam: 2/5/2024 12:00 PM EST    Indication: weakness    Comparison: 5/2/2023.    Findings:  There is mild cardiomegaly with increased heart size. Mildly prominent interstitial pattern appears stable and chronic. There is a prominent skinfold over the right chest. There is no definite pneumothorax. There is no effusion.      Impression:      Impression:  Mild cardiomegaly. Mild chronic findings of the lung fields.      Electronically Signed: Mary Ivan MD    2/5/2024 12:06 PM EST    Workstation ID: [id number]            Results for orders placed during the hospital encounter of 02/05/24    XR Chest 1 View    Narrative  XR CHEST 1 VW    Date of Exam: 2/5/2024 12:00 PM EST    Indication: weakness    Comparison: 5/2/2023.    Findings:  There is mild cardiomegaly with increased heart size. Mildly prominent interstitial pattern appears stable and chronic. There is a prominent skinfold over the right chest. There is no definite pneumothorax. There is no effusion.    Impression  Impression:  Mild  cardiomegaly. Mild chronic findings of the lung fields.      Electronically Signed: Mary Ivan MD  2/5/2024 12:06 PM EST  Workstation ID: [id number]      Results for orders placed during the hospital encounter of 09/24/23    XR Foot 3+ View Left    Narrative  XR FOOT 3+ VW LEFT    Date of Exam: 9/24/2023 3:15 PM EDT    Indication: pain redness swelling    Comparison: None available.    Findings:  No fracture or dislocation. There is soft tissue swelling diffusely at the left foot. No discrete osseous erosion. Plantar calcaneal bone spur. Enthesopathy at the Achilles insertion on the calcaneus. No radiodense foreign body.    Impression  Impression:  1. Negative for acute osseous abnormality.  2. Nonspecific soft tissue swelling.        Electronically Signed: Randall Zarco MD  9/24/2023 3:54 PM EDT  Workstation ID: [id number]      Results for orders placed during the hospital encounter of 05/02/23    XR Chest 1 View    Narrative  XR CHEST 1 VW    Date of Exam: 5/2/2023 2:25 PM EDT    Indication: dyspnea    Comparison: 4/8/2023    Findings:  Interval decrease in left base consolidation. There is minimal residual left base consolidation and a small left effusion. Right lung is clear. Cardiac and mediastinal contours are within normal limits. Regional skeleton is unremarkable.    Impression  Impression:    1. Near complete resolution of left base opacity and left effusion.      Electronically Signed: Daniel Riddle  5/2/2023 2:46 PM EDT  Workstation ID: [id number]      Results for orders placed during the hospital encounter of 03/01/23    Duplex venous lower extremity bilateral CAR    Interpretation Summary    Normal bilateral lower extremity venous duplex scan.        ASSESSMENT / PLAN      Altered mental status    Acute hypokalemia    Stage 3a chronic kidney disease    Chronic obstructive pulmonary disease    History of venous thrombosis and embolism    Primary hypertension    History of TIA (transient ischemic attack)     Severe pulmonary hypertension    Chronic respiratory failure with hypoxia    JULIAN (acute kidney injury)    ARF/JULIAN/CRF/CKD------Nonoliguric. +ARF/JULIAN on top of CRF/CKD STG 3A with a baseline serum  Creatinine of about 1.1. Unknown if patient has baseline proteinuria or hematuria. CRF/CKD STG 3A most likely secondary to HTN NS. +ARF/JULIAN is secondary to prerenal state/intravascular volume depletion. Gentle hydration. Avoid hypotension.  No NSAIDs or IV dye.  BUN/Cr coming down. D/C IVFs post current bag     2. ANEMIA------H/H stable     3. HTN WITH CKD-----Avoid hypotension. No ACE/ARB/DRI/diuretic for now     4. OA/DJD/HYPERURICEMIA------No NSAIDs. Add Allopurinol     5. DELIRIUM-------?Secondary to UTI. Better     6. UTI-----C and S pending. On Abx     7. HYPOCALCEMIA------Replaced     8. KETONURIA/DEHYDRATION------Secondary to recent outpatient increase in Lasix. Hold Lasix. Gentle IVFs given Pulmonary HTN     9. PULMONARY HTN--------Per , Pulmonary     10. HYPOKALEMIA-------Replaced     11. CAD-----per , Cardiology        Luke Fleming MD  [facility]  [phone]  02/08/24  07:12 EST

## 2024-02-08 NOTE — SIGNIFICANT NOTE
02/08/24 1353   OTHER   Discipline occupational therapist   Rehab Time/Intention   Session Not Performed other (see comments)  (Pt sleeping and difficult to rouse. OT will follow up at another time.)   Recommendation   OT - Next Appointment 02/09/24

## 2024-02-08 NOTE — PROGRESS NOTES
LOS: 1 day     Chief Complaint:  confusion        SUBJECTIVE:    History taken from: chart family RN    Interval History:  Pt is resting more today, she was able to wake up to eat. No complaints of pain.     Patient Complaints:        Review of Systems   Unable to perform ROS: Mental status change          Pertinent PMH:  has a past medical history of Acute exacerbation of chronic obstructive pulmonary disease (COPD), COPD (chronic obstructive pulmonary disease), Deep vein thrombosis of bilateral lower extremities (07/24/2019), History of pneumonia (06/2012), History of pulmonary embolism (03/2013), Hypertension (2001), Insomnia, Lobular breast cancer, left (11/2011), Malignant neoplasm of left breast in female, estrogen receptor positive (07/18/2019), Osteopenia (2012), Parainfluenza infection, Parotid duct obstruction, Severe pulmonary hypertension (03/03/2023), Stage 3a chronic kidney disease (07/24/2019), and TIA (transient ischemic attack) (10/25/2022).   ________________________________________________     OBJECTIVE:    On exam:  Sleeping, does open eyes to voice  Very drowsy  States name  Able to follow commands  Moves all extremities on commands  Neck is supple     ________________________________________________   RESULTS REVIEW    VITAL SIGNS:  Temp:  [96.2 °F (35.7 °C)-97.5 °F (36.4 °C)] 97.2 °F (36.2 °C)  Heart Rate:  [] 91  Resp:  [18-33] 25  BP: (130-181)/() 147/90    LABS:       Lab 02/08/24  0444 02/07/24  1013 02/06/24  0351 02/05/24  1150 02/05/24  1136   WBC 9.60 9.20 7.30 9.10  --    HEMOGLOBIN 12.8 11.8* 11.2* 12.8  --    HEMATOCRIT 40.5 37.4 34.9 40.5  --    PLATELETS 216 200 177 220  --    NEUTROS ABS 6.90 6.70 5.60 7.50*  --    LYMPHS ABS 1.40 1.00 0.60* 0.40*  --    MONOS ABS 0.90 0.90 0.90 0.80  --    EOS ABS 0.40 0.50* 0.10 0.40  --    MCV 84.9 86.3 83.6 85.6  --    PROCALCITONIN  --   --   --  0.06  --    LACTATE  --   --   --   --  1.4         [facility] 02/08/24  0444  02/07/24  1013 02/06/24  1806 02/06/24  0351 02/05/24  1150   SODIUM 136 136 135* 138 137   POTASSIUM 4.8 4.9 5.1 2.9* 3.1*   CHLORIDE 102 102 98 96* 94*   CO2 23.0 24.0 28.0 30.0* 30.0*   ANION GAP 11.0 10.0 9.0 12.0 13.0   BUN 32* 32* 32* 22 19   CREATININE 1.12* 1.26* 1.50* 1.07* 1.30*   EGFR 49.2* 42.7* 34.6* 52.0* 41.1*   GLUCOSE 90 100* 105* 82 104*   CALCIUM 9.4 8.8 8.4* 8.7 9.5   IONIZED CALCIUM 1.28  --   --   --   --    MAGNESIUM 2.0  --   --   --  2.2   PHOSPHORUS 3.4  --   --   --   --    TSH  --   --   --   --  1.640         Lab 02/08/24  0444 02/06/24  1806 02/05/24  1150   TOTAL PROTEIN 6.4 5.9* 7.1   ALBUMIN 3.5 3.3* 3.9   GLOBULIN 2.9 2.6 3.2   ALT (SGPT) 32 16 10   AST (SGOT) 35* 21 19   BILIRUBIN 0.7 0.6 1.1   ALK PHOS 60 58 69                 Lab 02/08/24  0444 02/05/24  1150   FOLATE >20.00  --    VITAMIN B 12  --  942         Lab 02/05/24  2202   FIO2 32     UA          8/25/2023    14:33 2/5/2024    11:46 2/6/2024    10:54   Urinalysis   Squamous Epithelial Cells, UA  0-2  3-6    Specific Gravity, UA  1.022  1.018    Ketones, UA Negative  Trace  Trace    Blood, UA  Small (1+)  Moderate (2+)    Leukocytes, UA Trace  Negative  Small (1+)    Nitrite, UA  Negative  Negative    RBC, UA  3-5  3-5    WBC, UA  0-2  0-2    Bacteria, UA  Trace  1+        Lab Results   Component Value Date    TSH 1.640 02/05/2024     (H) 10/25/2022    HGBA1C 5.8 (H) 10/25/2022    [id number] 02/05/2024         IMAGING STUDIES:  US Breast Left Limited    Result Date: 2/8/2024  IMPRESSION : 1. Nonspecific hypoechoic shadowing region measuring 2.0 cm at the 11 o'clock position 4 cm from the nipple corresponding to patient's lumpectomy site. This could represent scarring at the lumpectomy site, collapsed seroma cavity or recurrence. Abscess is felt less likely but not entirely excluded if there are clinical findings of infection.  Recommend patient return for full outpatient diagnostic work-up including diagnostic  mammogram and ultrasound with real-time evaluation by radiologist. Patient also requires import of outside breast imaging more recent than 2014  BI-RADS Final Assessment Category 0: Incomplete-Need Additional Imaging Evaluation Management Recommendation: Recall for additional imaging.   Electronically Signed By-Param Johansen MD On:2/8/2024 8:35 AM      MRI Brain With Contrast    Result Date: 2/7/2024  Impression: No abnormality noted on postcontrast imaging. See same-day noncontrast exam report for additional details. Electronically Signed: Alan Rodríguez MD  2/7/2024 6:39 PM CST  Workstation ID: [id number]    MRI Brain Without Contrast    Result Date: 2/7/2024  Impression: Mildly motion-degraded exam demonstrating expected age-related changes, without evidence of acute infarct, hemorrhage, mass or mass effect. Electronically Signed: Andres Olvera MD  2/7/2024 9:00 AM EST  Workstation ID: [id number]    CT Abdomen Pelvis Without Contrast    Result Date: 2/6/2024  Impression: Chronic atelectasis of the left lower lobe. Moderately severe emphysema. Lesion of the sternal manubrium as detailed, which could represent subacute or old fracture although metastatic disease is not excluded. Correlation with whole-body bone scan may be  useful. Aneurysmal dilatation of the abdominal aorta. Small amount of free pelvic fluid, nonspecific. Nodular liver contour suggests cirrhosis. Electronically Signed: Mary Ivan MD  2/6/2024 3:29 PM EST  Workstation ID: [id number]    CT Chest Without Contrast Diagnostic    Result Date: 2/6/2024  Impression: Chronic atelectasis of the left lower lobe. Moderately severe emphysema. Lesion of the sternal manubrium as detailed, which could represent subacute or old fracture although metastatic disease is not excluded. Correlation with whole-body bone scan may be  useful. Aneurysmal dilatation of the abdominal aorta. Small amount of free pelvic fluid, nonspecific. Nodular liver contour suggests  cirrhosis. Electronically Signed: Mary Ivan MD  2/6/2024 3:29 PM EST  Workstation ID: [id number]     I reviewed the patient's new clinical results.    ________________________________________________      PROBLEM LIST:    Altered mental status    Acute hypokalemia    Stage 3a chronic kidney disease    Chronic obstructive pulmonary disease    History of venous thrombosis and embolism    Primary hypertension    History of TIA (transient ischemic attack)    Severe pulmonary hypertension    Chronic respiratory failure with hypoxia    JULIAN (acute kidney injury)        ASSESSMENT/PLAN:    Acute encephalopathy/delirium, improving .  No obvious source of infection, may be also related to underlying ARF/JULIAN and sleep deprivation.   - CT head reviewed   -  MRI brain personally reviewed- motion artifact but no acute findings   - EKG: SR rate 75 premature complexes   - Labs: B12: P, folate P , TSH: 1.640, UA 1+ bacteria, Ammonia: 25, Cr 1.26, Na 136, CK 64  - afebrile past 24 hours   - Trial Melatonin 5 mg QHS- sleep deprivation   - Medications & labs reviewed   - Continue to treat underlying conditions   - Will follow     Possible underlying viral infection with negative workup-  Primary consult ID- continue abx  ARF/JULIAN- hx of CKD 3A- per Renal     Plan  Will check EEG tomorrow if no improvement         **Please refer to previous notes for further details and recommendations.     I discussed the patient's findings and my recommendations with patient and family    JOAQUÍN Bacon  02/08/24  14:51 EST

## 2024-02-08 NOTE — PROGRESS NOTES
Referring Provider: Rosamaria Jin MD    Reason for follow-up: Hypertension, atrial fibrillation     Patient Care Team:  Shaylee Mcdaniels MD as PCP - General (Family Medicine)  Richardson Willis MD as Cardiologist (Cardiology)  Rafy Rodriguez MD as Consulting Physician (Hematology and Oncology)  Christianne Phillips, AMARILIS as Licensed Practical Nurse  Salome Fleming MD as Consulting Physician (Nephrology)      SUBJECTIVE  Laying comfortably in bed.  Tells me that she is doing fair.  Daughter is at bedside.     ROS  Review of all systems negative except as indicated.    Since I have last seen, the patient has been without any chest discomfort, shortness of breath, palpitations, dizziness or syncope.  Denies having any headache, abdominal pain, nausea, vomiting, diarrhea, constipation, loss of weight or loss of appetite.  Denies having any excessive bruising, hematuria or blood in the stool.  ROS      Personal History:    Past Medical History:   Diagnosis Date    Acute exacerbation of chronic obstructive pulmonary disease (COPD)     COPD (chronic obstructive pulmonary disease)     Deep vein thrombosis of bilateral lower extremities 07/24/2019    3/2013    History of pneumonia 06/2012    community acquired pneumonia and right pleural effusion;hospitalized at [facility]    History of pulmonary embolism 03/2013    bilateral PE    Hypertension 2001    Insomnia     on Ambien 5mg at     Lobular breast cancer, left 11/2011    Stage IA left upper lobular breast cancer    Malignant neoplasm of left breast in female, estrogen receptor positive 07/18/2019    Osteopenia 2012    Parainfluenza infection     Parotid duct obstruction     Severe pulmonary hypertension 03/03/2023    Stage 3a chronic kidney disease 07/24/2019    TIA (transient ischemic attack) 10/25/2022       Past Surgical History:   Procedure Laterality Date    CARDIAC CATHETERIZATION N/A 3/3/2023    Procedure: Left and Right Heart Cath with Coronary  Angiography;  Surgeon: Richardson Willis MD;  Location: [facility] INVASIVE LOCATION;  Service: Cardiovascular;  Laterality: N/A;    CATARACT EXTRACTION      IVC FILTER RETRIEVAL  2014    IVC filter placement by Dr. Card    MAMMO STEREOTACTIC BREAST BX SURGICAL ADD UNI Left 2011    invasive lobular carcinoma-Dr. Cande Daniel    MASTECTOMY, PARTIAL Left 2011    and left axillary sentinel lymph node biopsy by Dr. Uriostegui    TUBAL ABDOMINAL LIGATION         Family History   Problem Relation Age of Onset    Lung cancer Brother        Social History     Tobacco Use    Smoking status: Former     Types: Cigarettes     Quit date:      Years since quittin.1     Passive exposure: Past    Smokeless tobacco: Never    Tobacco comments:     smoked 6 cigarettes a day from 13 years of age to 56 years of age when she quit in    Vaping Use    Vaping Use: Never used   Substance Use Topics    Alcohol use: Not Currently    Drug use: No        Home meds:  Prior to Admission medications    Medication Sig Start Date End Date Taking? Authorizing Provider   allopurinol (ZYLOPRIM) 100 MG tablet Take 1 tablet by mouth Daily.   Yes Jaylyn Golden MD   amoxicillin-clavulanate (AUGMENTIN) 875-125 MG per tablet Take 1 tablet by mouth 2 (Two) Times a Day. 24 Yes Jaylyn Golden MD   apixaban (ELIQUIS) 5 MG tablet tablet Take 1 tablet by mouth Every 12 (Twelve) Hours. Indications: Other - full anticoagulation, history of DVT/PE 10/27/22  Yes Shaylee Mcdaniels MD   budesonide-formoterol (SYMBICORT) 80-4.5 MCG/ACT inhaler Inhale 2 puffs 2 (Two) Times a Day.   Yes Jaylyn Golden MD   carvedilol (COREG) 12.5 MG tablet TAKE 1 TABLET BY MOUTH TWICE DAILY WITH MEALS 23  Yes Richardson Willis MD   Cholecalciferol (Vitamin D3) 50 MCG ( UT) capsule Take 1 capsule by mouth Daily.   Yes Jaylyn Golden MD   furosemide (LASIX) 40 MG tablet Take 1 tablet by mouth Daily.   Yes [place]  MD Jaylyn   gabapentin (NEURONTIN) 300 MG capsule Take 1 capsule by mouth 2 (Two) Times a Day.   Yes Provider, MD Jaylyn   levothyroxine (SYNTHROID, LEVOTHROID) 50 MCG tablet Take 1 tablet by mouth Daily.   Yes Provider, MD Jaylyn   lisinopril (PRINIVIL,ZESTRIL) 40 MG tablet Take 1 tablet by mouth Daily. 6/8/23  Yes Velma Ascencio APRN   potassium chloride (K-DUR,KLOR-CON) 10 MEQ CR tablet Take 1 tablet by mouth Daily. 2/24/23  Yes Richardson Willis MD   sildenafil (REVATIO) 20 MG tablet Take 1 tablet by mouth Every 8 (Eight) Hours. 5/11/23  Yes Mansi Becerril APRN       Allergies:  Patient has no known allergies.    Scheduled Meds:allopurinol, 100 mg, Oral, Daily  apixaban, 5 mg, Oral, Q12H  budesonide, 0.5 mg, Nebulization, BID - RT  carvedilol, 12.5 mg, Oral, BID With Meals  cefTRIAXone, 2,000 mg, Intravenous, Q24H  famotidine, 20 mg, Oral, Daily  hydrALAZINE, 50 mg, Oral, Q8H  ipratropium-albuterol, 3 mL, Nebulization, 4x Daily - RT  levothyroxine, 50 mcg, Oral, Q AM  melatonin, 5 mg, Oral, Nightly  sildenafil, 20 mg, Oral, TID  sodium chloride, 10 mL, Intravenous, Q12H  sodium chloride, 10 mL, Intravenous, Q12H      Continuous Infusions:sodium chloride, 60 mL/hr, Last Rate: 60 mL/hr (02/07/24 1226)      PRN Meds:.  acetaminophen    Calcium Replacement - Follow Nurse / BPA Driven Protocol    ipratropium-albuterol    Magnesium Standard Dose Replacement - Follow Nurse / BPA Driven Protocol    ondansetron    Phosphorus Replacement - Follow Nurse / BPA Driven Protocol    Potassium Replacement - Follow Nurse / BPA Driven Protocol    [COMPLETED] Insert Peripheral IV **AND** sodium chloride    sodium chloride    sodium chloride    sodium chloride    sodium chloride      OBJECTIVE    Vital Signs  Vitals:    02/07/24 2318 02/08/24 0505 02/08/24 0516 02/08/24 0558   BP: 156/87 (!) 175/149 (!) 181/105 150/76   BP Location: Right arm Right arm     Patient Position: Lying Lying     Pulse: 72  71 77   Resp: (!)  "29 (!) 33     Temp:  96.2 °F (35.7 °C)     TempSrc:  Axillary     SpO2:       Weight:       Height:           Flowsheet Rows      Flowsheet Row First Filed Value   Admission Height 162.6 cm (64\") Documented at 02/05/2024 1046   Admission Weight 66.7 kg (147 lb) Documented at 02/05/2024 1046              Intake/Output Summary (Last 24 hours) at 2/8/2024 0726  Last data filed at 2/7/2024 1300  Gross per 24 hour   Intake 0 ml   Output 100 ml   Net -100 ml          Telemetry: Atrial fibrillation with heart rate in the 90s and 100    Physical Exam:  The patient is alert, oriented to self  Vital signs as noted above.  Head and neck revealed no carotid bruits or jugular venous distention.  No thyromegaly or lymphadenopathy is present  Lungs clear.  No wheezing.  Breath sounds are normal bilaterally.  Irregularly irregular.  No murmur. No precordial rub is present.  No gallop is present.  Abdomen soft and nontender.  No organomegaly is present.  Extremities with good peripheral pulses without any pedal edema.  Skin warm and dry.  Musculoskeletal system is grossly normal.  CNS grossly normal.       Results Review:  I have personally reviewed the results from the time of this admission to 2/8/2024 07:26 EST and agree with these findings:  []  Laboratory  []  Microbiology  []  Radiology  []  EKG/Telemetry   []  Cardiology/Vascular   []  Pathology  []  Old records  []  Other:    Most notable findings include:    Lab Results (last 24 hours)       Procedure Component Value Units Date/Time    Comprehensive Metabolic Panel [[id number]]  (Abnormal) Collected: 02/08/24 0444    Specimen: Blood Updated: 02/08/24 0527     Glucose 90 mg/dL      BUN 32 mg/dL      Creatinine 1.12 mg/dL      Sodium 136 mmol/L      Potassium 4.8 mmol/L      Chloride 102 mmol/L      CO2 23.0 mmol/L      Calcium 9.4 mg/dL      Total Protein 6.4 g/dL      Albumin 3.5 g/dL      ALT (SGPT) 32 U/L      AST (SGOT) 35 U/L      Alkaline Phosphatase 60 U/L      Total " Bilirubin 0.7 mg/dL      Globulin 2.9 gm/dL      A/G Ratio 1.2 g/dL      BUN/Creatinine Ratio 28.6     Anion Gap 11.0 mmol/L      eGFR 49.2 mL/min/1.73     Narrative:      GFR Normal >60  Chronic Kidney Disease <60  Kidney Failure <15    The GFR formula is only valid for adults with stable renal function between ages 18 and 70.    Magnesium [[id number]]  (Normal) Collected: 02/08/24 0444    Specimen: Blood Updated: 02/08/24 0527     Magnesium 2.0 mg/dL     Phosphorus [[id number]]  (Normal) Collected: 02/08/24 0444    Specimen: Blood Updated: 02/08/24 0527     Phosphorus 3.4 mg/dL     CBC & Differential [[id number]]  (Abnormal) Collected: 02/08/24 0444    Specimen: Blood Updated: 02/08/24 0523    Narrative:      The following orders were created for panel order CBC & Differential.  Procedure                               Abnormality         Status                     ---------                               -----------         ------                     CBC Auto Differential[[id number]]        Abnormal            Final result                 Please view results for these tests on the individual orders.    CBC Auto Differential [[id number]]  (Abnormal) Collected: 02/08/24 0444    Specimen: Blood Updated: 02/08/24 0523     WBC 9.60 10*3/mm3      RBC 4.77 10*6/mm3      Hemoglobin 12.8 g/dL      Hematocrit 40.5 %      MCV 84.9 fL      MCH 26.9 pg      MCHC 31.6 g/dL      RDW 15.8 %      RDW-SD 49.9 fl      MPV 9.0 fL      Platelets 216 10*3/mm3      Neutrophil % 71.2 %      Lymphocyte % 14.3 %      Monocyte % 9.1 %      Eosinophil % 4.1 %      Basophil % 1.3 %      Neutrophils, Absolute 6.90 10*3/mm3      Lymphocytes, Absolute 1.40 10*3/mm3      Monocytes, Absolute 0.90 10*3/mm3      Eosinophils, Absolute 0.40 10*3/mm3      Basophils, Absolute 0.10 10*3/mm3      nRBC 0.1 /100 WBC     Calcium, Ionized [[id number]]  (Normal) Collected: 02/08/24 0444    Specimen: Blood Updated: 02/08/24 0520     Ionized Calcium 1.28 mmol/L      Folate [[id number]] Collected: 02/08/24 0444    Specimen: Blood Updated: 02/08/24 0504    Vitamin B12 [[id number]]  (Normal) Collected: 02/05/24 1150    Specimen: Blood Updated: 02/07/24 2033     Vitamin B-12 942 pg/mL     Narrative:      Results may be falsely increased if patient taking Biotin.      Blood Culture - Blood, Wrist, Right [[id number]]  (Normal) Collected: 02/05/24 1627    Specimen: Blood from Wrist, Right Updated: 02/07/24 1845     Blood Culture No growth at 2 days    Narrative:      Less than seven (7) mL's of blood was collected.  Insufficient quantity may yield false negative results.    Blood Culture - Blood, Arm, Right [[id number]]  (Normal) Collected: 02/05/24 1150    Specimen: Blood from Arm, Right Updated: 02/07/24 1200     Blood Culture No growth at 2 days    Narrative:      Less than seven (7) mL's of blood was collected.  Insufficient quantity may yield false negative results.    Basic Metabolic Panel [[id number]]  (Abnormal) Collected: 02/07/24 1013    Specimen: Blood Updated: 02/07/24 1055     Glucose 100 mg/dL      BUN 32 mg/dL      Creatinine 1.26 mg/dL      Sodium 136 mmol/L      Potassium 4.9 mmol/L      Chloride 102 mmol/L      CO2 24.0 mmol/L      Calcium 8.8 mg/dL      BUN/Creatinine Ratio 25.4     Anion Gap 10.0 mmol/L      eGFR 42.7 mL/min/1.73     Narrative:      GFR Normal >60  Chronic Kidney Disease <60  Kidney Failure <15    The GFR formula is only valid for adults with stable renal function between ages 18 and 70.    CBC & Differential [[id number]]  (Abnormal) Collected: 02/07/24 1013    Specimen: Blood Updated: 02/07/24 1037    Narrative:      The following orders were created for panel order CBC & Differential.  Procedure                               Abnormality         Status                     ---------                               -----------         ------                     CBC Auto Differential[[id number]]        Abnormal            Final result                 Please  view results for these tests on the individual orders.    CBC Auto Differential [[id number]]  (Abnormal) Collected: 02/07/24 1013    Specimen: Blood Updated: 02/07/24 1037     WBC 9.20 10*3/mm3      RBC 4.33 10*6/mm3      Hemoglobin 11.8 g/dL      Hematocrit 37.4 %      MCV 86.3 fL      MCH 27.1 pg      MCHC 31.5 g/dL      RDW 16.0 %      RDW-SD 48.6 fl      MPV 8.4 fL      Platelets 200 10*3/mm3      Neutrophil % 73.0 %      Lymphocyte % 10.8 %      Monocyte % 10.0 %      Eosinophil % 5.1 %      Basophil % 1.1 %      Neutrophils, Absolute 6.70 10*3/mm3      Lymphocytes, Absolute 1.00 10*3/mm3      Monocytes, Absolute 0.90 10*3/mm3      Eosinophils, Absolute 0.50 10*3/mm3      Basophils, Absolute 0.10 10*3/mm3      nRBC 0.0 /100 WBC     Uric Acid [[id number]]  (Abnormal) Collected: 02/06/24 1806    Specimen: Blood Updated: 02/07/24 0945     Uric Acid 7.9 mg/dL     Eosinophil Smear - Urine, Urine, Clean Catch [[id number]]  (Normal) Collected: 02/07/24 0725    Specimen: Urine, Clean Catch Updated: 02/07/24 0907     Eosinophil Smear 0 % EOS/100 Cells     Sodium, Urine, Random - Urine, Clean Catch [[id number]] Collected: 02/07/24 0725    Specimen: Urine, Clean Catch Updated: 02/07/24 0840     Sodium, Urine <20 mmol/L     Narrative:      Reference intervals for random urine have not been established.  Clinical usage is dependent upon physician's interpretation in combination with other laboratory tests.       CK [[id number]]  (Normal) Collected: 02/06/24 1806    Specimen: Blood Updated: 02/07/24 0748     Creatine Kinase 64 U/L     PTH, Intact [[id number]]  (Abnormal) Collected: 02/05/24 1150    Specimen: Blood Updated: 02/07/24 0745     PTH, Intact 86.8 pg/mL     Narrative:      Results may be falsely decreased if patient taking Biotin.              Imaging Results (Last 24 Hours)       Procedure Component Value Units Date/Time    US Breast Left Limited [[id number]] Resulted: 02/07/24 1810     Updated: 02/07/24 2038    MRI  Brain With Contrast [[id number]] Collected: 02/07/24 1935     Updated: 02/07/24 1942    Narrative:      MRI BRAIN W CONTRAST    Date of Exam: 2/7/2024 5:45 PM CST    Indication: Rule out encephalopathy?just need contrast.     Comparison: Noncontrast MRI brain 2/7/2024    Technique:  Routine multiplanar/multisequence sequence images of the brain were obtained after the uneventful administration of 14 cc Prohance.        Findings:  No abnormal enhancement noted.    No abnormal meningeal thickening.    No evidence of dural venous thrombosis.      Impression:      Impression:  No abnormality noted on postcontrast imaging. See same-day noncontrast exam report for additional details.        Electronically Signed: Alan Rodríguez MD    2/7/2024 6:39 PM CST    Workstation ID: [id number]    MRI Brain Without Contrast [[id number]] Collected: 02/07/24 0858     Updated: 02/07/24 0902    Narrative:      MRI BRAIN WO CONTRAST    Date of Exam: 2/7/2024 8:30 AM EST    Indication: Mental status change, unknown cause.     Comparison: CT February 5, 2024    Technique:  Routine multiplanar/multisequence sequence images of the brain were obtained without contrast administration.      Findings:  No acute infarct is present on diffusion weighted sequences. Midline structures are normal and the craniocervical junction appears satisfactory. Age-related changes are present with moderate generalized volume loss and mild typical T2 hyperintense   subcortical and pontine white matter changes, nonspecific but favored to reflect sequela of chronic microvascular ischemia. There is otherwise no evidence of intracranial hemorrhage, mass or mass effect. There is mild associated ex vacuo prominence of   the ventricles correlating with surrounding volume loss. The orbits appear normal. The paranasal sinuses are grossly clear.      Impression:      Impression:  Mildly motion-degraded exam demonstrating expected age-related changes, without evidence of acute  infarct, hemorrhage, mass or mass effect.        Electronically Signed: Andres Olvera MD    2/7/2024 9:00 AM EST    Workstation ID: [id number]            LAB RESULTS (LAST 7 DAYS)    CBC  Results from last 7 days   Lab Units 02/08/24 0444 02/07/24 1013 02/06/24 0351 02/05/24  1150   WBC 10*3/mm3 9.60 9.20 7.30 9.10   RBC 10*6/mm3 4.77 4.33 4.18 4.73   HEMOGLOBIN g/dL 12.8 11.8* 11.2* 12.8   HEMATOCRIT % 40.5 37.4 34.9 40.5   MCV fL 84.9 86.3 83.6 85.6   PLATELETS 10*3/mm3 216 200 177 220       BMP  Results from last 7 days   Lab Units 02/08/24 0444 02/07/24 1013 02/06/24 1806 02/06/24  0351 02/05/24  1150   SODIUM mmol/L 136 136 135* 138 137   POTASSIUM mmol/L 4.8 4.9 5.1 2.9* 3.1*   CHLORIDE mmol/L 102 102 98 96* 94*   CO2 mmol/L 23.0 24.0 28.0 30.0* 30.0*   BUN mg/dL 32* 32* 32* 22 19   CREATININE mg/dL 1.12* 1.26* 1.50* 1.07* 1.30*   GLUCOSE mg/dL 90 100* 105* 82 104*   MAGNESIUM mg/dL 2.0  --   --   --  2.2   PHOSPHORUS mg/dL 3.4  --   --   --   --        CMP   Results from last 7 days   Lab Units 02/08/24 0444 02/07/24  1013 02/06/24  1806 02/06/24  0351 02/05/24  1235 02/05/24  1150   SODIUM mmol/L 136 136 135* 138  --  137   POTASSIUM mmol/L 4.8 4.9 5.1 2.9*  --  3.1*   CHLORIDE mmol/L 102 102 98 96*  --  94*   CO2 mmol/L 23.0 24.0 28.0 30.0*  --  30.0*   BUN mg/dL 32* 32* 32* 22  --  19   CREATININE mg/dL 1.12* 1.26* 1.50* 1.07*  --  1.30*   GLUCOSE mg/dL 90 100* 105* 82  --  104*   ALBUMIN g/dL 3.5  --  3.3*  --   --  3.9   BILIRUBIN mg/dL 0.7  --  0.6  --   --  1.1   ALK PHOS U/L 60  --  58  --   --  69   AST (SGOT) U/L 35*  --  21  --   --  19   ALT (SGPT) U/L 32  --  16  --   --  10   AMMONIA umol/L  --   --   --   --  25  --        BNP        TROPONIN  Results from last 7 days   Lab Units 02/06/24  1806   CK TOTAL U/L 64       CoAg        Creatinine Clearance  Estimated Creatinine Clearance: 36.9 mL/min (A) (by C-G formula based on SCr of 1.12 mg/dL (H)).    ABG        Radiology  MRI Brain With  Contrast    Result Date: 2/7/2024  Impression: No abnormality noted on postcontrast imaging. See same-day noncontrast exam report for additional details. Electronically Signed: Alan Rodríguez MD  2/7/2024 6:39 PM CST  Workstation ID: [id number]    MRI Brain Without Contrast    Result Date: 2/7/2024  Impression: Mildly motion-degraded exam demonstrating expected age-related changes, without evidence of acute infarct, hemorrhage, mass or mass effect. Electronically Signed: Andres Olvera MD  2/7/2024 9:00 AM EST  Workstation ID: [id number]    CT Abdomen Pelvis Without Contrast    Result Date: 2/6/2024  Impression: Chronic atelectasis of the left lower lobe. Moderately severe emphysema. Lesion of the sternal manubrium as detailed, which could represent subacute or old fracture although metastatic disease is not excluded. Correlation with whole-body bone scan may be  useful. Aneurysmal dilatation of the abdominal aorta. Small amount of free pelvic fluid, nonspecific. Nodular liver contour suggests cirrhosis. Electronically Signed: Mary Ivan MD  2/6/2024 3:29 PM EST  Workstation ID: [id number]    CT Chest Without Contrast Diagnostic    Result Date: 2/6/2024  Impression: Chronic atelectasis of the left lower lobe. Moderately severe emphysema. Lesion of the sternal manubrium as detailed, which could represent subacute or old fracture although metastatic disease is not excluded. Correlation with whole-body bone scan may be  useful. Aneurysmal dilatation of the abdominal aorta. Small amount of free pelvic fluid, nonspecific. Nodular liver contour suggests cirrhosis. Electronically Signed: Mary Ivan MD  2/6/2024 3:29 PM EST  Workstation ID: [id number]       EKG  I personally viewed and interpreted the patient's EKG/Telemetry data:  ECG 12 Lead Rhythm Change   Preliminary Result   HEART RATE= 69  bpm   RR Interval= 900  ms   MT Interval= 213  ms   P Horizontal Axis= -3  deg   P Front Axis= 54  deg   QRSD Interval= 86  ms    QT Interval= 413  ms   QTcB= 435  ms   QRS Axis= 117  deg   T Wave Axis= 33  deg   - ABNORMAL ECG -   Sinus rhythm   Atrial premature complex   Borderline prolonged ID interval   Anteroseptal infarct, age indeterminate   Electronically Signed By:    Date and Time of Study: 2024-02-08 01:23:00      ECG 12 Lead Other; Weakness   Final Result   HEART RATE= 75  bpm   RR Interval= 860  ms   ID Interval= 176  ms   P Horizontal Axis= 201  deg   P Front Axis= 11  deg   QRSD Interval= 99  ms   QT Interval= 398  ms   QTcB= 429  ms   QRS Axis= 117  deg   T Wave Axis= -6  deg   - ABNORMAL ECG -   Sinus rhythm   Multiple premature complexes, vent & supraven   Right axis deviation   Low voltage, precordial leads   Nonspecific T abnormalities, anterior leads   Electronically Signed By: Pankaj Mccallum ([facility]) 06-Feb-2024 07:40:13   Date and Time of Study: 2024-02-05 11:04:44      SCANNED - TELEMETRY     Final Result      SCANNED - TELEMETRY     Final Result      SCANNED - TELEMETRY     Final Result      SCANNED - TELEMETRY     Final Result      SCANNED - TELEMETRY     Final Result      SCANNED - TELEMETRY     Final Result      SCANNED - TELEMETRY     Final Result      SCANNED - TELEMETRY     Final Result      SCANNED - TELEMETRY     Final Result      SCANNED - TELEMETRY     Final Result      SCANNED - TELEMETRY     Final Result      ECG 12 Lead Altered Mental Status    (Results Pending)         Echocardiogram:    Results for orders placed in visit on 09/13/22    Adult Transthoracic Echo Complete W/ Cont if Necessary Per Protocol    Interpretation Summary    Estimated left ventricular EF = 70% Estimated left ventricular EF was in agreement with the calculated left ventricular EF. Left ventricular systolic function is normal.    Left ventricular diastolic function is consistent with (grade II w/high LAP) pseudonormalization.        Stress Test:  Results for orders placed in visit on 09/13/22    Stress Test With Myocardial Perfusion  One Day    Interpretation Summary    Left ventricular ejection fraction is hyperdynamic (Calculated EF > 70%). .    Myocardial perfusion imaging indicates a normal myocardial perfusion study with no evidence of ischemia.    Impressions are consistent with a low risk study.    Findings consistent with a normal ECG stress test.         Cardiac Catheterization:  Results for orders placed during the hospital encounter of 03/03/23    Cardiac Catheterization/Vascular Study    Narrative  OPERATORS  Richardson Willis M.D. (Attending Cardiologist)      PROCEDURE PERFORMED  Ultrasound guided vascular access  Right heart catheterization  Coronary Angiogram  Left Heart Catheterization [id number]  Moderate Sedation    INDICATIONS FOR PROCEDURE  83-year-old woman with multiple cardiovascular risk factors, abnormal stress test presented with worsening shortness of breath.  After discussing the risk and benefit of the procedure she was brought in for elective right and left heart cath.    PROCEDURE IN DETAIL  Informed consent was obtained from the patient after explaining the risks, benefits, and alternative options of the procedure. After obtaining informed consent, the patient was brought to the cath lab and was prepped in a sterile fashion. Lidocaine 2% was used for local anesthesia into the right femoral venous access site. Right femoral vein was accessed using the micropuncture needle under ultrasound guidance and micropuncture wire advanced under flouroscopy. A 7 Turkmen vascular sheath was put into place percutaneously over guide-wire. Guide wires were removed. A 6Fr swan sheryl catheter was advanced to wedge position. RA, RV and PA and wedge pressures were recorded.  PA sat and arterial sats recorded.  The patient tolerated the procedure well without any complications.    Lidocaine 2% was used for local anesthesia into the right femoral arterial access site. The right femoral artery was accessed with a micropuncture needle via modified  Seldinger technique under ultrasound guidance. A 6F was inserted successfully.  Afterwards, 6F JR4 and JL4 diagnostic catheters were advanced over a wire into the ascending aorta and were used to engage the ostia of the left main and RCA respectively. JR4 used to cross the AV and obtain LV pressures and gradient across the AV measured via pullback technique. Images of the right and left coronary systems were obtained. All the catheters were exchanged over a wire and subsequently removed. Angiogram of the femoral access site was obtained and did not show complications. The patient tolerated the procedure well without any complications. The pictures were reviewed at the end of the procedure. A Mynx closure device was applied.    HEMODYNAMICS    RHC  RA 6/5, 4 mmHg  RV 74/3, 5 mmHg  PA 71/25, 44 mmHg  PCW 12 mmHg  AO Sat 92%  PA Sat 78%    Jose CO: 7.89 L/min    Jose CI: 4.69 L/min/m²    LHC  LV: 134/3, 20 mmHg  AO: 131/56, 87 mmHg  No significant gradient across the aortic valve during pullback of JR4 catheter.    FINDINGS  Coronary Angiogram  All vessels are heavily calcified  She also has heavily calcified peripheral arterial disease.  Right dominant circulation    Left main: Left main is a large caliber vessel which gives rise to the Left Anterior Descending and the Left circumflex.  Left main is angiographically free from any significant disease.    Left Anterior Descending Artery: LAD is a heavily calcified medium caliber vessel which gives rise to diagonals.  The vessel is highly tortuous and has 50 to 60% mid vessel stenosis best seen in VERÓNICA cranial view.    Left Circumflex: Heavily calcified vessel with 50% proximal segment stenosis.    Right Coronary Artery: The RCA is a large caliber vessel which is heavily calcified and tortuous.  It has diffuse luminal irregularities and 30 to 40% stenosis in the midsegment around the bend.    ESTIMATED BLOOD LOSS:  10 ml    COMPLICATIONS:  None    PROCEDURE  DATA:  Sedation Time: 25 minutes    IMPRESSIONS  Heavily calcified, tortuous nonobstructive coronary disease  Severe pulmonary hypertension with PA systolic pressure in the 70s  Mean PA pressure 44 mmHg    RECOMMENDATIONS  -Continue aggressive medical management of CAD  -Referral to pulmonology for treatment of pulmonary hypertension         Other:         ASSESSMENT & PLAN:    Principal Problem:    Altered mental status  Active Problems:    Acute hypokalemia    Stage 3a chronic kidney disease    Chronic obstructive pulmonary disease    History of venous thrombosis and embolism    Primary hypertension    History of TIA (transient ischemic attack)    Severe pulmonary hypertension    Chronic respiratory failure with hypoxia    JULIAN (acute kidney injury)      Altered mental status  Possible mastitis/PICC line infection  CT head and MRI of the brain unremarkable with no acute findings  UTI has been ruled out and ammonia level is normal  White count and lactate levels are normal  Started on antibiotics     JULIAN on Stage 3a chronic kidney disease  Creatinine1.12, GFR is 49.2  Improved with hydration  Diuretics have been held  Hypokalemia has resolved  Closely monitor renal function and respiratory status     COPD/Chronic respiratory failure  Severe pulmonary hypertension  On 2-3 L of oxygen via nasal cannula at baseline.  Has known pulmonary hypertension  RA 6/5, 4 mmHg  RV 74/3, 5 mmHg  PA 71/25, 44 mmHg  PCW 12 mmHg  Continue sildenafil  Managed by pulmonology     Atrial fibrillation  She has paroxysmal atrial fibrillation  [id number] score is 6  Continue Eliquis for atrial fibrillation as well as for history of DVT/PE  I will increase the dose of Coreg to 25 mg p.o. twice daily today     History of venous thrombosis and embolism  Continue Eliquis 5 mg p.o. twice daily.  She has an IVC filter in place as well.     Primary hypertension, chronic  Blood pressure is elevated: Increasing Coreg today  Echo shows preserved LV  function with grade 2 diastolic dysfunction  Insert of hydralazine we will resume amlodipine and lisinopril  Diuretics to be used as needed     Coronary artery disease  Continue high intensity statin and beta-blocker.  No need for aspirin while she is on anticoagulation  Previous cardiac catheterization showed heavily calcified coronaries but nonobstructive.  No chest pain and stable from cardiac standpoint.     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  Continue high intensity statin and anticoagulation with Eliquis 5 mg p.o. twice daily.    Richardson Willis MD  02/08/24  07:26 EST

## 2024-02-08 NOTE — PROGRESS NOTES
LOS: 1 day   Patient Care Team:  Shaylee Mcdaniels MD as PCP - General (Family Medicine)  Richardson Willis MD as Cardiologist (Cardiology)  Rafy Rodriguez MD as Consulting Physician (Hematology and Oncology)  Christianne Phillips, AMARILIS as Licensed Practical Nurse  Salome Fleming MD as Consulting Physician (Nephrology)    Subjective          Review of Systems   Unable to perform ROS: Mental status change           Objective     Vital Signs  Temp:  [96.2 °F (35.7 °C)-97.5 °F (36.4 °C)] 97.5 °F (36.4 °C)  Heart Rate:  [] 97  Resp:  [18-33] 26  BP: (137-181)/() 147/74      Physical Exam  Constitutional:       Appearance: She is not ill-appearing.   HENT:      Head: Normocephalic and atraumatic.      Right Ear: External ear normal.      Left Ear: External ear normal.      Nose: Nose normal.      Mouth/Throat:      Mouth: Mucous membranes are moist.   Eyes:      General:         Right eye: No discharge.         Left eye: No discharge.   Cardiovascular:      Rate and Rhythm: Normal rate and regular rhythm.      Pulses: Normal pulses.      Heart sounds: Normal heart sounds.   Pulmonary:      Effort: Pulmonary effort is normal.      Breath sounds: Normal breath sounds.   Abdominal:      General: Bowel sounds are normal.      Palpations: Abdomen is soft.   Musculoskeletal:         General: Normal range of motion.      Cervical back: Normal range of motion.   Skin:     General: Skin is warm.      Coloration: Skin is pale.   Neurological:      Mental Status: She is alert and oriented to person, place, and time.   Psychiatric:         Behavior: Behavior normal.              Results Review:    Lab Results (last 24 hours)       Procedure Component Value Units Date/Time    Comprehensive Metabolic Panel [[id number]]  (Abnormal) Collected: 02/08/24 0444    Specimen: Blood Updated: 02/08/24 0527     Glucose 90 mg/dL      BUN 32 mg/dL      Creatinine 1.12 mg/dL      Sodium 136 mmol/L      Potassium 4.8 mmol/L       Chloride 102 mmol/L      CO2 23.0 mmol/L      Calcium 9.4 mg/dL      Total Protein 6.4 g/dL      Albumin 3.5 g/dL      ALT (SGPT) 32 U/L      AST (SGOT) 35 U/L      Alkaline Phosphatase 60 U/L      Total Bilirubin 0.7 mg/dL      Globulin 2.9 gm/dL      A/G Ratio 1.2 g/dL      BUN/Creatinine Ratio 28.6     Anion Gap 11.0 mmol/L      eGFR 49.2 mL/min/1.73     Narrative:      GFR Normal >60  Chronic Kidney Disease <60  Kidney Failure <15    The GFR formula is only valid for adults with stable renal function between ages 18 and 70.    Magnesium [[id number]]  (Normal) Collected: 02/08/24 0444    Specimen: Blood Updated: 02/08/24 0527     Magnesium 2.0 mg/dL     Phosphorus [[id number]]  (Normal) Collected: 02/08/24 0444    Specimen: Blood Updated: 02/08/24 0527     Phosphorus 3.4 mg/dL     CBC & Differential [[id number]]  (Abnormal) Collected: 02/08/24 0444    Specimen: Blood Updated: 02/08/24 0523    Narrative:      The following orders were created for panel order CBC & Differential.  Procedure                               Abnormality         Status                     ---------                               -----------         ------                     CBC Auto Differential[[id number]]        Abnormal            Final result                 Please view results for these tests on the individual orders.    CBC Auto Differential [[id number]]  (Abnormal) Collected: 02/08/24 0444    Specimen: Blood Updated: 02/08/24 0523     WBC 9.60 10*3/mm3      RBC 4.77 10*6/mm3      Hemoglobin 12.8 g/dL      Hematocrit 40.5 %      MCV 84.9 fL      MCH 26.9 pg      MCHC 31.6 g/dL      RDW 15.8 %      RDW-SD 49.9 fl      MPV 9.0 fL      Platelets 216 10*3/mm3      Neutrophil % 71.2 %      Lymphocyte % 14.3 %      Monocyte % 9.1 %      Eosinophil % 4.1 %      Basophil % 1.3 %      Neutrophils, Absolute 6.90 10*3/mm3      Lymphocytes, Absolute 1.40 10*3/mm3      Monocytes, Absolute 0.90 10*3/mm3      Eosinophils, Absolute 0.40 10*3/mm3       Basophils, Absolute 0.10 10*3/mm3      nRBC 0.1 /100 WBC     Calcium, Ionized [[id number]]  (Normal) Collected: 02/08/24 0444    Specimen: Blood Updated: 02/08/24 0520     Ionized Calcium 1.28 mmol/L     Folate [[id number]] Collected: 02/08/24 0444    Specimen: Blood Updated: 02/08/24 0504    Vitamin B12 [[id number]]  (Normal) Collected: 02/05/24 1150    Specimen: Blood Updated: 02/07/24 2033     Vitamin B-12 942 pg/mL     Narrative:      Results may be falsely increased if patient taking Biotin.      Blood Culture - Blood, Wrist, Right [[id number]]  (Normal) Collected: 02/05/24 1627    Specimen: Blood from Wrist, Right Updated: 02/07/24 1845     Blood Culture No growth at 2 days    Narrative:      Less than seven (7) mL's of blood was collected.  Insufficient quantity may yield false negative results.    Blood Culture - Blood, Arm, Right [[id number]]  (Normal) Collected: 02/05/24 1150    Specimen: Blood from Arm, Right Updated: 02/07/24 1200     Blood Culture No growth at 2 days    Narrative:      Less than seven (7) mL's of blood was collected.  Insufficient quantity may yield false negative results.    Basic Metabolic Panel [[id number]]  (Abnormal) Collected: 02/07/24 1013    Specimen: Blood Updated: 02/07/24 1055     Glucose 100 mg/dL      BUN 32 mg/dL      Creatinine 1.26 mg/dL      Sodium 136 mmol/L      Potassium 4.9 mmol/L      Chloride 102 mmol/L      CO2 24.0 mmol/L      Calcium 8.8 mg/dL      BUN/Creatinine Ratio 25.4     Anion Gap 10.0 mmol/L      eGFR 42.7 mL/min/1.73     Narrative:      GFR Normal >60  Chronic Kidney Disease <60  Kidney Failure <15    The GFR formula is only valid for adults with stable renal function between ages 18 and 70.    CBC & Differential [[id number]]  (Abnormal) Collected: 02/07/24 1013    Specimen: Blood Updated: 02/07/24 1037    Narrative:      The following orders were created for panel order CBC & Differential.  Procedure                               Abnormality         Status                      ---------                               -----------         ------                     CBC Auto Differential[[id number]]        Abnormal            Final result                 Please view results for these tests on the individual orders.    CBC Auto Differential [[id number]]  (Abnormal) Collected: 02/07/24 1013    Specimen: Blood Updated: 02/07/24 1037     WBC 9.20 10*3/mm3      RBC 4.33 10*6/mm3      Hemoglobin 11.8 g/dL      Hematocrit 37.4 %      MCV 86.3 fL      MCH 27.1 pg      MCHC 31.5 g/dL      RDW 16.0 %      RDW-SD 48.6 fl      MPV 8.4 fL      Platelets 200 10*3/mm3      Neutrophil % 73.0 %      Lymphocyte % 10.8 %      Monocyte % 10.0 %      Eosinophil % 5.1 %      Basophil % 1.1 %      Neutrophils, Absolute 6.70 10*3/mm3      Lymphocytes, Absolute 1.00 10*3/mm3      Monocytes, Absolute 0.90 10*3/mm3      Eosinophils, Absolute 0.50 10*3/mm3      Basophils, Absolute 0.10 10*3/mm3      nRBC 0.0 /100 WBC              Imaging Results (Last 24 Hours)       Procedure Component Value Units Date/Time    US Breast Left Limited [[id number]] Collected: 02/08/24 0829     Updated: 02/08/24 0837    Narrative:      DATE OF EXAM:   2/7/2024 8:00 PM     PROCEDURE:   US BREAST LEFT LIMITED-     INDICATIONS:   Hx breast cancer- lump felft -abscess vs recurrence; R41.82-Altered  mental status, unspecified; E87.6-Hypokalemia     COMPARISON:  Prior breast imaging dated 10/22/2014, 10/8/2014, 10/2/2013     TECHNIQUE:   Sonographic grayscale and color Doppler images of the left breast were  obtained with representative examples submitted to PACS for  interpretation.     FINDINGS:   Imaging was performed after hours on an emergent basis to evaluate for  abscess. Radiologist was not present for real-time scanning.     Imaging of the left breast at the area of palpable concern at the  lumpectomy site corresponding to the 11 o'clock position 4 cm from the  nipple demonstrates an irregular hypoechoic shadowing region  measuring  1.4 x 2.0 x 2.0 cm.     This has a nonspecific appearance. This could represent patient's  scarring at the lumpectomy site, collapsed residual seroma or  recurrence. Abscess is felt less likely but not excluded if there are  clinical findings of cellulitis.       Impression:      IMPRESSION :   1. Nonspecific hypoechoic shadowing region measuring 2.0 cm at the 11  o'clock position 4 cm from the nipple corresponding to patient's  lumpectomy site. This could represent scarring at the lumpectomy site,  collapsed seroma cavity or recurrence. Abscess is felt less likely but  not entirely excluded if there are clinical findings of infection.     Recommend patient return for full outpatient diagnostic work-up  including diagnostic mammogram and ultrasound with real-time evaluation  by radiologist. Patient also requires import of outside breast imaging  more recent than 2014     BI-RADS Final Assessment Category 0: Incomplete-Need Additional Imaging  Evaluation  Management Recommendation: Recall for additional imaging.        Electronically Signed By-Param Johansen MD On:2/8/2024 8:35 AM       MRI Brain With Contrast [[id number]] Collected: 02/07/24 1935     Updated: 02/07/24 1942    Narrative:      MRI BRAIN W CONTRAST    Date of Exam: 2/7/2024 5:45 PM CST    Indication: Rule out encephalopathy?just need contrast.     Comparison: Noncontrast MRI brain 2/7/2024    Technique:  Routine multiplanar/multisequence sequence images of the brain were obtained after the uneventful administration of 14 cc Prohance.        Findings:  No abnormal enhancement noted.    No abnormal meningeal thickening.    No evidence of dural venous thrombosis.      Impression:      Impression:  No abnormality noted on postcontrast imaging. See same-day noncontrast exam report for additional details.        Electronically Signed: Alan Rodríguez MD    2/7/2024 6:39 PM CST    Workstation ID: [id number]                 I reviewed the patient's new  clinical results.    Medication Review:   Scheduled Meds:allopurinol, 100 mg, Oral, Daily  amLODIPine, 5 mg, Oral, Q24H  apixaban, 5 mg, Oral, Q12H  budesonide, 0.5 mg, Nebulization, BID - RT  carvedilol, 25 mg, Oral, BID With Meals  cefTRIAXone, 2,000 mg, Intravenous, Q24H  famotidine, 20 mg, Oral, Daily  hydrALAZINE, 50 mg, Oral, Q8H  ipratropium-albuterol, 3 mL, Nebulization, 4x Daily - RT  levothyroxine, 50 mcg, Oral, Q AM  melatonin, 5 mg, Oral, Nightly  sildenafil, 20 mg, Oral, TID  sodium chloride, 10 mL, Intravenous, Q12H  sodium chloride, 10 mL, Intravenous, Q12H      Continuous Infusions:sodium chloride, 60 mL/hr, Last Rate: 60 mL/hr (02/08/24 0848)      PRN Meds:.  acetaminophen    Calcium Replacement - Follow Nurse / BPA Driven Protocol    ipratropium-albuterol    Magnesium Standard Dose Replacement - Follow Nurse / BPA Driven Protocol    ondansetron    Phosphorus Replacement - Follow Nurse / BPA Driven Protocol    Potassium Replacement - Follow Nurse / BPA Driven Protocol    [COMPLETED] Insert Peripheral IV **AND** sodium chloride    sodium chloride    sodium chloride    sodium chloride    sodium chloride     Interval History:    2/6 patient examined this am and back to baseline son sylvia dewey at bedside. Few hours later patient became confused and cussing. She underwent CT a/p and chest which revealed new Lesion of the sternal manubrium;  The infrarenal abdominal aorta measures 3.9 cm transversely as compared to 2.6 cm previously  Dtr and grandtr state that the patient has had moments of inability to find words and describe word salad they also state that she had difficulty using spoon.   On further examination left breast with history of breast cancer is painful to touch with heaviness. They state doctor had started her on antibiotics prior to admission for skin infection around breast. No infection or discoloring noted.     2/8 ID/nephrology/cardiology following. Patient remains AMS has had hypotension  however now stabilized; continue supportive care    Assessment & Plan      Altered mental status-resolved  -Respiratory panel is negative for COVID, flu, RSV  -Urinalysis is clear  -Chest x-ray is unremarkable  -full respiratory viral panel negative  -Blood cultures are pending  -MRI brain, ct a/p and chest no acute findings  -low grade temps on admission     Elevated creatinine  -nephrology following     Right arm erythema  -doppler orders  -may need to be pulled    Left breast pain and discomfort  -ultrasound ordered     Hypokalemia  -worse this am replacing    History of DVT-continue Eliquis  COPD-appears well compensated.  Continue Symbicort  Chronic hypoxemia-oxygen requirement is at her baseline.  Pulmonary hypertension-continue sildenafil  Hyperuricemia-continue allopurinol     Patient is fully anticoagulated so no additional DVT prophylaxis is needed.  Famotidine for stress ulcer prophylaxis      Plan for disposition:DIANNE Becerril, JOAQUÍN  02/08/24  10:29 EST

## 2024-02-08 NOTE — SIGNIFICANT NOTE
1st attempt pt just returning to room from off unit and fatigued/difficulty to arouse. 2nd attempt: pt off unit

## 2024-02-09 ENCOUNTER — APPOINTMENT (OUTPATIENT)
Dept: GENERAL RADIOLOGY | Facility: HOSPITAL | Age: 84
DRG: 683 | End: 2024-02-09
Payer: MEDICARE

## 2024-02-09 LAB
ANION GAP SERPL CALCULATED.3IONS-SCNC: 11 MMOL/L (ref 5–15)
BASOPHILS # BLD AUTO: 0.1 10*3/MM3 (ref 0–0.2)
BASOPHILS NFR BLD AUTO: 1.1 % (ref 0–1.5)
BUN SERPL-MCNC: 30 MG/DL (ref 8–23)
BUN/CREAT SERPL: 25.9 (ref 7–25)
CALCIUM SPEC-SCNC: 9.3 MG/DL (ref 8.6–10.5)
CHLORIDE SERPL-SCNC: 105 MMOL/L (ref 98–107)
CO2 SERPL-SCNC: 22 MMOL/L (ref 22–29)
CREAT SERPL-MCNC: 1.16 MG/DL (ref 0.57–1)
DEPRECATED RDW RBC AUTO: 47.3 FL (ref 37–54)
EGFRCR SERPLBLD CKD-EPI 2021: 47.2 ML/MIN/1.73
EOSINOPHIL # BLD AUTO: 0.2 10*3/MM3 (ref 0–0.4)
EOSINOPHIL NFR BLD AUTO: 2.2 % (ref 0.3–6.2)
ERYTHROCYTE [DISTWIDTH] IN BLOOD BY AUTOMATED COUNT: 16 % (ref 12.3–15.4)
GLUCOSE SERPL-MCNC: 115 MG/DL (ref 65–99)
HCT VFR BLD AUTO: 38.8 % (ref 34–46.6)
HGB BLD-MCNC: 12.3 G/DL (ref 12–15.9)
LYMPHOCYTES # BLD AUTO: 1.2 10*3/MM3 (ref 0.7–3.1)
LYMPHOCYTES NFR BLD AUTO: 10.5 % (ref 19.6–45.3)
MAGNESIUM SERPL-MCNC: 1.9 MG/DL (ref 1.6–2.4)
MCH RBC QN AUTO: 26.8 PG (ref 26.6–33)
MCHC RBC AUTO-ENTMCNC: 31.6 G/DL (ref 31.5–35.7)
MCV RBC AUTO: 84.8 FL (ref 79–97)
MONOCYTES # BLD AUTO: 1 10*3/MM3 (ref 0.1–0.9)
MONOCYTES NFR BLD AUTO: 8.9 % (ref 5–12)
NEUTROPHILS NFR BLD AUTO: 77.3 % (ref 42.7–76)
NEUTROPHILS NFR BLD AUTO: 8.9 10*3/MM3 (ref 1.7–7)
NRBC BLD AUTO-RTO: 0 /100 WBC (ref 0–0.2)
PHOSPHATE SERPL-MCNC: 2.7 MG/DL (ref 2.5–4.5)
PLATELET # BLD AUTO: 234 10*3/MM3 (ref 140–450)
PMV BLD AUTO: 8.8 FL (ref 6–12)
POTASSIUM SERPL-SCNC: 4.3 MMOL/L (ref 3.5–5.2)
RBC # BLD AUTO: 4.57 10*6/MM3 (ref 3.77–5.28)
SODIUM SERPL-SCNC: 138 MMOL/L (ref 136–145)
WBC NRBC COR # BLD AUTO: 11.5 10*3/MM3 (ref 3.4–10.8)

## 2024-02-09 PROCEDURE — 99232 SBSQ HOSP IP/OBS MODERATE 35: CPT | Performed by: INTERNAL MEDICINE

## 2024-02-09 PROCEDURE — 94761 N-INVAS EAR/PLS OXIMETRY MLT: CPT

## 2024-02-09 PROCEDURE — 97112 NEUROMUSCULAR REEDUCATION: CPT

## 2024-02-09 PROCEDURE — 99232 SBSQ HOSP IP/OBS MODERATE 35: CPT | Performed by: NURSE PRACTITIONER

## 2024-02-09 PROCEDURE — 85025 COMPLETE CBC W/AUTO DIFF WBC: CPT | Performed by: INTERNAL MEDICINE

## 2024-02-09 PROCEDURE — 83735 ASSAY OF MAGNESIUM: CPT | Performed by: INTERNAL MEDICINE

## 2024-02-09 PROCEDURE — 94664 DEMO&/EVAL PT USE INHALER: CPT

## 2024-02-09 PROCEDURE — 97530 THERAPEUTIC ACTIVITIES: CPT

## 2024-02-09 PROCEDURE — 97110 THERAPEUTIC EXERCISES: CPT

## 2024-02-09 PROCEDURE — 94799 UNLISTED PULMONARY SVC/PX: CPT

## 2024-02-09 PROCEDURE — 71045 X-RAY EXAM CHEST 1 VIEW: CPT

## 2024-02-09 PROCEDURE — 80048 BASIC METABOLIC PNL TOTAL CA: CPT | Performed by: INTERNAL MEDICINE

## 2024-02-09 PROCEDURE — 25810000003 SODIUM CHLORIDE 0.9 % SOLUTION: Performed by: INTERNAL MEDICINE

## 2024-02-09 PROCEDURE — 84100 ASSAY OF PHOSPHORUS: CPT | Performed by: INTERNAL MEDICINE

## 2024-02-09 RX ORDER — AMLODIPINE BESYLATE 5 MG/1
10 TABLET ORAL
Status: DISCONTINUED | OUTPATIENT
Start: 2024-02-09 | End: 2024-02-11

## 2024-02-09 RX ORDER — CEPHALEXIN 500 MG/1
500 CAPSULE ORAL EVERY 8 HOURS SCHEDULED
Status: COMPLETED | OUTPATIENT
Start: 2024-02-09 | End: 2024-02-16

## 2024-02-09 RX ADMIN — CEPHALEXIN 500 MG: 500 CAPSULE ORAL at 15:05

## 2024-02-09 RX ADMIN — Medication 10 ML: at 08:05

## 2024-02-09 RX ADMIN — CEPHALEXIN 500 MG: 500 CAPSULE ORAL at 21:18

## 2024-02-09 RX ADMIN — IPRATROPIUM BROMIDE AND ALBUTEROL SULFATE 3 ML: .5; 3 SOLUTION RESPIRATORY (INHALATION) at 19:20

## 2024-02-09 RX ADMIN — SILDENAFIL CITRATE 20 MG: 20 TABLET ORAL at 15:05

## 2024-02-09 RX ADMIN — ALLOPURINOL 100 MG: 100 TABLET ORAL at 08:05

## 2024-02-09 RX ADMIN — Medication 10 ML: at 20:50

## 2024-02-09 RX ADMIN — CARVEDILOL 25 MG: 25 TABLET, FILM COATED ORAL at 17:39

## 2024-02-09 RX ADMIN — APIXABAN 5 MG: 5 TABLET, FILM COATED ORAL at 21:18

## 2024-02-09 RX ADMIN — IPRATROPIUM BROMIDE AND ALBUTEROL SULFATE 3 ML: .5; 3 SOLUTION RESPIRATORY (INHALATION) at 07:48

## 2024-02-09 RX ADMIN — CARVEDILOL 25 MG: 25 TABLET, FILM COATED ORAL at 08:05

## 2024-02-09 RX ADMIN — HYDRALAZINE HYDROCHLORIDE 50 MG: 25 TABLET ORAL at 05:00

## 2024-02-09 RX ADMIN — SILDENAFIL CITRATE 20 MG: 20 TABLET ORAL at 08:05

## 2024-02-09 RX ADMIN — FAMOTIDINE 20 MG: 20 TABLET, FILM COATED ORAL at 08:05

## 2024-02-09 RX ADMIN — Medication 10 ML: at 08:10

## 2024-02-09 RX ADMIN — APIXABAN 5 MG: 5 TABLET, FILM COATED ORAL at 08:05

## 2024-02-09 RX ADMIN — IPRATROPIUM BROMIDE AND ALBUTEROL SULFATE 3 ML: .5; 3 SOLUTION RESPIRATORY (INHALATION) at 16:06

## 2024-02-09 RX ADMIN — BUDESONIDE INHALATION 0.5 MG: 0.5 SUSPENSION RESPIRATORY (INHALATION) at 07:53

## 2024-02-09 RX ADMIN — SODIUM CHLORIDE 60 ML/HR: 9 INJECTION, SOLUTION INTRAVENOUS at 04:52

## 2024-02-09 RX ADMIN — LEVOTHYROXINE SODIUM 50 MCG: 0.05 TABLET ORAL at 05:00

## 2024-02-09 RX ADMIN — AMLODIPINE BESYLATE 10 MG: 5 TABLET ORAL at 08:10

## 2024-02-09 RX ADMIN — BUDESONIDE INHALATION 0.5 MG: 0.5 SUSPENSION RESPIRATORY (INHALATION) at 19:29

## 2024-02-09 NOTE — PROGRESS NOTES
LOS: 2 days   Patient Care Team:  Shaylee Mcdaniels MD as PCP - General (Family Medicine)  Richardson Willis MD as Cardiologist (Cardiology)  Rafy Rodriguez MD as Consulting Physician (Hematology and Oncology)  Christianne Phillips, AMARILIS as Licensed Practical Nurse  Salome Fleming MD as Consulting Physician (Nephrology)    Subjective          Review of Systems   Constitutional:  Positive for appetite change and fatigue.   HENT: Negative.     Respiratory: Negative.     Cardiovascular: Negative.    Gastrointestinal: Negative.    Genitourinary: Negative.    Musculoskeletal:  Positive for gait problem.   Neurological:  Positive for weakness.   Psychiatric/Behavioral: Negative.             Objective     Vital Signs  Temp:  [97.2 °F (36.2 °C)-98.9 °F (37.2 °C)] 98.9 °F (37.2 °C)  Heart Rate:  [] 96  Resp:  [13-32] 28  BP: ()/(55-90) 138/81      Physical Exam  Constitutional:       Appearance: She is not ill-appearing.   HENT:      Head: Normocephalic and atraumatic.      Right Ear: External ear normal.      Left Ear: External ear normal.      Nose: Nose normal.      Mouth/Throat:      Mouth: Mucous membranes are moist.   Eyes:      General:         Right eye: No discharge.         Left eye: No discharge.   Cardiovascular:      Rate and Rhythm: Normal rate and regular rhythm.      Pulses: Normal pulses.      Heart sounds: Normal heart sounds.   Pulmonary:      Effort: Pulmonary effort is normal.      Breath sounds: Normal breath sounds.   Abdominal:      General: Bowel sounds are normal.      Palpations: Abdomen is soft.   Musculoskeletal:         General: Normal range of motion.      Cervical back: Normal range of motion.   Skin:     General: Skin is warm.      Coloration: Skin is pale.   Neurological:      Mental Status: She is alert and oriented to person, place, and time.   Psychiatric:         Behavior: Behavior normal.              Results Review:    Lab Results (last 24 hours)       Procedure  Component Value Units Date/Time    Basic Metabolic Panel [[id number]]  (Abnormal) Collected: 02/09/24 0055    Specimen: Blood Updated: 02/09/24 0154     Glucose 115 mg/dL      BUN 30 mg/dL      Creatinine 1.16 mg/dL      Sodium 138 mmol/L      Potassium 4.3 mmol/L      Chloride 105 mmol/L      CO2 22.0 mmol/L      Calcium 9.3 mg/dL      BUN/Creatinine Ratio 25.9     Anion Gap 11.0 mmol/L      eGFR 47.2 mL/min/1.73     Narrative:      GFR Normal >60  Chronic Kidney Disease <60  Kidney Failure <15    The GFR formula is only valid for adults with stable renal function between ages 18 and 70.    Magnesium [[id number]]  (Normal) Collected: 02/09/24 0055    Specimen: Blood Updated: 02/09/24 0154     Magnesium 1.9 mg/dL     Phosphorus [[id number]]  (Normal) Collected: 02/09/24 0055    Specimen: Blood Updated: 02/09/24 0154     Phosphorus 2.7 mg/dL     CBC & Differential [[id number]]  (Abnormal) Collected: 02/09/24 0055    Specimen: Blood Updated: 02/09/24 0127    Narrative:      The following orders were created for panel order CBC & Differential.  Procedure                               Abnormality         Status                     ---------                               -----------         ------                     CBC Auto Differential[[id number]]        Abnormal            Final result                 Please view results for these tests on the individual orders.    CBC Auto Differential [[id number]]  (Abnormal) Collected: 02/09/24 0055    Specimen: Blood Updated: 02/09/24 0127     WBC 11.50 10*3/mm3      RBC 4.57 10*6/mm3      Hemoglobin 12.3 g/dL      Hematocrit 38.8 %      MCV 84.8 fL      MCH 26.8 pg      MCHC 31.6 g/dL      RDW 16.0 %      RDW-SD 47.3 fl      MPV 8.8 fL      Platelets 234 10*3/mm3      Neutrophil % 77.3 %      Lymphocyte % 10.5 %      Monocyte % 8.9 %      Eosinophil % 2.2 %      Basophil % 1.1 %      Neutrophils, Absolute 8.90 10*3/mm3      Lymphocytes, Absolute 1.20 10*3/mm3      Monocytes, Absolute  1.00 10*3/mm3      Eosinophils, Absolute 0.20 10*3/mm3      Basophils, Absolute 0.10 10*3/mm3      nRBC 0.0 /100 WBC     Blood Culture - Blood, Wrist, Right [[id number]]  (Normal) Collected: 02/05/24 1627    Specimen: Blood from Wrist, Right Updated: 02/08/24 1845     Blood Culture No growth at 3 days    Narrative:      Less than seven (7) mL's of blood was collected.  Insufficient quantity may yield false negative results.    Blood Culture - Blood, Arm, Right [[id number]]  (Normal) Collected: 02/05/24 1150    Specimen: Blood from Arm, Right Updated: 02/08/24 1200     Blood Culture No growth at 3 days    Narrative:      Less than seven (7) mL's of blood was collected.  Insufficient quantity may yield false negative results.    Folate [[id number]]  (Normal) Collected: 02/08/24 0444    Specimen: Blood Updated: 02/08/24 1130     Folate >20.00 ng/mL     Narrative:      Results may be falsely increased if patient taking Biotin.               Imaging Results (Last 24 Hours)       Procedure Component Value Units Date/Time    XR Chest 1 View [[id number]] Collected: 02/09/24 0730     Updated: 02/09/24 0737    Narrative:      XR CHEST 1 VW    Date of Exam: 2/9/2024 5:36 AM EST    Indication: sob    Comparison: None available.    Findings:  The trachea is midline. Stable cardiomegaly with mild enlargement of the pulmonary arteries. There is mild diffuse bilateral reticular lung thickening. Mild left basilar atelectasis. No definite pleural effusions. No change in position of the right-sided   pigtail catheter.      Impression:      Impression:  Stable left basilar atelectasis and chronic lung changes    Cardiomegaly      Electronically Signed: Pablo Martins MD    2/9/2024 7:35 AM EST    Workstation ID: [id number]                 I reviewed the patient's new clinical results.    Medication Review:   Scheduled Meds:allopurinol, 100 mg, Oral, Daily  amLODIPine, 10 mg, Oral, Q24H  apixaban, 5 mg, Oral, Q12H  budesonide, 0.5 mg,  Nebulization, BID - RT  carvedilol, 25 mg, Oral, BID With Meals  cefTRIAXone, 2,000 mg, Intravenous, Q24H  famotidine, 20 mg, Oral, Daily  hydrALAZINE, 50 mg, Oral, Q8H  ipratropium-albuterol, 3 mL, Nebulization, 4x Daily - RT  levothyroxine, 50 mcg, Oral, Q AM  melatonin, 5 mg, Oral, Nightly  sildenafil, 20 mg, Oral, TID  sodium chloride, 10 mL, Intravenous, Q12H  sodium chloride, 10 mL, Intravenous, Q12H      Continuous Infusions:     PRN Meds:.  acetaminophen    Calcium Replacement - Follow Nurse / BPA Driven Protocol    ipratropium-albuterol    Magnesium Standard Dose Replacement - Follow Nurse / BPA Driven Protocol    ondansetron    Phosphorus Replacement - Follow Nurse / BPA Driven Protocol    Potassium Replacement - Follow Nurse / BPA Driven Protocol    [COMPLETED] Insert Peripheral IV **AND** sodium chloride    sodium chloride    sodium chloride    sodium chloride    sodium chloride     Interval History:    2/6 patient examined this am and back to baseline son sylvia dewey at bedside. Few hours later patient became confused and cussing. She underwent CT a/p and chest which revealed new Lesion of the sternal manubrium;  The infrarenal abdominal aorta measures 3.9 cm transversely as compared to 2.6 cm previously  Dtr and grandtr state that the patient has had moments of inability to find words and describe word salad they also state that she had difficulty using spoon.   On further examination left breast with history of breast cancer is painful to touch with heaviness. They state doctor had started her on antibiotics prior to admission for skin infection around breast. No infection or discoloring noted.     2/8 ID/nephrology/cardiology following. Patient remains AMS has had hypotension however now stabilized; continue supportive care    2/9 improved up to chair however very weak will need rehab    Assessment & Plan      Altered mental status-resolved  -Respiratory panel is negative for COVID, flu, RSV  -Urinalysis  is clear  -Chest x-ray is unremarkable  -full respiratory viral panel negative  -Blood cultures are pending  -MRI brain, ct a/p and chest no acute findings  -low grade temps on admission     Elevated creatinine  -nephrology following     Right arm erythema  -doppler negative       Left breast pain and discomfort  -ultrasound  showed changes possibly related to a previous lumpectomy scar, or seroma or possible recurrence.  Outpatient diagnostic mammogram recommended      Hypokalemia  -worse this am replacing    History of DVT-continue Eliquis  COPD-appears well compensated.  Continue Symbicort  Chronic hypoxemia-oxygen requirement is at her baseline.  Pulmonary hypertension-continue sildenafil  Hyperuricemia-continue allopurinol     Patient is fully anticoagulated so no additional DVT prophylaxis is needed.  Famotidine for stress ulcer prophylaxis      Plan for disposition:will need inpt rehab    JOAQUÍN Rogers  02/09/24  10:36 EST

## 2024-02-09 NOTE — PROGRESS NOTES
Infectious Diseases Progress Note      LOS: 2 days   Patient Care Team:  Shaylee Mcdaniels MD as PCP - General (Family Medicine)  Richardson Willis MD as Cardiologist (Cardiology)  Rafy Rodriguez MD as Consulting Physician (Hematology and Oncology)  Christianne Phillips, RN as Licensed Practical Nurse  Salome Fleming MD as Consulting Physician (Nephrology)    Chief Complaint: Acute mental status change    Subjective       The patient has been afebrile for the last 24 hours.  The patient is on 3 L of oxygen by nasal cannula hemodynamically stable, and is tolerating antimicrobial therapy.  Patient is much more alert and awake today.  Currently in the chair      Review of Systems:   Review of Systems   Constitutional:  Positive for fatigue.   Respiratory:  Positive for cough and shortness of breath.    Musculoskeletal:         Left breast pain   Neurological:  Positive for weakness.        Objective     Vital Signs  Temp:  [97.4 °F (36.3 °C)-98.9 °F (37.2 °C)] 98 °F (36.7 °C)  Heart Rate:  [] 76  Resp:  [13-32] 16  BP: ()/(53-90) 105/57    Physical Exam:  Physical Exam  Vitals and nursing note reviewed.   Constitutional:       General: She is not in acute distress.     Appearance: She is well-developed and normal weight. She is ill-appearing. She is not diaphoretic.   HENT:      Head: Normocephalic and atraumatic.   Eyes:      General: No scleral icterus.     Extraocular Movements: Extraocular movements intact.      Conjunctiva/sclera: Conjunctivae normal.      Pupils: Pupils are equal, round, and reactive to light.   Cardiovascular:      Rate and Rhythm: Normal rate and regular rhythm.      Heart sounds: Normal heart sounds, S1 normal and S2 normal. No murmur heard.  Pulmonary:      Effort: Pulmonary effort is normal. No respiratory distress.      Breath sounds: Normal breath sounds. No stridor. No wheezing or rales.   Chest:      Chest wall: No tenderness.   Abdominal:      General: Bowel sounds are  normal. There is no distension.      Palpations: Abdomen is soft. There is no mass.      Tenderness: There is no abdominal tenderness. There is no guarding.      Comments: No abdominal tenderness today   Musculoskeletal:         General: No swelling, tenderness or deformity.      Cervical back: Neck supple.   Skin:     General: Skin is warm and dry.      Coloration: Skin is not pale.      Findings: No bruising, erythema or rash.      Comments:  Tenderness and what appears to be a lump to the left breast.  No significant erythema and no open wound     Erythema to the right upper arm under the PICC line site      Neurological:      Mental Status: She is disoriented.      Comments: More alert and awake today          Results Review:    I have reviewed all clinical data, test, lab, and imaging results.     Radiology  XR Chest 1 View    Result Date: 2/9/2024  XR CHEST 1 VW Date of Exam: 2/9/2024 5:36 AM EST Indication: sob Comparison: None available. Findings: The trachea is midline. Stable cardiomegaly with mild enlargement of the pulmonary arteries. There is mild diffuse bilateral reticular lung thickening. Mild left basilar atelectasis. No definite pleural effusions. No change in position of the right-sided  pigtail catheter.     Impression: Stable left basilar atelectasis and chronic lung changes Cardiomegaly Electronically Signed: Pablo Martins MD  2/9/2024 7:35 AM EST  Workstation ID: [id number]     Cardiology    Laboratory    Results from last 7 days   Lab Units 02/09/24  0055 02/08/24  0444 02/07/24  1013 02/06/24  0351 02/05/24  1150   WBC 10*3/mm3 11.50* 9.60 9.20 7.30 9.10   HEMOGLOBIN g/dL 12.3 12.8 11.8* 11.2* 12.8   HEMATOCRIT % 38.8 40.5 37.4 34.9 40.5   PLATELETS 10*3/mm3 234 216 200 177 220     Results from last 7 days   Lab Units 02/09/24  0055 02/08/24  0444 02/07/24  1013 02/06/24  1806 02/06/24  0351 02/05/24  1235 02/05/24  1150   SODIUM mmol/L 138 136 136 135* 138  --  137   POTASSIUM mmol/L 4.3  4.8 4.9 5.1 2.9*  --  3.1*   CHLORIDE mmol/L 105 102 102 98 96*  --  94*   CO2 mmol/L 22.0 23.0 24.0 28.0 30.0*  --  30.0*   BUN mg/dL 30* 32* 32* 32* 22  --  19   CREATININE mg/dL 1.16* 1.12* 1.26* 1.50* 1.07*  --  1.30*   GLUCOSE mg/dL 115* 90 100* 105* 82  --  104*   ALBUMIN g/dL  --  3.5  --  3.3*  --   --  3.9   BILIRUBIN mg/dL  --  0.7  --  0.6  --   --  1.1   ALK PHOS U/L  --  60  --  58  --   --  69   AST (SGOT) U/L  --  35*  --  21  --   --  19   ALT (SGPT) U/L  --  32  --  16  --   --  10   AMMONIA umol/L  --   --   --   --   --  25  --    CALCIUM mg/dL 9.3 9.4 8.8 8.4* 8.7  --  9.5     Results from last 7 days   Lab Units 02/06/24  1806   CK TOTAL U/L 64             Microbiology   Microbiology Results (last 10 days)       Procedure Component Value - Date/Time    Eosinophil Smear - Urine, Urine, Clean Catch [[id number]]  (Normal) Collected: 02/07/24 0725    Lab Status: Final result Specimen: Urine, Clean Catch Updated: 02/07/24 0907     Eosinophil Smear 0 % EOS/100 Cells     MRSA Screen, PCR (Inpatient) - Swab, Nares [[id number]]  (Normal) Collected: 02/05/24 1724    Lab Status: Final result Specimen: Swab from Nares Updated: 02/05/24 1843     MRSA PCR No MRSA Detected    Narrative:      The negative predictive value of this diagnostic test is high and should only be used to consider de-escalating anti-MRSA therapy. A positive result may indicate colonization with MRSA and must be correlated clinically.    Respiratory Panel PCR w/COVID-19(SARS-CoV-2) MARIA ELENA/SARIAH/BRANDIN/PAD/COR/LILLIAN In-House, NP Swab in UTM/VTM, 2 HR TAT - Swab, Nasopharynx [[id number]]  (Normal) Collected: 02/05/24 1724    Lab Status: Final result Specimen: Swab from Nasopharynx Updated: 02/05/24 1820     ADENOVIRUS, PCR Not Detected     Coronavirus 229E Not Detected     Coronavirus HKU1 Not Detected     Coronavirus NL63 Not Detected     Coronavirus OC43 Not Detected     COVID19 Not Detected     Human Metapneumovirus Not Detected     Human  Rhinovirus/Enterovirus Not Detected     Influenza A PCR Not Detected     Influenza B PCR Not Detected     Parainfluenza Virus 1 Not Detected     Parainfluenza Virus 2 Not Detected     Parainfluenza Virus 3 Not Detected     Parainfluenza Virus 4 Not Detected     RSV, PCR Not Detected     Bordetella pertussis pcr Not Detected     Bordetella parapertussis PCR Not Detected     Chlamydophila pneumoniae PCR Not Detected     Mycoplasma pneumo by PCR Not Detected    Narrative:      In the setting of a positive respiratory panel with a viral infection PLUS a negative procalcitonin without other underlying concern for bacterial infection, consider observing off antibiotics or discontinuation of antibiotics and continue supportive care. If the respiratory panel is positive for atypical bacterial infection (Bordetella pertussis, Chlamydophila pneumoniae, or Mycoplasma pneumoniae), consider antibiotic de-escalation to target atypical bacterial infection.    Blood Culture - Blood, Wrist, Right [[id number]]  (Normal) Collected: 02/05/24 1627    Lab Status: Preliminary result Specimen: Blood from Wrist, Right Updated: 02/08/24 1845     Blood Culture No growth at 3 days    Narrative:      Less than seven (7) mL's of blood was collected.  Insufficient quantity may yield false negative results.    COVID-19, FLU A/B, RSV PCR 1 HR TAT - Swab, Nasopharynx [[id number]]  (Normal) Collected: 02/05/24 1235    Lab Status: Final result Specimen: Swab from Nasopharynx Updated: 02/05/24 1325     COVID19 Not Detected     Influenza A PCR Not Detected     Influenza B PCR Not Detected     RSV, PCR Not Detected    Narrative:      Fact sheet for providers: https://www.fda.gov/media/675763/download    Fact sheet for patients: https://www.fda.gov/media/268408/download    Test performed by PCR.    Blood Culture - Blood, Arm, Right [[id number]]  (Normal) Collected: 02/05/24 1150    Lab Status: Preliminary result Specimen: Blood from Arm, Right Updated:  02/09/24 1200     Blood Culture No growth at 4 days    Narrative:      Less than seven (7) mL's of blood was collected.  Insufficient quantity may yield false negative results.            Medication Review:       Schedule Meds  allopurinol, 100 mg, Oral, Daily  amLODIPine, 10 mg, Oral, Q24H  apixaban, 5 mg, Oral, Q12H  budesonide, 0.5 mg, Nebulization, BID - RT  carvedilol, 25 mg, Oral, BID With Meals  cefTRIAXone, 2,000 mg, Intravenous, Q24H  famotidine, 20 mg, Oral, Daily  hydrALAZINE, 50 mg, Oral, Q8H  ipratropium-albuterol, 3 mL, Nebulization, 4x Daily - RT  levothyroxine, 50 mcg, Oral, Q AM  melatonin, 5 mg, Oral, Nightly  sildenafil, 20 mg, Oral, TID  sodium chloride, 10 mL, Intravenous, Q12H  sodium chloride, 10 mL, Intravenous, Q12H        Infusion Meds         PRN Meds    acetaminophen    Calcium Replacement - Follow Nurse / BPA Driven Protocol    ipratropium-albuterol    Magnesium Standard Dose Replacement - Follow Nurse / BPA Driven Protocol    ondansetron    Phosphorus Replacement - Follow Nurse / BPA Driven Protocol    Potassium Replacement - Follow Nurse / BPA Driven Protocol    [COMPLETED] Insert Peripheral IV **AND** sodium chloride    sodium chloride    sodium chloride    sodium chloride    sodium chloride        Assessment & Plan       Antimicrobial Therapy   1.  IV Rocephin        2.  P.o. Keflex        3.        4.        5.          Assessment     Left breast induration with tenderness.  Symptoms have been present prior to admission patient was put on p.o. Augmentin.  Left breast ultrasound showed changes possibly related to a previous lumpectomy scar, or seroma or possible recurrence.  Outpatient diagnostic mammogram recommended     Right arm erythema noted after IV insertion.  Currently with no clinical signs of infection.  Doppler was negative for DVT     Severe confusion started 3 days prior to admission.  Etiology is not clear.  Patient has a supple neck.  MRI of the brain with and without  contrast and showed no acute findings indicating the patient unlikely to have encephalitis.     History of  breast cancer in 2011 in remission     History of DVT/PE     COPD-normally on 2 to 3 L of oxygen by nasal cannula     Acute kidney injury on chronic kidney disease     Plan     Discontinue IV Rocephin   Start p.o. Keflex 500 mg 3 times daily for 7 days  Continue supportive care   Discussed with patient's family members at bedside  Not much more to add from infectious disease standpoint-we will sign off at this time-please call with any questions.      Chasity Purcell, APRN  02/09/24  13:57 EST    Note is dictated utilizing voice recognition software/Dragon

## 2024-02-09 NOTE — PLAN OF CARE
"Goal Outcome Evaluation:     Bed mobility - Max-A supine to sit  pt sat at edge of the bed for about 8 minutes with cga for support  Transfers - Mod-A, Assist x 2, and with rolling walker sit to stand.  Pt stood long enough to pull up depends and take a few steps to the chair.  Pt needed max encouragement to take steps to the chair.  \"I can't do it\"  Ambulation - 3 feet Mod-A, Assist x 2, and with rolling walker   again pt needed max encouragement to take some steps to the chair.  Pt demonstrated what looked like a fear of falling.      Therapeutic Exercise - 5 Reps B LE AROM supported sitting / EOB    If medically appropriate, Moderate Intensity Therapy recommended post-acute care. This is recommended as therapy feels the patient would require 3-4 days per week and wouldn't tolerate \"3 hour daily\" rehab intensity. SNF would be the preferred choice. If the patient does not agree to SNF, arrange HH or OP depending on home bound status. If patient is medically complex, consider LTACH. Pt requires no DME at discharge.     Pt desires Skilled Rehab placement at discharge. Pt cooperative; agreeable to therapeutic recommendations and plan of care.                                          "

## 2024-02-09 NOTE — THERAPY TREATMENT NOTE
"Subjective: Pt agreeable to therapeutic plan of care.    Objective:     Bed mobility - Max-A supine to sit  pt sat at edge of the bed for about 8 minutes with cga for support  Transfers - Mod-A, Assist x 2, and with rolling walker sit to stand.  Pt stood long enough to pull up depends and take a few steps to the chair.  Pt needed max encouragement to take steps to the chair.  \"I can't do it\"  Ambulation - 3 feet Mod-A, Assist x 2, and with rolling walker   again pt needed max encouragement to take some steps to the chair.  Pt demonstrated what looked like a fear of falling.      Therapeutic Exercise - 5 Reps B LE AROM supported sitting / EOB    Vitals: WNL  pt on 3L HF nc    Pain: 0 VAS      Education: Provided education on the importance of mobility in the acute care setting, Verbal/Tactile Cues, Transfer Training, and Gait Training    Assessment: Gabrielle Lyles presents with functional mobility impairments which indicate the need for skilled intervention. Tolerating session today without incident.  Pt with fair progress with her mobility.  Pt's daughter very encouraging to patient.   Will continue to follow and progress as tolerated.     Plan/Recommendations:   If medically appropriate, Moderate Intensity Therapy recommended post-acute care. This is recommended as therapy feels the patient would require 3-4 days per week and wouldn't tolerate \"3 hour daily\" rehab intensity. SNF would be the preferred choice. If the patient does not agree to SNF, arrange HH or OP depending on home bound status. If patient is medically complex, consider LTACH. Pt requires no DME at discharge.     Pt desires Skilled Rehab placement at discharge. Pt cooperative; agreeable to therapeutic recommendations and plan of care.     Basic Mobility 6-click:  Rollin = Total, A lot = 2, A little = 3; 4 = None  Supine>Sit:   1 = Total, A lot = 2, A little = 3; 4 = None   Sit>Stand with arms:  1 = Total, A lot = 2, A little = 3; 4 = " None  Bed>Chair:   1 = Total, A lot = 2, A little = 3; 4 = None  Ambulate in room:  1 = Total, A lot = 2, A little = 3; 4 = None  3-5 Steps with railin = Total, A lot = 2, A little = 3; 4 = None  Score: 11    Modified Harmony: 4 = Moderately severe disability (Unable to attend to own bodily needs without assistance, and unable to walk unassisted)     Post-Tx Position: Up in Chair, Alarms activated, and Call light and personal items within reach  pt's daughter present  PPE: gloves

## 2024-02-09 NOTE — PLAN OF CARE
Goal Outcome Evaluation:  Plan of Care Reviewed With: patient        Progress: improving  Outcome Evaluation: Pt resting in bed with no complaints at this time. More alert and oriented. Abx changed to po. Up in the chair per PT. Family updated

## 2024-02-09 NOTE — PROGRESS NOTES
"NEPHROLOGY PROGRESS NOTE------[facility]    [facility]  [phone]  Luke Fleming MD      Patient Care Team:  Shaylee Mcdaniels MD as PCP - General (Family Medicine)  Richardson Willis MD as Cardiologist (Cardiology)  Rafy Rodriguez MD as Consulting Physician (Hematology and Oncology)  Christianne Phillips RN as Licensed Practical Nurse  Salome Fleming MD as Consulting Physician (Nephrology)      Provider:  Luke Fleming MD  Patient Name: Gabrielle Lyles  :  1940    SUBJECTIVE:    F/U ARF/JULIAN/CRF/CKD     Up in chair. Daughter in room. No angina. No dysuria. Breathing ok    Medication:  allopurinol, 100 mg, Oral, Daily  amLODIPine, 5 mg, Oral, Q24H  apixaban, 5 mg, Oral, Q12H  budesonide, 0.5 mg, Nebulization, BID - RT  carvedilol, 25 mg, Oral, BID With Meals  cefTRIAXone, 2,000 mg, Intravenous, Q24H  famotidine, 20 mg, Oral, Daily  hydrALAZINE, 50 mg, Oral, Q8H  ipratropium-albuterol, 3 mL, Nebulization, 4x Daily - RT  levothyroxine, 50 mcg, Oral, Q AM  melatonin, 5 mg, Oral, Nightly  sildenafil, 20 mg, Oral, TID  sodium chloride, 10 mL, Intravenous, Q12H  sodium chloride, 10 mL, Intravenous, Q12H      sodium chloride, 60 mL/hr, Last Rate: 60 mL/hr (24 0452)        OBJECTIVE    Vital Sign Min/Max for last 24 hours  Temp  Min: 97.2 °F (36.2 °C)  Max: 98 °F (36.7 °C)   BP  Min: 98/56  Max: 151/78   Pulse  Min: 76  Max: 116   Resp  Min: 13  Max: 32   SpO2  Min: 86 %  Max: 98 %   No data recorded   Weight  Min: 68.9 kg (152 lb)  Max: 71.1 kg (156 lb 12 oz)     Flowsheet Rows      Flowsheet Row First Filed Value   Admission Height 162.6 cm (64\") Documented at 2024 1046   Admission Weight 66.7 kg (147 lb) Documented at 2024 1046            No intake/output data recorded.  I/O last 3 completed shifts:  In: 2340 [P.O.:240; I.V.:2000; IV Piggyback:100]  Out: 450 [Urine:450]    Physical Exam:  General Appearance: " "alert, appears stated age and cooperative  Head: normocephalic, without obvious abnormality and atraumatic  Eyes: conjunctivae and sclerae normal and no icterus  Neck: supple and no JVD  Lungs: +SCATTERED RHONCHI  Heart: regular rhythm & normal rate and normal S1, S2 +TAYE  Chest Wall: no abnormalities observed  Abdomen: normal bowel sounds and soft, nontender +OBESITY  Extremities: moves extremities well, no edema, no cyanosis  Skin: no bleeding, bruising or rash  Neurologic: Alert, and oriented. No focal deficits    Labs:    WBC WBC   Date Value Ref Range Status   02/09/2024 11.50 (H) 3.40 - 10.80 10*3/mm3 Final   02/08/2024 9.60 3.40 - 10.80 10*3/mm3 Final   02/07/2024 9.20 3.40 - 10.80 10*3/mm3 Final      HGB Hemoglobin   Date Value Ref Range Status   02/09/2024 12.3 12.0 - 15.9 g/dL Final   02/08/2024 12.8 12.0 - 15.9 g/dL Final   02/07/2024 11.8 (L) 12.0 - 15.9 g/dL Final      HCT Hematocrit   Date Value Ref Range Status   02/09/2024 38.8 34.0 - 46.6 % Final   02/08/2024 40.5 34.0 - 46.6 % Final   02/07/2024 37.4 34.0 - 46.6 % Final      Platelets No results found for: \"LABPLAT\"   MCV MCV   Date Value Ref Range Status   02/09/2024 84.8 79.0 - 97.0 fL Final   02/08/2024 84.9 79.0 - 97.0 fL Final   02/07/2024 86.3 79.0 - 97.0 fL Final          Sodium Sodium   Date Value Ref Range Status   02/09/2024 138 136 - 145 mmol/L Final   02/08/2024 136 136 - 145 mmol/L Final   02/07/2024 136 136 - 145 mmol/L Final   02/06/2024 135 (L) 136 - 145 mmol/L Final      Potassium Potassium   Date Value Ref Range Status   02/09/2024 4.3 3.5 - 5.2 mmol/L Final   02/08/2024 4.8 3.5 - 5.2 mmol/L Final   02/07/2024 4.9 3.5 - 5.2 mmol/L Final   02/06/2024 5.1 3.5 - 5.2 mmol/L Final      Chloride Chloride   Date Value Ref Range Status   02/09/2024 105 98 - 107 mmol/L Final   02/08/2024 102 98 - 107 mmol/L Final   02/07/2024 102 98 - 107 mmol/L Final   02/06/2024 98 98 - 107 mmol/L Final      CO2 CO2   Date Value Ref Range Status " "  02/09/2024 22.0 22.0 - 29.0 mmol/L Final   02/08/2024 23.0 22.0 - 29.0 mmol/L Final   02/07/2024 24.0 22.0 - 29.0 mmol/L Final   02/06/2024 28.0 22.0 - 29.0 mmol/L Final      BUN BUN   Date Value Ref Range Status   02/09/2024 30 (H) 8 - 23 mg/dL Final   02/08/2024 32 (H) 8 - 23 mg/dL Final   02/07/2024 32 (H) 8 - 23 mg/dL Final   02/06/2024 32 (H) 8 - 23 mg/dL Final      Creatinine Creatinine   Date Value Ref Range Status   02/09/2024 1.16 (H) 0.57 - 1.00 mg/dL Final   02/08/2024 1.12 (H) 0.57 - 1.00 mg/dL Final   02/07/2024 1.26 (H) 0.57 - 1.00 mg/dL Final   02/06/2024 1.50 (H) 0.57 - 1.00 mg/dL Final      Calcium Calcium   Date Value Ref Range Status   02/09/2024 9.3 8.6 - 10.5 mg/dL Final   02/08/2024 9.4 8.6 - 10.5 mg/dL Final   02/07/2024 8.8 8.6 - 10.5 mg/dL Final   02/06/2024 8.4 (L) 8.6 - 10.5 mg/dL Final      PO4 No components found for: \"PO4\"   Albumin Albumin   Date Value Ref Range Status   02/08/2024 3.5 3.5 - 5.2 g/dL Final   02/06/2024 3.3 (L) 3.5 - 5.2 g/dL Final      Magnesium Magnesium   Date Value Ref Range Status   02/09/2024 1.9 1.6 - 2.4 mg/dL Final   02/08/2024 2.0 1.6 - 2.4 mg/dL Final      Uric Acid No components found for: \"URIC ACID\"     Imaging Results (Last 72 Hours)       Procedure Component Value Units Date/Time    XR Chest 1 View [[id number]] Resulted: 02/09/24 0546     Updated: 02/09/24 0546    US Breast Left Limited [[id number]] Collected: 02/08/24 0829     Updated: 02/08/24 0837    Narrative:      DATE OF EXAM:   2/7/2024 8:00 PM     PROCEDURE:   US BREAST LEFT LIMITED-     INDICATIONS:   Hx breast cancer- lump felft -abscess vs recurrence; R41.82-Altered  mental status, unspecified; E87.6-Hypokalemia     COMPARISON:  Prior breast imaging dated 10/22/2014, 10/8/2014, 10/2/2013     TECHNIQUE:   Sonographic grayscale and color Doppler images of the left breast were  obtained with representative examples submitted to PACS for  interpretation.     FINDINGS:   Imaging was performed after " hours on an emergent basis to evaluate for  abscess. Radiologist was not present for real-time scanning.     Imaging of the left breast at the area of palpable concern at the  lumpectomy site corresponding to the 11 o'clock position 4 cm from the  nipple demonstrates an irregular hypoechoic shadowing region measuring  1.4 x 2.0 x 2.0 cm.     This has a nonspecific appearance. This could represent patient's  scarring at the lumpectomy site, collapsed residual seroma or  recurrence. Abscess is felt less likely but not excluded if there are  clinical findings of cellulitis.       Impression:      IMPRESSION :   1. Nonspecific hypoechoic shadowing region measuring 2.0 cm at the 11  o'clock position 4 cm from the nipple corresponding to patient's  lumpectomy site. This could represent scarring at the lumpectomy site,  collapsed seroma cavity or recurrence. Abscess is felt less likely but  not entirely excluded if there are clinical findings of infection.     Recommend patient return for full outpatient diagnostic work-up  including diagnostic mammogram and ultrasound with real-time evaluation  by radiologist. Patient also requires import of outside breast imaging  more recent than 2014     BI-RADS Final Assessment Category 0: Incomplete-Need Additional Imaging  Evaluation  Management Recommendation: Recall for additional imaging.        Electronically Signed By-Param Johansen MD On:2/8/2024 8:35 AM       MRI Brain With Contrast [[id number]] Collected: 02/07/24 1935     Updated: 02/07/24 1942    Narrative:      MRI BRAIN W CONTRAST    Date of Exam: 2/7/2024 5:45 PM CST    Indication: Rule out encephalopathy?just need contrast.     Comparison: Noncontrast MRI brain 2/7/2024    Technique:  Routine multiplanar/multisequence sequence images of the brain were obtained after the uneventful administration of 14 cc Prohance.        Findings:  No abnormal enhancement noted.    No abnormal meningeal thickening.    No evidence of  dural venous thrombosis.      Impression:      Impression:  No abnormality noted on postcontrast imaging. See same-day noncontrast exam report for additional details.        Electronically Signed: Alan Rodríguez MD    2/7/2024 6:39 PM CST    Workstation ID: [id number]    MRI Brain Without Contrast [[id number]] Collected: 02/07/24 0858     Updated: 02/07/24 0902    Narrative:      MRI BRAIN WO CONTRAST    Date of Exam: 2/7/2024 8:30 AM EST    Indication: Mental status change, unknown cause.     Comparison: CT February 5, 2024    Technique:  Routine multiplanar/multisequence sequence images of the brain were obtained without contrast administration.      Findings:  No acute infarct is present on diffusion weighted sequences. Midline structures are normal and the craniocervical junction appears satisfactory. Age-related changes are present with moderate generalized volume loss and mild typical T2 hyperintense   subcortical and pontine white matter changes, nonspecific but favored to reflect sequela of chronic microvascular ischemia. There is otherwise no evidence of intracranial hemorrhage, mass or mass effect. There is mild associated ex vacuo prominence of   the ventricles correlating with surrounding volume loss. The orbits appear normal. The paranasal sinuses are grossly clear.      Impression:      Impression:  Mildly motion-degraded exam demonstrating expected age-related changes, without evidence of acute infarct, hemorrhage, mass or mass effect.        Electronically Signed: Andres Olvera MD    2/7/2024 9:00 AM EST    Workstation ID: [id number]    CT Abdomen Pelvis Without Contrast [[id number]] Collected: 02/06/24 1518     Updated: 02/06/24 1537    Narrative:      CT CHEST WO CONTRAST DIAGNOSTIC, CT ABDOMEN PELVIS WO CONTRAST    Date of Exam: 2/6/2024 3:14 PM EST    Indication: COPD, exacerbation. History of breast cancer    Comparison: CT chest of 5/8/2023. Prior CT abdomen pelvis of 11/10/2010    Technique: Axial  CT images were obtained of the chest without contrast administration.  Sagittal and coronal reconstructions were performed.  Automated exposure control and iterative reconstruction methods were used.      Findings: CT chest:  There are moderate changes of centrilobular emphysema. There is chronic atelectasis of the left lower lobe posteriorly and medially. There are no pulmonary nodules or masses. There is moderately severe cardiomegaly, similar. There are coronary   calcifications. There is atherosclerotic disease of the thoracic aorta. There are calcified nodes in the right hilum. There are no enlarged mediastinal nodes. There are moderately severe degenerative changes in the lower C-spine and mild degenerative   changes in the thoracic spine. There is a new sclerotic focus of the mid manubrium with some overlying cortical thickening which may represent subacute or old fracture although metastatic disease is not excluded.    CT abdomen and pelvis: The infrarenal abdominal aorta measures 3.9 cm transversely as compared to 2.6 cm previously. There is air in the urinary bladder which is partially distended. There is a small amount of free pelvic fluid. There is diverticulosis   without evidence of acute diverticulitis. There is respiratory motion on the exam. There is no large or small bowel dilatation. The liver contour is mildly nodular, increased from the prior exam. The liver size is within normal limits. The other   nonopacified solid organs are within normal limits in size. There are no renal stones or hydronephrosis. There are cholecystectomy clips. There are moderately severe degenerative changes in the lumbar spine.      Impression:      Impression:  Chronic atelectasis of the left lower lobe. Moderately severe emphysema. Lesion of the sternal manubrium as detailed, which could represent subacute or old fracture although metastatic disease is not excluded. Correlation with whole-body bone scan may be    useful.    Aneurysmal dilatation of the abdominal aorta.    Small amount of free pelvic fluid, nonspecific.    Nodular liver contour suggests cirrhosis.    Electronically Signed: Mary Ivan MD    2/6/2024 3:29 PM EST    Workstation ID: [id number]    CT Chest Without Contrast Diagnostic [[id number]] Collected: 02/06/24 1518     Updated: 02/06/24 1537    Narrative:      CT CHEST WO CONTRAST DIAGNOSTIC, CT ABDOMEN PELVIS WO CONTRAST    Date of Exam: 2/6/2024 3:14 PM EST    Indication: COPD, exacerbation. History of breast cancer    Comparison: CT chest of 5/8/2023. Prior CT abdomen pelvis of 11/10/2010    Technique: Axial CT images were obtained of the chest without contrast administration.  Sagittal and coronal reconstructions were performed.  Automated exposure control and iterative reconstruction methods were used.      Findings: CT chest:  There are moderate changes of centrilobular emphysema. There is chronic atelectasis of the left lower lobe posteriorly and medially. There are no pulmonary nodules or masses. There is moderately severe cardiomegaly, similar. There are coronary   calcifications. There is atherosclerotic disease of the thoracic aorta. There are calcified nodes in the right hilum. There are no enlarged mediastinal nodes. There are moderately severe degenerative changes in the lower C-spine and mild degenerative   changes in the thoracic spine. There is a new sclerotic focus of the mid manubrium with some overlying cortical thickening which may represent subacute or old fracture although metastatic disease is not excluded.    CT abdomen and pelvis: The infrarenal abdominal aorta measures 3.9 cm transversely as compared to 2.6 cm previously. There is air in the urinary bladder which is partially distended. There is a small amount of free pelvic fluid. There is diverticulosis   without evidence of acute diverticulitis. There is respiratory motion on the exam. There is no large or small bowel  dilatation. The liver contour is mildly nodular, increased from the prior exam. The liver size is within normal limits. The other   nonopacified solid organs are within normal limits in size. There are no renal stones or hydronephrosis. There are cholecystectomy clips. There are moderately severe degenerative changes in the lumbar spine.      Impression:      Impression:  Chronic atelectasis of the left lower lobe. Moderately severe emphysema. Lesion of the sternal manubrium as detailed, which could represent subacute or old fracture although metastatic disease is not excluded. Correlation with whole-body bone scan may be   useful.    Aneurysmal dilatation of the abdominal aorta.    Small amount of free pelvic fluid, nonspecific.    Nodular liver contour suggests cirrhosis.    Electronically Signed: Mary Ivan MD    2/6/2024 3:29 PM EST    Workstation ID: [id number]            Results for orders placed during the hospital encounter of 02/05/24    XR Chest 1 View    Narrative  XR CHEST 1 VW    Date of Exam: 2/5/2024 12:00 PM EST    Indication: weakness    Comparison: 5/2/2023.    Findings:  There is mild cardiomegaly with increased heart size. Mildly prominent interstitial pattern appears stable and chronic. There is a prominent skinfold over the right chest. There is no definite pneumothorax. There is no effusion.    Impression  Impression:  Mild cardiomegaly. Mild chronic findings of the lung fields.      Electronically Signed: Mary Ivan MD  2/5/2024 12:06 PM EST  Workstation ID: [id number]      Results for orders placed during the hospital encounter of 09/24/23    XR Foot 3+ View Left    Narrative  XR FOOT 3+ VW LEFT    Date of Exam: 9/24/2023 3:15 PM EDT    Indication: pain redness swelling    Comparison: None available.    Findings:  No fracture or dislocation. There is soft tissue swelling diffusely at the left foot. No discrete osseous erosion. Plantar calcaneal bone spur. Enthesopathy at the Achilles  insertion on the calcaneus. No radiodense foreign body.    Impression  Impression:  1. Negative for acute osseous abnormality.  2. Nonspecific soft tissue swelling.        Electronically Signed: Randall Zarco MD  9/24/2023 3:54 PM EDT  Workstation ID: [id number]      Results for orders placed during the hospital encounter of 05/02/23    XR Chest 1 View    Narrative  XR CHEST 1 VW    Date of Exam: 5/2/2023 2:25 PM EDT    Indication: dyspnea    Comparison: 4/8/2023    Findings:  Interval decrease in left base consolidation. There is minimal residual left base consolidation and a small left effusion. Right lung is clear. Cardiac and mediastinal contours are within normal limits. Regional skeleton is unremarkable.    Impression  Impression:    1. Near complete resolution of left base opacity and left effusion.      Electronically Signed: Daniel Riddle  5/2/2023 2:46 PM EDT  Workstation ID: [id number]      Results for orders placed during the hospital encounter of 02/05/24    Duplex Venous Upper Extremity - Right CAR    Interpretation Summary    Normal right upper extremity venous duplex scan.        ASSESSMENT / PLAN      Altered mental status    Acute hypokalemia    Stage 3a chronic kidney disease    Chronic obstructive pulmonary disease    History of venous thrombosis and embolism    Primary hypertension    History of TIA (transient ischemic attack)    Severe pulmonary hypertension    Chronic respiratory failure with hypoxia    JULIAN (acute kidney injury)    ARF/JULIAN/CRF/CKD------Nonoliguric. +ARF/JULIAN on top of CRF/CKD STG 3A with a baseline serum  Creatinine of about 1.1. Unknown if patient has baseline proteinuria or hematuria. CRF/CKD STG 3A most likely secondary to HTN NS. +ARF/JULIAN is secondary to prerenal state/intravascular volume depletion. D/CIVFs today. Avoid hypotension.  No NSAIDs or IV dye.  BUN/Cr stable     2. ANEMIA------H/H stable     3. HTN WITH CKD-----Avoid hypotension. No ACE/ARB/DRI/diuretic for now     4.  OA/DJD/HYPERURICEMIA------No NSAIDs. Add Allopurinol     5. DELIRIUM-------?Secondary to UTI. Better     6. UTI-----C and S pending. On Abx     7. HYPOCALCEMIA------Replaced     8. KETONURIA/DEHYDRATION------Secondary to recent outpatient increase in Lasix. Hold Lasix. Gentle IVFs given Pulmonary HTN     9. PULMONARY HTN--------Per , Pulmonary     10. HYPOKALEMIA-------Replaced     11. CAD-----per , Cardiology    Okay from RENAL standpoint to d/c today and f/u as ordered    Luke Fleming MD  [facility]  [phone]  02/09/24  07:26 EST

## 2024-02-09 NOTE — PROGRESS NOTES
Referring Provider: Rosamaria Jin MD    Reason for follow-up: Hypertension, atrial fibrillation     Patient Care Team:  Shaylee Mcdaniels MD as PCP - General (Family Medicine)  Richardson Willis MD as Cardiologist (Cardiology)  Rafy Rodriguez MD as Consulting Physician (Hematology and Oncology)  hCristianne Phillips, AMARILIS as Licensed Practical Nurse  Salome Fleming MD as Consulting Physician (Nephrology)      SUBJECTIVE  Laying comfortably in bed.  Sleepy but tells me that she is doing fair.  Daughter is at bedside.     ROS  Review of all systems negative except as indicated.    Since I have last seen, the patient has been without any chest discomfort, shortness of breath, palpitations, dizziness or syncope.  Denies having any headache, abdominal pain, nausea, vomiting, diarrhea, constipation, loss of weight or loss of appetite.  Denies having any excessive bruising, hematuria or blood in the stool.  ROS      Personal History:    Past Medical History:   Diagnosis Date    Acute exacerbation of chronic obstructive pulmonary disease (COPD)     COPD (chronic obstructive pulmonary disease)     Deep vein thrombosis of bilateral lower extremities 07/24/2019    3/2013    History of pneumonia 06/2012    community acquired pneumonia and right pleural effusion;hospitalized at [facility]    History of pulmonary embolism 03/2013    bilateral PE    Hypertension 2001    Insomnia     on Ambien 5mg at     Lobular breast cancer, left 11/2011    Stage IA left upper lobular breast cancer    Malignant neoplasm of left breast in female, estrogen receptor positive 07/18/2019    Osteopenia 2012    Parainfluenza infection     Parotid duct obstruction     Severe pulmonary hypertension 03/03/2023    Stage 3a chronic kidney disease 07/24/2019    TIA (transient ischemic attack) 10/25/2022       Past Surgical History:   Procedure Laterality Date    CARDIAC CATHETERIZATION N/A 3/3/2023    Procedure: Left and Right Heart Cath with  Coronary Angiography;  Surgeon: Richardson Willis MD;  Location:  INVASIVE LOCATION;  Service: Cardiovascular;  Laterality: N/A;    CATARACT EXTRACTION      IVC FILTER RETRIEVAL  2014    IVC filter placement by Dr. Card    MAMMO STEREOTACTIC BREAST BX SURGICAL ADD UNI Left 2011    invasive lobular carcinoma-Dr. Cande Daniel    MASTECTOMY, PARTIAL Left 2011    and left axillary sentinel lymph node biopsy by Dr. Uriostegui    TUBAL ABDOMINAL LIGATION         Family History   Problem Relation Age of Onset    Lung cancer Brother        Social History     Tobacco Use    Smoking status: Former     Types: Cigarettes     Quit date:      Years since quittin.1     Passive exposure: Past    Smokeless tobacco: Never    Tobacco comments:     smoked 6 cigarettes a day from 13 years of age to 56 years of age when she quit in    Vaping Use    Vaping Use: Never used   Substance Use Topics    Alcohol use: Not Currently    Drug use: No        Home meds:  Prior to Admission medications    Medication Sig Start Date End Date Taking? Authorizing Provider   allopurinol (ZYLOPRIM) 100 MG tablet Take 1 tablet by mouth Daily.   Yes Jaylyn Golden MD   amoxicillin-clavulanate (AUGMENTIN) 875-125 MG per tablet Take 1 tablet by mouth 2 (Two) Times a Day. 24 Yes Jaylyn Golden MD   apixaban (ELIQUIS) 5 MG tablet tablet Take 1 tablet by mouth Every 12 (Twelve) Hours. Indications: Other - full anticoagulation, history of DVT/PE 10/27/22  Yes Shaylee Mcdaniels MD   budesonide-formoterol (SYMBICORT) 80-4.5 MCG/ACT inhaler Inhale 2 puffs 2 (Two) Times a Day.   Yes Jaylyn Golden MD   carvedilol (COREG) 12.5 MG tablet TAKE 1 TABLET BY MOUTH TWICE DAILY WITH MEALS 23  Yes Richardson Willis MD   Cholecalciferol (Vitamin D3) 50 MCG ( UT) capsule Take 1 capsule by mouth Daily.   Yes Jaylyn Golden MD   furosemide (LASIX) 40 MG tablet Take 1 tablet by mouth Daily.   Yes  ProviderJaylyn MD   gabapentin (NEURONTIN) 300 MG capsule Take 1 capsule by mouth 2 (Two) Times a Day.   Yes Jaylyn Golden MD   levothyroxine (SYNTHROID, LEVOTHROID) 50 MCG tablet Take 1 tablet by mouth Daily.   Yes Jaylyn Golden MD   lisinopril (PRINIVIL,ZESTRIL) 40 MG tablet Take 1 tablet by mouth Daily. 6/8/23  Yes Velma Ascencio APRN   potassium chloride (K-DUR,KLOR-CON) 10 MEQ CR tablet Take 1 tablet by mouth Daily. 2/24/23  Yes Richardson Willis MD   sildenafil (REVATIO) 20 MG tablet Take 1 tablet by mouth Every 8 (Eight) Hours. 5/11/23  Yes Mansi Becerril APRN       Allergies:  Patient has no known allergies.    Scheduled Meds:allopurinol, 100 mg, Oral, Daily  amLODIPine, 10 mg, Oral, Q24H  apixaban, 5 mg, Oral, Q12H  budesonide, 0.5 mg, Nebulization, BID - RT  carvedilol, 25 mg, Oral, BID With Meals  cefTRIAXone, 2,000 mg, Intravenous, Q24H  famotidine, 20 mg, Oral, Daily  hydrALAZINE, 50 mg, Oral, Q8H  ipratropium-albuterol, 3 mL, Nebulization, 4x Daily - RT  levothyroxine, 50 mcg, Oral, Q AM  melatonin, 5 mg, Oral, Nightly  sildenafil, 20 mg, Oral, TID  sodium chloride, 10 mL, Intravenous, Q12H  sodium chloride, 10 mL, Intravenous, Q12H      Continuous Infusions:sodium chloride, 60 mL/hr, Last Rate: 60 mL/hr (02/09/24 0452)      PRN Meds:.  acetaminophen    Calcium Replacement - Follow Nurse / BPA Driven Protocol    ipratropium-albuterol    Magnesium Standard Dose Replacement - Follow Nurse / BPA Driven Protocol    ondansetron    Phosphorus Replacement - Follow Nurse / BPA Driven Protocol    Potassium Replacement - Follow Nurse / BPA Driven Protocol    [COMPLETED] Insert Peripheral IV **AND** sodium chloride    sodium chloride    sodium chloride    sodium chloride    sodium chloride      OBJECTIVE    Vital Signs  Vitals:    02/09/24 0748 02/09/24 0752 02/09/24 0753 02/09/24 0756   BP:       BP Location:       Patient Position:       Pulse: 91 97 100 96   Resp: 22 22 28 28   Temp:      "  [facility]:       SpO2: 90% 94% 93% 92%   Weight:       Height:           Flowsheet Rows      Flowsheet Row First Filed Value   Admission Height 162.6 cm (64\") Documented at 02/05/2024 1046   Admission Weight 66.7 kg (147 lb) Documented at 02/05/2024 1046              Intake/Output Summary (Last 24 hours) at 2/9/2024 0959  Last data filed at 2/9/2024 0452  Gross per 24 hour   Intake 1220 ml   Output 450 ml   Net 770 ml          Telemetry: Atrial fibrillation with heart rate in the 90s now    Physical Exam:  The patient is alert, oriented to self  Vital signs as noted above.  Head and neck revealed no carotid bruits or jugular venous distention.  No thyromegaly or lymphadenopathy is present  Lungs clear.  No wheezing.  Breath sounds are normal bilaterally.  Irregularly irregular.  No murmur. No precordial rub is present.  No gallop is present.  Abdomen soft and nontender.  No organomegaly is present.  Extremities with good peripheral pulses without any pedal edema.  Skin warm and dry.  Musculoskeletal system is grossly normal.  CNS grossly normal.       Results Review:  I have personally reviewed the results from the time of this admission to 2/9/2024 09:59 EST and agree with these findings:  []  Laboratory  []  Microbiology  []  Radiology  []  EKG/Telemetry   []  Cardiology/Vascular   []  Pathology  []  Old records  []  Other:    Most notable findings include:    Lab Results (last 24 hours)       Procedure Component Value Units Date/Time    Basic Metabolic Panel [[id number]]  (Abnormal) Collected: 02/09/24 0055    Specimen: Blood Updated: 02/09/24 0154     Glucose 115 mg/dL      BUN 30 mg/dL      Creatinine 1.16 mg/dL      Sodium 138 mmol/L      Potassium 4.3 mmol/L      Chloride 105 mmol/L      CO2 22.0 mmol/L      Calcium 9.3 mg/dL      BUN/Creatinine Ratio 25.9     Anion Gap 11.0 mmol/L      eGFR 47.2 mL/min/1.73     Narrative:      GFR Normal >60  Chronic Kidney Disease <60  Kidney Failure <15    The GFR formula " is only valid for adults with stable renal function between ages 18 and 70.    Magnesium [[id number]]  (Normal) Collected: 02/09/24 0055    Specimen: Blood Updated: 02/09/24 0154     Magnesium 1.9 mg/dL     Phosphorus [[id number]]  (Normal) Collected: 02/09/24 0055    Specimen: Blood Updated: 02/09/24 0154     Phosphorus 2.7 mg/dL     CBC & Differential [[id number]]  (Abnormal) Collected: 02/09/24 0055    Specimen: Blood Updated: 02/09/24 0127    Narrative:      The following orders were created for panel order CBC & Differential.  Procedure                               Abnormality         Status                     ---------                               -----------         ------                     CBC Auto Differential[[id number]]        Abnormal            Final result                 Please view results for these tests on the individual orders.    CBC Auto Differential [[id number]]  (Abnormal) Collected: 02/09/24 0055    Specimen: Blood Updated: 02/09/24 0127     WBC 11.50 10*3/mm3      RBC 4.57 10*6/mm3      Hemoglobin 12.3 g/dL      Hematocrit 38.8 %      MCV 84.8 fL      MCH 26.8 pg      MCHC 31.6 g/dL      RDW 16.0 %      RDW-SD 47.3 fl      MPV 8.8 fL      Platelets 234 10*3/mm3      Neutrophil % 77.3 %      Lymphocyte % 10.5 %      Monocyte % 8.9 %      Eosinophil % 2.2 %      Basophil % 1.1 %      Neutrophils, Absolute 8.90 10*3/mm3      Lymphocytes, Absolute 1.20 10*3/mm3      Monocytes, Absolute 1.00 10*3/mm3      Eosinophils, Absolute 0.20 10*3/mm3      Basophils, Absolute 0.10 10*3/mm3      nRBC 0.0 /100 WBC     Blood Culture - Blood, Wrist, Right [[id number]]  (Normal) Collected: 02/05/24 1627    Specimen: Blood from Wrist, Right Updated: 02/08/24 1845     Blood Culture No growth at 3 days    Narrative:      Less than seven (7) mL's of blood was collected.  Insufficient quantity may yield false negative results.    Blood Culture - Blood, Arm, Right [[id number]]  (Normal) Collected: 02/05/24 1150     Specimen: Blood from Arm, Right Updated: 02/08/24 1200     Blood Culture No growth at 3 days    Narrative:      Less than seven (7) mL's of blood was collected.  Insufficient quantity may yield false negative results.    Folate [[id number]]  (Normal) Collected: 02/08/24 0444    Specimen: Blood Updated: 02/08/24 1130     Folate >20.00 ng/mL     Narrative:      Results may be falsely increased if patient taking Biotin.              Imaging Results (Last 24 Hours)       Procedure Component Value Units Date/Time    XR Chest 1 View [[id number]] Collected: 02/09/24 0730     Updated: 02/09/24 0737    Narrative:      XR CHEST 1 VW    Date of Exam: 2/9/2024 5:36 AM EST    Indication: sob    Comparison: None available.    Findings:  The trachea is midline. Stable cardiomegaly with mild enlargement of the pulmonary arteries. There is mild diffuse bilateral reticular lung thickening. Mild left basilar atelectasis. No definite pleural effusions. No change in position of the right-sided   pigtail catheter.      Impression:      Impression:  Stable left basilar atelectasis and chronic lung changes    Cardiomegaly      Electronically Signed: Pablo Martins MD    2/9/2024 7:35 AM EST    Workstation ID: [id number]            LAB RESULTS (LAST 7 DAYS)    CBC  Results from last 7 days   Lab Units 02/09/24  0055 02/08/24  0444 02/07/24  1013 02/06/24  0351 02/05/24  1150   WBC 10*3/mm3 11.50* 9.60 9.20 7.30 9.10   RBC 10*6/mm3 4.57 4.77 4.33 4.18 4.73   HEMOGLOBIN g/dL 12.3 12.8 11.8* 11.2* 12.8   HEMATOCRIT % 38.8 40.5 37.4 34.9 40.5   MCV fL 84.8 84.9 86.3 83.6 85.6   PLATELETS 10*3/mm3 234 216 200 177 220       BMP  Results from last 7 days   Lab Units 02/09/24  0055 02/08/24  0444 02/07/24  1013 02/06/24  1806 02/06/24  0351 02/05/24  1150   SODIUM mmol/L 138 136 136 135* 138 137   POTASSIUM mmol/L 4.3 4.8 4.9 5.1 2.9* 3.1*   CHLORIDE mmol/L 105 102 102 98 96* 94*   CO2 mmol/L 22.0 23.0 24.0 28.0 30.0* 30.0*   BUN mg/dL 30* 32* 32* 32*  22 19   CREATININE mg/dL 1.16* 1.12* 1.26* 1.50* 1.07* 1.30*   GLUCOSE mg/dL 115* 90 100* 105* 82 104*   MAGNESIUM mg/dL 1.9 2.0  --   --   --  2.2   PHOSPHORUS mg/dL 2.7 3.4  --   --   --   --        CMP   Results from last 7 days   Lab Units 02/09/24  0055 02/08/24  0444 02/07/24  1013 02/06/24  1806 02/06/24  0351 02/05/24  1235 02/05/24  1150   SODIUM mmol/L 138 136 136 135* 138  --  137   POTASSIUM mmol/L 4.3 4.8 4.9 5.1 2.9*  --  3.1*   CHLORIDE mmol/L 105 102 102 98 96*  --  94*   CO2 mmol/L 22.0 23.0 24.0 28.0 30.0*  --  30.0*   BUN mg/dL 30* 32* 32* 32* 22  --  19   CREATININE mg/dL 1.16* 1.12* 1.26* 1.50* 1.07*  --  1.30*   GLUCOSE mg/dL 115* 90 100* 105* 82  --  104*   ALBUMIN g/dL  --  3.5  --  3.3*  --   --  3.9   BILIRUBIN mg/dL  --  0.7  --  0.6  --   --  1.1   ALK PHOS U/L  --  60  --  58  --   --  69   AST (SGOT) U/L  --  35*  --  21  --   --  19   ALT (SGPT) U/L  --  32  --  16  --   --  10   AMMONIA umol/L  --   --   --   --   --  25  --        BNP        TROPONIN  Results from last 7 days   Lab Units 02/06/24  1806   CK TOTAL U/L 64       CoAg        Creatinine Clearance  Estimated Creatinine Clearance: 36.2 mL/min (A) (by C-G formula based on SCr of 1.16 mg/dL (H)).    ABG        Radiology  XR Chest 1 View    Result Date: 2/9/2024  Impression: Stable left basilar atelectasis and chronic lung changes Cardiomegaly Electronically Signed: Pablo Martins MD  2/9/2024 7:35 AM EST  Workstation ID: [id number]    US Breast Left Limited    Result Date: 2/8/2024  IMPRESSION : 1. Nonspecific hypoechoic shadowing region measuring 2.0 cm at the 11 o'clock position 4 cm from the nipple corresponding to patient's lumpectomy site. This could represent scarring at the lumpectomy site, collapsed seroma cavity or recurrence. Abscess is felt less likely but not entirely excluded if there are clinical findings of infection.  Recommend patient return for full outpatient diagnostic work-up including diagnostic mammogram  and ultrasound with real-time evaluation by radiologist. Patient also requires import of outside breast imaging more recent than 2014  BI-RADS Final Assessment Category 0: Incomplete-Need Additional Imaging Evaluation Management Recommendation: Recall for additional imaging.   Electronically Signed By-Param Johansen MD On:2/8/2024 8:35 AM      MRI Brain With Contrast    Result Date: 2/7/2024  Impression: No abnormality noted on postcontrast imaging. See same-day noncontrast exam report for additional details. Electronically Signed: Alan Rodríguez MD  2/7/2024 6:39 PM CST  Workstation ID: [id number]       EKG  I personally viewed and interpreted the patient's EKG/Telemetry data:  ECG 12 Lead Rhythm Change   Final Result   HEART RATE= 69  bpm   RR Interval= 900  ms   NJ Interval= 213  ms   P Horizontal Axis= -3  deg   P Front Axis= 54  deg   QRSD Interval= 86  ms   QT Interval= 413  ms   QTcB= 435  ms   QRS Axis= 117  deg   T Wave Axis= 33  deg   - ABNORMAL ECG -   Sinus rhythm   Atrial premature complex   Borderline prolonged NJ interval   Anteroseptal infarct, age indeterminate   When compared with ECG of 05-Feb-2024 11:04:44,   New or worsened ischemia or infarction   New conduction abnormality   Significant axis, voltage or hypertrophy change   Electronically Signed By: Richardson Willis ([facility]) 08-Feb-2024 17:20:33   Date and Time of Study: 2024-02-08 01:23:00      ECG 12 Lead Other; Weakness   Final Result   HEART RATE= 75  bpm   RR Interval= 860  ms   NJ Interval= 176  ms   P Horizontal Axis= 201  deg   P Front Axis= 11  deg   QRSD Interval= 99  ms   QT Interval= 398  ms   QTcB= 429  ms   QRS Axis= 117  deg   T Wave Axis= -6  deg   - ABNORMAL ECG -   Sinus rhythm   Multiple premature complexes, vent & supraven   Right axis deviation   Low voltage, precordial leads   Nonspecific T abnormalities, anterior leads   Electronically Signed By: Pankaj Mccallum ([facility]) 06-Feb-2024 07:40:13   Date and Time of Study: 2024-02-05  11:04:44      SCANNED - TELEMETRY     Final Result      SCANNED - TELEMETRY     Final Result      SCANNED - TELEMETRY     Final Result      SCANNED - TELEMETRY     Final Result      SCANNED - TELEMETRY     Final Result      SCANNED - TELEMETRY     Final Result      SCANNED - TELEMETRY     Final Result      SCANNED - TELEMETRY     Final Result      SCANNED - TELEMETRY     Final Result      SCANNED - TELEMETRY     Final Result      SCANNED - TELEMETRY     Final Result      SCANNED - TELEMETRY     Final Result      SCANNED - TELEMETRY     Final Result      SCANNED - TELEMETRY     Final Result      SCANNED - TELEMETRY     Final Result      SCANNED - TELEMETRY     Final Result      SCANNED - TELEMETRY     Final Result      SCANNED - TELEMETRY     Final Result      SCANNED - TELEMETRY     Final Result      ECG 12 Lead Altered Mental Status    (Results Pending)         Echocardiogram:    Results for orders placed during the hospital encounter of 02/05/24    Adult Transthoracic Echo Complete W/ Cont if Necessary Per Protocol    Interpretation Summary    Left ventricular systolic function is normal. Left ventricular ejection fraction appears to be 61 - 65%.    Severely reduced right ventricular systolic function noted.    The right ventricular cavity is severely dilated.    The left atrial cavity is mildly dilated.    Left atrial volume is mildly increased.    The right atrial cavity is moderate to severely  dilated.    Estimated right ventricular systolic pressure from tricuspid regurgitation is mildly elevated (35-45 mmHg).    Mild pulmonary hypertension is present.    There is a moderate (1-2cm) pericardial effusion. There is no evidence of cardiac tamponade.        Stress Test:  Results for orders placed in visit on 09/13/22    Stress Test With Myocardial Perfusion One Day    Interpretation Summary    Left ventricular ejection fraction is hyperdynamic (Calculated EF > 70%). .    Myocardial perfusion imaging indicates a  normal myocardial perfusion study with no evidence of ischemia.    Impressions are consistent with a low risk study.    Findings consistent with a normal ECG stress test.         Cardiac Catheterization:  Results for orders placed during the hospital encounter of 03/03/23    Cardiac Catheterization/Vascular Study    Narrative  OPERATORS  Richardson Willis M.D. (Attending Cardiologist)      PROCEDURE PERFORMED  Ultrasound guided vascular access  Right heart catheterization  Coronary Angiogram  Left Heart Catheterization 00201  Moderate Sedation    INDICATIONS FOR PROCEDURE  83-year-old woman with multiple cardiovascular risk factors, abnormal stress test presented with worsening shortness of breath.  After discussing the risk and benefit of the procedure she was brought in for elective right and left heart cath.    PROCEDURE IN DETAIL  Informed consent was obtained from the patient after explaining the risks, benefits, and alternative options of the procedure. After obtaining informed consent, the patient was brought to the cath lab and was prepped in a sterile fashion. Lidocaine 2% was used for local anesthesia into the right femoral venous access site. Right femoral vein was accessed using the micropuncture needle under ultrasound guidance and micropuncture wire advanced under flouroscopy. A 7 Maltese vascular sheath was put into place percutaneously over guide-wire. Guide wires were removed. A 6Fr swan sheryl catheter was advanced to wedge position. RA, RV and PA and wedge pressures were recorded.  PA sat and arterial sats recorded.  The patient tolerated the procedure well without any complications.    Lidocaine 2% was used for local anesthesia into the right femoral arterial access site. The right femoral artery was accessed with a micropuncture needle via modified Seldinger technique under ultrasound guidance. A 6F was inserted successfully.  Afterwards, 6F JR4 and JL4 diagnostic catheters were advanced over a wire  into the ascending aorta and were used to engage the ostia of the left main and RCA respectively. JR4 used to cross the AV and obtain LV pressures and gradient across the AV measured via pullback technique. Images of the right and left coronary systems were obtained. All the catheters were exchanged over a wire and subsequently removed. Angiogram of the femoral access site was obtained and did not show complications. The patient tolerated the procedure well without any complications. The pictures were reviewed at the end of the procedure. A Mynx closure device was applied.    HEMODYNAMICS    RHC  RA 6/5, 4 mmHg  RV 74/3, 5 mmHg  PA 71/25, 44 mmHg  PCW 12 mmHg  AO Sat 92%  PA Sat 78%    Jose CO: 7.89 L/min    Jose CI: 4.69 L/min/m²    LHC  LV: 134/3, 20 mmHg  AO: 131/56, 87 mmHg  No significant gradient across the aortic valve during pullback of JR4 catheter.    FINDINGS  Coronary Angiogram  All vessels are heavily calcified  She also has heavily calcified peripheral arterial disease.  Right dominant circulation    Left main: Left main is a large caliber vessel which gives rise to the Left Anterior Descending and the Left circumflex.  Left main is angiographically free from any significant disease.    Left Anterior Descending Artery: LAD is a heavily calcified medium caliber vessel which gives rise to diagonals.  The vessel is highly tortuous and has 50 to 60% mid vessel stenosis best seen in Kosovan cranial view.    Left Circumflex: Heavily calcified vessel with 50% proximal segment stenosis.    Right Coronary Artery: The RCA is a large caliber vessel which is heavily calcified and tortuous.  It has diffuse luminal irregularities and 30 to 40% stenosis in the midsegment around the bend.    ESTIMATED BLOOD LOSS:  10 ml    COMPLICATIONS:  None    PROCEDURE DATA:  Sedation Time: 25 minutes    IMPRESSIONS  Heavily calcified, tortuous nonobstructive coronary disease  Severe pulmonary hypertension with PA systolic pressure in  the 70s  Mean PA pressure 44 mmHg    RECOMMENDATIONS  -Continue aggressive medical management of CAD  -Referral to pulmonology for treatment of pulmonary hypertension         Other:         ASSESSMENT & PLAN:    Principal Problem:    Altered mental status  Active Problems:    Acute hypokalemia    Stage 3a chronic kidney disease    Chronic obstructive pulmonary disease    History of venous thrombosis and embolism    Primary hypertension    History of TIA (transient ischemic attack)    Severe pulmonary hypertension    Chronic respiratory failure with hypoxia    JULIAN (acute kidney injury)      Altered mental status  Possible mastitis/PICC line infection  CT head and MRI of the brain unremarkable with no acute findings  EEG per neurology  UTI has been ruled out and ammonia level is normal  White count and lactate levels are normal  Completed course of antibiotics per ID     JULIAN on Stage 3a chronic kidney disease  Creatinine 1.16, GFR is 47.2  Improved with hydration  Diuretics have been held  Hypokalemia has resolved.  Potassium 4.3  Closely monitor renal function and respiratory status     COPD/Chronic respiratory failure  Severe pulmonary hypertension  On 2-3 L of oxygen via nasal cannula at baseline.  Has known pulmonary hypertension  RA 6/5, 4 mmHg  RV 74/3, 5 mmHg  PA 71/25, 44 mmHg  PCW 12 mmHg  Continue sildenafil  Managed by pulmonology  Echocardiogram shows preserved LV function with mildly elevated RVSP.  Pericardial effusion is not significant     Atrial fibrillation  She has paroxysmal atrial fibrillation  [id number] score is 6  Continue Eliquis for atrial fibrillation as well as for history of DVT/PE  Heart rate has improved on Coreg 25 twice daily     History of venous thrombosis and embolism  Continue Eliquis 5 mg p.o. twice daily.  She has an IVC filter in place as well.     Primary hypertension, chronic  Blood pressure is improving  Continue Coreg and amlodipine at max doses  Echo shows preserved LV  function, reduced RV function and mildly elevated RVSP.  Pericardial effusion is not significant with no signs of tamponade.  Lisinopril can be restarted if needed as renal function has improved  Diuretics to be used as needed     Coronary artery disease  Continue high intensity statin and beta-blocker.  No need for aspirin while she is on anticoagulation  Previous cardiac catheterization showed heavily calcified coronaries but nonobstructive.  No chest pain and stable from cardiac standpoint.     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  Continue high intensity statin and anticoagulation with Eliquis 5 mg p.o. twice daily.    Discussed the care plan with patient's daughter at bedside.    Richardson Willis MD  02/09/24  09:59 EST

## 2024-02-09 NOTE — PROGRESS NOTES
LOS: 2 days     Chief Complaint: AMS     SUBJECTIVE:    History taken from: chart RN    Interval History: Pt is sitting up in the chair. Family is at bedside and says she's been more lucid, talkative, and was able to eat breakfast. She slept well the night before, family feels she is better.     Patient Complaints: No complaints, just wants to get back to bed       Review of Systems   Unable to perform ROS: Age           Pertinent PMH:  has a past medical history of Acute exacerbation of chronic obstructive pulmonary disease (COPD), COPD (chronic obstructive pulmonary disease), Deep vein thrombosis of bilateral lower extremities (07/24/2019), History of pneumonia (06/2012), History of pulmonary embolism (03/2013), Hypertension (2001), Insomnia, Lobular breast cancer, left (11/2011), Malignant neoplasm of left breast in female, estrogen receptor positive (07/18/2019), Osteopenia (2012), Parainfluenza infection, Parotid duct obstruction, Severe pulmonary hypertension (03/03/2023), Stage 3a chronic kidney disease (07/24/2019), and TIA (transient ischemic attack) (10/25/2022).   ________________________________________________     OBJECTIVE:    On exam:  GENERAL: NAD  CARDIO: RRR  NEURO:  Drowsy, sitting up in the chair   Oriented to name, hospital, month   EOMI, PERRL   No facial asymmetry  Mild dysarthria  Sensations intact    Strength 4/5 and equal in all extremities       ________________________________________________   RESULTS REVIEW    VITAL SIGNS:  Temp:  [97.2 °F (36.2 °C)-98.9 °F (37.2 °C)] 98.9 °F (37.2 °C)  Heart Rate:  [] 96  Resp:  [13-32] 28  BP: ()/(55-90) 138/81    LABS:       Lab 02/09/24  0055 02/08/24  0444 02/07/24  1013 02/06/24  0351 02/05/24  1150 02/05/24  1136   WBC 11.50* 9.60 9.20 7.30 9.10  --    HEMOGLOBIN 12.3 12.8 11.8* 11.2* 12.8  --    HEMATOCRIT 38.8 40.5 37.4 34.9 40.5  --    PLATELETS 234 216 200 177 220  --    NEUTROS ABS 8.90* 6.90 6.70 5.60 7.50*  --    LYMPHS  ABS 1.20 1.40 1.00 0.60* 0.40*  --    MONOS ABS 1.00* 0.90 0.90 0.90 0.80  --    EOS ABS 0.20 0.40 0.50* 0.10 0.40  --    MCV 84.8 84.9 86.3 83.6 85.6  --    PROCALCITONIN  --   --   --   --  0.06  --    LACTATE  --   --   --   --   --  1.4         Lab 02/09/24  0055 02/08/24 0444 02/07/24  1013 02/06/24  1806 02/06/24  0351 02/05/24  1150   SODIUM 138 136 136 135* 138 137   POTASSIUM 4.3 4.8 4.9 5.1 2.9* 3.1*   CHLORIDE 105 102 102 98 96* 94*   CO2 22.0 23.0 24.0 28.0 30.0* 30.0*   ANION GAP 11.0 11.0 10.0 9.0 12.0 13.0   BUN 30* 32* 32* 32* 22 19   CREATININE 1.16* 1.12* 1.26* 1.50* 1.07* 1.30*   EGFR 47.2* 49.2* 42.7* 34.6* 52.0* 41.1*   GLUCOSE 115* 90 100* 105* 82 104*   CALCIUM 9.3 9.4 8.8 8.4* 8.7 9.5   IONIZED CALCIUM  --  1.28  --   --   --   --    MAGNESIUM 1.9 2.0  --   --   --  2.2   PHOSPHORUS 2.7 3.4  --   --   --   --    TSH  --   --   --   --   --  1.640         Lab 02/08/24  0444 02/06/24  1806 02/05/24  1150   TOTAL PROTEIN 6.4 5.9* 7.1   ALBUMIN 3.5 3.3* 3.9   GLOBULIN 2.9 2.6 3.2   ALT (SGPT) 32 16 10   AST (SGOT) 35* 21 19   BILIRUBIN 0.7 0.6 1.1   ALK PHOS 60 58 69                 Lab 02/08/24  0444 02/05/24  1150   FOLATE >20.00  --    VITAMIN B 12  --  942         Lab 02/05/24  2202   FIO2 32     UA          8/25/2023    14:33 2/5/2024    11:46 2/6/2024    10:54   Urinalysis   Squamous Epithelial Cells, UA  0-2  3-6    Specific Gravity, UA  1.022  1.018    Ketones, UA Negative  Trace  Trace    Blood, UA  Small (1+)  Moderate (2+)    Leukocytes, UA Trace  Negative  Small (1+)    Nitrite, UA  Negative  Negative    RBC, UA  3-5  3-5    WBC, UA  0-2  0-2    Bacteria, UA  Trace  1+        Lab Results   Component Value Date    TSH 1.640 02/05/2024     (H) 10/25/2022    HGBA1C 5.8 (H) 10/25/2022    [id number] 942 02/05/2024         IMAGING STUDIES:  XR Chest 1 View    Result Date: 2/9/2024  Impression: Stable left basilar atelectasis and chronic lung changes Cardiomegaly Electronically  Signed: Pablo Martins MD  2/9/2024 7:35 AM EST  Workstation ID: [id number]    US Breast Left Limited    Result Date: 2/8/2024  IMPRESSION : 1. Nonspecific hypoechoic shadowing region measuring 2.0 cm at the 11 o'clock position 4 cm from the nipple corresponding to patient's lumpectomy site. This could represent scarring at the lumpectomy site, collapsed seroma cavity or recurrence. Abscess is felt less likely but not entirely excluded if there are clinical findings of infection.  Recommend patient return for full outpatient diagnostic work-up including diagnostic mammogram and ultrasound with real-time evaluation by radiologist. Patient also requires import of outside breast imaging more recent than 2014  BI-RADS Final Assessment Category 0: Incomplete-Need Additional Imaging Evaluation Management Recommendation: Recall for additional imaging.   Electronically Signed By-Param Johansen MD On:2/8/2024 8:35 AM      MRI Brain With Contrast    Result Date: 2/7/2024  Impression: No abnormality noted on postcontrast imaging. See same-day noncontrast exam report for additional details. Electronically Signed: Alan Rodríguez MD  2/7/2024 6:39 PM CST  Workstation ID: [id number]     I reviewed the patient's new clinical results.    ________________________________________________      PROBLEM LIST:    Altered mental status    Acute hypokalemia    Stage 3a chronic kidney disease    Chronic obstructive pulmonary disease    History of venous thrombosis and embolism    Primary hypertension    History of TIA (transient ischemic attack)    Severe pulmonary hypertension    Chronic respiratory failure with hypoxia    JULIAN (acute kidney injury)        ASSESSMENT/PLAN:    Acute encephalopathy/delirium, improving .  No obvious source of infection, may be also related to underlying ARF/JULIAN and sleep deprivation.   - CT head reviewed   -  MRI brain personally reviewed- motion artifact but no acute findings   - EKG: SR rate 75 premature  complexes   - Labs: B12: P, folate P , TSH: 1.640, UA 1+ bacteria, Ammonia: 25, Cr 1.26, Na 136, CK 64  - afebrile past 24 hours   - Trial Melatonin 5 mg QHS- sleep deprivation   - Medications & labs reviewed   - Continue to treat underlying conditions   - Will follow     2. Possible underlying viral infection with negative workup-  Primary consult ID- continue abx  3. ARF/JULIAN- hx of CKD 3A- per Renal      Plan  Will continue to monitor and give patient time. Pt is improving. Discussed with Dr. Bob to hold on EEG.              I discussed the patient's findings and my recommendations with patient and family    Yanique Quinn, APRN  02/09/24  10:52 EST

## 2024-02-09 NOTE — PROGRESS NOTES
"PULMONARY CRITICAL CARE PROGRESS  NOTE      PATIENT IDENTIFICATION:  Name: Gabrielle Lyles  MRN: [id number]  :  1940     Age: 83 y.o.  Sex: female    DATE OF Note:  2024   Referring Physician: Rosamaria Jin MD                  Subjective:   In bed, on O2 @ 3 L, still weak and not taking deep breaths, no nausea or vomiting, no bowel or bladder issues reported       Objective:  tMax 24 hrs: Temp (24hrs), Av °F (36.7 °C), Min:97.4 °F (36.3 °C), Max:98.9 °F (37.2 °C)      Vitals Ranges:   Temp:  [97.4 °F (36.3 °C)-98.9 °F (37.2 °C)] 98 °F (36.7 °C)  Heart Rate:  [] 76  Resp:  [13-32] 16  BP: ()/(53-81) 105/57    Intake and Output Last 3 Shifts:   I/O last 3 completed shifts:  In: 2340 [P.O.:240; I.V.:2000; IV Piggyback:100]  Out: 450 [Urine:450]    Exam:  /57   Pulse 76   Temp 98 °F (36.7 °C) (Oral)   Resp 16   Ht 162.6 cm (64\")   Wt 71.1 kg (156 lb 12 oz)   SpO2 90%   BMI 26.91 kg/m²     General Appearance:     HEENT:  Normocephalic, without obvious abnormality. Conjunctivae/corneas clear.  Normal external ear canals. Nares normal, no drainage     Neck:  Supple, symmetrical, trachea midline. No JVD.  Lungs /Chest wall:   Bilateral basal rhonchi, respirations unlabored, symmetrical wall movement.     Heart:  Regular rate and rhythm, systolic murmur PMI left sternal border  Abdomen: Soft, nontender, no masses, no organomegaly.    Extremities: Trace edema, no clubbing or cyanosis        Medications:  allopurinol, 100 mg, Oral, Daily  amLODIPine, 10 mg, Oral, Q24H  apixaban, 5 mg, Oral, Q12H  budesonide, 0.5 mg, Nebulization, BID - RT  carvedilol, 25 mg, Oral, BID With Meals  cephalexin, 500 mg, Oral, Q8H  famotidine, 20 mg, Oral, Daily  hydrALAZINE, 50 mg, Oral, Q8H  ipratropium-albuterol, 3 mL, Nebulization, 4x Daily - RT  levothyroxine, 50 mcg, Oral, Q AM  melatonin, 5 mg, Oral, Nightly  sildenafil, 20 mg, Oral, TID  sodium chloride, 10 mL, Intravenous, Q12H  sodium chloride, " 10 mL, Intravenous, Q12H        Infusion:          PRN:    acetaminophen    Calcium Replacement - Follow Nurse / BPA Driven Protocol    ipratropium-albuterol    Magnesium Standard Dose Replacement - Follow Nurse / BPA Driven Protocol    ondansetron    Phosphorus Replacement - Follow Nurse / BPA Driven Protocol    Potassium Replacement - Follow Nurse / BPA Driven Protocol    [COMPLETED] Insert Peripheral IV **AND** sodium chloride    sodium chloride    sodium chloride    sodium chloride    sodium chloride  Data Review:  All labs (24hrs):   Recent Results (from the past 24 hour(s))   Basic Metabolic Panel    Collection Time: 02/09/24 12:55 AM    Specimen: Blood   Result Value Ref Range    Glucose 115 (H) 65 - 99 mg/dL    BUN 30 (H) 8 - 23 mg/dL    Creatinine 1.16 (H) 0.57 - 1.00 mg/dL    Sodium 138 136 - 145 mmol/L    Potassium 4.3 3.5 - 5.2 mmol/L    Chloride 105 98 - 107 mmol/L    CO2 22.0 22.0 - 29.0 mmol/L    Calcium 9.3 8.6 - 10.5 mg/dL    BUN/Creatinine Ratio 25.9 (H) 7.0 - 25.0    Anion Gap 11.0 5.0 - 15.0 mmol/L    eGFR 47.2 (L) >60.0 mL/min/1.73   Magnesium    Collection Time: 02/09/24 12:55 AM    Specimen: Blood   Result Value Ref Range    Magnesium 1.9 1.6 - 2.4 mg/dL   Phosphorus    Collection Time: 02/09/24 12:55 AM    Specimen: Blood   Result Value Ref Range    Phosphorus 2.7 2.5 - 4.5 mg/dL   CBC Auto Differential    Collection Time: 02/09/24 12:55 AM    Specimen: Blood   Result Value Ref Range    WBC 11.50 (H) 3.40 - 10.80 10*3/mm3    RBC 4.57 3.77 - 5.28 10*6/mm3    Hemoglobin 12.3 12.0 - 15.9 g/dL    Hematocrit 38.8 34.0 - 46.6 %    MCV 84.8 79.0 - 97.0 fL    MCH 26.8 26.6 - 33.0 pg    MCHC 31.6 31.5 - 35.7 g/dL    RDW 16.0 (H) 12.3 - 15.4 %    RDW-SD 47.3 37.0 - 54.0 fl    MPV 8.8 6.0 - 12.0 fL    Platelets 234 140 - 450 10*3/mm3    Neutrophil % 77.3 (H) 42.7 - 76.0 %    Lymphocyte % 10.5 (L) 19.6 - 45.3 %    Monocyte % 8.9 5.0 - 12.0 %    Eosinophil % 2.2 0.3 - 6.2 %    Basophil % 1.1 0.0 - 1.5 %     Neutrophils, Absolute 8.90 (H) 1.70 - 7.00 10*3/mm3    Lymphocytes, Absolute 1.20 0.70 - 3.10 10*3/mm3    Monocytes, Absolute 1.00 (H) 0.10 - 0.90 10*3/mm3    Eosinophils, Absolute 0.20 0.00 - 0.40 10*3/mm3    Basophils, Absolute 0.10 0.00 - 0.20 10*3/mm3    nRBC 0.0 0.0 - 0.2 /100 WBC        Imaging:  XR Chest 1 View  Narrative: XR CHEST 1 VW    Date of Exam: 2/9/2024 5:36 AM EST    Indication: sob    Comparison: None available.    Findings:  The trachea is midline. Stable cardiomegaly with mild enlargement of the pulmonary arteries. There is mild diffuse bilateral reticular lung thickening. Mild left basilar atelectasis. No definite pleural effusions. No change in position of the right-sided   pigtail catheter.  Impression: Impression:  Stable left basilar atelectasis and chronic lung changes    Cardiomegaly    Electronically Signed: Pablo Martins MD    2/9/2024 7:35 AM EST    Workstation ID: [id number]       ASSESSMENT:  AMS  Chronic   Severe Pulmonary HTN  Hypokalemia  CKD 3  HTN  Hx PE/DVT  Hx TIA              PLAN:  Plan is for BEAN  PT/OT  Need to encourage to take deep breaths   OOB daily   Wean O2 to keep sats > 88%  Antibiotics per ID  Bronchodilators  Inhaled corticosteroids  Incentive spirometer  Cardiology following   Electrolytes/ glycemic control  DVT and GI prophylaxis-Apixaban      Discussed with Dr Jeanine Cruz, APRN    2/9/2024  15:48 EST        I personally have examined  and interviewed the patient. I have reviewed the history, data, problems, assessment and plan with our NP.  Total Critical care time in direct medical management (   ) minutes, This time specifically excludes time spent performing procedures.    Jerry Solorzano MD   2/9/2024  21:25 EST

## 2024-02-09 NOTE — PLAN OF CARE
Problem: Adult Inpatient Plan of Care  Goal: Plan of Care Review  Outcome: Ongoing, Progressing  Flowsheets (Taken 2/9/2024 0412)  Progress: improving  Plan of Care Reviewed With: patient  Outcome Evaluation: Pt steadily becoming more alert as she awakens through the shift. Pt re-oriented often and turned Q2hrs. Care continues.  Goal: Patient-Specific Goal (Individualized)  Outcome: Ongoing, Progressing  Goal: Absence of Hospital-Acquired Illness or Injury  Outcome: Ongoing, Progressing  Intervention: Identify and Manage Fall Risk  Recent Flowsheet Documentation  Taken 2/9/2024 0200 by Sadia Ross, RN  Safety Promotion/Fall Prevention:   activity supervised   assistive device/personal items within reach   clutter free environment maintained   fall prevention program maintained   nonskid shoes/slippers when out of bed   room organization consistent   safety round/check completed  Taken 2/9/2024 0000 by Sadia Ross, RN  Safety Promotion/Fall Prevention:   activity supervised   assistive device/personal items within reach   clutter free environment maintained   fall prevention program maintained   nonskid shoes/slippers when out of bed   room organization consistent   safety round/check completed  Taken 2/8/2024 2200 by Sadia Ross, RN  Safety Promotion/Fall Prevention:   activity supervised   assistive device/personal items within reach   clutter free environment maintained   fall prevention program maintained   nonskid shoes/slippers when out of bed   room organization consistent   safety round/check completed  Taken 2/8/2024 2000 by Sadia Ross, RN  Safety Promotion/Fall Prevention:   activity supervised   assistive device/personal items within reach   clutter free environment maintained   fall prevention program maintained   nonskid shoes/slippers when out of bed   room organization consistent   safety round/check completed  Intervention: Prevent Skin Injury  Recent Flowsheet Documentation  Taken 2/8/2024 2000 by  Sadia Ross RN  Body Position:   right   side-lying   position changed independently  Intervention: Prevent and Manage VTE (Venous Thromboembolism) Risk  Recent Flowsheet Documentation  Taken 2/9/2024 0000 by Sadia Ross RN  Range of Motion: active ROM (range of motion) encouraged  Taken 2/8/2024 2000 by Sadia Ross RN  VTE Prevention/Management:   bilateral   sequential compression devices off   patient refused intervention  Range of Motion: active ROM (range of motion) encouraged  Intervention: Prevent Infection  Recent Flowsheet Documentation  Taken 2/9/2024 0200 by Sadia Ross RN  Infection Prevention:   hand hygiene promoted   personal protective equipment utilized   rest/sleep promoted   single patient room provided  Taken 2/9/2024 0000 by Sadia Ross RN  Infection Prevention:   hand hygiene promoted   personal protective equipment utilized   rest/sleep promoted   single patient room provided  Taken 2/8/2024 2200 by Sadia Ross RN  Infection Prevention:   hand hygiene promoted   personal protective equipment utilized   rest/sleep promoted   single patient room provided  Taken 2/8/2024 2000 by Sadia Ross RN  Infection Prevention:   hand hygiene promoted   personal protective equipment utilized   rest/sleep promoted   single patient room provided  Goal: Optimal Comfort and Wellbeing  Outcome: Ongoing, Progressing  Intervention: Provide Person-Centered Care  Recent Flowsheet Documentation  Taken 2/9/2024 0000 by Sadia Ross RN  Trust Relationship/Rapport:   care explained   thoughts/feelings acknowledged  Taken 2/8/2024 2000 by Sadia Ross RN  Trust Relationship/Rapport:   care explained   thoughts/feelings acknowledged  Goal: Readiness for Transition of Care  Outcome: Ongoing, Progressing     Problem: Fall Injury Risk  Goal: Absence of Fall and Fall-Related Injury  Outcome: Ongoing, Progressing  Intervention: Identify and Manage Contributors  Recent Flowsheet Documentation  Taken 2/9/2024 0200 by Sadia Ross  RN  Medication Review/Management: medications reviewed  Taken 2/9/2024 0000 by Sadia Ross RN  Medication Review/Management: medications reviewed  Taken 2/8/2024 2200 by Sadia Ross RN  Medication Review/Management: medications reviewed  Taken 2/8/2024 2000 by Sadia Ross, RN  Medication Review/Management: medications reviewed  Intervention: Promote Injury-Free Environment  Recent Flowsheet Documentation  Taken 2/9/2024 0200 by Sadia Ross, RN  Safety Promotion/Fall Prevention:   activity supervised   assistive device/personal items within reach   clutter free environment maintained   fall prevention program maintained   nonskid shoes/slippers when out of bed   room organization consistent   safety round/check completed  Taken 2/9/2024 0000 by Sadia Ross RN  Safety Promotion/Fall Prevention:   activity supervised   assistive device/personal items within reach   clutter free environment maintained   fall prevention program maintained   nonskid shoes/slippers when out of bed   room organization consistent   safety round/check completed  Taken 2/8/2024 2200 by Sadia Ross RN  Safety Promotion/Fall Prevention:   activity supervised   assistive device/personal items within reach   clutter free environment maintained   fall prevention program maintained   nonskid shoes/slippers when out of bed   room organization consistent   safety round/check completed  Taken 2/8/2024 2000 by Sadia Ross RN  Safety Promotion/Fall Prevention:   activity supervised   assistive device/personal items within reach   clutter free environment maintained   fall prevention program maintained   nonskid shoes/slippers when out of bed   room organization consistent   safety round/check completed     Problem: Skin Injury Risk Increased  Goal: Skin Health and Integrity  Outcome: Ongoing, Progressing  Intervention: Optimize Skin Protection  Recent Flowsheet Documentation  Taken 2/8/2024 2000 by Sadia Ross, RN  Head of Bed (HOB) Positioning: HOB at 45  degrees     Problem: Hypertension Comorbidity  Goal: Blood Pressure in Desired Range  Outcome: Ongoing, Progressing  Intervention: Maintain Blood Pressure Management  Recent Flowsheet Documentation  Taken 2/9/2024 0200 by Sadia Ross RN  Medication Review/Management: medications reviewed  Taken 2/9/2024 0000 by Sadia Ross RN  Medication Review/Management: medications reviewed  Taken 2/8/2024 2200 by Sadia Ross, RN  Medication Review/Management: medications reviewed  Taken 2/8/2024 2000 by Sadia Ross RN  Medication Review/Management: medications reviewed     Problem: Confusion Acute  Goal: Optimal Cognitive Function  Outcome: Ongoing, Progressing     Problem: Breathing Pattern Ineffective  Goal: Effective Breathing Pattern  Outcome: Ongoing, Progressing  Intervention: Promote Improved Breathing Pattern  Recent Flowsheet Documentation  Taken 2/8/2024 2000 by Sadia Ross RN  Head of Bed (HOB) Positioning: HOB at 45 degrees   Goal Outcome Evaluation:  Plan of Care Reviewed With: patient        Progress: improving  Outcome Evaluation: Pt steadily becoming more alert as she awakens through the shift. Pt re-oriented often and turned Q2hrs. Care continues.

## 2024-02-09 NOTE — CASE MANAGEMENT/SOCIAL WORK
Continued Stay Note  [facility]     Patient Name: Gabrielle Lyles  MRN: [id number]  Today's Date: 2/9/2024    Admit Date: 2/5/2024    Plan: [facility], accepted. Will need precert.  PASRR approved. From home alone., Current [place] 2.5-3L.   Discharge Plan       Row Name 02/09/24 1333       Plan    Plan [facility] Mccabe, accepted. Will need precert.  PASRR approved. From home alone., Current [place] 2.5-3L.    Patient/Family in Agreement with Plan yes    Plan Comments Spoke with APRN- plan precert Monday.   Barriers to discharge: Confusion.  Psych, Neuro, ID, Pulm, Nephro following. IV abx.             Expected Discharge Date and Time       Expected Discharge Date Expected Discharge Time    Feb 13, 2024           Brook Carolina RN     Office Phone [phone]  Office Cell [phone]

## 2024-02-10 LAB
ANION GAP SERPL CALCULATED.3IONS-SCNC: 11 MMOL/L (ref 5–15)
BACTERIA SPEC AEROBE CULT: NORMAL
BACTERIA SPEC AEROBE CULT: NORMAL
BUN SERPL-MCNC: 29 MG/DL (ref 8–23)
BUN/CREAT SERPL: 25.4 (ref 7–25)
CALCIUM SPEC-SCNC: 9.2 MG/DL (ref 8.6–10.5)
CHLORIDE SERPL-SCNC: 106 MMOL/L (ref 98–107)
CO2 SERPL-SCNC: 23 MMOL/L (ref 22–29)
CREAT SERPL-MCNC: 1.14 MG/DL (ref 0.57–1)
DEPRECATED RDW RBC AUTO: 48.6 FL (ref 37–54)
EGFRCR SERPLBLD CKD-EPI 2021: 48.2 ML/MIN/1.73
ERYTHROCYTE [DISTWIDTH] IN BLOOD BY AUTOMATED COUNT: 16.4 % (ref 12.3–15.4)
GLUCOSE SERPL-MCNC: 113 MG/DL (ref 65–99)
HCT VFR BLD AUTO: 36.6 % (ref 34–46.6)
HGB BLD-MCNC: 11.7 G/DL (ref 12–15.9)
MAGNESIUM SERPL-MCNC: 2 MG/DL (ref 1.6–2.4)
MCH RBC QN AUTO: 27 PG (ref 26.6–33)
MCHC RBC AUTO-ENTMCNC: 32 G/DL (ref 31.5–35.7)
MCV RBC AUTO: 84.3 FL (ref 79–97)
PHOSPHATE SERPL-MCNC: 3 MG/DL (ref 2.5–4.5)
PLATELET # BLD AUTO: 238 10*3/MM3 (ref 140–450)
PMV BLD AUTO: 8.7 FL (ref 6–12)
POTASSIUM SERPL-SCNC: 4.3 MMOL/L (ref 3.5–5.2)
RBC # BLD AUTO: 4.34 10*6/MM3 (ref 3.77–5.28)
SODIUM SERPL-SCNC: 140 MMOL/L (ref 136–145)
WBC NRBC COR # BLD AUTO: 10.4 10*3/MM3 (ref 3.4–10.8)

## 2024-02-10 PROCEDURE — 25010000002 METHYLPREDNISOLONE PER 40 MG: Performed by: FAMILY MEDICINE

## 2024-02-10 PROCEDURE — 84100 ASSAY OF PHOSPHORUS: CPT | Performed by: INTERNAL MEDICINE

## 2024-02-10 PROCEDURE — 94761 N-INVAS EAR/PLS OXIMETRY MLT: CPT

## 2024-02-10 PROCEDURE — 80048 BASIC METABOLIC PNL TOTAL CA: CPT | Performed by: INTERNAL MEDICINE

## 2024-02-10 PROCEDURE — 94799 UNLISTED PULMONARY SVC/PX: CPT

## 2024-02-10 PROCEDURE — 85027 COMPLETE CBC AUTOMATED: CPT | Performed by: INTERNAL MEDICINE

## 2024-02-10 PROCEDURE — 83735 ASSAY OF MAGNESIUM: CPT | Performed by: INTERNAL MEDICINE

## 2024-02-10 PROCEDURE — 25810000003 SODIUM CHLORIDE 0.9 % SOLUTION: Performed by: NURSE PRACTITIONER

## 2024-02-10 PROCEDURE — 25010000002 FUROSEMIDE PER 20 MG: Performed by: INTERNAL MEDICINE

## 2024-02-10 PROCEDURE — 94664 DEMO&/EVAL PT USE INHALER: CPT

## 2024-02-10 RX ORDER — FUROSEMIDE 40 MG/1
40 TABLET ORAL DAILY
Status: DISCONTINUED | OUTPATIENT
Start: 2024-02-11 | End: 2024-02-14

## 2024-02-10 RX ORDER — METHYLPREDNISOLONE SODIUM SUCCINATE 40 MG/ML
20 INJECTION, POWDER, LYOPHILIZED, FOR SOLUTION INTRAMUSCULAR; INTRAVENOUS DAILY
Status: DISCONTINUED | OUTPATIENT
Start: 2024-02-10 | End: 2024-02-14

## 2024-02-10 RX ORDER — FUROSEMIDE 10 MG/ML
40 INJECTION INTRAMUSCULAR; INTRAVENOUS ONCE
Status: COMPLETED | OUTPATIENT
Start: 2024-02-10 | End: 2024-02-10

## 2024-02-10 RX ADMIN — Medication 10 ML: at 21:13

## 2024-02-10 RX ADMIN — SODIUM CHLORIDE 500 ML: 0.9 INJECTION, SOLUTION INTRAVENOUS at 21:13

## 2024-02-10 RX ADMIN — IPRATROPIUM BROMIDE AND ALBUTEROL SULFATE 3 ML: .5; 3 SOLUTION RESPIRATORY (INHALATION) at 11:16

## 2024-02-10 RX ADMIN — Medication 10 ML: at 09:50

## 2024-02-10 RX ADMIN — SILDENAFIL CITRATE 20 MG: 20 TABLET ORAL at 15:56

## 2024-02-10 RX ADMIN — Medication 5 MG: at 22:49

## 2024-02-10 RX ADMIN — ACETAMINOPHEN 650 MG: 325 TABLET, FILM COATED ORAL at 09:58

## 2024-02-10 RX ADMIN — AMLODIPINE BESYLATE 10 MG: 5 TABLET ORAL at 09:43

## 2024-02-10 RX ADMIN — LEVOTHYROXINE SODIUM 50 MCG: 0.05 TABLET ORAL at 05:10

## 2024-02-10 RX ADMIN — BUDESONIDE INHALATION 0.5 MG: 0.5 SUSPENSION RESPIRATORY (INHALATION) at 07:42

## 2024-02-10 RX ADMIN — CEPHALEXIN 500 MG: 500 CAPSULE ORAL at 05:10

## 2024-02-10 RX ADMIN — SILDENAFIL CITRATE 20 MG: 20 TABLET ORAL at 09:47

## 2024-02-10 RX ADMIN — CEPHALEXIN 500 MG: 500 CAPSULE ORAL at 21:13

## 2024-02-10 RX ADMIN — APIXABAN 5 MG: 5 TABLET, FILM COATED ORAL at 21:13

## 2024-02-10 RX ADMIN — BUDESONIDE INHALATION 0.5 MG: 0.5 SUSPENSION RESPIRATORY (INHALATION) at 21:04

## 2024-02-10 RX ADMIN — FAMOTIDINE 20 MG: 20 TABLET, FILM COATED ORAL at 09:49

## 2024-02-10 RX ADMIN — HYDRALAZINE HYDROCHLORIDE 50 MG: 25 TABLET ORAL at 15:58

## 2024-02-10 RX ADMIN — APIXABAN 5 MG: 5 TABLET, FILM COATED ORAL at 09:48

## 2024-02-10 RX ADMIN — ALLOPURINOL 100 MG: 100 TABLET ORAL at 09:48

## 2024-02-10 RX ADMIN — METHYLPREDNISOLONE SODIUM SUCCINATE 20 MG: 40 INJECTION, POWDER, FOR SOLUTION INTRAMUSCULAR; INTRAVENOUS at 12:12

## 2024-02-10 RX ADMIN — Medication 10 ML: at 09:49

## 2024-02-10 RX ADMIN — FUROSEMIDE 40 MG: 10 INJECTION, SOLUTION INTRAMUSCULAR; INTRAVENOUS at 16:00

## 2024-02-10 RX ADMIN — CEPHALEXIN 500 MG: 500 CAPSULE ORAL at 15:56

## 2024-02-10 RX ADMIN — IPRATROPIUM BROMIDE AND ALBUTEROL SULFATE 3 ML: .5; 3 SOLUTION RESPIRATORY (INHALATION) at 19:58

## 2024-02-10 RX ADMIN — IPRATROPIUM BROMIDE AND ALBUTEROL SULFATE 3 ML: .5; 3 SOLUTION RESPIRATORY (INHALATION) at 07:37

## 2024-02-10 RX ADMIN — HYDRALAZINE HYDROCHLORIDE 50 MG: 25 TABLET ORAL at 05:10

## 2024-02-10 RX ADMIN — CARVEDILOL 25 MG: 25 TABLET, FILM COATED ORAL at 09:47

## 2024-02-10 RX ADMIN — IPRATROPIUM BROMIDE AND ALBUTEROL SULFATE 3 ML: .5; 3 SOLUTION RESPIRATORY (INHALATION) at 17:22

## 2024-02-10 NOTE — PROGRESS NOTES
"NEPHROLOGY PROGRESS NOTE------[facility] [place]/[facility]    [facility]  [phone]  Anirudh Aleman MD      Patient Care Team:  Shaylee Mcdaniels MD as PCP - General (Family Medicine)  Richardson Willis MD as Cardiologist (Cardiology)  Rafy Rodriguez MD as Consulting Physician (Hematology and Oncology)  Christianne Phillips RN as Licensed Practical Nurse  Salome Fleming MD as Consulting Physician (Nephrology)      Provider:  Anirudh Aleman MD  Patient Name: Gabrielle Lyles  :  1940    SUBJECTIVE:    F/U ARF/JULIAN/CRF/CKD    No chest pain, has some shortness of breath  No vomiting or diarrhea    Medication:  allopurinol, 100 mg, Oral, Daily  amLODIPine, 10 mg, Oral, Q24H  apixaban, 5 mg, Oral, Q12H  budesonide, 0.5 mg, Nebulization, BID - RT  carvedilol, 25 mg, Oral, BID With Meals  cephalexin, 500 mg, Oral, Q8H  famotidine, 20 mg, Oral, Daily  hydrALAZINE, 50 mg, Oral, Q8H  ipratropium-albuterol, 3 mL, Nebulization, 4x Daily - RT  levothyroxine, 50 mcg, Oral, Q AM  melatonin, 5 mg, Oral, Nightly  methylPREDNISolone sodium succinate, 20 mg, Intravenous, Daily  sildenafil, 20 mg, Oral, TID  sodium chloride, 10 mL, Intravenous, Q12H  sodium chloride, 10 mL, Intravenous, Q12H             OBJECTIVE    Vital Sign Min/Max for last 24 hours  Temp  Min: 97.2 °F (36.2 °C)  Max: 98.8 °F (37.1 °C)   BP  Min: 87/60  Max: 136/70   Pulse  Min: 74  Max: 99   Resp  Min: 16  Max: 38   SpO2  Min: 87 %  Max: 98 %   No data recorded   Weight  Min: 73.5 kg (162 lb 0.6 oz)  Max: 73.5 kg (162 lb 0.6 oz)     Flowsheet Rows      Flowsheet Row First Filed Value   Admission Height 162.6 cm (64\") Documented at 2024 1046   Admission Weight 66.7 kg (147 lb) Documented at 2024 1046            No intake/output data recorded.  I/O last 3 completed shifts:  In: 1120 [P.O.:120; I.V.:1000]  Out: 150 [Urine:150]    Physical Exam:  General Appearance: alert, appears stated age and " "cooperative  Head: normocephalic, without obvious abnormality and atraumatic  Eyes: conjunctivae and sclerae normal and no icterus  Neck: supple and no JVD  Lungs: +SCATTERED RHONCHI  Heart: regular rhythm & normal rate and normal S1, S2 +TAYE  Chest Wall: no abnormalities observed  Abdomen: normal bowel sounds and soft, nontender +OBESITY  Extremities: moves extremities well, trace edema, no cyanosis  Skin: no bleeding, bruising or rash  Neurologic: Alert, and oriented. No focal deficits    Labs:    WBC WBC   Date Value Ref Range Status   02/10/2024 10.40 3.40 - 10.80 10*3/mm3 Final   02/09/2024 11.50 (H) 3.40 - 10.80 10*3/mm3 Final   02/08/2024 9.60 3.40 - 10.80 10*3/mm3 Final      HGB Hemoglobin   Date Value Ref Range Status   02/10/2024 11.7 (L) 12.0 - 15.9 g/dL Final   02/09/2024 12.3 12.0 - 15.9 g/dL Final   02/08/2024 12.8 12.0 - 15.9 g/dL Final      HCT Hematocrit   Date Value Ref Range Status   02/10/2024 36.6 34.0 - 46.6 % Final   02/09/2024 38.8 34.0 - 46.6 % Final   02/08/2024 40.5 34.0 - 46.6 % Final      Platelets No results found for: \"LABPLAT\"   MCV MCV   Date Value Ref Range Status   02/10/2024 84.3 79.0 - 97.0 fL Final   02/09/2024 84.8 79.0 - 97.0 fL Final   02/08/2024 84.9 79.0 - 97.0 fL Final          Sodium Sodium   Date Value Ref Range Status   02/10/2024 140 136 - 145 mmol/L Final   02/09/2024 138 136 - 145 mmol/L Final   02/08/2024 136 136 - 145 mmol/L Final      Potassium Potassium   Date Value Ref Range Status   02/10/2024 4.3 3.5 - 5.2 mmol/L Final   02/09/2024 4.3 3.5 - 5.2 mmol/L Final   02/08/2024 4.8 3.5 - 5.2 mmol/L Final      Chloride Chloride   Date Value Ref Range Status   02/10/2024 106 98 - 107 mmol/L Final   02/09/2024 105 98 - 107 mmol/L Final   02/08/2024 102 98 - 107 mmol/L Final      CO2 CO2   Date Value Ref Range Status   02/10/2024 23.0 22.0 - 29.0 mmol/L Final   02/09/2024 22.0 22.0 - 29.0 mmol/L Final   02/08/2024 23.0 22.0 - 29.0 mmol/L Final      BUN BUN   Date Value " "Ref Range Status   02/10/2024 29 (H) 8 - 23 mg/dL Final   02/09/2024 30 (H) 8 - 23 mg/dL Final   02/08/2024 32 (H) 8 - 23 mg/dL Final      Creatinine Creatinine   Date Value Ref Range Status   02/10/2024 1.14 (H) 0.57 - 1.00 mg/dL Final   02/09/2024 1.16 (H) 0.57 - 1.00 mg/dL Final   02/08/2024 1.12 (H) 0.57 - 1.00 mg/dL Final      Calcium Calcium   Date Value Ref Range Status   02/10/2024 9.2 8.6 - 10.5 mg/dL Final   02/09/2024 9.3 8.6 - 10.5 mg/dL Final   02/08/2024 9.4 8.6 - 10.5 mg/dL Final      PO4 No components found for: \"PO4\"   Albumin Albumin   Date Value Ref Range Status   02/08/2024 3.5 3.5 - 5.2 g/dL Final      Magnesium Magnesium   Date Value Ref Range Status   02/10/2024 2.0 1.6 - 2.4 mg/dL Final   02/09/2024 1.9 1.6 - 2.4 mg/dL Final   02/08/2024 2.0 1.6 - 2.4 mg/dL Final      Uric Acid No components found for: \"URIC ACID\"     Imaging Results (Last 72 Hours)       Procedure Component Value Units Date/Time    XR Chest 1 View [[id number]] Collected: 02/09/24 0730     Updated: 02/09/24 0737    Narrative:      XR CHEST 1 VW    Date of Exam: 2/9/2024 5:36 AM EST    Indication: sob    Comparison: None available.    Findings:  The trachea is midline. Stable cardiomegaly with mild enlargement of the pulmonary arteries. There is mild diffuse bilateral reticular lung thickening. Mild left basilar atelectasis. No definite pleural effusions. No change in position of the right-sided   pigtail catheter.      Impression:      Impression:  Stable left basilar atelectasis and chronic lung changes    Cardiomegaly      Electronically Signed: Pablo Martins MD    2/9/2024 7:35 AM EST    Workstation ID: [id number]     Breast Left Limited [[id number]] Collected: 02/08/24 0829     Updated: 02/08/24 0837    Narrative:      DATE OF EXAM:   2/7/2024 8:00 PM     PROCEDURE:   US BREAST LEFT LIMITED-     INDICATIONS:   Hx breast cancer- lump felft -abscess vs recurrence; R41.82-Altered  mental status, unspecified; " E87.6-Hypokalemia     COMPARISON:  Prior breast imaging dated 10/22/2014, 10/8/2014, 10/2/2013     TECHNIQUE:   Sonographic grayscale and color Doppler images of the left breast were  obtained with representative examples submitted to PACS for  interpretation.     FINDINGS:   Imaging was performed after hours on an emergent basis to evaluate for  abscess. Radiologist was not present for real-time scanning.     Imaging of the left breast at the area of palpable concern at the  lumpectomy site corresponding to the 11 o'clock position 4 cm from the  nipple demonstrates an irregular hypoechoic shadowing region measuring  1.4 x 2.0 x 2.0 cm.     This has a nonspecific appearance. This could represent patient's  scarring at the lumpectomy site, collapsed residual seroma or  recurrence. Abscess is felt less likely but not excluded if there are  clinical findings of cellulitis.       Impression:      IMPRESSION :   1. Nonspecific hypoechoic shadowing region measuring 2.0 cm at the 11  o'clock position 4 cm from the nipple corresponding to patient's  lumpectomy site. This could represent scarring at the lumpectomy site,  collapsed seroma cavity or recurrence. Abscess is felt less likely but  not entirely excluded if there are clinical findings of infection.     Recommend patient return for full outpatient diagnostic work-up  including diagnostic mammogram and ultrasound with real-time evaluation  by radiologist. Patient also requires import of outside breast imaging  more recent than 2014     BI-RADS Final Assessment Category 0: Incomplete-Need Additional Imaging  Evaluation  Management Recommendation: Recall for additional imaging.        Electronically Signed By-Param Johansen MD On:2/8/2024 8:35 AM       MRI Brain With Contrast [[id number]] Collected: 02/07/24 1935     Updated: 02/07/24 1942    Narrative:      MRI BRAIN W CONTRAST    Date of Exam: 2/7/2024 5:45 PM CST    Indication: Rule out encephalopathy?just need  contrast.     Comparison: Noncontrast MRI brain 2/7/2024    Technique:  Routine multiplanar/multisequence sequence images of the brain were obtained after the uneventful administration of 14 cc Prohance.        Findings:  No abnormal enhancement noted.    No abnormal meningeal thickening.    No evidence of dural venous thrombosis.      Impression:      Impression:  No abnormality noted on postcontrast imaging. See same-day noncontrast exam report for additional details.        Electronically Signed: Alan Rodríguez MD    2/7/2024 6:39 PM CST    Workstation ID: [id number]            Results for orders placed during the hospital encounter of 02/05/24    XR Chest 1 View    Narrative  XR CHEST 1 VW    Date of Exam: 2/9/2024 5:36 AM EST    Indication: sob    Comparison: None available.    Findings:  The trachea is midline. Stable cardiomegaly with mild enlargement of the pulmonary arteries. There is mild diffuse bilateral reticular lung thickening. Mild left basilar atelectasis. No definite pleural effusions. No change in position of the right-sided  pigtail catheter.    Impression  Impression:  Stable left basilar atelectasis and chronic lung changes    Cardiomegaly      Electronically Signed: Pablo Martins MD  2/9/2024 7:35 AM EST  Workstation ID: [id number]      XR Chest 1 View    Narrative  XR CHEST 1 VW    Date of Exam: 2/5/2024 12:00 PM EST    Indication: weakness    Comparison: 5/2/2023.    Findings:  There is mild cardiomegaly with increased heart size. Mildly prominent interstitial pattern appears stable and chronic. There is a prominent skinfold over the right chest. There is no definite pneumothorax. There is no effusion.    Impression  Impression:  Mild cardiomegaly. Mild chronic findings of the lung fields.      Electronically Signed: Mary Ivan MD  2/5/2024 12:06 PM EST  Workstation ID: [id number]      Results for orders placed during the hospital encounter of 09/24/23    XR Foot 3+ View  Left    Narrative  XR FOOT 3+ VW LEFT    Date of Exam: 9/24/2023 3:15 PM EDT    Indication: pain redness swelling    Comparison: None available.    Findings:  No fracture or dislocation. There is soft tissue swelling diffusely at the left foot. No discrete osseous erosion. Plantar calcaneal bone spur. Enthesopathy at the Achilles insertion on the calcaneus. No radiodense foreign body.    Impression  Impression:  1. Negative for acute osseous abnormality.  2. Nonspecific soft tissue swelling.        Electronically Signed: Randall Zarco MD  9/24/2023 3:54 PM EDT  Workstation ID: [id number]      Results for orders placed during the hospital encounter of 02/05/24    Duplex Venous Upper Extremity - Right CAR    Interpretation Summary    Normal right upper extremity venous duplex scan.        ASSESSMENT / PLAN      Altered mental status    Acute hypokalemia    Stage 3a chronic kidney disease    Chronic obstructive pulmonary disease    History of venous thrombosis and embolism    Primary hypertension    History of TIA (transient ischemic attack)    Severe pulmonary hypertension    Chronic respiratory failure with hypoxia    JULIAN (acute kidney injury)    ARF/JULIAN/CRF/CKD------Nonoliguric. +ARF/JULIAN on top of CRF/CKD STG 3A with a baseline serum  Creatinine of about 1.1. Unknown if patient has baseline proteinuria or hematuria. CRF/CKD STG 3A most likely secondary to HTN NS. +ARF/JULIAN is secondary to prerenal state/intravascular volume depletion. D/CIVFs today. Avoid hypotension.  No NSAIDs or IV dye.  BUN/Cr stable     2. ANEMIA------H/H stable     3. HTN WITH CKD-----Avoid hypotension. No ACE/ARB/DRI/diuretic for now     4. OA/DJD/HYPERURICEMIA------No NSAIDs. Add Allopurinol     5. DELIRIUM-------?Secondary to UTI. Better     6. UTI-----C and S pending. On Abx     7. HYPOCALCEMIA------Replaced     8. KETONURIA/DEHYDRATION------Secondary to recent outpatient increase in Lasix. Hold Lasix. Gentle IVFs given Pulmonary HTN     9.  PULMONARY HTN--------Per , Pulmonary     10. HYPOKALEMIA-------Replaced     11. CAD-----per , Cardiology    Creatinine stable  Appears to have slight excess volume  Add low-dose loop diuretic  Okay from RENAL standpoint to d/c today and f/u as ordered    Anirudh Aleman MD  [facility]  [phone]  02/10/24  13:50 EST

## 2024-02-10 NOTE — PROGRESS NOTES
"PULMONARY CRITICAL CARE PROGRESS  NOTE      PATIENT IDENTIFICATION:  Name: Gabrielle Lyles  MRN: [id number]  :  1940     Age: 83 y.o.  Sex: female    DATE OF Note:  2/10/2024   Referring Physician: Rosamaria Jin MD                  Subjective:   In bed, still on O2 , still weak and not taking deep breaths, no nausea or vomiting, no bowel or bladder issues reported       Objective:  tMax 24 hrs: Temp (24hrs), Av.3 °F (36.8 °C), Min:97.2 °F (36.2 °C), Max:98.8 °F (37.1 °C)      Vitals Ranges:   Temp:  [97.2 °F (36.2 °C)-98.8 °F (37.1 °C)] 98.7 °F (37.1 °C)  Heart Rate:  [74-99] 88  Resp:  [20-38] 38  BP: ()/(60-70) 105/60    Intake and Output Last 3 Shifts:   I/O last 3 completed shifts:  In: 1120 [P.O.:120; I.V.:1000]  Out: 150 [Urine:150]    Exam:  /60   Pulse 88   Temp 98.7 °F (37.1 °C) (Axillary)   Resp (!) 38   Ht 162.6 cm (64\")   Wt 73.5 kg (162 lb 0.6 oz)   SpO2 (!) 89%   BMI 27.81 kg/m²     General Appearance:     HEENT:  Normocephalic, without obvious abnormality. Conjunctivae/corneas clear.  Normal external ear canals. Nares normal, no drainage     Neck:  Supple, symmetrical, trachea midline. No JVD.  Lungs /Chest wall:   Bilateral basal rhonchi, respirations unlabored, symmetrical wall movement.     Heart:  Regular rate and rhythm, systolic murmur PMI left sternal border  Abdomen: Soft, nontender, no masses, no organomegaly.    Extremities: Trace edema, no clubbing or cyanosis        Medications:  allopurinol, 100 mg, Oral, Daily  amLODIPine, 10 mg, Oral, Q24H  apixaban, 5 mg, Oral, Q12H  budesonide, 0.5 mg, Nebulization, BID - RT  carvedilol, 25 mg, Oral, BID With Meals  cephalexin, 500 mg, Oral, Q8H  famotidine, 20 mg, Oral, Daily  [START ON 2024] furosemide, 40 mg, Oral, Daily  hydrALAZINE, 50 mg, Oral, Q8H  ipratropium-albuterol, 3 mL, Nebulization, 4x Daily - RT  levothyroxine, 50 mcg, Oral, Q AM  melatonin, 5 mg, Oral, Nightly  methylPREDNISolone sodium succinate, " 20 mg, Intravenous, Daily  sildenafil, 20 mg, Oral, TID  sodium chloride, 10 mL, Intravenous, Q12H  sodium chloride, 10 mL, Intravenous, Q12H        Infusion:          PRN:    acetaminophen    Calcium Replacement - Follow Nurse / BPA Driven Protocol    ipratropium-albuterol    Magnesium Standard Dose Replacement - Follow Nurse / BPA Driven Protocol    ondansetron    Phosphorus Replacement - Follow Nurse / BPA Driven Protocol    Potassium Replacement - Follow Nurse / BPA Driven Protocol    [COMPLETED] Insert Peripheral IV **AND** sodium chloride    sodium chloride    sodium chloride    sodium chloride    sodium chloride  Data Review:  All labs (24hrs):   Recent Results (from the past 24 hour(s))   CBC (No Diff)    Collection Time: 02/10/24  2:27 AM    Specimen: Blood   Result Value Ref Range    WBC 10.40 3.40 - 10.80 10*3/mm3    RBC 4.34 3.77 - 5.28 10*6/mm3    Hemoglobin 11.7 (L) 12.0 - 15.9 g/dL    Hematocrit 36.6 34.0 - 46.6 %    MCV 84.3 79.0 - 97.0 fL    MCH 27.0 26.6 - 33.0 pg    MCHC 32.0 31.5 - 35.7 g/dL    RDW 16.4 (H) 12.3 - 15.4 %    RDW-SD 48.6 37.0 - 54.0 fl    MPV 8.7 6.0 - 12.0 fL    Platelets 238 140 - 450 10*3/mm3   Basic Metabolic Panel    Collection Time: 02/10/24  2:27 AM    Specimen: Blood   Result Value Ref Range    Glucose 113 (H) 65 - 99 mg/dL    BUN 29 (H) 8 - 23 mg/dL    Creatinine 1.14 (H) 0.57 - 1.00 mg/dL    Sodium 140 136 - 145 mmol/L    Potassium 4.3 3.5 - 5.2 mmol/L    Chloride 106 98 - 107 mmol/L    CO2 23.0 22.0 - 29.0 mmol/L    Calcium 9.2 8.6 - 10.5 mg/dL    BUN/Creatinine Ratio 25.4 (H) 7.0 - 25.0    Anion Gap 11.0 5.0 - 15.0 mmol/L    eGFR 48.2 (L) >60.0 mL/min/1.73   Magnesium    Collection Time: 02/10/24  2:27 AM    Specimen: Blood   Result Value Ref Range    Magnesium 2.0 1.6 - 2.4 mg/dL   Phosphorus    Collection Time: 02/10/24  2:27 AM    Specimen: Blood   Result Value Ref Range    Phosphorus 3.0 2.5 - 4.5 mg/dL        Imaging:  XR Chest 1 View  Narrative: XR CHEST 1  VW    Date of Exam: 2/9/2024 5:36 AM EST    Indication: sob    Comparison: None available.    Findings:  The trachea is midline. Stable cardiomegaly with mild enlargement of the pulmonary arteries. There is mild diffuse bilateral reticular lung thickening. Mild left basilar atelectasis. No definite pleural effusions. No change in position of the right-sided   pigtail catheter.  Impression: Impression:  Stable left basilar atelectasis and chronic lung changes    Cardiomegaly    Electronically Signed: Pablo Martins MD    2/9/2024 7:35 AM EST    Workstation ID: [id number]       ASSESSMENT:  AMS  Chronic   Severe Pulmonary HTN  Hypokalemia  CKD 3  HTN  Hx PE/DVT  Hx TIA              PLAN:  Encourage to be OOB daily   Plan is for BEAN  PT/OT  Need to encourage to take deep breaths   Wean O2 to keep sats > 88%  Antibiotics per ID  Bronchodilators  Inhaled corticosteroids  Incentive spirometer  Cardiology following   Electrolytes/ glycemic control  DVT and GI prophylaxis-Apixaban      Discussed with Dr Jeanine Cruz, APRN    2/10/2024  16:39 EST    I personally have examined  and interviewed the patient. I have reviewed the history, data, problems, assessment and plan with our NP.  Total Critical care time in direct medical management (   ) minutes, This time specifically excludes time spent performing procedures.    Jerry Solorzano MD   2/10/2024  22:08 EST

## 2024-02-10 NOTE — PROGRESS NOTES
LOS: 3 days   Patient Care Team:  Shaylee Mcdaniels MD as PCP - General (Family Medicine)  Richardson Willis MD as Cardiologist (Cardiology)  Rafy Rodriguez MD as Consulting Physician (Hematology and Oncology)  Christianne Phillips, RN as Licensed Practical Nurse  Salome Fleming MD as Consulting Physician (Nephrology)    Subjective:  Improved overall    Objective:   Quite weak      Review of Systems:   Review of Systems   Constitutional:  Positive for activity change.   Neurological:  Positive for weakness.           Vital Signs  Temp:  [97.2 °F (36.2 °C)-98.7 °F (37.1 °C)] 97.9 °F (36.6 °C)  Heart Rate:  [74-99] 88  Resp:  [16-32] 32  BP: ()/(53-70) 116/67    Physical Exam:  Physical Exam  Vitals and nursing note reviewed.   Constitutional:       Appearance: She is ill-appearing.   Cardiovascular:      Rate and Rhythm: Rhythm irregular.      Heart sounds: Normal heart sounds.   Pulmonary:      Breath sounds: Normal breath sounds.   Skin:     General: Skin is warm.   Neurological:      Mental Status: She is alert.          Radiology:  XR Chest 1 View    Result Date: 2/9/2024  Impression: Stable left basilar atelectasis and chronic lung changes Cardiomegaly Electronically Signed: Pablo Martins MD  2/9/2024 7:35 AM EST  Workstation ID: [id number]    US Breast Left Limited    Result Date: 2/8/2024  IMPRESSION : 1. Nonspecific hypoechoic shadowing region measuring 2.0 cm at the 11 o'clock position 4 cm from the nipple corresponding to patient's lumpectomy site. This could represent scarring at the lumpectomy site, collapsed seroma cavity or recurrence. Abscess is felt less likely but not entirely excluded if there are clinical findings of infection.  Recommend patient return for full outpatient diagnostic work-up including diagnostic mammogram and ultrasound with real-time evaluation by radiologist. Patient also requires import of outside breast imaging more recent than 2014  BI-RADS Final Assessment  Category 0: Incomplete-Need Additional Imaging Evaluation Management Recommendation: Recall for additional imaging.   Electronically Signed By-Param Johansen MD On:2/8/2024 8:35 AM      MRI Brain With Contrast    Result Date: 2/7/2024  Impression: No abnormality noted on postcontrast imaging. See same-day noncontrast exam report for additional details. Electronically Signed: Alan Rodríguez MD  2/7/2024 6:39 PM CST  Workstation ID: [id number]    MRI Brain Without Contrast    Result Date: 2/7/2024  Impression: Mildly motion-degraded exam demonstrating expected age-related changes, without evidence of acute infarct, hemorrhage, mass or mass effect. Electronically Signed: Andres Olvera MD  2/7/2024 9:00 AM EST  Workstation ID: [id number]    CT Abdomen Pelvis Without Contrast    Result Date: 2/6/2024  Impression: Chronic atelectasis of the left lower lobe. Moderately severe emphysema. Lesion of the sternal manubrium as detailed, which could represent subacute or old fracture although metastatic disease is not excluded. Correlation with whole-body bone scan may be  useful. Aneurysmal dilatation of the abdominal aorta. Small amount of free pelvic fluid, nonspecific. Nodular liver contour suggests cirrhosis. Electronically Signed: Mary Ivan MD  2/6/2024 3:29 PM EST  Workstation ID: [id number]    CT Chest Without Contrast Diagnostic    Result Date: 2/6/2024  Impression: Chronic atelectasis of the left lower lobe. Moderately severe emphysema. Lesion of the sternal manubrium as detailed, which could represent subacute or old fracture although metastatic disease is not excluded. Correlation with whole-body bone scan may be  useful. Aneurysmal dilatation of the abdominal aorta. Small amount of free pelvic fluid, nonspecific. Nodular liver contour suggests cirrhosis. Electronically Signed: Mary Ivan MD  2/6/2024 3:29 PM EST  Workstation ID: [id number]    CT Head Without Contrast    Result Date: 2/5/2024  1.Examination is  limited due to motion artifact. 2.Given this limitation, no acute intracranial abnormality is identified. 3.Findings compatible with chronic microvascular ischemic change and diffuse cortical atrophy. Electronically Signed: Parker Mireles MD  2/5/2024 12:26 PM EST  Workstation ID: [id number]    XR Chest 1 View    Result Date: 2/5/2024  Impression: Mild cardiomegaly. Mild chronic findings of the lung fields. Electronically Signed: Mary Ivan MD  2/5/2024 12:06 PM EST  Workstation ID: [id number]        Results Review:     I reviewed the patient's new clinical results.  I reviewed the patient's new imaging results and agree with the interpretation.    Medication Review:   Scheduled Meds:allopurinol, 100 mg, Oral, Daily  amLODIPine, 10 mg, Oral, Q24H  apixaban, 5 mg, Oral, Q12H  budesonide, 0.5 mg, Nebulization, BID - RT  carvedilol, 25 mg, Oral, BID With Meals  cephalexin, 500 mg, Oral, Q8H  famotidine, 20 mg, Oral, Daily  hydrALAZINE, 50 mg, Oral, Q8H  ipratropium-albuterol, 3 mL, Nebulization, 4x Daily - RT  levothyroxine, 50 mcg, Oral, Q AM  melatonin, 5 mg, Oral, Nightly  sildenafil, 20 mg, Oral, TID  sodium chloride, 10 mL, Intravenous, Q12H  sodium chloride, 10 mL, Intravenous, Q12H      Continuous Infusions:   PRN Meds:.  acetaminophen    Calcium Replacement - Follow Nurse / BPA Driven Protocol    ipratropium-albuterol    Magnesium Standard Dose Replacement - Follow Nurse / BPA Driven Protocol    ondansetron    Phosphorus Replacement - Follow Nurse / BPA Driven Protocol    Potassium Replacement - Follow Nurse / BPA Driven Protocol    [COMPLETED] Insert Peripheral IV **AND** sodium chloride    sodium chloride    sodium chloride    sodium chloride    sodium chloride    Labs:    CBC    Results from last 7 days   Lab Units 02/10/24  0227 02/09/24  0055 02/08/24  0444 02/07/24  1013 02/06/24  0351 02/05/24  1150   WBC 10*3/mm3 10.40 11.50* 9.60 9.20 7.30 9.10   HEMOGLOBIN g/dL 11.7* 12.3 12.8 11.8* 11.2* 12.8  "  PLATELETS 10*3/mm3 238 234 216 200 177 220     BMP   Results from last 7 days   Lab Units 02/10/24  0227 02/09/24  0055 02/08/24  0444 02/07/24  1013 02/06/24  1806 02/06/24  0351 02/05/24  1150   SODIUM mmol/L 140 138 136 136 135* 138 137   POTASSIUM mmol/L 4.3 4.3 4.8 4.9 5.1 2.9* 3.1*   CHLORIDE mmol/L 106 105 102 102 98 96* 94*   CO2 mmol/L 23.0 22.0 23.0 24.0 28.0 30.0* 30.0*   BUN mg/dL 29* 30* 32* 32* 32* 22 19   CREATININE mg/dL 1.14* 1.16* 1.12* 1.26* 1.50* 1.07* 1.30*   GLUCOSE mg/dL 113* 115* 90 100* 105* 82 104*   MAGNESIUM mg/dL 2.0 1.9 2.0  --   --   --  2.2   PHOSPHORUS mg/dL 3.0 2.7 3.4  --   --   --   --      Cr Clearance Estimated Creatinine Clearance: 37.4 mL/min (A) (by C-G formula based on SCr of 1.14 mg/dL (H)).  Coag     HbA1C   Lab Results   Component Value Date    HGBA1C 5.8 (H) 10/25/2022     Blood Glucose No results found for: \"POCGLU\"  Infection   Results from last 7 days   Lab Units 02/05/24  1627 02/05/24  1150   BLOODCX  No growth at 4 days No growth at 4 days   PROCALCITONIN ng/mL  --  0.06     CMP   Results from last 7 days   Lab Units 02/10/24  0227 02/09/24  0055 02/08/24 0444 02/07/24  1013 02/06/24  1806 02/06/24  0351 02/05/24  1235 02/05/24  1150   SODIUM mmol/L 140 138 136 136 135* 138  --  137   POTASSIUM mmol/L 4.3 4.3 4.8 4.9 5.1 2.9*  --  3.1*   CHLORIDE mmol/L 106 105 102 102 98 96*  --  94*   CO2 mmol/L 23.0 22.0 23.0 24.0 28.0 30.0*  --  30.0*   BUN mg/dL 29* 30* 32* 32* 32* 22  --  19   CREATININE mg/dL 1.14* 1.16* 1.12* 1.26* 1.50* 1.07*  --  1.30*   GLUCOSE mg/dL 113* 115* 90 100* 105* 82  --  104*   ALBUMIN g/dL  --   --  3.5  --  3.3*  --   --  3.9   BILIRUBIN mg/dL  --   --  0.7  --  0.6  --   --  1.1   ALK PHOS U/L  --   --  60  --  58  --   --  69   AST (SGOT) U/L  --   --  35*  --  21  --   --  19   ALT (SGPT) U/L  --   --  32  --  16  --   --  10   AMMONIA umol/L  --   --   --   --   --   --  25  --      UA    Results from last 7 days   Lab Units " 02/06/24  1054   NITRITE UA  Negative   WBC UA /HPF 0-2   BACTERIA UA /HPF 1+*   SQUAM EPITHEL UA /HPF 3-6*     Radiology(recent) XR Chest 1 View    Result Date: 2/9/2024  Impression: Stable left basilar atelectasis and chronic lung changes Cardiomegaly Electronically Signed: Pablo Martins MD  2/9/2024 7:35 AM EST  Workstation ID: [id number]    Assessment:      Acute mental status changes with acute delirium  Urinary tract infection present upon admission  Hypocalcemia  Dehydration  Acute renal failure with acute kidney injury superimposed upon chronic kidney disease stage IIIa  Hypertension associated chronic kidney disease stage IIIa  Anemia of chronic kidney disease  Hyperuricemia  Left breast mastitis  Right arm thrombophlebitis  Pulmonary hypertension  Acute hypokalemia  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  Cerebrovascular disease status post CVA  Chronic oral anticoagulation therapy  Acquired hypothyroidism  Thrombophilia  History of pulmonary embolism  Paroxysmal atrial fibrillation  Hypercoagulable state secondary to atrial fibrillation  [id number] score 6  History of IVC filter  Aneurysmal dilatation of abdominal aorta  Panlobular COPD with emphysema  Chronic mucopurulent bronchitis  Peripheral polyneuropathy  History of breast cancer  Supplemental oxygen dependency  Chronic hypoxic respiratory failure        Plan:  Care as outlined//physical therapy//renal support        Paul Granados MD  02/10/24  10:45 EST

## 2024-02-11 LAB
ALBUMIN SERPL-MCNC: 3.3 G/DL (ref 3.5–5.2)
ANION GAP SERPL CALCULATED.3IONS-SCNC: 11 MMOL/L (ref 5–15)
BUN SERPL-MCNC: 35 MG/DL (ref 8–23)
BUN/CREAT SERPL: 24.5 (ref 7–25)
CALCIUM SPEC-SCNC: 9.1 MG/DL (ref 8.6–10.5)
CHLORIDE SERPL-SCNC: 106 MMOL/L (ref 98–107)
CO2 SERPL-SCNC: 21 MMOL/L (ref 22–29)
CREAT SERPL-MCNC: 1.43 MG/DL (ref 0.57–1)
EGFRCR SERPLBLD CKD-EPI 2021: 36.7 ML/MIN/1.73
GLUCOSE SERPL-MCNC: 161 MG/DL (ref 65–99)
PHOSPHATE SERPL-MCNC: 4.7 MG/DL (ref 2.5–4.5)
POTASSIUM SERPL-SCNC: 4.6 MMOL/L (ref 3.5–5.2)
SODIUM SERPL-SCNC: 138 MMOL/L (ref 136–145)

## 2024-02-11 PROCEDURE — 80069 RENAL FUNCTION PANEL: CPT | Performed by: INTERNAL MEDICINE

## 2024-02-11 PROCEDURE — 94799 UNLISTED PULMONARY SVC/PX: CPT

## 2024-02-11 PROCEDURE — 97530 THERAPEUTIC ACTIVITIES: CPT

## 2024-02-11 PROCEDURE — 94664 DEMO&/EVAL PT USE INHALER: CPT

## 2024-02-11 PROCEDURE — 97110 THERAPEUTIC EXERCISES: CPT

## 2024-02-11 PROCEDURE — 97112 NEUROMUSCULAR REEDUCATION: CPT

## 2024-02-11 PROCEDURE — 25010000002 METHYLPREDNISOLONE PER 40 MG: Performed by: FAMILY MEDICINE

## 2024-02-11 PROCEDURE — 94761 N-INVAS EAR/PLS OXIMETRY MLT: CPT

## 2024-02-11 RX ORDER — IPRATROPIUM BROMIDE AND ALBUTEROL SULFATE 2.5; .5 MG/3ML; MG/3ML
3 SOLUTION RESPIRATORY (INHALATION) EVERY 6 HOURS PRN
Status: DISCONTINUED | OUTPATIENT
Start: 2024-02-11 | End: 2024-02-17 | Stop reason: HOSPADM

## 2024-02-11 RX ORDER — CARVEDILOL 6.25 MG/1
12.5 TABLET ORAL 2 TIMES DAILY WITH MEALS
Status: DISCONTINUED | OUTPATIENT
Start: 2024-02-11 | End: 2024-02-17 | Stop reason: HOSPADM

## 2024-02-11 RX ORDER — AMLODIPINE BESYLATE 2.5 MG/1
2.5 TABLET ORAL
Status: DISCONTINUED | OUTPATIENT
Start: 2024-02-12 | End: 2024-02-11

## 2024-02-11 RX ADMIN — LEVOTHYROXINE SODIUM 50 MCG: 0.05 TABLET ORAL at 05:34

## 2024-02-11 RX ADMIN — IPRATROPIUM BROMIDE AND ALBUTEROL SULFATE 3 ML: .5; 3 SOLUTION RESPIRATORY (INHALATION) at 11:29

## 2024-02-11 RX ADMIN — METHYLPREDNISOLONE SODIUM SUCCINATE 20 MG: 40 INJECTION, POWDER, FOR SOLUTION INTRAMUSCULAR; INTRAVENOUS at 10:29

## 2024-02-11 RX ADMIN — ALLOPURINOL 100 MG: 100 TABLET ORAL at 10:32

## 2024-02-11 RX ADMIN — Medication 10 ML: at 20:31

## 2024-02-11 RX ADMIN — CEPHALEXIN 500 MG: 500 CAPSULE ORAL at 05:34

## 2024-02-11 RX ADMIN — SILDENAFIL CITRATE 20 MG: 20 TABLET ORAL at 17:57

## 2024-02-11 RX ADMIN — SILDENAFIL CITRATE 20 MG: 20 TABLET ORAL at 20:30

## 2024-02-11 RX ADMIN — Medication 5 MG: at 20:30

## 2024-02-11 RX ADMIN — APIXABAN 5 MG: 5 TABLET, FILM COATED ORAL at 20:30

## 2024-02-11 RX ADMIN — Medication 10 ML: at 10:30

## 2024-02-11 RX ADMIN — APIXABAN 5 MG: 5 TABLET, FILM COATED ORAL at 10:32

## 2024-02-11 RX ADMIN — Medication 10 ML: at 20:30

## 2024-02-11 RX ADMIN — IPRATROPIUM BROMIDE AND ALBUTEROL SULFATE 3 ML: .5; 3 SOLUTION RESPIRATORY (INHALATION) at 15:55

## 2024-02-11 RX ADMIN — BUDESONIDE INHALATION 0.5 MG: 0.5 SUSPENSION RESPIRATORY (INHALATION) at 07:23

## 2024-02-11 RX ADMIN — FUROSEMIDE 40 MG: 40 TABLET ORAL at 14:18

## 2024-02-11 RX ADMIN — FAMOTIDINE 20 MG: 20 TABLET, FILM COATED ORAL at 10:30

## 2024-02-11 RX ADMIN — CARVEDILOL 12.5 MG: 6.25 TABLET, FILM COATED ORAL at 17:58

## 2024-02-11 RX ADMIN — IPRATROPIUM BROMIDE AND ALBUTEROL SULFATE 3 ML: .5; 3 SOLUTION RESPIRATORY (INHALATION) at 07:19

## 2024-02-11 RX ADMIN — BUDESONIDE INHALATION 0.5 MG: 0.5 SUSPENSION RESPIRATORY (INHALATION) at 15:49

## 2024-02-11 RX ADMIN — HYDRALAZINE HYDROCHLORIDE 50 MG: 25 TABLET ORAL at 21:46

## 2024-02-11 RX ADMIN — CEPHALEXIN 500 MG: 500 CAPSULE ORAL at 21:46

## 2024-02-11 RX ADMIN — CEPHALEXIN 500 MG: 500 CAPSULE ORAL at 14:13

## 2024-02-11 NOTE — PLAN OF CARE
Problem: Adult Inpatient Plan of Care  Goal: Plan of Care Review  Outcome: Ongoing, Progressing  Flowsheets (Taken 2/11/2024 0343)  Progress: no change  Plan of Care Reviewed With: patient  Goal: Patient-Specific Goal (Individualized)  Outcome: Ongoing, Progressing  Goal: Absence of Hospital-Acquired Illness or Injury  Outcome: Ongoing, Progressing  Intervention: Identify and Manage Fall Risk  Recent Flowsheet Documentation  Taken 2/11/2024 0340 by Kymberly Lopez RN  Safety Promotion/Fall Prevention:   activity supervised   assistive device/personal items within reach   clutter free environment maintained   safety round/check completed  Taken 2/11/2024 0200 by Kymberly Lopez RN  Safety Promotion/Fall Prevention:   activity supervised   assistive device/personal items within reach   clutter free environment maintained   safety round/check completed  Taken 2/11/2024 0000 by Kymberly Lopez RN  Safety Promotion/Fall Prevention: safety round/check completed  Taken 2/10/2024 2200 by Kymberly Lopez RN  Safety Promotion/Fall Prevention: safety round/check completed  Taken 2/10/2024 2000 by Kymberly Lopez RN  Safety Promotion/Fall Prevention:   activity supervised   assistive device/personal items within reach   clutter free environment maintained   safety round/check completed  Intervention: Prevent Skin Injury  Recent Flowsheet Documentation  Taken 2/11/2024 0340 by Kymberly Lopez RN  Body Position: turned  Skin Protection: adhesive use limited  Taken 2/11/2024 0200 by Kymberly Lopez RN  Body Position: turned  Taken 2/11/2024 0000 by Kymberly Lopez RN  Body Position: turned  Skin Protection: adhesive use limited  Taken 2/10/2024 2200 by Kymberly Lopez RN  Body Position: turned  Taken 2/10/2024 2000 by Kymberly Lopez RN  Body Position: position changed independently  Skin Protection: adhesive use limited  Intervention: Prevent and Manage VTE (Venous Thromboembolism) Risk  Recent Flowsheet Documentation  Taken  2/11/2024 0340 by Kymberly Lopez RN  Activity Management: bedrest  Taken 2/11/2024 0200 by Kymberly Lopez RN  Activity Management: bedrest  Taken 2/11/2024 0000 by Kymberly Lopez RN  Activity Management: bedrest  Taken 2/10/2024 2200 by Kymberly Lopez RN  Activity Management: bedrest  Taken 2/10/2024 2000 by Kymberly Lopez RN  Activity Management: back to bed  Intervention: Prevent Infection  Recent Flowsheet Documentation  Taken 2/11/2024 0340 by Kymberly Lopez RN  Infection Prevention: hand hygiene promoted  Taken 2/11/2024 0200 by Kymberly Lopze RN  Infection Prevention: hand hygiene promoted  Taken 2/11/2024 0000 by Kymberly Lopez RN  Infection Prevention: hand hygiene promoted  Taken 2/10/2024 2200 by Kymberly Lopez RN  Infection Prevention: hand hygiene promoted  Taken 2/10/2024 2000 by Kymberly Lopez RN  Infection Prevention: hand hygiene promoted  Goal: Optimal Comfort and Wellbeing  Outcome: Ongoing, Progressing  Intervention: Provide Person-Centered Care  Recent Flowsheet Documentation  Taken 2/11/2024 0340 by Kymberly Lopez RN  Trust Relationship/Rapport:   care explained   choices provided   questions answered   emotional support provided   empathic listening provided   questions encouraged   reassurance provided   thoughts/feelings acknowledged  Taken 2/11/2024 0000 by Kymberly Lopez RN  Trust Relationship/Rapport:   care explained   choices provided   emotional support provided   empathic listening provided   questions answered   questions encouraged   thoughts/feelings acknowledged   reassurance provided  Taken 2/10/2024 2000 by Kymberly Lopez RN  Trust Relationship/Rapport:   care explained   choices provided   emotional support provided   questions answered   empathic listening provided   questions encouraged   thoughts/feelings acknowledged   reassurance provided  Goal: Readiness for Transition of Care  Outcome: Ongoing, Progressing     Problem: Fall Injury Risk  Goal: Absence of Fall  and Fall-Related Injury  Outcome: Ongoing, Progressing  Intervention: Identify and Manage Contributors  Recent Flowsheet Documentation  Taken 2/11/2024 0340 by Kymberly Lopez RN  Medication Review/Management: medications reviewed  Self-Care Promotion: safe use of adaptive equipment encouraged  Taken 2/11/2024 0200 by Kymberly Lopez RN  Medication Review/Management: medications reviewed  Self-Care Promotion: safe use of adaptive equipment encouraged  Taken 2/11/2024 0000 by Kymberly Lopez RN  Medication Review/Management: medications reviewed  Self-Care Promotion: safe use of adaptive equipment encouraged  Taken 2/10/2024 2200 by Kymberly Lopez RN  Medication Review/Management: medications reviewed  Self-Care Promotion: safe use of adaptive equipment encouraged  Taken 2/10/2024 2000 by Kymberly Lopez RN  Medication Review/Management: medications reviewed  Self-Care Promotion: safe use of adaptive equipment encouraged  Intervention: Promote Injury-Free Environment  Recent Flowsheet Documentation  Taken 2/11/2024 0340 by Kymberly Lopez RN  Safety Promotion/Fall Prevention:   activity supervised   assistive device/personal items within reach   clutter free environment maintained   safety round/check completed  Taken 2/11/2024 0200 by Kymberly Lopez RN  Safety Promotion/Fall Prevention:   activity supervised   assistive device/personal items within reach   clutter free environment maintained   safety round/check completed  Taken 2/11/2024 0000 by Kymberly Lopez RN  Safety Promotion/Fall Prevention: safety round/check completed  Taken 2/10/2024 2200 by Kymberly Lopez RN  Safety Promotion/Fall Prevention: safety round/check completed  Taken 2/10/2024 2000 by Kymberly Lopez RN  Safety Promotion/Fall Prevention:   activity supervised   assistive device/personal items within reach   clutter free environment maintained   safety round/check completed     Problem: Skin Injury Risk Increased  Goal: Skin Health and  Integrity  Outcome: Ongoing, Progressing  Intervention: Optimize Skin Protection  Recent Flowsheet Documentation  Taken 2/11/2024 0340 by Kymberly Lopez RN  Pressure Reduction Techniques: frequent weight shift encouraged  Head of Bed (HOB) Positioning:   HOB elevated   HOB at 30 degrees  Pressure Reduction Devices: pressure-redistributing mattress utilized  Skin Protection: adhesive use limited  Taken 2/11/2024 0200 by Kymberly Lopez RN  Head of Bed (HOB) Positioning:   HOB elevated   HOB at 30 degrees  Taken 2/11/2024 0000 by Kymberly Lopez RN  Pressure Reduction Techniques: frequent weight shift encouraged  Head of Bed (HOB) Positioning:   HOB elevated   HOB at 30 degrees  Pressure Reduction Devices: pressure-redistributing mattress utilized  Skin Protection: adhesive use limited  Taken 2/10/2024 2200 by Kymberly Lopez RN  Head of Bed (HOB) Positioning:   HOB elevated   HOB at 30 degrees  Taken 2/10/2024 2000 by Kymberly Lopez RN  Pressure Reduction Techniques: frequent weight shift encouraged  Head of Bed (HOB) Positioning:   HOB elevated   HOB at 30 degrees  Pressure Reduction Devices: pressure-redistributing mattress utilized  Skin Protection: adhesive use limited     Problem: Hypertension Comorbidity  Goal: Blood Pressure in Desired Range  Outcome: Ongoing, Progressing  Intervention: Maintain Blood Pressure Management  Recent Flowsheet Documentation  Taken 2/11/2024 0340 by Kymberly Lopez RN  Syncope Management: position changed slowly  Medication Review/Management: medications reviewed  Taken 2/11/2024 0200 by Kymberly Lopez RN  Medication Review/Management: medications reviewed  Taken 2/11/2024 0000 by Kymberly Lopez RN  Syncope Management: position changed slowly  Medication Review/Management: medications reviewed  Taken 2/10/2024 2200 by Kymberly Lopez RN  Medication Review/Management: medications reviewed  Taken 2/10/2024 2000 by Kymberly Lopez RN  Syncope Management: position changed  slowly  Medication Review/Management: medications reviewed     Problem: Confusion Acute  Goal: Optimal Cognitive Function  Outcome: Ongoing, Progressing  Intervention: Minimize Contributing Factors  Recent Flowsheet Documentation  Taken 2/11/2024 0340 by Kymberly Lopez RN  Sensory Stimulation Regulation: quiet environment promoted  Reorientation Measures: clock in view  Taken 2/11/2024 0000 by Kymberly Lopez RN  Sensory Stimulation Regulation: quiet environment promoted  Reorientation Measures: clock in view  Taken 2/10/2024 2000 by Kymberly Lopez RN  Reorientation Measures: clock in view     Problem: Breathing Pattern Ineffective  Goal: Effective Breathing Pattern  Outcome: Ongoing, Progressing  Intervention: Promote Improved Breathing Pattern  Recent Flowsheet Documentation  Taken 2/11/2024 0340 by Kymberly Lopez RN  Supportive Measures: active listening utilized  Head of Bed (HOB) Positioning:   HOB elevated   HOB at 30 degrees  Taken 2/11/2024 0200 by Kymberly Lopez RN  Head of Bed (HOB) Positioning:   HOB elevated   HOB at 30 degrees  Taken 2/11/2024 0000 by Kymberly Lopez RN  Supportive Measures: active listening utilized  Head of Bed (HOB) Positioning:   HOB elevated   HOB at 30 degrees  Taken 2/10/2024 2200 by Kymberly Lopez RN  Head of Bed (HOB) Positioning:   HOB elevated   HOB at 30 degrees  Taken 2/10/2024 2000 by Kymberly Lopez RN  Supportive Measures: active listening utilized  Head of Bed (HOB) Positioning:   HOB elevated   HOB at 30 degrees   Goal Outcome Evaluation:  Plan of Care Reviewed With: patient        Progress: no change

## 2024-02-11 NOTE — PROGRESS NOTES
LOS: 4 days   Patient Care Team:  Shaylee Mcdaniels MD as PCP - General (Family Medicine)  Richardson Willis MD as Cardiologist (Cardiology)  Rafy Rodriguez MD as Consulting Physician (Hematology and Oncology)  Christianne Phillips, AMARILIS as Licensed Practical Nurse  Salome Fleming MD as Consulting Physician (Nephrology)    Subjective:  Patient more alert and improved    Objective:   Afebrile      Review of Systems:   Review of Systems   Constitutional:  Positive for activity change.   Neurological:  Positive for weakness.           Vital Signs  Temp:  [97.4 °F (36.3 °C)-98.7 °F (37.1 °C)] 97.5 °F (36.4 °C)  Heart Rate:  [] 90  Resp:  [16-38] 20  BP: ()/(46-61) 116/59    Physical Exam:  Physical Exam  Vitals and nursing note reviewed.   Constitutional:       Appearance: Normal appearance.   Cardiovascular:      Rate and Rhythm: Normal rate.      Heart sounds: Normal heart sounds.   Pulmonary:      Breath sounds: Normal breath sounds.   Skin:     General: Skin is warm.   Neurological:      Mental Status: She is alert.          Radiology:  XR Chest 1 View    Result Date: 2/9/2024  Impression: Stable left basilar atelectasis and chronic lung changes Cardiomegaly Electronically Signed: Pablo Martins MD  2/9/2024 7:35 AM EST  Workstation ID: [id number]    US Breast Left Limited    Result Date: 2/8/2024  IMPRESSION : 1. Nonspecific hypoechoic shadowing region measuring 2.0 cm at the 11 o'clock position 4 cm from the nipple corresponding to patient's lumpectomy site. This could represent scarring at the lumpectomy site, collapsed seroma cavity or recurrence. Abscess is felt less likely but not entirely excluded if there are clinical findings of infection.  Recommend patient return for full outpatient diagnostic work-up including diagnostic mammogram and ultrasound with real-time evaluation by radiologist. Patient also requires import of outside breast imaging more recent than 2014  BI-RADS Final  Assessment Category 0: Incomplete-Need Additional Imaging Evaluation Management Recommendation: Recall for additional imaging.   Electronically Signed By-Param Johansen MD On:2/8/2024 8:35 AM      MRI Brain With Contrast    Result Date: 2/7/2024  Impression: No abnormality noted on postcontrast imaging. See same-day noncontrast exam report for additional details. Electronically Signed: Alan Rodríguez MD  2/7/2024 6:39 PM CST  Workstation ID: [id number]    MRI Brain Without Contrast    Result Date: 2/7/2024  Impression: Mildly motion-degraded exam demonstrating expected age-related changes, without evidence of acute infarct, hemorrhage, mass or mass effect. Electronically Signed: Andres Olvera MD  2/7/2024 9:00 AM EST  Workstation ID: [id number]    CT Abdomen Pelvis Without Contrast    Result Date: 2/6/2024  Impression: Chronic atelectasis of the left lower lobe. Moderately severe emphysema. Lesion of the sternal manubrium as detailed, which could represent subacute or old fracture although metastatic disease is not excluded. Correlation with whole-body bone scan may be  useful. Aneurysmal dilatation of the abdominal aorta. Small amount of free pelvic fluid, nonspecific. Nodular liver contour suggests cirrhosis. Electronically Signed: Mary Ivan MD  2/6/2024 3:29 PM EST  Workstation ID: [id number]    CT Chest Without Contrast Diagnostic    Result Date: 2/6/2024  Impression: Chronic atelectasis of the left lower lobe. Moderately severe emphysema. Lesion of the sternal manubrium as detailed, which could represent subacute or old fracture although metastatic disease is not excluded. Correlation with whole-body bone scan may be  useful. Aneurysmal dilatation of the abdominal aorta. Small amount of free pelvic fluid, nonspecific. Nodular liver contour suggests cirrhosis. Electronically Signed: Mary Ivan MD  2/6/2024 3:29 PM EST  Workstation ID: [id number]    CT Head Without Contrast    Result Date:  2/5/2024  1.Examination is limited due to motion artifact. 2.Given this limitation, no acute intracranial abnormality is identified. 3.Findings compatible with chronic microvascular ischemic change and diffuse cortical atrophy. Electronically Signed: Parker Mireles MD  2/5/2024 12:26 PM EST  Workstation ID: [id number]    XR Chest 1 View    Result Date: 2/5/2024  Impression: Mild cardiomegaly. Mild chronic findings of the lung fields. Electronically Signed: Mary Ivan MD  2/5/2024 12:06 PM EST  Workstation ID: [id number]        Results Review:     I reviewed the patient's new clinical results.  I reviewed the patient's new imaging results and agree with the interpretation.    Medication Review:   Scheduled Meds:allopurinol, 100 mg, Oral, Daily  [START ON 2/12/2024] amLODIPine, 2.5 mg, Oral, Q24H  apixaban, 5 mg, Oral, Q12H  budesonide, 0.5 mg, Nebulization, BID - RT  carvedilol, 25 mg, Oral, BID With Meals  cephalexin, 500 mg, Oral, Q8H  famotidine, 20 mg, Oral, Daily  furosemide, 40 mg, Oral, Daily  hydrALAZINE, 50 mg, Oral, Q8H  ipratropium-albuterol, 3 mL, Nebulization, 4x Daily - RT  levothyroxine, 50 mcg, Oral, Q AM  melatonin, 5 mg, Oral, Nightly  methylPREDNISolone sodium succinate, 20 mg, Intravenous, Daily  sildenafil, 20 mg, Oral, TID  sodium chloride, 10 mL, Intravenous, Q12H  sodium chloride, 10 mL, Intravenous, Q12H      Continuous Infusions:   PRN Meds:.  acetaminophen    Calcium Replacement - Follow Nurse / BPA Driven Protocol    ipratropium-albuterol    Magnesium Standard Dose Replacement - Follow Nurse / BPA Driven Protocol    ondansetron    Phosphorus Replacement - Follow Nurse / BPA Driven Protocol    Potassium Replacement - Follow Nurse / BPA Driven Protocol    [COMPLETED] Insert Peripheral IV **AND** sodium chloride    sodium chloride    sodium chloride    sodium chloride    sodium chloride    Labs:    CBC    Results from last 7 days   Lab Units 02/10/24  0227 02/09/24  0055 02/08/24  5313  "02/07/24  1013 02/06/24  0351 02/05/24  1150   WBC 10*3/mm3 10.40 11.50* 9.60 9.20 7.30 9.10   HEMOGLOBIN g/dL 11.7* 12.3 12.8 11.8* 11.2* 12.8   PLATELETS 10*3/mm3 238 234 216 200 177 220     BMP   Results from last 7 days   Lab Units 02/11/24  0429 02/10/24  0227 02/09/24  0055 02/08/24  0444 02/07/24  1013 02/06/24  1806 02/06/24  0351 02/05/24  1150   SODIUM mmol/L 138 140 138 136 136 135* 138 137   POTASSIUM mmol/L 4.6 4.3 4.3 4.8 4.9 5.1 2.9* 3.1*   CHLORIDE mmol/L 106 106 105 102 102 98 96* 94*   CO2 mmol/L 21.0* 23.0 22.0 23.0 24.0 28.0 30.0* 30.0*   BUN mg/dL 35* 29* 30* 32* 32* 32* 22 19   CREATININE mg/dL 1.43* 1.14* 1.16* 1.12* 1.26* 1.50* 1.07* 1.30*   GLUCOSE mg/dL 161* 113* 115* 90 100* 105* 82 104*   MAGNESIUM mg/dL  --  2.0 1.9 2.0  --   --   --  2.2   PHOSPHORUS mg/dL 4.7* 3.0 2.7 3.4  --   --   --   --      Cr Clearance Estimated Creatinine Clearance: 29.8 mL/min (A) (by C-G formula based on SCr of 1.43 mg/dL (H)).  Coag     HbA1C   Lab Results   Component Value Date    HGBA1C 5.8 (H) 10/25/2022     Blood Glucose No results found for: \"POCGLU\"  Infection   Results from last 7 days   Lab Units 02/05/24  1627 02/05/24  1150   BLOODCX  No growth at 5 days No growth at 5 days   PROCALCITONIN ng/mL  --  0.06     CMP   Results from last 7 days   Lab Units 02/11/24  0429 02/10/24  0227 02/09/24  0055 02/08/24  0444 02/07/24  1013 02/06/24  1806 02/06/24  0351 02/05/24  1235 02/05/24  1150   SODIUM mmol/L 138 140 138 136 136 135* 138  --  137   POTASSIUM mmol/L 4.6 4.3 4.3 4.8 4.9 5.1 2.9*  --  3.1*   CHLORIDE mmol/L 106 106 105 102 102 98 96*  --  94*   CO2 mmol/L 21.0* 23.0 22.0 23.0 24.0 28.0 30.0*  --  30.0*   BUN mg/dL 35* 29* 30* 32* 32* 32* 22  --  19   CREATININE mg/dL 1.43* 1.14* 1.16* 1.12* 1.26* 1.50* 1.07*  --  1.30*   GLUCOSE mg/dL 161* 113* 115* 90 100* 105* 82  --  104*   ALBUMIN g/dL 3.3*  --   --  3.5  --  3.3*  --   --  3.9   BILIRUBIN mg/dL  --   --   --  0.7  --  0.6  --   --  1.1 "   ALK PHOS U/L  --   --   --  60  --  58  --   --  69   AST (SGOT) U/L  --   --   --  35*  --  21  --   --  19   ALT (SGPT) U/L  --   --   --  32  --  16  --   --  10   AMMONIA umol/L  --   --   --   --   --   --   --  25  --      UA    Results from last 7 days   Lab Units 02/06/24  1054   NITRITE UA  Negative   WBC UA /HPF 0-2   BACTERIA UA /HPF 1+*   SQUAM EPITHEL UA /HPF 3-6*     Radiology(recent) No radiology results for the last day   Assessment:    Acute mental status changes with acute delirium  Urinary tract infection present upon admission  Hypocalcemia  Dehydration  Acute renal failure with acute kidney injury superimposed upon chronic kidney disease stage IIIa  Hypertension associated chronic kidney disease stage IIIa  Anemia of chronic kidney disease  Hyperuricemia  Left breast mastitis  Right arm thrombophlebitis  Pulmonary hypertension  Acute hypokalemia  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  Cerebrovascular disease status post CVA  Chronic oral anticoagulation therapy  Acquired hypothyroidism  Thrombophilia  Autonomic dysfunction syndrome  History of pulmonary embolism  Paroxysmal atrial fibrillation  Hypercoagulable state secondary to atrial fibrillation  [id number] score 6  History of IVC filter  Aneurysmal dilatation of abdominal aorta  Panlobular COPD with emphysema  Chronic mucopurulent bronchitis  Peripheral polyneuropathy  History of breast cancer  Supplemental oxygen dependency  Chronic hypoxic respiratory failure         Plan:  Continue present approach//will allow blood pressure to trend to higher given history of autonomic dysfunction        Paul Granados MD  02/11/24  10:21 EST

## 2024-02-11 NOTE — THERAPY TREATMENT NOTE
"Subjective: Pt agreeable to therapeutic plan of care. Pt still very fearful of transfers. Instructed nsg to use 2 assist back to bed    Objective:     Bed mobility - Mod-A  Transfers - Max-A from front at gait belt  Ambulation -  feet N/A or Not attempted.      Vitals: WNL on 7L hf    Pain: 0 VAS       Education: Provided education on the importance of mobility in the acute care setting, Verbal/Tactile Cues, and Transfer Training    Assessment: Gabrielle Lyles presents with functional mobility impairments which indicate the need for skilled intervention. Tolerating session today without incident. Pt needed much encouragement to sit up in chair at least to eat lunch soon and to not stay too long after lunch so she would have some strength to assist with transfer back to bed, family present and agreed on plan. Very weak and needed knees blocked to assist with wt bearing. Plans on rehab at [place].Will continue to follow and progress as tolerated.     Plan/Recommendations:   If medically appropriate, Moderate Intensity Therapy recommended post-acute care. This is recommended as therapy feels the patient would require 3-4 days per week and wouldn't tolerate \"3 hour daily\" rehab intensity. SNF would be the preferred choice. If the patient does not agree to SNF, arrange HH or OP depending on home bound status. If patient is medically complex, consider LTACH. Pt requires no DME at discharge.     Pt desires Skilled Rehab placement at discharge. Pt cooperative; agreeable to therapeutic recommendations and plan of care.         Basic Mobility 6-click:  Rollin = Total, A lot = 2, A little = 3; 4 = None  Supine>Sit:   1 = Total, A lot = 2, A little = 3; 4 = None   Sit>Stand with arms:  1 = Total, A lot = 2, A little = 3; 4 = None  Bed>Chair:   1 = Total, A lot = 2, A little = 3; 4 = None  Ambulate in room:  1 = Total, A lot = 2, A little = 3; 4 = None  3-5 Steps with railin = Total, A lot = 2, A little = 3; 4 = " None  Score: 11    Post-Tx Position: Up in Chair and Call light and personal items within reach  PPE: gloves

## 2024-02-11 NOTE — PROGRESS NOTES
"PULMONARY CRITICAL CARE PROGRESS  NOTE      PATIENT IDENTIFICATION:  Name: Gabrielle Lyles  MRN: [id number]  :  1940     Age: 83 y.o.  Sex: female    DATE OF Note:  2024   Referring Physician: Rosamaria Jin MD                  Subjective:   In bed, still on O2 , still weak and not taking deep breaths, no nausea or vomiting, no bowel or bladder issues reported       Objective:  tMax 24 hrs: Temp (24hrs), Av.9 °F (36.6 °C), Min:97.4 °F (36.3 °C), Max:98.7 °F (37.1 °C)      Vitals Ranges:   Temp:  [97.4 °F (36.3 °C)-98.7 °F (37.1 °C)] 97.5 °F (36.4 °C)  Heart Rate:  [] 80  Resp:  [16-38] 20  BP: ()/(46-61) 89/61    Intake and Output Last 3 Shifts:   No intake/output data recorded.    Exam:  BP (!) 89/61   Pulse 80   Temp 97.5 °F (36.4 °C) (Axillary)   Resp 20   Ht 162.6 cm (64\")   Wt 73.5 kg (162 lb 0.6 oz)   SpO2 91%   BMI 27.81 kg/m²     General Appearance:     HEENT:  Normocephalic, without obvious abnormality. Conjunctivae/corneas clear.  Normal external ear canals. Nares normal, no drainage     Neck:  Supple, symmetrical, trachea midline. No JVD.  Lungs /Chest wall:   Bilateral basal rhonchi, respirations unlabored, symmetrical wall movement.     Heart:  Regular rate and rhythm, systolic murmur PMI left sternal border  Abdomen: Soft, nontender, no masses, no organomegaly.    Extremities: Trace edema, no clubbing or cyanosis        Medications:  allopurinol, 100 mg, Oral, Daily  apixaban, 5 mg, Oral, Q12H  budesonide, 0.5 mg, Nebulization, BID - RT  carvedilol, 12.5 mg, Oral, BID With Meals  cephalexin, 500 mg, Oral, Q8H  famotidine, 20 mg, Oral, Daily  furosemide, 40 mg, Oral, Daily  hydrALAZINE, 50 mg, Oral, Q8H  ipratropium-albuterol, 3 mL, Nebulization, 4x Daily - RT  levothyroxine, 50 mcg, Oral, Q AM  melatonin, 5 mg, Oral, Nightly  methylPREDNISolone sodium succinate, 20 mg, Intravenous, Daily  sildenafil, 20 mg, Oral, TID  sodium chloride, 10 mL, Intravenous, " Q12H  sodium chloride, 10 mL, Intravenous, Q12H        Infusion:          PRN:    acetaminophen    Calcium Replacement - Follow Nurse / BPA Driven Protocol    ipratropium-albuterol    Magnesium Standard Dose Replacement - Follow Nurse / BPA Driven Protocol    ondansetron    Phosphorus Replacement - Follow Nurse / BPA Driven Protocol    Potassium Replacement - Follow Nurse / BPA Driven Protocol    [COMPLETED] Insert Peripheral IV **AND** sodium chloride    sodium chloride    sodium chloride    sodium chloride    sodium chloride  Data Review:  All labs (24hrs):   Recent Results (from the past 24 hour(s))   Renal Function Panel    Collection Time: 02/11/24  4:29 AM    Specimen: Blood   Result Value Ref Range    Glucose 161 (H) 65 - 99 mg/dL    BUN 35 (H) 8 - 23 mg/dL    Creatinine 1.43 (H) 0.57 - 1.00 mg/dL    Sodium 138 136 - 145 mmol/L    Potassium 4.6 3.5 - 5.2 mmol/L    Chloride 106 98 - 107 mmol/L    CO2 21.0 (L) 22.0 - 29.0 mmol/L    Calcium 9.1 8.6 - 10.5 mg/dL    Albumin 3.3 (L) 3.5 - 5.2 g/dL    Phosphorus 4.7 (H) 2.5 - 4.5 mg/dL    Anion Gap 11.0 5.0 - 15.0 mmol/L    BUN/Creatinine Ratio 24.5 7.0 - 25.0    eGFR 36.7 (L) >60.0 mL/min/1.73        Imaging:  XR Chest 1 View  Narrative: XR CHEST 1 VW    Date of Exam: 2/9/2024 5:36 AM EST    Indication: sob    Comparison: None available.    Findings:  The trachea is midline. Stable cardiomegaly with mild enlargement of the pulmonary arteries. There is mild diffuse bilateral reticular lung thickening. Mild left basilar atelectasis. No definite pleural effusions. No change in position of the right-sided   pigtail catheter.  Impression: Impression:  Stable left basilar atelectasis and chronic lung changes    Cardiomegaly    Electronically Signed: Pablo Martins MD    2/9/2024 7:35 AM EST    Workstation ID: [id number]       ASSESSMENT:  AMS  Chronic   Severe Pulmonary HTN  Hypokalemia  CKD 3  HTN  Hx PE/DVT  Hx TIA              PLAN:  Continue to wean oxygen  down  Patient will benefit from rehab  Continue  PT/OT  Need to encourage to take deep breaths   Wean O2 to keep sats > 88%  Antibiotics per ID  Bronchodilators  Inhaled corticosteroids  Incentive spirometer  Cardiology following   Electrolytes/ glycemic control  DVT and GI prophylaxis-Apixaban       management (   ) minutes, This time specifically excludes time spent performing procedures.    Jerry Solorzano MD   2/11/2024  10:43 EST

## 2024-02-11 NOTE — PLAN OF CARE
Assessment: Gabrielle Lyles presents with functional mobility impairments which indicate the need for skilled intervention. Tolerating session today without incident. Pt needed much encouragement to sit up in chair at least to eat lunch soon and to not stay too long after lunch so she would have some strength to assist with transfer back to bed, family present and agreed on plan. Very weak and needed knees blocked to assist with wt bearing. Plans on rehab at [place].Will continue to follow and progress as tolerated.

## 2024-02-11 NOTE — PROGRESS NOTES
"NEPHROLOGY PROGRESS NOTE------[facility]    [facility]  [phone]  Anirudh Aleman MD      Patient Care Team:  Shaylee Mcdaniels MD as PCP - General (Family Medicine)  Richardson Willis MD as Cardiologist (Cardiology)  Rafy Rodriguez MD as Consulting Physician (Hematology and Oncology)  Christianne Phillips RN as Licensed Practical Nurse  Salome Fleming MD as Consulting Physician (Nephrology)      Provider:  Anirudh Aleman MD  Patient Name: Gabrielle Lyles  :  1940    SUBJECTIVE:    F/U ARF/JULIAN/CRF/CKD  No chest pain, breathing better  Blood pressure low in the 90s.  Antihypertensives were held this morning    Medication:  allopurinol, 100 mg, Oral, Daily  amLODIPine, 10 mg, Oral, Q24H  apixaban, 5 mg, Oral, Q12H  budesonide, 0.5 mg, Nebulization, BID - RT  carvedilol, 25 mg, Oral, BID With Meals  cephalexin, 500 mg, Oral, Q8H  famotidine, 20 mg, Oral, Daily  furosemide, 40 mg, Oral, Daily  hydrALAZINE, 50 mg, Oral, Q8H  ipratropium-albuterol, 3 mL, Nebulization, 4x Daily - RT  levothyroxine, 50 mcg, Oral, Q AM  melatonin, 5 mg, Oral, Nightly  methylPREDNISolone sodium succinate, 20 mg, Intravenous, Daily  sildenafil, 20 mg, Oral, TID  sodium chloride, 10 mL, Intravenous, Q12H  sodium chloride, 10 mL, Intravenous, Q12H             OBJECTIVE    Vital Sign Min/Max for last 24 hours  Temp  Min: 97.4 °F (36.3 °C)  Max: 98.8 °F (37.1 °C)   BP  Min: 77/48  Max: 119/61   Pulse  Min: 72  Max: 104   Resp  Min: 16  Max: 38   SpO2  Min: 84 %  Max: 94 %   No data recorded   No data recorded     Flowsheet Rows      Flowsheet Row First Filed Value   Admission Height 162.6 cm (64\") Documented at 2024 1046   Admission Weight 66.7 kg (147 lb) Documented at 2024 1046            No intake/output data recorded.  No intake/output data recorded.    Physical Exam:  General Appearance: alert, appears stated age and cooperative  Head: normocephalic, " "without obvious abnormality and atraumatic  Eyes: conjunctivae and sclerae normal and no icterus  Neck: supple and no JVD  Lungs: +SCATTERED RHONCHI  Heart: regular rhythm & normal rate and normal S1, S2 +TAYE  Chest Wall: no abnormalities observed  Abdomen: normal bowel sounds and soft, nontender +OBESITY  Extremities: moves extremities well, trace edema, no cyanosis  Skin: no bleeding, bruising or rash  Neurologic: Alert, and oriented. No focal deficits    Labs:    WBC WBC   Date Value Ref Range Status   02/10/2024 10.40 3.40 - 10.80 10*3/mm3 Final   02/09/2024 11.50 (H) 3.40 - 10.80 10*3/mm3 Final      HGB Hemoglobin   Date Value Ref Range Status   02/10/2024 11.7 (L) 12.0 - 15.9 g/dL Final   02/09/2024 12.3 12.0 - 15.9 g/dL Final      HCT Hematocrit   Date Value Ref Range Status   02/10/2024 36.6 34.0 - 46.6 % Final   02/09/2024 38.8 34.0 - 46.6 % Final      Platelets No results found for: \"LABPLAT\"   MCV MCV   Date Value Ref Range Status   02/10/2024 84.3 79.0 - 97.0 fL Final   02/09/2024 84.8 79.0 - 97.0 fL Final          Sodium Sodium   Date Value Ref Range Status   02/11/2024 138 136 - 145 mmol/L Final   02/10/2024 140 136 - 145 mmol/L Final   02/09/2024 138 136 - 145 mmol/L Final      Potassium Potassium   Date Value Ref Range Status   02/11/2024 4.6 3.5 - 5.2 mmol/L Final   02/10/2024 4.3 3.5 - 5.2 mmol/L Final   02/09/2024 4.3 3.5 - 5.2 mmol/L Final      Chloride Chloride   Date Value Ref Range Status   02/11/2024 106 98 - 107 mmol/L Final   02/10/2024 106 98 - 107 mmol/L Final   02/09/2024 105 98 - 107 mmol/L Final      CO2 CO2   Date Value Ref Range Status   02/11/2024 21.0 (L) 22.0 - 29.0 mmol/L Final   02/10/2024 23.0 22.0 - 29.0 mmol/L Final   02/09/2024 22.0 22.0 - 29.0 mmol/L Final      BUN BUN   Date Value Ref Range Status   02/11/2024 35 (H) 8 - 23 mg/dL Final   02/10/2024 29 (H) 8 - 23 mg/dL Final   02/09/2024 30 (H) 8 - 23 mg/dL Final      Creatinine Creatinine   Date Value Ref Range Status " "  02/11/2024 1.43 (H) 0.57 - 1.00 mg/dL Final   02/10/2024 1.14 (H) 0.57 - 1.00 mg/dL Final   02/09/2024 1.16 (H) 0.57 - 1.00 mg/dL Final      Calcium Calcium   Date Value Ref Range Status   02/11/2024 9.1 8.6 - 10.5 mg/dL Final   02/10/2024 9.2 8.6 - 10.5 mg/dL Final   02/09/2024 9.3 8.6 - 10.5 mg/dL Final      PO4 No components found for: \"PO4\"   Albumin Albumin   Date Value Ref Range Status   02/11/2024 3.3 (L) 3.5 - 5.2 g/dL Final      Magnesium Magnesium   Date Value Ref Range Status   02/10/2024 2.0 1.6 - 2.4 mg/dL Final   02/09/2024 1.9 1.6 - 2.4 mg/dL Final      Uric Acid No components found for: \"URIC ACID\"     Imaging Results (Last 72 Hours)       Procedure Component Value Units Date/Time    XR Chest 1 View [[id number]] Collected: 02/09/24 0730     Updated: 02/09/24 0737    Narrative:      XR CHEST 1 VW    Date of Exam: 2/9/2024 5:36 AM EST    Indication: sob    Comparison: None available.    Findings:  The trachea is midline. Stable cardiomegaly with mild enlargement of the pulmonary arteries. There is mild diffuse bilateral reticular lung thickening. Mild left basilar atelectasis. No definite pleural effusions. No change in position of the right-sided   pigtail catheter.      Impression:      Impression:  Stable left basilar atelectasis and chronic lung changes    Cardiomegaly      Electronically Signed: Pablo Martins MD    2/9/2024 7:35 AM EST    Workstation ID: [id number]            Results for orders placed during the hospital encounter of 02/05/24    XR Chest 1 View    Narrative  XR CHEST 1 VW    Date of Exam: 2/9/2024 5:36 AM EST    Indication: sob    Comparison: None available.    Findings:  The trachea is midline. Stable cardiomegaly with mild enlargement of the pulmonary arteries. There is mild diffuse bilateral reticular lung thickening. Mild left basilar atelectasis. No definite pleural effusions. No change in position of the right-sided  pigtail catheter.    Impression  Impression:  Stable left " basilar atelectasis and chronic lung changes    Cardiomegaly      Electronically Signed: Pablo Martins MD  2/9/2024 7:35 AM EST  Workstation ID: [id number]      XR Chest 1 View    Narrative  XR CHEST 1 VW    Date of Exam: 2/5/2024 12:00 PM EST    Indication: weakness    Comparison: 5/2/2023.    Findings:  There is mild cardiomegaly with increased heart size. Mildly prominent interstitial pattern appears stable and chronic. There is a prominent skinfold over the right chest. There is no definite pneumothorax. There is no effusion.    Impression  Impression:  Mild cardiomegaly. Mild chronic findings of the lung fields.      Electronically Signed: Mary Ivan MD  2/5/2024 12:06 PM EST  Workstation ID: [id number]      Results for orders placed during the hospital encounter of 09/24/23    XR Foot 3+ View Left    Narrative  XR FOOT 3+ VW LEFT    Date of Exam: 9/24/2023 3:15 PM EDT    Indication: pain redness swelling    Comparison: None available.    Findings:  No fracture or dislocation. There is soft tissue swelling diffusely at the left foot. No discrete osseous erosion. Plantar calcaneal bone spur. Enthesopathy at the Achilles insertion on the calcaneus. No radiodense foreign body.    Impression  Impression:  1. Negative for acute osseous abnormality.  2. Nonspecific soft tissue swelling.        Electronically Signed: Randall Zarco MD  9/24/2023 3:54 PM EDT  Workstation ID: [id number]      Results for orders placed during the hospital encounter of 02/05/24    Duplex Venous Upper Extremity - Right CAR    Interpretation Summary    Normal right upper extremity venous duplex scan.        ASSESSMENT / PLAN      Altered mental status    Acute hypokalemia    Stage 3a chronic kidney disease    Chronic obstructive pulmonary disease    History of venous thrombosis and embolism    Primary hypertension    History of TIA (transient ischemic attack)    Severe pulmonary hypertension    Chronic respiratory failure with hypoxia    JULIAN  (acute kidney injury)    ARF/JULIAN/CRF/CKD------Nonoliguric. +ARF/JULIAN on top of CRF/CKD STG 3A with a baseline serum  Creatinine of about 1.1. Unknown if patient has baseline proteinuria or hematuria. CRF/CKD STG 3A most likely secondary to HTN NS. +ARF/JULIAN is secondary to prerenal state/intravascular volume depletion. D/CIVFs today. Avoid hypotension.  No NSAIDs or IV dye.  BUN/Cr stable     2. ANEMIA------H/H stable     3. HTN WITH CKD-----Avoid hypotension. No ACE/ARB/DRI/diuretic for now     4. OA/DJD/HYPERURICEMIA------No NSAIDs. Add Allopurinol     5. DELIRIUM-------?Secondary to UTI. Better     6. UTI-----C and S pending. On Abx     7. HYPOCALCEMIA------Replaced     8. KETONURIA/DEHYDRATION------Secondary to recent outpatient increase in Lasix. Hold Lasix. Gentle IVFs given Pulmonary HTN     9. PULMONARY HTN--------Per , Pulmonary     10. HYPOKALEMIA-------Replaced     11. CAD-----per , Cardiology    Creatinine slightly up, started Lasix yesterday  Blood pressure soft, stop amlodipine  May need to lower other antihypertensive depending on further blood pressure readings.  Continue Lasix for now    Anirudh Aleman MD  [facility]  [phone]  02/11/24  09:12 EST

## 2024-02-12 LAB
ALBUMIN SERPL-MCNC: 3.5 G/DL (ref 3.5–5.2)
ANION GAP SERPL CALCULATED.3IONS-SCNC: 11 MMOL/L (ref 5–15)
BASOPHILS # BLD AUTO: 0 10*3/MM3 (ref 0–0.2)
BASOPHILS NFR BLD AUTO: 0.1 % (ref 0–1.5)
BUN SERPL-MCNC: 45 MG/DL (ref 8–23)
BUN/CREAT SERPL: 33.1 (ref 7–25)
CALCIUM SPEC-SCNC: 9.2 MG/DL (ref 8.6–10.5)
CHLORIDE SERPL-SCNC: 104 MMOL/L (ref 98–107)
CO2 SERPL-SCNC: 23 MMOL/L (ref 22–29)
CREAT SERPL-MCNC: 1.36 MG/DL (ref 0.57–1)
DEPRECATED RDW RBC AUTO: 50.3 FL (ref 37–54)
EGFRCR SERPLBLD CKD-EPI 2021: 39 ML/MIN/1.73
EOSINOPHIL # BLD AUTO: 0 10*3/MM3 (ref 0–0.4)
EOSINOPHIL NFR BLD AUTO: 0 % (ref 0.3–6.2)
ERYTHROCYTE [DISTWIDTH] IN BLOOD BY AUTOMATED COUNT: 16.8 % (ref 12.3–15.4)
GLUCOSE SERPL-MCNC: 130 MG/DL (ref 65–99)
HCT VFR BLD AUTO: 35.9 % (ref 34–46.6)
HGB BLD-MCNC: 12.1 G/DL (ref 12–15.9)
LYMPHOCYTES # BLD AUTO: 0.5 10*3/MM3 (ref 0.7–3.1)
LYMPHOCYTES NFR BLD AUTO: 3.7 % (ref 19.6–45.3)
MCH RBC QN AUTO: 29.2 PG (ref 26.6–33)
MCHC RBC AUTO-ENTMCNC: 33.7 G/DL (ref 31.5–35.7)
MCV RBC AUTO: 86.8 FL (ref 79–97)
MONOCYTES # BLD AUTO: 0.6 10*3/MM3 (ref 0.1–0.9)
MONOCYTES NFR BLD AUTO: 4.6 % (ref 5–12)
NEUTROPHILS NFR BLD AUTO: 11.2 10*3/MM3 (ref 1.7–7)
NEUTROPHILS NFR BLD AUTO: 91.6 % (ref 42.7–76)
NRBC BLD AUTO-RTO: 0 /100 WBC (ref 0–0.2)
PHOSPHATE SERPL-MCNC: 4.2 MG/DL (ref 2.5–4.5)
PLATELET # BLD AUTO: 284 10*3/MM3 (ref 140–450)
PMV BLD AUTO: 8.3 FL (ref 6–12)
POTASSIUM SERPL-SCNC: 4.4 MMOL/L (ref 3.5–5.2)
RBC # BLD AUTO: 4.13 10*6/MM3 (ref 3.77–5.28)
SODIUM SERPL-SCNC: 138 MMOL/L (ref 136–145)
WBC NRBC COR # BLD AUTO: 12.2 10*3/MM3 (ref 3.4–10.8)

## 2024-02-12 PROCEDURE — 94799 UNLISTED PULMONARY SVC/PX: CPT

## 2024-02-12 PROCEDURE — 85025 COMPLETE CBC W/AUTO DIFF WBC: CPT | Performed by: INTERNAL MEDICINE

## 2024-02-12 PROCEDURE — 25010000002 METHYLPREDNISOLONE PER 40 MG: Performed by: FAMILY MEDICINE

## 2024-02-12 PROCEDURE — 80069 RENAL FUNCTION PANEL: CPT | Performed by: INTERNAL MEDICINE

## 2024-02-12 PROCEDURE — 94664 DEMO&/EVAL PT USE INHALER: CPT

## 2024-02-12 PROCEDURE — 97535 SELF CARE MNGMENT TRAINING: CPT

## 2024-02-12 PROCEDURE — 94761 N-INVAS EAR/PLS OXIMETRY MLT: CPT

## 2024-02-12 PROCEDURE — 97530 THERAPEUTIC ACTIVITIES: CPT

## 2024-02-12 PROCEDURE — 97110 THERAPEUTIC EXERCISES: CPT

## 2024-02-12 PROCEDURE — 99232 SBSQ HOSP IP/OBS MODERATE 35: CPT | Performed by: INTERNAL MEDICINE

## 2024-02-12 RX ADMIN — BUDESONIDE INHALATION 0.5 MG: 0.5 SUSPENSION RESPIRATORY (INHALATION) at 18:55

## 2024-02-12 RX ADMIN — FAMOTIDINE 20 MG: 20 TABLET, FILM COATED ORAL at 08:56

## 2024-02-12 RX ADMIN — CARVEDILOL 12.5 MG: 6.25 TABLET, FILM COATED ORAL at 16:58

## 2024-02-12 RX ADMIN — CEPHALEXIN 500 MG: 500 CAPSULE ORAL at 21:33

## 2024-02-12 RX ADMIN — APIXABAN 5 MG: 5 TABLET, FILM COATED ORAL at 21:33

## 2024-02-12 RX ADMIN — Medication 10 ML: at 21:35

## 2024-02-12 RX ADMIN — Medication 10 ML: at 08:56

## 2024-02-12 RX ADMIN — METHYLPREDNISOLONE SODIUM SUCCINATE 20 MG: 40 INJECTION, POWDER, FOR SOLUTION INTRAMUSCULAR; INTRAVENOUS at 08:55

## 2024-02-12 RX ADMIN — CARVEDILOL 12.5 MG: 6.25 TABLET, FILM COATED ORAL at 08:56

## 2024-02-12 RX ADMIN — Medication 5 MG: at 21:33

## 2024-02-12 RX ADMIN — FUROSEMIDE 40 MG: 40 TABLET ORAL at 08:56

## 2024-02-12 RX ADMIN — SILDENAFIL CITRATE 20 MG: 20 TABLET ORAL at 08:56

## 2024-02-12 RX ADMIN — BUDESONIDE INHALATION 0.5 MG: 0.5 SUSPENSION RESPIRATORY (INHALATION) at 07:28

## 2024-02-12 RX ADMIN — ALLOPURINOL 100 MG: 100 TABLET ORAL at 08:56

## 2024-02-12 RX ADMIN — SILDENAFIL CITRATE 20 MG: 20 TABLET ORAL at 15:37

## 2024-02-12 RX ADMIN — SILDENAFIL CITRATE 20 MG: 20 TABLET ORAL at 21:40

## 2024-02-12 RX ADMIN — LEVOTHYROXINE SODIUM 50 MCG: 0.05 TABLET ORAL at 05:37

## 2024-02-12 RX ADMIN — CEPHALEXIN 500 MG: 500 CAPSULE ORAL at 14:10

## 2024-02-12 RX ADMIN — CEPHALEXIN 500 MG: 500 CAPSULE ORAL at 05:37

## 2024-02-12 RX ADMIN — APIXABAN 5 MG: 5 TABLET, FILM COATED ORAL at 08:56

## 2024-02-12 RX ADMIN — HYDRALAZINE HYDROCHLORIDE 50 MG: 25 TABLET ORAL at 14:10

## 2024-02-12 NOTE — PROGRESS NOTES
"NEPHROLOGY PROGRESS NOTE------[facility]    [facility]  [phone]  Anirudh Aleman MD      Patient Care Team:  Shaylee Mcdaniels MD as PCP - General (Family Medicine)  Richardson Willis MD as Cardiologist (Cardiology)  Rafy Rodriguez MD as Consulting Physician (Hematology and Oncology)  Christianne Phillips RN as Licensed Practical Nurse  Salome Fleming MD as Consulting Physician (Nephrology)      Provider:  Anirudh Aleman MD  Patient Name: Gabrielle Lyles  :  1940    SUBJECTIVE:    F/U ARF/JULIAN/CRF/CKD  No chest pain, breathing better  Blood pressure in elp960u    Medication:  allopurinol, 100 mg, Oral, Daily  apixaban, 5 mg, Oral, Q12H  budesonide, 0.5 mg, Nebulization, BID - RT  carvedilol, 12.5 mg, Oral, BID With Meals  cephalexin, 500 mg, Oral, Q8H  famotidine, 20 mg, Oral, Daily  furosemide, 40 mg, Oral, Daily  hydrALAZINE, 50 mg, Oral, Q8H  levothyroxine, 50 mcg, Oral, Q AM  melatonin, 5 mg, Oral, Nightly  methylPREDNISolone sodium succinate, 20 mg, Intravenous, Daily  sildenafil, 20 mg, Oral, TID  sodium chloride, 10 mL, Intravenous, Q12H  sodium chloride, 10 mL, Intravenous, Q12H             OBJECTIVE    Vital Sign Min/Max for last 24 hours  Temp  Min: 96.6 °F (35.9 °C)  Max: 97.3 °F (36.3 °C)   BP  Min: 89/61  Max: 140/56   Pulse  Min: 77  Max: 98   Resp  Min: 16  Max: 26   SpO2  Min: 90 %  Max: 96 %   No data recorded   No data recorded     Flowsheet Rows      Flowsheet Row First Filed Value   Admission Height 162.6 cm (64\") Documented at 2024 1046   Admission Weight 66.7 kg (147 lb) Documented at 2024 1046            No intake/output data recorded.  No intake/output data recorded.    Physical Exam:  General Appearance: alert, appears stated age and cooperative  Head: normocephalic, without obvious abnormality and atraumatic  Eyes: conjunctivae and sclerae normal and no icterus  Neck: supple and no JVD  Lungs: " "+SCATTERED RHONCHI  Heart: regular rhythm & normal rate and normal S1, S2 +TAYE  Chest Wall: no abnormalities observed  Abdomen: normal bowel sounds and soft, nontender +OBESITY  Extremities: moves extremities well, trace edema, no cyanosis  Skin: no bleeding, bruising or rash  Neurologic: Alert, and oriented. No focal deficits    Labs:    WBC WBC   Date Value Ref Range Status   02/12/2024 12.20 (H) 3.40 - 10.80 10*3/mm3 Final   02/10/2024 10.40 3.40 - 10.80 10*3/mm3 Final      HGB Hemoglobin   Date Value Ref Range Status   02/12/2024 12.1 12.0 - 15.9 g/dL Final   02/10/2024 11.7 (L) 12.0 - 15.9 g/dL Final      HCT Hematocrit   Date Value Ref Range Status   02/12/2024 35.9 34.0 - 46.6 % Final   02/10/2024 36.6 34.0 - 46.6 % Final      Platelets No results found for: \"LABPLAT\"   MCV MCV   Date Value Ref Range Status   02/12/2024 86.8 79.0 - 97.0 fL Final   02/10/2024 84.3 79.0 - 97.0 fL Final          Sodium Sodium   Date Value Ref Range Status   02/12/2024 138 136 - 145 mmol/L Final   02/11/2024 138 136 - 145 mmol/L Final   02/10/2024 140 136 - 145 mmol/L Final      Potassium Potassium   Date Value Ref Range Status   02/12/2024 4.4 3.5 - 5.2 mmol/L Final   02/11/2024 4.6 3.5 - 5.2 mmol/L Final   02/10/2024 4.3 3.5 - 5.2 mmol/L Final      Chloride Chloride   Date Value Ref Range Status   02/12/2024 104 98 - 107 mmol/L Final   02/11/2024 106 98 - 107 mmol/L Final   02/10/2024 106 98 - 107 mmol/L Final      CO2 CO2   Date Value Ref Range Status   02/12/2024 23.0 22.0 - 29.0 mmol/L Final   02/11/2024 21.0 (L) 22.0 - 29.0 mmol/L Final   02/10/2024 23.0 22.0 - 29.0 mmol/L Final      BUN BUN   Date Value Ref Range Status   02/12/2024 45 (H) 8 - 23 mg/dL Final   02/11/2024 35 (H) 8 - 23 mg/dL Final   02/10/2024 29 (H) 8 - 23 mg/dL Final      Creatinine Creatinine   Date Value Ref Range Status   02/12/2024 1.36 (H) 0.57 - 1.00 mg/dL Final   02/11/2024 1.43 (H) 0.57 - 1.00 mg/dL Final   02/10/2024 1.14 (H) 0.57 - 1.00 mg/dL " "Final      Calcium Calcium   Date Value Ref Range Status   02/12/2024 9.2 8.6 - 10.5 mg/dL Final   02/11/2024 9.1 8.6 - 10.5 mg/dL Final   02/10/2024 9.2 8.6 - 10.5 mg/dL Final      PO4 No components found for: \"PO4\"   Albumin Albumin   Date Value Ref Range Status   02/12/2024 3.5 3.5 - 5.2 g/dL Final   02/11/2024 3.3 (L) 3.5 - 5.2 g/dL Final      Magnesium Magnesium   Date Value Ref Range Status   02/10/2024 2.0 1.6 - 2.4 mg/dL Final      Uric Acid No components found for: \"URIC ACID\"     Imaging Results (Last 72 Hours)       ** No results found for the last 72 hours. **            Results for orders placed during the hospital encounter of 02/05/24    XR Chest 1 View    Narrative  XR CHEST 1 VW    Date of Exam: 2/9/2024 5:36 AM EST    Indication: sob    Comparison: None available.    Findings:  The trachea is midline. Stable cardiomegaly with mild enlargement of the pulmonary arteries. There is mild diffuse bilateral reticular lung thickening. Mild left basilar atelectasis. No definite pleural effusions. No change in position of the right-sided  pigtail catheter.    Impression  Impression:  Stable left basilar atelectasis and chronic lung changes    Cardiomegaly      Electronically Signed: Pablo Martins MD  2/9/2024 7:35 AM EST  Workstation ID: [id number]      XR Chest 1 View    Narrative  XR CHEST 1 VW    Date of Exam: 2/5/2024 12:00 PM EST    Indication: weakness    Comparison: 5/2/2023.    Findings:  There is mild cardiomegaly with increased heart size. Mildly prominent interstitial pattern appears stable and chronic. There is a prominent skinfold over the right chest. There is no definite pneumothorax. There is no effusion.    Impression  Impression:  Mild cardiomegaly. Mild chronic findings of the lung fields.      Electronically Signed: Mary Ivan MD  2/5/2024 12:06 PM EST  Workstation ID: [id number]      Results for orders placed during the hospital encounter of 09/24/23    XR Foot 3+ View " Left    Narrative  XR FOOT 3+ VW LEFT    Date of Exam: 9/24/2023 3:15 PM EDT    Indication: pain redness swelling    Comparison: None available.    Findings:  No fracture or dislocation. There is soft tissue swelling diffusely at the left foot. No discrete osseous erosion. Plantar calcaneal bone spur. Enthesopathy at the Achilles insertion on the calcaneus. No radiodense foreign body.    Impression  Impression:  1. Negative for acute osseous abnormality.  2. Nonspecific soft tissue swelling.        Electronically Signed: Randall Zarco MD  9/24/2023 3:54 PM EDT  Workstation ID: [id number]      Results for orders placed during the hospital encounter of 02/05/24    Duplex Venous Upper Extremity - Right CAR    Interpretation Summary    Normal right upper extremity venous duplex scan.        ASSESSMENT / PLAN      Altered mental status    Acute hypokalemia    Stage 3a chronic kidney disease    Chronic obstructive pulmonary disease    History of venous thrombosis and embolism    Primary hypertension    History of TIA (transient ischemic attack)    Severe pulmonary hypertension    Chronic respiratory failure with hypoxia    JULIAN (acute kidney injury)    ARF/JULIAN/CRF/CKD------Nonoliguric. +ARF/JULIAN on top of CRF/CKD STG 3A with a baseline serum  Creatinine of about 1.1. Unknown if patient has baseline proteinuria or hematuria. CRF/CKD STG 3A most likely secondary to HTN NS. +ARF/JULINA is secondary to prerenal state/intravascular volume depletion. D/CIVFs today. Avoid hypotension.  No NSAIDs or IV dye.  BUN/Cr stable     2. ANEMIA------H/H stable     3. HTN WITH CKD-----Avoid hypotension. No ACE/ARB/DRI/diuretic for now     4. OA/DJD/HYPERURICEMIA------No NSAIDs. Add Allopurinol     5. DELIRIUM-------?Secondary to UTI. Better     6. UTI-----C and S pending. On Abx     7. HYPOCALCEMIA------Replaced     8. KETONURIA/DEHYDRATION------Secondary to recent outpatient increase in Lasix. Hold Lasix. Gentle IVFs given Pulmonary HTN     9.  PULMONARY HTN--------Per , Pulmonary     10. HYPOKALEMIA-------Replaced     11. CAD-----per , Cardiology    Creatinine stable  Blood pressure better, avoid over treatment  Continue Lasix for now    Anirudh Aleman MD  [facility] [place]  [phone]  02/12/24  08:13 EST

## 2024-02-12 NOTE — PROGRESS NOTES
Daily Progress Note          Assessment    AMS: No significant findings on brain MRI  Chronic Severe Pulmonary HTN  COPD: Emphysema  Chronic atelectasis in left lower lobe  Anemia  UTI  JULIAN/CKD 3  HTN  Hx PE/DVT  Hx TIA  CAD  History of breast cancer in 2018: Currently in remission              PLAN:  Oxygen supplement and titration to maintain saturation 90-95%: Currently requiring 7 L by high flow nasal cannula  PT/OT  Encourage use of incentive spirometry  Antibiotics per ID  Bronchodilators  IV steroids  Sildenafil  Inhaled corticosteroids  Diuresis  Thyroid hormone replacement  Cardiology following   Electrolytes/ glycemic control  Chronic anticoagulation: Apixaban  I personally reviewed the radiological images             LOS: 5 days     Subjective     Patient reports cough and shortness of breath    Objective     Vital signs for last 24 hours:  Vitals:    02/12/24 0400 02/12/24 0728 02/12/24 0735 02/12/24 0856   BP: 106/56   115/62   BP Location: Right arm   Right arm   Patient Position: Lying   Lying   Pulse: 80 81 78 83   Resp: 22 26 18 17   Temp: 97 °F (36.1 °C)   98 °F (36.7 °C)   TempSrc: Axillary   Axillary   SpO2: 93% 90% 91%    Weight:       Height:           Intake/Output last 3 shifts:  No intake/output data recorded.  Intake/Output this shift:  I/O this shift:  In: 200 [P.O.:200]  Out: -       Radiology  Imaging Results (Last 24 Hours)       ** No results found for the last 24 hours. **            Labs:  Results from last 7 days   Lab Units 02/12/24  0540   WBC 10*3/mm3 12.20*   HEMOGLOBIN g/dL 12.1   HEMATOCRIT % 35.9   PLATELETS 10*3/mm3 284     Results from last 7 days   Lab Units 02/12/24  0540 02/09/24  0055 02/08/24  0444   SODIUM mmol/L 138   < > 136   POTASSIUM mmol/L 4.4   < > 4.8   CHLORIDE mmol/L 104   < > 102   CO2 mmol/L 23.0   < > 23.0   BUN mg/dL 45*   < > 32*   CREATININE mg/dL 1.36*   < > 1.12*   CALCIUM mg/dL 9.2   < > 9.4   BILIRUBIN mg/dL  --   --  0.7   ALK PHOS U/L  --   --   60   ALT (SGPT) U/L  --   --  32   AST (SGOT) U/L  --   --  35*   GLUCOSE mg/dL 130*   < > 90    < > = values in this interval not displayed.         Results from last 7 days   Lab Units 02/12/24  0540 02/11/24  0429 02/08/24  0444   ALBUMIN g/dL 3.5 3.3* 3.5     Results from last 7 days   Lab Units 02/06/24  1806   CK TOTAL U/L 64         Results from last 7 days   Lab Units 02/10/24  0227   MAGNESIUM mg/dL 2.0         Results from last 7 days   Lab Units 02/05/24  1150   TSH uIU/mL 1.640           Meds:   SCHEDULE  allopurinol, 100 mg, Oral, Daily  apixaban, 5 mg, Oral, Q12H  budesonide, 0.5 mg, Nebulization, BID - RT  carvedilol, 12.5 mg, Oral, BID With Meals  cephalexin, 500 mg, Oral, Q8H  famotidine, 20 mg, Oral, Daily  furosemide, 40 mg, Oral, Daily  hydrALAZINE, 50 mg, Oral, Q8H  levothyroxine, 50 mcg, Oral, Q AM  melatonin, 5 mg, Oral, Nightly  methylPREDNISolone sodium succinate, 20 mg, Intravenous, Daily  sildenafil, 20 mg, Oral, TID  sodium chloride, 10 mL, Intravenous, Q12H  sodium chloride, 10 mL, Intravenous, Q12H      Infusions     PRNs    acetaminophen    Calcium Replacement - Follow Nurse / BPA Driven Protocol    ipratropium-albuterol    ipratropium-albuterol    Magnesium Standard Dose Replacement - Follow Nurse / BPA Driven Protocol    ondansetron    Phosphorus Replacement - Follow Nurse / BPA Driven Protocol    Potassium Replacement - Follow Nurse / BPA Driven Protocol    [COMPLETED] Insert Peripheral IV **AND** sodium chloride    sodium chloride    sodium chloride    sodium chloride    sodium chloride    Physical Exam:  General Appearance:  Alert   HEENT:  Normocephalic, without obvious abnormality, Conjunctiva/corneas clear,.   Nares normal, no drainage     Neck:  Supple, symmetrical, trachea midline.   Lungs /Chest wall:   Bilateral basal rhonchi, respirations unlabored, symmetrical wall movement.     Heart:  Regular rate and rhythm, S1 S2 normal  Abdomen: Soft, non-tender, no masses, no  organomegaly.    Extremities: No edema, no clubbing or cyanosis     ROS  Constitutional: Negative for chills, fever and malaise/fatigue.   HENT: Negative.    Eyes: Negative.    Cardiovascular: Negative.    Respiratory: Positive for cough and shortness of breath.    Skin: Negative.    Musculoskeletal: Negative.    Gastrointestinal: Negative.    Genitourinary: Negative.    Neurological: Negative.    Psychiatric/Behavioral: Negative.      I reviewed the recent clinical results  I personally reviewed the latest radiological studies    Part of this note may be an electronic transcription/translation of spoken language to printed text using the Dragon Dictation System.

## 2024-02-12 NOTE — PROGRESS NOTES
Referring Provider: Rosamaria Jin MD    Reason for follow-up: Hypertension, atrial fibrillation     Patient Care Team:  Shaylee Mcdaniels MD as PCP - General (Family Medicine)  Richardson Willis MD as Cardiologist (Cardiology)  Rafy Rodriguez MD as Consulting Physician (Hematology and Oncology)  Christianne Phillips, AMARILIS as Licensed Practical Nurse  Salome Fleming MD as Consulting Physician (Nephrology)      SUBJECTIVE  Sitting on a chair today and feels well.  Family members at bedside.     ROS  Review of all systems negative except as indicated.    Since I have last seen, the patient has been without any chest discomfort, shortness of breath, palpitations, dizziness or syncope.  Denies having any headache, abdominal pain, nausea, vomiting, diarrhea, constipation, loss of weight or loss of appetite.  Denies having any excessive bruising, hematuria or blood in the stool.  ROS      Personal History:    Past Medical History:   Diagnosis Date    Acute exacerbation of chronic obstructive pulmonary disease (COPD)     COPD (chronic obstructive pulmonary disease)     Deep vein thrombosis of bilateral lower extremities 07/24/2019    3/2013    History of pneumonia 06/2012    community acquired pneumonia and right pleural effusion;hospitalized at [facility]    History of pulmonary embolism 03/2013    bilateral PE    Hypertension 2001    Insomnia     on Ambien 5mg at     Lobular breast cancer, left 11/2011    Stage IA left upper lobular breast cancer    Malignant neoplasm of left breast in female, estrogen receptor positive 07/18/2019    Osteopenia 2012    Parainfluenza infection     Parotid duct obstruction     Severe pulmonary hypertension 03/03/2023    Stage 3a chronic kidney disease 07/24/2019    TIA (transient ischemic attack) 10/25/2022       Past Surgical History:   Procedure Laterality Date    CARDIAC CATHETERIZATION N/A 3/3/2023    Procedure: Left and Right Heart Cath with Coronary Angiography;  Surgeon:  Richardson Willis MD;  Location: [facility] INVAS[facility] LOC[facility];  Service: Cardiovascular;  Laterality: N/A;    CATARACT EXTRACTION      IVC FILTER RETRIEVAL  2014    IVC filter placement by Dr. Card    MAMMO STEREOTACTIC BREAST BX SURGICAL ADD UNI Left 2011    invasive lobular carcinoma-Dr. Cande Daniel    MASTECTOMY, PARTIAL Left 2011    and left axillary sentinel lymph node biopsy by Dr. Uriostegui    TUBAL ABDOMINAL LIGATION         Family History   Problem Relation Age of Onset    Lung cancer Brother        Social History     Tobacco Use    Smoking status: Former     Types: Cigarettes     Quit date:      Years since quittin.1     Passive exposure: Past    Smokeless tobacco: Never    Tobacco comments:     smoked 6 cigarettes a day from 13 years of age to 56 years of age when she quit in    Vaping Use    Vaping Use: Never used   Substance Use Topics    Alcohol use: Not Currently    Drug use: No        Home meds:  Prior to Admission medications    Medication Sig Start Date End Date Taking? Authorizing Provider   allopurinol (ZYLOPRIM) 100 MG tablet Take 1 tablet by mouth Daily.   Yes Jaylyn Golden MD   amoxicillin-clavulanate (AUGMENTIN) 875-125 MG per tablet Take 1 tablet by mouth 2 (Two) Times a Day. 24 Yes Jaylyn Golden MD   apixaban (ELIQUIS) 5 MG tablet tablet Take 1 tablet by mouth Every 12 (Twelve) Hours. Indications: Other - full anticoagulation, history of DVT/PE 10/27/22  Yes Shaylee Mcdaniels MD   budesonide-formoterol (SYMBICORT) 80-4.5 MCG/ACT inhaler Inhale 2 puffs 2 (Two) Times a Day.   Yes Jaylyn Golden MD   carvedilol (COREG) 12.5 MG tablet TAKE 1 TABLET BY MOUTH TWICE DAILY WITH MEALS 23  Yes Richardson Willis MD   Cholecalciferol (Vitamin D3) 50 MCG ( UT) capsule Take 1 capsule by mouth Daily.   Yes Jaylyn Golden MD   furosemide (LASIX) 40 MG tablet Take 1 tablet by mouth Daily.   Yes Jaylyn Golden MD    gabapentin (NEURONTIN) 300 MG capsule Take 1 capsule by mouth 2 (Two) Times a Day.   Yes Provider, MD Jaylyn   levothyroxine (SYNTHROID, LEVOTHROID) 50 MCG tablet Take 1 tablet by mouth Daily.   Yes Provider, MD Jaylyn   lisinopril (PRINIVIL,ZESTRIL) 40 MG tablet Take 1 tablet by mouth Daily. 6/8/23  Yes Velma Ascencio APRN   potassium chloride (K-DUR,KLOR-CON) 10 MEQ CR tablet Take 1 tablet by mouth Daily. 2/24/23  Yes Richardson Willis MD   sildenafil (REVATIO) 20 MG tablet Take 1 tablet by mouth Every 8 (Eight) Hours. 5/11/23  Yes Mansi Becerril APRN       Allergies:  Patient has no known allergies.    Scheduled Meds:allopurinol, 100 mg, Oral, Daily  apixaban, 5 mg, Oral, Q12H  budesonide, 0.5 mg, Nebulization, BID - RT  carvedilol, 12.5 mg, Oral, BID With Meals  cephalexin, 500 mg, Oral, Q8H  famotidine, 20 mg, Oral, Daily  furosemide, 40 mg, Oral, Daily  hydrALAZINE, 50 mg, Oral, Q8H  levothyroxine, 50 mcg, Oral, Q AM  melatonin, 5 mg, Oral, Nightly  methylPREDNISolone sodium succinate, 20 mg, Intravenous, Daily  sildenafil, 20 mg, Oral, TID  sodium chloride, 10 mL, Intravenous, Q12H  sodium chloride, 10 mL, Intravenous, Q12H      Continuous Infusions:     PRN Meds:.  acetaminophen    Calcium Replacement - Follow Nurse / BPA Driven Protocol    ipratropium-albuterol    ipratropium-albuterol    Magnesium Standard Dose Replacement - Follow Nurse / BPA Driven Protocol    ondansetron    Phosphorus Replacement - Follow Nurse / BPA Driven Protocol    Potassium Replacement - Follow Nurse / BPA Driven Protocol    [COMPLETED] Insert Peripheral IV **AND** sodium chloride    sodium chloride    sodium chloride    sodium chloride    sodium chloride      OBJECTIVE    Vital Signs  Vitals:    02/12/24 0728 02/12/24 0735 02/12/24 0856 02/12/24 1131   BP:   115/62 104/74   BP Location:   Right arm Right arm   Patient Position:   Lying Sitting   Pulse: 81 78 83 76   Resp: 26 18 17 24   Temp:   98 °F (36.7 °C) 97.6 °F  "(36.4 °C)   TempSrc:   Axillary Oral   SpO2: 90% 91%  90%   Weight:       Height:           Flowsheet Rows      Flowsheet Row First Filed Value   Admission Height 162.6 cm (64\") Documented at 02/05/2024 1046   Admission Weight 66.7 kg (147 lb) Documented at 02/05/2024 1046              Intake/Output Summary (Last 24 hours) at 2/12/2024 1144  Last data filed at 2/12/2024 0900  Gross per 24 hour   Intake 200 ml   Output --   Net 200 ml            Telemetry: Atrial fibrillation with heart rate in the 70s    Physical Exam:  The patient is alert, oriented X3  Vital signs as noted above.  Head and neck revealed no carotid bruits or jugular venous distention.  No thyromegaly or lymphadenopathy is present  Lungs clear.  No wheezing.  Breath sounds are normal bilaterally.  Irregularly irregular.  No murmur. No precordial rub is present.  No gallop is present.  Abdomen soft and nontender.  No organomegaly is present.  Extremities with good peripheral pulses without any pedal edema.  Skin warm and dry.  Musculoskeletal system is grossly normal.  CNS grossly normal.       Results Review:  I have personally reviewed the results from the time of this admission to 2/12/2024 11:44 EST and agree with these findings:  []  Laboratory  []  Microbiology  []  Radiology  []  EKG/Telemetry   []  Cardiology/Vascular   []  Pathology  []  Old records  []  Other:    Most notable findings include:    Lab Results (last 24 hours)       Procedure Component Value Units Date/Time    Renal Function Panel [[id number]]  (Abnormal) Collected: 02/12/24 0540    Specimen: Blood Updated: 02/12/24 0630     Glucose 130 mg/dL      BUN 45 mg/dL      Creatinine 1.36 mg/dL      Sodium 138 mmol/L      Potassium 4.4 mmol/L      Chloride 104 mmol/L      CO2 23.0 mmol/L      Calcium 9.2 mg/dL      Albumin 3.5 g/dL      Phosphorus 4.2 mg/dL      Anion Gap 11.0 mmol/L      BUN/Creatinine Ratio 33.1     eGFR 39.0 mL/min/1.73     Narrative:      GFR Normal >60  Chronic " Kidney Disease <60  Kidney Failure <15    The GFR formula is only valid for adults with stable renal function between ages 18 and 70.    CBC & Differential [[id number]]  (Abnormal) Collected: 02/12/24 0540    Specimen: Blood Updated: 02/12/24 0616    Narrative:      The following orders were created for panel order CBC & Differential.  Procedure                               Abnormality         Status                     ---------                               -----------         ------                     CBC Auto Differential[[id number]]        Abnormal            Final result                 Please view results for these tests on the individual orders.    CBC Auto Differential [[id number]]  (Abnormal) Collected: 02/12/24 0540    Specimen: Blood Updated: 02/12/24 0616     WBC 12.20 10*3/mm3      RBC 4.13 10*6/mm3      Hemoglobin 12.1 g/dL      Hematocrit 35.9 %      MCV 86.8 fL      MCH 29.2 pg      MCHC 33.7 g/dL      RDW 16.8 %      RDW-SD 50.3 fl      MPV 8.3 fL      Platelets 284 10*3/mm3      Neutrophil % 91.6 %      Lymphocyte % 3.7 %      Monocyte % 4.6 %      Eosinophil % 0.0 %      Basophil % 0.1 %      Neutrophils, Absolute 11.20 10*3/mm3      Lymphocytes, Absolute 0.50 10*3/mm3      Monocytes, Absolute 0.60 10*3/mm3      Eosinophils, Absolute 0.00 10*3/mm3      Basophils, Absolute 0.00 10*3/mm3      nRBC 0.0 /100 WBC             Imaging Results (Last 24 Hours)       ** No results found for the last 24 hours. **            LAB RESULTS (LAST 7 DAYS)    CBC  Results from last 7 days   Lab Units 02/12/24  0540 02/10/24  0227 02/09/24  0055 02/08/24  0444 02/07/24  1013 02/06/24  0351 02/05/24  1150   WBC 10*3/mm3 12.20* 10.40 11.50* 9.60 9.20 7.30 9.10   RBC 10*6/mm3 4.13 4.34 4.57 4.77 4.33 4.18 4.73   HEMOGLOBIN g/dL 12.1 11.7* 12.3 12.8 11.8* 11.2* 12.8   HEMATOCRIT % 35.9 36.6 38.8 40.5 37.4 34.9 40.5   MCV fL 86.8 84.3 84.8 84.9 86.3 83.6 85.6   PLATELETS 10*3/mm3 284 238 234 216 200 177 220        BMP  Results from last 7 days   Lab Units 02/12/24  0540 02/11/24  0429 02/10/24  0227 02/09/24  0055 02/08/24  0444 02/07/24  1013 02/06/24  1806 02/06/24  0351 02/05/24  1150   SODIUM mmol/L 138 138 140 138 136 136 135*   < > 137   POTASSIUM mmol/L 4.4 4.6 4.3 4.3 4.8 4.9 5.1   < > 3.1*   CHLORIDE mmol/L 104 106 106 105 102 102 98   < > 94*   CO2 mmol/L 23.0 21.0* 23.0 22.0 23.0 24.0 28.0   < > 30.0*   BUN mg/dL 45* 35* 29* 30* 32* 32* 32*   < > 19   CREATININE mg/dL 1.36* 1.43* 1.14* 1.16* 1.12* 1.26* 1.50*   < > 1.30*   GLUCOSE mg/dL 130* 161* 113* 115* 90 100* 105*   < > 104*   MAGNESIUM mg/dL  --   --  2.0 1.9 2.0  --   --   --  2.2   PHOSPHORUS mg/dL 4.2 4.7* 3.0 2.7 3.4  --   --   --   --     < > = values in this interval not displayed.       CMP   Results from last 7 days   Lab Units 02/12/24  0540 02/11/24  0429 02/10/24  0227 02/09/24  0055 02/08/24  0444 02/07/24  1013 02/06/24  1806 02/06/24  0351 02/05/24  1235 02/05/24  1150   SODIUM mmol/L 138 138 140 138 136 136 135*   < >  --  137   POTASSIUM mmol/L 4.4 4.6 4.3 4.3 4.8 4.9 5.1   < >  --  3.1*   CHLORIDE mmol/L 104 106 106 105 102 102 98   < >  --  94*   CO2 mmol/L 23.0 21.0* 23.0 22.0 23.0 24.0 28.0   < >  --  30.0*   BUN mg/dL 45* 35* 29* 30* 32* 32* 32*   < >  --  19   CREATININE mg/dL 1.36* 1.43* 1.14* 1.16* 1.12* 1.26* 1.50*   < >  --  1.30*   GLUCOSE mg/dL 130* 161* 113* 115* 90 100* 105*   < >  --  104*   ALBUMIN g/dL 3.5 3.3*  --   --  3.5  --  3.3*  --   --  3.9   BILIRUBIN mg/dL  --   --   --   --  0.7  --  0.6  --   --  1.1   ALK PHOS U/L  --   --   --   --  60  --  58  --   --  69   AST (SGOT) U/L  --   --   --   --  35*  --  21  --   --  19   ALT (SGPT) U/L  --   --   --   --  32  --  16  --   --  10   AMMONIA umol/L  --   --   --   --   --   --   --   --  25  --     < > = values in this interval not displayed.       BNP        TROPONIN  Results from last 7 days   Lab Units 02/06/24  7379   CK TOTAL U/L 64       [facility]         Creatinine Clearance  Estimated Creatinine Clearance: 31.3 mL/min (A) (by C-G formula based on SCr of 1.36 mg/dL (H)).    ABG        Radiology  No radiology results for the last day      EKG  I personally viewed and interpreted the patient's EKG/Telemetry data:  ECG 12 Lead Rhythm Change   Final Result   HEART RATE= 69  bpm   RR Interval= 900  ms   CO Interval= 213  ms   P Horizontal Axis= -3  deg   P Front Axis= 54  deg   QRSD Interval= 86  ms   QT Interval= 413  ms   QTcB= 435  ms   QRS Axis= 117  deg   T Wave Axis= 33  deg   - ABNORMAL ECG -   Sinus rhythm   Atrial premature complex   Borderline prolonged CO interval   Anteroseptal infarct, age indeterminate   When compared with ECG of 05-Feb-2024 11:04:44,   New or worsened ischemia or infarction   New conduction abnormality   Significant axis, voltage or hypertrophy change   Electronically Signed By: Richardson Willis ([facility]) 08-Feb-2024 17:20:33   Date and Time of Study: 2024-02-08 01:23:00      ECG 12 Lead Other; Weakness   Final Result   HEART RATE= 75  bpm   RR Interval= 860  ms   CO Interval= 176  ms   P Horizontal Axis= 201  deg   P Front Axis= 11  deg   QRSD Interval= 99  ms   QT Interval= 398  ms   QTcB= 429  ms   QRS Axis= 117  deg   T Wave Axis= -6  deg   - ABNORMAL ECG -   Sinus rhythm   Multiple premature complexes, vent & supraven   Right axis deviation   Low voltage, precordial leads   Nonspecific T abnormalities, anterior leads   Electronically Signed By: Pankaj Mccallum ([facility]) 06-Feb-2024 07:40:13   Date and Time of Study: 2024-02-05 11:04:44      SCANNED - TELEMETRY     Final Result      SCANNED - TELEMETRY     Final Result      SCANNED - TELEMETRY     Final Result      SCANNED - TELEMETRY     Final Result      SCANNED - TELEMETRY     Final Result      SCANNED - TELEMETRY     Final Result      SCANNED - TELEMETRY     Final Result      SCANNED - TELEMETRY     Final Result      SCANNED - TELEMETRY     Final Result      SCANNED - TELEMETRY     Final  Result      SCANNED - TELEMETRY     Final Result      SCANNED - TELEMETRY     Final Result      SCANNED - TELEMETRY     Final Result      SCANNED - TELEMETRY     Final Result      SCANNED - TELEMETRY     Final Result      SCANNED - TELEMETRY     Final Result      SCANNED - TELEMETRY     Final Result      SCANNED - TELEMETRY     Final Result      SCANNED - TELEMETRY     Final Result      SCANNED - TELEMETRY     Final Result      SCANNED - TELEMETRY     Final Result      SCANNED - TELEMETRY     Final Result      SCANNED - TELEMETRY     Final Result      SCANNED - TELEMETRY     Final Result      SCANNED - TELEMETRY     Final Result      SCANNED - TELEMETRY     Final Result      SCANNED - TELEMETRY     Final Result      SCANNED - TELEMETRY     Final Result      SCANNED - TELEMETRY     Final Result      SCANNED - TELEMETRY     Final Result      SCANNED - TELEMETRY     Final Result      SCANNED - TELEMETRY     Final Result      SCANNED - TELEMETRY     Final Result      SCANNED - TELEMETRY     Final Result      SCANNED - TELEMETRY     Final Result      SCANNED - TELEMETRY     Final Result      SCANNED - TELEMETRY     Final Result      ECG 12 Lead Altered Mental Status    (Results Pending)         Echocardiogram:    Results for orders placed during the hospital encounter of 02/05/24    Adult Transthoracic Echo Complete W/ Cont if Necessary Per Protocol    Interpretation Summary    Left ventricular systolic function is normal. Left ventricular ejection fraction appears to be 61 - 65%.    Severely reduced right ventricular systolic function noted.    The right ventricular cavity is severely dilated.    The left atrial cavity is mildly dilated.    Left atrial volume is mildly increased.    The right atrial cavity is moderate to severely  dilated.    Estimated right ventricular systolic pressure from tricuspid regurgitation is mildly elevated (35-45 mmHg).    Mild pulmonary hypertension is present.    There is a moderate  (1-2cm) pericardial effusion. There is no evidence of cardiac tamponade.        Stress Test:  Results for orders placed in visit on 09/13/22    Stress Test With Myocardial Perfusion One Day    Interpretation Summary    Left ventricular ejection fraction is hyperdynamic (Calculated EF > 70%). .    Myocardial perfusion imaging indicates a normal myocardial perfusion study with no evidence of ischemia.    Impressions are consistent with a low risk study.    Findings consistent with a normal ECG stress test.         Cardiac Catheterization:  Results for orders placed during the hospital encounter of 03/03/23    Cardiac Catheterization/Vascular Study    Narrative  OPERATORS  Richardson Willis M.D. (Attending Cardiologist)      PROCEDURE PERFORMED  Ultrasound guided vascular access  Right heart catheterization  Coronary Angiogram  Left Heart Catheterization [id number]  Moderate Sedation    INDICATIONS FOR PROCEDURE  83-year-old woman with multiple cardiovascular risk factors, abnormal stress test presented with worsening shortness of breath.  After discussing the risk and benefit of the procedure she was brought in for elective right and left heart cath.    PROCEDURE IN DETAIL  Informed consent was obtained from the patient after explaining the risks, benefits, and alternative options of the procedure. After obtaining informed consent, the patient was brought to the cath lab and was prepped in a sterile fashion. Lidocaine 2% was used for local anesthesia into the right femoral venous access site. Right femoral vein was accessed using the micropuncture needle under ultrasound guidance and micropuncture wire advanced under flouroscopy. A 7 English vascular sheath was put into place percutaneously over guide-wire. Guide wires were removed. A 6Fr swan sheryl catheter was advanced to wedge position. RA, RV and PA and wedge pressures were recorded.  PA sat and arterial sats recorded.  The patient tolerated the procedure well without any  complications.    Lidocaine 2% was used for local anesthesia into the right femoral arterial access site. The right femoral artery was accessed with a micropuncture needle via modified Seldinger technique under ultrasound guidance. A 6F was inserted successfully.  Afterwards, 6F JR4 and JL4 diagnostic catheters were advanced over a wire into the ascending aorta and were used to engage the ostia of the left main and RCA respectively. JR4 used to cross the AV and obtain LV pressures and gradient across the AV measured via pullback technique. Images of the right and left coronary systems were obtained. All the catheters were exchanged over a wire and subsequently removed. Angiogram of the femoral access site was obtained and did not show complications. The patient tolerated the procedure well without any complications. The pictures were reviewed at the end of the procedure. A Mynx closure device was applied.    HEMODYNAMICS    RHC  RA 6/5, 4 mmHg  RV 74/3, 5 mmHg  PA 71/25, 44 mmHg  PCW 12 mmHg  AO Sat 92%  PA Sat 78%    Jose CO: 7.89 L/min    Jose CI: 4.69 L/min/m²    LHC  LV: 134/3, 20 mmHg  AO: 131/56, 87 mmHg  No significant gradient across the aortic valve during pullback of JR4 catheter.    FINDINGS  Coronary Angiogram  All vessels are heavily calcified  She also has heavily calcified peripheral arterial disease.  Right dominant circulation    Left main: Left main is a large caliber vessel which gives rise to the Left Anterior Descending and the Left circumflex.  Left main is angiographically free from any significant disease.    Left Anterior Descending Artery: LAD is a heavily calcified medium caliber vessel which gives rise to diagonals.  The vessel is highly tortuous and has 50 to 60% mid vessel stenosis best seen in Yoruba cranial view.    Left Circumflex: Heavily calcified vessel with 50% proximal segment stenosis.    Right Coronary Artery: The RCA is a large caliber vessel which is heavily calcified and  tortuous.  It has diffuse luminal irregularities and 30 to 40% stenosis in the midsegment around the bend.    ESTIMATED BLOOD LOSS:  10 ml    COMPLICATIONS:  None    PROCEDURE DATA:  Sedation Time: 25 minutes    IMPRESSIONS  Heavily calcified, tortuous nonobstructive coronary disease  Severe pulmonary hypertension with PA systolic pressure in the 70s  Mean PA pressure 44 mmHg    RECOMMENDATIONS  -Continue aggressive medical management of CAD  -Referral to pulmonology for treatment of pulmonary hypertension         Other:         ASSESSMENT & PLAN:    Principal Problem:    Altered mental status  Active Problems:    Acute hypokalemia    Stage 3a chronic kidney disease    Chronic obstructive pulmonary disease    History of venous thrombosis and embolism    Primary hypertension    History of TIA (transient ischemic attack)    Severe pulmonary hypertension    Chronic respiratory failure with hypoxia    JULIAN (acute kidney injury)      Altered mental status  Possible mastitis/PICC line infection  CT head and MRI of the brain unremarkable with no acute findings  Antibiotics per ID  Mental status is better and back to baseline    JULIAN on Stage 3a chronic kidney disease  Creatinine 1.36, GFR is 39  Continue oral diuretics  Hypokalemia has resolved.  Potassium 4.4  Closely monitor renal function and respiratory status     COPD/Chronic respiratory failure  Severe pulmonary hypertension  On 2-3 L of oxygen via nasal cannula at baseline.  Has known pulmonary hypertension  RA 6/5, 4 mmHg  RV 74/3, 5 mmHg  PA 71/25, 44 mmHg  PCW 12 mmHg  Continue sildenafil  On steroids  Managed by pulmonology  Echocardiogram shows preserved LV function with mildly elevated RVSP.  Small to moderate pericardial effusion: No signs of tamponade     Atrial fibrillation  She has paroxysmal atrial fibrillation  [id number] score is 6  Continue Eliquis for atrial fibrillation as well as for history of DVT/PE  Heart rate has improved on beta-blocker      History of venous thrombosis and embolism  Continue Eliquis 5 mg p.o. twice daily.  She has an IVC filter in place as well.     Primary hypertension, chronic  Blood pressure is now normal  Coreg 12.5 mg p.o. twice daily  Continue amlodipine and hydralazine  Echo shows preserved LV function, reduced RV function and mildly elevated RVSP.  Pericardial effusion is not significant with no signs of tamponade.  Diuretics to be used as needed     Coronary artery disease  Continue high intensity statin and beta-blocker.  No need for aspirin while she is on anticoagulation  Previous cardiac catheterization showed heavily calcified coronaries but nonobstructive.  No chest pain and stable from cardiac standpoint.     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  Continue high intensity statin and anticoagulation with Eliquis 5 mg p.o. twice daily.    Discussed the care plan with patient's daughter at bedside.    Richardson Willis MD  02/12/24  11:44 EST

## 2024-02-12 NOTE — PLAN OF CARE
"  Assessment: Gabrielle Lyles presents with ADL impairments affecting function including balance, endurance / activity tolerance, pain, and strength. Pt educated on activity recommendations and getting up to bathroom with assistance to improve strength and endurance. Pt receptive to education. Demonstrated functioning below baseline abilities indicate the need for continued skilled intervention while inpatient. Tolerating session today without incident. Will continue to follow and progress as tolerated.     Plan/Recommendations:   Moderate Intensity Therapy recommended post-acute care. This is recommended as therapy feels the patient would require 3-4 days per week and wouldn't tolerate \"3 hour daily\" rehab intensity. SNF would be the preferred choice. If the patient does not agree to SNF, arrange HH or OP depending on home bound status. If patient is medically complex, consider LTACH.. Pt requires no DME at discharge.                    "

## 2024-02-12 NOTE — THERAPY TREATMENT NOTE
"Subjective: Pt agreeable to therapeutic plan of care.  Cognition: oriented to Person, Place, Time, and Situation    Objective:     Bed Mobility: CGA   Functional Transfers: Min-A and with rolling walker     Balance: supported and standing CGA and Min-A  Functional Ambulation: CGA, Min-A, and with rolling walker to and from the bathroom     Toileting: Min-A  ADL Position: unsupported sitting  ADL Comments: Pt required min A for clothing management     Therapeutic Exercise - 5 Reps Bilaterally and B LE AROM unsupported sitting / EOB    Vitals: WNL    Pain: 3 VAS  Location: General   Interventions for pain: Repositioned and Increased Activity  Education: Verbal/Tactile Cues, ADL training, and Transfer Training      Assessment: Gabrielle Lyles presents with ADL impairments affecting function including balance, endurance / activity tolerance, pain, and strength. Pt educated on activity recommendations and getting up to bathroom with assistance to improve strength and endurance. Pt receptive to education. Demonstrated functioning below baseline abilities indicate the need for continued skilled intervention while inpatient. Tolerating session today without incident. Will continue to follow and progress as tolerated.     Plan/Recommendations:   Moderate Intensity Therapy recommended post-acute care. This is recommended as therapy feels the patient would require 3-4 days per week and wouldn't tolerate \"3 hour daily\" rehab intensity. SNF would be the preferred choice. If the patient does not agree to SNF, arrange HH or OP depending on home bound status. If patient is medically complex, consider LTACH.. Pt requires no DME at discharge.     Pt desires Skilled Rehab placement at discharge. Pt cooperative; agreeable to therapeutic recommendations and plan of care.     Modified [place]: N/A = No pre-op stroke/TIA    Post-Tx Position: Up in Chair, Alarms activated, and Call light and personal items within reach  PPE: gloves   "

## 2024-02-12 NOTE — CASE MANAGEMENT/SOCIAL WORK
Continued Stay Note  [facility]     Patient Name: Gabrielle Lyles  MRN: [id number]  Today's Date: 2/12/2024    Admit Date: 2/5/2024    Plan: [facility], accepted. Will need precert- plan to start 2/13. PASRR approved. From home alone., Current [place] 2.5-3L.   Discharge Plan       Row Name 02/12/24 1600       Plan    Plan [facility], accepted. Will need precert- plan to start 2/13. PASRR approved. From home alone., Current [place] 2.5-3L.    Plan Comments Barriers to discharge: 7L O2.  Psych, Neuro, ID, Pulm, Nephro following. IV abx.             Expected Discharge Date and Time       Expected Discharge Date Expected Discharge Time    Feb 14, 2024         Phone communication or documentation only - no physical contact with patient or family.    Brook Carolina RN     Office Phone [phone]  Office Cell [phone]

## 2024-02-12 NOTE — PLAN OF CARE
Problem: Adult Inpatient Plan of Care  Goal: Absence of Hospital-Acquired Illness or Injury  Intervention: Identify and Manage Fall Risk  Recent Flowsheet Documentation  Taken 2/12/2024 1414 by Jenifer Alvarez RN  Safety Promotion/Fall Prevention:   assistive device/personal items within reach   clutter free environment maintained   fall prevention program maintained   lighting adjusted   nonskid shoes/slippers when out of bed   room organization consistent   safety round/check completed  Taken 2/12/2024 1201 by Jenifer Alvarez RN  Safety Promotion/Fall Prevention:   assistive device/personal items within reach   clutter free environment maintained   fall prevention program maintained   lighting adjusted   nonskid shoes/slippers when out of bed   room organization consistent   safety round/check completed  Taken 2/12/2024 1011 by Jenifer Alvarez RN  Safety Promotion/Fall Prevention:   assistive device/personal items within reach   clutter free environment maintained   fall prevention program maintained   lighting adjusted   nonskid shoes/slippers when out of bed   room organization consistent   safety round/check completed  Taken 2/12/2024 0830 by Jenifer Alvarez RN  Safety Promotion/Fall Prevention:   assistive device/personal items within reach   clutter free environment maintained   fall prevention program maintained   lighting adjusted   nonskid shoes/slippers when out of bed   safety round/check completed   room organization consistent  Intervention: Prevent Skin Injury  Recent Flowsheet Documentation  Taken 2/12/2024 1414 by Jenifer Alvarez RN  Body Position: turned  Taken 2/12/2024 1306 by Jenifer Alvarez RN  Body Position:   turned   right  Taken 2/12/2024 1201 by Jenifer Alvarez RN  Body Position: weight shifting  Taken 2/12/2024 1011 by Jenifer Alvarez RN  Body Position: weight shifting  Taken 2/12/2024 0830 by Jenifer Alvarez RN  Body Position: turned  Intervention: Prevent and Manage VTE (Venous Thromboembolism)  Risk  Recent Flowsheet Documentation  Taken 2/12/2024 1414 by Jenifer Alvarez RN  Activity Management: activity encouraged  Taken 2/12/2024 1201 by Jenifer Alvarez RN  Activity Management: activity encouraged  Range of Motion: active ROM (range of motion) encouraged  Taken 2/12/2024 1011 by Jenifer Alvarez RN  Activity Management: up to bedside commode  Taken 2/12/2024 0830 by Jenifer Alvarez RN  Activity Management: up to bedside commode  Range of Motion: active ROM (range of motion) encouraged  Intervention: Prevent Infection  Recent Flowsheet Documentation  Taken 2/12/2024 1414 by Jenifer Alvarez RN  Infection Prevention:   hand hygiene promoted   personal protective equipment utilized  Taken 2/12/2024 1201 by Jenifer Alvarez RN  Infection Prevention:   hand hygiene promoted   personal protective equipment utilized  Taken 2/12/2024 1011 by Jenifer Alvarez RN  Infection Prevention:   hand hygiene promoted   personal protective equipment utilized  Taken 2/12/2024 0830 by Jenifer Alvarez RN  Infection Prevention:   hand hygiene promoted   personal protective equipment utilized  Goal: Optimal Comfort and Wellbeing  Intervention: Provide Person-Centered Care  Recent Flowsheet Documentation  Taken 2/12/2024 1201 by Jenifer Alvarez RN  Trust Relationship/Rapport: care explained  Taken 2/12/2024 0830 by Jenifer Alvarez RN  Trust Relationship/Rapport: care explained   Goal Outcome Evaluation:         Pt ambulating to bathroom this shift. 1-2 assist with gait belt and walker. Currently on 4L NC, 2.5L baseline.  Attempting to wean. Pt accepted to [facility], awaiting precert.  Pt resting in bed, call light in reach. Plan of care to continue.

## 2024-02-12 NOTE — PLAN OF CARE
Goal Outcome Evaluation:  Plan of Care Reviewed With: patient        Progress: no change  Outcome Evaluation: Pt rested well in bed through the shift. Pt has been alert and oriented. Currently on 7L per high flow NC, to keep oxygen sats >88%. Pt did get up to BSC which required an assist of 2, very weak and SOB. Currently on oral abx, D/C to [facility] once nmedically cleared.

## 2024-02-13 LAB
ALBUMIN SERPL-MCNC: 3.4 G/DL (ref 3.5–5.2)
ANION GAP SERPL CALCULATED.3IONS-SCNC: 10 MMOL/L (ref 5–15)
BASOPHILS # BLD AUTO: 0 10*3/MM3 (ref 0–0.2)
BASOPHILS NFR BLD AUTO: 0.2 % (ref 0–1.5)
BUN SERPL-MCNC: 53 MG/DL (ref 8–23)
BUN/CREAT SERPL: 37.9 (ref 7–25)
CALCIUM SPEC-SCNC: 9.2 MG/DL (ref 8.6–10.5)
CHLORIDE SERPL-SCNC: 102 MMOL/L (ref 98–107)
CO2 SERPL-SCNC: 25 MMOL/L (ref 22–29)
CREAT SERPL-MCNC: 1.4 MG/DL (ref 0.57–1)
DEPRECATED RDW RBC AUTO: 49 FL (ref 37–54)
EGFRCR SERPLBLD CKD-EPI 2021: 37.6 ML/MIN/1.73
EOSINOPHIL # BLD AUTO: 0 10*3/MM3 (ref 0–0.4)
EOSINOPHIL NFR BLD AUTO: 0 % (ref 0.3–6.2)
ERYTHROCYTE [DISTWIDTH] IN BLOOD BY AUTOMATED COUNT: 16.5 % (ref 12.3–15.4)
GLUCOSE SERPL-MCNC: 118 MG/DL (ref 65–99)
HCT VFR BLD AUTO: 37.5 % (ref 34–46.6)
HGB BLD-MCNC: 11.9 G/DL (ref 12–15.9)
LYMPHOCYTES # BLD AUTO: 0.5 10*3/MM3 (ref 0.7–3.1)
LYMPHOCYTES NFR BLD AUTO: 4.4 % (ref 19.6–45.3)
MCH RBC QN AUTO: 27.2 PG (ref 26.6–33)
MCHC RBC AUTO-ENTMCNC: 31.8 G/DL (ref 31.5–35.7)
MCV RBC AUTO: 85.6 FL (ref 79–97)
MONOCYTES # BLD AUTO: 0.7 10*3/MM3 (ref 0.1–0.9)
MONOCYTES NFR BLD AUTO: 5.9 % (ref 5–12)
NEUTROPHILS NFR BLD AUTO: 10.5 10*3/MM3 (ref 1.7–7)
NEUTROPHILS NFR BLD AUTO: 89.5 % (ref 42.7–76)
NRBC BLD AUTO-RTO: 0.1 /100 WBC (ref 0–0.2)
PHOSPHATE SERPL-MCNC: 3.9 MG/DL (ref 2.5–4.5)
PLATELET # BLD AUTO: 282 10*3/MM3 (ref 140–450)
PMV BLD AUTO: 8.6 FL (ref 6–12)
POTASSIUM SERPL-SCNC: 4.3 MMOL/L (ref 3.5–5.2)
RBC # BLD AUTO: 4.38 10*6/MM3 (ref 3.77–5.28)
SODIUM SERPL-SCNC: 137 MMOL/L (ref 136–145)
WBC NRBC COR # BLD AUTO: 11.8 10*3/MM3 (ref 3.4–10.8)

## 2024-02-13 PROCEDURE — 25010000002 METHYLPREDNISOLONE PER 40 MG: Performed by: FAMILY MEDICINE

## 2024-02-13 PROCEDURE — 94799 UNLISTED PULMONARY SVC/PX: CPT

## 2024-02-13 PROCEDURE — 97530 THERAPEUTIC ACTIVITIES: CPT

## 2024-02-13 PROCEDURE — 85025 COMPLETE CBC W/AUTO DIFF WBC: CPT | Performed by: INTERNAL MEDICINE

## 2024-02-13 PROCEDURE — 97110 THERAPEUTIC EXERCISES: CPT

## 2024-02-13 PROCEDURE — 80069 RENAL FUNCTION PANEL: CPT | Performed by: INTERNAL MEDICINE

## 2024-02-13 PROCEDURE — 99232 SBSQ HOSP IP/OBS MODERATE 35: CPT | Performed by: INTERNAL MEDICINE

## 2024-02-13 PROCEDURE — 97535 SELF CARE MNGMENT TRAINING: CPT

## 2024-02-13 PROCEDURE — 94664 DEMO&/EVAL PT USE INHALER: CPT

## 2024-02-13 RX ORDER — AMOXICILLIN 250 MG
1 CAPSULE ORAL 2 TIMES DAILY PRN
Status: DISCONTINUED | OUTPATIENT
Start: 2024-02-13 | End: 2024-02-17 | Stop reason: HOSPADM

## 2024-02-13 RX ORDER — GUAIFENESIN 600 MG/1
1200 TABLET, EXTENDED RELEASE ORAL EVERY 12 HOURS SCHEDULED
Status: DISCONTINUED | OUTPATIENT
Start: 2024-02-13 | End: 2024-02-17 | Stop reason: HOSPADM

## 2024-02-13 RX ADMIN — BUDESONIDE INHALATION 0.5 MG: 0.5 SUSPENSION RESPIRATORY (INHALATION) at 20:00

## 2024-02-13 RX ADMIN — Medication 5 MG: at 20:25

## 2024-02-13 RX ADMIN — Medication 10 ML: at 07:46

## 2024-02-13 RX ADMIN — FAMOTIDINE 20 MG: 20 TABLET, FILM COATED ORAL at 07:44

## 2024-02-13 RX ADMIN — Medication 10 ML: at 20:31

## 2024-02-13 RX ADMIN — DOCUSATE SODIUM 50 MG AND SENNOSIDES 8.6 MG 1 TABLET: 8.6; 5 TABLET, FILM COATED ORAL at 17:24

## 2024-02-13 RX ADMIN — Medication 10 ML: at 20:26

## 2024-02-13 RX ADMIN — CEPHALEXIN 500 MG: 500 CAPSULE ORAL at 14:01

## 2024-02-13 RX ADMIN — Medication 10 ML: at 08:00

## 2024-02-13 RX ADMIN — METHYLPREDNISOLONE SODIUM SUCCINATE 20 MG: 40 INJECTION, POWDER, FOR SOLUTION INTRAMUSCULAR; INTRAVENOUS at 07:44

## 2024-02-13 RX ADMIN — APIXABAN 5 MG: 5 TABLET, FILM COATED ORAL at 20:25

## 2024-02-13 RX ADMIN — ALLOPURINOL 100 MG: 100 TABLET ORAL at 07:44

## 2024-02-13 RX ADMIN — GUAIFENESIN 1200 MG: 600 TABLET, EXTENDED RELEASE ORAL at 20:31

## 2024-02-13 RX ADMIN — LEVOTHYROXINE SODIUM 50 MCG: 0.05 TABLET ORAL at 06:08

## 2024-02-13 RX ADMIN — SILDENAFIL CITRATE 20 MG: 20 TABLET ORAL at 17:02

## 2024-02-13 RX ADMIN — CEPHALEXIN 500 MG: 500 CAPSULE ORAL at 20:25

## 2024-02-13 RX ADMIN — BUDESONIDE INHALATION 0.5 MG: 0.5 SUSPENSION RESPIRATORY (INHALATION) at 11:16

## 2024-02-13 RX ADMIN — HYDRALAZINE HYDROCHLORIDE 50 MG: 25 TABLET ORAL at 14:01

## 2024-02-13 RX ADMIN — CEPHALEXIN 500 MG: 500 CAPSULE ORAL at 06:08

## 2024-02-13 RX ADMIN — APIXABAN 5 MG: 5 TABLET, FILM COATED ORAL at 07:44

## 2024-02-13 RX ADMIN — HYDRALAZINE HYDROCHLORIDE 50 MG: 25 TABLET ORAL at 06:08

## 2024-02-13 RX ADMIN — CARVEDILOL 12.5 MG: 6.25 TABLET, FILM COATED ORAL at 17:02

## 2024-02-13 RX ADMIN — GUAIFENESIN 1200 MG: 600 TABLET, EXTENDED RELEASE ORAL at 14:01

## 2024-02-13 NOTE — SIGNIFICANT NOTE
Case Management/Social Work    Patient Name:  Gabrielle Lyles  YOB: 1940  MRN: [id number]  Admit Date:  2/5/2024 02/13/24 0924   Post Acute Pre-Cert Documentation   Request Submitted by Facility - Type: Hospital   Post-Acute Authorization Type Submitted: SNF   Date Post Acute Pre-Cert Inititated per Facility 02/13/24   Verification from Payer Yes   Date Post Acute Pre-Cert Completed 02/13/24   Accepting Facility [facility] Discharge Date Requested 02/13/24   All Clinicals Submitted? Yes   Had Accepting Facility at Time of Submission Yes   Response Received from Insurance? Approval   Response Communicated to:    Authorization Number: [id number]   Post Acute Pre-Cert Initiated Comment SERVANDO submitted SNF precert for [facility] via [employer] portal. [employer] auth ID [id number]. ICD code: J96.11. SNF precert auto approved. Valid 2/13-2/15. CM made aware.           Electronically signed by:  Joel Pérez CMA  02/13/24 09:25 EST    Joel Pérez  Case Management Associate  [address] [address]  P: [phone]  F: [phone]

## 2024-02-13 NOTE — PLAN OF CARE
Goal Outcome Evaluation:  Plan of Care Reviewed With: patient        Problem: Adult Inpatient Plan of Care  Goal: Plan of Care Review  Outcome: Ongoing, Progressing  Flowsheets (Taken 2/13/2024 5970)  Progress: improving  Plan of Care Reviewed With: patient     Progress: improving

## 2024-02-13 NOTE — PROGRESS NOTES
"NEPHROLOGY PROGRESS NOTE------[facility]    [facility]  [phone]  Luke Fleming MD      Patient Care Team:  Shaylee Mcdaniels MD as PCP - General (Family Medicine)  Richardson Willis MD as Cardiologist (Cardiology)  Rafy Rodriguez MD as Consulting Physician (Hematology and Oncology)  Christianne Phillips RN as Licensed Practical Nurse  Salome Fleming MD as Consulting Physician (Nephrology)      Provider:  Luke Fleming MD  Patient Name: Gabrielle Lyles  :  1940    SUBJECTIVE:    F/U ARF/JULIAN/CRF/CKD    Up in chair. Chronic SOB that's no worse than baseline. No dysuria. Fatigued. OT in room    Medication:  allopurinol, 100 mg, Oral, Daily  apixaban, 5 mg, Oral, Q12H  budesonide, 0.5 mg, Nebulization, BID - RT  carvedilol, 12.5 mg, Oral, BID With Meals  cephalexin, 500 mg, Oral, Q8H  famotidine, 20 mg, Oral, Daily  furosemide, 40 mg, Oral, Daily  hydrALAZINE, 50 mg, Oral, Q8H  levothyroxine, 50 mcg, Oral, Q AM  melatonin, 5 mg, Oral, Nightly  methylPREDNISolone sodium succinate, 20 mg, Intravenous, Daily  sildenafil, 20 mg, Oral, TID  sodium chloride, 10 mL, Intravenous, Q12H  sodium chloride, 10 mL, Intravenous, Q12H             OBJECTIVE    Vital Sign Min/Max for last 24 hours  Temp  Min: 97.1 °F (36.2 °C)  Max: 98.1 °F (36.7 °C)   BP  Min: 96/54  Max: 142/70   Pulse  Min: 73  Max: 83   Resp  Min: 17  Max: 30   SpO2  Min: 90 %  Max: 97 %   No data recorded   Weight  Min: 74.8 kg (164 lb 14.5 oz)  Max: 74.8 kg (164 lb 14.5 oz)     Flowsheet Rows      Flowsheet Row First Filed Value   Admission Height 162.6 cm (64\") Documented at 2024 1046   Admission Weight 66.7 kg (147 lb) Documented at 2024 1046            I/O this shift:  In: 240 [P.O.:240]  Out: -   I/O last 3 completed shifts:  In: 440 [P.O.:440]  Out: 400 [Urine:400]    Physical Exam:  General Appearance: alert, appears stated age and cooperative  Head: " "normocephalic, without obvious abnormality and atraumatic  Eyes: conjunctivae and sclerae normal and no icterus  Neck: supple and no JVD  Lungs: +SCATTERED RHONCHI  Heart: regular rhythm & normal rate and normal S1, S2 +TAYE  Chest Wall: no abnormalities observed  Abdomen: normal bowel sounds and soft, nontender +OBESITY  Extremities: moves extremities well, trace edema, no cyanosis  Skin: no bleeding, bruising or rash  Neurologic: Alert, and oriented. No focal deficits    Labs:    WBC WBC   Date Value Ref Range Status   02/13/2024 11.80 (H) 3.40 - 10.80 10*3/mm3 Final   02/12/2024 12.20 (H) 3.40 - 10.80 10*3/mm3 Final      HGB Hemoglobin   Date Value Ref Range Status   02/13/2024 11.9 (L) 12.0 - 15.9 g/dL Final   02/12/2024 12.1 12.0 - 15.9 g/dL Final      HCT Hematocrit   Date Value Ref Range Status   02/13/2024 37.5 34.0 - 46.6 % Final   02/12/2024 35.9 34.0 - 46.6 % Final      Platelets No results found for: \"LABPLAT\"   MCV MCV   Date Value Ref Range Status   02/13/2024 85.6 79.0 - 97.0 fL Final   02/12/2024 86.8 79.0 - 97.0 fL Final          Sodium Sodium   Date Value Ref Range Status   02/13/2024 137 136 - 145 mmol/L Final   02/12/2024 138 136 - 145 mmol/L Final   02/11/2024 138 136 - 145 mmol/L Final      Potassium Potassium   Date Value Ref Range Status   02/13/2024 4.3 3.5 - 5.2 mmol/L Final   02/12/2024 4.4 3.5 - 5.2 mmol/L Final   02/11/2024 4.6 3.5 - 5.2 mmol/L Final      Chloride Chloride   Date Value Ref Range Status   02/13/2024 102 98 - 107 mmol/L Final   02/12/2024 104 98 - 107 mmol/L Final   02/11/2024 106 98 - 107 mmol/L Final      CO2 CO2   Date Value Ref Range Status   02/13/2024 25.0 22.0 - 29.0 mmol/L Final   02/12/2024 23.0 22.0 - 29.0 mmol/L Final   02/11/2024 21.0 (L) 22.0 - 29.0 mmol/L Final      BUN BUN   Date Value Ref Range Status   02/13/2024 53 (H) 8 - 23 mg/dL Final   02/12/2024 45 (H) 8 - 23 mg/dL Final   02/11/2024 35 (H) 8 - 23 mg/dL Final      Creatinine Creatinine   Date Value " "Ref Range Status   02/13/2024 1.40 (H) 0.57 - 1.00 mg/dL Final   02/12/2024 1.36 (H) 0.57 - 1.00 mg/dL Final   02/11/2024 1.43 (H) 0.57 - 1.00 mg/dL Final      Calcium Calcium   Date Value Ref Range Status   02/13/2024 9.2 8.6 - 10.5 mg/dL Final   02/12/2024 9.2 8.6 - 10.5 mg/dL Final   02/11/2024 9.1 8.6 - 10.5 mg/dL Final      PO4 No components found for: \"PO4\"   Albumin Albumin   Date Value Ref Range Status   02/13/2024 3.4 (L) 3.5 - 5.2 g/dL Final   02/12/2024 3.5 3.5 - 5.2 g/dL Final   02/11/2024 3.3 (L) 3.5 - 5.2 g/dL Final      Magnesium No results found for: \"MG\"     Uric Acid No components found for: \"URIC ACID\"     Imaging Results (Last 72 Hours)       ** No results found for the last 72 hours. **            Results for orders placed during the hospital encounter of 02/05/24    XR Chest 1 View    Narrative  XR CHEST 1 VW    Date of Exam: 2/9/2024 5:36 AM EST    Indication: sob    Comparison: None available.    Findings:  The trachea is midline. Stable cardiomegaly with mild enlargement of the pulmonary arteries. There is mild diffuse bilateral reticular lung thickening. Mild left basilar atelectasis. No definite pleural effusions. No change in position of the right-sided  pigtail catheter.    Impression  Impression:  Stable left basilar atelectasis and chronic lung changes    Cardiomegaly      Electronically Signed: Pablo Martins MD  2/9/2024 7:35 AM EST  Workstation ID: [id number]      XR Chest 1 View    Narrative  XR CHEST 1 VW    Date of Exam: 2/5/2024 12:00 PM EST    Indication: weakness    Comparison: 5/2/2023.    Findings:  There is mild cardiomegaly with increased heart size. Mildly prominent interstitial pattern appears stable and chronic. There is a prominent skinfold over the right chest. There is no definite pneumothorax. There is no effusion.    Impression  Impression:  Mild cardiomegaly. Mild chronic findings of the lung fields.      Electronically Signed: Mary Ivan MD  2/5/2024 12:06 PM " EST  Workstation ID: [id number]      Results for orders placed during the hospital encounter of 09/24/23    XR Foot 3+ View Left    Narrative  XR FOOT 3+ VW LEFT    Date of Exam: 9/24/2023 3:15 PM EDT    Indication: pain redness swelling    Comparison: None available.    Findings:  No fracture or dislocation. There is soft tissue swelling diffusely at the left foot. No discrete osseous erosion. Plantar calcaneal bone spur. Enthesopathy at the Achilles insertion on the calcaneus. No radiodense foreign body.    Impression  Impression:  1. Negative for acute osseous abnormality.  2. Nonspecific soft tissue swelling.        Electronically Signed: Randall Zarco MD  9/24/2023 3:54 PM EDT  Workstation ID: [id number]      Results for orders placed during the hospital encounter of 02/05/24    Duplex Venous Upper Extremity - Right CAR    Interpretation Summary    Normal right upper extremity venous duplex scan.        ASSESSMENT / PLAN      Altered mental status    Acute hypokalemia    Stage 3a chronic kidney disease    Chronic obstructive pulmonary disease    History of venous thrombosis and embolism    Primary hypertension    History of TIA (transient ischemic attack)    Severe pulmonary hypertension    Chronic respiratory failure with hypoxia    JULIAN (acute kidney injury)    ARF/JULIAN/CRF/CKD------Nonoliguric. +ARF/JULIAN on top of CRF/CKD STG 3A with a baseline serum  Creatinine of about 1.1. Unknown if patient has baseline proteinuria or hematuria. CRF/CKD STG 3A most likely secondary to HTN NS. +ARF/JULIAN is secondary to prerenal state/intravascular volume depletion. Stable.  Avoid hypotension.  No NSAIDs or IV dye.  BUN/Cr stable     2. ANEMIA------H/H stable     3. HTN WITH CKD-----Avoid hypotension. No ACE/ARB/DRI/diuretic for now     4. OA/DJD/HYPERURICEMIA------No NSAIDs. Add Allopurinol     5. DELIRIUM-------?Secondary to UTI. Better     6. UTI-----S/P Tx     7. HYPOCALCEMIA------Replaced     8.  KETONURIA/DEHYDRATION------Secondary to recent outpatient increase in Lasix. Hold Lasix. Gentle IVFs given Pulmonary HTN     9. PULMONARY HTN--------Per , Pulmonary     10. HYPOKALEMIA-------Replaced     11. CAD-----per , Cardiology    12. REHAB PLACEMENT PENDING       Luke Fleming MD  [facility]  [phone]  02/13/24  06:55 EST

## 2024-02-13 NOTE — PROGRESS NOTES
Referring Provider: Rosamaria Jin MD    Reason for follow-up: Hypertension, atrial fibrillation     Patient Care Team:  Shaylee Mcdaniels MD as PCP - General (Family Medicine)  Richardson Willis MD as Cardiologist (Cardiology)  Rafy Rodriguez MD as Consulting Physician (Hematology and Oncology)  Christianne Phillips, AMARILIS as Licensed Practical Nurse  Salome Fleming MD as Consulting Physician (Nephrology)      SUBJECTIVE  Sitting on a chair today and feels well.  Family members at bedside.     ROS  Review of all systems negative except as indicated.    Since I have last seen, the patient has been without any chest discomfort, shortness of breath, palpitations, dizziness or syncope.  Denies having any headache, abdominal pain, nausea, vomiting, diarrhea, constipation, loss of weight or loss of appetite.  Denies having any excessive bruising, hematuria or blood in the stool.  ROS      Personal History:    Past Medical History:   Diagnosis Date    Acute exacerbation of chronic obstructive pulmonary disease (COPD)     COPD (chronic obstructive pulmonary disease)     Deep vein thrombosis of bilateral lower extremities 07/24/2019    3/2013    History of pneumonia 06/2012    community acquired pneumonia and right pleural effusion;hospitalized at [facility]    History of pulmonary embolism 03/2013    bilateral PE    Hypertension 2001    Insomnia     on Ambien 5mg at     Lobular breast cancer, left 11/2011    Stage IA left upper lobular breast cancer    Malignant neoplasm of left breast in female, estrogen receptor positive 07/18/2019    Osteopenia 2012    Parainfluenza infection     Parotid duct obstruction     Severe pulmonary hypertension 03/03/2023    Stage 3a chronic kidney disease 07/24/2019    TIA (transient ischemic attack) 10/25/2022       Past Surgical History:   Procedure Laterality Date    CARDIAC CATHETERIZATION N/A 3/3/2023    Procedure: Left and Right Heart Cath with Coronary Angiography;  Surgeon:  Richardson Willis MD;  Location: [facility] INVASIVE LOCATION;  Service: Cardiovascular;  Laterality: N/A;    CATARACT EXTRACTION      IVC FILTER RETRIEVAL  2014    IVC filter placement by Dr. Card    MAMMO STEREOTACTIC BREAST BX SURGICAL ADD UNI Left 2011    invasive lobular carcinoma-Dr. Cande Daniel    MASTECTOMY, PARTIAL Left 2011    and left axillary sentinel lymph node biopsy by Dr. Uriostegui    TUBAL ABDOMINAL LIGATION         Family History   Problem Relation Age of Onset    Lung cancer Brother        Social History     Tobacco Use    Smoking status: Former     Types: Cigarettes     Quit date:      Years since quittin.1     Passive exposure: Past    Smokeless tobacco: Never    Tobacco comments:     smoked 6 cigarettes a day from 13 years of age to 56 years of age when she quit in    Vaping Use    Vaping Use: Never used   Substance Use Topics    Alcohol use: Not Currently    Drug use: No        Home meds:  Prior to Admission medications    Medication Sig Start Date End Date Taking? Authorizing Provider   allopurinol (ZYLOPRIM) 100 MG tablet Take 1 tablet by mouth Daily.   Yes Jaylyn Golden MD   amoxicillin-clavulanate (AUGMENTIN) 875-125 MG per tablet Take 1 tablet by mouth 2 (Two) Times a Day. 24 Yes Jaylyn Golden MD   apixaban (ELIQUIS) 5 MG tablet tablet Take 1 tablet by mouth Every 12 (Twelve) Hours. Indications: Other - full anticoagulation, history of DVT/PE 10/27/22  Yes Shaylee Mcdaniels MD   budesonide-formoterol (SYMBICORT) 80-4.5 MCG/ACT inhaler Inhale 2 puffs 2 (Two) Times a Day.   Yes Jaylyn Golden MD   carvedilol (COREG) 12.5 MG tablet TAKE 1 TABLET BY MOUTH TWICE DAILY WITH MEALS 23  Yes Richardson Willis MD   Cholecalciferol (Vitamin D3) 50 MCG ( UT) capsule Take 1 capsule by mouth Daily.   Yes Jaylyn Golden MD   furosemide (LASIX) 40 MG tablet Take 1 tablet by mouth Daily.   Yes Jaylyn Golden MD    gabapentin (NEURONTIN) 300 MG capsule Take 1 capsule by mouth 2 (Two) Times a Day.   Yes Provider, MD Jaylyn   levothyroxine (SYNTHROID, LEVOTHROID) 50 MCG tablet Take 1 tablet by mouth Daily.   Yes Provider, MD Jaylyn   lisinopril (PRINIVIL,ZESTRIL) 40 MG tablet Take 1 tablet by mouth Daily. 6/8/23  Yes Velma Ascencio APRN   potassium chloride (K-DUR,KLOR-CON) 10 MEQ CR tablet Take 1 tablet by mouth Daily. 2/24/23  Yes Richardson Willis MD   sildenafil (REVATIO) 20 MG tablet Take 1 tablet by mouth Every 8 (Eight) Hours. 5/11/23  Yes Mansi Becerril APRN       Allergies:  Patient has no known allergies.    Scheduled Meds:allopurinol, 100 mg, Oral, Daily  apixaban, 5 mg, Oral, Q12H  budesonide, 0.5 mg, Nebulization, BID - RT  carvedilol, 12.5 mg, Oral, BID With Meals  cephalexin, 500 mg, Oral, Q8H  famotidine, 20 mg, Oral, Daily  furosemide, 40 mg, Oral, Daily  hydrALAZINE, 50 mg, Oral, Q8H  levothyroxine, 50 mcg, Oral, Q AM  melatonin, 5 mg, Oral, Nightly  methylPREDNISolone sodium succinate, 20 mg, Intravenous, Daily  sildenafil, 20 mg, Oral, TID  sodium chloride, 10 mL, Intravenous, Q12H  sodium chloride, 10 mL, Intravenous, Q12H      Continuous Infusions:     PRN Meds:.  acetaminophen    Calcium Replacement - Follow Nurse / BPA Driven Protocol    ipratropium-albuterol    ipratropium-albuterol    Magnesium Standard Dose Replacement - Follow Nurse / BPA Driven Protocol    ondansetron    Phosphorus Replacement - Follow Nurse / BPA Driven Protocol    Potassium Replacement - Follow Nurse / BPA Driven Protocol    [COMPLETED] Insert Peripheral IV **AND** sodium chloride    sodium chloride    sodium chloride    sodium chloride    sodium chloride      OBJECTIVE    Vital Signs  Vitals:    02/13/24 0010 02/13/24 0337 02/13/24 0608 02/13/24 0619   BP: 142/80 119/73 142/70    BP Location: Right arm Right arm Right arm    Patient Position: Lying Lying Lying    Pulse: 75 79 73    Resp: 24 23     Temp: 97.2 °F (36.2 °C)  "97.1 °F (36.2 °C)     TempSrc: Oral Oral     SpO2: 92% 91% 91% 94%   Weight:  74.8 kg (164 lb 14.5 oz)     Height:           Flowsheet Rows      Flowsheet Row First Filed Value   Admission Height 162.6 cm (64\") Documented at 02/05/2024 1046   Admission Weight 66.7 kg (147 lb) Documented at 02/05/2024 1046              Intake/Output Summary (Last 24 hours) at 2/13/2024 0752  Last data filed at 2/13/2024 0600  Gross per 24 hour   Intake 680 ml   Output 400 ml   Net 280 ml            Telemetry: Atrial fibrillation with heart rate in the 70s    Physical Exam:  The patient is alert, oriented X3  Vital signs as noted above.  Head and neck revealed no carotid bruits or jugular venous distention.  No thyromegaly or lymphadenopathy is present  Lungs clear.  No wheezing.  Breath sounds are normal bilaterally.  Irregularly irregular.  No murmur. No precordial rub is present.  No gallop is present.  Abdomen soft and nontender.  No organomegaly is present.  Extremities with good peripheral pulses without any pedal edema.  Skin warm and dry.  Musculoskeletal system is grossly normal.  CNS grossly normal.       Results Review:  I have personally reviewed the results from the time of this admission to 2/13/2024 07:52 EST and agree with these findings:  []  Laboratory  []  Microbiology  []  Radiology  []  EKG/Telemetry   []  Cardiology/Vascular   []  Pathology  []  Old records  []  Other:    Most notable findings include:    Lab Results (last 24 hours)       Procedure Component Value Units Date/Time    CBC & Differential [[id number]]  (Abnormal) Collected: 02/13/24 0354    Specimen: Blood Updated: 02/13/24 0512    Narrative:      The following orders were created for panel order CBC & Differential.  Procedure                               Abnormality         Status                     ---------                               -----------         ------                     CBC Auto Differential[[id number]]        Abnormal            " Final result                 Please view results for these tests on the individual orders.    CBC Auto Differential [[id number]]  (Abnormal) Collected: 02/13/24 0354    Specimen: Blood Updated: 02/13/24 0512     WBC 11.80 10*3/mm3      RBC 4.38 10*6/mm3      Hemoglobin 11.9 g/dL      Hematocrit 37.5 %      MCV 85.6 fL      MCH 27.2 pg      MCHC 31.8 g/dL      RDW 16.5 %      RDW-SD 49.0 fl      MPV 8.6 fL      Platelets 282 10*3/mm3      Neutrophil % 89.5 %      Lymphocyte % 4.4 %      Monocyte % 5.9 %      Eosinophil % 0.0 %      Basophil % 0.2 %      Neutrophils, Absolute 10.50 10*3/mm3      Lymphocytes, Absolute 0.50 10*3/mm3      Monocytes, Absolute 0.70 10*3/mm3      Eosinophils, Absolute 0.00 10*3/mm3      Basophils, Absolute 0.00 10*3/mm3      nRBC 0.1 /100 WBC     Renal Function Panel [[id number]]  (Abnormal) Collected: 02/13/24 0354    Specimen: Blood Updated: 02/13/24 0429     Glucose 118 mg/dL      BUN 53 mg/dL      Creatinine 1.40 mg/dL      Sodium 137 mmol/L      Potassium 4.3 mmol/L      Chloride 102 mmol/L      CO2 25.0 mmol/L      Calcium 9.2 mg/dL      Albumin 3.4 g/dL      Phosphorus 3.9 mg/dL      Anion Gap 10.0 mmol/L      BUN/Creatinine Ratio 37.9     eGFR 37.6 mL/min/1.73     Narrative:      GFR Normal >60  Chronic Kidney Disease <60  Kidney Failure <15    The GFR formula is only valid for adults with stable renal function between ages 18 and 70.            Imaging Results (Last 24 Hours)       ** No results found for the last 24 hours. **            LAB RESULTS (LAST 7 DAYS)    CBC  Results from last 7 days   Lab Units 02/13/24  0354 02/12/24  0540 02/10/24  0227 02/09/24  0055 02/08/24  0444 02/07/24  1013   WBC 10*3/mm3 11.80* 12.20* 10.40 11.50* 9.60 9.20   RBC 10*6/mm3 4.38 4.13 4.34 4.57 4.77 4.33   HEMOGLOBIN g/dL 11.9* 12.1 11.7* 12.3 12.8 11.8*   HEMATOCRIT % 37.5 35.9 36.6 38.8 40.5 37.4   MCV fL 85.6 86.8 84.3 84.8 84.9 86.3   PLATELETS 10*3/mm3 282 284 238 234 216 200        BMP  Results from last 7 days   Lab Units 02/13/24  0354 02/12/24  0540 02/11/24  0429 02/10/24  0227 02/09/24  0055 02/08/24  0444 02/07/24  1013   SODIUM mmol/L 137 138 138 140 138 136 136   POTASSIUM mmol/L 4.3 4.4 4.6 4.3 4.3 4.8 4.9   CHLORIDE mmol/L 102 104 106 106 105 102 102   CO2 mmol/L 25.0 23.0 21.0* 23.0 22.0 23.0 24.0   BUN mg/dL 53* 45* 35* 29* 30* 32* 32*   CREATININE mg/dL 1.40* 1.36* 1.43* 1.14* 1.16* 1.12* 1.26*   GLUCOSE mg/dL 118* 130* 161* 113* 115* 90 100*   MAGNESIUM mg/dL  --   --   --  2.0 1.9 2.0  --    PHOSPHORUS mg/dL 3.9 4.2 4.7* 3.0 2.7 3.4  --        CMP   Results from last 7 days   Lab Units 02/13/24  0354 02/12/24  0540 02/11/24  0429 02/10/24  0227 02/09/24  0055 02/08/24  0444 02/07/24  1013 02/06/24  1806   SODIUM mmol/L 137 138 138 140 138 136 136 135*   POTASSIUM mmol/L 4.3 4.4 4.6 4.3 4.3 4.8 4.9 5.1   CHLORIDE mmol/L 102 104 106 106 105 102 102 98   CO2 mmol/L 25.0 23.0 21.0* 23.0 22.0 23.0 24.0 28.0   BUN mg/dL 53* 45* 35* 29* 30* 32* 32* 32*   CREATININE mg/dL 1.40* 1.36* 1.43* 1.14* 1.16* 1.12* 1.26* 1.50*   GLUCOSE mg/dL 118* 130* 161* 113* 115* 90 100* 105*   ALBUMIN g/dL 3.4* 3.5 3.3*  --   --  3.5  --  3.3*   BILIRUBIN mg/dL  --   --   --   --   --  0.7  --  0.6   ALK PHOS U/L  --   --   --   --   --  60  --  58   AST (SGOT) U/L  --   --   --   --   --  35*  --  21   ALT (SGPT) U/L  --   --   --   --   --  32  --  16       BNP        TROPONIN  Results from last 7 days   Lab Units 02/06/24  1806   CK TOTAL U/L 64       CoAg        Creatinine Clearance  Estimated Creatinine Clearance: 30.7 mL/min (A) (by C-G formula based on SCr of 1.4 mg/dL (H)).    ABG        Radiology  No radiology results for the last day      EKG  I personally viewed and interpreted the patient's EKG/Telemetry data:  ECG 12 Lead Rhythm Change   Final Result   HEART RATE= 69  bpm   RR Interval= 900  ms   NV Interval= 213  ms   P Horizontal Axis= -3  deg   P Front Axis= 54  deg   QRSD Interval=  86  ms   QT Interval= 413  ms   QTcB= 435  ms   QRS Axis= 117  deg   T Wave Axis= 33  deg   - ABNORMAL ECG -   Sinus rhythm   Atrial premature complex   Borderline prolonged AR interval   Anteroseptal infarct, age indeterminate   When compared with ECG of 05-Feb-2024 11:04:44,   New or worsened ischemia or infarction   New conduction abnormality   Significant axis, voltage or hypertrophy change   Electronically Signed By: Richardson Willis ([facility]) 08-Feb-2024 17:20:33   Date and Time of Study: 2024-02-08 01:23:00      ECG 12 Lead Other; Weakness   Final Result   HEART RATE= 75  bpm   RR Interval= 860  ms   AR Interval= 176  ms   P Horizontal Axis= 201  deg   P Front Axis= 11  deg   QRSD Interval= 99  ms   QT Interval= 398  ms   QTcB= 429  ms   QRS Axis= 117  deg   T Wave Axis= -6  deg   - ABNORMAL ECG -   Sinus rhythm   Multiple premature complexes, vent & supraven   Right axis deviation   Low voltage, precordial leads   Nonspecific T abnormalities, anterior leads   Electronically Signed By: Pankaj Mccallum ([facility]) 06-Feb-2024 07:40:13   Date and Time of Study: 2024-02-05 11:04:44      SCANNED - TELEMETRY     Final Result      SCANNED - TELEMETRY     Final Result      SCANNED - TELEMETRY     Final Result      SCANNED - TELEMETRY     Final Result      SCANNED - TELEMETRY     Final Result      SCANNED - TELEMETRY     Final Result      SCANNED - TELEMETRY     Final Result      SCANNED - TELEMETRY     Final Result      SCANNED - TELEMETRY     Final Result      SCANNED - TELEMETRY     Final Result      SCANNED - TELEMETRY     Final Result      SCANNED - TELEMETRY     Final Result      SCANNED - TELEMETRY     Final Result      SCANNED - TELEMETRY     Final Result      SCANNED - TELEMETRY     Final Result      SCANNED - TELEMETRY     Final Result      SCANNED - TELEMETRY     Final Result      SCANNED - TELEMETRY     Final Result      SCANNED - TELEMETRY     Final Result      SCANNED - TELEMETRY     Final Result      SCANNED -  TELEMETRY     Final Result      SCANNED - TELEMETRY     Final Result      SCANNED - TELEMETRY     Final Result      SCANNED - TELEMETRY     Final Result      SCANNED - TELEMETRY     Final Result      SCANNED - TELEMETRY     Final Result      SCANNED - TELEMETRY     Final Result      SCANNED - TELEMETRY     Final Result      SCANNED - TELEMETRY     Final Result      SCANNED - TELEMETRY     Final Result      SCANNED - TELEMETRY     Final Result      SCANNED - TELEMETRY     Final Result      SCANNED - TELEMETRY     Final Result      SCANNED - TELEMETRY     Final Result      SCANNED - TELEMETRY     Final Result      SCANNED - TELEMETRY     Final Result      SCANNED - TELEMETRY     Final Result      SCANNED - TELEMETRY     Final Result      SCANNED - TELEMETRY     Final Result      SCANNED - TELEMETRY     Final Result      SCANNED - TELEMETRY     Final Result      SCANNED - TELEMETRY     Final Result      ECG 12 Lead Altered Mental Status    (Results Pending)         Echocardiogram:    Results for orders placed during the hospital encounter of 02/05/24    Adult Transthoracic Echo Complete W/ Cont if Necessary Per Protocol    Interpretation Summary    Left ventricular systolic function is normal. Left ventricular ejection fraction appears to be 61 - 65%.    Severely reduced right ventricular systolic function noted.    The right ventricular cavity is severely dilated.    The left atrial cavity is mildly dilated.    Left atrial volume is mildly increased.    The right atrial cavity is moderate to severely  dilated.    Estimated right ventricular systolic pressure from tricuspid regurgitation is mildly elevated (35-45 mmHg).    Mild pulmonary hypertension is present.    There is a moderate (1-2cm) pericardial effusion. There is no evidence of cardiac tamponade.        Stress Test:  Results for orders placed in visit on 09/13/22    Stress Test With Myocardial Perfusion One Day    Interpretation Summary    Left ventricular  ejection fraction is hyperdynamic (Calculated EF > 70%). .    Myocardial perfusion imaging indicates a normal myocardial perfusion study with no evidence of ischemia.    Impressions are consistent with a low risk study.    Findings consistent with a normal ECG stress test.         Cardiac Catheterization:  Results for orders placed during the hospital encounter of 03/03/23    Cardiac Catheterization/Vascular Study    Narrative  OPERATORS  Richardson Willis M.D. (Attending Cardiologist)      PROCEDURE PERFORMED  Ultrasound guided vascular access  Right heart catheterization  Coronary Angiogram  Left Heart Catheterization 52043  Moderate Sedation    INDICATIONS FOR PROCEDURE  83-year-old woman with multiple cardiovascular risk factors, abnormal stress test presented with worsening shortness of breath.  After discussing the risk and benefit of the procedure she was brought in for elective right and left heart cath.    PROCEDURE IN DETAIL  Informed consent was obtained from the patient after explaining the risks, benefits, and alternative options of the procedure. After obtaining informed consent, the patient was brought to the cath lab and was prepped in a sterile fashion. Lidocaine 2% was used for local anesthesia into the right femoral venous access site. Right femoral vein was accessed using the micropuncture needle under ultrasound guidance and micropuncture wire advanced under flouroscopy. A 7 [place]e vascular sheath was put into place percutaneously over guide-wire. Guide wires were removed. A 6Fr swan sheryl catheter was advanced to wedge position. RA, RV and PA and wedge pressures were recorded.  PA sat and arterial sats recorded.  The patient tolerated the procedure well without any complications.    Lidocaine 2% was used for local anesthesia into the right femoral arterial access site. The right femoral artery was accessed with a micropuncture needle via modified Seldinger technique under ultrasound guidance. A 6F  was inserted successfully.  Afterwards, 6F JR4 and JL4 diagnostic catheters were advanced over a wire into the ascending aorta and were used to engage the ostia of the left main and RCA respectively. JR4 used to cross the AV and obtain LV pressures and gradient across the AV measured via pullback technique. Images of the right and left coronary systems were obtained. All the catheters were exchanged over a wire and subsequently removed. Angiogram of the femoral access site was obtained and did not show complications. The patient tolerated the procedure well without any complications. The pictures were reviewed at the end of the procedure. A Mynx closure device was applied.    HEMODYNAMICS    RHC  RA 6/5, 4 mmHg  RV 74/3, 5 mmHg  PA 71/25, 44 mmHg  PCW 12 mmHg  AO Sat 92%  PA Sat 78%    Jose CO: 7.89 L/min    Jose CI: 4.69 L/min/m²    LHC  LV: 134/3, 20 mmHg  AO: 131/56, 87 mmHg  No significant gradient across the aortic valve during pullback of JR4 catheter.    FINDINGS  Coronary Angiogram  All vessels are heavily calcified  She also has heavily calcified peripheral arterial disease.  Right dominant circulation    Left main: Left main is a large caliber vessel which gives rise to the Left Anterior Descending and the Left circumflex.  Left main is angiographically free from any significant disease.    Left Anterior Descending Artery: LAD is a heavily calcified medium caliber vessel which gives rise to diagonals.  The vessel is highly tortuous and has 50 to 60% mid vessel stenosis best seen in VERÓNICA cranial view.    Left Circumflex: Heavily calcified vessel with 50% proximal segment stenosis.    Right Coronary Artery: The RCA is a large caliber vessel which is heavily calcified and tortuous.  It has diffuse luminal irregularities and 30 to 40% stenosis in the midsegment around the bend.    ESTIMATED BLOOD LOSS:  10 ml    COMPLICATIONS:  None    PROCEDURE DATA:  Sedation Time: 25 minutes    IMPRESSIONS  Heavily  calcified, tortuous nonobstructive coronary disease  Severe pulmonary hypertension with PA systolic pressure in the 70s  Mean PA pressure 44 mmHg    RECOMMENDATIONS  -Continue aggressive medical management of CAD  -Referral to pulmonology for treatment of pulmonary hypertension         Other:         ASSESSMENT & PLAN:    Principal Problem:    Altered mental status  Active Problems:    Acute hypokalemia    Stage 3a chronic kidney disease    Chronic obstructive pulmonary disease    History of venous thrombosis and embolism    Primary hypertension    History of TIA (transient ischemic attack)    Severe pulmonary hypertension    Chronic respiratory failure with hypoxia    JULIAN (acute kidney injury)      Altered mental status  Possible mastitis/PICC line infection  CT head and MRI of the brain unremarkable with no acute findings  Antibiotics per ID  Her mental status is now improved    JULIAN on Stage 3a chronic kidney disease  Creatinine 1.40, GFR is 37.6  Continue oral diuretics  Potassium has been replaced.  Hypokalemia resolved  Closely monitor renal function and respiratory status     COPD/Chronic respiratory failure  Severe pulmonary hypertension  On 2-3 L of oxygen via nasal cannula at baseline.  Has known pulmonary hypertension  RA 6/5, 4 mmHg  RV 74/3, 5 mmHg  PA 71/25, 44 mmHg  PCW 12 mmHg  Continue sildenafil  On steroids  Managed by pulmonology  Echocardiogram shows preserved LV function with mildly elevated RVSP.  Small to moderate pericardial effusion: No signs of tamponade     Atrial fibrillation  She has paroxysmal atrial fibrillation  [id number] score is 6  Continue Eliquis for atrial fibrillation as well as for history of DVT/PE  Heart rate has improved on beta-blocker     History of venous thrombosis and embolism  Continue Eliquis 5 mg p.o. twice daily.  She also has an IVC filter in place as well.     Primary hypertension, chronic  Blood pressure has improved.  Coreg 12.5 mg p.o. twice daily  Continue  hydralazine  Amlodipine can be added if better blood pressure control is desired.  Echo shows preserved LV function, reduced RV function and mildly elevated RVSP.  Pericardial effusion is not significant with no signs of tamponade.  Diuretics to be used as needed     Coronary artery disease  Continue high intensity statin and beta-blocker.  No need for aspirin while she is on anticoagulation  Previous cardiac catheterization showed heavily calcified coronaries but nonobstructive.  No chest pain and stable from cardiac standpoint.     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  Continue high intensity statin and anticoagulation with Eliquis 5 mg p.o. twice daily.    Discussed the care plan with patient's daughter at bedside.    Richardson Willis MD  02/13/24  07:52 EST

## 2024-02-13 NOTE — THERAPY TREATMENT NOTE
"Subjective: Pt agreeable to therapeutic plan of care.    Objective:     Bed mobility - N/A or Not attempted.    Transfers - N/A or Not attempted.    Ambulation - 0 feet N/A or Not attempted.    Therapeutic Exercise - 2x20 Reps B LE AROM lying supine    Vitals: Desaturates to 88% while performing therex and talking. Quick improvement when cued for PLB.     Pain: 0 VAS   Location:   Intervention for pain: N/A    Education: Provided education on the importance of mobility in the acute care setting, Verbal/Tactile Cues, and Stroke prevention and risk factors    Assessment: Gabrielle Lyles presents with functional mobility impairments which indicate the need for skilled intervention. Pt refused OOB activities this session, despite max encouragement on importance of upright mobility to decrease risk of immobilization complications. Pt reports she was up all morning in her chair until after lunch and is now too tired to get back up. Agreeable to supine therex only. Will continue to follow and progress as tolerated.     Plan/Recommendations:   If medically appropriate, Moderate Intensity Therapy recommended post-acute care. This is recommended as therapy feels the patient would require 3-4 days per week and wouldn't tolerate \"3 hour daily\" rehab intensity. SNF would be the preferred choice. If the patient does not agree to SNF, arrange HH or OP depending on home bound status. If patient is medically complex, consider LTACH. Pt requires no DME at discharge.     Pt desires Skilled Rehab placement at discharge. Pt cooperative; agreeable to therapeutic recommendations and plan of care.         Basic Mobility 6-click:  Rollin = Total, A lot = 2, A little = 3; 4 = None  Supine>Sit:   1 = Total, A lot = 2, A little = 3; 4 = None   Sit>Stand with arms:  1 = Total, A lot = 2, A little = 3; 4 = None  Bed>Chair:   1 = Total, A lot = 2, A little = 3; 4 = None  Ambulate in room:  1 = Total, A lot = 2, A little = 3; 4 = " None  3-5 Steps with railin = Total, A lot = 2, A little = 3; 4 = None  Score: 13    Modified Deann: N/A = No pre-op stroke/TIA    Post-Tx Position: Supine with HOB Elevated, Alarms activated, and Call light and personal items within reach  PPE: gloves

## 2024-02-13 NOTE — PLAN OF CARE
"Assessment: Gabrielle Lyles presents with functional mobility impairments which indicate the need for skilled intervention. Pt refused OOB activities this session, despite max encouragement on importance of upright mobility to decrease risk of immobilization complications. Pt reports she was up all morning in her chair until after lunch and is now too tired to get back up. Agreeable to supine therex only. Will continue to follow and progress as tolerated.     Plan/Recommendations:   If medically appropriate, Moderate Intensity Therapy recommended post-acute care. This is recommended as therapy feels the patient would require 3-4 days per week and wouldn't tolerate \"3 hour daily\" rehab intensity. SNF would be the preferred choice. If the patient does not agree to SNF, arrange HH or OP depending on home bound status. If patient is medically complex, consider LTACH. Pt requires no DME at discharge.     Pt desires Skilled Rehab placement at discharge. Pt cooperative; agreeable to therapeutic recommendations and plan of care.                 "

## 2024-02-13 NOTE — DISCHARGE PLACEMENT REQUEST
"Gabrielle Lyles (83 y.o. Female)       Date of Birth   1940    Social Security Number       Address   [address] [place] [address] [place] [address]    Home Phone   [phone]    MRN   [id number]       Pentecostal   Judaism    Marital Status                               Admission Date   2/5/24    Admission Type   Emergency    Admitting Provider   Rosamaria Jin MD    Attending Provider   Rosamaria Jin MD    Department, Room/Bed   [facility] 2D, 268/1       Discharge Date       Discharge Disposition       Discharge Destination                                 Attending Provider: Rosamaria Jin MD    Allergies: No Known Allergies    Isolation: None   Infection: None   Code Status: CPR    Ht: 162.6 cm (64\")   Wt: 74.8 kg (164 lb 14.5 oz)    Admission Cmt: None   Principal Problem: Altered mental status [R41.82]                   Active Insurance as of 2/5/2024       Primary Coverage       Payor Plan Insurance Group Employer/Plan Group    HUMANA MEDICARE REPLACEMENT HUMANA MED ADV GROUP [id number]       Payor Plan Address Payor Plan Phone Number Payor Plan Fax Number Effective Dates    [address] [address] [phone]  1/1/2018 - None Entered    [facility] [id number]         Subscriber Name Subscriber Birth Date Member ID       GABRIELLE LYLES 1940 [id number]                     Emergency Contacts        (Rel.) Home Phone Work Phone Mobile Phone    MARVIN DURANT (Daughter) [phone] -- --                 History & Physical        Rosamaria Jin MD at 02/05/24 1710              Patient Care Team:  Shaylee Mcdaniels MD as PCP - General (Family Medicine)  Richardson Willis MD as Cardiologist (Cardiology)  Rafy Rodriguez MD as Consulting Physician (Hematology and Oncology)  Christianne Pihllips, AMARILIS as Licensed Practical Nurse    Chief complaint confusion    Subjective    Patient is a 83 y.o. female with history of COPD, DVT, breast cancer, chronic kidney disease and pulmonary hypertension " who presents with progressive confusion over the last 3 days.  She was seen in her PCP office with bilateral lower extremity swelling and swelling and discomfort in her left breast.  Dr. Mcdaniels was concerned about possible mastitis in the left breast.  She started Augmentin and advised patient to increase dose of furosemide from 40 mg a day to 80 mg a day through the weekend.  Daughter noticed mild confusion on Saturday afternoon, somewhat worse on Sunday and then this morning she was significantly worse.  She is oriented to person only, which is a significant change from baseline..  There was no reported fever at home but her temperature on arrival to the emergency room was 100 degrees.  She has had poor oral intake over the last few days.  There is no reported vomiting or diarrhea.  Swelling in the breast and legs has resolved.    Review of Systems   Constitutional:  Positive for activity change, appetite change and fever. Negative for chills, diaphoresis, fatigue and unexpected weight change.   HENT:  Negative for facial swelling and hearing loss.    Eyes:  Negative for visual disturbance.   Respiratory:  Negative for cough, shortness of breath, wheezing and stridor.    Cardiovascular:  Negative for chest pain, palpitations and leg swelling.   Gastrointestinal:  Negative for abdominal pain, constipation, diarrhea, nausea and vomiting.   Endocrine: Positive for polyuria.   Genitourinary:  Negative for dysuria and urgency.   Musculoskeletal:  Positive for gait problem. Negative for arthralgias and back pain.   Skin:  Positive for wound. Negative for rash.   Neurological:  Positive for weakness. Negative for dizziness, tremors, light-headedness and headaches.   Psychiatric/Behavioral:  Positive for confusion.           History  Past Medical History:   Diagnosis Date    COPD (chronic obstructive pulmonary disease)     Deep vein thrombosis of bilateral lower extremities 7/24/2019    History of DVT (deep vein  thrombosis) 2013    bilateral lower extremity    History of pneumonia 2012    community acquired pneumonia and right pleural effusion;hospital[facility] at [facility]    History of pulmonary embolism 2013    bilateral PE    Hypertension     Insomnia     on Ambien 5mg at     Lobular breast cancer, left 2011    Stage IA left upper lobular breast cancer    Malignant neoplasm of left breast in female, estrogen receptor positive 2019    Osteopenia     Severe pulmonary hypertension 3/3/2023    Stage 3a chronic kidney disease 2019    TIA (transient ischemic attack) 10/25/2022     Past Surgical History:   Procedure Laterality Date    CARDIAC CATHETERIZATION N/A 3/3/2023    Procedure: Left and Right Heart Cath with Coronary Angiography;  Surgeon: Richardson Willis MD;  Location: [facility] INVASIVE LOCATION;  Service: Cardiovascular;  Laterality: N/A;    CATARACT EXTRACTION      IVC FILTER RETRIEVAL  2014    IVC filter placement by Dr. Card    MAMMO STEREOTACTIC BREAST BX SURGICAL ADD UNI Left 2011    invasive lobular carcinoma-Dr. Cande Daniel    MASTECTOMY, PARTIAL Left 2011    and left axillary sentinel lymph node biopsy by Dr. Uriostegui    TUBAL ABDOMINAL LIGATION       Family History   Problem Relation Age of Onset    Lung cancer Brother      Social History     Tobacco Use    Smoking status: Former     Types: Cigarettes     Quit date:      Years since quittin.1     Passive exposure: Past    Smokeless tobacco: Never    Tobacco comments:     smoked 6 cigarettes a day from 13 years of age to 56 years of age when she quit in    Vaping Use    Vaping Use: Never used   Substance Use Topics    Alcohol use: Not Currently    Drug use: No     (Not in a hospital admission)    Allergies:  Patient has no known allergies.    Objective    Vital Signs  Temp:  [99.5 °F (37.5 °C)-100 °F (37.8 °C)] 99.5 °F (37.5 °C)  Heart Rate:  [73-87] 74  Resp:  [18] 18  BP:  (142-146)/(80-83) 146/83     Physical Exam:      General Appearance:    Alert, disheveled, oriented to person only, speech is clear   Head:    Normocephalic, without obvious abnormality, atraumatic   Eyes:            Lids and lashes normal, conjunctivae and sclerae normal, no   icterus, no pallor, corneas clear, PERRLA   Ears:    Ears appear intact with no abnormalities noted   Throat:   No oral lesions, no thrush, oral mucosa moist   Neck:   No adenopathy, supple, trachea midline, no thyromegaly, no   carotid bruit, no JVD   Lungs:     Clear to auscultation,respirations regular, even and                  unlabored    Heart:    Regular rhythm and normal rate, normal S1 and S2, no            murmur, no gallop, no rub, no click   Chest Wall:  Left breast-no visible or palpable abnormalities   Abdomen:     Normal bowel sounds, no masses, no organomegaly, soft        non-tender, non-distended, no guarding, no rebound                tenderness   Extremities: No edema   Pulses:   Pulses palpable and equal bilaterally   Skin:   No bleeding, bruising or rash   Lymph nodes:   No palpable adenopathy   Neurologic: No localizing neurologic deficits; gait not assessed; mental status far below baseline       Results Review:     Imaging Results (Last 24 Hours)       Procedure Component Value Units Date/Time    CT Head Without Contrast [[id number]] Collected: 02/05/24 1223     Updated: 02/05/24 1228    Narrative:      CT HEAD WO CONTRAST    Date of Exam: 2/5/2024 12:12 PM EST    Indication: confusion.    Comparison: Head CT dated 9/26/2023    Technique: Axial CT images were obtained of the head without contrast administration.  Coronal reconstructions were performed.  Automated exposure control and iterative reconstruction methods were used.      FINDINGS:    Examination is limited due to motion artifact.    Foci of periventricular and subcortical white matter hypoattenuation are consistent with chronic, microvascular ischemic  change. There is age-related loss of brain parenchymal volume with prominence of sulcation and ventriculomegaly. No significant mass   effect, midline shift, intracranial hemorrhage, or hydrocephalus is identified. No extra-axial fluid collection is identified.   The calvarium and overlying soft tissues are unremarkable. The paranasal sinuses and bilateral mastoid air cells are   adequately aerated. The visualized bony orbits, globes, and retrobulbar soft tissues are unremarkable.      Impression:      1.Examination is limited due to motion artifact.  2.Given this limitation, no acute intracranial abnormality is identified.  3.Findings compatible with chronic microvascular ischemic change and diffuse cortical atrophy.    Electronically Signed: Parker Mireles MD    2/5/2024 12:26 PM EST    Workstation ID: [id number]    XR Chest 1 View [[id number]] Collected: 02/05/24 1205     Updated: 02/05/24 1208    Narrative:      XR CHEST 1 VW    Date of Exam: 2/5/2024 12:00 PM EST    Indication: weakness    Comparison: 5/2/2023.    Findings:  There is mild cardiomegaly with increased heart size. Mildly prominent interstitial pattern appears stable and chronic. There is a prominent skinfold over the right chest. There is no definite pneumothorax. There is no effusion.      Impression:      Impression:  Mild cardiomegaly. Mild chronic findings of the lung fields.      Electronically Signed: Mary Ivan MD    2/5/2024 12:06 PM EST    Workstation ID: [id number]             Lab Results (last 24 hours)       Procedure Component Value Units Date/Time    COVID-19, FLU A/B, RSV PCR 1 HR TAT - Swab, Nasopharynx [[id number]]  (Normal) Collected: 02/05/24 1235    Specimen: Swab from Nasopharynx Updated: 02/05/24 1325     COVID19 Not Detected     Influenza A PCR Not Detected     Influenza B PCR Not Detected     RSV, PCR Not Detected    Narrative:      Fact sheet for providers: https://www.fda.gov/media/217035/download    Fact sheet for  patients: https://www.[employer].gov/media/096917/download    Test performed by PCR.    Extra Tubes [[id number]] Collected: 02/05/24 1150    Specimen: Blood, Venous Line Updated: 02/05/24 1302    Narrative:      The following orders were created for panel order Extra Tubes.  Procedure                               Abnormality         Status                     ---------                               -----------         ------                     Gold Top - SST[[id number]]                                   Final result               Light Blue Top[[id number]]                                   Final result                 Please view results for these tests on the individual orders.    Gold Top - SST [[id number]] Collected: 02/05/24 1150    Specimen: Blood Updated: 02/05/24 1302     Extra Tube Hold for add-ons.     Comment: Auto resulted.       Light Blue Top [[id number]] Collected: 02/05/24 1150    Specimen: Blood Updated: 02/05/24 1302     Extra Tube Hold for add-ons.     Comment: Auto resulted       Ammonia [[id number]]  (Normal) Collected: 02/05/24 1235    Specimen: Blood from Arm, Left Updated: 02/05/24 1301     Ammonia 25 umol/L     Carbon Monoxide, Blood [[id number]]  (Normal) Collected: 02/05/24 1235    Specimen: Blood from Arm, Left Updated: 02/05/24 1242     Carbon Monoxide, Blood 1.7 %     Procalcitonin [[id number]]  (Normal) Collected: 02/05/24 1150    Specimen: Blood Updated: 02/05/24 1232     Procalcitonin 0.06 ng/mL     Narrative:      As a Marker for Sepsis (Non-Neonates):    1. <0.5 ng/mL represents a low risk of severe sepsis and/or septic shock.  2. >2 ng/mL represents a high risk of severe sepsis and/or septic shock.    As a Marker for Lower Respiratory Tract Infections that require antibiotic therapy:    PCT on Admission    Antibiotic Therapy       6-12 Hrs later    >0.5                Strongly Recommended  >0.25 - <0.5        Recommended   0.1 - 0.25          Discouraged              Remeasure/reassess  "PCT  <0.1                Strongly Discouraged     Remeasure/reassess PCT    As 28 day mortality risk marker: \"Change in Procalcitonin Result\" (>80% or <=80%) if Day 0 (or Day 1) and Day 4 values are available. Refer to http://www.[facility]-pct-calculator.com    Change in PCT <=80%  A decrease of PCT levels below or equal to 80% defines a positive change in PCT test result representing a higher risk for 28-day all-cause mortality of patients diagnosed with severe sepsis for septic shock.    Change in PCT >80%  A decrease of PCT levels of more than 80% defines a negative change in PCT result representing a lower risk for 28-day all-cause mortality of patients diagnosed with severe sepsis or septic shock.       TSH [[id number]]  (Normal) Collected: 02/05/24 1150    Specimen: Blood Updated: 02/05/24 1232     TSH 1.640 uIU/mL     Magnesium [[id number]]  (Normal) Collected: 02/05/24 1150    Specimen: Blood Updated: 02/05/24 1226     Magnesium 2.2 mg/dL     Urinalysis, Microscopic Only - Straight Cath [[id number]]  (Abnormal) Collected: 02/05/24 1146    Specimen: Urine from Straight Cath Updated: 02/05/24 1226     RBC, UA 3-5 /HPF      WBC, UA 0-2 /HPF      Comment: Urine culture not indicated.        Bacteria, UA Trace /HPF      Squamous Epithelial Cells, UA 0-2 /HPF      Hyaline Casts, UA 0-2 /LPF      Methodology Manual Light Microscopy    Comprehensive Metabolic Panel [[id number]]  (Abnormal) Collected: 02/05/24 1150    Specimen: Blood Updated: 02/05/24 1226     Glucose 104 mg/dL      BUN 19 mg/dL      Creatinine 1.30 mg/dL      Sodium 137 mmol/L      Potassium 3.1 mmol/L      Comment: Slight hemolysis detected by analyzer. Result may be falsely elevated.        Chloride 94 mmol/L      CO2 30.0 mmol/L      Calcium 9.5 mg/dL      Total Protein 7.1 g/dL      Albumin 3.9 g/dL      ALT (SGPT) 10 U/L      AST (SGOT) 19 U/L      Alkaline Phosphatase 69 U/L      Total Bilirubin 1.1 mg/dL      Globulin 3.2 gm/dL      A/G Ratio 1.2 " g/dL      BUN/Creatinine Ratio 14.6     Anion Gap 13.0 mmol/L      eGFR 41.1 mL/min/1.73     Narrative:      GFR Normal >60  Chronic Kidney Disease <60  Kidney Failure <15    The GFR formula is only valid for adults with stable renal function between ages 18 and 70.    Ethanol [[id number]] Collected: 02/05/24 1150    Specimen: Blood Updated: 02/05/24 1226     Ethanol % <0.010 %     Narrative:      Plasma Ethanol Clinical Symptoms:    ETOH (%)               Clinical Symptom  .01-.05              No apparent influence  .03-.12              Euphoria, Diminished judgment and attention   .09-.25              Impaired comprehension, Muscle incoordination  .18-.30              Confusion, Staggered gait, Slurred speech  .25-.40              Markedly decreased response to stimuli, unable to stand or                        walk, vomitting, sleep or stupor  .35-.50              Comatose, Anesthesia, Subnormal body temperature        Urine Drug Screen - Straight Cath [[id number]]  (Normal) Collected: 02/05/24 1149    Specimen: Urine from Straight Cath Updated: 02/05/24 1222     Amphet/Methamphet, Screen Negative     Barbiturates Screen, Urine Negative     Benzodiazepine Screen, Urine Negative     Cocaine Screen, Urine Negative     Opiate Screen Negative     THC, Screen, Urine Negative     Methadone Screen, Urine Negative     Oxycodone Screen, Urine Negative    Narrative:      Negative Thresholds Per Drugs Screened:    Amphetamines                 500 ng/ml  Barbiturates                 200 ng/ml  Benzodiazepines              100 ng/ml  Cocaine                      300 ng/ml  Methadone                    300 ng/ml  Opiates                      300 ng/ml  Oxycodone                    100 ng/ml  THC                           50 ng/ml    The Normal Value for all drugs tested is negative. This report includes final unconfirmed screening results to be used for medical treatment purposes only. Unconfirmed results must not be used for  non-medical purposes such as employment or legal testing. Clinical consideration should be applied to any drug of abuse test, particularly when unconfirmed results are used.          All urine drugs of abuse requests without chain of custody are for medical screening purposes only.  False positives are possible.      Urinalysis With Culture If Indicated - Straight Cath [[id number]]  (Abnormal) Collected: 02/05/24 1146    Specimen: Urine from Straight Cath Updated: 02/05/24 1218     Color, UA Yellow     Appearance, UA Clear     pH, UA 6.5     Specific Gravity, UA 1.022     Glucose, UA Negative     Ketones, UA Trace     Bilirubin, UA Negative     Blood, UA Small (1+)     Protein,  mg/dL (2+)     Leuk Esterase, UA Negative     Nitrite, UA Negative     Urobilinogen, UA 0.2 E.U./dL    Narrative:      In absence of clinical symptoms, the presence of pyuria, bacteria, and/or nitrites on the urinalysis result does not correlate with infection.    CBC & Differential [[id number]]  (Abnormal) Collected: 02/05/24 1150    Specimen: Blood Updated: 02/05/24 1159    Narrative:      The following orders were created for panel order CBC & Differential.  Procedure                               Abnormality         Status                     ---------                               -----------         ------                     CBC Auto Differential[[id number]]        Abnormal            Final result                 Please view results for these tests on the individual orders.    CBC Auto Differential [[id number]]  (Abnormal) Collected: 02/05/24 1150    Specimen: Blood Updated: 02/05/24 1159     WBC 9.10 10*3/mm3      RBC 4.73 10*6/mm3      Hemoglobin 12.8 g/dL      Hematocrit 40.5 %      MCV 85.6 fL      MCH 27.1 pg      MCHC 31.7 g/dL      RDW 16.0 %      RDW-SD 47.7 fl      MPV 8.6 fL      Platelets 220 10*3/mm3      Neutrophil % 82.2 %      Lymphocyte % 4.1 %      Monocyte % 8.4 %      Eosinophil % 4.2 %      Basophil % 1.1 %       Neutrophils, Absolute 7.50 10*3/mm3      Lymphocytes, Absolute 0.40 10*3/mm3      Monocytes, Absolute 0.80 10*3/mm3      Eosinophils, Absolute 0.40 10*3/mm3      Basophils, Absolute 0.10 10*3/mm3      nRBC 0.1 /100 WBC     Blood Culture - Blood, Arm, Right [[id number]] Collected: 02/05/24 1150    Specimen: Blood from Arm, Right Updated: 02/05/24 1155    POC Lactate [[id number]]  (Normal) Collected: 02/05/24 1136    Specimen: Blood Updated: 02/05/24 1137     Lactate 1.4 mmol/L      Comment: Serial Number: [id number]:  [id number]                I reviewed the patient's new clinical results.    Assessment & Plan      Altered mental status  -Patient is encephalopathic; etiology is unclear; she may be slightly dehydrated from her increased diuretics and poor intake through the weekend although the low-grade fever makes me concerned for infection that we have not yet identified.  Respiratory panel is negative for COVID, flu, RSV.  Urinalysis is clear.  Chest x-ray is unremarkable.  Will check full respiratory viral panel.  Blood cultures are pending.  I am holding off on antibiotics at this point as I do not have an identified source of infection and she is not clinically septic.  Monitor closely for any emerging clinical signs of infection.    Elevated creatinine-creatinine is slightly above baseline.  Will give small fluid bolus now and hydrate overnight.  Recheck renal function in the morning.  Avoid nephrotoxins.    Hypokalemia-replacing  History of DVT-continue Eliquis  COPD-appears well compensated.  Continue Symbicort  Chronic hypoxemia-oxygen requirement is at her baseline.  Pulmonary hypertension-continue sildenafil  Hyperuricemia-continue allopurinol    Patient is fully anticoagulated so no additional DVT prophylaxis is needed.  Famotidine for stress ulcer prophylaxis        I discussed the patient's findings and my recommendations with patient and daughter, who was present in the room.     Rosamaria Jin,  MD  02/05/24  17:10 EST        Electronically signed by Rosamaria Jin MD at 02/05/24 1719       Operative/Procedure Notes (all)    No notes of this type exist for this encounter.          Physician Progress Notes (last 48 hours)        Paul Granados MD at 02/13/24 0910               LOS: 6 days   Patient Care Team:  Shaylee Mcdaniels MD as PCP - General (Family Medicine)  Richardson Willis MD as Cardiologist (Cardiology)  Rafy Rodriguez MD as Consulting Physician (Hematology and Oncology)  Christianne Phillips, AMARILIS as Licensed Practical Nurse  Salome Fleming MD as Consulting Physician (Nephrology)    Subjective:  Sleepy    Objective:   Afebrile      Review of Systems:   Review of Systems   Unable to perform ROS: Other           Vital Signs  Temp:  [97.1 °F (36.2 °C)-98.1 °F (36.7 °C)] 97.1 °F (36.2 °C)  Heart Rate:  [73-82] 73  Resp:  [17-30] 23  BP: ()/(47-80) 142/70    Physical Exam:  Physical Exam  Cardiovascular:      Rate and Rhythm: Rhythm irregular.      Heart sounds: Normal heart sounds.   Pulmonary:      Breath sounds: Normal breath sounds.   Skin:     General: Skin is warm.   Neurological:      Mental Status: She is alert.          Radiology:  XR Chest 1 View    Result Date: 2/9/2024  Impression: Stable left basilar atelectasis and chronic lung changes Cardiomegaly Electronically Signed: Pablo Martins MD  2/9/2024 7:35 AM EST  Workstation ID: [id number]    US Breast Left Limited    Result Date: 2/8/2024  IMPRESSION : 1. Nonspecific hypoechoic shadowing region measuring 2.0 cm at the 11 o'clock position 4 cm from the nipple corresponding to patient's lumpectomy site. This could represent scarring at the lumpectomy site, collapsed seroma cavity or recurrence. Abscess is felt less likely but not entirely excluded if there are clinical findings of infection.  Recommend patient return for full outpatient diagnostic work-up including diagnostic mammogram and ultrasound with real-time evaluation by  radiologist. Patient also requires import of outside breast imaging more recent than 2014  BI-RADS Final Assessment Category 0: Incomplete-Need Additional Imaging Evaluation Management Recommendation: Recall for additional imaging.   Electronically Signed By-Param Johansen MD On:2/8/2024 8:35 AM      MRI Brain With Contrast    Result Date: 2/7/2024  Impression: No abnormality noted on postcontrast imaging. See same-day noncontrast exam report for additional details. Electronically Signed: Alan Rodríguez MD  2/7/2024 6:39 PM CST  Workstation ID: [id number]    MRI Brain Without Contrast    Result Date: 2/7/2024  Impression: Mildly motion-degraded exam demonstrating expected age-related changes, without evidence of acute infarct, hemorrhage, mass or mass effect. Electronically Signed: Andres Olvera MD  2/7/2024 9:00 AM EST  Workstation ID: [id number]    CT Abdomen Pelvis Without Contrast    Result Date: 2/6/2024  Impression: Chronic atelectasis of the left lower lobe. Moderately severe emphysema. Lesion of the sternal manubrium as detailed, which could represent subacute or old fracture although metastatic disease is not excluded. Correlation with whole-body bone scan may be  useful. Aneurysmal dilatation of the abdominal aorta. Small amount of free pelvic fluid, nonspecific. Nodular liver contour suggests cirrhosis. Electronically Signed: Mary Ivan MD  2/6/2024 3:29 PM EST  Workstation ID: [id number]    CT Chest Without Contrast Diagnostic    Result Date: 2/6/2024  Impression: Chronic atelectasis of the left lower lobe. Moderately severe emphysema. Lesion of the sternal manubrium as detailed, which could represent subacute or old fracture although metastatic disease is not excluded. Correlation with whole-body bone scan may be  useful. Aneurysmal dilatation of the abdominal aorta. Small amount of free pelvic fluid, nonspecific. Nodular liver contour suggests cirrhosis. Electronically Signed: Mary Ivan MD   2/6/2024 3:29 PM EST  Workstation ID: [id number]    CT Head Without Contrast    Result Date: 2/5/2024  1.Examination is limited due to motion artifact. 2.Given this limitation, no acute intracranial abnormality is identified. 3.Findings compatible with chronic microvascular ischemic change and diffuse cortical atrophy. Electronically Signed: Parker Mireles MD  2/5/2024 12:26 PM EST  Workstation ID: [id number]    XR Chest 1 View    Result Date: 2/5/2024  Impression: Mild cardiomegaly. Mild chronic findings of the lung fields. Electronically Signed: Mary Ivan MD  2/5/2024 12:06 PM EST  Workstation ID: [id number]        Results Review:     I reviewed the patient's new clinical results.  I reviewed the patient's new imaging results and agree with the interpretation.    Medication Review:   Scheduled Meds:allopurinol, 100 mg, Oral, Daily  apixaban, 5 mg, Oral, Q12H  budesonide, 0.5 mg, Nebulization, BID - RT  carvedilol, 12.5 mg, Oral, BID With Meals  cephalexin, 500 mg, Oral, Q8H  famotidine, 20 mg, Oral, Daily  furosemide, 40 mg, Oral, Daily  hydrALAZINE, 50 mg, Oral, Q8H  levothyroxine, 50 mcg, Oral, Q AM  melatonin, 5 mg, Oral, Nightly  methylPREDNISolone sodium succinate, 20 mg, Intravenous, Daily  sildenafil, 20 mg, Oral, TID  sodium chloride, 10 mL, Intravenous, Q12H  sodium chloride, 10 mL, Intravenous, Q12H      Continuous Infusions:   PRN Meds:.  acetaminophen    Calcium Replacement - Follow Nurse / BPA Driven Protocol    ipratropium-albuterol    ipratropium-albuterol    Magnesium Standard Dose Replacement - Follow Nurse / BPA Driven Protocol    ondansetron    Phosphorus Replacement - Follow Nurse / BPA Driven Protocol    Potassium Replacement - Follow Nurse / BPA Driven Protocol    [COMPLETED] Insert Peripheral IV **AND** sodium chloride    sodium chloride    sodium chloride    sodium chloride    sodium chloride    Labs:    CBC    Results from last 7 days   Lab Units 02/13/24  0354 02/12/24  0503  "02/10/24  0227 02/09/24  0055 02/08/24  0444 02/07/24  1013   WBC 10*3/mm3 11.80* 12.20* 10.40 11.50* 9.60 9.20   HEMOGLOBIN g/dL 11.9* 12.1 11.7* 12.3 12.8 11.8*   PLATELETS 10*3/mm3 282 284 238 234 216 200     BMP   Results from last 7 days   Lab Units 02/13/24  0354 02/12/24  0540 02/11/24  0429 02/10/24  0227 02/09/24  0055 02/08/24  0444 02/07/24  1013   SODIUM mmol/L 137 138 138 140 138 136 136   POTASSIUM mmol/L 4.3 4.4 4.6 4.3 4.3 4.8 4.9   CHLORIDE mmol/L 102 104 106 106 105 102 102   CO2 mmol/L 25.0 23.0 21.0* 23.0 22.0 23.0 24.0   BUN mg/dL 53* 45* 35* 29* 30* 32* 32*   CREATININE mg/dL 1.40* 1.36* 1.43* 1.14* 1.16* 1.12* 1.26*   GLUCOSE mg/dL 118* 130* 161* 113* 115* 90 100*   MAGNESIUM mg/dL  --   --   --  2.0 1.9 2.0  --    PHOSPHORUS mg/dL 3.9 4.2 4.7* 3.0 2.7 3.4  --      Cr Clearance Estimated Creatinine Clearance: 30.7 mL/min (A) (by C-G formula based on SCr of 1.4 mg/dL (H)).  Coag     HbA1C   Lab Results   Component Value Date    HGBA1C 5.8 (H) 10/25/2022     Blood Glucose No results found for: \"POCGLU\"  Infection     CMP   Results from last 7 days   Lab Units 02/13/24  0354 02/12/24  0540 02/11/24  0429 02/10/24  0227 02/09/24  0055 02/08/24  0444 02/07/24  1013 02/06/24  1806   SODIUM mmol/L 137 138 138 140 138 136 136 135*   POTASSIUM mmol/L 4.3 4.4 4.6 4.3 4.3 4.8 4.9 5.1   CHLORIDE mmol/L 102 104 106 106 105 102 102 98   CO2 mmol/L 25.0 23.0 21.0* 23.0 22.0 23.0 24.0 28.0   BUN mg/dL 53* 45* 35* 29* 30* 32* 32* 32*   CREATININE mg/dL 1.40* 1.36* 1.43* 1.14* 1.16* 1.12* 1.26* 1.50*   GLUCOSE mg/dL 118* 130* 161* 113* 115* 90 100* 105*   ALBUMIN g/dL 3.4* 3.5 3.3*  --   --  3.5  --  3.3*   BILIRUBIN mg/dL  --   --   --   --   --  0.7  --  0.6   ALK PHOS U/L  --   --   --   --   --  60  --  58   AST (SGOT) U/L  --   --   --   --   --  35*  --  21   ALT (SGPT) U/L  --   --   --   --   --  32  --  16     UA    Results from last 7 days   Lab Units 02/06/24  1054   NITRITE UA  Negative   WBC UA " /HPF 0-2   BACTERIA UA /HPF 1+*   SQUAM EPITHEL UA /HPF 3-6*     Radiology(recent) No radiology results for the last day   Assessment:      Acute mental status changes with acute delirium  Urinary tract infection present upon admission  Hypocalcemia  Dehydration  Acute renal failure with acute kidney injury superimposed upon chronic kidney disease stage IIIa  Hypertension associated chronic kidney disease stage IIIa  Anemia of chronic kidney disease  Hyperuricemia  Left breast mastitis  Right arm thrombophlebitis  Pulmonary hypertension  Acute hypokalemia  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  Cerebrovascular disease status post CVA  Chronic oral anticoagulation therapy  Acquired hypothyroidism  Thrombophilia  Autonomic dysfunction syndrome  History of pulmonary embolism  Paroxysmal atrial fibrillation  Hypercoagulable state secondary to atrial fibrillation  [id number] score 6  History of IVC filter  Aneurysmal dilatation of abdominal aorta  Panlobular COPD with emphysema  Chronic mucopurulent bronchitis  Peripheral polyneuropathy  History of breast cancer  Supplemental oxygen dependency  Chronic hypoxic respiratory failure    Plan:  Care as outlined//taper parenteral steroids//discharge planning        Paul Granados MD  02/13/24  09:10 EST          Electronically signed by Paul Granados MD at 02/13/24 0911       Salome Fleming MD at 02/13/24 0655          NEPHROLOGY PROGRESS NOTE------[facility]    [facility]  [phone]  Luke Fleming MD      Patient Care Team:  Shaylee Mcdaniels MD as PCP - General (Family Medicine)  Richardson Willis MD as Cardiologist (Cardiology)  Rafy Rodriguez MD as Consulting Physician (Hematology and Oncology)  Christianne Phillips, AMARILIS as Licensed Practical Nurse  Salome Fleming MD as Consulting Physician (Nephrology)      Provider:  Luke  "MD Bernadette  Patient Name: Gabrielle Lyles  :  1940    SUBJECTIVE:    F/U ARF/JULIAN/CRF/CKD    Up in chair. Chronic SOB that's no worse than baseline. No dysuria. Fatigued. OT in room    Medication:  allopurinol, 100 mg, Oral, Daily  apixaban, 5 mg, Oral, Q12H  budesonide, 0.5 mg, Nebulization, BID - RT  carvedilol, 12.5 mg, Oral, BID With Meals  cephalexin, 500 mg, Oral, Q8H  famotidine, 20 mg, Oral, Daily  furosemide, 40 mg, Oral, Daily  hydrALAZINE, 50 mg, Oral, Q8H  levothyroxine, 50 mcg, Oral, Q AM  melatonin, 5 mg, Oral, Nightly  methylPREDNISolone sodium succinate, 20 mg, Intravenous, Daily  sildenafil, 20 mg, Oral, TID  sodium chloride, 10 mL, Intravenous, Q12H  sodium chloride, 10 mL, Intravenous, Q12H             OBJECTIVE    Vital Sign Min/Max for last 24 hours  Temp  Min: 97.1 °F (36.2 °C)  Max: 98.1 °F (36.7 °C)   BP  Min: 96/54  Max: 142/70   Pulse  Min: 73  Max: 83   Resp  Min: 17  Max: 30   SpO2  Min: 90 %  Max: 97 %   No data recorded   Weight  Min: 74.8 kg (164 lb 14.5 oz)  Max: 74.8 kg (164 lb 14.5 oz)     Flowsheet Rows      Flowsheet Row First Filed Value   Admission Height 162.6 cm (64\") Documented at 2024 1046   Admission Weight 66.7 kg (147 lb) Documented at 2024 1046            I/O this shift:  In: 240 [P.O.:240]  Out: -   I/O last 3 completed shifts:  In: 440 [P.O.:440]  Out: 400 [Urine:400]    Physical Exam:  General Appearance: alert, appears stated age and cooperative  Head: normocephalic, without obvious abnormality and atraumatic  Eyes: conjunctivae and sclerae normal and no icterus  Neck: supple and no JVD  Lungs: +SCATTERED RHONCHI  Heart: regular rhythm & normal rate and normal S1, S2 +TAYE  Chest Wall: no abnormalities observed  Abdomen: normal bowel sounds and soft, nontender +OBESITY  Extremities: moves extremities well, trace edema, no cyanosis  Skin: no bleeding, bruising or rash  Neurologic: Alert, and oriented. No focal deficits    Labs:    WBC WBC   Date " "Value Ref Range Status   02/13/2024 11.80 (H) 3.40 - 10.80 10*3/mm3 Final   02/12/2024 12.20 (H) 3.40 - 10.80 10*3/mm3 Final      HGB Hemoglobin   Date Value Ref Range Status   02/13/2024 11.9 (L) 12.0 - 15.9 g/dL Final   02/12/2024 12.1 12.0 - 15.9 g/dL Final      HCT Hematocrit   Date Value Ref Range Status   02/13/2024 37.5 34.0 - 46.6 % Final   02/12/2024 35.9 34.0 - 46.6 % Final      Platelets No results found for: \"LABPLAT\"   MCV MCV   Date Value Ref Range Status   02/13/2024 85.6 79.0 - 97.0 fL Final   02/12/2024 86.8 79.0 - 97.0 fL Final          Sodium Sodium   Date Value Ref Range Status   02/13/2024 137 136 - 145 mmol/L Final   02/12/2024 138 136 - 145 mmol/L Final   02/11/2024 138 136 - 145 mmol/L Final      Potassium Potassium   Date Value Ref Range Status   02/13/2024 4.3 3.5 - 5.2 mmol/L Final   02/12/2024 4.4 3.5 - 5.2 mmol/L Final   02/11/2024 4.6 3.5 - 5.2 mmol/L Final      Chloride Chloride   Date Value Ref Range Status   02/13/2024 102 98 - 107 mmol/L Final   02/12/2024 104 98 - 107 mmol/L Final   02/11/2024 106 98 - 107 mmol/L Final      CO2 CO2   Date Value Ref Range Status   02/13/2024 25.0 22.0 - 29.0 mmol/L Final   02/12/2024 23.0 22.0 - 29.0 mmol/L Final   02/11/2024 21.0 (L) 22.0 - 29.0 mmol/L Final      BUN BUN   Date Value Ref Range Status   02/13/2024 53 (H) 8 - 23 mg/dL Final   02/12/2024 45 (H) 8 - 23 mg/dL Final   02/11/2024 35 (H) 8 - 23 mg/dL Final      Creatinine Creatinine   Date Value Ref Range Status   02/13/2024 1.40 (H) 0.57 - 1.00 mg/dL Final   02/12/2024 1.36 (H) 0.57 - 1.00 mg/dL Final   02/11/2024 1.43 (H) 0.57 - 1.00 mg/dL Final      Calcium Calcium   Date Value Ref Range Status   02/13/2024 9.2 8.6 - 10.5 mg/dL Final   02/12/2024 9.2 8.6 - 10.5 mg/dL Final   02/11/2024 9.1 8.6 - 10.5 mg/dL Final      PO4 No components found for: \"PO4\"   Albumin Albumin   Date Value Ref Range Status   02/13/2024 3.4 (L) 3.5 - 5.2 g/dL Final   02/12/2024 3.5 3.5 - 5.2 g/dL Final " "  02/11/2024 3.3 (L) 3.5 - 5.2 g/dL Final      Magnesium No results found for: \"MG\"     Uric Acid No components found for: \"URIC ACID\"     Imaging Results (Last 72 Hours)       ** No results found for the last 72 hours. **            Results for orders placed during the hospital encounter of 02/05/24    XR Chest 1 View    Narrative  XR CHEST 1 VW    Date of Exam: 2/9/2024 5:36 AM EST    Indication: sob    Comparison: None available.    Findings:  The trachea is midline. Stable cardiomegaly with mild enlargement of the pulmonary arteries. There is mild diffuse bilateral reticular lung thickening. Mild left basilar atelectasis. No definite pleural effusions. No change in position of the right-sided  pigtail catheter.    Impression  Impression:  Stable left basilar atelectasis and chronic lung changes    Cardiomegaly      Electronically Signed: Pablo Martins MD  2/9/2024 7:35 AM EST  Workstation ID: [id number]      XR Chest 1 View    Narrative  XR CHEST 1 VW    Date of Exam: 2/5/2024 12:00 PM EST    Indication: weakness    Comparison: 5/2/2023.    Findings:  There is mild cardiomegaly with increased heart size. Mildly prominent interstitial pattern appears stable and chronic. There is a prominent skinfold over the right chest. There is no definite pneumothorax. There is no effusion.    Impression  Impression:  Mild cardiomegaly. Mild chronic findings of the lung fields.      Electronically Signed: Mary Ivan MD  2/5/2024 12:06 PM EST  Workstation ID: [id number]      Results for orders placed during the hospital encounter of 09/24/23    XR Foot 3+ View Left    Narrative  XR FOOT 3+ VW LEFT    Date of Exam: 9/24/2023 3:15 PM EDT    Indication: pain redness swelling    Comparison: None available.    Findings:  No fracture or dislocation. There is soft tissue swelling diffusely at the left foot. No discrete osseous erosion. Plantar calcaneal bone spur. Enthesopathy at the Achilles insertion on the calcaneus. No " radiodense foreign body.    Impression  Impression:  1. Negative for acute osseous abnormality.  2. Nonspecific soft tissue swelling.        Electronically Signed: Randall Zarco MD  9/24/2023 3:54 PM EDT  Workstation ID: [id number]      Results for orders placed during the hospital encounter of 02/05/24    Duplex Venous Upper Extremity - Right CAR    Interpretation Summary    Normal right upper extremity venous duplex scan.        ASSESSMENT / PLAN      Altered mental status    Acute hypokalemia    Stage 3a chronic kidney disease    Chronic obstructive pulmonary disease    History of venous thrombosis and embolism    Primary hypertension    History of TIA (transient ischemic attack)    Severe pulmonary hypertension    Chronic respiratory failure with hypoxia    JULIAN (acute kidney injury)    ARF/JULIAN/CRF/CKD------Nonoliguric. +ARF/JULIAN on top of CRF/CKD STG 3A with a baseline serum  Creatinine of about 1.1. Unknown if patient has baseline proteinuria or hematuria. CRF/CKD STG 3A most likely secondary to HTN NS. +ARF/JULIAN is secondary to prerenal state/intravascular volume depletion. Stable.  Avoid hypotension.  No NSAIDs or IV dye.  BUN/Cr stable     2. ANEMIA------H/H stable     3. HTN WITH CKD-----Avoid hypotension. No ACE/ARB/DRI/diuretic for now     4. OA/DJD/HYPERURICEMIA------No NSAIDs. Add Allopurinol     5. DELIRIUM-------?Secondary to UTI. Better     6. UTI-----S/P Tx     7. HYPOCALCEMIA------Replaced     8. KETONURIA/DEHYDRATION------Secondary to recent outpatient increase in Lasix. Hold Lasix. Gentle IVFs given Pulmonary HTN     9. PULMONARY HTN--------Per , Pulmonary     10. HYPOKALEMIA-------Replaced     11. CAD-----per , Cardiology    12. REHAB PLACEMENT PENDING       Luke Fleming MD  [facility]  [phone]  02/13/24  06:55 EST      Electronically signed by Salome Fleming MD at 02/13/24 0911       Paul Granados MD at 02/12/24 2006                LOS: 5 days   Patient Care Team:  Shaylee Mcdaniels MD as PCP - General (Family Medicine)  Richardson Willis MD as Cardiologist (Cardiology)  Rafy Rodriguez MD as Consulting Physician (Hematology and Oncology)  Christianne Phillips, AMARILIS as Licensed Practical Nurse  Salome Fleming MD as Consulting Physician (Nephrology)    Subjective:  About the same    Objective:   afebrile      Review of Systems:   Review of Systems   Constitutional:  Positive for activity change.   Neurological:  Positive for weakness.           Vital Signs  Temp:  [97 °F (36.1 °C)-98.1 °F (36.7 °C)] 98.1 °F (36.7 °C)  Heart Rate:  [73-85] 79  Resp:  [17-30] 20  BP: ()/(54-74) 96/54    Physical Exam:  Physical Exam  Vitals reviewed.   Constitutional:       Appearance: Normal appearance.   Cardiovascular:      Rate and Rhythm: Rhythm irregular.      Heart sounds: Normal heart sounds.   Pulmonary:      Breath sounds: Normal breath sounds.   Skin:     General: Skin is warm.   Neurological:      Mental Status: She is alert.          Radiology:  XR Chest 1 View    Result Date: 2/9/2024  Impression: Stable left basilar atelectasis and chronic lung changes Cardiomegaly Electronically Signed: Pablo Martins MD  2/9/2024 7:35 AM EST  Workstation ID: [id number]    US Breast Left Limited    Result Date: 2/8/2024  IMPRESSION : 1. Nonspecific hypoechoic shadowing region measuring 2.0 cm at the 11 o'clock position 4 cm from the nipple corresponding to patient's lumpectomy site. This could represent scarring at the lumpectomy site, collapsed seroma cavity or recurrence. Abscess is felt less likely but not entirely excluded if there are clinical findings of infection.  Recommend patient return for full outpatient diagnostic work-up including diagnostic mammogram and ultrasound with real-time evaluation by radiologist. Patient also requires import of outside breast imaging more recent than 2014  BI-RADS Final Assessment Category 0: Incomplete-Need  Additional Imaging Evaluation Management Recommendation: Recall for additional imaging.   Electronically Signed By-Param Johansen MD On:2/8/2024 8:35 AM      MRI Brain With Contrast    Result Date: 2/7/2024  Impression: No abnormality noted on postcontrast imaging. See same-day noncontrast exam report for additional details. Electronically Signed: Alan Rodríguez MD  2/7/2024 6:39 PM CST  Workstation ID: [id number]    MRI Brain Without Contrast    Result Date: 2/7/2024  Impression: Mildly motion-degraded exam demonstrating expected age-related changes, without evidence of acute infarct, hemorrhage, mass or mass effect. Electronically Signed: Andres Olvera MD  2/7/2024 9:00 AM EST  Workstation ID: [id number]    CT Abdomen Pelvis Without Contrast    Result Date: 2/6/2024  Impression: Chronic atelectasis of the left lower lobe. Moderately severe emphysema. Lesion of the sternal manubrium as detailed, which could represent subacute or old fracture although metastatic disease is not excluded. Correlation with whole-body bone scan may be  useful. Aneurysmal dilatation of the abdominal aorta. Small amount of free pelvic fluid, nonspecific. Nodular liver contour suggests cirrhosis. Electronically Signed: Mary Ivan MD  2/6/2024 3:29 PM EST  Workstation ID: [id number]    CT Chest Without Contrast Diagnostic    Result Date: 2/6/2024  Impression: Chronic atelectasis of the left lower lobe. Moderately severe emphysema. Lesion of the sternal manubrium as detailed, which could represent subacute or old fracture although metastatic disease is not excluded. Correlation with whole-body bone scan may be  useful. Aneurysmal dilatation of the abdominal aorta. Small amount of free pelvic fluid, nonspecific. Nodular liver contour suggests cirrhosis. Electronically Signed: Mary Ivan MD  2/6/2024 3:29 PM EST  Workstation ID: [id number]    CT Head Without Contrast    Result Date: 2/5/2024  1.Examination is limited due to motion  artifact. 2.Given this limitation, no acute intracranial abnormality is identified. 3.Findings compatible with chronic microvascular ischemic change and diffuse cortical atrophy. Electronically Signed: Parker Mireles MD  2/5/2024 12:26 PM EST  Workstation ID: [id number]    XR Chest 1 View    Result Date: 2/5/2024  Impression: Mild cardiomegaly. Mild chronic findings of the lung fields. Electronically Signed: Mary Ivan MD  2/5/2024 12:06 PM EST  Workstation ID: [id number]        Results Review:     I reviewed the patient's new clinical results.  I reviewed the patient's new imaging results and agree with the interpretation.    Medication Review:   Scheduled Meds:allopurinol, 100 mg, Oral, Daily  apixaban, 5 mg, Oral, Q12H  budesonide, 0.5 mg, Nebulization, BID - RT  carvedilol, 12.5 mg, Oral, BID With Meals  cephalexin, 500 mg, Oral, Q8H  famotidine, 20 mg, Oral, Daily  furosemide, 40 mg, Oral, Daily  hydrALAZINE, 50 mg, Oral, Q8H  levothyroxine, 50 mcg, Oral, Q AM  melatonin, 5 mg, Oral, Nightly  methylPREDNISolone sodium succinate, 20 mg, Intravenous, Daily  sildenafil, 20 mg, Oral, TID  sodium chloride, 10 mL, Intravenous, Q12H  sodium chloride, 10 mL, Intravenous, Q12H      Continuous Infusions:   PRN Meds:.  acetaminophen    Calcium Replacement - Follow Nurse / BPA Driven Protocol    ipratropium-albuterol    ipratropium-albuterol    Magnesium Standard Dose Replacement - Follow Nurse / BPA Driven Protocol    ondansetron    Phosphorus Replacement - Follow Nurse / BPA Driven Protocol    Potassium Replacement - Follow Nurse / BPA Driven Protocol    [COMPLETED] Insert Peripheral IV **AND** sodium chloride    sodium chloride    sodium chloride    sodium chloride    sodium chloride    Labs:    CBC    Results from last 7 days   Lab Units 02/12/24  0540 02/10/24  0227 02/09/24  0055 02/08/24  0444 02/07/24  1013 02/06/24  0351   WBC 10*3/mm3 12.20* 10.40 11.50* 9.60 9.20 7.30   HEMOGLOBIN g/dL 12.1 11.7* 12.3 12.8  "11.8* 11.2*   PLATELETS 10*3/mm3 284 238 234 216 200 177     BMP   Results from last 7 days   Lab Units 02/12/24  0540 02/11/24  0429 02/10/24  0227 02/09/24  0055 02/08/24  0444 02/07/24  1013 02/06/24  1806   SODIUM mmol/L 138 138 140 138 136 136 135*   POTASSIUM mmol/L 4.4 4.6 4.3 4.3 4.8 4.9 5.1   CHLORIDE mmol/L 104 106 106 105 102 102 98   CO2 mmol/L 23.0 21.0* 23.0 22.0 23.0 24.0 28.0   BUN mg/dL 45* 35* 29* 30* 32* 32* 32*   CREATININE mg/dL 1.36* 1.43* 1.14* 1.16* 1.12* 1.26* 1.50*   GLUCOSE mg/dL 130* 161* 113* 115* 90 100* 105*   MAGNESIUM mg/dL  --   --  2.0 1.9 2.0  --   --    PHOSPHORUS mg/dL 4.2 4.7* 3.0 2.7 3.4  --   --      Cr Clearance Estimated Creatinine Clearance: 31.3 mL/min (A) (by C-G formula based on SCr of 1.36 mg/dL (H)).  Coag     HbA1C   Lab Results   Component Value Date    HGBA1C 5.8 (H) 10/25/2022     Blood Glucose No results found for: \"POCGLU\"  Infection     CMP   Results from last 7 days   Lab Units 02/12/24  0540 02/11/24  0429 02/10/24  0227 02/09/24  0055 02/08/24  0444 02/07/24  1013 02/06/24  1806   SODIUM mmol/L 138 138 140 138 136 136 135*   POTASSIUM mmol/L 4.4 4.6 4.3 4.3 4.8 4.9 5.1   CHLORIDE mmol/L 104 106 106 105 102 102 98   CO2 mmol/L 23.0 21.0* 23.0 22.0 23.0 24.0 28.0   BUN mg/dL 45* 35* 29* 30* 32* 32* 32*   CREATININE mg/dL 1.36* 1.43* 1.14* 1.16* 1.12* 1.26* 1.50*   GLUCOSE mg/dL 130* 161* 113* 115* 90 100* 105*   ALBUMIN g/dL 3.5 3.3*  --   --  3.5  --  3.3*   BILIRUBIN mg/dL  --   --   --   --  0.7  --  0.6   ALK PHOS U/L  --   --   --   --  60  --  58   AST (SGOT) U/L  --   --   --   --  35*  --  21   ALT (SGPT) U/L  --   --   --   --  32  --  16     UA    Results from last 7 days   Lab Units 02/06/24  1054   NITRITE UA  Negative   WBC UA /HPF 0-2   BACTERIA UA /HPF 1+*   SQUAM EPITHEL UA /HPF 3-6*     Radiology(recent) No radiology results for the last day   Assessment:    Acute mental status changes with acute delirium  Urinary tract infection present upon " admission  Hypocalcemia  Dehydration  Acute renal failure with acute kidney injury superimposed upon chronic kidney disease stage IIIa  Hypertension associated chronic kidney disease stage IIIa  Anemia of chronic kidney disease  Hyperuricemia  Left breast mastitis  Right arm thrombophlebitis  Pulmonary hypertension  Acute hypokalemia  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  Cerebrovascular disease status post CVA  Chronic oral anticoagulation therapy  Acquired hypothyroidism  Thrombophilia  Autonomic dysfunction syndrome  History of pulmonary embolism  Paroxysmal atrial fibrillation  Hypercoagulable state secondary to atrial fibrillation  [id number] score 6  History of IVC filter  Aneurysmal dilatation of abdominal aorta  Panlobular COPD with emphysema  Chronic mucopurulent bronchitis  Peripheral polyneuropathy  History of breast cancer  Supplemental oxygen dependency  Chronic hypoxic respiratory failure      Plan:  D/C planning/PT        Paul Granados MD  02/12/24  20:06 EST          Electronically signed by Paul Granados MD at 02/12/24 2007       Ana Cristina Oviedo MD at 02/12/24 1119          Daily Progress Note          Assessment    AMS: No significant findings on brain MRI  Chronic Severe Pulmonary HTN  COPD: Emphysema  Chronic atelectasis in left lower lobe  Anemia  UTI  JULIAN/CKD 3  HTN  Hx PE/DVT  Hx TIA  CAD  History of breast cancer in 2018: Currently in remission              PLAN:  Oxygen supplement and titration to maintain saturation 90-95%: Currently requiring 7 L by high flow nasal cannula  PT/OT  Encourage use of incentive spirometry  Antibiotics per ID  Bronchodilators  IV steroids  Sildenafil  Inhaled corticosteroids  Diuresis  Thyroid hormone replacement  Cardiology following   Electrolytes/ glycemic control  Chronic anticoagulation: Apixaban  I personally reviewed the radiological images             LOS: 5 days     Subjective     Patient  reports cough and shortness of breath    Objective     Vital signs for last 24 hours:  Vitals:    02/12/24 0400 02/12/24 0728 02/12/24 0735 02/12/24 0856   BP: 106/56   115/62   BP Location: Right arm   Right arm   Patient Position: Lying   Lying   Pulse: 80 81 78 83   Resp: 22 26 18 17   Temp: 97 °F (36.1 °C)   98 °F (36.7 °C)   TempSrc: Axillary   Axillary   SpO2: 93% 90% 91%    Weight:       Height:           Intake/Output last 3 shifts:  No intake/output data recorded.  Intake/Output this shift:  I/O this shift:  In: 200 [P.O.:200]  Out: -       Radiology  Imaging Results (Last 24 Hours)       ** No results found for the last 24 hours. **            Labs:  Results from last 7 days   Lab Units 02/12/24  0540   WBC 10*3/mm3 12.20*   HEMOGLOBIN g/dL 12.1   HEMATOCRIT % 35.9   PLATELETS 10*3/mm3 284     Results from last 7 days   Lab Units 02/12/24  0540 02/09/24  0055 02/08/24  0444   SODIUM mmol/L 138   < > 136   POTASSIUM mmol/L 4.4   < > 4.8   CHLORIDE mmol/L 104   < > 102   CO2 mmol/L 23.0   < > 23.0   BUN mg/dL 45*   < > 32*   CREATININE mg/dL 1.36*   < > 1.12*   CALCIUM mg/dL 9.2   < > 9.4   BILIRUBIN mg/dL  --   --  0.7   ALK PHOS U/L  --   --  60   ALT (SGPT) U/L  --   --  32   AST (SGOT) U/L  --   --  35*   GLUCOSE mg/dL 130*   < > 90    < > = values in this interval not displayed.         Results from last 7 days   Lab Units 02/12/24  0540 02/11/24  0429 02/08/24  0444   ALBUMIN g/dL 3.5 3.3* 3.5     Results from last 7 days   Lab Units 02/06/24  1806   CK TOTAL U/L 64         Results from last 7 days   Lab Units 02/10/24  0227   MAGNESIUM mg/dL 2.0         Results from last 7 days   Lab Units 02/05/24  1150   TSH uIU/mL 1.640           Meds:   SCHEDULE  allopurinol, 100 mg, Oral, Daily  apixaban, 5 mg, Oral, Q12H  budesonide, 0.5 mg, Nebulization, BID - RT  carvedilol, 12.5 mg, Oral, BID With Meals  cephalexin, 500 mg, Oral, Q8H  famotidine, 20 mg, Oral, Daily  furosemide, 40 mg, Oral,  Daily  hydrALAZINE, 50 mg, Oral, Q8H  levothyroxine, 50 mcg, Oral, Q AM  melatonin, 5 mg, Oral, Nightly  methylPREDNISolone sodium succinate, 20 mg, Intravenous, Daily  sildenafil, 20 mg, Oral, TID  sodium chloride, 10 mL, Intravenous, Q12H  sodium chloride, 10 mL, Intravenous, Q12H      Infusions     PRNs    acetaminophen    Calcium Replacement - Follow Nurse / BPA Driven Protocol    ipratropium-albuterol    ipratropium-albuterol    Magnesium Standard Dose Replacement - Follow Nurse / BPA Driven Protocol    ondansetron    Phosphorus Replacement - Follow Nurse / BPA Driven Protocol    Potassium Replacement - Follow Nurse / BPA Driven Protocol    [COMPLETED] Insert Peripheral IV **AND** sodium chloride    sodium chloride    sodium chloride    sodium chloride    sodium chloride    Physical Exam:  General Appearance:  Alert   HEENT:  Normocephalic, without obvious abnormality, Conjunctiva/corneas clear,.   Nares normal, no drainage     Neck:  Supple, symmetrical, trachea midline.   Lungs /Chest wall:   Bilateral basal rhonchi, respirations unlabored, symmetrical wall movement.     Heart:  Regular rate and rhythm, S1 S2 normal  Abdomen: Soft, non-tender, no masses, no organomegaly.    Extremities: No edema, no clubbing or cyanosis     ROS  Constitutional: Negative for chills, fever and malaise/fatigue.   HENT: Negative.    Eyes: Negative.    Cardiovascular: Negative.    Respiratory: Positive for cough and shortness of breath.    Skin: Negative.    Musculoskeletal: Negative.    Gastrointestinal: Negative.    Genitourinary: Negative.    Neurological: Negative.    Psychiatric/Behavioral: Negative.      I reviewed the recent clinical results  I personally reviewed the latest radiological studies    Part of this note may be an electronic transcription/translation of spoken language to printed text using the Dragon Dictation System.      Electronically signed by Ana Cristina Oviedo MD at 02/12/24 4731       Richardson Willis MD at  02/12/24 0930          Referring Provider: Rosamaria Jin MD    Reason for follow-up: Hypertension, atrial fibrillation     Patient Care Team:  Shaylee Mcdaniels MD as PCP - General (Family Medicine)  Richardson Willis MD as Cardiologist (Cardiology)  Rafy Rodriguez MD as Consulting Physician (Hematology and Oncology)  Christianne Phillips, AMARILIS as Licensed Practical Nurse  Salome Fleming MD as Consulting Physician (Nephrology)      SUBJECTIVE  Sitting on a chair today and feels well.  Family members at bedside.     ROS  Review of all systems negative except as indicated.    Since I have last seen, the patient has been without any chest discomfort, shortness of breath, palpitations, dizziness or syncope.  Denies having any headache, abdominal pain, nausea, vomiting, diarrhea, constipation, loss of weight or loss of appetite.  Denies having any excessive bruising, hematuria or blood in the stool.  ROS      Personal History:    Past Medical History:   Diagnosis Date    Acute exacerbation of chronic obstructive pulmonary disease (COPD)     COPD (chronic obstructive pulmonary disease)     Deep vein thrombosis of bilateral lower extremities 07/24/2019    3/2013    History of pneumonia 06/2012    community acquired pneumonia and right pleural effusion;hospitalized at [facility] [place]    History of pulmonary embolism 03/2013    bilateral PE    Hypertension 2001    Insomnia     on Ambien 5mg at     Lobular breast cancer, left 11/2011    Stage IA left upper lobular breast cancer    Malignant neoplasm of left breast in female, estrogen receptor positive 07/18/2019    Osteopenia 2012    Parainfluenza infection     Parotid duct obstruction     Severe pulmonary hypertension 03/03/2023    Stage 3a chronic kidney disease 07/24/2019    TIA (transient ischemic attack) 10/25/2022       Past Surgical History:   Procedure Laterality Date    CARDIAC CATHETERIZATION N/A 3/3/2023    Procedure: Left and Right Heart Cath with Coronary  Angiography;  Surgeon: Richardson Willis MD;  Location: [facility] INVASIVE LOCATION;  Service: Cardiovascular;  Laterality: N/A;    CATARACT EXTRACTION      IVC FILTER RETRIEVAL  2014    IVC filter placement by Dr. Card    MAMMO STEREOTACTIC BREAST BX SURGICAL ADD UNI Left 2011    invasive lobular carcinoma-Dr. Cande Daniel    MASTECTOMY, PARTIAL Left 2011    and left axillary sentinel lymph node biopsy by Dr. Uriostegui    TUBAL ABDOMINAL LIGATION         Family History   Problem Relation Age of Onset    Lung cancer Brother        Social History     Tobacco Use    Smoking status: Former     Types: Cigarettes     Quit date:      Years since quittin.1     Passive exposure: Past    Smokeless tobacco: Never    Tobacco comments:     smoked 6 cigarettes a day from 13 years of age to 56 years of age when she quit in    Vaping Use    Vaping Use: Never used   Substance Use Topics    Alcohol use: Not Currently    Drug use: No        Home meds:  Prior to Admission medications    Medication Sig Start Date End Date Taking? Authorizing Provider   allopurinol (ZYLOPRIM) 100 MG tablet Take 1 tablet by mouth Daily.   Yes Jaylyn Golden MD   amoxicillin-clavulanate (AUGMENTIN) 875-125 MG per tablet Take 1 tablet by mouth 2 (Two) Times a Day. 24 Yes Jaylyn Golden MD   apixaban (ELIQUIS) 5 MG tablet tablet Take 1 tablet by mouth Every 12 (Twelve) Hours. Indications: Other - full anticoagulation, history of DVT/PE 10/27/22  Yes Shaylee Mcdaniels MD   budesonide-formoterol (SYMBICORT) 80-4.5 MCG/ACT inhaler Inhale 2 puffs 2 (Two) Times a Day.   Yes Jaylyn Golden MD   carvedilol (COREG) 12.5 MG tablet TAKE 1 TABLET BY MOUTH TWICE DAILY WITH MEALS 23  Yes Richardson Willis MD   Cholecalciferol (Vitamin D3) 50 MCG ( UT) capsule Take 1 capsule by mouth Daily.   Yes Jaylyn Golden MD   furosemide (LASIX) 40 MG tablet Take 1 tablet by mouth Daily.   Yes [place]  MD Jaylyn   gabapentin (NEURONTIN) 300 MG capsule Take 1 capsule by mouth 2 (Two) Times a Day.   Yes Provider, MD Jaylyn   levothyroxine (SYNTHROID, LEVOTHROID) 50 MCG tablet Take 1 tablet by mouth Daily.   Yes Provider, MD Jaylyn   lisinopril (PRINIVIL,ZESTRIL) 40 MG tablet Take 1 tablet by mouth Daily. 6/8/23  Yes Velma Ascencio APRN   potassium chloride (K-DUR,KLOR-CON) 10 MEQ CR tablet Take 1 tablet by mouth Daily. 2/24/23  Yes Ricahrdson Willis MD   sildenafil (REVATIO) 20 MG tablet Take 1 tablet by mouth Every 8 (Eight) Hours. 5/11/23  Yes Mansi Becerril APRN       Allergies:  Patient has no known allergies.    Scheduled Meds:allopurinol, 100 mg, Oral, Daily  apixaban, 5 mg, Oral, Q12H  budesonide, 0.5 mg, Nebulization, BID - RT  carvedilol, 12.5 mg, Oral, BID With Meals  cephalexin, 500 mg, Oral, Q8H  famotidine, 20 mg, Oral, Daily  furosemide, 40 mg, Oral, Daily  hydrALAZINE, 50 mg, Oral, Q8H  levothyroxine, 50 mcg, Oral, Q AM  melatonin, 5 mg, Oral, Nightly  methylPREDNISolone sodium succinate, 20 mg, Intravenous, Daily  sildenafil, 20 mg, Oral, TID  sodium chloride, 10 mL, Intravenous, Q12H  sodium chloride, 10 mL, Intravenous, Q12H      Continuous Infusions:     PRN Meds:.  acetaminophen    Calcium Replacement - Follow Nurse / BPA Driven Protocol    ipratropium-albuterol    ipratropium-albuterol    Magnesium Standard Dose Replacement - Follow Nurse / BPA Driven Protocol    ondansetron    Phosphorus Replacement - Follow Nurse / BPA Driven Protocol    Potassium Replacement - Follow Nurse / BPA Driven Protocol    [COMPLETED] Insert Peripheral IV **AND** sodium chloride    sodium chloride    sodium chloride    sodium chloride    sodium chloride      OBJECTIVE    Vital Signs  Vitals:    02/12/24 0728 02/12/24 0735 02/12/24 0856 02/12/24 1131   BP:   115/62 104/74   BP Location:   Right arm Right arm   Patient Position:   Lying Sitting   Pulse: 81 78 83 76   Resp: 26 18 17 24   Temp:   98 °F (36.7  "°C) 97.6 °F (36.4 °C)   TempSrc:   Axillary Oral   SpO2: 90% 91%  90%   Weight:       Height:           Flowsheet Rows      Flowsheet Row First Filed Value   Admission Height 162.6 cm (64\") Documented at 02/05/2024 1046   Admission Weight 66.7 kg (147 lb) Documented at 02/05/2024 1046              Intake/Output Summary (Last 24 hours) at 2/12/2024 1144  Last data filed at 2/12/2024 0900  Gross per 24 hour   Intake 200 ml   Output --   Net 200 ml            Telemetry: Atrial fibrillation with heart rate in the 70s    Physical Exam:  The patient is alert, oriented X3  Vital signs as noted above.  Head and neck revealed no carotid bruits or jugular venous distention.  No thyromegaly or lymphadenopathy is present  Lungs clear.  No wheezing.  Breath sounds are normal bilaterally.  Irregularly irregular.  No murmur. No precordial rub is present.  No gallop is present.  Abdomen soft and nontender.  No organomegaly is present.  Extremities with good peripheral pulses without any pedal edema.  Skin warm and dry.  Musculoskeletal system is grossly normal.  CNS grossly normal.       Results Review:  I have personally reviewed the results from the time of this admission to 2/12/2024 11:44 EST and agree with these findings:  []  Laboratory  []  Microbiology  []  Radiology  []  EKG/Telemetry   []  Cardiology/Vascular   []  Pathology  []  Old records  []  Other:    Most notable findings include:    Lab Results (last 24 hours)       Procedure Component Value Units Date/Time    Renal Function Panel [[id number]]  (Abnormal) Collected: 02/12/24 0540    Specimen: Blood Updated: 02/12/24 0630     Glucose 130 mg/dL      BUN 45 mg/dL      Creatinine 1.36 mg/dL      Sodium 138 mmol/L      Potassium 4.4 mmol/L      Chloride 104 mmol/L      CO2 23.0 mmol/L      Calcium 9.2 mg/dL      Albumin 3.5 g/dL      Phosphorus 4.2 mg/dL      Anion Gap 11.0 mmol/L      BUN/Creatinine Ratio 33.1     eGFR 39.0 mL/min/1.73     Narrative:      GFR Normal " >60  Chronic Kidney Disease <60  Kidney Failure <15    The GFR formula is only valid for adults with stable renal function between ages 18 and 70.    CBC & Differential [[id number]]  (Abnormal) Collected: 02/12/24 0540    Specimen: Blood Updated: 02/12/24 0616    Narrative:      The following orders were created for panel order CBC & Differential.  Procedure                               Abnormality         Status                     ---------                               -----------         ------                     CBC Auto Differential[[id number]]        Abnormal            Final result                 Please view results for these tests on the individual orders.    CBC Auto Differential [[id number]]  (Abnormal) Collected: 02/12/24 0540    Specimen: Blood Updated: 02/12/24 0616     WBC 12.20 10*3/mm3      RBC 4.13 10*6/mm3      Hemoglobin 12.1 g/dL      Hematocrit 35.9 %      MCV 86.8 fL      MCH 29.2 pg      MCHC 33.7 g/dL      RDW 16.8 %      RDW-SD 50.3 fl      MPV 8.3 fL      Platelets 284 10*3/mm3      Neutrophil % 91.6 %      Lymphocyte % 3.7 %      Monocyte % 4.6 %      Eosinophil % 0.0 %      Basophil % 0.1 %      Neutrophils, Absolute 11.20 10*3/mm3      Lymphocytes, Absolute 0.50 10*3/mm3      Monocytes, Absolute 0.60 10*3/mm3      Eosinophils, Absolute 0.00 10*3/mm3      Basophils, Absolute 0.00 10*3/mm3      nRBC 0.0 /100 WBC             Imaging Results (Last 24 Hours)       ** No results found for the last 24 hours. **            LAB RESULTS (LAST 7 DAYS)    CBC  Results from last 7 days   Lab Units 02/12/24  0540 02/10/24  0227 02/09/24  0055 02/08/24  0444 02/07/24  1013 02/06/24  0351 02/05/24  1150   WBC 10*3/mm3 12.20* 10.40 11.50* 9.60 9.20 7.30 9.10   RBC 10*6/mm3 4.13 4.34 4.57 4.77 4.33 4.18 4.73   HEMOGLOBIN g/dL 12.1 11.7* 12.3 12.8 11.8* 11.2* 12.8   HEMATOCRIT % 35.9 36.6 38.8 40.5 37.4 34.9 40.5   MCV fL 86.8 84.3 84.8 84.9 86.3 83.6 85.6   PLATELETS 10*3/mm3 284 238 234 216 200 177 220        BMP  Results from last 7 days   Lab Units 02/12/24  0540 02/11/24  0429 02/10/24  0227 02/09/24  0055 02/08/24  0444 02/07/24  1013 02/06/24  1806 02/06/24  0351 02/05/24  1150   SODIUM mmol/L 138 138 140 138 136 136 135*   < > 137   POTASSIUM mmol/L 4.4 4.6 4.3 4.3 4.8 4.9 5.1   < > 3.1*   CHLORIDE mmol/L 104 106 106 105 102 102 98   < > 94*   CO2 mmol/L 23.0 21.0* 23.0 22.0 23.0 24.0 28.0   < > 30.0*   BUN mg/dL 45* 35* 29* 30* 32* 32* 32*   < > 19   CREATININE mg/dL 1.36* 1.43* 1.14* 1.16* 1.12* 1.26* 1.50*   < > 1.30*   GLUCOSE mg/dL 130* 161* 113* 115* 90 100* 105*   < > 104*   MAGNESIUM mg/dL  --   --  2.0 1.9 2.0  --   --   --  2.2   PHOSPHORUS mg/dL 4.2 4.7* 3.0 2.7 3.4  --   --   --   --     < > = values in this interval not displayed.       CMP   Results from last 7 days   Lab Units 02/12/24  0540 02/11/24  0429 02/10/24  0227 02/09/24  0055 02/08/24  0444 02/07/24  1013 02/06/24  1806 02/06/24  0351 02/05/24  1235 02/05/24  1150   SODIUM mmol/L 138 138 140 138 136 136 135*   < >  --  137   POTASSIUM mmol/L 4.4 4.6 4.3 4.3 4.8 4.9 5.1   < >  --  3.1*   CHLORIDE mmol/L 104 106 106 105 102 102 98   < >  --  94*   CO2 mmol/L 23.0 21.0* 23.0 22.0 23.0 24.0 28.0   < >  --  30.0*   BUN mg/dL 45* 35* 29* 30* 32* 32* 32*   < >  --  19   CREATININE mg/dL 1.36* 1.43* 1.14* 1.16* 1.12* 1.26* 1.50*   < >  --  1.30*   GLUCOSE mg/dL 130* 161* 113* 115* 90 100* 105*   < >  --  104*   ALBUMIN g/dL 3.5 3.3*  --   --  3.5  --  3.3*  --   --  3.9   BILIRUBIN mg/dL  --   --   --   --  0.7  --  0.6  --   --  1.1   ALK PHOS U/L  --   --   --   --  60  --  58  --   --  69   AST (SGOT) U/L  --   --   --   --  35*  --  21  --   --  19   ALT (SGPT) U/L  --   --   --   --  32  --  16  --   --  10   AMMONIA umol/L  --   --   --   --   --   --   --   --  25  --     < > = values in this interval not displayed.       BNP        TROPONIN  Results from last 7 days   Lab Units 02/06/24  6986   CK TOTAL U/L 64       [facility] [place]         [place] Clearance  Estimated Creatinine Clearance: 31.3 mL/min (A) (by C-G formula based on SCr of 1.36 mg/dL (H)).    ABG        Radiology  No radiology results for the last day      EKG  I personally viewed and interpreted the patient's EKG/Telemetry data:  ECG 12 Lead Rhythm Change   Final Result   HEART RATE= 69  bpm   RR Interval= 900  ms   NE Interval= 213  ms   P Horizontal Axis= -3  deg   P Front Axis= 54  deg   QRSD Interval= 86  ms   QT Interval= 413  ms   QTcB= 435  ms   QRS Axis= 117  deg   T Wave Axis= 33  deg   - ABNORMAL ECG -   Sinus rhythm   Atrial premature complex   Borderline prolonged NE interval   Anteroseptal infarct, age indeterminate   When compared with ECG of 05-Feb-2024 11:04:44,   New or worsened ischemia or infarction   New conduction abnormality   Significant axis, voltage or hypertrophy change   Electronically Signed By: Richardson Willis ([facility]) 08-Feb-2024 17:20:33   Date and Time of Study: 2024-02-08 01:23:00      ECG 12 Lead Other; Weakness   Final Result   HEART RATE= 75  bpm   RR Interval= 860  ms   NE Interval= 176  ms   P Horizontal Axis= 201  deg   P Front Axis= 11  deg   QRSD Interval= 99  ms   QT Interval= 398  ms   QTcB= 429  ms   QRS Axis= 117  deg   T Wave Axis= -6  deg   - ABNORMAL ECG -   Sinus rhythm   Multiple premature complexes, vent & supraven   Right axis deviation   Low voltage, precordial leads   Nonspecific T abnormalities, anterior leads   Electronically Signed By: Pankaj Mccallum ([facility]) 06-Feb-2024 07:40:13   Date and Time of Study: 2024-02-05 11:04:44      SCANNED - TELEMETRY     Final Result      SCANNED - TELEMETRY     Final Result      SCANNED - TELEMETRY     Final Result      SCANNED - TELEMETRY     Final Result      SCANNED - TELEMETRY     Final Result      SCANNED - TELEMETRY     Final Result      SCANNED - TELEMETRY     Final Result      SCANNED - TELEMETRY     Final Result      SCANNED - TELEMETRY     Final Result      SCANNED - TELEMETRY     Final  Result      SCANNED - TELEMETRY     Final Result      SCANNED - TELEMETRY     Final Result      SCANNED - TELEMETRY     Final Result      SCANNED - TELEMETRY     Final Result      SCANNED - TELEMETRY     Final Result      SCANNED - TELEMETRY     Final Result      SCANNED - TELEMETRY     Final Result      SCANNED - TELEMETRY     Final Result      SCANNED - TELEMETRY     Final Result      SCANNED - TELEMETRY     Final Result      SCANNED - TELEMETRY     Final Result      SCANNED - TELEMETRY     Final Result      SCANNED - TELEMETRY     Final Result      SCANNED - TELEMETRY     Final Result      SCANNED - TELEMETRY     Final Result      SCANNED - TELEMETRY     Final Result      SCANNED - TELEMETRY     Final Result      SCANNED - TELEMETRY     Final Result      SCANNED - TELEMETRY     Final Result      SCANNED - TELEMETRY     Final Result      SCANNED - TELEMETRY     Final Result      SCANNED - TELEMETRY     Final Result      SCANNED - TELEMETRY     Final Result      SCANNED - TELEMETRY     Final Result      SCANNED - TELEMETRY     Final Result      SCANNED - TELEMETRY     Final Result      SCANNED - TELEMETRY     Final Result      ECG 12 Lead Altered Mental Status    (Results Pending)         Echocardiogram:    Results for orders placed during the hospital encounter of 02/05/24    Adult Transthoracic Echo Complete W/ Cont if Necessary Per Protocol    Interpretation Summary    Left ventricular systolic function is normal. Left ventricular ejection fraction appears to be 61 - 65%.    Severely reduced right ventricular systolic function noted.    The right ventricular cavity is severely dilated.    The left atrial cavity is mildly dilated.    Left atrial volume is mildly increased.    The right atrial cavity is moderate to severely  dilated.    Estimated right ventricular systolic pressure from tricuspid regurgitation is mildly elevated (35-45 mmHg).    Mild pulmonary hypertension is present.    There is a moderate  (1-2cm) pericardial effusion. There is no evidence of cardiac tamponade.        Stress Test:  Results for orders placed in visit on 09/13/22    Stress Test With Myocardial Perfusion One Day    Interpretation Summary    Left ventricular ejection fraction is hyperdynamic (Calculated EF > 70%). .    Myocardial perfusion imaging indicates a normal myocardial perfusion study with no evidence of ischemia.    Impressions are consistent with a low risk study.    Findings consistent with a normal ECG stress test.         Cardiac Catheterization:  Results for orders placed during the hospital encounter of 03/03/23    Cardiac Catheterization/Vascular Study    Narrative  OPERATORS  Richardson Willis M.D. (Attending Cardiologist)      PROCEDURE PERFORMED  Ultrasound guided vascular access  Right heart catheterization  Coronary Angiogram  Left Heart Catheterization 59817  Moderate Sedation    INDICATIONS FOR PROCEDURE  83-year-old woman with multiple cardiovascular risk factors, abnormal stress test presented with worsening shortness of breath.  After discussing the risk and benefit of the procedure she was brought in for elective right and left heart cath.    PROCEDURE IN DETAIL  Informed consent was obtained from the patient after explaining the risks, benefits, and alternative options of the procedure. After obtaining informed consent, the patient was brought to the cath lab and was prepped in a sterile fashion. Lidocaine 2% was used for local anesthesia into the right femoral venous access site. Right femoral vein was accessed using the micropuncture needle under ultrasound guidance and micropuncture wire advanced under flouroscopy. A 7 Chinese vascular sheath was put into place percutaneously over guide-wire. Guide wires were removed. A 6Fr swan sheryl catheter was advanced to wedge position. RA, RV and PA and wedge pressures were recorded.  PA sat and arterial sats recorded.  The patient tolerated the procedure well without any  complications.    Lidocaine 2% was used for local anesthesia into the right femoral arterial access site. The right femoral artery was accessed with a micropuncture needle via modified Seldinger technique under ultrasound guidance. A 6F was inserted successfully.  Afterwards, 6F JR4 and JL4 diagnostic catheters were advanced over a wire into the ascending aorta and were used to engage the ostia of the left main and RCA respectively. JR4 used to cross the AV and obtain LV pressures and gradient across the AV measured via pullback technique. Images of the right and left coronary systems were obtained. All the catheters were exchanged over a wire and subsequently removed. Angiogram of the femoral access site was obtained and did not show complications. The patient tolerated the procedure well without any complications. The pictures were reviewed at the end of the procedure. A Mynx closure device was applied.    HEMODYNAMICS    RHC  RA 6/5, 4 mmHg  RV 74/3, 5 mmHg  PA 71/25, 44 mmHg  PCW 12 mmHg  AO Sat 92%  PA Sat 78%    Jose CO: 7.89 L/min    Jose CI: 4.69 L/min/m²    LHC  LV: 134/3, 20 mmHg  AO: 131/56, 87 mmHg  No significant gradient across the aortic valve during pullback of JR4 catheter.    FINDINGS  Coronary Angiogram  All vessels are heavily calcified  She also has heavily calcified peripheral arterial disease.  Right dominant circulation    Left main: Left main is a large caliber vessel which gives rise to the Left Anterior Descending and the Left circumflex.  Left main is angiographically free from any significant disease.    Left Anterior Descending Artery: LAD is a heavily calcified medium caliber vessel which gives rise to diagonals.  The vessel is highly tortuous and has 50 to 60% mid vessel stenosis best seen in Malay cranial view.    Left Circumflex: Heavily calcified vessel with 50% proximal segment stenosis.    Right Coronary Artery: The RCA is a large caliber vessel which is heavily calcified and  tortuous.  It has diffuse luminal irregularities and 30 to 40% stenosis in the midsegment around the bend.    ESTIMATED BLOOD LOSS:  10 ml    COMPLICATIONS:  None    PROCEDURE DATA:  Sedation Time: 25 minutes    IMPRESSIONS  Heavily calcified, tortuous nonobstructive coronary disease  Severe pulmonary hypertension with PA systolic pressure in the 70s  Mean PA pressure 44 mmHg    RECOMMENDATIONS  -Continue aggressive medical management of CAD  -Referral to pulmonology for treatment of pulmonary hypertension         Other:         ASSESSMENT & PLAN:    Principal Problem:    Altered mental status  Active Problems:    Acute hypokalemia    Stage 3a chronic kidney disease    Chronic obstructive pulmonary disease    History of venous thrombosis and embolism    Primary hypertension    History of TIA (transient ischemic attack)    Severe pulmonary hypertension    Chronic respiratory failure with hypoxia    JULAIN (acute kidney injury)      Altered mental status  Possible mastitis/PICC line infection  CT head and MRI of the brain unremarkable with no acute findings  Antibiotics per ID  Mental status is better and back to baseline    JULIAN on Stage 3a chronic kidney disease  Creatinine 1.36, GFR is 39  Continue oral diuretics  Hypokalemia has resolved.  Potassium 4.4  Closely monitor renal function and respiratory status     COPD/Chronic respiratory failure  Severe pulmonary hypertension  On 2-3 L of oxygen via nasal cannula at baseline.  Has known pulmonary hypertension  RA 6/5, 4 mmHg  RV 74/3, 5 mmHg  PA 71/25, 44 mmHg  PCW 12 mmHg  Continue sildenafil  On steroids  Managed by pulmonology  Echocardiogram shows preserved LV function with mildly elevated RVSP.  Small to moderate pericardial effusion: No signs of tamponade     Atrial fibrillation  She has paroxysmal atrial fibrillation  [id number] score is 6  Continue Eliquis for atrial fibrillation as well as for history of DVT/PE  Heart rate has improved on beta-blocker      History of venous thrombosis and embolism  Continue Eliquis 5 mg p.o. twice daily.  She has an IVC filter in place as well.     Primary hypertension, chronic  Blood pressure is now normal  Coreg 12.5 mg p.o. twice daily  Continue amlodipine and hydralazine  Echo shows preserved LV function, reduced RV function and mildly elevated RVSP.  Pericardial effusion is not significant with no signs of tamponade.  Diuretics to be used as needed     Coronary artery disease  Continue high intensity statin and beta-blocker.  No need for aspirin while she is on anticoagulation  Previous cardiac catheterization showed heavily calcified coronaries but nonobstructive.  No chest pain and stable from cardiac standpoint.     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  Continue high intensity statin and anticoagulation with Eliquis 5 mg p.o. twice daily.    Discussed the care plan with patient's daughter at bedside.    Richardson Willis MD  24  11:44 EST                  Electronically signed by Richardson Willis MD at 24 1147       Anirudh Aleman MD at 24 0813          NEPHROLOGY PROGRESS NOTE------[facility]    [facility]  [phone]  Anirudh Aleman MD      Patient Care Team:  Shaylee Mcdaniels MD as PCP - General (Family Medicine)  Richardson Willis MD as Cardiologist (Cardiology)  Rafy Rodriguez MD as Consulting Physician (Hematology and Oncology)  Christianne Phillips RN as Licensed Practical Nurse  Salome Fleming MD as Consulting Physician (Nephrology)      Provider:  Anirudh Aleman MD  Patient Name: Gabrielle Lyles  :  1940    SUBJECTIVE:    F/U ARF/JULIAN/CRF/CKD  No chest pain, breathing better  Blood pressure in [id number]    Medication:  allopurinol, 100 mg, Oral,  "Daily  apixaban, 5 mg, Oral, Q12H  budesonide, 0.5 mg, Nebulization, BID - RT  carvedilol, 12.5 mg, Oral, BID With Meals  cephalexin, 500 mg, Oral, Q8H  famotidine, 20 mg, Oral, Daily  furosemide, 40 mg, Oral, Daily  hydrALAZINE, 50 mg, Oral, Q8H  levothyroxine, 50 mcg, Oral, Q AM  melatonin, 5 mg, Oral, Nightly  methylPREDNISolone sodium succinate, 20 mg, Intravenous, Daily  sildenafil, 20 mg, Oral, TID  sodium chloride, 10 mL, Intravenous, Q12H  sodium chloride, 10 mL, Intravenous, Q12H             OBJECTIVE    Vital Sign Min/Max for last 24 hours  Temp  Min: 96.6 °F (35.9 °C)  Max: 97.3 °F (36.3 °C)   BP  Min: 89/61  Max: 140/56   Pulse  Min: 77  Max: 98   Resp  Min: 16  Max: 26   SpO2  Min: 90 %  Max: 96 %   No data recorded   No data recorded     Flowsheet Rows      Flowsheet Row First Filed Value   Admission Height 162.6 cm (64\") Documented at 02/05/2024 1046   Admission Weight 66.7 kg (147 lb) Documented at 02/05/2024 1046            No intake/output data recorded.  No intake/output data recorded.    Physical Exam:  General Appearance: alert, appears stated age and cooperative  Head: normocephalic, without obvious abnormality and atraumatic  Eyes: conjunctivae and sclerae normal and no icterus  Neck: supple and no JVD  Lungs: +SCATTERED RHONCHI  Heart: regular rhythm & normal rate and normal S1, S2 +TAYE  Chest Wall: no abnormalities observed  Abdomen: normal bowel sounds and soft, nontender +OBESITY  Extremities: moves extremities well, trace edema, no cyanosis  Skin: no bleeding, bruising or rash  Neurologic: Alert, and oriented. No focal deficits    Labs:    WBC WBC   Date Value Ref Range Status   02/12/2024 12.20 (H) 3.40 - 10.80 10*3/mm3 Final   02/10/2024 10.40 3.40 - 10.80 10*3/mm3 Final      HGB Hemoglobin   Date Value Ref Range Status   02/12/2024 12.1 12.0 - 15.9 g/dL Final   02/10/2024 11.7 (L) 12.0 - 15.9 g/dL Final      HCT Hematocrit   Date Value Ref Range Status   02/12/2024 35.9 34.0 - 46.6 % " "Final   02/10/2024 36.6 34.0 - 46.6 % Final      Platelets No results found for: \"LABPLAT\"   MCV MCV   Date Value Ref Range Status   02/12/2024 86.8 79.0 - 97.0 fL Final   02/10/2024 84.3 79.0 - 97.0 fL Final          Sodium Sodium   Date Value Ref Range Status   02/12/2024 138 136 - 145 mmol/L Final   02/11/2024 138 136 - 145 mmol/L Final   02/10/2024 140 136 - 145 mmol/L Final      Potassium Potassium   Date Value Ref Range Status   02/12/2024 4.4 3.5 - 5.2 mmol/L Final   02/11/2024 4.6 3.5 - 5.2 mmol/L Final   02/10/2024 4.3 3.5 - 5.2 mmol/L Final      Chloride Chloride   Date Value Ref Range Status   02/12/2024 104 98 - 107 mmol/L Final   02/11/2024 106 98 - 107 mmol/L Final   02/10/2024 106 98 - 107 mmol/L Final      CO2 CO2   Date Value Ref Range Status   02/12/2024 23.0 22.0 - 29.0 mmol/L Final   02/11/2024 21.0 (L) 22.0 - 29.0 mmol/L Final   02/10/2024 23.0 22.0 - 29.0 mmol/L Final      BUN BUN   Date Value Ref Range Status   02/12/2024 45 (H) 8 - 23 mg/dL Final   02/11/2024 35 (H) 8 - 23 mg/dL Final   02/10/2024 29 (H) 8 - 23 mg/dL Final      Creatinine Creatinine   Date Value Ref Range Status   02/12/2024 1.36 (H) 0.57 - 1.00 mg/dL Final   02/11/2024 1.43 (H) 0.57 - 1.00 mg/dL Final   02/10/2024 1.14 (H) 0.57 - 1.00 mg/dL Final      Calcium Calcium   Date Value Ref Range Status   02/12/2024 9.2 8.6 - 10.5 mg/dL Final   02/11/2024 9.1 8.6 - 10.5 mg/dL Final   02/10/2024 9.2 8.6 - 10.5 mg/dL Final      PO4 No components found for: \"PO4\"   Albumin Albumin   Date Value Ref Range Status   02/12/2024 3.5 3.5 - 5.2 g/dL Final   02/11/2024 3.3 (L) 3.5 - 5.2 g/dL Final      Magnesium Magnesium   Date Value Ref Range Status   02/10/2024 2.0 1.6 - 2.4 mg/dL Final      Uric Acid No components found for: \"URIC ACID\"     Imaging Results (Last 72 Hours)       ** No results found for the last 72 hours. **            Results for orders placed during the hospital encounter of 02/05/24    XR Chest 1 View    Narrative  XR " CHEST 1 VW    Date of Exam: 2/9/2024 5:36 AM EST    Indication: sob    Comparison: None available.    Findings:  The trachea is midline. Stable cardiomegaly with mild enlargement of the pulmonary arteries. There is mild diffuse bilateral reticular lung thickening. Mild left basilar atelectasis. No definite pleural effusions. No change in position of the right-sided  pigtail catheter.    Impression  Impression:  Stable left basilar atelectasis and chronic lung changes    Cardiomegaly      Electronically Signed: Pablo Martins MD  2/9/2024 7:35 AM EST  Workstation ID: [id number]      XR Chest 1 View    Narrative  XR CHEST 1 VW    Date of Exam: 2/5/2024 12:00 PM EST    Indication: weakness    Comparison: 5/2/2023.    Findings:  There is mild cardiomegaly with increased heart size. Mildly prominent interstitial pattern appears stable and chronic. There is a prominent skinfold over the right chest. There is no definite pneumothorax. There is no effusion.    Impression  Impression:  Mild cardiomegaly. Mild chronic findings of the lung fields.      Electronically Signed: Mary Ivan MD  2/5/2024 12:06 PM EST  Workstation ID: [id number]      Results for orders placed during the hospital encounter of 09/24/23    XR Foot 3+ View Left    Narrative  XR FOOT 3+ VW LEFT    Date of Exam: 9/24/2023 3:15 PM EDT    Indication: pain redness swelling    Comparison: None available.    Findings:  No fracture or dislocation. There is soft tissue swelling diffusely at the left foot. No discrete osseous erosion. Plantar calcaneal bone spur. Enthesopathy at the Achilles insertion on the calcaneus. No radiodense foreign body.    Impression  Impression:  1. Negative for acute osseous abnormality.  2. Nonspecific soft tissue swelling.        Electronically Signed: Randall Zarco MD  9/24/2023 3:54 PM EDT  Workstation ID: [id number]      Results for orders placed during the hospital encounter of 02/05/24    Duplex Venous Upper Extremity - Right  CAR    Interpretation Summary    Normal right upper extremity venous duplex scan.        ASSESSMENT / PLAN      Altered mental status    Acute hypokalemia    Stage 3a chronic kidney disease    Chronic obstructive pulmonary disease    History of venous thrombosis and embolism    Primary hypertension    History of TIA (transient ischemic attack)    Severe pulmonary hypertension    Chronic respiratory failure with hypoxia    JULIAN (acute kidney injury)    ARF/JULIAN/CRF/CKD------Nonoliguric. +ARF/JULIAN on top of CRF/CKD STG 3A with a baseline serum  Creatinine of about 1.1. Unknown if patient has baseline proteinuria or hematuria. CRF/CKD STG 3A most likely secondary to HTN NS. +ARF/JULIAN is secondary to prerenal state/intravascular volume depletion. D/CIVFs today. Avoid hypotension.  No NSAIDs or IV dye.  BUN/Cr stable     2. ANEMIA------H/H stable     3. HTN WITH CKD-----Avoid hypotension. No ACE/ARB/DRI/diuretic for now     4. OA/DJD/HYPERURICEMIA------No NSAIDs. Add Allopurinol     5. DELIRIUM-------?Secondary to UTI. Better     6. UTI-----C and S pending. On Abx     7. HYPOCALCEMIA------Replaced     8. KETONURIA/DEHYDRATION------Secondary to recent outpatient increase in Lasix. Hold Lasix. Gentle IVFs given Pulmonary HTN     9. PULMONARY HTN--------Per , Pulmonary     10. HYPOKALEMIA-------Replaced     11. CAD-----per , Cardiology    Creatinine stable  Blood pressure better, avoid over treatment  Continue Lasix for now    Anirudh Aleman MD  [facility]  [phone]  24  08:13 EST      Electronically signed by Anirudh Aleman MD at 24 1058       Jerry Solorzano MD at 24 1043          PULMONARY CRITICAL CARE PROGRESS  NOTE      PATIENT IDENTIFICATION:  Name: Gabrielle Lyles  MRN: [id number]  :  1940     Age: 83 y.o.  Sex: female    DATE OF Note:  2024   Referring Physician: Rosamaria Jin MD                  Subjective:   In bed, still on O2 ,  "still weak and not taking deep breaths, no nausea or vomiting, no bowel or bladder issues reported       Objective:  tMax 24 hrs: Temp (24hrs), Av.9 °F (36.6 °C), Min:97.4 °F (36.3 °C), Max:98.7 °F (37.1 °C)      Vitals Ranges:   Temp:  [97.4 °F (36.3 °C)-98.7 °F (37.1 °C)] 97.5 °F (36.4 °C)  Heart Rate:  [] 80  Resp:  [16-38] 20  BP: ()/(46-61) 89/61    Intake and Output Last 3 Shifts:   No intake/output data recorded.    Exam:  BP (!) 89/61   Pulse 80   Temp 97.5 °F (36.4 °C) (Axillary)   Resp 20   Ht 162.6 cm (64\")   Wt 73.5 kg (162 lb 0.6 oz)   SpO2 91%   BMI 27.81 kg/m²     General Appearance:     HEENT:  Normocephalic, without obvious abnormality. Conjunctivae/corneas clear.  Normal external ear canals. Nares normal, no drainage     Neck:  Supple, symmetrical, trachea midline. No JVD.  Lungs /Chest wall:   Bilateral basal rhonchi, respirations unlabored, symmetrical wall movement.     Heart:  Regular rate and rhythm, systolic murmur PMI left sternal border  Abdomen: Soft, nontender, no masses, no organomegaly.    Extremities: Trace edema, no clubbing or cyanosis        Medications:  allopurinol, 100 mg, Oral, Daily  apixaban, 5 mg, Oral, Q12H  budesonide, 0.5 mg, Nebulization, BID - RT  carvedilol, 12.5 mg, Oral, BID With Meals  cephalexin, 500 mg, Oral, Q8H  famotidine, 20 mg, Oral, Daily  furosemide, 40 mg, Oral, Daily  hydrALAZINE, 50 mg, Oral, Q8H  ipratropium-albuterol, 3 mL, Nebulization, 4x Daily - RT  levothyroxine, 50 mcg, Oral, Q AM  melatonin, 5 mg, Oral, Nightly  methylPREDNISolone sodium succinate, 20 mg, Intravenous, Daily  sildenafil, 20 mg, Oral, TID  sodium chloride, 10 mL, Intravenous, Q12H  sodium chloride, 10 mL, Intravenous, Q12H        Infusion:          PRN:    acetaminophen    Calcium Replacement - Follow Nurse / BPA Driven Protocol    ipratropium-albuterol    Magnesium Standard Dose Replacement - Follow Nurse / BPA Driven Protocol    ondansetron    Phosphorus " Replacement - Follow Nurse / BPA Driven Protocol    Potassium Replacement - Follow Nurse / BPA Driven Protocol    [COMPLETED] Insert Peripheral IV **AND** sodium chloride    sodium chloride    sodium chloride    sodium chloride    sodium chloride  Data Review:  All labs (24hrs):   Recent Results (from the past 24 hour(s))   Renal Function Panel    Collection Time: 02/11/24  4:29 AM    Specimen: Blood   Result Value Ref Range    Glucose 161 (H) 65 - 99 mg/dL    BUN 35 (H) 8 - 23 mg/dL    Creatinine 1.43 (H) 0.57 - 1.00 mg/dL    Sodium 138 136 - 145 mmol/L    Potassium 4.6 3.5 - 5.2 mmol/L    Chloride 106 98 - 107 mmol/L    CO2 21.0 (L) 22.0 - 29.0 mmol/L    Calcium 9.1 8.6 - 10.5 mg/dL    Albumin 3.3 (L) 3.5 - 5.2 g/dL    Phosphorus 4.7 (H) 2.5 - 4.5 mg/dL    Anion Gap 11.0 5.0 - 15.0 mmol/L    BUN/Creatinine Ratio 24.5 7.0 - 25.0    eGFR 36.7 (L) >60.0 mL/min/1.73        Imaging:  XR Chest 1 View  Narrative: XR CHEST 1 VW    Date of Exam: 2/9/2024 5:36 AM EST    Indication: sob    Comparison: None available.    Findings:  The trachea is midline. Stable cardiomegaly with mild enlargement of the pulmonary arteries. There is mild diffuse bilateral reticular lung thickening. Mild left basilar atelectasis. No definite pleural effusions. No change in position of the right-sided   pigtail catheter.  Impression: Impression:  Stable left basilar atelectasis and chronic lung changes    Cardiomegaly    Electronically Signed: Pablo Martins MD    2/9/2024 7:35 AM EST    Workstation ID: [id number]       ASSESSMENT:  AMS  Chronic   Severe Pulmonary HTN  Hypokalemia  CKD 3  HTN  Hx PE/DVT  Hx TIA              PLAN:  Continue to wean oxygen down  Patient will benefit from rehab  Continue  PT/OT  Need to encourage to take deep breaths   Wean O2 to keep sats > 88%  Antibiotics per ID  Bronchodilators  Inhaled corticosteroids  Incentive spirometer  Cardiology following   Electrolytes/ glycemic control  DVT and GI prophylaxis-Apixaban        management (   ) minutes, This time specifically excludes time spent performing procedures.    Jerry Solorzano MD   2/11/2024  10:43 EST         Electronically signed by Jerry Solorzano MD at 02/11/24 1044       Paul Granados MD at 02/11/24 1021               LOS: 4 days   Patient Care Team:  Shaylee Mcdaniels MD as PCP - General (Family Medicine)  Richardson Willis MD as Cardiologist (Cardiology)  Rafy Rodriguez MD as Consulting Physician (Hematology and Oncology)  Christianne Phillips RN as Licensed Practical Nurse  Salome Fleming MD as Consulting Physician (Nephrology)    Subjective:  Patient more alert and improved    Objective:   Afebrile      Review of Systems:   Review of Systems   Constitutional:  Positive for activity change.   Neurological:  Positive for weakness.           Vital Signs  Temp:  [97.4 °F (36.3 °C)-98.7 °F (37.1 °C)] 97.5 °F (36.4 °C)  Heart Rate:  [] 90  Resp:  [16-38] 20  BP: ()/(46-61) 116/59    Physical Exam:  Physical Exam  Vitals and nursing note reviewed.   Constitutional:       Appearance: Normal appearance.   Cardiovascular:      Rate and Rhythm: Normal rate.      Heart sounds: Normal heart sounds.   Pulmonary:      Breath sounds: Normal breath sounds.   Skin:     General: Skin is warm.   Neurological:      Mental Status: She is alert.          Radiology:  XR Chest 1 View    Result Date: 2/9/2024  Impression: Stable left basilar atelectasis and chronic lung changes Cardiomegaly Electronically Signed: Pablo Martins MD  2/9/2024 7:35 AM EST  Workstation ID: [id number]    US Breast Left Limited    Result Date: 2/8/2024  IMPRESSION : 1. Nonspecific hypoechoic shadowing region measuring 2.0 cm at the 11 o'clock position 4 cm from the nipple corresponding to patient's lumpectomy site. This could represent scarring at the lumpectomy site, collapsed seroma cavity or recurrence. Abscess is felt less likely but not entirely excluded if there are clinical findings of  infection.  Recommend patient return for full outpatient diagnostic work-up including diagnostic mammogram and ultrasound with real-time evaluation by radiologist. Patient also requires import of outside breast imaging more recent than 2014  BI-RADS Final Assessment Category 0: Incomplete-Need Additional Imaging Evaluation Management Recommendation: Recall for additional imaging.   Electronically Signed By-Param Johansen MD On:2/8/2024 8:35 AM      MRI Brain With Contrast    Result Date: 2/7/2024  Impression: No abnormality noted on postcontrast imaging. See same-day noncontrast exam report for additional details. Electronically Signed: Alan Rodríguez MD  2/7/2024 6:39 PM CST  Workstation ID: [id number]    MRI Brain Without Contrast    Result Date: 2/7/2024  Impression: Mildly motion-degraded exam demonstrating expected age-related changes, without evidence of acute infarct, hemorrhage, mass or mass effect. Electronically Signed: Andres Olvera MD  2/7/2024 9:00 AM EST  Workstation ID: [id number]    CT Abdomen Pelvis Without Contrast    Result Date: 2/6/2024  Impression: Chronic atelectasis of the left lower lobe. Moderately severe emphysema. Lesion of the sternal manubrium as detailed, which could represent subacute or old fracture although metastatic disease is not excluded. Correlation with whole-body bone scan may be  useful. Aneurysmal dilatation of the abdominal aorta. Small amount of free pelvic fluid, nonspecific. Nodular liver contour suggests cirrhosis. Electronically Signed: Mary Ivan MD  2/6/2024 3:29 PM EST  Workstation ID: [id number]    CT Chest Without Contrast Diagnostic    Result Date: 2/6/2024  Impression: Chronic atelectasis of the left lower lobe. Moderately severe emphysema. Lesion of the sternal manubrium as detailed, which could represent subacute or old fracture although metastatic disease is not excluded. Correlation with whole-body bone scan may be  useful. Aneurysmal dilatation of the  abdominal aorta. Small amount of free pelvic fluid, nonspecific. Nodular liver contour suggests cirrhosis. Electronically Signed: Mary Ivan MD  2/6/2024 3:29 PM EST  Workstation ID: [id number]    CT Head Without Contrast    Result Date: 2/5/2024  1.Examination is limited due to motion artifact. 2.Given this limitation, no acute intracranial abnormality is identified. 3.Findings compatible with chronic microvascular ischemic change and diffuse cortical atrophy. Electronically Signed: Parker Mireles MD  2/5/2024 12:26 PM EST  Workstation ID: [id number]    XR Chest 1 View    Result Date: 2/5/2024  Impression: Mild cardiomegaly. Mild chronic findings of the lung fields. Electronically Signed: Mary Ivan MD  2/5/2024 12:06 PM EST  Workstation ID: [id number]        Results Review:     I reviewed the patient's new clinical results.  I reviewed the patient's new imaging results and agree with the interpretation.    Medication Review:   Scheduled Meds:allopurinol, 100 mg, Oral, Daily  [START ON 2/12/2024] amLODIPine, 2.5 mg, Oral, Q24H  apixaban, 5 mg, Oral, Q12H  budesonide, 0.5 mg, Nebulization, BID - RT  carvedilol, 25 mg, Oral, BID With Meals  cephalexin, 500 mg, Oral, Q8H  famotidine, 20 mg, Oral, Daily  furosemide, 40 mg, Oral, Daily  hydrALAZINE, 50 mg, Oral, Q8H  ipratropium-albuterol, 3 mL, Nebulization, 4x Daily - RT  levothyroxine, 50 mcg, Oral, Q AM  melatonin, 5 mg, Oral, Nightly  methylPREDNISolone sodium succinate, 20 mg, Intravenous, Daily  sildenafil, 20 mg, Oral, TID  sodium chloride, 10 mL, Intravenous, Q12H  sodium chloride, 10 mL, Intravenous, Q12H      Continuous Infusions:   PRN Meds:.  acetaminophen    Calcium Replacement - Follow Nurse / BPA Driven Protocol    ipratropium-albuterol    Magnesium Standard Dose Replacement - Follow Nurse / BPA Driven Protocol    ondansetron    Phosphorus Replacement - Follow Nurse / BPA Driven Protocol    Potassium Replacement - Follow Nurse / BPA Driven  "Protocol    [COMPLETED] Insert Peripheral IV **AND** sodium chloride    sodium chloride    sodium chloride    sodium chloride    sodium chloride    Labs:    CBC    Results from last 7 days   Lab Units 02/10/24  0227 02/09/24  0055 02/08/24  0444 02/07/24  1013 02/06/24  0351 02/05/24  1150   WBC 10*3/mm3 10.40 11.50* 9.60 9.20 7.30 9.10   HEMOGLOBIN g/dL 11.7* 12.3 12.8 11.8* 11.2* 12.8   PLATELETS 10*3/mm3 238 234 216 200 177 220     BMP   Results from last 7 days   Lab Units 02/11/24  0429 02/10/24  0227 02/09/24  0055 02/08/24  0444 02/07/24  1013 02/06/24  1806 02/06/24  0351 02/05/24  1150   SODIUM mmol/L 138 140 138 136 136 135* 138 137   POTASSIUM mmol/L 4.6 4.3 4.3 4.8 4.9 5.1 2.9* 3.1*   CHLORIDE mmol/L 106 106 105 102 102 98 96* 94*   CO2 mmol/L 21.0* 23.0 22.0 23.0 24.0 28.0 30.0* 30.0*   BUN mg/dL 35* 29* 30* 32* 32* 32* 22 19   CREATININE mg/dL 1.43* 1.14* 1.16* 1.12* 1.26* 1.50* 1.07* 1.30*   GLUCOSE mg/dL 161* 113* 115* 90 100* 105* 82 104*   MAGNESIUM mg/dL  --  2.0 1.9 2.0  --   --   --  2.2   PHOSPHORUS mg/dL 4.7* 3.0 2.7 3.4  --   --   --   --      Cr Clearance Estimated Creatinine Clearance: 29.8 mL/min (A) (by C-G formula based on SCr of 1.43 mg/dL (H)).  Coag     HbA1C   Lab Results   Component Value Date    HGBA1C 5.8 (H) 10/25/2022     Blood Glucose No results found for: \"POCGLU\"  Infection   Results from last 7 days   Lab Units 02/05/24  1627 02/05/24  1150   BLOODCX  No growth at 5 days No growth at 5 days   PROCALCITONIN ng/mL  --  0.06     CMP   Results from last 7 days   Lab Units 02/11/24  0429 02/10/24  0227 02/09/24  0055 02/08/24  0444 02/07/24  1013 02/06/24  1806 02/06/24  0351 02/05/24  1235 02/05/24  1150   SODIUM mmol/L 138 140 138 136 136 135* 138  --  137   POTASSIUM mmol/L 4.6 4.3 4.3 4.8 4.9 5.1 2.9*  --  3.1*   CHLORIDE mmol/L 106 106 105 102 102 98 96*  --  94*   CO2 mmol/L 21.0* 23.0 22.0 23.0 24.0 28.0 30.0*  --  30.0*   BUN mg/dL 35* 29* 30* 32* 32* 32* 22  --  19 "   CREATININE mg/dL 1.43* 1.14* 1.16* 1.12* 1.26* 1.50* 1.07*  --  1.30*   GLUCOSE mg/dL 161* 113* 115* 90 100* 105* 82  --  104*   ALBUMIN g/dL 3.3*  --   --  3.5  --  3.3*  --   --  3.9   BILIRUBIN mg/dL  --   --   --  0.7  --  0.6  --   --  1.1   ALK PHOS U/L  --   --   --  60  --  58  --   --  69   AST (SGOT) U/L  --   --   --  35*  --  21  --   --  19   ALT (SGPT) U/L  --   --   --  32  --  16  --   --  10   AMMONIA umol/L  --   --   --   --   --   --   --  25  --      UA    Results from last 7 days   Lab Units 02/06/24  1054   NITRITE UA  Negative   WBC UA /HPF 0-2   BACTERIA UA /HPF 1+*   SQUAM EPITHEL UA /HPF 3-6*     Radiology(recent) No radiology results for the last day   Assessment:    Acute mental status changes with acute delirium  Urinary tract infection present upon admission  Hypocalcemia  Dehydration  Acute renal failure with acute kidney injury superimposed upon chronic kidney disease stage IIIa  Hypertension associated chronic kidney disease stage IIIa  Anemia of chronic kidney disease  Hyperuricemia  Left breast mastitis  Right arm thrombophlebitis  Pulmonary hypertension  Acute hypokalemia  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  Cerebrovascular disease status post CVA  Chronic oral anticoagulation therapy  Acquired hypothyroidism  Thrombophilia  Autonomic dysfunction syndrome  History of pulmonary embolism  Paroxysmal atrial fibrillation  Hypercoagulable state secondary to atrial fibrillation  [id number] score 6  History of IVC filter  Aneurysmal dilatation of abdominal aorta  Panlobular COPD with emphysema  Chronic mucopurulent bronchitis  Peripheral polyneuropathy  History of breast cancer  Supplemental oxygen dependency  Chronic hypoxic respiratory failure         Plan:  Continue present approach//will allow blood pressure to trend to higher given history of autonomic dysfunction        Paul Granados MD  02/11/24  10:21  "EST          Electronically signed by Paul Granados MD at 24 1022       Consult Notes (last 48 hours)  Notes from 24 0920 through 24 0920   No notes of this type exist for this encounter.          Physical Therapy Notes (last 48 hours)        Sarai Moss PTA at 24 1141  Version 1 of 1         Subjective: Pt agreeable to therapeutic plan of care. Pt still very fearful of transfers. Instructed nsg to use 2 assist back to bed    Objective:     Bed mobility - Mod-A  Transfers - Max-A from front at gait belt  Ambulation -  feet N/A or Not attempted.      Vitals: WNL on 7L hf    Pain: 0 VAS       Education: Provided education on the importance of mobility in the acute care setting, Verbal/Tactile Cues, and Transfer Training    Assessment: Gabrielle Lyles presents with functional mobility impairments which indicate the need for skilled intervention. Tolerating session today without incident. Pt needed much encouragement to sit up in chair at least to eat lunch soon and to not stay too long after lunch so she would have some strength to assist with transfer back to bed, family present and agreed on plan. Very weak and needed knees blocked to assist with wt bearing. Plans on rehab at [place].Will continue to follow and progress as tolerated.     Plan/Recommendations:   If medically appropriate, Moderate Intensity Therapy recommended post-acute care. This is recommended as therapy feels the patient would require 3-4 days per week and wouldn't tolerate \"3 hour daily\" rehab intensity. SNF would be the preferred choice. If the patient does not agree to SNF, arrange HH or OP depending on home bound status. If patient is medically complex, consider LTACH. Pt requires no DME at discharge.     Pt desires Skilled Rehab placement at discharge. Pt cooperative; agreeable to therapeutic recommendations and plan of care.         Basic Mobility 6-click:  Rollin = Total, A lot = 2, A little = 3; 4 " = None  Supine>Sit:   1 = Total, A lot = 2, A little = 3; 4 = None   Sit>Stand with arms:  1 = Total, A lot = 2, A little = 3; 4 = None  Bed>Chair:   1 = Total, A lot = 2, A little = 3; 4 = None  Ambulate in room:  1 = Total, A lot = 2, A little = 3; 4 = None  3-5 Steps with railin = Total, A lot = 2, A little = 3; 4 = None  Score: 11    Post-Tx Position: Up in Chair and Call light and personal items within reach  PPE: gloves     Electronically signed by Sarai Moss PTA at 24 1447       Sarai Moss PTA at 24 1141  Version 1 of 1           Assessment: Gabrielle Lyles presents with functional mobility impairments which indicate the need for skilled intervention. Tolerating session today without incident. Pt needed much encouragement to sit up in chair at least to eat lunch soon and to not stay too long after lunch so she would have some strength to assist with transfer back to bed, family present and agreed on plan. Very weak and needed knees blocked to assist with wt bearing. Plans on rehab at [place].Will continue to follow and progress as tolerated.                                Electronically signed by Sarai Moss PTA at 24 1444          Occupational Therapy Notes (last 24 hours)        Karen Nelson OT at 24 [phone]            Assessment: Gabrielle Lyles presents with ADL impairments affecting function including balance, endurance / activity tolerance, pain, and strength. Pt educated on activity recommendations and getting up to bathroom with assistance to improve strength and endurance. Pt receptive to education. Demonstrated functioning below baseline abilities indicate the need for continued skilled intervention while inpatient. Tolerating session today without incident. Will continue to follow and progress as tolerated.     Plan/Recommendations:   Moderate Intensity Therapy recommended post-acute care. This is recommended as therapy feels the patient would  "require 3-4 days per week and wouldn't tolerate \"3 hour daily\" rehab intensity. SNF would be the preferred choice. If the patient does not agree to SNF, arrange HH or OP depending on home bound status. If patient is medically complex, consider LTACH.. Pt requires no DME at discharge.                      Electronically signed by Karen Nelson OT at 02/12/24 3306       Karen Nelson OT at 02/12/24 1309          Subjective: Pt agreeable to therapeutic plan of care.  Cognition: oriented to Person, Place, Time, and Situation    Objective:     Bed Mobility: CGA   Functional Transfers: Min-A and with rolling walker     Balance: supported and standing CGA and Min-A  Functional Ambulation: CGA, Min-A, and with rolling walker to and from the bathroom     Toileting: Min-A  ADL Position: unsupported sitting  ADL Comments: Pt required min A for clothing management     Therapeutic Exercise - 5 Reps Bilaterally and B LE AROM unsupported sitting / EOB    Vitals: WNL    Pain: 3 VAS  Location: General   Interventions for pain: Repositioned and Increased Activity  Education: Verbal/Tactile Cues, ADL training, and Transfer Training      Assessment: Gabrielle Lyles presents with ADL impairments affecting function including balance, endurance / activity tolerance, pain, and strength. Pt educated on activity recommendations and getting up to bathroom with assistance to improve strength and endurance. Pt receptive to education. Demonstrated functioning below baseline abilities indicate the need for continued skilled intervention while inpatient. Tolerating session today without incident. Will continue to follow and progress as tolerated.     Plan/Recommendations:   Moderate Intensity Therapy recommended post-acute care. This is recommended as therapy feels the patient would require 3-4 days per week and wouldn't tolerate \"3 hour daily\" rehab intensity. SNF would be the preferred choice. If the patient does not agree to SNF, " arrange HH or OP depending on home bound status. If patient is medically complex, consider LTACH.. Pt requires no DME at discharge.     Pt desires Skilled Rehab placement at discharge. Pt cooperative; agreeable to therapeutic recommendations and plan of care.     Modified Deann: N/A = No pre-op stroke/TIA    Post-Tx Position: Up in Chair, Alarms activated, and Call light and personal items within reach  PPE: gloves     Electronically signed by Karen Nelson OT at 24 1634       Speech Language Pathology Notes (most recent note)    No notes exist for this encounter.     Reyes, Carmela, PT   Physical Therapist  Specialty:  Physical Therapy  Therapy Evaluation     Signed  Date of Service:  24 1038  Creation Time:  24 1038     Signed        Expand All Collapse All  Patient Name: Gabrielle Lyles                 : 1940                        MRN: [id number]                              Today's Date: 2024                                     Admit Date: 2024               Visit Dx:   Visit Diagnosis       ICD-10-CM ICD-9-CM   1. Altered mental status, unspecified altered mental status type  R41.82 780.97   2. Hypokalemia  E87.6 276.8         Problem List       Patient Active Problem List   Diagnosis    Stage 3a chronic kidney disease    Low back pain    Osteopenia    Chronic obstructive pulmonary disease    Gastroesophageal reflux disease    Gout    History of venous thrombosis and embolism    Primary hypertension    Lumbar radiculopathy    Nausea    Parotid duct obstruction    Personal history of malignant neoplasm of breast    Shortness of breath    Aortic aneurysm    Bulging lumbar disc    Spinal stenosis of lumbar region    History of TIA (transient ischemic attack)    Acute exacerbation of chronic obstructive pulmonary disease (COPD)    Parainfluenza infection    Severe pulmonary hypertension    Acute on chronic respiratory failure with hypoxia    Cellulitis of left foot     Altered mental status         Medical History        Past Medical History:   Diagnosis Date    COPD (chronic obstructive pulmonary disease)      Deep vein thrombosis of bilateral lower extremities 7/24/2019    History of DVT (deep vein thrombosis) 03/2013     bilateral lower extremity    History of pneumonia 06/2012     community acquired pneumonia and right pleural effusion;hospitalized at [facility]    History of pulmonary embolism 03/2013     bilateral PE    Hypertension 2001    Insomnia       on Ambien 5mg at     Lobular breast cancer, left 11/2011     Stage IA left upper lobular breast cancer    Malignant neoplasm of left breast in female, estrogen receptor positive 7/18/2019    Osteopenia 2012    Severe pulmonary hypertension 3/3/2023    Stage 3a chronic kidney disease 7/24/2019    TIA (transient ischemic attack) 10/25/2022         Surgical History         Past Surgical History:   Procedure Laterality Date    CARDIAC CATHETERIZATION N/A 3/3/2023     Procedure: Left and Right Heart Cath with Coronary Angiography;  Surgeon: Richardson Willis MD;  Location: [facility] INVASIVE LOCATION;  Service: Cardiovascular;  Laterality: N/A;    CATARACT EXTRACTION   2015    IVC FILTER RETRIEVAL   06/2014     IVC filter placement by Dr. Card    MAMMO STEREOTACTIC BREAST BX SURGICAL ADD UNI Left 11/01/2011     invasive lobular carcinoma-Dr. Cande Daniel    MASTECTOMY, PARTIAL Left 12/01/2011     and left axillary sentinel lymph node biopsy by Dr. Uriostegui    TUBAL ABDOMINAL LIGATION   1984           General Information         Row Name 02/06/24 1025                 Physical Therapy Time and Intention     Document Type evaluation  -CR       Mode of Treatment physical therapy  -CR          Row Name 02/06/24 1025                 General Information     Patient Profile Reviewed yes  -CR       Prior Level of Function independent:;all household mobility  -CR       Existing Precautions/Restrictions fall  -CR       Barriers to  Rehab cognitive status  -CR          Row Name 02/06/24 1025                 Living Environment     People in Home alone  -CR          Row Name 02/06/24 1025                 Home Main Entrance     Number of Stairs, Main Entrance two  -CR          Row Name 02/06/24 1025                 Stairs Within Home, Primary     Number of Stairs, Within Home, Primary none  -CR          Row Name 02/06/24 1025                 Cognition     Orientation Status (Cognition) unable/difficult to assess  only able to state her name  -CR          Row Name 02/06/24 1025                 Safety Issues, Functional Mobility     Safety Issues Affecting Function (Mobility) ability to follow commands;at risk behavior observed  -CR       Impairments Affecting Function (Mobility) balance;cognition;pain;range of motion (ROM);strength  -CR                       User Key  (r) = Recorded By, (t) = Taken By, (c) = Cosigned By        Initials Name Provider Type     CR Reyes, Carmela, PT Physical Therapist                            Mobility         Row Name 02/06/24 1029                 Bed Mobility     Bed Mobility supine-sit-supine  -CR       Supine-Sit-Supine [place] (Bed Mobility) maximum assist (25% patient effort);moderate assist (50% patient effort)  -CR       Assistive Device (Bed Mobility) draw sheet;head of bed elevated  -CR          Row Name 02/06/24 1029                 Transfers     Comment, (Transfers) attempted sit to stand, could not complete, patient not assisting  -CR                       User Key  (r) = Recorded By, (t) = Taken By, (c) = Cosigned By        Initials Name Provider Type     CR Reyes, Carmela, PT Physical Therapist                            Obj/Interventions         Row Name 02/06/24 1030                 Range of Motion Comprehensive     Comment, General Range of Motion did not follow commands to actively move UE/LE. PROM BUE/LE wfl with grimacing noted during LLE ROM  -CR          Row Name 02/06/24 1030                  Strength Comprehensive (MMT)     Comment, General Manual Muscle Testing (MMT) Assessment could not assess well  -CR          Row Name 02/06/24 1030                 Balance     Balance Assessment sitting static balance  -CR       Static Sitting Balance standby assist  -CR       Position, Sitting Balance sitting edge of bed  -CR                       User Key  (r) = Recorded By, (t) = Taken By, (c) = Cosigned By        Initials Name Provider Type     CR Reyes, Carmela, PT Physical Therapist                            Goals/Plan         Row Name 02/06/24 1035                 Transfer Goal 1 (PT)     Activity/Assistive Device (Transfer Goal 1, PT) transfers, all;walker, rolling  -CR       [place] Level/Cues Needed (Transfer Goal 1, PT) contact guard required  -CR       Time Frame (Transfer Goal 1, PT) long term goal (LTG);1 week  -CR          Row Name 02/06/24 1035                 Gait Training Goal 1 (PT)     Activity/Assistive Device (Gait Training Goal 1, PT) gait (walking locomotion);assistive device use;decrease fall risk;diminish gait deviation;forward stepping;walker, rolling  -CR       [place] Level (Gait Training Goal 1, PT) contact guard required  -CR       Distance (Gait Training Goal 1, PT) 50 x 2  -CR       Time Frame (Gait Training Goal 1, PT) 2 weeks  -CR          Row Name 02/06/24 1035                 Stairs Goal 1 (PT)     Activity/Assistive Device (Stairs Goal 1, PT) stairs, all skills  -CR       [place] Level/Cues Needed (Stairs Goal 1, PT) contact guard required  -CR       Number of Stairs (Stairs Goal 1, PT) 2  -CR       Time Frame (Stairs Goal 1, PT) long term goal (LTG);2 weeks  -CR          Row Name 02/06/24 1035                 Therapy Assessment/Plan (PT)     Planned Therapy Interventions (PT) balance training;gait training;home exercise program;patient/family education;transfer training;neuromuscular re-education;strengthening  -CR                    User Key  (r) = Recorded  By, (t) = Taken By, (c) = Cosigned By        Initials Name Provider Type     CR Reyes, Carmela, PT Physical Therapist                         Clinical Impression         Row Name 02/06/24 1031                 Pain     Additional Documentation Pain Scale: FACES Pre/Post-Treatment (Group)  -CR          Row Name 02/06/24 1031                 Pain Scale: FACES Pre/Post-Treatment     Pain: FACES Scale, Pretreatment 0-->no hurt  -CR       Posttreatment Pain Rating 6-->hurts even more  -CR       Pain Location - Side/Orientation Left  -CR       Pain Location lower  -CR       Pain Location - extremity  -CR       Pre/Posttreatment Pain Comment RN made aware  -CR          Row Name 02/06/24 1031                 Plan of Care Review     Plan of Care Reviewed With patient  -CR       Outcome Evaluation 82 y/o female brought in hospital on 2/5 due to confusion x 3 days with BLE edema and L breast swelling. PMH Includes breast Ca, hx of DVT/PE, pulmonary htn. Patient lives alone and normally does not use an a.d. for mobility. At time of eval, patient confused, only able to state her name and does not follow commands well, mostly stating NO to requests. Patient needed mod/max A for supine <>sit activity. Patient was able to sit EOB unsupported however when standing attempted was unable to complete mostly due to patient not making any effort to stand. Patient currently on 4LHF with sats 95% during activity. RN made aware. Patient currently below her baseline and unsafe to return home, will need SNF at discharge.  -CR          Row Name 02/06/24 1031                 Therapy Assessment/Plan (PT)     Patient/Family Therapy Goals Statement (PT) na  -CR       Rehab Potential (PT) good, to achieve stated therapy goals  -CR       Criteria for Skilled Interventions Met (PT) yes;skilled treatment is necessary  -CR       Therapy Frequency (PT) 5 times/wk  -CR       Predicted Duration of Therapy Intervention (PT) dc  -CR                       User  Key  (r) = Recorded By, (t) = Taken By, (c) = Cosigned By        Initials Name Provider Type     CR Reyes, Carmela, PT Physical Therapist                            Outcome Measures         Row Name 02/06/24 1036 02/06/24 0805            How much help from another person do you currently need...     Turning from your back to your side while in flat bed without using bedrails? 2  -CR 3  -JESUS     Moving from lying on back to sitting on the side of a flat bed without bedrails? 2  -CR 3  -JESUS     Moving to and from a bed to a chair (including a wheelchair)? 2  -CR 2  -JESUS     Standing up from a chair using your arms (e.g., wheelchair, bedside chair)? 2  -CR 2  -JESUS     Climbing 3-5 steps with a railing? 1  -CR 2  -JESUS     To walk in hospital room? 1  -CR 2  -JESUS     AM-PAC 6 Clicks Score (PT) 10  -CR 14  -JESUS     Highest Level of Mobility Goal 4 --> Transfer to chair/commode  -CR 4 --> Transfer to chair/commode  -JESUS                     User Key  (r) = Recorded By, (t) = Taken By, (c) = Cosigned By        Initials Name Provider Type     CR Reyes, Carmela, PT Physical Therapist     Azalia Amos RN Registered Nurse                          Physical Therapy Education            Title: PT OT SLP Therapies (In Progress)         Topic: Physical Therapy (In Progress)         Point: Mobility training (In Progress)         Learning Progress Summary               Patient Nonacceptance, E, NR by CR at 2/6/2024 1036     Acceptance, E,TB, VU by JESUS at 2/6/2024 0823     Acceptance, E,TB,D, VU,DU,NR by  at 2/6/2024 0236   Family Acceptance, E,TB, VU by JESUS at 2/6/2024 0823                               Point: Body mechanics (Done)         Learning Progress Summary               Patient Acceptance, E,TB, VU by JESUS at 2/6/2024 0823     Acceptance, E,TB,D, VU,DU,NR by  at 2/6/2024 0236   Family Acceptance, E,TB, VU by JESUS at 2/6/2024 0823                               Point: Precautions (Done)         Learning Progress Summary                Patient Acceptance, E,TB, VU by  at 2/6/2024 0823     Acceptance, E,TB,D, VU,DU,NR by  at 2/6/2024 0236   Family Acceptance, E,TB, VU by  at 2/6/2024 0823                                                   User Key         Initials Effective Dates Name Provider Type Discipline     CR 06/16/21 -  Reyes, Carmela, PT Physical Therapist PT      08/31/21 -  Azalia Houston RN Registered Nurse Nurse      06/16/21 -  Galen Wade LPN Licensed Nurse Nurse                          PT Recommendation and Plan  Planned Therapy Interventions (PT): balance training, gait training, home exercise program, patient/family education, transfer training, neuromuscular re-education, strengthening  Plan of Care Reviewed With: patient  Outcome Evaluation: 82 y/o female brought in hospital on 2/5 due to confusion x 3 days with BLE edema and L breast swelling. PMH Includes breast Ca, hx of DVT/PE, pulmonary htn. Patient lives alone and normally does not use an a.d. for mobility. At time of eval, patient confused, only able to state her name and does not follow commands well, mostly stating NO to requests. Patient needed mod/max A for supine <>sit activity. Patient was able to sit EOB unsupported however when standing attempted was unable to complete mostly due to patient not making any effort to stand. Patient currently on 4LHF with sats 95% during activity. RN made aware. Patient currently below her baseline and unsafe to return home, will need SNF at discharge.      Time Calculation:        PT Charges         Row Name 02/06/24 1037                       Time Calculation     Start Time 0952  -CR         Stop Time 1018  -CR         Time Calculation (min) 26 min  -CR         PT Received On 02/06/24  -CR         PT - Next Appointment 02/07/24  -CR         PT Goal Re-Cert Due Date 02/20/24  -CR                 Time Calculation- PT     Total Timed Code Minutes- PT 0 minute(s)  -CR                      User Key  (r) = Recorded  By, (t) = Taken By, (c) = Cosigned By        Initials Name Provider Type     CR Reyes, Carmela, PT Physical Therapist                       Therapy Charges for Today         Code Description Service Date Service Provider Modifiers Qty     17937512338 HC PT EVAL MOD COMPLEXITY 4 2/6/2024 Reyes, Carmela, PT GP 1                PT G-Codes  AM-PAC 6 Clicks Score (PT): 10  PT Discharge Summary  Anticipated Discharge Disposition (PT): skilled nursing facility     Carmela Reyes, PT                2/6/2024

## 2024-02-13 NOTE — PROGRESS NOTES
Daily Progress Note          Assessment    AMS: No significant findings on brain MRI  Chronic Severe Pulmonary HTN  COPD: Emphysema  Chronic atelectasis in left lower lobe  Anemia  UTI  JULIAN/CKD 3  HTN  Hx PE/DVT  Hx TIA  CAD  History of breast cancer in 2018: Currently in remission              PLAN:  Oxygen supplement and titration to maintain saturation 90-95%: Currently requiring 3 L by high flow nasal cannula  PT/OT  Encourage use of incentive spirometry  Antibiotics per ID  Bronchodilators  IV steroids  Sildenafil  Inhaled corticosteroids  Diuresis  Thyroid hormone replacement  Cardiology following   Electrolytes/ glycemic control  Chronic anticoagulation: Apixaban  I personally reviewed the radiological images             LOS: 6 days     Subjective     Patient reports gradual improvement in the cough and shortness of breath    Objective     Vital signs for last 24 hours:  Vitals:    02/13/24 0608 02/13/24 0619 02/13/24 0744 02/13/24 1209   BP: 142/70  (!) 85/61    BP Location: Right arm      Patient Position: Lying      Pulse: 73  72    Resp:    15   Temp:       TempSrc:       SpO2: 91% 94% 93% 93%   Weight:       Height:           Intake/Output last 3 shifts:  I/O last 3 completed shifts:  In: 680 [P.O.:680]  Out: 400 [Urine:400]  Intake/Output this shift:  I/O this shift:  In: 240 [P.O.:240]  Out: -       Radiology  Imaging Results (Last 24 Hours)       ** No results found for the last 24 hours. **            Labs:  Results from last 7 days   Lab Units 02/13/24  0354   WBC 10*3/mm3 11.80*   HEMOGLOBIN g/dL 11.9*   HEMATOCRIT % 37.5   PLATELETS 10*3/mm3 282     Results from last 7 days   Lab Units 02/13/24  0354 02/09/24  0055 02/08/24  0444   SODIUM mmol/L 137   < > 136   POTASSIUM mmol/L 4.3   < > 4.8   CHLORIDE mmol/L 102   < > 102   CO2 mmol/L 25.0   < > 23.0   BUN mg/dL 53*   < > 32*   CREATININE mg/dL 1.40*   < > 1.12*   CALCIUM mg/dL 9.2   < > 9.4   BILIRUBIN mg/dL  --   --  0.7   ALK PHOS U/L  --   --   60   ALT (SGPT) U/L  --   --  32   AST (SGOT) U/L  --   --  35*   GLUCOSE mg/dL 118*   < > 90    < > = values in this interval not displayed.         Results from last 7 days   Lab Units 02/13/24  0354 02/12/24  0540 02/11/24  0429   ALBUMIN g/dL 3.4* 3.5 3.3*     Results from last 7 days   Lab Units 02/06/24  1806   CK TOTAL U/L 64         Results from last 7 days   Lab Units 02/10/24  0227   MAGNESIUM mg/dL 2.0                     Meds:   SCHEDULE  allopurinol, 100 mg, Oral, Daily  apixaban, 5 mg, Oral, Q12H  budesonide, 0.5 mg, Nebulization, BID - RT  carvedilol, 12.5 mg, Oral, BID With Meals  cephalexin, 500 mg, Oral, Q8H  famotidine, 20 mg, Oral, Daily  furosemide, 40 mg, Oral, Daily  hydrALAZINE, 50 mg, Oral, Q8H  levothyroxine, 50 mcg, Oral, Q AM  melatonin, 5 mg, Oral, Nightly  methylPREDNISolone sodium succinate, 20 mg, Intravenous, Daily  sildenafil, 20 mg, Oral, TID  sodium chloride, 10 mL, Intravenous, Q12H  sodium chloride, 10 mL, Intravenous, Q12H      Infusions     PRNs    acetaminophen    Calcium Replacement - Follow Nurse / BPA Driven Protocol    ipratropium-albuterol    ipratropium-albuterol    Magnesium Standard Dose Replacement - Follow Nurse / BPA Driven Protocol    ondansetron    Phosphorus Replacement - Follow Nurse / BPA Driven Protocol    Potassium Replacement - Follow Nurse / BPA Driven Protocol    [COMPLETED] Insert Peripheral IV **AND** sodium chloride    sodium chloride    sodium chloride    sodium chloride    sodium chloride    Physical Exam:  General Appearance:  Alert   HEENT:  Normocephalic, without obvious abnormality, Conjunctiva/corneas clear,.   Nares normal, no drainage     Neck:  Supple, symmetrical, trachea midline.   Lungs /Chest wall: Mild wheezing with bilateral basal rhonchi, respirations unlabored, symmetrical wall movement.     Heart:  Regular rate and rhythm, S1 S2 normal  Abdomen: Soft, non-tender, no masses, no organomegaly.    Extremities: No edema, no clubbing or  cyanosis     ROS  Constitutional: Negative for chills, fever and malaise/fatigue.   HENT: Negative.    Eyes: Negative.    Cardiovascular: Negative.    Respiratory: Positive for cough and shortness of breath.    Skin: Negative.    Musculoskeletal: Negative.    Gastrointestinal: Negative.    Genitourinary: Negative.    Neurological: Negative.    Psychiatric/Behavioral: Negative.      I reviewed the recent clinical results  I personally reviewed the latest radiological studies    Part of this note may be an electronic transcription/translation of spoken language to printed text using the Dragon Dictation System.

## 2024-02-13 NOTE — PROGRESS NOTES
LOS: 5 days   Patient Care Team:  Shaylee Mcdaniels MD as PCP - General (Family Medicine)  Richardson Willis MD as Cardiologist (Cardiology)  Rafy Rodriguez MD as Consulting Physician (Hematology and Oncology)  Christianne Phillips, AMARILIS as Licensed Practical Nurse  Salome Fleming MD as Consulting Physician (Nephrology)    Subjective:  About the same    Objective:   afebrile      Review of Systems:   Review of Systems   Constitutional:  Positive for activity change.   Neurological:  Positive for weakness.           Vital Signs  Temp:  [97 °F (36.1 °C)-98.1 °F (36.7 °C)] 98.1 °F (36.7 °C)  Heart Rate:  [73-85] 79  Resp:  [17-30] 20  BP: ()/(54-74) 96/54    Physical Exam:  Physical Exam  Vitals reviewed.   Constitutional:       Appearance: Normal appearance.   Cardiovascular:      Rate and Rhythm: Rhythm irregular.      Heart sounds: Normal heart sounds.   Pulmonary:      Breath sounds: Normal breath sounds.   Skin:     General: Skin is warm.   Neurological:      Mental Status: She is alert.          Radiology:  XR Chest 1 View    Result Date: 2/9/2024  Impression: Stable left basilar atelectasis and chronic lung changes Cardiomegaly Electronically Signed: Pablo Martins MD  2/9/2024 7:35 AM EST  Workstation ID: [id number]    US Breast Left Limited    Result Date: 2/8/2024  IMPRESSION : 1. Nonspecific hypoechoic shadowing region measuring 2.0 cm at the 11 o'clock position 4 cm from the nipple corresponding to patient's lumpectomy site. This could represent scarring at the lumpectomy site, collapsed seroma cavity or recurrence. Abscess is felt less likely but not entirely excluded if there are clinical findings of infection.  Recommend patient return for full outpatient diagnostic work-up including diagnostic mammogram and ultrasound with real-time evaluation by radiologist. Patient also requires import of outside breast imaging more recent than 2014  BI-RADS Final Assessment Category 0: Incomplete-Need  Additional Imaging Evaluation Management Recommendation: Recall for additional imaging.   Electronically Signed By-Param Johansen MD On:2/8/2024 8:35 AM      MRI Brain With Contrast    Result Date: 2/7/2024  Impression: No abnormality noted on postcontrast imaging. See same-day noncontrast exam report for additional details. Electronically Signed: Alan Rodríguez MD  2/7/2024 6:39 PM CST  Workstation ID: [id number]    MRI Brain Without Contrast    Result Date: 2/7/2024  Impression: Mildly motion-degraded exam demonstrating expected age-related changes, without evidence of acute infarct, hemorrhage, mass or mass effect. Electronically Signed: Andres Olvera MD  2/7/2024 9:00 AM EST  Workstation ID: [id number]    CT Abdomen Pelvis Without Contrast    Result Date: 2/6/2024  Impression: Chronic atelectasis of the left lower lobe. Moderately severe emphysema. Lesion of the sternal manubrium as detailed, which could represent subacute or old fracture although metastatic disease is not excluded. Correlation with whole-body bone scan may be  useful. Aneurysmal dilatation of the abdominal aorta. Small amount of free pelvic fluid, nonspecific. Nodular liver contour suggests cirrhosis. Electronically Signed: Mary Ivan MD  2/6/2024 3:29 PM EST  Workstation ID: [id number]    CT Chest Without Contrast Diagnostic    Result Date: 2/6/2024  Impression: Chronic atelectasis of the left lower lobe. Moderately severe emphysema. Lesion of the sternal manubrium as detailed, which could represent subacute or old fracture although metastatic disease is not excluded. Correlation with whole-body bone scan may be  useful. Aneurysmal dilatation of the abdominal aorta. Small amount of free pelvic fluid, nonspecific. Nodular liver contour suggests cirrhosis. Electronically Signed: Mary Ivan MD  2/6/2024 3:29 PM EST  Workstation ID: [id number]    CT Head Without Contrast    Result Date: 2/5/2024  1.Examination is limited due to motion  artifact. 2.Given this limitation, no acute intracranial abnormality is identified. 3.Findings compatible with chronic microvascular ischemic change and diffuse cortical atrophy. Electronically Signed: Parker Mireles MD  2/5/2024 12:26 PM EST  Workstation ID: [id number]    XR Chest 1 View    Result Date: 2/5/2024  Impression: Mild cardiomegaly. Mild chronic findings of the lung fields. Electronically Signed: Mary Ivan MD  2/5/2024 12:06 PM EST  Workstation ID: [id number]        Results Review:     I reviewed the patient's new clinical results.  I reviewed the patient's new imaging results and agree with the interpretation.    Medication Review:   Scheduled Meds:allopurinol, 100 mg, Oral, Daily  apixaban, 5 mg, Oral, Q12H  budesonide, 0.5 mg, Nebulization, BID - RT  carvedilol, 12.5 mg, Oral, BID With Meals  cephalexin, 500 mg, Oral, Q8H  famotidine, 20 mg, Oral, Daily  furosemide, 40 mg, Oral, Daily  hydrALAZINE, 50 mg, Oral, Q8H  levothyroxine, 50 mcg, Oral, Q AM  melatonin, 5 mg, Oral, Nightly  methylPREDNISolone sodium succinate, 20 mg, Intravenous, Daily  sildenafil, 20 mg, Oral, TID  sodium chloride, 10 mL, Intravenous, Q12H  sodium chloride, 10 mL, Intravenous, Q12H      Continuous Infusions:   PRN Meds:.  acetaminophen    Calcium Replacement - Follow Nurse / BPA Driven Protocol    ipratropium-albuterol    ipratropium-albuterol    Magnesium Standard Dose Replacement - Follow Nurse / BPA Driven Protocol    ondansetron    Phosphorus Replacement - Follow Nurse / BPA Driven Protocol    Potassium Replacement - Follow Nurse / BPA Driven Protocol    [COMPLETED] Insert Peripheral IV **AND** sodium chloride    sodium chloride    sodium chloride    sodium chloride    sodium chloride    Labs:    CBC    Results from last 7 days   Lab Units 02/12/24  0540 02/10/24  0227 02/09/24  0055 02/08/24  0444 02/07/24  1013 02/06/24  0351   WBC 10*3/mm3 12.20* 10.40 11.50* 9.60 9.20 7.30   HEMOGLOBIN g/dL 12.1 11.7* 12.3 12.8  "11.8* 11.2*   PLATELETS 10*3/mm3 284 238 234 216 200 177     BMP   Results from last 7 days   Lab Units 02/12/24  0540 02/11/24  0429 02/10/24  0227 02/09/24  0055 02/08/24  0444 02/07/24  1013 02/06/24  1806   SODIUM mmol/L 138 138 140 138 136 136 135*   POTASSIUM mmol/L 4.4 4.6 4.3 4.3 4.8 4.9 5.1   CHLORIDE mmol/L 104 106 106 105 102 102 98   CO2 mmol/L 23.0 21.0* 23.0 22.0 23.0 24.0 28.0   BUN mg/dL 45* 35* 29* 30* 32* 32* 32*   CREATININE mg/dL 1.36* 1.43* 1.14* 1.16* 1.12* 1.26* 1.50*   GLUCOSE mg/dL 130* 161* 113* 115* 90 100* 105*   MAGNESIUM mg/dL  --   --  2.0 1.9 2.0  --   --    PHOSPHORUS mg/dL 4.2 4.7* 3.0 2.7 3.4  --   --      Cr Clearance Estimated Creatinine Clearance: 31.3 mL/min (A) (by C-G formula based on SCr of 1.36 mg/dL (H)).  Coag     HbA1C   Lab Results   Component Value Date    HGBA1C 5.8 (H) 10/25/2022     Blood Glucose No results found for: \"POCGLU\"  Infection     CMP   Results from last 7 days   Lab Units 02/12/24  0540 02/11/24  0429 02/10/24  0227 02/09/24  0055 02/08/24  0444 02/07/24  1013 02/06/24  1806   SODIUM mmol/L 138 138 140 138 136 136 135*   POTASSIUM mmol/L 4.4 4.6 4.3 4.3 4.8 4.9 5.1   CHLORIDE mmol/L 104 106 106 105 102 102 98   CO2 mmol/L 23.0 21.0* 23.0 22.0 23.0 24.0 28.0   BUN mg/dL 45* 35* 29* 30* 32* 32* 32*   CREATININE mg/dL 1.36* 1.43* 1.14* 1.16* 1.12* 1.26* 1.50*   GLUCOSE mg/dL 130* 161* 113* 115* 90 100* 105*   ALBUMIN g/dL 3.5 3.3*  --   --  3.5  --  3.3*   BILIRUBIN mg/dL  --   --   --   --  0.7  --  0.6   ALK PHOS U/L  --   --   --   --  60  --  58   AST (SGOT) U/L  --   --   --   --  35*  --  21   ALT (SGPT) U/L  --   --   --   --  32  --  16     UA    Results from last 7 days   Lab Units 02/06/24  1054   NITRITE UA  Negative   WBC UA /HPF 0-2   BACTERIA UA /HPF 1+*   SQUAM EPITHEL UA /HPF 3-6*     Radiology(recent) No radiology results for the last day   Assessment:    Acute mental status changes with acute delirium  Urinary tract infection present upon " admission  Hypocalcemia  Dehydration  Acute renal failure with acute kidney injury superimposed upon chronic kidney disease stage IIIa  Hypertension associated chronic kidney disease stage IIIa  Anemia of chronic kidney disease  Hyperuricemia  Left breast mastitis  Right arm thrombophlebitis  Pulmonary hypertension  Acute hypokalemia  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  Cerebrovascular disease status post CVA  Chronic oral anticoagulation therapy  Acquired hypothyroidism  Thrombophilia  Autonomic dysfunction syndrome  History of pulmonary embolism  Paroxysmal atrial fibrillation  Hypercoagulable state secondary to atrial fibrillation  [id number] score 6  History of IVC filter  Aneurysmal dilatation of abdominal aorta  Panlobular COPD with emphysema  Chronic mucopurulent bronchitis  Peripheral polyneuropathy  History of breast cancer  Supplemental oxygen dependency  Chronic hypoxic respiratory failure      Plan:  D/C planning/PT        Paul Granados MD  02/12/24  20:06 EST

## 2024-02-13 NOTE — THERAPY TREATMENT NOTE
"Subjective: Pt agreeable to therapeutic plan of care.  Cognition: oriented to Person, Place, Time, and Situation and safety/judgement: poor    Objective:     Bed Mobility: N/A or Not attempted. Pt on commode on arrival  Functional Transfers: CGA and with rolling walker     Balance: with UE support and standing CGA and Min-A  Functional Ambulation: CGA, Min-A, and with rolling walker    Toileting: Min-A  ADL Position: supported standing  ADL Comments: min A for clothing management       Therapeutic Exercise - 5 Reps Bilaterally and B LE AROM supported sitting / chair    Vitals: Desaturates on 3L HF NC  87% on HF  Able to recover to 94% with education and PLB    Pain: 0 VAS  Location:   Interventions for pain: N/A  Education: Provided education on the importance of mobility in the acute care setting, ADL training, Transfer Training, and Energy conservation strategies      Assessment: Gabrielle Lyles presents with ADL impairments affecting function including balance, endurance / activity tolerance, shortness of breath, and strength. Pt stating in high 80s with minimal activity. Pt educated on PLB and IS use. Pt with difficulty understanding and required increased time for practice/comprehension. Pt would benefit from additional pulmonary rehab. Demonstrated functioning below baseline abilities indicate the need for continued skilled intervention while inpatient. Tolerating session today without incident. Will continue to follow and progress as tolerated.     Plan/Recommendations:   Moderate Intensity Therapy recommended post-acute care. This is recommended as therapy feels the patient would require 3-4 days per week and wouldn't tolerate \"3 hour daily\" rehab intensity. SNF would be the preferred choice. If the patient does not agree to SNF, arrange HH or OP depending on home bound status. If patient is medically complex, consider LTACH.. Pt requires no DME at discharge.     Pt desires Skilled Rehab placement at " discharge. Pt cooperative; agreeable to therapeutic recommendations and plan of care.     Modified Deann: N/A = No pre-op stroke/TIA    Post-Tx Position: Up in Chair, Alarms activated, and Call light and personal items within reach  PPE: gloves

## 2024-02-13 NOTE — PROGRESS NOTES
LOS: 6 days   Patient Care Team:  Shaylee Mcdaniels MD as PCP - General (Family Medicine)  Richardson Willis MD as Cardiologist (Cardiology)  Rafy Rodriguez MD as Consulting Physician (Hematology and Oncology)  Christianne Phillips, AMARILIS as Licensed Practical Nurse  Salome Fleming MD as Consulting Physician (Nephrology)    Subjective:  Sleepy    Objective:   Afebrile      Review of Systems:   Review of Systems   Unable to perform ROS: Other           Vital Signs  Temp:  [97.1 °F (36.2 °C)-98.1 °F (36.7 °C)] 97.1 °F (36.2 °C)  Heart Rate:  [73-82] 73  Resp:  [17-30] 23  BP: ()/(47-80) 142/70    Physical Exam:  Physical Exam  Cardiovascular:      Rate and Rhythm: Rhythm irregular.      Heart sounds: Normal heart sounds.   Pulmonary:      Breath sounds: Normal breath sounds.   Skin:     General: Skin is warm.   Neurological:      Mental Status: She is alert.          Radiology:  XR Chest 1 View    Result Date: 2/9/2024  Impression: Stable left basilar atelectasis and chronic lung changes Cardiomegaly Electronically Signed: Pablo Martins MD  2/9/2024 7:35 AM EST  Workstation ID: [id number]    US Breast Left Limited    Result Date: 2/8/2024  IMPRESSION : 1. Nonspecific hypoechoic shadowing region measuring 2.0 cm at the 11 o'clock position 4 cm from the nipple corresponding to patient's lumpectomy site. This could represent scarring at the lumpectomy site, collapsed seroma cavity or recurrence. Abscess is felt less likely but not entirely excluded if there are clinical findings of infection.  Recommend patient return for full outpatient diagnostic work-up including diagnostic mammogram and ultrasound with real-time evaluation by radiologist. Patient also requires import of outside breast imaging more recent than 2014  BI-RADS Final Assessment Category 0: Incomplete-Need Additional Imaging Evaluation Management Recommendation: Recall for additional imaging.   Electronically Signed By-Param Johansen MD  On:2/8/2024 8:35 AM      MRI Brain With Contrast    Result Date: 2/7/2024  Impression: No abnormality noted on postcontrast imaging. See same-day noncontrast exam report for additional details. Electronically Signed: Alan Rodríguez MD  2/7/2024 6:39 PM CST  Workstation ID: [id number]    MRI Brain Without Contrast    Result Date: 2/7/2024  Impression: Mildly motion-degraded exam demonstrating expected age-related changes, without evidence of acute infarct, hemorrhage, mass or mass effect. Electronically Signed: Andres Olvera MD  2/7/2024 9:00 AM EST  Workstation ID: [id number]    CT Abdomen Pelvis Without Contrast    Result Date: 2/6/2024  Impression: Chronic atelectasis of the left lower lobe. Moderately severe emphysema. Lesion of the sternal manubrium as detailed, which could represent subacute or old fracture although metastatic disease is not excluded. Correlation with whole-body bone scan may be  useful. Aneurysmal dilatation of the abdominal aorta. Small amount of free pelvic fluid, nonspecific. Nodular liver contour suggests cirrhosis. Electronically Signed: Mary Ivan MD  2/6/2024 3:29 PM EST  Workstation ID: [id number]    CT Chest Without Contrast Diagnostic    Result Date: 2/6/2024  Impression: Chronic atelectasis of the left lower lobe. Moderately severe emphysema. Lesion of the sternal manubrium as detailed, which could represent subacute or old fracture although metastatic disease is not excluded. Correlation with whole-body bone scan may be  useful. Aneurysmal dilatation of the abdominal aorta. Small amount of free pelvic fluid, nonspecific. Nodular liver contour suggests cirrhosis. Electronically Signed: Mary Ivan MD  2/6/2024 3:29 PM EST  Workstation ID: [id number]    CT Head Without Contrast    Result Date: 2/5/2024  1.Examination is limited due to motion artifact. 2.Given this limitation, no acute intracranial abnormality is identified. 3.Findings compatible with chronic microvascular ischemic  change and diffuse cortical atrophy. Electronically Signed: Parker Mireles MD  2/5/2024 12:26 PM EST  Workstation ID: [id number]    XR Chest 1 View    Result Date: 2/5/2024  Impression: Mild cardiomegaly. Mild chronic findings of the lung fields. Electronically Signed: Mary Ivan MD  2/5/2024 12:06 PM EST  Workstation ID: [id number]        Results Review:     I reviewed the patient's new clinical results.  I reviewed the patient's new imaging results and agree with the interpretation.    Medication Review:   Scheduled Meds:allopurinol, 100 mg, Oral, Daily  apixaban, 5 mg, Oral, Q12H  budesonide, 0.5 mg, Nebulization, BID - RT  carvedilol, 12.5 mg, Oral, BID With Meals  cephalexin, 500 mg, Oral, Q8H  famotidine, 20 mg, Oral, Daily  furosemide, 40 mg, Oral, Daily  hydrALAZINE, 50 mg, Oral, Q8H  levothyroxine, 50 mcg, Oral, Q AM  melatonin, 5 mg, Oral, Nightly  methylPREDNISolone sodium succinate, 20 mg, Intravenous, Daily  sildenafil, 20 mg, Oral, TID  sodium chloride, 10 mL, Intravenous, Q12H  sodium chloride, 10 mL, Intravenous, Q12H      Continuous Infusions:   PRN Meds:.  acetaminophen    Calcium Replacement - Follow Nurse / BPA Driven Protocol    ipratropium-albuterol    ipratropium-albuterol    Magnesium Standard Dose Replacement - Follow Nurse / BPA Driven Protocol    ondansetron    Phosphorus Replacement - Follow Nurse / BPA Driven Protocol    Potassium Replacement - Follow Nurse / BPA Driven Protocol    [COMPLETED] Insert Peripheral IV **AND** sodium chloride    sodium chloride    sodium chloride    sodium chloride    sodium chloride    Labs:    CBC    Results from last 7 days   Lab Units 02/13/24  0354 02/12/24  0540 02/10/24  0227 02/09/24  0055 02/08/24  0444 02/07/24  1013   WBC 10*3/mm3 11.80* 12.20* 10.40 11.50* 9.60 9.20   HEMOGLOBIN g/dL 11.9* 12.1 11.7* 12.3 12.8 11.8*   PLATELETS 10*3/mm3 282 284 238 234 216 200     BMP   Results from last 7 days   Lab Units 02/13/24  0354 02/12/24  0540  "02/11/24  0429 02/10/24  0227 02/09/24  0055 02/08/24  0444 02/07/24  1013   SODIUM mmol/L 137 138 138 140 138 136 136   POTASSIUM mmol/L 4.3 4.4 4.6 4.3 4.3 4.8 4.9   CHLORIDE mmol/L 102 104 106 106 105 102 102   CO2 mmol/L 25.0 23.0 21.0* 23.0 22.0 23.0 24.0   BUN mg/dL 53* 45* 35* 29* 30* 32* 32*   CREATININE mg/dL 1.40* 1.36* 1.43* 1.14* 1.16* 1.12* 1.26*   GLUCOSE mg/dL 118* 130* 161* 113* 115* 90 100*   MAGNESIUM mg/dL  --   --   --  2.0 1.9 2.0  --    PHOSPHORUS mg/dL 3.9 4.2 4.7* 3.0 2.7 3.4  --      Cr Clearance Estimated Creatinine Clearance: 30.7 mL/min (A) (by C-G formula based on SCr of 1.4 mg/dL (H)).  Coag     HbA1C   Lab Results   Component Value Date    HGBA1C 5.8 (H) 10/25/2022     Blood Glucose No results found for: \"POCGLU\"  Infection     CMP   Results from last 7 days   Lab Units 02/13/24  0354 02/12/24  0540 02/11/24  0429 02/10/24  0227 02/09/24  0055 02/08/24  0444 02/07/24  1013 02/06/24  1806   SODIUM mmol/L 137 138 138 140 138 136 136 135*   POTASSIUM mmol/L 4.3 4.4 4.6 4.3 4.3 4.8 4.9 5.1   CHLORIDE mmol/L 102 104 106 106 105 102 102 98   CO2 mmol/L 25.0 23.0 21.0* 23.0 22.0 23.0 24.0 28.0   BUN mg/dL 53* 45* 35* 29* 30* 32* 32* 32*   CREATININE mg/dL 1.40* 1.36* 1.43* 1.14* 1.16* 1.12* 1.26* 1.50*   GLUCOSE mg/dL 118* 130* 161* 113* 115* 90 100* 105*   ALBUMIN g/dL 3.4* 3.5 3.3*  --   --  3.5  --  3.3*   BILIRUBIN mg/dL  --   --   --   --   --  0.7  --  0.6   ALK PHOS U/L  --   --   --   --   --  60  --  58   AST (SGOT) U/L  --   --   --   --   --  35*  --  21   ALT (SGPT) U/L  --   --   --   --   --  32  --  16     UA    Results from last 7 days   Lab Units 02/06/24  1054   NITRITE UA  Negative   WBC UA /HPF 0-2   BACTERIA UA /HPF 1+*   SQUAM EPITHEL UA /HPF 3-6*     Radiology(recent) No radiology results for the last day   Assessment:      Acute mental status changes with acute delirium  Urinary tract infection present upon admission  Hypocalcemia  Dehydration  Acute renal failure with " acute kidney injury superimposed upon chronic kidney disease stage IIIa  Hypertension associated chronic kidney disease stage IIIa  Anemia of chronic kidney disease  Hyperuricemia  Left breast mastitis  Right arm thrombophlebitis  Pulmonary hypertension  Acute hypokalemia  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  Cerebrovascular disease status post CVA  Chronic oral anticoagulation therapy  Acquired hypothyroidism  Thrombophilia  Autonomic dysfunction syndrome  History of pulmonary embolism  Paroxysmal atrial fibrillation  Hypercoagulable state secondary to atrial fibrillation  [id number] score 6  History of IVC filter  Aneurysmal dilatation of abdominal aorta  Panlobular COPD with emphysema  Chronic mucopurulent bronchitis  Peripheral polyneuropathy  History of breast cancer  Supplemental oxygen dependency  Chronic hypoxic respiratory failure    Plan:  Care as outlined//taper parenteral steroids//discharge planning        Paul Granados MD  02/13/24  09:10 EST

## 2024-02-13 NOTE — PLAN OF CARE
"Assessment: Gabrielle Lyles presents with ADL impairments affecting function including balance, endurance / activity tolerance, shortness of breath, and strength. Pt stating in high 80s with minimal activity. Pt educated on PLB and IS use. Pt with difficulty understanding and required increased time for practice/comprehension. Pt would benefit from additional pulmonary rehab. Demonstrated functioning below baseline abilities indicate the need for continued skilled intervention while inpatient. Tolerating session today without incident. Will continue to follow and progress as tolerated.     Plan/Recommendations:   Moderate Intensity Therapy recommended post-acute care. This is recommended as therapy feels the patient would require 3-4 days per week and wouldn't tolerate \"3 hour daily\" rehab intensity. SNF would be the preferred choice. If the patient does not agree to SNF, arrange HH or OP depending on home bound status. If patient is medically complex, consider LTACH.. Pt requires no DME at discharge.                            "

## 2024-02-13 NOTE — CASE MANAGEMENT/SOCIAL WORK
Continued Stay Note  LIDYA Cox     Patient Name: Gabrielle Lyles  MRN: [id number]  Today's Date: 2/13/2024    Admit Date: 2/5/2024    Plan: [facility] accepted.  Precert approved 2/13-2/15.  PASRR approved.  From home alone.  Current [place] 2.5-3L   Discharge Plan       Row Name 02/13/24 1628       Plan    Plan [facility] accepted.  Precert approved 2/13-2/15.  PASRR approved.  From home alone.  Current [place] 2.5-3L    Patient/Family in Agreement with Plan yes    Plan Comments Barriers to discharge: O2 weaned form 7L to 4L.  BUN increased today.  taper steroids.Psych, Neuro, ID, Pulm, Nephro following.             Expected Discharge Date and Time       Expected Discharge Date Expected Discharge Time    Feb 14, 2024           Brook [place] RN     Office Phone [phone]  Office Cell [phone]

## 2024-02-14 LAB
ALBUMIN SERPL-MCNC: 3.3 G/DL (ref 3.5–5.2)
ANION GAP SERPL CALCULATED.3IONS-SCNC: 10 MMOL/L (ref 5–15)
BASOPHILS # BLD AUTO: 0 10*3/MM3 (ref 0–0.2)
BASOPHILS NFR BLD AUTO: 0 % (ref 0–1.5)
BUN SERPL-MCNC: 54 MG/DL (ref 8–23)
BUN/CREAT SERPL: 35.8 (ref 7–25)
CALCIUM SPEC-SCNC: 9.2 MG/DL (ref 8.6–10.5)
CHLORIDE SERPL-SCNC: 103 MMOL/L (ref 98–107)
CO2 SERPL-SCNC: 26 MMOL/L (ref 22–29)
CREAT SERPL-MCNC: 1.51 MG/DL (ref 0.57–1)
DEPRECATED RDW RBC AUTO: 51.2 FL (ref 37–54)
EGFRCR SERPLBLD CKD-EPI 2021: 34.4 ML/MIN/1.73
EOSINOPHIL # BLD AUTO: 0 10*3/MM3 (ref 0–0.4)
EOSINOPHIL NFR BLD AUTO: 0 % (ref 0.3–6.2)
ERYTHROCYTE [DISTWIDTH] IN BLOOD BY AUTOMATED COUNT: 17.2 % (ref 12.3–15.4)
GLUCOSE SERPL-MCNC: 112 MG/DL (ref 65–99)
HCT VFR BLD AUTO: 37.6 % (ref 34–46.6)
HGB BLD-MCNC: 12.2 G/DL (ref 12–15.9)
LYMPHOCYTES # BLD AUTO: 0.4 10*3/MM3 (ref 0.7–3.1)
LYMPHOCYTES NFR BLD AUTO: 3.9 % (ref 19.6–45.3)
MAGNESIUM SERPL-MCNC: 2.1 MG/DL (ref 1.6–2.4)
MCH RBC QN AUTO: 27.8 PG (ref 26.6–33)
MCHC RBC AUTO-ENTMCNC: 32.4 G/DL (ref 31.5–35.7)
MCV RBC AUTO: 85.7 FL (ref 79–97)
MONOCYTES # BLD AUTO: 0.7 10*3/MM3 (ref 0.1–0.9)
MONOCYTES NFR BLD AUTO: 6.4 % (ref 5–12)
NEUTROPHILS NFR BLD AUTO: 10 10*3/MM3 (ref 1.7–7)
NEUTROPHILS NFR BLD AUTO: 89.7 % (ref 42.7–76)
NRBC BLD AUTO-RTO: 0.1 /100 WBC (ref 0–0.2)
PHOSPHATE SERPL-MCNC: 3.9 MG/DL (ref 2.5–4.5)
PLATELET # BLD AUTO: 282 10*3/MM3 (ref 140–450)
PMV BLD AUTO: 8.4 FL (ref 6–12)
POTASSIUM SERPL-SCNC: 4.5 MMOL/L (ref 3.5–5.2)
RBC # BLD AUTO: 4.38 10*6/MM3 (ref 3.77–5.28)
SODIUM SERPL-SCNC: 139 MMOL/L (ref 136–145)
WBC NRBC COR # BLD AUTO: 11.1 10*3/MM3 (ref 3.4–10.8)

## 2024-02-14 PROCEDURE — 83735 ASSAY OF MAGNESIUM: CPT | Performed by: INTERNAL MEDICINE

## 2024-02-14 PROCEDURE — 94761 N-INVAS EAR/PLS OXIMETRY MLT: CPT

## 2024-02-14 PROCEDURE — 97530 THERAPEUTIC ACTIVITIES: CPT

## 2024-02-14 PROCEDURE — 25010000002 METHYLPREDNISOLONE PER 40 MG: Performed by: FAMILY MEDICINE

## 2024-02-14 PROCEDURE — 85025 COMPLETE CBC W/AUTO DIFF WBC: CPT | Performed by: INTERNAL MEDICINE

## 2024-02-14 PROCEDURE — 97535 SELF CARE MNGMENT TRAINING: CPT

## 2024-02-14 PROCEDURE — 80069 RENAL FUNCTION PANEL: CPT | Performed by: INTERNAL MEDICINE

## 2024-02-14 PROCEDURE — 94799 UNLISTED PULMONARY SVC/PX: CPT

## 2024-02-14 PROCEDURE — 94664 DEMO&/EVAL PT USE INHALER: CPT

## 2024-02-14 PROCEDURE — 85027 COMPLETE CBC AUTOMATED: CPT | Performed by: INTERNAL MEDICINE

## 2024-02-14 PROCEDURE — 97116 GAIT TRAINING THERAPY: CPT

## 2024-02-14 PROCEDURE — 97110 THERAPEUTIC EXERCISES: CPT

## 2024-02-14 PROCEDURE — 99232 SBSQ HOSP IP/OBS MODERATE 35: CPT | Performed by: INTERNAL MEDICINE

## 2024-02-14 PROCEDURE — 97112 NEUROMUSCULAR REEDUCATION: CPT

## 2024-02-14 RX ORDER — METHYLPREDNISOLONE SODIUM SUCCINATE 40 MG/ML
20 INJECTION, POWDER, LYOPHILIZED, FOR SOLUTION INTRAMUSCULAR; INTRAVENOUS EVERY 12 HOURS
Status: DISCONTINUED | OUTPATIENT
Start: 2024-02-14 | End: 2024-02-15

## 2024-02-14 RX ORDER — FUROSEMIDE 20 MG/1
20 TABLET ORAL DAILY
Status: DISCONTINUED | OUTPATIENT
Start: 2024-02-15 | End: 2024-02-17 | Stop reason: HOSPADM

## 2024-02-14 RX ORDER — IPRATROPIUM BROMIDE AND ALBUTEROL SULFATE 2.5; .5 MG/3ML; MG/3ML
3 SOLUTION RESPIRATORY (INHALATION)
Status: DISCONTINUED | OUTPATIENT
Start: 2024-02-14 | End: 2024-02-17 | Stop reason: HOSPADM

## 2024-02-14 RX ADMIN — HYDRALAZINE HYDROCHLORIDE 50 MG: 25 TABLET ORAL at 05:57

## 2024-02-14 RX ADMIN — METHYLPREDNISOLONE SODIUM SUCCINATE 20 MG: 40 INJECTION, POWDER, FOR SOLUTION INTRAMUSCULAR; INTRAVENOUS at 20:02

## 2024-02-14 RX ADMIN — METHYLPREDNISOLONE SODIUM SUCCINATE 20 MG: 40 INJECTION, POWDER, FOR SOLUTION INTRAMUSCULAR; INTRAVENOUS at 07:30

## 2024-02-14 RX ADMIN — IPRATROPIUM BROMIDE AND ALBUTEROL SULFATE 3 ML: .5; 3 SOLUTION RESPIRATORY (INHALATION) at 07:05

## 2024-02-14 RX ADMIN — CEPHALEXIN 500 MG: 500 CAPSULE ORAL at 20:03

## 2024-02-14 RX ADMIN — SILDENAFIL CITRATE 20 MG: 20 TABLET ORAL at 16:37

## 2024-02-14 RX ADMIN — FAMOTIDINE 20 MG: 20 TABLET, FILM COATED ORAL at 07:30

## 2024-02-14 RX ADMIN — Medication 10 ML: at 20:02

## 2024-02-14 RX ADMIN — HYDRALAZINE HYDROCHLORIDE 50 MG: 25 TABLET ORAL at 20:02

## 2024-02-14 RX ADMIN — APIXABAN 5 MG: 5 TABLET, FILM COATED ORAL at 20:03

## 2024-02-14 RX ADMIN — GUAIFENESIN 1200 MG: 600 TABLET, EXTENDED RELEASE ORAL at 20:02

## 2024-02-14 RX ADMIN — IPRATROPIUM BROMIDE AND ALBUTEROL SULFATE 3 ML: .5; 3 SOLUTION RESPIRATORY (INHALATION) at 19:00

## 2024-02-14 RX ADMIN — Medication 10 ML: at 07:30

## 2024-02-14 RX ADMIN — HYDRALAZINE HYDROCHLORIDE 50 MG: 25 TABLET ORAL at 14:19

## 2024-02-14 RX ADMIN — CEPHALEXIN 500 MG: 500 CAPSULE ORAL at 14:18

## 2024-02-14 RX ADMIN — BUDESONIDE INHALATION 0.5 MG: 0.5 SUSPENSION RESPIRATORY (INHALATION) at 07:09

## 2024-02-14 RX ADMIN — Medication 5 MG: at 20:03

## 2024-02-14 RX ADMIN — LEVOTHYROXINE SODIUM 50 MCG: 0.05 TABLET ORAL at 05:57

## 2024-02-14 RX ADMIN — BUDESONIDE INHALATION 0.5 MG: 0.5 SUSPENSION RESPIRATORY (INHALATION) at 19:04

## 2024-02-14 RX ADMIN — FUROSEMIDE 40 MG: 40 TABLET ORAL at 07:30

## 2024-02-14 RX ADMIN — GUAIFENESIN 1200 MG: 600 TABLET, EXTENDED RELEASE ORAL at 07:30

## 2024-02-14 RX ADMIN — SILDENAFIL CITRATE 20 MG: 20 TABLET ORAL at 20:03

## 2024-02-14 RX ADMIN — SILDENAFIL CITRATE 20 MG: 20 TABLET ORAL at 07:30

## 2024-02-14 RX ADMIN — CARVEDILOL 12.5 MG: 6.25 TABLET, FILM COATED ORAL at 17:57

## 2024-02-14 RX ADMIN — APIXABAN 5 MG: 5 TABLET, FILM COATED ORAL at 07:30

## 2024-02-14 RX ADMIN — Medication 10 ML: at 20:03

## 2024-02-14 RX ADMIN — IPRATROPIUM BROMIDE AND ALBUTEROL SULFATE 3 ML: .5; 3 SOLUTION RESPIRATORY (INHALATION) at 11:23

## 2024-02-14 RX ADMIN — IPRATROPIUM BROMIDE AND ALBUTEROL SULFATE 3 ML: .5; 3 SOLUTION RESPIRATORY (INHALATION) at 15:29

## 2024-02-14 RX ADMIN — ALLOPURINOL 100 MG: 100 TABLET ORAL at 07:30

## 2024-02-14 RX ADMIN — CEPHALEXIN 500 MG: 500 CAPSULE ORAL at 05:57

## 2024-02-14 RX ADMIN — CARVEDILOL 12.5 MG: 6.25 TABLET, FILM COATED ORAL at 07:29

## 2024-02-14 NOTE — PLAN OF CARE
Goal Outcome Evaluation:  Plan of Care Reviewed With: patient        Progress: improving               Pt c/o shortness of breath with exertion. Currently on 3L O2 which is what pt wears at home. Accepted at [facility].  Will continue to monitor...

## 2024-02-14 NOTE — PLAN OF CARE
"Assessment: Gabrielle Lyles presents with ADL impairments affecting function including balance, endurance / activity tolerance, and strength. Pt with good carryover for education. Pt continues to have decreased endurance and weakness and would benefit from SNF at [place]. Demonstrated functioning below baseline abilities indicate the need for continued skilled intervention while inpatient. Tolerating session today without incident. Will continue to follow and progress as tolerated.     Plan/Recommendations:   Moderate Intensity Therapy recommended post-acute care. This is recommended as therapy feels the patient would require 3-4 days per week and wouldn't tolerate \"3 hour daily\" rehab intensity. SNF would be the preferred choice. If the patient does not agree to SNF, arrange HH or OP depending on home bound status. If patient is medically complex, consider LTACH.. Pt requires no DME at discharge.                                         "

## 2024-02-14 NOTE — THERAPY TREATMENT NOTE
"Subjective: Pt agreeable to therapeutic plan of care.  Cognition: oriented to Person, Place, Time, and Situation and safety/judgement: fair    Objective:     Bed Mobility: SBA   Functional Transfers: SBA, CGA, and with rolling walker     Balance: supported and standing CGA  Functional Ambulation: SBA and with rolling walker    Toileting: Min-A  ADL Position: supported standing  ADL Comments: Pt min a for clothing management     Vitals: Desaturates pt desaturates to 88% while talking     Pain: 0 VAS  Location:   Interventions for pain: N/A  Education: Provided education on the importance of mobility in the acute care setting, Verbal/Tactile Cues, ADL training, Transfer Training, and Energy conservation strategies      Assessment: Gabrielle Lyles presents with ADL impairments affecting function including balance, endurance / activity tolerance, and strength. Pt with good carryover for education. Pt continues to have decreased endurance and weakness and would benefit from SNF at [place]. Demonstrated functioning below baseline abilities indicate the need for continued skilled intervention while inpatient. Tolerating session today without incident. Will continue to follow and progress as tolerated.     Plan/Recommendations:   Moderate Intensity Therapy recommended post-acute care. This is recommended as therapy feels the patient would require 3-4 days per week and wouldn't tolerate \"3 hour daily\" rehab intensity. SNF would be the preferred choice. If the patient does not agree to SNF, arrange HH or OP depending on home bound status. If patient is medically complex, consider LTACH.. Pt requires no DME at discharge.     Pt desires Skilled Rehab placement at discharge. Pt cooperative; agreeable to therapeutic recommendations and plan of care.     Modified [place]: N/A = No pre-op stroke/TIA    Post-Tx Position: Up in Chair and Call light and personal items within reach  PPE: gloves   "

## 2024-02-14 NOTE — PROGRESS NOTES
Referring Provider: Paul Granados MD    Reason for follow-up: Hypertension, atrial fibrillation     Patient Care Team:  Shaylee Mcdaniels MD as PCP - General (Family Medicine)  Richardson Willis MD as Cardiologist (Cardiology)  Rafy Rodriguez MD as Consulting Physician (Hematology and Oncology)  Christianne Phillips, RN as Licensed Practical Nurse  Salome Fleming MD as Consulting Physician (Nephrology)      SUBJECTIVE  Reports worsening shortness of breath.  Tells me that she may not be able to make it.     ROS  Review of all systems negative except as indicated.    Since I have last seen, the patient has been without any chest discomfort, shortness of breath, palpitations, dizziness or syncope.  Denies having any headache, abdominal pain, nausea, vomiting, diarrhea, constipation, loss of weight or loss of appetite.  Denies having any excessive bruising, hematuria or blood in the stool.  ROS      Personal History:    Past Medical History:   Diagnosis Date    Acute exacerbation of chronic obstructive pulmonary disease (COPD)     COPD (chronic obstructive pulmonary disease)     Deep vein thrombosis of bilateral lower extremities 07/24/2019    3/2013    History of pneumonia 06/2012    community acquired pneumonia and right pleural effusion;hospitalized at [facility]    History of pulmonary embolism 03/2013    bilateral PE    Hypertension 2001    Insomnia     on Ambien 5mg at     Lobular breast cancer, left 11/2011    Stage IA left upper lobular breast cancer    Malignant neoplasm of left breast in female, estrogen receptor positive 07/18/2019    Osteopenia 2012    Parainfluenza infection     Parotid duct obstruction     Severe pulmonary hypertension 03/03/2023    Stage 3a chronic kidney disease 07/24/2019    TIA (transient ischemic attack) 10/25/2022       Past Surgical History:   Procedure Laterality Date    CARDIAC CATHETERIZATION N/A 3/3/2023    Procedure: Left and Right Heart Cath with Coronary  Angiography;  Surgeon: Richardson Willis MD;  Location: [facility] INVASIVE LOCATION;  Service: Cardiovascular;  Laterality: N/A;    CATARACT EXTRACTION      IVC FILTER RETRIEVAL  2014    IVC filter placement by Dr. Card    MAMMO STEREOTACTIC BREAST BX SURGICAL ADD UNI Left 2011    invasive lobular carcinoma-Dr. Cande Daniel    MASTECTOMY, PARTIAL Left 2011    and left axillary sentinel lymph node biopsy by Dr. Uriostegui    TUBAL ABDOMINAL LIGATION         Family History   Problem Relation Age of Onset    Lung cancer Brother        Social History     Tobacco Use    Smoking status: Former     Types: Cigarettes     Quit date:      Years since quittin.1     Passive exposure: Past    Smokeless tobacco: Never    Tobacco comments:     smoked 6 cigarettes a day from 13 years of age to 56 years of age when she quit in    Vaping Use    Vaping Use: Never used   Substance Use Topics    Alcohol use: Not Currently    Drug use: No        Home meds:  Prior to Admission medications    Medication Sig Start Date End Date Taking? Authorizing Provider   allopurinol (ZYLOPRIM) 100 MG tablet Take 1 tablet by mouth Daily.   Yes Jaylyn Golden MD   amoxicillin-clavulanate (AUGMENTIN) 875-125 MG per tablet Take 1 tablet by mouth 2 (Two) Times a Day. 24 Yes Jaylyn Golden MD   apixaban (ELIQUIS) 5 MG tablet tablet Take 1 tablet by mouth Every 12 (Twelve) Hours. Indications: Other - full anticoagulation, history of DVT/PE 10/27/22  Yes Shaylee Mcdaniels MD   budesonide-formoterol (SYMBICORT) 80-4.5 MCG/ACT inhaler Inhale 2 puffs 2 (Two) Times a Day.   Yes Jaylyn Golden MD   carvedilol (COREG) 12.5 MG tablet TAKE 1 TABLET BY MOUTH TWICE DAILY WITH MEALS 23  Yes Richardson Willis MD   Cholecalciferol (Vitamin D3) 50 MCG ( UT) capsule Take 1 capsule by mouth Daily.   Yes Jaylyn Golden MD   furosemide (LASIX) 40 MG tablet Take 1 tablet by mouth Daily.   Yes [place]  MD Jaylyn   gabapentin (NEURONTIN) 300 MG capsule Take 1 capsule by mouth 2 (Two) Times a Day.   Yes Provider, MD Jaylyn   levothyroxine (SYNTHROID, LEVOTHROID) 50 MCG tablet Take 1 tablet by mouth Daily.   Yes Provider, MD Jaylyn   lisinopril (PRINIVIL,ZESTRIL) 40 MG tablet Take 1 tablet by mouth Daily. 6/8/23  Yes Velma Ascencio APRN   potassium chloride (K-DUR,KLOR-CON) 10 MEQ CR tablet Take 1 tablet by mouth Daily. 2/24/23  Yes Richardson Willis MD   sildenafil (REVATIO) 20 MG tablet Take 1 tablet by mouth Every 8 (Eight) Hours. 5/11/23  Yes Mansi Becerril APRN       Allergies:  Patient has no known allergies.    Scheduled Meds:allopurinol, 100 mg, Oral, Daily  apixaban, 5 mg, Oral, Q12H  budesonide, 0.5 mg, Nebulization, BID - RT  carvedilol, 12.5 mg, Oral, BID With Meals  cephalexin, 500 mg, Oral, Q8H  famotidine, 20 mg, Oral, Daily  furosemide, 40 mg, Oral, Daily  guaiFENesin, 1,200 mg, Oral, Q12H  hydrALAZINE, 50 mg, Oral, Q8H  levothyroxine, 50 mcg, Oral, Q AM  melatonin, 5 mg, Oral, Nightly  methylPREDNISolone sodium succinate, 20 mg, Intravenous, Daily  sildenafil, 20 mg, Oral, TID  sodium chloride, 10 mL, Intravenous, Q12H  sodium chloride, 10 mL, Intravenous, Q12H      Continuous Infusions:     PRN Meds:.  acetaminophen    Calcium Replacement - Follow Nurse / BPA Driven Protocol    ipratropium-albuterol    ipratropium-albuterol    Magnesium Standard Dose Replacement - Follow Nurse / BPA Driven Protocol    ondansetron    Phosphorus Replacement - Follow Nurse / BPA Driven Protocol    Potassium Replacement - Follow Nurse / BPA Driven Protocol    senna-docusate sodium    [COMPLETED] Insert Peripheral IV **AND** sodium chloride    sodium chloride    sodium chloride    sodium chloride    sodium chloride      OBJECTIVE    Vital Signs  Vitals:    02/14/24 0451 02/14/24 0519 02/14/24 0705 02/14/24 0709   BP:  126/79     BP Location: Right arm Right arm     Patient Position: Lying Lying     Pulse:   "70 74 71   Resp: 20 20 20    Temp:       TempSrc: Oral      SpO2:  93% 95% 96%   Weight:       Height:           Flowsheet Rows      Flowsheet Row First Filed Value   Admission Height 162.6 cm (64\") Documented at 02/05/2024 1046   Admission Weight 66.7 kg (147 lb) Documented at 02/05/2024 1046              Intake/Output Summary (Last 24 hours) at 2/14/2024 0711  Last data filed at 2/13/2024 2024  Gross per 24 hour   Intake 600 ml   Output --   Net 600 ml            Telemetry: Atrial fibrillation with heart rate in the 70s    Physical Exam:  The patient is alert, oriented X3  Vital signs as noted above.  Head and neck revealed no carotid bruits or jugular venous distention.  No thyromegaly or lymphadenopathy is present  Lungs clear.  No wheezing.  Breath sounds are normal bilaterally.  Irregularly irregular.  No murmur. No precordial rub is present.  No gallop is present.  Abdomen soft and nontender.  No organomegaly is present.  Extremities with good peripheral pulses without any pedal edema.  Skin warm and dry.  Musculoskeletal system is grossly normal.  CNS grossly normal.       Results Review:  I have personally reviewed the results from the time of this admission to 2/14/2024 07:11 EST and agree with these findings:  []  Laboratory  []  Microbiology  []  Radiology  []  EKG/Telemetry   []  Cardiology/Vascular   []  Pathology  []  Old records  []  Other:    Most notable findings include:    Lab Results (last 24 hours)       Procedure Component Value Units Date/Time    Renal Function Panel [[id number]]  (Abnormal) Collected: 02/14/24 0158    Specimen: Blood Updated: 02/14/24 0342     Glucose 112 mg/dL      BUN 54 mg/dL      Creatinine 1.51 mg/dL      Sodium 139 mmol/L      Potassium 4.5 mmol/L      Chloride 103 mmol/L      CO2 26.0 mmol/L      Calcium 9.2 mg/dL      Albumin 3.3 g/dL      Phosphorus 3.9 mg/dL      Anion Gap 10.0 mmol/L      BUN/Creatinine Ratio 35.8     eGFR 34.4 mL/min/1.73     Narrative:      GFR " Normal >60  Chronic Kidney Disease <60  Kidney Failure <15    The GFR formula is only valid for adults with stable renal function between ages 18 and 70.    Magnesium [[id number]]  (Normal) Collected: 02/14/24 0158    Specimen: Blood Updated: 02/14/24 0342     Magnesium 2.1 mg/dL     CBC & Differential [[id number]]  (Abnormal) Collected: 02/14/24 0158    Specimen: Blood Updated: 02/14/24 0305    Narrative:      The following orders were created for panel order CBC & Differential.  Procedure                               Abnormality         Status                     ---------                               -----------         ------                     CBC Auto Differential[[id number]]        Abnormal            Final result                 Please view results for these tests on the individual orders.    CBC Auto Differential [[id number]]  (Abnormal) Collected: 02/14/24 0158    Specimen: Blood Updated: 02/14/24 0305     WBC 11.10 10*3/mm3      RBC 4.38 10*6/mm3      Hemoglobin 12.2 g/dL      Hematocrit 37.6 %      MCV 85.7 fL      MCH 27.8 pg      MCHC 32.4 g/dL      RDW 17.2 %      RDW-SD 51.2 fl      MPV 8.4 fL      Platelets 282 10*3/mm3      Neutrophil % 89.7 %      Lymphocyte % 3.9 %      Monocyte % 6.4 %      Eosinophil % 0.0 %      Basophil % 0.0 %      Neutrophils, Absolute 10.00 10*3/mm3      Lymphocytes, Absolute 0.40 10*3/mm3      Monocytes, Absolute 0.70 10*3/mm3      Eosinophils, Absolute 0.00 10*3/mm3      Basophils, Absolute 0.00 10*3/mm3      nRBC 0.1 /100 WBC             Imaging Results (Last 24 Hours)       ** No results found for the last 24 hours. **            LAB RESULTS (LAST 7 DAYS)    CBC  Results from last 7 days   Lab Units 02/14/24  0158 02/13/24  0354 02/12/24  0540 02/10/24  0227 02/09/24  0055 02/08/24  0444 02/07/24  1013   WBC 10*3/mm3 11.10* 11.80* 12.20* 10.40 11.50* 9.60 9.20   RBC 10*6/mm3 4.38 4.38 4.13 4.34 4.57 4.77 4.33   HEMOGLOBIN g/dL 12.2 11.9* 12.1 11.7* 12.3 12.8 11.8*    HEMATOCRIT % 37.6 37.5 35.9 36.6 38.8 40.5 37.4   MCV fL 85.7 85.6 86.8 84.3 84.8 84.9 86.3   PLATELETS 10*3/mm3 282 282 284 238 234 216 200       BMP  Results from last 7 days   Lab Units 02/14/24  0158 02/13/24  0354 02/12/24  0540 02/11/24  0429 02/10/24  0227 02/09/24  0055 02/08/24  0444   SODIUM mmol/L 139 137 138 138 140 138 136   POTASSIUM mmol/L 4.5 4.3 4.4 4.6 4.3 4.3 4.8   CHLORIDE mmol/L 103 102 104 106 106 105 102   CO2 mmol/L 26.0 25.0 23.0 21.0* 23.0 22.0 23.0   BUN mg/dL 54* 53* 45* 35* 29* 30* 32*   CREATININE mg/dL 1.51* 1.40* 1.36* 1.43* 1.14* 1.16* 1.12*   GLUCOSE mg/dL 112* 118* 130* 161* 113* 115* 90   MAGNESIUM mg/dL 2.1  --   --   --  2.0 1.9 2.0   PHOSPHORUS mg/dL 3.9 3.9 4.2 4.7* 3.0 2.7 3.4       CMP   Results from last 7 days   Lab Units 02/14/24  0158 02/13/24  0354 02/12/24  0540 02/11/24  0429 02/10/24  0227 02/09/24  0055 02/08/24  0444   SODIUM mmol/L 139 137 138 138 140 138 136   POTASSIUM mmol/L 4.5 4.3 4.4 4.6 4.3 4.3 4.8   CHLORIDE mmol/L 103 102 104 106 106 105 102   CO2 mmol/L 26.0 25.0 23.0 21.0* 23.0 22.0 23.0   BUN mg/dL 54* 53* 45* 35* 29* 30* 32*   CREATININE mg/dL 1.51* 1.40* 1.36* 1.43* 1.14* 1.16* 1.12*   GLUCOSE mg/dL 112* 118* 130* 161* 113* 115* 90   ALBUMIN g/dL 3.3* 3.4* 3.5 3.3*  --   --  3.5   BILIRUBIN mg/dL  --   --   --   --   --   --  0.7   ALK PHOS U/L  --   --   --   --   --   --  60   AST (SGOT) U/L  --   --   --   --   --   --  35*   ALT (SGPT) U/L  --   --   --   --   --   --  32       BNP        TROPONIN          CoAg        Creatinine Clearance  Estimated Creatinine Clearance: 28.4 mL/min (A) (by C-G formula based on SCr of 1.51 mg/dL (H)).    ABG        Radiology  No radiology results for the last day      EKG  I personally viewed and interpreted the patient's EKG/Telemetry data:  ECG 12 Lead Rhythm Change   Final Result   HEART RATE= 69  bpm   RR Interval= 900  ms   IA Interval= 213  ms   P Horizontal Axis= -3  deg   P Front Axis= 54  deg   QRSD  Interval= 86  ms   QT Interval= 413  ms   QTcB= 435  ms   QRS Axis= 117  deg   T Wave Axis= 33  deg   - ABNORMAL ECG -   Sinus rhythm   Atrial premature complex   Borderline prolonged VT interval   Anteroseptal infarct, age indeterminate   When compared with ECG of 05-Feb-2024 11:04:44,   New or worsened ischemia or infarction   New conduction abnormality   Significant axis, voltage or hypertrophy change   Electronically Signed By: Richardson Willis ([facility]) 08-Feb-2024 17:20:33   Date and Time of Study: 2024-02-08 01:23:00      ECG 12 Lead Other; Weakness   Final Result   HEART RATE= 75  bpm   RR Interval= 860  ms   VT Interval= 176  ms   P Horizontal Axis= 201  deg   P Front Axis= 11  deg   QRSD Interval= 99  ms   QT Interval= 398  ms   QTcB= 429  ms   QRS Axis= 117  deg   T Wave Axis= -6  deg   - ABNORMAL ECG -   Sinus rhythm   Multiple premature complexes, vent & supraven   Right axis deviation   Low voltage, precordial leads   Nonspecific T abnormalities, anterior leads   Electronically Signed By: Pankaj Mccallum ([facility]) 06-Feb-2024 07:40:13   Date and Time of Study: 2024-02-05 11:04:44      SCANNED - TELEMETRY     Final Result      SCANNED - TELEMETRY     Final Result      SCANNED - TELEMETRY     Final Result      SCANNED - TELEMETRY     Final Result      SCANNED - TELEMETRY     Final Result      SCANNED - TELEMETRY     Final Result      SCANNED - TELEMETRY     Final Result      SCANNED - TELEMETRY     Final Result      SCANNED - TELEMETRY     Final Result      SCANNED - TELEMETRY     Final Result      SCANNED - TELEMETRY     Final Result      SCANNED - TELEMETRY     Final Result      SCANNED - TELEMETRY     Final Result      SCANNED - TELEMETRY     Final Result      SCANNED - TELEMETRY     Final Result      SCANNED - TELEMETRY     Final Result      SCANNED - TELEMETRY     Final Result      SCANNED - TELEMETRY     Final Result      SCANNED - TELEMETRY     Final Result      SCANNED - TELEMETRY     Final Result       SCANNED - TELEMETRY     Final Result      SCANNED - TELEMETRY     Final Result      SCANNED - TELEMETRY     Final Result      SCANNED - TELEMETRY     Final Result      SCANNED - TELEMETRY     Final Result      SCANNED - TELEMETRY     Final Result      SCANNED - TELEMETRY     Final Result      SCANNED - TELEMETRY     Final Result      SCANNED - TELEMETRY     Final Result      SCANNED - TELEMETRY     Final Result      SCANNED - TELEMETRY     Final Result      SCANNED - TELEMETRY     Final Result      SCANNED - TELEMETRY     Final Result      SCANNED - TELEMETRY     Final Result      SCANNED - TELEMETRY     Final Result      SCANNED - TELEMETRY     Final Result      SCANNED - TELEMETRY     Final Result      SCANNED - TELEMETRY     Final Result      SCANNED - TELEMETRY     Final Result      SCANNED - TELEMETRY     Final Result      SCANNED - TELEMETRY     Final Result      SCANNED - TELEMETRY     Final Result      SCANNED - TELEMETRY     Final Result      SCANNED - TELEMETRY     Final Result      SCANNED - TELEMETRY     Final Result      SCANNED - TELEMETRY     Final Result      SCANNED - TELEMETRY     Final Result      SCANNED - TELEMETRY     Final Result      SCANNED - TELEMETRY     Final Result      SCANNED - TELEMETRY     Final Result      ECG 12 Lead Altered Mental Status    (Results Pending)         Echocardiogram:    Results for orders placed during the hospital encounter of 02/05/24    Adult Transthoracic Echo Complete W/ Cont if Necessary Per Protocol    Interpretation Summary    Left ventricular systolic function is normal. Left ventricular ejection fraction appears to be 61 - 65%.    Severely reduced right ventricular systolic function noted.    The right ventricular cavity is severely dilated.    The left atrial cavity is mildly dilated.    Left atrial volume is mildly increased.    The right atrial cavity is moderate to severely  dilated.    Estimated right ventricular systolic pressure from tricuspid  regurgitation is mildly elevated (35-45 mmHg).    Mild pulmonary hypertension is present.    There is a moderate (1-2cm) pericardial effusion. There is no evidence of cardiac tamponade.        Stress Test:  Results for orders placed in visit on 09/13/22    Stress Test With Myocardial Perfusion One Day    Interpretation Summary    Left ventricular ejection fraction is hyperdynamic (Calculated EF > 70%). .    Myocardial perfusion imaging indicates a normal myocardial perfusion study with no evidence of ischemia.    Impressions are consistent with a low risk study.    Findings consistent with a normal ECG stress test.         Cardiac Catheterization:  Results for orders placed during the hospital encounter of 03/03/23    Cardiac Catheterization/Vascular Study    Narrative  OPERATORS  Richardson Willis M.D. (Attending Cardiologist)      PROCEDURE PERFORMED  Ultrasound guided vascular access  Right heart catheterization  Coronary Angiogram  Left Heart Catheterization 28094  Moderate Sedation    INDICATIONS FOR PROCEDURE  83-year-old woman with multiple cardiovascular risk factors, abnormal stress test presented with worsening shortness of breath.  After discussing the risk and benefit of the procedure she was brought in for elective right and left heart cath.    PROCEDURE IN DETAIL  Informed consent was obtained from the patient after explaining the risks, benefits, and alternative options of the procedure. After obtaining informed consent, the patient was brought to the cath lab and was prepped in a sterile fashion. Lidocaine 2% was used for local anesthesia into the right femoral venous access site. Right femoral vein was accessed using the micropuncture needle under ultrasound guidance and micropuncture wire advanced under flouroscopy. A 7 Prydeinig vascular sheath was put into place percutaneously over guide-wire. Guide wires were removed. A 6Fr swan sheryl catheter was advanced to wedge position. RA, RV and PA and wedge  pressures were recorded.  PA sat and arterial sats recorded.  The patient tolerated the procedure well without any complications.    Lidocaine 2% was used for local anesthesia into the right femoral arterial access site. The right femoral artery was accessed with a micropuncture needle via modified Seldinger technique under ultrasound guidance. A 6F was inserted successfully.  Afterwards, 6F JR4 and JL4 diagnostic catheters were advanced over a wire into the ascending aorta and were used to engage the ostia of the left main and RCA respectively. JR4 used to cross the AV and obtain LV pressures and gradient across the AV measured via pullback technique. Images of the right and left coronary systems were obtained. All the catheters were exchanged over a wire and subsequently removed. Angiogram of the femoral access site was obtained and did not show complications. The patient tolerated the procedure well without any complications. The pictures were reviewed at the end of the procedure. A Mynx closure device was applied.    HEMODYNAMICS    RHC  RA 6/5, 4 mmHg  RV 74/3, 5 mmHg  PA 71/25, 44 mmHg  PCW 12 mmHg  AO Sat 92%  PA Sat 78%    Jose CO: 7.89 L/min    Jose CI: 4.69 L/min/m²    LHC  LV: 134/3, 20 mmHg  AO: 131/56, 87 mmHg  No significant gradient across the aortic valve during pullback of JR4 catheter.    FINDINGS  Coronary Angiogram  All vessels are heavily calcified  She also has heavily calcified peripheral arterial disease.  Right dominant circulation    Left main: Left main is a large caliber vessel which gives rise to the Left Anterior Descending and the Left circumflex.  Left main is angiographically free from any significant disease.    Left Anterior Descending Artery: LAD is a heavily calcified medium caliber vessel which gives rise to diagonals.  The vessel is highly tortuous and has 50 to 60% mid vessel stenosis best seen in VERÓNICA cranial view.    Left Circumflex: Heavily calcified vessel with 50% proximal  segment stenosis.    Right Coronary Artery: The RCA is a large caliber vessel which is heavily calcified and tortuous.  It has diffuse luminal irregularities and 30 to 40% stenosis in the midsegment around the bend.    ESTIMATED BLOOD LOSS:  10 ml    COMPLICATIONS:  None    PROCEDURE DATA:  Sedation Time: 25 minutes    IMPRESSIONS  Heavily calcified, tortuous nonobstructive coronary disease  Severe pulmonary hypertension with PA systolic pressure in the 70s  Mean PA pressure 44 mmHg    RECOMMENDATIONS  -Continue aggressive medical management of CAD  -Referral to pulmonology for treatment of pulmonary hypertension         Other:         ASSESSMENT & PLAN:    Principal Problem:    Altered mental status  Active Problems:    Acute hypokalemia    Stage 3a chronic kidney disease    Chronic obstructive pulmonary disease    History of venous thrombosis and embolism    Primary hypertension    History of TIA (transient ischemic attack)    Severe pulmonary hypertension    Chronic respiratory failure with hypoxia    JULIAN (acute kidney injury)      Altered mental status  Possible mastitis/PICC line infection  CT head and MRI of the brain unremarkable with no acute findings  Antibiotics per ID  Her mental status is now improved and back to baseline    JULIAN on Stage 3a chronic kidney disease  Creatinine 1.5, GFR is 34.4  Continue oral diuretics.  Potassium has been replaced.  Hypokalemia resolved  Closely monitor renal function and respiratory status     COPD/Chronic respiratory failure  Severe pulmonary hypertension  On 2-3 L of oxygen via nasal cannula at baseline.  Has known pulmonary hypertension  RA 6/5, 4 mmHg  RV 74/3, 5 mmHg  PA 71/25, 44 mmHg  PCW 12 mmHg  Continue sildenafil  On steroids  Managed by pulmonology  On 5 L of oxygen currently  Echocardiogram shows preserved LV function with mildly elevated RVSP.  Small to moderate pericardial effusion: No signs of tamponade     Atrial fibrillation  She has paroxysmal atrial  fibrillation  [id number] score is 6  Continue Eliquis for atrial fibrillation as well as for history of DVT/PE  Heart rate has improved on beta-blocker     History of venous thrombosis and embolism  Continue Eliquis 5 mg p.o. twice daily.  She also has an IVC filter in place as well.     Primary hypertension, chronic  Blood pressure has improved.  Continue Coreg and hydralazine for blood pressure control  Amlodipine can be added if better blood pressure control is desired.  Echo shows preserved LV function, reduced RV function and mildly elevated RVSP.  Pericardial effusion is not significant with no signs of tamponade.  Diuretics to be used as needed     Coronary artery disease  Continue high intensity statin and beta-blocker.  No need for aspirin while she is on anticoagulation  Previous cardiac catheterization showed heavily calcified coronaries but nonobstructive.  No chest pain and stable from cardiac standpoint.     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  Continue high intensity statin and anticoagulation with Eliquis 5 mg p.o. twice daily.    Discussed the care plan with the patient and her nurse at bedside.    Richardson Willis MD  02/14/24  07:11 EST

## 2024-02-14 NOTE — PROGRESS NOTES
Daily Progress Note          Assessment    AMS: No significant findings on brain MRI  Chronic Severe Pulmonary HTN  COPD: Emphysema  Chronic atelectasis in left lower lobe  Anemia  UTI  JULIAN/CKD 3  HTN  Hx PE/DVT  Hx TIA  CAD  History of breast cancer in 2018: Currently in remission              PLAN:  Oxygen supplement and titration to maintain saturation 90-95%: Currently requiring 5 L by high flow nasal cannula  PT/OT  Encourage use of incentive spirometry  Mucinex  Antibiotics per ID  Bronchodilators  IV steroids  Sildenafil  Inhaled corticosteroids  Diuresis  Thyroid hormone replacement  Cardiology following   Electrolytes/ glycemic control  Chronic anticoagulation: Apixaban  I personally reviewed the radiological images             LOS: 7 days     Subjective     Patient reports gradual improvement in the cough and shortness of breath    Objective     Vital signs for last 24 hours:  Vitals:    02/14/24 0705 02/14/24 0708 02/14/24 0709 02/14/24 0713   BP: 132/75      BP Location: Right arm      Patient Position: Lying      Pulse: 74 71 71 72   Resp: 20 20 20 20   Temp: 98 °F (36.7 °C)      TempSrc: Oral      SpO2: 95% 96% 96% 95%   Weight:       Height:           Intake/Output last 3 shifts:  I/O last 3 completed shifts:  In: 840 [P.O.:840]  Out: -   Intake/Output this shift:  I/O this shift:  In: 240 [P.O.:240]  Out: -       Radiology  Imaging Results (Last 24 Hours)       ** No results found for the last 24 hours. **            Labs:  Results from last 7 days   Lab Units 02/14/24  0158   WBC 10*3/mm3 11.10*   HEMOGLOBIN g/dL 12.2   HEMATOCRIT % 37.6   PLATELETS 10*3/mm3 282     Results from last 7 days   Lab Units 02/14/24  0158 02/09/24  0055 02/08/24  0444   SODIUM mmol/L 139   < > 136   POTASSIUM mmol/L 4.5   < > 4.8   CHLORIDE mmol/L 103   < > 102   CO2 mmol/L 26.0   < > 23.0   BUN mg/dL 54*   < > 32*   CREATININE mg/dL 1.51*   < > 1.12*   CALCIUM mg/dL 9.2   < > 9.4   BILIRUBIN mg/dL  --   --  0.7   ALK  PHOS U/L  --   --  60   ALT (SGPT) U/L  --   --  32   AST (SGOT) U/L  --   --  35*   GLUCOSE mg/dL 112*   < > 90    < > = values in this interval not displayed.         Results from last 7 days   Lab Units 02/14/24  0158 02/13/24  0354 02/12/24  0540   ALBUMIN g/dL 3.3* 3.4* 3.5               Results from last 7 days   Lab Units 02/14/24  0158   MAGNESIUM mg/dL 2.1                     Meds:   SCHEDULE  allopurinol, 100 mg, Oral, Daily  apixaban, 5 mg, Oral, Q12H  budesonide, 0.5 mg, Nebulization, BID - RT  carvedilol, 12.5 mg, Oral, BID With Meals  cephalexin, 500 mg, Oral, Q8H  famotidine, 20 mg, Oral, Daily  [START ON 2/15/2024] furosemide, 20 mg, Oral, Daily  guaiFENesin, 1,200 mg, Oral, Q12H  hydrALAZINE, 50 mg, Oral, Q8H  ipratropium-albuterol, 3 mL, Nebulization, 4x Daily - RT  levothyroxine, 50 mcg, Oral, Q AM  melatonin, 5 mg, Oral, Nightly  methylPREDNISolone sodium succinate, 20 mg, Intravenous, Q12H  sildenafil, 20 mg, Oral, TID  sodium chloride, 10 mL, Intravenous, Q12H  sodium chloride, 10 mL, Intravenous, Q12H      Infusions     PRNs    acetaminophen    Calcium Replacement - Follow Nurse / BPA Driven Protocol    ipratropium-albuterol    Magnesium Standard Dose Replacement - Follow Nurse / BPA Driven Protocol    ondansetron    Phosphorus Replacement - Follow Nurse / BPA Driven Protocol    Potassium Replacement - Follow Nurse / BPA Driven Protocol    senna-docusate sodium    [COMPLETED] Insert Peripheral IV **AND** sodium chloride    sodium chloride    sodium chloride    sodium chloride    sodium chloride    Physical Exam:  General Appearance:  Alert   HEENT:  Normocephalic, without obvious abnormality, Conjunctiva/corneas clear,.   Nares normal, no drainage     Neck:  Supple, symmetrical, trachea midline.   Lungs /Chest wall: Mild wheezing with bilateral basal rhonchi, respirations unlabored, symmetrical wall movement.     Heart:  Regular rate and rhythm, S1 S2 normal  Abdomen: Soft, non-tender, no  masses, no organomegaly.    Extremities: No edema, no clubbing or cyanosis     ROS  Constitutional: Negative for chills, fever and malaise/fatigue.   HENT: Negative.    Eyes: Negative.    Cardiovascular: Negative.    Respiratory: Positive for cough and shortness of breath.    Skin: Negative.    Musculoskeletal: Negative.    Gastrointestinal: Negative.    Genitourinary: Negative.    Neurological: Generalized weakness      I reviewed the recent clinical results  I personally reviewed the latest radiological studies    Part of this note may be an electronic transcription/translation of spoken language to printed text using the Dragon Dictation System.

## 2024-02-14 NOTE — PLAN OF CARE
"Assessment: Gabrielle Lyles presents with functional mobility impairments which indicate the need for skilled intervention. Improved functional mobility this date. Min A for bed mobility and functional transfers using RW. Pt able to ambulate short distance to recliner chair today. Plan to d/c to subacute rehab when medically appropriate. Will continue to follow and progress as tolerated.     Plan/Recommendations:   If medically appropriate, Moderate Intensity Therapy recommended post-acute care. This is recommended as therapy feels the patient would require 3-4 days per week and wouldn't tolerate \"3 hour daily\" rehab intensity. SNF would be the preferred choice. If the patient does not agree to SNF, arrange HH or OP depending on home bound status. If patient is medically complex, consider LTACH. Pt requires no DME at discharge.     Pt desires Skilled Rehab placement at discharge. Pt cooperative; agreeable to therapeutic recommendations and plan of care.     "

## 2024-02-14 NOTE — THERAPY TREATMENT NOTE
"Subjective: Pt agreeable to therapeutic plan of care.    Objective:     Bed mobility - Supine to sitting EOB Min-A    Transfers - STS from EOB Min-A and with rolling walker    Ambulation - 3 feet Min-A and with rolling walker. One minor LOB episode noted when initially standing.    Therapeutic Exercise - 20 Reps B LE AROM lying supine and supported sitting / chair    Vitals: WNL on 5L HiFlo    Pain: 0 VAS   Location:   Intervention for pain: N/A    Education: Provided education on the importance of mobility in the acute care setting, Verbal/Tactile Cues, Transfer Training, and Gait Training    Assessment: Gabrielle Lyles presents with functional mobility impairments which indicate the need for skilled intervention. Improved functional mobility this date. Min A for bed mobility and functional transfers using RW. Pt able to ambulate short distance to recliner chair today. Plan to d/c to subacute rehab when medically appropriate. Will continue to follow and progress as tolerated.     Plan/Recommendations:   If medically appropriate, Moderate Intensity Therapy recommended post-acute care. This is recommended as therapy feels the patient would require 3-4 days per week and wouldn't tolerate \"3 hour daily\" rehab intensity. SNF would be the preferred choice. If the patient does not agree to SNF, arrange HH or OP depending on home bound status. If patient is medically complex, consider LTACH. Pt requires no DME at discharge.     Pt desires Skilled Rehab placement at discharge. Pt cooperative; agreeable to therapeutic recommendations and plan of care.         Basic Mobility 6-click:  Rollin = Total, A lot = 2, A little = 3; 4 = None  Supine>Sit:   1 = Total, A lot = 2, A little = 3; 4 = None   Sit>Stand with arms:  1 = Total, A lot = 2, A little = 3; 4 = None  Bed>Chair:   1 = Total, A lot = 2, A little = 3; 4 = None  Ambulate in room:  1 = Total, A lot = 2, A little = 3; 4 = None  3-5 Steps with railin = " Total, A lot = 2, A little = 3; 4 = None  Score: 16    Modified [place]: N/A = No pre-op stroke/TIA    Post-Tx Position: Up in Chair, Alarms activated, and Call light and personal items within reach  PPE: gloves

## 2024-02-14 NOTE — CASE MANAGEMENT/SOCIAL WORK
Continued Stay Note  LIDYA Cox     Patient Name: Gabrielle Lyles  MRN: [id number]  Today's Date: 2/14/2024    Admit Date: 2/5/2024    Plan: [facility] accepted.  [facility] approved 2/13-2/15.  [facility] approved.  From home alone.  Current [place] 2.5-3L   Discharge Plan       Row Name 02/14/24 1634       Plan    Plan Comments Barriers to discharge: steroids today.   ID pulm, nephro following.             Expected Discharge Date and Time       Expected Discharge Date Expected Discharge Time    Feb 15, 2024           Brook Carolina RN    Office Phone [phone]  Office Cell [phone]

## 2024-02-14 NOTE — PROGRESS NOTES
LOS: 7 days   Patient Care Team:  Shaylee Mcdaniels MD as PCP - General (Family Medicine)  Richardson Willis MD as Cardiologist (Cardiology)  Rafy Rodriguez MD as Consulting Physician (Hematology and Oncology)  Christianne Phillips, AMARILIS as Licensed Practical Nurse  Salome Fleming MD as Consulting Physician (Nephrology)    Subjective:  Looks better overall but complains of shortness of breath    Objective:   Afebrile      Review of Systems:   Review of Systems   Respiratory:  Positive for shortness of breath.    Neurological:  Positive for weakness.           Vital Signs  Temp:  [97.2 °F (36.2 °C)-97.8 °F (36.6 °C)] 97.2 °F (36.2 °C)  Heart Rate:  [66-89] 72  Resp:  [15-28] 20  BP: (109-138)/(50-79) 126/79    Physical Exam:  Physical Exam  Vitals and nursing note reviewed.   Cardiovascular:      Rate and Rhythm: Normal rate.      Heart sounds: Normal heart sounds.   Pulmonary:      Breath sounds: Normal breath sounds.   Skin:     General: Skin is warm.   Neurological:      Mental Status: She is alert.          Radiology:  XR Chest 1 View    Result Date: 2/9/2024  Impression: Stable left basilar atelectasis and chronic lung changes Cardiomegaly Electronically Signed: Pablo Martins MD  2/9/2024 7:35 AM EST  Workstation ID: [id number]    US Breast Left Limited    Result Date: 2/8/2024  IMPRESSION : 1. Nonspecific hypoechoic shadowing region measuring 2.0 cm at the 11 o'clock position 4 cm from the nipple corresponding to patient's lumpectomy site. This could represent scarring at the lumpectomy site, collapsed seroma cavity or recurrence. Abscess is felt less likely but not entirely excluded if there are clinical findings of infection.  Recommend patient return for full outpatient diagnostic work-up including diagnostic mammogram and ultrasound with real-time evaluation by radiologist. Patient also requires import of outside breast imaging more recent than 2014  BI-RADS Final Assessment Category 0:  Incomplete-Need Additional Imaging Evaluation Management Recommendation: Recall for additional imaging.   Electronically Signed By-Param Johansen MD On:2/8/2024 8:35 AM      MRI Brain With Contrast    Result Date: 2/7/2024  Impression: No abnormality noted on postcontrast imaging. See same-day noncontrast exam report for additional details. Electronically Signed: Alan Rodríguez MD  2/7/2024 6:39 PM CST  Workstation ID: [id number]    MRI Brain Without Contrast    Result Date: 2/7/2024  Impression: Mildly motion-degraded exam demonstrating expected age-related changes, without evidence of acute infarct, hemorrhage, mass or mass effect. Electronically Signed: Andres Olvera MD  2/7/2024 9:00 AM EST  Workstation ID: [id number]    CT Abdomen Pelvis Without Contrast    Result Date: 2/6/2024  Impression: Chronic atelectasis of the left lower lobe. Moderately severe emphysema. Lesion of the sternal manubrium as detailed, which could represent subacute or old fracture although metastatic disease is not excluded. Correlation with whole-body bone scan may be  useful. Aneurysmal dilatation of the abdominal aorta. Small amount of free pelvic fluid, nonspecific. Nodular liver contour suggests cirrhosis. Electronically Signed: Mary Ivan MD  2/6/2024 3:29 PM EST  Workstation ID: [id number]    CT Chest Without Contrast Diagnostic    Result Date: 2/6/2024  Impression: Chronic atelectasis of the left lower lobe. Moderately severe emphysema. Lesion of the sternal manubrium as detailed, which could represent subacute or old fracture although metastatic disease is not excluded. Correlation with whole-body bone scan may be  useful. Aneurysmal dilatation of the abdominal aorta. Small amount of free pelvic fluid, nonspecific. Nodular liver contour suggests cirrhosis. Electronically Signed: Mary Ivan MD  2/6/2024 3:29 PM EST  Workstation ID: [id number]        Results Review:     I reviewed the patient's new clinical results.  I reviewed  the patient's new imaging results and agree with the interpretation.    Medication Review:   Scheduled Meds:allopurinol, 100 mg, Oral, Daily  apixaban, 5 mg, Oral, Q12H  budesonide, 0.5 mg, Nebulization, BID - RT  carvedilol, 12.5 mg, Oral, BID With Meals  cephalexin, 500 mg, Oral, Q8H  famotidine, 20 mg, Oral, Daily  furosemide, 40 mg, Oral, Daily  guaiFENesin, 1,200 mg, Oral, Q12H  hydrALAZINE, 50 mg, Oral, Q8H  levothyroxine, 50 mcg, Oral, Q AM  melatonin, 5 mg, Oral, Nightly  methylPREDNISolone sodium succinate, 20 mg, Intravenous, Daily  sildenafil, 20 mg, Oral, TID  sodium chloride, 10 mL, Intravenous, Q12H  sodium chloride, 10 mL, Intravenous, Q12H      Continuous Infusions:   PRN Meds:.  acetaminophen    Calcium Replacement - Follow Nurse / BPA Driven Protocol    ipratropium-albuterol    ipratropium-albuterol    Magnesium Standard Dose Replacement - Follow Nurse / BPA Driven Protocol    ondansetron    Phosphorus Replacement - Follow Nurse / BPA Driven Protocol    Potassium Replacement - Follow Nurse / BPA Driven Protocol    senna-docusate sodium    [COMPLETED] Insert Peripheral IV **AND** sodium chloride    sodium chloride    sodium chloride    sodium chloride    sodium chloride    Labs:    CBC    Results from last 7 days   Lab Units 02/14/24  0158 02/13/24  0354 02/12/24  0540 02/10/24  0227 02/09/24  0055 02/08/24  0444 02/07/24  1013   WBC 10*3/mm3 11.10* 11.80* 12.20* 10.40 11.50* 9.60 9.20   HEMOGLOBIN g/dL 12.2 11.9* 12.1 11.7* 12.3 12.8 11.8*   PLATELETS 10*3/mm3 282 282 284 238 234 216 200     BMP   Results from last 7 days   Lab Units 02/14/24  0158 02/13/24  0354 02/12/24  0540 02/11/24  0429 02/10/24  0227 02/09/24  0055 02/08/24  0444   SODIUM mmol/L 139 137 138 138 140 138 136   POTASSIUM mmol/L 4.5 4.3 4.4 4.6 4.3 4.3 4.8   CHLORIDE mmol/L 103 102 104 106 106 105 102   CO2 mmol/L 26.0 25.0 23.0 21.0* 23.0 22.0 23.0   BUN mg/dL 54* 53* 45* 35* 29* 30* 32*   CREATININE mg/dL 1.51* 1.40* 1.36*  "1.43* 1.14* 1.16* 1.12*   GLUCOSE mg/dL 112* 118* 130* 161* 113* 115* 90   MAGNESIUM mg/dL 2.1  --   --   --  2.0 1.9 2.0   PHOSPHORUS mg/dL 3.9 3.9 4.2 4.7* 3.0 2.7 3.4     Cr Clearance Estimated Creatinine Clearance: 28.4 mL/min (A) (by C-G formula based on SCr of 1.51 mg/dL (H)).  Coag     HbA1C   Lab Results   Component Value Date    HGBA1C 5.8 (H) 10/25/2022     Blood Glucose No results found for: \"POCGLU\"  Infection     CMP   Results from last 7 days   Lab Units 02/14/24  0158 02/13/24  0354 02/12/24  0540 02/11/24  0429 02/10/24  0227 02/09/24  0055 02/08/24  0444   SODIUM mmol/L 139 137 138 138 140 138 136   POTASSIUM mmol/L 4.5 4.3 4.4 4.6 4.3 4.3 4.8   CHLORIDE mmol/L 103 102 104 106 106 105 102   CO2 mmol/L 26.0 25.0 23.0 21.0* 23.0 22.0 23.0   BUN mg/dL 54* 53* 45* 35* 29* 30* 32*   CREATININE mg/dL 1.51* 1.40* 1.36* 1.43* 1.14* 1.16* 1.12*   GLUCOSE mg/dL 112* 118* 130* 161* 113* 115* 90   ALBUMIN g/dL 3.3* 3.4* 3.5 3.3*  --   --  3.5   BILIRUBIN mg/dL  --   --   --   --   --   --  0.7   ALK PHOS U/L  --   --   --   --   --   --  60   AST (SGOT) U/L  --   --   --   --   --   --  35*   ALT (SGPT) U/L  --   --   --   --   --   --  32     UA      Radiology(recent) No radiology results for the last day   Assessment:    Acute mental status changes with acute delirium  Urinary tract infection present upon admission  Hypocalcemia  Dehydration  Acute renal failure with acute kidney injury superimposed upon chronic kidney disease stage IIIa  Hypertension associated chronic kidney disease stage IIIa  Anemia of chronic kidney disease  Hyperuricemia  Left breast mastitis  Right arm thrombophlebitis  Pulmonary hypertension  Acute hypokalemia  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  Cerebrovascular disease status post CVA  Chronic oral anticoagulation therapy  Acquired hypothyroidism  Thrombophilia  Autonomic dysfunction syndrome  History of pulmonary " embolism  Paroxysmal atrial fibrillation  Hypercoagulable state secondary to atrial fibrillation  [id number] score 6  History of IVC filter  Aneurysmal dilatation of abdominal aorta  Panlobular COPD with emphysema  Chronic mucopurulent bronchitis  Peripheral polyneuropathy  History of breast cancer  Supplemental oxygen dependency  Chronic hypoxic respiratory failure     Plan:    Aggressive pulmonary toilet//discharge planning      Paul Granados MD  02/14/24  08:42 EST

## 2024-02-14 NOTE — PROGRESS NOTES
"NEPHROLOGY PROGRESS NOTE------[facility]    [facility]  [phone]  Luke Fleming MD      Patient Care Team:  Shaylee Mcdaniels MD as PCP - General (Family Medicine)  Richardson Willis MD as Cardiologist (Cardiology)  Rafy Rodriguez MD as Consulting Physician (Hematology and Oncology)  Christianne Phillips RN as Licensed Practical Nurse  Salome Fleming MD as Consulting Physician (Nephrology)      Provider:  Luke Fleming MD  Patient Name: Gabrielle Lyles  :  1940    SUBJECTIVE:    F/U ARF/JULIAN/CRF/CKD    Mild SOB this AM. No angina. No dysuria.     Medication:  allopurinol, 100 mg, Oral, Daily  apixaban, 5 mg, Oral, Q12H  budesonide, 0.5 mg, Nebulization, BID - RT  carvedilol, 12.5 mg, Oral, BID With Meals  cephalexin, 500 mg, Oral, Q8H  famotidine, 20 mg, Oral, Daily  furosemide, 40 mg, Oral, Daily  guaiFENesin, 1,200 mg, Oral, Q12H  hydrALAZINE, 50 mg, Oral, Q8H  levothyroxine, 50 mcg, Oral, Q AM  melatonin, 5 mg, Oral, Nightly  methylPREDNISolone sodium succinate, 20 mg, Intravenous, Daily  sildenafil, 20 mg, Oral, TID  sodium chloride, 10 mL, Intravenous, Q12H  sodium chloride, 10 mL, Intravenous, Q12H             OBJECTIVE    Vital Sign Min/Max for last 24 hours  Temp  Min: 97.2 °F (36.2 °C)  Max: 97.8 °F (36.6 °C)   BP  Min: 85/61  Max: 138/68   Pulse  Min: 66  Max: 89   Resp  Min: 15  Max: 28   SpO2  Min: 89 %  Max: 96 %   No data recorded   No data recorded     Flowsheet Rows      Flowsheet Row First Filed Value   Admission Height 162.6 cm (64\") Documented at 2024 1046   Admission Weight 66.7 kg (147 lb) Documented at 2024 1046            No intake/output data recorded.  I/O last 3 completed shifts:  In: 840 [P.O.:840]  Out: -     Physical Exam:  General Appearance: alert, appears stated age and cooperative  Head: normocephalic, without obvious abnormality and atraumatic  Eyes: conjunctivae and sclerae " "normal and no icterus  Neck: supple and no JVD  Lungs: +SCATTERED RHONCHI  Heart: regular rhythm & normal rate and normal S1, S2 +TAYE  Chest Wall: no abnormalities observed  Abdomen: normal bowel sounds and soft, nontender +OBESITY  Extremities: moves extremities well, trace edema, no cyanosis  Skin: no bleeding, bruising or rash  Neurologic: Alert, and oriented. No focal deficits    Labs:    WBC WBC   Date Value Ref Range Status   02/14/2024 11.10 (H) 3.40 - 10.80 10*3/mm3 Final   02/13/2024 11.80 (H) 3.40 - 10.80 10*3/mm3 Final   02/12/2024 12.20 (H) 3.40 - 10.80 10*3/mm3 Final      HGB Hemoglobin   Date Value Ref Range Status   02/14/2024 12.2 12.0 - 15.9 g/dL Final   02/13/2024 11.9 (L) 12.0 - 15.9 g/dL Final   02/12/2024 12.1 12.0 - 15.9 g/dL Final      HCT Hematocrit   Date Value Ref Range Status   02/14/2024 37.6 34.0 - 46.6 % Final   02/13/2024 37.5 34.0 - 46.6 % Final   02/12/2024 35.9 34.0 - 46.6 % Final      Platelets No results found for: \"LABPLAT\"   MCV MCV   Date Value Ref Range Status   02/14/2024 85.7 79.0 - 97.0 fL Final   02/13/2024 85.6 79.0 - 97.0 fL Final   02/12/2024 86.8 79.0 - 97.0 fL Final          Sodium Sodium   Date Value Ref Range Status   02/14/2024 139 136 - 145 mmol/L Final   02/13/2024 137 136 - 145 mmol/L Final   02/12/2024 138 136 - 145 mmol/L Final      Potassium Potassium   Date Value Ref Range Status   02/14/2024 4.5 3.5 - 5.2 mmol/L Final   02/13/2024 4.3 3.5 - 5.2 mmol/L Final   02/12/2024 4.4 3.5 - 5.2 mmol/L Final      Chloride Chloride   Date Value Ref Range Status   02/14/2024 103 98 - 107 mmol/L Final   02/13/2024 102 98 - 107 mmol/L Final   02/12/2024 104 98 - 107 mmol/L Final      CO2 CO2   Date Value Ref Range Status   02/14/2024 26.0 22.0 - 29.0 mmol/L Final   02/13/2024 25.0 22.0 - 29.0 mmol/L Final   02/12/2024 23.0 22.0 - 29.0 mmol/L Final      BUN BUN   Date Value Ref Range Status   02/14/2024 54 (H) 8 - 23 mg/dL Final   02/13/2024 53 (H) 8 - 23 mg/dL Final " "  02/12/2024 45 (H) 8 - 23 mg/dL Final      Creatinine Creatinine   Date Value Ref Range Status   02/14/2024 1.51 (H) 0.57 - 1.00 mg/dL Final   02/13/2024 1.40 (H) 0.57 - 1.00 mg/dL Final   02/12/2024 1.36 (H) 0.57 - 1.00 mg/dL Final      Calcium Calcium   Date Value Ref Range Status   02/14/2024 9.2 8.6 - 10.5 mg/dL Final   02/13/2024 9.2 8.6 - 10.5 mg/dL Final   02/12/2024 9.2 8.6 - 10.5 mg/dL Final      PO4 No components found for: \"PO4\"   Albumin Albumin   Date Value Ref Range Status   02/14/2024 3.3 (L) 3.5 - 5.2 g/dL Final   02/13/2024 3.4 (L) 3.5 - 5.2 g/dL Final   02/12/2024 3.5 3.5 - 5.2 g/dL Final      Magnesium Magnesium   Date Value Ref Range Status   02/14/2024 2.1 1.6 - 2.4 mg/dL Final        Uric Acid No components found for: \"URIC ACID\"     Imaging Results (Last 72 Hours)       ** No results found for the last 72 hours. **            Results for orders placed during the hospital encounter of 02/05/24    XR Chest 1 View    Narrative  XR CHEST 1 VW    Date of Exam: 2/9/2024 5:36 AM EST    Indication: sob    Comparison: None available.    Findings:  The trachea is midline. Stable cardiomegaly with mild enlargement of the pulmonary arteries. There is mild diffuse bilateral reticular lung thickening. Mild left basilar atelectasis. No definite pleural effusions. No change in position of the right-sided  pigtail catheter.    Impression  Impression:  Stable left basilar atelectasis and chronic lung changes    Cardiomegaly      Electronically Signed: Pablo Martins MD  2/9/2024 7:35 AM EST  Workstation ID: [id number]      XR Chest 1 View    Narrative  XR CHEST 1 VW    Date of Exam: 2/5/2024 12:00 PM EST    Indication: weakness    Comparison: 5/2/2023.    Findings:  There is mild cardiomegaly with increased heart size. Mildly prominent interstitial pattern appears stable and chronic. There is a prominent skinfold over the right chest. There is no definite pneumothorax. There is no " effusion.    Impression  Impression:  Mild cardiomegaly. Mild chronic findings of the lung fields.      Electronically Signed: Mary Ivan MD  2/5/2024 12:06 PM EST  Workstation ID: [id number]      Results for orders placed during the hospital encounter of 09/24/23    XR Foot 3+ View Left    Narrative  XR FOOT 3+ VW LEFT    Date of Exam: 9/24/2023 3:15 PM EDT    Indication: pain redness swelling    Comparison: None available.    Findings:  No fracture or dislocation. There is soft tissue swelling diffusely at the left foot. No discrete osseous erosion. Plantar calcaneal bone spur. Enthesopathy at the Achilles insertion on the calcaneus. No radiodense foreign body.    Impression  Impression:  1. Negative for acute osseous abnormality.  2. Nonspecific soft tissue swelling.        Electronically Signed: Randall Zarco MD  9/24/2023 3:54 PM EDT  Workstation ID: [id number]      Results for orders placed during the hospital encounter of 02/05/24    Duplex Venous Upper Extremity - Right CAR    Interpretation Summary    Normal right upper extremity venous duplex scan.        ASSESSMENT / PLAN      Altered mental status    Acute hypokalemia    Stage 3a chronic kidney disease    Chronic obstructive pulmonary disease    History of venous thrombosis and embolism    Primary hypertension    History of TIA (transient ischemic attack)    Severe pulmonary hypertension    Chronic respiratory failure with hypoxia    JULIAN (acute kidney injury)    ARF/JULIAN/CRF/CKD------Nonoliguric. +ARF/JULIAN on top of CRF/CKD STG 3A with a baseline serum  Creatinine of about 1.1. Unknown if patient has baseline proteinuria or hematuria. CRF/CKD STG 3A most likely secondary to HTN NS. +ARF/JULIAN is secondary to prerenal state/intravascular volume depletion. Stable.  Avoid hypotension.  No NSAIDs or IV dye.  BUN/Cr stable     2. ANEMIA------H/H stable     3. HTN WITH CKD-----Avoid hypotension. No ACE/ARB/DRI/diuretic for now     4. OA/DJD/HYPERURICEMIA------No  NSAIDs. Added Allopurinol     5. DELIRIUM-------Resolved     6. UTI-----S/P Tx     7. HYPOCALCEMIA------Replaced     8. KETONURIA/DEHYDRATION------Secondary to recent outpatient increase in Lasix. Hold Lasix. Gentle IVFs given Pulmonary HTN     9. PULMONARY HTN--------Per , Pulmonary     10. HYPOKALEMIA-------Replaced     11. CAD-----per , Cardiology    12. REHAB PLACEMENT PENDING       Luke Fleming MD  [facility]  [phone]  02/14/24  07:11 EST

## 2024-02-15 LAB
ALBUMIN SERPL-MCNC: 3.1 G/DL (ref 3.5–5.2)
ANION GAP SERPL CALCULATED.3IONS-SCNC: 8 MMOL/L (ref 5–15)
BUN SERPL-MCNC: 56 MG/DL (ref 8–23)
BUN/CREAT SERPL: 43.1 (ref 7–25)
CALCIUM SPEC-SCNC: 9.2 MG/DL (ref 8.6–10.5)
CHLORIDE SERPL-SCNC: 103 MMOL/L (ref 98–107)
CO2 SERPL-SCNC: 27 MMOL/L (ref 22–29)
CREAT SERPL-MCNC: 1.3 MG/DL (ref 0.57–1)
DEPRECATED RDW RBC AUTO: 51.6 FL (ref 37–54)
DEPRECATED RDW RBC AUTO: 52.1 FL (ref 37–54)
EGFRCR SERPLBLD CKD-EPI 2021: 41.1 ML/MIN/1.73
ERYTHROCYTE [DISTWIDTH] IN BLOOD BY AUTOMATED COUNT: 16.8 % (ref 12.3–15.4)
ERYTHROCYTE [DISTWIDTH] IN BLOOD BY AUTOMATED COUNT: 16.8 % (ref 12.3–15.4)
GLUCOSE SERPL-MCNC: 118 MG/DL (ref 65–99)
HCT VFR BLD AUTO: 38.2 % (ref 34–46.6)
HCT VFR BLD AUTO: 40.1 % (ref 34–46.6)
HGB BLD-MCNC: 12.1 G/DL (ref 12–15.9)
HGB BLD-MCNC: 12.6 G/DL (ref 12–15.9)
MCH RBC QN AUTO: 26.3 PG (ref 26.6–33)
MCH RBC QN AUTO: 26.4 PG (ref 26.6–33)
MCHC RBC AUTO-ENTMCNC: 31.5 G/DL (ref 31.5–35.7)
MCHC RBC AUTO-ENTMCNC: 31.8 G/DL (ref 31.5–35.7)
MCV RBC AUTO: 83.2 FL (ref 79–97)
MCV RBC AUTO: 83.4 FL (ref 79–97)
PHOSPHATE SERPL-MCNC: 3.9 MG/DL (ref 2.5–4.5)
PLATELET # BLD AUTO: 264 10*3/MM3 (ref 140–450)
PLATELET # BLD AUTO: 285 10*3/MM3 (ref 140–450)
PMV BLD AUTO: 8.3 FL (ref 6–12)
PMV BLD AUTO: 8.5 FL (ref 6–12)
POTASSIUM SERPL-SCNC: 4.7 MMOL/L (ref 3.5–5.2)
RBC # BLD AUTO: 4.6 10*6/MM3 (ref 3.77–5.28)
RBC # BLD AUTO: 4.81 10*6/MM3 (ref 3.77–5.28)
SODIUM SERPL-SCNC: 138 MMOL/L (ref 136–145)
WBC NRBC COR # BLD AUTO: 10.7 10*3/MM3 (ref 3.4–10.8)
WBC NRBC COR # BLD AUTO: 8.7 10*3/MM3 (ref 3.4–10.8)

## 2024-02-15 PROCEDURE — 97530 THERAPEUTIC ACTIVITIES: CPT

## 2024-02-15 PROCEDURE — 85027 COMPLETE CBC AUTOMATED: CPT | Performed by: INTERNAL MEDICINE

## 2024-02-15 PROCEDURE — 97116 GAIT TRAINING THERAPY: CPT

## 2024-02-15 PROCEDURE — 94664 DEMO&/EVAL PT USE INHALER: CPT

## 2024-02-15 PROCEDURE — 94799 UNLISTED PULMONARY SVC/PX: CPT

## 2024-02-15 PROCEDURE — 94761 N-INVAS EAR/PLS OXIMETRY MLT: CPT

## 2024-02-15 PROCEDURE — 97110 THERAPEUTIC EXERCISES: CPT

## 2024-02-15 PROCEDURE — 99232 SBSQ HOSP IP/OBS MODERATE 35: CPT | Performed by: INTERNAL MEDICINE

## 2024-02-15 PROCEDURE — 25010000002 METHYLPREDNISOLONE PER 40 MG: Performed by: FAMILY MEDICINE

## 2024-02-15 PROCEDURE — 80069 RENAL FUNCTION PANEL: CPT | Performed by: INTERNAL MEDICINE

## 2024-02-15 PROCEDURE — 97535 SELF CARE MNGMENT TRAINING: CPT

## 2024-02-15 PROCEDURE — 83735 ASSAY OF MAGNESIUM: CPT | Performed by: INTERNAL MEDICINE

## 2024-02-15 RX ORDER — METHYLPREDNISOLONE SODIUM SUCCINATE 40 MG/ML
20 INJECTION, POWDER, LYOPHILIZED, FOR SOLUTION INTRAMUSCULAR; INTRAVENOUS DAILY
Status: DISCONTINUED | OUTPATIENT
Start: 2024-02-16 | End: 2024-02-16

## 2024-02-15 RX ADMIN — CEPHALEXIN 500 MG: 500 CAPSULE ORAL at 20:09

## 2024-02-15 RX ADMIN — APIXABAN 5 MG: 5 TABLET, FILM COATED ORAL at 07:51

## 2024-02-15 RX ADMIN — FUROSEMIDE 20 MG: 20 TABLET ORAL at 07:53

## 2024-02-15 RX ADMIN — GUAIFENESIN 1200 MG: 600 TABLET, EXTENDED RELEASE ORAL at 20:10

## 2024-02-15 RX ADMIN — SILDENAFIL CITRATE 20 MG: 20 TABLET ORAL at 07:53

## 2024-02-15 RX ADMIN — CARVEDILOL 12.5 MG: 6.25 TABLET, FILM COATED ORAL at 07:53

## 2024-02-15 RX ADMIN — SILDENAFIL CITRATE 20 MG: 20 TABLET ORAL at 20:10

## 2024-02-15 RX ADMIN — HYDRALAZINE HYDROCHLORIDE 50 MG: 25 TABLET ORAL at 20:10

## 2024-02-15 RX ADMIN — Medication 5 MG: at 20:09

## 2024-02-15 RX ADMIN — HYDRALAZINE HYDROCHLORIDE 50 MG: 25 TABLET ORAL at 06:00

## 2024-02-15 RX ADMIN — ALLOPURINOL 100 MG: 100 TABLET ORAL at 07:54

## 2024-02-15 RX ADMIN — APIXABAN 5 MG: 5 TABLET, FILM COATED ORAL at 20:09

## 2024-02-15 RX ADMIN — GUAIFENESIN 1200 MG: 600 TABLET, EXTENDED RELEASE ORAL at 07:51

## 2024-02-15 RX ADMIN — IPRATROPIUM BROMIDE AND ALBUTEROL SULFATE 3 ML: .5; 3 SOLUTION RESPIRATORY (INHALATION) at 11:09

## 2024-02-15 RX ADMIN — SILDENAFIL CITRATE 20 MG: 20 TABLET ORAL at 16:51

## 2024-02-15 RX ADMIN — FAMOTIDINE 20 MG: 20 TABLET, FILM COATED ORAL at 07:51

## 2024-02-15 RX ADMIN — Medication 10 ML: at 07:50

## 2024-02-15 RX ADMIN — CEPHALEXIN 500 MG: 500 CAPSULE ORAL at 13:31

## 2024-02-15 RX ADMIN — IPRATROPIUM BROMIDE AND ALBUTEROL SULFATE 3 ML: .5; 3 SOLUTION RESPIRATORY (INHALATION) at 19:50

## 2024-02-15 RX ADMIN — IPRATROPIUM BROMIDE AND ALBUTEROL SULFATE 3 ML: .5; 3 SOLUTION RESPIRATORY (INHALATION) at 14:53

## 2024-02-15 RX ADMIN — Medication 10 ML: at 20:10

## 2024-02-15 RX ADMIN — CEPHALEXIN 500 MG: 500 CAPSULE ORAL at 06:00

## 2024-02-15 RX ADMIN — METHYLPREDNISOLONE SODIUM SUCCINATE 20 MG: 40 INJECTION, POWDER, FOR SOLUTION INTRAMUSCULAR; INTRAVENOUS at 07:49

## 2024-02-15 RX ADMIN — LEVOTHYROXINE SODIUM 50 MCG: 0.05 TABLET ORAL at 06:00

## 2024-02-15 RX ADMIN — IPRATROPIUM BROMIDE AND ALBUTEROL SULFATE 3 ML: .5; 3 SOLUTION RESPIRATORY (INHALATION) at 07:18

## 2024-02-15 RX ADMIN — BUDESONIDE INHALATION 0.5 MG: 0.5 SUSPENSION RESPIRATORY (INHALATION) at 07:22

## 2024-02-15 RX ADMIN — CARVEDILOL 12.5 MG: 6.25 TABLET, FILM COATED ORAL at 17:39

## 2024-02-15 RX ADMIN — BUDESONIDE INHALATION 0.5 MG: 0.5 SUSPENSION RESPIRATORY (INHALATION) at 19:55

## 2024-02-15 NOTE — PLAN OF CARE
Goal Outcome Evaluation:  Plan of Care Reviewed With: patient        Progress: no change  Outcome Evaluation: Alert & oriented. ON 4 L HF 02. Up in chair most of am. Ambulating short distanced with RWX. Reports feeling better & stronger. Likely to dc to [facility] tomorrow.  Remains risk for falls & skin injury

## 2024-02-15 NOTE — PLAN OF CARE
"Goal Outcome Evaluation:      Assessment: Gabrielle Lyles presents with ADL impairments affecting function including endurance / activity tolerance, pain, and shortness of breath. Demonstrated functioning below baseline abilities indicate the need for continued skilled intervention while inpatient. Tolerating session today without incident. Pt has been up walking several times in the room and to the bathroom as well today> she reports fatigue and needs encouragement to be active again but is able to walk w/ SBA and setup. Pt was to discharge today but requested to be kept another night and her precert has . It will be re-submitted. Pt needed min (A) to come to stand form low toilet this date because of her OA knee pain. Pt is functioning below her baseline due to SOA and activity intolerance as well as OA pain exacerbation. Will continue to follow and progress as tolerated.     Plan/Recommendations:   Moderate Intensity Therapy recommended post-acute care. This is recommended as therapy feels the patient would require 3-4 days per week and wouldn't tolerate \"3 hour daily\" rehab intensity. SNF would be the preferred choice. If the patient does not agree to SNF, arrange HH or OP depending on home bound status. If patient is medically complex, consider LTACH.. Pt requires no DME at discharge.     Pt desires Skilled Rehab placement at discharge. Pt cooperative; agreeable to therapeutic recommendations and plan of care.     Modified [place]: 3 = Moderate disability (Requires some help, but able to walk unassisted)  "

## 2024-02-15 NOTE — THERAPY TREATMENT NOTE
Subjective: Pt agreeable to therapeutic plan of care.  Cognition: oriented to Person, Place, Time, and Situation, memory: Normal, arousal/alertness: Alert, safety/judgement: good, and awareness of deficits: good awareness of safety precautions and good awareness of deficits    Objective: up in chair w/ dtr visiting.    Bed Mobility: N/A or Not attempted.   Functional Transfers: Min-A up from low toilet due to painful left knee. Pt reports likely OA and that mobility helps.     Balance: supported, dynamic, with UE support, and standing SBA  Functional Ambulation: SBA, with adaptive equipment, and with rolling walker. Has HF O2 line with short extension so she can access bathroom. Uses 4L on this setup but desats to 84% with toileting and hand hygiene & needs PLB leaned forward in chair to recover.    Toileting and Grooming: Supervision  ADL Position: unsupported sitting and unsupported standing  ADL Comments: needs encouragement to continue activity up at sink to wash hands after toileting.    Vitals: Desaturates    Pain: 4 VAS  Location: lt knee  Interventions for pain: Repositioned, Increased Activity, and Therapeutic Presence  Education: Provided education on the importance of mobility in the acute care setting, Verbal/Tactile Cues, and ADL training      Assessment: Gabrielle Lyles presents with ADL impairments affecting function including endurance / activity tolerance, pain, and shortness of breath. Demonstrated functioning below baseline abilities indicate the need for continued skilled intervention while inpatient. Tolerating session today without incident. Pt has been up walking several times in the room and to the bathroom as well today> she reports fatigue and needs encouragement to be active again but is able to walk w/ SBA and setup. Pt was to discharge today but requested to be kept another night and her precert has . It will be re-submitted. Pt needed min (A) to come to stand form low toilet  "this date because of her OA knee pain. Pt is functioning below her baseline due to SOA and activity intolerance as well as OA pain exacerbation. Will continue to follow and progress as tolerated.     Plan/Recommendations:   Moderate Intensity Therapy recommended post-acute care. This is recommended as therapy feels the patient would require 3-4 days per week and wouldn't tolerate \"3 hour daily\" rehab intensity. SNF would be the preferred choice. If the patient does not agree to SNF, arrange HH or OP depending on home bound status. If patient is medically complex, consider LTACH.. Pt requires no DME at discharge.     Pt desires Skilled Rehab placement at discharge. Pt cooperative; agreeable to therapeutic recommendations and plan of care.     Modified [place]: 3 = Moderate disability (Requires some help, but able to walk unassisted)    Post-Tx Position: Up in Chair and Call light and personal items within reach  PPE: gloves    "

## 2024-02-15 NOTE — CASE MANAGEMENT/SOCIAL WORK
Continued Stay Note   Kenny     Patient Name: Gabrielle Lyles  MRN: [id number]  Today's Date: 2/15/2024    Admit Date: 2024    Plan: [facility] accepted. Precert approved -2/15, extension requested. PASRR approved. From home alone. Current [place] 2.5-3L   Discharge Plan       Row Name 02/15/24 1505       Plan    Plan [facility] accepted. Precert approved -2/15, extension requested. PASRR approved. From home alone. Current [place] 2.5-3L    Patient/Family in Agreement with Plan yes    Plan Comments Precert will  end of day.   Extension requested.   Awaiting response.             Expected Discharge Date and Time       Expected Discharge Date Expected Discharge Time    2024           Brook Carolina RN     Office Phone [phone]  Office Cell [phone]

## 2024-02-15 NOTE — NURSING NOTE
Nurse and NA having difficulty getting pts temperature. Multiple attempts oral and axillary. Pts skin is warm to touch. Pt reports that they always having trouble getting her temperature to read. Will continue to monitor...

## 2024-02-15 NOTE — PROGRESS NOTES
"NEPHROLOGY PROGRESS NOTE------[facility]    [facility]  [phone]  Luke Fleming MD      Patient Care Team:  Shaylee Mcdaniels MD as PCP - General (Family Medicine)  Richardson Willis MD as Cardiologist (Cardiology)  Rafy Rodriguez MD as Consulting Physician (Hematology and Oncology)  Christianne Phillips RN as Licensed Practical Nurse  Salome Fleming MD as Consulting Physician (Nephrology)      Provider:  Luke Fleming MD  Patient Name: Gabrielle Lyles  :  1940    SUBJECTIVE:    F/U ARF/JULIAN/CRF/CKD    Up in chair. S/P BM. Breathing better. No angina. No dysuria    Medication:  allopurinol, 100 mg, Oral, Daily  apixaban, 5 mg, Oral, Q12H  budesonide, 0.5 mg, Nebulization, BID - RT  carvedilol, 12.5 mg, Oral, BID With Meals  cephalexin, 500 mg, Oral, Q8H  famotidine, 20 mg, Oral, Daily  furosemide, 20 mg, Oral, Daily  guaiFENesin, 1,200 mg, Oral, Q12H  hydrALAZINE, 50 mg, Oral, Q8H  ipratropium-albuterol, 3 mL, Nebulization, 4x Daily - RT  levothyroxine, 50 mcg, Oral, Q AM  melatonin, 5 mg, Oral, Nightly  methylPREDNISolone sodium succinate, 20 mg, Intravenous, Q12H  sildenafil, 20 mg, Oral, TID  sodium chloride, 10 mL, Intravenous, Q12H  sodium chloride, 10 mL, Intravenous, Q12H             OBJECTIVE    Vital Sign Min/Max for last 24 hours  Temp  Min: 94.4 °F (34.7 °C)  Max: 96.2 °F (35.7 °C)   BP  Min: 106/73  Max: 143/68   Pulse  Min: 58  Max: 84   Resp  Min: 13  Max: 21   SpO2  Min: 90 %  Max: 97 %   No data recorded   No data recorded     Flowsheet Rows      Flowsheet Row First Filed Value   Admission Height 162.6 cm (64\") Documented at 2024 1046   Admission Weight 66.7 kg (147 lb) Documented at 2024 1046            No intake/output data recorded.  I/O last 3 completed shifts:  In: 720 [P.O.:720]  Out: -     Physical Exam:  General Appearance: alert, appears stated age and cooperative  Head: normocephalic, " "without obvious abnormality and atraumatic  Eyes: conjunctivae and sclerae normal and no icterus  Neck: supple and no JVD  Lungs: +SCATTERED RHONCHI  Heart: regular rhythm & normal rate and normal S1, S2 +TAYE  Chest Wall: no abnormalities observed  Abdomen: normal bowel sounds and soft, nontender +OBESITY  Extremities: moves extremities well, trace edema, no cyanosis  Skin: no bleeding, bruising or rash  Neurologic: Alert, and oriented. No focal deficits    Labs:    WBC WBC   Date Value Ref Range Status   02/14/2024 8.70 3.40 - 10.80 10*3/mm3 Final   02/14/2024 11.10 (H) 3.40 - 10.80 10*3/mm3 Final   02/13/2024 11.80 (H) 3.40 - 10.80 10*3/mm3 Final      HGB Hemoglobin   Date Value Ref Range Status   02/14/2024 12.1 12.0 - 15.9 g/dL Final   02/14/2024 12.2 12.0 - 15.9 g/dL Final   02/13/2024 11.9 (L) 12.0 - 15.9 g/dL Final      HCT Hematocrit   Date Value Ref Range Status   02/14/2024 38.2 34.0 - 46.6 % Final   02/14/2024 37.6 34.0 - 46.6 % Final   02/13/2024 37.5 34.0 - 46.6 % Final      Platelets No results found for: \"LABPLAT\"   MCV MCV   Date Value Ref Range Status   02/14/2024 83.2 79.0 - 97.0 fL Final   02/14/2024 85.7 79.0 - 97.0 fL Final   02/13/2024 85.6 79.0 - 97.0 fL Final          Sodium Sodium   Date Value Ref Range Status   02/14/2024 138 136 - 145 mmol/L Final   02/14/2024 139 136 - 145 mmol/L Final   02/13/2024 137 136 - 145 mmol/L Final      Potassium Potassium   Date Value Ref Range Status   02/14/2024 4.7 3.5 - 5.2 mmol/L Final   02/14/2024 4.5 3.5 - 5.2 mmol/L Final   02/13/2024 4.3 3.5 - 5.2 mmol/L Final      Chloride Chloride   Date Value Ref Range Status   02/14/2024 103 98 - 107 mmol/L Final   02/14/2024 103 98 - 107 mmol/L Final   02/13/2024 102 98 - 107 mmol/L Final      CO2 CO2   Date Value Ref Range Status   02/14/2024 27.0 22.0 - 29.0 mmol/L Final   02/14/2024 26.0 22.0 - 29.0 mmol/L Final   02/13/2024 25.0 22.0 - 29.0 mmol/L Final      BUN BUN   Date Value Ref Range Status   02/14/2024 " "56 (H) 8 - 23 mg/dL Final   02/14/2024 54 (H) 8 - 23 mg/dL Final   02/13/2024 53 (H) 8 - 23 mg/dL Final      Creatinine Creatinine   Date Value Ref Range Status   02/14/2024 1.30 (H) 0.57 - 1.00 mg/dL Final   02/14/2024 1.51 (H) 0.57 - 1.00 mg/dL Final   02/13/2024 1.40 (H) 0.57 - 1.00 mg/dL Final      Calcium Calcium   Date Value Ref Range Status   02/14/2024 9.2 8.6 - 10.5 mg/dL Final   02/14/2024 9.2 8.6 - 10.5 mg/dL Final   02/13/2024 9.2 8.6 - 10.5 mg/dL Final      PO4 No components found for: \"PO4\"   Albumin Albumin   Date Value Ref Range Status   02/14/2024 3.1 (L) 3.5 - 5.2 g/dL Final   02/14/2024 3.3 (L) 3.5 - 5.2 g/dL Final   02/13/2024 3.4 (L) 3.5 - 5.2 g/dL Final      Magnesium Magnesium   Date Value Ref Range Status   02/14/2024 2.1 1.6 - 2.4 mg/dL Final        Uric Acid No components found for: \"URIC ACID\"     Imaging Results (Last 72 Hours)       ** No results found for the last 72 hours. **            Results for orders placed during the hospital encounter of 02/05/24    XR Chest 1 View    Narrative  XR CHEST 1 VW    Date of Exam: 2/9/2024 5:36 AM EST    Indication: sob    Comparison: None available.    Findings:  The trachea is midline. Stable cardiomegaly with mild enlargement of the pulmonary arteries. There is mild diffuse bilateral reticular lung thickening. Mild left basilar atelectasis. No definite pleural effusions. No change in position of the right-sided  pigtail catheter.    Impression  Impression:  Stable left basilar atelectasis and chronic lung changes    Cardiomegaly      Electronically Signed: Pablo Martins MD  2/9/2024 7:35 AM EST  Workstation ID: [id number]      XR Chest 1 View    Narrative  XR CHEST 1 VW    Date of Exam: 2/5/2024 12:00 PM EST    Indication: weakness    Comparison: 5/2/2023.    Findings:  There is mild cardiomegaly with increased heart size. Mildly prominent interstitial pattern appears stable and chronic. There is a prominent skinfold over the right chest. There is " no definite pneumothorax. There is no effusion.    Impression  Impression:  Mild cardiomegaly. Mild chronic findings of the lung fields.      Electronically Signed: Mary Ivan MD  2/5/2024 12:06 PM EST  Workstation ID: [id number]      Results for orders placed during the hospital encounter of 09/24/23    XR Foot 3+ View Left    Narrative  XR FOOT 3+ VW LEFT    Date of Exam: 9/24/2023 3:15 PM EDT    Indication: pain redness swelling    Comparison: None available.    Findings:  No fracture or dislocation. There is soft tissue swelling diffusely at the left foot. No discrete osseous erosion. Plantar calcaneal bone spur. Enthesopathy at the Achilles insertion on the calcaneus. No radiodense foreign body.    Impression  Impression:  1. Negative for acute osseous abnormality.  2. Nonspecific soft tissue swelling.        Electronically Signed: Randall Zarco MD  9/24/2023 3:54 PM EDT  Workstation ID: [id number]      Results for orders placed during the hospital encounter of 02/05/24    Duplex Venous Upper Extremity - Right CAR    Interpretation Summary    Normal right upper extremity venous duplex scan.        ASSESSMENT / PLAN      Altered mental status    Acute hypokalemia    Stage 3a chronic kidney disease    Chronic obstructive pulmonary disease    History of venous thrombosis and embolism    Primary hypertension    History of TIA (transient ischemic attack)    Severe pulmonary hypertension    Chronic respiratory failure with hypoxia    JULIAN (acute kidney injury)    ARF/JULIAN/CRF/CKD------Nonoliguric. +ARF/JULIAN on top of CRF/CKD STG 3A with a baseline serum  Creatinine of about 1.1. Unknown if patient has baseline proteinuria or hematuria. CRF/CKD STG 3A most likely secondary to HTN NS. +ARF/JULIAN is secondary to prerenal state/intravascular volume depletion. Stable.  Avoid hypotension.  No NSAIDs or IV dye.  BUN/Cr stable     2. ANEMIA------H/H stable     3. HTN WITH CKD-----Avoid hypotension. No ACE/ARB/DRI/diuretic for  now     4. OA/DJD/HYPERURICEMIA------No NSAIDs. Added Allopurinol     5. DELIRIUM-------Resolved     6. UTI-----S/P Tx     7. HYPOCALCEMIA------Replaced     8. KETONURIA/DEHYDRATION------Secondary to recent outpatient increase in Lasix. Hold Lasix. Gentle IVFs given Pulmonary HTN     9. PULMONARY HTN--------Per , Pulmonary     10. HYPOKALEMIA-------Replaced     11. CAD-----per , Cardiology    12. REHAB PLACEMENT PENDING       Luke Fleming MD  [facility]  [phone]  02/15/24  07:18 EST

## 2024-02-15 NOTE — PROGRESS NOTES
Daily Progress Note          Assessment    AMS: No significant findings on brain MRI  Chronic Severe Pulmonary HTN  COPD: Emphysema  Chronic atelectasis in left lower lobe  Anemia  UTI  JULIAN/CKD 3  HTN  Hx PE/DVT  Hx TIA  CAD  History of breast cancer in 2018: Currently in remission              PLAN:  Respiratory status improving and patient can be discharged home tomorrow from pulmonary standpoint with follow-up in the pulmonary clinic in 3 weeks     oxygen supplement and titration to maintain saturation 90-95%: Currently requiring 4 L by high flow nasal cannula  PT/OT  Encourage use of incentive spirometry  Mucinex  Antibiotics per ID  Bronchodilators  IV steroids to be switched to oral prednisone 10 mg upon discharge for 5 days, 5 mg for 5 days and then discontinue  Sildenafil  Inhaled corticosteroids  Diuresis as per nephrology  Thyroid hormone replacement  Cardiology following   Electrolytes/ glycemic control  Chronic anticoagulation: Apixaban  I personally reviewed the radiological images             LOS: 8 days     Subjective     Patient reports gradual improvement in the cough and shortness of breath    Objective     Vital signs for last 24 hours:  Vitals:    02/15/24 0747 02/15/24 1109 02/15/24 1112 02/15/24 1119   BP: 119/68   95/51   BP Location:    Right arm   Patient Position:    Lying   Pulse: 80 70 70 74   Resp: 16 16 16 14   Temp:       TempSrc:       SpO2: 95% 90% 96% 92%   Weight:       Height:           Intake/Output last 3 shifts:  I/O last 3 completed shifts:  In: 720 [P.O.:720]  Out: -   Intake/Output this shift:  I/O this shift:  In: 480 [P.O.:480]  Out: -       Radiology  Imaging Results (Last 24 Hours)       ** No results found for the last 24 hours. **            Labs:  Results from last 7 days   Lab Units 02/14/24  2339   WBC 10*3/mm3 8.70   HEMOGLOBIN g/dL 12.1   HEMATOCRIT % 38.2   PLATELETS 10*3/mm3 264     Results from last 7 days   Lab Units 02/14/24  2339   SODIUM mmol/L 138    POTASSIUM mmol/L 4.7   CHLORIDE mmol/L 103   CO2 mmol/L 27.0   BUN mg/dL 56*   CREATININE mg/dL 1.30*   CALCIUM mg/dL 9.2   GLUCOSE mg/dL 118*         Results from last 7 days   Lab Units 02/14/24  2339 02/14/24  0158 02/13/24  0354   ALBUMIN g/dL 3.1* 3.3* 3.4*               Results from last 7 days   Lab Units 02/14/24  0158   MAGNESIUM mg/dL 2.1                     Meds:   SCHEDULE  allopurinol, 100 mg, Oral, Daily  apixaban, 5 mg, Oral, Q12H  budesonide, 0.5 mg, Nebulization, BID - RT  carvedilol, 12.5 mg, Oral, BID With Meals  cephalexin, 500 mg, Oral, Q8H  famotidine, 20 mg, Oral, Daily  furosemide, 20 mg, Oral, Daily  guaiFENesin, 1,200 mg, Oral, Q12H  hydrALAZINE, 50 mg, Oral, Q8H  ipratropium-albuterol, 3 mL, Nebulization, 4x Daily - RT  levothyroxine, 50 mcg, Oral, Q AM  melatonin, 5 mg, Oral, Nightly  [START ON 2/16/2024] methylPREDNISolone sodium succinate, 20 mg, Intravenous, Daily  sildenafil, 20 mg, Oral, TID  sodium chloride, 10 mL, Intravenous, Q12H  sodium chloride, 10 mL, Intravenous, Q12H      Infusions     PRNs    acetaminophen    Calcium Replacement - Follow Nurse / BPA Driven Protocol    ipratropium-albuterol    Magnesium Standard Dose Replacement - Follow Nurse / BPA Driven Protocol    ondansetron    Phosphorus Replacement - Follow Nurse / BPA Driven Protocol    Potassium Replacement - Follow Nurse / BPA Driven Protocol    senna-docusate sodium    [COMPLETED] Insert Peripheral IV **AND** sodium chloride    sodium chloride    sodium chloride    sodium chloride    sodium chloride    Physical Exam:  General Appearance:  Alert   HEENT:  Normocephalic, without obvious abnormality, Conjunctiva/corneas clear,.   Nares normal, no drainage     Neck:  Supple, symmetrical, trachea midline.   Lungs /Chest wall: Improved air entry with no wheezing with increased bilateral basal rhonchi, respirations unlabored, symmetrical wall movement.     Heart:  Regular rate and rhythm, S1 S2 normal  Abdomen: Soft,  non-tender, no masses, no organomegaly.    Extremities: No edema, no clubbing or cyanosis     ROS  Constitutional: Negative for chills, fever and malaise/fatigue.   HENT: Negative.    Eyes: Negative.    Cardiovascular: Negative.    Respiratory: Positive for improvement in the cough and shortness of breath.    Skin: Negative.    Musculoskeletal: Negative.    Gastrointestinal: Negative.    Genitourinary: Negative.    Neurological: Generalized weakness      I reviewed the recent clinical results  I personally reviewed the latest radiological studies    Part of this note may be an electronic transcription/translation of spoken language to printed text using the Dragon Dictation System.

## 2024-02-15 NOTE — SIGNIFICANT NOTE
02/15/24 1531   Post Acute Pre-Cert Documentation   Authorization Number: [id number]   Post Acute Pre-Cert Initiated Comment CM sent message in [employer] portal for pre-cert extension. Pre-cert auth approved until 2/18. 2D CM updated.

## 2024-02-15 NOTE — PROGRESS NOTES
Referring Provider: Paul Granados MD    Reason for follow-up: Hypertension, atrial fibrillation     Patient Care Team:  Shaylee Mcdaniels MD as PCP - General (Family Medicine)  Richardson Willis MD as Cardiologist (Cardiology)  Rafy Rodriguez MD as Consulting Physician (Hematology and Oncology)  Christianne Phillips, AMARILIS as Licensed Practical Nurse  Salome Fleming MD as Consulting Physician (Nephrology)      SUBJECTIVE  She feels much better today.  Reports improvement in breathing and overall condition.     ROS  Review of all systems negative except as indicated.    Since I have last seen, the patient has been without any chest discomfort, shortness of breath, palpitations, dizziness or syncope.  Denies having any headache, abdominal pain, nausea, vomiting, diarrhea, constipation, loss of weight or loss of appetite.  Denies having any excessive bruising, hematuria or blood in the stool.  ROS      Personal History:    Past Medical History:   Diagnosis Date    Acute exacerbation of chronic obstructive pulmonary disease (COPD)     COPD (chronic obstructive pulmonary disease)     Deep vein thrombosis of bilateral lower extremities 07/24/2019    3/2013    History of pneumonia 06/2012    community acquired pneumonia and right pleural effusion;hospitalized at [facility]    History of pulmonary embolism 03/2013    bilateral PE    Hypertension 2001    Insomnia     on Ambien 5mg at     Lobular breast cancer, left 11/2011    Stage IA left upper lobular breast cancer    Malignant neoplasm of left breast in female, estrogen receptor positive 07/18/2019    Osteopenia 2012    Parainfluenza infection     Parotid duct obstruction     Severe pulmonary hypertension 03/03/2023    Stage 3a chronic kidney disease 07/24/2019    TIA (transient ischemic attack) 10/25/2022       Past Surgical History:   Procedure Laterality Date    CARDIAC CATHETERIZATION N/A 3/3/2023    Procedure: Left and Right Heart Cath with Coronary  Angiography;  Surgeon: Richardson Willis MD;  Location: [facility] INVASIVE LOCATION;  Service: Cardiovascular;  Laterality: N/A;    CATARACT EXTRACTION      IVC FILTER RETRIEVAL  2014    IVC filter placement by Dr. Card    MAMMO STEREOTACTIC BREAST BX SURGICAL ADD UNI Left 2011    invasive lobular carcinoma-Dr. Cande Daniel    MASTECTOMY, PARTIAL Left 2011    and left axillary sentinel lymph node biopsy by Dr. Uriostegui    TUBAL ABDOMINAL LIGATION         Family History   Problem Relation Age of Onset    Lung cancer Brother        Social History     Tobacco Use    Smoking status: Former     Types: Cigarettes     Quit date:      Years since quittin.1     Passive exposure: Past    Smokeless tobacco: Never    Tobacco comments:     smoked 6 cigarettes a day from 13 years of age to 56 years of age when she quit in    Vaping Use    Vaping Use: Never used   Substance Use Topics    Alcohol use: Not Currently    Drug use: No        Home meds:  Prior to Admission medications    Medication Sig Start Date End Date Taking? Authorizing Provider   allopurinol (ZYLOPRIM) 100 MG tablet Take 1 tablet by mouth Daily.   Yes Jaylyn Golden MD   amoxicillin-clavulanate (AUGMENTIN) 875-125 MG per tablet Take 1 tablet by mouth 2 (Two) Times a Day. 24 Yes Jaylyn Golden MD   apixaban (ELIQUIS) 5 MG tablet tablet Take 1 tablet by mouth Every 12 (Twelve) Hours. Indications: Other - full anticoagulation, history of DVT/PE 10/27/22  Yes Shaylee Mcdaniels MD   budesonide-formoterol (SYMBICORT) 80-4.5 MCG/ACT inhaler Inhale 2 puffs 2 (Two) Times a Day.   Yes Jaylyn Golden MD   carvedilol (COREG) 12.5 MG tablet TAKE 1 TABLET BY MOUTH TWICE DAILY WITH MEALS 23  Yes Richardson Willis MD   Cholecalciferol (Vitamin D3) 50 MCG ( UT) capsule Take 1 capsule by mouth Daily.   Yes Jaylyn Golden MD   furosemide (LASIX) 40 MG tablet Take 1 tablet by mouth Daily.   Yes [place]  MD Jaylyn   gabapentin (NEURONTIN) 300 MG capsule Take 1 capsule by mouth 2 (Two) Times a Day.   Yes Provider, MD Jaylyn   levothyroxine (SYNTHROID, LEVOTHROID) 50 MCG tablet Take 1 tablet by mouth Daily.   Yes Provider, MD Jaylyn   lisinopril (PRINIVIL,ZESTRIL) 40 MG tablet Take 1 tablet by mouth Daily. 6/8/23  Yes Velma Ascencio APRN   potassium chloride (K-DUR,KLOR-CON) 10 MEQ CR tablet Take 1 tablet by mouth Daily. 2/24/23  Yes Richardson Willis MD   sildenafil (REVATIO) 20 MG tablet Take 1 tablet by mouth Every 8 (Eight) Hours. 5/11/23  Yes Mansi Becerril APRN       Allergies:  Patient has no known allergies.    Scheduled Meds:allopurinol, 100 mg, Oral, Daily  apixaban, 5 mg, Oral, Q12H  budesonide, 0.5 mg, Nebulization, BID - RT  carvedilol, 12.5 mg, Oral, BID With Meals  cephalexin, 500 mg, Oral, Q8H  famotidine, 20 mg, Oral, Daily  furosemide, 20 mg, Oral, Daily  guaiFENesin, 1,200 mg, Oral, Q12H  hydrALAZINE, 50 mg, Oral, Q8H  ipratropium-albuterol, 3 mL, Nebulization, 4x Daily - RT  levothyroxine, 50 mcg, Oral, Q AM  melatonin, 5 mg, Oral, Nightly  methylPREDNISolone sodium succinate, 20 mg, Intravenous, Q12H  sildenafil, 20 mg, Oral, TID  sodium chloride, 10 mL, Intravenous, Q12H  sodium chloride, 10 mL, Intravenous, Q12H      Continuous Infusions:     PRN Meds:.  acetaminophen    Calcium Replacement - Follow Nurse / BPA Driven Protocol    ipratropium-albuterol    Magnesium Standard Dose Replacement - Follow Nurse / BPA Driven Protocol    ondansetron    Phosphorus Replacement - Follow Nurse / BPA Driven Protocol    Potassium Replacement - Follow Nurse / BPA Driven Protocol    senna-docusate sodium    [COMPLETED] Insert Peripheral IV **AND** sodium chloride    sodium chloride    sodium chloride    sodium chloride    sodium chloride      OBJECTIVE    Vital Signs  Vitals:    02/14/24 1953 02/14/24 2052 02/15/24 0014 02/15/24 0405   BP: 113/65  106/73 114/80   BP Location: Right arm  Right arm  "Right arm   Patient Position: Lying  Lying Lying   Pulse: 84  72 78   Resp: 21 13 14   Temp:  94.8 °F (34.9 °C) 96.2 °F (35.7 °C) 94.4 °F (34.7 °C)   TempSrc:  Axillary Axillary Axillary   SpO2: 93%  94% 91%   Weight:       Height:           Flowsheet Rows      Flowsheet Row First Filed Value   Admission Height 162.6 cm (64\") Documented at 02/05/2024 1046   Admission Weight 66.7 kg (147 lb) Documented at 02/05/2024 1046              Intake/Output Summary (Last 24 hours) at 2/15/2024 0650  Last data filed at 2/14/2024 1759  Gross per 24 hour   Intake 600 ml   Output --   Net 600 ml            Telemetry: Atrial fibrillation with heart rate in the 70s    Physical Exam:  The patient is alert, oriented X3  Vital signs as noted above.  Head and neck revealed no carotid bruits or jugular venous distention.  No thyromegaly or lymphadenopathy is present  Lungs clear.  No wheezing.  Breath sounds are normal bilaterally.  Irregularly irregular.  No murmur. No precordial rub is present.  No gallop is present.  Abdomen soft and nontender.  No organomegaly is present.  Extremities with good peripheral pulses without any pedal edema.  Skin warm and dry.  Musculoskeletal system is grossly normal.  CNS grossly normal.       Results Review:  I have personally reviewed the results from the time of this admission to 2/15/2024 06:50 EST and agree with these findings:  []  Laboratory  []  Microbiology  []  Radiology  []  EKG/Telemetry   []  Cardiology/Vascular   []  Pathology  []  Old records  []  Other:    Most notable findings include:    Lab Results (last 24 hours)       Procedure Component Value Units Date/Time    Renal Function Panel [[id number]]  (Abnormal) Collected: 02/14/24 2339    Specimen: Blood Updated: 02/15/24 0045     Glucose 118 mg/dL      BUN 56 mg/dL      Creatinine 1.30 mg/dL      Sodium 138 mmol/L      Potassium 4.7 mmol/L      Chloride 103 mmol/L      CO2 27.0 mmol/L      Calcium 9.2 mg/dL      Albumin 3.1 g/dL      " Phosphorus 3.9 mg/dL      Anion Gap 8.0 mmol/L      BUN/Creatinine Ratio 43.1     eGFR 41.1 mL/min/1.73     Narrative:      GFR Normal >60  Chronic Kidney Disease <60  Kidney Failure <15    The GFR formula is only valid for adults with stable renal function between ages 18 and 70.    CBC (No Diff) [[id number]]  (Abnormal) Collected: 02/14/24 2339    Specimen: Blood Updated: 02/15/24 0021     WBC 8.70 10*3/mm3      RBC 4.60 10*6/mm3      Hemoglobin 12.1 g/dL      Hematocrit 38.2 %      MCV 83.2 fL      MCH 26.4 pg      MCHC 31.8 g/dL      RDW 16.8 %      RDW-SD 52.1 fl      MPV 8.3 fL      Platelets 264 10*3/mm3             Imaging Results (Last 24 Hours)       ** No results found for the last 24 hours. **            LAB RESULTS (LAST 7 DAYS)    CBC  Results from last 7 days   Lab Units 02/14/24  2339 02/14/24  0158 02/13/24  0354 02/12/24  0540 02/10/24  0227 02/09/24  0055   WBC 10*3/mm3 8.70 11.10* 11.80* 12.20* 10.40 11.50*   RBC 10*6/mm3 4.60 4.38 4.38 4.13 4.34 4.57   HEMOGLOBIN g/dL 12.1 12.2 11.9* 12.1 11.7* 12.3   HEMATOCRIT % 38.2 37.6 37.5 35.9 36.6 38.8   MCV fL 83.2 85.7 85.6 86.8 84.3 84.8   PLATELETS 10*3/mm3 264 282 282 284 238 234       BMP  Results from last 7 days   Lab Units 02/14/24  2339 02/14/24  0158 02/13/24  0354 02/12/24  0540 02/11/24  0429 02/10/24  0227 02/09/24  0055   SODIUM mmol/L 138 139 137 138 138 140 138   POTASSIUM mmol/L 4.7 4.5 4.3 4.4 4.6 4.3 4.3   CHLORIDE mmol/L 103 103 102 104 106 106 105   CO2 mmol/L 27.0 26.0 25.0 23.0 21.0* 23.0 22.0   BUN mg/dL 56* 54* 53* 45* 35* 29* 30*   CREATININE mg/dL 1.30* 1.51* 1.40* 1.36* 1.43* 1.14* 1.16*   GLUCOSE mg/dL 118* 112* 118* 130* 161* 113* 115*   MAGNESIUM mg/dL  --  2.1  --   --   --  2.0 1.9   PHOSPHORUS mg/dL 3.9 3.9 3.9 4.2 4.7* 3.0 2.7       CMP   Results from last 7 days   Lab Units 02/14/24  2339 02/14/24  0158 02/13/24  0354 02/12/24  0540 02/11/24  0429 02/10/24  0227 02/09/24  0055   SODIUM mmol/L 138 139 137 138 138 140  138   POTASSIUM mmol/L 4.7 4.5 4.3 4.4 4.6 4.3 4.3   CHLORIDE mmol/L 103 103 102 104 106 106 105   CO2 mmol/L 27.0 26.0 25.0 23.0 21.0* 23.0 22.0   BUN mg/dL 56* 54* 53* 45* 35* 29* 30*   CREATININE mg/dL 1.30* 1.51* 1.40* 1.36* 1.43* 1.14* 1.16*   GLUCOSE mg/dL 118* 112* 118* 130* 161* 113* 115*   ALBUMIN g/dL 3.1* 3.3* 3.4* 3.5 3.3*  --   --        BNP        TROPONIN          CoAg        Creatinine Clearance  Estimated Creatinine Clearance: 33 mL/min (A) (by C-G formula based on SCr of 1.3 mg/dL (H)).    ABG        Radiology  No radiology results for the last day      EKG  I personally viewed and interpreted the patient's EKG/Telemetry data:  ECG 12 Lead Rhythm Change   Final Result   HEART RATE= 69  bpm   RR Interval= 900  ms   KY Interval= 213  ms   P Horizontal Axis= -3  deg   P Front Axis= 54  deg   QRSD Interval= 86  ms   QT Interval= 413  ms   QTcB= 435  ms   QRS Axis= 117  deg   T Wave Axis= 33  deg   - ABNORMAL ECG -   Sinus rhythm   Atrial premature complex   Borderline prolonged KY interval   Anteroseptal infarct, age indeterminate   When compared with ECG of 05-Feb-2024 11:04:44,   New or worsened ischemia or infarction   New conduction abnormality   Significant axis, voltage or hypertrophy change   Electronically Signed By: Richardson Willis ([facility]) 08-Feb-2024 17:20:33   Date and Time of Study: 2024-02-08 01:23:00      ECG 12 Lead Other; Weakness   Final Result   HEART RATE= 75  bpm   RR Interval= 860  ms   KY Interval= 176  ms   P Horizontal Axis= 201  deg   P Front Axis= 11  deg   QRSD Interval= 99  ms   QT Interval= 398  ms   QTcB= 429  ms   QRS Axis= 117  deg   T Wave Axis= -6  deg   - ABNORMAL ECG -   Sinus rhythm   Multiple premature complexes, vent & supraven   Right axis deviation   Low voltage, precordial leads   Nonspecific T abnormalities, anterior leads   Electronically Signed By: Pankaj Mccallum ([facility]) 06-Feb-2024 07:40:13   Date and Time of Study: 2024-02-05 11:04:44      SCANNED - TELEMETRY      Final Result      SCANNED - TELEMETRY     Final Result      SCANNED - TELEMETRY     Final Result      SCANNED - TELEMETRY     Final Result      SCANNED - TELEMETRY     Final Result      SCANNED - TELEMETRY     Final Result      SCANNED - TELEMETRY     Final Result      SCANNED - TELEMETRY     Final Result      SCANNED - TELEMETRY     Final Result      SCANNED - TELEMETRY     Final Result      SCANNED - TELEMETRY     Final Result      SCANNED - TELEMETRY     Final Result      SCANNED - TELEMETRY     Final Result      SCANNED - TELEMETRY     Final Result      SCANNED - TELEMETRY     Final Result      SCANNED - TELEMETRY     Final Result      SCANNED - TELEMETRY     Final Result      SCANNED - TELEMETRY     Final Result      SCANNED - TELEMETRY     Final Result      SCANNED - TELEMETRY     Final Result      SCANNED - TELEMETRY     Final Result      SCANNED - TELEMETRY     Final Result      SCANNED - TELEMETRY     Final Result      SCANNED - TELEMETRY     Final Result      SCANNED - TELEMETRY     Final Result      SCANNED - TELEMETRY     Final Result      SCANNED - TELEMETRY     Final Result      SCANNED - TELEMETRY     Final Result      SCANNED - TELEMETRY     Final Result      SCANNED - TELEMETRY     Final Result      SCANNED - TELEMETRY     Final Result      SCANNED - TELEMETRY     Final Result      SCANNED - TELEMETRY     Final Result      SCANNED - TELEMETRY     Final Result      SCANNED - TELEMETRY     Final Result      SCANNED - TELEMETRY     Final Result      SCANNED - TELEMETRY     Final Result      SCANNED - TELEMETRY     Final Result      SCANNED - TELEMETRY     Final Result      SCANNED - TELEMETRY     Final Result      SCANNED - TELEMETRY     Final Result      SCANNED - TELEMETRY     Final Result      SCANNED - TELEMETRY     Final Result      SCANNED - TELEMETRY     Final Result      SCANNED - TELEMETRY     Final Result      SCANNED - TELEMETRY     Final Result      SCANNED - TELEMETRY     Final Result       SCANNED - TELEMETRY     Final Result      SCANNED - TELEMETRY     Final Result      SCANNED - TELEMETRY     Final Result      SCANNED - TELEMETRY     Final Result      SCANNED - TELEMETRY     Final Result      SCANNED - TELEMETRY     Final Result      SCANNED - TELEMETRY     Final Result      SCANNED - TELEMETRY     Final Result      SCANNED - TELEMETRY     Final Result      ECG 12 Lead Altered Mental Status    (Results Pending)         Echocardiogram:    Results for orders placed during the hospital encounter of 02/05/24    Adult Transthoracic Echo Complete W/ Cont if Necessary Per Protocol    Interpretation Summary    Left ventricular systolic function is normal. Left ventricular ejection fraction appears to be 61 - 65%.    Severely reduced right ventricular systolic function noted.    The right ventricular cavity is severely dilated.    The left atrial cavity is mildly dilated.    Left atrial volume is mildly increased.    The right atrial cavity is moderate to severely  dilated.    Estimated right ventricular systolic pressure from tricuspid regurgitation is mildly elevated (35-45 mmHg).    Mild pulmonary hypertension is present.    There is a moderate (1-2cm) pericardial effusion. There is no evidence of cardiac tamponade.        Stress Test:  Results for orders placed in visit on 09/13/22    Stress Test With Myocardial Perfusion One Day    Interpretation Summary    Left ventricular ejection fraction is hyperdynamic (Calculated EF > 70%). .    Myocardial perfusion imaging indicates a normal myocardial perfusion study with no evidence of ischemia.    Impressions are consistent with a low risk study.    Findings consistent with a normal ECG stress test.         Cardiac Catheterization:  Results for orders placed during the hospital encounter of 03/03/23    Cardiac Catheterization/Vascular Study    Narrative  OPERATORS  Richardson Willis M.D. (Attending Cardiologist)      PROCEDURE PERFORMED  Ultrasound guided  vascular access  Right heart catheterization  Coronary Angiogram  Left Heart Catheterization 09883  Moderate Sedation    INDICATIONS FOR PROCEDURE  83-year-old woman with multiple cardiovascular risk factors, abnormal stress test presented with worsening shortness of breath.  After discussing the risk and benefit of the procedure she was brought in for elective right and left heart cath.    PROCEDURE IN DETAIL  Informed consent was obtained from the patient after explaining the risks, benefits, and alternative options of the procedure. After obtaining informed consent, the patient was brought to the cath lab and was prepped in a sterile fashion. Lidocaine 2% was used for local anesthesia into the right femoral venous access site. Right femoral vein was accessed using the micropuncture needle under ultrasound guidance and micropuncture wire advanced under flouroscopy. A 7 Frisian vascular sheath was put into place percutaneously over guide-wire. Guide wires were removed. A 6Fr swan sheryl catheter was advanced to wedge position. RA, RV and PA and wedge pressures were recorded.  PA sat and arterial sats recorded.  The patient tolerated the procedure well without any complications.    Lidocaine 2% was used for local anesthesia into the right femoral arterial access site. The right femoral artery was accessed with a micropuncture needle via modified Seldinger technique under ultrasound guidance. A 6F was inserted successfully.  Afterwards, 6F JR4 and JL4 diagnostic catheters were advanced over a wire into the ascending aorta and were used to engage the ostia of the left main and RCA respectively. JR4 used to cross the AV and obtain LV pressures and gradient across the AV measured via pullback technique. Images of the right and left coronary systems were obtained. All the catheters were exchanged over a wire and subsequently removed. Angiogram of the femoral access site was obtained and did not show complications. The  patient tolerated the procedure well without any complications. The pictures were reviewed at the end of the procedure. A Mynx closure device was applied.    HEMODYNAMICS    RHC  RA 6/5, 4 mmHg  RV 74/3, 5 mmHg  PA 71/25, 44 mmHg  PCW 12 mmHg  AO Sat 92%  PA Sat 78%    Jose CO: 7.89 L/min    Jose CI: 4.69 L/min/m²    LHC  LV: 134/3, 20 mmHg  AO: 131/56, 87 mmHg  No significant gradient across the aortic valve during pullback of JR4 catheter.    FINDINGS  Coronary Angiogram  All vessels are heavily calcified  She also has heavily calcified peripheral arterial disease.  Right dominant circulation    Left main: Left main is a large caliber vessel which gives rise to the Left Anterior Descending and the Left circumflex.  Left main is angiographically free from any significant disease.    Left Anterior Descending Artery: LAD is a heavily calcified medium caliber vessel which gives rise to diagonals.  The vessel is highly tortuous and has 50 to 60% mid vessel stenosis best seen in Kiswahili cranial view.    Left Circumflex: Heavily calcified vessel with 50% proximal segment stenosis.    Right Coronary Artery: The RCA is a large caliber vessel which is heavily calcified and tortuous.  It has diffuse luminal irregularities and 30 to 40% stenosis in the midsegment around the bend.    ESTIMATED BLOOD LOSS:  10 ml    COMPLICATIONS:  None    PROCEDURE DATA:  Sedation Time: 25 minutes    IMPRESSIONS  Heavily calcified, tortuous nonobstructive coronary disease  Severe pulmonary hypertension with PA systolic pressure in the 70s  Mean PA pressure 44 mmHg    RECOMMENDATIONS  -Continue aggressive medical management of CAD  -Referral to pulmonology for treatment of pulmonary hypertension         Other:         ASSESSMENT & PLAN:    Principal Problem:    Altered mental status  Active Problems:    Acute hypokalemia    Stage 3a chronic kidney disease    Chronic obstructive pulmonary disease    History of venous thrombosis and embolism     Primary hypertension    History of TIA (transient ischemic attack)    Severe pulmonary hypertension    Chronic respiratory failure with hypoxia    JULIAN (acute kidney injury)      Altered mental status  Possible mastitis/PICC line infection  CT head and MRI of the brain unremarkable with no acute findings  Antibiotics per ID  Her mental status is now improved and back to baseline    JULIAN on Stage 3a chronic kidney disease  Creatinine 1.3, GFR is 41.1  Diuretics per nephrology  Potassium has been replaced.  Hypokalemia resolved  Closely monitor renal function and respiratory status     COPD/Chronic respiratory failure  Severe pulmonary hypertension  On 2-3 L of oxygen via nasal cannula at baseline.  Has known pulmonary hypertension  RA 6/5, 4 mmHg  RV 74/3, 5 mmHg  PA 71/25, 44 mmHg  PCW 12 mmHg  Continue sildenafil  On steroids  Managed by pulmonology  Respiratory status continues to improve  Echocardiogram shows preserved LV function with mildly elevated RVSP.  Small to moderate pericardial effusion: No signs of tamponade     Atrial fibrillation  She has paroxysmal atrial fibrillation  [id number] score is 6  Continue Eliquis for atrial fibrillation as well as for history of DVT/PE  Heart rate has improved on beta-blocker: Continue Coreg     History of venous thrombosis and embolism  Continue Eliquis 5 mg p.o. twice daily.  She also has an IVC filter in place as well.     Primary hypertension, chronic  Blood pressure has improved and is currently normal  Continue Coreg and hydralazine for blood pressure control  Amlodipine can be added if better blood pressure control is desired.  Echo shows preserved LV function, reduced RV function and mildly elevated RVSP.  Pericardial effusion is not significant with no signs of tamponade.  Diuretics to be used as needed     Coronary artery disease  Continue high intensity statin and beta-blocker.  No need for aspirin while she is on anticoagulation  Previous cardiac  catheterization showed heavily calcified coronaries but nonobstructive.  No chest pain and stable from cardiac standpoint.     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  Continue high intensity statin and anticoagulation with Eliquis 5 mg p.o. twice daily.    Okay to discharge from cardiac standpoint.  She will need physical therapy and rehab.    Richardson Willis MD  02/15/24  06:50 EST

## 2024-02-15 NOTE — PLAN OF CARE
Goal Outcome Evaluation:  Plan of Care Reviewed With: patient        Progress: improving     Pt resting comfortably with no complaints overnight. Currently on 4L O2. Possible discharge to [facility] later today. Will continue to monitor...

## 2024-02-15 NOTE — CONSULTS
"Nutrition Services    Patient Name: Gabrielle Lyles  YOB: 1940  MRN: [id number]  Admission date: 2/5/2024      NUTRITION SCREENING      Trending Narrative: 2/15: Check on for LOS x 10 days.  Admitted for confusion/AMS.  Other conditions include COPD.  Patient not available at time of RD visit.         PO Diet: Diet: Regular/House Diet; Texture: Regular Texture (IDDSI 7); Fluid Consistency: Thin (IDDSI 0)   PO Supplements: None ordered    Trending PO Intake:  59% average PO intakes x 11 documented meals since admission        Nutritionally-Pertinent Medications RDN Reviewed, C/W clinical course         Labs (reviewed below): Reviewed, management per attending      Results from last 7 days   Lab Units 02/14/24 2339 02/14/24 0158 02/13/24  0354   SODIUM mmol/L 138 139 137   POTASSIUM mmol/L 4.7 4.5 4.3   CHLORIDE mmol/L 103 103 102   CO2 mmol/L 27.0 26.0 25.0   BUN mg/dL 56* 54* 53*   CREATININE mg/dL 1.30* 1.51* 1.40*   CALCIUM mg/dL 9.2 9.2 9.2   GLUCOSE mg/dL 118* 112* 118*     Results from last 7 days   Lab Units 02/14/24 2339 02/14/24 0158 02/11/24  0429 02/10/24  0227 02/09/24  0055   MAGNESIUM mg/dL  --  2.1  --  2.0 1.9   PHOSPHORUS mg/dL 3.9 3.9   < > 3.0 2.7   HEMOGLOBIN g/dL 12.1 12.2   < > 11.7* 12.3   HEMATOCRIT % 38.2 37.6   < > 36.6 38.8    < > = values in this interval not displayed.     Lab Results   Component Value Date    HGBA1C 5.8 (H) 10/25/2022          GI Function:  Last documented BM 2/15 (today)       Skin: Intact        Weight Review: Estimated body mass index is 28.31 kg/m² as calculated from the following:    Height as of this encounter: 162.6 cm (64\").    Weight as of this encounter: 74.8 kg (164 lb 14.5 oz).    Comment:   2/15: 164#  No recent weight loss to note     Wt Readings from Last 30 Encounters:   02/13/24 0337 74.8 kg (164 lb 14.5 oz)   02/10/24 0431 73.5 kg (162 lb 0.6 oz)   02/09/24 0422 71.1 kg (156 lb 12 oz)   02/08/24 1314 68.9 kg (152 lb)   02/07/24 0411 " 69 kg (152 lb 1.9 oz)   02/06/24 0415 66.9 kg (147 lb 7.8 oz)   02/05/24 1046 66.7 kg (147 lb)   09/25/23 0600 66.8 kg (147 lb 4.3 oz)   09/24/23 1305 64 kg (141 lb)   08/29/23 1324 64 kg (141 lb)   08/25/23 1422 64 kg (141 lb)   08/25/23 1250 64.2 kg (141 lb 8 oz)   05/12/23 0634 63 kg (139 lb)   05/10/23 0405 62.8 kg (138 lb 7.2 oz)   05/09/23 0350 65 kg (143 lb 4.8 oz)   05/06/23 0639 64 kg (141 lb 1.5 oz)   05/04/23 0406 62.7 kg (138 lb 3.7 oz)   05/02/23 1310 61.5 kg (135 lb 9.3 oz)   05/02/23 1221 62.6 kg (138 lb)   04/08/23 2123 64.3 kg (141 lb 12.1 oz)   04/08/23 1654 62.6 kg (138 lb)   03/03/23 1024 61.8 kg (136 lb 3.9 oz)   02/24/23 1246 63.5 kg (140 lb)   12/13/22 1042 64 kg (141 lb)   12/12/22 0933 63.5 kg (140 lb)   11/13/22 1015 64.4 kg (142 lb)   10/27/22 0537 64.7 kg (142 lb 10.2 oz)   10/25/22 1531 64.3 kg (141 lb 12.1 oz)   10/11/22 1312 64.4 kg (142 lb)   10/04/22 1101 64.4 kg (142 lb)   09/13/22 0856 64.4 kg (142 lb)   10/08/21 1054 66.7 kg (147 lb)   09/28/21 1816 66.2 kg (146 lb)   09/10/21 1517 64.4 kg (142 lb)   06/21/21 0034 65 kg (143 lb 4.8 oz)   05/29/21 1154 67.6 kg (149 lb)   01/10/21 1041 68 kg (150 lb)   12/23/20 1224 68.9 kg (152 lb)   02/01/20 1416 69.9 kg (154 lb)   12/27/19 1058 69.9 kg (154 lb)   12/26/19 1820 70.3 kg (155 lb)   12/20/19 1031 70.3 kg (155 lb)   12/13/19 1132 69.9 kg (154 lb)   12/05/19 1023 71.2 kg (157 lb)          --       Nutrition Problem Statement: No nutritional diagnosis noted at this time, RD will continue to monitor for any nutritional diagnosis that may arise.        Nutrition Intervention: Continue current diet and encourage good PO intakes.          Monitoring/Evaluation Per protocol, I&O, PO intake, Supplement intake, Pertinent labs, Weight, Skin status, GI status, Symptoms, POC/GOC          RD to follow up per protocol.    Electronically signed by:  Oumou Holm RD  02/15/24 11:10 EST

## 2024-02-15 NOTE — THERAPY TREATMENT NOTE
"Subjective: Pt agreeable to therapeutic plan of care.    Objective:     Bed mobility - N/A or Not attempted. Pt up in chair upon arrival.     Transfers - CGA and with rolling walker    Ambulation - 15 feet Min-A and with rolling walker. One minor LOB episode noted while ambulating in room.     Therapeutic Exercise - 20 Reps B LE AROM supported sitting / chair    Vitals: Desaturates to 85% on 4LPM with steady recovery while resting with PLB.     Pain: 5 VAS   Location: R knee  Intervention for pain: Repositioned, Increased Activity, and Therapeutic Presence    Education: Provided education on the importance of mobility in the acute care setting, Verbal/Tactile Cues, Transfer Training, Gait Training, and Stroke prevention and risk factors    Assessment: Gabrielle Lyles presents with functional mobility impairments which indicate the need for skilled intervention. Steady improvement in functional mobility noted, with pt able to increase ambulation distance to 15 feet with min A due to impaired balance using RW. Pt reports fatigue after ambulating and desats while on 4LPM. Pt requires encouragement to progress mobility. Will continue to follow and progress as tolerated.     Plan/Recommendations:   If medically appropriate, Moderate Intensity Therapy recommended post-acute care. This is recommended as therapy feels the patient would require 3-4 days per week and wouldn't tolerate \"3 hour daily\" rehab intensity. SNF would be the preferred choice. If the patient does not agree to SNF, arrange HH or OP depending on home bound status. If patient is medically complex, consider LTACH. Pt requires no DME at discharge.     Pt desires Skilled Rehab placement at discharge. Pt cooperative; agreeable to therapeutic recommendations and plan of care.         Basic Mobility 6-click:  Rollin = Total, A lot = 2, A little = 3; 4 = None  Supine>Sit:   1 = Total, A lot = 2, A little = 3; 4 = None   Sit>Stand with arms:  1 = " Total, A lot = 2, A little = 3; 4 = None  Bed>Chair:   1 = Total, A lot = 2, A little = 3; 4 = None  Ambulate in room:  1 = Total, A lot = 2, A little = 3; 4 = None  3-5 Steps with railin = Total, A lot = 2, A little = 3; 4 = None  Score: 16    Modified [place]: N/A = No pre-op stroke/TIA    Post-Tx Position: Up in Chair, Alarms activated, and Call light and personal items within reach  PPE: gloves

## 2024-02-15 NOTE — PROGRESS NOTES
LOS: 8 days   Patient Care Team:  Shaylee Mcdaniels MD as PCP - General (Family Medicine)  Richardson Willis MD as Cardiologist (Cardiology)  Rafy Rodriguez MD as Consulting Physician (Hematology and Oncology)  Christianne Phillips, RN as Licensed Practical Nurse  Salome Fleming MD as Consulting Physician (Nephrology)    Subjective:  Less short of breath more alert  Objective:   Afebrile      Review of Systems:   Review of Systems   Neurological:  Positive for weakness.       Vital Signs  Temp:  [94.4 °F (34.7 °C)-96.2 °F (35.7 °C)] 94.4 °F (34.7 °C)  Heart Rate:  [58-84] 80  Resp:  [13-21] 16  BP: (106-143)/(65-80) 119/68    Physical Exam:  Physical Exam  Constitutional:       Appearance: Normal appearance.   Cardiovascular:      Rate and Rhythm: Regular rhythm. Rhythm irregular.   Pulmonary:      Breath sounds: Normal breath sounds.   Skin:     General: Skin is warm.   Neurological:      General: No focal deficit present.      Mental Status: She is alert.          Radiology:  XR Chest 1 View    Result Date: 2/9/2024  Impression: Stable left basilar atelectasis and chronic lung changes Cardiomegaly Electronically Signed: Pablo Martins MD  2/9/2024 7:35 AM EST  Workstation ID: [id number]    US Breast Left Limited    Result Date: 2/8/2024  IMPRESSION : 1. Nonspecific hypoechoic shadowing region measuring 2.0 cm at the 11 o'clock position 4 cm from the nipple corresponding to patient's lumpectomy site. This could represent scarring at the lumpectomy site, collapsed seroma cavity or recurrence. Abscess is felt less likely but not entirely excluded if there are clinical findings of infection.  Recommend patient return for full outpatient diagnostic work-up including diagnostic mammogram and ultrasound with real-time evaluation by radiologist. Patient also requires import of outside breast imaging more recent than 2014  BI-RADS Final Assessment Category 0: Incomplete-Need Additional Imaging Evaluation  Management Recommendation: Recall for additional imaging.   Electronically Signed By-Param Johansen MD On:2/8/2024 8:35 AM      MRI Brain With Contrast    Result Date: 2/7/2024  Impression: No abnormality noted on postcontrast imaging. See same-day noncontrast exam report for additional details. Electronically Signed: Alan Rodríguez MD  2/7/2024 6:39 PM CST  Workstation ID: [id number]    MRI Brain Without Contrast    Result Date: 2/7/2024  Impression: Mildly motion-degraded exam demonstrating expected age-related changes, without evidence of acute infarct, hemorrhage, mass or mass effect. Electronically Signed: Andres Olvera MD  2/7/2024 9:00 AM EST  Workstation ID: [id number]        Results Review:     I reviewed the patient's new clinical results.  I reviewed the patient's new imaging results and agree with the interpretation.    Medication Review:   Scheduled Meds:allopurinol, 100 mg, Oral, Daily  apixaban, 5 mg, Oral, Q12H  budesonide, 0.5 mg, Nebulization, BID - RT  carvedilol, 12.5 mg, Oral, BID With Meals  cephalexin, 500 mg, Oral, Q8H  famotidine, 20 mg, Oral, Daily  furosemide, 20 mg, Oral, Daily  guaiFENesin, 1,200 mg, Oral, Q12H  hydrALAZINE, 50 mg, Oral, Q8H  ipratropium-albuterol, 3 mL, Nebulization, 4x Daily - RT  levothyroxine, 50 mcg, Oral, Q AM  melatonin, 5 mg, Oral, Nightly  methylPREDNISolone sodium succinate, 20 mg, Intravenous, Q12H  sildenafil, 20 mg, Oral, TID  sodium chloride, 10 mL, Intravenous, Q12H  sodium chloride, 10 mL, Intravenous, Q12H      Continuous Infusions:   PRN Meds:.  acetaminophen    Calcium Replacement - Follow Nurse / BPA Driven Protocol    ipratropium-albuterol    Magnesium Standard Dose Replacement - Follow Nurse / BPA Driven Protocol    ondansetron    Phosphorus Replacement - Follow Nurse / BPA Driven Protocol    Potassium Replacement - Follow Nurse / BPA Driven Protocol    senna-docusate sodium    [COMPLETED] Insert Peripheral IV **AND** sodium chloride    sodium  "chloride    sodium chloride    sodium chloride    sodium chloride    Labs:    CBC    Results from last 7 days   Lab Units 02/14/24  2339 02/14/24  0158 02/13/24  0354 02/12/24  0540 02/10/24  0227 02/09/24  0055   WBC 10*3/mm3 8.70 11.10* 11.80* 12.20* 10.40 11.50*   HEMOGLOBIN g/dL 12.1 12.2 11.9* 12.1 11.7* 12.3   PLATELETS 10*3/mm3 264 282 282 284 238 234     BMP   Results from last 7 days   Lab Units 02/14/24  2339 02/14/24  0158 02/13/24  0354 02/12/24  0540 02/11/24  0429 02/10/24  0227 02/09/24  0055   SODIUM mmol/L 138 139 137 138 138 140 138   POTASSIUM mmol/L 4.7 4.5 4.3 4.4 4.6 4.3 4.3   CHLORIDE mmol/L 103 103 102 104 106 106 105   CO2 mmol/L 27.0 26.0 25.0 23.0 21.0* 23.0 22.0   BUN mg/dL 56* 54* 53* 45* 35* 29* 30*   CREATININE mg/dL 1.30* 1.51* 1.40* 1.36* 1.43* 1.14* 1.16*   GLUCOSE mg/dL 118* 112* 118* 130* 161* 113* 115*   MAGNESIUM mg/dL  --  2.1  --   --   --  2.0 1.9   PHOSPHORUS mg/dL 3.9 3.9 3.9 4.2 4.7* 3.0 2.7     Cr Clearance Estimated Creatinine Clearance: 33 mL/min (A) (by C-G formula based on SCr of 1.3 mg/dL (H)).  Coag     HbA1C   Lab Results   Component Value Date    HGBA1C 5.8 (H) 10/25/2022     Blood Glucose No results found for: \"POCGLU\"  Infection     CMP   Results from last 7 days   Lab Units 02/14/24  2339 02/14/24  0158 02/13/24  0354 02/12/24  0540 02/11/24  0429 02/10/24  0227 02/09/24  0055   SODIUM mmol/L 138 139 137 138 138 140 138   POTASSIUM mmol/L 4.7 4.5 4.3 4.4 4.6 4.3 4.3   CHLORIDE mmol/L 103 103 102 104 106 106 105   CO2 mmol/L 27.0 26.0 25.0 23.0 21.0* 23.0 22.0   BUN mg/dL 56* 54* 53* 45* 35* 29* 30*   CREATININE mg/dL 1.30* 1.51* 1.40* 1.36* 1.43* 1.14* 1.16*   GLUCOSE mg/dL 118* 112* 118* 130* 161* 113* 115*   ALBUMIN g/dL 3.1* 3.3* 3.4* 3.5 3.3*  --   --      UA      Radiology(recent) No radiology results for the last day   Assessment:    Acute mental status changes with acute delirium  Urinary tract infection present upon " admission  Hypocalcemia  Dehydration  Acute renal failure with acute kidney injury superimposed upon chronic kidney disease stage IIIa  Left breast anomaly  Hypertension associated chronic kidney disease stage IIIa  Anemia of chronic kidney disease  Hyperuricemia  Left breast mastitis  Right arm thrombophlebitis  Pulmonary hypertension  Acute hypokalemia  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  Cerebrovascular disease status post CVA  Chronic oral anticoagulation therapy  Acquired hypothyroidism  Thrombophilia  Autonomic dysfunction syndrome  History of pulmonary embolism  Paroxysmal atrial fibrillation  Hypercoagulable state secondary to atrial fibrillation  [id number] score 6  History of IVC filter  Aneurysmal dilatation of abdominal aorta  Panlobular COPD with emphysema  Chronic mucopurulent bronchitis  Peripheral polyneuropathy  History of breast cancer  Supplemental oxygen dependency  Chronic hypoxic respiratory failure    Plan:  The patient feels better but does not want to be transferred to rehab today//feels like she is not ready//will discuss with family  De-escalate steroids      Paul Granados MD  02/15/24  08:43 EST

## 2024-02-15 NOTE — PLAN OF CARE
"Assessment: Gabrielle Lyles presents with functional mobility impairments which indicate the need for skilled intervention. Steady improvement in functional mobility noted, with pt able to increase ambulation distance to 15 feet with min A due to impaired balance using RW. Pt reports fatigue after ambulating and desats while on 4LPM. Pt requires encouragement to progress mobility. Will continue to follow and progress as tolerated.     Plan/Recommendations:   If medically appropriate, Moderate Intensity Therapy recommended post-acute care. This is recommended as therapy feels the patient would require 3-4 days per week and wouldn't tolerate \"3 hour daily\" rehab intensity. SNF would be the preferred choice. If the patient does not agree to SNF, arrange HH or OP depending on home bound status. If patient is medically complex, consider LTACH. Pt requires no DME at discharge.     Pt desires Skilled Rehab placement at discharge. Pt cooperative; agreeable to therapeutic recommendations and plan of care.        "

## 2024-02-16 LAB
ALBUMIN SERPL-MCNC: 3.3 G/DL (ref 3.5–5.2)
ANION GAP SERPL CALCULATED.3IONS-SCNC: 8 MMOL/L (ref 5–15)
BUN SERPL-MCNC: 59 MG/DL (ref 8–23)
BUN/CREAT SERPL: 36.4 (ref 7–25)
CALCIUM SPEC-SCNC: 9.1 MG/DL (ref 8.6–10.5)
CHLORIDE SERPL-SCNC: 102 MMOL/L (ref 98–107)
CO2 SERPL-SCNC: 27 MMOL/L (ref 22–29)
CREAT SERPL-MCNC: 1.62 MG/DL (ref 0.57–1)
EGFRCR SERPLBLD CKD-EPI 2021: 31.6 ML/MIN/1.73
GLUCOSE SERPL-MCNC: 105 MG/DL (ref 65–99)
MAGNESIUM SERPL-MCNC: 2.2 MG/DL (ref 1.6–2.4)
PHOSPHATE SERPL-MCNC: 4 MG/DL (ref 2.5–4.5)
POTASSIUM SERPL-SCNC: 4.8 MMOL/L (ref 3.5–5.2)
SODIUM SERPL-SCNC: 137 MMOL/L (ref 136–145)

## 2024-02-16 PROCEDURE — 94799 UNLISTED PULMONARY SVC/PX: CPT

## 2024-02-16 PROCEDURE — 94761 N-INVAS EAR/PLS OXIMETRY MLT: CPT

## 2024-02-16 PROCEDURE — 94664 DEMO&/EVAL PT USE INHALER: CPT

## 2024-02-16 PROCEDURE — 97110 THERAPEUTIC EXERCISES: CPT

## 2024-02-16 PROCEDURE — 99232 SBSQ HOSP IP/OBS MODERATE 35: CPT | Performed by: INTERNAL MEDICINE

## 2024-02-16 PROCEDURE — 97116 GAIT TRAINING THERAPY: CPT

## 2024-02-16 PROCEDURE — 97530 THERAPEUTIC ACTIVITIES: CPT

## 2024-02-16 PROCEDURE — 25010000002 METHYLPREDNISOLONE PER 40 MG: Performed by: FAMILY MEDICINE

## 2024-02-16 RX ORDER — PREDNISONE 5 MG/1
5 TABLET ORAL
Qty: 5 TABLET | Refills: 0 | Status: DISCONTINUED | OUTPATIENT
Start: 2024-02-22 | End: 2024-02-17 | Stop reason: HOSPADM

## 2024-02-16 RX ORDER — PREDNISONE 10 MG/1
10 TABLET ORAL
Qty: 5 TABLET | Refills: 0 | Status: DISCONTINUED | OUTPATIENT
Start: 2024-02-17 | End: 2024-02-17 | Stop reason: HOSPADM

## 2024-02-16 RX ADMIN — SILDENAFIL CITRATE 20 MG: 20 TABLET ORAL at 07:33

## 2024-02-16 RX ADMIN — ACETAMINOPHEN 650 MG: 325 TABLET, FILM COATED ORAL at 21:38

## 2024-02-16 RX ADMIN — SILDENAFIL CITRATE 20 MG: 20 TABLET ORAL at 21:38

## 2024-02-16 RX ADMIN — HYDRALAZINE HYDROCHLORIDE 50 MG: 25 TABLET ORAL at 06:12

## 2024-02-16 RX ADMIN — ALLOPURINOL 100 MG: 100 TABLET ORAL at 07:33

## 2024-02-16 RX ADMIN — IPRATROPIUM BROMIDE AND ALBUTEROL SULFATE 3 ML: .5; 3 SOLUTION RESPIRATORY (INHALATION) at 11:07

## 2024-02-16 RX ADMIN — Medication 10 ML: at 21:51

## 2024-02-16 RX ADMIN — APIXABAN 5 MG: 5 TABLET, FILM COATED ORAL at 21:38

## 2024-02-16 RX ADMIN — LEVOTHYROXINE SODIUM 50 MCG: 0.05 TABLET ORAL at 06:12

## 2024-02-16 RX ADMIN — Medication 10 ML: at 07:35

## 2024-02-16 RX ADMIN — ACETAMINOPHEN 650 MG: 325 TABLET, FILM COATED ORAL at 15:05

## 2024-02-16 RX ADMIN — CARVEDILOL 12.5 MG: 6.25 TABLET, FILM COATED ORAL at 07:34

## 2024-02-16 RX ADMIN — GUAIFENESIN 1200 MG: 600 TABLET, EXTENDED RELEASE ORAL at 07:33

## 2024-02-16 RX ADMIN — IPRATROPIUM BROMIDE AND ALBUTEROL SULFATE 3 ML: .5; 3 SOLUTION RESPIRATORY (INHALATION) at 07:04

## 2024-02-16 RX ADMIN — FAMOTIDINE 20 MG: 20 TABLET, FILM COATED ORAL at 07:34

## 2024-02-16 RX ADMIN — METHYLPREDNISOLONE SODIUM SUCCINATE 20 MG: 40 INJECTION, POWDER, FOR SOLUTION INTRAMUSCULAR; INTRAVENOUS at 07:34

## 2024-02-16 RX ADMIN — CARVEDILOL 12.5 MG: 6.25 TABLET, FILM COATED ORAL at 17:37

## 2024-02-16 RX ADMIN — APIXABAN 5 MG: 5 TABLET, FILM COATED ORAL at 07:33

## 2024-02-16 RX ADMIN — GUAIFENESIN 1200 MG: 600 TABLET, EXTENDED RELEASE ORAL at 21:38

## 2024-02-16 RX ADMIN — BUDESONIDE INHALATION 0.5 MG: 0.5 SUSPENSION RESPIRATORY (INHALATION) at 07:08

## 2024-02-16 RX ADMIN — IPRATROPIUM BROMIDE AND ALBUTEROL SULFATE 3 ML: .5; 3 SOLUTION RESPIRATORY (INHALATION) at 15:08

## 2024-02-16 RX ADMIN — CEPHALEXIN 500 MG: 500 CAPSULE ORAL at 06:12

## 2024-02-16 RX ADMIN — FUROSEMIDE 20 MG: 20 TABLET ORAL at 07:34

## 2024-02-16 RX ADMIN — Medication 5 MG: at 21:38

## 2024-02-16 RX ADMIN — Medication 10 ML: at 07:41

## 2024-02-16 RX ADMIN — HYDRALAZINE HYDROCHLORIDE 50 MG: 25 TABLET ORAL at 21:38

## 2024-02-16 RX ADMIN — Medication 10 ML: at 21:38

## 2024-02-16 RX ADMIN — SILDENAFIL CITRATE 20 MG: 20 TABLET ORAL at 15:06

## 2024-02-16 NOTE — PROGRESS NOTES
Daily Progress Note          Assessment    AMS: No significant findings on brain MRI  Chronic Severe Pulmonary HTN  COPD: Emphysema  Chronic atelectasis in left lower lobe  Anemia  UTI  JULIAN/CKD 3  HTN  Hx PE/DVT  Hx TIA  CAD  History of breast cancer in 2018: Currently in remission              PLAN:  Respiratory status improving and patient can be discharged home  from pulmonary standpoint with follow-up in the pulmonary clinic in 3 weeks     oxygen supplement and titration to maintain saturation 90-95%: Currently requiring 4 L by high flow nasal cannula  PT/OT  Encourage use of incentive spirometry  Mucinex  Antibiotics per ID  Bronchodilators  IV steroids  switched to oral prednisone 10 mg  for 5 days, 5 mg for 5 days and then discontinue  Sildenafil  Inhaled corticosteroids  Diuresis as per nephrology  Thyroid hormone replacement  Cardiology following   Electrolytes/ glycemic control  Chronic anticoagulation: Apixaban  I personally reviewed the radiological images             LOS: 9 days     Subjective     Patient reports gradual improvement in the cough and shortness of breath    Objective     Vital signs for last 24 hours:  Vitals:    02/16/24 0708 02/16/24 0711 02/16/24 1107 02/16/24 1110   BP:  121/61     BP Location:  Right arm     Patient Position:  Lying     Pulse: 70 68 72 66   Resp: 20 20 16 16   Temp:  95.7 °F (35.4 °C)     TempSrc:  Oral     SpO2: 98% 97% 97% (!) 89%   Weight:       Height:           Intake/Output last 3 shifts:  I/O last 3 completed shifts:  In: 1200 [P.O.:1200]  Out: -   Intake/Output this shift:  I/O this shift:  In: 240 [P.O.:240]  Out: -       Radiology  Imaging Results (Last 24 Hours)       ** No results found for the last 24 hours. **            Labs:  Results from last 7 days   Lab Units 02/15/24  2323   WBC 10*3/mm3 10.70   HEMOGLOBIN g/dL 12.6   HEMATOCRIT % 40.1   PLATELETS 10*3/mm3 285     Results from last 7 days   Lab Units 02/15/24  2323   SODIUM mmol/L 137   POTASSIUM  mmol/L 4.8   CHLORIDE mmol/L 102   CO2 mmol/L 27.0   BUN mg/dL 59*   CREATININE mg/dL 1.62*   CALCIUM mg/dL 9.1   GLUCOSE mg/dL 105*         Results from last 7 days   Lab Units 02/15/24  2323 02/14/24  2339 02/14/24  0158   ALBUMIN g/dL 3.3* 3.1* 3.3*               Results from last 7 days   Lab Units 02/15/24  2323   MAGNESIUM mg/dL 2.2                     Meds:   SCHEDULE  allopurinol, 100 mg, Oral, Daily  apixaban, 5 mg, Oral, Q12H  budesonide, 0.5 mg, Nebulization, BID - RT  carvedilol, 12.5 mg, Oral, BID With Meals  famotidine, 20 mg, Oral, Daily  furosemide, 20 mg, Oral, Daily  guaiFENesin, 1,200 mg, Oral, Q12H  hydrALAZINE, 50 mg, Oral, Q8H  ipratropium-albuterol, 3 mL, Nebulization, 4x Daily - RT  levothyroxine, 50 mcg, Oral, Q AM  melatonin, 5 mg, Oral, Nightly  methylPREDNISolone sodium succinate, 20 mg, Intravenous, Daily  sildenafil, 20 mg, Oral, TID  sodium chloride, 10 mL, Intravenous, Q12H  sodium chloride, 10 mL, Intravenous, Q12H      Infusions     PRNs    acetaminophen    Calcium Replacement - Follow Nurse / BPA Driven Protocol    ipratropium-albuterol    Magnesium Standard Dose Replacement - Follow Nurse / BPA Driven Protocol    ondansetron    Phosphorus Replacement - Follow Nurse / BPA Driven Protocol    Potassium Replacement - Follow Nurse / BPA Driven Protocol    senna-docusate sodium    [COMPLETED] Insert Peripheral IV **AND** sodium chloride    sodium chloride    sodium chloride    sodium chloride    sodium chloride    Physical Exam:  General Appearance:  Alert   HEENT:  Normocephalic, without obvious abnormality, Conjunctiva/corneas clear,.   Nares normal, no drainage     Neck:  Supple, symmetrical, trachea midline.   Lungs /Chest wall: Improved air entry with no wheezing with increased bilateral basal rhonchi, respirations unlabored, symmetrical wall movement.     Heart:  Regular rate and rhythm, S1 S2 normal  Abdomen: Soft, non-tender, no masses, no organomegaly.    Extremities: No edema,  no clubbing or cyanosis     ROS  Constitutional: Negative for chills, fever and malaise/fatigue.   HENT: Negative.    Eyes: Negative.    Cardiovascular: Negative.    Respiratory: Positive for improvement in the cough and shortness of breath.    Skin: Negative.    Musculoskeletal: Negative.    Gastrointestinal: Negative.    Genitourinary: Negative.    Neurological: Generalized weakness      I reviewed the recent clinical results  I personally reviewed the latest radiological studies    Part of this note may be an electronic transcription/translation of spoken language to printed text using the Dragon Dictation System.

## 2024-02-16 NOTE — PROGRESS NOTES
LOS: 9 days   Patient Care Team:  Shaylee Mcdaniels MD as PCP - General (Family Medicine)  Richardson Willis MD as Cardiologist (Cardiology)  Rafy Rodriguez MD as Consulting Physician (Hematology and Oncology)  Christianne Phillips, AMARILIS as Licensed Practical Nurse  Salome Fleming MD as Consulting Physician (Nephrology)    Subjective:  Less short of breath more alert  Objective:   Afebrile      Review of Systems:   Review of Systems   Constitutional:  Positive for activity change and appetite change.   HENT: Negative.     Respiratory:  Positive for cough and shortness of breath.    Gastrointestinal: Negative.    Genitourinary: Negative.    Neurological:  Positive for weakness.   Psychiatric/Behavioral: Negative.         Vital Signs  Temp:  [95.7 °F (35.4 °C)-97.4 °F (36.3 °C)] 95.7 °F (35.4 °C)  Heart Rate:  [67-86] 68  Resp:  [14-25] 20  BP: ()/(51-62) 121/61    Physical Exam:  Physical Exam  Constitutional:       Appearance: Normal appearance.   Cardiovascular:      Rate and Rhythm: Rhythm irregular.   Pulmonary:      Breath sounds: Normal breath sounds.   Skin:     General: Skin is warm.   Neurological:      General: No focal deficit present.      Mental Status: She is alert.          Radiology:  XR Chest 1 View    Result Date: 2/9/2024  Impression: Stable left basilar atelectasis and chronic lung changes Cardiomegaly Electronically Signed: Pablo Martins MD  2/9/2024 7:35 AM EST  Workstation ID: [id number]        Results Review:     I reviewed the patient's new clinical results.  I reviewed the patient's new imaging results and agree with the interpretation.    Medication Review:   Scheduled Meds:allopurinol, 100 mg, Oral, Daily  apixaban, 5 mg, Oral, Q12H  budesonide, 0.5 mg, Nebulization, BID - RT  carvedilol, 12.5 mg, Oral, BID With Meals  famotidine, 20 mg, Oral, Daily  furosemide, 20 mg, Oral, Daily  guaiFENesin, 1,200 mg, Oral, Q12H  hydrALAZINE, 50 mg, Oral, Q8H  ipratropium-albuterol, 3 mL,  "Nebulization, 4x Daily - RT  levothyroxine, 50 mcg, Oral, Q AM  melatonin, 5 mg, Oral, Nightly  methylPREDNISolone sodium succinate, 20 mg, Intravenous, Daily  sildenafil, 20 mg, Oral, TID  sodium chloride, 10 mL, Intravenous, Q12H  sodium chloride, 10 mL, Intravenous, Q12H      Continuous Infusions:   PRN Meds:.  acetaminophen    Calcium Replacement - Follow Nurse / BPA Driven Protocol    ipratropium-albuterol    Magnesium Standard Dose Replacement - Follow Nurse / BPA Driven Protocol    ondansetron    Phosphorus Replacement - Follow Nurse / BPA Driven Protocol    Potassium Replacement - Follow Nurse / BPA Driven Protocol    senna-docusate sodium    [COMPLETED] Insert Peripheral IV **AND** sodium chloride    sodium chloride    sodium chloride    sodium chloride    sodium chloride    Labs:    CBC    Results from last 7 days   Lab Units 02/15/24  2323 02/14/24  2339 02/14/24  0158 02/13/24  0354 02/12/24  0540 02/10/24  0227   WBC 10*3/mm3 10.70 8.70 11.10* 11.80* 12.20* 10.40   HEMOGLOBIN g/dL 12.6 12.1 12.2 11.9* 12.1 11.7*   PLATELETS 10*3/mm3 285 264 282 282 284 238     BMP   Results from last 7 days   Lab Units 02/15/24  2323 02/14/24  2339 02/14/24  0158 02/13/24  0354 02/12/24  0540 02/11/24  0429 02/10/24  0227   SODIUM mmol/L 137 138 139 137 138 138 140   POTASSIUM mmol/L 4.8 4.7 4.5 4.3 4.4 4.6 4.3   CHLORIDE mmol/L 102 103 103 102 104 106 106   CO2 mmol/L 27.0 27.0 26.0 25.0 23.0 21.0* 23.0   BUN mg/dL 59* 56* 54* 53* 45* 35* 29*   CREATININE mg/dL 1.62* 1.30* 1.51* 1.40* 1.36* 1.43* 1.14*   GLUCOSE mg/dL 105* 118* 112* 118* 130* 161* 113*   MAGNESIUM mg/dL 2.2  --  2.1  --   --   --  2.0   PHOSPHORUS mg/dL 4.0 3.9 3.9 3.9 4.2 4.7* 3.0     Cr Clearance Estimated Creatinine Clearance: 25.7 mL/min (A) (by C-G formula based on SCr of 1.62 mg/dL (H)).  Coag     HbA1C   Lab Results   Component Value Date    HGBA1C 5.8 (H) 10/25/2022     Blood Glucose No results found for: \"POCGLU\"  Infection     CMP   Results " from last 7 days   Lab Units 02/15/24  2323 02/14/24  2339 02/14/24  0158 02/13/24  0354 02/12/24  0540 02/11/24  0429 02/10/24  0227   SODIUM mmol/L 137 138 139 137 138 138 140   POTASSIUM mmol/L 4.8 4.7 4.5 4.3 4.4 4.6 4.3   CHLORIDE mmol/L 102 103 103 102 104 106 106   CO2 mmol/L 27.0 27.0 26.0 25.0 23.0 21.0* 23.0   BUN mg/dL 59* 56* 54* 53* 45* 35* 29*   CREATININE mg/dL 1.62* 1.30* 1.51* 1.40* 1.36* 1.43* 1.14*   GLUCOSE mg/dL 105* 118* 112* 118* 130* 161* 113*   ALBUMIN g/dL 3.3* 3.1* 3.3* 3.4* 3.5 3.3*  --      UA      Radiology(recent) No radiology results for the last day   Assessment:    Acute mental status changes with acute delirium  Urinary tract infection present upon admission  Hypocalcemia  Dehydration  Acute renal failure with acute kidney injury superimposed upon chronic kidney disease stage IIIa  Left breast anomaly  Hypertension associated chronic kidney disease stage IIIa  Anemia of chronic kidney disease  Hyperuricemia  Left breast mastitis  Right arm thrombophlebitis  Pulmonary hypertension  Acute hypokalemia  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  Cerebrovascular disease status post CVA  Chronic oral anticoagulation therapy  Acquired hypothyroidism  Thrombophilia  Autonomic dysfunction syndrome  History of pulmonary embolism  Paroxysmal atrial fibrillation  Hypercoagulable state secondary to atrial fibrillation  [id number] score 6  History of IVC filter  Aneurysmal dilatation of abdominal aorta  Panlobular COPD with emphysema  Chronic mucopurulent bronchitis  Peripheral polyneuropathy  History of breast cancer  Supplemental oxygen dependency  Chronic hypoxic respiratory failure    Plan:  [facility] at discharge/wean steroids/physical therapy      JOAQUÍN Montilla  02/16/24  09:12 EST

## 2024-02-16 NOTE — PLAN OF CARE
Goal Outcome Evaluation:   Pt up in chair, a/ox4. On 4 liter of o2. No complaints at this time

## 2024-02-16 NOTE — PLAN OF CARE
"Assessment: Gabrielle Lyles presents with functional mobility impairments which indicate the need for skilled intervention. Pt able to increase ambulation distance this session; however, still desaturates and becomes easily fatigued with exertion. Pt requires some encouragement to progress mobility. Will continue to follow and progress as tolerated.     Plan/Recommendations:   If medically appropriate, Moderate Intensity Therapy recommended post-acute care. This is recommended as therapy feels the patient would require 3-4 days per week and wouldn't tolerate \"3 hour daily\" rehab intensity. SNF would be the preferred choice. If the patient does not agree to SNF, arrange HH or OP depending on home bound status. If patient is medically complex, consider LTACH. Pt requires no DME at discharge.     Pt desires Skilled Rehab placement at discharge. Pt cooperative; agreeable to therapeutic recommendations and plan of care.     " Patient unable to complete

## 2024-02-16 NOTE — THERAPY TREATMENT NOTE
"Subjective: Pt agreeable to therapeutic plan of care.    Objective:     Bed mobility - N/A or Not attempted. Pt up in chair upon entering room.    Transfers - STS CGA and with rolling walker    Ambulation - 20 feet CGA and with rolling walker    Therapeutic Exercise - 20 Reps B LE AROM supported sitting / chair    Vitals: Desaturates and Hypotensive  87% after ambulation on 4LPM  B/P 93/58 mmHg    Pain: 0 VAS   Location:   Intervention for pain: N/A    Education: Provided education on the importance of mobility in the acute care setting, Verbal/Tactile Cues, Transfer Training, and Gait Training    Assessment: Gabrielle Lyles presents with functional mobility impairments which indicate the need for skilled intervention. Pt able to increase ambulation distance this session; however, still desaturates and becomes easily fatigued with exertion. Pt requires some encouragement to progress mobility. Will continue to follow and progress as tolerated.     Plan/Recommendations:   If medically appropriate, Moderate Intensity Therapy recommended post-acute care. This is recommended as therapy feels the patient would require 3-4 days per week and wouldn't tolerate \"3 hour daily\" rehab intensity. SNF would be the preferred choice. If the patient does not agree to SNF, arrange HH or OP depending on home bound status. If patient is medically complex, consider LTACH. Pt requires no DME at discharge.     Pt desires Skilled Rehab placement at discharge. Pt cooperative; agreeable to therapeutic recommendations and plan of care.         Basic Mobility 6-click:  Rollin = Total, A lot = 2, A little = 3; 4 = None  Supine>Sit:   1 = Total, A lot = 2, A little = 3; 4 = None   Sit>Stand with arms:  1 = Total, A lot = 2, A little = 3; 4 = None  Bed>Chair:   1 = Total, A lot = 2, A little = 3; 4 = None  Ambulate in room:  1 = Total, A lot = 2, A little = 3; 4 = None  3-5 Steps with railin = Total, A lot = 2, A little = 3; 4 = " None  Score: 16    Modified [place]: N/A = No pre-op stroke/TIA    Post-Tx Position: Up in Chair, Alarms activated, and Call light and personal items within reach  PPE: gloves

## 2024-02-16 NOTE — PLAN OF CARE
Goal Outcome Evaluation:  Plan of Care Reviewed With: patient        Progress: improving       Pt resting comfortably with no complaints. Currently on 4L O2. Pt to discharge to [facility]. Will continue to monitor...

## 2024-02-16 NOTE — CASE MANAGEMENT/SOCIAL WORK
Continued Stay Note   Kenny     Patient Name: Gabrielle Lyles  MRN: [id number]  Today's Date: 2/16/2024    Admit Date: 2/5/2024    Plan: [facility]iq Mccabe accepted. Precert approved 2/13-2/18 (extension granted). PASRR approved. From home alone. Current [place] 2.5-3L   Discharge Plan       Row Name 02/16/24 1614       Plan    Plan [facility]iq [facility] accepted. Precert approved 2/13-2/18 (extension granted). PASRR approved. From home alone. Current [place] 2.5-3L    Patient/Family in Agreement with Plan yes    Plan Comments Precert extension approved thru end of day 2/18.  Notified APRN.             Expected Discharge Date and Time       Expected Discharge Date Expected Discharge Time    Feb 17, 2024           Brook [place] RN    Office Phone [phone]  Office Cell [phone]

## 2024-02-16 NOTE — PROGRESS NOTES
Referring Provider: Paul Granados MD    Reason for follow-up: Hypertension, atrial fibrillation     Patient Care Team:  Shaylee Mcdaniels MD as PCP - General (Family Medicine)  Richardson Willis MD as Cardiologist (Cardiology)  Rafy Rodriguez MD as Consulting Physician (Hematology and Oncology)  Christianne Phillips, RN as Licensed Practical Nurse  Salome Fleming MD as Consulting Physician (Nephrology)      SUBJECTIVE  Feels better.  Receiving physical therapy.     ROS  Review of all systems negative except as indicated.    Since I have last seen, the patient has been without any chest discomfort, shortness of breath, palpitations, dizziness or syncope.  Denies having any headache, abdominal pain, nausea, vomiting, diarrhea, constipation, loss of weight or loss of appetite.  Denies having any excessive bruising, hematuria or blood in the stool.  ROS      Personal History:    Past Medical History:   Diagnosis Date    Acute exacerbation of chronic obstructive pulmonary disease (COPD)     COPD (chronic obstructive pulmonary disease)     Deep vein thrombosis of bilateral lower extremities 07/24/2019    3/2013    History of pneumonia 06/2012    community acquired pneumonia and right pleural effusion;hospitalized at [facility]    History of pulmonary embolism 03/2013    bilateral PE    Hypertension 2001    Insomnia     on Ambien 5mg at     Lobular breast cancer, left 11/2011    Stage IA left upper lobular breast cancer    Malignant neoplasm of left breast in female, estrogen receptor positive 07/18/2019    Osteopenia 2012    Parainfluenza infection     Parotid duct obstruction     Severe pulmonary hypertension 03/03/2023    Stage 3a chronic kidney disease 07/24/2019    TIA (transient ischemic attack) 10/25/2022       Past Surgical History:   Procedure Laterality Date    CARDIAC CATHETERIZATION N/A 3/3/2023    Procedure: Left and Right Heart Cath with Coronary Angiography;  Surgeon: Richardson Willis MD;   Location: [facility] INVASIVE LOCATION;  Service: Cardiovascular;  Laterality: N/A;    CATARACT EXTRACTION      IVC FILTER RETRIEVAL  2014    IVC filter placement by Dr. Card    MAMMO STEREOTACTIC BREAST BX SURGICAL ADD UNI Left 2011    invasive lobular carcinoma-Dr. Cande Daniel    MASTECTOMY, PARTIAL Left 2011    and left axillary sentinel lymph node biopsy by Dr. Uriostegui    TUBAL ABDOMINAL LIGATION         Family History   Problem Relation Age of Onset    Lung cancer Brother        Social History     Tobacco Use    Smoking status: Former     Types: Cigarettes     Quit date:      Years since quittin.1     Passive exposure: Past    Smokeless tobacco: Never    Tobacco comments:     smoked 6 cigarettes a day from 13 years of age to 56 years of age when she quit in    Vaping Use    Vaping Use: Never used   Substance Use Topics    Alcohol use: Not Currently    Drug use: No        Home meds:  Prior to Admission medications    Medication Sig Start Date End Date Taking? Authorizing Provider   allopurinol (ZYLOPRIM) 100 MG tablet Take 1 tablet by mouth Daily.   Yes ProviderJaylyn MD   amoxicillin-clavulanate (AUGMENTIN) 875-125 MG per tablet Take 1 tablet by mouth 2 (Two) Times a Day. 24 Yes ProviderJaylyn MD   apixaban (ELIQUIS) 5 MG tablet tablet Take 1 tablet by mouth Every 12 (Twelve) Hours. Indications: Other - full anticoagulation, history of DVT/PE 10/27/22  Yes Shaylee Mcdaniels MD   budesonide-formoterol (SYMBICORT) 80-4.5 MCG/ACT inhaler Inhale 2 puffs 2 (Two) Times a Day.   Yes ProviderJaylyn MD   carvedilol (COREG) 12.5 MG tablet TAKE 1 TABLET BY MOUTH TWICE DAILY WITH MEALS 23  Yes Richardson Willis MD   Cholecalciferol (Vitamin D3) 50 MCG (2000 UT) capsule Take 1 capsule by mouth Daily.   Yes ProviderJyalyn MD   furosemide (LASIX) 40 MG tablet Take 1 tablet by mouth Daily.   Yes ProviderJaylyn MD   gabapentin (NEURONTIN)  300 MG capsule Take 1 capsule by mouth 2 (Two) Times a Day.   Yes Provider, MD Jaylyn   levothyroxine (SYNTHROID, LEVOTHROID) 50 MCG tablet Take 1 tablet by mouth Daily.   Yes Provider, MD Jaylyn   lisinopril (PRINIVIL,ZESTRIL) 40 MG tablet Take 1 tablet by mouth Daily. 6/8/23  Yes Velma Ascencio APRN   potassium chloride (K-DUR,KLOR-CON) 10 MEQ CR tablet Take 1 tablet by mouth Daily. 2/24/23  Yes Richardson Willis MD   sildenafil (REVATIO) 20 MG tablet Take 1 tablet by mouth Every 8 (Eight) Hours. 5/11/23  Yes Mansi Becerril APRN       Allergies:  Patient has no known allergies.    Scheduled Meds:allopurinol, 100 mg, Oral, Daily  apixaban, 5 mg, Oral, Q12H  budesonide, 0.5 mg, Nebulization, BID - RT  carvedilol, 12.5 mg, Oral, BID With Meals  famotidine, 20 mg, Oral, Daily  furosemide, 20 mg, Oral, Daily  guaiFENesin, 1,200 mg, Oral, Q12H  hydrALAZINE, 50 mg, Oral, Q8H  ipratropium-albuterol, 3 mL, Nebulization, 4x Daily - RT  levothyroxine, 50 mcg, Oral, Q AM  melatonin, 5 mg, Oral, Nightly  methylPREDNISolone sodium succinate, 20 mg, Intravenous, Daily  sildenafil, 20 mg, Oral, TID  sodium chloride, 10 mL, Intravenous, Q12H  sodium chloride, 10 mL, Intravenous, Q12H      Continuous Infusions:     PRN Meds:.  acetaminophen    Calcium Replacement - Follow Nurse / BPA Driven Protocol    ipratropium-albuterol    Magnesium Standard Dose Replacement - Follow Nurse / BPA Driven Protocol    ondansetron    Phosphorus Replacement - Follow Nurse / BPA Driven Protocol    Potassium Replacement - Follow Nurse / BPA Driven Protocol    senna-docusate sodium    [COMPLETED] Insert Peripheral IV **AND** sodium chloride    sodium chloride    sodium chloride    sodium chloride    sodium chloride      OBJECTIVE    Vital Signs  Vitals:    02/16/24 0704 02/16/24 0707 02/16/24 0708 02/16/24 0711   BP:       BP Location:       Patient Position:       Pulse: 69 67 70 68   Resp: 20 20 20 20   Temp:       TempSrc:       SpO2: 96% 97%  "98% 97%   Weight:       Height:           Flowsheet Rows      Flowsheet Row First Filed Value   Admission Height 162.6 cm (64\") Documented at 02/05/2024 1046   Admission Weight 66.7 kg (147 lb) Documented at 02/05/2024 1046              Intake/Output Summary (Last 24 hours) at 2/16/2024 0721  Last data filed at 2/15/2024 2021  Gross per 24 hour   Intake 1200 ml   Output --   Net 1200 ml            Telemetry: Atrial fibrillation with heart rate in the 70s    Physical Exam:  The patient is alert, oriented X3  Vital signs as noted above.  Head and neck revealed no carotid bruits or jugular venous distention.  No thyromegaly or lymphadenopathy is present  Lungs clear.  No wheezing.  Breath sounds are normal bilaterally.  Irregularly irregular.  No murmur. No precordial rub is present.  No gallop is present.  Abdomen soft and nontender.  No organomegaly is present.  Extremities with good peripheral pulses without any pedal edema.  Skin warm and dry.  Musculoskeletal system is grossly normal.  CNS grossly normal.       Results Review:  I have personally reviewed the results from the time of this admission to 2/16/2024 07:21 EST and agree with these findings:  []  Laboratory  []  Microbiology  []  Radiology  []  EKG/Telemetry   []  Cardiology/Vascular   []  Pathology  []  Old records  []  Other:    Most notable findings include:    Lab Results (last 24 hours)       Procedure Component Value Units Date/Time    Renal Function Panel [[id number]]  (Abnormal) Collected: 02/15/24 2323    Specimen: Blood Updated: 02/16/24 0013     Glucose 105 mg/dL      BUN 59 mg/dL      Creatinine 1.62 mg/dL      Sodium 137 mmol/L      Potassium 4.8 mmol/L      Chloride 102 mmol/L      CO2 27.0 mmol/L      Calcium 9.1 mg/dL      Albumin 3.3 g/dL      Phosphorus 4.0 mg/dL      Anion Gap 8.0 mmol/L      BUN/Creatinine Ratio 36.4     eGFR 31.6 mL/min/1.73     Narrative:      GFR Normal >60  Chronic Kidney Disease <60  Kidney Failure <15    The GFR " formula is only valid for adults with stable renal function between ages 18 and 70.    Magnesium [[id number]]  (Normal) Collected: 02/15/24 2323    Specimen: Blood Updated: 02/16/24 0013     Magnesium 2.2 mg/dL     CBC (No Diff) [[id number]]  (Abnormal) Collected: 02/15/24 2323    Specimen: Blood Updated: 02/15/24 2349     WBC 10.70 10*3/mm3      RBC 4.81 10*6/mm3      Hemoglobin 12.6 g/dL      Hematocrit 40.1 %      MCV 83.4 fL      MCH 26.3 pg      MCHC 31.5 g/dL      RDW 16.8 %      RDW-SD 51.6 fl      MPV 8.5 fL      Platelets 285 10*3/mm3             Imaging Results (Last 24 Hours)       ** No results found for the last 24 hours. **            LAB RESULTS (LAST 7 DAYS)    CBC  Results from last 7 days   Lab Units 02/15/24  2323 02/14/24  2339 02/14/24  0158 02/13/24  0354 02/12/24  0540 02/10/24  0227   WBC 10*3/mm3 10.70 8.70 11.10* 11.80* 12.20* 10.40   RBC 10*6/mm3 4.81 4.60 4.38 4.38 4.13 4.34   HEMOGLOBIN g/dL 12.6 12.1 12.2 11.9* 12.1 11.7*   HEMATOCRIT % 40.1 38.2 37.6 37.5 35.9 36.6   MCV fL 83.4 83.2 85.7 85.6 86.8 84.3   PLATELETS 10*3/mm3 285 264 282 282 284 238       BMP  Results from last 7 days   Lab Units 02/15/24  2323 02/14/24  2339 02/14/24  0158 02/13/24  0354 02/12/24  0540 02/11/24  0429 02/10/24  0227   SODIUM mmol/L 137 138 139 137 138 138 140   POTASSIUM mmol/L 4.8 4.7 4.5 4.3 4.4 4.6 4.3   CHLORIDE mmol/L 102 103 103 102 104 106 106   CO2 mmol/L 27.0 27.0 26.0 25.0 23.0 21.0* 23.0   BUN mg/dL 59* 56* 54* 53* 45* 35* 29*   CREATININE mg/dL 1.62* 1.30* 1.51* 1.40* 1.36* 1.43* 1.14*   GLUCOSE mg/dL 105* 118* 112* 118* 130* 161* 113*   MAGNESIUM mg/dL 2.2  --  2.1  --   --   --  2.0   PHOSPHORUS mg/dL 4.0 3.9 3.9 3.9 4.2 4.7* 3.0       CMP   Results from last 7 days   Lab Units 02/15/24  2323 02/14/24  2339 02/14/24  0158 02/13/24  0354 02/12/24  0540 02/11/24  0429 02/10/24  0227   SODIUM mmol/L 137 138 139 137 138 138 140   POTASSIUM mmol/L 4.8 4.7 4.5 4.3 4.4 4.6 4.3   CHLORIDE mmol/L 102  103 103 102 104 106 106   CO2 mmol/L 27.0 27.0 26.0 25.0 23.0 21.0* 23.0   BUN mg/dL 59* 56* 54* 53* 45* 35* 29*   CREATININE mg/dL 1.62* 1.30* 1.51* 1.40* 1.36* 1.43* 1.14*   GLUCOSE mg/dL 105* 118* 112* 118* 130* 161* 113*   ALBUMIN g/dL 3.3* 3.1* 3.3* 3.4* 3.5 3.3*  --        BNP        TROPONIN          CoAg        Creatinine Clearance  Estimated Creatinine Clearance: 25.7 mL/min (A) (by C-G formula based on SCr of 1.62 mg/dL (H)).    ABG        Radiology  No radiology results for the last day      EKG  I personally viewed and interpreted the patient's EKG/Telemetry data:  ECG 12 Lead Rhythm Change   Final Result   HEART RATE= 69  bpm   RR Interval= 900  ms   WI Interval= 213  ms   P Horizontal Axis= -3  deg   P Front Axis= 54  deg   QRSD Interval= 86  ms   QT Interval= 413  ms   QTcB= 435  ms   QRS Axis= 117  deg   T Wave Axis= 33  deg   - ABNORMAL ECG -   Sinus rhythm   Atrial premature complex   Borderline prolonged WI interval   Anteroseptal infarct, age indeterminate   When compared with ECG of 05-Feb-2024 11:04:44,   New or worsened ischemia or infarction   New conduction abnormality   Significant axis, voltage or hypertrophy change   Electronically Signed By: Richardson Willis (BRANDIN) 08-Feb-2024 17:20:33   Date and Time of Study: 2024-02-08 01:23:00      ECG 12 Lead Other; Weakness   Final Result   HEART RATE= 75  bpm   RR Interval= 860  ms   WI Interval= 176  ms   P Horizontal Axis= 201  deg   P Front Axis= 11  deg   QRSD Interval= 99  ms   QT Interval= 398  ms   QTcB= 429  ms   QRS Axis= 117  deg   T Wave Axis= -6  deg   - ABNORMAL ECG -   Sinus rhythm   Multiple premature complexes, vent & supraven   Right axis deviation   Low voltage, precordial leads   Nonspecific T abnormalities, anterior leads   Electronically Signed By: Pankaj Mccallum (Brandin) 06-Feb-2024 07:40:13   Date and Time of Study: 2024-02-05 11:04:44      SCANNED - TELEMETRY     Final Result      SCANNED - TELEMETRY     Final Result      SCANNED  - TELEMETRY     Final Result      SCANNED - TELEMETRY     Final Result      SCANNED - TELEMETRY     Final Result      SCANNED - TELEMETRY     Final Result      SCANNED - TELEMETRY     Final Result      SCANNED - TELEMETRY     Final Result      SCANNED - TELEMETRY     Final Result      SCANNED - TELEMETRY     Final Result      SCANNED - TELEMETRY     Final Result      SCANNED - TELEMETRY     Final Result      SCANNED - TELEMETRY     Final Result      SCANNED - TELEMETRY     Final Result      SCANNED - TELEMETRY     Final Result      SCANNED - TELEMETRY     Final Result      SCANNED - TELEMETRY     Final Result      SCANNED - TELEMETRY     Final Result      SCANNED - TELEMETRY     Final Result      SCANNED - TELEMETRY     Final Result      SCANNED - TELEMETRY     Final Result      SCANNED - TELEMETRY     Final Result      SCANNED - TELEMETRY     Final Result      SCANNED - TELEMETRY     Final Result      SCANNED - TELEMETRY     Final Result      SCANNED - TELEMETRY     Final Result      SCANNED - TELEMETRY     Final Result      SCANNED - TELEMETRY     Final Result      SCANNED - TELEMETRY     Final Result      SCANNED - TELEMETRY     Final Result      SCANNED - TELEMETRY     Final Result      SCANNED - TELEMETRY     Final Result      SCANNED - TELEMETRY     Final Result      SCANNED - TELEMETRY     Final Result      SCANNED - TELEMETRY     Final Result      SCANNED - TELEMETRY     Final Result      SCANNED - TELEMETRY     Final Result      SCANNED - TELEMETRY     Final Result      SCANNED - TELEMETRY     Final Result      SCANNED - TELEMETRY     Final Result      SCANNED - TELEMETRY     Final Result      SCANNED - TELEMETRY     Final Result      SCANNED - TELEMETRY     Final Result      SCANNED - TELEMETRY     Final Result      SCANNED - TELEMETRY     Final Result      SCANNED - TELEMETRY     Final Result      SCANNED - TELEMETRY     Final Result      SCANNED - TELEMETRY     Final Result      SCANNED - TELEMETRY      Final Result      SCANNED - TELEMETRY     Final Result      SCANNED - TELEMETRY     Final Result      SCANNED - TELEMETRY     Final Result      SCANNED - TELEMETRY     Final Result      SCANNED - TELEMETRY     Final Result      SCANNED - TELEMETRY     Final Result      SCANNED - TELEMETRY     Final Result      SCANNED - TELEMETRY     Final Result      SCANNED - TELEMETRY     Final Result      ECG 12 Lead Altered Mental Status    (Results Pending)         Echocardiogram:    Results for orders placed during the hospital encounter of 02/05/24    Adult Transthoracic Echo Complete W/ Cont if Necessary Per Protocol    Interpretation Summary    Left ventricular systolic function is normal. Left ventricular ejection fraction appears to be 61 - 65%.    Severely reduced right ventricular systolic function noted.    The right ventricular cavity is severely dilated.    The left atrial cavity is mildly dilated.    Left atrial volume is mildly increased.    The right atrial cavity is moderate to severely  dilated.    Estimated right ventricular systolic pressure from tricuspid regurgitation is mildly elevated (35-45 mmHg).    Mild pulmonary hypertension is present.    There is a moderate (1-2cm) pericardial effusion. There is no evidence of cardiac tamponade.        Stress Test:  Results for orders placed in visit on 09/13/22    Stress Test With Myocardial Perfusion One Day    Interpretation Summary    Left ventricular ejection fraction is hyperdynamic (Calculated EF > 70%). .    Myocardial perfusion imaging indicates a normal myocardial perfusion study with no evidence of ischemia.    Impressions are consistent with a low risk study.    Findings consistent with a normal ECG stress test.         Cardiac Catheterization:  Results for orders placed during the hospital encounter of 03/03/23    Cardiac Catheterization/Vascular Study    Narrative  OPERATORS  Richardson Willis M.D. (Attending Cardiologist)      PROCEDURE  PERFORMED  Ultrasound guided vascular access  Right heart catheterization  Coronary Angiogram  Left Heart Catheterization [id number]  Moderate Sedation    INDICATIONS FOR PROCEDURE  83-year-old woman with multiple cardiovascular risk factors, abnormal stress test presented with worsening shortness of breath.  After discussing the risk and benefit of the procedure she was brought in for elective right and left heart cath.    PROCEDURE IN DETAIL  Informed consent was obtained from the patient after explaining the risks, benefits, and alternative options of the procedure. After obtaining informed consent, the patient was brought to the cath lab and was prepped in a sterile fashion. Lidocaine 2% was used for local anesthesia into the right femoral venous access site. Right femoral vein was accessed using the micropuncture needle under ultrasound guidance and micropuncture wire advanced under flouroscopy. A 7 Tongan vascular sheath was put into place percutaneously over guide-wire. Guide wires were removed. A 6Fr swan sheryl catheter was advanced to wedge position. RA, RV and PA and wedge pressures were recorded.  PA sat and arterial sats recorded.  The patient tolerated the procedure well without any complications.    Lidocaine 2% was used for local anesthesia into the right femoral arterial access site. The right femoral artery was accessed with a micropuncture needle via modified Seldinger technique under ultrasound guidance. A 6F was inserted successfully.  Afterwards, 6F JR4 and JL4 diagnostic catheters were advanced over a wire into the ascending aorta and were used to engage the ostia of the left main and RCA respectively. JR4 used to cross the AV and obtain LV pressures and gradient across the AV measured via pullback technique. Images of the right and left coronary systems were obtained. All the catheters were exchanged over a wire and subsequently removed. Angiogram of the femoral access site was obtained and did not  show complications. The patient tolerated the procedure well without any complications. The pictures were reviewed at the end of the procedure. A Mynx closure device was applied.    HEMODYNAMICS    RHC  RA 6/5, 4 mmHg  RV 74/3, 5 mmHg  PA 71/25, 44 mmHg  PCW 12 mmHg  AO Sat 92%  PA Sat 78%    Jose CO: 7.89 L/min    Jose CI: 4.69 L/min/m²    LHC  LV: 134/3, 20 mmHg  AO: 131/56, 87 mmHg  No significant gradient across the aortic valve during pullback of JR4 catheter.    FINDINGS  Coronary Angiogram  All vessels are heavily calcified  She also has heavily calcified peripheral arterial disease.  Right dominant circulation    Left main: Left main is a large caliber vessel which gives rise to the Left Anterior Descending and the Left circumflex.  Left main is angiographically free from any significant disease.    Left Anterior Descending Artery: LAD is a heavily calcified medium caliber vessel which gives rise to diagonals.  The vessel is highly tortuous and has 50 to 60% mid vessel stenosis best seen in Serbian cranial view.    Left Circumflex: Heavily calcified vessel with 50% proximal segment stenosis.    Right Coronary Artery: The RCA is a large caliber vessel which is heavily calcified and tortuous.  It has diffuse luminal irregularities and 30 to 40% stenosis in the midsegment around the bend.    ESTIMATED BLOOD LOSS:  10 ml    COMPLICATIONS:  None    PROCEDURE DATA:  Sedation Time: 25 minutes    IMPRESSIONS  Heavily calcified, tortuous nonobstructive coronary disease  Severe pulmonary hypertension with PA systolic pressure in the 70s  Mean PA pressure 44 mmHg    RECOMMENDATIONS  -Continue aggressive medical management of CAD  -Referral to pulmonology for treatment of pulmonary hypertension         Other:         ASSESSMENT & PLAN:    Principal Problem:    Altered mental status  Active Problems:    Acute hypokalemia    Stage 3a chronic kidney disease    Chronic obstructive pulmonary disease    History of venous  thrombosis and embolism    Primary hypertension    History of TIA (transient ischemic attack)    Severe pulmonary hypertension    Chronic respiratory failure with hypoxia    JULIAN (acute kidney injury)      Altered mental status  Possible mastitis/PICC line infection  CT head and MRI of the brain unremarkable with no acute findings  Antibiotics per ID  Her mental status is now improved and back to baseline    JULIAN on Stage 3a chronic kidney disease  Creatinine 1.6, GFR is 31.6  Diuretics per nephrology  Potassium has been replaced.  Hypokalemia resolved  Closely monitor renal function and respiratory status     COPD/Chronic respiratory failure  Severe pulmonary hypertension  On 2-3 L of oxygen via nasal cannula at baseline.  Has known pulmonary hypertension  RA 6/5, 4 mmHg  RV 74/3, 5 mmHg  PA 71/25, 44 mmHg  PCW 12 mmHg  Continue sildenafil and steroids  Being managed by pulmonology  Respiratory status is back to baseline  Echocardiogram shows preserved LV function with mildly elevated RVSP.  Small to moderate pericardial effusion: No signs of tamponade     Atrial fibrillation  She has paroxysmal atrial fibrillation  [id number] score is 6  Continue Eliquis for atrial fibrillation as well as for history of DVT/PE  Heart rate has improved on beta-blocker: Continue Coreg     History of venous thrombosis and embolism  Continue Eliquis 5 mg p.o. twice daily.  She also has an IVC filter in place.     Primary hypertension, chronic  Blood pressure has improved and is currently normal  Continue Coreg and hydralazine for blood pressure control  Amlodipine can be added if better blood pressure control is desired.  Echo shows preserved LV function, reduced RV function and mildly elevated RVSP.  Pericardial effusion is not significant with no signs of tamponade.  Diuretics to be used as needed.  Per nephrology     Coronary artery disease  Continue high intensity statin and beta-blocker.  No need for aspirin while she is on  anticoagulation  Previous cardiac catheterization showed heavily calcified coronaries but nonobstructive.  No chest pain and stable from cardiac standpoint.     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  Continue high intensity statin and anticoagulation with Eliquis 5 mg p.o. twice daily.    She will be discharged to a rehab facility.  Okay to discharge from cardiac standpoint.    Richardson Willis MD  02/16/24  07:21 EST

## 2024-02-16 NOTE — PROGRESS NOTES
"NEPHROLOGY PROGRESS NOTE------[facility]    [facility]  [phone]  Luke Fleming MD      Patient Care Team:  Shaylee Mcdaniels MD as PCP - General (Family Medicine)  Richardson Willis MD as Cardiologist (Cardiology)  Rafy Rodriguez MD as Consulting Physician (Hematology and Oncology)  Christianne Phillips RN as Licensed Practical Nurse  Salome Fleming MD as Consulting Physician (Nephrology)      Provider:  Luke Fleming MD  Patient Name: Gabrielle Lyles  :  1940    SUBJECTIVE:    F/U ARF/JULIAN/CRF/CKD    Little malaised. No worsening SOB. No dysuria.     Medication:  allopurinol, 100 mg, Oral, Daily  apixaban, 5 mg, Oral, Q12H  budesonide, 0.5 mg, Nebulization, BID - RT  carvedilol, 12.5 mg, Oral, BID With Meals  famotidine, 20 mg, Oral, Daily  furosemide, 20 mg, Oral, Daily  guaiFENesin, 1,200 mg, Oral, Q12H  hydrALAZINE, 50 mg, Oral, Q8H  ipratropium-albuterol, 3 mL, Nebulization, 4x Daily - RT  levothyroxine, 50 mcg, Oral, Q AM  melatonin, 5 mg, Oral, Nightly  methylPREDNISolone sodium succinate, 20 mg, Intravenous, Daily  sildenafil, 20 mg, Oral, TID  sodium chloride, 10 mL, Intravenous, Q12H  sodium chloride, 10 mL, Intravenous, Q12H             OBJECTIVE    Vital Sign Min/Max for last 24 hours  Temp  Min: 95.7 °F (35.4 °C)  Max: 97.4 °F (36.3 °C)   BP  Min: 95/51  Max: 121/61   Pulse  Min: 67  Max: 86   Resp  Min: 14  Max: 25   SpO2  Min: 90 %  Max: 98 %   No data recorded   Weight  Min: 70.3 kg (154 lb 15.7 oz)  Max: 70.3 kg (154 lb 15.7 oz)     Flowsheet Rows      Flowsheet Row First Filed Value   Admission Height 162.6 cm (64\") Documented at 2024 1046   Admission Weight 66.7 kg (147 lb) Documented at 2024 1046            No intake/output data recorded.  I/O last 3 completed shifts:  In: 1200 [P.O.:1200]  Out: -     Physical Exam:  General Appearance: alert, appears stated age and cooperative  Head: " "normocephalic, without obvious abnormality and atraumatic  Eyes: conjunctivae and sclerae normal and no icterus  Neck: supple and no JVD  Lungs: +SCATTERED RHONCHI  Heart: regular rhythm & normal rate and normal S1, S2 +TAYE  Chest Wall: no abnormalities observed  Abdomen: normal bowel sounds and soft, nontender +OBESITY  Extremities: moves extremities well, trace edema, no cyanosis  Skin: no bleeding, bruising or rash  Neurologic: Alert, and oriented. No focal deficits    Labs:    WBC WBC   Date Value Ref Range Status   02/15/2024 10.70 3.40 - 10.80 10*3/mm3 Final   02/14/2024 8.70 3.40 - 10.80 10*3/mm3 Final   02/14/2024 11.10 (H) 3.40 - 10.80 10*3/mm3 Final      HGB Hemoglobin   Date Value Ref Range Status   02/15/2024 12.6 12.0 - 15.9 g/dL Final   02/14/2024 12.1 12.0 - 15.9 g/dL Final   02/14/2024 12.2 12.0 - 15.9 g/dL Final      HCT Hematocrit   Date Value Ref Range Status   02/15/2024 40.1 34.0 - 46.6 % Final   02/14/2024 38.2 34.0 - 46.6 % Final   02/14/2024 37.6 34.0 - 46.6 % Final      Platelets No results found for: \"LABPLAT\"   MCV MCV   Date Value Ref Range Status   02/15/2024 83.4 79.0 - 97.0 fL Final   02/14/2024 83.2 79.0 - 97.0 fL Final   02/14/2024 85.7 79.0 - 97.0 fL Final          Sodium Sodium   Date Value Ref Range Status   02/15/2024 137 136 - 145 mmol/L Final   02/14/2024 138 136 - 145 mmol/L Final   02/14/2024 139 136 - 145 mmol/L Final      Potassium Potassium   Date Value Ref Range Status   02/15/2024 4.8 3.5 - 5.2 mmol/L Final   02/14/2024 4.7 3.5 - 5.2 mmol/L Final   02/14/2024 4.5 3.5 - 5.2 mmol/L Final      Chloride Chloride   Date Value Ref Range Status   02/15/2024 102 98 - 107 mmol/L Final   02/14/2024 103 98 - 107 mmol/L Final   02/14/2024 103 98 - 107 mmol/L Final      CO2 CO2   Date Value Ref Range Status   02/15/2024 27.0 22.0 - 29.0 mmol/L Final   02/14/2024 27.0 22.0 - 29.0 mmol/L Final   02/14/2024 26.0 22.0 - 29.0 mmol/L Final      BUN BUN   Date Value Ref Range Status " "  02/15/2024 59 (H) 8 - 23 mg/dL Final   02/14/2024 56 (H) 8 - 23 mg/dL Final   02/14/2024 54 (H) 8 - 23 mg/dL Final      Creatinine Creatinine   Date Value Ref Range Status   02/15/2024 1.62 (H) 0.57 - 1.00 mg/dL Final   02/14/2024 1.30 (H) 0.57 - 1.00 mg/dL Final   02/14/2024 1.51 (H) 0.57 - 1.00 mg/dL Final      Calcium Calcium   Date Value Ref Range Status   02/15/2024 9.1 8.6 - 10.5 mg/dL Final   02/14/2024 9.2 8.6 - 10.5 mg/dL Final   02/14/2024 9.2 8.6 - 10.5 mg/dL Final      PO4 No components found for: \"PO4\"   Albumin Albumin   Date Value Ref Range Status   02/15/2024 3.3 (L) 3.5 - 5.2 g/dL Final   02/14/2024 3.1 (L) 3.5 - 5.2 g/dL Final   02/14/2024 3.3 (L) 3.5 - 5.2 g/dL Final      Magnesium Magnesium   Date Value Ref Range Status   02/15/2024 2.2 1.6 - 2.4 mg/dL Final   02/14/2024 2.1 1.6 - 2.4 mg/dL Final        Uric Acid No components found for: \"URIC ACID\"     Imaging Results (Last 72 Hours)       ** No results found for the last 72 hours. **            Results for orders placed during the hospital encounter of 02/05/24    XR Chest 1 View    Narrative  XR CHEST 1 VW    Date of Exam: 2/9/2024 5:36 AM EST    Indication: sob    Comparison: None available.    Findings:  The trachea is midline. Stable cardiomegaly with mild enlargement of the pulmonary arteries. There is mild diffuse bilateral reticular lung thickening. Mild left basilar atelectasis. No definite pleural effusions. No change in position of the right-sided  pigtail catheter.    Impression  Impression:  Stable left basilar atelectasis and chronic lung changes    Cardiomegaly      Electronically Signed: Pablo Martins MD  2/9/2024 7:35 AM EST  Workstation ID: [id number]      XR Chest 1 View    Narrative  XR CHEST 1 VW    Date of Exam: 2/5/2024 12:00 PM EST    Indication: weakness    Comparison: 5/2/2023.    Findings:  There is mild cardiomegaly with increased heart size. Mildly prominent interstitial pattern appears stable and chronic. There is " a prominent skinfold over the right chest. There is no definite pneumothorax. There is no effusion.    Impression  Impression:  Mild cardiomegaly. Mild chronic findings of the lung fields.      Electronically Signed: Mary Ivan MD  2/5/2024 12:06 PM EST  Workstation ID: [id number]      Results for orders placed during the hospital encounter of 09/24/23    XR Foot 3+ View Left    Narrative  XR FOOT 3+ VW LEFT    Date of Exam: 9/24/2023 3:15 PM EDT    Indication: pain redness swelling    Comparison: None available.    Findings:  No fracture or dislocation. There is soft tissue swelling diffusely at the left foot. No discrete osseous erosion. Plantar calcaneal bone spur. Enthesopathy at the Achilles insertion on the calcaneus. No radiodense foreign body.    Impression  Impression:  1. Negative for acute osseous abnormality.  2. Nonspecific soft tissue swelling.        Electronically Signed: Randall Zarco MD  9/24/2023 3:54 PM EDT  Workstation ID: [id number]      Results for orders placed during the hospital encounter of 02/05/24    Duplex Venous Upper Extremity - Right CAR    Interpretation Summary    Normal right upper extremity venous duplex scan.        ASSESSMENT / PLAN      Altered mental status    Acute hypokalemia    Stage 3a chronic kidney disease    Chronic obstructive pulmonary disease    History of venous thrombosis and embolism    Primary hypertension    History of TIA (transient ischemic attack)    Severe pulmonary hypertension    Chronic respiratory failure with hypoxia    JULIAN (acute kidney injury)    ARF/JULIAN/CRF/CKD------Nonoliguric. +ARF/JULIAN on top of CRF/CKD STG 3A with a baseline serum  Creatinine of about 1.1. Unknown if patient has baseline proteinuria or hematuria. CRF/CKD STG 3A most likely secondary to HTN NS. +ARF/JULIAN is secondary to prerenal state/intravascular volume depletion. Stable.  Avoid hypotension.  No NSAIDs or IV dye.  BUN/Cr stable but azotemia worse from steroid exposure     2.  ANEMIA------H/H stable     3. HTN WITH CKD-----Avoid hypotension. No ACE/ARB/DRI/diuretic for now     4. OA/DJD/HYPERURICEMIA------No NSAIDs. Added Allopurinol     5. DELIRIUM-------Resolved     6. UTI-----S/P Tx     7. HYPOCALCEMIA------Replaced     8. KETONURIA/DEHYDRATION------Secondary to recent outpatient increase in Lasix. Hold Lasix. Gentle IVFs given Pulmonary HTN     9. PULMONARY HTN--------Per , Pulmonary     10. HYPOKALEMIA-------Replaced     11. CAD-----per , Cardiology    12. REHAB PLACEMENT PENDING       Luke Fleming MD  [facility]  [phone]  02/16/24  07:46 EST

## 2024-02-17 VITALS
HEART RATE: 70 BPM | TEMPERATURE: 98.1 F | WEIGHT: 161.16 LBS | OXYGEN SATURATION: 97 % | RESPIRATION RATE: 15 BRPM | BODY MASS INDEX: 27.51 KG/M2 | SYSTOLIC BLOOD PRESSURE: 117 MMHG | DIASTOLIC BLOOD PRESSURE: 62 MMHG | HEIGHT: 64 IN

## 2024-02-17 LAB
ALBUMIN SERPL-MCNC: 3.2 G/DL (ref 3.5–5.2)
ANION GAP SERPL CALCULATED.3IONS-SCNC: 9 MMOL/L (ref 5–15)
BUN SERPL-MCNC: 63 MG/DL (ref 8–23)
BUN/CREAT SERPL: 38.9 (ref 7–25)
CALCIUM SPEC-SCNC: 8.9 MG/DL (ref 8.6–10.5)
CHLORIDE SERPL-SCNC: 102 MMOL/L (ref 98–107)
CO2 SERPL-SCNC: 25 MMOL/L (ref 22–29)
CREAT SERPL-MCNC: 1.62 MG/DL (ref 0.57–1)
DEPRECATED RDW RBC AUTO: 51.2 FL (ref 37–54)
EGFRCR SERPLBLD CKD-EPI 2021: 31.6 ML/MIN/1.73
ERYTHROCYTE [DISTWIDTH] IN BLOOD BY AUTOMATED COUNT: 16.7 % (ref 12.3–15.4)
GLUCOSE SERPL-MCNC: 105 MG/DL (ref 65–99)
HCT VFR BLD AUTO: 39.7 % (ref 34–46.6)
HGB BLD-MCNC: 12.6 G/DL (ref 12–15.9)
MAGNESIUM SERPL-MCNC: 2.2 MG/DL (ref 1.6–2.4)
MCH RBC QN AUTO: 26.4 PG (ref 26.6–33)
MCHC RBC AUTO-ENTMCNC: 31.8 G/DL (ref 31.5–35.7)
MCV RBC AUTO: 83.1 FL (ref 79–97)
PHOSPHATE SERPL-MCNC: 4.3 MG/DL (ref 2.5–4.5)
PLATELET # BLD AUTO: 286 10*3/MM3 (ref 140–450)
PMV BLD AUTO: 8.3 FL (ref 6–12)
POTASSIUM SERPL-SCNC: 4.6 MMOL/L (ref 3.5–5.2)
RBC # BLD AUTO: 4.78 10*6/MM3 (ref 3.77–5.28)
SODIUM SERPL-SCNC: 136 MMOL/L (ref 136–145)
WBC NRBC COR # BLD AUTO: 12.7 10*3/MM3 (ref 3.4–10.8)

## 2024-02-17 PROCEDURE — 83735 ASSAY OF MAGNESIUM: CPT | Performed by: INTERNAL MEDICINE

## 2024-02-17 PROCEDURE — 94799 UNLISTED PULMONARY SVC/PX: CPT

## 2024-02-17 PROCEDURE — 94761 N-INVAS EAR/PLS OXIMETRY MLT: CPT

## 2024-02-17 PROCEDURE — 63710000001 PREDNISONE PER 5 MG: Performed by: INTERNAL MEDICINE

## 2024-02-17 PROCEDURE — 80069 RENAL FUNCTION PANEL: CPT | Performed by: INTERNAL MEDICINE

## 2024-02-17 PROCEDURE — 85027 COMPLETE CBC AUTOMATED: CPT | Performed by: INTERNAL MEDICINE

## 2024-02-17 RX ORDER — FAMOTIDINE 20 MG/1
20 TABLET, FILM COATED ORAL DAILY
Qty: 30 TABLET | Refills: 1 | Status: SHIPPED | OUTPATIENT
Start: 2024-02-17

## 2024-02-17 RX ORDER — PREDNISONE 5 MG/1
TABLET ORAL
Qty: 5 TABLET | Refills: 0 | Status: SHIPPED | OUTPATIENT
Start: 2024-02-18 | End: 2024-02-27

## 2024-02-17 RX ORDER — FUROSEMIDE 20 MG/1
20 TABLET ORAL DAILY
Qty: 30 TABLET | Refills: 1 | Status: SHIPPED | OUTPATIENT
Start: 2024-02-17

## 2024-02-17 RX ORDER — HYDRALAZINE HYDROCHLORIDE 50 MG/1
50 TABLET, FILM COATED ORAL EVERY 8 HOURS SCHEDULED
Qty: 90 TABLET | Refills: 1 | Status: SHIPPED | OUTPATIENT
Start: 2024-02-17

## 2024-02-17 RX ADMIN — SILDENAFIL CITRATE 20 MG: 20 TABLET ORAL at 07:41

## 2024-02-17 RX ADMIN — ACETAMINOPHEN 650 MG: 325 TABLET, FILM COATED ORAL at 05:09

## 2024-02-17 RX ADMIN — ACETAMINOPHEN 650 MG: 325 TABLET, FILM COATED ORAL at 10:35

## 2024-02-17 RX ADMIN — HYDRALAZINE HYDROCHLORIDE 50 MG: 25 TABLET ORAL at 05:09

## 2024-02-17 RX ADMIN — LEVOTHYROXINE SODIUM 50 MCG: 0.05 TABLET ORAL at 05:09

## 2024-02-17 RX ADMIN — GUAIFENESIN 1200 MG: 600 TABLET, EXTENDED RELEASE ORAL at 07:40

## 2024-02-17 RX ADMIN — ALLOPURINOL 100 MG: 100 TABLET ORAL at 07:40

## 2024-02-17 RX ADMIN — FUROSEMIDE 20 MG: 20 TABLET ORAL at 07:41

## 2024-02-17 RX ADMIN — CARVEDILOL 12.5 MG: 6.25 TABLET, FILM COATED ORAL at 07:40

## 2024-02-17 RX ADMIN — APIXABAN 5 MG: 5 TABLET, FILM COATED ORAL at 07:40

## 2024-02-17 RX ADMIN — BUDESONIDE INHALATION 0.5 MG: 0.5 SUSPENSION RESPIRATORY (INHALATION) at 07:45

## 2024-02-17 RX ADMIN — Medication 10 ML: at 08:05

## 2024-02-17 RX ADMIN — IPRATROPIUM BROMIDE AND ALBUTEROL SULFATE 3 ML: .5; 3 SOLUTION RESPIRATORY (INHALATION) at 11:24

## 2024-02-17 RX ADMIN — IPRATROPIUM BROMIDE AND ALBUTEROL SULFATE 3 ML: .5; 3 SOLUTION RESPIRATORY (INHALATION) at 07:51

## 2024-02-17 RX ADMIN — FAMOTIDINE 20 MG: 20 TABLET, FILM COATED ORAL at 07:40

## 2024-02-17 RX ADMIN — PREDNISONE 10 MG: 10 TABLET ORAL at 07:41

## 2024-02-17 NOTE — PLAN OF CARE
Goal Outcome Evaluation:     Patient d/c to [facility] for rehab via family care with daughter inderjit, report called to bethany fry, spoke with rishabh.

## 2024-02-17 NOTE — CASE MANAGEMENT/SOCIAL WORK
Continued Stay Note  LIDYA Cox     Patient Name: Gabrielle Lyles  MRN: [id number]  Today's Date: 2/17/2024    Admit Date: 2/5/2024    Plan: [facility] accepted. Precert approved 1/13-2/18 (extension granted). PASRR approved. From home alone with [place] 2.5-3L cont. 02. Family to transport.   Discharge Plan       Row Name 02/17/24 1323       Plan    Plan [facility] accepted. Precert approved 1/13-2/18 (extension granted). PASRR approved. From home alone with [place] [facility] 2.5-3L cont. 02. Family to transport.    Patient/Family in Agreement with Plan yes    Plan Comments Confirmed with [facility] liaison that bed is available today after 1400. Pt will go to room B13 bed 2.

## 2024-02-17 NOTE — DISCHARGE SUMMARY
Date of Discharge:  2/17/2024    Discharge Diagnosis:   Acute mental status changes with acute delirium  Urinary tract infection present upon admission  Hypocalcemia  Dehydration  Acute renal failure with acute kidney injury superimposed upon chronic kidney disease stage IIIa  Left breast anomaly  Hypertension associated chronic kidney disease stage IIIa  Anemia of chronic kidney disease  Hyperuricemia  Left breast mastitis  Right arm thrombophlebitis  Pulmonary hypertension  Acute hypokalemia  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  Cerebrovascular disease status post CVA  Chronic oral anticoagulation therapy  Acquired hypothyroidism  Thrombophilia  Autonomic dysfunction syndrome  History of pulmonary embolism  Paroxysmal atrial fibrillation  Hypercoagulable state secondary to atrial fibrillation  [id number] score 6  History of IVC filter  Aneurysmal dilatation of abdominal aorta  Panlobular COPD with emphysema  Chronic mucopurulent bronchitis  Peripheral polyneuropathy  History of breast cancer  Supplemental oxygen dependency  Chronic hypoxic respiratory failure    Presenting Problem/History of Present Illness  Active Hospital Problems    Diagnosis  POA    **Altered mental status [R41.82]  Yes    JULIAN (acute kidney injury) [N17.9]  Yes    Acute hypokalemia [E87.6]  Yes    Chronic respiratory failure with hypoxia [J96.11]  Yes    Severe pulmonary hypertension [I27.20]  Yes    History of TIA (transient ischemic attack) [Z86.73]  Not Applicable    Primary hypertension [I10]  Yes    Stage 3a chronic kidney disease [N18.31]  Yes    Chronic obstructive pulmonary disease [J44.9]  Yes    History of venous thrombosis and embolism [Z86.718]  Not Applicable      Resolved Hospital Problems   No resolved problems to display.          Hospital Course    Patient is a 83 y.o. female presented with  history of COPD, DVT, breast cancer, chronic kidney disease and pulmonary  hypertension who presents with progressive confusion over the last 3 days.  She was seen in her PCP office with bilateral lower extremity swelling and swelling and discomfort in her left breast.  Dr. Mcdaniels was concerned about possible mastitis in the left breast.  She started Augmentin and advised patient to increase dose of furosemide from 40 mg a day to 80 mg a day through the weekend.  Daughter noticed mild confusion on Saturday afternoon, somewhat worse on Sunday and then this morning she was significantly worse.  Patient underwent multiple tests all of which were negative for the altered mental status.  She was treated with supportive care followed by infectious disease, nephrology.  She is back to baseline and has moderate deconditioning and weakness that will require rehab.  She will need to follow-up with PCP after rehab.      Procedures Performed         Consults:   Consults       Date and Time Order Name Status Description    2/7/2024 11:24 AM Inpatient Infectious Diseases Consult Completed     2/7/2024 11:18 AM Inpatient Neurology Consult General Completed     2/7/2024  9:40 AM Inpatient Pulmonology Consult Completed     2/7/2024  6:34 AM Inpatient Nephrology Consult Completed     2/6/2024  4:28 PM Inpatient Cardiology Consult Completed     2/5/2024  2:14 PM Family Medicine Consult              Pertinent Test Results:    Lab Results (most recent)       Procedure Component Value Units Date/Time    Renal Function Panel [[id number]]  (Abnormal) Collected: 02/17/24 0431    Specimen: Blood Updated: 02/17/24 0521     Glucose 105 mg/dL      BUN 63 mg/dL      Creatinine 1.62 mg/dL      Sodium 136 mmol/L      Potassium 4.6 mmol/L      Comment: Slight hemolysis detected by analyzer. Result may be falsely elevated.        Chloride 102 mmol/L      CO2 25.0 mmol/L      Calcium 8.9 mg/dL      Albumin 3.2 g/dL      Phosphorus 4.3 mg/dL      Anion Gap 9.0 mmol/L      BUN/Creatinine Ratio 38.9     eGFR 31.6 mL/min/1.73      Narrative:      GFR Normal >60  Chronic Kidney Disease <60  Kidney Failure <15    The GFR formula is only valid for adults with stable renal function between ages 18 and 70.    Magnesium [[id number]]  (Normal) Collected: 02/17/24 0431    Specimen: Blood Updated: 02/17/24 0521     Magnesium 2.2 mg/dL     CBC (No Diff) [[id number]]  (Abnormal) Collected: 02/17/24 0431    Specimen: Blood Updated: 02/17/24 0444     WBC 12.70 10*3/mm3      RBC 4.78 10*6/mm3      Hemoglobin 12.6 g/dL      Hematocrit 39.7 %      MCV 83.1 fL      MCH 26.4 pg      MCHC 31.8 g/dL      RDW 16.7 %      RDW-SD 51.2 fl      MPV 8.3 fL      Platelets 286 10*3/mm3     Renal Function Panel [[id number]]  (Abnormal) Collected: 02/15/24 2323    Specimen: Blood Updated: 02/16/24 0013     Glucose 105 mg/dL      BUN 59 mg/dL      Creatinine 1.62 mg/dL      Sodium 137 mmol/L      Potassium 4.8 mmol/L      Chloride 102 mmol/L      CO2 27.0 mmol/L      Calcium 9.1 mg/dL      Albumin 3.3 g/dL      Phosphorus 4.0 mg/dL      Anion Gap 8.0 mmol/L      BUN/Creatinine Ratio 36.4     eGFR 31.6 mL/min/1.73     Narrative:      GFR Normal >60  Chronic Kidney Disease <60  Kidney Failure <15    The GFR formula is only valid for adults with stable renal function between ages 18 and 70.    Magnesium [[id number]]  (Normal) Collected: 02/15/24 2323    Specimen: Blood Updated: 02/16/24 0013     Magnesium 2.2 mg/dL     CBC (No Diff) [[id number]]  (Abnormal) Collected: 02/15/24 2323    Specimen: Blood Updated: 02/15/24 2349     WBC 10.70 10*3/mm3      RBC 4.81 10*6/mm3      Hemoglobin 12.6 g/dL      Hematocrit 40.1 %      MCV 83.4 fL      MCH 26.3 pg      MCHC 31.5 g/dL      RDW 16.8 %      RDW-SD 51.6 fl      MPV 8.5 fL      Platelets 285 10*3/mm3     CBC & Differential [[id number]]  (Abnormal) Collected: 02/14/24 0158    Specimen: Blood Updated: 02/14/24 0305    Narrative:      The following orders were created for panel order CBC & Differential.  Procedure                                Abnormality         Status                     ---------                               -----------         ------                     CBC Auto Differential[[id number]]        Abnormal            Final result                 Please view results for these tests on the individual orders.    CBC Auto Differential [[id number]]  (Abnormal) Collected: 02/14/24 0158    Specimen: Blood Updated: 02/14/24 0305     WBC 11.10 10*3/mm3      RBC 4.38 10*6/mm3      Hemoglobin 12.2 g/dL      Hematocrit 37.6 %      MCV 85.7 fL      MCH 27.8 pg      MCHC 32.4 g/dL      RDW 17.2 %      RDW-SD 51.2 fl      MPV 8.4 fL      Platelets 282 10*3/mm3      Neutrophil % 89.7 %      Lymphocyte % 3.9 %      Monocyte % 6.4 %      Eosinophil % 0.0 %      Basophil % 0.0 %      Neutrophils, Absolute 10.00 10*3/mm3      Lymphocytes, Absolute 0.40 10*3/mm3      Monocytes, Absolute 0.70 10*3/mm3      Eosinophils, Absolute 0.00 10*3/mm3      Basophils, Absolute 0.00 10*3/mm3      nRBC 0.1 /100 WBC     CBC & Differential [[id number]]  (Abnormal) Collected: 02/13/24 0354    Specimen: Blood Updated: 02/13/24 0512    Narrative:      The following orders were created for panel order CBC & Differential.  Procedure                               Abnormality         Status                     ---------                               -----------         ------                     CBC Auto Differential[[id number]]        Abnormal            Final result                 Please view results for these tests on the individual orders.    CBC Auto Differential [[id number]]  (Abnormal) Collected: 02/13/24 0354    Specimen: Blood Updated: 02/13/24 0512     WBC 11.80 10*3/mm3      RBC 4.38 10*6/mm3      Hemoglobin 11.9 g/dL      Hematocrit 37.5 %      MCV 85.6 fL      MCH 27.2 pg      MCHC 31.8 g/dL      RDW 16.5 %      RDW-SD 49.0 fl      MPV 8.6 fL      Platelets 282 10*3/mm3      Neutrophil % 89.5 %      Lymphocyte % 4.4 %      Monocyte % 5.9 %      Eosinophil % 0.0 %       Basophil % 0.2 %      Neutrophils, Absolute 10.50 10*3/mm3      Lymphocytes, Absolute 0.50 10*3/mm3      Monocytes, Absolute 0.70 10*3/mm3      Eosinophils, Absolute 0.00 10*3/mm3      Basophils, Absolute 0.00 10*3/mm3      nRBC 0.1 /100 WBC     Blood Culture - Blood, Wrist, Right [[id number]]  (Normal) Collected: 02/05/24 1627    Specimen: Blood from Wrist, Right Updated: 02/10/24 1846     Blood Culture No growth at 5 days    Narrative:      Less than seven (7) mL's of blood was collected.  Insufficient quantity may yield false negative results.    Blood Culture - Blood, Arm, Right [[id number]]  (Normal) Collected: 02/05/24 1150    Specimen: Blood from Arm, Right Updated: 02/10/24 1200     Blood Culture No growth at 5 days    Narrative:      Less than seven (7) mL's of blood was collected.  Insufficient quantity may yield false negative results.    Basic Metabolic Panel [[id number]]  (Abnormal) Collected: 02/10/24 0227    Specimen: Blood Updated: 02/10/24 0258     Glucose 113 mg/dL      BUN 29 mg/dL      Creatinine 1.14 mg/dL      Sodium 140 mmol/L      Potassium 4.3 mmol/L      Chloride 106 mmol/L      CO2 23.0 mmol/L      Calcium 9.2 mg/dL      BUN/Creatinine Ratio 25.4     Anion Gap 11.0 mmol/L      eGFR 48.2 mL/min/1.73     Narrative:      GFR Normal >60  Chronic Kidney Disease <60  Kidney Failure <15    The GFR formula is only valid for adults with stable renal function between ages 18 and 70.    Phosphorus [[id number]]  (Normal) Collected: 02/10/24 0227    Specimen: Blood Updated: 02/10/24 0258     Phosphorus 3.0 mg/dL     Basic Metabolic Panel [[id number]]  (Abnormal) Collected: 02/09/24 0055    Specimen: Blood Updated: 02/09/24 0154     Glucose 115 mg/dL      BUN 30 mg/dL      Creatinine 1.16 mg/dL      Sodium 138 mmol/L      Potassium 4.3 mmol/L      Chloride 105 mmol/L      CO2 22.0 mmol/L      Calcium 9.3 mg/dL      BUN/Creatinine Ratio 25.9     Anion Gap 11.0 mmol/L      eGFR 47.2 mL/min/1.73      Narrative:      GFR Normal >60  Chronic Kidney Disease <60  Kidney Failure <15    The GFR formula is only valid for adults with stable renal function between ages 18 and 70.    Phosphorus [[id number]]  (Normal) Collected: 02/09/24 0055    Specimen: Blood Updated: 02/09/24 0154     Phosphorus 2.7 mg/dL     Folate [[id number]]  (Normal) Collected: 02/08/24 0444    Specimen: Blood Updated: 02/08/24 1130     Folate >20.00 ng/mL     Narrative:      Results may be falsely increased if patient taking Biotin.      Comprehensive Metabolic Panel [[id number]]  (Abnormal) Collected: 02/08/24 0444    Specimen: Blood Updated: 02/08/24 0527     Glucose 90 mg/dL      BUN 32 mg/dL      Creatinine 1.12 mg/dL      Sodium 136 mmol/L      Potassium 4.8 mmol/L      Chloride 102 mmol/L      CO2 23.0 mmol/L      Calcium 9.4 mg/dL      Total Protein 6.4 g/dL      Albumin 3.5 g/dL      ALT (SGPT) 32 U/L      AST (SGOT) 35 U/L      Alkaline Phosphatase 60 U/L      Total Bilirubin 0.7 mg/dL      Globulin 2.9 gm/dL      A/G Ratio 1.2 g/dL      BUN/Creatinine Ratio 28.6     Anion Gap 11.0 mmol/L      eGFR 49.2 mL/min/1.73     Narrative:      GFR Normal >60  Chronic Kidney Disease <60  Kidney Failure <15    The GFR formula is only valid for adults with stable renal function between ages 18 and 70.    Calcium, Ionized [[id number]]  (Normal) Collected: 02/08/24 0444    Specimen: Blood Updated: 02/08/24 0520     Ionized Calcium 1.28 mmol/L     Vitamin B12 [[id number]]  (Normal) Collected: 02/05/24 1150    Specimen: Blood Updated: 02/07/24 2033     Vitamin B-12 942 pg/mL     Narrative:      Results may be falsely increased if patient taking Biotin.      Uric Acid [[id number]]  (Abnormal) Collected: 02/06/24 1806    Specimen: Blood Updated: 02/07/24 0945     Uric Acid 7.9 mg/dL     Eosinophil Smear - Urine, Urine, Clean Catch [[id number]]  (Normal) Collected: 02/07/24 0725    Specimen: Urine, Clean Catch Updated: 02/07/24 0907     Eosinophil Smear 0 %  EOS/100 Cells     Sodium, Urine, Random - Urine, Clean Catch [[id number]] Collected: 02/07/24 0725    Specimen: Urine, Clean Catch Updated: 02/07/24 0840     Sodium, Urine <20 mmol/L     Narrative:      Reference intervals for random urine have not been established.  Clinical usage is dependent upon physician's interpretation in combination with other laboratory tests.       CK [[id number]]  (Normal) Collected: 02/06/24 1806    Specimen: Blood Updated: 02/07/24 0748     Creatine Kinase 64 U/L     PTH, Intact [[id number]]  (Abnormal) Collected: 02/05/24 1150    Specimen: Blood Updated: 02/07/24 0745     PTH, Intact 86.8 pg/mL     Narrative:      Results may be falsely decreased if patient taking Biotin.      Comprehensive Metabolic Panel [[id number]]  (Abnormal) Collected: 02/06/24 1806    Specimen: Blood Updated: 02/06/24 1855     Glucose 105 mg/dL      BUN 32 mg/dL      Creatinine 1.50 mg/dL      Sodium 135 mmol/L      Potassium 5.1 mmol/L      Chloride 98 mmol/L      CO2 28.0 mmol/L      Calcium 8.4 mg/dL      Total Protein 5.9 g/dL      Albumin 3.3 g/dL      ALT (SGPT) 16 U/L      AST (SGOT) 21 U/L      Alkaline Phosphatase 58 U/L      Total Bilirubin 0.6 mg/dL      Globulin 2.6 gm/dL      A/G Ratio 1.3 g/dL      BUN/Creatinine Ratio 21.3     Anion Gap 9.0 mmol/L      eGFR 34.6 mL/min/1.73     Narrative:      GFR Normal >60  Chronic Kidney Disease <60  Kidney Failure <15    The GFR formula is only valid for adults with stable renal function between ages 18 and 70.    Urinalysis, Microscopic Only - Straight Cath [[id number]]  (Abnormal) Collected: 02/06/24 1054    Specimen: Urine from Straight Cath Updated: 02/06/24 1126     RBC, UA 3-5 /HPF      WBC, UA 0-2 /HPF      Comment: Urine culture not indicated.        Bacteria, UA 1+ /HPF      Squamous Epithelial Cells, UA 3-6 /HPF      Hyaline Casts, UA 0-2 /LPF      Methodology Manual Light Microscopy    Urinalysis With Culture If Indicated - Straight Cath [[id number]]   (Abnormal) Collected: 02/06/24 1054    Specimen: Urine from Straight Cath Updated: 02/06/24 1114     Color, UA Yellow     Appearance, UA Clear     pH, UA 6.5     Specific Gravity, UA 1.018     Glucose, UA Negative     Ketones, UA Trace     Bilirubin, UA Negative     Blood, UA Moderate (2+)     Protein, UA >=300 mg/dL (3+)     Leuk Esterase, UA Small (1+)     Nitrite, UA Negative     Urobilinogen, UA 1.0 E.U./dL    Narrative:      In absence of clinical symptoms, the presence of pyuria, bacteria, and/or nitrites on the urinalysis result does not correlate with infection.    Blood Gas, Venous - [[id number]]  (Abnormal) Collected: 02/05/24 2202    Specimen: Venous Blood Updated: 02/05/24 2215     Site Right Brachial     pH, Venous 7.430 pH Units      pCO2, Venous 46.6 mm Hg      pO2, Venous 32.9 mm Hg      HCO3, Venous 30.9 mmol/L      Base Excess, Venous 5.5 mmol/L      Comment: Serial Number: [id number]:  [id number]        O2 Saturation, Venous 64.4 %      CO2 Content 32.3 mmol/L      Barometric Pressure for Blood Gas --     Comment: N/A        Modality Cannula     FIO2 32 %     MRSA Screen, PCR (Inpatient) - Swab, Nares [[id number]]  (Normal) Collected: 02/05/24 1724    Specimen: Swab from Nares Updated: 02/05/24 1843     MRSA PCR No MRSA Detected    Narrative:      The negative predictive value of this diagnostic test is high and should only be used to consider de-escalating anti-MRSA therapy. A positive result may indicate colonization with MRSA and must be correlated clinically.    Respiratory Panel PCR w/COVID-19(SARS-CoV-2) MARIA ELENA/SARIAH/BRANDIN/PAD/COR/LILLIAN In-House, NP Swab in UTM/VTM, 2 HR TAT - Swab, Nasopharynx [[id number]]  (Normal) Collected: 02/05/24 1724    Specimen: Swab from Nasopharynx Updated: 02/05/24 1820     ADENOVIRUS, PCR Not Detected     Coronavirus 229E Not Detected     Coronavirus HKU1 Not Detected     Coronavirus NL63 Not Detected     Coronavirus OC43 Not Detected     COVID19 Not Detected     Human  Metapneumovirus Not Detected     Human Rhinovirus/Enterovirus Not Detected     Influenza A PCR Not Detected     Influenza B PCR Not Detected     Parainfluenza Virus 1 Not Detected     Parainfluenza Virus 2 Not Detected     Parainfluenza Virus 3 Not Detected     Parainfluenza Virus 4 Not Detected     RSV, PCR Not Detected     Bordetella pertussis pcr Not Detected     Bordetella parapertussis PCR Not Detected     Chlamydophila pneumoniae PCR Not Detected     Mycoplasma pneumo by PCR Not Detected    Narrative:      In the setting of a positive respiratory panel with a viral infection PLUS a negative procalcitonin without other underlying concern for bacterial infection, consider observing off antibiotics or discontinuation of antibiotics and continue supportive care. If the respiratory panel is positive for atypical bacterial infection (Bordetella pertussis, Chlamydophila pneumoniae, or Mycoplasma pneumoniae), consider antibiotic de-escalation to target atypical bacterial infection.    COVID-19, FLU A/B, RSV PCR 1 HR TAT - Swab, Nasopharynx [[id number]]  (Normal) Collected: 02/05/24 1235    Specimen: Swab from Nasopharynx Updated: 02/05/24 1325     COVID19 Not Detected     Influenza A PCR Not Detected     Influenza B PCR Not Detected     RSV, PCR Not Detected    Narrative:      Fact sheet for providers: https://www.fda.gov/media/063512/download    Fact sheet for patients: https://www.fda.gov/media/723915/download    Test performed by PCR.    Extra Tubes [[id number]] Collected: 02/05/24 1150    Specimen: Blood Updated: 02/05/24 1302    Narrative:      The following orders were created for panel order Extra Tubes.  Procedure                               Abnormality         Status                     ---------                               -----------         ------                     Gold Top - SST[[id number]]                                   Final result               Light Blue Top[[id number]]                             "       Final result                 Please view results for these tests on the individual orders.    Gold Top - SST [[id number]] Collected: 02/05/24 1150    Specimen: Blood Updated: 02/05/24 1302     Extra Tube Hold for add-ons.     Comment: Auto resulted.       Light Blue Top [[id number]] Collected: 02/05/24 1150    Specimen: Blood Updated: 02/05/24 1302     Extra Tube Hold for add-ons.     Comment: Auto resulted       Ammonia [[id number]]  (Normal) Collected: 02/05/24 1235    Specimen: Blood from Arm, Left Updated: 02/05/24 1301     Ammonia 25 umol/L     Carbon Monoxide, Blood [[id number]]  (Normal) Collected: 02/05/24 1235    Specimen: Blood from Arm, Left Updated: 02/05/24 1242     Carbon Monoxide, Blood 1.7 %     Procalcitonin [[id number]]  (Normal) Collected: 02/05/24 1150    Specimen: Blood Updated: 02/05/24 1232     Procalcitonin 0.06 ng/mL     Narrative:      As a Marker for Sepsis (Non-Neonates):    1. <0.5 ng/mL represents a low risk of severe sepsis and/or septic shock.  2. >2 ng/mL represents a high risk of severe sepsis and/or septic shock.    As a Marker for Lower Respiratory Tract Infections that require antibiotic therapy:    PCT on Admission    Antibiotic Therapy       6-12 Hrs later    >0.5                Strongly Recommended  >0.25 - <0.5        Recommended   0.1 - 0.25          Discouraged              Remeasure/reassess PCT  <0.1                Strongly Discouraged     Remeasure/reassess PCT    As 28 day mortality risk marker: \"Change in Procalcitonin Result\" (>80% or <=80%) if Day 0 (or Day 1) and Day 4 values are available. Refer to http://www.[facility]-pct-calculator.com    Change in PCT <=80%  A decrease of PCT levels below or equal to 80% defines a positive change in PCT test result representing a higher risk for 28-day all-cause mortality of patients diagnosed with severe sepsis for septic shock.    Change in PCT >80%  A decrease of PCT levels of more than 80% defines a negative change in PCT " result representing a lower risk for 28-day all-cause mortality of patients diagnosed with severe sepsis or septic shock.       TSH [[id number]]  (Normal) Collected: 02/05/24 1150    Specimen: Blood Updated: 02/05/24 1232     TSH 1.640 uIU/mL     Urinalysis, Microscopic Only - Straight Cath [[id number]]  (Abnormal) Collected: 02/05/24 1146    Specimen: Urine from Straight Cath Updated: 02/05/24 1226     RBC, UA 3-5 /HPF      WBC, UA 0-2 /HPF      Comment: Urine culture not indicated.        Bacteria, UA Trace /HPF      Squamous Epithelial Cells, UA 0-2 /HPF      Hyaline Casts, UA 0-2 /LPF      Methodology Manual Light Microscopy    Ethanol [[id number]] Collected: 02/05/24 1150    Specimen: Blood Updated: 02/05/24 1226     Ethanol % <0.010 %     Narrative:      Plasma Ethanol Clinical Symptoms:    ETOH (%)               Clinical Symptom  .01-.05              No apparent influence  .03-.12              Euphoria, Diminished judgment and attention   .09-.25              Impaired comprehension, Muscle incoordination  .18-.30              Confusion, Staggered gait, Slurred speech  .25-.40              Markedly decreased response to stimuli, unable to stand or                        walk, vomitting, sleep or stupor  .35-.50              Comatose, Anesthesia, Subnormal body temperature        Urine Drug Screen - Straight Cath [[id number]]  (Normal) Collected: 02/05/24 1149    Specimen: Urine from Straight Cath Updated: 02/05/24 1222     Amphet/Methamphet, Screen Negative     Barbiturates Screen, Urine Negative     Benzodiazepine Screen, Urine Negative     Cocaine Screen, Urine Negative     Opiate Screen Negative     THC, Screen, Urine Negative     Methadone Screen, Urine Negative     Oxycodone Screen, Urine Negative    Narrative:      Negative Thresholds Per Drugs Screened:    Amphetamines                 500 ng/ml  Barbiturates                 200 ng/ml  Benzodiazepines              100 ng/ml  Cocaine                      300  ng/ml  Methadone                    300 ng/ml  Opiates                      300 ng/ml  Oxycodone                    100 ng/ml  THC                           50 ng/ml    The Normal Value for all drugs tested is negative. This report includes final unconfirmed screening results to be used for medical treatment purposes only. Unconfirmed results must not be used for non-medical purposes such as employment or legal testing. Clinical consideration should be applied to any drug of abuse test, particularly when unconfirmed results are used.          All urine drugs of abuse requests without chain of custody are for medical screening purposes only.  False positives are possible.      Urinalysis With Culture If Indicated - Straight Cath [[id number]]  (Abnormal) Collected: 02/05/24 1146    Specimen: Urine from Straight Cath Updated: 02/05/24 1218     Color, UA Yellow     Appearance, UA Clear     pH, UA 6.5     Specific Gravity, UA 1.022     Glucose, UA Negative     Ketones, UA Trace     Bilirubin, UA Negative     Blood, UA Small (1+)     Protein,  mg/dL (2+)     Leuk Esterase, UA Negative     Nitrite, UA Negative     Urobilinogen, UA 0.2 E.U./dL    Narrative:      In absence of clinical symptoms, the presence of pyuria, bacteria, and/or nitrites on the urinalysis result does not correlate with infection.    POC Lactate [[id number]]  (Normal) Collected: 02/05/24 1136    Specimen: Blood Updated: 02/05/24 1137     Lactate 1.4 mmol/L      Comment: Serial Number: [id number]:  [id number]                Results for orders placed during the hospital encounter of 02/05/24    Adult Transthoracic Echo Complete W/ Cont if Necessary Per Protocol    Interpretation Summary    Left ventricular systolic function is normal. Left ventricular ejection fraction appears to be 61 - 65%.    Severely reduced right ventricular systolic function noted.    The right ventricular cavity is severely dilated.    The left atrial cavity is mildly  dilated.    Left atrial volume is mildly increased.    The right atrial cavity is moderate to severely  dilated.    Estimated right ventricular systolic pressure from tricuspid regurgitation is mildly elevated (35-45 mmHg).    Mild pulmonary hypertension is present.    There is a moderate (1-2cm) pericardial effusion. There is no evidence of cardiac tamponade.       Condition on Discharge:  Stable    Vital Signs  Temp:  [96.4 °F (35.8 °C)-98.1 °F (36.7 °C)] 98.1 °F (36.7 °C)  Heart Rate:  [67-85] 70  Resp:  [14-20] 15  BP: (105-128)/(51-73) 117/62      Physical Exam  Vitals reviewed.   Constitutional:       Appearance: She is not ill-appearing.   HENT:      Head: Normocephalic and atraumatic.      Right Ear: External ear normal.      Left Ear: External ear normal.      Nose: Nose normal.      Mouth/Throat:      Mouth: Mucous membranes are moist.   Eyes:      General:         Right eye: No discharge.         Left eye: No discharge.   Cardiovascular:      Rate and Rhythm: Normal rate and regular rhythm.      Pulses: Normal pulses.      Heart sounds: Normal heart sounds.   Pulmonary:      Effort: Pulmonary effort is normal.      Breath sounds: Normal breath sounds.   Abdominal:      General: Bowel sounds are normal.      Palpations: Abdomen is soft.   Musculoskeletal:         General: Normal range of motion.      Cervical back: Normal range of motion.   Skin:     General: Skin is warm and dry.   Neurological:      Mental Status: She is alert and oriented to person, place, and time.   Psychiatric:         Behavior: Behavior normal.              Discharge Disposition  Rehab Facility or Unit (DC - External)    Discharge Medications     Discharge Medications        New Medications        Instructions Start Date   famotidine 20 MG tablet  Commonly known as: PEPCID   20 mg, Oral, Daily      hydrALAZINE 50 MG tablet  Commonly known as: APRESOLINE   50 mg, Oral, Every 8 Hours Scheduled      predniSONE 5 MG tablet  Commonly  known as: DELTASONE   Take 2 tablets by mouth Daily With Breakfast for 4 days, THEN 1 tablet Daily With Breakfast for 5 days.   Start Date: February 18, 2024            Changes to Medications        Instructions Start Date   furosemide 20 MG tablet  Commonly known as: LASIX  What changed:   medication strength  how much to take   20 mg, Oral, Daily             Continue These Medications        Instructions Start Date   allopurinol 100 MG tablet  Commonly known as: ZYLOPRIM   100 mg, Oral, Daily      budesonide-formoterol 80-4.5 MCG/ACT inhaler  Commonly known as: SYMBICORT   2 puffs, Inhalation, 2 Times Daily - RT      carvedilol 12.5 MG tablet  Commonly known as: COREG   12.5 mg, Oral, 2 Times Daily With Meals      Eliquis 5 MG tablet tablet  Generic drug: apixaban   5 mg, Oral, Every 12 Hours Scheduled      levothyroxine 50 MCG tablet  Commonly known as: SYNTHROID, LEVOTHROID   1 tablet, Oral, Daily      potassium chloride 10 MEQ CR tablet  Commonly known as: K-DUR,KLOR-CON,KLOR-CON M10   10 mEq, Oral, Daily      sildenafil 20 MG tablet  Commonly known as: REVATIO   20 mg, Oral, Every 8 Hours Scheduled      Vitamin D3 50 MCG (2000 UT) capsule   2,000 Units, Oral, Daily             Stop These Medications      amoxicillin-clavulanate 875-125 MG per tablet  Commonly known as: AUGMENTIN     gabapentin 300 MG capsule  Commonly known as: NEURONTIN     lisinopril 40 MG tablet  Commonly known as: PRINIVIL,ZESTRIL              Discharge Diet:   Diet Instructions       Diet: Regular/House Diet; Regular Texture (IDDSI 7); Thin (IDDSI 0)      Discharge Diet: Regular/House Diet    Texture: Regular Texture (IDDSI 7)    Fluid Consistency: Thin (IDDSI 0)            Activity at Discharge:   Activity Instructions       Gradually Increase Activity Until at Pre-Hospitalization Level     Additional Activity Instructions:    As tolerated            Follow-up Appointments  Future Appointments   Date Time Provider Department Center    2/26/2024  1:15 PM Richardson Willis MD [facility]     Additional Instructions for the Follow-ups that You Need to Schedule       Basic Metabolic Panel    Feb 16, 2024 (Approximate)      Release to patient: Routine Release        Discharge Follow-up with PCP   As directed       Currently Documented PCP:    Shaylee Mcdaniels MD    PCP Phone Number:    [phone]     Follow Up Details: Call for a follow-up appointment with Dr. Granados after rehab        Discharge Follow-up with Specified Provider: Dr Fleming; 2 Weeks   As directed      To: Dr Fleming   Follow Up: 2 Weeks                Test Results Pending at Discharge       JOAQUÍN Rogers  02/17/24  12:23 EST    Time: Discharge 25 min

## 2024-02-17 NOTE — PROGRESS NOTES
Daily Progress Note          Assessment    AMS: No significant findings on brain MRI  Chronic Severe Pulmonary HTN  COPD: Emphysema  Chronic atelectasis in left lower lobe  Anemia  UTI  JULIAN/CKD 3  HTN  Hx PE/DVT  Hx TIA  CAD  History of breast cancer in 2018: Currently in remission              PLAN:  Respiratory status improving and patient can be discharged home  from pulmonary standpoint with follow-up in the pulmonary clinic in 3 weeks     oxygen supplement and titration to maintain saturation 90-95%: Currently requiring 4 L by high flow nasal cannula  PT/OT  Encourage use of incentive spirometry  Mucinex  Antibiotics per ID: Completed  Bronchodilators  IV steroids  switched to oral prednisone 10 mg  for 5 days, 5 mg for 5 days and then discontinue  Sildenafil  Inhaled corticosteroids  Diuresis as per nephrology  Thyroid hormone replacement  Cardiology following   Electrolytes/ glycemic control  Chronic anticoagulation: Apixaban  I personally reviewed the radiological images             LOS: 10 days     Subjective     Patient reports gradual improvement in the cough and shortness of breath    Objective     Vital signs for last 24 hours:  Vitals:    02/17/24 0759 02/17/24 1100 02/17/24 1124 02/17/24 1138   BP:   117/62    BP Location:   Right arm    Patient Position:   Sitting    Pulse: 73 71 70 70   Resp: 16 14 15 15   Temp:       TempSrc:       SpO2: 95% 97% 97% 97%   Weight:       Height:           Intake/Output last 3 shifts:  I/O last 3 completed shifts:  In: 870 [P.O.:870]  Out: -   Intake/Output this shift:  I/O this shift:  In: 240 [P.O.:240]  Out: -       Radiology  Imaging Results (Last 24 Hours)       ** No results found for the last 24 hours. **            Labs:  Results from last 7 days   Lab Units 02/17/24  0431   WBC 10*3/mm3 12.70*   HEMOGLOBIN g/dL 12.6   HEMATOCRIT % 39.7   PLATELETS 10*3/mm3 286     Results from last 7 days   Lab Units 02/17/24  0431   SODIUM mmol/L 136   POTASSIUM mmol/L 4.6    CHLORIDE mmol/L 102   CO2 mmol/L 25.0   BUN mg/dL 63*   CREATININE mg/dL 1.62*   CALCIUM mg/dL 8.9   GLUCOSE mg/dL 105*         Results from last 7 days   Lab Units 02/17/24  0431 02/15/24  2323 02/14/24  2339   ALBUMIN g/dL 3.2* 3.3* 3.1*               Results from last 7 days   Lab Units 02/17/24  0431   MAGNESIUM mg/dL 2.2                     Meds:   SCHEDULE  allopurinol, 100 mg, Oral, Daily  apixaban, 5 mg, Oral, Q12H  budesonide, 0.5 mg, Nebulization, BID - RT  carvedilol, 12.5 mg, Oral, BID With Meals  famotidine, 20 mg, Oral, Daily  furosemide, 20 mg, Oral, Daily  guaiFENesin, 1,200 mg, Oral, Q12H  hydrALAZINE, 50 mg, Oral, Q8H  ipratropium-albuterol, 3 mL, Nebulization, 4x Daily - RT  levothyroxine, 50 mcg, Oral, Q AM  melatonin, 5 mg, Oral, Nightly  predniSONE, 10 mg, Oral, Daily With Breakfast   Followed by  [START ON 2/22/2024] predniSONE, 5 mg, Oral, Daily With Breakfast  sildenafil, 20 mg, Oral, TID  sodium chloride, 10 mL, Intravenous, Q12H  sodium chloride, 10 mL, Intravenous, Q12H      Infusions     PRNs    acetaminophen    Calcium Replacement - Follow Nurse / BPA Driven Protocol    ipratropium-albuterol    Magnesium Standard Dose Replacement - Follow Nurse / BPA Driven Protocol    ondansetron    Phosphorus Replacement - Follow Nurse / BPA Driven Protocol    Potassium Replacement - Follow Nurse / BPA Driven Protocol    senna-docusate sodium    [COMPLETED] Insert Peripheral IV **AND** sodium chloride    sodium chloride    sodium chloride    sodium chloride    sodium chloride    Physical Exam:  General Appearance:  Alert   HEENT:  Normocephalic, without obvious abnormality, Conjunctiva/corneas clear,.   Nares normal, no drainage     Neck:  Supple, symmetrical, trachea midline.   Lungs /Chest wall: Improved air entry with no wheezing with improved bilateral basal rhonchi, respirations unlabored, symmetrical wall movement.     Heart:  Regular rate and rhythm, S1 S2 normal  Abdomen: Soft, non-tender,  no masses, no organomegaly.    Extremities: No edema, no clubbing or cyanosis     ROS  Constitutional: Negative for chills, fever and malaise/fatigue.   HENT: Negative.    Eyes: Negative.    Cardiovascular: Negative.    Respiratory: Positive for improvement in the cough and shortness of breath.    Skin: Negative.    Musculoskeletal: Negative.    Gastrointestinal: Negative.    Genitourinary: Negative.    Neurological: Generalized weakness      I reviewed the recent clinical results  I personally reviewed the latest radiological studies    Part of this note may be an electronic transcription/translation of spoken language to printed text using the Dragon Dictation System.

## 2024-02-17 NOTE — PROGRESS NOTES
"NEPHROLOGY PROGRESS NOTE------[facility]    [facility]  [phone]  Luke Fleming MD      Patient Care Team:  Shaylee Mcdaniels MD as PCP - General (Family Medicine)  Richardson Willis MD as Cardiologist (Cardiology)  Rafy Rodriguez MD as Consulting Physician (Hematology and Oncology)  Christianne Phillips RN as Licensed Practical Nurse  Salome Fleming MD as Consulting Physician (Nephrology)      Provider:  Luke Fleming MD  Patient Name: Gabrielle Lyles  :  1940    SUBJECTIVE:    F/U ARF/JULIAN/CRF/CKD    No overnight events. No angina. No dysuria. No palpitations.     Medication:  allopurinol, 100 mg, Oral, Daily  apixaban, 5 mg, Oral, Q12H  budesonide, 0.5 mg, Nebulization, BID - RT  carvedilol, 12.5 mg, Oral, BID With Meals  famotidine, 20 mg, Oral, Daily  furosemide, 20 mg, Oral, Daily  guaiFENesin, 1,200 mg, Oral, Q12H  hydrALAZINE, 50 mg, Oral, Q8H  ipratropium-albuterol, 3 mL, Nebulization, 4x Daily - RT  levothyroxine, 50 mcg, Oral, Q AM  melatonin, 5 mg, Oral, Nightly  predniSONE, 10 mg, Oral, Daily With Breakfast   Followed by  [START ON 2024] predniSONE, 5 mg, Oral, Daily With Breakfast  sildenafil, 20 mg, Oral, TID  sodium chloride, 10 mL, Intravenous, Q12H  sodium chloride, 10 mL, Intravenous, Q12H             OBJECTIVE    Vital Sign Min/Max for last 24 hours  Temp  Min: 96.4 °F (35.8 °C)  Max: 98.1 °F (36.7 °C)   BP  Min: 93/58  Max: 128/69   Pulse  Min: 66  Max: 85   Resp  Min: 15  Max: 23   SpO2  Min: 89 %  Max: 97 %   No data recorded   Weight  Min: 73.1 kg (161 lb 2.5 oz)  Max: 73.1 kg (161 lb 2.5 oz)     Flowsheet Rows      Flowsheet Row First Filed Value   Admission Height 162.6 cm (64\") Documented at 2024 1046   Admission Weight 66.7 kg (147 lb) Documented at 2024 1046            No intake/output data recorded.  I/O last 3 completed shifts:  In: 870 [P.O.:870]  Out: -     Physical " "Exam:  General Appearance: alert, appears stated age and cooperative  Head: normocephalic, without obvious abnormality and atraumatic  Eyes: conjunctivae and sclerae normal and no icterus  Neck: supple and no JVD  Lungs: +SCATTERED RHONCHI  Heart: regular rhythm & normal rate and normal S1, S2 +TAYE  Chest Wall: no abnormalities observed  Abdomen: normal bowel sounds and soft, nontender +OBESITY  Extremities: moves extremities well, trace edema, no cyanosis  Skin: no bleeding, bruising or rash  Neurologic: Alert, and oriented. No focal deficits    Labs:    WBC WBC   Date Value Ref Range Status   02/17/2024 12.70 (H) 3.40 - 10.80 10*3/mm3 Final   02/15/2024 10.70 3.40 - 10.80 10*3/mm3 Final   02/14/2024 8.70 3.40 - 10.80 10*3/mm3 Final      HGB Hemoglobin   Date Value Ref Range Status   02/17/2024 12.6 12.0 - 15.9 g/dL Final   02/15/2024 12.6 12.0 - 15.9 g/dL Final   02/14/2024 12.1 12.0 - 15.9 g/dL Final      HCT Hematocrit   Date Value Ref Range Status   02/17/2024 39.7 34.0 - 46.6 % Final   02/15/2024 40.1 34.0 - 46.6 % Final   02/14/2024 38.2 34.0 - 46.6 % Final      Platelets No results found for: \"LABPLAT\"   MCV MCV   Date Value Ref Range Status   02/17/2024 83.1 79.0 - 97.0 fL Final   02/15/2024 83.4 79.0 - 97.0 fL Final   02/14/2024 83.2 79.0 - 97.0 fL Final          Sodium Sodium   Date Value Ref Range Status   02/17/2024 136 136 - 145 mmol/L Final   02/15/2024 137 136 - 145 mmol/L Final   02/14/2024 138 136 - 145 mmol/L Final      Potassium Potassium   Date Value Ref Range Status   02/17/2024 4.6 3.5 - 5.2 mmol/L Final     Comment:     Slight hemolysis detected by analyzer. Result may be falsely elevated.   02/15/2024 4.8 3.5 - 5.2 mmol/L Final   02/14/2024 4.7 3.5 - 5.2 mmol/L Final      Chloride Chloride   Date Value Ref Range Status   02/17/2024 102 98 - 107 mmol/L Final   02/15/2024 102 98 - 107 mmol/L Final   02/14/2024 103 98 - 107 mmol/L Final      CO2 CO2   Date Value Ref Range Status   02/17/2024 " "25.0 22.0 - 29.0 mmol/L Final   02/15/2024 27.0 22.0 - 29.0 mmol/L Final   02/14/2024 27.0 22.0 - 29.0 mmol/L Final      BUN BUN   Date Value Ref Range Status   02/17/2024 63 (H) 8 - 23 mg/dL Final   02/15/2024 59 (H) 8 - 23 mg/dL Final   02/14/2024 56 (H) 8 - 23 mg/dL Final      Creatinine Creatinine   Date Value Ref Range Status   02/17/2024 1.62 (H) 0.57 - 1.00 mg/dL Final   02/15/2024 1.62 (H) 0.57 - 1.00 mg/dL Final   02/14/2024 1.30 (H) 0.57 - 1.00 mg/dL Final      Calcium Calcium   Date Value Ref Range Status   02/17/2024 8.9 8.6 - 10.5 mg/dL Final   02/15/2024 9.1 8.6 - 10.5 mg/dL Final   02/14/2024 9.2 8.6 - 10.5 mg/dL Final      PO4 No components found for: \"PO4\"   Albumin Albumin   Date Value Ref Range Status   02/17/2024 3.2 (L) 3.5 - 5.2 g/dL Final   02/15/2024 3.3 (L) 3.5 - 5.2 g/dL Final   02/14/2024 3.1 (L) 3.5 - 5.2 g/dL Final      Magnesium Magnesium   Date Value Ref Range Status   02/17/2024 2.2 1.6 - 2.4 mg/dL Final   02/15/2024 2.2 1.6 - 2.4 mg/dL Final        Uric Acid No components found for: \"URIC ACID\"     Imaging Results (Last 72 Hours)       ** No results found for the last 72 hours. **            Results for orders placed during the hospital encounter of 02/05/24    XR Chest 1 View    Narrative  XR CHEST 1 VW    Date of Exam: 2/9/2024 5:36 AM EST    Indication: sob    Comparison: None available.    Findings:  The trachea is midline. Stable cardiomegaly with mild enlargement of the pulmonary arteries. There is mild diffuse bilateral reticular lung thickening. Mild left basilar atelectasis. No definite pleural effusions. No change in position of the right-sided  pigtail catheter.    Impression  Impression:  Stable left basilar atelectasis and chronic lung changes    Cardiomegaly      Electronically Signed: Pablo Martins MD  2/9/2024 7:35 AM EST  Workstation ID: [id number]      XR Chest 1 View    Narrative  XR CHEST 1 VW    Date of Exam: 2/5/2024 12:00 PM EST    Indication: " weakness    Comparison: 5/2/2023.    Findings:  There is mild cardiomegaly with increased heart size. Mildly prominent interstitial pattern appears stable and chronic. There is a prominent skinfold over the right chest. There is no definite pneumothorax. There is no effusion.    Impression  Impression:  Mild cardiomegaly. Mild chronic findings of the lung fields.      Electronically Signed: Mary Ivan MD  2/5/2024 12:06 PM EST  Workstation ID: [id number]      Results for orders placed during the hospital encounter of 09/24/23    XR Foot 3+ View Left    Narrative  XR FOOT 3+ VW LEFT    Date of Exam: 9/24/2023 3:15 PM EDT    Indication: pain redness swelling    Comparison: None available.    Findings:  No fracture or dislocation. There is soft tissue swelling diffusely at the left foot. No discrete osseous erosion. Plantar calcaneal bone spur. Enthesopathy at the Achilles insertion on the calcaneus. No radiodense foreign body.    Impression  Impression:  1. Negative for acute osseous abnormality.  2. Nonspecific soft tissue swelling.        Electronically Signed: Randall Zarco MD  9/24/2023 3:54 PM EDT  Workstation ID: [id number]      Results for orders placed during the hospital encounter of 02/05/24    Duplex Venous Upper Extremity - Right CAR    Interpretation Summary    Normal right upper extremity venous duplex scan.        ASSESSMENT / PLAN      Altered mental status    Acute hypokalemia    Stage 3a chronic kidney disease    Chronic obstructive pulmonary disease    History of venous thrombosis and embolism    Primary hypertension    History of TIA (transient ischemic attack)    Severe pulmonary hypertension    Chronic respiratory failure with hypoxia    JULIAN (acute kidney injury)    ARF/JULIAN/CRF/CKD------Nonoliguric. +ARF/JULIAN on top of CRF/CKD STG 3A with a baseline serum  Creatinine of about 1.1. Unknown if patient has baseline proteinuria or hematuria. CRF/CKD STG 3A most likely secondary to HTN NS. +ARF/JULIAN is  secondary to prerenal state/intravascular volume depletion. Stable.  Avoid hypotension.  No NSAIDs or IV dye.  BUN/Cr stable but azotemia worse from steroid exposure and will hopefully improve as steroids taper.      2. ANEMIA------H/H stable     3. HTN WITH CKD-----Avoid hypotension. No ACE/ARB/DRI/diuretic for now     4. OA/DJD/HYPERURICEMIA------No NSAIDs. Added Allopurinol     5. DELIRIUM-------Resolved     6. UTI-----S/P Tx     7. HYPOCALCEMIA------Replaced     8. KETONURIA/DEHYDRATION------Secondary to recent outpatient increase in Lasix. Hold Lasix. Gentle IVFs given Pulmonary HTN     9. PULMONARY HTN--------Per , Pulmonary     10. HYPOKALEMIA-------Replaced     11. CAD-----per , Cardiology    12. REHAB PLACEMENT PENDING       Luke Fleming MD  [facility]  [phone]  02/17/24  08:05 EST

## 2024-02-17 NOTE — PLAN OF CARE
Goal Outcome Evaluation:  Plan of Care Reviewed With: patient     Problem: Adult Inpatient Plan of Care  Goal: Plan of Care Review  Outcome: Ongoing, Progressing  Flowsheets (Taken 2/17/2024 4820)  Progress: improving  Plan of Care Reviewed With: patient        Progress: improving

## 2024-02-17 NOTE — CASE MANAGEMENT/SOCIAL WORK
Case Management Discharge Note      Final Note: [facility]            Destination Coordination complete.      Service Provider Selected Services Address Phone Fax Patient Preferred    [facility] [place] [facility] [address] [place] [address] [place] [id number] [phone] --               Transportation Services  Private: Car    Final Discharge Disposition Code: 03 - skilled nursing facility (SNF)

## 2024-02-20 ENCOUNTER — TELEPHONE (OUTPATIENT)
Dept: CARDIAC REHAB | Facility: HOSPITAL | Age: 84
End: 2024-02-20
Payer: MEDICARE

## 2024-03-16 NOTE — PROGRESS NOTES
Encounter Date:03/18/2024        Patient ID: Gabrielle Lyles is a 84 y.o. female.      Chief Complaint:      History of Present Illness  84 years old woman with COPD, pulmonary hypertension, chronic respiratory failure on oxygen, TIA, DVT and pulmonary embolism, chronic kidney disease, hypertension who was evaluated in the hospital for altered mental state and was noted to have infection at the PICC line site and possible mastitis for which she was given antibiotics.  Today she has multiple complaints of dyspnea on exertion, leg edema, shortness of breath, dizziness and malaise.  Oxygen saturation is only 84%.  She is currently on Eliquis, Coreg, Lasix, hydralazine.      The following portions of the patient's history were reviewed and updated as appropriate: allergies, current medications, past family history, past medical history, past social history, past surgical history, and problem list.    Review of Systems   Constitutional: Positive for malaise/fatigue.   Cardiovascular:  Positive for dyspnea on exertion, leg swelling and palpitations. Negative for chest pain.   Respiratory:  Positive for shortness of breath. Negative for cough.    Gastrointestinal:  Positive for nausea. Negative for abdominal pain and vomiting.   Neurological:  Positive for dizziness. Negative for focal weakness, headaches, light-headedness and numbness.   All other systems reviewed and are negative.        Current Outpatient Medications:     allopurinol (ZYLOPRIM) 100 MG tablet, Take 1 tablet by mouth Daily., Disp: , Rfl:     apixaban (ELIQUIS) 5 MG tablet tablet, Take 1 tablet by mouth Every 12 (Twelve) Hours. Indications: Other - full anticoagulation, history of DVT/PE, Disp: 60 tablet, Rfl: 2    budesonide-formoterol (SYMBICORT) 80-4.5 MCG/ACT inhaler, Inhale 2 puffs 2 (Two) Times a Day., Disp: , Rfl:     carvedilol (COREG) 12.5 MG tablet, TAKE 1 TABLET BY MOUTH TWICE DAILY WITH MEALS, Disp: 180 tablet, Rfl: 3    Cholecalciferol  (Vitamin D3) 50 MCG (2000 UT) capsule, Take 1 capsule by mouth Daily., Disp: , Rfl:     famotidine (PEPCID) 20 MG tablet, Take 1 tablet by mouth Daily., Disp: 30 tablet, Rfl: 1    furosemide (LASIX) 20 MG tablet, Take 1 tablet by mouth Daily., Disp: 30 tablet, Rfl: 1    hydrALAZINE (APRESOLINE) 50 MG tablet, Take 1 tablet by mouth Every 8 (Eight) Hours., Disp: 90 tablet, Rfl: 1    levothyroxine (SYNTHROID, LEVOTHROID) 50 MCG tablet, Take 1 tablet by mouth Daily., Disp: , Rfl:     potassium chloride (K-DUR,KLOR-CON) 10 MEQ CR tablet, Take 1 tablet by mouth Daily., Disp: 90 tablet, Rfl: 3    sildenafil (REVATIO) 20 MG tablet, Take 1 tablet by mouth Every 8 (Eight) Hours., Disp: 90 tablet, Rfl: 1    Current outpatient and discharge medications have been reconciled for the patient.  Reviewed by: Richardson Willis MD       No Known Allergies    Family History   Problem Relation Age of Onset    Lung cancer Brother        Past Surgical History:   Procedure Laterality Date    CARDIAC CATHETERIZATION N/A 3/3/2023    Procedure: Left and Right Heart Cath with Coronary Angiography;  Surgeon: Richardson Willis MD;  Location: [facility] INVASIVE LOCATION;  Service: Cardiovascular;  Laterality: N/A;    CATARACT EXTRACTION  2015    IVC FILTER RETRIEVAL  06/2014    IVC filter placement by Dr. Card    MAMMO STEREOTACTIC BREAST BX SURGICAL ADD UNI Left 11/01/2011    invasive lobular carcinoma-Dr. Cande Daniel    MASTECTOMY, PARTIAL Left 12/01/2011    and left axillary sentinel lymph node biopsy by Dr. Uriostegui    TUBAL ABDOMINAL LIGATION  1984       Past Medical History:   Diagnosis Date    Acute exacerbation of chronic obstructive pulmonary disease (COPD)     COPD (chronic obstructive pulmonary disease)     Deep vein thrombosis of bilateral lower extremities 07/24/2019    3/2013    History of pneumonia 06/2012    community acquired pneumonia and right pleural effusion;hospitalized at [facility]    History of pulmonary embolism 03/2013     bilateral PE    Hypertension     Insomnia     on Ambien 5mg at [facility]    Lobular breast cancer, left 2011    Stage IA left upper lobular breast cancer    Malignant neoplasm of left breast in female, estrogen receptor positive 2019    Osteopenia     Parainfluenza infection     Parotid duct obstruction     Severe pulmonary hypertension 2023    Stage 3a chronic kidney disease 2019    TIA (transient ischemic attack) 10/25/2022       Family History   Problem Relation Age of Onset    Lung cancer Brother        Social History     Socioeconomic History    Marital status:    Tobacco Use    Smoking status: Former     Current packs/day: 0.00     Types: Cigarettes     Quit date:      Years since quittin.2     Passive exposure: Past    Smokeless tobacco: Never    Tobacco comments:     smoked 6 cigarettes a day from 13 years of age to 56 years of age when she quit in    Vaping Use    Vaping status: Never Used   Substance and Sexual Activity    Alcohol use: Not Currently    Drug use: No    Sexual activity: Not Currently     Partners: Male               Objective:       Physical Exam    There were no vitals taken for this visit.  The patient is alert, oriented and in no distress.    Vital signs as noted above.    Head and neck revealed no carotid bruits or jugular venous distension.  No thyromegaly or lymphadenopathy is present.    Lungs clear.  No wheezing.  Breath sounds are normal bilaterally.    Heart normal first and second heart sounds.  No murmur..  No pericardial rub is present.  No gallop is present.    Abdomen soft and nontender.  No organomegaly is present.    Extremities revealed good peripheral pulses without any pedal edema.    Skin warm and dry.    Musculoskeletal system is grossly normal.    CNS grossly normal.          No diagnosis found.LAB RESULTS (LAST 7 DAYS)    CBC        BMP        CMP         BNP        TROPONIN        CoAg        Creatinine  Clearance  Estimated Creatinine Clearance: 25.8 mL/min (A) (by C-G formula based on SCr of 1.62 mg/dL (H)).    ABG        Radiology  No radiology results for the last day    EKG  Procedures    Stress test  Results for orders placed in visit on 09/13/22    Stress Test With Myocardial Perfusion One Day    Interpretation Summary    Left ventricular ejection fraction is hyperdynamic (Calculated EF > 70%). .    Myocardial perfusion imaging indicates a normal myocardial perfusion study with no evidence of ischemia.    Impressions are consistent with a low risk study.    Findings consistent with a normal ECG stress test.      Echocardiogram  Results for orders placed during the hospital encounter of 02/05/24    Adult Transthoracic Echo Complete W/ Cont if Necessary Per Protocol    Interpretation Summary    Left ventricular systolic function is normal. Left ventricular ejection fraction appears to be 61 - 65%.    Severely reduced right ventricular systolic function noted.    The right ventricular cavity is severely dilated.    The left atrial cavity is mildly dilated.    Left atrial volume is mildly increased.    The right atrial cavity is moderate to severely  dilated.    Estimated right ventricular systolic pressure from tricuspid regurgitation is mildly elevated (35-45 mmHg).    Mild pulmonary hypertension is present.    There is a moderate (1-2cm) pericardial effusion. There is no evidence of cardiac tamponade.      Cardiac catheterization  Results for orders placed during the hospital encounter of 03/03/23    Cardiac Catheterization/Vascular Study    Conclusion  OPERATORS  Richardson Willis M.D. (Attending Cardiologist)      PROCEDURE PERFORMED  Ultrasound guided vascular access  Right heart catheterization  Coronary Angiogram  Left Heart Catheterization 35047  Moderate Sedation    INDICATIONS FOR PROCEDURE  83-year-old woman with multiple cardiovascular risk factors, abnormal stress test presented with worsening shortness  of breath.  After discussing the risk and benefit of the procedure she was brought in for elective right and left heart cath.    PROCEDURE IN DETAIL  Informed consent was obtained from the patient after explaining the risks, benefits, and alternative options of the procedure. After obtaining informed consent, the patient was brought to the cath lab and was prepped in a sterile fashion. Lidocaine 2% was used for local anesthesia into the right femoral venous access site. Right femoral vein was accessed using the micropuncture needle under ultrasound guidance and micropuncture wire advanced under flouroscopy. A 7 Lao vascular sheath was put into place percutaneously over guide-wire. Guide wires were removed. A 6Fr swan sheryl catheter was advanced to wedge position. RA, RV and PA and wedge pressures were recorded.  PA sat and arterial sats recorded.  The patient tolerated the procedure well without any complications.    Lidocaine 2% was used for local anesthesia into the right femoral arterial access site. The right femoral artery was accessed with a micropuncture needle via modified Seldinger technique under ultrasound guidance. A 6F was inserted successfully.  Afterwards, 6F JR4 and JL4 diagnostic catheters were advanced over a wire into the ascending aorta and were used to engage the ostia of the left main and RCA respectively. JR4 used to cross the AV and obtain LV pressures and gradient across the AV measured via pullback technique. Images of the right and left coronary systems were obtained. All the catheters were exchanged over a wire and subsequently removed. Angiogram of the femoral access site was obtained and did not show complications. The patient tolerated the procedure well without any complications. The pictures were reviewed at the end of the procedure. A Mynx closure device was applied.    HEMODYNAMICS    RHC  RA 6/5, 4 mmHg  RV 74/3, 5 mmHg  PA 71/25, 44 mmHg  PCW 12 mmHg  AO Sat 92%  PA Sat  78%    Jose CO: 7.89 L/min    Jose CI: 4.69 L/min/m²    LHC  LV: 134/3, 20 mmHg  AO: 131/56, 87 mmHg  No significant gradient across the aortic valve during pullback of JR4 catheter.    FINDINGS  Coronary Angiogram  All vessels are heavily calcified  She also has heavily calcified peripheral arterial disease.  Right dominant circulation    Left main: Left main is a large caliber vessel which gives rise to the Left Anterior Descending and the Left circumflex.  Left main is angiographically free from any significant disease.    Left Anterior Descending Artery: LAD is a heavily calcified medium caliber vessel which gives rise to diagonals.  The vessel is highly tortuous and has 50 to 60% mid vessel stenosis best seen in Cypriot cranial view.    Left Circumflex: Heavily calcified vessel with 50% proximal segment stenosis.    Right Coronary Artery: The RCA is a large caliber vessel which is heavily calcified and tortuous.  It has diffuse luminal irregularities and 30 to 40% stenosis in the midsegment around the bend.    ESTIMATED BLOOD LOSS:  10 ml    COMPLICATIONS:  None    PROCEDURE DATA:  Sedation Time: 25 minutes    IMPRESSIONS  Heavily calcified, tortuous nonobstructive coronary disease  Severe pulmonary hypertension with PA systolic pressure in the 70s  Mean PA pressure 44 mmHg    RECOMMENDATIONS  -Continue aggressive medical management of CAD  -Referral to pulmonology for treatment of pulmonary hypertension          Assessment and Plan       There are no diagnoses linked to this encounter.     COPD/Chronic respiratory failure/shortness of breath  Severe pulmonary hypertension  HFpEF  On 3 L of oxygen via nasal cannula at baseline.  Oxygen saturation is in the 80s today  Has known pulmonary hypertension  RA 6/5, 4 mmHg  RV 74/3, 5 mmHg  PA 71/25, 44 mmHg  PCW 12 mmHg  Currently on sildenafil  She follows with Dr. Roy.  Autoimmune workup is in progress  Echocardiogram shows preserved LV function with mildly elevated  RVSP.  Small to moderate pericardial effusion: No signs of tamponade  I will repeat a limited echocardiogram as soon as possible to look for size of pericardial effusion.  I have advised her to take extra diuretics for the next 3 days.  Checking labs including proBNP today  I have advised her to come to the emergency room if her breathing was to worsen.     Atrial fibrillation  She has paroxysmal atrial fibrillation  [id number] score is 6  Continue Eliquis for atrial fibrillation as well as for history of DVT/PE  Heart rate has improved on beta-blocker: Continue Coreg     History of venous thrombosis and embolism  Continue Eliquis 5 mg p.o. twice daily.  She also has an IVC filter in place.     Primary hypertension, chronic  Blood pressure has improved and is currently normal  Continue Coreg and hydralazine for blood pressure control.  Blood pressure is 108/47 today  Amlodipine can be added if better blood pressure control is desired.  Echo shows preserved LV function, reduced RV function and mildly elevated RVSP.  Pericardial effusion is not significant with no signs of tamponade.  Continue diuretics     Coronary artery disease  Continue high intensity statin and beta-blocker.  No need for aspirin while she is on anticoagulation  Previous cardiac catheterization showed heavily calcified coronaries but nonobstructive.  No chest pain and stable from cardiac standpoint.     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  Continue high intensity statin and anticoagulation with Eliquis 5 mg p.o. twice daily.    JULIAN on Stage 3a chronic kidney disease  Creatinine 1.6, GFR is 31.6  Continue furosemide.  Follow-up with nephrology  Checking renal function today    Recent altered mental status  Precipitated by mastitis/PICC line infection  CT head and MRI of  the brain unremarkable with no acute findings  Antibiotics per ID  Her mental status is now improved and back to baseline

## 2024-03-18 ENCOUNTER — OFFICE VISIT (OUTPATIENT)
Dept: CARDIOLOGY | Facility: CLINIC | Age: 84
End: 2024-03-18
Payer: MEDICARE

## 2024-03-18 ENCOUNTER — HOSPITAL ENCOUNTER (OUTPATIENT)
Dept: CARDIOLOGY | Facility: HOSPITAL | Age: 84
Discharge: HOME | End: 2024-03-18
Payer: MEDICARE

## 2024-03-18 ENCOUNTER — LAB (OUTPATIENT)
Dept: LAB | Facility: HOSPITAL | Age: 84
End: 2024-03-18
Payer: MEDICARE

## 2024-03-18 VITALS
WEIGHT: 154 LBS | OXYGEN SATURATION: 84 % | BODY MASS INDEX: 26.29 KG/M2 | DIASTOLIC BLOOD PRESSURE: 47 MMHG | HEART RATE: 70 BPM | HEIGHT: 64 IN | SYSTOLIC BLOOD PRESSURE: 108 MMHG

## 2024-03-18 DIAGNOSIS — I10 ESSENTIAL (PRIMARY) HYPERTENSION: ICD-10-CM

## 2024-03-18 DIAGNOSIS — I27.21 PAH (PULMONARY ARTERIAL HYPERTENSION) WITH PORTAL HYPERTENSION: ICD-10-CM

## 2024-03-18 DIAGNOSIS — R60.0 BILATERAL LEG EDEMA: ICD-10-CM

## 2024-03-18 DIAGNOSIS — R06.02 SOB (SHORTNESS OF BREATH): ICD-10-CM

## 2024-03-18 DIAGNOSIS — I82.403 DVT, RECURRENT, LOWER EXTREMITY, ACUTE, BILATERAL: ICD-10-CM

## 2024-03-18 DIAGNOSIS — I48.0 AF (PAROXYSMAL ATRIAL FIBRILLATION): ICD-10-CM

## 2024-03-18 DIAGNOSIS — R00.9 ABNORMAL HEART RATE: ICD-10-CM

## 2024-03-18 DIAGNOSIS — I27.21 PAH (PULMONARY ARTERIAL HYPERTENSION) WITH PORTAL HYPERTENSION: Primary | ICD-10-CM

## 2024-03-18 DIAGNOSIS — R00.2 PALPITATIONS: ICD-10-CM

## 2024-03-18 DIAGNOSIS — K76.6 PAH (PULMONARY ARTERIAL HYPERTENSION) WITH PORTAL HYPERTENSION: Primary | ICD-10-CM

## 2024-03-18 DIAGNOSIS — I31.39 PERICARDIAL EFFUSION: ICD-10-CM

## 2024-03-18 DIAGNOSIS — K76.6 PAH (PULMONARY ARTERIAL HYPERTENSION) WITH PORTAL HYPERTENSION: ICD-10-CM

## 2024-03-18 DIAGNOSIS — R94.5 ABNORMAL RESULTS OF LIVER FUNCTION STUDIES: ICD-10-CM

## 2024-03-18 DIAGNOSIS — G45.9 TIA (TRANSIENT ISCHEMIC ATTACK): ICD-10-CM

## 2024-03-18 DIAGNOSIS — I73.9 PAD (PERIPHERAL ARTERY DISEASE): ICD-10-CM

## 2024-03-18 DIAGNOSIS — I27.20 PULMONARY HYPERTENSION: ICD-10-CM

## 2024-03-18 DIAGNOSIS — N18.30 STAGE 3 CHRONIC KIDNEY DISEASE, UNSPECIFIED WHETHER STAGE 3A OR 3B CKD: ICD-10-CM

## 2024-03-18 DIAGNOSIS — I10 PRIMARY HYPERTENSION: Primary | ICD-10-CM

## 2024-03-18 DIAGNOSIS — I71.9 AORTIC ANEURYSM WITHOUT RUPTURE, UNSPECIFIED PORTION OF AORTA: ICD-10-CM

## 2024-03-18 DIAGNOSIS — I82.4Y3 DEEP VEIN THROMBOSIS (DVT) OF PROXIMAL VEIN OF BOTH LOWER EXTREMITIES, UNSPECIFIED CHRONICITY: ICD-10-CM

## 2024-03-18 PROCEDURE — 3074F SYST BP LT 130 MM HG: CPT | Performed by: INTERNAL MEDICINE

## 2024-03-18 PROCEDURE — 85670 THROMBIN TIME PLASMA: CPT

## 2024-03-18 PROCEDURE — 86235 NUCLEAR ANTIGEN ANTIBODY: CPT

## 2024-03-18 PROCEDURE — 86146 BETA-2 GLYCOPROTEIN ANTIBODY: CPT

## 2024-03-18 PROCEDURE — 80053 COMPREHEN METABOLIC PANEL: CPT

## 2024-03-18 PROCEDURE — 1159F MED LIST DOCD IN RCRD: CPT | Performed by: INTERNAL MEDICINE

## 2024-03-18 PROCEDURE — 1160F RVW MEDS BY RX/DR IN RCRD: CPT | Performed by: INTERNAL MEDICINE

## 2024-03-18 PROCEDURE — 86038 ANTINUCLEAR ANTIBODIES: CPT

## 2024-03-18 PROCEDURE — 93308 TTE F-UP OR LMTD: CPT

## 2024-03-18 PROCEDURE — 85732 THROMBOPLASTIN TIME PARTIAL: CPT

## 2024-03-18 PROCEDURE — 85613 RUSSELL VIPER VENOM DILUTED: CPT

## 2024-03-18 PROCEDURE — 85597 PHOSPHOLIPID PLTLT NEUTRALIZ: CPT

## 2024-03-18 PROCEDURE — 85610 PROTHROMBIN TIME: CPT

## 2024-03-18 PROCEDURE — 36415 COLL VENOUS BLD VENIPUNCTURE: CPT

## 2024-03-18 PROCEDURE — 99214 OFFICE O/P EST MOD 30 MIN: CPT | Performed by: INTERNAL MEDICINE

## 2024-03-18 PROCEDURE — 85598 HEXAGNAL PHOSPH PLTLT NEUTRL: CPT

## 2024-03-18 PROCEDURE — 85025 COMPLETE CBC W/AUTO DIFF WBC: CPT

## 2024-03-18 PROCEDURE — 83880 ASSAY OF NATRIURETIC PEPTIDE: CPT

## 2024-03-18 PROCEDURE — 3078F DIAST BP <80 MM HG: CPT | Performed by: INTERNAL MEDICINE

## 2024-03-18 PROCEDURE — 86147 CARDIOLIPIN ANTIBODY EA IG: CPT

## 2024-03-18 PROCEDURE — 85730 THROMBOPLASTIN TIME PARTIAL: CPT

## 2024-03-18 RX ORDER — FOLIC ACID-PYRIDOXINE-CYANOCOBALAMIN TAB 2.5-25-2 MG 2.5-25-2 MG
1 TAB ORAL DAILY
COMMUNITY
Start: 2024-02-28

## 2024-03-19 LAB
ALBUMIN SERPL-MCNC: 3.7 G/DL (ref 3.5–5.2)
ALBUMIN/GLOB SERPL: 1.7 G/DL
ALP SERPL-CCNC: 78 U/L (ref 39–117)
ALT SERPL W P-5'-P-CCNC: 31 U/L (ref 1–33)
ANA SER QL: NEGATIVE
ANA SER QL: NEGATIVE
ANION GAP SERPL CALCULATED.3IONS-SCNC: 12 MMOL/L (ref 5–15)
AST SERPL-CCNC: 27 U/L (ref 1–32)
BASOPHILS # BLD AUTO: 0.07 10*3/MM3 (ref 0–0.2)
BASOPHILS NFR BLD AUTO: 0.7 % (ref 0–1.5)
BILIRUB SERPL-MCNC: 2.2 MG/DL (ref 0–1.2)
BUN SERPL-MCNC: 40 MG/DL (ref 8–23)
BUN/CREAT SERPL: 22.6 (ref 7–25)
CALCIUM SPEC-SCNC: 9.6 MG/DL (ref 8.6–10.5)
CHLORIDE SERPL-SCNC: 103 MMOL/L (ref 98–107)
CO2 SERPL-SCNC: 21 MMOL/L (ref 22–29)
CREAT SERPL-MCNC: 1.77 MG/DL (ref 0.57–1)
DEPRECATED RDW RBC AUTO: 52.5 FL (ref 37–54)
EGFRCR SERPLBLD CKD-EPI 2021: 28.2 ML/MIN/1.73
ENA SCL70 AB SER-ACNC: <0.2 AI (ref 0–0.9)
ENA SS-A AB SER-ACNC: <0.2 AI (ref 0–0.9)
ENA SS-B AB SER-ACNC: 0.3 AI (ref 0–0.9)
EOSINOPHIL # BLD AUTO: 0.04 10*3/MM3 (ref 0–0.4)
EOSINOPHIL NFR BLD AUTO: 0.4 % (ref 0.3–6.2)
ERYTHROCYTE [DISTWIDTH] IN BLOOD BY AUTOMATED COUNT: 17.8 % (ref 12.3–15.4)
GLOBULIN UR ELPH-MCNC: 2.2 GM/DL
GLUCOSE SERPL-MCNC: 125 MG/DL (ref 65–99)
HCT VFR BLD AUTO: 33.2 % (ref 34–46.6)
HGB BLD-MCNC: 10.4 G/DL (ref 12–15.9)
IMM GRANULOCYTES # BLD AUTO: 0.04 10*3/MM3 (ref 0–0.05)
IMM GRANULOCYTES NFR BLD AUTO: 0.4 % (ref 0–0.5)
LYMPHOCYTES # BLD AUTO: 0.98 10*3/MM3 (ref 0.7–3.1)
LYMPHOCYTES NFR BLD AUTO: 10 % (ref 19.6–45.3)
MCH RBC QN AUTO: 25.9 PG (ref 26.6–33)
MCHC RBC AUTO-ENTMCNC: 31.3 G/DL (ref 31.5–35.7)
MCV RBC AUTO: 82.8 FL (ref 79–97)
MONOCYTES # BLD AUTO: 0.91 10*3/MM3 (ref 0.1–0.9)
MONOCYTES NFR BLD AUTO: 9.2 % (ref 5–12)
NEUTROPHILS NFR BLD AUTO: 7.8 10*3/MM3 (ref 1.7–7)
NEUTROPHILS NFR BLD AUTO: 79.3 % (ref 42.7–76)
NRBC BLD AUTO-RTO: 0 /100 WBC (ref 0–0.2)
NT-PROBNP SERPL-MCNC: ABNORMAL PG/ML (ref 0–1800)
PLATELET # BLD AUTO: 245 10*3/MM3 (ref 140–450)
PMV BLD AUTO: 10.6 FL (ref 6–12)
POTASSIUM SERPL-SCNC: 3.9 MMOL/L (ref 3.5–5.2)
PROT SERPL-MCNC: 5.9 G/DL (ref 6–8.5)
RBC # BLD AUTO: 4.01 10*6/MM3 (ref 3.77–5.28)
SODIUM SERPL-SCNC: 136 MMOL/L (ref 136–145)
WBC NRBC COR # BLD AUTO: 9.84 10*3/MM3 (ref 3.4–10.8)

## 2024-03-21 ENCOUNTER — APPOINTMENT (OUTPATIENT)
Dept: GENERAL RADIOLOGY | Facility: HOSPITAL | Age: 84
End: 2024-03-21
Payer: MEDICARE

## 2024-03-21 ENCOUNTER — HOSPITAL ENCOUNTER (EMERGENCY)
Facility: HOSPITAL | Age: 84
Discharge: HOME | End: 2024-03-21
Attending: EMERGENCY MEDICINE
Payer: MEDICARE

## 2024-03-21 VITALS
RESPIRATION RATE: 15 BRPM | TEMPERATURE: 97.8 F | OXYGEN SATURATION: 95 % | DIASTOLIC BLOOD PRESSURE: 91 MMHG | HEART RATE: 90 BPM | WEIGHT: 154.1 LBS | SYSTOLIC BLOOD PRESSURE: 144 MMHG | BODY MASS INDEX: 26.31 KG/M2 | HEIGHT: 64 IN

## 2024-03-21 DIAGNOSIS — I50.9 ACUTE ON CHRONIC CONGESTIVE HEART FAILURE, UNSPECIFIED HEART FAILURE TYPE: Primary | ICD-10-CM

## 2024-03-21 DIAGNOSIS — R06.00 DYSPNEA, UNSPECIFIED TYPE: ICD-10-CM

## 2024-03-21 LAB
ANION GAP SERPL CALCULATED.3IONS-SCNC: 14 MMOL/L (ref 5–15)
ARTERIAL PATENCY WRIST A: POSITIVE
ATMOSPHERIC PRESS: ABNORMAL MM[HG]
B PARAPERT DNA SPEC QL NAA+PROBE: NOT DETECTED
B PERT DNA SPEC QL NAA+PROBE: NOT DETECTED
BASE EXCESS BLDA CALC-SCNC: 0.2 MMOL/L (ref 0–3)
BASOPHILS # BLD AUTO: 0.12 10*3/MM3 (ref 0–0.2)
BASOPHILS NFR BLD AUTO: 1.1 % (ref 0–1.5)
BDY SITE: ABNORMAL
BUN SERPL-MCNC: 39 MG/DL (ref 8–23)
BUN/CREAT SERPL: 22.3 (ref 7–25)
C PNEUM DNA NPH QL NAA+NON-PROBE: NOT DETECTED
CALCIUM SPEC-SCNC: 9.2 MG/DL (ref 8.6–10.5)
CHLORIDE SERPL-SCNC: 102 MMOL/L (ref 98–107)
CO2 BLDA-SCNC: 26.4 MMOL/L (ref 22–29)
CO2 SERPL-SCNC: 25 MMOL/L (ref 22–29)
CREAT SERPL-MCNC: 1.75 MG/DL (ref 0.57–1)
DEPRECATED RDW RBC AUTO: 67.3 FL (ref 37–54)
EGFRCR SERPLBLD CKD-EPI 2021: 28.6 ML/MIN/1.73
EOSINOPHIL # BLD AUTO: 0.07 10*3/MM3 (ref 0–0.4)
EOSINOPHIL NFR BLD AUTO: 0.7 % (ref 0.3–6.2)
ERYTHROCYTE [DISTWIDTH] IN BLOOD BY AUTOMATED COUNT: 21.2 % (ref 12.3–15.4)
FLUAV H1 2009 PAND RNA NPH QL NAA+PROBE: NOT DETECTED
FLUAV H1 HA GENE NPH QL NAA+PROBE: NOT DETECTED
FLUAV H3 RNA NPH QL NAA+PROBE: NOT DETECTED
FLUAV SUBTYP SPEC NAA+PROBE: NOT DETECTED
FLUBV RNA ISLT QL NAA+PROBE: NOT DETECTED
GEN 5 2HR TROPONIN T REFLEX: 24 NG/L
GLUCOSE SERPL-MCNC: 97 MG/DL (ref 65–99)
HADV DNA SPEC NAA+PROBE: NOT DETECTED
HCO3 BLDA-SCNC: 25.1 MMOL/L (ref 21–28)
HCOV 229E RNA SPEC QL NAA+PROBE: NOT DETECTED
HCOV HKU1 RNA SPEC QL NAA+PROBE: NOT DETECTED
HCOV NL63 RNA SPEC QL NAA+PROBE: NOT DETECTED
HCOV OC43 RNA SPEC QL NAA+PROBE: NOT DETECTED
HCT VFR BLD AUTO: 35.8 % (ref 34–46.6)
HEMODILUTION: NO
HGB BLD-MCNC: 10.8 G/DL (ref 12–15.9)
HMPV RNA NPH QL NAA+NON-PROBE: NOT DETECTED
HOLD SPECIMEN: NORMAL
HPIV1 RNA ISLT QL NAA+PROBE: NOT DETECTED
HPIV2 RNA SPEC QL NAA+PROBE: NOT DETECTED
HPIV3 RNA NPH QL NAA+PROBE: NOT DETECTED
HPIV4 P GENE NPH QL NAA+PROBE: NOT DETECTED
IMM GRANULOCYTES # BLD AUTO: 0.04 10*3/MM3 (ref 0–0.05)
IMM GRANULOCYTES NFR BLD AUTO: 0.4 % (ref 0–0.5)
INHALED O2 CONCENTRATION: 100 %
LYMPHOCYTES # BLD AUTO: 0.84 10*3/MM3 (ref 0.7–3.1)
LYMPHOCYTES NFR BLD AUTO: 8 % (ref 19.6–45.3)
M PNEUMO IGG SER IA-ACNC: NOT DETECTED
MCH RBC QN AUTO: 26.7 PG (ref 26.6–33)
MCHC RBC AUTO-ENTMCNC: 30.2 G/DL (ref 31.5–35.7)
MCV RBC AUTO: 88.6 FL (ref 79–97)
MODALITY: ABNORMAL
MONOCYTES # BLD AUTO: 1.08 10*3/MM3 (ref 0.1–0.9)
MONOCYTES NFR BLD AUTO: 10.3 % (ref 5–12)
NEUTROPHILS NFR BLD AUTO: 79.5 % (ref 42.7–76)
NEUTROPHILS NFR BLD AUTO: 8.35 10*3/MM3 (ref 1.7–7)
NRBC BLD AUTO-RTO: 0 /100 WBC (ref 0–0.2)
NT-PROBNP SERPL-MCNC: ABNORMAL PG/ML (ref 0–1800)
PCO2 BLDA: 41 MM HG (ref 35–48)
PH BLDA: 7.4 PH UNITS (ref 7.35–7.45)
PLATELET # BLD AUTO: 275 10*3/MM3 (ref 140–450)
PMV BLD AUTO: 10.2 FL (ref 6–12)
PO2 BLDA: 208.5 MM HG (ref 83–108)
POTASSIUM SERPL-SCNC: 4.1 MMOL/L (ref 3.5–5.2)
QT INTERVAL: 465 MS
QTC INTERVAL: 458 MS
RBC # BLD AUTO: 4.04 10*6/MM3 (ref 3.77–5.28)
RHINOVIRUS RNA SPEC NAA+PROBE: NOT DETECTED
RSV RNA NPH QL NAA+NON-PROBE: NOT DETECTED
SAO2 % BLDCOA: 99.7 % (ref 94–98)
SARS-COV-2 RNA NPH QL NAA+NON-PROBE: NOT DETECTED
SODIUM SERPL-SCNC: 141 MMOL/L (ref 136–145)
TROPONIN T DELTA: -3 NG/L
TROPONIN T SERPL HS-MCNC: 27 NG/L
WBC NRBC COR # BLD AUTO: 10.5 10*3/MM3 (ref 3.4–10.8)
WHOLE BLOOD HOLD COAG: NORMAL

## 2024-03-21 PROCEDURE — 96374 THER/PROPH/DIAG INJ IV PUSH: CPT

## 2024-03-21 PROCEDURE — 36600 WITHDRAWAL OF ARTERIAL BLOOD: CPT | Performed by: EMERGENCY MEDICINE

## 2024-03-21 PROCEDURE — 25010000002 FUROSEMIDE PER 20 MG: Performed by: EMERGENCY MEDICINE

## 2024-03-21 PROCEDURE — 93005 ELECTROCARDIOGRAM TRACING: CPT | Performed by: EMERGENCY MEDICINE

## 2024-03-21 PROCEDURE — 83880 ASSAY OF NATRIURETIC PEPTIDE: CPT | Performed by: EMERGENCY MEDICINE

## 2024-03-21 PROCEDURE — 94799 UNLISTED PULMONARY SVC/PX: CPT

## 2024-03-21 PROCEDURE — 0202U NFCT DS 22 TRGT SARS-COV-2: CPT | Performed by: EMERGENCY MEDICINE

## 2024-03-21 PROCEDURE — 94640 AIRWAY INHALATION TREATMENT: CPT

## 2024-03-21 PROCEDURE — 36415 COLL VENOUS BLD VENIPUNCTURE: CPT | Performed by: EMERGENCY MEDICINE

## 2024-03-21 PROCEDURE — 85025 COMPLETE CBC W/AUTO DIFF WBC: CPT | Performed by: EMERGENCY MEDICINE

## 2024-03-21 PROCEDURE — 84484 ASSAY OF TROPONIN QUANT: CPT | Performed by: EMERGENCY MEDICINE

## 2024-03-21 PROCEDURE — 82803 BLOOD GASES ANY COMBINATION: CPT | Performed by: EMERGENCY MEDICINE

## 2024-03-21 PROCEDURE — 94761 N-INVAS EAR/PLS OXIMETRY MLT: CPT

## 2024-03-21 PROCEDURE — 80048 BASIC METABOLIC PNL TOTAL CA: CPT | Performed by: EMERGENCY MEDICINE

## 2024-03-21 PROCEDURE — 94664 DEMO&/EVAL PT USE INHALER: CPT

## 2024-03-21 PROCEDURE — 25010000002 METHYLPREDNISOLONE PER 125 MG: Performed by: EMERGENCY MEDICINE

## 2024-03-21 PROCEDURE — 99284 EMERGENCY DEPT VISIT MOD MDM: CPT

## 2024-03-21 PROCEDURE — 71045 X-RAY EXAM CHEST 1 VIEW: CPT

## 2024-03-21 PROCEDURE — 96375 TX/PRO/DX INJ NEW DRUG ADDON: CPT

## 2024-03-21 RX ORDER — IPRATROPIUM BROMIDE AND ALBUTEROL SULFATE 2.5; .5 MG/3ML; MG/3ML
3 SOLUTION RESPIRATORY (INHALATION) ONCE
Status: COMPLETED | OUTPATIENT
Start: 2024-03-21 | End: 2024-03-21

## 2024-03-21 RX ORDER — METHYLPREDNISOLONE SODIUM SUCCINATE 125 MG/2ML
125 INJECTION, POWDER, LYOPHILIZED, FOR SOLUTION INTRAMUSCULAR; INTRAVENOUS ONCE
Status: COMPLETED | OUTPATIENT
Start: 2024-03-21 | End: 2024-03-21

## 2024-03-21 RX ORDER — FUROSEMIDE 10 MG/ML
80 INJECTION INTRAMUSCULAR; INTRAVENOUS ONCE
Status: DISCONTINUED | OUTPATIENT
Start: 2024-03-21 | End: 2024-03-21

## 2024-03-21 RX ORDER — SODIUM CHLORIDE 0.9 % (FLUSH) 0.9 %
10 SYRINGE (ML) INJECTION AS NEEDED
Status: DISCONTINUED | OUTPATIENT
Start: 2024-03-21 | End: 2024-03-21 | Stop reason: HOSPADM

## 2024-03-21 RX ORDER — FUROSEMIDE 10 MG/ML
80 INJECTION INTRAMUSCULAR; INTRAVENOUS ONCE
Status: COMPLETED | OUTPATIENT
Start: 2024-03-21 | End: 2024-03-21

## 2024-03-21 RX ORDER — PREDNISONE 20 MG/1
20 TABLET ORAL DAILY
Qty: 5 TABLET | Refills: 0 | Status: SHIPPED | OUTPATIENT
Start: 2024-03-21

## 2024-03-21 RX ADMIN — FUROSEMIDE 80 MG: 10 INJECTION, SOLUTION INTRAMUSCULAR; INTRAVENOUS at 17:25

## 2024-03-21 RX ADMIN — METHYLPREDNISOLONE SODIUM SUCCINATE 125 MG: 125 INJECTION, POWDER, FOR SOLUTION INTRAMUSCULAR; INTRAVENOUS at 12:49

## 2024-03-21 RX ADMIN — IPRATROPIUM BROMIDE AND ALBUTEROL SULFATE 3 ML: .5; 3 SOLUTION RESPIRATORY (INHALATION) at 13:16

## 2024-03-21 NOTE — ED PROVIDER NOTES
Subjective   History of Present Illness  Patient is an 84-year-old female complaint of increasing shortness of breath over the past several days.  Denies cough fever chest pain vomiting or other associated complaints      Review of Systems    Past Medical History:   Diagnosis Date    Acute exacerbation of chronic obstructive pulmonary disease (COPD)     COPD (chronic obstructive pulmonary disease)     Deep vein thrombosis of bilateral lower extremities 07/24/2019    3/2013    History of pneumonia 06/2012    community acquired pneumonia and right pleural effusion;hospitalized at [facility]    History of pulmonary embolism 03/2013    bilateral PE    Hypertension 2001    Insomnia     on Ambien 5mg at     Lobular breast cancer, left 11/2011    Stage IA left upper lobular breast cancer    Malignant neoplasm of left breast in female, estrogen receptor positive 07/18/2019    Osteopenia 2012    Parainfluenza infection     Parotid duct obstruction     Severe pulmonary hypertension 03/03/2023    Stage 3a chronic kidney disease 07/24/2019    TIA (transient ischemic attack) 10/25/2022       No Known Allergies    Past Surgical History:   Procedure Laterality Date    CARDIAC CATHETERIZATION N/A 3/3/2023    Procedure: Left and Right Heart Cath with Coronary Angiography;  Surgeon: Richardson Willis MD;  Location: [facility] INVASIVE LOCATION;  Service: Cardiovascular;  Laterality: N/A;    CATARACT EXTRACTION  2015    IVC FILTER RETRIEVAL  06/2014    IVC filter placement by Dr. Card    MAMMO STEREOTACTIC BREAST BX SURGICAL ADD UNI Left 11/01/2011    invasive lobular carcinoma-Dr. Cande Daniel    MASTECTOMY, PARTIAL Left 12/01/2011    and left axillary sentinel lymph node biopsy by Dr. Uriostegui    TUBAL ABDOMINAL LIGATION  1984       Family History   Problem Relation Age of Onset    Lung cancer Brother        Social History     Socioeconomic History    Marital status:    Tobacco Use    Smoking status: Former     Current  packs/day: 0.00     Types: Cigarettes     Quit date:      Years since quittin.2     Passive exposure: Past    Smokeless tobacco: Never    Tobacco comments:     smoked 6 cigarettes a day from 13 years of age to 56 years of age when she quit in    Vaping Use    Vaping status: Never Used   Substance and Sexual Activity    Alcohol use: Not Currently    Drug use: No    Sexual activity: Not Currently     Partners: Male           Objective   Physical Exam  Neck has no adenopathy JVD or bruits.  Lungs are clear.  Heart has a regular rate rhythm without murmur rub or gallop.  Chest is nontender.  Abdomen soft.  Extremities and has no cyanosis or edema.  Procedures     My EKG interpretation shows atrial fibrillation at a rate of 86 with no acute ST change      ED Course      Results for orders placed or performed during the hospital encounter of 24   Respiratory Panel PCR w/COVID-19(SARS-CoV-2) MARIA ELENA/SARIAH/BRANDIN/PAD/COR/LILLIAN In-House, NP Swab in UTM/VTM, 2 HR TAT - Swab, Nasopharynx    Specimen: Nasopharynx; Swab   Result Value Ref Range    ADENOVIRUS, PCR Not Detected Not Detected    Coronavirus 229E Not Detected Not Detected    Coronavirus HKU1 Not Detected Not Detected    Coronavirus NL63 Not Detected Not Detected    Coronavirus OC43 Not Detected Not Detected    COVID19 Not Detected Not Detected - Ref. Range    Human Metapneumovirus Not Detected Not Detected    Human Rhinovirus/Enterovirus Not Detected Not Detected    Influenza A PCR Not Detected Not Detected    Influenza A H1 Not Detected Not Detected    Influenza A H1 2009 PCR Not Detected Not Detected    Influenza A H3 Not Detected Not Detected    Influenza B PCR Not Detected Not Detected    Parainfluenza Virus 1 Not Detected Not Detected    Parainfluenza Virus 2 Not Detected Not Detected    Parainfluenza Virus 3 Not Detected Not Detected    Parainfluenza Virus 4 Not Detected Not Detected    RSV, PCR Not Detected Not Detected    Bordetella pertussis pcr Not  Detected Not Detected    Bordetella parapertussis PCR Not Detected Not Detected    Chlamydophila pneumoniae PCR Not Detected Not Detected    Mycoplasma pneumo by PCR Not Detected Not Detected   Basic Metabolic Panel    Specimen: Blood   Result Value Ref Range    Glucose 97 65 - 99 mg/dL    BUN 39 (H) 8 - 23 mg/dL    Creatinine 1.75 (H) 0.57 - 1.00 mg/dL    Sodium 141 136 - 145 mmol/L    Potassium 4.1 3.5 - 5.2 mmol/L    Chloride 102 98 - 107 mmol/L    CO2 25.0 22.0 - 29.0 mmol/L    Calcium 9.2 8.6 - 10.5 mg/dL    BUN/Creatinine Ratio 22.3 7.0 - 25.0    Anion Gap 14.0 5.0 - 15.0 mmol/L    eGFR 28.6 (L) >60.0 mL/min/1.73   Blood Gas, Arterial -    Specimen: Arterial Blood   Result Value Ref Range    Site Right Radial     Elie's Test Positive     pH, Arterial 7.396 7.350 - 7.450 pH units    pCO2, Arterial 41.0 35.0 - 48.0 mm Hg    pO2, Arterial 208.5 (H) 83.0 - 108.0 mm Hg    HCO3, Arterial 25.1 21.0 - 28.0 mmol/L    Base Excess, Arterial 0.2 0.0 - 3.0 mmol/L    O2 Saturation, Arterial 99.7 (H) 94.0 - 98.0 %    CO2 Content 26.4 22 - 29 mmol/L    Barometric Pressure for Blood Gas      Modality NRB     FIO2 100 %    Hemodilution No    High Sensitivity Troponin T    Specimen: Blood   Result Value Ref Range    HS Troponin T 27 (H) <14 ng/L   CBC Auto Differential    Specimen: Blood   Result Value Ref Range    WBC 10.50 3.40 - 10.80 10*3/mm3    RBC 4.04 3.77 - 5.28 10*6/mm3    Hemoglobin 10.8 (L) 12.0 - 15.9 g/dL    Hematocrit 35.8 34.0 - 46.6 %    MCV 88.6 79.0 - 97.0 fL    MCH 26.7 26.6 - 33.0 pg    MCHC 30.2 (L) 31.5 - 35.7 g/dL    RDW 21.2 (H) 12.3 - 15.4 %    RDW-SD 67.3 (H) 37.0 - 54.0 fl    MPV 10.2 6.0 - 12.0 fL    Platelets 275 140 - 450 10*3/mm3    Neutrophil % 79.5 (H) 42.7 - 76.0 %    Lymphocyte % 8.0 (L) 19.6 - 45.3 %    Monocyte % 10.3 5.0 - 12.0 %    Eosinophil % 0.7 0.3 - 6.2 %    Basophil % 1.1 0.0 - 1.5 %    Immature Grans % 0.4 0.0 - 0.5 %    Neutrophils, Absolute 8.35 (H) 1.70 - 7.00 10*3/mm3     Lymphocytes, Absolute 0.84 0.70 - 3.10 10*3/mm3    Monocytes, Absolute 1.08 (H) 0.10 - 0.90 10*3/mm3    Eosinophils, Absolute 0.07 0.00 - 0.40 10*3/mm3    Basophils, Absolute 0.12 0.00 - 0.20 10*3/mm3    Immature Grans, Absolute 0.04 0.00 - 0.05 10*3/mm3    nRBC 0.0 0.0 - 0.2 /100 WBC   BNP    Specimen: Blood   Result Value Ref Range    proBNP 10,374.0 (H) 0.0 - 1,800.0 pg/mL   High Sensitivity Troponin T 2Hr    Specimen: Blood   Result Value Ref Range    HS Troponin T 24 (H) <14 ng/L    Troponin T Delta -3 >=-4 - <+4 ng/L   ECG 12 Lead Dyspnea   Result Value Ref Range    QT Interval 385 ms    QTC Interval 449 ms   ECG 12 Lead Dyspnea   Result Value Ref Range    QT Interval 465 ms    QTC Interval 458 ms   ECG 12 Lead Chest Pain   Result Value Ref Range    QT Interval 336 ms    QTC Interval 404 ms   Gold Top - SST   Result Value Ref Range    Extra Tube Hold for add-ons.    Light Blue Top   Result Value Ref Range    Extra Tube Hold for add-ons.      XR Chest 1 View    Result Date: 3/21/2024  Impression: Chronic findings. No acute process. Electronically Signed: Mary Ivan MD  3/21/2024 1:48 PM EDT  Workstation ID: [id number]                                          Medical Decision Making  Chest x-ray interpretation shows no cardiomegaly effusion or infiltrate.  Patient has no evidence of acute coronary syndrome based on EKG and troponin.  BNP is greater than 10,000.  I reviewed old chart and patient typically has a BNP of 1000.  Respiratory panel is negative.  BMP shows renal insufficiency at the patient's baseline.  There is no electrolyte abnormality.  Blood gas shows pH to be 7.39 pCO2 of 41 pO2 of 208.  Patient was given Lasix 80 mg IV.  On reexam she is improved.  Patient be discharged.  She will be placed on prednisone.  She will follow with MD for recheck    Amount and/or Complexity of Data Reviewed  Labs: ordered. Decision-making details documented in ED Course.  Radiology: ordered and independent  interpretation performed.  ECG/medicine tests: ordered and independent interpretation performed.    Risk  Prescription drug management.        Final diagnoses:   Acute on chronic congestive heart failure, unspecified heart failure type   Dyspnea, unspecified type       ED Disposition  ED Disposition       ED Disposition   Discharge    Condition   Stable    Comment   --               No follow-up provider specified.       Medication List        New Prescriptions      predniSONE 20 MG tablet  Commonly known as: DELTASONE  Take 1 tablet by mouth Daily.            Changed      famotidine 20 MG tablet  Commonly known as: PEPCID  Take 1 tablet by mouth Daily.  What changed:   when to take this  reasons to take this               Where to Get Your Medications        Information about where to get these medications is not yet available    Ask your nurse or doctor about these medications  predniSONE 20 MG tablet            Pavel Simpson MD  03/21/24 [id number]     no

## 2024-03-22 LAB
QT INTERVAL: 336 MS
QT INTERVAL: 385 MS
QTC INTERVAL: 404 MS
QTC INTERVAL: 449 MS

## 2024-03-25 LAB
APTT HEX PL PPP: 8 SEC
APTT IMM NP PPP: ABNORMAL SEC
APTT PPP 1:1 SALINE: ABNORMAL SEC
APTT PPP: 32 SEC
B2 GLYCOPROT1 IGA SER-ACNC: <10 SAU
B2 GLYCOPROT1 IGG SER-ACNC: <10 SGU
B2 GLYCOPROT1 IGM SER-ACNC: <10 SMU
CARDIOLIPIN IGG SER IA-ACNC: <10 GPL
CARDIOLIPIN IGM SER IA-ACNC: <10 MPL
CONFIRM APTT: 0.8 SEC
CONFIRM DRVVT: 147 SEC
DRVVT SCREEN TO CONFIRM RATIO: 1 RATIO
INR PPP: 1.7 RATIO
LABORATORY COMMENT REPORT: ABNORMAL
PROTHROMBIN TIME: 17.4 SEC
SCREEN DRVVT: 155.7 SEC
THROMBIN TIME: 18.2 SEC

## 2024-04-25 ENCOUNTER — APPOINTMENT (OUTPATIENT)
Dept: GENERAL RADIOLOGY | Facility: HOSPITAL | Age: 84
DRG: 291 | End: 2024-04-25
Payer: MEDICARE

## 2024-04-25 ENCOUNTER — HOSPITAL ENCOUNTER (INPATIENT)
Facility: HOSPITAL | Age: 84
LOS: 12 days | Discharge: HOME HEALTH | DRG: 291 | End: 2024-05-07
Attending: FAMILY MEDICINE | Admitting: FAMILY MEDICINE
Payer: MEDICARE

## 2024-04-25 DIAGNOSIS — I50.33 ACUTE ON CHRONIC HEART FAILURE WITH PRESERVED EJECTION FRACTION (HFPEF): ICD-10-CM

## 2024-04-25 DIAGNOSIS — J43.1 PANLOBULAR EMPHYSEMA: Primary | ICD-10-CM

## 2024-04-25 PROBLEM — R06.00 DYSPNEA: Status: ACTIVE | Noted: 2024-04-25

## 2024-04-25 LAB
ALBUMIN SERPL-MCNC: 4 G/DL (ref 3.5–5.2)
ALBUMIN/GLOB SERPL: 1.7 G/DL
ALP SERPL-CCNC: 75 U/L (ref 39–117)
ALT SERPL W P-5'-P-CCNC: 16 U/L (ref 1–33)
ANION GAP SERPL CALCULATED.3IONS-SCNC: 15 MMOL/L (ref 5–15)
AST SERPL-CCNC: 18 U/L (ref 1–32)
BACTERIA UR QL AUTO: ABNORMAL /HPF
BASOPHILS # BLD AUTO: 0.08 10*3/MM3 (ref 0–0.2)
BASOPHILS NFR BLD AUTO: 1 % (ref 0–1.5)
BILIRUB SERPL-MCNC: 2.1 MG/DL (ref 0–1.2)
BILIRUB UR QL STRIP: NEGATIVE
BUN SERPL-MCNC: 32 MG/DL (ref 8–23)
BUN/CREAT SERPL: 18.7 (ref 7–25)
CALCIUM SPEC-SCNC: 9.9 MG/DL (ref 8.6–10.5)
CHLORIDE SERPL-SCNC: 105 MMOL/L (ref 98–107)
CLARITY UR: ABNORMAL
CO2 SERPL-SCNC: 25 MMOL/L (ref 22–29)
COLOR UR: YELLOW
CREAT SERPL-MCNC: 1.71 MG/DL (ref 0.57–1)
DEPRECATED RDW RBC AUTO: 69.5 FL (ref 37–54)
EGFRCR SERPLBLD CKD-EPI 2021: 29.4 ML/MIN/1.73
EOSINOPHIL # BLD AUTO: 0.25 10*3/MM3 (ref 0–0.4)
EOSINOPHIL NFR BLD AUTO: 3.2 % (ref 0.3–6.2)
ERYTHROCYTE [DISTWIDTH] IN BLOOD BY AUTOMATED COUNT: 21.2 % (ref 12.3–15.4)
GLOBULIN UR ELPH-MCNC: 2.3 GM/DL
GLUCOSE SERPL-MCNC: 168 MG/DL (ref 65–99)
GLUCOSE UR STRIP-MCNC: NEGATIVE MG/DL
HCT VFR BLD AUTO: 37.6 % (ref 34–46.6)
HGB BLD-MCNC: 10.8 G/DL (ref 12–15.9)
HGB UR QL STRIP.AUTO: NEGATIVE
HYALINE CASTS UR QL AUTO: ABNORMAL /LPF
IMM GRANULOCYTES # BLD AUTO: 0.03 10*3/MM3 (ref 0–0.05)
IMM GRANULOCYTES NFR BLD AUTO: 0.4 % (ref 0–0.5)
KETONES UR QL STRIP: NEGATIVE
LEUKOCYTE ESTERASE UR QL STRIP.AUTO: ABNORMAL
LYMPHOCYTES # BLD AUTO: 1 10*3/MM3 (ref 0.7–3.1)
LYMPHOCYTES NFR BLD AUTO: 13 % (ref 19.6–45.3)
MCH RBC QN AUTO: 26 PG (ref 26.6–33)
MCHC RBC AUTO-ENTMCNC: 28.7 G/DL (ref 31.5–35.7)
MCV RBC AUTO: 90.6 FL (ref 79–97)
MONOCYTES # BLD AUTO: 0.82 10*3/MM3 (ref 0.1–0.9)
MONOCYTES NFR BLD AUTO: 10.6 % (ref 5–12)
NEUTROPHILS NFR BLD AUTO: 5.53 10*3/MM3 (ref 1.7–7)
NEUTROPHILS NFR BLD AUTO: 71.8 % (ref 42.7–76)
NITRITE UR QL STRIP: POSITIVE
NRBC BLD AUTO-RTO: 0 /100 WBC (ref 0–0.2)
NT-PROBNP SERPL-MCNC: 9256 PG/ML (ref 0–1800)
PH UR STRIP.AUTO: 5.5 [PH] (ref 5–8)
PLATELET # BLD AUTO: 192 10*3/MM3 (ref 140–450)
PMV BLD AUTO: 11.2 FL (ref 6–12)
POTASSIUM SERPL-SCNC: 3.4 MMOL/L (ref 3.5–5.2)
PROCALCITONIN SERPL-MCNC: 0.04 NG/ML (ref 0–0.25)
PROT SERPL-MCNC: 6.3 G/DL (ref 6–8.5)
PROT UR QL STRIP: ABNORMAL
RBC # BLD AUTO: 4.15 10*6/MM3 (ref 3.77–5.28)
RBC # UR STRIP: ABNORMAL /HPF
RBC MORPH BLD: NORMAL
REF LAB TEST METHOD: ABNORMAL
SMALL PLATELETS BLD QL SMEAR: ADEQUATE
SODIUM SERPL-SCNC: 145 MMOL/L (ref 136–145)
SP GR UR STRIP: 1.01 (ref 1–1.03)
SQUAMOUS #/AREA URNS HPF: ABNORMAL /HPF
TROPONIN T SERPL HS-MCNC: 25 NG/L
TSH SERPL DL<=0.05 MIU/L-ACNC: 8.76 UIU/ML (ref 0.27–4.2)
UROBILINOGEN UR QL STRIP: ABNORMAL
WBC # UR STRIP: ABNORMAL /HPF
WBC MORPH BLD: NORMAL
WBC NRBC COR # BLD AUTO: 7.71 10*3/MM3 (ref 3.4–10.8)

## 2024-04-25 PROCEDURE — 71045 X-RAY EXAM CHEST 1 VIEW: CPT

## 2024-04-25 PROCEDURE — 94799 UNLISTED PULMONARY SVC/PX: CPT

## 2024-04-25 PROCEDURE — 94640 AIRWAY INHALATION TREATMENT: CPT

## 2024-04-25 PROCEDURE — 84484 ASSAY OF TROPONIN QUANT: CPT | Performed by: FAMILY MEDICINE

## 2024-04-25 PROCEDURE — 85007 BL SMEAR W/DIFF WBC COUNT: CPT | Performed by: FAMILY MEDICINE

## 2024-04-25 PROCEDURE — 83880 ASSAY OF NATRIURETIC PEPTIDE: CPT | Performed by: FAMILY MEDICINE

## 2024-04-25 PROCEDURE — 25010000002 METHYLPREDNISOLONE PER 40 MG: Performed by: FAMILY MEDICINE

## 2024-04-25 PROCEDURE — 94664 DEMO&/EVAL PT USE INHALER: CPT

## 2024-04-25 PROCEDURE — 81001 URINALYSIS AUTO W/SCOPE: CPT | Performed by: FAMILY MEDICINE

## 2024-04-25 PROCEDURE — 80053 COMPREHEN METABOLIC PANEL: CPT | Performed by: FAMILY MEDICINE

## 2024-04-25 PROCEDURE — 25010000002 FUROSEMIDE PER 20 MG: Performed by: FAMILY MEDICINE

## 2024-04-25 PROCEDURE — 94761 N-INVAS EAR/PLS OXIMETRY MLT: CPT

## 2024-04-25 PROCEDURE — 84145 PROCALCITONIN (PCT): CPT | Performed by: FAMILY MEDICINE

## 2024-04-25 PROCEDURE — 85025 COMPLETE CBC W/AUTO DIFF WBC: CPT | Performed by: FAMILY MEDICINE

## 2024-04-25 PROCEDURE — 84443 ASSAY THYROID STIM HORMONE: CPT | Performed by: FAMILY MEDICINE

## 2024-04-25 RX ORDER — IPRATROPIUM BROMIDE AND ALBUTEROL SULFATE 2.5; .5 MG/3ML; MG/3ML
3 SOLUTION RESPIRATORY (INHALATION)
Status: DISCONTINUED | OUTPATIENT
Start: 2024-04-25 | End: 2024-05-04

## 2024-04-25 RX ORDER — FUROSEMIDE 10 MG/ML
40 INJECTION INTRAMUSCULAR; INTRAVENOUS ONCE
Status: COMPLETED | OUTPATIENT
Start: 2024-04-25 | End: 2024-04-25

## 2024-04-25 RX ORDER — SODIUM CHLORIDE 0.9 % (FLUSH) 0.9 %
10 SYRINGE (ML) INJECTION AS NEEDED
Status: DISCONTINUED | OUTPATIENT
Start: 2024-04-25 | End: 2024-05-07 | Stop reason: HOSPADM

## 2024-04-25 RX ORDER — BISACODYL 5 MG/1
5 TABLET, DELAYED RELEASE ORAL DAILY PRN
Status: DISCONTINUED | OUTPATIENT
Start: 2024-04-25 | End: 2024-05-07 | Stop reason: HOSPADM

## 2024-04-25 RX ORDER — BISACODYL 10 MG
10 SUPPOSITORY, RECTAL RECTAL DAILY PRN
Status: DISCONTINUED | OUTPATIENT
Start: 2024-04-25 | End: 2024-05-07 | Stop reason: HOSPADM

## 2024-04-25 RX ORDER — ONDANSETRON 2 MG/ML
4 INJECTION INTRAMUSCULAR; INTRAVENOUS EVERY 6 HOURS PRN
Status: DISCONTINUED | OUTPATIENT
Start: 2024-04-25 | End: 2024-05-07 | Stop reason: HOSPADM

## 2024-04-25 RX ORDER — SODIUM CHLORIDE 0.9 % (FLUSH) 0.9 %
10 SYRINGE (ML) INJECTION EVERY 12 HOURS SCHEDULED
Status: DISCONTINUED | OUTPATIENT
Start: 2024-04-25 | End: 2024-05-07 | Stop reason: HOSPADM

## 2024-04-25 RX ORDER — ACETAMINOPHEN 325 MG/1
650 TABLET ORAL EVERY 4 HOURS PRN
Status: DISCONTINUED | OUTPATIENT
Start: 2024-04-25 | End: 2024-05-07 | Stop reason: HOSPADM

## 2024-04-25 RX ORDER — LEVOTHYROXINE SODIUM 0.05 MG/1
50 TABLET ORAL
Status: DISCONTINUED | OUTPATIENT
Start: 2024-04-26 | End: 2024-04-25

## 2024-04-25 RX ORDER — AMOXICILLIN 250 MG
2 CAPSULE ORAL 2 TIMES DAILY PRN
Status: DISCONTINUED | OUTPATIENT
Start: 2024-04-25 | End: 2024-05-07 | Stop reason: HOSPADM

## 2024-04-25 RX ORDER — POTASSIUM CHLORIDE 20 MEQ/1
40 TABLET, EXTENDED RELEASE ORAL ONCE
Status: COMPLETED | OUTPATIENT
Start: 2024-04-25 | End: 2024-04-26

## 2024-04-25 RX ORDER — POLYETHYLENE GLYCOL 3350 17 G/17G
17 POWDER, FOR SOLUTION ORAL DAILY PRN
Status: DISCONTINUED | OUTPATIENT
Start: 2024-04-25 | End: 2024-05-07 | Stop reason: HOSPADM

## 2024-04-25 RX ORDER — METHYLPREDNISOLONE SODIUM SUCCINATE 40 MG/ML
20 INJECTION, POWDER, LYOPHILIZED, FOR SOLUTION INTRAMUSCULAR; INTRAVENOUS EVERY 12 HOURS
Status: DISCONTINUED | OUTPATIENT
Start: 2024-04-25 | End: 2024-04-27

## 2024-04-25 RX ORDER — SODIUM CHLORIDE 9 MG/ML
40 INJECTION, SOLUTION INTRAVENOUS AS NEEDED
Status: DISCONTINUED | OUTPATIENT
Start: 2024-04-25 | End: 2024-05-07 | Stop reason: HOSPADM

## 2024-04-25 RX ORDER — ONDANSETRON 4 MG/1
4 TABLET, ORALLY DISINTEGRATING ORAL EVERY 6 HOURS PRN
Status: DISCONTINUED | OUTPATIENT
Start: 2024-04-25 | End: 2024-05-07 | Stop reason: HOSPADM

## 2024-04-25 RX ORDER — CARVEDILOL 6.25 MG/1
6.25 TABLET ORAL 2 TIMES DAILY WITH MEALS
Status: DISCONTINUED | OUTPATIENT
Start: 2024-04-25 | End: 2024-05-02

## 2024-04-25 RX ORDER — PANTOPRAZOLE SODIUM 40 MG/1
40 TABLET, DELAYED RELEASE ORAL
Status: DISCONTINUED | OUTPATIENT
Start: 2024-04-26 | End: 2024-05-03

## 2024-04-25 RX ORDER — LEVOTHYROXINE SODIUM 0.07 MG/1
75 TABLET ORAL
Status: DISCONTINUED | OUTPATIENT
Start: 2024-04-26 | End: 2024-05-07 | Stop reason: HOSPADM

## 2024-04-25 RX ADMIN — CARVEDILOL 6.25 MG: 6.25 TABLET, FILM COATED ORAL at 17:02

## 2024-04-25 RX ADMIN — APIXABAN 2.5 MG: 2.5 TABLET, FILM COATED ORAL at 23:11

## 2024-04-25 RX ADMIN — METHYLPREDNISOLONE SODIUM SUCCINATE 20 MG: 40 INJECTION, POWDER, FOR SOLUTION INTRAMUSCULAR; INTRAVENOUS at 15:53

## 2024-04-25 RX ADMIN — IPRATROPIUM BROMIDE AND ALBUTEROL SULFATE 3 ML: .5; 3 SOLUTION RESPIRATORY (INHALATION) at 16:00

## 2024-04-25 RX ADMIN — FUROSEMIDE 40 MG: 10 INJECTION, SOLUTION INTRAMUSCULAR; INTRAVENOUS at 15:53

## 2024-04-25 RX ADMIN — IPRATROPIUM BROMIDE AND ALBUTEROL SULFATE 3 ML: .5; 3 SOLUTION RESPIRATORY (INHALATION) at 18:30

## 2024-04-26 ENCOUNTER — APPOINTMENT (OUTPATIENT)
Dept: ULTRASOUND IMAGING | Facility: HOSPITAL | Age: 84
DRG: 291 | End: 2024-04-26
Payer: MEDICARE

## 2024-04-26 PROBLEM — D63.1 ANEMIA IN STAGE 2 CHRONIC KIDNEY DISEASE: Status: ACTIVE | Noted: 2024-02-26

## 2024-04-26 PROBLEM — I48.0 PAROXYSMAL ATRIAL FIBRILLATION: Status: ACTIVE | Noted: 2024-02-26

## 2024-04-26 PROBLEM — N18.2 ANEMIA IN STAGE 2 CHRONIC KIDNEY DISEASE: Status: ACTIVE | Noted: 2024-02-26

## 2024-04-26 LAB
ANION GAP SERPL CALCULATED.3IONS-SCNC: 14 MMOL/L (ref 5–15)
BUN SERPL-MCNC: 34 MG/DL (ref 8–23)
BUN/CREAT SERPL: 18.8 (ref 7–25)
CALCIUM SPEC-SCNC: 9.8 MG/DL (ref 8.6–10.5)
CHLORIDE SERPL-SCNC: 105 MMOL/L (ref 98–107)
CK SERPL-CCNC: 32 U/L (ref 20–180)
CO2 SERPL-SCNC: 25 MMOL/L (ref 22–29)
CREAT SERPL-MCNC: 1.81 MG/DL (ref 0.57–1)
DEPRECATED RDW RBC AUTO: 66.1 FL (ref 37–54)
EGFRCR SERPLBLD CKD-EPI 2021: 27.5 ML/MIN/1.73
ERYTHROCYTE [DISTWIDTH] IN BLOOD BY AUTOMATED COUNT: 21 % (ref 12.3–15.4)
FERRITIN SERPL-MCNC: 26.82 NG/ML (ref 13–150)
GEN 5 2HR TROPONIN T REFLEX: 21 NG/L
GLUCOSE SERPL-MCNC: 164 MG/DL (ref 65–99)
HCT VFR BLD AUTO: 36.1 % (ref 34–46.6)
HGB BLD-MCNC: 10.5 G/DL (ref 12–15.9)
IRON 24H UR-MRATE: 27 MCG/DL (ref 37–145)
IRON SATN MFR SERPL: 6 % (ref 20–50)
MCH RBC QN AUTO: 25.3 PG (ref 26.6–33)
MCHC RBC AUTO-ENTMCNC: 29.1 G/DL (ref 31.5–35.7)
MCV RBC AUTO: 87 FL (ref 79–97)
PLATELET # BLD AUTO: 204 10*3/MM3 (ref 140–450)
PMV BLD AUTO: 10.8 FL (ref 6–12)
POTASSIUM SERPL-SCNC: 3.7 MMOL/L (ref 3.5–5.2)
RBC # BLD AUTO: 4.15 10*6/MM3 (ref 3.77–5.28)
SODIUM SERPL-SCNC: 144 MMOL/L (ref 136–145)
T4 FREE SERPL-MCNC: 1.57 NG/DL (ref 0.93–1.7)
TIBC SERPL-MCNC: 469 MCG/DL (ref 298–536)
TRANSFERRIN SERPL-MCNC: 315 MG/DL (ref 200–360)
TROPONIN T DELTA: -4 NG/L
URATE SERPL-MCNC: 12.1 MG/DL (ref 2.4–5.7)
WBC NRBC COR # BLD AUTO: 6.21 10*3/MM3 (ref 3.4–10.8)

## 2024-04-26 PROCEDURE — 25010000002 METHYLPREDNISOLONE PER 40 MG: Performed by: FAMILY MEDICINE

## 2024-04-26 PROCEDURE — 25010000002 BUMETANIDE PER 0.5 MG: Performed by: INTERNAL MEDICINE

## 2024-04-26 PROCEDURE — 84550 ASSAY OF BLOOD/URIC ACID: CPT | Performed by: INTERNAL MEDICINE

## 2024-04-26 PROCEDURE — 82550 ASSAY OF CK (CPK): CPT | Performed by: INTERNAL MEDICINE

## 2024-04-26 PROCEDURE — 94799 UNLISTED PULMONARY SVC/PX: CPT

## 2024-04-26 PROCEDURE — 84484 ASSAY OF TROPONIN QUANT: CPT | Performed by: FAMILY MEDICINE

## 2024-04-26 PROCEDURE — 99222 1ST HOSP IP/OBS MODERATE 55: CPT | Performed by: INTERNAL MEDICINE

## 2024-04-26 PROCEDURE — 84466 ASSAY OF TRANSFERRIN: CPT | Performed by: INTERNAL MEDICINE

## 2024-04-26 PROCEDURE — 76775 US EXAM ABDO BACK WALL LIM: CPT

## 2024-04-26 PROCEDURE — 85027 COMPLETE CBC AUTOMATED: CPT | Performed by: FAMILY MEDICINE

## 2024-04-26 PROCEDURE — 82728 ASSAY OF FERRITIN: CPT | Performed by: INTERNAL MEDICINE

## 2024-04-26 PROCEDURE — 25010000002 NA FERRIC GLUC CPLX PER 12.5 MG: Performed by: INTERNAL MEDICINE

## 2024-04-26 PROCEDURE — 80048 BASIC METABOLIC PNL TOTAL CA: CPT | Performed by: FAMILY MEDICINE

## 2024-04-26 PROCEDURE — 94761 N-INVAS EAR/PLS OXIMETRY MLT: CPT

## 2024-04-26 PROCEDURE — 94664 DEMO&/EVAL PT USE INHALER: CPT

## 2024-04-26 PROCEDURE — 84439 ASSAY OF FREE THYROXINE: CPT | Performed by: NURSE PRACTITIONER

## 2024-04-26 PROCEDURE — 83540 ASSAY OF IRON: CPT | Performed by: INTERNAL MEDICINE

## 2024-04-26 PROCEDURE — 94640 AIRWAY INHALATION TREATMENT: CPT

## 2024-04-26 RX ORDER — SILDENAFIL CITRATE 20 MG/1
20 TABLET ORAL 3 TIMES DAILY
Status: DISCONTINUED | OUTPATIENT
Start: 2024-04-26 | End: 2024-05-07 | Stop reason: HOSPADM

## 2024-04-26 RX ORDER — BUMETANIDE 0.25 MG/ML
1 INJECTION INTRAMUSCULAR; INTRAVENOUS EVERY 12 HOURS
Status: DISCONTINUED | OUTPATIENT
Start: 2024-04-26 | End: 2024-04-27

## 2024-04-26 RX ORDER — POTASSIUM CHLORIDE 20 MEQ/1
20 TABLET, EXTENDED RELEASE ORAL 2 TIMES DAILY WITH MEALS
Status: DISCONTINUED | OUTPATIENT
Start: 2024-04-26 | End: 2024-04-27

## 2024-04-26 RX ORDER — FEBUXOSTAT 40 MG/1
40 TABLET, FILM COATED ORAL DAILY
Status: DISCONTINUED | OUTPATIENT
Start: 2024-04-26 | End: 2024-05-07 | Stop reason: HOSPADM

## 2024-04-26 RX ADMIN — METHYLPREDNISOLONE SODIUM SUCCINATE 20 MG: 40 INJECTION, POWDER, FOR SOLUTION INTRAMUSCULAR; INTRAVENOUS at 04:34

## 2024-04-26 RX ADMIN — IPRATROPIUM BROMIDE AND ALBUTEROL SULFATE 3 ML: .5; 3 SOLUTION RESPIRATORY (INHALATION) at 14:28

## 2024-04-26 RX ADMIN — SODIUM CHLORIDE 125 MG: 9 INJECTION, SOLUTION INTRAVENOUS at 09:52

## 2024-04-26 RX ADMIN — IPRATROPIUM BROMIDE AND ALBUTEROL SULFATE 3 ML: .5; 3 SOLUTION RESPIRATORY (INHALATION) at 19:37

## 2024-04-26 RX ADMIN — BUMETANIDE 1 MG: 0.25 INJECTION INTRAMUSCULAR; INTRAVENOUS at 09:52

## 2024-04-26 RX ADMIN — LEVOTHYROXINE SODIUM 75 MCG: 0.07 TABLET ORAL at 05:56

## 2024-04-26 RX ADMIN — POTASSIUM CHLORIDE 20 MEQ: 1500 TABLET, EXTENDED RELEASE ORAL at 09:52

## 2024-04-26 RX ADMIN — Medication 10 ML: at 20:58

## 2024-04-26 RX ADMIN — APIXABAN 2.5 MG: 2.5 TABLET, FILM COATED ORAL at 09:52

## 2024-04-26 RX ADMIN — SILDENAFIL CITRATE 20 MG: 20 TABLET ORAL at 20:58

## 2024-04-26 RX ADMIN — SILDENAFIL CITRATE 20 MG: 20 TABLET ORAL at 09:52

## 2024-04-26 RX ADMIN — IPRATROPIUM BROMIDE AND ALBUTEROL SULFATE 3 ML: .5; 3 SOLUTION RESPIRATORY (INHALATION) at 06:40

## 2024-04-26 RX ADMIN — APIXABAN 2.5 MG: 2.5 TABLET, FILM COATED ORAL at 20:58

## 2024-04-26 RX ADMIN — FEBUXOSTAT 40 MG: 40 TABLET, FILM COATED ORAL at 09:52

## 2024-04-26 RX ADMIN — Medication 10 ML: at 00:16

## 2024-04-26 RX ADMIN — Medication 10 ML: at 09:52

## 2024-04-26 RX ADMIN — PANTOPRAZOLE SODIUM 40 MG: 40 TABLET, DELAYED RELEASE ORAL at 05:56

## 2024-04-26 RX ADMIN — BUMETANIDE 1 MG: 0.25 INJECTION INTRAMUSCULAR; INTRAVENOUS at 20:58

## 2024-04-26 RX ADMIN — POTASSIUM CHLORIDE 40 MEQ: 1500 TABLET, EXTENDED RELEASE ORAL at 00:07

## 2024-04-26 RX ADMIN — CARVEDILOL 6.25 MG: 6.25 TABLET, FILM COATED ORAL at 17:27

## 2024-04-26 RX ADMIN — CARVEDILOL 6.25 MG: 6.25 TABLET, FILM COATED ORAL at 09:52

## 2024-04-26 RX ADMIN — METHYLPREDNISOLONE SODIUM SUCCINATE 20 MG: 40 INJECTION, POWDER, FOR SOLUTION INTRAMUSCULAR; INTRAVENOUS at 17:27

## 2024-04-26 RX ADMIN — SILDENAFIL CITRATE 20 MG: 20 TABLET ORAL at 17:27

## 2024-04-26 RX ADMIN — POTASSIUM CHLORIDE 20 MEQ: 1500 TABLET, EXTENDED RELEASE ORAL at 17:27

## 2024-04-26 NOTE — CONSULTS
[facility]: [facility]         CONSULT NOTE    Patient Identification:  Gabrielle Lyles  84 y.o.  female  1940  [id number]            Requesting physician: Attending physician    Reason for Consultation: Shortness of breath, pulm hypertension      History of Present Illness:  84-year-old female with history of pulmonary hypertension, COPD, chronic hypoxemia on home oxygen, previous DVT and PE, previous TIA, CKD, HTN who presented 4/25/2024 with complaints of increased shortness of breath and increased lower extremity edema.  Patient is afebrile and hemodynamically stable, oxygen saturation 91% on 3 L of oxygen.  proBNP 9256, creatinine 1.81, WBCs 6.2, hemoglobin 10.5, chest x-ray shows pulmonary congestion suggestive of CHF      Assessment:  Dyspnea  Hypoxemia: On home oxygen  Chronic severe pulmonary hypertension: PA 71/25, 44 mmHg  HFpEF  COPD/emphysema  Chronic atelectasis in left lower lobe  Anemia  CKD  HTN  History of PE/DVT  History of TIA  CAD  Paroxysmal atrial fibrillation  History of breast cancer 2018, currently in remission    Results for orders placed in visit on 03/18/24    Adult Transthoracic Echo Limited W/ Cont if Necessary Per Protocol    Interpretation Summary    Left ventricular ejection fraction appears to be 61 - 65%.    The right ventricular cavity is dilated.    There is a small (<1cm) pericardial effusion adjacent to the left ventricle. There is no evidence of cardiac tamponade.    IVC is 2.1 cm.        Recommendations:  IV steroids  Oxygen supplement and titration to maintain saturation 90 to 95%: Currently requiring 3 L per nasal cannula  Bronchodilators  Sildenafil  Inhaled corticosteroids    Diuresis  Thyroid hormone replacement  Cardiology following   Electrolytes/ glycemic control  Chronic anticoagulation: Apixaban  I personally reviewed the radiological studies      Review of Sytems:  Constitutional: Negative for chills, and fever and positive for malaise/fatigue.    HENT: Negative.    Eyes: Negative.    Cardiovascular: Shortness of breath and lower extremity edema  Respiratory: Positive for cough and shortness of breath.    Skin: Negative.    Musculoskeletal: Negative.    Gastrointestinal: Negative.    Genitourinary: Negative.    Neurological: Generalized weakness.    Past Medical History:  Past Medical History:   Diagnosis Date    Acute exacerbation of chronic obstructive pulmonary disease (COPD)     COPD (chronic obstructive pulmonary disease)     Deep vein thrombosis of bilateral lower extremities 07/24/2019    3/2013    History of pneumonia 06/2012    community acquired pneumonia and right pleural effusion;hospitalized at [facility]    History of pulmonary embolism 03/2013    bilateral PE    Hypertension 2001    Insomnia     on Ambien 5mg at     Lobular breast cancer, left 11/2011    Stage IA left upper lobular breast cancer    Malignant neoplasm of left breast in female, estrogen receptor positive 07/18/2019    Osteopenia 2012    Parainfluenza infection     Parotid duct obstruction     Severe pulmonary hypertension 03/03/2023    Stage 3a chronic kidney disease 07/24/2019    TIA (transient ischemic attack) 10/25/2022       Past Surgical History:  Past Surgical History:   Procedure Laterality Date    CARDIAC CATHETERIZATION N/A 3/3/2023    Procedure: Left and Right Heart Cath with Coronary Angiography;  Surgeon: Richardson Willis MD;  Location: [facility] INVASIVE LOCATION;  Service: Cardiovascular;  Laterality: N/A;    CATARACT EXTRACTION  2015    IVC FILTER RETRIEVAL  06/2014    IVC filter placement by Dr. Card    MAMMO STEREOTACTIC BREAST BX SURGICAL ADD UNI Left 11/01/2011    invasive lobular carcinoma-Dr. Cande Daniel    MASTECTOMY, PARTIAL Left 12/01/2011    and left axillary sentinel lymph node biopsy by Dr. Uriostegui    TUBAL ABDOMINAL LIGATION  1984        Home Meds:  Medications Prior to Admission   Medication Sig Dispense Refill Last Dose    apixaban  (ELIQUIS) 5 MG tablet tablet Take 1 tablet by mouth Every 12 (Twelve) Hours. Indications: Other - full anticoagulation, history of DVT/PE 60 tablet 2 2024    budesonide-formoterol (SYMBICORT) 80-4.5 MCG/ACT inhaler Inhale 2 puffs 2 (Two) Times a Day.   2024    carvedilol (COREG) 12.5 MG tablet TAKE 1 TABLET BY MOUTH TWICE DAILY WITH MEALS 180 tablet 3 2024    Cholecalciferol (Vitamin D3) 50 MCG (2000) capsule Take 1 capsule by mouth Daily.   2024    Folbic 2.5-25-2 MG tablet tablet Take 1 tablet by mouth Daily.   2024    furosemide (LASIX) 20 MG tablet Take 1 tablet by mouth Daily. 30 tablet 1 2024    levothyroxine (SYNTHROID, LEVOTHROID) 50 MCG tablet Take 1 tablet by mouth Daily.   2024    O2 (OXYGEN) Inhale 2 L/min Continuous.   2024    potassium chloride (K-DUR,KLOR-CON) 10 MEQ CR tablet Take 1 tablet by mouth Daily. 90 tablet 3 2024    sildenafil (REVATIO) 20 MG tablet Take 1 tablet by mouth Every 8 (Eight) Hours. 90 tablet 1 2024    allopurinol (ZYLOPRIM) 100 MG tablet Take 1 tablet by mouth Daily.   More than a month    predniSONE (DELTASONE) 20 MG tablet Take 1 tablet by mouth Daily. 5 tablet 0 More than a month       Allergies:  No Known Allergies    Social History:   Social History     Socioeconomic History    Marital status:    Tobacco Use    Smoking status: Former     Current packs/day: 0.00     Types: Cigarettes     Quit date:      Years since quittin.3     Passive exposure: Past    Smokeless tobacco: Never    Tobacco comments:     smoked 6 cigarettes a day from 13 years of age to 56 years of age when she quit in    Vaping Use    Vaping status: Never Used   Substance and Sexual Activity    Alcohol use: Not Currently    Drug use: No    Sexual activity: Not Currently     Partners: Male       Family History:  Family History   Problem Relation Age of Onset    Lung cancer Brother        Physical Exam:  /73 (BP Location: Right arm,  Patient Position: Lying)   Pulse 90   Temp 97.3 °F (36.3 °C) (Oral)   Resp 18   Wt 72.9 kg (160 lb 11.5 oz)   SpO2 91%   BMI 27.59 kg/m²  Body mass index is 27.59 kg/m². 91% 72.9 kg (160 lb 11.5 oz)  General Appearance:  Alert   HEENT:  Normocephalic, without obvious abnormality, Conjunctiva/corneas clear,.   Nares normal, no drainage     Neck:  Supple, symmetrical, trachea midline.   Lungs /Chest wall:   Bilateral basal rhonchi, respirations unlabored, symmetrical wall movement.     Heart:  Regular rate and rhythm, S1 S2 normal  Abdomen: Soft, non-tender, no masses, no organomegaly.    Extremities: + Bilateral lower extremity edema, no clubbing or cyanosis    LABS:  Lab Results   Component Value Date    CALCIUM 9.8 04/26/2024    PHOS 4.3 02/17/2024     Results from last 7 days   Lab Units 04/26/24 0043 04/25/24 2035 04/25/24 2035 04/25/24  1536   SODIUM mmol/L 144  --  145  --    POTASSIUM mmol/L 3.7  --  3.4*  --    CHLORIDE mmol/L 105  --  105  --    CO2 mmol/L 25.0  --  25.0  --    BUN mg/dL 34*  --  32*  --    CREATININE mg/dL 1.81*  --  1.71*  --    GLUCOSE mg/dL 164*   < > 168*  --    CALCIUM mg/dL 9.8  --  9.9  --    WBC 10*3/mm3 6.21  --   --  7.71   HEMOGLOBIN g/dL 10.5*  --   --  10.8*   PLATELETS 10*3/mm3 204  --   --  192   ALT (SGPT) U/L  --   --  16  --    AST (SGOT) U/L  --   --  18  --    PROBNP pg/mL  --   --   --  9,256.0*   PROCALCITONIN ng/mL  --   --   --  0.04    < > = values in this interval not displayed.     Lab Results   Component Value Date    CKTOTAL 32 04/26/2024    TROPONINT 21 (H) 04/26/2024     Results from last 7 days   Lab Units 04/26/24 0043 04/25/24 2035   CK TOTAL U/L 32  --    HSTROP T ng/L 21* 25*         Results from last 7 days   Lab Units 04/25/24  1536   PROCALCITONIN ng/mL 0.04                     Lab Results   Component Value Date    TSH 8.760 (H) 04/25/2024     Estimated Creatinine Clearance: 23.1 mL/min (A) (by C-G formula based on SCr of 1.81 mg/dL  (H)).  Results from last 7 days   Lab Units 04/25/24  1556   NITRITE UA  Positive*   WBC UA /HPF 0-2   BACTERIA UA /HPF 1+*   SQUAM EPITHEL UA /HPF 0-2        Imaging:  Imaging Results (Last 24 Hours)       Procedure Component Value Units Date/Time    XR Chest 1 View [[id number]] Collected: 04/25/24 1551     Updated: 04/25/24 1554    Narrative:      XR CHEST 1 VW    Date of Exam: 4/25/2024 3:38 PM EDT    Indication: SOB    Comparison: 3/21/2024.    Findings:  There is stable mild cardiomegaly. There is pulmonary vascular congestion with increasing interstitial prominence bilaterally. There may be a small right effusion.      Impression:      Impression:  Findings suggest mild CHF superimposed over chronic lung disease.      Electronically Signed: Mary Ivan MD    4/25/2024 3:52 PM EDT    Workstation ID: [id number]              Current Meds:   SCHEDULE  apixaban, 2.5 mg, Oral, Q12H  bumetanide, 1 mg, Intravenous, Q12H  carvedilol, 6.25 mg, Oral, BID With Meals  febuxostat, 40 mg, Oral, Daily  ferric gluconate, 125 mg, Intravenous, Daily  ipratropium-albuterol, 3 mL, Nebulization, 4x Daily - RT  levothyroxine, 75 mcg, Oral, Q AM  methylPREDNISolone sodium succinate, 20 mg, Intravenous, Q12H  pantoprazole, 40 mg, Oral, Q AM  potassium chloride, 20 mEq, Oral, BID With Meals  sodium chloride, 10 mL, Intravenous, Q12H      Infusions     PRNs    acetaminophen    senna-docusate sodium **AND** polyethylene glycol **AND** bisacodyl **AND** bisacodyl    Calcium Replacement - Follow Nurse / BPA Driven Protocol    Magnesium Standard Dose Replacement - Follow Nurse / BPA Driven Protocol    ondansetron ODT **OR** ondansetron    Phosphorus Replacement - Follow Nurse / BPA Driven Protocol    Potassium Replacement - Follow Nurse / BPA Driven Protocol    sodium chloride    sodium chloride        Ana Cristina Oviedo MD  4/26/2024  09:18 EDT      Much of this encounter note is an electronic transcription/translation of spoken language to  printed text using Dragon Software.

## 2024-04-26 NOTE — CONSULTS
Administered the [facility].  Pt was being transported for a test.  Will follow up on Monday.

## 2024-04-26 NOTE — CONSULTS
NEPHROLOGY CONSULTATION-----[facility]    [facility]  [phone]  Luke Fleming MD    Patient Care Team:  Paul Granados MD as PCP - General (Family Medicine)  Richardson Willis MD as Cardiologist (Cardiology)  Rafy Rodriguez MD as Consulting Physician (Hematology and Oncology)  Christianne Phillips, AMARILIS as Licensed Practical Nurse  Salome Fleming MD as Consulting Physician (Nephrology)    CC/REASON FOR CONSULTATION: RENAL FAILURE/ELEVATED SERUM CREATININE    PHYSICIAN REQUESTING CONSULTATION:     History of Present Illness    Patient is a 84 y.o. WF whom I was asked to see in consultation for evaluation and management of renal failure/elevated serum creatinine. Patient was admitted with SOB, edema, PEREZ.  Patient denies prior knowledge of functional kidney disease, proteinuria, or hematuria. No NSAIDs or recent IV dye exposure. No known h/o hepatitis, TB, rheumatic fever, jaundice, SLE. Does bleed/bruise easily as she is chronically anticoagulated on Eliquis.  No urinary sx.   +Compliance with home meds. Was on diuretics in the form of  Lasix prior to admission. Was not on ACE-I/ARB prior to admission. No herbal med use .    Review of Systems   Constitutional:  Positive for activity change, appetite change and fatigue. Negative for chills, diaphoresis, fever and unexpected weight change.   HENT:  Negative for congestion, dental problem, drooling, ear discharge, ear pain, facial swelling, hearing loss, mouth sores, nosebleeds, postnasal drip, rhinorrhea, sinus pressure, sinus pain, sneezing, sore throat, tinnitus, trouble swallowing and voice change.    Eyes:  Negative for photophobia, pain, discharge, redness, itching and visual disturbance.   Respiratory:  Positive for shortness of breath. Negative for apnea, cough, choking, chest tightness, wheezing and stridor.    Cardiovascular:  Positive for leg swelling. Negative for chest  pain and palpitations.   Gastrointestinal:  Negative for abdominal distention, abdominal pain, anal bleeding, blood in stool, constipation, diarrhea, nausea, rectal pain and vomiting.   Endocrine: Negative for cold intolerance, heat intolerance, polydipsia, polyphagia and polyuria.   Genitourinary:  Positive for frequency. Negative for decreased urine volume, difficulty urinating, dysuria, enuresis, flank pain, genital sores, hematuria and urgency.   Musculoskeletal:  Positive for arthralgias and back pain. Negative for gait problem, joint swelling, myalgias, neck pain and neck stiffness.   Skin:  Negative for color change, pallor, rash and wound.   Allergic/Immunologic: Negative for environmental allergies, food allergies and immunocompromised state.   Neurological:  Positive for weakness. Negative for dizziness, tremors, seizures, syncope, facial asymmetry, speech difficulty, light-headedness, numbness and headaches.   Hematological:  Negative for adenopathy. Bruises/bleeds easily.   Psychiatric/Behavioral:  Negative for agitation, behavioral problems, confusion, decreased concentration, dysphoric mood, hallucinations, self-injury, sleep disturbance and suicidal ideas. The patient is not nervous/anxious and is not hyperactive.           Past Medical History:   Diagnosis Date    Acute exacerbation of chronic obstructive pulmonary disease (COPD)     COPD (chronic obstructive pulmonary disease)     Deep vein thrombosis of bilateral lower extremities 07/24/2019    3/2013    History of pneumonia 06/2012    community acquired pneumonia and right pleural effusion;hospitalized at [facility]    History of pulmonary embolism 03/2013    bilateral PE    Hypertension 2001    Insomnia     on Ambien 5mg at     Lobular breast cancer, left 11/2011    Stage IA left upper lobular breast cancer    Malignant neoplasm of left breast in female, estrogen receptor positive 07/18/2019    Osteopenia 2012    Parainfluenza infection      Parotid duct obstruction     Severe pulmonary hypertension 2023    Stage 3a chronic kidney disease 2019    TIA (transient ischemic attack) 10/25/2022       Past Surgical History:   Procedure Laterality Date    CARDIAC CATHETERIZATION N/A 3/3/2023    Procedure: Left and Right Heart Cath with Coronary Angiography;  Surgeon: Richardson Willis MD;  Location: [facility] CATH INVASIVE LOCATION;  Service: Cardiovascular;  Laterality: N/A;    CATARACT EXTRACTION      IVC FILTER RETRIEVAL  2014    IVC filter placement by Dr. Card    MAMMO STEREOTACTIC BREAST BX SURGICAL ADD UNI Left 2011    invasive lobular carcinoma-Dr. Castellon Colomb    MASTECTOMY, PARTIAL Left 2011    and left axillary sentinel lymph node biopsy by Dr. Uriostegui    TUBAL ABDOMINAL LIGATION         Family History   Problem Relation Age of Onset    Lung cancer Brother        Social History     Tobacco Use    Smoking status: Former     Current packs/day: 0.00     Types: Cigarettes     Quit date:      Years since quittin.3     Passive exposure: Past    Smokeless tobacco: Never    Tobacco comments:     smoked 6 cigarettes a day from 13 years of age to 56 years of age when she quit in    Vaping Use    Vaping status: Never Used   Substance Use Topics    Alcohol use: Not Currently    Drug use: No       Home Meds:   Medications Prior to Admission   Medication Sig Dispense Refill Last Dose    apixaban (ELIQUIS) 5 MG tablet tablet Take 1 tablet by mouth Every 12 (Twelve) Hours. Indications: Other - full anticoagulation, history of DVT/PE 60 tablet 2 2024    budesonide-formoterol (SYMBICORT) 80-4.5 MCG/ACT inhaler Inhale 2 puffs 2 (Two) Times a Day.   2024    carvedilol (COREG) 12.5 MG tablet TAKE 1 TABLET BY MOUTH TWICE DAILY WITH MEALS 180 tablet 3 2024    Cholecalciferol (Vitamin D3) 50 MCG ( UT) capsule Take 1 capsule by mouth Daily.   2024    Folbic 2.5-25-2 MG tablet tablet Take 1 tablet by mouth  Daily.   4/25/2024    furosemide (LASIX) 20 MG tablet Take 1 tablet by mouth Daily. 30 tablet 1 4/25/2024    levothyroxine (SYNTHROID, LEVOTHROID) 50 MCG tablet Take 1 tablet by mouth Daily.   4/25/2024    O2 (OXYGEN) Inhale 2 L/min Continuous.   4/25/2024    potassium chloride (K-DUR,KLOR-CON) 10 MEQ CR tablet Take 1 tablet by mouth Daily. 90 tablet 3 4/25/2024    sildenafil (REVATIO) 20 MG tablet Take 1 tablet by mouth Every 8 (Eight) Hours. 90 tablet 1 4/25/2024    allopurinol (ZYLOPRIM) 100 MG tablet Take 1 tablet by mouth Daily.   More than a month    predniSONE (DELTASONE) 20 MG tablet Take 1 tablet by mouth Daily. 5 tablet 0 More than a month       Scheduled Meds:  apixaban, 2.5 mg, Oral, Q12H  carvedilol, 6.25 mg, Oral, BID With Meals  ipratropium-albuterol, 3 mL, Nebulization, 4x Daily - RT  levothyroxine, 75 mcg, Oral, Q AM  methylPREDNISolone sodium succinate, 20 mg, Intravenous, Q12H  pantoprazole, 40 mg, Oral, Q AM  sodium chloride, 10 mL, Intravenous, Q12H        Continuous Infusions:       PRN Meds:    acetaminophen    senna-docusate sodium **AND** polyethylene glycol **AND** bisacodyl **AND** bisacodyl    Calcium Replacement - Follow Nurse / BPA Driven Protocol    Magnesium Standard Dose Replacement - Follow Nurse / BPA Driven Protocol    ondansetron ODT **OR** ondansetron    Phosphorus Replacement - Follow Nurse / BPA Driven Protocol    Potassium Replacement - Follow Nurse / BPA Driven Protocol    sodium chloride    sodium chloride    Allergies:  Patient has no known allergies.    OBJECTIVE    Vital Signs  Temp:  [97.3 °F (36.3 °C)-97.5 °F (36.4 °C)] 97.3 °F (36.3 °C)  Heart Rate:  [71-90] 90  Resp:  [15-21] 18  BP: (112-135)/(56-73) 119/73    No intake/output data recorded.  I/O last 3 completed shifts:  In: -   Out: 175 [Urine:175]    Physical Exam:  General Appearance: alert, appears stated age and cooperative  Head: normocephalic, without obvious abnormality and atraumatic  Eyes: conjunctivae  "and sclerae normal and no icterus  Neck: supple and +JVD  Lungs: +FINE BIBASILAR CRACKLES  Heart: IRREG IRREG +TAYE  Chest Wall: no abnormalities observed  Abdomen: normal bowel sounds and soft, nontender  Extremities: moves extremities well,1+ BILAT PRETIBIAL EDEMA, no cyanosis  Skin: no bleeding, bruising or rash  Neurologic: Alert, and oriented. No focal deficits    Results Review:    I reviewed the patient's new clinical results.    WBC WBC   Date Value Ref Range Status   04/26/2024 6.21 3.40 - 10.80 10*3/mm3 Final   04/25/2024 7.71 3.40 - 10.80 10*3/mm3 Final      HGB Hemoglobin   Date Value Ref Range Status   04/26/2024 10.5 (L) 12.0 - 15.9 g/dL Final   04/25/2024 10.8 (L) 12.0 - 15.9 g/dL Final      HCT Hematocrit   Date Value Ref Range Status   04/26/2024 36.1 34.0 - 46.6 % Final   04/25/2024 37.6 34.0 - 46.6 % Final      Platelets No results found for: \"LABPLAT\"   MCV MCV   Date Value Ref Range Status   04/26/2024 87.0 79.0 - 97.0 fL Final   04/25/2024 90.6 79.0 - 97.0 fL Final          Sodium Sodium   Date Value Ref Range Status   04/26/2024 144 136 - 145 mmol/L Final   04/25/2024 145 136 - 145 mmol/L Final      Potassium Potassium   Date Value Ref Range Status   04/26/2024 3.7 3.5 - 5.2 mmol/L Final   04/25/2024 3.4 (L) 3.5 - 5.2 mmol/L Final      Chloride Chloride   Date Value Ref Range Status   04/26/2024 105 98 - 107 mmol/L Final   04/25/2024 105 98 - 107 mmol/L Final      CO2 CO2   Date Value Ref Range Status   04/26/2024 25.0 22.0 - 29.0 mmol/L Final   04/25/2024 25.0 22.0 - 29.0 mmol/L Final      BUN BUN   Date Value Ref Range Status   04/26/2024 34 (H) 8 - 23 mg/dL Final   04/25/2024 32 (H) 8 - 23 mg/dL Final      Creatinine Creatinine   Date Value Ref Range Status   04/26/2024 1.81 (H) 0.57 - 1.00 mg/dL Final   04/25/2024 1.71 (H) 0.57 - 1.00 mg/dL Final      Calcium Calcium   Date Value Ref Range Status   04/26/2024 9.8 8.6 - 10.5 mg/dL Final   04/25/2024 9.9 8.6 - 10.5 mg/dL Final      PO4 No " "results found for: \"CAPO4\"   Albumin Albumin   Date Value Ref Range Status   04/25/2024 4.0 3.5 - 5.2 g/dL Final      Magnesium No results found for: \"MG\"   Uric Acid No results found for: \"URICACID\"       Imaging Results (Last 72 Hours)       Procedure Component Value Units Date/Time    XR Chest 1 View [[id number]] Collected: 04/25/24 1551     Updated: 04/25/24 1554    Narrative:      XR CHEST 1 VW    Date of Exam: 4/25/2024 3:38 PM EDT    Indication: SOB    Comparison: 3/21/2024.    Findings:  There is stable mild cardiomegaly. There is pulmonary vascular congestion with increasing interstitial prominence bilaterally. There may be a small right effusion.      Impression:      Impression:  Findings suggest mild CHF superimposed over chronic lung disease.      Electronically Signed: Mary Ivan MD    4/25/2024 3:52 PM EDT    Workstation ID: [id number]              Results for orders placed during the hospital encounter of 04/25/24    XR Chest 1 View    Narrative  XR CHEST 1 VW    Date of Exam: 4/25/2024 3:38 PM EDT    Indication: SOB    Comparison: 3/21/2024.    Findings:  There is stable mild cardiomegaly. There is pulmonary vascular congestion with increasing interstitial prominence bilaterally. There may be a small right effusion.    Impression  Impression:  Findings suggest mild CHF superimposed over chronic lung disease.      Electronically Signed: Mary Ivan MD  4/25/2024 3:52 PM EDT  Workstation ID: [id number]      Results for orders placed during the hospital encounter of 03/21/24    XR Chest 1 View    Narrative  XR CHEST 1 VW    Date of Exam: 3/21/2024 1:20 PM EDT    Indication: Dyspnea    Comparison: 2/9/2024.    Findings:  There is stable mild cardiomegaly. Lung fields appear clear of acute infiltrates or effusions. Mild diffuse bilateral reticular lung thickening is stable. Right PICC line has been removed.    Impression  Impression:  Chronic findings. No acute process.      Electronically Signed: " Mary Ivan MD  3/21/2024 1:48 PM EDT  Workstation ID: [id number]      Results for orders placed during the hospital encounter of 02/05/24    XR Chest 1 View    Narrative  XR CHEST 1 VW    Date of Exam: 2/9/2024 5:36 AM EST    Indication: sob    Comparison: None available.    Findings:  The trachea is midline. Stable cardiomegaly with mild enlargement of the pulmonary arteries. There is mild diffuse bilateral reticular lung thickening. Mild left basilar atelectasis. No definite pleural effusions. No change in position of the right-sided  pigtail catheter.    Impression  Impression:  Stable left basilar atelectasis and chronic lung changes    Cardiomegaly      Electronically Signed: Pablo Martins MD  2/9/2024 7:35 AM EST  Workstation ID: [id number]        Results for orders placed during the hospital encounter of 02/05/24    Duplex Venous Upper Extremity - Right CAR    Interpretation Summary    Normal right upper extremity venous duplex scan.      ASSESSMENT / PLAN      Dyspnea      RENAL FAILURE------Nonoliguric. +ARF/JULIAN on top of CRF/CKD STG 3A   with a baseline serum Creatinine of about 1.3-1.4.  CRF/CKD STG 3A is secondary to HTN NS. +ARF/JULIAN is secondary to prerenal/hemodynamic fluctuation from decompensated CHF (Cardiorenal). Gentle diuresis.  Avoid hypotension. Check urine and serum studies and renal US and PVR. No NSAIDs or IV dye. Dose meds for CrCl less than 10 cc/min     2. ANEMIA OF CKD------IV iron for ROME. Follow for EPO need     3. HTN WITH CKD-----Avoid hypotension. No ACE/ARB/DRI for now     4. OA/DJD/HYPERURICEMIA------No NSAIDs. Add Uloric     5. VOLUME OVERLOAD-----Cautious diuresis with IV Bumex. Thyroid functions ok     6. HYPERGLYCEMIA------Glucometers, SSI     7.  PULMONARY HTN--------Per , Pulmonary     8. HYPOKALEMIA-------Replace po     9. CAD-----per , Cardiology      I discussed the patient's findings and my recommendations with patient, nursing staff, and primary care  team    Will follow along closely. Thank you for allowing us to see this patient in renal consultation.    [facility]  [phone]  MD Luke Barton MD  04/26/24  07:07 EDT

## 2024-04-26 NOTE — H&P
Patient Care Team:  Paul Granados MD as PCP - General (Family Medicine)  Richardson Willis MD as Cardiologist (Cardiology)  Rafy Rodriguez MD as Consulting Physician (Hematology and Oncology)  Christianne Phillips RN as Licensed Practical Nurse  Salome Fleming MD as Consulting Physician (Nephrology)    Chief Complaint  Subjective     The patient is a 84 y.o. female who presents with shortness of breath with evidence of an acute exacerbation of diastolic congestive heart failure    HPI  The patient was in her usual state of health until 1 to 2 weeks prior to presentation when she began to experience the insidious onset of some progressive shortness of breath associate with some orthopnea and paroxysmal nocturnal dyspnea.  She attempted to use home medications but decompensated and presented to our office for evaluation where she was found to be in some respiratory distress.  She was referred for prompt admission.  At the time my evaluation she has received parenteral diuresis and is feeling better overall.  Review of Systems  Review of Systems   Constitutional:  Positive for activity change.   Respiratory:  Positive for shortness of breath.    Musculoskeletal:  Positive for arthralgias.   Neurological:  Positive for weakness.       History  Past Medical History:   Diagnosis Date    Acute exacerbation of chronic obstructive pulmonary disease (COPD)     COPD (chronic obstructive pulmonary disease)     Deep vein thrombosis of bilateral lower extremities 07/24/2019    3/2013    History of pneumonia 06/2012    community acquired pneumonia and right pleural effusion;hospitalized at [facility]    History of pulmonary embolism 03/2013    bilateral PE    Hypertension 2001    Insomnia     on Ambien 5mg at     Lobular breast cancer, left 11/2011    Stage IA left upper lobular breast cancer    Malignant neoplasm of left breast in female, estrogen receptor positive 07/18/2019    Osteopenia 2012    Parainfluenza  infection     Parotid duct obstruction     Severe pulmonary hypertension 2023    Stage 3a chronic kidney disease 2019    TIA (transient ischemic attack) 10/25/2022     Past Surgical History:   Procedure Laterality Date    CARDIAC CATHETERIZATION N/A 3/3/2023    Procedure: Left and Right Heart Cath with Coronary Angiography;  Surgeon: Richardson Willis MD;  Location: [facility] CATH INVASIVE LOCATION;  Service: Cardiovascular;  Laterality: N/A;    CATARACT EXTRACTION      IVC FILTER RETRIEVAL  2014    IVC filter placement by Dr. Card    MAMMO STEREOTACTIC BREAST BX SURGICAL ADD UNI Left 2011    invasive lobular carcinoma-Dr. Castellon Colomb    MASTECTOMY, PARTIAL Left 2011    and left axillary sentinel lymph node biopsy by Dr. Uriostegui    TUBAL ABDOMINAL LIGATION       Family History   Problem Relation Age of Onset    Lung cancer Brother      Social History     Tobacco Use    Smoking status: Former     Current packs/day: 0.00     Types: Cigarettes     Quit date:      Years since quittin.3     Passive exposure: Past    Smokeless tobacco: Never    Tobacco comments:     smoked 6 cigarettes a day from 13 years of age to 56 years of age when she quit in    Vaping Use    Vaping status: Never Used   Substance Use Topics    Alcohol use: Not Currently    Drug use: No     Allergies:  Patient has no known allergies.    Objective     Vital Signs  Temp:  [97.3 °F (36.3 °C)-97.5 °F (36.4 °C)] 97.3 °F (36.3 °C)  Heart Rate:  [71-90] 87  Resp:  [15-21] 18  BP: (112-135)/(56-73) 127/70      Physical Exam:   Physical Exam  Vitals and nursing note reviewed.   Constitutional:       Appearance: Normal appearance.   Cardiovascular:      Rate and Rhythm: Rhythm irregular.      Heart sounds: Normal heart sounds.   Pulmonary:      Breath sounds: Normal breath sounds.   Musculoskeletal:         General: Swelling present.   Skin:     General: Skin is warm.   Neurological:      Mental Status: She is alert.  "             Results Review:   CBC    Results from last 7 days   Lab Units 04/26/24  0043 04/25/24  1536   WBC 10*3/mm3 6.21 7.71   HEMOGLOBIN g/dL 10.5* 10.8*   PLATELETS 10*3/mm3 204 192     BMP   Results from last 7 days   Lab Units 04/26/24  0043 04/25/24 2035   SODIUM mmol/L 144 145   POTASSIUM mmol/L 3.7 3.4*   CHLORIDE mmol/L 105 105   CO2 mmol/L 25.0 25.0   BUN mg/dL 34* 32*   CREATININE mg/dL 1.81* 1.71*   GLUCOSE mg/dL 164* 168*     Cr Clearance Estimated Creatinine Clearance: 23.1 mL/min (A) (by C-G formula based on SCr of 1.81 mg/dL (H)).  [facility]     HbA1C   Lab Results   Component Value Date    HGBA1C 5.8 (H) 10/25/2022     Blood Glucose No results found for: \"POCGLU\"  Infection   Results from last 7 days   Lab Units 04/25/24  1536   PROCALCITONIN ng/mL 0.04     CMP   Results from last 7 days   Lab Units 04/26/24  0043 04/25/24 2035   SODIUM mmol/L 144 145   POTASSIUM mmol/L 3.7 3.4*   CHLORIDE mmol/L 105 105   CO2 mmol/L 25.0 25.0   BUN mg/dL 34* 32*   CREATININE mg/dL 1.81* 1.71*   GLUCOSE mg/dL 164* 168*   ALBUMIN g/dL  --  4.0   BILIRUBIN mg/dL  --  2.1*   ALK PHOS U/L  --  75   AST (SGOT) U/L  --  18   ALT (SGPT) U/L  --  16     UA    Results from last 7 days   Lab Units 04/25/24  1556   NITRITE UA  Positive*   WBC UA /HPF 0-2   BACTERIA UA /HPF 1+*   SQUAM EPITHEL UA /HPF 0-2     Radiology(recent) XR Chest 1 View    Result Date: 4/25/2024  Impression: Findings suggest mild CHF superimposed over chronic lung disease. Electronically Signed: Mary Ivan MD  4/25/2024 3:52 PM EDT  Workstation ID: [id number]      Assessment:      Dyspnea  Acute exacerbation of diastolic congestive heart failure  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  Atrial fibrillation, paroxysmal  Severe pulmonary hypertension  Chronic atelectasis left lower lobe  Chronic kidney disease stage IIIa  Left breast anomaly  Hypertension associated chronic kidney disease stage IIIa  Anemia of chronic kidney " disease  Hyperuricemia  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  Cerebrovascular disease status post CVA  Chronic oral anticoagulation therapy  Acquired hypothyroidism  Thrombophilia  Autonomic dysfunction syndrome  History of pulmonary embolism  Hypercoagulable state secondary to atrial fibrillation  [id number] score 6  History of IVC filter  Aneurysmal dilatation of abdominal aorta  Chronic mucopurulent bronchitis  Peripheral polyneuropathy  History of breast cancer  Supplemental oxygen dependency  Chronic hypoxic respiratory failure        Plan:  Gentle diuresis//renal support//aggressive pulmonary toilet  Expected Length of Stay 3 days    I discussed the patient's findings and my recommendations with patient and nursing staff.     Paul Granados MD  04/26/24  09:36 EDT

## 2024-04-26 NOTE — PLAN OF CARE
Goal Outcome Evaluation:              Problem: Adult Inpatient Plan of Care  Goal: Plan of Care Review  Outcome: Ongoing, Progressing  Goal: Patient-Specific Goal (Individualized)  Outcome: Ongoing, Progressing  Goal: Absence of Hospital-Acquired Illness or Injury  Outcome: Ongoing, Progressing  Intervention: Identify and Manage Fall Risk  Recent Flowsheet Documentation  Taken 4/26/2024 0600 by Sera Palacio RN  Safety Promotion/Fall Prevention:   safety round/check completed   room organization consistent  Taken 4/26/2024 0400 by Sera Palacio RN  Safety Promotion/Fall Prevention:   safety round/check completed   room organization consistent  Taken 4/26/2024 0200 by Sera Palacio RN  Safety Promotion/Fall Prevention:   safety round/check completed   room organization consistent  Taken 4/26/2024 0000 by Sera Palacio RN  Safety Promotion/Fall Prevention:   safety round/check completed   room organization consistent  Taken 4/25/2024 2200 by Sera Palacio RN  Safety Promotion/Fall Prevention:   safety round/check completed   room organization consistent  Intervention: Prevent Skin Injury  Recent Flowsheet Documentation  Taken 4/25/2024 2100 by Sera Palacio RN  Skin Protection:   adhesive use limited   tubing/devices free from skin contact  Intervention: Prevent and Manage VTE (Venous Thromboembolism) Risk  Recent Flowsheet Documentation  Taken 4/25/2024 2100 by Sera Palaico RN  VTE Prevention/Management: sequential compression devices on  Range of Motion: active ROM (range of motion) encouraged  Goal: Optimal Comfort and Wellbeing  Outcome: Ongoing, Progressing  Intervention: Provide Person-Centered Care  Recent Flowsheet Documentation  Taken 4/25/2024 2100 by Sera aPlacio RN  Trust Relationship/Rapport:   care explained   emotional support provided   choices provided  Goal: Readiness for Transition of Care  Outcome: Ongoing, Progressing     Problem: COPD (Chronic Obstructive Pulmonary Disease) Comorbidity  Goal:  Maintenance of COPD Symptom Control  Outcome: Ongoing, Progressing  Intervention: Maintain COPD-Symptom Control  Recent Flowsheet Documentation  Taken 4/26/2024 0400 by Sera Palacio, RN  Supportive Measures: active listening utilized  Taken 4/25/2024 2100 by Sera Palacio, RN  Supportive Measures: active listening utilized     Problem: Heart Failure Comorbidity  Goal: Maintenance of Heart Failure Symptom Control  Outcome: Ongoing, Progressing     Problem: Hypertension Comorbidity  Goal: Blood Pressure in Desired Range  Outcome: Ongoing, Progressing

## 2024-04-26 NOTE — CONSULTS
Referring Provider: Paul Granados MD    Reason for Consultation: Shortness of breath, heart failure exacerbation      Patient Care Team:  Paul Granados MD as PCP - General (Family Medicine)  Richardson Willis MD as Cardiologist (Cardiology)  Rafy Rodriguez MD as Consulting Physician (Hematology and Oncology)  Christianne Phillips, AMARILIS as Licensed Practical Nurse  Salome Fleming MD as Consulting Physician (Nephrology)      SUBJECTIVE     Chief Complaint: Shortness of breath    History of present illness:  Gabrielle Lyles is a 84 y.o. female  with COPD, pulmonary hypertension, chronic respiratory failure on oxygen, TIA, DVT and pulmonary embolism, chronic kidney disease, hypertension who presents to the hospital with complaints of worsening shortness of breath and leg edema.  She is currently on Eliquis, Coreg, Lasix.  She has had multiple previous hospital admissions for similar complaints and also has chronic kidney disease.    Review of systems:    Constitutional: No weakness, fatigue, fever, rigors, chills   Eyes: No vision changes, eye pain   ENT/oropharynx: No difficulty swallowing, sore throat, epistaxis, changes in hearing   Cardiovascular: No chest pain, chest tightness, palpitations, paroxysmal nocturnal dyspnea, orthopnea, diaphoresis, dizziness / syncopal episode   Respiratory: + shortness of breath, dyspnea on exertion, cough, wheezing, hemoptysis   Gastrointestinal: No abdominal pain, nausea, vomiting, diarrhea, bloody stools   Genitourinary: No hematuria, dysuria   Neurological: No headache, tremors, numbness, one-sided weakness    Musculoskeletal: No cramps, myalgias, joint pain, joint swelling   Integument: No rash, edema        Personal History:      Past Medical History:   Diagnosis Date    Acute exacerbation of chronic obstructive pulmonary disease (COPD)     COPD (chronic obstructive pulmonary disease)     Deep vein thrombosis of bilateral lower extremities 07/24/2019    3/2013     History of pneumonia 2012    community acquired pneumonia and right pleural effusion;hospitalized at [facility]    History of pulmonary embolism 2013    bilateral PE    Hypertension     Insomnia     on Ambien 5mg at     Lobular breast cancer, left 2011    Stage IA left upper lobular breast cancer    Malignant neoplasm of left breast in female, estrogen receptor positive 2019    Osteopenia     Parainfluenza infection     Parotid duct obstruction     Severe pulmonary hypertension 2023    Stage 3a chronic kidney disease 2019    TIA (transient ischemic attack) 10/25/2022       Past Surgical History:   Procedure Laterality Date    CARDIAC CATHETERIZATION N/A 3/3/2023    Procedure: Left and Right Heart Cath with Coronary Angiography;  Surgeon: Richardson Willis MD;  Location: [facility] CATH INVASIVE LOCATION;  Service: Cardiovascular;  Laterality: N/A;    CATARACT EXTRACTION      IVC FILTER RETRIEVAL  2014    IVC filter placement by Dr. Card    MAMMO STEREOTACTIC BREAST BX SURGICAL ADD UNI Left 2011    invasive lobular carcinoma-Dr. Cande Daniel    MASTECTOMY, PARTIAL Left 2011    and left axillary sentinel lymph node biopsy by Dr. Uriostegui    TUBAL ABDOMINAL LIGATION         Family History   Problem Relation Age of Onset    Lung cancer Brother        Social History     Tobacco Use    Smoking status: Former     Current packs/day: 0.00     Types: Cigarettes     Quit date:      Years since quittin.3     Passive exposure: Past    Smokeless tobacco: Never    Tobacco comments:     smoked 6 cigarettes a day from 13 years of age to 56 years of age when she quit in    Vaping Use    Vaping status: Never Used   Substance Use Topics    Alcohol use: Not Currently    Drug use: No        Home meds:  Prior to Admission medications    Medication Sig Start Date End Date Taking? Authorizing Provider   apixaban (ELIQUIS) 5 MG tablet tablet Take 1 tablet by mouth Every 12  (Twelve) Hours. Indications: Other - full anticoagulation, history of DVT/PE 10/27/22  Yes Shaylee Mcdaniels MD   budesonide-formoterol (SYMBICORT) 80-4.5 MCG/ACT inhaler Inhale 2 puffs 2 (Two) Times a Day.   Yes ProviderJaylyn MD   carvedilol (COREG) 12.5 MG tablet TAKE 1 TABLET BY MOUTH TWICE DAILY WITH MEALS 12/26/23  Yes Richardson Willis MD   Cholecalciferol (Vitamin D3) 50 MCG (2000 UT) capsule Take 1 capsule by mouth Daily.   Yes Provider, MD Jaylyn   Folbic 2.5-25-2 MG tablet tablet Take 1 tablet by mouth Daily. 2/28/24  Yes Jaylyn Golden MD   furosemide (LASIX) 20 MG tablet Take 1 tablet by mouth Daily. 2/17/24  Yes Mansi Becerril APRN   levothyroxine (SYNTHROID, LEVOTHROID) 50 MCG tablet Take 1 tablet by mouth Daily.   Yes ProviderJaylyn MD   O2 (OXYGEN) Inhale 2 L/min Continuous. 2/26/24  Yes ProviderJaylyn MD   potassium chloride (K-DUR,KLOR-CON) 10 MEQ CR tablet Take 1 tablet by mouth Daily. 2/24/23  Yes Richardson Willis MD   sildenafil (REVATIO) 20 MG tablet Take 1 tablet by mouth Every 8 (Eight) Hours. 5/11/23  Yes Mansi Becerril APRN   allopurinol (ZYLOPRIM) 100 MG tablet Take 1 tablet by mouth Daily.    ProviderJaylyn MD   predniSONE (DELTASONE) 20 MG tablet Take 1 tablet by mouth Daily. 3/21/24   Pvael Simpson MD       Allergies:     Patient has no known allergies.    Scheduled Meds:apixaban, 2.5 mg, Oral, Q12H  bumetanide, 1 mg, Intravenous, Q12H  carvedilol, 6.25 mg, Oral, BID With Meals  ipratropium-albuterol, 3 mL, Nebulization, 4x Daily - RT  levothyroxine, 75 mcg, Oral, Q AM  methylPREDNISolone sodium succinate, 20 mg, Intravenous, Q12H  pantoprazole, 40 mg, Oral, Q AM  potassium chloride, 20 mEq, Oral, BID With Meals  sodium chloride, 10 mL, Intravenous, Q12H      Continuous Infusions:   PRN Meds:  acetaminophen    senna-docusate sodium **AND** polyethylene glycol **AND** bisacodyl **AND** bisacodyl    Calcium Replacement - Follow Nurse / BPA Driven  Protocol    Magnesium Standard Dose Replacement - Follow Nurse / BPA Driven Protocol    ondansetron ODT **OR** ondansetron    Phosphorus Replacement - Follow Nurse / BPA Driven Protocol    Potassium Replacement - Follow Nurse / BPA Driven Protocol    sodium chloride    sodium chloride      OBJECTIVE    Vital Signs  Vitals:    04/26/24 0142 04/26/24 0604 04/26/24 0640 04/26/24 0646   BP: 135/73 119/73     BP Location: Right arm Right arm     Patient Position: Lying Lying     Pulse: 80 85 88 90   Resp: 19 15 18 18   Temp: 97.3 °F (36.3 °C) 97.3 °F (36.3 °C)     TempSrc: Oral Oral     SpO2: 93% 92% 96% 91%   Weight:  72.9 kg (160 lb 11.5 oz)         Flowsheet Rows      Flowsheet Row First Filed Value   Admission Height --   Admission Weight 74 kg (163 lb 2.3 oz) Documented at 04/25/2024 2107              Intake/Output Summary (Last 24 hours) at 4/26/2024 0728  Last data filed at 4/25/2024 2300  Gross per 24 hour   Intake --   Output 175 ml   Net -175 ml        Telemetry: Atrial fibrillation with controlled heart rate    Physical Exam:  The patient is alert, oriented and in no distress.  Vital signs as noted above.  Head and neck revealed no carotid bruits or jugular venous distention.  No thyromegaly or lymphadenopathy is present  Distant and diminished breath sounds.  Heart: Normal first and second heart sounds. No murmur.  No precordial rub is present.  No gallop is present.  Abdomen: Soft and nontender.  No organomegaly is present.  Extremities with good peripheral pulses without any pedal edema.  Skin: Warm and dry.  Musculoskeletal system is grossly normal.  CNS grossly normal.       Results Review:  I have personally reviewed the results from the time of this admission to 4/26/2024 07:28 EDT and agree with these findings:  []  Laboratory  []  Microbiology  []  Radiology  []  EKG/Telemetry   []  Cardiology/Vascular   []  Pathology  []  Old records  []  Other:    Most notable findings include:     Lab Results (last  24 hours)       Procedure Component Value Units Date/Time    CK [[id number]] Collected: 04/26/24 0043    Specimen: Blood Updated: 04/26/24 0715    Ferritin [[id number]] Collected: 04/26/24 0043    Specimen: Blood Updated: 04/26/24 0715    Iron Profile [[id number]] Collected: 04/26/24 0043    Specimen: Blood Updated: 04/26/24 0715    Uric Acid [[id number]] Collected: 04/26/24 0043    Specimen: Blood Updated: 04/26/24 0715    High Sensitivity Troponin T 2Hr [[id number]]  (Abnormal) Collected: 04/26/24 0043    Specimen: Blood Updated: 04/26/24 0122     HS Troponin T 21 ng/L      Troponin T Delta -4 ng/L     Narrative:      High Sensitive Troponin T Reference Range:  <14.0 ng/L- Negative Female for AMI  <22.0 ng/L- Negative Male for AMI  >=14 - Abnormal Female indicating possible myocardial injury.  >=22 - Abnormal Male indicating possible myocardial injury.   Clinicians would have to utilize clinical acumen, EKG, Troponin, and serial changes to determine if it is an Acute Myocardial Infarction or myocardial injury due to an underlying chronic condition.         Basic Metabolic Panel [[id number]]  (Abnormal) Collected: 04/26/24 0043    Specimen: Blood Updated: 04/26/24 0122     Glucose 164 mg/dL      BUN 34 mg/dL      Creatinine 1.81 mg/dL      Sodium 144 mmol/L      Potassium 3.7 mmol/L      Chloride 105 mmol/L      CO2 25.0 mmol/L      Calcium 9.8 mg/dL      BUN/Creatinine Ratio 18.8     Anion Gap 14.0 mmol/L      eGFR 27.5 mL/min/1.73     Narrative:      GFR Normal >60  Chronic Kidney Disease <60  Kidney Failure <15    The GFR formula is only valid for adults with stable renal function between ages 18 and 70.    CBC (No Diff) [[id number]]  (Abnormal) Collected: 04/26/24 0043    Specimen: Blood Updated: 04/26/24 0051     WBC 6.21 10*3/mm3      RBC 4.15 10*6/mm3      Hemoglobin 10.5 g/dL      Hematocrit 36.1 %      MCV 87.0 fL      MCH 25.3 pg      MCHC 29.1 g/dL      RDW 21.0 %      RDW-SD 66.1 fl      MPV 10.8 fL       Platelets 204 10*3/mm3     Comprehensive Metabolic Panel [[id number]]  (Abnormal) Collected: 04/25/24 2035    Specimen: Blood Updated: 04/25/24 2104     Glucose 168 mg/dL      BUN 32 mg/dL      Creatinine 1.71 mg/dL      Sodium 145 mmol/L      Potassium 3.4 mmol/L      Chloride 105 mmol/L      CO2 25.0 mmol/L      Calcium 9.9 mg/dL      Total Protein 6.3 g/dL      Albumin 4.0 g/dL      ALT (SGPT) 16 U/L      AST (SGOT) 18 U/L      Alkaline Phosphatase 75 U/L      Total Bilirubin 2.1 mg/dL      Globulin 2.3 gm/dL      A/G Ratio 1.7 g/dL      BUN/Creatinine Ratio 18.7     Anion Gap 15.0 mmol/L      eGFR 29.4 mL/min/1.73     Narrative:      GFR Normal >60  Chronic Kidney Disease <60  Kidney Failure <15    The GFR formula is only valid for adults with stable renal function between ages 18 and 70.    High Sensitivity Troponin T [[id number]]  (Abnormal) Collected: 04/25/24 2035    Specimen: Blood Updated: 04/25/24 2104     HS Troponin T 25 ng/L     Narrative:      High Sensitive Troponin T Reference Range:  <14.0 ng/L- Negative Female for AMI  <22.0 ng/L- Negative Male for AMI  >=14 - Abnormal Female indicating possible myocardial injury.  >=22 - Abnormal Male indicating possible myocardial injury.   Clinicians would have to utilize clinical acumen, EKG, Troponin, and serial changes to determine if it is an Acute Myocardial Infarction or myocardial injury due to an underlying chronic condition.         Urinalysis, Microscopic Only - Urine, Clean Catch [[id number]]  (Abnormal) Collected: 04/25/24 1556    Specimen: Urine, Clean Catch Updated: 04/25/24 1628     RBC, UA 0-2 /HPF      WBC, UA 0-2 /HPF      Bacteria, UA 1+ /HPF      Squamous Epithelial Cells, UA 0-2 /HPF      Hyaline Casts, UA None Seen /LPF      Methodology Manual Light Microscopy    Urinalysis With Microscopic If Indicated (No Culture) - Urine, Clean Catch [[id number]]  (Abnormal) Collected: 04/25/24 1556    Specimen: Urine, Clean Catch Updated: 04/25/24 1621      Color, UA Yellow     Appearance, UA Cloudy     pH, UA 5.5     Specific Gravity, UA 1.014     Glucose, UA Negative     Ketones, UA Negative     Bilirubin, UA Negative     Blood, UA Negative     Protein,  mg/dL (2+)     Leuk Esterase, UA Trace     Nitrite, UA Positive     Urobilinogen, UA 1.0 E.U./dL    BNP [[id number]]  (Abnormal) Collected: 04/25/24 1536    Specimen: Blood Updated: 04/25/24 1619     proBNP 9,256.0 pg/mL     Narrative:      This assay is used as an aid in the diagnosis of individuals suspected of having heart failure. It can be used as an aid in the diagnosis of acute decompensated heart failure (ADHF) in patients presenting with signs and symptoms of ADHF to the emergency department (ED). In addition, NT-proBNP of <300 pg/mL indicates ADHF is not likely.    Age Range Result Interpretation  NT-proBNP Concentration (pg/mL:      <50             Positive            >450                   Gray                 300-450                    Negative             <300    50-75           Positive            >900                  Gray                300-900                  Negative            <300      >75             Positive            >1800                  Gray                300-1800                  Negative            <300    TSH [[id number]]  (Abnormal) Collected: 04/25/24 1536    Specimen: Blood Updated: 04/25/24 1619     TSH 8.760 uIU/mL     Procalcitonin [[id number]]  (Normal) Collected: 04/25/24 1536    Specimen: Blood Updated: 04/25/24 1619     Procalcitonin 0.04 ng/mL     Narrative:      As a Marker for Sepsis (Non-Neonates):    1. <0.5 ng/mL represents a low risk of severe sepsis and/or septic shock.  2. >2 ng/mL represents a high risk of severe sepsis and/or septic shock.    As a Marker for Lower Respiratory Tract Infections that require antibiotic therapy:    PCT on Admission    Antibiotic Therapy       6-12 Hrs later    >0.5                Strongly Recommended  >0.25 - <0.5         "Recommended   0.1 - 0.25          Discouraged              Remeasure/reassess PCT  <0.1                Strongly Discouraged     Remeasure/reassess PCT    As 28 day mortality risk marker: \"Change in Procalcitonin Result\" (>80% or <=80%) if Day 0 (or Day 1) and Day 4 values are available. Refer to http://[email or web address]-pct-calculator.com    Change in PCT <=80%  A decrease of PCT levels below or equal to 80% defines a positive change in PCT test result representing a higher risk for 28-day all-cause mortality of patients diagnosed with severe sepsis for septic shock.    Change in PCT >80%  A decrease of PCT levels of more than 80% defines a negative change in PCT result representing a lower risk for 28-day all-cause mortality of patients diagnosed with severe sepsis or septic shock.       CBC & Differential [[id number]]  (Abnormal) Collected: 04/25/24 1536    Specimen: Blood Updated: 04/25/24 1602    Narrative:      The following orders were created for panel order CBC & Differential.  Procedure                               Abnormality         Status                     ---------                               -----------         ------                     CBC Auto Differential[[id number]]        Abnormal            Final result               Scan Slide[[id number]]                                       Final result                 Please view results for these tests on the individual orders.    Scan Slide [[id number]] Collected: 04/25/24 1536    Specimen: Blood Updated: 04/25/24 1602     RBC Morphology Normal     WBC Morphology Normal     Platelet Estimate Adequate    CBC Auto Differential [[id number]]  (Abnormal) Collected: 04/25/24 1536    Specimen: Blood Updated: 04/25/24 1602     WBC 7.71 10*3/mm3      RBC 4.15 10*6/mm3      Hemoglobin 10.8 g/dL      Hematocrit 37.6 %      MCV 90.6 fL      MCH 26.0 pg      MCHC 28.7 g/dL      RDW 21.2 %      RDW-SD 69.5 fl      MPV 11.2 fL      Platelets 192 10*3/mm3      Comment: Result " checked          Neutrophil % 71.8 %      Lymphocyte % 13.0 %      Monocyte % 10.6 %      Eosinophil % 3.2 %      Basophil % 1.0 %      Immature Grans % 0.4 %      Neutrophils, Absolute 5.53 10*3/mm3      Lymphocytes, Absolute 1.00 10*3/mm3      Monocytes, Absolute 0.82 10*3/mm3      Eosinophils, Absolute 0.25 10*3/mm3      Basophils, Absolute 0.08 10*3/mm3      Immature Grans, Absolute 0.03 10*3/mm3      nRBC 0.0 /100 WBC             Imaging Results (Last 24 Hours)       Procedure Component Value Units Date/Time    XR Chest 1 View [[id number]] Collected: 04/25/24 1551     Updated: 04/25/24 1554    Narrative:      XR CHEST 1 VW    Date of Exam: 4/25/2024 3:38 PM EDT    Indication: SOB    Comparison: 3/21/2024.    Findings:  There is stable mild cardiomegaly. There is pulmonary vascular congestion with increasing interstitial prominence bilaterally. There may be a small right effusion.      Impression:      Impression:  Findings suggest mild CHF superimposed over chronic lung disease.      Electronically Signed: Mary Ivan MD    4/25/2024 3:52 PM EDT    Workstation ID: [id number]            LAB RESULTS (LAST 7 DAYS)    CBC  Results from last 7 days   Lab Units 04/26/24  0043 04/25/24  1536   WBC 10*3/mm3 6.21 7.71   RBC 10*6/mm3 4.15 4.15   HEMOGLOBIN g/dL 10.5* 10.8*   HEMATOCRIT % 36.1 37.6   MCV fL 87.0 90.6   PLATELETS 10*3/mm3 204 192       BMP  Results from last 7 days   Lab Units 04/26/24 0043 04/25/24 2035   SODIUM mmol/L 144 145   POTASSIUM mmol/L 3.7 3.4*   CHLORIDE mmol/L 105 105   CO2 mmol/L 25.0 25.0   BUN mg/dL 34* 32*   CREATININE mg/dL 1.81* 1.71*   GLUCOSE mg/dL 164* 168*       CMP   Results from last 7 days   Lab Units 04/26/24 0043 04/25/24 2035   SODIUM mmol/L 144 145   POTASSIUM mmol/L 3.7 3.4*   CHLORIDE mmol/L 105 105   CO2 mmol/L 25.0 25.0   BUN mg/dL 34* 32*   CREATININE mg/dL 1.81* 1.71*   GLUCOSE mg/dL 164* 168*   ALBUMIN g/dL  --  4.0   BILIRUBIN mg/dL  --  2.1*   ALK PHOS U/L  --   75   AST (SGOT) U/L  --  18   ALT (SGPT) U/L  --  16       BNP        TROPONIN  Results from last 7 days   Lab Units 04/26/24  0043   HSTROP T ng/L 21*       CoAg        Creatinine Clearance  Estimated Creatinine Clearance: 23.1 mL/min (A) (by C-G formula based on SCr of 1.81 mg/dL (H)).    ABG          Radiology  XR Chest 1 View    Result Date: 4/25/2024  Impression: Findings suggest mild CHF superimposed over chronic lung disease. Electronically Signed: Mary Ivan MD  4/25/2024 3:52 PM EDT  Workstation ID: [id number]       EKG  I personally viewed and interpreted the patient's EKG/Telemetry data:  Telemetry Scan   Final Result            Echocardiogram:    Results for orders placed in visit on 03/18/24    Adult Transthoracic Echo Limited W/ Cont if Necessary Per Protocol    Interpretation Summary    Left ventricular ejection fraction appears to be 61 - 65%.    The right ventricular cavity is dilated.    There is a small (<1cm) pericardial effusion adjacent to the left ventricle. There is no evidence of cardiac tamponade.    IVC is 2.1 cm.        Stress Test:  Results for orders placed in visit on 09/13/22    Stress Test With Myocardial Perfusion One Day    Interpretation Summary    Left ventricular ejection fraction is hyperdynamic (Calculated EF > 70%). .    Myocardial perfusion imaging indicates a normal myocardial perfusion study with no evidence of ischemia.    Impressions are consistent with a low risk study.    Findings consistent with a normal ECG stress test.        Cardiac Catheterization:  Results for orders placed during the hospital encounter of 03/03/23    Cardiac Catheterization/Vascular Study    Conclusion  OPERATORS  Richardson Willis M.D. (Attending Cardiologist)      PROCEDURE PERFORMED  Ultrasound guided vascular access  Right heart catheterization  Coronary Angiogram  Left Heart Catheterization [id number]  Moderate Sedation    INDICATIONS FOR PROCEDURE  83-year-old woman with multiple cardiovascular  risk factors, abnormal stress test presented with worsening shortness of breath.  After discussing the risk and benefit of the procedure she was brought in for elective right and left heart cath.    PROCEDURE IN DETAIL  Informed consent was obtained from the patient after explaining the risks, benefits, and alternative options of the procedure. After obtaining informed consent, the patient was brought to the cath lab and was prepped in a sterile fashion. Lidocaine 2% was used for local anesthesia into the right femoral venous access site. Right femoral vein was accessed using the micropuncture needle under ultrasound guidance and micropuncture wire advanced under flouroscopy. A 7 [place] vascular sheath was put into place percutaneously over guide-wire. Guide wires were removed. A 6Fr swan sheryl catheter was advanced to wedge position. RA, RV and PA and wedge pressures were recorded.  PA sat and arterial sats recorded.  The patient tolerated the procedure well without any complications.    Lidocaine 2% was used for local anesthesia into the right femoral arterial access site. The right femoral artery was accessed with a micropuncture needle via modified Seldinger technique under ultrasound guidance. A 6F was inserted successfully.  Afterwards, 6F JR4 and JL4 diagnostic catheters were advanced over a wire into the ascending aorta and were used to engage the ostia of the left main and RCA respectively. JR4 used to cross the AV and obtain LV pressures and gradient across the AV measured via pullback technique. Images of the right and left coronary systems were obtained. All the catheters were exchanged over a wire and subsequently removed. Angiogram of the femoral access site was obtained and did not show complications. The patient tolerated the procedure well without any complications. The pictures were reviewed at the end of the procedure. A Mynx closure device was applied.    HEMODYNAMICS    RHC  RA 6/5, 4 mmHg  RV  74/3, 5 mmHg  PA 71/25, 44 mmHg  PCW 12 mmHg  AO Sat 92%  PA Sat 78%    Jose CO: 7.89 L/min    Jose CI: 4.69 L/min/m²    LHC  LV: 134/3, 20 mmHg  AO: 131/56, 87 mmHg  No significant gradient across the aortic valve during pullback of JR4 catheter.    FINDINGS  Coronary Angiogram  All vessels are heavily calcified  She also has heavily calcified peripheral arterial disease.  Right dominant circulation    Left main: Left main is a large caliber vessel which gives rise to the Left Anterior Descending and the Left circumflex.  Left main is angiographically free from any significant disease.    Left Anterior Descending Artery: LAD is a heavily calcified medium caliber vessel which gives rise to diagonals.  The vessel is highly tortuous and has 50 to 60% mid vessel stenosis best seen in [place]awk cranial view.    Left Circumflex: Heavily calcified vessel with 50% proximal segment stenosis.    Right Coronary Artery: The RCA is a large caliber vessel which is heavily calcified and tortuous.  It has diffuse luminal irregularities and 30 to 40% stenosis in the midsegment around the bend.    ESTIMATED BLOOD LOSS:  10 ml    COMPLICATIONS:  None    PROCEDURE DATA:  Sedation Time: 25 minutes    IMPRESSIONS  Heavily calcified, tortuous nonobstructive coronary disease  Severe pulmonary hypertension with PA systolic pressure in the 70s  Mean PA pressure 44 mmHg    RECOMMENDATIONS  -Continue aggressive medical management of CAD  -Referral to pulmonology for treatment of pulmonary hypertension        Other:      ASSESSMENT & PLAN:    Principal Problem:    Dyspnea    COPD/Chronic respiratory failure/shortness of breath  Severe pulmonary hypertension  HFpEF  On 3 L of oxygen via nasal cannula at baseline, currently requiring 4 L  Has known pulmonary hypertension  RA 6/5, 4 mmHg  RV 74/3, 5 mmHg  PA 71/25, 44 mmHg  PCW 12 mmHg  She is on sildenafil  She follows with Dr. Roy.   Echocardiogram shows preserved LV function with mildly elevated  RVSP.  Small to moderate pericardial effusion: No signs of tamponade  proBNP of 9200 compared to 10,000 last month.  Starting IV Bumex  I will also connect her with heart failure clinic for intermittent IV diuresis     Atrial fibrillation  She has paroxysmal atrial fibrillation  [id number] score is 6  Continue Eliquis for atrial fibrillation as well as for history of DVT/PE  Continue Coreg for heart rate control     History of venous thrombosis and embolism  Continue Eliquis 5 mg p.o. twice daily.  She also has an IVC filter in place.     Primary hypertension, chronic  Blood pressure has improved and is currently normal  Continue Coreg for blood pressure control  Amlodipine can be added if better blood pressure control is desired.  Echo shows preserved LV function, reduced RV function and mildly elevated RVSP.  Pericardial effusion is not significant with no signs of tamponade.  Continue diuretics     Coronary artery disease  Continue high intensity statin and beta-blocker.  No need for aspirin while she is on anticoagulation  Previous cardiac catheterization showed heavily calcified coronaries but nonobstructive.  No chest pain and stable from cardiac standpoint.     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  Continue high intensity statin and anticoagulation with Eliquis 5 mg p.o. twice daily.     Stage IIIb chronic kidney disease  Creatinine 1.8, GFR is 27.5  Continue diuretics, Bumex  Nephrology has been consulted     Recent altered mental status  Precipitated by mastitis/PICC line infection  CT head and MRI of the brain unremarkable with no acute findings  Her mental status is now improved and back to baseline.    Richardson Willis MD  04/26/24  07:28 EDT

## 2024-04-26 NOTE — CASE MANAGEMENT/SOCIAL WORK
Discharge Planning Assessment  [facility]     Patient Name: Gabrielle Lyles  MRN: [id number]  Today's Date: 4/26/2024    Admit Date: 4/25/2024    Plan: DC PLAN: From routine home. Current with Home 02 3L thru [place].     Discharge Needs Assessment       Row Name 04/26/24 1525       Living Environment    People in Home alone    Current Living Arrangements home    Potentially Unsafe Housing Conditions none    In the past 12 months has the electric, gas, oil, or water company threatened to shut off services in your home? No    Primary Care Provided by self    Provides Primary Care For no one    Quality of Family Relationships helpful;involved;supportive    Able to Return to Prior Arrangements yes       Resource/Environmental Concerns    Resource/Environmental Concerns none    Transportation Concerns none       Transportation Needs    In the past 12 months, has lack of transportation kept you from medical appointments or from getting medications? no    In the past 12 months, has lack of transportation kept you from meetings, work, or from getting things needed for daily living? No       Food Insecurity    Within the past 12 months, you worried that your food would run out before you got the money to buy more. Never true    Within the past 12 months, the food you bought just didn't last and you didn't have money to get more. Never true       Transition Planning    Patient/Family Anticipates Transition to home    Patient/Family Anticipated Services at Transition none    Transportation Anticipated car, drives self;family or friend will provide       Discharge Needs Assessment    Readmission Within the Last 30 Days no previous admission in last 30 days    Equipment Currently Used at Home cane, straight;walker, rolling;oxygen    Anticipated Changes Related to Illness none    Equipment Needed After Discharge none                   Discharge Plan       Row Name 04/26/24 1525       Plan    Plan DC PLAN: From routine home.  Current with Home 02 3L thru [place].    Patient/Family in Agreement with Plan yes    Plan Comments Met with patient at bedside, from routine home  alone. Independent with ADL's uses a a walker and current with home 02 3 L Thru [place]. PCP is [facility], [facility]. Able to afford medications and denies any issues with food or utilities. Denies any transportation issues, daughter will provide. Denies any needs for HHC or SNF at this time. Pending Clinical Course.                  Continued Care and Services - Admitted Since 4/25/2024    No active coordination exists for this encounter.       Selected Continued Care - Prior Encounters Includes continued care and service providers with selected services from prior encounters from 1/26/2024 to 4/26/2024      Discharged on 2/17/2024 Admission date: 2/5/2024 - Discharge disposition: Skilled Nursing Facility (DC - External)      Destination       Service Provider Selected Services Address Phone Fax Patient Preferred    [facility] [place] [facility] [address] [place] [address] [place] [facility] [phone] --                             Demographic Summary       Row Name 04/26/24 1521       General Information    Admission Type inpatient    Arrived From emergency department    Required Notices Provided Important Message from Medicare    Referral Source admission list    Reason for Consult discharge planning    Preferred Language English       Contact Information    Permission Granted to Share Info With     Contact Information Obtained for                    Functional Status       Row Name 04/26/24 1524       Functional Status    Usual Activity Tolerance excellent    Current Activity Tolerance excellent       Physical Activity    On average, how many days per week do you engage in moderate to strenuous exercise (like a brisk walk)? 0 days    On average, how many minutes do you engage in exercise at this  level? 0 min    Number of minutes of exercise per week 0       Assessment of Health Literacy    How often do you have someone help you read hospital materials? Sometimes    How often do you have problems learning about your medical condition because of difficulty understanding written information? Sometimes    How often do you have a problem understanding what is told to you about your medical condition? Sometimes    How confident are you filling out medical forms by yourself? Somewhat    Health Literacy Moderate       Functional Status, IADL    Medications independent    Meal Preparation independent    Housekeeping independent    Laundry independent    Shopping independent       Mental Status    General Appearance WDL WDL       Mental Status Summary    Recent Changes in Mental Status/Cognitive Functioning no changes                       Patient Forms       Row Name 04/26/24 1517       Patient Forms    Important Message from Medicare (IMM) Delivered  IMM 4/26/24 per cm    Delivered to Patient    Method of delivery In person                  Urszula Marcano RN  Case Management

## 2024-04-27 LAB
ALBUMIN SERPL-MCNC: 4.3 G/DL (ref 3.5–5.2)
ALBUMIN/GLOB SERPL: 1.5 G/DL
ALP SERPL-CCNC: 74 U/L (ref 39–117)
ALT SERPL W P-5'-P-CCNC: 21 U/L (ref 1–33)
ANION GAP SERPL CALCULATED.3IONS-SCNC: 14 MMOL/L (ref 5–15)
AST SERPL-CCNC: 23 U/L (ref 1–32)
BILIRUB SERPL-MCNC: 1.6 MG/DL (ref 0–1.2)
BUN SERPL-MCNC: 44 MG/DL (ref 8–23)
BUN/CREAT SERPL: 19.8 (ref 7–25)
C DIFF GDH + TOXINS A+B STL QL IA.RAPID: NEGATIVE
C DIFF GDH + TOXINS A+B STL QL IA.RAPID: NEGATIVE
CA-I SERPL ISE-MCNC: 1.29 MMOL/L (ref 1.2–1.3)
CALCIUM SPEC-SCNC: 10.6 MG/DL (ref 8.6–10.5)
CHLORIDE SERPL-SCNC: 103 MMOL/L (ref 98–107)
CO2 SERPL-SCNC: 23 MMOL/L (ref 22–29)
CREAT SERPL-MCNC: 2.22 MG/DL (ref 0.57–1)
DEPRECATED RDW RBC AUTO: 68.1 FL (ref 37–54)
EGFRCR SERPLBLD CKD-EPI 2021: 21.5 ML/MIN/1.73
ERYTHROCYTE [DISTWIDTH] IN BLOOD BY AUTOMATED COUNT: 21.5 % (ref 12.3–15.4)
GLOBULIN UR ELPH-MCNC: 2.9 GM/DL
GLUCOSE SERPL-MCNC: 122 MG/DL (ref 65–99)
HCT VFR BLD AUTO: 40.2 % (ref 34–46.6)
HGB BLD-MCNC: 11.7 G/DL (ref 12–15.9)
MAGNESIUM SERPL-MCNC: 2.3 MG/DL (ref 1.6–2.4)
MCH RBC QN AUTO: 25.5 PG (ref 26.6–33)
MCHC RBC AUTO-ENTMCNC: 29.1 G/DL (ref 31.5–35.7)
MCV RBC AUTO: 87.6 FL (ref 79–97)
PHOSPHATE SERPL-MCNC: 3.2 MG/DL (ref 2.5–4.5)
PLATELET # BLD AUTO: 240 10*3/MM3 (ref 140–450)
PMV BLD AUTO: 10.6 FL (ref 6–12)
POTASSIUM SERPL-SCNC: 4.9 MMOL/L (ref 3.5–5.2)
PROT SERPL-MCNC: 7.2 G/DL (ref 6–8.5)
RBC # BLD AUTO: 4.59 10*6/MM3 (ref 3.77–5.28)
SODIUM SERPL-SCNC: 140 MMOL/L (ref 136–145)
WBC NRBC COR # BLD AUTO: 14.78 10*3/MM3 (ref 3.4–10.8)

## 2024-04-27 PROCEDURE — 25010000002 NA FERRIC GLUC CPLX PER 12.5 MG: Performed by: INTERNAL MEDICINE

## 2024-04-27 PROCEDURE — 83735 ASSAY OF MAGNESIUM: CPT | Performed by: INTERNAL MEDICINE

## 2024-04-27 PROCEDURE — 25010000002 BUMETANIDE PER 0.5 MG: Performed by: INTERNAL MEDICINE

## 2024-04-27 PROCEDURE — 25010000002 METHYLPREDNISOLONE PER 40 MG: Performed by: FAMILY MEDICINE

## 2024-04-27 PROCEDURE — 82330 ASSAY OF CALCIUM: CPT | Performed by: INTERNAL MEDICINE

## 2024-04-27 PROCEDURE — 80053 COMPREHEN METABOLIC PANEL: CPT | Performed by: INTERNAL MEDICINE

## 2024-04-27 PROCEDURE — 84100 ASSAY OF PHOSPHORUS: CPT | Performed by: INTERNAL MEDICINE

## 2024-04-27 PROCEDURE — 85027 COMPLETE CBC AUTOMATED: CPT | Performed by: INTERNAL MEDICINE

## 2024-04-27 PROCEDURE — 87449 NOS EACH ORGANISM AG IA: CPT | Performed by: FAMILY MEDICINE

## 2024-04-27 PROCEDURE — 94799 UNLISTED PULMONARY SVC/PX: CPT

## 2024-04-27 PROCEDURE — 87324 CLOSTRIDIUM AG IA: CPT | Performed by: FAMILY MEDICINE

## 2024-04-27 RX ORDER — LOPERAMIDE HYDROCHLORIDE 2 MG/1
2 CAPSULE ORAL 4 TIMES DAILY PRN
Status: DISCONTINUED | OUTPATIENT
Start: 2024-04-27 | End: 2024-04-27

## 2024-04-27 RX ORDER — LOPERAMIDE HYDROCHLORIDE 2 MG/1
4 CAPSULE ORAL 4 TIMES DAILY PRN
Status: DISCONTINUED | OUTPATIENT
Start: 2024-04-27 | End: 2024-05-07 | Stop reason: HOSPADM

## 2024-04-27 RX ORDER — PREDNISONE 20 MG/1
20 TABLET ORAL
Status: COMPLETED | OUTPATIENT
Start: 2024-04-28 | End: 2024-05-02

## 2024-04-27 RX ORDER — BUMETANIDE 0.25 MG/ML
1 INJECTION INTRAMUSCULAR; INTRAVENOUS DAILY
Status: DISCONTINUED | OUTPATIENT
Start: 2024-04-28 | End: 2024-04-28

## 2024-04-27 RX ORDER — POTASSIUM CHLORIDE 20 MEQ/1
20 TABLET, EXTENDED RELEASE ORAL
Status: DISCONTINUED | OUTPATIENT
Start: 2024-04-27 | End: 2024-04-28

## 2024-04-27 RX ADMIN — IPRATROPIUM BROMIDE AND ALBUTEROL SULFATE 3 ML: .5; 3 SOLUTION RESPIRATORY (INHALATION) at 19:53

## 2024-04-27 RX ADMIN — PANTOPRAZOLE SODIUM 40 MG: 40 TABLET, DELAYED RELEASE ORAL at 05:25

## 2024-04-27 RX ADMIN — POTASSIUM CHLORIDE 20 MEQ: 1500 TABLET, EXTENDED RELEASE ORAL at 09:08

## 2024-04-27 RX ADMIN — LOPERAMIDE HYDROCHLORIDE 2 MG: 2 CAPSULE ORAL at 10:29

## 2024-04-27 RX ADMIN — IPRATROPIUM BROMIDE AND ALBUTEROL SULFATE 3 ML: .5; 3 SOLUTION RESPIRATORY (INHALATION) at 12:22

## 2024-04-27 RX ADMIN — POTASSIUM CHLORIDE 20 MEQ: 1500 TABLET, EXTENDED RELEASE ORAL at 12:41

## 2024-04-27 RX ADMIN — SILDENAFIL CITRATE 20 MG: 20 TABLET ORAL at 17:09

## 2024-04-27 RX ADMIN — SODIUM CHLORIDE 125 MG: 9 INJECTION, SOLUTION INTRAVENOUS at 09:09

## 2024-04-27 RX ADMIN — POTASSIUM CHLORIDE 20 MEQ: 1500 TABLET, EXTENDED RELEASE ORAL at 17:09

## 2024-04-27 RX ADMIN — LOPERAMIDE HYDROCHLORIDE 4 MG: 2 CAPSULE ORAL at 22:20

## 2024-04-27 RX ADMIN — LEVOTHYROXINE SODIUM 75 MCG: 0.07 TABLET ORAL at 05:25

## 2024-04-27 RX ADMIN — SILDENAFIL CITRATE 20 MG: 20 TABLET ORAL at 22:10

## 2024-04-27 RX ADMIN — APIXABAN 2.5 MG: 2.5 TABLET, FILM COATED ORAL at 09:09

## 2024-04-27 RX ADMIN — IPRATROPIUM BROMIDE AND ALBUTEROL SULFATE 3 ML: .5; 3 SOLUTION RESPIRATORY (INHALATION) at 08:36

## 2024-04-27 RX ADMIN — CARVEDILOL 6.25 MG: 6.25 TABLET, FILM COATED ORAL at 17:09

## 2024-04-27 RX ADMIN — Medication 10 ML: at 22:11

## 2024-04-27 RX ADMIN — METHYLPREDNISOLONE SODIUM SUCCINATE 20 MG: 40 INJECTION, POWDER, FOR SOLUTION INTRAMUSCULAR; INTRAVENOUS at 04:05

## 2024-04-27 RX ADMIN — FEBUXOSTAT 40 MG: 40 TABLET, FILM COATED ORAL at 09:08

## 2024-04-27 RX ADMIN — LOPERAMIDE HYDROCHLORIDE 4 MG: 2 CAPSULE ORAL at 12:45

## 2024-04-27 RX ADMIN — APIXABAN 2.5 MG: 2.5 TABLET, FILM COATED ORAL at 22:10

## 2024-04-27 RX ADMIN — CARVEDILOL 6.25 MG: 6.25 TABLET, FILM COATED ORAL at 09:08

## 2024-04-27 RX ADMIN — SILDENAFIL CITRATE 20 MG: 20 TABLET ORAL at 09:08

## 2024-04-27 RX ADMIN — Medication 10 ML: at 09:09

## 2024-04-27 RX ADMIN — BUMETANIDE 1 MG: 0.25 INJECTION INTRAMUSCULAR; INTRAVENOUS at 09:09

## 2024-04-27 NOTE — PROGRESS NOTES
"NEPHROLOGY PROGRESS NOTE------[facility]    [facility]  [phone]  Luke Fleming MD      Patient Care Team:  Paul Granados MD as PCP - General (Family Medicine)  Richardson Willis MD as Cardiologist (Cardiology)  Rafy Rodriguez MD as Consulting Physician (Hematology and Oncology)  Christianne Phillips RN as Licensed Practical Nurse  Salome Fleming MD as Consulting Physician (Nephrology)      Provider:  Luke Fleming MD  Patient Name: Gabrielle Lyles  :  1940    SUBJECTIVE:    F/U ARF/JULIAN/CRF/CKD    Some diarrhea. Breathing better. No angina. No dysuria.     Medication:  apixaban, 2.5 mg, Oral, Q12H  bumetanide, 1 mg, Intravenous, Q12H  carvedilol, 6.25 mg, Oral, BID With Meals  febuxostat, 40 mg, Oral, Daily  ferric gluconate, 125 mg, Intravenous, Daily  ipratropium-albuterol, 3 mL, Nebulization, 4x Daily - RT  levothyroxine, 75 mcg, Oral, Q AM  methylPREDNISolone sodium succinate, 20 mg, Intravenous, Q12H  pantoprazole, 40 mg, Oral, Q AM  potassium chloride, 20 mEq, Oral, BID With Meals  sildenafil, 20 mg, Oral, TID  sodium chloride, 10 mL, Intravenous, Q12H           OBJECTIVE    Vital Sign Min/Max for last 24 hours  Temp  Min: 97.2 °F (36.2 °C)  Max: 97.6 °F (36.4 °C)   BP  Min: 108/78  Max: 143/89   Pulse  Min: 75  Max: 88   Resp  Min: 16  Max: 20   SpO2  Min: 90 %  Max: 99 %   No data recorded   Weight  Min: 72.6 kg (160 lb)  Max: 72.6 kg (160 lb)     Flowsheet Rows      Flowsheet Row First Filed Value   Admission Height 162.6 cm (64\") Documented at 2024 0711   Admission Weight 74 kg (163 lb 2.3 oz) Documented at 2024 2107            No intake/output data recorded.  I/O last 3 completed shifts:  In: 720 [P.O.:720]  Out: 825 [Urine:825]    Physical Exam:  General Appearance: alert, appears stated age and cooperative  Head: normocephalic, without obvious abnormality and atraumatic  Eyes: conjunctivae and " "sclerae normal and no icterus  Neck: supple and +MILD JVD  Lungs: +FINE BIBASILAR CRACKLES (BETTER)  Heart: IRREG IRREG +TAYE  Chest Wall: no abnormalities observed  Abdomen: normal bowel sounds and soft, nontender  Extremities: moves extremities well,1+ BILAT PRETIBIAL EDEMA, no cyanosis  Skin: no bleeding, bruising or rash  Neurologic: Alert, and oriented. No focal deficits    Labs:    WBC WBC   Date Value Ref Range Status   04/26/2024 6.21 3.40 - 10.80 10*3/mm3 Final   04/25/2024 7.71 3.40 - 10.80 10*3/mm3 Final      HGB Hemoglobin   Date Value Ref Range Status   04/26/2024 10.5 (L) 12.0 - 15.9 g/dL Final   04/25/2024 10.8 (L) 12.0 - 15.9 g/dL Final      HCT Hematocrit   Date Value Ref Range Status   04/26/2024 36.1 34.0 - 46.6 % Final   04/25/2024 37.6 34.0 - 46.6 % Final      Platelets No results found for: \"LABPLAT\"   MCV MCV   Date Value Ref Range Status   04/26/2024 87.0 79.0 - 97.0 fL Final   04/25/2024 90.6 79.0 - 97.0 fL Final          Sodium Sodium   Date Value Ref Range Status   04/26/2024 144 136 - 145 mmol/L Final   04/25/2024 145 136 - 145 mmol/L Final      Potassium Potassium   Date Value Ref Range Status   04/26/2024 3.7 3.5 - 5.2 mmol/L Final   04/25/2024 3.4 (L) 3.5 - 5.2 mmol/L Final      Chloride Chloride   Date Value Ref Range Status   04/26/2024 105 98 - 107 mmol/L Final   04/25/2024 105 98 - 107 mmol/L Final      CO2 CO2   Date Value Ref Range Status   04/26/2024 25.0 22.0 - 29.0 mmol/L Final   04/25/2024 25.0 22.0 - 29.0 mmol/L Final      BUN BUN   Date Value Ref Range Status   04/26/2024 34 (H) 8 - 23 mg/dL Final   04/25/2024 32 (H) 8 - 23 mg/dL Final      Creatinine Creatinine   Date Value Ref Range Status   04/26/2024 1.81 (H) 0.57 - 1.00 mg/dL Final   04/25/2024 1.71 (H) 0.57 - 1.00 mg/dL Final      Calcium Calcium   Date Value Ref Range Status   04/26/2024 9.8 8.6 - 10.5 mg/dL Final   04/25/2024 9.9 8.6 - 10.5 mg/dL Final      PO4 No components found for: \"PO4\"   Albumin Albumin   Date " "Value Ref Range Status   04/25/2024 4.0 3.5 - 5.2 g/dL Final      Magnesium No results found for: \"MG\"   Uric Acid No components found for: \"URIC ACID\"     Imaging Results (Last 72 Hours)       Procedure Component Value Units Date/Time    US Renal Bilateral [[id number]] Collected: 04/26/24 1128     Updated: 04/26/24 1134    Narrative:      US RENAL BILATERAL    Date of Exam: 4/26/2024 11:04 AM EDT    Indication: ARF/JULIAN/CRF/CKD.    Comparison: No comparisons available.    Technique: Grayscale and color Doppler ultrasound evaluation of the kidneys and urinary bladder was performed.      Findings:  RIGHT kidney measures 9.8 x 5.6 x 5.2 cm. There is increased echogenicity of the kidney. There is no solid kidney mass.  No echogenic shadowing stone.  No hydronephrosis.    LEFT kidney measures 8.9 x 5.2 x 4.5 cm. There is increased echogenicity of the kidney. There is no solid kidney mass.  No echogenic shadowing stone. There is a 1.9 x 1.6 x 1.8 cm anechoic cyst in the midpole. There is mild dilatation of the left renal   pelvis.    The bladder is empty.    There is ascites.        Impression:      Impression:    1. Increased echogenicity of the kidneys suggestive of chronic medical renal disease.  2. Mild dilatation of the left renal pelvis.        Electronically Signed: Cintia Becker MD    4/26/2024 11:32 AM EDT    Workstation ID: [id number]    XR Chest 1 View [[id number]] Collected: 04/25/24 1551     Updated: 04/25/24 1554    Narrative:      XR CHEST 1 VW    Date of Exam: 4/25/2024 3:38 PM EDT    Indication: SOB    Comparison: 3/21/2024.    Findings:  There is stable mild cardiomegaly. There is pulmonary vascular congestion with increasing interstitial prominence bilaterally. There may be a small right effusion.      Impression:      Impression:  Findings suggest mild CHF superimposed over chronic lung disease.      Electronically Signed: Mary Ivan MD    4/25/2024 3:52 PM EDT    Workstation ID: [id number]      "       Results for orders placed during the hospital encounter of 04/25/24    XR Chest 1 View    Narrative  XR CHEST 1 VW    Date of Exam: 4/25/2024 3:38 PM EDT    Indication: SOB    Comparison: 3/21/2024.    Findings:  There is stable mild cardiomegaly. There is pulmonary vascular congestion with increasing interstitial prominence bilaterally. There may be a small right effusion.    Impression  Impression:  Findings suggest mild CHF superimposed over chronic lung disease.      Electronically Signed: Mary Ivan MD  4/25/2024 3:52 PM EDT  Workstation ID: [id number]      Results for orders placed during the hospital encounter of 03/21/24    XR Chest 1 View    Narrative  XR CHEST 1 VW    Date of Exam: 3/21/2024 1:20 PM EDT    Indication: Dyspnea    Comparison: 2/9/2024.    Findings:  There is stable mild cardiomegaly. Lung fields appear clear of acute infiltrates or effusions. Mild diffuse bilateral reticular lung thickening is stable. Right PICC line has been removed.    Impression  Impression:  Chronic findings. No acute process.      Electronically Signed: Mary Ivan MD  3/21/2024 1:48 PM EDT  Workstation ID: [id number]      Results for orders placed during the hospital encounter of 02/05/24    XR Chest 1 View    Narrative  XR CHEST 1 VW    Date of Exam: 2/9/2024 5:36 AM EST    Indication: sob    Comparison: None available.    Findings:  The trachea is midline. Stable cardiomegaly with mild enlargement of the pulmonary arteries. There is mild diffuse bilateral reticular lung thickening. Mild left basilar atelectasis. No definite pleural effusions. No change in position of the right-sided  pigtail catheter.    Impression  Impression:  Stable left basilar atelectasis and chronic lung changes    Cardiomegaly      Electronically Signed: Pablo Martins MD  2/9/2024 7:35 AM EST  Workstation ID: [id number]      Results for orders placed during the hospital encounter of 02/05/24    Duplex Venous Upper Extremity - Right  CAR    Interpretation Summary    Normal right upper extremity venous duplex scan.        ASSESSMENT / PLAN      Dyspnea    ARF/JULIAN/CRF/CKD------Nonoliguric. +ARF/JULIAN on top of CRF/CKD STG 3A   with a baseline serum Creatinine of about 1.3-1.4.  CRF/CKD STG 3A is secondary to HTN NS. +ARF/JULIAN is secondary to prerenal/hemodynamic fluctuation from decompensated CHF (Cardiorenal). Gentle diuresis.  Avoid hypotension.   No NSAIDs or IV dye. Dose meds for CrCl less than 10 cc/min     2. ANEMIA OF CKD------IV iron for ROME. Follow for EPO need     3. HTN WITH CKD-----Avoid hypotension. No ACE/ARB/DRI for now     4. OA/DJD/HYPERURICEMIA------No NSAIDs. Add edUloric     5. VOLUME OVERLOAD-----Cautious diuresis with IV Bumex. Thyroid functions ok     6. HYPERGLYCEMIA------Glucometers, SSI     7.  PULMONARY HTN--------Per , Pulmonary     8. HYPOKALEMIA-------Replace po     9. CAD-----per , Cardiology    10. DIARRHEA-----C.Diff negative. Add prn Immodium        Luke Fleming MD  [facility]  [phone]  04/27/24  08:24 EDT

## 2024-04-27 NOTE — PLAN OF CARE
Problem: Adult Inpatient Plan of Care  Goal: Plan of Care Review  Outcome: Ongoing, Progressing  Flowsheets (Taken 4/27/2024 3588)  Outcome Evaluation: Pt states she has had diarrhea since last night.  pt has had several watery stools.  c-diff neg.   MD aware and new orders for lomotil. no other complaints at present. Will cont to monitor   Goal Outcome Evaluation:              Outcome Evaluation: Pt states she has had diarrhea since last night.  pt has had several watery stools.  c-diff neg.   MD aware and new orders for lomotil. no other complaints at present. Will cont to monitor

## 2024-04-27 NOTE — PROGRESS NOTES
LOS: 2 days   Patient Care Team:  Paul Granados MD as PCP - General (Family Medicine)  Richardson Willis MD as Cardiologist (Cardiology)  Rafy Rodriguez MD as Consulting Physician (Hematology and Oncology)  Christianne Phillips, AMARILIS as Licensed Practical Nurse  Salome Fleming MD as Consulting Physician (Nephrology)    Subjective:  Miserable//diarrhea    Objective:   Afebrile      Review of Systems:   Review of Systems   Constitutional:  Positive for activity change.   Respiratory: Negative.     Gastrointestinal:  Positive for diarrhea.   Neurological:  Positive for weakness.           Vital Signs  Temp:  [97.2 °F (36.2 °C)-97.6 °F (36.4 °C)] 97.3 °F (36.3 °C)  Heart Rate:  [75-90] 81  Resp:  [16-21] 21  BP: (108-143)/(66-89) 129/66    Physical Exam:  Physical Exam  Vitals reviewed.   Cardiovascular:      Rate and Rhythm: Rhythm irregular.      Heart sounds: Normal heart sounds.   Pulmonary:      Breath sounds: Normal breath sounds.   Skin:     General: Skin is warm.   Neurological:      Mental Status: She is alert.          Radiology:  US Renal Bilateral    Result Date: 4/26/2024  Impression: 1. Increased echogenicity of the kidneys suggestive of chronic medical renal disease. 2. Mild dilatation of the left renal pelvis. Electronically Signed: Cintia Becker MD  4/26/2024 11:32 AM EDT  Workstation ID: [id number]    XR Chest 1 View    Result Date: 4/25/2024  Impression: Findings suggest mild CHF superimposed over chronic lung disease. Electronically Signed: Mary Ivan MD  4/25/2024 3:52 PM EDT  Workstation ID: [id number]        Results Review:     I reviewed the patient's new clinical results.  I reviewed the patient's new imaging results and agree with the interpretation.    Medication Review:   Scheduled Meds:apixaban, 2.5 mg, Oral, Q12H  [START ON 4/28/2024] bumetanide, 1 mg, Intravenous, Daily  carvedilol, 6.25 mg, Oral, BID With Meals  febuxostat, 40 mg, Oral, Daily  ferric gluconate, 125 mg,  "Intravenous, Daily  ipratropium-albuterol, 3 mL, Nebulization, 4x Daily - RT  levothyroxine, 75 mcg, Oral, Q AM  methylPREDNISolone sodium succinate, 20 mg, Intravenous, Q12H  pantoprazole, 40 mg, Oral, Q AM  potassium chloride, 20 mEq, Oral, TID With Meals  sildenafil, 20 mg, Oral, TID  sodium chloride, 10 mL, Intravenous, Q12H      Continuous Infusions:   PRN Meds:.  acetaminophen    senna-docusate sodium **AND** polyethylene glycol **AND** bisacodyl **AND** bisacodyl    Calcium Replacement - Follow Nurse / BPA Driven Protocol    loperamide    Magnesium Standard Dose Replacement - Follow Nurse / BPA Driven Protocol    ondansetron ODT **OR** ondansetron    Phosphorus Replacement - Follow Nurse / BPA Driven Protocol    Potassium Replacement - Follow Nurse / BPA Driven Protocol    sodium chloride    sodium chloride    Labs:    CBC    Results from last 7 days   Lab Units 04/27/24  1014 04/26/24  0043 04/25/24  1536   WBC 10*3/mm3 14.78* 6.21 7.71   HEMOGLOBIN g/dL 11.7* 10.5* 10.8*   PLATELETS 10*3/mm3 240 204 192     BMP   Results from last 7 days   Lab Units 04/27/24  1014 04/26/24  0043 04/25/24  2035   SODIUM mmol/L 140 144 145   POTASSIUM mmol/L 4.9 3.7 3.4*   CHLORIDE mmol/L 103 105 105   CO2 mmol/L 23.0 25.0 25.0   BUN mg/dL 44* 34* 32*   CREATININE mg/dL 2.22* 1.81* 1.71*   GLUCOSE mg/dL 122* 164* 168*   MAGNESIUM mg/dL 2.3  --   --    PHOSPHORUS mg/dL 3.2  --   --      Cr Clearance Estimated Creatinine Clearance: 18.8 mL/min (A) (by C-G formula based on SCr of 2.22 mg/dL (H)).  Coag     HbA1C   Lab Results   Component Value Date    HGBA1C 5.8 (H) 10/25/2022     Blood Glucose No results found for: \"POCGLU\"  Infection   Results from last 7 days   Lab Units 04/25/24  1536   PROCALCITONIN ng/mL 0.04     CMP   Results from last 7 days   Lab Units 04/27/24  1014 04/26/24  0043 04/25/24 2035   SODIUM mmol/L 140 144 145   POTASSIUM mmol/L 4.9 3.7 3.4*   CHLORIDE mmol/L 103 105 105   CO2 mmol/L 23.0 25.0 25.0   BUN " mg/dL 44* 34* 32*   CREATININE mg/dL 2.22* 1.81* 1.71*   GLUCOSE mg/dL 122* 164* 168*   ALBUMIN g/dL 4.3  --  4.0   BILIRUBIN mg/dL 1.6*  --  2.1*   ALK PHOS U/L 74  --  75   AST (SGOT) U/L 23  --  18   ALT (SGPT) U/L 21  --  16     UA    Results from last 7 days   Lab Units 04/25/24  1556   NITRITE UA  Positive*   WBC UA /HPF 0-2   BACTERIA UA /HPF 1+*   SQUAM EPITHEL UA /HPF 0-2     Radiology(recent) US Renal Bilateral    Result Date: 4/26/2024  Impression: 1. Increased echogenicity of the kidneys suggestive of chronic medical renal disease. 2. Mild dilatation of the left renal pelvis. Electronically Signed: Cintia Becker MD  4/26/2024 11:32 AM EDT  Workstation ID: [id number]    XR Chest 1 View    Result Date: 4/25/2024  Impression: Findings suggest mild CHF superimposed over chronic lung disease. 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  Atrial fibrillation, paroxysmal  Severe pulmonary hypertension  Chronic atelectasis left lower lobe  Chronic kidney disease stage IIIa  Left breast anomaly  Hypertension associated chronic kidney disease stage IIIa  Anemia of chronic kidney disease  Hyperuricemia  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  Cerebrovascular disease status post CVA  Chronic oral anticoagulation therapy  Acquired hypothyroidism  Thrombophilia  Autonomic dysfunction syndrome  History of pulmonary embolism  Hypercoagulable state secondary to atrial fibrillation  [id number] score 6  History of IVC filter  Aneurysmal dilatation of abdominal aorta  Chronic mucopurulent bronchitis  Peripheral polyneuropathy  History of breast cancer  Supplemental oxygen dependency  Chronic hypoxic respiratory failure    Plan:  Symptomatic care          Paul Granados,  MD  04/27/24  11:08 EDT

## 2024-04-27 NOTE — PLAN OF CARE
Goal Outcome Evaluation:         Encouraged elevating lower extremities.Patient is receptive to encouragement and edema progressively coming down. Patient has had numerous loose stools this am. Several nurses attempted to get labs but unable to obtain blood. Switched to lab collect. Patient is asleep with call light in reach.

## 2024-04-27 NOTE — PROGRESS NOTES
Daily Progress Note          Assessment  Dyspnea  Hypoxemia: On home oxygen  Chronic severe pulmonary hypertension: PA 71/25, 44 mmHg  HFpEF  COPD/emphysema  Chronic atelectasis in left lower lobe  Anemia  CKD  HTN  History of PE/DVT  History of TIA  CAD  Paroxysmal atrial fibrillation  History of breast cancer 2018, currently in remission     Results for orders placed in visit on 03/18/24     Adult Transthoracic Echo Limited W/ Cont if Necessary Per Protocol     Interpretation Summary    Left ventricular ejection fraction appears to be 61 - 65%.    The right ventricular cavity is dilated.    There is a small (<1cm) pericardial effusion adjacent to the left ventricle. There is no evidence of cardiac tamponade.    IVC is 2.1 cm.           Recommendations:  IV steroids: change to po prednisone   Oxygen supplement and titration to maintain saturation 90 to 95%: Currently requiring 3 L per nasal cannula  Bronchodilators  Sildenafil  Inhaled corticosteroids     Diuresis as per nephrology  Thyroid hormone replacement  Cardiology following   Electrolytes/ glycemic control  Chronic anticoagulation: Apixaban  I personally reviewed the radiological studies               LOS: 2 days     Subjective     C/o PEREZ, mild cough, diarrhea     Objective     Vital signs for last 24 hours:  Vitals:    04/27/24 0841 04/27/24 0948 04/27/24 1222 04/27/24 1226   BP:  129/66     BP Location:  Right arm     Patient Position:  Sitting     Pulse: 88 81 80 75   Resp: 16 21 20 20   Temp:       TempSrc:       SpO2: 92% 93% 98% 94%   Weight:       Height:           Intake/Output last 3 shifts:  I/O last 3 completed shifts:  In: 720 [P.O.:720]  Out: 825 [Urine:825]  Intake/Output this shift:  No intake/output data recorded.      Radiology  Imaging Results (Last 24 Hours)       ** No results found for the last 24 hours. **            Labs:  Results from last 7 days   Lab Units 04/27/24  1014   WBC 10*3/mm3 14.78*   HEMOGLOBIN g/dL 11.7*   HEMATOCRIT %  40.2   PLATELETS 10*3/mm3 240     Results from last 7 days   Lab Units 04/27/24  1014   SODIUM mmol/L 140   POTASSIUM mmol/L 4.9   CHLORIDE mmol/L 103   CO2 mmol/L 23.0   BUN mg/dL 44*   CREATININE mg/dL 2.22*   CALCIUM mg/dL 10.6*   BILIRUBIN mg/dL 1.6*   ALK PHOS U/L 74   ALT (SGPT) U/L 21   AST (SGOT) U/L 23   GLUCOSE mg/dL 122*         Results from last 7 days   Lab Units 04/27/24  1014 04/25/24 2035   ALBUMIN g/dL 4.3 4.0     Results from last 7 days   Lab Units 04/26/24  0043 04/25/24 2035   CK TOTAL U/L 32  --    HSTROP T ng/L 21* 25*         Results from last 7 days   Lab Units 04/27/24  1014   MAGNESIUM mg/dL 2.3         Results from last 7 days   Lab Units 04/26/24  0043 04/25/24  1536   TSH uIU/mL  --  8.760*   FREE T4 ng/dL 1.57  --            Meds:   SCHEDULE  apixaban, 2.5 mg, Oral, Q12H  [START ON 4/28/2024] bumetanide, 1 mg, Intravenous, Daily  carvedilol, 6.25 mg, Oral, BID With Meals  febuxostat, 40 mg, Oral, Daily  ferric gluconate, 125 mg, Intravenous, Daily  ipratropium-albuterol, 3 mL, Nebulization, 4x Daily - RT  levothyroxine, 75 mcg, Oral, Q AM  methylPREDNISolone sodium succinate, 20 mg, Intravenous, Q12H  pantoprazole, 40 mg, Oral, Q AM  potassium chloride, 20 mEq, Oral, TID With Meals  sildenafil, 20 mg, Oral, TID  sodium chloride, 10 mL, Intravenous, Q12H      Infusions     PRNs    acetaminophen    senna-docusate sodium **AND** polyethylene glycol **AND** bisacodyl **AND** bisacodyl    Calcium Replacement - Follow Nurse / BPA Driven Protocol    loperamide    Magnesium Standard Dose Replacement - Follow Nurse / BPA Driven Protocol    ondansetron ODT **OR** ondansetron    Phosphorus Replacement - Follow Nurse / BPA Driven Protocol    Potassium Replacement - Follow Nurse / BPA Driven Protocol    sodium chloride    sodium chloride    Physical Exam:  General Appearance:  Alert   HEENT:  Normocephalic, without obvious abnormality, Conjunctiva/corneas clear,.   Nares normal, no drainage      Neck:  Supple, symmetrical, trachea midline.   Lungs /Chest wall:   mild Bilateral basal rhonchi, respirations unlabored, symmetrical wall movement.     Heart:  Regular rate and rhythm, S1 S2 normal  Abdomen: Soft, non-tender, no masses, no organomegaly.    Extremities: No edema, no clubbing or cyanosis     ROS  Constitutional: Negative for chills, fever and malaise/fatigue.   HENT: Negative.    Eyes: Negative.    Cardiovascular: Negative.    Respiratory: Positive for cough and shortness of breath.    Skin: Negative.    Musculoskeletal: Negative.    Gastrointestinal: diarrhea    Genitourinary: Negative.    Neurological: Negative.    Psychiatric/Behavioral: Negative.      I reviewed the recent clinical results  I personally reviewed the latest radiological studies    Part of this note may be an electronic transcription/translation of spoken language to printed text using the Dragon Dictation System.

## 2024-04-28 ENCOUNTER — APPOINTMENT (OUTPATIENT)
Dept: GENERAL RADIOLOGY | Facility: HOSPITAL | Age: 84
DRG: 291 | End: 2024-04-28
Payer: MEDICARE

## 2024-04-28 LAB
ANION GAP SERPL CALCULATED.3IONS-SCNC: 12 MMOL/L (ref 5–15)
BUN SERPL-MCNC: 48 MG/DL (ref 8–23)
BUN/CREAT SERPL: 25.8 (ref 7–25)
CALCIUM SPEC-SCNC: 10.5 MG/DL (ref 8.6–10.5)
CHLORIDE SERPL-SCNC: 106 MMOL/L (ref 98–107)
CO2 SERPL-SCNC: 26 MMOL/L (ref 22–29)
CREAT SERPL-MCNC: 1.86 MG/DL (ref 0.57–1)
DEPRECATED RDW RBC AUTO: 66.6 FL (ref 37–54)
EGFRCR SERPLBLD CKD-EPI 2021: 26.6 ML/MIN/1.73
ERYTHROCYTE [DISTWIDTH] IN BLOOD BY AUTOMATED COUNT: 21.5 % (ref 12.3–15.4)
GLUCOSE SERPL-MCNC: 87 MG/DL (ref 65–99)
HCT VFR BLD AUTO: 38.2 % (ref 34–46.6)
HGB BLD-MCNC: 11.2 G/DL (ref 12–15.9)
MAGNESIUM SERPL-MCNC: 2.3 MG/DL (ref 1.6–2.4)
MCH RBC QN AUTO: 25.5 PG (ref 26.6–33)
MCHC RBC AUTO-ENTMCNC: 29.3 G/DL (ref 31.5–35.7)
MCV RBC AUTO: 86.8 FL (ref 79–97)
PHOSPHATE SERPL-MCNC: 2.6 MG/DL (ref 2.5–4.5)
PLATELET # BLD AUTO: 238 10*3/MM3 (ref 140–450)
PMV BLD AUTO: 10.6 FL (ref 6–12)
POTASSIUM SERPL-SCNC: 4.4 MMOL/L (ref 3.5–5.2)
RBC # BLD AUTO: 4.4 10*6/MM3 (ref 3.77–5.28)
SODIUM SERPL-SCNC: 144 MMOL/L (ref 136–145)
WBC NRBC COR # BLD AUTO: 12.34 10*3/MM3 (ref 3.4–10.8)

## 2024-04-28 PROCEDURE — 63710000001 PREDNISONE PER 1 MG: Performed by: INTERNAL MEDICINE

## 2024-04-28 PROCEDURE — C1751 CATH, INF, PER/CENT/MIDLINE: HCPCS

## 2024-04-28 PROCEDURE — 84100 ASSAY OF PHOSPHORUS: CPT | Performed by: INTERNAL MEDICINE

## 2024-04-28 PROCEDURE — 80048 BASIC METABOLIC PNL TOTAL CA: CPT | Performed by: INTERNAL MEDICINE

## 2024-04-28 PROCEDURE — 25010000002 ONDANSETRON PER 1 MG: Performed by: NURSE PRACTITIONER

## 2024-04-28 PROCEDURE — 93010 ELECTROCARDIOGRAM REPORT: CPT | Performed by: INTERNAL MEDICINE

## 2024-04-28 PROCEDURE — 94799 UNLISTED PULMONARY SVC/PX: CPT

## 2024-04-28 PROCEDURE — 25010000002 NA FERRIC GLUC CPLX PER 12.5 MG: Performed by: INTERNAL MEDICINE

## 2024-04-28 PROCEDURE — 83735 ASSAY OF MAGNESIUM: CPT | Performed by: INTERNAL MEDICINE

## 2024-04-28 PROCEDURE — 71045 X-RAY EXAM CHEST 1 VIEW: CPT

## 2024-04-28 PROCEDURE — 85027 COMPLETE CBC AUTOMATED: CPT | Performed by: INTERNAL MEDICINE

## 2024-04-28 PROCEDURE — 93005 ELECTROCARDIOGRAM TRACING: CPT | Performed by: FAMILY MEDICINE

## 2024-04-28 RX ORDER — BUMETANIDE 1 MG/1
1 TABLET ORAL 2 TIMES DAILY
Status: DISCONTINUED | OUTPATIENT
Start: 2024-04-28 | End: 2024-04-30

## 2024-04-28 RX ADMIN — Medication 10 ML: at 20:47

## 2024-04-28 RX ADMIN — CARVEDILOL 6.25 MG: 6.25 TABLET, FILM COATED ORAL at 17:02

## 2024-04-28 RX ADMIN — BUMETANIDE 1 MG: 1 TABLET ORAL at 09:07

## 2024-04-28 RX ADMIN — LEVOTHYROXINE SODIUM 75 MCG: 0.07 TABLET ORAL at 05:19

## 2024-04-28 RX ADMIN — SODIUM CHLORIDE 125 MG: 9 INJECTION, SOLUTION INTRAVENOUS at 09:02

## 2024-04-28 RX ADMIN — PANTOPRAZOLE SODIUM 40 MG: 40 TABLET, DELAYED RELEASE ORAL at 05:19

## 2024-04-28 RX ADMIN — LOPERAMIDE HYDROCHLORIDE 4 MG: 2 CAPSULE ORAL at 20:59

## 2024-04-28 RX ADMIN — IPRATROPIUM BROMIDE AND ALBUTEROL SULFATE 3 ML: .5; 3 SOLUTION RESPIRATORY (INHALATION) at 19:29

## 2024-04-28 RX ADMIN — SILDENAFIL CITRATE 20 MG: 20 TABLET ORAL at 09:02

## 2024-04-28 RX ADMIN — FEBUXOSTAT 40 MG: 40 TABLET, FILM COATED ORAL at 09:01

## 2024-04-28 RX ADMIN — LOPERAMIDE HYDROCHLORIDE 4 MG: 2 CAPSULE ORAL at 17:07

## 2024-04-28 RX ADMIN — APIXABAN 2.5 MG: 2.5 TABLET, FILM COATED ORAL at 09:01

## 2024-04-28 RX ADMIN — IPRATROPIUM BROMIDE AND ALBUTEROL SULFATE 3 ML: .5; 3 SOLUTION RESPIRATORY (INHALATION) at 10:54

## 2024-04-28 RX ADMIN — APIXABAN 2.5 MG: 2.5 TABLET, FILM COATED ORAL at 20:47

## 2024-04-28 RX ADMIN — IPRATROPIUM BROMIDE AND ALBUTEROL SULFATE 3 ML: .5; 3 SOLUTION RESPIRATORY (INHALATION) at 16:00

## 2024-04-28 RX ADMIN — SILDENAFIL CITRATE 20 MG: 20 TABLET ORAL at 17:02

## 2024-04-28 RX ADMIN — BUMETANIDE 1 MG: 1 TABLET ORAL at 20:47

## 2024-04-28 RX ADMIN — PREDNISONE 20 MG: 20 TABLET ORAL at 09:02

## 2024-04-28 RX ADMIN — CARVEDILOL 6.25 MG: 6.25 TABLET, FILM COATED ORAL at 09:02

## 2024-04-28 RX ADMIN — ONDANSETRON 4 MG: 2 INJECTION INTRAMUSCULAR; INTRAVENOUS at 05:19

## 2024-04-28 RX ADMIN — SILDENAFIL CITRATE 20 MG: 20 TABLET ORAL at 20:47

## 2024-04-28 RX ADMIN — Medication 10 ML: at 09:05

## 2024-04-28 NOTE — PROGRESS NOTES
Daily Progress Note          Assessment  Dyspnea  Hypoxemia: On home oxygen  Chronic severe pulmonary hypertension: PA 71/25, 44 mmHg  HFpEF  COPD/emphysema  Chronic atelectasis in left lower lobe  Anemia  CKD  HTN  History of PE/DVT  History of TIA  CAD  Paroxysmal atrial fibrillation  History of breast cancer 2018, currently in remission     Results for orders placed in visit on 03/18/24     Adult Transthoracic Echo Limited W/ Cont if Necessary Per Protocol     Interpretation Summary    Left ventricular ejection fraction appears to be 61 - 65%.    The right ventricular cavity is dilated.    There is a small (<1cm) pericardial effusion adjacent to the left ventricle. There is no evidence of cardiac tamponade.    IVC is 2.1 cm.           Recommendations:  Respiratory status is gradually improving, the chest x-ray 4/28/2024 is improving in the right side with minimal atelectasis on the left side     po prednisone   Oxygen supplement and titration to maintain saturation 90 to 95%: Currently requiring 3 L per nasal cannula  Bronchodilators  Sildenafil  Inhaled corticosteroids     Diuresis as per nephrology  Thyroid hormone replacement  Cardiology following   Electrolytes/ glycemic control  Chronic anticoagulation: Apixaban  I personally reviewed the radiological studies               LOS: 3 days     Subjective     C/o PEREZ, mild cough, diarrhea     Objective     Vital signs for last 24 hours:  Vitals:    04/28/24 0847 04/28/24 0902 04/28/24 1054 04/28/24 1100   BP: 112/66      BP Location: Right arm      Patient Position: Lying      Pulse: 81 74 75 80   Resp: 15  16 16   Temp: 97.5 °F (36.4 °C)      TempSrc: Oral      SpO2: 94%  97% 94%   Weight:       Height:           Intake/Output last 3 shifts:  I/O last 3 completed shifts:  In: -   Out: 250 [Urine:250]  Intake/Output this shift:  No intake/output data recorded.      Radiology  Imaging Results (Last 24 Hours)       Procedure Component Value Units Date/Time    XR  Chest 1 View [[id number]] Collected: 04/28/24 1131     Updated: 04/28/24 1136    Narrative:      XR CHEST 1 VW    Date of Exam: 4/28/2024 9:45 AM EDT    Indication: CHF    Comparison: 4/25/2020    Findings:  Heart size and pulmonary vasculature are stable. No gross pulmonary edema is demonstrated. There is strandy opacity in the left lung base.      Impression:      Impression:    1. Cardiomegaly with no evidence of pulmonary edema  2. Left basilar atelectasis      Electronically Signed: Darnell Kennedy    4/28/2024 11:33 AM EDT    Workstation ID: [id number]            Labs:  Results from last 7 days   Lab Units 04/28/24  1019   WBC 10*3/mm3 12.34*   HEMOGLOBIN g/dL 11.2*   HEMATOCRIT % 38.2   PLATELETS 10*3/mm3 238     Results from last 7 days   Lab Units 04/28/24  1019 04/27/24  1014   SODIUM mmol/L 144 140   POTASSIUM mmol/L 4.4 4.9   CHLORIDE mmol/L 106 103   CO2 mmol/L 26.0 23.0   BUN mg/dL 48* 44*   CREATININE mg/dL 1.86* 2.22*   CALCIUM mg/dL 10.5 10.6*   BILIRUBIN mg/dL  --  1.6*   ALK PHOS U/L  --  74   ALT (SGPT) U/L  --  21   AST (SGOT) U/L  --  23   GLUCOSE mg/dL 87 122*         Results from last 7 days   Lab Units 04/27/24  1014 04/25/24  2035   ALBUMIN g/dL 4.3 4.0     Results from last 7 days   Lab Units 04/26/24  0043 04/25/24  2035   CK TOTAL U/L 32  --    HSTROP T ng/L 21* 25*         Results from last 7 days   Lab Units 04/28/24  1019   MAGNESIUM mg/dL 2.3         Results from last 7 days   Lab Units 04/26/24  0043 04/25/24  1536   TSH uIU/mL  --  8.760*   FREE T4 ng/dL 1.57  --            Meds:   SCHEDULE  apixaban, 2.5 mg, Oral, Q12H  bumetanide, 1 mg, Oral, BID  carvedilol, 6.25 mg, Oral, BID With Meals  febuxostat, 40 mg, Oral, Daily  ipratropium-albuterol, 3 mL, Nebulization, 4x Daily - RT  levothyroxine, 75 mcg, Oral, Q AM  pantoprazole, 40 mg, Oral, Q AM  predniSONE, 20 mg, Oral, Daily With Breakfast  sildenafil, 20 mg, Oral, TID  sodium chloride, 10 mL, Intravenous, Q12H      Infusions      PRNs    acetaminophen    senna-docusate sodium **AND** polyethylene glycol **AND** bisacodyl **AND** bisacodyl    Calcium Replacement - Follow Nurse / BPA Driven Protocol    loperamide    Magnesium Standard Dose Replacement - Follow Nurse / BPA Driven Protocol    ondansetron ODT **OR** ondansetron    Phosphorus Replacement - Follow Nurse / BPA Driven Protocol    Potassium Replacement - Follow Nurse / BPA Driven Protocol    sodium chloride    sodium chloride    Physical Exam:  General Appearance:  Alert   HEENT:  Normocephalic, without obvious abnormality, Conjunctiva/corneas clear,.   Nares normal, no drainage     Neck:  Supple, symmetrical, trachea midline.   Lungs /Chest wall:   mild Bilateral basal rhonchi, respirations unlabored, symmetrical wall movement.     Heart:  Regular rate and rhythm, S1 S2 normal  Abdomen: Soft, non-tender, no masses, no organomegaly.    Extremities: No edema, no clubbing or cyanosis     ROS  Constitutional: Negative for chills, fever and malaise/fatigue.   HENT: Negative.    Eyes: Negative.    Cardiovascular: Negative.    Respiratory: Positive for cough and shortness of breath.    Skin: Negative.    Musculoskeletal: Negative.    Gastrointestinal: diarrhea    Genitourinary: Negative.    Neurological: Negative.    Psychiatric/Behavioral: Negative.      I reviewed the recent clinical results  I personally reviewed the latest radiological studies    Part of this note may be an electronic transcription/translation of spoken language to printed text using the Dragon Dictation System.

## 2024-04-28 NOTE — PLAN OF CARE
Problem: Adult Inpatient Plan of Care  Goal: Plan of Care Review  Outcome: Ongoing, Progressing  Flowsheets (Taken 4/28/2024 9073)  Outcome Evaluation: Pt had cxr and ekg completed today  awaiting rusults of echo.  bumex increased to bid.  Pt cont with diarrhea  immodium given.  will cont to monitor   Goal Outcome Evaluation:              Outcome Evaluation: Pt had cxr and ekg completed today  awaiting rusults of echo.  bumex increased to bid.  Pt cont with diarrhea  immodium given.  will cont to monitor

## 2024-04-28 NOTE — PROGRESS NOTES
"NEPHROLOGY PROGRESS NOTE------[facility]    [facility]  [phone]  Luke Fleming MD      Patient Care Team:  Paul Granados MD as PCP - General (Family Medicine)  Richardson Willis MD as Cardiologist (Cardiology)  Rafy Rodriguez MD as Consulting Physician (Hematology and Oncology)  Christianne Phillips RN as Licensed Practical Nurse  Salome Fleming MD as Consulting Physician (Nephrology)      Provider:  Luke Fleming MD  Patient Name: Gabrielle Lyles  :  1940    SUBJECTIVE:    F/U ARF/JULIAN/CRF/CKD    Still with diarrhea. No SOB, CP, dysuria. Appetite ok.     Medication:  apixaban, 2.5 mg, Oral, Q12H  bumetanide, 1 mg, Intravenous, Daily  carvedilol, 6.25 mg, Oral, BID With Meals  febuxostat, 40 mg, Oral, Daily  ferric gluconate, 125 mg, Intravenous, Daily  ipratropium-albuterol, 3 mL, Nebulization, 4x Daily - RT  levothyroxine, 75 mcg, Oral, Q AM  pantoprazole, 40 mg, Oral, Q AM  potassium chloride, 20 mEq, Oral, TID With Meals  predniSONE, 20 mg, Oral, Daily With Breakfast  sildenafil, 20 mg, Oral, TID  sodium chloride, 10 mL, Intravenous, Q12H           OBJECTIVE    Vital Sign Min/Max for last 24 hours  Temp  Min: 97.2 °F (36.2 °C)  Max: 97.4 °F (36.3 °C)   BP  Min: 113/69  Max: 147/88   Pulse  Min: 69  Max: 90   Resp  Min: 12  Max: 21   SpO2  Min: 90 %  Max: 99 %   No data recorded   Weight  Min: 72.6 kg (160 lb)  Max: 72.6 kg (160 lb)     Flowsheet Rows      Flowsheet Row First Filed Value   Admission Height 162.6 cm (64\") Documented at 2024 0711   Admission Weight 74 kg (163 lb 2.3 oz) Documented at 2024 2107            No intake/output data recorded.  I/O last 3 completed shifts:  In: 720 [P.O.:720]  Out: 700 [Urine:700]    Physical Exam:  General Appearance: alert, appears stated age and cooperative  Head: normocephalic, without obvious abnormality and atraumatic  Eyes: conjunctivae and sclerae normal " "and no icterus  Neck: supple and +MILD JVD  Lungs: +FINE BIBASILAR CRACKLES (BETTER)  Heart: IRREG IRREG +TAYE  Chest Wall: no abnormalities observed  Abdomen: normal bowel sounds and soft, nontender  Extremities: moves extremities well,1+ BILAT PRETIBIAL EDEMA, no cyanosis  Skin: no bleeding, bruising or rash  Neurologic: Alert, and oriented. No focal deficits    Labs:    WBC WBC   Date Value Ref Range Status   04/27/2024 14.78 (H) 3.40 - 10.80 10*3/mm3 Final   04/26/2024 6.21 3.40 - 10.80 10*3/mm3 Final   04/25/2024 7.71 3.40 - 10.80 10*3/mm3 Final      HGB Hemoglobin   Date Value Ref Range Status   04/27/2024 11.7 (L) 12.0 - 15.9 g/dL Final   04/26/2024 10.5 (L) 12.0 - 15.9 g/dL Final   04/25/2024 10.8 (L) 12.0 - 15.9 g/dL Final      HCT Hematocrit   Date Value Ref Range Status   04/27/2024 40.2 34.0 - 46.6 % Final   04/26/2024 36.1 34.0 - 46.6 % Final   04/25/2024 37.6 34.0 - 46.6 % Final      Platelets No results found for: \"LABPLAT\"   MCV MCV   Date Value Ref Range Status   04/27/2024 87.6 79.0 - 97.0 fL Final   04/26/2024 87.0 79.0 - 97.0 fL Final   04/25/2024 90.6 79.0 - 97.0 fL Final          Sodium Sodium   Date Value Ref Range Status   04/27/2024 140 136 - 145 mmol/L Final   04/26/2024 144 136 - 145 mmol/L Final   04/25/2024 145 136 - 145 mmol/L Final      Potassium Potassium   Date Value Ref Range Status   04/27/2024 4.9 3.5 - 5.2 mmol/L Final     Comment:     Result checked     04/26/2024 3.7 3.5 - 5.2 mmol/L Final   04/25/2024 3.4 (L) 3.5 - 5.2 mmol/L Final      Chloride Chloride   Date Value Ref Range Status   04/27/2024 103 98 - 107 mmol/L Final   04/26/2024 105 98 - 107 mmol/L Final   04/25/2024 105 98 - 107 mmol/L Final      CO2 CO2   Date Value Ref Range Status   04/27/2024 23.0 22.0 - 29.0 mmol/L Final   04/26/2024 25.0 22.0 - 29.0 mmol/L Final   04/25/2024 25.0 22.0 - 29.0 mmol/L Final      BUN BUN   Date Value Ref Range Status   04/27/2024 44 (H) 8 - 23 mg/dL Final   04/26/2024 34 (H) 8 - 23 " "mg/dL Final   04/25/2024 32 (H) 8 - 23 mg/dL Final      Creatinine Creatinine   Date Value Ref Range Status   04/27/2024 2.22 (H) 0.57 - 1.00 mg/dL Final   04/26/2024 1.81 (H) 0.57 - 1.00 mg/dL Final   04/25/2024 1.71 (H) 0.57 - 1.00 mg/dL Final      Calcium Calcium   Date Value Ref Range Status   04/27/2024 10.6 (H) 8.6 - 10.5 mg/dL Final   04/26/2024 9.8 8.6 - 10.5 mg/dL Final   04/25/2024 9.9 8.6 - 10.5 mg/dL Final      PO4 No components found for: \"PO4\"   Albumin Albumin   Date Value Ref Range Status   04/27/2024 4.3 3.5 - 5.2 g/dL Final   04/25/2024 4.0 3.5 - 5.2 g/dL Final      Magnesium Magnesium   Date Value Ref Range Status   04/27/2024 2.3 1.6 - 2.4 mg/dL Final      Uric Acid No components found for: \"URIC ACID\"     Imaging Results (Last 72 Hours)       Procedure Component Value Units Date/Time    US Renal Bilateral [[id number]] Collected: 04/26/24 1128     Updated: 04/26/24 1134    Narrative:      US RENAL BILATERAL    Date of Exam: 4/26/2024 11:04 AM EDT    Indication: ARF/JULIAN/CRF/CKD.    Comparison: No comparisons available.    Technique: Grayscale and color Doppler ultrasound evaluation of the kidneys and urinary bladder was performed.      Findings:  RIGHT kidney measures 9.8 x 5.6 x 5.2 cm. There is increased echogenicity of the kidney. There is no solid kidney mass.  No echogenic shadowing stone.  No hydronephrosis.    LEFT kidney measures 8.9 x 5.2 x 4.5 cm. There is increased echogenicity of the kidney. There is no solid kidney mass.  No echogenic shadowing stone. There is a 1.9 x 1.6 x 1.8 cm anechoic cyst in the midpole. There is mild dilatation of the left renal   pelvis.    The bladder is empty.    There is ascites.        Impression:      Impression:    1. Increased echogenicity of the kidneys suggestive of chronic medical renal disease.  2. Mild dilatation of the left renal pelvis.        Electronically Signed: Cintia Becker MD    4/26/2024 11:32 AM EDT    Workstation ID: [id number]    XR " Chest 1 View [[id number]] Collected: 04/25/24 1551     Updated: 04/25/24 1554    Narrative:      XR CHEST 1 VW    Date of Exam: 4/25/2024 3:38 PM EDT    Indication: SOB    Comparison: 3/21/2024.    Findings:  There is stable mild cardiomegaly. There is pulmonary vascular congestion with increasing interstitial prominence bilaterally. There may be a small right effusion.      Impression:      Impression:  Findings suggest mild CHF superimposed over chronic lung disease.      Electronically Signed: Mary Ivan MD    4/25/2024 3:52 PM EDT    Workstation ID: [id number]            Results for orders placed during the hospital encounter of 04/25/24    XR Chest 1 View    Narrative  XR CHEST 1 VW    Date of Exam: 4/25/2024 3:38 PM EDT    Indication: SOB    Comparison: 3/21/2024.    Findings:  There is stable mild cardiomegaly. There is pulmonary vascular congestion with increasing interstitial prominence bilaterally. There may be a small right effusion.    Impression  Impression:  Findings suggest mild CHF superimposed over chronic lung disease.      Electronically Signed: Mary Ivan MD  4/25/2024 3:52 PM EDT  Workstation ID: [id number]      Results for orders placed during the hospital encounter of 03/21/24    XR Chest 1 View    Narrative  XR CHEST 1 VW    Date of Exam: 3/21/2024 1:20 PM EDT    Indication: Dyspnea    Comparison: 2/9/2024.    Findings:  There is stable mild cardiomegaly. Lung fields appear clear of acute infiltrates or effusions. Mild diffuse bilateral reticular lung thickening is stable. Right PICC line has been removed.    Impression  Impression:  Chronic findings. No acute process.      Electronically Signed: aMry Ivan MD  3/21/2024 1:48 PM EDT  Workstation ID: [id number]      Results for orders placed during the hospital encounter of 02/05/24    XR Chest 1 View    Narrative  XR CHEST 1 VW    Date of Exam: 2/9/2024 5:36 AM EST    Indication: sob    Comparison: None available.    Findings:  The  trachea is midline. Stable cardiomegaly with mild enlargement of the pulmonary arteries. There is mild diffuse bilateral reticular lung thickening. Mild left basilar atelectasis. No definite pleural effusions. No change in position of the right-sided  pigtail catheter.    Impression  Impression:  Stable left basilar atelectasis and chronic lung changes    Cardiomegaly      Electronically Signed: Pablo Martins MD  2/9/2024 7:35 AM EST  Workstation ID: [id number]      Results for orders placed during the hospital encounter of 02/05/24    Duplex Venous Upper Extremity - Right CAR    Interpretation Summary    Normal right upper extremity venous duplex scan.        ASSESSMENT / PLAN      Dyspnea    ARF/JULIAN/CRF/CKD------Nonoliguric. +ARF/JULIAN on top of CRF/CKD STG 3A   with a baseline serum Creatinine of about 1.3-1.4.  CRF/CKD STG 3A is secondary to HTN NS. +ARF/JULIAN is secondary to prerenal/hemodynamic fluctuation from decompensated CHF (Cardiorenal). Gentle diuresis.  Avoid hypotension.   No NSAIDs or IV dye. Dose meds for CrCl less than 10 cc/min     2. ANEMIA OF CKD------IV iron for ROME. Follow for EPO need     3. HTN WITH CKD-----Avoid hypotension. No ACE/ARB/DRI for now     4. OA/DJD/HYPERURICEMIA------No NSAIDs. Add edUloric     5. VOLUME OVERLOAD-----Cautious diuresis. Change to po Bumex today. Thyroid functions ok     6. HYPERGLYCEMIA------Glucometers, SSI     7.  PULMONARY HTN--------Per , Pulmonary     8. HYPOKALEMIA-------Replaced     9. CAD-----per , Cardiology    10. DIARRHEA-----C.Diff negative. Added prn Immodium        Luke Fleming MD  [facility]  [phone]  04/28/24  06:42 EDT

## 2024-04-28 NOTE — NURSING NOTE
This nurse made 2 attempts to draw labs. Another nurse was asked to help but was unsuccessful. This nurse changed the collection to a lab collect, but chemistry informed me that there are no phlebotomist this  morning. Charge nurse notified to assist or to find someone that would be able to assist. Day RN notified as well

## 2024-04-28 NOTE — PROGRESS NOTES
LOS: 3 days   Patient Care Team:  Paul Granados MD as PCP - General (Family Medicine)  Richardson Willis MD as Cardiologist (Cardiology)  Rafy Rodriguez MD as Consulting Physician (Hematology and Oncology)  Christianne Phillips, AMARILIS as Licensed Practical Nurse  Salome Fleming MD as Consulting Physician (Nephrology)    Subjective:  Continued diarrhea    Objective:   Afebrile      Review of Systems:   Review of Systems   Constitutional:  Positive for activity change and appetite change.   Respiratory:  Positive for shortness of breath.    Gastrointestinal:  Positive for diarrhea and nausea.   Neurological:  Positive for weakness.           Vital Signs  Temp:  [97.2 °F (36.2 °C)-97.5 °F (36.4 °C)] 97.5 °F (36.4 °C)  Heart Rate:  [69-86] 81  Resp:  [12-21] 15  BP: (112-147)/(64-88) 112/66    Physical Exam:  Physical Exam  Vitals and nursing note reviewed.   Constitutional:       Appearance: Normal appearance.   Cardiovascular:      Rate and Rhythm: Rhythm irregular.      Heart sounds: Normal heart sounds.   Pulmonary:      Breath sounds: Normal breath sounds.   Neurological:      Mental Status: She is alert.          Radiology:  US Renal Bilateral    Result Date: 4/26/2024  Impression: 1. Increased echogenicity of the kidneys suggestive of chronic medical renal disease. 2. Mild dilatation of the left renal pelvis. Electronically Signed: Cintia Becker MD  4/26/2024 11:32 AM EDT  Workstation ID: [id number]    XR Chest 1 View    Result Date: 4/25/2024  Impression: Findings suggest mild CHF superimposed over chronic lung disease. Electronically Signed: Mary Ivan MD  4/25/2024 3:52 PM EDT  Workstation ID: [id number]        Results Review:     I reviewed the patient's new clinical results.  I reviewed the patient's new imaging results and agree with the interpretation.    Medication Review:   Scheduled Meds:apixaban, 2.5 mg, Oral, Q12H  bumetanide, 1 mg, Oral, BID  carvedilol, 6.25 mg, Oral, BID With  "Meals  febuxostat, 40 mg, Oral, Daily  ferric gluconate, 125 mg, Intravenous, Daily  ipratropium-albuterol, 3 mL, Nebulization, 4x Daily - RT  levothyroxine, 75 mcg, Oral, Q AM  pantoprazole, 40 mg, Oral, Q AM  predniSONE, 20 mg, Oral, Daily With Breakfast  sildenafil, 20 mg, Oral, TID  sodium chloride, 10 mL, Intravenous, Q12H      Continuous Infusions:   PRN Meds:.  acetaminophen    senna-docusate sodium **AND** polyethylene glycol **AND** bisacodyl **AND** bisacodyl    Calcium Replacement - Follow Nurse / BPA Driven Protocol    loperamide    Magnesium Standard Dose Replacement - Follow Nurse / BPA Driven Protocol    ondansetron ODT **OR** ondansetron    Phosphorus Replacement - Follow Nurse / BPA Driven Protocol    Potassium Replacement - Follow Nurse / BPA Driven Protocol    sodium chloride    sodium chloride    Labs:    CBC    Results from last 7 days   Lab Units 04/27/24  1014 04/26/24  0043 04/25/24  1536   WBC 10*3/mm3 14.78* 6.21 7.71   HEMOGLOBIN g/dL 11.7* 10.5* 10.8*   PLATELETS 10*3/mm3 240 204 192     BMP   Results from last 7 days   Lab Units 04/27/24  1014 04/26/24  0043 04/25/24  2035   SODIUM mmol/L 140 144 145   POTASSIUM mmol/L 4.9 3.7 3.4*   CHLORIDE mmol/L 103 105 105   CO2 mmol/L 23.0 25.0 25.0   BUN mg/dL 44* 34* 32*   CREATININE mg/dL 2.22* 1.81* 1.71*   GLUCOSE mg/dL 122* 164* 168*   MAGNESIUM mg/dL 2.3  --   --    PHOSPHORUS mg/dL 3.2  --   --      Cr Clearance Estimated Creatinine Clearance: 18.8 mL/min (A) (by C-G formula based on SCr of 2.22 mg/dL (H)).  Coag     HbA1C   Lab Results   Component Value Date    HGBA1C 5.8 (H) 10/25/2022     Blood Glucose No results found for: \"POCGLU\"  Infection   Results from last 7 days   Lab Units 04/25/24  1536   PROCALCITONIN ng/mL 0.04     CMP   Results from last 7 days   Lab Units 04/27/24  1014 04/26/24  0043 04/25/24 2035   SODIUM mmol/L 140 144 145   POTASSIUM mmol/L 4.9 3.7 3.4*   CHLORIDE mmol/L 103 105 105   CO2 mmol/L 23.0 25.0 25.0   BUN " mg/dL 44* 34* 32*   CREATININE mg/dL 2.22* 1.81* 1.71*   GLUCOSE mg/dL 122* 164* 168*   ALBUMIN g/dL 4.3  --  4.0   BILIRUBIN mg/dL 1.6*  --  2.1*   ALK PHOS U/L 74  --  75   AST (SGOT) U/L 23  --  18   ALT (SGPT) U/L 21  --  16     UA    Results from last 7 days   Lab Units 04/25/24  1556   NITRITE UA  Positive*   WBC UA /HPF 0-2   BACTERIA UA /HPF 1+*   SQUAM EPITHEL UA /HPF 0-2     Radiology(recent) US Renal Bilateral    Result Date: 4/26/2024  Impression: 1. Increased echogenicity of the kidneys suggestive of chronic medical renal disease. 2. Mild dilatation of the left renal pelvis. 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  Atrial fibrillation, paroxysmal  Severe pulmonary hypertension  Chronic atelectasis left lower lobe  Chronic kidney disease stage IIIa  Left breast anomaly  Hypertension associated chronic kidney disease stage IIIa  Anemia of chronic kidney disease  Hyperuricemia  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  Cerebrovascular disease status post CVA  Chronic oral anticoagulation therapy  Acquired hypothyroidism  Thrombophilia  Autonomic dysfunction syndrome  History of pulmonary embolism  Hypercoagulable state secondary to atrial fibrillation  [id number] score 6  History of IVC filter  Aneurysmal dilatation of abdominal aorta  Chronic mucopurulent bronchitis  Peripheral polyneuropathy  History of breast cancer  Supplemental oxygen dependency  Chronic hypoxic respiratory failure    Plan:  Care as outlined//control of diarrhea//ambulate        Paul Granados MD  04/28/24  09:02 EDT

## 2024-04-29 LAB
ALBUMIN SERPL-MCNC: 3.8 G/DL (ref 3.5–5.2)
ALBUMIN/GLOB SERPL: 1.7 G/DL
ALP SERPL-CCNC: 66 U/L (ref 39–117)
ALT SERPL W P-5'-P-CCNC: 16 U/L (ref 1–33)
ANION GAP SERPL CALCULATED.3IONS-SCNC: 12 MMOL/L (ref 5–15)
AST SERPL-CCNC: 17 U/L (ref 1–32)
BILIRUB SERPL-MCNC: 1.3 MG/DL (ref 0–1.2)
BUN SERPL-MCNC: 48 MG/DL (ref 8–23)
BUN/CREAT SERPL: 24.5 (ref 7–25)
CA-I SERPL ISE-MCNC: 1.34 MMOL/L (ref 1.2–1.3)
CALCIUM SPEC-SCNC: 10 MG/DL (ref 8.6–10.5)
CHLORIDE SERPL-SCNC: 105 MMOL/L (ref 98–107)
CO2 SERPL-SCNC: 26 MMOL/L (ref 22–29)
CREAT SERPL-MCNC: 1.96 MG/DL (ref 0.57–1)
DEPRECATED RDW RBC AUTO: 65.7 FL (ref 37–54)
EGFRCR SERPLBLD CKD-EPI 2021: 25 ML/MIN/1.73
ERYTHROCYTE [DISTWIDTH] IN BLOOD BY AUTOMATED COUNT: 21.4 % (ref 12.3–15.4)
GLOBULIN UR ELPH-MCNC: 2.2 GM/DL
GLUCOSE SERPL-MCNC: 116 MG/DL (ref 65–99)
HCT VFR BLD AUTO: 35.4 % (ref 34–46.6)
HGB BLD-MCNC: 10.6 G/DL (ref 12–15.9)
MAGNESIUM SERPL-MCNC: 2.1 MG/DL (ref 1.6–2.4)
MCH RBC QN AUTO: 25.8 PG (ref 26.6–33)
MCHC RBC AUTO-ENTMCNC: 29.9 G/DL (ref 31.5–35.7)
MCV RBC AUTO: 86.1 FL (ref 79–97)
PHOSPHATE SERPL-MCNC: 3.3 MG/DL (ref 2.5–4.5)
PLATELET # BLD AUTO: 211 10*3/MM3 (ref 140–450)
PMV BLD AUTO: 10.9 FL (ref 6–12)
POTASSIUM SERPL-SCNC: 4.2 MMOL/L (ref 3.5–5.2)
PROT SERPL-MCNC: 6 G/DL (ref 6–8.5)
QT INTERVAL: 379 MS
QTC INTERVAL: 416 MS
RBC # BLD AUTO: 4.11 10*6/MM3 (ref 3.77–5.28)
SODIUM SERPL-SCNC: 143 MMOL/L (ref 136–145)
WBC NRBC COR # BLD AUTO: 9.41 10*3/MM3 (ref 3.4–10.8)

## 2024-04-29 PROCEDURE — 25010000002 METOCLOPRAMIDE PER 10 MG: Performed by: FAMILY MEDICINE

## 2024-04-29 PROCEDURE — 63710000001 ONDANSETRON ODT 4 MG TABLET DISPERSIBLE: Performed by: NURSE PRACTITIONER

## 2024-04-29 PROCEDURE — 84100 ASSAY OF PHOSPHORUS: CPT | Performed by: INTERNAL MEDICINE

## 2024-04-29 PROCEDURE — 94799 UNLISTED PULMONARY SVC/PX: CPT

## 2024-04-29 PROCEDURE — 83735 ASSAY OF MAGNESIUM: CPT | Performed by: INTERNAL MEDICINE

## 2024-04-29 PROCEDURE — 82330 ASSAY OF CALCIUM: CPT | Performed by: INTERNAL MEDICINE

## 2024-04-29 PROCEDURE — 63710000001 PREDNISONE PER 1 MG: Performed by: INTERNAL MEDICINE

## 2024-04-29 PROCEDURE — 80053 COMPREHEN METABOLIC PANEL: CPT | Performed by: INTERNAL MEDICINE

## 2024-04-29 PROCEDURE — 99232 SBSQ HOSP IP/OBS MODERATE 35: CPT | Performed by: INTERNAL MEDICINE

## 2024-04-29 PROCEDURE — 63710000001 PROMETHAZINE PER 25 MG: Performed by: FAMILY MEDICINE

## 2024-04-29 PROCEDURE — 85027 COMPLETE CBC AUTOMATED: CPT | Performed by: INTERNAL MEDICINE

## 2024-04-29 RX ORDER — PROMETHAZINE HYDROCHLORIDE 25 MG/1
25 TABLET ORAL EVERY 6 HOURS PRN
Status: DISCONTINUED | OUTPATIENT
Start: 2024-04-29 | End: 2024-05-07 | Stop reason: HOSPADM

## 2024-04-29 RX ORDER — METOCLOPRAMIDE HYDROCHLORIDE 5 MG/ML
5 INJECTION INTRAMUSCULAR; INTRAVENOUS ONCE
Status: COMPLETED | OUTPATIENT
Start: 2024-04-29 | End: 2024-04-29

## 2024-04-29 RX ORDER — PROMETHAZINE HYDROCHLORIDE 25 MG/1
25 SUPPOSITORY RECTAL EVERY 6 HOURS PRN
Status: DISCONTINUED | OUTPATIENT
Start: 2024-04-29 | End: 2024-05-07 | Stop reason: HOSPADM

## 2024-04-29 RX ADMIN — SILDENAFIL CITRATE 20 MG: 20 TABLET ORAL at 08:31

## 2024-04-29 RX ADMIN — PANTOPRAZOLE SODIUM 40 MG: 40 TABLET, DELAYED RELEASE ORAL at 05:07

## 2024-04-29 RX ADMIN — CARVEDILOL 6.25 MG: 6.25 TABLET, FILM COATED ORAL at 17:23

## 2024-04-29 RX ADMIN — APIXABAN 2.5 MG: 2.5 TABLET, FILM COATED ORAL at 08:31

## 2024-04-29 RX ADMIN — SILDENAFIL CITRATE 20 MG: 20 TABLET ORAL at 17:23

## 2024-04-29 RX ADMIN — IPRATROPIUM BROMIDE AND ALBUTEROL SULFATE 3 ML: .5; 3 SOLUTION RESPIRATORY (INHALATION) at 15:10

## 2024-04-29 RX ADMIN — ONDANSETRON 4 MG: 4 TABLET, ORALLY DISINTEGRATING ORAL at 05:08

## 2024-04-29 RX ADMIN — CARVEDILOL 6.25 MG: 6.25 TABLET, FILM COATED ORAL at 08:31

## 2024-04-29 RX ADMIN — APIXABAN 2.5 MG: 2.5 TABLET, FILM COATED ORAL at 20:27

## 2024-04-29 RX ADMIN — SILDENAFIL CITRATE 20 MG: 20 TABLET ORAL at 20:26

## 2024-04-29 RX ADMIN — Medication 10 ML: at 08:33

## 2024-04-29 RX ADMIN — LEVOTHYROXINE SODIUM 75 MCG: 0.07 TABLET ORAL at 05:07

## 2024-04-29 RX ADMIN — BUMETANIDE 1 MG: 1 TABLET ORAL at 08:31

## 2024-04-29 RX ADMIN — PROMETHAZINE HYDROCHLORIDE 25 MG: 25 TABLET ORAL at 09:23

## 2024-04-29 RX ADMIN — METOCLOPRAMIDE 5 MG: 5 INJECTION, SOLUTION INTRAMUSCULAR; INTRAVENOUS at 09:23

## 2024-04-29 RX ADMIN — BUMETANIDE 1 MG: 1 TABLET ORAL at 20:27

## 2024-04-29 RX ADMIN — IPRATROPIUM BROMIDE AND ALBUTEROL SULFATE 3 ML: .5; 3 SOLUTION RESPIRATORY (INHALATION) at 11:43

## 2024-04-29 RX ADMIN — PREDNISONE 20 MG: 20 TABLET ORAL at 08:31

## 2024-04-29 RX ADMIN — FEBUXOSTAT 40 MG: 40 TABLET, FILM COATED ORAL at 08:31

## 2024-04-29 RX ADMIN — Medication 10 ML: at 20:27

## 2024-04-29 NOTE — PROGRESS NOTES
Referring Provider: Paul Granados MD    Reason for follow-up: Shortness of breath, elevated proBNP     Patient Care Team:  Paul Granados MD as PCP - General (Family Medicine)  Richardson Willis MD as Cardiologist (Cardiology)  Rafy Rodriguez MD as Consulting Physician (Hematology and Oncology)  Christianne Phillips RN as Licensed Practical Nurse  Salome Fleming MD as Consulting Physician (Nephrology)      SUBJECTIVE    Resting comfortably in bed.  Complains of nausea.     ROS  Review of all systems negative except as indicated.    Since I have last seen, the patient has been without any chest discomfort, shortness of breath, palpitations, dizziness or syncope.  Denies having any headache, abdominal pain, nausea, vomiting, diarrhea, constipation, loss of weight or loss of appetite.  Denies having any excessive bruising, hematuria or blood in the stool.  ROS      Personal History:    Past Medical History:   Diagnosis Date    Acute exacerbation of chronic obstructive pulmonary disease (COPD)     COPD (chronic obstructive pulmonary disease)     Deep vein thrombosis of bilateral lower extremities 07/24/2019    3/2013    History of pneumonia 06/2012    community acquired pneumonia and right pleural effusion;hospitalized at [facility]    History of pulmonary embolism 03/2013    bilateral PE    Hypertension 2001    Insomnia     on Ambien 5mg at     Lobular breast cancer, left 11/2011    Stage IA left upper lobular breast cancer    Malignant neoplasm of left breast in female, estrogen receptor positive 07/18/2019    Osteopenia 2012    Parainfluenza infection     Parotid duct obstruction     Severe pulmonary hypertension 03/03/2023    Stage 3a chronic kidney disease 07/24/2019    TIA (transient ischemic attack) 10/25/2022       Past Surgical History:   Procedure Laterality Date    CARDIAC CATHETERIZATION N/A 3/3/2023    Procedure: Left and Right Heart Cath with Coronary Angiography;  Surgeon: Richardson Willis,  MD;  Location: [facility] INVASIVE LOCATION;  Service: Cardiovascular;  Laterality: N/A;    CATARACT EXTRACTION      IVC FILTER RETRIEVAL  2014    IVC filter placement by Dr. Card    MAMMO STEREOTACTIC BREAST BX SURGICAL ADD UNI Left 2011    invasive lobular carcinoma-Dr. Cande Daniel    MASTECTOMY, PARTIAL Left 2011    and left axillary sentinel lymph node biopsy by Dr. Uriostegui    TUBAL ABDOMINAL LIGATION         Family History   Problem Relation Age of Onset    Lung cancer Brother        Social History     Tobacco Use    Smoking status: Former     Current packs/day: 0.00     Types: Cigarettes     Quit date:      Years since quittin.3     Passive exposure: Past    Smokeless tobacco: Never    Tobacco comments:     smoked 6 cigarettes a day from 13 years of age to 56 years of age when she quit in    Vaping Use    Vaping status: Never Used   Substance Use Topics    Alcohol use: Not Currently    Drug use: No        Home meds:  Prior to Admission medications    Medication Sig Start Date End Date Taking? Authorizing Provider   apixaban (ELIQUIS) 5 MG tablet tablet Take 1 tablet by mouth Every 12 (Twelve) Hours. Indications: Other - full anticoagulation, history of DVT/PE 10/27/22  Yes Shaylee Mcdaniels MD   budesonide-formoterol (SYMBICORT) 80-4.5 MCG/ACT inhaler Inhale 2 puffs 2 (Two) Times a Day.   Yes ProviderJaylyn MD   carvedilol (COREG) 12.5 MG tablet TAKE 1 TABLET BY MOUTH TWICE DAILY WITH MEALS 23  Yes Richardson Willis MD   Cholecalciferol (Vitamin D3) 50 MCG (2000 UT) capsule Take 1 capsule by mouth Daily.   Yes ProviderJaylyn MD   Folbic 2.5-25-2 MG tablet tablet Take 1 tablet by mouth Daily. 24  Yes Jaylyn Golden MD   furosemide (LASIX) 20 MG tablet Take 1 tablet by mouth Daily. 24  Yes Mansi Becerril APRN   levothyroxine (SYNTHROID, LEVOTHROID) 50 MCG tablet Take 1 tablet by mouth Daily.   Yes Jaylyn Golden MD   O2 (OXYGEN)  "Inhale 2 L/min Continuous. 2/26/24  Yes Provider, MD Jaylyn   potassium chloride (K-DUR,KLOR-CON) 10 MEQ CR tablet Take 1 tablet by mouth Daily. 2/24/23  Yes Richardson Willis MD   sildenafil (REVATIO) 20 MG tablet Take 1 tablet by mouth Every 8 (Eight) Hours. 5/11/23  Yes Mansi Becerril APRN   allopurinol (ZYLOPRIM) 100 MG tablet Take 1 tablet by mouth Daily.    Provider, MD Jaylyn       Allergies:  Patient has no known allergies.    Scheduled Meds:apixaban, 2.5 mg, Oral, Q12H  bumetanide, 1 mg, Oral, BID  carvedilol, 6.25 mg, Oral, BID With Meals  febuxostat, 40 mg, Oral, Daily  ipratropium-albuterol, 3 mL, Nebulization, 4x Daily - RT  levothyroxine, 75 mcg, Oral, Q AM  pantoprazole, 40 mg, Oral, Q AM  predniSONE, 20 mg, Oral, Daily With Breakfast  sildenafil, 20 mg, Oral, TID  sodium chloride, 10 mL, Intravenous, Q12H      Continuous Infusions:   PRN Meds:.  acetaminophen    senna-docusate sodium **AND** polyethylene glycol **AND** bisacodyl **AND** bisacodyl    Calcium Replacement - Follow Nurse / BPA Driven Protocol    loperamide    Magnesium Standard Dose Replacement - Follow Nurse / BPA Driven Protocol    ondansetron ODT **OR** ondansetron    Phosphorus Replacement - Follow Nurse / BPA Driven Protocol    Potassium Replacement - Follow Nurse / BPA Driven Protocol    sodium chloride    sodium chloride      OBJECTIVE    Vital Signs  Vitals:    04/28/24 2047 04/28/24 2135 04/29/24 0104 04/29/24 0525   BP: 120/68 93/53 107/64 130/74   BP Location:  Right arm Right arm Right arm   Patient Position:  Lying Lying Lying   Pulse: 77 80 79 77   Resp:  26 18 14   Temp:  97.1 °F (36.2 °C) 97.3 °F (36.3 °C) 97 °F (36.1 °C)   TempSrc:  Oral Oral Oral   SpO2:  90% 90% 91%   Weight:    69 kg (152 lb 1.9 oz)   Height:           Flowsheet Rows      Flowsheet Row First Filed Value   Admission Height 162.6 cm (64\") Documented at 04/27/2024 0711   Admission Weight 74 kg (163 lb 2.3 oz) Documented at 04/25/2024 2107      "         Intake/Output Summary (Last 24 hours) at 4/29/2024 0718  Last data filed at 4/29/2024 0525  Gross per 24 hour   Intake 360 ml   Output 950 ml   Net -590 ml          Telemetry: Atrial fibrillation with controlled heart rate    Physical Exam:  The patient is alert, oriented and in no distress.  Vital signs as noted above.  Head and neck revealed no carotid bruits or jugular venous distention.  No thyromegaly or lymphadenopathy is present  Lungs clear.  No wheezing.  Breath sounds are normal bilaterally.  Heart normal first and second heart sounds.  No murmur. No precordial rub is present.  No gallop is present.  Abdomen soft and nontender.  No organomegaly is present.  Extremities with good peripheral pulses without any pedal edema.  Skin warm and dry.  Musculoskeletal system is grossly normal.  CNS grossly normal.       Results Review:  I have personally reviewed the results from the time of this admission to 4/29/2024 07:18 EDT and agree with these findings:  []  Laboratory  []  Microbiology  []  Radiology  []  EKG/Telemetry   []  Cardiology/Vascular   []  Pathology  []  Old records  []  Other:    Most notable findings include:    Lab Results (last 24 hours)       Procedure Component Value Units Date/Time    Phosphorus [[id number]]  (Normal) Collected: 04/29/24 0256    Specimen: Blood from Arm, Right Updated: 04/29/24 0341     Phosphorus 3.3 mg/dL     Comprehensive Metabolic Panel [[id number]]  (Abnormal) Collected: 04/29/24 0256    Specimen: Blood from Arm, Right Updated: 04/29/24 0338     Glucose 116 mg/dL      BUN 48 mg/dL      Creatinine 1.96 mg/dL      Sodium 143 mmol/L      Potassium 4.2 mmol/L      Chloride 105 mmol/L      CO2 26.0 mmol/L      Calcium 10.0 mg/dL      Total Protein 6.0 g/dL      Albumin 3.8 g/dL      ALT (SGPT) 16 U/L      AST (SGOT) 17 U/L      Alkaline Phosphatase 66 U/L      Total Bilirubin 1.3 mg/dL      Globulin 2.2 gm/dL      A/G Ratio 1.7 g/dL      BUN/Creatinine Ratio 24.5      Anion Gap 12.0 mmol/L      eGFR 25.0 mL/min/1.73     Narrative:      GFR Normal >60  Chronic Kidney Disease <60  Kidney Failure <15    The GFR formula is only valid for adults with stable renal function between ages 18 and 70.    Magnesium [[id number]]  (Normal) Collected: 04/29/24 0256    Specimen: Blood from Arm, Right Updated: 04/29/24 0338     Magnesium 2.1 mg/dL     Calcium, Ionized [[id number]]  (Abnormal) Collected: 04/29/24 0256    Specimen: Blood from Arm, Right Updated: 04/29/24 0324     Ionized Calcium 1.34 mmol/L     CBC (No Diff) [[id number]]  (Abnormal) Collected: 04/29/24 0256    Specimen: Blood from Arm, Right Updated: 04/29/24 0303     WBC 9.41 10*3/mm3      RBC 4.11 10*6/mm3      Hemoglobin 10.6 g/dL      Hematocrit 35.4 %      MCV 86.1 fL      MCH 25.8 pg      MCHC 29.9 g/dL      RDW 21.4 %      RDW-SD 65.7 fl      MPV 10.9 fL      Platelets 211 10*3/mm3     Basic Metabolic Panel [[id number]]  (Abnormal) Collected: 04/28/24 1019    Specimen: Blood Updated: 04/28/24 1048     Glucose 87 mg/dL      BUN 48 mg/dL      Creatinine 1.86 mg/dL      Sodium 144 mmol/L      Potassium 4.4 mmol/L      Chloride 106 mmol/L      CO2 26.0 mmol/L      Calcium 10.5 mg/dL      BUN/Creatinine Ratio 25.8     Anion Gap 12.0 mmol/L      eGFR 26.6 mL/min/1.73     Narrative:      GFR Normal >60  Chronic Kidney Disease <60  Kidney Failure <15    The GFR formula is only valid for adults with stable renal function between ages 18 and 70.    Magnesium [[id number]]  (Normal) Collected: 04/28/24 1019    Specimen: Blood Updated: 04/28/24 1048     Magnesium 2.3 mg/dL     Phosphorus [[id number]]  (Normal) Collected: 04/28/24 1019    Specimen: Blood Updated: 04/28/24 1048     Phosphorus 2.6 mg/dL     CBC (No Diff) [[id number]]  (Abnormal) Collected: 04/28/24 1019    Specimen: Blood Updated: 04/28/24 1024     WBC 12.34 10*3/mm3      RBC 4.40 10*6/mm3      Hemoglobin 11.2 g/dL      Hematocrit 38.2 %      MCV 86.8 fL      MCH 25.5 pg      MCHC  29.3 g/dL      RDW 21.5 %      RDW-SD 66.6 fl      MPV 10.6 fL      Platelets 238 10*3/mm3             Imaging Results (Last 24 Hours)       Procedure Component Value Units Date/Time    XR Chest 1 View [[id number]] Collected: 04/28/24 1131     Updated: 04/28/24 1136    Narrative:      XR CHEST 1 VW    Date of Exam: 4/28/2024 9:45 AM EDT    Indication: CHF    Comparison: 4/25/2020    Findings:  Heart size and pulmonary vasculature are stable. No gross pulmonary edema is demonstrated. There is strandy opacity in the left lung base.      Impression:      Impression:    1. Cardiomegaly with no evidence of pulmonary edema  2. Left basilar atelectasis      Electronically Signed: Darnell Kennedy    4/28/2024 11:33 AM EDT    Workstation ID: [id number]            LAB RESULTS (LAST 7 DAYS)    CBC  Results from last 7 days   Lab Units 04/29/24  0256 04/28/24  1019 04/27/24  1014 04/26/24  0043 04/25/24  1536   WBC 10*3/mm3 9.41 12.34* 14.78* 6.21 7.71   RBC 10*6/mm3 4.11 4.40 4.59 4.15 4.15   HEMOGLOBIN g/dL 10.6* 11.2* 11.7* 10.5* 10.8*   HEMATOCRIT % 35.4 38.2 40.2 36.1 37.6   MCV fL 86.1 86.8 87.6 87.0 90.6   PLATELETS 10*3/mm3 211 238 240 204 192       BMP  Results from last 7 days   Lab Units 04/29/24  0256 04/28/24  1019 04/27/24  1014 04/26/24  0043 04/25/24  2035   SODIUM mmol/L 143 144 140 144 145   POTASSIUM mmol/L 4.2 4.4 4.9 3.7 3.4*   CHLORIDE mmol/L 105 106 103 105 105   CO2 mmol/L 26.0 26.0 23.0 25.0 25.0   BUN mg/dL 48* 48* 44* 34* 32*   CREATININE mg/dL 1.96* 1.86* 2.22* 1.81* 1.71*   GLUCOSE mg/dL 116* 87 122* 164* 168*   MAGNESIUM mg/dL 2.1 2.3 2.3  --   --    PHOSPHORUS mg/dL 3.3 2.6 3.2  --   --        CMP   Results from last 7 days   Lab Units 04/29/24  0256 04/28/24  1019 04/27/24  1014 04/26/24  0043 04/25/24  2035   SODIUM mmol/L 143 144 140 144 145   POTASSIUM mmol/L 4.2 4.4 4.9 3.7 3.4*   CHLORIDE mmol/L 105 106 103 105 105   CO2 mmol/L 26.0 26.0 23.0 25.0 25.0   BUN mg/dL 48* 48* 44* 34* 32*   CREATININE  mg/dL 1.96* 1.86* 2.22* 1.81* 1.71*   GLUCOSE mg/dL 116* 87 122* 164* 168*   ALBUMIN g/dL 3.8  --  4.3  --  4.0   BILIRUBIN mg/dL 1.3*  --  1.6*  --  2.1*   ALK PHOS U/L 66  --  74  --  75   AST (SGOT) U/L 17  --  23  --  18   ALT (SGPT) U/L 16  --  21  --  16       BNP        TROPONIN  Results from last 7 days   Lab Units 04/26/24  0043   CK TOTAL U/L 32   HSTROP T ng/L 21*       CoAg        Creatinine Clearance  Estimated Creatinine Clearance: 20.7 mL/min (A) (by C-G formula based on SCr of 1.96 mg/dL (H)).    ABG        Radiology  XR Chest 1 View    Result Date: 4/28/2024  Impression: 1. Cardiomegaly with no evidence of pulmonary edema 2. Left basilar atelectasis Electronically Signed: Darnell Kennedy  4/28/2024 11:33 AM EDT  Workstation ID: [id number]       EKG  I personally viewed and interpreted the patient's EKG/Telemetry data:  ECG 12 Lead Rhythm Change   Preliminary Result   HEART RATE= 72  bpm   RR Interval= 830  ms   MN Interval=   ms   P Horizontal Axis=   deg   P Front Axis=   deg   QRSD Interval= 91  ms   QT Interval= 379  ms   QTcB= 416  ms   QRS Axis= 118  deg   T Wave Axis=   deg   - ABNORMAL ECG -   Atrial fibrillation   Right axis deviation   Low voltage, precordial leads   Electronically Signed By:    Date and Time of Study: 2024-04-28 07:50:23      Telemetry Scan   Final Result      Telemetry Scan   Final Result      Telemetry Scan   Final Result            Echocardiogram:    Results for orders placed in visit on 03/18/24    Adult Transthoracic Echo Limited W/ Cont if Necessary Per Protocol    Interpretation Summary    Left ventricular ejection fraction appears to be 61 - 65%.    The right ventricular cavity is dilated.    There is a small (<1cm) pericardial effusion adjacent to the left ventricle. There is no evidence of cardiac tamponade.    IVC is 2.1 cm.        Stress Test:  Results for orders placed in visit on 09/13/22    Stress Test With Myocardial Perfusion One Day    Interpretation  Summary    Left ventricular ejection fraction is hyperdynamic (Calculated EF > 70%). .    Myocardial perfusion imaging indicates a normal myocardial perfusion study with no evidence of ischemia.    Impressions are consistent with a low risk study.    Findings consistent with a normal ECG stress test.         Cardiac Catheterization:  Results for orders placed during the hospital encounter of 03/03/23    Cardiac Catheterization/Vascular Study    Conclusion  OPERATORS  Richardson Willis M.D. (Attending Cardiologist)      PROCEDURE PERFORMED  Ultrasound guided vascular access  Right heart catheterization  Coronary Angiogram  Left Heart Catheterization 78235  Moderate Sedation    INDICATIONS FOR PROCEDURE  83-year-old woman with multiple cardiovascular risk factors, abnormal stress test presented with worsening shortness of breath.  After discussing the risk and benefit of the procedure she was brought in for elective right and left heart cath.    PROCEDURE IN DETAIL  Informed consent was obtained from the patient after explaining the risks, benefits, and alternative options of the procedure. After obtaining informed consent, the patient was brought to the cath lab and was prepped in a sterile fashion. Lidocaine 2% was used for local anesthesia into the right femoral venous access site. Right femoral vein was accessed using the micropuncture needle under ultrasound guidance and micropuncture wire advanced under flouroscopy. A 7 Polish vascular sheath was put into place percutaneously over guide-wire. Guide wires were removed. A 6Fr swan sheryl catheter was advanced to wedge position. RA, RV and PA and wedge pressures were recorded.  PA sat and arterial sats recorded.  The patient tolerated the procedure well without any complications.    Lidocaine 2% was used for local anesthesia into the right femoral arterial access site. The right femoral artery was accessed with a micropuncture needle via modified Seldinger technique under  ultrasound guidance. A 6F was inserted successfully.  Afterwards, 6F JR4 and JL4 diagnostic catheters were advanced over a wire into the ascending aorta and were used to engage the ostia of the left main and RCA respectively. JR4 used to cross the AV and obtain LV pressures and gradient across the AV measured via pullback technique. Images of the right and left coronary systems were obtained. All the catheters were exchanged over a wire and subsequently removed. Angiogram of the femoral access site was obtained and did not show complications. The patient tolerated the procedure well without any complications. The pictures were reviewed at the end of the procedure. A Mynx closure device was applied.    HEMODYNAMICS    RHC  RA 6/5, 4 mmHg  RV 74/3, 5 mmHg  PA 71/25, 44 mmHg  PCW 12 mmHg  AO Sat 92%  PA Sat 78%    Jose CO: 7.89 L/min    Jose CI: 4.69 L/min/m²    LHC  LV: 134/3, 20 mmHg  AO: 131/56, 87 mmHg  No significant gradient across the aortic valve during pullback of JR4 catheter.    FINDINGS  Coronary Angiogram  All vessels are heavily calcified  She also has heavily calcified peripheral arterial disease.  Right dominant circulation    Left main: Left main is a large caliber vessel which gives rise to the Left Anterior Descending and the Left circumflex.  Left main is angiographically free from any significant disease.    Left Anterior Descending Artery: LAD is a heavily calcified medium caliber vessel which gives rise to diagonals.  The vessel is highly tortuous and has 50 to 60% mid vessel stenosis best seen in VERÓNICA cranial view.    Left Circumflex: Heavily calcified vessel with 50% proximal segment stenosis.    Right Coronary Artery: The RCA is a large caliber vessel which is heavily calcified and tortuous.  It has diffuse luminal irregularities and 30 to 40% stenosis in the midsegment around the bend.    ESTIMATED BLOOD LOSS:  10 ml    COMPLICATIONS:  None    PROCEDURE DATA:  Sedation Time: 25  minutes    IMPRESSIONS  Heavily calcified, tortuous nonobstructive coronary disease  Severe pulmonary hypertension with PA systolic pressure in the 70s  Mean PA pressure 44 mmHg    RECOMMENDATIONS  -Continue aggressive medical management of CAD  -Referral to pulmonology for treatment of pulmonary hypertension         Other:         ASSESSMENT & PLAN:    Principal Problem:    Dyspnea    COPD/Chronic respiratory failure/shortness of breath  Severe pulmonary hypertension  HFpEF  On 3 L of oxygen via nasal cannula at baseline, currently requiring 4 L  Has known pulmonary hypertension  RA 6/5, 4 mmHg  RV 74/3, 5 mmHg  PA 71/25, 44 mmHg  PCW 12 mmHg  She is on sildenafil  Pulmonology is on board  Echocardiogram shows preserved LV function with mildly elevated RVSP.  Small to moderate pericardial effusion: No signs of tamponade  proBNP of 9200 compared to 10,000 last month.  Now stable on oral Bumex  We will also connect her with heart failure clinic for intermittent IV diuresis     Atrial fibrillation  She has paroxysmal atrial fibrillation  [id number] score is 6  Continue Eliquis for atrial fibrillation as well as for history of DVT/PE  Continue Coreg for heart rate control     History of venous thrombosis and embolism  Continue Eliquis 5 mg p.o. twice daily.  She also has an IVC filter in place.     Primary hypertension, chronic  Blood pressure has improved and is currently normal  Continue Coreg for blood pressure control  Amlodipine can be added if better blood pressure control is desired.  Echo shows preserved LV function, reduced RV function and mildly elevated RVSP.  Pericardial effusion is not significant with no signs of tamponade.  Continue diuretics     Coronary artery disease  Continue beta-blocker  Already on Eliquis  Start statin  Previous cardiac catheterization showed heavily calcified coronaries but nonobstructive.  No chest pain and stable from cardiac standpoint.     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  Continue high intensity statin and anticoagulation with Eliquis 5 mg p.o. twice daily.     Stage IIIb chronic kidney disease  Creatinine 1.96, GFR is 25  Continue oral Bumex  Nephrology is also following     Recent altered mental status  Previously precipitated by mastitis/PICC line infection  Previous CT head and MRI of the brain unremarkable with no acute findings  Mental status during this admission has been at baseline.      Richardson Willis MD  04/29/24  07:18 EDT

## 2024-04-29 NOTE — PLAN OF CARE
Goal Outcome Evaluation:            Patient is alert and oriented x4, patient still having diarrhea,imodium given, pt complained of nausea, ondansetron given, VSS with no further complaints at this time, will continue to monitor

## 2024-04-29 NOTE — CASE MANAGEMENT/SOCIAL WORK
Continued Stay Note  LIDYA Cox     Patient Name: Gabrielle Lyles  MRN: [id number]  Today's Date: 4/29/2024    Admit Date: 4/25/2024    Plan: DC PLAN: From routine home. Current with Home 02 3L thru [place].   Discharge Plan       Row Name 04/29/24 1505       Plan    Plan DC PLAN: From routine home. Current with Home 02 3L thru [place].    Patient/Family in Agreement with Plan yes    Plan Comments BArriers to discharge: Bumex changed to Oral.  Steroids changed to oral. Watching renal labs.   Nephrology, pulm, cardio following             Expected Discharge Date and Time       Expected Discharge Date Expected Discharge Time    Apr 30, 2024               Brook Carolina RN     Office Phone [phone]  Office Cell [phone]

## 2024-04-29 NOTE — PROGRESS NOTES
Daily Progress Note          Assessment    Dyspnea  Hypoxemia: On home oxygen    Severe pulmonary hypertension mean PA pressure 44   RHC March 2023  RA 6/5, 4 mmHg  RV 74/3, 5 mmHg  PA 71/25, 44 mmHg  PCW 12 mmHg  AO Sat 92%  PA Sat 78%  Jose CO: 7.89 L/min  Jose CI: 4.69 L/min/m²  [facility]   LV: 134/3, 20 mmHg  AO: 131/56, 87 mmHg  No significant gradient across the aortic valve during pullback of JR4 catheter.     Severe calcified tortuous nonobstructive coronary artery disease     Patient had CT with IV contrast in October 2022 showed no pulmonary embolism     COPD/emphysema  Chronic atelectasis in left lower lobe  Anemia  CKD  HTN  History of PE/DVT  History of TIA  CAD  Paroxysmal atrial fibrillation  History of breast cancer 2018, currently in remission     Results for orders placed in visit on 03/18/24     Adult Transthoracic Echo Limited W/ Cont if Necessary Per Protocol     Interpretation Summary    Left ventricular ejection fraction appears to be 61 - 65%.    The right ventricular cavity is dilated.    There is a small (<1cm) pericardial effusion adjacent to the left ventricle. There is no evidence of cardiac tamponade.    IVC is 2.1 cm.           Recommendations:      Sildenafil for pulmonary hypertension, monitor systemic blood pressure     po prednisone   Oxygen supplement and titration to maintain saturation 90 to 95%: Currently requiring 3 L per nasal cannula  Bronchodilators    Inhaled corticosteroids     Diuresis as per nephrology    Thyroid hormone replacement  Cardiology following   Electrolytes/ glycemic control  Chronic anticoagulation: Apixaban  I personally reviewed the radiological studies               LOS: 4 days     Subjective     C/o PEREZ, mild cough, diarrhea     Objective     Vital signs for last 24 hours:  Vitals:    04/28/24 2135 04/29/24 0104 04/29/24 0525 04/29/24 0719   BP: 93/53 107/64 130/74    BP Location: Right arm Right arm Right arm    Patient Position: Lying Lying Lying    Pulse:  80 79 77 72   Resp: 26 18 14    Temp: 97.1 °F (36.2 °C) 97.3 °F (36.3 °C) 97 °F (36.1 °C)    TempSrc: Oral Oral Oral    SpO2: 90% 90% 91% 94%   Weight:   69 kg (152 lb 1.9 oz)    Height:           Intake/Output last 3 shifts:  I/O last 3 completed shifts:  In: 360 [P.O.:360]  Out: 950 [Urine:950]  Intake/Output this shift:  No intake/output data recorded.      Radiology  Imaging Results (Last 24 Hours)       Procedure Component Value Units Date/Time    XR Chest 1 View [[id number]] Collected: 04/28/24 1131     Updated: 04/28/24 1136    Narrative:      XR CHEST 1 VW    Date of Exam: 4/28/2024 9:45 AM EDT    Indication: CHF    Comparison: 4/25/2020    Findings:  Heart size and pulmonary vasculature are stable. No gross pulmonary edema is demonstrated. There is strandy opacity in the left lung base.      Impression:      Impression:    1. Cardiomegaly with no evidence of pulmonary edema  2. Left basilar atelectasis      Electronically Signed: Darnell Kennedy    4/28/2024 11:33 AM EDT    Workstation ID: [id number]            Labs:  Results from last 7 days   Lab Units 04/29/24  0256   WBC 10*3/mm3 9.41   HEMOGLOBIN g/dL 10.6*   HEMATOCRIT % 35.4   PLATELETS 10*3/mm3 211     Results from last 7 days   Lab Units 04/29/24  0256   SODIUM mmol/L 143   POTASSIUM mmol/L 4.2   CHLORIDE mmol/L 105   CO2 mmol/L 26.0   BUN mg/dL 48*   CREATININE mg/dL 1.96*   CALCIUM mg/dL 10.0   BILIRUBIN mg/dL 1.3*   ALK PHOS U/L 66   ALT (SGPT) U/L 16   AST (SGOT) U/L 17   GLUCOSE mg/dL 116*         Results from last 7 days   Lab Units 04/29/24  0256 04/27/24  1014 04/25/24 2035   ALBUMIN g/dL 3.8 4.3 4.0     Results from last 7 days   Lab Units 04/26/24  0043 04/25/24 2035   CK TOTAL U/L 32  --    HSTROP T ng/L 21* 25*         Results from last 7 days   Lab Units 04/29/24  0256   MAGNESIUM mg/dL 2.1         Results from last 7 days   Lab Units 04/26/24  0043 04/25/24  1536   TSH uIU/mL  --  8.760*   FREE T4 ng/dL 1.57  --            Meds:    SCHEDULE  apixaban, 2.5 mg, Oral, Q12H  bumetanide, 1 mg, Oral, BID  carvedilol, 6.25 mg, Oral, BID With Meals  febuxostat, 40 mg, Oral, Daily  ipratropium-albuterol, 3 mL, Nebulization, 4x Daily - RT  levothyroxine, 75 mcg, Oral, Q AM  pantoprazole, 40 mg, Oral, Q AM  predniSONE, 20 mg, Oral, Daily With Breakfast  sildenafil, 20 mg, Oral, TID  sodium chloride, 10 mL, Intravenous, Q12H      Infusions     PRNs    acetaminophen    senna-docusate sodium **AND** polyethylene glycol **AND** bisacodyl **AND** bisacodyl    Calcium Replacement - Follow Nurse / BPA Driven Protocol    loperamide    Magnesium Standard Dose Replacement - Follow Nurse / BPA Driven Protocol    ondansetron ODT **OR** ondansetron    Phosphorus Replacement - Follow Nurse / BPA Driven Protocol    Potassium Replacement - Follow Nurse / BPA Driven Protocol    sodium chloride    sodium chloride    Physical Exam:  General Appearance:  Alert   HEENT:  Normocephalic, without obvious abnormality, Conjunctiva/corneas clear,.   Nares normal, no drainage     Neck:  Supple, symmetrical, trachea midline.   Lungs /Chest wall:   mild Bilateral basal rhonchi, respirations unlabored, symmetrical wall movement.     Heart:  Regular rate and rhythm, S1 S2 normal  Abdomen: Soft, non-tender, no masses, no organomegaly.    Extremities: No edema, no clubbing or cyanosis     ROS  Constitutional: Negative for chills, fever and malaise/fatigue.   HENT: Negative.    Eyes: Negative.    Cardiovascular: Negative.    Respiratory: Positive for cough and shortness of breath.    Skin: Negative.    Musculoskeletal: Negative.    Gastrointestinal: diarrhea    Genitourinary: Negative.    Neurological: Negative.    Psychiatric/Behavioral: Negative.      I reviewed the recent clinical results  I personally reviewed the latest radiological studies    Part of this note may be an electronic transcription/translation of spoken language to printed text using the Dragon Dictation System.

## 2024-04-29 NOTE — CONSULTS
"Heart Failure Program  Nurse Navigator  Discharge Planning    Patient Name:Gabrielle Lyles  :1940  Cardiologist:Lazaro  Current Admission Date: 2024   Previous Admission:    Admission frequency: 1 admissions in 6 months    Heart Failure history per record:    Symptoms on admission:c/o SOA, orthopnea past 1-2 weeks.       Admission Weight:  Flowsheet Rows      Flowsheet Row First Filed Value   Admission Height 162.6 cm (64\") Documented at 2024 0711   Admission Weight 74 kg (163 lb 2.3 oz) Documented at 2024 2107              Current Home Medications:  Prior to Admission medications    Medication Sig Start Date End Date Taking? Authorizing Provider   apixaban (ELIQUIS) 5 MG tablet tablet Take 1 tablet by mouth Every 12 (Twelve) Hours. Indications: Other - full anticoagulation, history of DVT/PE 10/27/22  Yes Shaylee Mcdaniels MD   budesonide-formoterol (SYMBICORT) 80-4.5 MCG/ACT inhaler Inhale 2 puffs 2 (Two) Times a Day.   Yes Jaylyn Golden MD   carvedilol (COREG) 12.5 MG tablet TAKE 1 TABLET BY MOUTH TWICE DAILY WITH MEALS 23  Yes Richardson Willis MD   Cholecalciferol (Vitamin D3) 50 MCG ( UT) capsule Take 1 capsule by mouth Daily.   Yes Jaylyn Golden MD   Folbic 2.5-25-2 MG tablet tablet Take 1 tablet by mouth Daily. 24  Yes Jaylyn Golden MD   furosemide (LASIX) 20 MG tablet Take 1 tablet by mouth Daily. 24  Yes Mansi Becerril APRN   levothyroxine (SYNTHROID, LEVOTHROID) 50 MCG tablet Take 1 tablet by mouth Daily.   Yes Jaylyn Golden MD   O2 (OXYGEN) Inhale 2 L/min Continuous. 24  Yes Jaylyn Golden MD   potassium chloride (K-DUR,KLOR-CON) 10 MEQ CR tablet Take 1 tablet by mouth Daily. 23  Yes Richardson Willis MD   sildenafil (REVATIO) 20 MG tablet Take 1 tablet by mouth Every 8 (Eight) Hours. 23  Yes Mansi Becerril APRN   allopurinol (ZYLOPRIM) 100 MG tablet Take 1 tablet by mouth Daily.    [place] MD Jaylyn "       Social history:   Pt from home, daughter at bedside.     Smoking status:     Diagnostics Testing:  proBNP level: 9256    Echocardiogram:Results for orders placed in visit on 24    Adult Transthoracic Echo Limited W/ Cont if Necessary Per Protocol    Interpretation Summary    Left ventricular ejection fraction appears to be 61 - 65%.    The right ventricular cavity is dilated.    There is a small (<1cm) pericardial effusion adjacent to the left ventricle. There is no evidence of cardiac tamponade.    IVC is 2.1 cm.        Patient Assessment:   Pt lying in bed, resp even and unlabored. No soa with conversation. Noted 1+ edema bilateral legs. Denies SOA    Current O2:    Home O2:      Education provided to patient:  yes- Heart Failure disease education  yes -Symptom identification/management  yes -Daily Weights  yes- Diet education  na- Fluid restriction (if ordered)  yes- Activity education  yes- Medication education  na- Smoking cessation  yes- Follow-up Appointments  yes-Provided information on how to access [facility] My HF Guide/Heart Failure Interactive workbook    Acceptance of learning: acceptance, cooperative    Heart Failure education interactive teaching session time: 20 minutes    GWT-65%    Identified needs/barriers:   Volume status, needs 7 day follow-up and cardiology follow-up    Intervention:   Education, HF clinic apt made

## 2024-04-29 NOTE — PROGRESS NOTES
"NEPHROLOGY PROGRESS NOTE------[facility]    [facility]  [phone]  Luke Fleming MD      Patient Care Team:  Paul Granados MD as PCP - General (Family Medicine)  Richardson Willis MD as Cardiologist (Cardiology)  Rafy Rodriguez MD as Consulting Physician (Hematology and Oncology)  Christianne Phillips RN as Licensed Practical Nurse  Salome Fleming MD as Consulting Physician (Nephrology)      Provider:  Luke Fleming MD  Patient Name: Gabrielle Lyles  :  1940    SUBJECTIVE:    F/U ARF/JULIAN/CRF/CKD    Feeling cold. Diarrhea a little better. No angina. No dysuria.     Medication:  apixaban, 2.5 mg, Oral, Q12H  bumetanide, 1 mg, Oral, BID  carvedilol, 6.25 mg, Oral, BID With Meals  febuxostat, 40 mg, Oral, Daily  ipratropium-albuterol, 3 mL, Nebulization, 4x Daily - RT  levothyroxine, 75 mcg, Oral, Q AM  pantoprazole, 40 mg, Oral, Q AM  predniSONE, 20 mg, Oral, Daily With Breakfast  sildenafil, 20 mg, Oral, TID  sodium chloride, 10 mL, Intravenous, Q12H           OBJECTIVE    Vital Sign Min/Max for last 24 hours  Temp  Min: 96.4 °F (35.8 °C)  Max: 97.5 °F (36.4 °C)   BP  Min: 93/53  Max: 132/88   Pulse  Min: 70  Max: 81   Resp  Min: 14  Max: 26   SpO2  Min: 90 %  Max: 97 %   No data recorded   Weight  Min: 69 kg (152 lb 1.9 oz)  Max: 69 kg (152 lb 1.9 oz)     Flowsheet Rows      Flowsheet Row First Filed Value   Admission Height 162.6 cm (64\") Documented at 2024 0711   Admission Weight 74 kg (163 lb 2.3 oz) Documented at 2024 2107            No intake/output data recorded.  I/O last 3 completed shifts:  In: 360 [P.O.:360]  Out: 950 [Urine:950]    Physical Exam:  General Appearance: alert, appears stated age and cooperative  Head: normocephalic, without obvious abnormality and atraumatic  Eyes: conjunctivae and sclerae normal and no icterus  Neck: supple and +MILD JVD  Lungs: +FINE BIBASILAR CRACKLES " "(BETTER)  Heart: IRREG IRREG +TAYE  Chest Wall: no abnormalities observed  Abdomen: normal bowel sounds and soft, nontender  Extremities: moves extremities well,1+ BILAT PRETIBIAL EDEMA, no cyanosis  Skin: no bleeding, bruising or rash  Neurologic: Alert, and oriented. No focal deficits    Labs:    WBC WBC   Date Value Ref Range Status   04/29/2024 9.41 3.40 - 10.80 10*3/mm3 Final   04/28/2024 12.34 (H) 3.40 - 10.80 10*3/mm3 Final   04/27/2024 14.78 (H) 3.40 - 10.80 10*3/mm3 Final      HGB Hemoglobin   Date Value Ref Range Status   04/29/2024 10.6 (L) 12.0 - 15.9 g/dL Final   04/28/2024 11.2 (L) 12.0 - 15.9 g/dL Final   04/27/2024 11.7 (L) 12.0 - 15.9 g/dL Final      HCT Hematocrit   Date Value Ref Range Status   04/29/2024 35.4 34.0 - 46.6 % Final   04/28/2024 38.2 34.0 - 46.6 % Final   04/27/2024 40.2 34.0 - 46.6 % Final      Platelets No results found for: \"LABPLAT\"   MCV MCV   Date Value Ref Range Status   04/29/2024 86.1 79.0 - 97.0 fL Final   04/28/2024 86.8 79.0 - 97.0 fL Final   04/27/2024 87.6 79.0 - 97.0 fL Final          Sodium Sodium   Date Value Ref Range Status   04/29/2024 143 136 - 145 mmol/L Final   04/28/2024 144 136 - 145 mmol/L Final   04/27/2024 140 136 - 145 mmol/L Final      Potassium Potassium   Date Value Ref Range Status   04/29/2024 4.2 3.5 - 5.2 mmol/L Final   04/28/2024 4.4 3.5 - 5.2 mmol/L Final   04/27/2024 4.9 3.5 - 5.2 mmol/L Final     Comment:     Result checked        Chloride Chloride   Date Value Ref Range Status   04/29/2024 105 98 - 107 mmol/L Final   04/28/2024 106 98 - 107 mmol/L Final   04/27/2024 103 98 - 107 mmol/L Final      CO2 CO2   Date Value Ref Range Status   04/29/2024 26.0 22.0 - 29.0 mmol/L Final   04/28/2024 26.0 22.0 - 29.0 mmol/L Final   04/27/2024 23.0 22.0 - 29.0 mmol/L Final      BUN BUN   Date Value Ref Range Status   04/29/2024 48 (H) 8 - 23 mg/dL Final   04/28/2024 48 (H) 8 - 23 mg/dL Final   04/27/2024 44 (H) 8 - 23 mg/dL Final      Creatinine Creatinine " "  Date Value Ref Range Status   04/29/2024 1.96 (H) 0.57 - 1.00 mg/dL Final   04/28/2024 1.86 (H) 0.57 - 1.00 mg/dL Final   04/27/2024 2.22 (H) 0.57 - 1.00 mg/dL Final      Calcium Calcium   Date Value Ref Range Status   04/29/2024 10.0 8.6 - 10.5 mg/dL Final   04/28/2024 10.5 8.6 - 10.5 mg/dL Final   04/27/2024 10.6 (H) 8.6 - 10.5 mg/dL Final      PO4 No components found for: \"PO4\"   Albumin Albumin   Date Value Ref Range Status   04/29/2024 3.8 3.5 - 5.2 g/dL Final   04/27/2024 4.3 3.5 - 5.2 g/dL Final      Magnesium Magnesium   Date Value Ref Range Status   04/29/2024 2.1 1.6 - 2.4 mg/dL Final   04/28/2024 2.3 1.6 - 2.4 mg/dL Final   04/27/2024 2.3 1.6 - 2.4 mg/dL Final      Uric Acid No components found for: \"URIC ACID\"     Imaging Results (Last 72 Hours)       Procedure Component Value Units Date/Time    XR Chest 1 View [[id number]] Collected: 04/28/24 1131     Updated: 04/28/24 1136    Narrative:      XR CHEST 1 VW    Date of Exam: 4/28/2024 9:45 AM EDT    Indication: CHF    Comparison: 4/25/2020    Findings:  Heart size and pulmonary vasculature are stable. No gross pulmonary edema is demonstrated. There is strandy opacity in the left lung base.      Impression:      Impression:    1. Cardiomegaly with no evidence of pulmonary edema  2. Left basilar atelectasis      Electronically Signed: Darnell Kennedy    4/28/2024 11:33 AM EDT    Workstation ID: [id number]    US Renal Bilateral [[id number]] Collected: 04/26/24 1128     Updated: 04/26/24 1134    Narrative:      US RENAL BILATERAL    Date of Exam: 4/26/2024 11:04 AM EDT    Indication: ARF/JULIAN/CRF/CKD.    Comparison: No comparisons available.    Technique: Grayscale and color Doppler ultrasound evaluation of the kidneys and urinary bladder was performed.      Findings:  RIGHT kidney measures 9.8 x 5.6 x 5.2 cm. There is increased echogenicity of the kidney. There is no solid kidney mass.  No echogenic shadowing stone.  No hydronephrosis.    LEFT kidney measures 8.9 " x 5.2 x 4.5 cm. There is increased echogenicity of the kidney. There is no solid kidney mass.  No echogenic shadowing stone. There is a 1.9 x 1.6 x 1.8 cm anechoic cyst in the midpole. There is mild dilatation of the left renal   pelvis.    The bladder is empty.    There is ascites.        Impression:      Impression:    1. Increased echogenicity of the kidneys suggestive of chronic medical renal disease.  2. Mild dilatation of the left renal pelvis.        Electronically Signed: Cintia Becker MD    4/26/2024 11:32 AM EDT    Workstation ID: [id number]            Results for orders placed during the hospital encounter of 04/25/24    XR Chest 1 View    Narrative  XR CHEST 1 VW    Date of Exam: 4/28/2024 9:45 AM EDT    Indication: CHF    Comparison: 4/25/2020    Findings:  Heart size and pulmonary vasculature are stable. No gross pulmonary edema is demonstrated. There is strandy opacity in the left lung base.    Impression  Impression:    1. Cardiomegaly with no evidence of pulmonary edema  2. Left basilar atelectasis      Electronically Signed: Darnell Kennedy  4/28/2024 11:33 AM EDT  Workstation ID: [id number]      XR Chest 1 View    Narrative  XR CHEST 1 VW    Date of Exam: 4/25/2024 3:38 PM EDT    Indication: SOB    Comparison: 3/21/2024.    Findings:  There is stable mild cardiomegaly. There is pulmonary vascular congestion with increasing interstitial prominence bilaterally. There may be a small right effusion.    Impression  Impression:  Findings suggest mild CHF superimposed over chronic lung disease.      Electronically Signed: Mary Ivan MD  4/25/2024 3:52 PM EDT  Workstation ID: [id number]      Results for orders placed during the hospital encounter of 03/21/24    XR Chest 1 View    Narrative  XR CHEST 1 VW    Date of Exam: 3/21/2024 1:20 PM EDT    Indication: Dyspnea    Comparison: 2/9/2024.    Findings:  There is stable mild cardiomegaly. Lung fields appear clear of acute infiltrates or effusions. Mild  diffuse bilateral reticular lung thickening is stable. Right PICC line has been removed.    Impression  Impression:  Chronic findings. No acute process.      Electronically Signed: Mary Ivan MD  3/21/2024 1:48 PM EDT  Workstation ID: [id number]      Results for orders placed during the hospital encounter of 02/05/24    Duplex Venous Upper Extremity - Right CAR    Interpretation Summary    Normal right upper extremity venous duplex scan.        ASSESSMENT / PLAN      Dyspnea    ARF/JULIAN/CRF/CKD------Nonoliguric. +ARF/JULIAN on top of CRF/CKD STG 3A   with a baseline serum Creatinine of about 1.3-1.4.  CRF/CKD STG 3A is secondary to HTN NS. +ARF/JULIAN is secondary to prerenal/hemodynamic fluctuation from decompensated CHF (Cardiorenal). Gentle diuresis.  Avoid hypotension.   No NSAIDs or IV dye. Dose meds for CrCl less than 10 cc/min     2. ANEMIA OF CKD------IV iron for ROME. Follow for EPO need     3. HTN WITH CKD-----Avoid hypotension. No ACE/ARB/DRI for now     4. OA/DJD/HYPERURICEMIA------No NSAIDs. Added Uloric     5. VOLUME OVERLOAD-----Cautious diuresis. Stable on po Bumex. Thyroid functions ok     6. HYPERGLYCEMIA------Glucometers, SSI     7.  PULMONARY HTN--------Per , Pulmonary     8. HYPOKALEMIA-------Replaced     9. CAD-----per , Cardiology    10. DIARRHEA-----C.Diff negative.          Luke Fleming MD  [facility]  [phone]  04/29/24  07:40 EDT

## 2024-04-29 NOTE — PROGRESS NOTES
LOS: 4 days   Patient Care Team:  Paul Granados MD as PCP - General (Family Medicine)  Richardson Willis MD as Cardiologist (Cardiology)  Rafy Rodriguez MD as Consulting Physician (Hematology and Oncology)  Christianne Phillips, AMARILIS as Licensed Practical Nurse  Salome Fleming MD as Consulting Physician (Nephrology)    Subjective:  Good spirits but quite nauseated this morning and considerable diarrhea    Objective:   Afebrile      Review of Systems:   Review of Systems   Respiratory:  Negative for shortness of breath.    Gastrointestinal:  Positive for diarrhea and nausea.           Vital Signs  Temp:  [96.4 °F (35.8 °C)-97.3 °F (36.3 °C)] 97 °F (36.1 °C)  Heart Rate:  [70-85] 85  Resp:  [14-26] 14  BP: ()/(53-88) 125/76    Physical Exam:  Physical Exam  Constitutional:       Appearance: Normal appearance. She is not ill-appearing.   Cardiovascular:      Rate and Rhythm: Rhythm irregular.      Heart sounds: Normal heart sounds.   Pulmonary:      Breath sounds: Normal breath sounds.   Skin:     General: Skin is warm.   Neurological:      General: No focal deficit present.      Mental Status: She is alert.          Radiology:  XR Chest 1 View    Result Date: 4/28/2024  Impression: 1. Cardiomegaly with no evidence of pulmonary edema 2. Left basilar atelectasis Electronically Signed: Darnell Kennedy  4/28/2024 11:33 AM EDT  Workstation ID: [id number]    US Renal Bilateral    Result Date: 4/26/2024  Impression: 1. Increased echogenicity of the kidneys suggestive of chronic medical renal disease. 2. Mild dilatation of the left renal pelvis. Electronically Signed: Cintia Becker MD  4/26/2024 11:32 AM EDT  Workstation ID: [id number]    XR Chest 1 View    Result Date: 4/25/2024  Impression: Findings suggest mild CHF superimposed over chronic lung disease. Electronically Signed: Mary Ivan MD  4/25/2024 3:52 PM EDT  Workstation ID: [id number]        Results Review:     I reviewed the patient's new clinical  results.  I reviewed the patient's new imaging results and agree with the interpretation.    Medication Review:   Scheduled Meds:apixaban, 2.5 mg, Oral, Q12H  bumetanide, 1 mg, Oral, BID  carvedilol, 6.25 mg, Oral, BID With Meals  febuxostat, 40 mg, Oral, Daily  ipratropium-albuterol, 3 mL, Nebulization, 4x Daily - RT  levothyroxine, 75 mcg, Oral, Q AM  pantoprazole, 40 mg, Oral, Q AM  predniSONE, 20 mg, Oral, Daily With Breakfast  sildenafil, 20 mg, Oral, TID  sodium chloride, 10 mL, Intravenous, Q12H      Continuous Infusions:   PRN Meds:.  acetaminophen    senna-docusate sodium **AND** polyethylene glycol **AND** bisacodyl **AND** bisacodyl    Calcium Replacement - Follow Nurse / BPA Driven Protocol    loperamide    Magnesium Standard Dose Replacement - Follow Nurse / BPA Driven Protocol    ondansetron ODT **OR** ondansetron    Phosphorus Replacement - Follow Nurse / BPA Driven Protocol    Potassium Replacement - Follow Nurse / BPA Driven Protocol    sodium chloride    sodium chloride    Labs:    CBC    Results from last 7 days   Lab Units 04/29/24  0256 04/28/24  1019 04/27/24  1014 04/26/24  0043 04/25/24  1536   WBC 10*3/mm3 9.41 12.34* 14.78* 6.21 7.71   HEMOGLOBIN g/dL 10.6* 11.2* 11.7* 10.5* 10.8*   PLATELETS 10*3/mm3 211 238 240 204 192     BMP   Results from last 7 days   Lab Units 04/29/24  0256 04/28/24  1019 04/27/24  1014 04/26/24  0043 04/25/24  2035   SODIUM mmol/L 143 144 140 144 145   POTASSIUM mmol/L 4.2 4.4 4.9 3.7 3.4*   CHLORIDE mmol/L 105 106 103 105 105   CO2 mmol/L 26.0 26.0 23.0 25.0 25.0   BUN mg/dL 48* 48* 44* 34* 32*   CREATININE mg/dL 1.96* 1.86* 2.22* 1.81* 1.71*   GLUCOSE mg/dL 116* 87 122* 164* 168*   MAGNESIUM mg/dL 2.1 2.3 2.3  --   --    PHOSPHORUS mg/dL 3.3 2.6 3.2  --   --      Cr Clearance Estimated Creatinine Clearance: 20.7 mL/min (A) (by C-G formula based on SCr of 1.96 mg/dL (H)).  Coag     HbA1C   Lab Results   Component Value Date    HGBA1C 5.8 (H) 10/25/2022     Blood  "Glucose No results found for: \"POCGLU\"  Infection   Results from last 7 days   Lab Units 04/25/24  1536   PROCALCITONIN ng/mL 0.04     CMP   Results from last 7 days   Lab Units 04/29/24  0256 04/28/24  1019 04/27/24  1014 04/26/24  0043 04/25/24  2035   SODIUM mmol/L 143 144 140 144 145   POTASSIUM mmol/L 4.2 4.4 4.9 3.7 3.4*   CHLORIDE mmol/L 105 106 103 105 105   CO2 mmol/L 26.0 26.0 23.0 25.0 25.0   BUN mg/dL 48* 48* 44* 34* 32*   CREATININE mg/dL 1.96* 1.86* 2.22* 1.81* 1.71*   GLUCOSE mg/dL 116* 87 122* 164* 168*   ALBUMIN g/dL 3.8  --  4.3  --  4.0   BILIRUBIN mg/dL 1.3*  --  1.6*  --  2.1*   ALK PHOS U/L 66  --  74  --  75   AST (SGOT) U/L 17  --  23  --  18   ALT (SGPT) U/L 16  --  21  --  16     UA    Results from last 7 days   Lab Units 04/25/24  1556   NITRITE UA  Positive*   WBC UA /HPF 0-2   BACTERIA UA /HPF 1+*   SQUAM EPITHEL UA /HPF 0-2     Radiology(recent) XR Chest 1 View    Result Date: 4/28/2024  Impression: 1. Cardiomegaly with no evidence of pulmonary edema 2. Left basilar atelectasis Electronically Signed: Darnell Kennedy  4/28/2024 11:33 AM EDT  Workstation ID: [id number]    Assessment:      Acute gastroenteritis  Acute exacerbation of diastolic congestive heart failure  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  Atrial fibrillation, paroxysmal  Severe pulmonary hypertension  Chronic atelectasis left lower lobe  Acute renal failure with acute kidney injury superimposed upon chronic disease stage IIIa  Left breast anomaly  Hypertension associated chronic kidney disease stage IIIa  Anemia of chronic kidney disease  Hyperuricemia  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  Cerebrovascular disease status post CVA  Chronic oral anticoagulation therapy  Acquired hypothyroidism  Thrombophilia  Autonomic dysfunction syndrome  History of pulmonary embolism  Hypercoagulable state secondary to atrial fibrillation  [id number] score 6  History of " IVC filter  Aneurysmal dilatation of abdominal aorta  Chronic mucopurulent bronchitis  Peripheral polyneuropathy  History of breast cancer  Supplemental oxygen dependency  Chronic hypoxic respiratory failure    Plan:  Care as outlined//antiemetics and antidiarrheals to continue//continue renal support        Paul Granados MD  04/29/24  09:06 EDT

## 2024-04-30 LAB
ANION GAP SERPL CALCULATED.3IONS-SCNC: 11 MMOL/L (ref 5–15)
BUN SERPL-MCNC: 49 MG/DL (ref 8–23)
BUN/CREAT SERPL: 22.2 (ref 7–25)
CALCIUM SPEC-SCNC: 9.7 MG/DL (ref 8.6–10.5)
CHLORIDE SERPL-SCNC: 105 MMOL/L (ref 98–107)
CO2 SERPL-SCNC: 28 MMOL/L (ref 22–29)
CREAT SERPL-MCNC: 2.21 MG/DL (ref 0.57–1)
DEPRECATED RDW RBC AUTO: 64.9 FL (ref 37–54)
EGFRCR SERPLBLD CKD-EPI 2021: 21.6 ML/MIN/1.73
ERYTHROCYTE [DISTWIDTH] IN BLOOD BY AUTOMATED COUNT: 21.3 % (ref 12.3–15.4)
GLUCOSE SERPL-MCNC: 103 MG/DL (ref 65–99)
HCT VFR BLD AUTO: 35.8 % (ref 34–46.6)
HGB BLD-MCNC: 10.7 G/DL (ref 12–15.9)
MAGNESIUM SERPL-MCNC: 1.9 MG/DL (ref 1.6–2.4)
MCH RBC QN AUTO: 25.6 PG (ref 26.6–33)
MCHC RBC AUTO-ENTMCNC: 29.9 G/DL (ref 31.5–35.7)
MCV RBC AUTO: 85.6 FL (ref 79–97)
PHOSPHATE SERPL-MCNC: 3.3 MG/DL (ref 2.5–4.5)
PLATELET # BLD AUTO: 207 10*3/MM3 (ref 140–450)
PMV BLD AUTO: 10.8 FL (ref 6–12)
POTASSIUM SERPL-SCNC: 3.6 MMOL/L (ref 3.5–5.2)
RBC # BLD AUTO: 4.18 10*6/MM3 (ref 3.77–5.28)
SODIUM SERPL-SCNC: 144 MMOL/L (ref 136–145)
WBC NRBC COR # BLD AUTO: 9.09 10*3/MM3 (ref 3.4–10.8)

## 2024-04-30 PROCEDURE — 94799 UNLISTED PULMONARY SVC/PX: CPT

## 2024-04-30 PROCEDURE — 25010000002 MAGNESIUM SULFATE IN D5W 1G/100ML (PREMIX) 1-5 GM/100ML-% SOLUTION: Performed by: INTERNAL MEDICINE

## 2024-04-30 PROCEDURE — 99232 SBSQ HOSP IP/OBS MODERATE 35: CPT | Performed by: INTERNAL MEDICINE

## 2024-04-30 PROCEDURE — 83735 ASSAY OF MAGNESIUM: CPT | Performed by: INTERNAL MEDICINE

## 2024-04-30 PROCEDURE — 80048 BASIC METABOLIC PNL TOTAL CA: CPT | Performed by: INTERNAL MEDICINE

## 2024-04-30 PROCEDURE — 85027 COMPLETE CBC AUTOMATED: CPT | Performed by: INTERNAL MEDICINE

## 2024-04-30 PROCEDURE — 94761 N-INVAS EAR/PLS OXIMETRY MLT: CPT

## 2024-04-30 PROCEDURE — 94664 DEMO&/EVAL PT USE INHALER: CPT

## 2024-04-30 PROCEDURE — 84100 ASSAY OF PHOSPHORUS: CPT | Performed by: INTERNAL MEDICINE

## 2024-04-30 PROCEDURE — 63710000001 PREDNISONE PER 1 MG: Performed by: INTERNAL MEDICINE

## 2024-04-30 PROCEDURE — 97162 PT EVAL MOD COMPLEX 30 MIN: CPT

## 2024-04-30 RX ORDER — POTASSIUM CHLORIDE 20 MEQ/1
40 TABLET, EXTENDED RELEASE ORAL ONCE
Qty: 2 TABLET | Refills: 0 | Status: COMPLETED | OUTPATIENT
Start: 2024-04-30 | End: 2024-04-30

## 2024-04-30 RX ORDER — POTASSIUM CHLORIDE 20 MEQ/1
20 TABLET, EXTENDED RELEASE ORAL DAILY
Status: DISCONTINUED | OUTPATIENT
Start: 2024-05-01 | End: 2024-05-07 | Stop reason: HOSPADM

## 2024-04-30 RX ORDER — MAGNESIUM SULFATE 1 G/100ML
1 INJECTION INTRAVENOUS ONCE
Status: COMPLETED | OUTPATIENT
Start: 2024-04-30 | End: 2024-04-30

## 2024-04-30 RX ORDER — BUMETANIDE 1 MG/1
1 TABLET ORAL DAILY
Status: DISCONTINUED | OUTPATIENT
Start: 2024-05-01 | End: 2024-05-01

## 2024-04-30 RX ADMIN — SILDENAFIL CITRATE 20 MG: 20 TABLET ORAL at 09:46

## 2024-04-30 RX ADMIN — PANTOPRAZOLE SODIUM 40 MG: 40 TABLET, DELAYED RELEASE ORAL at 05:35

## 2024-04-30 RX ADMIN — IPRATROPIUM BROMIDE AND ALBUTEROL SULFATE 3 ML: .5; 3 SOLUTION RESPIRATORY (INHALATION) at 10:16

## 2024-04-30 RX ADMIN — Medication 10 ML: at 21:54

## 2024-04-30 RX ADMIN — IPRATROPIUM BROMIDE AND ALBUTEROL SULFATE 3 ML: .5; 3 SOLUTION RESPIRATORY (INHALATION) at 19:25

## 2024-04-30 RX ADMIN — CARVEDILOL 6.25 MG: 6.25 TABLET, FILM COATED ORAL at 09:45

## 2024-04-30 RX ADMIN — MAGNESIUM SULFATE IN DEXTROSE 1 G: 10 INJECTION, SOLUTION INTRAVENOUS at 09:44

## 2024-04-30 RX ADMIN — IPRATROPIUM BROMIDE AND ALBUTEROL SULFATE 3 ML: .5; 3 SOLUTION RESPIRATORY (INHALATION) at 14:17

## 2024-04-30 RX ADMIN — POTASSIUM CHLORIDE 40 MEQ: 1500 TABLET, EXTENDED RELEASE ORAL at 05:35

## 2024-04-30 RX ADMIN — APIXABAN 2.5 MG: 2.5 TABLET, FILM COATED ORAL at 09:46

## 2024-04-30 RX ADMIN — SILDENAFIL CITRATE 20 MG: 20 TABLET ORAL at 21:52

## 2024-04-30 RX ADMIN — PREDNISONE 20 MG: 20 TABLET ORAL at 09:47

## 2024-04-30 RX ADMIN — FEBUXOSTAT 40 MG: 40 TABLET, FILM COATED ORAL at 09:45

## 2024-04-30 RX ADMIN — APIXABAN 2.5 MG: 2.5 TABLET, FILM COATED ORAL at 21:53

## 2024-04-30 RX ADMIN — Medication 10 ML: at 09:52

## 2024-04-30 RX ADMIN — IPRATROPIUM BROMIDE AND ALBUTEROL SULFATE 3 ML: .5; 3 SOLUTION RESPIRATORY (INHALATION) at 06:34

## 2024-04-30 RX ADMIN — LEVOTHYROXINE SODIUM 75 MCG: 0.07 TABLET ORAL at 05:35

## 2024-04-30 NOTE — THERAPY EVALUATION
Patient Name: Gabrielle Lyles  : 1940    MRN: [id number]                              Today's Date: 2024       Admit Date: 2024    Visit Dx:     ICD-10-CM ICD-9-CM   1. Acute on chronic heart failure with preserved ejection fraction (HFpEF)  I50.33 428.23     Patient Active Problem List   Diagnosis    Acute hypokalemia    Stage 3a chronic kidney disease    Low back pain    Osteopenia    Chronic obstructive pulmonary disease    Gastroesophageal reflux disease    Gout    History of venous thrombosis and embolism    Primary hypertension    Lumbar radiculopathy    Nausea    Personal history of malignant neoplasm of breast    Shortness of breath    Aortic aneurysm    Bulging lumbar disc    Spinal stenosis of lumbar region    History of TIA (transient ischemic attack)    Severe pulmonary hypertension    Chronic respiratory failure with hypoxia    Cellulitis of left foot    Altered mental status    JULIAN (acute kidney injury)    Dyspnea    Anemia in stage 2 chronic kidney disease    Paroxysmal atrial fibrillation     Past Medical History:   Diagnosis Date    Acute exacerbation of chronic obstructive pulmonary disease (COPD)     COPD (chronic obstructive pulmonary disease)     Deep vein thrombosis of bilateral lower extremities 2019    3/2013    History of pneumonia 2012    community acquired pneumonia and right pleural effusion;hospitalized at [facility]    History of pulmonary embolism 2013    bilateral PE    Hypertension     Insomnia     on Ambien 5mg at     Lobular breast cancer, left 2011    Stage IA left upper lobular breast cancer    Malignant neoplasm of left breast in female, estrogen receptor positive 2019    Osteopenia 2012    Parainfluenza infection     Parotid duct obstruction     Severe pulmonary hypertension 2023    Stage 3a chronic kidney disease 2019    TIA (transient ischemic attack) 10/25/2022     Past Surgical History:   Procedure Laterality  Date    CARDIAC CATHETERIZATION N/A 3/3/2023    Procedure: Left and Right Heart Cath with Coronary Angiography;  Surgeon: Richardson Willis MD;  Location: [facility] CATH INVASIVE LOCATION;  Service: Cardiovascular;  Laterality: N/A;    CATARACT EXTRACTION  2015    IVC FILTER RETRIEVAL  06/2014    IVC filter placement by Dr. Card    MAMMO STEREOTACTIC BREAST BX SURGICAL ADD UNI Left 11/01/2011    invasive lobular carcinoma-Dr. Cande Daniel    MASTECTOMY, PARTIAL Left 12/01/2011    and left axillary sentinel lymph node biopsy by Dr. Uriostegui    TUBAL ABDOMINAL LIGATION  1984      General Information       Row Name 04/30/24 1539          Physical Therapy Time and Intention    Document Type evaluation  -CR     Mode of Treatment physical therapy  -CR       Row Name 04/30/24 1539          General Information    Patient Profile Reviewed yes  -CR     Prior Level of Function independent:;all household mobility;ADL's  using rw  -CR     Existing Precautions/Restrictions oxygen therapy device and L/min  3lnc  -CR     Barriers to Rehab medically complex  -CR       Row Name 04/30/24 1539          Living Environment    People in Home alone  daughter comes to assist as needed  -CR       Row Name 04/30/24 1539          Home Main Entrance    Number of Stairs, Main Entrance two  -CR       Row Name 04/30/24 1539          Cognition    Orientation Status (Cognition) oriented x 4  -CR       Row Name 04/30/24 1539          Safety Issues, Functional Mobility    Impairments Affecting Function (Mobility) endurance/activity tolerance  -CR               User Key  (r) = Recorded By, (t) = Taken By, (c) = Cosigned By      Initials Name Provider Type    CR Reyes, Carmela, PT Physical Therapist                   Mobility       Row Name 04/30/24 1539          Bed Mobility    Bed Mobility supine-sit-supine  -CR     Supine-Sit-Supine [place] (Bed Mobility) modified independence  -CR     Assistive Device (Bed Mobility) bed rails;head of bed elevated   -CR       Row Name 04/30/24 1539          Sit-Stand Transfer    Sit-Stand [place] (Transfers) modified independence  -CR     Assistive Device (Sit-Stand Transfers) walker, front-wheeled  -CR       Row Name 04/30/24 1539          Gait/Stairs (Locomotion)    [place] Level (Gait) standby assist  -CR     Assistive Device (Gait) walker, front-wheeled  -CR     Distance in Feet (Gait) 20  -CR     Deviations/Abnormal Patterns (Gait) gait speed decreased  -CR               User Key  (r) = Recorded By, (t) = Taken By, (c) = Cosigned By      Initials Name Provider Type    CR Reyes, Carmela, PT Physical Therapist                   Obj/Interventions       Row Name 04/30/24 1540          Range of Motion Comprehensive    General Range of Motion no range of motion deficits identified  -CR       Row Name 04/30/24 1540          Strength Comprehensive (MMT)    General Manual Muscle Testing (MMT) Assessment no strength deficits identified  -CR       Row Name 04/30/24 1540          Balance    Balance Assessment sitting static balance;standing static balance;standing dynamic balance  -CR     Static Sitting Balance independent  -CR     Position, Sitting Balance sitting edge of bed  -CR     Static Standing Balance modified independence  -CR     Dynamic Standing Balance modified independence  -CR     Position/Device Used, Standing Balance walker, front-wheeled  -CR       Row Name 04/30/24 1540          Sensory Assessment (Somatosensory)    Sensory Assessment (Somatosensory) sensation intact  -CR               User Key  (r) = Recorded By, (t) = Taken By, (c) = Cosigned By      Initials Name Provider Type    CR Reyes, Carmela, PT Physical Therapist                   Goals/Plan       Row Name 04/30/24 1541          Gait Training Goal 1 (PT)    Activity/Assistive Device (Gait Training Goal 1, PT) gait (walking locomotion);assistive device use;improve balance and speed;increase endurance/gait distance;walker, rolling  -CR      [place] Level (Gait Training Goal 1, PT) standby assist  -CR     Distance (Gait Training Goal 1, PT) 150 ft no desat  -CR     Time Frame (Gait Training Goal 1, PT) long term goal (LTG);1 week  -CR       Row Name 04/30/24 1543          Patient Education Goal (PT)    Activity (Patient Education Goal, PT) HEP without desat  -CR     [place]/Cues/Accuracy (Memory Goal 2, PT) demonstrates adequately  -CR     Time Frame (Patient Education Goal, PT) long term goal (LTG);1 week  -CR       Row Name 04/30/24 1548          Therapy Assessment/Plan (PT)    Planned Therapy Interventions (PT) gait training;home exercise program;patient/family education  -CR               User Key  (r) = Recorded By, (t) = Taken By, (c) = Cosigned By      Initials Name Provider Type    CR Reyes, Carmela, PT Physical Therapist                   Clinical Impression       Row Name 04/30/24 1544          Pain    Pretreatment Pain Rating 0/10 - no pain  -CR     Posttreatment Pain Rating 0/10 - no pain  -CR       Row Name 04/30/24 1542          Plan of Care Review    Plan of Care Reviewed With patient  -CR     Outcome Evaluation 85 y/o female admitted on 4/25 due to SOA ,diagnosed with CHF exacerbation. PMH includes COPD on home O2 at 3l/nc, pulmonary htn, hx of DVT/PE with IV filter,CKD. Patient lives alone and normally uses rw for mobility. At time of eval patient is modified independent with supine<>sit using bed rail. Mod I for transfers using rw. Ambulated 20 ft using rw with SBA ; unsure if desatting due to poor reading on monitor. Patient did not report of fatigue or SOA during gait. Patient should be safe to return home once medically stable , will benefit from HH or OP PT follow up. Will continue to follow and progress while patient is in hospital.  -CR       Row Name 04/30/24 1547          Therapy Assessment/Plan (PT)    Patient/Family Therapy Goals Statement (PT) get stronger  -CR     Rehab Potential (PT) good, to achieve stated  therapy goals  -CR     Criteria for Skilled Interventions Met (PT) yes;skilled treatment is necessary  -CR     Therapy Frequency (PT) 3 times/wk  -CR     Predicted Duration of Therapy Intervention (PT) dc  -CR               User Key  (r) = Recorded By, (t) = Taken By, (c) = Cosigned By      Initials Name Provider Type    CR Reyes, Carmela, PT Physical Therapist                   Outcome Measures       Row Name 04/30/24 1545          How much help from another person do you currently need...    Turning from your back to your side while in flat bed without using bedrails? 4  -CR     Moving from lying on back to sitting on the side of a flat bed without bedrails? 4  -CR     Moving to and from a bed to a chair (including a wheelchair)? 4  -CR     Standing up from a chair using your arms (e.g., wheelchair, bedside chair)? 4  -CR     Climbing 3-5 steps with a railing? 4  -CR     To walk in hospital room? 4  -CR     AM-PAC 6 Clicks Score (PT) 24  -CR     Highest Level of Mobility Goal 8 --> Walked 250 feet or more  -CR               User Key  (r) = Recorded By, (t) = Taken By, (c) = Cosigned By      Initials Name Provider Type    CR Reyes, Carmela, PT Physical Therapist                                 Physical Therapy Education       Title: PT OT SLP Therapies (In Progress)       Topic: Physical Therapy (In Progress)       Point: Mobility training (Done)       Learning Progress Summary             Patient Acceptance, E, VU by CR at 4/30/2024 1545                         Point: Home exercise program (Not Started)       Learner Progress:  Not documented in this visit.                              User Key       Initials Effective Dates Name Provider Type Discipline    CR 06/16/21 -  Reyes, Carmela, PT Physical Therapist PT                  PT Recommendation and Plan  Planned Therapy Interventions (PT): gait training, home exercise program, patient/family education  Plan of Care Reviewed With: patient  Outcome Evaluation: 84  y/o female admitted on 4/25 due to SOA ,diagnosed with CHF exacerbation. PMH includes COPD on home O2 at 3l/nc, pulmonary htn, hx of DVT/PE with IV filter,CKD. Patient lives alone and normally uses rw for mobility. At time of eval patient is modified independent with supine<>sit using bed rail. Mod I for transfers using rw. Ambulated 20 ft using rw with SBA ; unsure if desatting due to poor reading on monitor. Patient did not report of fatigue or SOA during gait. Patient should be safe to return home once medically stable , will benefit from HH or OP PT follow up. Will continue to follow and progress while patient is in hospital.     Time Calculation:         PT Charges       Row Name 04/30/24 1545             Time Calculation    Start Time 1440  -CR      Stop Time 1500  -CR      Time Calculation (min) 20 min  -CR      PT Received On 04/30/24  -CR      PT - Next Appointment 05/01/24  -CR      PT Goal Re-Cert Due Date 05/14/24  -CR         Time Calculation- PT    Total Timed Code Minutes- PT 0 minute(s)  -CR                User Key  (r) = Recorded By, (t) = Taken By, (c) = Cosigned By      Initials Name Provider Type    CR Reyes, Carmela, PT Physical Therapist                  Therapy Charges for Today       Code Description Service Date Service Provider Modifiers Qty    27100970362 HC PT EVAL MOD COMPLEXITY 4 4/30/2024 Reyes, Carmela, ROGELIO GP 1            PT G-Codes  AM-PAC 6 Clicks Score (PT): 24  PT Discharge Summary  Anticipated Discharge Disposition (PT): home with home health, home with outpatient therapy services    Carmela Reyes, PT  4/30/2024

## 2024-04-30 NOTE — PROGRESS NOTES
Daily Progress Note          Assessment    Dyspnea  Hypoxemia: On home oxygen    Severe pulmonary hypertension mean PA pressure 44   [facility] March 2023  RA 6/5, 4 mmHg  RV 74/3, 5 mmHg  PA 71/25, 44 mmHg  PCW 12 mmHg  AO Sat 92%  PA Sat 78%  Jose CO: 7.89 L/min  Jose CI: 4.69 L/min/m²  [facility]   LV: 134/3, 20 mmHg  AO: 131/56, 87 mmHg  No significant gradient across the aortic valve during pullback of JR4 catheter.     Severe calcified tortuous nonobstructive coronary artery disease     Patient had CT with IV contrast in October 2022 showed no pulmonary embolism     COPD/emphysema  Chronic atelectasis in left lower lobe  Anemia  CKD  HTN  History of PE/DVT  History of TIA  CAD  Paroxysmal atrial fibrillation  History of breast cancer 2018, currently in remission     Results for orders placed in visit on 03/18/24     Adult Transthoracic Echo Limited W/ Cont if Necessary Per Protocol     Interpretation Summary    Left ventricular ejection fraction appears to be 61 - 65%.    The right ventricular cavity is dilated.    There is a small (<1cm) pericardial effusion adjacent to the left ventricle. There is no evidence of cardiac tamponade.    IVC is 2.1 cm.           Recommendations:      Sildenafil for pulmonary hypertension, monitor systemic blood pressure     po prednisone     Oxygen supplement and titration to maintain saturation 90 to 95%: Currently requiring 4 L per nasal cannula  Bronchodilators    Inhaled corticosteroids     Diuresis as per nephrology    Thyroid hormone replacement  Cardiology following   Electrolytes/ glycemic control  Chronic anticoagulation: Apixaban    I personally reviewed the radiological studies                 LOS: 5 days     Subjective     C/o PEREZ, mild cough, diarrhea     Objective     Vital signs for last 24 hours:  Vitals:    04/30/24 0140 04/30/24 0531 04/30/24 0634 04/30/24 0640   BP: 125/56 142/76     BP Location: Right arm Right arm     Patient Position: Lying Lying     Pulse: 78 76 73 80    Resp: 17 16 16 16   Temp: 97.3 °F (36.3 °C) 97.4 °F (36.3 °C)     TempSrc: Oral Oral     SpO2: 98% 98% 94% 97%   Weight:  66.7 kg (147 lb 0.8 oz)     Height:           Intake/Output last 3 shifts:  I/O last 3 completed shifts:  In: 720 [P.O.:720]  Out: 2950 [Urine:2950]  Intake/Output this shift:  No intake/output data recorded.      Radiology  Imaging Results (Last 24 Hours)       ** No results found for the last 24 hours. **            Labs:  Results from last 7 days   Lab Units 04/30/24  0038   WBC 10*3/mm3 9.09   HEMOGLOBIN g/dL 10.7*   HEMATOCRIT % 35.8   PLATELETS 10*3/mm3 207     Results from last 7 days   Lab Units 04/30/24  0038 04/29/24  0256   SODIUM mmol/L 144 143   POTASSIUM mmol/L 3.6 4.2   CHLORIDE mmol/L 105 105   CO2 mmol/L 28.0 26.0   BUN mg/dL 49* 48*   CREATININE mg/dL 2.21* 1.96*   CALCIUM mg/dL 9.7 10.0   BILIRUBIN mg/dL  --  1.3*   ALK PHOS U/L  --  66   ALT (SGPT) U/L  --  16   AST (SGOT) U/L  --  17   GLUCOSE mg/dL 103* 116*         Results from last 7 days   Lab Units 04/29/24  0256 04/27/24  1014 04/25/24  2035   ALBUMIN g/dL 3.8 4.3 4.0     Results from last 7 days   Lab Units 04/26/24  0043 04/25/24  2035   CK TOTAL U/L 32  --    HSTROP T ng/L 21* 25*         Results from last 7 days   Lab Units 04/30/24  0038   MAGNESIUM mg/dL 1.9         Results from last 7 days   Lab Units 04/26/24  0043 04/25/24  1536   TSH uIU/mL  --  8.760*   FREE T4 ng/dL 1.57  --            Meds:   SCHEDULE  apixaban, 2.5 mg, Oral, Q12H  [START ON 5/1/2024] bumetanide, 1 mg, Oral, Daily  carvedilol, 6.25 mg, Oral, BID With Meals  febuxostat, 40 mg, Oral, Daily  ipratropium-albuterol, 3 mL, Nebulization, 4x Daily - RT  levothyroxine, 75 mcg, Oral, Q AM  magnesium sulfate, 1 g, Intravenous, Once  pantoprazole, 40 mg, Oral, Q AM  [START ON 5/1/2024] potassium chloride, 20 mEq, Oral, Daily  predniSONE, 20 mg, Oral, Daily With Breakfast  sildenafil, 20 mg, Oral, TID  sodium chloride, 10 mL, Intravenous,  Q12H      Infusions     PRNs    acetaminophen    senna-docusate sodium **AND** polyethylene glycol **AND** bisacodyl **AND** bisacodyl    Calcium Replacement - Follow Nurse / BPA Driven Protocol    loperamide    Magnesium Standard Dose Replacement - Follow Nurse / BPA Driven Protocol    ondansetron ODT **OR** ondansetron    Phosphorus Replacement - Follow Nurse / BPA Driven Protocol    Potassium Replacement - Follow Nurse / BPA Driven Protocol    promethazine    promethazine    sodium chloride    sodium chloride    Physical Exam:  General Appearance:  Alert   HEENT:  Normocephalic, without obvious abnormality, Conjunctiva/corneas clear,.   Nares normal, no drainage     Neck:  Supple, symmetrical, trachea midline.   Lungs /Chest wall:   mild Bilateral basal rhonchi, respirations unlabored, symmetrical wall movement.     Heart:  Regular rate and rhythm, S1 S2 normal  Abdomen: Soft, non-tender, no masses, no organomegaly.    Extremities: No edema, no clubbing or cyanosis     ROS  Constitutional: Negative for chills, fever and malaise/fatigue.   HENT: Negative.    Eyes: Negative.    Cardiovascular: Negative.    Respiratory: Positive for cough and shortness of breath.    Skin: Negative.    Musculoskeletal: Negative.    Gastrointestinal: diarrhea    Genitourinary: Negative.    Neurological: Negative.    Psychiatric/Behavioral: Negative.      I reviewed the recent clinical results  I personally reviewed the latest radiological studies    Part of this note may be an electronic transcription/translation of spoken language to printed text using the Dragon Dictation System.

## 2024-04-30 NOTE — PLAN OF CARE
Goal Outcome Evaluation:         Patient is calm and cooperative, on 4L humidified NC, had potassium electrolyte replacement done per provider on call, VSS with no complaints from the patient at the moment.

## 2024-04-30 NOTE — PROGRESS NOTES
"NEPHROLOGY PROGRESS NOTE------[facility]    [facility]  [phone]  Luke Fleming MD      Patient Care Team:  Paul Granados MD as PCP - General (Family Medicine)  Richardson Willis MD as Cardiologist (Cardiology)  Rafy Rodriguez MD as Consulting Physician (Hematology and Oncology)  Christianne Phillips RN as Licensed Practical Nurse  Salome Fleming MD as Consulting Physician (Nephrology)      Provider:  Luke Fleming MD  Patient Name: Gabrielle Lyles  :  1940    SUBJECTIVE:    F/U ARF/JULIAN/CRF/CKD    Diarrhea better. Breathing fine. No angina. No dysuria. Appetite ok    Medication:  apixaban, 2.5 mg, Oral, Q12H  bumetanide, 1 mg, Oral, BID  carvedilol, 6.25 mg, Oral, BID With Meals  febuxostat, 40 mg, Oral, Daily  ipratropium-albuterol, 3 mL, Nebulization, 4x Daily - RT  levothyroxine, 75 mcg, Oral, Q AM  pantoprazole, 40 mg, Oral, Q AM  predniSONE, 20 mg, Oral, Daily With Breakfast  sildenafil, 20 mg, Oral, TID  sodium chloride, 10 mL, Intravenous, Q12H           OBJECTIVE    Vital Sign Min/Max for last 24 hours  Temp  Min: 97.2 °F (36.2 °C)  Max: 97.4 °F (36.3 °C)   BP  Min: 106/58  Max: 142/76   Pulse  Min: 68  Max: 85   Resp  Min: 10  Max: 17   SpO2  Min: 93 %  Max: 99 %   No data recorded   Weight  Min: 66.7 kg (147 lb 0.8 oz)  Max: 66.7 kg (147 lb 0.8 oz)     Flowsheet Rows      Flowsheet Row First Filed Value   Admission Height 162.6 cm (64\") Documented at 2024 0711   Admission Weight 74 kg (163 lb 2.3 oz) Documented at 2024 2107            No intake/output data recorded.  I/O last 3 completed shifts:  In: 720 [P.O.:720]  Out: 2950 [Urine:2950]    Physical Exam:  General Appearance: alert, appears stated age and cooperative  Head: normocephalic, without obvious abnormality and atraumatic  Eyes: conjunctivae and sclerae normal and no icterus  Neck: supple and NO GROSS JVD NOW  Lungs: NO CRACKLES " "NOW  Heart: IRREG IRREG +TAYE  Chest Wall: no abnormalities observed  Abdomen: normal bowel sounds and soft, nontender  Extremities: moves extremities well,1+ BILAT PRETIBIAL EDEMA, no cyanosis  Skin: no bleeding, bruising or rash  Neurologic: Alert, and oriented. No focal deficits    Labs:    WBC WBC   Date Value Ref Range Status   04/30/2024 9.09 3.40 - 10.80 10*3/mm3 Final   04/29/2024 9.41 3.40 - 10.80 10*3/mm3 Final   04/28/2024 12.34 (H) 3.40 - 10.80 10*3/mm3 Final   04/27/2024 14.78 (H) 3.40 - 10.80 10*3/mm3 Final      HGB Hemoglobin   Date Value Ref Range Status   04/30/2024 10.7 (L) 12.0 - 15.9 g/dL Final   04/29/2024 10.6 (L) 12.0 - 15.9 g/dL Final   04/28/2024 11.2 (L) 12.0 - 15.9 g/dL Final   04/27/2024 11.7 (L) 12.0 - 15.9 g/dL Final      HCT Hematocrit   Date Value Ref Range Status   04/30/2024 35.8 34.0 - 46.6 % Final   04/29/2024 35.4 34.0 - 46.6 % Final   04/28/2024 38.2 34.0 - 46.6 % Final   04/27/2024 40.2 34.0 - 46.6 % Final      Platelets No results found for: \"LABPLAT\"   MCV MCV   Date Value Ref Range Status   04/30/2024 85.6 79.0 - 97.0 fL Final   04/29/2024 86.1 79.0 - 97.0 fL Final   04/28/2024 86.8 79.0 - 97.0 fL Final   04/27/2024 87.6 79.0 - 97.0 fL Final          Sodium Sodium   Date Value Ref Range Status   04/30/2024 144 136 - 145 mmol/L Final   04/29/2024 143 136 - 145 mmol/L Final   04/28/2024 144 136 - 145 mmol/L Final   04/27/2024 140 136 - 145 mmol/L Final      Potassium Potassium   Date Value Ref Range Status   04/30/2024 3.6 3.5 - 5.2 mmol/L Final   04/29/2024 4.2 3.5 - 5.2 mmol/L Final   04/28/2024 4.4 3.5 - 5.2 mmol/L Final   04/27/2024 4.9 3.5 - 5.2 mmol/L Final     Comment:     Result checked        Chloride Chloride   Date Value Ref Range Status   04/30/2024 105 98 - 107 mmol/L Final   04/29/2024 105 98 - 107 mmol/L Final   04/28/2024 106 98 - 107 mmol/L Final   04/27/2024 103 98 - 107 mmol/L Final      CO2 CO2   Date Value Ref Range Status   04/30/2024 28.0 22.0 - 29.0 " "mmol/L Final   04/29/2024 26.0 22.0 - 29.0 mmol/L Final   04/28/2024 26.0 22.0 - 29.0 mmol/L Final   04/27/2024 23.0 22.0 - 29.0 mmol/L Final      BUN BUN   Date Value Ref Range Status   04/30/2024 49 (H) 8 - 23 mg/dL Final   04/29/2024 48 (H) 8 - 23 mg/dL Final   04/28/2024 48 (H) 8 - 23 mg/dL Final   04/27/2024 44 (H) 8 - 23 mg/dL Final      Creatinine Creatinine   Date Value Ref Range Status   04/30/2024 2.21 (H) 0.57 - 1.00 mg/dL Final   04/29/2024 1.96 (H) 0.57 - 1.00 mg/dL Final   04/28/2024 1.86 (H) 0.57 - 1.00 mg/dL Final   04/27/2024 2.22 (H) 0.57 - 1.00 mg/dL Final      Calcium Calcium   Date Value Ref Range Status   04/30/2024 9.7 8.6 - 10.5 mg/dL Final   04/29/2024 10.0 8.6 - 10.5 mg/dL Final   04/28/2024 10.5 8.6 - 10.5 mg/dL Final   04/27/2024 10.6 (H) 8.6 - 10.5 mg/dL Final      PO4 No components found for: \"PO4\"   Albumin Albumin   Date Value Ref Range Status   04/29/2024 3.8 3.5 - 5.2 g/dL Final   04/27/2024 4.3 3.5 - 5.2 g/dL Final      Magnesium Magnesium   Date Value Ref Range Status   04/30/2024 1.9 1.6 - 2.4 mg/dL Final   04/29/2024 2.1 1.6 - 2.4 mg/dL Final   04/28/2024 2.3 1.6 - 2.4 mg/dL Final   04/27/2024 2.3 1.6 - 2.4 mg/dL Final      Uric Acid No components found for: \"URIC ACID\"     Imaging Results (Last 72 Hours)       Procedure Component Value Units Date/Time    XR Chest 1 View [[id number]] Collected: 04/28/24 1131     Updated: 04/28/24 1136    Narrative:      XR CHEST 1 VW    Date of Exam: 4/28/2024 9:45 AM EDT    Indication: CHF    Comparison: 4/25/2020    Findings:  Heart size and pulmonary vasculature are stable. No gross pulmonary edema is demonstrated. There is strandy opacity in the left lung base.      Impression:      Impression:    1. Cardiomegaly with no evidence of pulmonary edema  2. Left basilar atelectasis      Electronically Signed: Darnell Kennedy    4/28/2024 11:33 AM EDT    Workstation ID: [id number]            Results for orders placed during the hospital encounter of " 04/25/24    XR Chest 1 View    Narrative  XR CHEST 1 VW    Date of Exam: 4/28/2024 9:45 AM EDT    Indication: CHF    Comparison: 4/25/2020    Findings:  Heart size and pulmonary vasculature are stable. No gross pulmonary edema is demonstrated. There is strandy opacity in the left lung base.    Impression  Impression:    1. Cardiomegaly with no evidence of pulmonary edema  2. Left basilar atelectasis      Electronically Signed: Darnell Kennedy  4/28/2024 11:33 AM EDT  Workstation ID: [id number]      XR Chest 1 View    Narrative  XR CHEST 1 VW    Date of Exam: 4/25/2024 3:38 PM EDT    Indication: SOB    Comparison: 3/21/2024.    Findings:  There is stable mild cardiomegaly. There is pulmonary vascular congestion with increasing interstitial prominence bilaterally. There may be a small right effusion.    Impression  Impression:  Findings suggest mild CHF superimposed over chronic lung disease.      Electronically Signed: Mary Ivan MD  4/25/2024 3:52 PM EDT  Workstation ID: [id number]      Results for orders placed during the hospital encounter of 03/21/24    XR Chest 1 View    Narrative  XR CHEST 1 VW    Date of Exam: 3/21/2024 1:20 PM EDT    Indication: Dyspnea    Comparison: 2/9/2024.    Findings:  There is stable mild cardiomegaly. Lung fields appear clear of acute infiltrates or effusions. Mild diffuse bilateral reticular lung thickening is stable. Right PICC line has been removed.    Impression  Impression:  Chronic findings. No acute process.      Electronically Signed: Mary Ivan MD  3/21/2024 1:48 PM EDT  Workstation ID: [id number]      Results for orders placed during the hospital encounter of 02/05/24    Duplex Venous Upper Extremity - Right CAR    Interpretation Summary    Normal right upper extremity venous duplex scan.        ASSESSMENT / PLAN      Dyspnea    ARF/JULIAN/CRF/CKD------Nonoliguric. +ARF/JULIAN on top of CRF/CKD STG 3A   with a baseline serum Creatinine of about 1.3-1.4.  CRF/CKD STG 3A is  secondary to HTN NS. +ARF/JULIAN is secondary to prerenal/hemodynamic fluctuation from decompensated CHF (Cardiorenal). Gentle diuresis.  Avoid hypotension.   It appears that in the long run we may have to accept a higher baseline serum creatinine in order to keep euvolemic. No NSAIDs or IV dye. Dose meds for CrCl less than 10 cc/min     2. ANEMIA OF CKD------IV iron for ROME. Follow for EPO need     3. HTN WITH CKD-----Avoid hypotension. No ACE/ARB/DRI for now     4. OA/DJD/HYPERURICEMIA------No NSAIDs. Added Uloric     5. VOLUME OVERLOAD----Better clinically. Hold Bumex today and restart tomorrow.     6. HYPERGLYCEMIA------Glucometers, SSI     7.  PULMONARY HTN--------Per , Pulmonary     8. HYPOKALEMIA-------Replaced     9. CAD-----per , Cardiology    10. DIARRHEA-----C.Diff negative.          Luke Fleming MD  [facility]  [phone]  04/30/24  08:04 EDT

## 2024-04-30 NOTE — PLAN OF CARE
Goal Outcome Evaluation:  Plan of Care Reviewed With: patient           Outcome Evaluation: 83 y/o female admitted on 4/25 due to SOA ,diagnosed with CHF exacerbation. PMH includes COPD on home O2 at 3l/nc, pulmonary htn, hx of DVT/PE with IV filter,CKD. Patient lives alone and normally uses rw for mobility. At time of eval patient is modified independent with supine<>sit using bed rail. Mod I for transfers using rw. Ambulated 20 ft using rw with SBA ; unsure if desatting due to poor reading on monitor. Patient did not report of fatigue or SOA during gait. Patient should be safe to return home once medically stable , will benefit from HH or OP PT follow up. Will continue to follow and progress while patient is in hospital.      Anticipated Discharge Disposition (PT): home with home health, home with outpatient therapy services

## 2024-04-30 NOTE — PROGRESS NOTES
LOS: 5 days   Patient Care Team:  Paul Granados MD as PCP - General (Family Medicine)  Richardson Willis MD as Cardiologist (Cardiology)  Rafy Rodriguez MD as Consulting Physician (Hematology and Oncology)  Christianne Phillips, AMARILIS as Licensed Practical Nurse  Salome Fleming MD as Consulting Physician (Nephrology)    Subjective:  Is better with less diarrhea    Objective:   Afebrile      Review of Systems:   Review of Systems   Neurological:  Positive for weakness.           Vital Signs  Temp:  [97.2 °F (36.2 °C)-97.4 °F (36.3 °C)] 97.4 °F (36.3 °C)  Heart Rate:  [68-82] 80  Resp:  [10-17] 16  BP: (106-142)/(56-76) 142/76    Physical Exam:  Physical Exam  Vitals and nursing note reviewed.   Cardiovascular:      Rate and Rhythm: Rhythm irregular.      Heart sounds: Normal heart sounds.   Pulmonary:      Breath sounds: Normal breath sounds.   Skin:     General: Skin is warm.   Neurological:      Mental Status: She is alert.          Radiology:  XR Chest 1 View    Result Date: 4/28/2024  Impression: 1. Cardiomegaly with no evidence of pulmonary edema 2. Left basilar atelectasis Electronically Signed: Darnell Kennedy  4/28/2024 11:33 AM EDT  Workstation ID: [id number]    US Renal Bilateral    Result Date: 4/26/2024  Impression: 1. Increased echogenicity of the kidneys suggestive of chronic medical renal disease. 2. Mild dilatation of the left renal pelvis. Electronically Signed: Cintia Becker MD  4/26/2024 11:32 AM EDT  Workstation ID: [id number]    XR Chest 1 View    Result Date: 4/25/2024  Impression: Findings suggest mild CHF superimposed over chronic lung disease. Electronically Signed: Mary Ivan MD  4/25/2024 3:52 PM EDT  Workstation ID: [id number]        Results Review:     I reviewed the patient's new clinical results.  I reviewed the patient's new imaging results and agree with the interpretation.    Medication Review:   Scheduled Meds:apixaban, 2.5 mg, Oral, Q12H  [START ON 5/1/2024] bumetanide, 1  mg, Oral, Daily  carvedilol, 6.25 mg, Oral, BID With Meals  febuxostat, 40 mg, Oral, Daily  ipratropium-albuterol, 3 mL, Nebulization, 4x Daily - RT  levothyroxine, 75 mcg, Oral, Q AM  magnesium sulfate, 1 g, Intravenous, Once  pantoprazole, 40 mg, Oral, Q AM  [START ON 5/1/2024] potassium chloride, 20 mEq, Oral, Daily  predniSONE, 20 mg, Oral, Daily With Breakfast  sildenafil, 20 mg, Oral, TID  sodium chloride, 10 mL, Intravenous, Q12H      Continuous Infusions:   PRN Meds:.  acetaminophen    senna-docusate sodium **AND** polyethylene glycol **AND** bisacodyl **AND** bisacodyl    Calcium Replacement - Follow Nurse / BPA Driven Protocol    loperamide    Magnesium Standard Dose Replacement - Follow Nurse / BPA Driven Protocol    ondansetron ODT **OR** ondansetron    Phosphorus Replacement - Follow Nurse / BPA Driven Protocol    Potassium Replacement - Follow Nurse / BPA Driven Protocol    promethazine    promethazine    sodium chloride    sodium chloride    Labs:    CBC    Results from last 7 days   Lab Units 04/30/24  0038 04/29/24  0256 04/28/24  1019 04/27/24  1014 04/26/24  0043 04/25/24  1536   WBC 10*3/mm3 9.09 9.41 12.34* 14.78* 6.21 7.71   HEMOGLOBIN g/dL 10.7* 10.6* 11.2* 11.7* 10.5* 10.8*   PLATELETS 10*3/mm3 207 211 238 240 204 192     BMP   Results from last 7 days   Lab Units 04/30/24  0038 04/29/24  0256 04/28/24  1019 04/27/24  1014 04/26/24  0043 04/25/24  2035   SODIUM mmol/L 144 143 144 140 144 145   POTASSIUM mmol/L 3.6 4.2 4.4 4.9 3.7 3.4*   CHLORIDE mmol/L 105 105 106 103 105 105   CO2 mmol/L 28.0 26.0 26.0 23.0 25.0 25.0   BUN mg/dL 49* 48* 48* 44* 34* 32*   CREATININE mg/dL 2.21* 1.96* 1.86* 2.22* 1.81* 1.71*   GLUCOSE mg/dL 103* 116* 87 122* 164* 168*   MAGNESIUM mg/dL 1.9 2.1 2.3 2.3  --   --    PHOSPHORUS mg/dL 3.3 3.3 2.6 3.2  --   --      Cr Clearance Estimated Creatinine Clearance: 18.1 mL/min (A) (by C-G formula based on SCr of 2.21 mg/dL (H)).  Coag     HbA1C   Lab Results   Component  "Value Date    HGBA1C 5.8 (H) 10/25/2022     Blood Glucose No results found for: \"POCGLU\"  Infection   Results from last 7 days   Lab Units 04/25/24  1536   PROCALCITONIN ng/mL 0.04     CMP   Results from last 7 days   Lab Units 04/30/24  0038 04/29/24  0256 04/28/24  1019 04/27/24  1014 04/26/24  0043 04/25/24  2035   SODIUM mmol/L 144 143 144 140 144 145   POTASSIUM mmol/L 3.6 4.2 4.4 4.9 3.7 3.4*   CHLORIDE mmol/L 105 105 106 103 105 105   CO2 mmol/L 28.0 26.0 26.0 23.0 25.0 25.0   BUN mg/dL 49* 48* 48* 44* 34* 32*   CREATININE mg/dL 2.21* 1.96* 1.86* 2.22* 1.81* 1.71*   GLUCOSE mg/dL 103* 116* 87 122* 164* 168*   ALBUMIN g/dL  --  3.8  --  4.3  --  4.0   BILIRUBIN mg/dL  --  1.3*  --  1.6*  --  2.1*   ALK PHOS U/L  --  66  --  74  --  75   AST (SGOT) U/L  --  17  --  23  --  18   ALT (SGPT) U/L  --  16  --  21  --  16     UA    Results from last 7 days   Lab Units 04/25/24  1556   NITRITE UA  Positive*   WBC UA /HPF 0-2   BACTERIA UA /HPF 1+*   SQUAM EPITHEL UA /HPF 0-2     Radiology(recent) XR Chest 1 View    Result Date: 4/28/2024  Impression: 1. Cardiomegaly with no evidence of pulmonary edema 2. Left basilar atelectasis Electronically Signed: Darnell Kennedy  4/28/2024 11:33 AM EDT  Workstation ID: [id number]    Assessment:      Acute gastroenteritis  Acute exacerbation of diastolic congestive heart failure  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  Atrial fibrillation, paroxysmal  Severe pulmonary hypertension  Chronic atelectasis left lower lobe  Acute renal failure with acute kidney injury superimposed upon chronic disease stage IIIa  Left breast anomaly  Hypertension associated chronic kidney disease stage IIIa  Anemia of chronic kidney disease  Hyperuricemia  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  Cerebrovascular disease status post CVA  Chronic oral anticoagulation therapy  Acquired hypothyroidism  Thrombophilia  Autonomic dysfunction " syndrome  History of pulmonary embolism  Hypercoagulable state secondary to atrial fibrillation  [id number] score 6  History of IVC filter  Aneurysmal dilatation of abdominal aorta  Chronic mucopurulent bronchitis  Peripheral polyneuropathy  History of breast cancer  Supplemental oxygen dependency  Chronic hypoxic respiratory failure    Plan:  Physical therapy today//may need inpatient rehabilitation        Paul Granados MD  04/30/24  08:47 EDT

## 2024-04-30 NOTE — PLAN OF CARE
Goal Outcome Evaluation:         A&O x 4.  NAD noted.  Sleeping between care off and on throughout day.  PT consult requested per MD.  WENDI to begin precert process.  Expect discharge in 1-2 days.

## 2024-04-30 NOTE — PROGRESS NOTES
Referring Provider: Paul Granados MD    Reason for follow-up: Shortness of breath, elevated proBNP     Patient Care Team:  Paul Granados MD as PCP - General (Family Medicine)  Richardson Willis MD as Cardiologist (Cardiology)  Rafy Rodriguez MD as Consulting Physician (Hematology and Oncology)  Christianne Phillips, RN as Licensed Practical Nurse  Salome Fleming MD as Consulting Physician (Nephrology)      SUBJECTIVE  Reports improvement in shortness of breath.     ROS  Review of all systems negative except as indicated.    Since I have last seen, the patient has been without any chest discomfort, shortness of breath, palpitations, dizziness or syncope.  Denies having any headache, abdominal pain, nausea, vomiting, diarrhea, constipation, loss of weight or loss of appetite.  Denies having any excessive bruising, hematuria or blood in the stool.  ROS      Personal History:    Past Medical History:   Diagnosis Date    Acute exacerbation of chronic obstructive pulmonary disease (COPD)     COPD (chronic obstructive pulmonary disease)     Deep vein thrombosis of bilateral lower extremities 07/24/2019    3/2013    History of pneumonia 06/2012    community acquired pneumonia and right pleural effusion;hospitalized at [facility]    History of pulmonary embolism 03/2013    bilateral PE    Hypertension 2001    Insomnia     on Ambien 5mg at     Lobular breast cancer, left 11/2011    Stage IA left upper lobular breast cancer    Malignant neoplasm of left breast in female, estrogen receptor positive 07/18/2019    Osteopenia 2012    Parainfluenza infection     Parotid duct obstruction     Severe pulmonary hypertension 03/03/2023    Stage 3a chronic kidney disease 07/24/2019    TIA (transient ischemic attack) 10/25/2022       Past Surgical History:   Procedure Laterality Date    CARDIAC CATHETERIZATION N/A 3/3/2023    Procedure: Left and Right Heart Cath with Coronary Angiography;  Surgeon: Richardson Willis MD;   Location: [facility] INVASIVE LOCATION;  Service: Cardiovascular;  Laterality: N/A;    CATARACT EXTRACTION      IVC FILTER RETRIEVAL  2014    IVC filter placement by Dr. Card    MAMMO STEREOTACTIC BREAST BX SURGICAL ADD UNI Left 2011    invasive lobular carcinoma-Dr. Cande Daniel    MASTECTOMY, PARTIAL Left 2011    and left axillary sentinel lymph node biopsy by Dr. Uriostegui    TUBAL ABDOMINAL LIGATION         Family History   Problem Relation Age of Onset    Lung cancer Brother        Social History     Tobacco Use    Smoking status: Former     Current packs/day: 0.00     Types: Cigarettes     Quit date:      Years since quittin.3     Passive exposure: Past    Smokeless tobacco: Never    Tobacco comments:     smoked 6 cigarettes a day from 13 years of age to 56 years of age when she quit in    Vaping Use    Vaping status: Never Used   Substance Use Topics    Alcohol use: Not Currently    Drug use: No        Home meds:  Prior to Admission medications    Medication Sig Start Date End Date Taking? Authorizing Provider   apixaban (ELIQUIS) 5 MG tablet tablet Take 1 tablet by mouth Every 12 (Twelve) Hours. Indications: Other - full anticoagulation, history of DVT/PE 10/27/22  Yes Shaylee Mcdaniels MD   budesonide-formoterol (SYMBICORT) 80-4.5 MCG/ACT inhaler Inhale 2 puffs 2 (Two) Times a Day.   Yes ProviderJaylyn MD   carvedilol (COREG) 12.5 MG tablet TAKE 1 TABLET BY MOUTH TWICE DAILY WITH MEALS 23  Yes Richardson Willis MD   Cholecalciferol (Vitamin D3) 50 MCG (2000 UT) capsule Take 1 capsule by mouth Daily.   Yes ProviderJaylyn MD   Folbic 2.5-25-2 MG tablet tablet Take 1 tablet by mouth Daily. 24  Yes Jaylyn Golden MD   furosemide (LASIX) 20 MG tablet Take 1 tablet by mouth Daily. 24  Yes Mansi Becerril APRN   levothyroxine (SYNTHROID, LEVOTHROID) 50 MCG tablet Take 1 tablet by mouth Daily.   Yes Jaylyn Golden MD   O2 (OXYGEN) Inhale 2  "L/min Continuous. 2/26/24  Yes Provider, MD Jaylyn   potassium chloride (K-DUR,KLOR-CON) 10 MEQ CR tablet Take 1 tablet by mouth Daily. 2/24/23  Yes Richardson Willis MD   sildenafil (REVATIO) 20 MG tablet Take 1 tablet by mouth Every 8 (Eight) Hours. 5/11/23  Yes Mansi Becerril APRN   allopurinol (ZYLOPRIM) 100 MG tablet Take 1 tablet by mouth Daily.    Provider, Jaylyn, MD       Allergies:  Patient has no known allergies.    Scheduled Meds:apixaban, 2.5 mg, Oral, Q12H  bumetanide, 1 mg, Oral, BID  carvedilol, 6.25 mg, Oral, BID With Meals  febuxostat, 40 mg, Oral, Daily  ipratropium-albuterol, 3 mL, Nebulization, 4x Daily - RT  levothyroxine, 75 mcg, Oral, Q AM  pantoprazole, 40 mg, Oral, Q AM  predniSONE, 20 mg, Oral, Daily With Breakfast  sildenafil, 20 mg, Oral, TID  sodium chloride, 10 mL, Intravenous, Q12H      Continuous Infusions:   PRN Meds:.  acetaminophen    senna-docusate sodium **AND** polyethylene glycol **AND** bisacodyl **AND** bisacodyl    Calcium Replacement - Follow Nurse / BPA Driven Protocol    loperamide    Magnesium Standard Dose Replacement - Follow Nurse / BPA Driven Protocol    ondansetron ODT **OR** ondansetron    Phosphorus Replacement - Follow Nurse / BPA Driven Protocol    Potassium Replacement - Follow Nurse / BPA Driven Protocol    promethazine    promethazine    sodium chloride    sodium chloride      OBJECTIVE    Vital Signs  Vitals:    04/30/24 0140 04/30/24 0531 04/30/24 0634 04/30/24 0640   BP: 125/56 142/76     BP Location: Right arm Right arm     Patient Position: Lying Lying     Pulse: 78 76 73 80   Resp: 17 16 16 16   Temp: 97.3 °F (36.3 °C) 97.4 °F (36.3 °C)     TempSrc: Oral Oral     SpO2: 98% 98% 94% 97%   Weight:  66.7 kg (147 lb 0.8 oz)     Height:           Flowsheet Rows      Flowsheet Row First Filed Value   Admission Height 162.6 cm (64\") Documented at 04/27/2024 0711   Admission Weight 74 kg (163 lb 2.3 oz) Documented at 04/25/2024 2107      "         Intake/Output Summary (Last 24 hours) at 4/30/2024 0711  Last data filed at 4/30/2024 0414  Gross per 24 hour   Intake 360 ml   Output 2000 ml   Net -1640 ml          Telemetry: Atrial fibrillation with controlled heart rate    Physical Exam:  The patient is alert, oriented and in no distress.  Vital signs as noted above.  Head and neck revealed no carotid bruits or jugular venous distention.  No thyromegaly or lymphadenopathy is present  Lungs clear.  No wheezing.  Breath sounds are normal bilaterally.  Heart normal first and second heart sounds.  No murmur. No precordial rub is present.  No gallop is present.  Abdomen soft and nontender.  No organomegaly is present.  Extremities with good peripheral pulses without any pedal edema.  Skin warm and dry.  Musculoskeletal system is grossly normal.  CNS grossly normal.       Results Review:  I have personally reviewed the results from the time of this admission to 4/30/2024 07:11 EDT and agree with these findings:  []  Laboratory  []  Microbiology  []  Radiology  []  EKG/Telemetry   []  Cardiology/Vascular   []  Pathology  []  Old records  []  Other:    Most notable findings include:    Lab Results (last 24 hours)       Procedure Component Value Units Date/Time    Basic Metabolic Panel [[id number]]  (Abnormal) Collected: 04/30/24 0038    Specimen: Blood from Arm, Right Updated: 04/30/24 0109     Glucose 103 mg/dL      BUN 49 mg/dL      Creatinine 2.21 mg/dL      Sodium 144 mmol/L      Potassium 3.6 mmol/L      Chloride 105 mmol/L      CO2 28.0 mmol/L      Calcium 9.7 mg/dL      BUN/Creatinine Ratio 22.2     Anion Gap 11.0 mmol/L      eGFR 21.6 mL/min/1.73     Narrative:      GFR Normal >60  Chronic Kidney Disease <60  Kidney Failure <15    The GFR formula is only valid for adults with stable renal function between ages 18 and 70.    Magnesium [[id number]]  (Normal) Collected: 04/30/24 0038    Specimen: Blood from Arm, Right Updated: 04/30/24 0109     Magnesium 1.9  mg/dL     Phosphorus [[id number]]  (Normal) Collected: 04/30/24 0038    Specimen: Blood from Arm, Right Updated: 04/30/24 0109     Phosphorus 3.3 mg/dL     CBC (No Diff) [[id number]]  (Abnormal) Collected: 04/30/24 0038    Specimen: Blood from Arm, Right Updated: 04/30/24 0045     WBC 9.09 10*3/mm3      RBC 4.18 10*6/mm3      Hemoglobin 10.7 g/dL      Hematocrit 35.8 %      MCV 85.6 fL      MCH 25.6 pg      MCHC 29.9 g/dL      RDW 21.3 %      RDW-SD 64.9 fl      MPV 10.8 fL      Platelets 207 10*3/mm3             Imaging Results (Last 24 Hours)       ** No results found for the last 24 hours. **            LAB RESULTS (LAST 7 DAYS)    CBC  Results from last 7 days   Lab Units 04/30/24  0038 04/29/24  0256 04/28/24  1019 04/27/24  1014 04/26/24  0043 04/25/24  1536   WBC 10*3/mm3 9.09 9.41 12.34* 14.78* 6.21 7.71   RBC 10*6/mm3 4.18 4.11 4.40 4.59 4.15 4.15   HEMOGLOBIN g/dL 10.7* 10.6* 11.2* 11.7* 10.5* 10.8*   HEMATOCRIT % 35.8 35.4 38.2 40.2 36.1 37.6   MCV fL 85.6 86.1 86.8 87.6 87.0 90.6   PLATELETS 10*3/mm3 207 211 238 240 204 192       BMP  Results from last 7 days   Lab Units 04/30/24  0038 04/29/24  0256 04/28/24  1019 04/27/24  1014 04/26/24  0043 04/25/24  2035   SODIUM mmol/L 144 143 144 140 144 145   POTASSIUM mmol/L 3.6 4.2 4.4 4.9 3.7 3.4*   CHLORIDE mmol/L 105 105 106 103 105 105   CO2 mmol/L 28.0 26.0 26.0 23.0 25.0 25.0   BUN mg/dL 49* 48* 48* 44* 34* 32*   CREATININE mg/dL 2.21* 1.96* 1.86* 2.22* 1.81* 1.71*   GLUCOSE mg/dL 103* 116* 87 122* 164* 168*   MAGNESIUM mg/dL 1.9 2.1 2.3 2.3  --   --    PHOSPHORUS mg/dL 3.3 3.3 2.6 3.2  --   --        CMP   Results from last 7 days   Lab Units 04/30/24  0038 04/29/24  0256 04/28/24  1019 04/27/24  1014 04/26/24  0043 04/25/24  2035   SODIUM mmol/L 144 143 144 140 144 145   POTASSIUM mmol/L 3.6 4.2 4.4 4.9 3.7 3.4*   CHLORIDE mmol/L 105 105 106 103 105 105   CO2 mmol/L 28.0 26.0 26.0 23.0 25.0 25.0   BUN mg/dL 49* 48* 48* 44* 34* 32*   CREATININE mg/dL 2.21*  1.96* 1.86* 2.22* 1.81* 1.71*   GLUCOSE mg/dL 103* 116* 87 122* 164* 168*   ALBUMIN g/dL  --  3.8  --  4.3  --  4.0   BILIRUBIN mg/dL  --  1.3*  --  1.6*  --  2.1*   ALK PHOS U/L  --  66  --  74  --  75   AST (SGOT) U/L  --  17  --  23  --  18   ALT (SGPT) U/L  --  16  --  21  --  16       BNP        TROPONIN  Results from last 7 days   Lab Units 04/26/24  0043   CK TOTAL U/L 32   HSTROP T ng/L 21*       CoAg        Creatinine Clearance  Estimated Creatinine Clearance: 18.1 mL/min (A) (by C-G formula based on SCr of 2.21 mg/dL (H)).    ABG        Radiology  XR Chest 1 View    Result Date: 4/28/2024  Impression: 1. Cardiomegaly with no evidence of pulmonary edema 2. Left basilar atelectasis Electronically Signed: Darnell Kennedy  4/28/2024 11:33 AM EDT  Workstation ID: [id number]       EKG  I personally viewed and interpreted the patient's EKG/Telemetry data:  ECG 12 Lead Rhythm Change   Final Result   HEART RATE= 72  bpm   RR Interval= 830  ms   NH Interval=   ms   P Horizontal Axis=   deg   P Front Axis=   deg   QRSD Interval= 91  ms   QT Interval= 379  ms   QTcB= 416  ms   QRS Axis= 118  deg   T Wave Axis=   deg   - ABNORMAL ECG -   Atrial fibrillation   Right axis deviation   Low voltage, precordial leads   When compared with ECG of 21-Mar-2024 14:18:17,   No significant change   Electronically Signed By: Richardson Willis ([facility]) 29-Apr-2024 23:17:34   Date and Time of Study: 2024-04-28 07:50:23      Telemetry Scan   Final Result      Telemetry Scan   Final Result      Telemetry Scan   Final Result      Telemetry Scan   Final Result      Telemetry Scan   Final Result      Telemetry Scan   Final Result      Telemetry Scan   Final Result      Telemetry Scan   Final Result      Telemetry Scan   Final Result      Telemetry Scan   Final Result      Telemetry Scan   Final Result      Telemetry Scan   Final Result      Telemetry Scan   Final Result      Telemetry Scan   Final Result      Telemetry Scan   Final Result       Telemetry Scan   Final Result      Telemetry Scan   Final Result      Telemetry Scan   Final Result      Telemetry Scan   Final Result      Telemetry Scan   Final Result      Telemetry Scan   Final Result      Telemetry Scan   Final Result      Telemetry Scan   Final Result      Telemetry Scan   Final Result            Echocardiogram:    Results for orders placed in visit on 03/18/24    Adult Transthoracic Echo Limited W/ Cont if Necessary Per Protocol    Interpretation Summary    Left ventricular ejection fraction appears to be 61 - 65%.    The right ventricular cavity is dilated.    There is a small (<1cm) pericardial effusion adjacent to the left ventricle. There is no evidence of cardiac tamponade.    IVC is 2.1 cm.        Stress Test:  Results for orders placed in visit on 09/13/22    Stress Test With Myocardial Perfusion One Day    Interpretation Summary    Left ventricular ejection fraction is hyperdynamic (Calculated EF > 70%). .    Myocardial perfusion imaging indicates a normal myocardial perfusion study with no evidence of ischemia.    Impressions are consistent with a low risk study.    Findings consistent with a normal ECG stress test.         Cardiac Catheterization:  Results for orders placed during the hospital encounter of 03/03/23    Cardiac Catheterization/Vascular Study    Conclusion  OPERATORS  Richardson Willis M.D. (Attending Cardiologist)      PROCEDURE PERFORMED  Ultrasound guided vascular access  Right heart catheterization  Coronary Angiogram  Left Heart Catheterization 43601  Moderate Sedation    INDICATIONS FOR PROCEDURE  83-year-old woman with multiple cardiovascular risk factors, abnormal stress test presented with worsening shortness of breath.  After discussing the risk and benefit of the procedure she was brought in for elective right and left heart cath.    PROCEDURE IN DETAIL  Informed consent was obtained from the patient after explaining the risks, benefits, and alternative options  of the procedure. After obtaining informed consent, the patient was brought to the cath lab and was prepped in a sterile fashion. Lidocaine 2% was used for local anesthesia into the right femoral venous access site. Right femoral vein was accessed using the micropuncture needle under ultrasound guidance and micropuncture wire advanced under flouroscopy. A 7 Romansh vascular sheath was put into place percutaneously over guide-wire. Guide wires were removed. A 6Fr swan sheryl catheter was advanced to wedge position. RA, RV and PA and wedge pressures were recorded.  PA sat and arterial sats recorded.  The patient tolerated the procedure well without any complications.    Lidocaine 2% was used for local anesthesia into the right femoral arterial access site. The right femoral artery was accessed with a micropuncture needle via modified Seldinger technique under ultrasound guidance. A 6F was inserted successfully.  Afterwards, 6F JR4 and JL4 diagnostic catheters were advanced over a wire into the ascending aorta and were used to engage the ostia of the left main and RCA respectively. JR4 used to cross the AV and obtain LV pressures and gradient across the AV measured via pullback technique. Images of the right and left coronary systems were obtained. All the catheters were exchanged over a wire and subsequently removed. Angiogram of the femoral access site was obtained and did not show complications. The patient tolerated the procedure well without any complications. The pictures were reviewed at the end of the procedure. A Mynx closure device was applied.    HEMODYNAMICS    RHC  RA 6/5, 4 mmHg  RV 74/3, 5 mmHg  PA 71/25, 44 mmHg  PCW 12 mmHg  AO Sat 92%  PA Sat 78%    Jose CO: 7.89 L/min    Jose CI: 4.69 L/min/m²    LHC  LV: 134/3, 20 mmHg  AO: 131/56, 87 mmHg  No significant gradient across the aortic valve during pullback of JR4 catheter.    FINDINGS  Coronary Angiogram  All vessels are heavily calcified  She also has  heavily calcified peripheral arterial disease.  Right dominant circulation    Left main: Left main is a large caliber vessel which gives rise to the Left Anterior Descending and the Left circumflex.  Left main is angiographically free from any significant disease.    Left Anterior Descending Artery: LAD is a heavily calcified medium caliber vessel which gives rise to diagonals.  The vessel is highly tortuous and has 50 to 60% mid vessel stenosis best seen in Bengali cranial view.    Left Circumflex: Heavily calcified vessel with 50% proximal segment stenosis.    Right Coronary Artery: The RCA is a large caliber vessel which is heavily calcified and tortuous.  It has diffuse luminal irregularities and 30 to 40% stenosis in the midsegment around the bend.    ESTIMATED BLOOD LOSS:  10 ml    COMPLICATIONS:  None    PROCEDURE DATA:  Sedation Time: 25 minutes    IMPRESSIONS  Heavily calcified, tortuous nonobstructive coronary disease  Severe pulmonary hypertension with PA systolic pressure in the 70s  Mean PA pressure 44 mmHg    RECOMMENDATIONS  -Continue aggressive medical management of CAD  -Referral to pulmonology for treatment of pulmonary hypertension         Other:         ASSESSMENT & PLAN:    Principal Problem:    Dyspnea    COPD/Chronic respiratory failure/shortness of breath  Severe pulmonary hypertension  HFpEF  On 3 L of oxygen via nasal cannula at baseline, currently requiring 4 L  Has known pulmonary hypertension  RA 6/5, 4 mmHg  RV 74/3, 5 mmHg  PA 71/25, 44 mmHg  PCW 12 mmHg  She is on sildenafil  Pulmonology is on board  Echocardiogram shows preserved LV function with mildly elevated RVSP.  Small to moderate pericardial effusion: No signs of tamponade  proBNP of 9200 compared to 10,000 last month.  Now stable on oral Bumex  We will also connect her with heart failure clinic for intermittent IV diuresis     Atrial fibrillation  She has paroxysmal atrial fibrillation  [id number] score is 6  Continue Eliquis  for atrial fibrillation as well as for history of DVT/PE  Continue Coreg for heart rate control     History of venous thrombosis and embolism  Low-dose Eliquis due to renal dysfunction and age.  She also has an IVC filter in place.     Primary hypertension, chronic  Blood pressure has improved and is currently normal  Continue Coreg for blood pressure control  Amlodipine can be added if better blood pressure control is desired.  Echo shows preserved LV function, reduced RV function and mildly elevated RVSP.  Pericardial effusion is not significant with no signs of tamponade.  Continue diuretics     Coronary artery disease  Continue beta-blocker  Already on Eliquis low-dose  Start statin  Previous cardiac catheterization showed heavily calcified coronaries but nonobstructive.  No chest pain and stable from cardiac standpoint.     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  Continue high intensity statin and anticoagulation with Eliquis 2.5 mg p.o. twice daily due to renal dysfunction and advanced age.     Stage IIIb chronic kidney disease  Creatinine worsening 2.21, GFR is 21.6.  Continue oral Bumex  Nephrology is also following     Recent altered mental status  Previously precipitated by mastitis/PICC line infection  Previous CT head and MRI of the brain unremarkable with no acute findings  Mental status during this admission has been at baseline.      Richardson Willis MD  04/30/24  07:11 EDT

## 2024-05-01 LAB
ANION GAP SERPL CALCULATED.3IONS-SCNC: 13 MMOL/L (ref 5–15)
BUN SERPL-MCNC: 48 MG/DL (ref 8–23)
BUN/CREAT SERPL: 21.1 (ref 7–25)
CALCIUM SPEC-SCNC: 9.4 MG/DL (ref 8.6–10.5)
CHLORIDE SERPL-SCNC: 107 MMOL/L (ref 98–107)
CO2 SERPL-SCNC: 25 MMOL/L (ref 22–29)
CREAT SERPL-MCNC: 2.27 MG/DL (ref 0.57–1)
DEPRECATED RDW RBC AUTO: 66.4 FL (ref 37–54)
EGFRCR SERPLBLD CKD-EPI 2021: 20.9 ML/MIN/1.73
ERYTHROCYTE [DISTWIDTH] IN BLOOD BY AUTOMATED COUNT: 22 % (ref 12.3–15.4)
GLUCOSE SERPL-MCNC: 160 MG/DL (ref 65–99)
HCT VFR BLD AUTO: 35.9 % (ref 34–46.6)
HGB BLD-MCNC: 10.6 G/DL (ref 12–15.9)
MAGNESIUM SERPL-MCNC: 2.1 MG/DL (ref 1.6–2.4)
MCH RBC QN AUTO: 25.8 PG (ref 26.6–33)
MCHC RBC AUTO-ENTMCNC: 29.5 G/DL (ref 31.5–35.7)
MCV RBC AUTO: 87.3 FL (ref 79–97)
PHOSPHATE SERPL-MCNC: 2.8 MG/DL (ref 2.5–4.5)
PLATELET # BLD AUTO: 223 10*3/MM3 (ref 140–450)
PMV BLD AUTO: 10.6 FL (ref 6–12)
POTASSIUM SERPL-SCNC: 4 MMOL/L (ref 3.5–5.2)
RBC # BLD AUTO: 4.11 10*6/MM3 (ref 3.77–5.28)
SODIUM SERPL-SCNC: 145 MMOL/L (ref 136–145)
WBC NRBC COR # BLD AUTO: 10.11 10*3/MM3 (ref 3.4–10.8)

## 2024-05-01 PROCEDURE — 63710000001 PREDNISONE PER 1 MG: Performed by: INTERNAL MEDICINE

## 2024-05-01 PROCEDURE — 94799 UNLISTED PULMONARY SVC/PX: CPT

## 2024-05-01 PROCEDURE — 97530 THERAPEUTIC ACTIVITIES: CPT

## 2024-05-01 PROCEDURE — 85027 COMPLETE CBC AUTOMATED: CPT | Performed by: INTERNAL MEDICINE

## 2024-05-01 PROCEDURE — 97110 THERAPEUTIC EXERCISES: CPT

## 2024-05-01 PROCEDURE — 99232 SBSQ HOSP IP/OBS MODERATE 35: CPT | Performed by: INTERNAL MEDICINE

## 2024-05-01 PROCEDURE — 94664 DEMO&/EVAL PT USE INHALER: CPT

## 2024-05-01 PROCEDURE — 97116 GAIT TRAINING THERAPY: CPT

## 2024-05-01 PROCEDURE — 94761 N-INVAS EAR/PLS OXIMETRY MLT: CPT

## 2024-05-01 PROCEDURE — 80048 BASIC METABOLIC PNL TOTAL CA: CPT | Performed by: INTERNAL MEDICINE

## 2024-05-01 PROCEDURE — 83735 ASSAY OF MAGNESIUM: CPT | Performed by: INTERNAL MEDICINE

## 2024-05-01 PROCEDURE — 84100 ASSAY OF PHOSPHORUS: CPT | Performed by: INTERNAL MEDICINE

## 2024-05-01 RX ORDER — BUMETANIDE 1 MG/1
0.5 TABLET ORAL DAILY
Status: DISCONTINUED | OUTPATIENT
Start: 2024-05-02 | End: 2024-05-04

## 2024-05-01 RX ADMIN — CARVEDILOL 6.25 MG: 6.25 TABLET, FILM COATED ORAL at 18:21

## 2024-05-01 RX ADMIN — IPRATROPIUM BROMIDE AND ALBUTEROL SULFATE 3 ML: .5; 3 SOLUTION RESPIRATORY (INHALATION) at 19:25

## 2024-05-01 RX ADMIN — APIXABAN 2.5 MG: 2.5 TABLET, FILM COATED ORAL at 08:41

## 2024-05-01 RX ADMIN — CARVEDILOL 6.25 MG: 6.25 TABLET, FILM COATED ORAL at 08:41

## 2024-05-01 RX ADMIN — SILDENAFIL CITRATE 20 MG: 20 TABLET ORAL at 08:41

## 2024-05-01 RX ADMIN — PANTOPRAZOLE SODIUM 40 MG: 40 TABLET, DELAYED RELEASE ORAL at 05:03

## 2024-05-01 RX ADMIN — FEBUXOSTAT 40 MG: 40 TABLET, FILM COATED ORAL at 08:41

## 2024-05-01 RX ADMIN — IPRATROPIUM BROMIDE AND ALBUTEROL SULFATE 3 ML: .5; 3 SOLUTION RESPIRATORY (INHALATION) at 07:09

## 2024-05-01 RX ADMIN — SILDENAFIL CITRATE 20 MG: 20 TABLET ORAL at 22:49

## 2024-05-01 RX ADMIN — IPRATROPIUM BROMIDE AND ALBUTEROL SULFATE 3 ML: .5; 3 SOLUTION RESPIRATORY (INHALATION) at 15:50

## 2024-05-01 RX ADMIN — SILDENAFIL CITRATE 20 MG: 20 TABLET ORAL at 16:30

## 2024-05-01 RX ADMIN — POTASSIUM CHLORIDE 20 MEQ: 1500 TABLET, EXTENDED RELEASE ORAL at 08:41

## 2024-05-01 RX ADMIN — APIXABAN 2.5 MG: 2.5 TABLET, FILM COATED ORAL at 22:49

## 2024-05-01 RX ADMIN — PREDNISONE 20 MG: 20 TABLET ORAL at 08:41

## 2024-05-01 RX ADMIN — Medication 10 ML: at 22:51

## 2024-05-01 RX ADMIN — Medication 10 ML: at 08:41

## 2024-05-01 RX ADMIN — IPRATROPIUM BROMIDE AND ALBUTEROL SULFATE 3 ML: .5; 3 SOLUTION RESPIRATORY (INHALATION) at 11:42

## 2024-05-01 RX ADMIN — LEVOTHYROXINE SODIUM 75 MCG: 0.07 TABLET ORAL at 05:03

## 2024-05-01 NOTE — PROGRESS NOTES
Daily Progress Note          Assessment    Dyspnea  Hypoxemia: On home oxygen 2 L    Severe pulmonary hypertension mean PA pressure 44   RHC March 2023  RA 6/5, 4 mmHg  RV 74/3, 5 mmHg  PA 71/25, 44 mmHg  PCW 12 mmHg  AO Sat 92%  PA Sat 78%  Jose CO: 7.89 L/min  Jose CI: 4.69 L/min/m²  [facility]   LV: 134/3, 20 mmHg  AO: 131/56, 87 mmHg  No significant gradient across the aortic valve during pullback of JR4 catheter.     Severe calcified tortuous nonobstructive coronary artery disease     Patient had CT with IV contrast in October 2022 showed no pulmonary embolism     COPD/emphysema  Chronic atelectasis in left lower lobe  Anemia  CKD  HTN  History of PE/DVT  History of TIA  CAD  Paroxysmal atrial fibrillation  History of breast cancer 2018, currently in remission     Results for orders placed in visit on 03/18/24     Adult Transthoracic Echo Limited W/ Cont if Necessary Per Protocol     Interpretation Summary    Left ventricular ejection fraction appears to be 61 - 65%.    The right ventricular cavity is dilated.    There is a small (<1cm) pericardial effusion adjacent to the left ventricle. There is no evidence of cardiac tamponade.    IVC is 2.1 cm.           Recommendations:      Sildenafil for pulmonary hypertension, monitor systemic blood pressure     po prednisone     Oxygen supplement and titration to maintain saturation 90 to 95%: Currently requiring 4 L per nasal cannula  Bronchodilators    Inhaled corticosteroids     Diuresis as per nephrology    Thyroid hormone replacement  Cardiology following   Electrolytes/ glycemic control  Chronic anticoagulation: Apixaban    I personally reviewed the radiological studies                 LOS: 6 days     Subjective     C/o PEREZ, mild cough, diarrhea     Objective     Vital signs for last 24 hours:  Vitals:    05/01/24 0124 05/01/24 0538 05/01/24 0709 05/01/24 0712   BP: 132/79 133/71     BP Location: Right arm Right arm     Patient Position: Lying Lying     Pulse: 83 80 74  77   Resp: 16 15 15 15   Temp: 97.7 °F (36.5 °C) 97.7 °F (36.5 °C)     TempSrc: Oral Oral     SpO2: 94% 95% 91% 95%   Weight:  66.1 kg (145 lb 11.6 oz)     Height:           Intake/Output last 3 shifts:  I/O last 3 completed shifts:  In: 1440 [P.O.:1440]  Out: 1400 [Urine:1400]  Intake/Output this shift:  No intake/output data recorded.      Radiology  Imaging Results (Last 24 Hours)       ** No results found for the last 24 hours. **            Labs:  Results from last 7 days   Lab Units 05/01/24  0239   WBC 10*3/mm3 10.11   HEMOGLOBIN g/dL 10.6*   HEMATOCRIT % 35.9   PLATELETS 10*3/mm3 223     Results from last 7 days   Lab Units 05/01/24  0239 04/30/24  0038 04/29/24  0256   SODIUM mmol/L 145   < > 143   POTASSIUM mmol/L 4.0   < > 4.2   CHLORIDE mmol/L 107   < > 105   CO2 mmol/L 25.0   < > 26.0   BUN mg/dL 48*   < > 48*   CREATININE mg/dL 2.27*   < > 1.96*   CALCIUM mg/dL 9.4   < > 10.0   BILIRUBIN mg/dL  --   --  1.3*   ALK PHOS U/L  --   --  66   ALT (SGPT) U/L  --   --  16   AST (SGOT) U/L  --   --  17   GLUCOSE mg/dL 160*   < > 116*    < > = values in this interval not displayed.         Results from last 7 days   Lab Units 04/29/24  0256 04/27/24  1014 04/25/24 2035   ALBUMIN g/dL 3.8 4.3 4.0     Results from last 7 days   Lab Units 04/26/24  0043 04/25/24  2035   CK TOTAL U/L 32  --    HSTROP T ng/L 21* 25*         Results from last 7 days   Lab Units 05/01/24  0239   MAGNESIUM mg/dL 2.1         Results from last 7 days   Lab Units 04/26/24  0043 04/25/24  1536   TSH uIU/mL  --  8.760*   FREE T4 ng/dL 1.57  --            Meds:   SCHEDULE  apixaban, 2.5 mg, Oral, Q12H  [START ON 5/2/2024] bumetanide, 0.5 mg, Oral, Daily  carvedilol, 6.25 mg, Oral, BID With Meals  febuxostat, 40 mg, Oral, Daily  ipratropium-albuterol, 3 mL, Nebulization, 4x Daily - RT  levothyroxine, 75 mcg, Oral, Q AM  pantoprazole, 40 mg, Oral, Q AM  potassium chloride, 20 mEq, Oral, Daily  predniSONE, 20 mg, Oral, Daily With  Breakfast  sildenafil, 20 mg, Oral, TID  sodium chloride, 10 mL, Intravenous, Q12H      Infusions     PRNs    acetaminophen    senna-docusate sodium **AND** polyethylene glycol **AND** bisacodyl **AND** bisacodyl    Calcium Replacement - Follow Nurse / BPA Driven Protocol    loperamide    Magnesium Standard Dose Replacement - Follow Nurse / BPA Driven Protocol    ondansetron ODT **OR** ondansetron    Phosphorus Replacement - Follow Nurse / BPA Driven Protocol    Potassium Replacement - Follow Nurse / BPA Driven Protocol    promethazine    promethazine    sodium chloride    sodium chloride    Physical Exam:  General Appearance:  Alert   HEENT:  Normocephalic, without obvious abnormality, Conjunctiva/corneas clear,.   Nares normal, no drainage     Neck:  Supple, symmetrical, trachea midline.   Lungs /Chest wall:   mild Bilateral basal rhonchi, respirations unlabored, symmetrical wall movement.     Heart:  Regular rate and rhythm, S1 S2 normal  Abdomen: Soft, non-tender, no masses, no organomegaly.    Extremities: No edema, no clubbing or cyanosis     ROS  Constitutional: Negative for chills, fever and malaise/fatigue.   HENT: Negative.    Eyes: Negative.    Cardiovascular: Negative.    Respiratory: Positive for cough and shortness of breath.    Skin: Negative.    Musculoskeletal: Negative.    Gastrointestinal: diarrhea    Genitourinary: Negative.    Neurological: Negative.    Psychiatric/Behavioral: Negative.      I reviewed the recent clinical results  I personally reviewed the latest radiological studies    Part of this note may be an electronic transcription/translation of spoken language to printed text using the Dragon Dictation System.

## 2024-05-01 NOTE — PROGRESS NOTES
LOS: 6 days   Patient Care Team:  Paul Granados MD as PCP - General (Family Medicine)  Richardson Willis MD as Cardiologist (Cardiology)  Rafy Rodriguez MD as Consulting Physician (Hematology and Oncology)  Christianne Phillips, AMARILIS as Licensed Practical Nurse  Salome Fleming MD as Consulting Physician (Nephrology)    Subjective:  Better overall//less diarrhea    Objective:   afebrile      Review of Systems:   Review of Systems   Constitutional:  Positive for activity change.   Respiratory:  Positive for shortness of breath.    Gastrointestinal:  Positive for diarrhea.   Neurological:  Positive for weakness.           Vital Signs  Temp:  [97.3 °F (36.3 °C)-97.7 °F (36.5 °C)] 97.3 °F (36.3 °C)  Heart Rate:  [72-94] 88  Resp:  [12-18] 14  BP: (104-133)/(53-79) 104/65    Physical Exam:  Physical Exam  Vitals and nursing note reviewed.   Cardiovascular:      Rate and Rhythm: Rhythm irregular.      Heart sounds: Normal heart sounds.   Pulmonary:      Breath sounds: Normal breath sounds.   Skin:     General: Skin is warm.   Neurological:      Mental Status: She is alert.          Radiology:  XR Chest 1 View    Result Date: 4/28/2024  Impression: 1. Cardiomegaly with no evidence of pulmonary edema 2. Left basilar atelectasis Electronically Signed: Darnell Kennedy  4/28/2024 11:33 AM EDT  Workstation ID: [id number]    US Renal Bilateral    Result Date: 4/26/2024  Impression: 1. Increased echogenicity of the kidneys suggestive of chronic medical renal disease. 2. Mild dilatation of the left renal pelvis. Electronically Signed: Cintia Becker MD  4/26/2024 11:32 AM EDT  Workstation ID: [id number]    XR Chest 1 View    Result Date: 4/25/2024  Impression: Findings suggest mild CHF superimposed over chronic lung disease. Electronically Signed: Mary Ivan MD  4/25/2024 3:52 PM EDT  Workstation ID: [id number]        Results Review:     I reviewed the patient's new clinical results.  I reviewed the patient's new imaging  results and agree with the interpretation.    Medication Review:   Scheduled Meds:apixaban, 2.5 mg, Oral, Q12H  [START ON 5/2/2024] bumetanide, 0.5 mg, Oral, Daily  carvedilol, 6.25 mg, Oral, BID With Meals  febuxostat, 40 mg, Oral, Daily  ipratropium-albuterol, 3 mL, Nebulization, 4x Daily - RT  levothyroxine, 75 mcg, Oral, Q AM  pantoprazole, 40 mg, Oral, Q AM  potassium chloride, 20 mEq, Oral, Daily  predniSONE, 20 mg, Oral, Daily With Breakfast  sildenafil, 20 mg, Oral, TID  sodium chloride, 10 mL, Intravenous, Q12H      Continuous Infusions:   PRN Meds:.  acetaminophen    senna-docusate sodium **AND** polyethylene glycol **AND** bisacodyl **AND** bisacodyl    Calcium Replacement - Follow Nurse / BPA Driven Protocol    loperamide    Magnesium Standard Dose Replacement - Follow Nurse / BPA Driven Protocol    ondansetron ODT **OR** ondansetron    Phosphorus Replacement - Follow Nurse / BPA Driven Protocol    Potassium Replacement - Follow Nurse / BPA Driven Protocol    promethazine    promethazine    sodium chloride    sodium chloride    Labs:    CBC    Results from last 7 days   Lab Units 05/01/24  0239 04/30/24  0038 04/29/24  0256 04/28/24  1019 04/27/24  1014 04/26/24  0043 04/25/24  1536   WBC 10*3/mm3 10.11 9.09 9.41 12.34* 14.78* 6.21 7.71   HEMOGLOBIN g/dL 10.6* 10.7* 10.6* 11.2* 11.7* 10.5* 10.8*   PLATELETS 10*3/mm3 223 207 211 238 240 204 192     BMP   Results from last 7 days   Lab Units 05/01/24  0239 04/30/24  0038 04/29/24  0256 04/28/24  1019 04/27/24  1014 04/26/24  0043 04/25/24  2035   SODIUM mmol/L 145 144 143 144 140 144 145   POTASSIUM mmol/L 4.0 3.6 4.2 4.4 4.9 3.7 3.4*   CHLORIDE mmol/L 107 105 105 106 103 105 105   CO2 mmol/L 25.0 28.0 26.0 26.0 23.0 25.0 25.0   BUN mg/dL 48* 49* 48* 48* 44* 34* 32*   CREATININE mg/dL 2.27* 2.21* 1.96* 1.86* 2.22* 1.81* 1.71*   GLUCOSE mg/dL 160* 103* 116* 87 122* 164* 168*   MAGNESIUM mg/dL 2.1 1.9 2.1 2.3 2.3  --   --    PHOSPHORUS mg/dL 2.8 3.3 3.3 2.6  "3.2  --   --      Cr Clearance Estimated Creatinine Clearance: 17.6 mL/min (A) (by C-G formula based on SCr of 2.27 mg/dL (H)).  Coag     HbA1C   Lab Results   Component Value Date    HGBA1C 5.8 (H) 10/25/2022     Blood Glucose No results found for: \"POCGLU\"  Infection   Results from last 7 days   Lab Units 04/25/24  1536   PROCALCITONIN ng/mL 0.04     CMP   Results from last 7 days   Lab Units 05/01/24  0239 04/30/24  0038 04/29/24  0256 04/28/24  1019 04/27/24  1014 04/26/24  0043 04/25/24  2035   SODIUM mmol/L 145 144 143 144 140 144 145   POTASSIUM mmol/L 4.0 3.6 4.2 4.4 4.9 3.7 3.4*   CHLORIDE mmol/L 107 105 105 106 103 105 105   CO2 mmol/L 25.0 28.0 26.0 26.0 23.0 25.0 25.0   BUN mg/dL 48* 49* 48* 48* 44* 34* 32*   CREATININE mg/dL 2.27* 2.21* 1.96* 1.86* 2.22* 1.81* 1.71*   GLUCOSE mg/dL 160* 103* 116* 87 122* 164* 168*   ALBUMIN g/dL  --   --  3.8  --  4.3  --  4.0   BILIRUBIN mg/dL  --   --  1.3*  --  1.6*  --  2.1*   ALK PHOS U/L  --   --  66  --  74  --  75   AST (SGOT) U/L  --   --  17  --  23  --  18   ALT (SGPT) U/L  --   --  16  --  21  --  16     UA    Results from last 7 days   Lab Units 04/25/24  1556   NITRITE UA  Positive*   WBC UA /HPF 0-2   BACTERIA UA /HPF 1+*   SQUAM EPITHEL UA /HPF 0-2     Radiology(recent) No radiology results for the last day   Assessment:    Acute gastroenteritis  Acute exacerbation of diastolic congestive heart failure  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  Atrial fibrillation, paroxysmal  Severe pulmonary hypertension  Chronic atelectasis left lower lobe  Acute renal failure with acute kidney injury superimposed upon chronic disease stage IIIa  Left breast anomaly  Hypertension associated chronic kidney disease stage IIIa  Anemia of chronic kidney disease  Hyperuricemia  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  Cerebrovascular disease status post CVA  Chronic oral anticoagulation therapy  Acquired " hypothyroidism  Thrombophilia  Autonomic dysfunction syndrome  History of pulmonary embolism  Hypercoagulable state secondary to atrial fibrillation  [id number] score 6  History of IVC filter  Aneurysmal dilatation of abdominal aorta  Chronic mucopurulent bronchitis  Peripheral polyneuropathy  History of breast cancer  Supplemental oxygen dependency  Chronic hypoxic respiratory failure      Plan:    [facility] inpatient rehabilitation//D/C planning        Paul Granados MD  05/01/24  14:10 EDT

## 2024-05-01 NOTE — THERAPY TREATMENT NOTE
"Subjective: Pt agreeable to therapeutic plan of care.    Objective:     Bed mobility - Modified-Independent using bedrails    Transfers - Modified-Independent and with rolling walker    Ambulation - 100 feet Supervision and with rolling walker    Therapeutic Exercise - 20 Reps B LE AROM supported sitting / chair    Vitals: WNL on 3L    Pain: 0 VAS   Location:   Intervention for pain: N/A    Education: Provided education on the importance of mobility in the acute care setting, Verbal/Tactile Cues, Transfer Training, and Gait Training    Assessment: Gabrielle Lyles presents with functional mobility impairments which indicate the need for skilled intervention. Tolerating session today without incident. Pt safe to return home at discharge with HHPT. Will continue to follow and progress as tolerated.     Plan/Recommendations:   If medically appropriate, Low Intensity Therapy recommended post-acute care - This is recommended as therapy feels this patient would require 2-3 visits per week. OP or HH would be the best option depending on patient's home bound status. Consider, if the patient has other  \"skilled\" needs such as wounds, IV antibiotics, etc. Combined with \"low intensity\" could also equate to a SNF. If patient is medically complex, consider LTAC. Pt requires no DME at discharge.     Pt desires Home with Home Health at discharge. Pt cooperative; agreeable to therapeutic recommendations and plan of care.         Basic Mobility 6-click:  Rollin = Total, A lot = 2, A little = 3; 4 = None  Supine>Sit:   1 = Total, A lot = 2, A little = 3; 4 = None   Sit>Stand with arms:  1 = Total, A lot = 2, A little = 3; 4 = None  Bed>Chair:   1 = Total, A lot = 2, A little = 3; 4 = None  Ambulate in room:  1 = Total, A lot = 2, A little = 3; 4 = None  3-5 Steps with railin = Total, A lot = 2, A little = 3; 4 = None  Score: 23    Modified Deann: N/A = No pre-op stroke/TIA    Post-Tx Position: Up in Chair, Alarms " activated, and Call light and personal items within reach  PPE: gloves

## 2024-05-01 NOTE — PLAN OF CARE
"Assessment: Gabrielle Lyles presents with functional mobility impairments which indicate the need for skilled intervention. Tolerating session today without incident. Pt safe to return home at discharge with HHPT. Will continue to follow and progress as tolerated.     Plan/Recommendations:   If medically appropriate, Low Intensity Therapy recommended post-acute care - This is recommended as therapy feels this patient would require 2-3 visits per week. OP or HH would be the best option depending on patient's home bound status. Consider, if the patient has other  \"skilled\" needs such as wounds, IV antibiotics, etc. Combined with \"low intensity\" could also equate to a SNF. If patient is medically complex, consider LTAC. Pt requires no DME at discharge.     Pt desires Home with Home Health at discharge. Pt cooperative; agreeable to therapeutic recommendations and plan of care.     "

## 2024-05-01 NOTE — PLAN OF CARE
Goal Outcome Evaluation:      Patient is calm and cooperative, on 2L humidified NC and was tolerated well by pt. Call light placed within reach. VSS with no complaints at the moment

## 2024-05-01 NOTE — CASE MANAGEMENT/SOCIAL WORK
Continued Stay Note  LIDYA Cox     Patient Name: Gabrielle Lyels  MRN: [id number]  Today's Date: 5/1/2024    Admit Date: 4/25/2024    Plan: DC PLAN: From routine home. Current with Home 02 3L thru [place].   Discharge Plan       Row Name 05/01/24 1647       Plan    Plan DC PLAN: From routine home. Current with Home 02 3L thru [place].    Patient/Family in Agreement with Plan yes    Plan Comments BArriers to discharge: Holding bumex.  Nephrology, Pulm, Cardio following.             Expected Discharge Date and Time       Expected Discharge Date Expected Discharge Time    May 2, 2024           Brook [place] RN    Office Phone [phone]  Office Cell [phone]

## 2024-05-01 NOTE — PROGRESS NOTES
"NEPHROLOGY PROGRESS NOTE------[facility]    [facility]  [phone]  Luke Fleming MD      Patient Care Team:  Paul Granados MD as PCP - General (Family Medicine)  Richardson Willis MD as Cardiologist (Cardiology)  Rafy Rodriguez MD as Consulting Physician (Hematology and Oncology)  Christianne Phillips RN as Licensed Practical Nurse  Salome Fleming MD as Consulting Physician (Nephrology)      Provider:  Luke Fleming MD  Patient Name: Gabrielle Lyles  :  1940    SUBJECTIVE:    F/U ARF/JULIAN/CRF/CKD    Fatigued. No angina. No dysuria.     Medication:  apixaban, 2.5 mg, Oral, Q12H  bumetanide, 1 mg, Oral, Daily  carvedilol, 6.25 mg, Oral, BID With Meals  febuxostat, 40 mg, Oral, Daily  ipratropium-albuterol, 3 mL, Nebulization, 4x Daily - RT  levothyroxine, 75 mcg, Oral, Q AM  pantoprazole, 40 mg, Oral, Q AM  potassium chloride, 20 mEq, Oral, Daily  predniSONE, 20 mg, Oral, Daily With Breakfast  sildenafil, 20 mg, Oral, TID  sodium chloride, 10 mL, Intravenous, Q12H           OBJECTIVE    Vital Sign Min/Max for last 24 hours  Temp  Min: 97.3 °F (36.3 °C)  Max: 97.7 °F (36.5 °C)   BP  Min: 90/47  Max: 133/71   Pulse  Min: 74  Max: 94   Resp  Min: 14  Max: 18   SpO2  Min: 90 %  Max: 99 %   No data recorded   Weight  Min: 66.1 kg (145 lb 11.6 oz)  Max: 66.1 kg (145 lb 11.6 oz)     Flowsheet Rows      Flowsheet Row First Filed Value   Admission Height 162.6 cm (64\") Documented at 2024 0711   Admission Weight 74 kg (163 lb 2.3 oz) Documented at 2024 2107            No intake/output data recorded.  I/O last 3 completed shifts:  In: 1440 [P.O.:1440]  Out: 1400 [Urine:1400]    Physical Exam:  General Appearance: alert, appears stated age and cooperative  Head: normocephalic, without obvious abnormality and atraumatic  Eyes: conjunctivae and sclerae normal and no icterus  Neck: supple and NO GROSS JVD NOW  Lungs: NO " "CRACKLES NOW  Heart: IRREG IRREG +TAYE  Chest Wall: no abnormalities observed  Abdomen: normal bowel sounds and soft, nontender  Extremities: moves extremities well,1+ BILAT PRETIBIAL EDEMA, no cyanosis  Skin: no bleeding, bruising or rash  Neurologic: Alert, and oriented. No focal deficits    Labs:    WBC WBC   Date Value Ref Range Status   05/01/2024 10.11 3.40 - 10.80 10*3/mm3 Final   04/30/2024 9.09 3.40 - 10.80 10*3/mm3 Final   04/29/2024 9.41 3.40 - 10.80 10*3/mm3 Final   04/28/2024 12.34 (H) 3.40 - 10.80 10*3/mm3 Final      HGB Hemoglobin   Date Value Ref Range Status   05/01/2024 10.6 (L) 12.0 - 15.9 g/dL Final   04/30/2024 10.7 (L) 12.0 - 15.9 g/dL Final   04/29/2024 10.6 (L) 12.0 - 15.9 g/dL Final   04/28/2024 11.2 (L) 12.0 - 15.9 g/dL Final      HCT Hematocrit   Date Value Ref Range Status   05/01/2024 35.9 34.0 - 46.6 % Final   04/30/2024 35.8 34.0 - 46.6 % Final   04/29/2024 35.4 34.0 - 46.6 % Final   04/28/2024 38.2 34.0 - 46.6 % Final      Platelets No results found for: \"LABPLAT\"   MCV MCV   Date Value Ref Range Status   05/01/2024 87.3 79.0 - 97.0 fL Final   04/30/2024 85.6 79.0 - 97.0 fL Final   04/29/2024 86.1 79.0 - 97.0 fL Final   04/28/2024 86.8 79.0 - 97.0 fL Final          Sodium Sodium   Date Value Ref Range Status   05/01/2024 145 136 - 145 mmol/L Final   04/30/2024 144 136 - 145 mmol/L Final   04/29/2024 143 136 - 145 mmol/L Final   04/28/2024 144 136 - 145 mmol/L Final      Potassium Potassium   Date Value Ref Range Status   05/01/2024 4.0 3.5 - 5.2 mmol/L Final     Comment:     Slight hemolysis detected by analyzer. Result may be falsely elevated.   04/30/2024 3.6 3.5 - 5.2 mmol/L Final   04/29/2024 4.2 3.5 - 5.2 mmol/L Final   04/28/2024 4.4 3.5 - 5.2 mmol/L Final      Chloride Chloride   Date Value Ref Range Status   05/01/2024 107 98 - 107 mmol/L Final   04/30/2024 105 98 - 107 mmol/L Final   04/29/2024 105 98 - 107 mmol/L Final   04/28/2024 106 98 - 107 mmol/L Final      CO2 CO2 " "  Date Value Ref Range Status   05/01/2024 25.0 22.0 - 29.0 mmol/L Final   04/30/2024 28.0 22.0 - 29.0 mmol/L Final   04/29/2024 26.0 22.0 - 29.0 mmol/L Final   04/28/2024 26.0 22.0 - 29.0 mmol/L Final      BUN BUN   Date Value Ref Range Status   05/01/2024 48 (H) 8 - 23 mg/dL Final   04/30/2024 49 (H) 8 - 23 mg/dL Final   04/29/2024 48 (H) 8 - 23 mg/dL Final   04/28/2024 48 (H) 8 - 23 mg/dL Final      Creatinine Creatinine   Date Value Ref Range Status   05/01/2024 2.27 (H) 0.57 - 1.00 mg/dL Final   04/30/2024 2.21 (H) 0.57 - 1.00 mg/dL Final   04/29/2024 1.96 (H) 0.57 - 1.00 mg/dL Final   04/28/2024 1.86 (H) 0.57 - 1.00 mg/dL Final      Calcium Calcium   Date Value Ref Range Status   05/01/2024 9.4 8.6 - 10.5 mg/dL Final   04/30/2024 9.7 8.6 - 10.5 mg/dL Final   04/29/2024 10.0 8.6 - 10.5 mg/dL Final   04/28/2024 10.5 8.6 - 10.5 mg/dL Final      PO4 No components found for: \"PO4\"   Albumin Albumin   Date Value Ref Range Status   04/29/2024 3.8 3.5 - 5.2 g/dL Final      Magnesium Magnesium   Date Value Ref Range Status   05/01/2024 2.1 1.6 - 2.4 mg/dL Final   04/30/2024 1.9 1.6 - 2.4 mg/dL Final   04/29/2024 2.1 1.6 - 2.4 mg/dL Final   04/28/2024 2.3 1.6 - 2.4 mg/dL Final      Uric Acid No components found for: \"URIC ACID\"     Imaging Results (Last 72 Hours)       Procedure Component Value Units Date/Time    XR Chest 1 View [[id number]] Collected: 04/28/24 1131     Updated: 04/28/24 1136    Narrative:      XR CHEST 1 VW    Date of Exam: 4/28/2024 9:45 AM EDT    Indication: CHF    Comparison: 4/25/2020    Findings:  Heart size and pulmonary vasculature are stable. No gross pulmonary edema is demonstrated. There is strandy opacity in the left lung base.      Impression:      Impression:    1. Cardiomegaly with no evidence of pulmonary edema  2. Left basilar atelectasis      Electronically Signed: Darnell Kennedy    4/28/2024 11:33 AM EDT    Workstation ID: [id number]            Results for orders placed during the " hospital encounter of 04/25/24    XR Chest 1 View    Narrative  XR CHEST 1 VW    Date of Exam: 4/28/2024 9:45 AM EDT    Indication: CHF    Comparison: 4/25/2020    Findings:  Heart size and pulmonary vasculature are stable. No gross pulmonary edema is demonstrated. There is strandy opacity in the left lung base.    Impression  Impression:    1. Cardiomegaly with no evidence of pulmonary edema  2. Left basilar atelectasis      Electronically Signed: Darnell Kennedy  4/28/2024 11:33 AM EDT  Workstation ID: [id number]      XR Chest 1 View    Narrative  XR CHEST 1 VW    Date of Exam: 4/25/2024 3:38 PM EDT    Indication: SOB    Comparison: 3/21/2024.    Findings:  There is stable mild cardiomegaly. There is pulmonary vascular congestion with increasing interstitial prominence bilaterally. There may be a small right effusion.    Impression  Impression:  Findings suggest mild CHF superimposed over chronic lung disease.      Electronically Signed: Mary Ivan MD  4/25/2024 3:52 PM EDT  Workstation ID: [id number]      Results for orders placed during the hospital encounter of 03/21/24    XR Chest 1 View    Narrative  XR CHEST 1 VW    Date of Exam: 3/21/2024 1:20 PM EDT    Indication: Dyspnea    Comparison: 2/9/2024.    Findings:  There is stable mild cardiomegaly. Lung fields appear clear of acute infiltrates or effusions. Mild diffuse bilateral reticular lung thickening is stable. Right PICC line has been removed.    Impression  Impression:  Chronic findings. No acute process.      Electronically Signed: Mary Ivan MD  3/21/2024 1:48 PM EDT  Workstation ID: [id number]      Results for orders placed during the hospital encounter of 02/05/24    Duplex Venous Upper Extremity - Right CAR    Interpretation Summary    Normal right upper extremity venous duplex scan.        ASSESSMENT / PLAN      Dyspnea    ARF/JULIAN/CRF/CKD------Nonoliguric. +ARF/JULIAN on top of CRF/CKD STG 3A   with a baseline serum Creatinine of about 1.3-1.4.   CRF/CKD STG 3A is secondary to HTN NS. +ARF/JULIAN is secondary to prerenal/hemodynamic fluctuation from decompensated CHF (Cardiorenal). Gentle diuresis.  Avoid hypotension.   It appears that in the long run we may have to accept a higher baseline serum creatinine in order to keep euvolemic. No NSAIDs or IV dye. Dose meds for CrCl less than 10 cc/min. BUN/Cr up. Hold diuretics today     2. ANEMIA OF CKD------IV iron for ROME. Follow for EPO need     3. HTN WITH CKD-----Avoid hypotension. No ACE/ARB/DRI for now     4. OA/DJD/HYPERURICEMIA------No NSAIDs. Added Uloric     5. VOLUME OVERLOAD----Better clinically. Hold Bumex again today and restart tomorrow.     6. HYPERGLYCEMIA------Glucometers, SSI     7.  PULMONARY HTN--------Per , Pulmonary     8. HYPOKALEMIA-------Replaced     9. CAD-----per , Cardiology    10. DIARRHEA-----C.Diff negative.      11. DECONDITIONING------PT/OT/Rehab        Luke Fleming MD  [facility]  [phone]  05/01/24  07:36 EDT

## 2024-05-01 NOTE — PROGRESS NOTES
Referring Provider: Paul Granados MD    Reason for follow-up: Shortness of breath, elevated proBNP     Patient Care Team:  Paul Granados MD as PCP - General (Family Medicine)  Richardson Willis MD as Cardiologist (Cardiology)  Rafy Rodriguez MD as Consulting Physician (Hematology and Oncology)  Christianne Phillips RN as Licensed Practical Nurse  Salome Fleming MD as Consulting Physician (Nephrology)      SUBJECTIVE   Laying comfortably in bed.  Eating breakfast.     ROS  Review of all systems negative except as indicated.    Since I have last seen, the patient has been without any chest discomfort, shortness of breath, palpitations, dizziness or syncope.  Denies having any headache, abdominal pain, nausea, vomiting, diarrhea, constipation, loss of weight or loss of appetite.  Denies having any excessive bruising, hematuria or blood in the stool.  ROS      Personal History:    Past Medical History:   Diagnosis Date    Acute exacerbation of chronic obstructive pulmonary disease (COPD)     COPD (chronic obstructive pulmonary disease)     Deep vein thrombosis of bilateral lower extremities 07/24/2019    3/2013    History of pneumonia 06/2012    community acquired pneumonia and right pleural effusion;hospitalized at [facility]    History of pulmonary embolism 03/2013    bilateral PE    Hypertension 2001    Insomnia     on Ambien 5mg at     Lobular breast cancer, left 11/2011    Stage IA left upper lobular breast cancer    Malignant neoplasm of left breast in female, estrogen receptor positive 07/18/2019    Osteopenia 2012    Parainfluenza infection     Parotid duct obstruction     Severe pulmonary hypertension 03/03/2023    Stage 3a chronic kidney disease 07/24/2019    TIA (transient ischemic attack) 10/25/2022       Past Surgical History:   Procedure Laterality Date    CARDIAC CATHETERIZATION N/A 3/3/2023    Procedure: Left and Right Heart Cath with Coronary Angiography;  Surgeon: Richardson Willis MD;   Location: [facility] INVASIVE LOCATION;  Service: Cardiovascular;  Laterality: N/A;    CATARACT EXTRACTION      IVC FILTER RETRIEVAL  2014    IVC filter placement by Dr. Card    MAMMO STEREOTACTIC BREAST BX SURGICAL ADD UNI Left 2011    invasive lobular carcinoma-Dr. Cande Daniel    MASTECTOMY, PARTIAL Left 2011    and left axillary sentinel lymph node biopsy by Dr. Uriostegui    TUBAL ABDOMINAL LIGATION         Family History   Problem Relation Age of Onset    Lung cancer Brother        Social History     Tobacco Use    Smoking status: Former     Current packs/day: 0.00     Types: Cigarettes     Quit date:      Years since quittin.3     Passive exposure: Past    Smokeless tobacco: Never    Tobacco comments:     smoked 6 cigarettes a day from 13 years of age to 56 years of age when she quit in    Vaping Use    Vaping status: Never Used   Substance Use Topics    Alcohol use: Not Currently    Drug use: No        Home meds:  Prior to Admission medications    Medication Sig Start Date End Date Taking? Authorizing Provider   apixaban (ELIQUIS) 5 MG tablet tablet Take 1 tablet by mouth Every 12 (Twelve) Hours. Indications: Other - full anticoagulation, history of DVT/PE 10/27/22  Yes Shaylee Mcdaniels MD   budesonide-formoterol (SYMBICORT) 80-4.5 MCG/ACT inhaler Inhale 2 puffs 2 (Two) Times a Day.   Yes ProviderJaylyn MD   carvedilol (COREG) 12.5 MG tablet TAKE 1 TABLET BY MOUTH TWICE DAILY WITH MEALS 23  Yes Richardson Willis MD   Cholecalciferol (Vitamin D3) 50 MCG (2000 UT) capsule Take 1 capsule by mouth Daily.   Yes ProviderJaylyn MD   Folbic 2.5-25-2 MG tablet tablet Take 1 tablet by mouth Daily. 24  Yes Jaylyn Golden MD   furosemide (LASIX) 20 MG tablet Take 1 tablet by mouth Daily. 24  Yes Mansi Becerril APRN   levothyroxine (SYNTHROID, LEVOTHROID) 50 MCG tablet Take 1 tablet by mouth Daily.   Yes Jaylyn Golden MD   O2 (OXYGEN) Inhale 2  "L/min Continuous. 2/26/24  Yes Provider, MD Jaylyn   potassium chloride (K-DUR,KLOR-CON) 10 MEQ CR tablet Take 1 tablet by mouth Daily. 2/24/23  Yes Richardson Willis MD   sildenafil (REVATIO) 20 MG tablet Take 1 tablet by mouth Every 8 (Eight) Hours. 5/11/23  Yes Mansi Becerril APRN   allopurinol (ZYLOPRIM) 100 MG tablet Take 1 tablet by mouth Daily.    Provider, Jaylyn, MD       Allergies:  Patient has no known allergies.    Scheduled Meds:apixaban, 2.5 mg, Oral, Q12H  [START ON 5/2/2024] bumetanide, 0.5 mg, Oral, Daily  carvedilol, 6.25 mg, Oral, BID With Meals  febuxostat, 40 mg, Oral, Daily  ipratropium-albuterol, 3 mL, Nebulization, 4x Daily - RT  levothyroxine, 75 mcg, Oral, Q AM  pantoprazole, 40 mg, Oral, Q AM  potassium chloride, 20 mEq, Oral, Daily  predniSONE, 20 mg, Oral, Daily With Breakfast  sildenafil, 20 mg, Oral, TID  sodium chloride, 10 mL, Intravenous, Q12H      Continuous Infusions:   PRN Meds:.  acetaminophen    senna-docusate sodium **AND** polyethylene glycol **AND** bisacodyl **AND** bisacodyl    Calcium Replacement - Follow Nurse / BPA Driven Protocol    loperamide    Magnesium Standard Dose Replacement - Follow Nurse / BPA Driven Protocol    ondansetron ODT **OR** ondansetron    Phosphorus Replacement - Follow Nurse / BPA Driven Protocol    Potassium Replacement - Follow Nurse / BPA Driven Protocol    promethazine    promethazine    sodium chloride    sodium chloride      OBJECTIVE    Vital Signs  Vitals:    05/01/24 0124 05/01/24 0538 05/01/24 0709 05/01/24 0712   BP: 132/79 133/71     BP Location: Right arm Right arm     Patient Position: Lying Lying     Pulse: 83 80 74 77   Resp: 16 15 15 15   Temp: 97.7 °F (36.5 °C) 97.7 °F (36.5 °C)     TempSrc: Oral Oral     SpO2: 94% 95% 91% 95%   Weight:  66.1 kg (145 lb 11.6 oz)     Height:           Flowsheet Rows      Flowsheet Row First Filed Value   Admission Height 162.6 cm (64\") Documented at 04/27/2024 0711   Admission Weight 74 kg " (163 lb 2.3 oz) Documented at 04/25/2024 2107              Intake/Output Summary (Last 24 hours) at 5/1/2024 0823  Last data filed at 5/1/2024 0538  Gross per 24 hour   Intake 1440 ml   Output 400 ml   Net 1040 ml          Telemetry: Atrial fibrillation with controlled heart rate    Physical Exam:  The patient is alert, oriented and in no distress.  Vital signs as noted above.  Head and neck revealed no carotid bruits or jugular venous distention.  No thyromegaly or lymphadenopathy is present  Lungs clear.  No wheezing.  Breath sounds are normal bilaterally.  Heart normal first and second heart sounds.  No murmur. No precordial rub is present.  No gallop is present.  Abdomen soft and nontender.  No organomegaly is present.  Extremities with good peripheral pulses without any pedal edema.  Skin warm and dry.  Musculoskeletal system is grossly normal.  CNS grossly normal.       Results Review:  I have personally reviewed the results from the time of this admission to 5/1/2024 08:23 EDT and agree with these findings:  []  Laboratory  []  Microbiology  []  Radiology  []  EKG/Telemetry   []  Cardiology/Vascular   []  Pathology  []  Old records  []  Other:    Most notable findings include:    Lab Results (last 24 hours)       Procedure Component Value Units Date/Time    Basic Metabolic Panel [[id number]]  (Abnormal) Collected: 05/01/24 0239    Specimen: Blood from Arm, Right Updated: 05/01/24 0314     Glucose 160 mg/dL      BUN 48 mg/dL      Creatinine 2.27 mg/dL      Sodium 145 mmol/L      Potassium 4.0 mmol/L      Comment: Slight hemolysis detected by analyzer. Result may be falsely elevated.        Chloride 107 mmol/L      CO2 25.0 mmol/L      Calcium 9.4 mg/dL      BUN/Creatinine Ratio 21.1     Anion Gap 13.0 mmol/L      eGFR 20.9 mL/min/1.73     Narrative:      GFR Normal >60  Chronic Kidney Disease <60  Kidney Failure <15    The GFR formula is only valid for adults with stable renal function between ages 18 and 70.     Magnesium [[id number]]  (Normal) Collected: 05/01/24 0239    Specimen: Blood from Arm, Right Updated: 05/01/24 0314     Magnesium 2.1 mg/dL     Phosphorus [[id number]]  (Normal) Collected: 05/01/24 0239    Specimen: Blood from Arm, Right Updated: 05/01/24 0314     Phosphorus 2.8 mg/dL     CBC (No Diff) [[id number]]  (Abnormal) Collected: 05/01/24 0239    Specimen: Blood from Arm, Right Updated: 05/01/24 0248     WBC 10.11 10*3/mm3      RBC 4.11 10*6/mm3      Hemoglobin 10.6 g/dL      Hematocrit 35.9 %      MCV 87.3 fL      MCH 25.8 pg      MCHC 29.5 g/dL      RDW 22.0 %      RDW-SD 66.4 fl      MPV 10.6 fL      Platelets 223 10*3/mm3             Imaging Results (Last 24 Hours)       ** No results found for the last 24 hours. **            LAB RESULTS (LAST 7 DAYS)    CBC  Results from last 7 days   Lab Units 05/01/24 0239 04/30/24  0038 04/29/24  0256 04/28/24  1019 04/27/24  1014 04/26/24  0043 04/25/24  1536   WBC 10*3/mm3 10.11 9.09 9.41 12.34* 14.78* 6.21 7.71   RBC 10*6/mm3 4.11 4.18 4.11 4.40 4.59 4.15 4.15   HEMOGLOBIN g/dL 10.6* 10.7* 10.6* 11.2* 11.7* 10.5* 10.8*   HEMATOCRIT % 35.9 35.8 35.4 38.2 40.2 36.1 37.6   MCV fL 87.3 85.6 86.1 86.8 87.6 87.0 90.6   PLATELETS 10*3/mm3 223 207 211 238 240 204 192       BMP  Results from last 7 days   Lab Units 05/01/24  0239 04/30/24  0038 04/29/24  0256 04/28/24  1019 04/27/24  1014 04/26/24  0043 04/25/24  2035   SODIUM mmol/L 145 144 143 144 140 144 145   POTASSIUM mmol/L 4.0 3.6 4.2 4.4 4.9 3.7 3.4*   CHLORIDE mmol/L 107 105 105 106 103 105 105   CO2 mmol/L 25.0 28.0 26.0 26.0 23.0 25.0 25.0   BUN mg/dL 48* 49* 48* 48* 44* 34* 32*   CREATININE mg/dL 2.27* 2.21* 1.96* 1.86* 2.22* 1.81* 1.71*   GLUCOSE mg/dL 160* 103* 116* 87 122* 164* 168*   MAGNESIUM mg/dL 2.1 1.9 2.1 2.3 2.3  --   --    PHOSPHORUS mg/dL 2.8 3.3 3.3 2.6 3.2  --   --        CMP   Results from last 7 days   Lab Units 05/01/24  0239 04/30/24  0038 04/29/24  0256 04/28/24  1019 04/27/24  1014  04/26/24 0043 04/25/24 2035   SODIUM mmol/L 145 144 143 144 140 144 145   POTASSIUM mmol/L 4.0 3.6 4.2 4.4 4.9 3.7 3.4*   CHLORIDE mmol/L 107 105 105 106 103 105 105   CO2 mmol/L 25.0 28.0 26.0 26.0 23.0 25.0 25.0   BUN mg/dL 48* 49* 48* 48* 44* 34* 32*   CREATININE mg/dL 2.27* 2.21* 1.96* 1.86* 2.22* 1.81* 1.71*   GLUCOSE mg/dL 160* 103* 116* 87 122* 164* 168*   ALBUMIN g/dL  --   --  3.8  --  4.3  --  4.0   BILIRUBIN mg/dL  --   --  1.3*  --  1.6*  --  2.1*   ALK PHOS U/L  --   --  66  --  74  --  75   AST (SGOT) U/L  --   --  17  --  23  --  18   ALT (SGPT) U/L  --   --  16  --  21  --  16       BNP        TROPONIN  Results from last 7 days   Lab Units 04/26/24 0043   CK TOTAL U/L 32   HSTROP T ng/L 21*       CoAg        Creatinine Clearance  Estimated Creatinine Clearance: 17.6 mL/min (A) (by C-G formula based on SCr of 2.27 mg/dL (H)).    ABG        Radiology  No radiology results for the last day      EKG  I personally viewed and interpreted the patient's EKG/Telemetry data:  ECG 12 Lead Rhythm Change   Final Result   HEART RATE= 72  bpm   RR Interval= 830  ms   WY Interval=   ms   P Horizontal Axis=   deg   P Front Axis=   deg   QRSD Interval= 91  ms   QT Interval= 379  ms   QTcB= 416  ms   QRS Axis= 118  deg   T Wave Axis=   deg   - ABNORMAL ECG -   Atrial fibrillation   Right axis deviation   Low voltage, precordial leads   When compared with ECG of 21-Mar-2024 14:18:17,   No significant change   Electronically Signed By: Richardson Willis ([facility]) 29-Apr-2024 23:17:34   Date and Time of Study: 2024-04-28 07:50:23      Telemetry Scan   Final Result      Telemetry Scan   Final Result      Telemetry Scan   Final Result      Telemetry Scan   Final Result      Telemetry Scan   Final Result      Telemetry Scan   Final Result      Telemetry Scan   Final Result      Telemetry Scan   Final Result      Telemetry Scan   Final Result      Telemetry Scan   Final Result      Telemetry Scan   Final Result      Telemetry Scan    Final Result      Telemetry Scan   Final Result      Telemetry Scan   Final Result      Telemetry Scan   Final Result      Telemetry Scan   Final Result      Telemetry Scan   Final Result      Telemetry Scan   Final Result      Telemetry Scan   Final Result      Telemetry Scan   Final Result      Telemetry Scan   Final Result      Telemetry Scan   Final Result      Telemetry Scan   Final Result      Telemetry Scan   Final Result      Telemetry Scan   Final Result      Telemetry Scan   Final Result      Telemetry Scan   Final Result      Telemetry Scan   Final Result      Telemetry Scan   Final Result      Telemetry Scan   Final Result      Telemetry Scan   Final Result      Telemetry Scan   Final Result      Telemetry Scan   Final Result            Echocardiogram:    Results for orders placed in visit on 03/18/24    Adult Transthoracic Echo Limited W/ Cont if Necessary Per Protocol    Interpretation Summary    Left ventricular ejection fraction appears to be 61 - 65%.    The right ventricular cavity is dilated.    There is a small (<1cm) pericardial effusion adjacent to the left ventricle. There is no evidence of cardiac tamponade.    IVC is 2.1 cm.        Stress Test:  Results for orders placed in visit on 09/13/22    Stress Test With Myocardial Perfusion One Day    Interpretation Summary    Left ventricular ejection fraction is hyperdynamic (Calculated EF > 70%). .    Myocardial perfusion imaging indicates a normal myocardial perfusion study with no evidence of ischemia.    Impressions are consistent with a low risk study.    Findings consistent with a normal ECG stress test.         Cardiac Catheterization:  Results for orders placed during the hospital encounter of 03/03/23    Cardiac Catheterization/Vascular Study    Conclusion  OPERATORS  Richardson Willis M.D. (Attending Cardiologist)      PROCEDURE PERFORMED  Ultrasound guided vascular access  Right heart catheterization  Coronary Angiogram  Left Heart  Catheterization 16847  Moderate Sedation    INDICATIONS FOR PROCEDURE  83-year-old woman with multiple cardiovascular risk factors, abnormal stress test presented with worsening shortness of breath.  After discussing the risk and benefit of the procedure she was brought in for elective right and left heart cath.    PROCEDURE IN DETAIL  Informed consent was obtained from the patient after explaining the risks, benefits, and alternative options of the procedure. After obtaining informed consent, the patient was brought to the cath lab and was prepped in a sterile fashion. Lidocaine 2% was used for local anesthesia into the right femoral venous access site. Right femoral vein was accessed using the micropuncture needle under ultrasound guidance and micropuncture wire advanced under flouroscopy. A 7 Serbian vascular sheath was put into place percutaneously over guide-wire. Guide wires were removed. A 6Fr swan sheryl catheter was advanced to wedge position. RA, RV and PA and wedge pressures were recorded.  PA sat and arterial sats recorded.  The patient tolerated the procedure well without any complications.    Lidocaine 2% was used for local anesthesia into the right femoral arterial access site. The right femoral artery was accessed with a micropuncture needle via modified Seldinger technique under ultrasound guidance. A 6F was inserted successfully.  Afterwards, 6F JR4 and JL4 diagnostic catheters were advanced over a wire into the ascending aorta and were used to engage the ostia of the left main and RCA respectively. JR4 used to cross the AV and obtain LV pressures and gradient across the AV measured via pullback technique. Images of the right and left coronary systems were obtained. All the catheters were exchanged over a wire and subsequently removed. Angiogram of the femoral access site was obtained and did not show complications. The patient tolerated the procedure well without any complications. The pictures were  reviewed at the end of the procedure. A Mynx closure device was applied.    HEMODYNAMICS    RHC  RA 6/5, 4 mmHg  RV 74/3, 5 mmHg  PA 71/25, 44 mmHg  PCW 12 mmHg  AO Sat 92%  PA Sat 78%    Jose CO: 7.89 L/min    Jose CI: 4.69 L/min/m²    LHC  LV: 134/3, 20 mmHg  AO: 131/56, 87 mmHg  No significant gradient across the aortic valve during pullback of JR4 catheter.    FINDINGS  Coronary Angiogram  All vessels are heavily calcified  She also has heavily calcified peripheral arterial disease.  Right dominant circulation    Left main: Left main is a large caliber vessel which gives rise to the Left Anterior Descending and the Left circumflex.  Left main is angiographically free from any significant disease.    Left Anterior Descending Artery: LAD is a heavily calcified medium caliber vessel which gives rise to diagonals.  The vessel is highly tortuous and has 50 to 60% mid vessel stenosis best seen in Setswana cranial view.    Left Circumflex: Heavily calcified vessel with 50% proximal segment stenosis.    Right Coronary Artery: The RCA is a large caliber vessel which is heavily calcified and tortuous.  It has diffuse luminal irregularities and 30 to 40% stenosis in the midsegment around the bend.    ESTIMATED BLOOD LOSS:  10 ml    COMPLICATIONS:  None    PROCEDURE DATA:  Sedation Time: 25 minutes    IMPRESSIONS  Heavily calcified, tortuous nonobstructive coronary disease  Severe pulmonary hypertension with PA systolic pressure in the 70s  Mean PA pressure 44 mmHg    RECOMMENDATIONS  -Continue aggressive medical management of CAD  -Referral to pulmonology for treatment of pulmonary hypertension         Other:         ASSESSMENT & PLAN:    Principal Problem:    Dyspnea    COPD/Chronic respiratory failure/shortness of breath  Severe pulmonary hypertension  HFpEF  On 3 L of oxygen via nasal cannula at baseline, currently requiring 4 L  Has known pulmonary hypertension  RA 6/5, 4 mmHg  RV 74/3, 5 mmHg  PA 71/25, 44 mmHg  PCW 12  mmHg  She is on sildenafil  Pulmonology is on board  Echocardiogram shows preserved LV function with mildly elevated RVSP.  Small to moderate pericardial effusion: No signs of tamponade  proBNP of 9200 compared to 10,000 last month.  Bumex being held due to worsening creatinine  We will also connect her with heart failure clinic for intermittent IV diuresis     Atrial fibrillation  She has paroxysmal atrial fibrillation  [id number] score is 6  Continue Eliquis for atrial fibrillation as well as for history of DVT/PE  Continue Coreg for heart rate control     History of venous thrombosis and embolism  Low-dose Eliquis due to renal dysfunction and age.  She also has an IVC filter in place.     Primary hypertension, chronic  Blood pressure has improved and is currently normal  Continue Coreg for blood pressure control  Amlodipine can be added if better blood pressure control is desired.  Echo shows preserved LV function, reduced RV function and mildly elevated RVSP.  Pericardial effusion is not significant with no signs of tamponade.  Diuretics on hold     Coronary artery disease  Continue beta-blocker  Already on Eliquis low-dose  Start statin  Previous cardiac catheterization showed heavily calcified coronaries but nonobstructive.  No chest pain and stable from cardiac standpoint.     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  Continue high intensity statin and anticoagulation with Eliquis 2.5 mg p.o. twice daily due to renal dysfunction and advanced age.     Stage IIIb chronic kidney disease  Creatinine worsening  to 2.27, GFR is 20.9  Hold Bumex  Nephrology is also following     Recent altered mental status  Previously precipitated by mastitis/PICC line infection  Previous CT head and MRI of the brain unremarkable with no acute  findings  Mental status during this admission has been at baseline.      Richardson Willis MD  05/01/24  08:23 EDT

## 2024-05-02 LAB
ANION GAP SERPL CALCULATED.3IONS-SCNC: 10 MMOL/L (ref 5–15)
BUN SERPL-MCNC: 52 MG/DL (ref 8–23)
BUN/CREAT SERPL: 26.1 (ref 7–25)
CALCIUM SPEC-SCNC: 10.1 MG/DL (ref 8.6–10.5)
CHLORIDE SERPL-SCNC: 107 MMOL/L (ref 98–107)
CO2 SERPL-SCNC: 26 MMOL/L (ref 22–29)
CREAT SERPL-MCNC: 1.99 MG/DL (ref 0.57–1)
DEPRECATED RDW RBC AUTO: 67 FL (ref 37–54)
EGFRCR SERPLBLD CKD-EPI 2021: 24.5 ML/MIN/1.73
ERYTHROCYTE [DISTWIDTH] IN BLOOD BY AUTOMATED COUNT: 22.8 % (ref 12.3–15.4)
GLUCOSE SERPL-MCNC: 101 MG/DL (ref 65–99)
HCT VFR BLD AUTO: 37.4 % (ref 34–46.6)
HGB BLD-MCNC: 11.1 G/DL (ref 12–15.9)
MAGNESIUM SERPL-MCNC: 2.3 MG/DL (ref 1.6–2.4)
MCH RBC QN AUTO: 26.2 PG (ref 26.6–33)
MCHC RBC AUTO-ENTMCNC: 29.7 G/DL (ref 31.5–35.7)
MCV RBC AUTO: 88.2 FL (ref 79–97)
PHOSPHATE SERPL-MCNC: 2.1 MG/DL (ref 2.5–4.5)
PHOSPHATE SERPL-MCNC: 3.1 MG/DL (ref 2.5–4.5)
PLATELET # BLD AUTO: 213 10*3/MM3 (ref 140–450)
PMV BLD AUTO: 11.3 FL (ref 6–12)
POTASSIUM SERPL-SCNC: 4.3 MMOL/L (ref 3.5–5.2)
RBC # BLD AUTO: 4.24 10*6/MM3 (ref 3.77–5.28)
SODIUM SERPL-SCNC: 143 MMOL/L (ref 136–145)
WBC NRBC COR # BLD AUTO: 11.74 10*3/MM3 (ref 3.4–10.8)

## 2024-05-02 PROCEDURE — 83735 ASSAY OF MAGNESIUM: CPT | Performed by: INTERNAL MEDICINE

## 2024-05-02 PROCEDURE — 94799 UNLISTED PULMONARY SVC/PX: CPT

## 2024-05-02 PROCEDURE — 99232 SBSQ HOSP IP/OBS MODERATE 35: CPT | Performed by: INTERNAL MEDICINE

## 2024-05-02 PROCEDURE — 63710000001 ONDANSETRON ODT 4 MG TABLET DISPERSIBLE: Performed by: NURSE PRACTITIONER

## 2024-05-02 PROCEDURE — 94664 DEMO&/EVAL PT USE INHALER: CPT

## 2024-05-02 PROCEDURE — 94761 N-INVAS EAR/PLS OXIMETRY MLT: CPT

## 2024-05-02 PROCEDURE — 63710000001 PREDNISONE PER 1 MG: Performed by: INTERNAL MEDICINE

## 2024-05-02 PROCEDURE — 80048 BASIC METABOLIC PNL TOTAL CA: CPT | Performed by: INTERNAL MEDICINE

## 2024-05-02 PROCEDURE — 84100 ASSAY OF PHOSPHORUS: CPT | Performed by: FAMILY MEDICINE

## 2024-05-02 PROCEDURE — 25810000003 SODIUM CHLORIDE 0.9 % SOLUTION: Performed by: FAMILY MEDICINE

## 2024-05-02 PROCEDURE — 84100 ASSAY OF PHOSPHORUS: CPT | Performed by: INTERNAL MEDICINE

## 2024-05-02 PROCEDURE — 85027 COMPLETE CBC AUTOMATED: CPT | Performed by: INTERNAL MEDICINE

## 2024-05-02 RX ORDER — CARVEDILOL 6.25 MG/1
12.5 TABLET ORAL 2 TIMES DAILY WITH MEALS
Status: DISCONTINUED | OUTPATIENT
Start: 2024-05-02 | End: 2024-05-04

## 2024-05-02 RX ADMIN — IPRATROPIUM BROMIDE AND ALBUTEROL SULFATE 3 ML: .5; 3 SOLUTION RESPIRATORY (INHALATION) at 15:08

## 2024-05-02 RX ADMIN — IPRATROPIUM BROMIDE AND ALBUTEROL SULFATE 3 ML: .5; 3 SOLUTION RESPIRATORY (INHALATION) at 07:12

## 2024-05-02 RX ADMIN — Medication 10 ML: at 08:07

## 2024-05-02 RX ADMIN — BUMETANIDE 0.5 MG: 1 TABLET ORAL at 08:04

## 2024-05-02 RX ADMIN — CARVEDILOL 12.5 MG: 6.25 TABLET, FILM COATED ORAL at 08:04

## 2024-05-02 RX ADMIN — LOPERAMIDE HYDROCHLORIDE 4 MG: 2 CAPSULE ORAL at 02:54

## 2024-05-02 RX ADMIN — LOPERAMIDE HYDROCHLORIDE 4 MG: 2 CAPSULE ORAL at 16:00

## 2024-05-02 RX ADMIN — POTASSIUM CHLORIDE 20 MEQ: 1500 TABLET, EXTENDED RELEASE ORAL at 08:04

## 2024-05-02 RX ADMIN — ONDANSETRON 4 MG: 4 TABLET, ORALLY DISINTEGRATING ORAL at 05:35

## 2024-05-02 RX ADMIN — FEBUXOSTAT 40 MG: 40 TABLET, FILM COATED ORAL at 08:04

## 2024-05-02 RX ADMIN — SILDENAFIL CITRATE 20 MG: 20 TABLET ORAL at 16:00

## 2024-05-02 RX ADMIN — LOPERAMIDE HYDROCHLORIDE 4 MG: 2 CAPSULE ORAL at 22:14

## 2024-05-02 RX ADMIN — APIXABAN 2.5 MG: 2.5 TABLET, FILM COATED ORAL at 22:14

## 2024-05-02 RX ADMIN — LEVOTHYROXINE SODIUM 75 MCG: 0.07 TABLET ORAL at 05:33

## 2024-05-02 RX ADMIN — IPRATROPIUM BROMIDE AND ALBUTEROL SULFATE 3 ML: .5; 3 SOLUTION RESPIRATORY (INHALATION) at 18:45

## 2024-05-02 RX ADMIN — Medication 10 ML: at 22:17

## 2024-05-02 RX ADMIN — CARVEDILOL 12.5 MG: 6.25 TABLET, FILM COATED ORAL at 18:19

## 2024-05-02 RX ADMIN — PREDNISONE 20 MG: 20 TABLET ORAL at 08:04

## 2024-05-02 RX ADMIN — APIXABAN 2.5 MG: 2.5 TABLET, FILM COATED ORAL at 08:05

## 2024-05-02 RX ADMIN — IPRATROPIUM BROMIDE AND ALBUTEROL SULFATE 3 ML: .5; 3 SOLUTION RESPIRATORY (INHALATION) at 10:55

## 2024-05-02 RX ADMIN — SILDENAFIL CITRATE 20 MG: 20 TABLET ORAL at 08:04

## 2024-05-02 RX ADMIN — SODIUM PHOSPHATE, MONOBASIC, MONOHYDRATE AND SODIUM PHOSPHATE, DIBASIC, ANHYDROUS 15 MMOL: 142; 276 INJECTION, SOLUTION INTRAVENOUS at 08:56

## 2024-05-02 RX ADMIN — SILDENAFIL CITRATE 20 MG: 20 TABLET ORAL at 22:14

## 2024-05-02 RX ADMIN — LOPERAMIDE HYDROCHLORIDE 4 MG: 2 CAPSULE ORAL at 08:06

## 2024-05-02 RX ADMIN — PANTOPRAZOLE SODIUM 40 MG: 40 TABLET, DELAYED RELEASE ORAL at 05:33

## 2024-05-02 NOTE — PROGRESS NOTES
Referring Provider: Paul Granados MD    Reason for follow-up: Shortness of breath, elevated proBNP     Patient Care Team:  Paul Granados MD as PCP - General (Family Medicine)  Richardson Willis MD as Cardiologist (Cardiology)  Rafy Rodriguez MD as Consulting Physician (Hematology and Oncology)  Christianne Phillips RN as Licensed Practical Nurse  Salome Fleming MD as Consulting Physician (Nephrology)      SUBJECTIVE  Complains of diarrhea today.     ROS  Review of all systems negative except as indicated.    Since I have last seen, the patient has been without any chest discomfort, shortness of breath, palpitations, dizziness or syncope.  Denies having any headache, abdominal pain, nausea, vomiting, diarrhea, constipation, loss of weight or loss of appetite.  Denies having any excessive bruising, hematuria or blood in the stool.  ROS      Personal History:    Past Medical History:   Diagnosis Date    Acute exacerbation of chronic obstructive pulmonary disease (COPD)     COPD (chronic obstructive pulmonary disease)     Deep vein thrombosis of bilateral lower extremities 07/24/2019    3/2013    History of pneumonia 06/2012    community acquired pneumonia and right pleural effusion;hospitalized at [facility]    History of pulmonary embolism 03/2013    bilateral PE    Hypertension 2001    Insomnia     on Ambien 5mg at     Lobular breast cancer, left 11/2011    Stage IA left upper lobular breast cancer    Malignant neoplasm of left breast in female, estrogen receptor positive 07/18/2019    Osteopenia 2012    Parainfluenza infection     Parotid duct obstruction     Severe pulmonary hypertension 03/03/2023    Stage 3a chronic kidney disease 07/24/2019    TIA (transient ischemic attack) 10/25/2022       Past Surgical History:   Procedure Laterality Date    CARDIAC CATHETERIZATION N/A 3/3/2023    Procedure: Left and Right Heart Cath with Coronary Angiography;  Surgeon: Richardson Willis MD;  Location: [facility]  CATH INVASIVE LOCATION;  Service: Cardiovascular;  Laterality: N/A;    CATARACT EXTRACTION      IVC FILTER RETRIEVAL  2014    IVC filter placement by Dr. Card    MAMMO STEREOTACTIC BREAST BX SURGICAL ADD UNI Left 2011    invasive lobular carcinoma-Dr. Cande Daniel    MASTECTOMY, PARTIAL Left 2011    and left axillary sentinel lymph node biopsy by Dr. Uriostegui    TUBAL ABDOMINAL LIGATION         Family History   Problem Relation Age of Onset    Lung cancer Brother        Social History     Tobacco Use    Smoking status: Former     Current packs/day: 0.00     Types: Cigarettes     Quit date:      Years since quittin.3     Passive exposure: Past    Smokeless tobacco: Never    Tobacco comments:     smoked 6 cigarettes a day from 13 years of age to 56 years of age when she quit in    Vaping Use    Vaping status: Never Used   Substance Use Topics    Alcohol use: Not Currently    Drug use: No        Home meds:  Prior to Admission medications    Medication Sig Start Date End Date Taking? Authorizing Provider   apixaban (ELIQUIS) 5 MG tablet tablet Take 1 tablet by mouth Every 12 (Twelve) Hours. Indications: Other - full anticoagulation, history of DVT/PE 10/27/22  Yes Shaylee Mcdaniels MD   budesonide-formoterol (SYMBICORT) 80-4.5 MCG/ACT inhaler Inhale 2 puffs 2 (Two) Times a Day.   Yes ProviderJaylyn MD   carvedilol (COREG) 12.5 MG tablet TAKE 1 TABLET BY MOUTH TWICE DAILY WITH MEALS 23  Yes Richardson Willis MD   Cholecalciferol (Vitamin D3) 50 MCG (2000 UT) capsule Take 1 capsule by mouth Daily.   Yes ProviderJaylyn MD   Folbic 2.5-25-2 MG tablet tablet Take 1 tablet by mouth Daily. 24  Yes Jaylyn Golden MD   furosemide (LASIX) 20 MG tablet Take 1 tablet by mouth Daily. 24  Yes Mansi Becerril APRN   levothyroxine (SYNTHROID, LEVOTHROID) 50 MCG tablet Take 1 tablet by mouth Daily.   Yes ProviderJaylyn MD   O2 (OXYGEN) Inhale 2 L/min  "Continuous. 2/26/24  Yes Provider, MD Jaylyn   potassium chloride (K-DUR,KLOR-CON) 10 MEQ CR tablet Take 1 tablet by mouth Daily. 2/24/23  Yes Richardson Willis MD   sildenafil (REVATIO) 20 MG tablet Take 1 tablet by mouth Every 8 (Eight) Hours. 5/11/23  Yes Mansi Becerril APRN   allopurinol (ZYLOPRIM) 100 MG tablet Take 1 tablet by mouth Daily.    Provider, Jaylyn, MD       Allergies:  Patient has no known allergies.    Scheduled Meds:apixaban, 2.5 mg, Oral, Q12H  bumetanide, 0.5 mg, Oral, Daily  carvedilol, 6.25 mg, Oral, BID With Meals  febuxostat, 40 mg, Oral, Daily  ipratropium-albuterol, 3 mL, Nebulization, 4x Daily - RT  levothyroxine, 75 mcg, Oral, Q AM  pantoprazole, 40 mg, Oral, Q AM  potassium chloride, 20 mEq, Oral, Daily  predniSONE, 20 mg, Oral, Daily With Breakfast  sildenafil, 20 mg, Oral, TID  sodium chloride, 10 mL, Intravenous, Q12H  sodium phosphate, 15 mmol, Intravenous, Once      Continuous Infusions:   PRN Meds:.  acetaminophen    senna-docusate sodium **AND** polyethylene glycol **AND** bisacodyl **AND** bisacodyl    Calcium Replacement - Follow Nurse / BPA Driven Protocol    loperamide    Magnesium Standard Dose Replacement - Follow Nurse / BPA Driven Protocol    ondansetron ODT **OR** ondansetron    Phosphorus Replacement - Follow Nurse / BPA Driven Protocol    Potassium Replacement - Follow Nurse / BPA Driven Protocol    promethazine    promethazine    sodium chloride    sodium chloride      OBJECTIVE    Vital Signs  Vitals:    05/02/24 0123 05/02/24 0456 05/02/24 0712 05/02/24 0716   BP: 122/66 167/92     BP Location: Right arm Right arm     Patient Position: Lying Lying     Pulse: 75 84 85 85   Resp: 17 22 18 18   Temp: 97.6 °F (36.4 °C) 97.5 °F (36.4 °C)     TempSrc: Oral Oral     SpO2: 96% 98%  (!) 82%   Weight:  66.4 kg (146 lb 6.2 oz)     Height:           Flowsheet Rows      Flowsheet Row First Filed Value   Admission Height 162.6 cm (64\") Documented at 04/27/2024 0711 "   Admission Weight 74 kg (163 lb 2.3 oz) Documented at 04/25/2024 2107              Intake/Output Summary (Last 24 hours) at 5/2/2024 0732  Last data filed at 5/2/2024 0123  Gross per 24 hour   Intake 1080 ml   Output 200 ml   Net 880 ml          Telemetry: Atrial fibrillation with controlled heart rate    Physical Exam:  The patient is alert, oriented and in no distress.  Vital signs as noted above.  Head and neck revealed no carotid bruits or jugular venous distention.  No thyromegaly or lymphadenopathy is present  Lungs clear.  No wheezing.  Breath sounds are normal bilaterally.  Heart normal first and second heart sounds.  No murmur. No precordial rub is present.  No gallop is present.  Abdomen soft and nontender.  No organomegaly is present.  Extremities with good peripheral pulses without any pedal edema.  Skin warm and dry.  Musculoskeletal system is grossly normal.  CNS grossly normal.       Results Review:  I have personally reviewed the results from the time of this admission to 5/2/2024 07:32 EDT and agree with these findings:  []  Laboratory  []  Microbiology  []  Radiology  []  EKG/Telemetry   []  Cardiology/Vascular   []  Pathology  []  Old records  []  Other:    Most notable findings include:    Lab Results (last 24 hours)       Procedure Component Value Units Date/Time    Phosphorus [[id number]]  (Abnormal) Collected: 05/02/24 0536    Specimen: Blood from Arm, Right Updated: 05/02/24 0613     Phosphorus 2.1 mg/dL     Basic Metabolic Panel [[id number]]  (Abnormal) Collected: 05/02/24 0536    Specimen: Blood from Arm, Right Updated: 05/02/24 0611     Glucose 101 mg/dL      BUN 52 mg/dL      Creatinine 1.99 mg/dL      Sodium 143 mmol/L      Potassium 4.3 mmol/L      Chloride 107 mmol/L      CO2 26.0 mmol/L      Calcium 10.1 mg/dL      BUN/Creatinine Ratio 26.1     Anion Gap 10.0 mmol/L      eGFR 24.5 mL/min/1.73     Narrative:      GFR Normal >60  Chronic Kidney Disease <60  Kidney Failure <15    The GFR  formula is only valid for adults with stable renal function between ages 18 and 70.    Magnesium [[id number]]  (Normal) Collected: 05/02/24 0536    Specimen: Blood from Arm, Right Updated: 05/02/24 0611     Magnesium 2.3 mg/dL     CBC (No Diff) [[id number]]  (Abnormal) Collected: 05/02/24 0536    Specimen: Blood from Arm, Right Updated: 05/02/24 0547     WBC 11.74 10*3/mm3      RBC 4.24 10*6/mm3      Hemoglobin 11.1 g/dL      Hematocrit 37.4 %      MCV 88.2 fL      MCH 26.2 pg      MCHC 29.7 g/dL      RDW 22.8 %      RDW-SD 67.0 fl      MPV 11.3 fL      Platelets 213 10*3/mm3             Imaging Results (Last 24 Hours)       ** No results found for the last 24 hours. **            LAB RESULTS (LAST 7 DAYS)    CBC  Results from last 7 days   Lab Units 05/02/24 0536 05/01/24 0239 04/30/24 0038 04/29/24 0256 04/28/24 1019 04/27/24  1014 04/26/24  0043   WBC 10*3/mm3 11.74* 10.11 9.09 9.41 12.34* 14.78* 6.21   RBC 10*6/mm3 4.24 4.11 4.18 4.11 4.40 4.59 4.15   HEMOGLOBIN g/dL 11.1* 10.6* 10.7* 10.6* 11.2* 11.7* 10.5*   HEMATOCRIT % 37.4 35.9 35.8 35.4 38.2 40.2 36.1   MCV fL 88.2 87.3 85.6 86.1 86.8 87.6 87.0   PLATELETS 10*3/mm3 213 223 207 211 238 240 204       BMP  Results from last 7 days   Lab Units 05/02/24 0536 05/01/24 0239 04/30/24 0038 04/29/24 0256 04/28/24 1019 04/27/24 1014 04/26/24  0043   SODIUM mmol/L 143 145 144 143 144 140 144   POTASSIUM mmol/L 4.3 4.0 3.6 4.2 4.4 4.9 3.7   CHLORIDE mmol/L 107 107 105 105 106 103 105   CO2 mmol/L 26.0 25.0 28.0 26.0 26.0 23.0 25.0   BUN mg/dL 52* 48* 49* 48* 48* 44* 34*   CREATININE mg/dL 1.99* 2.27* 2.21* 1.96* 1.86* 2.22* 1.81*   GLUCOSE mg/dL 101* 160* 103* 116* 87 122* 164*   MAGNESIUM mg/dL 2.3 2.1 1.9 2.1 2.3 2.3  --    PHOSPHORUS mg/dL 2.1* 2.8 3.3 3.3 2.6 3.2  --        CMP   Results from last 7 days   Lab Units 05/02/24  0536 05/01/24  0239 04/30/24  0038 04/29/24  0256 04/28/24  1019 04/27/24  1014 04/26/24  0043 04/25/24  2035   SODIUM mmol/L 143  145 144 143 144 140 144 145   POTASSIUM mmol/L 4.3 4.0 3.6 4.2 4.4 4.9 3.7 3.4*   CHLORIDE mmol/L 107 107 105 105 106 103 105 105   CO2 mmol/L 26.0 25.0 28.0 26.0 26.0 23.0 25.0 25.0   BUN mg/dL 52* 48* 49* 48* 48* 44* 34* 32*   CREATININE mg/dL 1.99* 2.27* 2.21* 1.96* 1.86* 2.22* 1.81* 1.71*   GLUCOSE mg/dL 101* 160* 103* 116* 87 122* 164* 168*   ALBUMIN g/dL  --   --   --  3.8  --  4.3  --  4.0   BILIRUBIN mg/dL  --   --   --  1.3*  --  1.6*  --  2.1*   ALK PHOS U/L  --   --   --  66  --  74  --  75   AST (SGOT) U/L  --   --   --  17  --  23  --  18   ALT (SGPT) U/L  --   --   --  16  --  21  --  16       BNP        TROPONIN  Results from last 7 days   Lab Units 04/26/24  0043   CK TOTAL U/L 32   HSTROP T ng/L 21*       CoAg        Creatinine Clearance  Estimated Creatinine Clearance: 20.1 mL/min (A) (by C-G formula based on SCr of 1.99 mg/dL (H)).    ABG        Radiology  No radiology results for the last day      EKG  I personally viewed and interpreted the patient's EKG/Telemetry data:  ECG 12 Lead Rhythm Change   Final Result   HEART RATE= 72  bpm   RR Interval= 830  ms   LA Interval=   ms   P Horizontal Axis=   deg   P Front Axis=   deg   QRSD Interval= 91  ms   QT Interval= 379  ms   QTcB= 416  ms   QRS Axis= 118  deg   T Wave Axis=   deg   - ABNORMAL ECG -   Atrial fibrillation   Right axis deviation   Low voltage, precordial leads   When compared with ECG of 21-Mar-2024 14:18:17,   No significant change   Electronically Signed By: Richardson Willis (BRANDIN) 29-Apr-2024 23:17:34   Date and Time of Study: 2024-04-28 07:50:23      Telemetry Scan   Final Result      Telemetry Scan   Final Result      Telemetry Scan   Final Result      Telemetry Scan   Final Result      Telemetry Scan   Final Result      Telemetry Scan   Final Result      Telemetry Scan   Final Result      Telemetry Scan   Final Result      Telemetry Scan   Final Result      Telemetry Scan   Final Result      Telemetry Scan   Final Result       Telemetry Scan   Final Result      Telemetry Scan   Final Result      Telemetry Scan   Final Result      Telemetry Scan   Final Result      Telemetry Scan   Final Result      Telemetry Scan   Final Result      Telemetry Scan   Final Result      Telemetry Scan   Final Result      Telemetry Scan   Final Result      Telemetry Scan   Final Result      Telemetry Scan   Final Result      Telemetry Scan   Final Result      Telemetry Scan   Final Result      Telemetry Scan   Final Result      Telemetry Scan   Final Result      Telemetry Scan   Final Result      Telemetry Scan   Final Result      Telemetry Scan   Final Result      Telemetry Scan   Final Result      Telemetry Scan   Final Result      Telemetry Scan   Final Result      Telemetry Scan   Final Result      Telemetry Scan   Final Result      Telemetry Scan   Final Result      Telemetry Scan   Final Result      Telemetry Scan   Final Result      Telemetry Scan   Final Result            Echocardiogram:    Results for orders placed in visit on 03/18/24    Adult Transthoracic Echo Limited W/ Cont if Necessary Per Protocol    Interpretation Summary    Left ventricular ejection fraction appears to be 61 - 65%.    The right ventricular cavity is dilated.    There is a small (<1cm) pericardial effusion adjacent to the left ventricle. There is no evidence of cardiac tamponade.    IVC is 2.1 cm.        Stress Test:  Results for orders placed in visit on 09/13/22    Stress Test With Myocardial Perfusion One Day    Interpretation Summary    Left ventricular ejection fraction is hyperdynamic (Calculated EF > 70%). .    Myocardial perfusion imaging indicates a normal myocardial perfusion study with no evidence of ischemia.    Impressions are consistent with a low risk study.    Findings consistent with a normal ECG stress test.         Cardiac Catheterization:  Results for orders placed during the hospital encounter of 03/03/23    Cardiac Catheterization/Vascular  Study    Conclusion  OPERATORS  Richardson Willis M.D. (Attending Cardiologist)      PROCEDURE PERFORMED  Ultrasound guided vascular access  Right heart catheterization  Coronary Angiogram  Left Heart Catheterization 68906  Moderate Sedation    INDICATIONS FOR PROCEDURE  83-year-old woman with multiple cardiovascular risk factors, abnormal stress test presented with worsening shortness of breath.  After discussing the risk and benefit of the procedure she was brought in for elective right and left heart cath.    PROCEDURE IN DETAIL  Informed consent was obtained from the patient after explaining the risks, benefits, and alternative options of the procedure. After obtaining informed consent, the patient was brought to the cath lab and was prepped in a sterile fashion. Lidocaine 2% was used for local anesthesia into the right femoral venous access site. Right femoral vein was accessed using the micropuncture needle under ultrasound guidance and micropuncture wire advanced under flouroscopy. A 7 [place] vascular sheath was put into place percutaneously over guide-wire. Guide wires were removed. A 6Fr swan sheryl catheter was advanced to wedge position. RA, RV and PA and wedge pressures were recorded.  PA sat and arterial sats recorded.  The patient tolerated the procedure well without any complications.    Lidocaine 2% was used for local anesthesia into the right femoral arterial access site. The right femoral artery was accessed with a micropuncture needle via modified Seldinger technique under ultrasound guidance. A 6F was inserted successfully.  Afterwards, 6F JR4 and JL4 diagnostic catheters were advanced over a wire into the ascending aorta and were used to engage the ostia of the left main and RCA respectively. JR4 used to cross the AV and obtain LV pressures and gradient across the AV measured via pullback technique. Images of the right and left coronary systems were obtained. All the catheters were exchanged over a  wire and subsequently removed. Angiogram of the femoral access site was obtained and did not show complications. The patient tolerated the procedure well without any complications. The pictures were reviewed at the end of the procedure. A Mynx closure device was applied.    HEMODYNAMICS    RHC  RA 6/5, 4 mmHg  RV 74/3, 5 mmHg  PA 71/25, 44 mmHg  PCW 12 mmHg  AO Sat 92%  PA Sat 78%    Jose CO: 7.89 L/min    Jose CI: 4.69 L/min/m²    LHC  LV: 134/3, 20 mmHg  AO: 131/56, 87 mmHg  No significant gradient across the aortic valve during pullback of JR4 catheter.    FINDINGS  Coronary Angiogram  All vessels are heavily calcified  She also has heavily calcified peripheral arterial disease.  Right dominant circulation    Left main: Left main is a large caliber vessel which gives rise to the Left Anterior Descending and the Left circumflex.  Left main is angiographically free from any significant disease.    Left Anterior Descending Artery: LAD is a heavily calcified medium caliber vessel which gives rise to diagonals.  The vessel is highly tortuous and has 50 to 60% mid vessel stenosis best seen in [place] cranial view.    Left Circumflex: Heavily calcified vessel with 50% proximal segment stenosis.    Right Coronary Artery: The RCA is a large caliber vessel which is heavily calcified and tortuous.  It has diffuse luminal irregularities and 30 to 40% stenosis in the midsegment around the bend.    ESTIMATED BLOOD LOSS:  10 ml    COMPLICATIONS:  None    PROCEDURE DATA:  Sedation Time: 25 minutes    IMPRESSIONS  Heavily calcified, tortuous nonobstructive coronary disease  Severe pulmonary hypertension with PA systolic pressure in the 70s  Mean PA pressure 44 mmHg    RECOMMENDATIONS  -Continue aggressive medical management of CAD  -Referral to pulmonology for treatment of pulmonary hypertension         Other:         ASSESSMENT & PLAN:    Principal Problem:    Dyspnea    COPD/Chronic respiratory failure/shortness of breath  Severe  pulmonary hypertension  HFpEF  On 3 L of oxygen via nasal cannula at baseline, currently requiring 4 L  Has known pulmonary hypertension  RA 6/5, 4 mmHg  RV 74/3, 5 mmHg  PA 71/25, 44 mmHg  PCW 12 mmHg  She is on sildenafil  Echocardiogram shows preserved LV function with mildly elevated RVSP.  Small to moderate pericardial effusion: No signs of tamponade  proBNP of 9200 compared to 10,000 last month.  Started on Bumex  We will also connect her with heart failure clinic for intermittent IV diuresis     Atrial fibrillation  She has paroxysmal atrial fibrillation  [id number] score is 6  Continue Eliquis for atrial fibrillation as well as for history of DVT/PE  Continue Coreg for heart rate control     History of venous thrombosis and embolism  Low-dose Eliquis due to renal dysfunction and age.  She also has an IVC filter in place.     Primary hypertension, chronic  Blood pressure has improved and is currently normal  Continue Coreg for blood pressure control  Amlodipine can be added if better blood pressure control is desired.  Echo shows preserved LV function, reduced RV function and mildly elevated RVSP.  Pericardial effusion is not significant with no signs of tamponade.  Started on Bumex      Coronary artery disease  Continue beta-blocker  Already on Eliquis low-dose  She will be started on a statin  Previous cardiac catheterization showed heavily calcified coronaries but nonobstructive.  No chest pain and stable from cardiac standpoint.     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  Continue high intensity statin and anticoagulation with Eliquis 2.5 mg p.o. twice daily due to renal dysfunction and advanced age.     Stage IIIb chronic kidney disease  Creatinine 1.99, GFR is 24.5  Starting low-dose Bumex  Nephrology is also following      Recent altered mental status  Previously precipitated by mastitis/PICC line infection  Previous CT head and MRI of the brain unremarkable with no acute findings  Mental status during this admission has been at baseline.      Richardson Willis MD  05/02/24  07:33 EDT

## 2024-05-02 NOTE — PROGRESS NOTES
Daily Progress Note          Assessment    Dyspnea  Hypoxemia: On home oxygen 2 L    Severe pulmonary hypertension mean PA pressure 44   RHC March 2023  RA 6/5, 4 mmHg  RV 74/3, 5 mmHg  PA 71/25, 44 mmHg  PCW 12 mmHg  AO Sat 92%  PA Sat 78%  Jose CO: 7.89 L/min  Jose CI: 4.69 L/min/m²  [facility]   LV: 134/3, 20 mmHg  AO: 131/56, 87 mmHg  No significant gradient across the aortic valve during pullback of JR4 catheter.     Severe calcified tortuous nonobstructive coronary artery disease     Patient had CT with IV contrast in October 2022 showed no pulmonary embolism     COPD/emphysema  Chronic atelectasis in left lower lobe  Anemia  CKD  HTN  History of PE/DVT  History of TIA  CAD  Paroxysmal atrial fibrillation  History of breast cancer 2018, currently in remission     Results for orders placed in visit on 03/18/24     Adult Transthoracic Echo Limited W/ Cont if Necessary Per Protocol     Interpretation Summary    Left ventricular ejection fraction appears to be 61 - 65%.    The right ventricular cavity is dilated.    There is a small (<1cm) pericardial effusion adjacent to the left ventricle. There is no evidence of cardiac tamponade.    IVC is 2.1 cm.           Recommendations:      Sildenafil for pulmonary hypertension, monitor systemic blood pressure     po prednisone     Oxygen supplement and titration to maintain saturation 90 to 95%: Currently requiring 4 L per nasal cannula  Bronchodilators    Inhaled corticosteroids     Diuresis as per nephrology    Thyroid hormone replacement  Cardiology following   Electrolytes/ glycemic control  Chronic anticoagulation: Apixaban    I personally reviewed the radiological studies                 LOS: 7 days     Subjective     C/o PEREZ, mild cough, diarrhea     Objective     Vital signs for last 24 hours:  Vitals:    05/02/24 0123 05/02/24 0456 05/02/24 0712 05/02/24 0716   BP: 122/66 167/92     BP Location: Right arm Right arm     Patient Position: Lying Lying     Pulse: 75 84 85  85   Resp: 17 22 18 18   Temp: 97.6 °F (36.4 °C) 97.5 °F (36.4 °C)     TempSrc: Oral Oral     SpO2: 96% 98%  (!) 82%   Weight:  66.4 kg (146 lb 6.2 oz)     Height:           Intake/Output last 3 shifts:  I/O last 3 completed shifts:  In: 1680 [P.O.:1680]  Out: 600 [Urine:600]  Intake/Output this shift:  No intake/output data recorded.      Radiology  Imaging Results (Last 24 Hours)       ** No results found for the last 24 hours. **            Labs:  Results from last 7 days   Lab Units 05/02/24  0536   WBC 10*3/mm3 11.74*   HEMOGLOBIN g/dL 11.1*   HEMATOCRIT % 37.4   PLATELETS 10*3/mm3 213     Results from last 7 days   Lab Units 05/02/24  0536 04/30/24  0038 04/29/24  0256   SODIUM mmol/L 143   < > 143   POTASSIUM mmol/L 4.3   < > 4.2   CHLORIDE mmol/L 107   < > 105   CO2 mmol/L 26.0   < > 26.0   BUN mg/dL 52*   < > 48*   CREATININE mg/dL 1.99*   < > 1.96*   CALCIUM mg/dL 10.1   < > 10.0   BILIRUBIN mg/dL  --   --  1.3*   ALK PHOS U/L  --   --  66   ALT (SGPT) U/L  --   --  16   AST (SGOT) U/L  --   --  17   GLUCOSE mg/dL 101*   < > 116*    < > = values in this interval not displayed.         Results from last 7 days   Lab Units 04/29/24  0256 04/27/24  1014 04/25/24 2035   ALBUMIN g/dL 3.8 4.3 4.0     Results from last 7 days   Lab Units 04/26/24  0043 04/25/24  2035   CK TOTAL U/L 32  --    HSTROP T ng/L 21* 25*         Results from last 7 days   Lab Units 05/02/24  0536   MAGNESIUM mg/dL 2.3         Results from last 7 days   Lab Units 04/26/24  0043 04/25/24  1536   TSH uIU/mL  --  8.760*   FREE T4 ng/dL 1.57  --            Meds:   SCHEDULE  apixaban, 2.5 mg, Oral, Q12H  bumetanide, 0.5 mg, Oral, Daily  carvedilol, 12.5 mg, Oral, BID With Meals  febuxostat, 40 mg, Oral, Daily  ipratropium-albuterol, 3 mL, Nebulization, 4x Daily - RT  levothyroxine, 75 mcg, Oral, Q AM  pantoprazole, 40 mg, Oral, Q AM  potassium chloride, 20 mEq, Oral, Daily  sildenafil, 20 mg, Oral, TID  sodium chloride, 10 mL, Intravenous,  Q12H  sodium phosphate, 15 mmol, Intravenous, Once      Infusions     PRNs    acetaminophen    senna-docusate sodium **AND** polyethylene glycol **AND** bisacodyl **AND** bisacodyl    Calcium Replacement - Follow Nurse / BPA Driven Protocol    loperamide    Magnesium Standard Dose Replacement - Follow Nurse / BPA Driven Protocol    ondansetron ODT **OR** ondansetron    Phosphorus Replacement - Follow Nurse / BPA Driven Protocol    Potassium Replacement - Follow Nurse / BPA Driven Protocol    promethazine    promethazine    sodium chloride    sodium chloride    Physical Exam:  General Appearance:  Alert   HEENT:  Normocephalic, without obvious abnormality, Conjunctiva/corneas clear,.   Nares normal, no drainage     Neck:  Supple, symmetrical, trachea midline.   Lungs /Chest wall:   mild Bilateral basal rhonchi, respirations unlabored, symmetrical wall movement.     Heart:  Regular rate and rhythm, S1 S2 normal  Abdomen: Soft, non-tender, no masses, no organomegaly.    Extremities: No edema, no clubbing or cyanosis     ROS  Constitutional: Negative for chills, fever and malaise/fatigue.   HENT: Negative.    Eyes: Negative.    Cardiovascular: Negative.    Respiratory: Positive for cough and shortness of breath.    Skin: Negative.    Musculoskeletal: Negative.    Gastrointestinal: diarrhea    Genitourinary: Negative.    Neurological: Negative.    Psychiatric/Behavioral: Negative.      I reviewed the recent clinical results  I personally reviewed the latest radiological studies    Part of this note may be an electronic transcription/translation of spoken language to printed text using the Dragon Dictation System.

## 2024-05-02 NOTE — PROGRESS NOTES
LOS: 7 days   Patient Care Team:  Paul Granados MD as PCP - General (Family Medicine)  Richardson Willis MD as Cardiologist (Cardiology)  Rafy Rodriguez MD as Consulting Physician (Hematology and Oncology)  Christianne Phillips, AMARILIS as Licensed Practical Nurse  Salome Fleming MD as Consulting Physician (Nephrology)    Subjective     Patient is feeling better    Review of Systems   Constitutional:  Positive for activity change.   HENT: Negative.     Respiratory:  Negative for shortness of breath.    Cardiovascular: Negative.    Gastrointestinal: Negative.    Genitourinary: Negative.    Musculoskeletal:  Positive for gait problem.   Neurological:  Positive for weakness.   Psychiatric/Behavioral: Negative.             Objective     Vital Signs  Temp:  [97.4 °F (36.3 °C)-98.1 °F (36.7 °C)] 97.4 °F (36.3 °C)  Heart Rate:  [70-89] 70  Resp:  [11-22] 16  BP: (112-167)/(58-92) 167/92      Physical Exam  Vitals reviewed.   HENT:      Head: Normocephalic and atraumatic.      Right Ear: External ear normal.      Left Ear: External ear normal.      Nose: Nose normal.      Mouth/Throat:      Mouth: Mucous membranes are moist.   Eyes:      General:         Right eye: No discharge.         Left eye: No discharge.   Cardiovascular:      Rate and Rhythm: Normal rate and regular rhythm.      Pulses: Normal pulses.      Heart sounds: Normal heart sounds.   Pulmonary:      Effort: Pulmonary effort is normal.      Breath sounds: Normal breath sounds.   Abdominal:      General: Bowel sounds are normal.      Palpations: Abdomen is soft.   Musculoskeletal:         General: Normal range of motion.      Cervical back: Normal range of motion.   Skin:     General: Skin is warm and dry.   Neurological:      Mental Status: She is alert and oriented to person, place, and time.   Psychiatric:         Behavior: Behavior normal.              Results Review:    Lab Results (last 24 hours)       Procedure Component Value Units Date/Time     Phosphorus [[id number]]  (Abnormal) Collected: 05/02/24 0536    Specimen: Blood from Arm, Right Updated: 05/02/24 0613     Phosphorus 2.1 mg/dL     Basic Metabolic Panel [[id number]]  (Abnormal) Collected: 05/02/24 0536    Specimen: Blood from Arm, Right Updated: 05/02/24 0611     Glucose 101 mg/dL      BUN 52 mg/dL      Creatinine 1.99 mg/dL      Sodium 143 mmol/L      Potassium 4.3 mmol/L      Chloride 107 mmol/L      CO2 26.0 mmol/L      Calcium 10.1 mg/dL      BUN/Creatinine Ratio 26.1     Anion Gap 10.0 mmol/L      eGFR 24.5 mL/min/1.73     Narrative:      GFR Normal >60  Chronic Kidney Disease <60  Kidney Failure <15    The GFR formula is only valid for adults with stable renal function between ages 18 and 70.    Magnesium [[id number]]  (Normal) Collected: 05/02/24 0536    Specimen: Blood from Arm, Right Updated: 05/02/24 0611     Magnesium 2.3 mg/dL     CBC (No Diff) [[id number]]  (Abnormal) Collected: 05/02/24 0536    Specimen: Blood from Arm, Right Updated: 05/02/24 0547     WBC 11.74 10*3/mm3      RBC 4.24 10*6/mm3      Hemoglobin 11.1 g/dL      Hematocrit 37.4 %      MCV 88.2 fL      MCH 26.2 pg      MCHC 29.7 g/dL      RDW 22.8 %      RDW-SD 67.0 fl      MPV 11.3 fL      Platelets 213 10*3/mm3              Imaging Results (Last 24 Hours)       ** No results found for the last 24 hours. **                 I reviewed the patient's new clinical results.    Medication Review:   Scheduled Meds:apixaban, 2.5 mg, Oral, Q12H  bumetanide, 0.5 mg, Oral, Daily  carvedilol, 12.5 mg, Oral, BID With Meals  febuxostat, 40 mg, Oral, Daily  ipratropium-albuterol, 3 mL, Nebulization, 4x Daily - RT  levothyroxine, 75 mcg, Oral, Q AM  pantoprazole, 40 mg, Oral, Q AM  potassium chloride, 20 mEq, Oral, Daily  sildenafil, 20 mg, Oral, TID  sodium chloride, 10 mL, Intravenous, Q12H      Continuous Infusions:   PRN Meds:.  acetaminophen    senna-docusate sodium **AND** polyethylene glycol **AND** bisacodyl **AND** bisacodyl     Calcium Replacement - Follow Nurse / BPA Driven Protocol    loperamide    Magnesium Standard Dose Replacement - Follow Nurse / BPA Driven Protocol    ondansetron ODT **OR** ondansetron    Phosphorus Replacement - Follow Nurse / BPA Driven Protocol    Potassium Replacement - Follow Nurse / BPA Driven Protocol    promethazine    promethazine    sodium chloride    sodium chloride     Interval History:    Assessment & Plan      Acute gastroenteritis  Acute exacerbation of diastolic congestive heart failure  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  Atrial fibrillation, paroxysmal  Severe pulmonary hypertension  Chronic atelectasis left lower lobe  Acute renal failure with acute kidney injury superimposed upon chronic disease stage IIIa  Left breast anomaly  Hypertension associated chronic kidney disease stage IIIa  Anemia of chronic kidney disease  Hyperuricemia  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  Cerebrovascular disease status post CVA  Chronic oral anticoagulation therapy  Acquired hypothyroidism  Thrombophilia  Autonomic dysfunction syndrome  History of pulmonary embolism  Hypercoagulable state secondary to atrial fibrillation  [id number] score 6  History of IVC filter  Aneurysmal dilatation of abdominal aorta  Chronic mucopurulent bronchitis  Peripheral polyneuropathy  History of breast cancer  Supplemental oxygen dependency  Chronic hypoxic respiratory failure        Plan:     [facility]//D/C planning         Mansi Becerril, APRN  05/02/24  13:07 EDT

## 2024-05-03 LAB
ANION GAP SERPL CALCULATED.3IONS-SCNC: 12 MMOL/L (ref 5–15)
BUN SERPL-MCNC: 50 MG/DL (ref 8–23)
BUN/CREAT SERPL: 25.1 (ref 7–25)
CALCIUM SPEC-SCNC: 9.5 MG/DL (ref 8.6–10.5)
CHLORIDE SERPL-SCNC: 107 MMOL/L (ref 98–107)
CO2 SERPL-SCNC: 25 MMOL/L (ref 22–29)
CREAT SERPL-MCNC: 1.99 MG/DL (ref 0.57–1)
DEPRECATED RDW RBC AUTO: 66.1 FL (ref 37–54)
EGFRCR SERPLBLD CKD-EPI 2021: 24.5 ML/MIN/1.73
ERYTHROCYTE [DISTWIDTH] IN BLOOD BY AUTOMATED COUNT: 23.1 % (ref 12.3–15.4)
GLUCOSE SERPL-MCNC: 99 MG/DL (ref 65–99)
HCT VFR BLD AUTO: 35.8 % (ref 34–46.6)
HGB BLD-MCNC: 10.7 G/DL (ref 12–15.9)
MAGNESIUM SERPL-MCNC: 2 MG/DL (ref 1.6–2.4)
MCH RBC QN AUTO: 26.1 PG (ref 26.6–33)
MCHC RBC AUTO-ENTMCNC: 29.9 G/DL (ref 31.5–35.7)
MCV RBC AUTO: 87.3 FL (ref 79–97)
PHOSPHATE SERPL-MCNC: 3.5 MG/DL (ref 2.5–4.5)
PLATELET # BLD AUTO: 188 10*3/MM3 (ref 140–450)
PMV BLD AUTO: 11.2 FL (ref 6–12)
POTASSIUM SERPL-SCNC: 4 MMOL/L (ref 3.5–5.2)
RBC # BLD AUTO: 4.1 10*6/MM3 (ref 3.77–5.28)
SODIUM SERPL-SCNC: 144 MMOL/L (ref 136–145)
WBC NRBC COR # BLD AUTO: 10.86 10*3/MM3 (ref 3.4–10.8)

## 2024-05-03 PROCEDURE — 25010000002 ONDANSETRON PER 1 MG: Performed by: NURSE PRACTITIONER

## 2024-05-03 PROCEDURE — 80048 BASIC METABOLIC PNL TOTAL CA: CPT | Performed by: INTERNAL MEDICINE

## 2024-05-03 PROCEDURE — 99232 SBSQ HOSP IP/OBS MODERATE 35: CPT | Performed by: INTERNAL MEDICINE

## 2024-05-03 PROCEDURE — 85027 COMPLETE CBC AUTOMATED: CPT | Performed by: INTERNAL MEDICINE

## 2024-05-03 PROCEDURE — 94761 N-INVAS EAR/PLS OXIMETRY MLT: CPT

## 2024-05-03 PROCEDURE — 97116 GAIT TRAINING THERAPY: CPT

## 2024-05-03 PROCEDURE — 97530 THERAPEUTIC ACTIVITIES: CPT

## 2024-05-03 PROCEDURE — 97110 THERAPEUTIC EXERCISES: CPT

## 2024-05-03 PROCEDURE — 83735 ASSAY OF MAGNESIUM: CPT | Performed by: INTERNAL MEDICINE

## 2024-05-03 PROCEDURE — 94799 UNLISTED PULMONARY SVC/PX: CPT

## 2024-05-03 PROCEDURE — 84100 ASSAY OF PHOSPHORUS: CPT | Performed by: INTERNAL MEDICINE

## 2024-05-03 PROCEDURE — 94664 DEMO&/EVAL PT USE INHALER: CPT

## 2024-05-03 RX ORDER — PANTOPRAZOLE SODIUM 40 MG/1
40 TABLET, DELAYED RELEASE ORAL
Status: DISCONTINUED | OUTPATIENT
Start: 2024-05-03 | End: 2024-05-07 | Stop reason: HOSPADM

## 2024-05-03 RX ADMIN — CARVEDILOL 12.5 MG: 6.25 TABLET, FILM COATED ORAL at 09:59

## 2024-05-03 RX ADMIN — IPRATROPIUM BROMIDE AND ALBUTEROL SULFATE 3 ML: .5; 3 SOLUTION RESPIRATORY (INHALATION) at 11:03

## 2024-05-03 RX ADMIN — SILDENAFIL CITRATE 20 MG: 20 TABLET ORAL at 10:00

## 2024-05-03 RX ADMIN — FEBUXOSTAT 40 MG: 40 TABLET, FILM COATED ORAL at 09:59

## 2024-05-03 RX ADMIN — IPRATROPIUM BROMIDE AND ALBUTEROL SULFATE 3 ML: .5; 3 SOLUTION RESPIRATORY (INHALATION) at 19:00

## 2024-05-03 RX ADMIN — APIXABAN 2.5 MG: 2.5 TABLET, FILM COATED ORAL at 10:00

## 2024-05-03 RX ADMIN — ONDANSETRON 4 MG: 2 INJECTION INTRAMUSCULAR; INTRAVENOUS at 08:36

## 2024-05-03 RX ADMIN — LEVOTHYROXINE SODIUM 75 MCG: 0.07 TABLET ORAL at 05:40

## 2024-05-03 RX ADMIN — PANTOPRAZOLE SODIUM 40 MG: 40 TABLET, DELAYED RELEASE ORAL at 05:40

## 2024-05-03 RX ADMIN — Medication 10 ML: at 10:01

## 2024-05-03 RX ADMIN — PANTOPRAZOLE SODIUM 40 MG: 40 TABLET, DELAYED RELEASE ORAL at 17:22

## 2024-05-03 RX ADMIN — POTASSIUM CHLORIDE 20 MEQ: 1500 TABLET, EXTENDED RELEASE ORAL at 10:00

## 2024-05-03 RX ADMIN — APIXABAN 2.5 MG: 2.5 TABLET, FILM COATED ORAL at 20:25

## 2024-05-03 RX ADMIN — Medication 10 ML: at 20:25

## 2024-05-03 RX ADMIN — SILDENAFIL CITRATE 20 MG: 20 TABLET ORAL at 17:21

## 2024-05-03 RX ADMIN — IPRATROPIUM BROMIDE AND ALBUTEROL SULFATE 3 ML: .5; 3 SOLUTION RESPIRATORY (INHALATION) at 15:29

## 2024-05-03 RX ADMIN — SILDENAFIL CITRATE 20 MG: 20 TABLET ORAL at 20:25

## 2024-05-03 RX ADMIN — CARVEDILOL 12.5 MG: 6.25 TABLET, FILM COATED ORAL at 17:21

## 2024-05-03 NOTE — PROGRESS NOTES
Daily Progress Note          Assessment    Dyspnea  Hypoxemia: On home oxygen 2 L    Severe pulmonary hypertension mean PA pressure 44   RHC March 2023  RA 6/5, 4 mmHg  RV 74/3, 5 mmHg  PA 71/25, 44 mmHg  PCW 12 mmHg  AO Sat 92%  PA Sat 78%  Jose CO: 7.89 L/min  Jose CI: 4.69 L/min/m²  [facility]   LV: 134/3, 20 mmHg  AO: 131/56, 87 mmHg  No significant gradient across the aortic valve during pullback of JR4 catheter.     Severe calcified tortuous nonobstructive coronary artery disease     Patient had CT with IV contrast in October 2022 showed no pulmonary embolism     COPD/emphysema  Chronic atelectasis in left lower lobe  Anemia  CKD  HTN  History of PE/DVT  History of TIA  CAD  Paroxysmal atrial fibrillation  History of breast cancer 2018, currently in remission     Results for orders placed in visit on 03/18/24     Adult Transthoracic Echo Limited W/ Cont if Necessary Per Protocol     Interpretation Summary    Left ventricular ejection fraction appears to be 61 - 65%.    The right ventricular cavity is dilated.    There is a small (<1cm) pericardial effusion adjacent to the left ventricle. There is no evidence of cardiac tamponade.    IVC is 2.1 cm.           Recommendations:      Sildenafil for pulmonary hypertension, monitor systemic blood pressure    Off po prednisone     Oxygen supplement and titration to maintain saturation 90 to 95%: Currently requiring 3 L per nasal cannula  Bronchodilators    Inhaled corticosteroids     Diuresis as per nephrology    Thyroid hormone replacement  Cardiology following   Electrolytes/ glycemic control  Chronic anticoagulation: Apixaban    I personally reviewed the radiological studies                 LOS: 8 days     Subjective     C/o PEREZ, mild cough, diarrhea     Objective     Vital signs for last 24 hours:  Vitals:    05/02/24 2214 05/03/24 0104 05/03/24 0432 05/03/24 0916   BP: 139/65 123/70 103/61 144/73   BP Location:  Right arm Right arm Right arm   Patient Position:  Lying  Lying Lying   Pulse: 82 81 82 78   Resp:  16 18 18   Temp:  98 °F (36.7 °C) 97.4 °F (36.3 °C) 97 °F (36.1 °C)   TempSrc:  Oral Oral Axillary   SpO2:  95% 97% 91%   Weight:   62 kg (136 lb 11 oz)    Height:           Intake/Output last 3 shifts:  I/O last 3 completed shifts:  In: 1080 [P.O.:1080]  Out: 1200 [Urine:1200]  Intake/Output this shift:  No intake/output data recorded.      Radiology  Imaging Results (Last 24 Hours)       ** No results found for the last 24 hours. **            Labs:  Results from last 7 days   Lab Units 05/03/24  0342   WBC 10*3/mm3 10.86*   HEMOGLOBIN g/dL 10.7*   HEMATOCRIT % 35.8   PLATELETS 10*3/mm3 188     Results from last 7 days   Lab Units 05/03/24  0342 04/30/24  0038 04/29/24  0256   SODIUM mmol/L 144   < > 143   POTASSIUM mmol/L 4.0   < > 4.2   CHLORIDE mmol/L 107   < > 105   CO2 mmol/L 25.0   < > 26.0   BUN mg/dL 50*   < > 48*   CREATININE mg/dL 1.99*   < > 1.96*   CALCIUM mg/dL 9.5   < > 10.0   BILIRUBIN mg/dL  --   --  1.3*   ALK PHOS U/L  --   --  66   ALT (SGPT) U/L  --   --  16   AST (SGOT) U/L  --   --  17   GLUCOSE mg/dL 99   < > 116*    < > = values in this interval not displayed.         Results from last 7 days   Lab Units 04/29/24  0256 04/27/24  1014   ALBUMIN g/dL 3.8 4.3               Results from last 7 days   Lab Units 05/03/24  0342   MAGNESIUM mg/dL 2.0                     Meds:   SCHEDULE  apixaban, 2.5 mg, Oral, Q12H  bumetanide, 0.5 mg, Oral, Daily  carvedilol, 12.5 mg, Oral, BID With Meals  febuxostat, 40 mg, Oral, Daily  ipratropium-albuterol, 3 mL, Nebulization, 4x Daily - RT  levothyroxine, 75 mcg, Oral, Q AM  pantoprazole, 40 mg, Oral, Q AM  potassium chloride, 20 mEq, Oral, Daily  sildenafil, 20 mg, Oral, TID  sodium chloride, 10 mL, Intravenous, Q12H      Infusions     PRNs    acetaminophen    senna-docusate sodium **AND** polyethylene glycol **AND** bisacodyl **AND** bisacodyl    Calcium Replacement - Follow Nurse / BPA Driven Protocol     loperamide    Magnesium Standard Dose Replacement - Follow Nurse / BPA Driven Protocol    ondansetron ODT **OR** ondansetron    Phosphorus Replacement - Follow Nurse / BPA Driven Protocol    Potassium Replacement - Follow Nurse / BPA Driven Protocol    promethazine    promethazine    sodium chloride    sodium chloride    Physical Exam:  General Appearance:  Alert   HEENT:  Normocephalic, without obvious abnormality, Conjunctiva/corneas clear,.   Nares normal, no drainage     Neck:  Supple, symmetrical, trachea midline.   Lungs /Chest wall:   mild Bilateral basal rhonchi, respirations unlabored, symmetrical wall movement.     Heart:  Regular rate and rhythm, S1 S2 normal  Abdomen: Soft, non-tender, no masses, no organomegaly.    Extremities: No edema, no clubbing or cyanosis     ROS  Constitutional: Negative for chills, fever and malaise/fatigue.   HENT: Negative.    Eyes: Negative.    Cardiovascular: Negative.    Respiratory: Positive for cough and shortness of breath.    Skin: Negative.    Musculoskeletal: Negative.    Gastrointestinal: diarrhea    Genitourinary: Negative.    Neurological: Negative.    Psychiatric/Behavioral: Negative.      I reviewed the recent clinical results  I personally reviewed the latest radiological studies    Part of this note may be an electronic transcription/translation of spoken language to printed text using the Dragon Dictation System.

## 2024-05-03 NOTE — PLAN OF CARE
Goal Outcome Evaluation:  Plan of Care Reviewed With: patient        Progress: improving  Outcome Evaluation: Pt rested comfortably through the evening with no new complaints. VSS on 3L NC.

## 2024-05-03 NOTE — CONSULTS
Nutrition Services    Patient Name: Gabrielle Lyles  YOB: 1940  MRN: [id number]  Admission date: 4/25/2024    Moderate chronic disease related malnutrition related to inadequate intake in the setting of hypermetabolic disease state (COPD) as evidenced by po intake providing < 75% of est energy requirement for > 1 month, unintentional weight loss > 7.5% in three months, 2+ edema documented, and evidence of moderate muscle and fat wasting per NFPE.    RD to add Boost Plus q daily (Provides 360 kcals, 14 g of protein if consumed)   Boost Glucose Control may be substituted for Boost Plus at this time, due to national shortage of many ONS products. If substituted, each Boost Glucose Control will provide 190 kcal and 16g PRO.      NUTRITION SCREENING      Trending Narrative: 5/3 - Nutrition assessment and POC initiation related to LOS x 8 days. Patient was admitted 4/25 with SOB, edema, and dyspnea on exertion. Patient also reported several loose stools on day of admit. C-diff negative. Patient continues to have frequent stools (x 4 in last 24 hours). Patient is receving imodium.  Patient ha CXR and EKG today - Bumex increased. Patient's appetite is fair now and no N/V reported at this time. NFPE completed, consistent with nutrition diagnosis of malnutrition using AND/ASPEN criteria. See MSA below.           PO Diet: Diet: Regular/House; Fluid Consistency: Thin (IDDSI 0)   PO Supplements:    Trending PO Intake:  5/3 - 75% average po intake x last 11 documented meal        Nutritionally-Pertinent Medications RDN Reviewed, C/W clinical course         Labs (reviewed below): Reviewed. Management per attending.      Results from last 7 days   Lab Units 05/03/24  0342 05/02/24  0536 05/01/24  0239 04/30/24  0038 04/29/24  0256 04/28/24  1019 04/27/24  1014   SODIUM mmol/L 144 143 145   < > 143   < > 140   POTASSIUM mmol/L 4.0 4.3 4.0   < > 4.2   < > 4.9   CHLORIDE mmol/L 107 107 107   < > 105   < > 103   CO2  "mmol/L 25.0 26.0 25.0   < > 26.0   < > 23.0   BUN mg/dL 50* 52* 48*   < > 48*   < > 44*   CREATININE mg/dL 1.99* 1.99* 2.27*   < > 1.96*   < > 2.22*   CALCIUM mg/dL 9.5 10.1 9.4   < > 10.0   < > 10.6*   BILIRUBIN mg/dL  --   --   --   --  1.3*  --  1.6*   ALK PHOS U/L  --   --   --   --  66  --  74   ALT (SGPT) U/L  --   --   --   --  16  --  21   AST (SGOT) U/L  --   --   --   --  17  --  23   GLUCOSE mg/dL 99 101* 160*   < > 116*   < > 122*    < > = values in this interval not displayed.     Results from last 7 days   Lab Units 05/03/24  0342 05/02/24  1805 05/02/24  0536 05/01/24  0239   MAGNESIUM mg/dL 2.0  --  2.3 2.1   PHOSPHORUS mg/dL 3.5   < > 2.1* 2.8   HEMOGLOBIN g/dL 10.7*  --  11.1* 10.6*   HEMATOCRIT % 35.8  --  37.4 35.9    < > = values in this interval not displayed.     Lab Results   Component Value Date    HGBA1C 5.8 (H) 10/25/2022          GI Function:  BM x 4 in the last 24 hours - receiving imodium.       Skin: 2+ edema to L/R legs, ankles and feet  Skin intact       Weight Review: Estimated body mass index is 23.46 kg/m² as calculated from the following:    Height as of this encounter: 162.6 cm (64\").    Weight as of this encounter: 62 kg (136 lb 11 oz).    Comment:   5/3 136#   16% weight loss in the last 3 months    Wt Readings from Last 30 Encounters:   05/03/24 0432 62 kg (136 lb 11 oz)   05/02/24 0456 66.4 kg (146 lb 6.2 oz)   05/01/24 0538 66.1 kg (145 lb 11.6 oz)   04/30/24 0531 66.7 kg (147 lb 0.8 oz)   04/29/24 0525 69 kg (152 lb 1.9 oz)   04/27/24 0711 72.6 kg (160 lb)   04/26/24 0604 72.9 kg (160 lb 11.5 oz)   04/25/24 2107 74 kg (163 lb 2.3 oz)   03/21/24 1233 69.9 kg (154 lb 1.6 oz)   03/18/24 1439 69.9 kg (154 lb)   02/17/24 0400 73.1 kg (161 lb 2.5 oz)   02/16/24 0429 70.3 kg (154 lb 15.7 oz)   02/13/24 0337 74.8 kg (164 lb 14.5 oz)   02/10/24 0431 73.5 kg (162 lb 0.6 oz)   02/09/24 0422 71.1 kg (156 lb 12 oz)   02/08/24 1314 68.9 kg (152 lb)   02/07/24 0411 69 kg (152 lb 1.9 oz) "   02/06/24 0415 66.9 kg (147 lb 7.8 oz)   02/05/24 1046 66.7 kg (147 lb)   09/25/23 0600 66.8 kg (147 lb 4.3 oz)   09/24/23 1305 64 kg (141 lb)   08/29/23 1324 64 kg (141 lb)   08/25/23 1422 64 kg (141 lb)   08/25/23 1250 64.2 kg (141 lb 8 oz)   05/12/23 0634 63 kg (139 lb)   05/10/23 0405 62.8 kg (138 lb 7.2 oz)   05/09/23 0350 65 kg (143 lb 4.8 oz)   05/06/23 0639 64 kg (141 lb 1.5 oz)   05/04/23 0406 62.7 kg (138 lb 3.7 oz)   05/02/23 1310 61.5 kg (135 lb 9.3 oz)   05/02/23 1221 62.6 kg (138 lb)   04/08/23 2123 64.3 kg (141 lb 12.1 oz)   04/08/23 1654 62.6 kg (138 lb)   03/03/23 1024 61.8 kg (136 lb 3.9 oz)   02/24/23 1246 63.5 kg (140 lb)   12/13/22 1042 64 kg (141 lb)   12/12/22 0933 63.5 kg (140 lb)   11/13/22 1015 64.4 kg (142 lb)   10/27/22 0537 64.7 kg (142 lb 10.2 oz)   10/25/22 1531 64.3 kg (141 lb 12.1 oz)   10/11/22 1312 64.4 kg (142 lb)   10/04/22 1101 64.4 kg (142 lb)   09/13/22 0856 64.4 kg (142 lb)   10/08/21 1054 66.7 kg (147 lb)   09/28/21 1816 66.2 kg (146 lb)   09/10/21 1517 64.4 kg (142 lb)   06/21/21 0034 65 kg (143 lb 4.8 oz)   05/29/21 1154 67.6 kg (149 lb)   01/10/21 1041 68 kg (150 lb)   12/23/20 1224 68.9 kg (152 lb)   02/01/20 1416 69.9 kg (154 lb)   12/27/19 1058 69.9 kg (154 lb)   12/26/19 1820 70.3 kg (155 lb)          --      Trending Physical   Appearance, NFPE 5/3 - NFPE completed, consistent with nutrition diagnosis of malnutrition using AND/ASPEN criteria. See MSA below.               Nutrition Problem Statement: Moderate chronic disease related malnutrition related to inadequate intake in the setting of hypermetabolic disease state (COPD) as evidenced by po intake providing < 75% of est energy requirement for > 1 month, unintentional weight loss > 7.5% in three months, 2+ edema documented, and evidence of moderate muscle and fat wasting per NFPE.        Nutrition Intervention: NFPE performed.     Continue to encourage good po intake.     Add Boost Plus q daily (Provides 360  kcals, 14 g of protein if consumed)   Boost Glucose Control may be substituted for Boost Plus at this time, due to national shortage of many ONS products. If substituted, each Boost Glucose Control will provide 190 kcal and 16g PRO.          Monitoring/Evaluation Per protocol, I&O, PO intake, Supplement intake, Pertinent labs, Weight, Skin status, GI status, Symptoms, POC/GOC, Swallow function, Hemodynamic stability        Malnutrition Severity Assessment      Patient meets criteria for : Moderate (non-severe) Malnutrition  Malnutrition Type (Last 8 Hours)       Malnutrition Severity Assessment       Row Name 05/03/24 1228       Malnutrition Severity Assessment    Malnutrition Type Chronic Disease - Related Malnutrition      Row Name 05/03/24 1228       Insufficient Energy Intake     Insufficient Energy Intake Findings Moderate    Insufficient Energy Intake  <75% of est. energy requirement for > or equal to 1 month      Row Name 05/03/24 1228       Unintentional Weight Loss     Unintentional Weight Loss Findings Moderate    Unintentional Weight Loss  Weight loss greater than 7.5% in three months      Row Name 05/03/24 1228       Muscle Loss    Loss of Muscle Mass Findings Moderate    Clavicle Bone Region Moderate - some protrusion in females, visible in males    Dorsal Hand Region Moderate - slight depression      Row Name 05/03/24 1228       Fat Loss    Subcutaneous Fat Loss Findings Moderate    Orbital Region  Moderate -  somewhat hollowness, slightly dark circles      Row Name 05/03/24 1228       Fluid Accumulation (Edema)    Fluid Accumulation  Moderate equals 2+ pitting edema      Row Name 05/03/24 1228       Criteria Met (Must meet criteria for severity in at least 2 of these categories: M Wasting, Fat Loss, Fluid, Secondary Signs, Wt. Status, Intake)    Patient meets criteria for  Moderate (non-severe) Malnutrition                           RD to follow up per protocol.    Electronically signed by:  Meaghan  Nathan Elkins RD  05/03/24 12:16 EDT

## 2024-05-03 NOTE — PLAN OF CARE
Assessment: Gabrielle Lyles presents with functional mobility impairments which indicate the need for skilled intervention. Tolerating session today without incident. Pt with functional decline since previous treatment session, requiring more physical assistance for functional mobility. Pt reporting nausea and heartburn at beginning of session, and reports increasing dizziness and weakness while ambulating. As pt is now below functional baseline, now recommending Acute IP Rehab at discharge to address functional deficits to return to PLOF. Pt is highly motivated and agreeable to rehab stay. Will continue to follow and progress as tolerated.     Plan/Recommendations:   If medically appropriate, High Intensity Therapy recommended post-acute care. This is recommended as therapy feels the patient would require 5-6 days per week, 2-3 hours per day. At this time, inpatient rehabilitation (acute rehab) would be the first choice and SNF would be second. Pt requires no DME at discharge.     Pt desires Inpatient Rehabilitation placement at discharge. Pt cooperative; agreeable to therapeutic recommendations and plan of care.

## 2024-05-03 NOTE — PROGRESS NOTES
Referring Provider: Rosamaria Jin MD    Reason for follow-up: Shortness of breath, elevated proBNP     Patient Care Team:  Paul Granados MD as PCP - General (Family Medicine)  Richardson Willis MD as Cardiologist (Cardiology)  Rafy Rodriguez MD as Consulting Physician (Hematology and Oncology)  Christianne Phillips RN as Licensed Practical Nurse  Salome Fleming MD as Consulting Physician (Nephrology)      SUBJECTIVE  Today she complains of nausea and vomiting     ROS  Review of all systems negative except as indicated.    Since I have last seen, the patient has been without any chest discomfort, shortness of breath, palpitations, dizziness or syncope.  Denies having any headache, abdominal pain, nausea, vomiting, diarrhea, constipation, loss of weight or loss of appetite.  Denies having any excessive bruising, hematuria or blood in the stool.  ROS      Personal History:    Past Medical History:   Diagnosis Date    Acute exacerbation of chronic obstructive pulmonary disease (COPD)     COPD (chronic obstructive pulmonary disease)     Deep vein thrombosis of bilateral lower extremities 07/24/2019    3/2013    History of pneumonia 06/2012    community acquired pneumonia and right pleural effusion;hospitalized at [facility]    History of pulmonary embolism 03/2013    bilateral PE    Hypertension 2001    Insomnia     on Ambien 5mg at     Lobular breast cancer, left 11/2011    Stage IA left upper lobular breast cancer    Malignant neoplasm of left breast in female, estrogen receptor positive 07/18/2019    Osteopenia 2012    Parainfluenza infection     Parotid duct obstruction     Severe pulmonary hypertension 03/03/2023    Stage 3a chronic kidney disease 07/24/2019    TIA (transient ischemic attack) 10/25/2022       Past Surgical History:   Procedure Laterality Date    CARDIAC CATHETERIZATION N/A 3/3/2023    Procedure: Left and Right Heart Cath with Coronary Angiography;  Surgeon: Richardson Willis MD;   Location: [facility] INVASIVE LOCATION;  Service: Cardiovascular;  Laterality: N/A;    CATARACT EXTRACTION      IVC FILTER RETRIEVAL  2014    IVC filter placement by Dr. Card    MAMMO STEREOTACTIC BREAST BX SURGICAL ADD UNI Left 2011    invasive lobular carcinoma-Dr. Cande Daniel    MASTECTOMY, PARTIAL Left 2011    and left axillary sentinel lymph node biopsy by Dr. Uriostegui    TUBAL ABDOMINAL LIGATION         Family History   Problem Relation Age of Onset    Lung cancer Brother        Social History     Tobacco Use    Smoking status: Former     Current packs/day: 0.00     Types: Cigarettes     Quit date:      Years since quittin.3     Passive exposure: Past    Smokeless tobacco: Never    Tobacco comments:     smoked 6 cigarettes a day from 13 years of age to 56 years of age when she quit in    Vaping Use    Vaping status: Never Used   Substance Use Topics    Alcohol use: Not Currently    Drug use: No        Home meds:  Prior to Admission medications    Medication Sig Start Date End Date Taking? Authorizing Provider   apixaban (ELIQUIS) 5 MG tablet tablet Take 1 tablet by mouth Every 12 (Twelve) Hours. Indications: Other - full anticoagulation, history of DVT/PE 10/27/22  Yes Shaylee Mcdaniels MD   budesonide-formoterol (SYMBICORT) 80-4.5 MCG/ACT inhaler Inhale 2 puffs 2 (Two) Times a Day.   Yes ProviderJaylyn MD   carvedilol (COREG) 12.5 MG tablet TAKE 1 TABLET BY MOUTH TWICE DAILY WITH MEALS 23  Yes Richardson Willis MD   Cholecalciferol (Vitamin D3) 50 MCG (2000 UT) capsule Take 1 capsule by mouth Daily.   Yes ProviderJaylyn MD   Folbic 2.5-25-2 MG tablet tablet Take 1 tablet by mouth Daily. 24  Yes Jaylyn Golden MD   furosemide (LASIX) 20 MG tablet Take 1 tablet by mouth Daily. 24  Yes Mansi Becerril APRN   levothyroxine (SYNTHROID, LEVOTHROID) 50 MCG tablet Take 1 tablet by mouth Daily.   Yes Jaylyn Golden MD   O2 (OXYGEN) Inhale 2  "L/min Continuous. 2/26/24  Yes Provider, MD Jaylyn   potassium chloride (K-DUR,KLOR-CON) 10 MEQ CR tablet Take 1 tablet by mouth Daily. 2/24/23  Yes Richardson Willis MD   sildenafil (REVATIO) 20 MG tablet Take 1 tablet by mouth Every 8 (Eight) Hours. 5/11/23  Yes Mansi Becerril APRN   allopurinol (ZYLOPRIM) 100 MG tablet Take 1 tablet by mouth Daily.    Provider, Jaylyn, MD       Allergies:  Patient has no known allergies.    Scheduled Meds:apixaban, 2.5 mg, Oral, Q12H  bumetanide, 0.5 mg, Oral, Daily  carvedilol, 12.5 mg, Oral, BID With Meals  febuxostat, 40 mg, Oral, Daily  ipratropium-albuterol, 3 mL, Nebulization, 4x Daily - RT  levothyroxine, 75 mcg, Oral, Q AM  pantoprazole, 40 mg, Oral, Q AM  potassium chloride, 20 mEq, Oral, Daily  sildenafil, 20 mg, Oral, TID  sodium chloride, 10 mL, Intravenous, Q12H      Continuous Infusions:   PRN Meds:.  acetaminophen    senna-docusate sodium **AND** polyethylene glycol **AND** bisacodyl **AND** bisacodyl    Calcium Replacement - Follow Nurse / BPA Driven Protocol    loperamide    Magnesium Standard Dose Replacement - Follow Nurse / BPA Driven Protocol    ondansetron ODT **OR** ondansetron    Phosphorus Replacement - Follow Nurse / BPA Driven Protocol    Potassium Replacement - Follow Nurse / BPA Driven Protocol    promethazine    promethazine    sodium chloride    sodium chloride      OBJECTIVE    Vital Signs  Vitals:    05/02/24 2118 05/02/24 2214 05/03/24 0104 05/03/24 0432   BP: 119/75 139/65 123/70 103/61   BP Location: Right arm  Right arm Right arm   Patient Position: Lying  Lying Lying   Pulse: 89 82 81 82   Resp: 18  16 18   Temp: 97.8 °F (36.6 °C)  98 °F (36.7 °C) 97.4 °F (36.3 °C)   TempSrc: Oral  Oral Oral   SpO2: 97%  95% 97%   Weight:    62 kg (136 lb 11 oz)   Height:           Flowsheet Rows      Flowsheet Row First Filed Value   Admission Height 162.6 cm (64\") Documented at 04/27/2024 0711   Admission Weight 74 kg (163 lb 2.3 oz) Documented at " 04/25/2024 2107              Intake/Output Summary (Last 24 hours) at 5/3/2024 0738  Last data filed at 5/3/2024 0432  Gross per 24 hour   Intake 600 ml   Output 1000 ml   Net -400 ml          Telemetry: Atrial fibrillation with controlled heart rate    Physical Exam:  The patient is alert, oriented and in no distress.  Vital signs as noted above.  Head and neck revealed no carotid bruits or jugular venous distention.  No thyromegaly or lymphadenopathy is present  Lungs clear.  No wheezing.  Breath sounds are normal bilaterally.  Heart normal first and second heart sounds.  No murmur. No precordial rub is present.  No gallop is present.  Abdomen soft and nontender.  No organomegaly is present.  Extremities with good peripheral pulses without any pedal edema.  Skin warm and dry.  Musculoskeletal system is grossly normal.  CNS grossly normal.       Results Review:  I have personally reviewed the results from the time of this admission to 5/3/2024 07:38 EDT and agree with these findings:  []  Laboratory  []  Microbiology  []  Radiology  []  EKG/Telemetry   []  Cardiology/Vascular   []  Pathology  []  Old records  []  Other:    Most notable findings include:    Lab Results (last 24 hours)       Procedure Component Value Units Date/Time    Basic Metabolic Panel [[id number]]  (Abnormal) Collected: 05/03/24 0342    Specimen: Blood from Arm, Right Updated: 05/03/24 0435     Glucose 99 mg/dL      BUN 50 mg/dL      Creatinine 1.99 mg/dL      Sodium 144 mmol/L      Potassium 4.0 mmol/L      Comment: Slight hemolysis detected by analyzer. Result may be falsely elevated.        Chloride 107 mmol/L      CO2 25.0 mmol/L      Calcium 9.5 mg/dL      BUN/Creatinine Ratio 25.1     Anion Gap 12.0 mmol/L      eGFR 24.5 mL/min/1.73     Narrative:      GFR Normal >60  Chronic Kidney Disease <60  Kidney Failure <15    The GFR formula is only valid for adults with stable renal function between ages 18 and 70.    Magnesium [[id number]]   (Normal) Collected: 05/03/24 0342    Specimen: Blood from Arm, Right Updated: 05/03/24 0435     Magnesium 2.0 mg/dL     Phosphorus [[id number]]  (Normal) Collected: 05/03/24 0342    Specimen: Blood from Arm, Right Updated: 05/03/24 0435     Phosphorus 3.5 mg/dL     CBC (No Diff) [[id number]]  (Abnormal) Collected: 05/03/24 0342    Specimen: Blood from Arm, Right Updated: 05/03/24 0348     WBC 10.86 10*3/mm3      RBC 4.10 10*6/mm3      Hemoglobin 10.7 g/dL      Hematocrit 35.8 %      MCV 87.3 fL      MCH 26.1 pg      MCHC 29.9 g/dL      RDW 23.1 %      RDW-SD 66.1 fl      MPV 11.2 fL      Platelets 188 10*3/mm3     Phosphorus [[id number]]  (Normal) Collected: 05/02/24 1805    Specimen: Blood Updated: 05/02/24 1840     Phosphorus 3.1 mg/dL             Imaging Results (Last 24 Hours)       ** No results found for the last 24 hours. **            LAB RESULTS (LAST 7 DAYS)    CBC  Results from last 7 days   Lab Units 05/03/24  0342 05/02/24  0536 05/01/24  0239 04/30/24  0038 04/29/24  0256 04/28/24  1019 04/27/24  1014   WBC 10*3/mm3 10.86* 11.74* 10.11 9.09 9.41 12.34* 14.78*   RBC 10*6/mm3 4.10 4.24 4.11 4.18 4.11 4.40 4.59   HEMOGLOBIN g/dL 10.7* 11.1* 10.6* 10.7* 10.6* 11.2* 11.7*   HEMATOCRIT % 35.8 37.4 35.9 35.8 35.4 38.2 40.2   MCV fL 87.3 88.2 87.3 85.6 86.1 86.8 87.6   PLATELETS 10*3/mm3 188 213 223 207 211 238 240       BMP  Results from last 7 days   Lab Units 05/03/24  0342 05/02/24  1805 05/02/24  0536 05/01/24  0239 04/30/24  0038 04/29/24  0256 04/28/24  1019 04/27/24  1014   SODIUM mmol/L 144  --  143 145 144 143 144 140   POTASSIUM mmol/L 4.0  --  4.3 4.0 3.6 4.2 4.4 4.9   CHLORIDE mmol/L 107  --  107 107 105 105 106 103   CO2 mmol/L 25.0  --  26.0 25.0 28.0 26.0 26.0 23.0   BUN mg/dL 50*  --  52* 48* 49* 48* 48* 44*   CREATININE mg/dL 1.99*  --  1.99* 2.27* 2.21* 1.96* 1.86* 2.22*   GLUCOSE mg/dL 99  --  101* 160* 103* 116* 87 122*   MAGNESIUM mg/dL 2.0  --  2.3 2.1 1.9 2.1 2.3 2.3   PHOSPHORUS mg/dL 3.5  3.1 2.1* 2.8 3.3 3.3 2.6 3.2       CMP   Results from last 7 days   Lab Units 05/03/24  0342 05/02/24  0536 05/01/24  0239 04/30/24  0038 04/29/24  0256 04/28/24  1019 04/27/24  1014   SODIUM mmol/L 144 143 145 144 143 144 140   POTASSIUM mmol/L 4.0 4.3 4.0 3.6 4.2 4.4 4.9   CHLORIDE mmol/L 107 107 107 105 105 106 103   CO2 mmol/L 25.0 26.0 25.0 28.0 26.0 26.0 23.0   BUN mg/dL 50* 52* 48* 49* 48* 48* 44*   CREATININE mg/dL 1.99* 1.99* 2.27* 2.21* 1.96* 1.86* 2.22*   GLUCOSE mg/dL 99 101* 160* 103* 116* 87 122*   ALBUMIN g/dL  --   --   --   --  3.8  --  4.3   BILIRUBIN mg/dL  --   --   --   --  1.3*  --  1.6*   ALK PHOS U/L  --   --   --   --  66  --  74   AST (SGOT) U/L  --   --   --   --  17  --  23   ALT (SGPT) U/L  --   --   --   --  16  --  21       BNP        TROPONIN          CoAg        Creatinine Clearance  Estimated Creatinine Clearance: 21 mL/min (A) (by C-G formula based on SCr of 1.99 mg/dL (H)).    ABG        Radiology  No radiology results for the last day      EKG  I personally viewed and interpreted the patient's EKG/Telemetry data:  ECG 12 Lead Rhythm Change   Final Result   HEART RATE= 72  bpm   RR Interval= 830  ms   ND Interval=   ms   P Horizontal Axis=   deg   P Front Axis=   deg   QRSD Interval= 91  ms   QT Interval= 379  ms   QTcB= 416  ms   QRS Axis= 118  deg   T Wave Axis=   deg   - ABNORMAL ECG -   Atrial fibrillation   Right axis deviation   Low voltage, precordial leads   When compared with ECG of 21-Mar-2024 14:18:17,   No significant change   Electronically Signed By: Richardson Willis ([facility]) 29-Apr-2024 23:17:34   Date and Time of Study: 2024-04-28 07:50:23      Telemetry Scan   Final Result      Telemetry Scan   Final Result      Telemetry Scan   Final Result      Telemetry Scan   Final Result      Telemetry Scan   Final Result      Telemetry Scan   Final Result      Telemetry Scan   Final Result      Telemetry Scan   Final Result      Telemetry Scan   Final Result      Telemetry Scan    Final Result      Telemetry Scan   Final Result      Telemetry Scan   Final Result      Telemetry Scan   Final Result      Telemetry Scan   Final Result      Telemetry Scan   Final Result      Telemetry Scan   Final Result      Telemetry Scan   Final Result      Telemetry Scan   Final Result      Telemetry Scan   Final Result      Telemetry Scan   Final Result      Telemetry Scan   Final Result      Telemetry Scan   Final Result      Telemetry Scan   Final Result      Telemetry Scan   Final Result      Telemetry Scan   Final Result      Telemetry Scan   Final Result      Telemetry Scan   Final Result      Telemetry Scan   Final Result      Telemetry Scan   Final Result      Telemetry Scan   Final Result      Telemetry Scan   Final Result      Telemetry Scan   Final Result      Telemetry Scan   Final Result      Telemetry Scan   Final Result      Telemetry Scan   Final Result      Telemetry Scan   Final Result      Telemetry Scan   Final Result      Telemetry Scan   Final Result      Telemetry Scan   Final Result      Telemetry Scan   Final Result      Telemetry Scan   Final Result      Telemetry Scan   Final Result      Telemetry Scan   Final Result            Echocardiogram:    Results for orders placed in visit on 03/18/24    Adult Transthoracic Echo Limited W/ Cont if Necessary Per Protocol    Interpretation Summary    Left ventricular ejection fraction appears to be 61 - 65%.    The right ventricular cavity is dilated.    There is a small (<1cm) pericardial effusion adjacent to the left ventricle. There is no evidence of cardiac tamponade.    IVC is 2.1 cm.        Stress Test:  Results for orders placed in visit on 09/13/22    Stress Test With Myocardial Perfusion One Day    Interpretation Summary    Left ventricular ejection fraction is hyperdynamic (Calculated EF > 70%). .    Myocardial perfusion imaging indicates a normal myocardial perfusion study with no evidence of ischemia.    Impressions are consistent  with a low risk study.    Findings consistent with a normal ECG stress test.         Cardiac Catheterization:  Results for orders placed during the hospital encounter of 03/03/23    Cardiac Catheterization/Vascular Study    Conclusion  OPERATORS  Richardson Willis M.D. (Attending Cardiologist)      PROCEDURE PERFORMED  Ultrasound guided vascular access  Right heart catheterization  Coronary Angiogram  Left Heart Catheterization [id number]  Moderate Sedation    INDICATIONS FOR PROCEDURE  83-year-old woman with multiple cardiovascular risk factors, abnormal stress test presented with worsening shortness of breath.  After discussing the risk and benefit of the procedure she was brought in for elective right and left heart cath.    PROCEDURE IN DETAIL  Informed consent was obtained from the patient after explaining the risks, benefits, and alternative options of the procedure. After obtaining informed consent, the patient was brought to the cath lab and was prepped in a sterile fashion. Lidocaine 2% was used for local anesthesia into the right femoral venous access site. Right femoral vein was accessed using the micropuncture needle under ultrasound guidance and micropuncture wire advanced under flouroscopy. A 7 [place] vascular sheath was put into place percutaneously over guide-wire. Guide wires were removed. A 6Fr swan sheryl catheter was advanced to wedge position. RA, RV and PA and wedge pressures were recorded.  PA sat and arterial sats recorded.  The patient tolerated the procedure well without any complications.    Lidocaine 2% was used for local anesthesia into the right femoral arterial access site. The right femoral artery was accessed with a micropuncture needle via modified Seldinger technique under ultrasound guidance. A 6F was inserted successfully.  Afterwards, 6F JR4 and JL4 diagnostic catheters were advanced over a wire into the ascending aorta and were used to engage the ostia of the left main and RCA  respectively. JR4 used to cross the AV and obtain LV pressures and gradient across the AV measured via pullback technique. Images of the right and left coronary systems were obtained. All the catheters were exchanged over a wire and subsequently removed. Angiogram of the femoral access site was obtained and did not show complications. The patient tolerated the procedure well without any complications. The pictures were reviewed at the end of the procedure. A Mynx closure device was applied.    HEMODYNAMICS    RHC  RA 6/5, 4 mmHg  RV 74/3, 5 mmHg  PA 71/25, 44 mmHg  PCW 12 mmHg  AO Sat 92%  PA Sat 78%    Jose CO: 7.89 L/min    Jose CI: 4.69 L/min/m²    LHC  LV: 134/3, 20 mmHg  AO: 131/56, 87 mmHg  No significant gradient across the aortic valve during pullback of JR4 catheter.    FINDINGS  Coronary Angiogram  All vessels are heavily calcified  She also has heavily calcified peripheral arterial disease.  Right dominant circulation    Left main: Left main is a large caliber vessel which gives rise to the Left Anterior Descending and the Left circumflex.  Left main is angiographically free from any significant disease.    Left Anterior Descending Artery: LAD is a heavily calcified medium caliber vessel which gives rise to diagonals.  The vessel is highly tortuous and has 50 to 60% mid vessel stenosis best seen in [place]an cranial view.    Left Circumflex: Heavily calcified vessel with 50% proximal segment stenosis.    Right Coronary Artery: The RCA is a large caliber vessel which is heavily calcified and tortuous.  It has diffuse luminal irregularities and 30 to 40% stenosis in the midsegment around the bend.    ESTIMATED BLOOD LOSS:  10 ml    COMPLICATIONS:  None    PROCEDURE DATA:  Sedation Time: 25 minutes    IMPRESSIONS  Heavily calcified, tortuous nonobstructive coronary disease  Severe pulmonary hypertension with PA systolic pressure in the 70s  Mean PA pressure 44 mmHg    RECOMMENDATIONS  -Continue aggressive medical  management of CAD  -Referral to pulmonology for treatment of pulmonary hypertension         Other:         ASSESSMENT & PLAN:    Principal Problem:    Dyspnea    COPD/Chronic respiratory failure/shortness of breath  Severe pulmonary hypertension  HFpEF  On 3 L of oxygen via nasal cannula at baseline, currently requiring 4 L  Has known pulmonary hypertension  RA 6/5, 4 mmHg  RV 74/3, 5 mmHg  PA 71/25, 44 mmHg  PCW 12 mmHg  She is on sildenafil  Echocardiogram shows preserved LV function with mildly elevated RVSP.  Small to moderate pericardial effusion: No signs of tamponade  proBNP of 9200 compared to 10,000 last month.  Bumex held due due to nausea, diarrhea  We will also connect her with heart failure clinic for intermittent IV diuresis     Atrial fibrillation  She has paroxysmal atrial fibrillation  HCN2US0-DCAi score is 6  Continue Eliquis for atrial fibrillation as well as for history of DVT/PE  Continue Coreg for heart rate control     History of venous thrombosis and embolism  Low-dose Eliquis due to renal dysfunction and age.  She also has an IVC filter in place.     Primary hypertension, chronic  Blood pressure has improved and is currently normal  Continue Coreg for blood pressure control  Amlodipine can be added if better blood pressure control is desired.  Echo shows preserved LV function, reduced RV function and mildly elevated RVSP.  Pericardial effusion is not significant with no signs of tamponade.  Bumex on hold due to nausea and diarrhea     Coronary artery disease  Continue beta-blocker  Already on Eliquis low-dose  She will be started on a statin  Previous cardiac catheterization showed heavily calcified coronaries but nonobstructive.  No chest pain and stable from cardiac standpoint.     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  Continue high intensity statin and anticoagulation with Eliquis 2.5 mg p.o. twice daily due to renal dysfunction and advanced age.     Stage IIIb chronic kidney disease  Creatinine 1.99, GFR is 24.5  Bumex on hold due to nausea  Nephrology is also following     Recent altered mental status  Previously precipitated by mastitis/PICC line infection  Previous CT head and MRI of the brain unremarkable with no acute findings  Mental status during this admission has been at baseline.      Richardson Willis MD  05/03/24  07:39 EDT

## 2024-05-03 NOTE — PROGRESS NOTES
"NEPHROLOGY PROGRESS NOTE------[facility]/OPT    [facility]  [phone]  Luke Fleming MD      Patient Care Team:  Paul Granados MD as PCP - General (Family Medicine)  Richardson Willis MD as Cardiologist (Cardiology)  Rafy Rodriguez MD as Consulting Physician (Hematology and Oncology)  Christianne Phillips RN as Licensed Practical Nurse  Salome Fleming MD as Consulting Physician (Nephrology)      Provider:  Luke Fleming MD  Patient Name: Gabrielle Lyles  :  1940    SUBJECTIVE:    F/U ARF/JULIAN/CRF/CKD    No complaints. No SOB, CP, palpitations, cramping.     Medication:  apixaban, 2.5 mg, Oral, Q12H  bumetanide, 0.5 mg, Oral, Daily  carvedilol, 12.5 mg, Oral, BID With Meals  febuxostat, 40 mg, Oral, Daily  ipratropium-albuterol, 3 mL, Nebulization, 4x Daily - RT  levothyroxine, 75 mcg, Oral, Q AM  pantoprazole, 40 mg, Oral, Q AM  potassium chloride, 20 mEq, Oral, Daily  sildenafil, 20 mg, Oral, TID  sodium chloride, 10 mL, Intravenous, Q12H           OBJECTIVE    Vital Sign Min/Max for last 24 hours  Temp  Min: 97.2 °F (36.2 °C)  Max: 98 °F (36.7 °C)   BP  Min: 71/48  Max: 139/65   Pulse  Min: 70  Max: 89   Resp  Min: 12  Max: 18   SpO2  Min: 87 %  Max: 100 %   No data recorded   Weight  Min: 62 kg (136 lb 11 oz)  Max: 62 kg (136 lb 11 oz)     Flowsheet Rows      Flowsheet Row First Filed Value   Admission Height 162.6 cm (64\") Documented at 2024 0711   Admission Weight 74 kg (163 lb 2.3 oz) Documented at 2024 2107            No intake/output data recorded.  I/O last 3 completed shifts:  In: 1080 [P.O.:1080]  Out: 1200 [Urine:1200]    Physical Exam:  General Appearance: alert, appears stated age and cooperative  Head: normocephalic, without obvious abnormality and atraumatic  Eyes: conjunctivae and sclerae normal and no icterus  Neck: supple and NO GROSS JVD NOW  Lungs: NO CRACKLES NOW  Heart: IRREG IRREG " "+TAYE  Chest Wall: no abnormalities observed  Abdomen: normal bowel sounds and soft, nontender  Extremities: moves extremities well,1+ BILAT PRETIBIAL EDEMA, no cyanosis  Skin: no bleeding, bruising or rash  Neurologic: Alert, and oriented. No focal deficits    Labs:    WBC WBC   Date Value Ref Range Status   05/03/2024 10.86 (H) 3.40 - 10.80 10*3/mm3 Final   05/02/2024 11.74 (H) 3.40 - 10.80 10*3/mm3 Final   05/01/2024 10.11 3.40 - 10.80 10*3/mm3 Final      HGB Hemoglobin   Date Value Ref Range Status   05/03/2024 10.7 (L) 12.0 - 15.9 g/dL Final   05/02/2024 11.1 (L) 12.0 - 15.9 g/dL Final   05/01/2024 10.6 (L) 12.0 - 15.9 g/dL Final      HCT Hematocrit   Date Value Ref Range Status   05/03/2024 35.8 34.0 - 46.6 % Final   05/02/2024 37.4 34.0 - 46.6 % Final   05/01/2024 35.9 34.0 - 46.6 % Final      Platelets No results found for: \"LABPLAT\"   MCV MCV   Date Value Ref Range Status   05/03/2024 87.3 79.0 - 97.0 fL Final   05/02/2024 88.2 79.0 - 97.0 fL Final   05/01/2024 87.3 79.0 - 97.0 fL Final          Sodium Sodium   Date Value Ref Range Status   05/03/2024 144 136 - 145 mmol/L Final   05/02/2024 143 136 - 145 mmol/L Final   05/01/2024 145 136 - 145 mmol/L Final      Potassium Potassium   Date Value Ref Range Status   05/03/2024 4.0 3.5 - 5.2 mmol/L Final     Comment:     Slight hemolysis detected by analyzer. Result may be falsely elevated.   05/02/2024 4.3 3.5 - 5.2 mmol/L Final   05/01/2024 4.0 3.5 - 5.2 mmol/L Final     Comment:     Slight hemolysis detected by analyzer. Result may be falsely elevated.      Chloride Chloride   Date Value Ref Range Status   05/03/2024 107 98 - 107 mmol/L Final   05/02/2024 107 98 - 107 mmol/L Final   05/01/2024 107 98 - 107 mmol/L Final      CO2 CO2   Date Value Ref Range Status   05/03/2024 25.0 22.0 - 29.0 mmol/L Final   05/02/2024 26.0 22.0 - 29.0 mmol/L Final   05/01/2024 25.0 22.0 - 29.0 mmol/L Final      BUN BUN   Date Value Ref Range Status   05/03/2024 50 (H) 8 - 23 " "mg/dL Final   05/02/2024 52 (H) 8 - 23 mg/dL Final   05/01/2024 48 (H) 8 - 23 mg/dL Final      Creatinine Creatinine   Date Value Ref Range Status   05/03/2024 1.99 (H) 0.57 - 1.00 mg/dL Final   05/02/2024 1.99 (H) 0.57 - 1.00 mg/dL Final   05/01/2024 2.27 (H) 0.57 - 1.00 mg/dL Final      Calcium Calcium   Date Value Ref Range Status   05/03/2024 9.5 8.6 - 10.5 mg/dL Final   05/02/2024 10.1 8.6 - 10.5 mg/dL Final   05/01/2024 9.4 8.6 - 10.5 mg/dL Final      PO4 No components found for: \"PO4\"   Albumin No results found for: \"ALBUMIN\"     Magnesium Magnesium   Date Value Ref Range Status   05/03/2024 2.0 1.6 - 2.4 mg/dL Final   05/02/2024 2.3 1.6 - 2.4 mg/dL Final   05/01/2024 2.1 1.6 - 2.4 mg/dL Final      Uric Acid No components found for: \"URIC ACID\"     Imaging Results (Last 72 Hours)       ** No results found for the last 72 hours. **            Results for orders placed during the hospital encounter of 04/25/24    XR Chest 1 View    Narrative  XR CHEST 1 VW    Date of Exam: 4/28/2024 9:45 AM EDT    Indication: CHF    Comparison: 4/25/2020    Findings:  Heart size and pulmonary vasculature are stable. No gross pulmonary edema is demonstrated. There is strandy opacity in the left lung base.    Impression  Impression:    1. Cardiomegaly with no evidence of pulmonary edema  2. Left basilar atelectasis      Electronically Signed: Darnell Kennedy  4/28/2024 11:33 AM EDT  Workstation ID: [id number]      XR Chest 1 View    Narrative  XR CHEST 1 VW    Date of Exam: 4/25/2024 3:38 PM EDT    Indication: SOB    Comparison: 3/21/2024.    Findings:  There is stable mild cardiomegaly. There is pulmonary vascular congestion with increasing interstitial prominence bilaterally. There may be a small right effusion.    Impression  Impression:  Findings suggest mild CHF superimposed over chronic lung disease.      Electronically Signed: Mary Ivan MD  4/25/2024 3:52 PM EDT  Workstation ID: [id number]      Results for orders placed " during the hospital encounter of 03/21/24    XR Chest 1 View    Narrative  XR CHEST 1 VW    Date of Exam: 3/21/2024 1:20 PM EDT    Indication: Dyspnea    Comparison: 2/9/2024.    Findings:  There is stable mild cardiomegaly. Lung fields appear clear of acute infiltrates or effusions. Mild diffuse bilateral reticular lung thickening is stable. Right PICC line has been removed.    Impression  Impression:  Chronic findings. No acute process.      Electronically Signed: Mary Ivan MD  3/21/2024 1:48 PM EDT  Workstation ID: [id number]      Results for orders placed during the hospital encounter of 02/05/24    Duplex Venous Upper Extremity - Right CAR    Interpretation Summary    Normal right upper extremity venous duplex scan.        ASSESSMENT / PLAN      Dyspnea    ARF/JULIAN/CRF/CKD------Nonoliguric. +ARF/JULIAN on top of CRF/CKD STG 3A   with a baseline serum Creatinine of about 1.3-1.4.  CRF/CKD STG 3A is secondary to HTN NS. +ARF/JULIAN is secondary to prerenal/hemodynamic fluctuation from decompensated CHF (Cardiorenal). Hold Bumex today given poor po intake and nausea/vomiting. Avoid hypotension.   It appears that in the long run we may have to accept a higher baseline serum creatinine in order to keep euvolemic. No NSAIDs or IV dye. Dose meds for CrCl less than 10 cc/min. Azotemia up from steroid exposure.     2. ANEMIA OF CKD------S/P IV iron for ROME. Follow for EPO need     3. HTN WITH CKD-----BP ok with increased Coreg and follow. Avoid hypotension. No ACE/ARB/DRI for now     4. OA/DJD/HYPERURICEMIA------No NSAIDs. Added Uloric     5. VOLUME OVERLOAD---Clinically better. Hold Bumex given n/v/d     6. HYPERGLYCEMIA------Glucometers, SSI     7.  PULMONARY HTN--------Per , Pulmonary     8. HYPOKALEMIA-------Replaced     9. CAD-----per , Cardiology    10. DIARRHEA-----C.Diff negative.      11. DECONDITIONING------PT/OT/Rehab      Luke Fleming MD  [facility]  [phone]  05/03/24  08:15 EDT

## 2024-05-03 NOTE — PROGRESS NOTES
LOS: 8 days   Patient Care Team:  Paul Granados MD as PCP - General (Family Medicine)  Richardson Willis MD as Cardiologist (Cardiology)  Rafy Rodriguez MD as Consulting Physician (Hematology and Oncology)  Christianne Phillips, AMARILIS as Licensed Practical Nurse  Salome Fleming MD as Consulting Physician (Nephrology)    Subjective     Patient is feeling better    Review of Systems   Constitutional:  Positive for activity change.   HENT: Negative.     Respiratory:  Negative for shortness of breath.    Cardiovascular: Negative.    Gastrointestinal:  Positive for nausea.   Genitourinary: Negative.    Musculoskeletal:  Positive for gait problem.   Neurological:  Positive for weakness.   Psychiatric/Behavioral: Negative.             Objective     Vital Signs  Temp:  [97 °F (36.1 °C)-98 °F (36.7 °C)] 97 °F (36.1 °C)  Heart Rate:  [70-89] 78  Resp:  [15-18] 18  BP: ()/(48-75) 144/73      Physical Exam  Vitals reviewed.   HENT:      Head: Normocephalic and atraumatic.      Right Ear: External ear normal.      Left Ear: External ear normal.      Nose: Nose normal.      Mouth/Throat:      Mouth: Mucous membranes are moist.   Eyes:      General:         Right eye: No discharge.         Left eye: No discharge.   Cardiovascular:      Rate and Rhythm: Normal rate and regular rhythm.      Pulses: Normal pulses.      Heart sounds: Normal heart sounds.   Pulmonary:      Effort: Pulmonary effort is normal.      Breath sounds: Normal breath sounds.   Abdominal:      General: Bowel sounds are normal.      Palpations: Abdomen is soft.   Musculoskeletal:         General: Normal range of motion.      Cervical back: Normal range of motion.   Skin:     General: Skin is warm and dry.   Neurological:      Mental Status: She is alert and oriented to person, place, and time.   Psychiatric:         Behavior: Behavior normal.              Results Review:    Lab Results (last 24 hours)       Procedure Component Value Units  Date/Time    Basic Metabolic Panel [[id number]]  (Abnormal) Collected: 05/03/24 0342    Specimen: Blood from Arm, Right Updated: 05/03/24 0435     Glucose 99 mg/dL      BUN 50 mg/dL      Creatinine 1.99 mg/dL      Sodium 144 mmol/L      Potassium 4.0 mmol/L      Comment: Slight hemolysis detected by analyzer. Result may be falsely elevated.        Chloride 107 mmol/L      CO2 25.0 mmol/L      Calcium 9.5 mg/dL      BUN/Creatinine Ratio 25.1     Anion Gap 12.0 mmol/L      eGFR 24.5 mL/min/1.73     Narrative:      GFR Normal >60  Chronic Kidney Disease <60  Kidney Failure <15    The GFR formula is only valid for adults with stable renal function between ages 18 and 70.    Magnesium [[id number]]  (Normal) Collected: 05/03/24 0342    Specimen: Blood from Arm, Right Updated: 05/03/24 0435     Magnesium 2.0 mg/dL     Phosphorus [[id number]]  (Normal) Collected: 05/03/24 0342    Specimen: Blood from Arm, Right Updated: 05/03/24 0435     Phosphorus 3.5 mg/dL     CBC (No Diff) [[id number]]  (Abnormal) Collected: 05/03/24 0342    Specimen: Blood from Arm, Right Updated: 05/03/24 0348     WBC 10.86 10*3/mm3      RBC 4.10 10*6/mm3      Hemoglobin 10.7 g/dL      Hematocrit 35.8 %      MCV 87.3 fL      MCH 26.1 pg      MCHC 29.9 g/dL      RDW 23.1 %      RDW-SD 66.1 fl      MPV 11.2 fL      Platelets 188 10*3/mm3     Phosphorus [[id number]]  (Normal) Collected: 05/02/24 1805    Specimen: Blood Updated: 05/02/24 1840     Phosphorus 3.1 mg/dL              Imaging Results (Last 24 Hours)       ** No results found for the last 24 hours. **                 I reviewed the patient's new clinical results.    Medication Review:   Scheduled Meds:apixaban, 2.5 mg, Oral, Q12H  bumetanide, 0.5 mg, Oral, Daily  carvedilol, 12.5 mg, Oral, BID With Meals  febuxostat, 40 mg, Oral, Daily  ipratropium-albuterol, 3 mL, Nebulization, 4x Daily - RT  levothyroxine, 75 mcg, Oral, Q AM  pantoprazole, 40 mg, Oral, Q AM  potassium chloride, 20 mEq, Oral,  Daily  sildenafil, 20 mg, Oral, TID  sodium chloride, 10 mL, Intravenous, Q12H      Continuous Infusions:   PRN Meds:.  acetaminophen    senna-docusate sodium **AND** polyethylene glycol **AND** bisacodyl **AND** bisacodyl    Calcium Replacement - Follow Nurse / BPA Driven Protocol    loperamide    Magnesium Standard Dose Replacement - Follow Nurse / BPA Driven Protocol    ondansetron ODT **OR** ondansetron    Phosphorus Replacement - Follow Nurse / BPA Driven Protocol    Potassium Replacement - Follow Nurse / BPA Driven Protocol    promethazine    promethazine    sodium chloride    sodium chloride     Interval History:    Assessment & Plan      Acute gastroenteritis  Acute exacerbation of diastolic congestive heart failure  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  Atrial fibrillation, paroxysmal  Severe pulmonary hypertension  Chronic atelectasis left lower lobe  Acute renal failure with acute kidney injury superimposed upon chronic disease stage IIIa  Left breast anomaly  Hypertension associated chronic kidney disease stage IIIa  Anemia of chronic kidney disease  Hyperuricemia  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  Cerebrovascular disease status post CVA  Chronic oral anticoagulation therapy  Acquired hypothyroidism  Thrombophilia  Autonomic dysfunction syndrome  History of pulmonary embolism  Hypercoagulable state secondary to atrial fibrillation  [id number] score 6  History of IVC filter  Aneurysmal dilatation of abdominal aorta  Chronic mucopurulent bronchitis  Peripheral polyneuropathy  History of breast cancer  Supplemental oxygen dependency  Chronic hypoxic respiratory failure        Plan:     [facility] inpatient rehabilitation//D/C planning         Mansi Becerril, APRN  05/03/24  09:51 EDT

## 2024-05-03 NOTE — CASE MANAGEMENT/SOCIAL WORK
Continued Stay Note   [place]     Patient Name: Gabrielle Lyles  MRN: [id number]  Today's Date: 5/3/2024    Admit Date: 4/25/2024    Plan: Inpatient rehab, pending choices. From home with 3L O2 with [place].   Will need precert, no PASRR needed. Need OP heart failure clinic order   Discharge Plan       Row Name 05/03/24 1723       Plan    Plan Inpatient rehab, pending choices. From home with 3L O2 with [place].   Will need precert, no PASRR needed. Need OP heart failure clinic order    Patient/Family in Agreement with Plan yes    Plan Comments Met with patient at bedside, discussed HH- declined HH or OP PT.  Notified by PT end of day of recc for IP rehab.  List given, awaiting choices.  Will need precert.  Barriers to discharge: renally adjusting bumex dose daily.  hold bumest today.  Nephro, pulm, cardio following.               Expected Discharge Date and Time       Expected Discharge Date Expected Discharge Time    May 6, 2024           [place] RN     Office Phone [phone]  Office Cell [phone]

## 2024-05-03 NOTE — THERAPY TREATMENT NOTE
Subjective: Pt agreeable to therapeutic plan of care. Family at bedside upon entering room. Pt reports she does not feel well today, reporting nausea and heart burn. RN notified and aware.     Objective:     Bed mobility - Supine<>sitting Min-A    Transfers - STS CGA and with rolling walker    Ambulation - 50 feet CGA, Min-A, and with rolling walker. Pt reports increasing dizziness while ambulating, requiring min A towards end of ambulation due to increasing BLE weakness.     Therapeutic Exercise - 10 Reps B LE AROM unsupported sitting / EOB    Vitals: Hypotensive  B/P post ambulation 86/69 mmHg  B/P supine 104/62 mmHg    Pain: 0 VAS   Location:   Intervention for pain: N/A    Education: Provided education on the importance of mobility in the acute care setting, Verbal/Tactile Cues, Transfer Training, and Gait Training    Assessment: Gabrielle Lyles presents with functional mobility impairments which indicate the need for skilled intervention. Tolerating session today without incident. Pt with functional decline since previous treatment session, requiring more physical assistance for functional mobility. Pt reporting nausea and heartburn at beginning of session, and reports increasing dizziness and weakness while ambulating. As pt is now below functional baseline, now recommending Acute IP Rehab at discharge to address functional deficits to return to PLOF. Pt is highly motivated and agreeable to rehab stay. Will continue to follow and progress as tolerated.     Plan/Recommendations:   If medically appropriate, High Intensity Therapy recommended post-acute care. This is recommended as therapy feels the patient would require 5-6 days per week, 2-3 hours per day. At this time, inpatient rehabilitation (acute rehab) would be the first choice and SNF would be second. Pt requires no DME at discharge.     Pt desires Inpatient Rehabilitation placement at discharge. Pt cooperative; agreeable to therapeutic recommendations  and plan of care.         Basic Mobility 6-click:  Rollin = Total, A lot = 2, A little = 3; 4 = None  Supine>Sit:   1 = Total, A lot = 2, A little = 3; 4 = None   Sit>Stand with arms:  1 = Total, A lot = 2, A little = 3; 4 = None  Bed>Chair:   1 = Total, A lot = 2, A little = 3; 4 = None  Ambulate in room:  1 = Total, A lot = 2, A little = 3; 4 = None  3-5 Steps with railin = Total, A lot = 2, A little = 3; 4 = None  Score: 18    Modified [place]: N/A = No pre-op stroke/TIA    Post-Tx Position: Supine with HOB Elevated, Alarms activated, and Call light and personal items within reach  PPE: gloves

## 2024-05-04 PROBLEM — E44.0 MODERATE MALNUTRITION: Status: ACTIVE | Noted: 2024-05-04

## 2024-05-04 LAB
ALBUMIN SERPL-MCNC: 3.2 G/DL (ref 3.5–5.2)
ALBUMIN/GLOB SERPL: 1.5 G/DL
ALP SERPL-CCNC: 62 U/L (ref 39–117)
ALT SERPL W P-5'-P-CCNC: 22 U/L (ref 1–33)
ANION GAP SERPL CALCULATED.3IONS-SCNC: 10 MMOL/L (ref 5–15)
AST SERPL-CCNC: 20 U/L (ref 1–32)
BILIRUB SERPL-MCNC: 1.6 MG/DL (ref 0–1.2)
BUN SERPL-MCNC: 45 MG/DL (ref 8–23)
BUN/CREAT SERPL: 25.4 (ref 7–25)
CA-I SERPL ISE-MCNC: 1.24 MMOL/L (ref 1.2–1.3)
CALCIUM SPEC-SCNC: 8.9 MG/DL (ref 8.6–10.5)
CHLORIDE SERPL-SCNC: 108 MMOL/L (ref 98–107)
CO2 SERPL-SCNC: 26 MMOL/L (ref 22–29)
CREAT SERPL-MCNC: 1.77 MG/DL (ref 0.57–1)
DEPRECATED RDW RBC AUTO: 66.4 FL (ref 37–54)
EGFRCR SERPLBLD CKD-EPI 2021: 28.2 ML/MIN/1.73
ERYTHROCYTE [DISTWIDTH] IN BLOOD BY AUTOMATED COUNT: 23.3 % (ref 12.3–15.4)
GLOBULIN UR ELPH-MCNC: 2.1 GM/DL
GLUCOSE SERPL-MCNC: 97 MG/DL (ref 65–99)
HCT VFR BLD AUTO: 34.7 % (ref 34–46.6)
HGB BLD-MCNC: 10.3 G/DL (ref 12–15.9)
MAGNESIUM SERPL-MCNC: 1.9 MG/DL (ref 1.6–2.4)
MCH RBC QN AUTO: 25.9 PG (ref 26.6–33)
MCHC RBC AUTO-ENTMCNC: 29.7 G/DL (ref 31.5–35.7)
MCV RBC AUTO: 87.4 FL (ref 79–97)
PHOSPHATE SERPL-MCNC: 2.9 MG/DL (ref 2.5–4.5)
PLATELET # BLD AUTO: 172 10*3/MM3 (ref 140–450)
PMV BLD AUTO: 11.3 FL (ref 6–12)
POTASSIUM SERPL-SCNC: 4 MMOL/L (ref 3.5–5.2)
PROT SERPL-MCNC: 5.3 G/DL (ref 6–8.5)
RBC # BLD AUTO: 3.97 10*6/MM3 (ref 3.77–5.28)
SODIUM SERPL-SCNC: 144 MMOL/L (ref 136–145)
WBC NRBC COR # BLD AUTO: 10.43 10*3/MM3 (ref 3.4–10.8)

## 2024-05-04 PROCEDURE — 99232 SBSQ HOSP IP/OBS MODERATE 35: CPT | Performed by: INTERNAL MEDICINE

## 2024-05-04 PROCEDURE — 85027 COMPLETE CBC AUTOMATED: CPT | Performed by: INTERNAL MEDICINE

## 2024-05-04 PROCEDURE — 82330 ASSAY OF CALCIUM: CPT | Performed by: INTERNAL MEDICINE

## 2024-05-04 PROCEDURE — 94799 UNLISTED PULMONARY SVC/PX: CPT

## 2024-05-04 PROCEDURE — 94664 DEMO&/EVAL PT USE INHALER: CPT

## 2024-05-04 PROCEDURE — 84100 ASSAY OF PHOSPHORUS: CPT | Performed by: INTERNAL MEDICINE

## 2024-05-04 PROCEDURE — 83735 ASSAY OF MAGNESIUM: CPT | Performed by: INTERNAL MEDICINE

## 2024-05-04 PROCEDURE — 80053 COMPREHEN METABOLIC PANEL: CPT | Performed by: INTERNAL MEDICINE

## 2024-05-04 PROCEDURE — 94762 N-INVAS EAR/PLS OXIMTRY CONT: CPT

## 2024-05-04 RX ORDER — BUMETANIDE 1 MG/1
1 TABLET ORAL DAILY
Status: DISCONTINUED | OUTPATIENT
Start: 2024-05-04 | End: 2024-05-06

## 2024-05-04 RX ORDER — MIDODRINE HYDROCHLORIDE 5 MG/1
5 TABLET ORAL
Status: DISCONTINUED | OUTPATIENT
Start: 2024-05-04 | End: 2024-05-07 | Stop reason: HOSPADM

## 2024-05-04 RX ORDER — CARVEDILOL 6.25 MG/1
6.25 TABLET ORAL 2 TIMES DAILY WITH MEALS
Status: DISCONTINUED | OUTPATIENT
Start: 2024-05-04 | End: 2024-05-06

## 2024-05-04 RX ORDER — IPRATROPIUM BROMIDE AND ALBUTEROL SULFATE 2.5; .5 MG/3ML; MG/3ML
3 SOLUTION RESPIRATORY (INHALATION)
Status: DISCONTINUED | OUTPATIENT
Start: 2024-05-05 | End: 2024-05-07 | Stop reason: HOSPADM

## 2024-05-04 RX ADMIN — APIXABAN 2.5 MG: 2.5 TABLET, FILM COATED ORAL at 20:22

## 2024-05-04 RX ADMIN — FEBUXOSTAT 40 MG: 40 TABLET, FILM COATED ORAL at 08:26

## 2024-05-04 RX ADMIN — PANTOPRAZOLE SODIUM 40 MG: 40 TABLET, DELAYED RELEASE ORAL at 16:43

## 2024-05-04 RX ADMIN — APIXABAN 2.5 MG: 2.5 TABLET, FILM COATED ORAL at 08:27

## 2024-05-04 RX ADMIN — Medication 10 ML: at 20:21

## 2024-05-04 RX ADMIN — IPRATROPIUM BROMIDE AND ALBUTEROL SULFATE 3 ML: .5; 3 SOLUTION RESPIRATORY (INHALATION) at 11:39

## 2024-05-04 RX ADMIN — SILDENAFIL CITRATE 20 MG: 20 TABLET ORAL at 20:22

## 2024-05-04 RX ADMIN — BUMETANIDE 1 MG: 1 TABLET ORAL at 16:43

## 2024-05-04 RX ADMIN — CARVEDILOL 12.5 MG: 6.25 TABLET, FILM COATED ORAL at 08:27

## 2024-05-04 RX ADMIN — LEVOTHYROXINE SODIUM 75 MCG: 0.07 TABLET ORAL at 05:17

## 2024-05-04 RX ADMIN — SILDENAFIL CITRATE 20 MG: 20 TABLET ORAL at 08:27

## 2024-05-04 RX ADMIN — POTASSIUM CHLORIDE 20 MEQ: 1500 TABLET, EXTENDED RELEASE ORAL at 08:26

## 2024-05-04 RX ADMIN — Medication 10 ML: at 08:27

## 2024-05-04 RX ADMIN — IPRATROPIUM BROMIDE AND ALBUTEROL SULFATE 3 ML: .5; 3 SOLUTION RESPIRATORY (INHALATION) at 19:28

## 2024-05-04 RX ADMIN — IPRATROPIUM BROMIDE AND ALBUTEROL SULFATE 3 ML: .5; 3 SOLUTION RESPIRATORY (INHALATION) at 06:47

## 2024-05-04 RX ADMIN — PANTOPRAZOLE SODIUM 40 MG: 40 TABLET, DELAYED RELEASE ORAL at 08:27

## 2024-05-04 RX ADMIN — SILDENAFIL CITRATE 20 MG: 20 TABLET ORAL at 16:43

## 2024-05-04 RX ADMIN — MIDODRINE HYDROCHLORIDE 5 MG: 5 TABLET ORAL at 16:43

## 2024-05-04 RX ADMIN — IPRATROPIUM BROMIDE AND ALBUTEROL SULFATE 3 ML: .5; 3 SOLUTION RESPIRATORY (INHALATION) at 15:20

## 2024-05-04 NOTE — PLAN OF CARE
Problem: Adult Inpatient Plan of Care  Goal: Plan of Care Review  Outcome: Ongoing, Progressing  Flowsheets (Taken 5/4/2024 9648)  Outcome Evaluation: Pt states no diarrhea today.  no c/o. legs with some increase in edema.  bumex increased to 1mg. denies pain/so  will cont to monitor   Goal Outcome Evaluation:              Outcome Evaluation: Pt states no diarrhea today.  no c/o. legs with some increase in edema.  bumex increased to 1mg. denies pain/so  will cont to monitor

## 2024-05-04 NOTE — PLAN OF CARE
Goal Outcome Evaluation:         Patient's SpO2 dropped to 80 while sleeping, titrated oxygen to 6L before sats stabilized in the mid-90s. Oxygen is now back down to baseline 3L with sats of 99. Patient resting comfortably during this shift, no new complaints at this time.

## 2024-05-04 NOTE — PROGRESS NOTES
Daily Progress Note          Assessment    Dyspnea  Hypoxemia: On home oxygen 2 L    Severe pulmonary hypertension mean PA pressure 44   RHC March 2023  RA 6/5, 4 mmHg  RV 74/3, 5 mmHg  PA 71/25, 44 mmHg  PCW 12 mmHg  AO Sat 92%  PA Sat 78%  Jose CO: 7.89 L/min  Jose CI: 4.69 L/min/m²  [facility]   LV: 134/3, 20 mmHg  AO: 131/56, 87 mmHg  No significant gradient across the aortic valve during pullback of JR4 catheter.     Severe calcified tortuous nonobstructive coronary artery disease     Patient had CT with IV contrast in October 2022 showed no pulmonary embolism     COPD/emphysema  Chronic atelectasis in left lower lobe  Anemia  CKD  HTN  History of PE/DVT  History of TIA  CAD  Paroxysmal atrial fibrillation  History of breast cancer 2018, currently in remission     Results for orders placed in visit on 03/18/24     Adult Transthoracic Echo Limited W/ Cont if Necessary Per Protocol     Interpretation Summary    Left ventricular ejection fraction appears to be 61 - 65%.    The right ventricular cavity is dilated.    There is a small (<1cm) pericardial effusion adjacent to the left ventricle. There is no evidence of cardiac tamponade.    IVC is 2.1 cm.           Recommendations:      Sildenafil for pulmonary hypertension, monitor systemic blood pressure    Off po prednisone     Oxygen supplement and titration to maintain saturation 90 to 95%: Currently requiring 3 L per nasal cannula  Bronchodilators    Inhaled corticosteroids     Diuresis as per nephrology    Thyroid hormone replacement  Cardiology following   Electrolytes/ glycemic control  Chronic anticoagulation: Apixaban    I personally reviewed the radiological studies                 LOS: 9 days     Subjective     C/o PEREZ, mild cough, diarrhea     Objective     Vital signs for last 24 hours:  Vitals:    05/04/24 1308 05/04/24 1310 05/04/24 1520 05/04/24 1524   BP: (!) 77/48      BP Location: Right arm      Patient Position: Lying      Pulse: 74  76 73   Resp: 16   14 14   Temp: 97.4 °F (36.3 °C)      TempSrc: Oral      SpO2: (!) 84% 92% 97% 97%   Weight:       Height:           Intake/Output last 3 shifts:  I/O last 3 completed shifts:  In: 720 [P.O.:720]  Out: 600 [Urine:600]  Intake/Output this shift:  I/O this shift:  In: 460 [P.O.:460]  Out: -       Radiology  Imaging Results (Last 24 Hours)       ** No results found for the last 24 hours. **            Labs:  Results from last 7 days   Lab Units 05/04/24  0032   WBC 10*3/mm3 10.43   HEMOGLOBIN g/dL 10.3*   HEMATOCRIT % 34.7   PLATELETS 10*3/mm3 172     Results from last 7 days   Lab Units 05/04/24  0032   SODIUM mmol/L 144   POTASSIUM mmol/L 4.0   CHLORIDE mmol/L 108*   CO2 mmol/L 26.0   BUN mg/dL 45*   CREATININE mg/dL 1.77*   CALCIUM mg/dL 8.9   BILIRUBIN mg/dL 1.6*   ALK PHOS U/L 62   ALT (SGPT) U/L 22   AST (SGOT) U/L 20   GLUCOSE mg/dL 97         Results from last 7 days   Lab Units 05/04/24  0032 04/29/24  0256   ALBUMIN g/dL 3.2* 3.8               Results from last 7 days   Lab Units 05/04/24  0032   MAGNESIUM mg/dL 1.9                     Meds:   SCHEDULE  apixaban, 2.5 mg, Oral, Q12H  bumetanide, 1 mg, Oral, Daily  carvedilol, 6.25 mg, Oral, BID With Meals  febuxostat, 40 mg, Oral, Daily  ipratropium-albuterol, 3 mL, Nebulization, 4x Daily - RT  levothyroxine, 75 mcg, Oral, Q AM  midodrine, 5 mg, Oral, TID AC  pantoprazole, 40 mg, Oral, BID AC  potassium chloride, 20 mEq, Oral, Daily  sildenafil, 20 mg, Oral, TID  sodium chloride, 10 mL, Intravenous, Q12H      Infusions     PRNs    acetaminophen    senna-docusate sodium **AND** polyethylene glycol **AND** bisacodyl **AND** bisacodyl    Calcium Replacement - Follow Nurse / BPA Driven Protocol    loperamide    Magnesium Standard Dose Replacement - Follow Nurse / BPA Driven Protocol    ondansetron ODT **OR** ondansetron    Phosphorus Replacement - Follow Nurse / BPA Driven Protocol    Potassium Replacement - Follow Nurse / BPA Driven Protocol    promethazine     promethazine    sodium chloride    sodium chloride    Physical Exam:  General Appearance:  Alert   HEENT:  Normocephalic, without obvious abnormality, Conjunctiva/corneas clear,.   Nares normal, no drainage     Neck:  Supple, symmetrical, trachea midline.   Lungs /Chest wall:   mild Bilateral basal rhonchi, respirations unlabored, symmetrical wall movement.     Heart:  Regular rate and rhythm, S1 S2 normal  Abdomen: Soft, non-tender, no masses, no organomegaly.    Extremities: No edema, no clubbing or cyanosis     ROS  Constitutional: Negative for chills, fever and malaise/fatigue.   HENT: Negative.    Eyes: Negative.    Cardiovascular: Negative.    Respiratory: Positive for cough and shortness of breath.    Skin: Negative.    Musculoskeletal: Negative.    Gastrointestinal: diarrhea    Genitourinary: Negative.    Neurological: Negative.    Psychiatric/Behavioral: Negative.      I reviewed the recent clinical results  I personally reviewed the latest radiological studies    Part of this note may be an electronic transcription/translation of spoken language to printed text using the Dragon Dictation System.

## 2024-05-04 NOTE — PROGRESS NOTES
"NEPHROLOGY PROGRESS NOTE------[facility]    [facility]  [phone]  Anirudh Aleman MD      Patient Care Team:  Paul Granados MD as PCP - General (Family Medicine)  Richardson Willis MD as Cardiologist (Cardiology)  Rafy Rodriguez MD as Consulting Physician (Hematology and Oncology)  Christianne Phillips RN as Licensed Practical Nurse  Salome Fleming MD as Consulting Physician (Nephrology)      Provider:  Anirudh Aleman MD  Patient Name: Gabrielle Lyles  :  1940    SUBJECTIVE:    F/U ARF/JULIAN/CRF/CKD  No chest pain, still some shortness of air  .     Medication:  apixaban, 2.5 mg, Oral, Q12H  [Held by provider] bumetanide, 0.5 mg, Oral, Daily  carvedilol, 6.25 mg, Oral, BID With Meals  febuxostat, 40 mg, Oral, Daily  ipratropium-albuterol, 3 mL, Nebulization, 4x Daily - RT  levothyroxine, 75 mcg, Oral, Q AM  pantoprazole, 40 mg, Oral, BID AC  potassium chloride, 20 mEq, Oral, Daily  sildenafil, 20 mg, Oral, TID  sodium chloride, 10 mL, Intravenous, Q12H           OBJECTIVE    Vital Sign Min/Max for last 24 hours  Temp  Min: 97.4 °F (36.3 °C)  Max: 98.3 °F (36.8 °C)   BP  Min: 80/54  Max: 121/73   Pulse  Min: 70  Max: 89   Resp  Min: 13  Max: 21   SpO2  Min: 92 %  Max: 100 %   No data recorded   Weight  Min: 62.1 kg (136 lb 14.5 oz)  Max: 62.1 kg (136 lb 14.5 oz)     Flowsheet Rows      Flowsheet Row First Filed Value   Admission Height 162.6 cm (64\") Documented at 2024 0711   Admission Weight 74 kg (163 lb 2.3 oz) Documented at 2024 2107            I/O this shift:  In: 240 [P.O.:240]  Out: -   I/O last 3 completed shifts:  In: 720 [P.O.:720]  Out: 600 [Urine:600]    Physical Exam:  General Appearance: alert, appears stated age and cooperative  Head: normocephalic, without obvious abnormality and atraumatic  Eyes: conjunctivae and sclerae normal and no icterus  Neck: supple and NO GROSS JVD   Lungs: Diminished breath " "sounds  Heart: IRREG IRREG +TAYE  Chest Wall: no abnormalities observed  Abdomen: normal bowel sounds and soft, nontender  Extremities: moves extremities well,1+ BILAT PRETIBIAL EDEMA, no cyanosis  Skin: no bleeding, bruising or rash  Neurologic: Alert, and oriented. No focal deficits    Labs:    WBC WBC   Date Value Ref Range Status   05/04/2024 10.43 3.40 - 10.80 10*3/mm3 Final   05/03/2024 10.86 (H) 3.40 - 10.80 10*3/mm3 Final   05/02/2024 11.74 (H) 3.40 - 10.80 10*3/mm3 Final      HGB Hemoglobin   Date Value Ref Range Status   05/04/2024 10.3 (L) 12.0 - 15.9 g/dL Final   05/03/2024 10.7 (L) 12.0 - 15.9 g/dL Final   05/02/2024 11.1 (L) 12.0 - 15.9 g/dL Final      HCT Hematocrit   Date Value Ref Range Status   05/04/2024 34.7 34.0 - 46.6 % Final   05/03/2024 35.8 34.0 - 46.6 % Final   05/02/2024 37.4 34.0 - 46.6 % Final      Platelets No results found for: \"LABPLAT\"   MCV MCV   Date Value Ref Range Status   05/04/2024 87.4 79.0 - 97.0 fL Final   05/03/2024 87.3 79.0 - 97.0 fL Final   05/02/2024 88.2 79.0 - 97.0 fL Final          Sodium Sodium   Date Value Ref Range Status   05/04/2024 144 136 - 145 mmol/L Final   05/03/2024 144 136 - 145 mmol/L Final   05/02/2024 143 136 - 145 mmol/L Final      Potassium Potassium   Date Value Ref Range Status   05/04/2024 4.0 3.5 - 5.2 mmol/L Final   05/03/2024 4.0 3.5 - 5.2 mmol/L Final     Comment:     Slight hemolysis detected by analyzer. Result may be falsely elevated.   05/02/2024 4.3 3.5 - 5.2 mmol/L Final      Chloride Chloride   Date Value Ref Range Status   05/04/2024 108 (H) 98 - 107 mmol/L Final   05/03/2024 107 98 - 107 mmol/L Final   05/02/2024 107 98 - 107 mmol/L Final      CO2 CO2   Date Value Ref Range Status   05/04/2024 26.0 22.0 - 29.0 mmol/L Final   05/03/2024 25.0 22.0 - 29.0 mmol/L Final   05/02/2024 26.0 22.0 - 29.0 mmol/L Final      BUN BUN   Date Value Ref Range Status   05/04/2024 45 (H) 8 - 23 mg/dL Final   05/03/2024 50 (H) 8 - 23 mg/dL Final " "  05/02/2024 52 (H) 8 - 23 mg/dL Final      Creatinine Creatinine   Date Value Ref Range Status   05/04/2024 1.77 (H) 0.57 - 1.00 mg/dL Final   05/03/2024 1.99 (H) 0.57 - 1.00 mg/dL Final   05/02/2024 1.99 (H) 0.57 - 1.00 mg/dL Final      Calcium Calcium   Date Value Ref Range Status   05/04/2024 8.9 8.6 - 10.5 mg/dL Final   05/03/2024 9.5 8.6 - 10.5 mg/dL Final   05/02/2024 10.1 8.6 - 10.5 mg/dL Final      PO4 No components found for: \"PO4\"   Albumin Albumin   Date Value Ref Range Status   05/04/2024 3.2 (L) 3.5 - 5.2 g/dL Final        Magnesium Magnesium   Date Value Ref Range Status   05/04/2024 1.9 1.6 - 2.4 mg/dL Final   05/03/2024 2.0 1.6 - 2.4 mg/dL Final   05/02/2024 2.3 1.6 - 2.4 mg/dL Final      Uric Acid No components found for: \"URIC ACID\"     Imaging Results (Last 72 Hours)       ** No results found for the last 72 hours. **            Results for orders placed during the hospital encounter of 04/25/24    XR Chest 1 View    Narrative  XR CHEST 1 VW    Date of Exam: 4/28/2024 9:45 AM EDT    Indication: CHF    Comparison: 4/25/2020    Findings:  Heart size and pulmonary vasculature are stable. No gross pulmonary edema is demonstrated. There is strandy opacity in the left lung base.    Impression  Impression:    1. Cardiomegaly with no evidence of pulmonary edema  2. Left basilar atelectasis      Electronically Signed: Darnell Kennedy  4/28/2024 11:33 AM EDT  Workstation ID: [id number]      XR Chest 1 View    Narrative  XR CHEST 1 VW    Date of Exam: 4/25/2024 3:38 PM EDT    Indication: SOB    Comparison: 3/21/2024.    Findings:  There is stable mild cardiomegaly. There is pulmonary vascular congestion with increasing interstitial prominence bilaterally. There may be a small right effusion.    Impression  Impression:  Findings suggest mild CHF superimposed over chronic lung disease.      Electronically Signed: Mary Ivan MD  4/25/2024 3:52 PM EDT  Workstation ID: [id number]      Results for orders placed " during the hospital encounter of 03/21/24    XR Chest 1 View    Narrative  XR CHEST 1 VW    Date of Exam: 3/21/2024 1:20 PM EDT    Indication: Dyspnea    Comparison: 2/9/2024.    Findings:  There is stable mild cardiomegaly. Lung fields appear clear of acute infiltrates or effusions. Mild diffuse bilateral reticular lung thickening is stable. Right PICC line has been removed.    Impression  Impression:  Chronic findings. No acute process.      Electronically Signed: Mary Ivan MD  3/21/2024 1:48 PM EDT  Workstation ID: [id number]      Results for orders placed during the hospital encounter of 02/05/24    Duplex Venous Upper Extremity - Right CAR    Interpretation Summary    Normal right upper extremity venous duplex scan.        ASSESSMENT / PLAN      Dyspnea    Moderate malnutrition    ARF/JULIAN/CRF/CKD------Nonoliguric. +ARF/JULIAN on top of CRF/CKD STG 3A   with a baseline serum Creatinine of about 1.3-1.4.  CRF/CKD STG 3A is secondary to HTN NS. +ARF/JULIAN is secondary to prerenal/hemodynamic fluctuation from decompensated CHF (Cardiorenal). Hold Bumex today given poor po intake and nausea/vomiting. Avoid hypotension.   It appears that in the long run we may have to accept a higher baseline serum creatinine in order to keep euvolemic. No NSAIDs or IV dye. Dose meds for CrCl less than 10 cc/min. Azotemia up from steroid exposure.     2. ANEMIA OF CKD------S/P IV iron for ROME. Follow for EPO need     3. HTN WITH CKD-----Avoid hypotension. No ACE/ARB/DRI for now     4. OA/DJD/HYPERURICEMIA------No NSAIDs. Added Uloric     5. VOLUME OVERLOAD---Clinically better. Hold Bumex given n/v/d     6. HYPERGLYCEMIA------Glucometers, SSI     7.  PULMONARY HTN--------Per , Pulmonary     8. HYPOKALEMIA-------Replaced     9. CAD-----per , Cardiology    10. DIARRHEA-----C.Diff negative.      11. DECONDITIONING------PT/OT/Rehab    Creatinine stable  Blood pressure soft  Add midodrine  Add Bumex milligram p.o. daily for  excess volume    Anirudh Aleman MD  [facility]  [phone]  05/04/24  12:00 EDT

## 2024-05-04 NOTE — PROGRESS NOTES
Referring Provider: Rosamaria Jin MD    Reason for follow-up: Shortness of breath, elevated proBNP     Patient Care Team:  Paul Granados MD as PCP - General (Family Medicine)  Richardson Willis MD as Cardiologist (Cardiology)  Rafy Rodriguez MD as Consulting Physician (Hematology and Oncology)  Christianne Phillips, AMARILIS as Licensed Practical Nurse  Salome Fleming MD as Consulting Physician (Nephrology)      SUBJECTIVE  Resting comfortably in bed.      ROS  Review of all systems negative except as indicated.    Since I have last seen, the patient has been without any chest discomfort, shortness of breath, palpitations, dizziness or syncope.  Denies having any headache, abdominal pain, nausea, vomiting, diarrhea, constipation, loss of weight or loss of appetite.  Denies having any excessive bruising, hematuria or blood in the stool.  ROS      Personal History:    Past Medical History:   Diagnosis Date    Acute exacerbation of chronic obstructive pulmonary disease (COPD)     COPD (chronic obstructive pulmonary disease)     Deep vein thrombosis of bilateral lower extremities 07/24/2019    3/2013    History of pneumonia 06/2012    community acquired pneumonia and right pleural effusion;hospitalized at [facility]    History of pulmonary embolism 03/2013    bilateral PE    Hypertension 2001    Insomnia     on Ambien 5mg at     Lobular breast cancer, left 11/2011    Stage IA left upper lobular breast cancer    Malignant neoplasm of left breast in female, estrogen receptor positive 07/18/2019    Osteopenia 2012    Parainfluenza infection     Parotid duct obstruction     Severe pulmonary hypertension 03/03/2023    Stage 3a chronic kidney disease 07/24/2019    TIA (transient ischemic attack) 10/25/2022       Past Surgical History:   Procedure Laterality Date    CARDIAC CATHETERIZATION N/A 3/3/2023    Procedure: Left and Right Heart Cath with Coronary Angiography;  Surgeon: Richardson Willis MD;  Location: [facility]  INVASIVE LOCATION;  Service: Cardiovascular;  Laterality: N/A;    CATARACT EXTRACTION      IVC FILTER RETRIEVAL  2014    IVC filter placement by Dr. Card    MAMMO STEREOTACTIC BREAST BX SURGICAL ADD UNI Left 2011    invasive lobular carcinoma-Dr. Cande Daniel    MASTECTOMY, PARTIAL Left 2011    and left axillary sentinel lymph node biopsy by Dr. Uriostegui    TUBAL ABDOMINAL LIGATION         Family History   Problem Relation Age of Onset    Lung cancer Brother        Social History     Tobacco Use    Smoking status: Former     Current packs/day: 0.00     Types: Cigarettes     Quit date:      Years since quittin.3     Passive exposure: Past    Smokeless tobacco: Never    Tobacco comments:     smoked 6 cigarettes a day from 13 years of age to 56 years of age when she quit in    Vaping Use    Vaping status: Never Used   Substance Use Topics    Alcohol use: Not Currently    Drug use: No        Home meds:  Prior to Admission medications    Medication Sig Start Date End Date Taking? Authorizing Provider   apixaban (ELIQUIS) 5 MG tablet tablet Take 1 tablet by mouth Every 12 (Twelve) Hours. Indications: Other - full anticoagulation, history of DVT/PE 10/27/22  Yes Shaylee Mcdaniels MD   budesonide-formoterol (SYMBICORT) 80-4.5 MCG/ACT inhaler Inhale 2 puffs 2 (Two) Times a Day.   Yes ProviderJaylyn MD   carvedilol (COREG) 12.5 MG tablet TAKE 1 TABLET BY MOUTH TWICE DAILY WITH MEALS 23  Yes Richardson Willis MD   Cholecalciferol (Vitamin D3) 50 MCG (2000 UT) capsule Take 1 capsule by mouth Daily.   Yes ProviderJaylyn MD   Folbic 2.5-25-2 MG tablet tablet Take 1 tablet by mouth Daily. 24  Yes Jaylyn Golden MD   furosemide (LASIX) 20 MG tablet Take 1 tablet by mouth Daily. 24  Yes Mansi Becerril APRN   levothyroxine (SYNTHROID, LEVOTHROID) 50 MCG tablet Take 1 tablet by mouth Daily.   Yes ProviderJaylyn MD   O2 (OXYGEN) Inhale 2 L/min Continuous.  "2/26/24  Yes Provider, MD Jaylyn   potassium chloride (K-DUR,KLOR-CON) 10 MEQ CR tablet Take 1 tablet by mouth Daily. 2/24/23  Yes Richardson Willis MD   sildenafil (REVATIO) 20 MG tablet Take 1 tablet by mouth Every 8 (Eight) Hours. 5/11/23  Yes Mansi Becerril APRN   allopurinol (ZYLOPRIM) 100 MG tablet Take 1 tablet by mouth Daily.    Provider, MD Jaylyn       Allergies:  Patient has no known allergies.    Scheduled Meds:apixaban, 2.5 mg, Oral, Q12H  [Held by provider] bumetanide, 0.5 mg, Oral, Daily  carvedilol, 12.5 mg, Oral, BID With Meals  febuxostat, 40 mg, Oral, Daily  ipratropium-albuterol, 3 mL, Nebulization, 4x Daily - RT  levothyroxine, 75 mcg, Oral, Q AM  pantoprazole, 40 mg, Oral, BID AC  potassium chloride, 20 mEq, Oral, Daily  sildenafil, 20 mg, Oral, TID  sodium chloride, 10 mL, Intravenous, Q12H      Continuous Infusions:   PRN Meds:.  acetaminophen    senna-docusate sodium **AND** polyethylene glycol **AND** bisacodyl **AND** bisacodyl    Calcium Replacement - Follow Nurse / BPA Driven Protocol    loperamide    Magnesium Standard Dose Replacement - Follow Nurse / BPA Driven Protocol    ondansetron ODT **OR** ondansetron    Phosphorus Replacement - Follow Nurse / BPA Driven Protocol    Potassium Replacement - Follow Nurse / BPA Driven Protocol    promethazine    promethazine    sodium chloride    sodium chloride      OBJECTIVE    Vital Signs  Vitals:    05/04/24 0114 05/04/24 0506 05/04/24 0647 05/04/24 0653   BP: 111/74 121/73     BP Location: Right arm Right arm     Patient Position: Lying Lying     Pulse: 80 79 82 89   Resp: 17 13 16 15   Temp: 98.3 °F (36.8 °C) 97.7 °F (36.5 °C)     TempSrc: Oral Oral     SpO2: 96% 95% 93% 93%   Weight:  62.1 kg (136 lb 14.5 oz)     Height:           Flowsheet Rows      Flowsheet Row First Filed Value   Admission Height 162.6 cm (64\") Documented at 04/27/2024 0711   Admission Weight 74 kg (163 lb 2.3 oz) Documented at 04/25/2024 2107      "         Intake/Output Summary (Last 24 hours) at 5/4/2024 0714  Last data filed at 5/4/2024 0506  Gross per 24 hour   Intake 600 ml   Output 200 ml   Net 400 ml          Telemetry: Atrial fibrillation with controlled heart rate    Physical Exam:  The patient is alert, oriented and in no distress.  Vital signs as noted above.  Head and neck revealed no carotid bruits or jugular venous distention.  No thyromegaly or lymphadenopathy is present  Lungs clear.  No wheezing.  Breath sounds are normal bilaterally.  Heart normal first and second heart sounds.  No murmur. No precordial rub is present.  No gallop is present.  Abdomen soft and nontender.  No organomegaly is present.  Extremities with good peripheral pulses without any pedal edema.  Skin warm and dry.  Musculoskeletal system is grossly normal.  CNS grossly normal.       Results Review:  I have personally reviewed the results from the time of this admission to 5/4/2024 07:14 EDT and agree with these findings:  []  Laboratory  []  Microbiology  []  Radiology  []  EKG/Telemetry   []  Cardiology/Vascular   []  Pathology  []  Old records  []  Other:    Most notable findings include:    Lab Results (last 24 hours)       Procedure Component Value Units Date/Time    Comprehensive Metabolic Panel [[id number]]  (Abnormal) Collected: 05/04/24 0032    Specimen: Blood Updated: 05/04/24 0110     Glucose 97 mg/dL      BUN 45 mg/dL      Creatinine 1.77 mg/dL      Sodium 144 mmol/L      Potassium 4.0 mmol/L      Chloride 108 mmol/L      CO2 26.0 mmol/L      Calcium 8.9 mg/dL      Total Protein 5.3 g/dL      Albumin 3.2 g/dL      ALT (SGPT) 22 U/L      AST (SGOT) 20 U/L      Alkaline Phosphatase 62 U/L      Total Bilirubin 1.6 mg/dL      Globulin 2.1 gm/dL      A/G Ratio 1.5 g/dL      BUN/Creatinine Ratio 25.4     Anion Gap 10.0 mmol/L      eGFR 28.2 mL/min/1.73     Narrative:      GFR Normal >60  Chronic Kidney Disease <60  Kidney Failure <15    The GFR formula is only valid for  adults with stable renal function between ages 18 and 70.    Magnesium [[id number]]  (Normal) Collected: 05/04/24 0032    Specimen: Blood Updated: 05/04/24 0110     Magnesium 1.9 mg/dL     Phosphorus [[id number]]  (Normal) Collected: 05/04/24 0032    Specimen: Blood Updated: 05/04/24 0110     Phosphorus 2.9 mg/dL     Calcium, Ionized [[id number]]  (Normal) Collected: 05/04/24 0032    Specimen: Blood Updated: 05/04/24 0055     Ionized Calcium 1.24 mmol/L     CBC (No Diff) [[id number]]  (Abnormal) Collected: 05/04/24 0032    Specimen: Blood Updated: 05/04/24 0047     WBC 10.43 10*3/mm3      RBC 3.97 10*6/mm3      Hemoglobin 10.3 g/dL      Hematocrit 34.7 %      MCV 87.4 fL      MCH 25.9 pg      MCHC 29.7 g/dL      RDW 23.3 %      RDW-SD 66.4 fl      MPV 11.3 fL      Platelets 172 10*3/mm3             Imaging Results (Last 24 Hours)       ** No results found for the last 24 hours. **            LAB RESULTS (LAST 7 DAYS)    CBC  Results from last 7 days   Lab Units 05/04/24  0032 05/03/24  0342 05/02/24  0536 05/01/24  0239 04/30/24  0038 04/29/24  0256 04/28/24  1019   WBC 10*3/mm3 10.43 10.86* 11.74* 10.11 9.09 9.41 12.34*   RBC 10*6/mm3 3.97 4.10 4.24 4.11 4.18 4.11 4.40   HEMOGLOBIN g/dL 10.3* 10.7* 11.1* 10.6* 10.7* 10.6* 11.2*   HEMATOCRIT % 34.7 35.8 37.4 35.9 35.8 35.4 38.2   MCV fL 87.4 87.3 88.2 87.3 85.6 86.1 86.8   PLATELETS 10*3/mm3 172 188 213 223 207 211 238       BMP  Results from last 7 days   Lab Units 05/04/24  0032 05/03/24  0342 05/02/24  1805 05/02/24  0536 05/01/24  0239 04/30/24  0038 04/29/24  0256 04/28/24  1019   SODIUM mmol/L 144 144  --  143 145 144 143 144   POTASSIUM mmol/L 4.0 4.0  --  4.3 4.0 3.6 4.2 4.4   CHLORIDE mmol/L 108* 107  --  107 107 105 105 106   CO2 mmol/L 26.0 25.0  --  26.0 25.0 28.0 26.0 26.0   BUN mg/dL 45* 50*  --  52* 48* 49* 48* 48*   CREATININE mg/dL 1.77* 1.99*  --  1.99* 2.27* 2.21* 1.96* 1.86*   GLUCOSE mg/dL 97 99  --  101* 160* 103* 116* 87   MAGNESIUM mg/dL 1.9 2.0   --  2.3 2.1 1.9 2.1 2.3   PHOSPHORUS mg/dL 2.9 3.5 3.1 2.1* 2.8 3.3 3.3 2.6       CMP   Results from last 7 days   Lab Units 05/04/24  0032 05/03/24  0342 05/02/24  0536 05/01/24  0239 04/30/24  0038 04/29/24  0256 04/28/24  1019 04/27/24  1014   SODIUM mmol/L 144 144 143 145 144 143 144 140   POTASSIUM mmol/L 4.0 4.0 4.3 4.0 3.6 4.2 4.4 4.9   CHLORIDE mmol/L 108* 107 107 107 105 105 106 103   CO2 mmol/L 26.0 25.0 26.0 25.0 28.0 26.0 26.0 23.0   BUN mg/dL 45* 50* 52* 48* 49* 48* 48* 44*   CREATININE mg/dL 1.77* 1.99* 1.99* 2.27* 2.21* 1.96* 1.86* 2.22*   GLUCOSE mg/dL 97 99 101* 160* 103* 116* 87 122*   ALBUMIN g/dL 3.2*  --   --   --   --  3.8  --  4.3   BILIRUBIN mg/dL 1.6*  --   --   --   --  1.3*  --  1.6*   ALK PHOS U/L 62  --   --   --   --  66  --  74   AST (SGOT) U/L 20  --   --   --   --  17  --  23   ALT (SGPT) U/L 22  --   --   --   --  16  --  21       BNP        TROPONIN          CoAg        Creatinine Clearance  Estimated Creatinine Clearance: 23.6 mL/min (A) (by C-G formula based on SCr of 1.77 mg/dL (H)).    ABG        Radiology  No radiology results for the last day      EKG  I personally viewed and interpreted the patient's EKG/Telemetry data:  ECG 12 Lead Rhythm Change   Final Result   HEART RATE= 72  bpm   RR Interval= 830  ms   MS Interval=   ms   P Horizontal Axis=   deg   P Front Axis=   deg   QRSD Interval= 91  ms   QT Interval= 379  ms   QTcB= 416  ms   QRS Axis= 118  deg   T Wave Axis=   deg   - ABNORMAL ECG -   Atrial fibrillation   Right axis deviation   Low voltage, precordial leads   When compared with ECG of 21-Mar-2024 14:18:17,   No significant change   Electronically Signed By: Richardson Willis ([facility] [place]) 29-Apr-2024 23:17:34   Date and Time of Study: 2024-04-28 07:50:23      Telemetry Scan   Final Result      Telemetry Scan   Final Result      Telemetry Scan   Final Result      Telemetry Scan   Final Result      Telemetry Scan   Final Result      Telemetry Scan   Final Result      Telemetry  Scan   Final Result      Telemetry Scan   Final Result      Telemetry Scan   Final Result      Telemetry Scan   Final Result      Telemetry Scan   Final Result      Telemetry Scan   Final Result      Telemetry Scan   Final Result      Telemetry Scan   Final Result      Telemetry Scan   Final Result      Telemetry Scan   Final Result      Telemetry Scan   Final Result      Telemetry Scan   Final Result      Telemetry Scan   Final Result      Telemetry Scan   Final Result      Telemetry Scan   Final Result      Telemetry Scan   Final Result      Telemetry Scan   Final Result      Telemetry Scan   Final Result      Telemetry Scan   Final Result      Telemetry Scan   Final Result      Telemetry Scan   Final Result      Telemetry Scan   Final Result      Telemetry Scan   Final Result      Telemetry Scan   Final Result      Telemetry Scan   Final Result      Telemetry Scan   Final Result      Telemetry Scan   Final Result      Telemetry Scan   Final Result      Telemetry Scan   Final Result      Telemetry Scan   Final Result      Telemetry Scan   Final Result      Telemetry Scan   Final Result      Telemetry Scan   Final Result      Telemetry Scan   Final Result      Telemetry Scan   Final Result      Telemetry Scan   Final Result      Telemetry Scan   Final Result      Telemetry Scan   Final Result      Telemetry Scan   Final Result      Telemetry Scan   Final Result            Echocardiogram:    Results for orders placed in visit on 03/18/24    Adult Transthoracic Echo Limited W/ Cont if Necessary Per Protocol    Interpretation Summary    Left ventricular ejection fraction appears to be 61 - 65%.    The right ventricular cavity is dilated.    There is a small (<1cm) pericardial effusion adjacent to the left ventricle. There is no evidence of cardiac tamponade.    IVC is 2.1 cm.        Stress Test:  Results for orders placed in visit on 09/13/22    Stress Test With Myocardial Perfusion One Day    Interpretation Summary     Left ventricular ejection fraction is hyperdynamic (Calculated EF > 70%). .    Myocardial perfusion imaging indicates a normal myocardial perfusion study with no evidence of ischemia.    Impressions are consistent with a low risk study.    Findings consistent with a normal ECG stress test.         Cardiac Catheterization:  Results for orders placed during the hospital encounter of 03/03/23    Cardiac Catheterization/Vascular Study    Conclusion  OPERATORS  Richardson Willis M.D. (Attending Cardiologist)      PROCEDURE PERFORMED  Ultrasound guided vascular access  Right heart catheterization  Coronary Angiogram  Left Heart Catheterization [id number]  Moderate Sedation    INDICATIONS FOR PROCEDURE  83-year-old woman with multiple cardiovascular risk factors, abnormal stress test presented with worsening shortness of breath.  After discussing the risk and benefit of the procedure she was brought in for elective right and left heart cath.    PROCEDURE IN DETAIL  Informed consent was obtained from the patient after explaining the risks, benefits, and alternative options of the procedure. After obtaining informed consent, the patient was brought to the cath lab and was prepped in a sterile fashion. Lidocaine 2% was used for local anesthesia into the right femoral venous access site. Right femoral vein was accessed using the micropuncture needle under ultrasound guidance and micropuncture wire advanced under flouroscopy. A 7 [place] vascular sheath was put into place percutaneously over guide-wire. Guide wires were removed. A 6Fr swan sheryl catheter was advanced to wedge position. RA, RV and PA and wedge pressures were recorded.  PA sat and arterial sats recorded.  The patient tolerated the procedure well without any complications.    Lidocaine 2% was used for local anesthesia into the right femoral arterial access site. The right femoral artery was accessed with a micropuncture needle via modified Seldinger technique under  ultrasound guidance. A 6F was inserted successfully.  Afterwards, 6F JR4 and JL4 diagnostic catheters were advanced over a wire into the ascending aorta and were used to engage the ostia of the left main and RCA respectively. JR4 used to cross the AV and obtain LV pressures and gradient across the AV measured via pullback technique. Images of the right and left coronary systems were obtained. All the catheters were exchanged over a wire and subsequently removed. Angiogram of the femoral access site was obtained and did not show complications. The patient tolerated the procedure well without any complications. The pictures were reviewed at the end of the procedure. A Mynx closure device was applied.    HEMODYNAMICS    RHC  RA 6/5, 4 mmHg  RV 74/3, 5 mmHg  PA 71/25, 44 mmHg  PCW 12 mmHg  AO Sat 92%  PA Sat 78%    Jose CO: 7.89 L/min    Jose CI: 4.69 L/min/m²    LHC  LV: 134/3, 20 mmHg  AO: 131/56, 87 mmHg  No significant gradient across the aortic valve during pullback of JR4 catheter.    FINDINGS  Coronary Angiogram  All vessels are heavily calcified  She also has heavily calcified peripheral arterial disease.  Right dominant circulation    Left main: Left main is a large caliber vessel which gives rise to the Left Anterior Descending and the Left circumflex.  Left main is angiographically free from any significant disease.    Left Anterior Descending Artery: LAD is a heavily calcified medium caliber vessel which gives rise to diagonals.  The vessel is highly tortuous and has 50 to 60% mid vessel stenosis best seen in VERÓNICA cranial view.    Left Circumflex: Heavily calcified vessel with 50% proximal segment stenosis.    Right Coronary Artery: The RCA is a large caliber vessel which is heavily calcified and tortuous.  It has diffuse luminal irregularities and 30 to 40% stenosis in the midsegment around the bend.    ESTIMATED BLOOD LOSS:  10 ml    COMPLICATIONS:  None    PROCEDURE DATA:  Sedation Time: 25  minutes    IMPRESSIONS  Heavily calcified, tortuous nonobstructive coronary disease  Severe pulmonary hypertension with PA systolic pressure in the 70s  Mean PA pressure 44 mmHg    RECOMMENDATIONS  -Continue aggressive medical management of CAD  -Referral to pulmonology for treatment of pulmonary hypertension         Other:         ASSESSMENT & PLAN:    Principal Problem:    Dyspnea    COPD/Chronic respiratory failure/shortness of breath  Severe pulmonary hypertension  HFpEF  On 3 L of oxygen via nasal cannula at baseline, currently requiring 4 L  Has known pulmonary hypertension  RA 6/5, 4 mmHg  RV 74/3, 5 mmHg  PA 71/25, 44 mmHg  PCW 12 mmHg  She is on sildenafil  Echocardiogram shows preserved LV function with mildly elevated RVSP.  Small to moderate pericardial effusion: No signs of tamponade  proBNP of 9200 compared to 10,000 last month.  Bumex is currently on hold.  Persistent nausea and diarrhea  We will also connect her with heart failure clinic for intermittent IV diuresis     Atrial fibrillation  She has paroxysmal atrial fibrillation  [id number] score is 6  Continue Eliquis for atrial fibrillation as well as for history of DVT/PE  Continue Coreg for heart rate control  H&H 10.3/34.7     History of venous thrombosis and embolism  Low-dose Eliquis due to renal dysfunction and age.  She also has an IVC filter in place.     Primary hypertension, chronic  Blood pressure has improved and is currently normal  Continue Coreg for blood pressure control  Amlodipine can be added if better blood pressure control is desired.  Echo shows preserved LV function, reduced RV function and mildly elevated RVSP.  Pericardial effusion is not significant with no signs of tamponade.  Bumex on hold due to nausea and diarrhea     Coronary artery disease  Continue beta-blocker  Already on Eliquis low-dose  She will be started on a statin  Previous cardiac catheterization showed heavily calcified coronaries but nonobstructive.  No  chest pain and stable from cardiac standpoint.     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  Continue high intensity statin and anticoagulation with Eliquis 2.5 mg p.o. twice daily due to renal dysfunction and advanced age.     Stage IIIb chronic kidney disease  Creatinine has improved 1.77, GFR is 28.2  Bumex on hold due to nausea  Nephrology is also following     Recent altered mental status  Previously precipitated by mastitis/PICC line infection  Previous CT head and MRI of the brain unremarkable with no acute findings  Mental status during this admission has been at baseline.      Richardson Willis MD  05/04/24  07:14 EDT

## 2024-05-05 LAB
ALBUMIN SERPL-MCNC: 3.2 G/DL (ref 3.5–5.2)
ANION GAP SERPL CALCULATED.3IONS-SCNC: 10 MMOL/L (ref 5–15)
BUN SERPL-MCNC: 42 MG/DL (ref 8–23)
BUN/CREAT SERPL: 24.3 (ref 7–25)
CALCIUM SPEC-SCNC: 8.5 MG/DL (ref 8.6–10.5)
CHLORIDE SERPL-SCNC: 110 MMOL/L (ref 98–107)
CO2 SERPL-SCNC: 26 MMOL/L (ref 22–29)
CREAT SERPL-MCNC: 1.73 MG/DL (ref 0.57–1)
EGFRCR SERPLBLD CKD-EPI 2021: 29 ML/MIN/1.73
GLUCOSE SERPL-MCNC: 106 MG/DL (ref 65–99)
PHOSPHATE SERPL-MCNC: 2.9 MG/DL (ref 2.5–4.5)
POTASSIUM SERPL-SCNC: 4.6 MMOL/L (ref 3.5–5.2)
SODIUM SERPL-SCNC: 146 MMOL/L (ref 136–145)

## 2024-05-05 PROCEDURE — 80069 RENAL FUNCTION PANEL: CPT | Performed by: INTERNAL MEDICINE

## 2024-05-05 PROCEDURE — 94799 UNLISTED PULMONARY SVC/PX: CPT

## 2024-05-05 PROCEDURE — 94664 DEMO&/EVAL PT USE INHALER: CPT

## 2024-05-05 PROCEDURE — 99232 SBSQ HOSP IP/OBS MODERATE 35: CPT | Performed by: INTERNAL MEDICINE

## 2024-05-05 PROCEDURE — 94761 N-INVAS EAR/PLS OXIMETRY MLT: CPT

## 2024-05-05 PROCEDURE — 97166 OT EVAL MOD COMPLEX 45 MIN: CPT

## 2024-05-05 RX ADMIN — MIDODRINE HYDROCHLORIDE 5 MG: 5 TABLET ORAL at 17:25

## 2024-05-05 RX ADMIN — PANTOPRAZOLE SODIUM 40 MG: 40 TABLET, DELAYED RELEASE ORAL at 17:25

## 2024-05-05 RX ADMIN — POTASSIUM CHLORIDE 20 MEQ: 1500 TABLET, EXTENDED RELEASE ORAL at 08:30

## 2024-05-05 RX ADMIN — SILDENAFIL CITRATE 20 MG: 20 TABLET ORAL at 21:48

## 2024-05-05 RX ADMIN — CARVEDILOL 6.25 MG: 6.25 TABLET, FILM COATED ORAL at 08:30

## 2024-05-05 RX ADMIN — SILDENAFIL CITRATE 20 MG: 20 TABLET ORAL at 17:25

## 2024-05-05 RX ADMIN — FEBUXOSTAT 40 MG: 40 TABLET, FILM COATED ORAL at 08:30

## 2024-05-05 RX ADMIN — CARVEDILOL 6.25 MG: 6.25 TABLET, FILM COATED ORAL at 17:25

## 2024-05-05 RX ADMIN — LEVOTHYROXINE SODIUM 75 MCG: 0.07 TABLET ORAL at 04:57

## 2024-05-05 RX ADMIN — IPRATROPIUM BROMIDE AND ALBUTEROL SULFATE 3 ML: .5; 3 SOLUTION RESPIRATORY (INHALATION) at 18:39

## 2024-05-05 RX ADMIN — BUMETANIDE 1 MG: 1 TABLET ORAL at 08:30

## 2024-05-05 RX ADMIN — Medication 10 ML: at 08:31

## 2024-05-05 RX ADMIN — MIDODRINE HYDROCHLORIDE 5 MG: 5 TABLET ORAL at 08:30

## 2024-05-05 RX ADMIN — APIXABAN 2.5 MG: 2.5 TABLET, FILM COATED ORAL at 08:30

## 2024-05-05 RX ADMIN — SILDENAFIL CITRATE 20 MG: 20 TABLET ORAL at 08:30

## 2024-05-05 RX ADMIN — PANTOPRAZOLE SODIUM 40 MG: 40 TABLET, DELAYED RELEASE ORAL at 08:30

## 2024-05-05 RX ADMIN — APIXABAN 2.5 MG: 2.5 TABLET, FILM COATED ORAL at 21:48

## 2024-05-05 RX ADMIN — IPRATROPIUM BROMIDE AND ALBUTEROL SULFATE 3 ML: .5; 3 SOLUTION RESPIRATORY (INHALATION) at 06:40

## 2024-05-05 RX ADMIN — Medication 10 ML: at 21:48

## 2024-05-05 RX ADMIN — MIDODRINE HYDROCHLORIDE 5 MG: 5 TABLET ORAL at 11:34

## 2024-05-05 NOTE — CASE MANAGEMENT/SOCIAL WORK
Continued Stay Note  LIDYA Cox     Patient Name: Gabrielle Lyles  MRN: [id number]  Today's Date: 5/5/2024    Admit Date: 4/25/2024    Plan: Referral to [facility]; pending acceptance. Precert needed. No PASRR required for acute. Need OP heart failure clinic order.   Discharge Plan       Row Name 05/05/24 1740       Plan    Plan Referral to [facility]; pending acceptance. Precert needed. No PASRR required for acute. Need OP heart failure clinic order.    Patient/Family in Agreement with Plan yes    Plan Comments Spoke with pt regarding inpatient rehab choices. Pt chose [facility]. Referral placed and liaison notified.

## 2024-05-05 NOTE — THERAPY EVALUATION
Patient Name: Gabrielle Lyles  : 1940    MRN: [id number]                              Today's Date: 2024       Admit Date: 2024    Visit Dx:     ICD-10-CM ICD-9-CM   1. Acute on chronic heart failure with preserved ejection fraction (HFpEF)  I50.33 428.23     Patient Active Problem List   Diagnosis    Acute hypokalemia    Stage 3a chronic kidney disease    Low back pain    Osteopenia    Chronic obstructive pulmonary disease    Gastroesophageal reflux disease    Gout    History of venous thrombosis and embolism    Primary hypertension    Lumbar radiculopathy    Nausea    Personal history of malignant neoplasm of breast    Shortness of breath    Aortic aneurysm    Bulging lumbar disc    Spinal stenosis of lumbar region    History of TIA (transient ischemic attack)    Severe pulmonary hypertension    Chronic respiratory failure with hypoxia    Cellulitis of left foot    Altered mental status    JULIAN (acute kidney injury)    Dyspnea    Anemia in stage 2 chronic kidney disease    Paroxysmal atrial fibrillation    Moderate malnutrition     Past Medical History:   Diagnosis Date    Acute exacerbation of chronic obstructive pulmonary disease (COPD)     COPD (chronic obstructive pulmonary disease)     Deep vein thrombosis of bilateral lower extremities 2019    3/2013    History of pneumonia 2012    community acquired pneumonia and right pleural effusion;hospitalized at [facility]    History of pulmonary embolism 2013    bilateral PE    Hypertension     Insomnia     on Ambien 5mg at     Lobular breast cancer, left 2011    Stage IA left upper lobular breast cancer    Malignant neoplasm of left breast in female, estrogen receptor positive 2019    Osteopenia 2012    Parainfluenza infection     Parotid duct obstruction     Severe pulmonary hypertension 2023    Stage 3a chronic kidney disease 2019    TIA (transient ischemic attack) 10/25/2022     Past Surgical History:    Procedure Laterality Date    CARDIAC CATHETERIZATION N/A 3/3/2023    Procedure: Left and Right Heart Cath with Coronary Angiography;  Surgeon: Richardson Willis MD;  Location: [facility] CATH INVASIVE LOCATION;  Service: Cardiovascular;  Laterality: N/A;    CATARACT EXTRACTION  2015    IVC FILTER RETRIEVAL  06/2014    IVC filter placement by Dr. Card    MAMMO STEREOTACTIC BREAST BX SURGICAL ADD UNI Left 11/01/2011    invasive lobular carcinoma-Dr. Cande Daniel    MASTECTOMY, PARTIAL Left 12/01/2011    and left axillary sentinel lymph node biopsy by Dr. Uriostegui    TUBAL ABDOMINAL LIGATION  1984      General Information       Row Name 05/05/24 0806          General Information    Prior Level of Function independent:  dtr drives and dose some housekeeping for pt. Pt uses Rolator. or RW.  -     Existing Precautions/Restrictions oxygen therapy device and L/min  3L as at home  -     Barriers to Rehab medically complex  -       Row Name 05/05/24 0806          Living Environment    People in Home alone  -       Row Name 05/05/24 0806          Home Main Entrance    Number of Stairs, Main Entrance two  -       Row Name 05/05/24 0806          Stairs Within Home, Primary    Number of Stairs, Within Home, Primary none  -       Row Name 05/05/24 0806          Cognition    Orientation Status (Cognition) oriented x 4  -       Row Name 05/05/24 0806          Safety Issues, Functional Mobility    Impairments Affecting Function (Mobility) endurance/activity tolerance;shortness of breath  -               User Key  (r) = Recorded By, (t) = Taken By, (c) = Cosigned By      Initials Name Provider Type     Katiana Salas OT Occupational Therapist                     Mobility/ADL's       Row Name 05/05/24 0809          Bed Mobility    Bed Mobility bed mobility (all) activities  -     All Activities, [place] (Bed Mobility) modified independence  -       Row Name 05/05/24 0809          Transfers    Transfers toilet  transfer;sit-stand transfer;stand-sit transfer  -[facility] Name 05/05/24 0809          Sit-Stand Transfer    Sit-Stand [place] (Transfers) modified [facility]     Assistive Device (Sit-Stand Transfers) walker, front-wheeled  -[facility] Name 05/05/24 0809          Stand-Sit Transfer    Stand-Sit [place] (Transfers) modified [place]  -[facility] Name 05/05/24 0809          Toilet Transfer    Type (Toilet Transfer) stand pivot/stand step  -     [place] Level (Toilet Transfer) modified [place]  -[facility] Name 05/05/24 0809          Functional Mobility    Functional Mobility- Ind. Level conditional independence;other (see comments)  O2 tank assist at [facility].  -[facility] Name 05/05/24 0809          Activities of Daily Living    BADL Assessment/Intervention toileting;grooming;feeding  -MH       Row Name 05/05/24 0809          Toileting Assessment/Training    [place] Level (Toileting) toileting skills;modified independence  -[facility] Name 05/05/24 0809          Grooming Assessment/Training    [place] Level (Grooming) grooming skills;set up  -MH       Row Name 05/05/24 0809          Self-Feeding Assessment/Training    [place] Level (Feeding) feeding skills;independent  -               User Key  (r) = Recorded By, (t) = Taken By, (c) = Cosigned By      Initials Name Provider Type     Katiana Salas OT Occupational Therapist                   Obj/Interventions       [facility] Name 05/05/24 0811          Sensory Assessment (Somatosensory)    Sensory Assessment (Somatosensory) sensation intact  -[facility] Name 05/05/24 0811          Vision Assessment/Intervention    Visual Impairment/Limitations WFL  -[facility] Name 05/05/24 0811          Range of Motion Comprehensive    General Range of Motion no range of motion deficits identified  -[facility] Name 05/05/24 0811          Strength Comprehensive (MMT)    General Manual Muscle Testing (MMT) Assessment no  strength deficits identified  -               User Key  (r) = Recorded By, (t) = Taken By, (c) = Cosigned By      Initials Name Provider Type     Katiana Salas, OT Occupational Therapist                   Goals/Plan    No documentation.                  Clinical Impression       Row Name 05/05/24 0812          Pain Assessment    Pretreatment Pain Rating 0/10 - no pain  -     Posttreatment Pain Rating 0/10 - no pain  -       Row Name 05/05/24 1141 05/05/24 0812       Plan of Care Review    Plan of Care Reviewed With -- patient  -MH    Progress -- improving  -    Outcome Evaluation Pt is an 82 yo female brought to ED 2/5/24 with c/o AMS. MRI pending. CT Chest (+) Chronic atelectasis LLL, lesion of sternal manubrium. CTH limited due to motion. CT Abd pelvis suggestive of cirrhosis, aneurysmal dilation of abdominal aorta.  Dx   PMHx significant for CKD III, COPD, TIA, breast CA, DVT, PE, IVC Filter.    At baseline, pt resides alone in [facility] with 2 TAYLOR. She is independent, driving short distances but reporting mainly her dtr drives her and assists with housekeeping and making appointments and other IADL. Pt is able to cook some meals for herself and mobilizes with a Rolator or RW.  Pt uses 3L O2 24/7. Pt has good social support. Pt oriented X4 today and able to walk short distance but reports legs start to feel weak. Her main deficit below her baseline at this time is her endurance. Pt states she will have good help at home and would like to have HHC at d/c. Pt appears appropriate for [facility].  - --      Row Name 05/05/24 0812          Therapy Assessment/Plan (OT)    Criteria for Skilled Therapeutic Interventions Met (OT) no problems identified which require skilled intervention  -     Therapy Frequency (OT) evaluation only  -     Predicted Duration of Therapy Intervention (OT) Pt is chronically ill 83 y/o F admitted with fluid overload and some nausea, reflux, diarrhea, soa. She is back to her baseline now  and up to the [facility] [place]. States dtr will assist at home. Pt walks short distance on 3L as at home w/ RW. Pt demonstrates an home HEP from her last [facility] services. No further acute4 OT needs identified. Recommend home at d/c with [facility] if pt wishes.  -       Row Name 05/05/24 0812          Therapy Plan Review/Discharge Plan (OT)    Anticipated Discharge Disposition (OT) home with assist;home with home health  -       Row Name 05/05/24 0815 05/05/24 0812       Vital Signs    Intra Systolic BP Rehab 85  -MH --    Intra Treatment Diastolic BP 62  -MH --    Post Systolic BP Rehab 102  -MH --    Post Treatment Diastolic BP 53  -MH --    O2 Delivery Pre Treatment -- supplemental O2  -    O2 Delivery Intra Treatment -- supplemental O2  -MH    O2 Delivery Post Treatment -- supplemental O2  -MH    Pre Patient Position -- Supine  -    Intra Patient Position -- Standing  -    Post Patient Position -- Sitting  -      Row Name 05/05/24 0812          Positioning and Restraints    Pre-Treatment Position in bed  -     Post Treatment Position bed  -     In Bed sitting;call light within reach;encouraged to call for assist  -               User Key  (r) = Recorded By, (t) = Taken By, (c) = Cosigned By      Initials Name Provider Type     Katiana Salas, OT Occupational Therapist                   Outcome Measures       Row Name 05/05/24 1149 05/05/24 0800       How much help from another person do you currently need...    Turning from your back to your side while in flat bed without using bedrails? 4  - 4  -SM    Moving from lying on back to sitting on the side of a flat bed without bedrails? 4  - 4  -SM    Moving to and from a bed to a chair (including a wheelchair)? 4  - 4  -SM    Standing up from a chair using your arms (e.g., wheelchair, bedside chair)? 4  - 4  -SM    Climbing 3-5 steps with a railing? 4  - 4  -SM    To walk in hospital room? 4  - 4  -SM    AM-PAC 6 Clicks Score (PT) 24  - 24  -SM     Highest Level of Mobility Goal 8 --> Walked 250 feet or more  - 8 --> Walked 250 feet or more  -              User Key  (r) = Recorded By, (t) = Taken By, (c) = Cosigned By      Initials Name Provider Type     Katiana Salas OT Occupational Therapist    Sarah Hunt RN Registered Nurse                    Occupational Therapy Education       Title: PT OT SLP Therapies (In Progress)       Topic: Occupational Therapy (In Progress)       Point: ADL training (Done)       Description:   Instruct learner(s) on proper safety adaptation and remediation techniques during self care or transfers.   Instruct in proper use of assistive devices.                  Learning Progress Summary             Patient Acceptance, E,TB, VU by  at 5/5/2024 1152                         Point: Home exercise program (Not Started)       Description:   Instruct learner(s) on appropriate technique for monitoring, assisting and/or progressing therapeutic exercises/activities.                  Learner Progress:  Not documented in this visit.              Point: Precautions (Done)       Description:   Instruct learner(s) on prescribed precautions during self-care and functional transfers.                  Learning Progress Summary             Patient Acceptance, E,TB, VU by  at 5/5/2024 1152                         Point: Body mechanics (Done)       Description:   Instruct learner(s) on proper positioning and spine alignment during self-care, functional mobility activities and/or exercises.                  Learning Progress Summary             Patient Acceptance, E,TB, VU by  at 5/5/2024 1152                                         User Key       Initials Effective Dates Name Provider Type [facility] 06/16/21 -  Katiana Salas OT Occupational Therapist OT                  OT Recommendation and Plan  Therapy Frequency (OT): evaluation only  Plan of Care Review  Plan of Care Reviewed With: patient  Progress: improving  Outcome  Evaluation: Pt is an 84 yo female brought to ED 2/5/24 with c/o AMS. MRI pending. CT Chest (+) Chronic atelectasis LLL, lesion of sternal manubrium. CTH limited due to motion. CT Abd pelvis suggestive of cirrhosis, aneurysmal dilation of abdominal aorta.  Dx   PMHx significant for CKD III, COPD, TIA, breast CA, DVT, PE, IVC Filter.    At baseline, pt resides alone in [facility] with 2 TAYLOR. She is independent, driving short distances but reporting mainly her dtr drives her and assists with housekeeping and making appointments and other IADL. Pt is able to cook some meals for herself and mobilizes with a Rolator or RW.  Pt uses 3L O2 24/7. Pt has good social support. Pt oriented X4 today and able to walk short distance but reports legs start to feel weak. Her main deficit below her baseline at this time is her endurance. Pt states she will have good help at home and would like to have HHC at d/c. Pt appears appropriate for HHC.     Time Calculation:         Time Calculation- OT       Row Name 05/05/24 1152             Time Calculation-     OT Start Time 0748  -      OT Stop Time 0818  -      OT Time Calculation (min) 30 min  -      Total Timed Code Minutes- OT 10 minute(s)  -      OT Received On 05/05/24  -                User Key  (r) = Recorded By, (t) = Taken By, (c) = Cosigned By      Initials Name Provider Type     Katiana Salas OT Occupational Therapist                  Therapy Charges for Today       Code Description Service Date Service Provider Modifiers Qty    [id number]  OT EVAL MOD COMPLEXITY 3 5/5/2024 Katiana Salas OT GO 1                 Katiana Salas OT  5/5/2024

## 2024-05-05 NOTE — PLAN OF CARE
Goal Outcome Evaluation:            VSS, 3L NC, patient is still getting dizzy when she ambulates to bedside commode. Patient resting comfortably this shift

## 2024-05-05 NOTE — SIGNIFICANT NOTE
05/05/24 1215   OTHER   Discipline physical therapy assistant   Rehab Time/Intention   Session Not Performed patient/family declined treatment  (Pt just seen by OT and too worn out for PT today)   Recommendation   PT - Next Appointment 05/06/24

## 2024-05-05 NOTE — DISCHARGE PLACEMENT REQUEST
"Gabrielle Lyles (84 y.o. Female)       Date of Birth   1940    Social Security Number       Address   [address] [place] [address] [place] [address]    Home Phone   [phone]    MRN   [id number]       Religious   Hindu    Marital Status                               Admission Date   4/25/24    Admission Type   Urgent    Admitting Provider   Paul Granados MD    Attending Provider   Paul Granados MD    Department, Room/Bed   [facility], 2104/1       Discharge Date       Discharge Disposition       Discharge Destination                                 Attending Provider: Paul Granados MD    Allergies: No Known Allergies    Isolation: None   Infection: None   Code Status: CPR    Ht: 162.6 cm (64\")   Wt: 66.5 kg (146 lb 9.7 oz)    Admission Cmt: None   Principal Problem: Dyspnea [R06.00]                   Active Insurance as of 4/25/2024       Primary Coverage       Payor Plan Insurance Group Employer/Plan Group    HUMANA MEDICARE REPLACEMENT HUMANA MED ADV GROUP [id number]       Payor Plan Address Payor Plan Phone Number Payor Plan Fax Number Effective Dates    [address] [address] [phone]  1/1/2018 - None Entered    [facility] [id number]         Subscriber Name Subscriber Birth Date Member ID       GABRIELLE LYLES 1940 [id number]                     Emergency Contacts        (Rel.) Home Phone Work Phone Mobile Phone    MARVIN DURANT (Daughter) [phone] -- --                 History & Physical        Paul Granados MD at 04/26/24 0935          Patient Care Team:  Paul Granados MD as PCP - General (Family Medicine)  Richardson Willis MD as Cardiologist (Cardiology)  Rafy Rodriguez MD as Consulting Physician (Hematology and Oncology)  Christianne Phillips RN as Licensed Practical Nurse  Salome Fleming MD as Consulting Physician (Nephrology)    Chief Complaint  Subjective    The patient is a 84 y.o. female who presents with " shortness of breath with evidence of an acute exacerbation of diastolic congestive heart failure    HPI  The patient was in her usual state of health until 1 to 2 weeks prior to presentation when she began to experience the insidious onset of some progressive shortness of breath associate with some orthopnea and paroxysmal nocturnal dyspnea.  She attempted to use home medications but decompensated and presented to our office for evaluation where she was found to be in some respiratory distress.  She was referred for prompt admission.  At the time my evaluation she has received parenteral diuresis and is feeling better overall.  Review of Systems  Review of Systems   Constitutional:  Positive for activity change.   Respiratory:  Positive for shortness of breath.    Musculoskeletal:  Positive for arthralgias.   Neurological:  Positive for weakness.       History  Past Medical History:   Diagnosis Date    Acute exacerbation of chronic obstructive pulmonary disease (COPD)     COPD (chronic obstructive pulmonary disease)     Deep vein thrombosis of bilateral lower extremities 07/24/2019    3/2013    History of pneumonia 06/2012    community acquired pneumonia and right pleural effusion;hospitalized at [facility]    History of pulmonary embolism 03/2013    bilateral PE    Hypertension 2001    Insomnia     on Ambien 5mg at     Lobular breast cancer, left 11/2011    Stage IA left upper lobular breast cancer    Malignant neoplasm of left breast in female, estrogen receptor positive 07/18/2019    Osteopenia 2012    Parainfluenza infection     Parotid duct obstruction     Severe pulmonary hypertension 03/03/2023    Stage 3a chronic kidney disease 07/24/2019    TIA (transient ischemic attack) 10/25/2022     Past Surgical History:   Procedure Laterality Date    CARDIAC CATHETERIZATION N/A 3/3/2023    Procedure: Left and Right Heart Cath with Coronary Angiography;  Surgeon: Richardson Willis MD;  Location: [facility] INVASIVE  LOCATION;  Service: Cardiovascular;  Laterality: N/A;    CATARACT EXTRACTION      IVC FILTER RETRIEVAL  2014    IVC filter placement by Dr. Card    MAMMO STEREOTACTIC BREAST BX SURGICAL ADD UNI Left 2011    invasive lobular carcinoma-Dr. Cande Daniel    MASTECTOMY, PARTIAL Left 2011    and left axillary sentinel lymph node biopsy by Dr. Uriostegui    TUBAL ABDOMINAL LIGATION       Family History   Problem Relation Age of Onset    Lung cancer Brother      Social History     Tobacco Use    Smoking status: Former     Current packs/day: 0.00     Types: Cigarettes     Quit date:      Years since quittin.3     Passive exposure: Past    Smokeless tobacco: Never    Tobacco comments:     smoked 6 cigarettes a day from 13 years of age to 56 years of age when she quit in    Vaping Use    Vaping status: Never Used   Substance Use Topics    Alcohol use: Not Currently    Drug use: No     Allergies:  Patient has no known allergies.    Objective    Vital Signs  Temp:  [97.3 °F (36.3 °C)-97.5 °F (36.4 °C)] 97.3 °F (36.3 °C)  Heart Rate:  [71-90] 87  Resp:  [15-21] 18  BP: (112-135)/(56-73) 127/70      Physical Exam:   Physical Exam  Vitals and nursing note reviewed.   Constitutional:       Appearance: Normal appearance.   Cardiovascular:      Rate and Rhythm: Rhythm irregular.      Heart sounds: Normal heart sounds.   Pulmonary:      Breath sounds: Normal breath sounds.   Musculoskeletal:         General: Swelling present.   Skin:     General: Skin is warm.   Neurological:      Mental Status: She is alert.              Results Review:   CBC    Results from last 7 days   Lab Units 24  0043 24  1536   WBC 10*3/mm3 6.21 7.71   HEMOGLOBIN g/dL 10.5* 10.8*   PLATELETS 10*3/mm3 204 192     BMP   Results from last 7 days   Lab Units 24  0043 24  2035   SODIUM mmol/L 144 145   POTASSIUM mmol/L 3.7 3.4*   CHLORIDE mmol/L 105 105   CO2 mmol/L 25.0 25.0   BUN mg/dL 34* 32*   CREATININE  "mg/dL 1.81* 1.71*   GLUCOSE mg/dL 164* 168*     Cr Clearance Estimated Creatinine Clearance: 23.1 mL/min (A) (by C-G formula based on SCr of 1.81 mg/dL (H)).  [facility] [place]     HbA1C   Lab Results   Component Value Date    HGBA1C 5.8 (H) 10/25/2022     Blood Glucose No results found for: \"POCGLU\"  Infection   Results from last 7 days   Lab Units 04/25/24  1536   PROCALCITONIN ng/mL 0.04     CMP   Results from last 7 days   Lab Units 04/26/24  0043 04/25/24  2035   SODIUM mmol/L 144 145   POTASSIUM mmol/L 3.7 3.4*   CHLORIDE mmol/L 105 105   CO2 mmol/L 25.0 25.0   BUN mg/dL 34* 32*   CREATININE mg/dL 1.81* 1.71*   GLUCOSE mg/dL 164* 168*   ALBUMIN g/dL  --  4.0   BILIRUBIN mg/dL  --  2.1*   ALK PHOS U/L  --  75   AST (SGOT) U/L  --  18   ALT (SGPT) U/L  --  16     UA    Results from last 7 days   Lab Units 04/25/24  1556   NITRITE UA  Positive*   WBC UA /HPF 0-2   BACTERIA UA /HPF 1+*   SQUAM EPITHEL UA /HPF 0-2     Radiology(recent) XR Chest 1 View    Result Date: 4/25/2024  Impression: Findings suggest mild CHF superimposed over chronic lung disease. 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  Cerebrovascular disease status post CVA  Chronic oral anticoagulation therapy  Acquired hypothyroidism  Thrombophilia  Autonomic dysfunction syndrome  History of pulmonary embolism  Hypercoagulable state secondary to atrial fibrillation  [id number] score 6  History of IVC filter  Aneurysmal " dilatation of abdominal aorta  Chronic mucopurulent bronchitis  Peripheral polyneuropathy  History of breast cancer  Supplemental oxygen dependency  Chronic hypoxic respiratory failure        Plan:  Gentle diuresis//renal support//aggressive pulmonary toilet  Expected Length of Stay 3 days    I discussed the patient's findings and my recommendations with patient and nursing staff.     Paul Granados MD  04/26/24  09:36 EDT          Electronically signed by Paul Granados MD at 04/26/24 0940          Physician Progress Notes (last 24 hours)        Carlie Roy MD at 05/05/24 1348          Daily Progress Note          Assessment    Dyspnea  Hypoxemia: On home oxygen 2 L    Severe pulmonary hypertension mean PA pressure 44   RHC March 2023  RA 6/5, 4 mmHg  RV 74/3, 5 mmHg  PA 71/25, 44 mmHg  PCW 12 mmHg  AO Sat 92%  PA Sat 78%  Jose CO: 7.89 L/min  Jose CI: 4.69 L/min/m²  LHC   LV: 134/3, 20 mmHg  AO: 131/56, 87 mmHg  No significant gradient across the aortic valve during pullback of JR4 catheter.     Severe calcified tortuous nonobstructive coronary artery disease     Patient had CT with IV contrast in October 2022 showed no pulmonary embolism     COPD/emphysema  Chronic atelectasis in left lower lobe  Anemia  CKD  HTN  History of PE/DVT  History of TIA  CAD  Paroxysmal atrial fibrillation  History of breast cancer 2018, currently in remission     Results for orders placed in visit on 03/18/24     Adult Transthoracic Echo Limited W/ Cont if Necessary Per Protocol     Interpretation Summary    Left ventricular ejection fraction appears to be 61 - 65%.    The right ventricular cavity is dilated.    There is a small (<1cm) pericardial effusion adjacent to the left ventricle. There is no evidence of cardiac tamponade.    IVC is 2.1 cm.           Recommendations:      Sildenafil for pulmonary hypertension, monitor systemic blood pressure    Off po prednisone     Oxygen supplement and titration to maintain saturation 90  to 95%: Currently requiring 3 L per nasal cannula  Bronchodilators    Inhaled corticosteroids     Diuresis as per nephrology    Thyroid hormone replacement  Cardiology following   Electrolytes/ glycemic control  Chronic anticoagulation: Apixaban    I personally reviewed the radiological studies                 LOS: 10 days     Subjective     C/o PEREZ, mild cough, diarrhea     Objective     Vital signs for last 24 hours:  Vitals:    05/05/24 0644 05/05/24 0814 05/05/24 0830 05/05/24 1226   BP:  102/53  118/71   BP Location:  Right arm  Left arm   Patient Position:  Sitting  Lying   Pulse: 74 82 74 73   Resp: 18 14  17   Temp:  97.7 °F (36.5 °C)  98.7 °F (37.1 °C)   TempSrc:  Oral  Oral   SpO2: 100% 92%  99%   Weight:       Height:           Intake/Output last 3 shifts:  I/O last 3 completed shifts:  In: 1300 [P.O.:1300]  Out: 200 [Urine:200]  Intake/Output this shift:  No intake/output data recorded.      Radiology  Imaging Results (Last 24 Hours)       ** No results found for the last 24 hours. **            Labs:  Results from last 7 days   Lab Units 05/04/24  0032   WBC 10*3/mm3 10.43   HEMOGLOBIN g/dL 10.3*   HEMATOCRIT % 34.7   PLATELETS 10*3/mm3 172     Results from last 7 days   Lab Units 05/05/24  0105 05/04/24  0032   SODIUM mmol/L 146* 144   POTASSIUM mmol/L 4.6 4.0   CHLORIDE mmol/L 110* 108*   CO2 mmol/L 26.0 26.0   BUN mg/dL 42* 45*   CREATININE mg/dL 1.73* 1.77*   CALCIUM mg/dL 8.5* 8.9   BILIRUBIN mg/dL  --  1.6*   ALK PHOS U/L  --  62   ALT (SGPT) U/L  --  22   AST (SGOT) U/L  --  20   GLUCOSE mg/dL 106* 97         Results from last 7 days   Lab Units 05/05/24  0105 05/04/24  0032 04/29/24  0256   ALBUMIN g/dL 3.2* 3.2* 3.8               Results from last 7 days   Lab Units 05/04/24  0032   MAGNESIUM mg/dL 1.9                     Meds:   SCHEDULE  apixaban, 2.5 mg, Oral, Q12H  bumetanide, 1 mg, Oral, Daily  carvedilol, 6.25 mg, Oral, BID With Meals  febuxostat, 40 mg, Oral,  Daily  ipratropium-albuterol, 3 mL, Nebulization, BID - RT  levothyroxine, 75 mcg, Oral, Q AM  midodrine, 5 mg, Oral, TID AC  pantoprazole, 40 mg, Oral, BID AC  potassium chloride, 20 mEq, Oral, Daily  sildenafil, 20 mg, Oral, TID  sodium chloride, 10 mL, Intravenous, Q12H      Infusions     PRNs    acetaminophen    senna-docusate sodium **AND** polyethylene glycol **AND** bisacodyl **AND** bisacodyl    Calcium Replacement - Follow Nurse / BPA Driven Protocol    loperamide    Magnesium Standard Dose Replacement - Follow Nurse / BPA Driven Protocol    ondansetron ODT **OR** ondansetron    Phosphorus Replacement - Follow Nurse / BPA Driven Protocol    Potassium Replacement - Follow Nurse / BPA Driven Protocol    promethazine    promethazine    sodium chloride    sodium chloride    Physical Exam:  General Appearance:  Alert   HEENT:  Normocephalic, without obvious abnormality, Conjunctiva/corneas clear,.   Nares normal, no drainage     Neck:  Supple, symmetrical, trachea midline.   Lungs /Chest wall:   mild Bilateral basal rhonchi, respirations unlabored, symmetrical wall movement.     Heart:  Regular rate and rhythm, S1 S2 normal  Abdomen: Soft, non-tender, no masses, no organomegaly.    Extremities: No edema, no clubbing or cyanosis     ROS  Constitutional: Negative for chills, fever and malaise/fatigue.   HENT: Negative.    Eyes: Negative.    Cardiovascular: Negative.    Respiratory: Positive for cough and shortness of breath.    Skin: Negative.    Musculoskeletal: Negative.    Gastrointestinal: diarrhea    Genitourinary: Negative.    Neurological: Negative.    Psychiatric/Behavioral: Negative.      I reviewed the recent clinical results  I personally reviewed the latest radiological studies    Part of this note may be an electronic transcription/translation of spoken language to printed text using the Dragon Dictation System.      Electronically signed by Carlie Roy MD at 05/05/24 3206       Anirudh Aleman MD  "at 24 1303          NEPHROLOGY PROGRESS NOTE------[facility]    [facility]  [phone]  Anirudh Aleman MD      Patient Care Team:  Paul Granados MD as PCP - General (Family Medicine)  Richardson Willis MD as Cardiologist (Cardiology)  Rafy Rodriguez MD as Consulting Physician (Hematology and Oncology)  Christianne Phillips RN as Licensed Practical Nurse  Salome Fleming MD as Consulting Physician (Nephrology)      Provider:  Anirudh Aleman MD  Patient Name: Gabrielle Lyles  :  1940    SUBJECTIVE:    F/U ARF/JULIAN/CRF/CKD  No chest pain, still some shortness of air, but better  .     Medication:  apixaban, 2.5 mg, Oral, Q12H  bumetanide, 1 mg, Oral, Daily  carvedilol, 6.25 mg, Oral, BID With Meals  febuxostat, 40 mg, Oral, Daily  ipratropium-albuterol, 3 mL, Nebulization, BID - RT  levothyroxine, 75 mcg, Oral, Q AM  midodrine, 5 mg, Oral, TID AC  pantoprazole, 40 mg, Oral, BID AC  potassium chloride, 20 mEq, Oral, Daily  sildenafil, 20 mg, Oral, TID  sodium chloride, 10 mL, Intravenous, Q12H           OBJECTIVE    Vital Sign Min/Max for last 24 hours  Temp  Min: 97.2 °F (36.2 °C)  Max: 98.7 °F (37.1 °C)   BP  Min: 77/48  Max: 118/71   Pulse  Min: 71  Max: 84   Resp  Min: 14  Max: 18   SpO2  Min: 84 %  Max: 100 %   No data recorded   Weight  Min: 66.5 kg (146 lb 9.7 oz)  Max: 66.5 kg (146 lb 9.7 oz)     Flowsheet Rows      Flowsheet Row First Filed Value   Admission Height 162.6 cm (64\") Documented at 2024 0711   Admission Weight 74 kg (163 lb 2.3 oz) Documented at 2024 2107            No intake/output data recorded.  I/O last 3 completed shifts:  In: 1300 [P.O.:1300]  Out: 200 [Urine:200]    Physical Exam:  General Appearance: alert, appears stated age and cooperative  Head: normocephalic, without obvious abnormality and atraumatic  Eyes: conjunctivae and sclerae normal and no icterus  Neck: supple and NO GROSS JVD " "  Lungs: Diminished breath sounds  Heart: IRREG IRREG +TAYE  Chest Wall: no abnormalities observed  Abdomen: normal bowel sounds and soft, nontender  Extremities: moves extremities well,1+ BILAT PRETIBIAL EDEMA, no cyanosis  Skin: no bleeding, bruising or rash  Neurologic: Alert, and oriented. No focal deficits    Labs:    WBC WBC   Date Value Ref Range Status   05/04/2024 10.43 3.40 - 10.80 10*3/mm3 Final   05/03/2024 10.86 (H) 3.40 - 10.80 10*3/mm3 Final      HGB Hemoglobin   Date Value Ref Range Status   05/04/2024 10.3 (L) 12.0 - 15.9 g/dL Final   05/03/2024 10.7 (L) 12.0 - 15.9 g/dL Final      HCT Hematocrit   Date Value Ref Range Status   05/04/2024 34.7 34.0 - 46.6 % Final   05/03/2024 35.8 34.0 - 46.6 % Final      Platelets No results found for: \"LABPLAT\"   MCV MCV   Date Value Ref Range Status   05/04/2024 87.4 79.0 - 97.0 fL Final   05/03/2024 87.3 79.0 - 97.0 fL Final          Sodium Sodium   Date Value Ref Range Status   05/05/2024 146 (H) 136 - 145 mmol/L Final   05/04/2024 144 136 - 145 mmol/L Final   05/03/2024 144 136 - 145 mmol/L Final      Potassium Potassium   Date Value Ref Range Status   05/05/2024 4.6 3.5 - 5.2 mmol/L Final   05/04/2024 4.0 3.5 - 5.2 mmol/L Final   05/03/2024 4.0 3.5 - 5.2 mmol/L Final     Comment:     Slight hemolysis detected by analyzer. Result may be falsely elevated.      Chloride Chloride   Date Value Ref Range Status   05/05/2024 110 (H) 98 - 107 mmol/L Final   05/04/2024 108 (H) 98 - 107 mmol/L Final   05/03/2024 107 98 - 107 mmol/L Final      CO2 CO2   Date Value Ref Range Status   05/05/2024 26.0 22.0 - 29.0 mmol/L Final   05/04/2024 26.0 22.0 - 29.0 mmol/L Final   05/03/2024 25.0 22.0 - 29.0 mmol/L Final      BUN BUN   Date Value Ref Range Status   05/05/2024 42 (H) 8 - 23 mg/dL Final   05/04/2024 45 (H) 8 - 23 mg/dL Final   05/03/2024 50 (H) 8 - 23 mg/dL Final      Creatinine Creatinine   Date Value Ref Range Status   05/05/2024 1.73 (H) 0.57 - 1.00 mg/dL Final " "  05/04/2024 1.77 (H) 0.57 - 1.00 mg/dL Final   05/03/2024 1.99 (H) 0.57 - 1.00 mg/dL Final      Calcium Calcium   Date Value Ref Range Status   05/05/2024 8.5 (L) 8.6 - 10.5 mg/dL Final   05/04/2024 8.9 8.6 - 10.5 mg/dL Final   05/03/2024 9.5 8.6 - 10.5 mg/dL Final      PO4 No components found for: \"PO4\"   Albumin Albumin   Date Value Ref Range Status   05/05/2024 3.2 (L) 3.5 - 5.2 g/dL Final   05/04/2024 3.2 (L) 3.5 - 5.2 g/dL Final        Magnesium Magnesium   Date Value Ref Range Status   05/04/2024 1.9 1.6 - 2.4 mg/dL Final   05/03/2024 2.0 1.6 - 2.4 mg/dL Final      Uric Acid No components found for: \"URIC ACID\"     Imaging Results (Last 72 Hours)       ** No results found for the last 72 hours. **            Results for orders placed during the hospital encounter of 04/25/24    XR Chest 1 View    Narrative  XR CHEST 1 VW    Date of Exam: 4/28/2024 9:45 AM EDT    Indication: CHF    Comparison: 4/25/2020    Findings:  Heart size and pulmonary vasculature are stable. No gross pulmonary edema is demonstrated. There is strandy opacity in the left lung base.    Impression  Impression:    1. Cardiomegaly with no evidence of pulmonary edema  2. Left basilar atelectasis      Electronically Signed: Darnell Kennedy  4/28/2024 11:33 AM EDT  Workstation ID: [id number]      XR Chest 1 View    Narrative  XR CHEST 1 VW    Date of Exam: 4/25/2024 3:38 PM EDT    Indication: SOB    Comparison: 3/21/2024.    Findings:  There is stable mild cardiomegaly. There is pulmonary vascular congestion with increasing interstitial prominence bilaterally. There may be a small right effusion.    Impression  Impression:  Findings suggest mild CHF superimposed over chronic lung disease.      Electronically Signed: Mary Ivan MD  4/25/2024 3:52 PM EDT  Workstation ID: [id number]      Results for orders placed during the hospital encounter of 03/21/24    XR Chest 1 View    Narrative  XR CHEST 1 VW    Date of Exam: 3/21/2024 1:20 PM " EDT    Indication: Dyspnea    Comparison: 2/9/2024.    Findings:  There is stable mild cardiomegaly. Lung fields appear clear of acute infiltrates or effusions. Mild diffuse bilateral reticular lung thickening is stable. Right PICC line has been removed.    Impression  Impression:  Chronic findings. No acute process.      Electronically Signed: Mary Ivan MD  3/21/2024 1:48 PM EDT  Workstation ID: [id number]      Results for orders placed during the hospital encounter of 02/05/24    Duplex Venous Upper Extremity - Right CAR    Interpretation Summary    Normal right upper extremity venous duplex scan.        ASSESSMENT / PLAN      Dyspnea    Moderate malnutrition    ARF/JULIAN/CRF/CKD------Nonoliguric. +ARF/JULIAN on top of CRF/CKD STG 3A   with a baseline serum Creatinine of about 1.3-1.4.  CRF/CKD STG 3A is secondary to HTN NS. +ARF/JULIAN is secondary to prerenal/hemodynamic fluctuation from decompensated CHF (Cardiorenal). Hold Bumex today given poor po intake and nausea/vomiting. Avoid hypotension.   It appears that in the long run we may have to accept a higher baseline serum creatinine in order to keep euvolemic. No NSAIDs or IV dye. Dose meds for CrCl less than 10 cc/min. Azotemia up from steroid exposure.     2. ANEMIA OF CKD------S/P IV iron for ROME. Follow for EPO need     3. HTN WITH CKD-----Avoid hypotension. No ACE/ARB/DRI for now     4. OA/DJD/HYPERURICEMIA------No NSAIDs. Added Uloric     5. VOLUME OVERLOAD---Clinically better. Hold Bumex given n/v/d     6. HYPERGLYCEMIA------Glucometers, SSI     7.  PULMONARY HTN--------Per , Pulmonary     8. HYPOKALEMIA-------Replaced     9. CAD-----per , Cardiology    10. DIARRHEA-----C.Diff negative.      11. DECONDITIONING------PT/OT/Rehab    Creatinine stable  Blood pressure better, continue midodrine  Continue Bumex  Awaits rehab    Anirudh Aleman MD  [facility]  [phone]  05/05/24  13:03 EDT      Electronically signed  by Anirudh Aleman MD at 05/05/24 1511       Rosamaria Jin MD at 05/05/24 1240               LOS: 10 days   Patient Care Team:  Paul Granados MD as PCP - General (Family Medicine)  Richardson Willis MD as Cardiologist (Cardiology)  Rafy Rodriguez MD as Consulting Physician (Hematology and Oncology)  Christianne Phillips, AMARILIS as Licensed Practical Nurse  Salome Fleming MD as Consulting Physician (Nephrology)    Subjective     Interval History: Clinically stable    Patient Complaints: Able to ambulate further today.  Diarrhea has resolved.    History taken from: patient    Review of Systems   Constitutional:  Positive for activity change and fatigue. Negative for appetite change, chills, diaphoresis and fever.   HENT:  Negative for facial swelling.    Eyes:  Negative for visual disturbance.   Respiratory:  Positive for shortness of breath. Negative for cough, wheezing and stridor.    Cardiovascular:  Negative for chest pain, palpitations and leg swelling.   Gastrointestinal:  Negative for abdominal pain and constipation.   Genitourinary:  Negative for difficulty urinating, dyspareunia and urgency.   Musculoskeletal:  Positive for back pain and gait problem. Negative for arthralgias.   Neurological:  Negative for dizziness and headaches.   Psychiatric/Behavioral:  Negative for confusion.            Objective     Vital Signs  Temp:  [97.2 °F (36.2 °C)-98.7 °F (37.1 °C)] 98.7 °F (37.1 °C)  Heart Rate:  [71-84] 73  Resp:  [14-18] 17  BP: ()/(45-71) 118/71    Physical Exam:     General Appearance:    Alert, cooperative, in no acute distress,   Head:    Normocephalic, without obvious abnormality, atraumatic   Eyes:            Lids and lashes normal, conjunctivae and sclerae normal, no   icterus, no pallor, corneas clear, PERRLA   Ears:    Ears appear intact with no abnormalities noted   Throat:   No oral lesions, no thrush, oral mucosa moist   Neck:   No adenopathy, supple, trachea midline, no thyromegaly,  no   carotid bruit, no JVD   Lungs:     Clear to auscultation,respirations regular, even and                  unlabored    Heart:  Irregularly irregular   Chest Wall:    No abnormalities observed   Abdomen:     Normal bowel sounds, no masses, no organomegaly, soft        Non-tender non-distended, no guarding,   Extremities:   Moves all extremities well, no edema, no cyanosis, no             Redness   Pulses:   Pulses palpable and equal bilaterally   Skin:   No bleeding, bruising or rash   Lymph nodes:   No palpable adenopathy   Neurologic:   Cranial nerves 2 - 12 grossly intact, sensation intact, DTR       present and equal bilaterally        Results Review:    Lab Results (last 24 hours)       Procedure Component Value Units Date/Time    Renal Function Panel [[id number]]  (Abnormal) Collected: 05/05/24 0105    Specimen: Blood Updated: 05/05/24 0142     Glucose 106 mg/dL      BUN 42 mg/dL      Creatinine 1.73 mg/dL      Sodium 146 mmol/L      Potassium 4.6 mmol/L      Chloride 110 mmol/L      CO2 26.0 mmol/L      Calcium 8.5 mg/dL      Albumin 3.2 g/dL      Phosphorus 2.9 mg/dL      Anion Gap 10.0 mmol/L      BUN/Creatinine Ratio 24.3     eGFR 29.0 mL/min/1.73     Narrative:      GFR Normal >60  Chronic Kidney Disease <60  Kidney Failure <15    The GFR formula is only valid for adults with stable renal function between ages 18 and 70.             Imaging Results (Last 24 Hours)       ** No results found for the last 24 hours. **                 I reviewed the patient's new clinical results.    Medication Review:   Scheduled Meds:apixaban, 2.5 mg, Oral, Q12H  bumetanide, 1 mg, Oral, Daily  carvedilol, 6.25 mg, Oral, BID With Meals  febuxostat, 40 mg, Oral, Daily  ipratropium-albuterol, 3 mL, Nebulization, BID - RT  levothyroxine, 75 mcg, Oral, Q AM  midodrine, 5 mg, Oral, TID AC  pantoprazole, 40 mg, Oral, BID AC  potassium chloride, 20 mEq, Oral, Daily  sildenafil, 20 mg, Oral, TID  sodium chloride, 10 mL,  Intravenous, Q12H      Continuous Infusions:   PRN Meds:.  acetaminophen    senna-docusate sodium **AND** polyethylene glycol **AND** bisacodyl **AND** bisacodyl    Calcium Replacement - Follow Nurse / BPA Driven Protocol    loperamide    Magnesium Standard Dose Replacement - Follow Nurse / BPA Driven Protocol    ondansetron ODT **OR** ondansetron    Phosphorus Replacement - Follow Nurse / BPA Driven Protocol    Potassium Replacement - Follow Nurse / BPA Driven Protocol    promethazine    promethazine    sodium chloride    sodium chloride     Assessment & Plan       Dyspnea    Moderate malnutrition  COPD  Pulmonary hypertension-sildenafil  CKD - creatinine is stable; Bumex restarted  Acute gastroenteritis -resolved  Acute on chronic diastolic CHF clinically stable  PAF - rate controlled; anti-coagulated  Chronic CAD - no anginal symptoms  Hypothyroidism-levothyroxine  Hyperuricemia-Uloric  Hypotension-better on lower dose of carvedilol; on midodrine    Plan for disposition: Ready for discharge when skilled rehab arrangements have been made    Rosamaria Jin MD  05/05/24  12:40 EDT            Electronically signed by Rosamaria Jin MD at 05/05/24 1244       Richardson Willis MD at 05/05/24 0720          Referring Provider: Rosamaria Jin MD    Reason for follow-up: Shortness of breath, elevated proBNP     Patient Care Team:  Paul Granados MD as PCP - General (Family Medicine)  Richardson Willis MD as Cardiologist (Cardiology)  Rafy Rodriguez MD as Consulting Physician (Hematology and Oncology)  Christianne Phillips, AMARILIS as Licensed Practical Nurse  Salome Fleming MD as Consulting Physician (Nephrology)      SUBJECTIVE  Complains of weakness and dizziness.     ROS  Review of all systems negative except as indicated.    Since I have last seen, the patient has been without any chest discomfort, shortness of breath, palpitations, dizziness or syncope.  Denies having any headache, abdominal pain, nausea, vomiting, diarrhea,  constipation, loss of weight or loss of appetite.  Denies having any excessive bruising, hematuria or blood in the stool.  ROS      Personal History:    Past Medical History:   Diagnosis Date    Acute exacerbation of chronic obstructive pulmonary disease (COPD)     COPD (chronic obstructive pulmonary disease)     Deep vein thrombosis of bilateral lower extremities 2019    3/2013    History of pneumonia 2012    community acquired pneumonia and right pleural effusion;hospitalized at [facility]    History of pulmonary embolism 2013    bilateral PE    Hypertension     Insomnia     on Ambien 5mg at     Lobular breast cancer, left 2011    Stage IA left upper lobular breast cancer    Malignant neoplasm of left breast in female, estrogen receptor positive 2019    Osteopenia     Parainfluenza infection     Parotid duct obstruction     Severe pulmonary hypertension 2023    Stage 3a chronic kidney disease 2019    TIA (transient ischemic attack) 10/25/2022       Past Surgical History:   Procedure Laterality Date    CARDIAC CATHETERIZATION N/A 3/3/2023    Procedure: Left and Right Heart Cath with Coronary Angiography;  Surgeon: Richardson Willis MD;  Location: [facility] INVASIVE LOCATION;  Service: Cardiovascular;  Laterality: N/A;    CATARACT EXTRACTION      IVC FILTER RETRIEVAL  2014    IVC filter placement by Dr. Card    MAMMO STEREOTACTIC BREAST BX SURGICAL ADD UNI Left 2011    invasive lobular carcinoma-Dr. Cande Daniel    MASTECTOMY, PARTIAL Left 2011    and left axillary sentinel lymph node biopsy by Dr. Uriostegui    TUBAL ABDOMINAL LIGATION         Family History   Problem Relation Age of Onset    Lung cancer Brother        Social History     Tobacco Use    Smoking status: Former     Current packs/day: 0.00     Types: Cigarettes     Quit date:      Years since quittin.3     Passive exposure: Past    Smokeless tobacco: Never    Tobacco comments:      smoked 6 cigarettes a day from 13 years of age to 56 years of age when she quit in 1996   Vaping Use    Vaping status: Never Used   Substance Use Topics    Alcohol use: Not Currently    Drug use: No        Home meds:  Prior to Admission medications    Medication Sig Start Date End Date Taking? Authorizing Provider   apixaban (ELIQUIS) 5 MG tablet tablet Take 1 tablet by mouth Every 12 (Twelve) Hours. Indications: Other - full anticoagulation, history of DVT/PE 10/27/22  Yes Shaylee Mcdaniels MD   budesonide-formoterol (SYMBICORT) 80-4.5 MCG/ACT inhaler Inhale 2 puffs 2 (Two) Times a Day.   Yes Jaylyn Golden MD   carvedilol (COREG) 12.5 MG tablet TAKE 1 TABLET BY MOUTH TWICE DAILY WITH MEALS 12/26/23  Yes Richardson Willis MD   Cholecalciferol (Vitamin D3) 50 MCG (2000 UT) capsule Take 1 capsule by mouth Daily.   Yes Jaylyn Golden MD   Folbic 2.5-25-2 MG tablet tablet Take 1 tablet by mouth Daily. 2/28/24  Yes Jaylyn Golden MD   furosemide (LASIX) 20 MG tablet Take 1 tablet by mouth Daily. 2/17/24  Yes Mansi Becerril APRN   levothyroxine (SYNTHROID, LEVOTHROID) 50 MCG tablet Take 1 tablet by mouth Daily.   Yes Jaylyn Golden MD   O2 (OXYGEN) Inhale 2 L/min Continuous. 2/26/24  Yes Jaylyn Golden MD   potassium chloride (K-DUR,KLOR-CON) 10 MEQ CR tablet Take 1 tablet by mouth Daily. 2/24/23  Yes Richardson Willis MD   sildenafil (REVATIO) 20 MG tablet Take 1 tablet by mouth Every 8 (Eight) Hours. 5/11/23  Yes Mansi Becerril APRN   allopurinol (ZYLOPRIM) 100 MG tablet Take 1 tablet by mouth Daily.    ProviderJaylyn MD       Allergies:  Patient has no known allergies.    Scheduled Meds:apixaban, 2.5 mg, Oral, Q12H  bumetanide, 1 mg, Oral, Daily  carvedilol, 6.25 mg, Oral, BID With Meals  febuxostat, 40 mg, Oral, Daily  ipratropium-albuterol, 3 mL, Nebulization, BID - RT  levothyroxine, 75 mcg, Oral, Q AM  midodrine, 5 mg, Oral, TID AC  pantoprazole, 40 mg, Oral, BID  "AC  potassium chloride, 20 mEq, Oral, Daily  sildenafil, 20 mg, Oral, TID  sodium chloride, 10 mL, Intravenous, Q12H      Continuous Infusions:   PRN Meds:.  acetaminophen    senna-docusate sodium **AND** polyethylene glycol **AND** bisacodyl **AND** bisacodyl    Calcium Replacement - Follow Nurse / BPA Driven Protocol    loperamide    Magnesium Standard Dose Replacement - Follow Nurse / BPA Driven Protocol    ondansetron ODT **OR** ondansetron    Phosphorus Replacement - Follow Nurse / BPA Driven Protocol    Potassium Replacement - Follow Nurse / BPA Driven Protocol    promethazine    promethazine    sodium chloride    sodium chloride      OBJECTIVE    Vital Signs  Vitals:    05/05/24 0036 05/05/24 0444 05/05/24 0640 05/05/24 0644   BP: 101/53 (!) 81/48     BP Location: Right arm Right arm     Patient Position: Lying Lying     Pulse: 84 78 71 74   Resp: 18 18 18 18   Temp: 98.4 °F (36.9 °C) 97.5 °F (36.4 °C)     TempSrc: Oral Oral     SpO2: 97% 96% 100% 100%   Weight:  66.5 kg (146 lb 9.7 oz)     Height:           Flowsheet Rows      Flowsheet Row First Filed Value   Admission Height 162.6 cm (64\") Documented at 04/27/2024 0711   Admission Weight 74 kg (163 lb 2.3 oz) Documented at 04/25/2024 2107              Intake/Output Summary (Last 24 hours) at 5/5/2024 0720  Last data filed at 5/5/2024 0444  Gross per 24 hour   Intake 940 ml   Output --   Net 940 ml          Telemetry: Atrial fibrillation with controlled heart rate    Physical Exam:  The patient is alert, oriented and in no distress.  Vital signs as noted above.  Head and neck revealed no carotid bruits or jugular venous distention.  No thyromegaly or lymphadenopathy is present  Lungs clear.  No wheezing.  Breath sounds are normal bilaterally.  Heart normal first and second heart sounds.  No murmur. No precordial rub is present.  No gallop is present.  Abdomen soft and nontender.  No organomegaly is present.  Extremities with good peripheral pulses without " any pedal edema.  Skin warm and dry.  Musculoskeletal system is grossly normal.  CNS grossly normal.       Results Review:  I have personally reviewed the results from the time of this admission to 5/5/2024 07:20 EDT and agree with these findings:  []  Laboratory  []  Microbiology  []  Radiology  []  EKG/Telemetry   []  Cardiology/Vascular   []  Pathology  []  Old records  []  Other:    Most notable findings include:    Lab Results (last 24 hours)       Procedure Component Value Units Date/Time    Renal Function Panel [[id number]]  (Abnormal) Collected: 05/05/24 0105    Specimen: Blood Updated: 05/05/24 0142     Glucose 106 mg/dL      BUN 42 mg/dL      Creatinine 1.73 mg/dL      Sodium 146 mmol/L      Potassium 4.6 mmol/L      Chloride 110 mmol/L      CO2 26.0 mmol/L      Calcium 8.5 mg/dL      Albumin 3.2 g/dL      Phosphorus 2.9 mg/dL      Anion Gap 10.0 mmol/L      BUN/Creatinine Ratio 24.3     eGFR 29.0 mL/min/1.73     Narrative:      GFR Normal >60  Chronic Kidney Disease <60  Kidney Failure <15    The GFR formula is only valid for adults with stable renal function between ages 18 and 70.            Imaging Results (Last 24 Hours)       ** No results found for the last 24 hours. **            LAB RESULTS (LAST 7 DAYS)    CBC  Results from last 7 days   Lab Units 05/04/24  0032 05/03/24  0342 05/02/24  0536 05/01/24  0239 04/30/24  0038 04/29/24  0256 04/28/24  1019   WBC 10*3/mm3 10.43 10.86* 11.74* 10.11 9.09 9.41 12.34*   RBC 10*6/mm3 3.97 4.10 4.24 4.11 4.18 4.11 4.40   HEMOGLOBIN g/dL 10.3* 10.7* 11.1* 10.6* 10.7* 10.6* 11.2*   HEMATOCRIT % 34.7 35.8 37.4 35.9 35.8 35.4 38.2   MCV fL 87.4 87.3 88.2 87.3 85.6 86.1 86.8   PLATELETS 10*3/mm3 172 188 213 223 207 211 238       BMP  Results from last 7 days   Lab Units 05/05/24  0105 05/04/24  0032 05/03/24  0342 05/02/24  1805 05/02/24  0536 05/01/24  0239 04/30/24  0038 04/29/24  0256 04/28/24  1019   SODIUM mmol/L 146* 144 144  --  143 145 144 143 144    POTASSIUM mmol/L 4.6 4.0 4.0  --  4.3 4.0 3.6 4.2 4.4   CHLORIDE mmol/L 110* 108* 107  --  107 107 105 105 106   CO2 mmol/L 26.0 26.0 25.0  --  26.0 25.0 28.0 26.0 26.0   BUN mg/dL 42* 45* 50*  --  52* 48* 49* 48* 48*   CREATININE mg/dL 1.73* 1.77* 1.99*  --  1.99* 2.27* 2.21* 1.96* 1.86*   GLUCOSE mg/dL 106* 97 99  --  101* 160* 103* 116* 87   MAGNESIUM mg/dL  --  1.9 2.0  --  2.3 2.1 1.9 2.1 2.3   PHOSPHORUS mg/dL 2.9 2.9 3.5 3.1 2.1* 2.8 3.3 3.3 2.6       CMP   Results from last 7 days   Lab Units 05/05/24  0105 05/04/24  0032 05/03/24  0342 05/02/24  0536 05/01/24  0239 04/30/24  0038 04/29/24  0256   SODIUM mmol/L 146* 144 144 143 145 144 143   POTASSIUM mmol/L 4.6 4.0 4.0 4.3 4.0 3.6 4.2   CHLORIDE mmol/L 110* 108* 107 107 107 105 105   CO2 mmol/L 26.0 26.0 25.0 26.0 25.0 28.0 26.0   BUN mg/dL 42* 45* 50* 52* 48* 49* 48*   CREATININE mg/dL 1.73* 1.77* 1.99* 1.99* 2.27* 2.21* 1.96*   GLUCOSE mg/dL 106* 97 99 101* 160* 103* 116*   ALBUMIN g/dL 3.2* 3.2*  --   --   --   --  3.8   BILIRUBIN mg/dL  --  1.6*  --   --   --   --  1.3*   ALK PHOS U/L  --  62  --   --   --   --  66   AST (SGOT) U/L  --  20  --   --   --   --  17   ALT (SGPT) U/L  --  22  --   --   --   --  16       BNP        TROPONIN          CoAg        Creatinine Clearance  Estimated Creatinine Clearance: 23.1 mL/min (A) (by C-G formula based on SCr of 1.73 mg/dL (H)).    ABG        Radiology  No radiology results for the last day      EKG  I personally viewed and interpreted the patient's EKG/Telemetry data:  ECG 12 Lead Rhythm Change   Final Result   HEART RATE= 72  bpm   RR Interval= 830  ms   SC Interval=   ms   P Horizontal Axis=   deg   P Front Axis=   deg   QRSD Interval= 91  ms   QT Interval= 379  ms   QTcB= 416  ms   QRS Axis= 118  deg   T Wave Axis=   deg   - ABNORMAL ECG -   Atrial fibrillation   Right axis deviation   Low voltage, precordial leads   When compared with ECG of 21-Mar-2024 14:18:17,   No significant change   Electronically  Signed By: Richardson Willis ([facility]) 29-Apr-2024 23:17:34   Date and Time of Study: 2024-04-28 07:50:23      Telemetry Scan   Final Result      Telemetry Scan   Final Result      Telemetry Scan   Final Result      Telemetry Scan   Final Result      Telemetry Scan   Final Result      Telemetry Scan   Final Result      Telemetry Scan   Final Result      Telemetry Scan   Final Result      Telemetry Scan   Final Result      Telemetry Scan   Final Result      Telemetry Scan   Final Result      Telemetry Scan   Final Result      Telemetry Scan   Final Result      Telemetry Scan   Final Result      Telemetry Scan   Final Result      Telemetry Scan   Final Result      Telemetry Scan   Final Result      Telemetry Scan   Final Result      Telemetry Scan   Final Result      Telemetry Scan   Final Result      Telemetry Scan   Final Result      Telemetry Scan   Final Result      Telemetry Scan   Final Result      Telemetry Scan   Final Result      Telemetry Scan   Final Result      Telemetry Scan   Final Result      Telemetry Scan   Final Result      Telemetry Scan   Final Result      Telemetry Scan   Final Result      Telemetry Scan   Final Result      Telemetry Scan   Final Result      Telemetry Scan   Final Result      Telemetry Scan   Final Result      Telemetry Scan   Final Result      Telemetry Scan   Final Result      Telemetry Scan   Final Result      Telemetry Scan   Final Result      Telemetry Scan   Final Result      Telemetry Scan   Final Result      Telemetry Scan   Final Result      Telemetry Scan   Final Result      Telemetry Scan   Final Result      Telemetry Scan   Final Result      Telemetry Scan   Final Result      Telemetry Scan   Final Result      Telemetry Scan   Final Result            Echocardiogram:    Results for orders placed in visit on 03/18/24    Adult Transthoracic Echo Limited W/ Cont if Necessary Per Protocol    Interpretation Summary    Left ventricular ejection fraction appears to be 61 - 65%.    The  right ventricular cavity is dilated.    There is a small (<1cm) pericardial effusion adjacent to the left ventricle. There is no evidence of cardiac tamponade.    IVC is 2.1 cm.        Stress Test:  Results for orders placed in visit on 09/13/22    Stress Test With Myocardial Perfusion One Day    Interpretation Summary    Left ventricular ejection fraction is hyperdynamic (Calculated EF > 70%). .    Myocardial perfusion imaging indicates a normal myocardial perfusion study with no evidence of ischemia.    Impressions are consistent with a low risk study.    Findings consistent with a normal ECG stress test.         Cardiac Catheterization:  Results for orders placed during the hospital encounter of 03/03/23    Cardiac Catheterization/Vascular Study    Conclusion  OPERATORS  Richardson Willis M.D. (Attending Cardiologist)      PROCEDURE PERFORMED  Ultrasound guided vascular access  Right heart catheterization  Coronary Angiogram  Left Heart Catheterization 44703  Moderate Sedation    INDICATIONS FOR PROCEDURE  83-year-old woman with multiple cardiovascular risk factors, abnormal stress test presented with worsening shortness of breath.  After discussing the risk and benefit of the procedure she was brought in for elective right and left heart cath.    PROCEDURE IN DETAIL  Informed consent was obtained from the patient after explaining the risks, benefits, and alternative options of the procedure. After obtaining informed consent, the patient was brought to the cath lab and was prepped in a sterile fashion. Lidocaine 2% was used for local anesthesia into the right femoral venous access site. Right femoral vein was accessed using the micropuncture needle under ultrasound guidance and micropuncture wire advanced under flouroscopy. A 7 Korean vascular sheath was put into place percutaneously over guide-wire. Guide wires were removed. A 6Fr swan sheryl catheter was advanced to wedge position. RA, RV and PA and wedge pressures  were recorded.  PA sat and arterial sats recorded.  The patient tolerated the procedure well without any complications.    Lidocaine 2% was used for local anesthesia into the right femoral arterial access site. The right femoral artery was accessed with a micropuncture needle via modified Seldinger technique under ultrasound guidance. A 6F was inserted successfully.  Afterwards, 6F JR4 and JL4 diagnostic catheters were advanced over a wire into the ascending aorta and were used to engage the ostia of the left main and RCA respectively. JR4 used to cross the AV and obtain LV pressures and gradient across the AV measured via pullback technique. Images of the right and left coronary systems were obtained. All the catheters were exchanged over a wire and subsequently removed. Angiogram of the femoral access site was obtained and did not show complications. The patient tolerated the procedure well without any complications. The pictures were reviewed at the end of the procedure. A Mynx closure device was applied.    HEMODYNAMICS    RHC  RA 6/5, 4 mmHg  RV 74/3, 5 mmHg  PA 71/25, 44 mmHg  PCW 12 mmHg  AO Sat 92%  PA Sat 78%    Jose CO: 7.89 L/min    Jose CI: 4.69 L/min/m²    LHC  LV: 134/3, 20 mmHg  AO: 131/56, 87 mmHg  No significant gradient across the aortic valve during pullback of JR4 catheter.    FINDINGS  Coronary Angiogram  All vessels are heavily calcified  She also has heavily calcified peripheral arterial disease.  Right dominant circulation    Left main: Left main is a large caliber vessel which gives rise to the Left Anterior Descending and the Left circumflex.  Left main is angiographically free from any significant disease.    Left Anterior Descending Artery: LAD is a heavily calcified medium caliber vessel which gives rise to diagonals.  The vessel is highly tortuous and has 50 to 60% mid vessel stenosis best seen in Irish cranial view.    Left Circumflex: Heavily calcified vessel with 50% proximal segment  stenosis.    Right Coronary Artery: The RCA is a large caliber vessel which is heavily calcified and tortuous.  It has diffuse luminal irregularities and 30 to 40% stenosis in the midsegment around the bend.    ESTIMATED BLOOD LOSS:  10 ml    COMPLICATIONS:  None    PROCEDURE DATA:  Sedation Time: 25 minutes    IMPRESSIONS  Heavily calcified, tortuous nonobstructive coronary disease  Severe pulmonary hypertension with PA systolic pressure in the 70s  Mean PA pressure 44 mmHg    RECOMMENDATIONS  -Continue aggressive medical management of CAD  -Referral to pulmonology for treatment of pulmonary hypertension         Other:         ASSESSMENT & PLAN:    Principal Problem:    Dyspnea  Active Problems:    Moderate malnutrition    COPD/Chronic respiratory failure/shortness of breath  Severe pulmonary hypertension  HFpEF  On 3 L of oxygen via nasal cannula at baseline, currently requiring 4 L  Has known pulmonary hypertension with mean PA pressure of 44 and peak PA pressure of 71 mmHg with normal wedge pressure  She is on sildenafil  Echocardiogram shows preserved LV function with mildly elevated RVSP.  Small to moderate pericardial effusion: No signs of tamponade  proBNP of 9200 compared to 10,000 last month.  Bumex is currently on hold.  Persistent nausea and diarrhea  We will also connect her with heart failure clinic for intermittent IV diuresis     Atrial fibrillation  She has paroxysmal atrial fibrillation  [id number] score is 6  Continue Eliquis for atrial fibrillation as well as for history of DVT/PE  Continue Coreg for heart rate control  H&H 10.3/34.7     History of venous thrombosis and embolism  Low-dose Eliquis due to renal dysfunction and age.  She also has an IVC filter in place.     Primary hypertension, chronic  Blood pressure has improved and is currently normal  Continue Coreg for blood pressure control  Amlodipine can be added if better blood pressure control is desired.  Echo shows preserved LV  function, reduced RV function and mildly elevated RVSP.  Pericardial effusion is not significant with no signs of tamponade.  Bumex on hold due to nausea and diarrhea     Coronary artery disease  Continue beta-blocker  Already on Eliquis low-dose  She will be started on a statin  Previous cardiac catheterization showed heavily calcified coronaries but nonobstructive.  No chest pain and stable from cardiac standpoint.     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  Continue high intensity statin and anticoagulation with Eliquis 2.5 mg p.o. twice daily due to renal dysfunction and advanced age.     Stage IIIb chronic kidney disease  Creatinine has improved 1.73, GFR is 29  Bumex on hold due to nausea  Nephrology is also following     Recent altered mental status  Previously precipitated by mastitis/PICC line infection  Previous CT head and MRI of the brain unremarkable with no acute findings  Mental status during this admission has been at baseline.      Richardson Willis MD  24  07:20 EDT                  Electronically signed by Richardson Willis MD at 24 1022          Physical Therapy Notes (most recent note)        Sarai Moss PTA at 24 0834  Version 1 of 1            24 1215   OTHER   Discipline physical therapy assistant   Rehab Time/Intention   Session Not Performed patient/family declined treatment  (Pt just seen by OT and too worn out for PT today)   Recommendation   PT - Next Appointment 24         Electronically signed by Sarai Moss PTA at 24 1215          Occupational Therapy Notes (most recent note)        Katiana Salas OT at 24 1153          Patient Name: Gabrielle Lyles  : 1940    MRN: [id number]                              Today's Date: 2024       Admit Date:  4/25/2024    Visit Dx:     ICD-10-CM ICD-9-CM   1. Acute on chronic heart failure with preserved ejection fraction (HFpEF)  I50.33 428.23     Patient Active Problem List   Diagnosis    Acute hypokalemia    Stage 3a chronic kidney disease    Low back pain    Osteopenia    Chronic obstructive pulmonary disease    Gastroesophageal reflux disease    Gout    History of venous thrombosis and embolism    Primary hypertension    Lumbar radiculopathy    Nausea    Personal history of malignant neoplasm of breast    Shortness of breath    Aortic aneurysm    Bulging lumbar disc    Spinal stenosis of lumbar region    History of TIA (transient ischemic attack)    Severe pulmonary hypertension    Chronic respiratory failure with hypoxia    Cellulitis of left foot    Altered mental status    JULIAN (acute kidney injury)    Dyspnea    Anemia in stage 2 chronic kidney disease    Paroxysmal atrial fibrillation    Moderate malnutrition     Past Medical History:   Diagnosis Date    Acute exacerbation of chronic obstructive pulmonary disease (COPD)     COPD (chronic obstructive pulmonary disease)     Deep vein thrombosis of bilateral lower extremities 07/24/2019    3/2013    History of pneumonia 06/2012    community acquired pneumonia and right pleural effusion;[facility] [facility]    History of pulmonary embolism 03/2013    bilateral PE    Hypertension 2001    Insomnia     on Ambien 5mg at     Lobular breast cancer, left 11/2011    Stage IA left upper lobular breast cancer    Malignant neoplasm of left breast in female, estrogen receptor positive 07/18/2019    Osteopenia 2012    Parainfluenza infection     Parotid duct obstruction     Severe pulmonary hypertension 03/03/2023    Stage 3a chronic kidney disease 07/24/2019    TIA (transient ischemic attack) 10/25/2022     Past Surgical History:   Procedure Laterality Date    CARDIAC CATHETERIZATION N/A 3/3/2023    Procedure: Left and Right Heart Cath with Coronary Angiography;   Surgeon: Richardson Willis MD;  Location: [facility] CATH INVASIVE LOCATION;  Service: Cardiovascular;  Laterality: N/A;    CATARACT EXTRACTION  2015    IVC FILTER RETRIEVAL  06/2014    IVC filter placement by Dr. Card    MAMMO STEREOTACTIC BREAST BX SURGICAL ADD UNI Left 11/01/2011    invasive lobular carcinoma-Dr. Cande Daniel    MASTECTOMY, PARTIAL Left 12/01/2011    and left axillary sentinel lymph node biopsy by Dr. Uriostegui    TUBAL ABDOMINAL LIGATION  1984      General Information       Row Name 05/05/24 0806          General Information    Prior Level of Function independent:  dtr drives and dose some housekeeping for pt. Pt uses Rolator. or RW.  -     Existing Precautions/Restrictions oxygen therapy device and L/min  3L as at home  -     Barriers to Rehab medically complex  -       Row Name 05/05/24 0806          Living Environment    People in Home alone  -       Row Name 05/05/24 0806          Home Main Entrance    Number of Stairs, Main Entrance two  -       Row Name 05/05/24 0806          Stairs Within Home, Primary    Number of Stairs, Within Home, Primary none  -       Row Name 05/05/24 0806          Cognition    Orientation Status (Cognition) oriented x 4  -       Row Name 05/05/24 0806          Safety Issues, Functional Mobility    Impairments Affecting Function (Mobility) endurance/activity tolerance;shortness of breath  -               User Key  (r) = Recorded By, (t) = Taken By, (c) = Cosigned By      Initials Name Provider Type     Katiana Salas OT Occupational Therapist                     Mobility/ADL's       Row Name 05/05/24 0809          Bed Mobility    Bed Mobility bed mobility (all) activities  -     All Activities, [place] (Bed Mobility) modified independence  -       Row Name 05/05/24 0809          Transfers    Transfers toilet transfer;sit-stand transfer;stand-sit transfer  -       Row Name 05/05/24 0809          Sit-Stand Transfer    Sit-Stand [place]  (Transfers) modified [place]  -     Assistive Device (Sit-Stand Transfers) walker, front-wheeled  -       Row Name 05/05/24 0809          Stand-Sit Transfer    Stand-Sit [place] (Transfers) modified independence  -       Row Name 05/05/24 0809          Toilet Transfer    Type (Toilet Transfer) stand pivot/stand step  -     [place] Level (Toilet Transfer) modified [place]  -       Row Name 05/05/24 0809          Functional Mobility    Functional Mobility- Ind. Level conditional independence;other (see comments)  O2 tank assist at [facility].  -       Row Name 05/05/24 0809          Activities of Daily Living    BADL Assessment/Intervention toileting;grooming;feeding  -       Row Name 05/05/24 0809          Toileting Assessment/Training    [place] Level (Toileting) toileting skills;modified independence  -       Row Name 05/05/24 0809          Grooming Assessment/Training    [place] Level (Grooming) grooming skills;set up  -[facility] Name 05/05/24 0809          Self-Feeding Assessment/Training    [place] Level (Feeding) feeding skills;independent  -               User Key  (r) = Recorded By, (t) = Taken By, (c) = Cosigned By      Initials Name Provider Type     Katiana Salas OT Occupational Therapist                   Obj/Interventions       Row Name 05/05/24 0811          Sensory Assessment (Somatosensory)    Sensory Assessment (Somatosensory) sensation intact  -[facility] Name 05/05/24 0811          Vision Assessment/Intervention    Visual Impairment/Limitations WFL  -       Row Name 05/05/24 0811          Range of Motion Comprehensive    General Range of Motion no range of motion deficits identified  -[facility] Name 05/05/24 0811          Strength Comprehensive (MMT)    General Manual Muscle Testing (MMT) Assessment no strength deficits identified  -               User Key  (r) = Recorded By, (t) = Taken By, (c) = Cosigned By      Initials Name Provider  Type     Katiana Salas, KAMINI Occupational Therapist                   Goals/Plan    No documentation.                  Clinical Impression       Row Name 05/05/24 0812          Pain Assessment    Pretreatment Pain Rating 0/10 - no pain  -     Posttreatment Pain Rating 0/10 - no pain  -       Row Name 05/05/24 1141 05/05/24 0812       Plan of Care Review    Plan of Care Reviewed With -- patient  -MH    Progress -- improving  -    Outcome Evaluation Pt is an 84 yo female brought to ED 2/5/24 with c/o AMS. MRI pending. CT Chest (+) Chronic atelectasis LLL, lesion of sternal manubrium. CTH limited due to motion. CT Abd pelvis suggestive of cirrhosis, aneurysmal dilation of abdominal aorta.  Dx   PMHx significant for CKD III, COPD, TIA, breast CA, DVT, PE, IVC Filter.    At baseline, pt resides alone in [facility] with 2 TAYLOR. She is independent, driving short distances but reporting mainly her dtr drives her and assists with housekeeping and making appointments and other IADL. Pt is able to cook some meals for herself and mobilizes with a Rolator or RW.  Pt uses 3L O2 24/7. Pt has good social support. Pt oriented X4 today and able to walk short distance but reports legs start to feel weak. Her main deficit below her baseline at this time is her endurance. Pt states she will have good help at home and would like to have HHC at d/c. Pt appears appropriate for [facility].  - --      Row Name 05/05/24 0812          Therapy Assessment/Plan (OT)    Criteria for Skilled Therapeutic Interventions Met (OT) no problems identified which require skilled intervention  -     Therapy Frequency (OT) evaluation only  -     Predicted Duration of Therapy Intervention (OT) Pt is chronically ill 83 y/o F admitted with fluid overload and some nausea, reflux, diarrhea, soa. She is back to her baseline now and up to the [facility]. States dtr will assist at home. Pt walks short distance on 3L as at home w/ RW. Pt demonstrates an home HEP from  her last [facility] services. No further acute4 OT needs identified. Recommend home at d/c with [facility] if pt wishes.  -       Row Name 05/05/24 0812          Therapy Plan Review/Discharge Plan (OT)    Anticipated Discharge Disposition (OT) home with assist;home with home health  -       Row Name 05/05/24 0815 05/05/24 0812       Vital Signs    Intra Systolic BP Rehab 85  -MH --    Intra Treatment Diastolic BP 62  -MH --    Post Systolic BP Rehab 102  -MH --    Post Treatment Diastolic BP 53  -MH --    O2 Delivery Pre Treatment -- supplemental O2  -MH    O2 Delivery Intra Treatment -- supplemental O2  -MH    O2 Delivery Post Treatment -- supplemental O2  -MH    Pre Patient Position -- Supine  -MH    Intra Patient Position -- Standing  -MH    Post Patient Position -- Sitting  -      Row Name 05/05/24 0812          Positioning and Restraints    Pre-Treatment Position in bed  -MH     Post Treatment Position bed  -     In Bed sitting;call light within reach;encouraged to call for assist  -               User Key  (r) = Recorded By, (t) = Taken By, (c) = Cosigned By      Initials Name Provider Type     Katiana Salas, OT Occupational Therapist                   Outcome Measures       Row Name 05/05/24 1149 05/05/24 0800       How much help from another person do you currently need...    Turning from your back to your side while in flat bed without using bedrails? 4  - 4  -SM    Moving from lying on back to sitting on the side of a flat bed without bedrails? 4  - 4  -SM    Moving to and from a bed to a chair (including a wheelchair)? 4  - 4  -SM    Standing up from a chair using your arms (e.g., wheelchair, bedside chair)? 4  - 4  -SM    Climbing 3-5 steps with a railing? 4  - 4  -SM    To walk in hospital room? 4  - 4  -SM    AM-PAC 6 Clicks Score (PT) 24  - 24  -SM    Highest Level of Mobility Goal 8 --> Walked 250 feet or more  - 8 --> Walked 250 feet or more  -SM              User Key  (r) = Recorded  By, (t) = Taken By, (c) = Cosigned By      Initials Name Provider Type     Katiana Salas OT Occupational Therapist    Sarah Hunt RN Registered Nurse                    Occupational Therapy Education       Title: PT OT SLP Therapies (In Progress)       Topic: Occupational Therapy (In Progress)       Point: ADL training (Done)       Description:   Instruct learner(s) on proper safety adaptation and remediation techniques during self care or transfers.   Instruct in proper use of assistive devices.                  Learning Progress Summary             Patient Acceptance, E,TB, VU by  at 5/5/2024 1152                         Point: Home exercise program (Not Started)       Description:   Instruct learner(s) on appropriate technique for monitoring, assisting and/or progressing therapeutic exercises/activities.                  Learner Progress:  Not documented in this visit.              Point: Precautions (Done)       Description:   Instruct learner(s) on prescribed precautions during self-care and functional transfers.                  Learning Progress Summary             Patient Acceptance, E,TB, VU by  at 5/5/2024 1152                         Point: Body mechanics (Done)       Description:   Instruct learner(s) on proper positioning and spine alignment during self-care, functional mobility activities and/or exercises.                  Learning Progress Summary             Patient Acceptance, E,TB, VU by  at 5/5/2024 1152                                         User Key       Initials Effective Dates Name Provider Type Discipline     06/16/21 -  Katiana Salas OT Occupational Therapist OT                  OT Recommendation and Plan  Therapy Frequency (OT): evaluation only  Plan of Care Review  Plan of Care Reviewed With: patient  Progress: improving  Outcome Evaluation: Pt is an 84 yo female brought to ED 2/5/24 with c/o AMS. MRI pending. CT Chest (+) Chronic atelectasis LLL, lesion of sternal  manubrium. CTH limited due to motion. CT Abd pelvis suggestive of cirrhosis, aneurysmal dilation of abdominal aorta.  Dx   PMHx significant for CKD III, COPD, TIA, breast CA, DVT, PE, IVC Filter.    At baseline, pt resides alone in [facility] with 2 TAYLOR. She is independent, driving short distances but reporting mainly her dtr drives her and assists with housekeeping and making appointments and other IADL. Pt is able to cook some meals for herself and mobilizes with a Rolator or RW.  Pt uses 3L O2 24/7. Pt has good social support. Pt oriented X4 today and able to walk short distance but reports legs start to feel weak. Her main deficit below her baseline at this time is her endurance. Pt states she will have good help at home and would like to have HHC at d/c. Pt appears appropriate for HHC.     Time Calculation:         Time Calculation- OT       Row Name 05/05/24 7060             Time Calculation- OT    OT Start Time 0748  -      OT Stop Time 0818  -      OT Time Calculation (min) 30 min  -      Total Timed Code Minutes- OT 10 minute(s)  -      OT Received On 05/05/24  -                User Key  (r) = Recorded By, (t) = Taken By, (c) = Cosigned By      Initials Name Provider Type     Katiana Salas OT Occupational Therapist                  Therapy Charges for Today       Code Description Service Date Service Provider Modifiers Qty    [id number]  OT EVAL MOD COMPLEXITY 3 5/5/2024 Katiana Salas OT GO 1                 Katiana Salas OT  5/5/2024    Electronically signed by Katiana Salas OT at 05/05/24 0555

## 2024-05-05 NOTE — PLAN OF CARE
Problem: Adult Inpatient Plan of Care  Goal: Plan of Care Review  Outcome: Ongoing, Progressing  Flowsheets (Taken 5/5/2024 1324)  Outcome Evaluation: Pt doing better today.  cont on O2 at 3L.  Walked in [place] with PT today.  No c/o . ready for rehab when arranged.  will cont to monitor   Goal Outcome Evaluation:              Outcome Evaluation: Pt doing better today.  cont on O2 at 3L.  Walked in [place] with PT today.  No c/o . ready for rehab when arranged.  will cont to monitor

## 2024-05-05 NOTE — PROGRESS NOTES
Daily Progress Note          Assessment    Dyspnea  Hypoxemia: On home oxygen 2 L    Severe pulmonary hypertension mean PA pressure 44   [facility] March 2023  RA 6/5, 4 mmHg  RV 74/3, 5 mmHg  PA 71/25, 44 mmHg  PCW 12 mmHg  AO Sat 92%  PA Sat 78%  Jose CO: 7.89 L/min  Jose CI: 4.69 L/min/m²  [facility]   LV: 134/3, 20 mmHg  AO: 131/56, 87 mmHg  No significant gradient across the aortic valve during pullback of JR4 catheter.     Severe calcified tortuous nonobstructive coronary artery disease     Patient had CT with IV contrast in October 2022 showed no pulmonary embolism     COPD/emphysema  Chronic atelectasis in left lower lobe  Anemia  CKD  HTN  History of PE/DVT  History of TIA  CAD  Paroxysmal atrial fibrillation  History of breast cancer 2018, currently in remission     Results for orders placed in visit on 03/18/24     Adult Transthoracic Echo Limited W/ Cont if Necessary Per Protocol     Interpretation Summary    Left ventricular ejection fraction appears to be 61 - 65%.    The right ventricular cavity is dilated.    There is a small (<1cm) pericardial effusion adjacent to the left ventricle. There is no evidence of cardiac tamponade.    IVC is 2.1 cm.           Recommendations:      Sildenafil for pulmonary hypertension, monitor systemic blood pressure    Off po prednisone     Oxygen supplement and titration to maintain saturation 90 to 95%: Currently requiring 3 L per nasal cannula  Bronchodilators    Inhaled corticosteroids     Diuresis as per nephrology    Thyroid hormone replacement  Cardiology following   Electrolytes/ glycemic control  Chronic anticoagulation: Apixaban    I personally reviewed the radiological studies                 LOS: 10 days     Subjective     C/o PEREZ, mild cough, diarrhea     Objective     Vital signs for last 24 hours:  Vitals:    05/05/24 0644 05/05/24 0814 05/05/24 0830 05/05/24 1226   BP:  102/53  118/71   BP Location:  Right arm  Left arm   Patient Position:  Sitting  Lying   Pulse: 74  82 74 73   Resp: 18 14  17   Temp:  97.7 °F (36.5 °C)  98.7 °F (37.1 °C)   TempSrc:  Oral  Oral   SpO2: 100% 92%  99%   Weight:       Height:           Intake/Output last 3 shifts:  I/O last 3 completed shifts:  In: 1300 [P.O.:1300]  Out: 200 [Urine:200]  Intake/Output this shift:  No intake/output data recorded.      Radiology  Imaging Results (Last 24 Hours)       ** No results found for the last 24 hours. **            Labs:  Results from last 7 days   Lab Units 05/04/24  0032   WBC 10*3/mm3 10.43   HEMOGLOBIN g/dL 10.3*   HEMATOCRIT % 34.7   PLATELETS 10*3/mm3 172     Results from last 7 days   Lab Units 05/05/24  0105 05/04/24  0032   SODIUM mmol/L 146* 144   POTASSIUM mmol/L 4.6 4.0   CHLORIDE mmol/L 110* 108*   CO2 mmol/L 26.0 26.0   BUN mg/dL 42* 45*   CREATININE mg/dL 1.73* 1.77*   CALCIUM mg/dL 8.5* 8.9   BILIRUBIN mg/dL  --  1.6*   ALK PHOS U/L  --  62   ALT (SGPT) U/L  --  22   AST (SGOT) U/L  --  20   GLUCOSE mg/dL 106* 97         Results from last 7 days   Lab Units 05/05/24  0105 05/04/24  0032 04/29/24  0256   ALBUMIN g/dL 3.2* 3.2* 3.8               Results from last 7 days   Lab Units 05/04/24  0032   MAGNESIUM mg/dL 1.9                     Meds:   SCHEDULE  apixaban, 2.5 mg, Oral, Q12H  bumetanide, 1 mg, Oral, Daily  carvedilol, 6.25 mg, Oral, BID With Meals  febuxostat, 40 mg, Oral, Daily  ipratropium-albuterol, 3 mL, Nebulization, BID - RT  levothyroxine, 75 mcg, Oral, Q AM  midodrine, 5 mg, Oral, TID AC  pantoprazole, 40 mg, Oral, BID AC  potassium chloride, 20 mEq, Oral, Daily  sildenafil, 20 mg, Oral, TID  sodium chloride, 10 mL, Intravenous, Q12H      Infusions     PRNs    acetaminophen    senna-docusate sodium **AND** polyethylene glycol **AND** bisacodyl **AND** bisacodyl    Calcium Replacement - Follow Nurse / BPA Driven Protocol    loperamide    Magnesium Standard Dose Replacement - Follow Nurse / BPA Driven Protocol    ondansetron ODT **OR** ondansetron    Phosphorus Replacement -  Follow Nurse / BPA Driven Protocol    Potassium Replacement - Follow Nurse / BPA Driven Protocol    promethazine    promethazine    sodium chloride    sodium chloride    Physical Exam:  General Appearance:  Alert   HEENT:  Normocephalic, without obvious abnormality, Conjunctiva/corneas clear,.   Nares normal, no drainage     Neck:  Supple, symmetrical, trachea midline.   Lungs /Chest wall:   mild Bilateral basal rhonchi, respirations unlabored, symmetrical wall movement.     Heart:  Regular rate and rhythm, S1 S2 normal  Abdomen: Soft, non-tender, no masses, no organomegaly.    Extremities: No edema, no clubbing or cyanosis     ROS  Constitutional: Negative for chills, fever and malaise/fatigue.   HENT: Negative.    Eyes: Negative.    Cardiovascular: Negative.    Respiratory: Positive for cough and shortness of breath.    Skin: Negative.    Musculoskeletal: Negative.    Gastrointestinal: diarrhea    Genitourinary: Negative.    Neurological: Negative.    Psychiatric/Behavioral: Negative.      I reviewed the recent clinical results  I personally reviewed the latest radiological studies    Part of this note may be an electronic transcription/translation of spoken language to printed text using the Dragon Dictation System.     2 = A lot of assistance

## 2024-05-05 NOTE — PLAN OF CARE
Goal Outcome Evaluation:  Plan of Care Reviewed With: patient        Progress: improving  Outcome Evaluation: Pt is an 84 yo female brought to ED 2/5/24 with c/o AMS. MRI pending. CT Chest (+) Chronic atelectasis LLL, lesion of sternal manubrium. CTH limited due to motion. CT Abd pelvis suggestive of cirrhosis, aneurysmal dilation of abdominal aorta.  Dx   PMHx significant for CKD III, COPD, TIA, breast CA, DVT, PE, IVC Filter.    At baseline, pt resides alone in [facility] with 2 TAYLOR. She is independent, driving short distances but reporting mainly her dtr drives her and assists with housekeeping and making appointments and other IADL. Pt is able to cook some meals for herself and mobilizes with a Rolator or RW.  Pt uses 3L O2 24/7. Pt has good social support. Pt oriented X4 today and able to walk short distance but reports legs start to feel weak. Her main deficit below her baseline at this time is her endurance. Pt states she will have good help at home and would like to have HHC at d/c. Pt appears appropriate for HHC.      Anticipated Discharge Disposition (OT): home with assist, home with home health

## 2024-05-05 NOTE — PROGRESS NOTES
LOS: 10 days   Patient Care Team:  Paul Granados MD as PCP - General (Family Medicine)  Richardson Willis MD as Cardiologist (Cardiology)  Rafy Rodriguez MD as Consulting Physician (Hematology and Oncology)  Christianne Phillips, AMARILIS as Licensed Practical Nurse  Salome Fleming MD as Consulting Physician (Nephrology)    Subjective     Interval History: Clinically stable    Patient Complaints: Able to ambulate further today.  Diarrhea has resolved.    History taken from: patient    Review of Systems   Constitutional:  Positive for activity change and fatigue. Negative for appetite change, chills, diaphoresis and fever.   HENT:  Negative for facial swelling.    Eyes:  Negative for visual disturbance.   Respiratory:  Positive for shortness of breath. Negative for cough, wheezing and stridor.    Cardiovascular:  Negative for chest pain, palpitations and leg swelling.   Gastrointestinal:  Negative for abdominal pain and constipation.   Genitourinary:  Negative for difficulty urinating, dyspareunia and urgency.   Musculoskeletal:  Positive for back pain and gait problem. Negative for arthralgias.   Neurological:  Negative for dizziness and headaches.   Psychiatric/Behavioral:  Negative for confusion.            Objective     Vital Signs  Temp:  [97.2 °F (36.2 °C)-98.7 °F (37.1 °C)] 98.7 °F (37.1 °C)  Heart Rate:  [71-84] 73  Resp:  [14-18] 17  BP: ()/(45-71) 118/71    Physical Exam:     General Appearance:    Alert, cooperative, in no acute distress,   Head:    Normocephalic, without obvious abnormality, atraumatic   Eyes:            Lids and lashes normal, conjunctivae and sclerae normal, no   icterus, no pallor, corneas clear, PERRLA   Ears:    Ears appear intact with no abnormalities noted   Throat:   No oral lesions, no thrush, oral mucosa moist   Neck:   No adenopathy, supple, trachea midline, no thyromegaly, no   carotid bruit, no JVD   Lungs:     Clear to auscultation,respirations regular, even  and                  unlabored    Heart:  Irregularly irregular   Chest Wall:    No abnormalities observed   Abdomen:     Normal bowel sounds, no masses, no organomegaly, soft        Non-tender non-distended, no guarding,   Extremities:   Moves all extremities well, no edema, no cyanosis, no             Redness   Pulses:   Pulses palpable and equal bilaterally   Skin:   No bleeding, bruising or rash   Lymph nodes:   No palpable adenopathy   Neurologic:   Cranial nerves 2 - 12 grossly intact, sensation intact, DTR       present and equal bilaterally        Results Review:    Lab Results (last 24 hours)       Procedure Component Value Units Date/Time    Renal Function Panel [[id number]]  (Abnormal) Collected: 05/05/24 0105    Specimen: Blood Updated: 05/05/24 0142     Glucose 106 mg/dL      BUN 42 mg/dL      Creatinine 1.73 mg/dL      Sodium 146 mmol/L      Potassium 4.6 mmol/L      Chloride 110 mmol/L      CO2 26.0 mmol/L      Calcium 8.5 mg/dL      Albumin 3.2 g/dL      Phosphorus 2.9 mg/dL      Anion Gap 10.0 mmol/L      BUN/Creatinine Ratio 24.3     eGFR 29.0 mL/min/1.73     Narrative:      GFR Normal >60  Chronic Kidney Disease <60  Kidney Failure <15    The GFR formula is only valid for adults with stable renal function between ages 18 and 70.             Imaging Results (Last 24 Hours)       ** No results found for the last 24 hours. **                 I reviewed the patient's new clinical results.    Medication Review:   Scheduled Meds:apixaban, 2.5 mg, Oral, Q12H  bumetanide, 1 mg, Oral, Daily  carvedilol, 6.25 mg, Oral, BID With Meals  febuxostat, 40 mg, Oral, Daily  ipratropium-albuterol, 3 mL, Nebulization, BID - RT  levothyroxine, 75 mcg, Oral, Q AM  midodrine, 5 mg, Oral, TID AC  pantoprazole, 40 mg, Oral, BID AC  potassium chloride, 20 mEq, Oral, Daily  sildenafil, 20 mg, Oral, TID  sodium chloride, 10 mL, Intravenous, Q12H      Continuous Infusions:   PRN Meds:.  acetaminophen    senna-docusate sodium  **AND** polyethylene glycol **AND** bisacodyl **AND** bisacodyl    Calcium Replacement - Follow Nurse / BPA Driven Protocol    loperamide    Magnesium Standard Dose Replacement - Follow Nurse / BPA Driven Protocol    ondansetron ODT **OR** ondansetron    Phosphorus Replacement - Follow Nurse / BPA Driven Protocol    Potassium Replacement - Follow Nurse / BPA Driven Protocol    promethazine    promethazine    sodium chloride    sodium chloride     Assessment & Plan       Dyspnea    Moderate malnutrition  COPD  Pulmonary hypertension-sildenafil  CKD - creatinine is stable; Bumex restarted  Acute gastroenteritis -resolved  Acute on chronic diastolic CHF clinically stable  PAF - rate controlled; anti-coagulated  Chronic CAD - no anginal symptoms  Hypothyroidism-levothyroxine  Hyperuricemia-[place]  Hypotension-better on lower dose of carvedilol; on midodrine    Plan for disposition: Ready for discharge when skilled rehab arrangements have been made    Rosamaria Jin MD  05/05/24  12:40 EDT

## 2024-05-05 NOTE — PROGRESS NOTES
"NEPHROLOGY PROGRESS NOTE------[facility]    [facility]  [phone]  Anirudh Aleman MD      Patient Care Team:  Paul Granados MD as PCP - General (Family Medicine)  Richardson Willis MD as Cardiologist (Cardiology)  Rafy Rodriguez MD as Consulting Physician (Hematology and Oncology)  Christianne Phillips RN as Licensed Practical Nurse  Salome Fleming MD as Consulting Physician (Nephrology)      Provider:  Anirudh Aleman MD  Patient Name: Gabrielle Lyles  :  1940    SUBJECTIVE:    F/U ARF/JULIAN/CRF/CKD  No chest pain, still some shortness of air, but better  .     Medication:  apixaban, 2.5 mg, Oral, Q12H  bumetanide, 1 mg, Oral, Daily  carvedilol, 6.25 mg, Oral, BID With Meals  febuxostat, 40 mg, Oral, Daily  ipratropium-albuterol, 3 mL, Nebulization, BID - RT  levothyroxine, 75 mcg, Oral, Q AM  midodrine, 5 mg, Oral, TID AC  pantoprazole, 40 mg, Oral, BID AC  potassium chloride, 20 mEq, Oral, Daily  sildenafil, 20 mg, Oral, TID  sodium chloride, 10 mL, Intravenous, Q12H           OBJECTIVE    Vital Sign Min/Max for last 24 hours  Temp  Min: 97.2 °F (36.2 °C)  Max: 98.7 °F (37.1 °C)   BP  Min: 77/48  Max: 118/71   Pulse  Min: 71  Max: 84   Resp  Min: 14  Max: 18   SpO2  Min: 84 %  Max: 100 %   No data recorded   Weight  Min: 66.5 kg (146 lb 9.7 oz)  Max: 66.5 kg (146 lb 9.7 oz)     Flowsheet Rows      Flowsheet Row First Filed Value   Admission Height 162.6 cm (64\") Documented at 2024 0711   Admission Weight 74 kg (163 lb 2.3 oz) Documented at 2024 2107            No intake/output data recorded.  I/O last 3 completed shifts:  In: 1300 [P.O.:1300]  Out: 200 [Urine:200]    Physical Exam:  General Appearance: alert, appears stated age and cooperative  Head: normocephalic, without obvious abnormality and atraumatic  Eyes: conjunctivae and sclerae normal and no icterus  Neck: supple and NO GROSS JVD   Lungs: Diminished breath " "sounds  Heart: IRREG IRREG +TAYE  Chest Wall: no abnormalities observed  Abdomen: normal bowel sounds and soft, nontender  Extremities: moves extremities well,1+ BILAT PRETIBIAL EDEMA, no cyanosis  Skin: no bleeding, bruising or rash  Neurologic: Alert, and oriented. No focal deficits    Labs:    WBC WBC   Date Value Ref Range Status   05/04/2024 10.43 3.40 - 10.80 10*3/mm3 Final   05/03/2024 10.86 (H) 3.40 - 10.80 10*3/mm3 Final      HGB Hemoglobin   Date Value Ref Range Status   05/04/2024 10.3 (L) 12.0 - 15.9 g/dL Final   05/03/2024 10.7 (L) 12.0 - 15.9 g/dL Final      HCT Hematocrit   Date Value Ref Range Status   05/04/2024 34.7 34.0 - 46.6 % Final   05/03/2024 35.8 34.0 - 46.6 % Final      Platelets No results found for: \"LABPLAT\"   MCV MCV   Date Value Ref Range Status   05/04/2024 87.4 79.0 - 97.0 fL Final   05/03/2024 87.3 79.0 - 97.0 fL Final          Sodium Sodium   Date Value Ref Range Status   05/05/2024 146 (H) 136 - 145 mmol/L Final   05/04/2024 144 136 - 145 mmol/L Final   05/03/2024 144 136 - 145 mmol/L Final      Potassium Potassium   Date Value Ref Range Status   05/05/2024 4.6 3.5 - 5.2 mmol/L Final   05/04/2024 4.0 3.5 - 5.2 mmol/L Final   05/03/2024 4.0 3.5 - 5.2 mmol/L Final     Comment:     Slight hemolysis detected by analyzer. Result may be falsely elevated.      Chloride Chloride   Date Value Ref Range Status   05/05/2024 110 (H) 98 - 107 mmol/L Final   05/04/2024 108 (H) 98 - 107 mmol/L Final   05/03/2024 107 98 - 107 mmol/L Final      CO2 CO2   Date Value Ref Range Status   05/05/2024 26.0 22.0 - 29.0 mmol/L Final   05/04/2024 26.0 22.0 - 29.0 mmol/L Final   05/03/2024 25.0 22.0 - 29.0 mmol/L Final      BUN BUN   Date Value Ref Range Status   05/05/2024 42 (H) 8 - 23 mg/dL Final   05/04/2024 45 (H) 8 - 23 mg/dL Final   05/03/2024 50 (H) 8 - 23 mg/dL Final      Creatinine Creatinine   Date Value Ref Range Status   05/05/2024 1.73 (H) 0.57 - 1.00 mg/dL Final   05/04/2024 1.77 (H) 0.57 - " "1.00 mg/dL Final   05/03/2024 1.99 (H) 0.57 - 1.00 mg/dL Final      Calcium Calcium   Date Value Ref Range Status   05/05/2024 8.5 (L) 8.6 - 10.5 mg/dL Final   05/04/2024 8.9 8.6 - 10.5 mg/dL Final   05/03/2024 9.5 8.6 - 10.5 mg/dL Final      PO4 No components found for: \"PO4\"   Albumin Albumin   Date Value Ref Range Status   05/05/2024 3.2 (L) 3.5 - 5.2 g/dL Final   05/04/2024 3.2 (L) 3.5 - 5.2 g/dL Final        Magnesium Magnesium   Date Value Ref Range Status   05/04/2024 1.9 1.6 - 2.4 mg/dL Final   05/03/2024 2.0 1.6 - 2.4 mg/dL Final      Uric Acid No components found for: \"URIC ACID\"     Imaging Results (Last 72 Hours)       ** No results found for the last 72 hours. **            Results for orders placed during the hospital encounter of 04/25/24    XR Chest 1 View    Narrative  XR CHEST 1 VW    Date of Exam: 4/28/2024 9:45 AM EDT    Indication: CHF    Comparison: 4/25/2020    Findings:  Heart size and pulmonary vasculature are stable. No gross pulmonary edema is demonstrated. There is strandy opacity in the left lung base.    Impression  Impression:    1. Cardiomegaly with no evidence of pulmonary edema  2. Left basilar atelectasis      Electronically Signed: Darnell Kennedy  4/28/2024 11:33 AM EDT  Workstation ID: [id number]      XR Chest 1 View    Narrative  XR CHEST 1 VW    Date of Exam: 4/25/2024 3:38 PM EDT    Indication: SOB    Comparison: 3/21/2024.    Findings:  There is stable mild cardiomegaly. There is pulmonary vascular congestion with increasing interstitial prominence bilaterally. There may be a small right effusion.    Impression  Impression:  Findings suggest mild CHF superimposed over chronic lung disease.      Electronically Signed: Mary Ivan MD  4/25/2024 3:52 PM EDT  Workstation ID: [id number]      Results for orders placed during the hospital encounter of 03/21/24    XR Chest 1 View    Narrative  XR CHEST 1 VW    Date of Exam: 3/21/2024 1:20 PM EDT    Indication: Dyspnea    Comparison: " 2/9/2024.    Findings:  There is stable mild cardiomegaly. Lung fields appear clear of acute infiltrates or effusions. Mild diffuse bilateral reticular lung thickening is stable. Right PICC line has been removed.    Impression  Impression:  Chronic findings. No acute process.      Electronically Signed: Mary Ivan MD  3/21/2024 1:48 PM EDT  Workstation ID: [id number]      Results for orders placed during the hospital encounter of 02/05/24    Duplex Venous Upper Extremity - Right CAR    Interpretation Summary    Normal right upper extremity venous duplex scan.        ASSESSMENT / PLAN      Dyspnea    Moderate malnutrition    ARF/JULIAN/CRF/CKD------Nonoliguric. +ARF/JULIAN on top of CRF/CKD STG 3A   with a baseline serum Creatinine of about 1.3-1.4.  CRF/CKD STG 3A is secondary to HTN NS. +ARF/JULIAN is secondary to prerenal/hemodynamic fluctuation from decompensated CHF (Cardiorenal). Hold Bumex today given poor po intake and nausea/vomiting. Avoid hypotension.   It appears that in the long run we may have to accept a higher baseline serum creatinine in order to keep euvolemic. No NSAIDs or IV dye. Dose meds for CrCl less than 10 cc/min. Azotemia up from steroid exposure.     2. ANEMIA OF CKD------S/P IV iron for ROME. Follow for EPO need     3. HTN WITH CKD-----Avoid hypotension. No ACE/ARB/DRI for now     4. OA/DJD/HYPERURICEMIA------No NSAIDs. Added Uloric     5. VOLUME OVERLOAD---Clinically better. Hold Bumex given n/v/d     6. HYPERGLYCEMIA------Glucometers, SSI     7.  PULMONARY HTN--------Per , Pulmonary     8. HYPOKALEMIA-------Replaced     9. CAD-----per , Cardiology    10. DIARRHEA-----C.Diff negative.      11. DECONDITIONING------PT/OT/Rehab    Creatinine stable  Blood pressure better, continue midodrine  Continue Bumex  Awaits rehab    Anirudh Aleman MD  [facility]  [phone]  05/05/24  13:03 EDT

## 2024-05-05 NOTE — PROGRESS NOTES
Referring Provider: Rosamaria Jin MD    Reason for follow-up: Shortness of breath, elevated proBNP     Patient Care Team:  Paul Granados MD as PCP - General (Family Medicine)  Richardson Willis MD as Cardiologist (Cardiology)  Rafy Rodriguez MD as Consulting Physician (Hematology and Oncology)  Christianne Phillips, AMARILIS as Licensed Practical Nurse  Salome Fleming MD as Consulting Physician (Nephrology)      SUBJECTIVE  Complains of weakness and dizziness.     ROS  Review of all systems negative except as indicated.    Since I have last seen, the patient has been without any chest discomfort, shortness of breath, palpitations, dizziness or syncope.  Denies having any headache, abdominal pain, nausea, vomiting, diarrhea, constipation, loss of weight or loss of appetite.  Denies having any excessive bruising, hematuria or blood in the stool.  ROS      Personal History:    Past Medical History:   Diagnosis Date    Acute exacerbation of chronic obstructive pulmonary disease (COPD)     COPD (chronic obstructive pulmonary disease)     Deep vein thrombosis of bilateral lower extremities 07/24/2019    3/2013    History of pneumonia 06/2012    community acquired pneumonia and right pleural effusion;hospitalized at [facility]    History of pulmonary embolism 03/2013    bilateral PE    Hypertension 2001    Insomnia     on Ambien 5mg at     Lobular breast cancer, left 11/2011    Stage IA left upper lobular breast cancer    Malignant neoplasm of left breast in female, estrogen receptor positive 07/18/2019    Osteopenia 2012    Parainfluenza infection     Parotid duct obstruction     Severe pulmonary hypertension 03/03/2023    Stage 3a chronic kidney disease 07/24/2019    TIA (transient ischemic attack) 10/25/2022       Past Surgical History:   Procedure Laterality Date    CARDIAC CATHETERIZATION N/A 3/3/2023    Procedure: Left and Right Heart Cath with Coronary Angiography;  Surgeon: Richardson Willis MD;  Location:   [facility];  Service: Cardiovascular;  Laterality: N/A;    CATARACT EXTRACTION      IVC FILTER RETRIEVAL  2014    IVC filter placement by Dr. Card    MAMMO STEREOTACTIC BREAST BX SURGICAL ADD UNI Left 2011    invasive lobular carcinoma-Dr. Cande Daniel    MASTECTOMY, PARTIAL Left 2011    and left axillary sentinel lymph node biopsy by Dr. Uriostegui    TUBAL ABDOMINAL LIGATION         Family History   Problem Relation Age of Onset    Lung cancer Brother        Social History     Tobacco Use    Smoking status: Former     Current packs/day: 0.00     Types: Cigarettes     Quit date:      Years since quittin.3     Passive exposure: Past    Smokeless tobacco: Never    Tobacco comments:     smoked 6 cigarettes a day from 13 years of age to 56 years of age when she quit in    Vaping Use    Vaping status: Never Used   Substance Use Topics    Alcohol use: Not Currently    Drug use: No        Home meds:  Prior to Admission medications    Medication Sig Start Date End Date Taking? Authorizing Provider   apixaban (ELIQUIS) 5 MG tablet tablet Take 1 tablet by mouth Every 12 (Twelve) Hours. Indications: Other - full anticoagulation, history of DVT/PE 10/27/22  Yes Shaylee Mcdaniels MD   budesonide-formoterol (SYMBICORT) 80-4.5 MCG/ACT inhaler Inhale 2 puffs 2 (Two) Times a Day.   Yes ProviderJaylyn MD   carvedilol (COREG) 12.5 MG tablet TAKE 1 TABLET BY MOUTH TWICE DAILY WITH MEALS 23  Yes Richardson Willis MD   Cholecalciferol (Vitamin D3) 50 MCG (2000 UT) capsule Take 1 capsule by mouth Daily.   Yes ProviderJaylyn MD   Folbic 2.5-25-2 MG tablet tablet Take 1 tablet by mouth Daily. 24  Yes Jaylyn Golden MD   furosemide (LASIX) 20 MG tablet Take 1 tablet by mouth Daily. 24  Yes Mansi Becerril APRN   levothyroxine (SYNTHROID, LEVOTHROID) 50 MCG tablet Take 1 tablet by mouth Daily.   Yes ProviderJaylyn MD   O2 (OXYGEN) Inhale 2 L/min  "Continuous. 2/26/24  Yes Provider, MD Jaylyn   potassium chloride (K-DUR,KLOR-CON) 10 MEQ CR tablet Take 1 tablet by mouth Daily. 2/24/23  Yes Richardson Willis MD   sildenafil (REVATIO) 20 MG tablet Take 1 tablet by mouth Every 8 (Eight) Hours. 5/11/23  Yes Mansi Becerril APRN   allopurinol (ZYLOPRIM) 100 MG tablet Take 1 tablet by mouth Daily.    Provider, Jaylyn, MD       Allergies:  Patient has no known allergies.    Scheduled Meds:apixaban, 2.5 mg, Oral, Q12H  bumetanide, 1 mg, Oral, Daily  carvedilol, 6.25 mg, Oral, BID With Meals  febuxostat, 40 mg, Oral, Daily  ipratropium-albuterol, 3 mL, Nebulization, BID - RT  levothyroxine, 75 mcg, Oral, Q AM  midodrine, 5 mg, Oral, TID AC  pantoprazole, 40 mg, Oral, BID AC  potassium chloride, 20 mEq, Oral, Daily  sildenafil, 20 mg, Oral, TID  sodium chloride, 10 mL, Intravenous, Q12H      Continuous Infusions:   PRN Meds:.  acetaminophen    senna-docusate sodium **AND** polyethylene glycol **AND** bisacodyl **AND** bisacodyl    Calcium Replacement - Follow Nurse / BPA Driven Protocol    loperamide    Magnesium Standard Dose Replacement - Follow Nurse / BPA Driven Protocol    ondansetron ODT **OR** ondansetron    Phosphorus Replacement - Follow Nurse / BPA Driven Protocol    Potassium Replacement - Follow Nurse / BPA Driven Protocol    promethazine    promethazine    sodium chloride    sodium chloride      OBJECTIVE    Vital Signs  Vitals:    05/05/24 0036 05/05/24 0444 05/05/24 0640 05/05/24 0644   BP: 101/53 (!) 81/48     BP Location: Right arm Right arm     Patient Position: Lying Lying     Pulse: 84 78 71 74   Resp: 18 18 18 18   Temp: 98.4 °F (36.9 °C) 97.5 °F (36.4 °C)     TempSrc: Oral Oral     SpO2: 97% 96% 100% 100%   Weight:  66.5 kg (146 lb 9.7 oz)     Height:           Flowsheet Rows      Flowsheet Row First Filed Value   Admission Height 162.6 cm (64\") Documented at 04/27/2024 0711   Admission Weight 74 kg (163 lb 2.3 oz) Documented at 04/25/2024 " 2107              Intake/Output Summary (Last 24 hours) at 5/5/2024 0720  Last data filed at 5/5/2024 0444  Gross per 24 hour   Intake 940 ml   Output --   Net 940 ml          Telemetry: Atrial fibrillation with controlled heart rate    Physical Exam:  The patient is alert, oriented and in no distress.  Vital signs as noted above.  Head and neck revealed no carotid bruits or jugular venous distention.  No thyromegaly or lymphadenopathy is present  Lungs clear.  No wheezing.  Breath sounds are normal bilaterally.  Heart normal first and second heart sounds.  No murmur. No precordial rub is present.  No gallop is present.  Abdomen soft and nontender.  No organomegaly is present.  Extremities with good peripheral pulses without any pedal edema.  Skin warm and dry.  Musculoskeletal system is grossly normal.  CNS grossly normal.       Results Review:  I have personally reviewed the results from the time of this admission to 5/5/2024 07:20 EDT and agree with these findings:  []  Laboratory  []  Microbiology  []  Radiology  []  EKG/Telemetry   []  Cardiology/Vascular   []  Pathology  []  Old records  []  Other:    Most notable findings include:    Lab Results (last 24 hours)       Procedure Component Value Units Date/Time    Renal Function Panel [[id number]]  (Abnormal) Collected: 05/05/24 0105    Specimen: Blood Updated: 05/05/24 0142     Glucose 106 mg/dL      BUN 42 mg/dL      Creatinine 1.73 mg/dL      Sodium 146 mmol/L      Potassium 4.6 mmol/L      Chloride 110 mmol/L      CO2 26.0 mmol/L      Calcium 8.5 mg/dL      Albumin 3.2 g/dL      Phosphorus 2.9 mg/dL      Anion Gap 10.0 mmol/L      BUN/Creatinine Ratio 24.3     eGFR 29.0 mL/min/1.73     Narrative:      GFR Normal >60  Chronic Kidney Disease <60  Kidney Failure <15    The GFR formula is only valid for adults with stable renal function between ages 18 and 70.            Imaging Results (Last 24 Hours)       ** No results found for the last 24 hours. **             LAB RESULTS (LAST 7 DAYS)    CBC  Results from last 7 days   Lab Units 05/04/24  0032 05/03/24  0342 05/02/24  0536 05/01/24  0239 04/30/24  0038 04/29/24  0256 04/28/24  1019   WBC 10*3/mm3 10.43 10.86* 11.74* 10.11 9.09 9.41 12.34*   RBC 10*6/mm3 3.97 4.10 4.24 4.11 4.18 4.11 4.40   HEMOGLOBIN g/dL 10.3* 10.7* 11.1* 10.6* 10.7* 10.6* 11.2*   HEMATOCRIT % 34.7 35.8 37.4 35.9 35.8 35.4 38.2   MCV fL 87.4 87.3 88.2 87.3 85.6 86.1 86.8   PLATELETS 10*3/mm3 172 188 213 223 207 211 238       BMP  Results from last 7 days   Lab Units 05/05/24  0105 05/04/24  0032 05/03/24  0342 05/02/24  1805 05/02/24  0536 05/01/24  0239 04/30/24  0038 04/29/24  0256 04/28/24  1019   SODIUM mmol/L 146* 144 144  --  143 145 144 143 144   POTASSIUM mmol/L 4.6 4.0 4.0  --  4.3 4.0 3.6 4.2 4.4   CHLORIDE mmol/L 110* 108* 107  --  107 107 105 105 106   CO2 mmol/L 26.0 26.0 25.0  --  26.0 25.0 28.0 26.0 26.0   BUN mg/dL 42* 45* 50*  --  52* 48* 49* 48* 48*   CREATININE mg/dL 1.73* 1.77* 1.99*  --  1.99* 2.27* 2.21* 1.96* 1.86*   GLUCOSE mg/dL 106* 97 99  --  101* 160* 103* 116* 87   MAGNESIUM mg/dL  --  1.9 2.0  --  2.3 2.1 1.9 2.1 2.3   PHOSPHORUS mg/dL 2.9 2.9 3.5 3.1 2.1* 2.8 3.3 3.3 2.6       CMP   Results from last 7 days   Lab Units 05/05/24  0105 05/04/24  0032 05/03/24  0342 05/02/24  0536 05/01/24  0239 04/30/24  0038 04/29/24  0256   SODIUM mmol/L 146* 144 144 143 145 144 143   POTASSIUM mmol/L 4.6 4.0 4.0 4.3 4.0 3.6 4.2   CHLORIDE mmol/L 110* 108* 107 107 107 105 105   CO2 mmol/L 26.0 26.0 25.0 26.0 25.0 28.0 26.0   BUN mg/dL 42* 45* 50* 52* 48* 49* 48*   CREATININE mg/dL 1.73* 1.77* 1.99* 1.99* 2.27* 2.21* 1.96*   GLUCOSE mg/dL 106* 97 99 101* 160* 103* 116*   ALBUMIN g/dL 3.2* 3.2*  --   --   --   --  3.8   BILIRUBIN mg/dL  --  1.6*  --   --   --   --  1.3*   ALK PHOS U/L  --  62  --   --   --   --  66   AST (SGOT) U/L  --  20  --   --   --   --  17   ALT (SGPT) U/L  --  22  --   --   --   --  16       BNP         TROPONIN          CoAg        Creatinine Clearance  Estimated Creatinine Clearance: 23.1 mL/min (A) (by C-G formula based on SCr of 1.73 mg/dL (H)).    ABG        Radiology  No radiology results for the last day      EKG  I personally viewed and interpreted the patient's EKG/Telemetry data:  ECG 12 Lead Rhythm Change   Final Result   HEART RATE= 72  bpm   RR Interval= 830  ms   PA Interval=   ms   P Horizontal Axis=   deg   P Front Axis=   deg   QRSD Interval= 91  ms   QT Interval= 379  ms   QTcB= 416  ms   QRS Axis= 118  deg   T Wave Axis=   deg   - ABNORMAL ECG -   Atrial fibrillation   Right axis deviation   Low voltage, precordial leads   When compared with ECG of 21-Mar-2024 14:18:17,   No significant change   Electronically Signed By: Richardson Willis ([facility]) 29-Apr-2024 23:17:34   Date and Time of Study: 2024-04-28 07:50:23      Telemetry Scan   Final Result      Telemetry Scan   Final Result      Telemetry Scan   Final Result      Telemetry Scan   Final Result      Telemetry Scan   Final Result      Telemetry Scan   Final Result      Telemetry Scan   Final Result      Telemetry Scan   Final Result      Telemetry Scan   Final Result      Telemetry Scan   Final Result      Telemetry Scan   Final Result      Telemetry Scan   Final Result      Telemetry Scan   Final Result      Telemetry Scan   Final Result      Telemetry Scan   Final Result      Telemetry Scan   Final Result      Telemetry Scan   Final Result      Telemetry Scan   Final Result      Telemetry Scan   Final Result      Telemetry Scan   Final Result      Telemetry Scan   Final Result      Telemetry Scan   Final Result      Telemetry Scan   Final Result      Telemetry Scan   Final Result      Telemetry Scan   Final Result      Telemetry Scan   Final Result      Telemetry Scan   Final Result      Telemetry Scan   Final Result      Telemetry Scan   Final Result      Telemetry Scan   Final Result      Telemetry Scan   Final Result      Telemetry Scan    Final Result      Telemetry Scan   Final Result      Telemetry Scan   Final Result      Telemetry Scan   Final Result      Telemetry Scan   Final Result      Telemetry Scan   Final Result      Telemetry Scan   Final Result      Telemetry Scan   Final Result      Telemetry Scan   Final Result      Telemetry Scan   Final Result      Telemetry Scan   Final Result      Telemetry Scan   Final Result      Telemetry Scan   Final Result      Telemetry Scan   Final Result      Telemetry Scan   Final Result            Echocardiogram:    Results for orders placed in visit on 03/18/24    Adult Transthoracic Echo Limited W/ Cont if Necessary Per Protocol    Interpretation Summary    Left ventricular ejection fraction appears to be 61 - 65%.    The right ventricular cavity is dilated.    There is a small (<1cm) pericardial effusion adjacent to the left ventricle. There is no evidence of cardiac tamponade.    IVC is 2.1 cm.        Stress Test:  Results for orders placed in visit on 09/13/22    Stress Test With Myocardial Perfusion One Day    Interpretation Summary    Left ventricular ejection fraction is hyperdynamic (Calculated EF > 70%). .    Myocardial perfusion imaging indicates a normal myocardial perfusion study with no evidence of ischemia.    Impressions are consistent with a low risk study.    Findings consistent with a normal ECG stress test.         Cardiac Catheterization:  Results for orders placed during the hospital encounter of 03/03/23    Cardiac Catheterization/Vascular Study    Conclusion  OPERATORS  Richardson Willis M.D. (Attending Cardiologist)      PROCEDURE PERFORMED  Ultrasound guided vascular access  Right heart catheterization  Coronary Angiogram  Left Heart Catheterization 66138  Moderate Sedation    INDICATIONS FOR PROCEDURE  83-year-old woman with multiple cardiovascular risk factors, abnormal stress test presented with worsening shortness of breath.  After discussing the risk and benefit of the  procedure she was brought in for elective right and left heart cath.    PROCEDURE IN DETAIL  Informed consent was obtained from the patient after explaining the risks, benefits, and alternative options of the procedure. After obtaining informed consent, the patient was brought to the cath lab and was prepped in a sterile fashion. Lidocaine 2% was used for local anesthesia into the right femoral venous access site. Right femoral vein was accessed using the micropuncture needle under ultrasound guidance and micropuncture wire advanced under flouroscopy. A 7 Chinese vascular sheath was put into place percutaneously over guide-wire. Guide wires were removed. A 6Fr swan sheryl catheter was advanced to wedge position. RA, RV and PA and wedge pressures were recorded.  PA sat and arterial sats recorded.  The patient tolerated the procedure well without any complications.    Lidocaine 2% was used for local anesthesia into the right femoral arterial access site. The right femoral artery was accessed with a micropuncture needle via modified Seldinger technique under ultrasound guidance. A 6F was inserted successfully.  Afterwards, 6F JR4 and JL4 diagnostic catheters were advanced over a wire into the ascending aorta and were used to engage the ostia of the left main and RCA respectively. JR4 used to cross the AV and obtain LV pressures and gradient across the AV measured via pullback technique. Images of the right and left coronary systems were obtained. All the catheters were exchanged over a wire and subsequently removed. Angiogram of the femoral access site was obtained and did not show complications. The patient tolerated the procedure well without any complications. The pictures were reviewed at the end of the procedure. A Mynx closure device was applied.    HEMODYNAMICS    RHC  RA 6/5, 4 mmHg  RV 74/3, 5 mmHg  PA 71/25, 44 mmHg  PCW 12 mmHg  AO Sat 92%  PA Sat 78%    Jose CO: 7.89 L/min    Jose CI: 4.69 L/min/m²    LHC  LV:  134/3, 20 mmHg  AO: 131/56, 87 mmHg  No significant gradient across the aortic valve during pullback of JR4 catheter.    FINDINGS  Coronary Angiogram  All vessels are heavily calcified  She also has heavily calcified peripheral arterial disease.  Right dominant circulation    Left main: Left main is a large caliber vessel which gives rise to the Left Anterior Descending and the Left circumflex.  Left main is angiographically free from any significant disease.    Left Anterior Descending Artery: LAD is a heavily calcified medium caliber vessel which gives rise to diagonals.  The vessel is highly tortuous and has 50 to 60% mid vessel stenosis best seen in [place] cranial view.    Left Circumflex: Heavily calcified vessel with 50% proximal segment stenosis.    Right Coronary Artery: The RCA is a large caliber vessel which is heavily calcified and tortuous.  It has diffuse luminal irregularities and 30 to 40% stenosis in the midsegment around the bend.    ESTIMATED BLOOD LOSS:  10 ml    COMPLICATIONS:  None    PROCEDURE DATA:  Sedation Time: 25 minutes    IMPRESSIONS  Heavily calcified, tortuous nonobstructive coronary disease  Severe pulmonary hypertension with PA systolic pressure in the 70s  Mean PA pressure 44 mmHg    RECOMMENDATIONS  -Continue aggressive medical management of CAD  -Referral to pulmonology for treatment of pulmonary hypertension         Other:         ASSESSMENT & PLAN:    Principal Problem:    Dyspnea  Active Problems:    Moderate malnutrition    COPD/Chronic respiratory failure/shortness of breath  Severe pulmonary hypertension  HFpEF  On 3 L of oxygen via nasal cannula at baseline, currently requiring 4 L  Has known pulmonary hypertension with mean PA pressure of 44 and peak PA pressure of 71 mmHg with normal wedge pressure  She is on sildenafil  Echocardiogram shows preserved LV function with mildly elevated RVSP.  Small to moderate pericardial effusion: No signs of tamponade  proBNP of 9200  compared to 10,000 last month.  Bumex is currently on hold.  Persistent nausea and diarrhea  We will also connect her with heart failure clinic for intermittent IV diuresis     Atrial fibrillation  She has paroxysmal atrial fibrillation  [id number] score is 6  Continue Eliquis for atrial fibrillation as well as for history of DVT/PE  Continue Coreg for heart rate control  H&H 10.3/34.7     History of venous thrombosis and embolism  Low-dose Eliquis due to renal dysfunction and age.  She also has an IVC filter in place.     Primary hypertension, chronic  Blood pressure has improved and is currently normal  Continue Coreg for blood pressure control  Amlodipine can be added if better blood pressure control is desired.  Echo shows preserved LV function, reduced RV function and mildly elevated RVSP.  Pericardial effusion is not significant with no signs of tamponade.  Bumex on hold due to nausea and diarrhea     Coronary artery disease  Continue beta-blocker  Already on Eliquis low-dose  She will be started on a statin  Previous cardiac catheterization showed heavily calcified coronaries but nonobstructive.  No chest pain and stable from cardiac standpoint.     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  Continue high intensity statin and anticoagulation with Eliquis 2.5 mg p.o. twice daily due to renal dysfunction and advanced age.     Stage IIIb chronic kidney disease  Creatinine has improved 1.73, GFR is 29  Bumex on hold due to nausea  Nephrology is also following     Recent altered mental status  Previously precipitated by mastitis/PICC line infection  Previous CT head and MRI of the brain unremarkable with no acute findings  Mental status during this admission has been at baseline.      Richardson Willis MD  05/05/24  07:20 EDT

## 2024-05-06 PROBLEM — Z99.81 DEPENDENCE ON SUPPLEMENTAL OXYGEN: Status: ACTIVE | Noted: 2024-02-26

## 2024-05-06 PROBLEM — I25.119 ATHSCL HEART DISEASE OF NATIVE COR ART W UNSP ANG PCTRS: Status: ACTIVE | Noted: 2024-02-26

## 2024-05-06 PROBLEM — J43.1 PANLOBULAR EMPHYSEMA: Status: ACTIVE | Noted: 2024-02-26

## 2024-05-06 PROBLEM — N18.31 CHRONIC KIDNEY DISEASE, STAGE 3A: Status: ACTIVE | Noted: 2024-02-26

## 2024-05-06 PROCEDURE — 94799 UNLISTED PULMONARY SVC/PX: CPT

## 2024-05-06 PROCEDURE — 99232 SBSQ HOSP IP/OBS MODERATE 35: CPT | Performed by: INTERNAL MEDICINE

## 2024-05-06 PROCEDURE — 63710000001 PROMETHAZINE PER 25 MG: Performed by: FAMILY MEDICINE

## 2024-05-06 PROCEDURE — 94664 DEMO&/EVAL PT USE INHALER: CPT

## 2024-05-06 PROCEDURE — 94761 N-INVAS EAR/PLS OXIMETRY MLT: CPT

## 2024-05-06 PROCEDURE — 63710000001 ONDANSETRON ODT 4 MG TABLET DISPERSIBLE: Performed by: NURSE PRACTITIONER

## 2024-05-06 PROCEDURE — 97116 GAIT TRAINING THERAPY: CPT

## 2024-05-06 PROCEDURE — 25010000002 MAGNESIUM SULFATE IN D5W 1G/100ML (PREMIX) 1-5 GM/100ML-% SOLUTION: Performed by: INTERNAL MEDICINE

## 2024-05-06 RX ORDER — BUMETANIDE 1 MG/1
0.5 TABLET ORAL DAILY
Status: DISCONTINUED | OUTPATIENT
Start: 2024-05-07 | End: 2024-05-07 | Stop reason: HOSPADM

## 2024-05-06 RX ORDER — CARVEDILOL 3.12 MG/1
3.12 TABLET ORAL 2 TIMES DAILY WITH MEALS
Status: DISCONTINUED | OUTPATIENT
Start: 2024-05-06 | End: 2024-05-07 | Stop reason: HOSPADM

## 2024-05-06 RX ORDER — MAGNESIUM SULFATE 1 G/100ML
1 INJECTION INTRAVENOUS ONCE
Status: COMPLETED | OUTPATIENT
Start: 2024-05-06 | End: 2024-05-06

## 2024-05-06 RX ADMIN — MAGNESIUM SULFATE IN DEXTROSE 1 G: 10 INJECTION, SOLUTION INTRAVENOUS at 10:57

## 2024-05-06 RX ADMIN — LEVOTHYROXINE SODIUM 75 MCG: 0.07 TABLET ORAL at 05:55

## 2024-05-06 RX ADMIN — FEBUXOSTAT 40 MG: 40 TABLET, FILM COATED ORAL at 08:22

## 2024-05-06 RX ADMIN — PANTOPRAZOLE SODIUM 40 MG: 40 TABLET, DELAYED RELEASE ORAL at 08:23

## 2024-05-06 RX ADMIN — MIDODRINE HYDROCHLORIDE 5 MG: 5 TABLET ORAL at 10:56

## 2024-05-06 RX ADMIN — MIDODRINE HYDROCHLORIDE 5 MG: 5 TABLET ORAL at 17:13

## 2024-05-06 RX ADMIN — PROMETHAZINE HYDROCHLORIDE 25 MG: 25 TABLET ORAL at 20:38

## 2024-05-06 RX ADMIN — PANTOPRAZOLE SODIUM 40 MG: 40 TABLET, DELAYED RELEASE ORAL at 17:13

## 2024-05-06 RX ADMIN — SILDENAFIL CITRATE 20 MG: 20 TABLET ORAL at 20:38

## 2024-05-06 RX ADMIN — SILDENAFIL CITRATE 20 MG: 20 TABLET ORAL at 17:13

## 2024-05-06 RX ADMIN — MIDODRINE HYDROCHLORIDE 5 MG: 5 TABLET ORAL at 08:23

## 2024-05-06 RX ADMIN — CARVEDILOL 3.12 MG: 3.12 TABLET, FILM COATED ORAL at 17:12

## 2024-05-06 RX ADMIN — ONDANSETRON 4 MG: 4 TABLET, ORALLY DISINTEGRATING ORAL at 18:46

## 2024-05-06 RX ADMIN — POTASSIUM CHLORIDE 20 MEQ: 1500 TABLET, EXTENDED RELEASE ORAL at 08:23

## 2024-05-06 RX ADMIN — APIXABAN 2.5 MG: 2.5 TABLET, FILM COATED ORAL at 08:23

## 2024-05-06 RX ADMIN — APIXABAN 2.5 MG: 2.5 TABLET, FILM COATED ORAL at 20:38

## 2024-05-06 RX ADMIN — IPRATROPIUM BROMIDE AND ALBUTEROL SULFATE 3 ML: .5; 3 SOLUTION RESPIRATORY (INHALATION) at 06:40

## 2024-05-06 RX ADMIN — Medication 10 ML: at 08:37

## 2024-05-06 RX ADMIN — Medication 10 ML: at 20:48

## 2024-05-06 RX ADMIN — SILDENAFIL CITRATE 20 MG: 20 TABLET ORAL at 08:23

## 2024-05-06 NOTE — PLAN OF CARE
Goal Outcome Evaluation:  Plan of Care Reviewed With: patient        Progress: improving  Outcome Evaluation: Pt rested with no complaints. VSS on 3L NC

## 2024-05-06 NOTE — CASE MANAGEMENT/SOCIAL WORK
Continued Stay Note  LIDYA Cox     Patient Name: Gabrielle Lyles  MRN: [id number]  Today's Date: 5/6/2024    Admit Date: 4/25/2024    Plan: Home with [facility] (accepted, need order).  Need Order for OP heart failure clinic.   Discharge Plan       Row Name 05/06/24 1713       Plan    Plan Home with [facility] (accepted, need order).  Need Order for OP heart failure clinic.    Patient/Family in Agreement with Plan yes    Plan Comments PT/ OT recc change to home with HH.  Long discussion with patient and family regarding that insurance will not authorize going to [facility].  Discussed HH vs OPPT. Chose [facility], referral sent and accepted.               Expected Discharge Date and Time       Expected Discharge Date Expected Discharge Time    May 6, 2024           Brook Carolina RN     Office Phone [phone]  Office Cell [phone]

## 2024-05-06 NOTE — DISCHARGE PLACEMENT REQUEST
"Gabrielle Lyles (84 y.o. Female)       Date of Birth   1940    Social Security Number       Address   [address] [place] [address] [place] [address]    Home Phone   [phone]    MRN   [id number]       Confucianist   Restorationist    Marital Status                               Admission Date   4/25/24    Admission Type   Urgent    Admitting Provider   Paul Granados MD    Attending Provider   Paul Granados MD    Department, Room/Bed   [facility], 2104/1       Discharge Date       Discharge Disposition       Discharge Destination                                 Attending Provider: Paul Granados MD    Allergies: No Known Allergies    Isolation: None   Infection: None   Code Status: CPR    Ht: 162.6 cm (64\")   Wt: 67 kg (147 lb 11.3 oz)    Admission Cmt: None   Principal Problem: Dyspnea [R06.00]                   Active Insurance as of 4/25/2024       Primary Coverage       Payor Plan Insurance Group Employer/Plan Group    HUMANA MEDICARE REPLACEMENT HUMANA MED ADV GROUP [id number]       Payor Plan Address Payor Plan Phone Number Payor Plan Fax Number Effective Dates    [address] [address] [phone]  1/1/2018 - None Entered    [facility] [address]         Subscriber Name Subscriber Birth Date Member ID       GABRIELLE LYLES 1940 [id number]                     Emergency Contacts        (Rel.) Home Phone Work Phone Mobile Phone    MARVIN DURANT (Daughter) [phone] -- --                 History & Physical        Paul Granados MD at 04/26/24 0935          Patient Care Team:  Paul Granados MD as PCP - General (Family Medicine)  Richardson Willis MD as Cardiologist (Cardiology)  Rafy Rodriguez MD as Consulting Physician (Hematology and Oncology)  Christianne Phillips RN as Licensed Practical Nurse  Salome Fleming MD as Consulting Physician (Nephrology)    Chief Complaint  Subjective    The patient is a 84 y.o. female who presents with " shortness of breath with evidence of an acute exacerbation of diastolic congestive heart failure    HPI  The patient was in her usual state of health until 1 to 2 weeks prior to presentation when she began to experience the insidious onset of some progressive shortness of breath associate with some orthopnea and paroxysmal nocturnal dyspnea.  She attempted to use home medications but decompensated and presented to our office for evaluation where she was found to be in some respiratory distress.  She was referred for prompt admission.  At the time my evaluation she has received parenteral diuresis and is feeling better overall.  Review of Systems  Review of Systems   Constitutional:  Positive for activity change.   Respiratory:  Positive for shortness of breath.    Musculoskeletal:  Positive for arthralgias.   Neurological:  Positive for weakness.       History  Past Medical History:   Diagnosis Date    Acute exacerbation of chronic obstructive pulmonary disease (COPD)     COPD (chronic obstructive pulmonary disease)     Deep vein thrombosis of bilateral lower extremities 07/24/2019    3/2013    History of pneumonia 06/2012    community acquired pneumonia and right pleural effusion;hospitalized at [facility]    History of pulmonary embolism 03/2013    bilateral PE    Hypertension 2001    Insomnia     on Ambien 5mg at     Lobular breast cancer, left 11/2011    Stage IA left upper lobular breast cancer    Malignant neoplasm of left breast in female, estrogen receptor positive 07/18/2019    Osteopenia 2012    Parainfluenza infection     Parotid duct obstruction     Severe pulmonary hypertension 03/03/2023    Stage 3a chronic kidney disease 07/24/2019    TIA (transient ischemic attack) 10/25/2022     Past Surgical History:   Procedure Laterality Date    CARDIAC CATHETERIZATION N/A 3/3/2023    Procedure: Left and Right Heart Cath with Coronary Angiography;  Surgeon: Richardson Willis MD;  Location: [facility] INVASIVE  LOCATION;  Service: Cardiovascular;  Laterality: N/A;    CATARACT EXTRACTION      IVC FILTER RETRIEVAL  2014    IVC filter placement by Dr. Card    MAMMO STEREOTACTIC BREAST BX SURGICAL ADD UNI Left 2011    invasive lobular carcinoma-Dr. Cande Daniel    MASTECTOMY, PARTIAL Left 2011    and left axillary sentinel lymph node biopsy by Dr. Uriostegui    TUBAL ABDOMINAL LIGATION       Family History   Problem Relation Age of Onset    Lung cancer Brother      Social History     Tobacco Use    Smoking status: Former     Current packs/day: 0.00     Types: Cigarettes     Quit date:      Years since quittin.3     Passive exposure: Past    Smokeless tobacco: Never    Tobacco comments:     smoked 6 cigarettes a day from 13 years of age to 56 years of age when she quit in    Vaping Use    Vaping status: Never Used   Substance Use Topics    Alcohol use: Not Currently    Drug use: No     Allergies:  Patient has no known allergies.    Objective    Vital Signs  Temp:  [97.3 °F (36.3 °C)-97.5 °F (36.4 °C)] 97.3 °F (36.3 °C)  Heart Rate:  [71-90] 87  Resp:  [15-21] 18  BP: (112-135)/(56-73) 127/70      Physical Exam:   Physical Exam  Vitals and nursing note reviewed.   Constitutional:       Appearance: Normal appearance.   Cardiovascular:      Rate and Rhythm: Rhythm irregular.      Heart sounds: Normal heart sounds.   Pulmonary:      Breath sounds: Normal breath sounds.   Musculoskeletal:         General: Swelling present.   Skin:     General: Skin is warm.   Neurological:      Mental Status: She is alert.              Results Review:   CBC    Results from last 7 days   Lab Units 24  0043 24  1536   WBC 10*3/mm3 6.21 7.71   HEMOGLOBIN g/dL 10.5* 10.8*   PLATELETS 10*3/mm3 204 192     BMP   Results from last 7 days   Lab Units 24  0043 24  2035   SODIUM mmol/L 144 145   POTASSIUM mmol/L 3.7 3.4*   CHLORIDE mmol/L 105 105   CO2 mmol/L 25.0 25.0   BUN mg/dL 34* 32*   CREATININE  "mg/dL 1.81* 1.71*   GLUCOSE mg/dL 164* 168*     Cr Clearance Estimated Creatinine Clearance: 23.1 mL/min (A) (by C-G formula based on SCr of 1.81 mg/dL (H)).  [facility]     HbA1C   Lab Results   Component Value Date    HGBA1C 5.8 (H) 10/25/2022     Blood Glucose No results found for: \"POCGLU\"  Infection   Results from last 7 days   Lab Units 04/25/24  1536   PROCALCITONIN ng/mL 0.04     CMP   Results from last 7 days   Lab Units 04/26/24  0043 04/25/24  2035   SODIUM mmol/L 144 145   POTASSIUM mmol/L 3.7 3.4*   CHLORIDE mmol/L 105 105   CO2 mmol/L 25.0 25.0   BUN mg/dL 34* 32*   CREATININE mg/dL 1.81* 1.71*   GLUCOSE mg/dL 164* 168*   ALBUMIN g/dL  --  4.0   BILIRUBIN mg/dL  --  2.1*   ALK PHOS U/L  --  75   AST (SGOT) U/L  --  18   ALT (SGPT) U/L  --  16     UA    Results from last 7 days   Lab Units 04/25/24  1556   NITRITE UA  Positive*   WBC UA /HPF 0-2   BACTERIA UA /HPF 1+*   SQUAM EPITHEL UA /HPF 0-2     Radiology(recent) XR Chest 1 View    Result Date: 4/25/2024  Impression: Findings suggest mild CHF superimposed over chronic lung disease. 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  Cerebrovascular disease status post CVA  Chronic oral anticoagulation therapy  Acquired hypothyroidism  Thrombophilia  Autonomic dysfunction syndrome  History of pulmonary embolism  Hypercoagulable state secondary to atrial fibrillation  [id number] score 6  History of IVC filter  Aneurysmal " "dilatation of abdominal aorta  Chronic mucopurulent bronchitis  Peripheral polyneuropathy  History of breast cancer  Supplemental oxygen dependency  Chronic hypoxic respiratory failure        Plan:  Gentle diuresis//renal support//aggressive pulmonary toilet  Expected Length of Stay 3 days    I discussed the patient's findings and my recommendations with patient and nursing staff.     Paul Granados MD  24  09:36 EDT          Electronically signed by Palu Granados MD at 24 0940          Physician Progress Notes (last 24 hours)        Jerry Solorzano MD at 24 1130          PULMONARY CRITICAL CARE PROGRESS  NOTE      PATIENT IDENTIFICATION:  Name: Gabrielle Lyles  MRN: [id number]  :  1940     Age: 84 y.o.  Sex: female    DATE OF Note:  2024   Referring Physician: Paul Granados MD                  Subjective:   Feeling better, no new issue, no SOB, no chest pain, no nausea or vomiting, no change in bowel habit, no dysuria,  no new  skin rash or itching.      Objective:  tMax 24 hrs: Temp (24hrs), Av.2 °F (36.8 °C), Min:97.4 °F (36.3 °C), Max:98.8 °F (37.1 °C)      Vitals Ranges:   Temp:  [97.4 °F (36.3 °C)-98.8 °F (37.1 °C)] 97.6 °F (36.4 °C)  Heart Rate:  [71-83] 79  Resp:  [14-22] 18  BP: ()/(41-71) 103/47    Intake and Output Last 3 Shifts:   I/O last 3 completed shifts:  In: 1160 [P.O.:1160]  Out: -     Exam:  /47 (BP Location: Right arm, Patient Position: Lying)   Pulse 79   Temp 97.6 °F (36.4 °C) (Oral)   Resp 18   Ht 162.6 cm (64\")   Wt 67 kg (147 lb 11.3 oz)   SpO2 94%   BMI 25.35 kg/m²     General Appearance:     HEENT:  Normocephalic, without obvious abnormality. Conjunctivae/corneas clear.  Normal external ear canals. Nares normal, no drainage     Neck:  Supple, symmetrical, trachea midline. No JVD.  Lungs /Chest wall:   Bilateral basal rhonchi, respirations unlabored, symmetrical wall movement.     Heart:  Regular rate and rhythm, systolic " murmur PMI left sternal border  Abdomen: Soft, nontender, no masses, no organomegaly.    Extremities: Trace edema, no clubbing or cyanosis        Medications:  apixaban, 2.5 mg, Oral, Q12H  [START ON 5/7/2024] bumetanide, 0.5 mg, Oral, Daily  carvedilol, 3.125 mg, Oral, BID With Meals  febuxostat, 40 mg, Oral, Daily  ipratropium-albuterol, 3 mL, Nebulization, BID - RT  levothyroxine, 75 mcg, Oral, Q AM  midodrine, 5 mg, Oral, TID AC  pantoprazole, 40 mg, Oral, BID AC  potassium chloride, 20 mEq, Oral, Daily  sildenafil, 20 mg, Oral, TID  sodium chloride, 10 mL, Intravenous, Q12H        Infusion:        PRN:    acetaminophen    senna-docusate sodium **AND** polyethylene glycol **AND** bisacodyl **AND** bisacodyl    Calcium Replacement - Follow Nurse / BPA Driven Protocol    loperamide    Magnesium Standard Dose Replacement - Follow Nurse / BPA Driven Protocol    ondansetron ODT **OR** ondansetron    Phosphorus Replacement - Follow Nurse / BPA Driven Protocol    Potassium Replacement - Follow Nurse / BPA Driven Protocol    promethazine    promethazine    sodium chloride    sodium chloride  Data Review:  All labs (24hrs): No results found for this or any previous visit (from the past 24 hour(s)).     Imaging:  XR Chest 1 View  Narrative: XR CHEST 1 VW    Date of Exam: 4/28/2024 9:45 AM EDT    Indication: CHF    Comparison: 4/25/2020    Findings:  Heart size and pulmonary vasculature are stable. No gross pulmonary edema is demonstrated. There is strandy opacity in the left lung base.  Impression: Impression:    1. Cardiomegaly with no evidence of pulmonary edema  2. Left basilar atelectasis    Electronically Signed: Darnell Kennedy    4/28/2024 11:33 AM EDT    Workstation ID: [id number]       ASSESSMENT:  Hypoxemia chronic respiratory failure on oxygen  Chronic obstructive airway disease  Moderate pulmonary hypertension  Chronic renal disease  Hypertension  History of PE      PLAN:  Continue anticoagulation  Watch  "MEETA's  Continue on sildenafil for now  Bronchodilator  Inhaled corticosteroids  Electrolytes/ glycemic control  DVT and GI prophylaxis.    Total Critical care time in direct medical management (   ) minutes. This time specifically excludes time spent performing procedures.  Jerry Solorzano MD. D, ABSM.     2024  11:30 EDT     Electronically signed by Jerry Solorzano MD at 24 1133       Salome Fleming MD at 24 0904          NEPHROLOGY PROGRESS NOTE------[facility]    [facility]  [phone]  Luke Fleming MD      Patient Care Team:  Paul Granados MD as PCP - General (Family Medicine)  Richardson Willis MD as Cardiologist (Cardiology)  Rafy Rodriguez MD as Consulting Physician (Hematology and Oncology)  Christianne Phillips RN as Licensed Practical Nurse  Salome Fleming MD as Consulting Physician (Nephrology)      Provider:  Luke Fleming MD  Patient Name: Gabrielle Lyles  :  1940    SUBJECTIVE:    F/U ARF/JULIAN/CRF/CKD    Up at side of bed. No real SOB, CP, n/v/d. No dysuria. Appetite ok  .     Medication:  apixaban, 2.5 mg, Oral, Q12H  bumetanide, 1 mg, Oral, Daily  carvedilol, 6.25 mg, Oral, BID With Meals  febuxostat, 40 mg, Oral, Daily  ipratropium-albuterol, 3 mL, Nebulization, BID - RT  levothyroxine, 75 mcg, Oral, Q AM  midodrine, 5 mg, Oral, TID AC  pantoprazole, 40 mg, Oral, BID AC  potassium chloride, 20 mEq, Oral, Daily  sildenafil, 20 mg, Oral, TID  sodium chloride, 10 mL, Intravenous, Q12H           OBJECTIVE    Vital Sign Min/Max for last 24 hours  Temp  Min: 97.4 °F (36.3 °C)  Max: 98.8 °F (37.1 °C)   BP  Min: 87/41  Max: 121/68   Pulse  Min: 71  Max: 83   Resp  Min: 14  Max: 22   SpO2  Min: 95 %  Max: 100 %   No data recorded   Weight  Min: 67 kg (147 lb 11.3 oz)  Max: 67 kg (147 lb 11.3 oz)     Flowsheet Rows      Flowsheet Row First Filed Value   Admission Height 162.6 cm (64\") " "Documented at 04/27/2024 0711   Admission Weight 74 kg (163 lb 2.3 oz) Documented at 04/25/2024 2107            I/O this shift:  In: 240 [P.O.:240]  Out: -   I/O last 3 completed shifts:  In: 1160 [P.O.:1160]  Out: -     Physical Exam:  General Appearance: alert, appears stated age and cooperative  Head: normocephalic, without obvious abnormality and atraumatic  Eyes: conjunctivae and sclerae normal and no icterus  Neck: supple and NO GROSS JVD   Lungs: Diminished breath sounds  Heart: IRREG IRREG +TAYE  Chest Wall: no abnormalities observed  Abdomen: normal bowel sounds and soft, nontender  Extremities: moves extremities well,1+ BILAT PRETIBIAL EDEMA, no cyanosis  Skin: no bleeding, bruising or rash  Neurologic: Alert, and oriented. No focal deficits    Labs:    WBC WBC   Date Value Ref Range Status   05/04/2024 10.43 3.40 - 10.80 10*3/mm3 Final      HGB Hemoglobin   Date Value Ref Range Status   05/04/2024 10.3 (L) 12.0 - 15.9 g/dL Final      HCT Hematocrit   Date Value Ref Range Status   05/04/2024 34.7 34.0 - 46.6 % Final      Platelets No results found for: \"LABPLAT\"   MCV MCV   Date Value Ref Range Status   05/04/2024 87.4 79.0 - 97.0 fL Final          Sodium Sodium   Date Value Ref Range Status   05/05/2024 146 (H) 136 - 145 mmol/L Final   05/04/2024 144 136 - 145 mmol/L Final      Potassium Potassium   Date Value Ref Range Status   05/05/2024 4.6 3.5 - 5.2 mmol/L Final   05/04/2024 4.0 3.5 - 5.2 mmol/L Final      Chloride Chloride   Date Value Ref Range Status   05/05/2024 110 (H) 98 - 107 mmol/L Final   05/04/2024 108 (H) 98 - 107 mmol/L Final      CO2 CO2   Date Value Ref Range Status   05/05/2024 26.0 22.0 - 29.0 mmol/L Final   05/04/2024 26.0 22.0 - 29.0 mmol/L Final      BUN BUN   Date Value Ref Range Status   05/05/2024 42 (H) 8 - 23 mg/dL Final   05/04/2024 45 (H) 8 - 23 mg/dL Final      Creatinine Creatinine   Date Value Ref Range Status   05/05/2024 1.73 (H) 0.57 - 1.00 mg/dL Final   05/04/2024 1.77 " "(H) 0.57 - 1.00 mg/dL Final      Calcium Calcium   Date Value Ref Range Status   05/05/2024 8.5 (L) 8.6 - 10.5 mg/dL Final   05/04/2024 8.9 8.6 - 10.5 mg/dL Final      PO4 No components found for: \"PO4\"   Albumin Albumin   Date Value Ref Range Status   05/05/2024 3.2 (L) 3.5 - 5.2 g/dL Final   05/04/2024 3.2 (L) 3.5 - 5.2 g/dL Final        Magnesium Magnesium   Date Value Ref Range Status   05/04/2024 1.9 1.6 - 2.4 mg/dL Final      Uric Acid No components found for: \"URIC ACID\"     Imaging Results (Last 72 Hours)       ** No results found for the last 72 hours. **            Results for orders placed during the hospital encounter of 04/25/24    XR Chest 1 View    Narrative  XR CHEST 1 VW    Date of Exam: 4/28/2024 9:45 AM EDT    Indication: CHF    Comparison: 4/25/2020    Findings:  Heart size and pulmonary vasculature are stable. No gross pulmonary edema is demonstrated. There is strandy opacity in the left lung base.    Impression  Impression:    1. Cardiomegaly with no evidence of pulmonary edema  2. Left basilar atelectasis      Electronically Signed: Darnell Kennedy  4/28/2024 11:33 AM EDT  Workstation ID: [id number]      XR Chest 1 View    Narrative  XR CHEST 1 VW    Date of Exam: 4/25/2024 3:38 PM EDT    Indication: SOB    Comparison: 3/21/2024.    Findings:  There is stable mild cardiomegaly. There is pulmonary vascular congestion with increasing interstitial prominence bilaterally. There may be a small right effusion.    Impression  Impression:  Findings suggest mild CHF superimposed over chronic lung disease.      Electronically Signed: Mary Ivan MD  4/25/2024 3:52 PM EDT  Workstation ID: [id number]      Results for orders placed during the hospital encounter of 03/21/24    XR Chest 1 View    Narrative  XR CHEST 1 VW    Date of Exam: 3/21/2024 1:20 PM EDT    Indication: Dyspnea    Comparison: 2/9/2024.    Findings:  There is stable mild cardiomegaly. Lung fields appear clear of acute infiltrates or " effusions. Mild diffuse bilateral reticular lung thickening is stable. Right PICC line has been removed.    Impression  Impression:  Chronic findings. No acute process.      Electronically Signed: Mary Ivan MD  3/21/2024 1:48 PM EDT  Workstation ID: [id number]      Results for orders placed during the hospital encounter of 02/05/24    Duplex Venous Upper Extremity - Right CAR    Interpretation Summary    Normal right upper extremity venous duplex scan.        ASSESSMENT / PLAN      Dyspnea    Moderate malnutrition    ARF/JULIAN/CRF/CKD------Nonoliguric. +ARF/JULIAN on top of CRF/CKD STG 3A   with a baseline serum Creatinine of about 1.3-1.4.  CRF/CKD STG 3A is secondary to HTN NS. +ARF/JULIAN is secondary to prerenal/hemodynamic fluctuation from decompensated CHF (Cardiorenal). Hold Bumex today given poor po intake and nausea/vomiting. Avoid hypotension.   It appears that in the long run we may have to accept a higher baseline serum creatinine in order to keep euvolemic. No NSAIDs or IV dye. Dose meds for CrCl less than 10 cc/min. Azotemia up from steroid exposure.     2. ANEMIA OF CKD------S/P IV iron for ROME. Follow for EPO need     3. HTN WITH CKD-----BP low. Back down Coreg. On Midodrine. Avoid hypotension. No ACE/ARB/DRI for now     4. OA/DJD/HYPERURICEMIA------No NSAIDs. Added Uloric     5. VOLUME OVERLOAD---Clinically better. Back down Bumex to 0.5 mg daily and follow     6. HYPERGLYCEMIA------Glucometers, SSI     7.  PULMONARY HTN--------Per , Pulmonary     8. HYPOKALEMIA-------Replaced     9. CAD-----per , Cardiology    10. DIARRHEA-----C.Diff negative.      11. DECONDITIONING------PT/OT/Rehab    12. PULMONARY HTN------On Revatio    13. GERD------On PPI. Benefits outweigh risks despite CKD       Luke Fleming MD  [facility]  [phone]  05/06/24  09:04 EDT      Electronically signed by Salome Fleming MD at 05/06/24 0951       Carlie Roy MD at  05/05/24 1348          Daily Progress Note          Assessment    Dyspnea  Hypoxemia: On home oxygen 2 L    Severe pulmonary hypertension mean PA pressure 44   RHC March 2023  RA 6/5, 4 mmHg  RV 74/3, 5 mmHg  PA 71/25, 44 mmHg  PCW 12 mmHg  AO Sat 92%  PA Sat 78%  Jose CO: 7.89 L/min  Jose CI: 4.69 L/min/m²  [facility]   LV: 134/3, 20 mmHg  AO: 131/56, 87 mmHg  No significant gradient across the aortic valve during pullback of JR4 catheter.     Severe calcified tortuous nonobstructive coronary artery disease     Patient had CT with IV contrast in October 2022 showed no pulmonary embolism     COPD/emphysema  Chronic atelectasis in left lower lobe  Anemia  CKD  HTN  History of PE/DVT  History of TIA  CAD  Paroxysmal atrial fibrillation  History of breast cancer 2018, currently in remission     Results for orders placed in visit on 03/18/24     Adult Transthoracic Echo Limited W/ Cont if Necessary Per Protocol     Interpretation Summary    Left ventricular ejection fraction appears to be 61 - 65%.    The right ventricular cavity is dilated.    There is a small (<1cm) pericardial effusion adjacent to the left ventricle. There is no evidence of cardiac tamponade.    IVC is 2.1 cm.           Recommendations:      Sildenafil for pulmonary hypertension, monitor systemic blood pressure    Off po prednisone     Oxygen supplement and titration to maintain saturation 90 to 95%: Currently requiring 3 L per nasal cannula  Bronchodilators    Inhaled corticosteroids     Diuresis as per nephrology    Thyroid hormone replacement  Cardiology following   Electrolytes/ glycemic control  Chronic anticoagulation: Apixaban    I personally reviewed the radiological studies                 LOS: 10 days     Subjective     C/o PEREZ, mild cough, diarrhea     Objective     Vital signs for last 24 hours:  Vitals:    05/05/24 0644 05/05/24 0814 05/05/24 0830 05/05/24 1226   BP:  102/53  118/71   BP Location:  Right arm  Left arm   Patient Position:   Sitting  Lying   Pulse: 74 82 74 73   Resp: 18 14  17   Temp:  97.7 °F (36.5 °C)  98.7 °F (37.1 °C)   TempSrc:  Oral  Oral   SpO2: 100% 92%  99%   Weight:       Height:           Intake/Output last 3 shifts:  I/O last 3 completed shifts:  In: 1300 [P.O.:1300]  Out: 200 [Urine:200]  Intake/Output this shift:  No intake/output data recorded.      Radiology  Imaging Results (Last 24 Hours)       ** No results found for the last 24 hours. **            Labs:  Results from last 7 days   Lab Units 05/04/24  0032   WBC 10*3/mm3 10.43   HEMOGLOBIN g/dL 10.3*   HEMATOCRIT % 34.7   PLATELETS 10*3/mm3 172     Results from last 7 days   Lab Units 05/05/24 0105 05/04/24  0032   SODIUM mmol/L 146* 144   POTASSIUM mmol/L 4.6 4.0   CHLORIDE mmol/L 110* 108*   CO2 mmol/L 26.0 26.0   BUN mg/dL 42* 45*   CREATININE mg/dL 1.73* 1.77*   CALCIUM mg/dL 8.5* 8.9   BILIRUBIN mg/dL  --  1.6*   ALK PHOS U/L  --  62   ALT (SGPT) U/L  --  22   AST (SGOT) U/L  --  20   GLUCOSE mg/dL 106* 97         Results from last 7 days   Lab Units 05/05/24  0105 05/04/24  0032 04/29/24  0256   ALBUMIN g/dL 3.2* 3.2* 3.8               Results from last 7 days   Lab Units 05/04/24  0032   MAGNESIUM mg/dL 1.9                     Meds:   SCHEDULE  apixaban, 2.5 mg, Oral, Q12H  bumetanide, 1 mg, Oral, Daily  carvedilol, 6.25 mg, Oral, BID With Meals  febuxostat, 40 mg, Oral, Daily  ipratropium-albuterol, 3 mL, Nebulization, BID - RT  levothyroxine, 75 mcg, Oral, Q AM  midodrine, 5 mg, Oral, TID AC  pantoprazole, 40 mg, Oral, BID AC  potassium chloride, 20 mEq, Oral, Daily  sildenafil, 20 mg, Oral, TID  sodium chloride, 10 mL, Intravenous, Q12H      Infusions     PRNs    acetaminophen    senna-docusate sodium **AND** polyethylene glycol **AND** bisacodyl **AND** bisacodyl    Calcium Replacement - Follow Nurse / BPA Driven Protocol    loperamide    Magnesium Standard Dose Replacement - Follow Nurse / BPA Driven Protocol    ondansetron ODT **OR** ondansetron     Phosphorus Replacement - Follow Nurse / BPA Driven Protocol    Potassium Replacement - Follow Nurse / BPA Driven Protocol    promethazine    promethazine    sodium chloride    sodium chloride    Physical Exam:  General Appearance:  Alert   HEENT:  Normocephalic, without obvious abnormality, Conjunctiva/corneas clear,.   Nares normal, no drainage     Neck:  Supple, symmetrical, trachea midline.   Lungs /Chest wall:   mild Bilateral basal rhonchi, respirations unlabored, symmetrical wall movement.     Heart:  Regular rate and rhythm, S1 S2 normal  Abdomen: Soft, non-tender, no masses, no organomegaly.    Extremities: No edema, no clubbing or cyanosis     ROS  Constitutional: Negative for chills, fever and malaise/fatigue.   HENT: Negative.    Eyes: Negative.    Cardiovascular: Negative.    Respiratory: Positive for cough and shortness of breath.    Skin: Negative.    Musculoskeletal: Negative.    Gastrointestinal: diarrhea    Genitourinary: Negative.    Neurological: Negative.    Psychiatric/Behavioral: Negative.      I reviewed the recent clinical results  I personally reviewed the latest radiological studies    Part of this note may be an electronic transcription/translation of spoken language to printed text using the Dragon Dictation System.      Electronically signed by Carlie Roy MD at 24 [id number]       Anirudh Aleman MD at 24 1303          NEPHROLOGY PROGRESS NOTE------[facility]    [facility]  [phone]  Anirudh Aleman MD      Patient Care Team:  Paul Granados MD as PCP - General (Family Medicine)  Richardson Willis MD as Cardiologist (Cardiology)  Rafy Rodriguez MD as Consulting Physician (Hematology and Oncology)  Christianne Phillips, AMARILIS as Licensed Practical Nurse  Salome Fleming MD as Consulting Physician (Nephrology)      Provider:  Anirudh Aleman MD  Patient Name: Gabrielle Lyles  :  1940    SUBJECTIVE:    F/U  "ARF/JULIAN/CRF/CKD  No chest pain, still some shortness of air, but better  .     Medication:  apixaban, 2.5 mg, Oral, Q12H  bumetanide, 1 mg, Oral, Daily  carvedilol, 6.25 mg, Oral, BID With Meals  febuxostat, 40 mg, Oral, Daily  ipratropium-albuterol, 3 mL, Nebulization, BID - RT  levothyroxine, 75 mcg, Oral, Q AM  midodrine, 5 mg, Oral, TID AC  pantoprazole, 40 mg, Oral, BID AC  potassium chloride, 20 mEq, Oral, Daily  sildenafil, 20 mg, Oral, TID  sodium chloride, 10 mL, Intravenous, Q12H           OBJECTIVE    Vital Sign Min/Max for last 24 hours  Temp  Min: 97.2 °F (36.2 °C)  Max: 98.7 °F (37.1 °C)   BP  Min: 77/48  Max: 118/71   Pulse  Min: 71  Max: 84   Resp  Min: 14  Max: 18   SpO2  Min: 84 %  Max: 100 %   No data recorded   Weight  Min: 66.5 kg (146 lb 9.7 oz)  Max: 66.5 kg (146 lb 9.7 oz)     Flowsheet Rows      Flowsheet Row First Filed Value   Admission Height 162.6 cm (64\") Documented at 04/27/2024 0711   Admission Weight 74 kg (163 lb 2.3 oz) Documented at 04/25/2024 2107            No intake/output data recorded.  I/O last 3 completed shifts:  In: 1300 [P.O.:1300]  Out: 200 [Urine:200]    Physical Exam:  General Appearance: alert, appears stated age and cooperative  Head: normocephalic, without obvious abnormality and atraumatic  Eyes: conjunctivae and sclerae normal and no icterus  Neck: supple and NO GROSS JVD   Lungs: Diminished breath sounds  Heart: IRREG IRREG +TAYE  Chest Wall: no abnormalities observed  Abdomen: normal bowel sounds and soft, nontender  Extremities: moves extremities well,1+ BILAT PRETIBIAL EDEMA, no cyanosis  Skin: no bleeding, bruising or rash  Neurologic: Alert, and oriented. No focal deficits    Labs:    WBC WBC   Date Value Ref Range Status   05/04/2024 10.43 3.40 - 10.80 10*3/mm3 Final   05/03/2024 10.86 (H) 3.40 - 10.80 10*3/mm3 Final      HGB Hemoglobin   Date Value Ref Range Status   05/04/2024 10.3 (L) 12.0 - 15.9 g/dL Final   05/03/2024 10.7 (L) 12.0 - 15.9 g/dL Final    " "  HCT Hematocrit   Date Value Ref Range Status   05/04/2024 34.7 34.0 - 46.6 % Final   05/03/2024 35.8 34.0 - 46.6 % Final      Platelets No results found for: \"LABPLAT\"   MCV MCV   Date Value Ref Range Status   05/04/2024 87.4 79.0 - 97.0 fL Final   05/03/2024 87.3 79.0 - 97.0 fL Final          Sodium Sodium   Date Value Ref Range Status   05/05/2024 146 (H) 136 - 145 mmol/L Final   05/04/2024 144 136 - 145 mmol/L Final   05/03/2024 144 136 - 145 mmol/L Final      Potassium Potassium   Date Value Ref Range Status   05/05/2024 4.6 3.5 - 5.2 mmol/L Final   05/04/2024 4.0 3.5 - 5.2 mmol/L Final   05/03/2024 4.0 3.5 - 5.2 mmol/L Final     Comment:     Slight hemolysis detected by analyzer. Result may be falsely elevated.      Chloride Chloride   Date Value Ref Range Status   05/05/2024 110 (H) 98 - 107 mmol/L Final   05/04/2024 108 (H) 98 - 107 mmol/L Final   05/03/2024 107 98 - 107 mmol/L Final      CO2 CO2   Date Value Ref Range Status   05/05/2024 26.0 22.0 - 29.0 mmol/L Final   05/04/2024 26.0 22.0 - 29.0 mmol/L Final   05/03/2024 25.0 22.0 - 29.0 mmol/L Final      BUN BUN   Date Value Ref Range Status   05/05/2024 42 (H) 8 - 23 mg/dL Final   05/04/2024 45 (H) 8 - 23 mg/dL Final   05/03/2024 50 (H) 8 - 23 mg/dL Final      Creatinine Creatinine   Date Value Ref Range Status   05/05/2024 1.73 (H) 0.57 - 1.00 mg/dL Final   05/04/2024 1.77 (H) 0.57 - 1.00 mg/dL Final   05/03/2024 1.99 (H) 0.57 - 1.00 mg/dL Final      Calcium Calcium   Date Value Ref Range Status   05/05/2024 8.5 (L) 8.6 - 10.5 mg/dL Final   05/04/2024 8.9 8.6 - 10.5 mg/dL Final   05/03/2024 9.5 8.6 - 10.5 mg/dL Final      PO4 No components found for: \"PO4\"   Albumin Albumin   Date Value Ref Range Status   05/05/2024 3.2 (L) 3.5 - 5.2 g/dL Final   05/04/2024 3.2 (L) 3.5 - 5.2 g/dL Final        Magnesium Magnesium   Date Value Ref Range Status   05/04/2024 1.9 1.6 - 2.4 mg/dL Final   05/03/2024 2.0 1.6 - 2.4 mg/dL Final      Uric Acid No components found " "for: \"URIC ACID\"     Imaging Results (Last 72 Hours)       ** No results found for the last 72 hours. **            Results for orders placed during the hospital encounter of 04/25/24    XR Chest 1 View    Narrative  XR CHEST 1 VW    Date of Exam: 4/28/2024 9:45 AM EDT    Indication: CHF    Comparison: 4/25/2020    Findings:  Heart size and pulmonary vasculature are stable. No gross pulmonary edema is demonstrated. There is strandy opacity in the left lung base.    Impression  Impression:    1. Cardiomegaly with no evidence of pulmonary edema  2. Left basilar atelectasis      Electronically Signed: Darnell Kennedy  4/28/2024 11:33 AM EDT  Workstation ID: [id number]      XR Chest 1 View    Narrative  XR CHEST 1 VW    Date of Exam: 4/25/2024 3:38 PM EDT    Indication: SOB    Comparison: 3/21/2024.    Findings:  There is stable mild cardiomegaly. There is pulmonary vascular congestion with increasing interstitial prominence bilaterally. There may be a small right effusion.    Impression  Impression:  Findings suggest mild CHF superimposed over chronic lung disease.      Electronically Signed: Mary Ivan MD  4/25/2024 3:52 PM EDT  Workstation ID: [id number]      Results for orders placed during the hospital encounter of 03/21/24    XR Chest 1 View    Narrative  XR CHEST 1 VW    Date of Exam: 3/21/2024 1:20 PM EDT    Indication: Dyspnea    Comparison: 2/9/2024.    Findings:  There is stable mild cardiomegaly. Lung fields appear clear of acute infiltrates or effusions. Mild diffuse bilateral reticular lung thickening is stable. Right PICC line has been removed.    Impression  Impression:  Chronic findings. No acute process.      Electronically Signed: Mary Ivan MD  3/21/2024 1:48 PM EDT  Workstation ID: [id number]      Results for orders placed during the hospital encounter of 02/05/24    Duplex Venous Upper Extremity - Right CAR    Interpretation Summary    Normal right upper extremity venous duplex " scan.        ASSESSMENT / PLAN      Dyspnea    Moderate malnutrition    ARF/JULIAN/CRF/CKD------Nonoliguric. +ARF/JULIAN on top of CRF/CKD STG 3A   with a baseline serum Creatinine of about 1.3-1.4.  CRF/CKD STG 3A is secondary to HTN NS. +ARF/JULIAN is secondary to prerenal/hemodynamic fluctuation from decompensated CHF (Cardiorenal). Hold Bumex today given poor po intake and nausea/vomiting. Avoid hypotension.   It appears that in the long run we may have to accept a higher baseline serum creatinine in order to keep euvolemic. No NSAIDs or IV dye. Dose meds for CrCl less than 10 cc/min. Azotemia up from steroid exposure.     2. ANEMIA OF CKD------S/P IV iron for ROME. Follow for EPO need     3. HTN WITH CKD-----Avoid hypotension. No ACE/ARB/DRI for now     4. OA/DJD/HYPERURICEMIA------No NSAIDs. Added Uloric     5. VOLUME OVERLOAD---Clinically better. Hold Bumex given n/v/d     6. HYPERGLYCEMIA------Glucometers, SSI     7.  PULMONARY HTN--------Per , Pulmonary     8. HYPOKALEMIA-------Replaced     9. CAD-----per , Cardiology    10. DIARRHEA-----C.Diff negative.      11. DECONDITIONING------PT/OT/Rehab    Creatinine stable  Blood pressure better, continue midodrine  Continue Bumex  Awaits rehab    Anirudh Aleman MD  [facility]  [phone]  05/05/24  13:03 EDT      Electronically signed by Anirudh Aleman MD at 05/05/24 1511       Rosamaria Jin MD at 05/05/24 1240               LOS: 10 days   Patient Care Team:  Paul Granados MD as PCP - General (Family Medicine)  Richardson Willis MD as Cardiologist (Cardiology)  Rafy Rodriguez MD as Consulting Physician (Hematology and Oncology)  Christianne Phillips, AMARILIS as Licensed Practical Nurse  Salome Fleming MD as Consulting Physician (Nephrology)    Subjective     Interval History: Clinically stable    Patient Complaints: Able to ambulate further today.  Diarrhea has resolved.    History taken from: patient    Review of  Systems   Constitutional:  Positive for activity change and fatigue. Negative for appetite change, chills, diaphoresis and fever.   HENT:  Negative for facial swelling.    Eyes:  Negative for visual disturbance.   Respiratory:  Positive for shortness of breath. Negative for cough, wheezing and stridor.    Cardiovascular:  Negative for chest pain, palpitations and leg swelling.   Gastrointestinal:  Negative for abdominal pain and constipation.   Genitourinary:  Negative for difficulty urinating, dyspareunia and urgency.   Musculoskeletal:  Positive for back pain and gait problem. Negative for arthralgias.   Neurological:  Negative for dizziness and headaches.   Psychiatric/Behavioral:  Negative for confusion.            Objective     Vital Signs  Temp:  [97.2 °F (36.2 °C)-98.7 °F (37.1 °C)] 98.7 °F (37.1 °C)  Heart Rate:  [71-84] 73  Resp:  [14-18] 17  BP: ()/(45-71) 118/71    Physical Exam:     General Appearance:    Alert, cooperative, in no acute distress,   Head:    Normocephalic, without obvious abnormality, atraumatic   Eyes:            Lids and lashes normal, conjunctivae and sclerae normal, no   icterus, no pallor, corneas clear, PERRLA   Ears:    Ears appear intact with no abnormalities noted   Throat:   No oral lesions, no thrush, oral mucosa moist   Neck:   No adenopathy, supple, trachea midline, no thyromegaly, no   carotid bruit, no JVD   Lungs:     Clear to auscultation,respirations regular, even and                  unlabored    Heart:  Irregularly irregular   Chest Wall:    No abnormalities observed   Abdomen:     Normal bowel sounds, no masses, no organomegaly, soft        Non-tender non-distended, no guarding,   Extremities:   Moves all extremities well, no edema, no cyanosis, no             Redness   Pulses:   Pulses palpable and equal bilaterally   Skin:   No bleeding, bruising or rash   Lymph nodes:   No palpable adenopathy   Neurologic:   Cranial nerves 2 - 12 grossly intact, sensation  intact, DTR       present and equal bilaterally        Results Review:    Lab Results (last 24 hours)       Procedure Component Value Units Date/Time    Renal Function Panel [[id number]]  (Abnormal) Collected: 05/05/24 0105    Specimen: Blood Updated: 05/05/24 0142     Glucose 106 mg/dL      BUN 42 mg/dL      Creatinine 1.73 mg/dL      Sodium 146 mmol/L      Potassium 4.6 mmol/L      Chloride 110 mmol/L      CO2 26.0 mmol/L      Calcium 8.5 mg/dL      Albumin 3.2 g/dL      Phosphorus 2.9 mg/dL      Anion Gap 10.0 mmol/L      BUN/Creatinine Ratio 24.3     eGFR 29.0 mL/min/1.73     Narrative:      GFR Normal >60  Chronic Kidney Disease <60  Kidney Failure <15    The GFR formula is only valid for adults with stable renal function between ages 18 and 70.             Imaging Results (Last 24 Hours)       ** No results found for the last 24 hours. **                 I reviewed the patient's new clinical results.    Medication Review:   Scheduled Meds:apixaban, 2.5 mg, Oral, Q12H  bumetanide, 1 mg, Oral, Daily  carvedilol, 6.25 mg, Oral, BID With Meals  febuxostat, 40 mg, Oral, Daily  ipratropium-albuterol, 3 mL, Nebulization, BID - RT  levothyroxine, 75 mcg, Oral, Q AM  midodrine, 5 mg, Oral, TID AC  pantoprazole, 40 mg, Oral, BID AC  potassium chloride, 20 mEq, Oral, Daily  sildenafil, 20 mg, Oral, TID  sodium chloride, 10 mL, Intravenous, Q12H      Continuous Infusions:   PRN Meds:.  acetaminophen    senna-docusate sodium **AND** polyethylene glycol **AND** bisacodyl **AND** bisacodyl    Calcium Replacement - Follow Nurse / BPA Driven Protocol    loperamide    Magnesium Standard Dose Replacement - Follow Nurse / BPA Driven Protocol    ondansetron ODT **OR** ondansetron    Phosphorus Replacement - Follow Nurse / BPA Driven Protocol    Potassium Replacement - Follow Nurse / BPA Driven Protocol    promethazine    promethazine    sodium chloride    sodium chloride     Assessment & Plan       Dyspnea    Moderate  malnutrition  COPD  Pulmonary hypertension-sildenafil  CKD - creatinine is stable; Bumex restarted  Acute gastroenteritis -resolved  Acute on chronic diastolic CHF clinically stable  PAF - rate controlled; anti-coagulated  Chronic CAD - no anginal symptoms  Hypothyroidism-levothyroxine  Hyperuricemia-Uloric  Hypotension-better on lower dose of carvedilol; on midodrine    Plan for disposition: Ready for discharge when skilled rehab arrangements have been made    Rosamaria Jin MD  24  12:40 EDT            Electronically signed by Rosamaria Jin MD at 24 1244       Consult Notes (last 24 hours)  Notes from 24 1153 through 24 1153   No notes of this type exist for this encounter.       Physical Therapy Notes (last 24 hours)  Notes from 24 1153 through 24 1153   No notes exist for this encounter.          Occupational Therapy Notes (last 24 hours)        Katiana Salas OT at 24 1153          Patient Name: Gabrielle Lyles  : 1940    MRN: [id number]                              Today's Date: 2024       Admit Date: 2024    Visit Dx:     ICD-10-CM ICD-9-CM   1. Acute on chronic heart failure with preserved ejection fraction (HFpEF)  I50.33 428.23     Patient Active Problem List   Diagnosis    Acute hypokalemia    Stage 3a chronic kidney disease    Low back pain    Osteopenia    Chronic obstructive pulmonary disease    Gastroesophageal reflux disease    Gout    History of venous thrombosis and embolism    Primary hypertension    Lumbar radiculopathy    Nausea    Personal history of malignant neoplasm of breast    Shortness of breath    Aortic aneurysm    Bulging lumbar disc    Spinal stenosis of lumbar region    History of TIA (transient ischemic attack)    Severe pulmonary hypertension    Chronic respiratory failure with hypoxia    Cellulitis of left foot    Altered mental status    JULIAN (acute kidney injury)    Dyspnea    Anemia in stage 2 chronic kidney disease     Paroxysmal atrial fibrillation    Moderate malnutrition     Past Medical History:   Diagnosis Date    Acute exacerbation of chronic obstructive pulmonary disease (COPD)     COPD (chronic obstructive pulmonary disease)     Deep vein thrombosis of bilateral lower extremities 07/24/2019    3/2013    History of pneumonia 06/2012    community acquired pneumonia and right pleural effusion;hospitalized at [facility]    History of pulmonary embolism 03/2013    bilateral PE    Hypertension 2001    Insomnia     on Ambien 5mg at     Lobular breast cancer, left 11/2011    Stage IA left upper lobular breast cancer    Malignant neoplasm of left breast in female, estrogen receptor positive 07/18/2019    Osteopenia 2012    Parainfluenza infection     Parotid duct obstruction     Severe pulmonary hypertension 03/03/2023    Stage 3a chronic kidney disease 07/24/2019    TIA (transient ischemic attack) 10/25/2022     Past Surgical History:   Procedure Laterality Date    CARDIAC CATHETERIZATION N/A 3/3/2023    Procedure: Left and Right Heart Cath with Coronary Angiography;  Surgeon: Richardson Willis MD;  Location:  INVASIVE LOCATION;  Service: Cardiovascular;  Laterality: N/A;    CATARACT EXTRACTION  2015    IVC FILTER RETRIEVAL  06/2014    IVC filter placement by Dr. Card    MAMMO STEREOTACTIC BREAST BX SURGICAL ADD UNI Left 11/01/2011    invasive lobular carcinoma-Dr. Cande Daniel    MASTECTOMY, PARTIAL Left 12/01/2011    and left axillary sentinel lymph node biopsy by Dr. Uriostegui    TUBAL ABDOMINAL LIGATION  1984      General Information       Row Name 05/05/24 0806          General Information    Prior Level of Function independent:  dtr drives and dose some housekeeping for pt. Pt uses Rolator. or RW.  -     Existing Precautions/Restrictions oxygen therapy device and L/min  3L as at home  -     Barriers to Rehab medically complex  -       Row Name 05/05/24 0806          Living Environment    People in Home  alone  -       Row Name 05/05/24 0806          Home Main Entrance    Number of Stairs, Main Entrance two  -       Row Name 05/05/24 0806          Stairs Within Home, Primary    Number of Stairs, Within Home, Primary none  -       Row Name 05/05/24 0806          Cognition    Orientation Status (Cognition) oriented x 4  -       Row Name 05/05/24 0806          Safety Issues, Functional Mobility    Impairments Affecting Function (Mobility) endurance/activity tolerance;shortness of breath  -               User Key  (r) = Recorded By, (t) = Taken By, (c) = Cosigned By      Initials Name Provider Type     Katiana Salas OT Occupational Therapist                     Mobility/ADL's       Row Name 05/05/24 0809          Bed Mobility    Bed Mobility bed mobility (all) activities  -     All Activities, [place] (Bed Mobility) modified [place]  -       Row Name 05/05/24 0809          Transfers    Transfers toilet transfer;sit-stand transfer;stand-sit transfer  -       Row Name 05/05/24 0809          Sit-Stand Transfer    Sit-Stand [place] (Transfers) modified [facility]     Assistive Device (Sit-Stand Transfers) walker, front-wheeled  -       Row Name 05/05/24 0809          Stand-Sit Transfer    Stand-Sit [place] (Transfers) modified [place]  -[facility] Name 05/05/24 0809          Toilet Transfer    Type (Toilet Transfer) stand pivot/stand step  -     [place] Level (Toilet Transfer) modified [place]  -       Row Name 05/05/24 0809          Functional Mobility    Functional Mobility- Ind. Level conditional independence;other (see comments)  O2 tank assist at [facility].  -       Row Name 05/05/24 0809          Activities of Daily Living    BADL Assessment/Intervention toileting;grooming;feeding  -       Row Name 05/05/24 0809          Toileting Assessment/Training    [place] Level (Toileting) toileting skills;modified independence  -[facility] Name 05/05/24  0809          Grooming Assessment/Training    [place] Level (Grooming) grooming skills;set up  -[facility] Name 05/05/24 0809          Self-Feeding Assessment/Training    [place] Level (Feeding) feeding skills;independent  -               User Key  (r) = Recorded By, (t) = Taken By, (c) = Cosigned By      Initials Name Provider Type     Katiana Salas OT Occupational Therapist                   Obj/Interventions       Row Name 05/05/24 0811          Sensory Assessment (Somatosensory)    Sensory Assessment (Somatosensory) sensation intact  -MH       Row Name 05/05/24 0811          Vision Assessment/Intervention    Visual Impairment/Limitations WFL  -MH       Row Name 05/05/24 0811          Range of Motion Comprehensive    General Range of Motion no range of motion deficits identified  -MH       Row Name 05/05/24 0811          Strength Comprehensive (MMT)    General Manual Muscle Testing (MMT) Assessment no strength deficits identified  -               User Key  (r) = Recorded By, (t) = Taken By, (c) = Cosigned By      Initials Name Provider Type     Katiana Salas, OT Occupational Therapist                   Goals/Plan    No documentation.                  Clinical Impression       Row Name 05/05/24 0812          Pain Assessment    Pretreatment Pain Rating 0/10 - no pain  -     Posttreatment Pain Rating 0/10 - no pain  -MH       Row Name 05/05/24 1141 05/05/24 0812       Plan of Care Review    Plan of Care Reviewed With -- patient  -    Progress -- improving  -    Outcome Evaluation Pt is an 84 yo female brought to ED 2/5/24 with c/o AMS. MRI pending. CT Chest (+) Chronic atelectasis LLL, lesion of sternal manubrium. CTH limited due to motion. CT Abd pelvis suggestive of cirrhosis, aneurysmal dilation of abdominal aorta.  Dx   PMHx significant for CKD III, COPD, TIA, breast CA, DVT, PE, IVC Filter.    At baseline, pt resides alone in [facility] with 2 TAYLOR. She is independent, driving short  distances but reporting mainly her dtr drives her and assists with housekeeping and making appointments and other IADL. Pt is able to cook some meals for herself and mobilizes with a Rolator or RW.  Pt uses 3L O2 24/7. Pt has good social support. Pt oriented X4 today and able to walk short distance but reports legs start to feel weak. Her main deficit below her baseline at this time is her endurance. Pt states she will have good help at home and would like to have HHC at d/c. Pt appears appropriate for [facility].  - --      Row Name 05/05/24 0812          Therapy Assessment/Plan (OT)    Criteria for Skilled Therapeutic Interventions Met (OT) no problems identified which require skilled intervention  -     Therapy Frequency (OT) evaluation only  -     Predicted Duration of Therapy Intervention (OT) Pt is chronically ill 85 y/o F admitted with fluid overload and some nausea, reflux, diarrhea, soa. She is back to her baseline now and up to the [facility] [place] [facility]. States dtr will assist at home. Pt walks short distance on 3L as at home w/ RW. Pt demonstrates an home HEP from her last [facility] services. No further acute4 OT needs identified. Recommend home at d/c with [facility] if pt wishes.  -       Row Name 05/05/24 0812          Therapy Plan Review/Discharge Plan (OT)    Anticipated Discharge Disposition (OT) home with assist;home with home health  -       Row Name 05/05/24 0815 05/05/24 0812       Vital Signs    Intra Systolic BP Rehab 85  -MH --    Intra Treatment Diastolic BP 62  -MH --    Post Systolic BP Rehab 102  -MH --    Post Treatment Diastolic BP 53  -MH --    O2 Delivery Pre Treatment -- supplemental O2  -    O2 Delivery Intra Treatment -- supplemental O2  -    O2 Delivery Post Treatment -- supplemental O2  -    Pre Patient Position -- Supine  -    Intra Patient Position -- Standing  -    Post Patient Position -- Sitting  -      Row Name 05/05/24 0812          Positioning and Restraints    Pre-Treatment  Position in bed  -MH     Post Treatment Position bed  -MH     In Bed sitting;call light within reach;encouraged to call for assist  -               User Key  (r) = Recorded By, (t) = Taken By, (c) = Cosigned By      Initials Name Provider Type    Katiana Schwarz OT Occupational Therapist                   Outcome Measures       Row Name 05/05/24 1149 05/05/24 0800       How much help from another person do you currently need...    Turning from your back to your side while in flat bed without using bedrails? 4  - 4  -SM    Moving from lying on back to sitting on the side of a flat bed without bedrails? 4  - 4  -SM    Moving to and from a bed to a chair (including a wheelchair)? 4  - 4  -SM    Standing up from a chair using your arms (e.g., wheelchair, bedside chair)? 4  - 4  -SM    Climbing 3-5 steps with a railing? 4  - 4  -SM    To walk in hospital room? 4  - 4  -SM    AM-PAC 6 Clicks Score (PT) 24  - 24  -SM    Highest Level of Mobility Goal 8 --> Walked 250 feet or more  - 8 --> Walked 250 feet or more  -SM              User Key  (r) = Recorded By, (t) = Taken By, (c) = Cosigned By      Initials Name Provider Type    Katiana Schwarz OT Occupational Therapist    Sarah Hunt RN Registered Nurse                    Occupational Therapy Education       Title: PT OT SLP Therapies (In Progress)       Topic: Occupational Therapy (In Progress)       Point: ADL training (Done)       Description:   Instruct learner(s) on proper safety adaptation and remediation techniques during self care or transfers.   Instruct in proper use of assistive devices.                  Learning Progress Summary             Patient Acceptance, E,TB, VU by  at 5/5/2024 1152                         Point: Home exercise program (Not Started)       Description:   Instruct learner(s) on appropriate technique for monitoring, assisting and/or progressing therapeutic exercises/activities.                  Learner  Progress:  Not documented in this visit.              Point: Precautions (Done)       Description:   Instruct learner(s) on prescribed precautions during self-care and functional transfers.                  Learning Progress Summary             Patient Acceptance, E,TB, VU by  at 5/5/2024 1152                         Point: Body mechanics (Done)       Description:   Instruct learner(s) on proper positioning and spine alignment during self-care, functional mobility activities and/or exercises.                  Learning Progress Summary             Patient Acceptance, E,TB, VU by  at 5/5/2024 1152                                         User Key       Initials Effective Dates Name Provider Type Discipline     06/16/21 -  Katiana Salas OT Occupational Therapist OT                  OT Recommendation and Plan  Therapy Frequency (OT): evaluation only  Plan of Care Review  Plan of Care Reviewed With: patient  Progress: improving  Outcome Evaluation: Pt is an 84 yo female brought to ED 2/5/24 with c/o AMS. MRI pending. CT Chest (+) Chronic atelectasis LLL, lesion of sternal manubrium. CTH limited due to motion. CT Abd pelvis suggestive of cirrhosis, aneurysmal dilation of abdominal aorta.  Dx   PMHx significant for CKD III, COPD, TIA, breast CA, DVT, PE, IVC Filter.    At baseline, pt resides alone in [facility] with 2 TAYLOR. She is independent, driving short distances but reporting mainly her dtr drives her and assists with housekeeping and making appointments and other IADL. Pt is able to cook some meals for herself and mobilizes with a Rolator or RW.  Pt uses 3L O2 24/7. Pt has good social support. Pt oriented X4 today and able to walk short distance but reports legs start to feel weak. Her main deficit below her baseline at this time is her endurance. Pt states she will have good help at home and would like to have HHC at d/c. Pt appears appropriate for HHC.     Time Calculation:         Time Calculation- OT       Row  Name 05/05/24 1152             Time Calculation- OT    OT Start Time 0748  -      OT Stop Time 0818  -      OT Time Calculation (min) 30 min  -      Total Timed Code Minutes- OT 10 minute(s)  -      OT Received On 05/05/24  -                User Key  (r) = Recorded By, (t) = Taken By, (c) = Cosigned By      Initials Name Provider Type     Katiana Salas OT Occupational Therapist                  Therapy Charges for Today       Code Description Service Date Service Provider Modifiers Qty    [id number]  OT EVAL MOD COMPLEXITY 3 5/5/2024 Katiana Salas OT GO 1                 Katiana Salas OT  5/5/2024    Electronically signed by Katiana Salas OT at 05/05/24 1150

## 2024-05-06 NOTE — PLAN OF CARE
Goal Outcome Evaluation:               Pt rested throughout shift. Refusing blood draws. Pt remains on 3L NC, remains in controlled afib. BP soft today, sys in the 80s, held coreg and bumex, verified with Dr Granados. No complaints of pain, will continue to observe.

## 2024-05-06 NOTE — PROGRESS NOTES
"PULMONARY CRITICAL CARE PROGRESS  NOTE      PATIENT IDENTIFICATION:  Name: Gabrielle Lyles  MRN: [id number]  :  1940     Age: 84 y.o.  Sex: female    DATE OF Note:  2024   Referring Physician: Paul Granados MD                  Subjective:   Feeling better, no new issue, no SOB, no chest pain, no nausea or vomiting, no change in bowel habit, no dysuria,  no new  skin rash or itching.      Objective:  tMax 24 hrs: Temp (24hrs), Av.2 °F (36.8 °C), Min:97.4 °F (36.3 °C), Max:98.8 °F (37.1 °C)      Vitals Ranges:   Temp:  [97.4 °F (36.3 °C)-98.8 °F (37.1 °C)] 97.6 °F (36.4 °C)  Heart Rate:  [71-83] 79  Resp:  [14-22] 18  BP: ()/(41-71) 103/47    Intake and Output Last 3 Shifts:   I/O last 3 completed shifts:  In: 1160 [P.O.:1160]  Out: -     Exam:  /47 (BP Location: Right arm, Patient Position: Lying)   Pulse 79   Temp 97.6 °F (36.4 °C) (Oral)   Resp 18   Ht 162.6 cm (64\")   Wt 67 kg (147 lb 11.3 oz)   SpO2 94%   BMI 25.35 kg/m²     General Appearance:     HEENT:  Normocephalic, without obvious abnormality. Conjunctivae/corneas clear.  Normal external ear canals. Nares normal, no drainage     Neck:  Supple, symmetrical, trachea midline. No JVD.  Lungs /Chest wall:   Bilateral basal rhonchi, respirations unlabored, symmetrical wall movement.     Heart:  Regular rate and rhythm, systolic murmur PMI left sternal border  Abdomen: Soft, nontender, no masses, no organomegaly.    Extremities: Trace edema, no clubbing or cyanosis        Medications:  apixaban, 2.5 mg, Oral, Q12H  [START ON 2024] bumetanide, 0.5 mg, Oral, Daily  carvedilol, 3.125 mg, Oral, BID With Meals  febuxostat, 40 mg, Oral, Daily  ipratropium-albuterol, 3 mL, Nebulization, BID - RT  levothyroxine, 75 mcg, Oral, Q AM  midodrine, 5 mg, Oral, TID AC  pantoprazole, 40 mg, Oral, BID AC  potassium chloride, 20 mEq, Oral, Daily  sildenafil, 20 mg, Oral, TID  sodium chloride, 10 mL, Intravenous, Q12H        Infusion:    "     PRN:    acetaminophen    senna-docusate sodium **AND** polyethylene glycol **AND** bisacodyl **AND** bisacodyl    Calcium Replacement - Follow Nurse / BPA Driven Protocol    loperamide    Magnesium Standard Dose Replacement - Follow Nurse / BPA Driven Protocol    ondansetron ODT **OR** ondansetron    Phosphorus Replacement - Follow Nurse / BPA Driven Protocol    Potassium Replacement - Follow Nurse / BPA Driven Protocol    promethazine    promethazine    sodium chloride    sodium chloride  Data Review:  All labs (24hrs): No results found for this or any previous visit (from the past 24 hour(s)).     Imaging:  XR Chest 1 View  Narrative: XR CHEST 1 VW    Date of Exam: 4/28/2024 9:45 AM EDT    Indication: CHF    Comparison: 4/25/2020    Findings:  Heart size and pulmonary vasculature are stable. No gross pulmonary edema is demonstrated. There is strandy opacity in the left lung base.  Impression: Impression:    1. Cardiomegaly with no evidence of pulmonary edema  2. Left basilar atelectasis    Electronically Signed: Darnell Kennedy    4/28/2024 11:33 AM EDT    Workstation ID: [id number]       ASSESSMENT:  Hypoxemia chronic respiratory failure on oxygen  Chronic obstructive airway disease  Moderate pulmonary hypertension  Chronic renal disease  Hypertension  History of PE      PLAN:  Continue anticoagulation  Watch MEETA's  Continue on sildenafil for now  Bronchodilator  Inhaled corticosteroids  Electrolytes/ glycemic control  DVT and GI prophylaxis.    Total Critical care time in direct medical management (   ) minutes. This time specifically excludes time spent performing procedures.  Jerry Solorzano MD. D, ABSM.     5/6/2024  11:30 EDT

## 2024-05-06 NOTE — PROGRESS NOTES
Referring Provider: Paul Granados MD    Reason for follow-up: Shortness of breath, elevated proBNP     Patient Care Team:  Paul Granados MD as PCP - General (Family Medicine)  Richardson Willis MD as Cardiologist (Cardiology)  Rafy Rodriguez MD as Consulting Physician (Hematology and Oncology)  Christianne Phillips RN as Licensed Practical Nurse  Salome Fleming MD as Consulting Physician (Nephrology)      SUBJECTIVE  Feels well.  Denies any chest pain or shortness of breath.  Awaiting placement in rehab.     ROS  Review of all systems negative except as indicated.    Since I have last seen, the patient has been without any chest discomfort, shortness of breath, palpitations, dizziness or syncope.  Denies having any headache, abdominal pain, nausea, vomiting, diarrhea, constipation, loss of weight or loss of appetite.  Denies having any excessive bruising, hematuria or blood in the stool.  ROS      Personal History:    Past Medical History:   Diagnosis Date    Acute exacerbation of chronic obstructive pulmonary disease (COPD)     COPD (chronic obstructive pulmonary disease)     Deep vein thrombosis of bilateral lower extremities 07/24/2019    3/2013    History of pneumonia 06/2012    community acquired pneumonia and right pleural effusion;hospitalized at [facility]    History of pulmonary embolism 03/2013    bilateral PE    Hypertension 2001    Insomnia     on Ambien 5mg at     Lobular breast cancer, left 11/2011    Stage IA left upper lobular breast cancer    Malignant neoplasm of left breast in female, estrogen receptor positive 07/18/2019    Osteopenia 2012    Parainfluenza infection     Parotid duct obstruction     Severe pulmonary hypertension 03/03/2023    Stage 3a chronic kidney disease 07/24/2019    TIA (transient ischemic attack) 10/25/2022       Past Surgical History:   Procedure Laterality Date    CARDIAC CATHETERIZATION N/A 3/3/2023    Procedure: Left and Right Heart Cath with Coronary  Angiography;  Surgeon: Richardson Willis MD;  Location: [facility] INVASIVE LOCATION;  Service: Cardiovascular;  Laterality: N/A;    CATARACT EXTRACTION      IVC FILTER RETRIEVAL  2014    IVC filter placement by Dr. Card    MAMMO STEREOTACTIC BREAST BX SURGICAL ADD UNI Left 2011    invasive lobular carcinoma-Dr. Cande Daniel    MASTECTOMY, PARTIAL Left 2011    and left axillary sentinel lymph node biopsy by Dr. Uriostegui    TUBAL ABDOMINAL LIGATION         Family History   Problem Relation Age of Onset    Lung cancer Brother        Social History     Tobacco Use    Smoking status: Former     Current packs/day: 0.00     Types: Cigarettes     Quit date:      Years since quittin.3     Passive exposure: Past    Smokeless tobacco: Never    Tobacco comments:     smoked 6 cigarettes a day from 13 years of age to 56 years of age when she quit in    Vaping Use    Vaping status: Never Used   Substance Use Topics    Alcohol use: Not Currently    Drug use: No        Home meds:  Prior to Admission medications    Medication Sig Start Date End Date Taking? Authorizing Provider   apixaban (ELIQUIS) 5 MG tablet tablet Take 1 tablet by mouth Every 12 (Twelve) Hours. Indications: Other - full anticoagulation, history of DVT/PE 10/27/22  Yes Shyalee Mcdaniels MD   budesonide-formoterol (SYMBICORT) 80-4.5 MCG/ACT inhaler Inhale 2 puffs 2 (Two) Times a Day.   Yes Jaylyn Golden MD   carvedilol (COREG) 12.5 MG tablet TAKE 1 TABLET BY MOUTH TWICE DAILY WITH MEALS 23  Yes Richardson Willis MD   Cholecalciferol (Vitamin D3) 50 MCG (2000 UT) capsule Take 1 capsule by mouth Daily.   Yes Jaylyn Golden MD   Folbic 2.5-25-2 MG tablet tablet Take 1 tablet by mouth Daily. 24  Yes Jaylyn Golden MD   furosemide (LASIX) 20 MG tablet Take 1 tablet by mouth Daily. 24  Yes Mansi Becerril APRN   levothyroxine (SYNTHROID, LEVOTHROID) 50 MCG tablet Take 1 tablet by mouth Daily.   Yes  "ProviderJaylyn MD   O2 (OXYGEN) Inhale 2 L/min Continuous. 2/26/24  Yes Jaylyn Golden MD   potassium chloride (K-DUR,KLOR-CON) 10 MEQ CR tablet Take 1 tablet by mouth Daily. 2/24/23  Yes Richardson Willis MD   sildenafil (REVATIO) 20 MG tablet Take 1 tablet by mouth Every 8 (Eight) Hours. 5/11/23  Yes Mansi Becerril APRN   allopurinol (ZYLOPRIM) 100 MG tablet Take 1 tablet by mouth Daily.    ProviderJaylyn MD       Allergies:  Patient has no known allergies.    Scheduled Meds:apixaban, 2.5 mg, Oral, Q12H  bumetanide, 1 mg, Oral, Daily  carvedilol, 6.25 mg, Oral, BID With Meals  febuxostat, 40 mg, Oral, Daily  ipratropium-albuterol, 3 mL, Nebulization, BID - RT  levothyroxine, 75 mcg, Oral, Q AM  midodrine, 5 mg, Oral, TID AC  pantoprazole, 40 mg, Oral, BID AC  potassium chloride, 20 mEq, Oral, Daily  sildenafil, 20 mg, Oral, TID  sodium chloride, 10 mL, Intravenous, Q12H      Continuous Infusions:   PRN Meds:.  acetaminophen    senna-docusate sodium **AND** polyethylene glycol **AND** bisacodyl **AND** bisacodyl    Calcium Replacement - Follow Nurse / BPA Driven Protocol    loperamide    Magnesium Standard Dose Replacement - Follow Nurse / BPA Driven Protocol    ondansetron ODT **OR** ondansetron    Phosphorus Replacement - Follow Nurse / BPA Driven Protocol    Potassium Replacement - Follow Nurse / BPA Driven Protocol    promethazine    promethazine    sodium chloride    sodium chloride      OBJECTIVE    Vital Signs  Vitals:    05/06/24 0500 05/06/24 0550 05/06/24 0640 05/06/24 0644   BP:  115/54     BP Location:  Right arm     Patient Position:  Lying     Pulse:  78 77 79   Resp:  16 16 16   Temp:  98.8 °F (37.1 °C)     TempSrc:  Oral     SpO2:  97% 95% 96%   Weight: 67 kg (147 lb 11.3 oz)      Height:           Flowsheet Rows      Flowsheet Row First Filed Value   Admission Height 162.6 cm (64\") Documented at 04/27/2024 0711   Admission Weight 74 kg (163 lb 2.3 oz) Documented at 04/25/2024 " 2107              Intake/Output Summary (Last 24 hours) at 5/6/2024 0653  Last data filed at 5/5/2024 2144  Gross per 24 hour   Intake 680 ml   Output --   Net 680 ml          Telemetry: Atrial fibrillation with controlled heart rate    Physical Exam:  The patient is alert, oriented and in no distress.  Vital signs as noted above.  Head and neck revealed no carotid bruits or jugular venous distention.  No thyromegaly or lymphadenopathy is present  Lungs clear.  No wheezing.  Breath sounds are normal bilaterally.  Heart normal first and second heart sounds.  No murmur. No precordial rub is present.  No gallop is present.  Abdomen soft and nontender.  No organomegaly is present.  Extremities with good peripheral pulses without any pedal edema.  Skin warm and dry.  Musculoskeletal system is grossly normal.  CNS grossly normal.       Results Review:  I have personally reviewed the results from the time of this admission to 5/6/2024 06:53 EDT and agree with these findings:  []  Laboratory  []  Microbiology  []  Radiology  []  EKG/Telemetry   []  Cardiology/Vascular   []  Pathology  []  Old records  []  Other:    Most notable findings include:    Lab Results (last 24 hours)       ** No results found for the last 24 hours. **            Imaging Results (Last 24 Hours)       ** No results found for the last 24 hours. **            LAB RESULTS (LAST 7 DAYS)    CBC  Results from last 7 days   Lab Units 05/04/24  0032 05/03/24  0342 05/02/24  0536 05/01/24  0239 04/30/24  0038   WBC 10*3/mm3 10.43 10.86* 11.74* 10.11 9.09   RBC 10*6/mm3 3.97 4.10 4.24 4.11 4.18   HEMOGLOBIN g/dL 10.3* 10.7* 11.1* 10.6* 10.7*   HEMATOCRIT % 34.7 35.8 37.4 35.9 35.8   MCV fL 87.4 87.3 88.2 87.3 85.6   PLATELETS 10*3/mm3 172 188 213 223 207       BMP  Results from last 7 days   Lab Units 05/05/24  0105 05/04/24  0032 05/03/24  0342 05/02/24  1805 05/02/24  0536 05/01/24  0239 04/30/24  0038   SODIUM mmol/L 146* 144 144  --  143 145 144    POTASSIUM mmol/L 4.6 4.0 4.0  --  4.3 4.0 3.6   CHLORIDE mmol/L 110* 108* 107  --  107 107 105   CO2 mmol/L 26.0 26.0 25.0  --  26.0 25.0 28.0   BUN mg/dL 42* 45* 50*  --  52* 48* 49*   CREATININE mg/dL 1.73* 1.77* 1.99*  --  1.99* 2.27* 2.21*   GLUCOSE mg/dL 106* 97 99  --  101* 160* 103*   MAGNESIUM mg/dL  --  1.9 2.0  --  2.3 2.1 1.9   PHOSPHORUS mg/dL 2.9 2.9 3.5 3.1 2.1* 2.8 3.3       CMP   Results from last 7 days   Lab Units 05/05/24  0105 05/04/24  0032 05/03/24  0342 05/02/24  0536 05/01/24  0239 04/30/24  0038   SODIUM mmol/L 146* 144 144 143 145 144   POTASSIUM mmol/L 4.6 4.0 4.0 4.3 4.0 3.6   CHLORIDE mmol/L 110* 108* 107 107 107 105   CO2 mmol/L 26.0 26.0 25.0 26.0 25.0 28.0   BUN mg/dL 42* 45* 50* 52* 48* 49*   CREATININE mg/dL 1.73* 1.77* 1.99* 1.99* 2.27* 2.21*   GLUCOSE mg/dL 106* 97 99 101* 160* 103*   ALBUMIN g/dL 3.2* 3.2*  --   --   --   --    BILIRUBIN mg/dL  --  1.6*  --   --   --   --    ALK PHOS U/L  --  62  --   --   --   --    AST (SGOT) U/L  --  20  --   --   --   --    ALT (SGPT) U/L  --  22  --   --   --   --        BNP        TROPONIN          CoAg        Creatinine Clearance  Estimated Creatinine Clearance: 23.2 mL/min (A) (by C-G formula based on SCr of 1.73 mg/dL (H)).    ABG        Radiology  No radiology results for the last day      EKG  I personally viewed and interpreted the patient's EKG/Telemetry data:  ECG 12 Lead Rhythm Change   Final Result   HEART RATE= 72  bpm   RR Interval= 830  ms   MN Interval=   ms   P Horizontal Axis=   deg   P Front Axis=   deg   QRSD Interval= 91  ms   QT Interval= 379  ms   QTcB= 416  ms   QRS Axis= 118  deg   T Wave Axis=   deg   - ABNORMAL ECG -   Atrial fibrillation   Right axis deviation   Low voltage, precordial leads   When compared with ECG of 21-Mar-2024 14:18:17,   No significant change   Electronically Signed By: Richardson Willis ([facility]) 29-Apr-2024 23:17:34   Date and Time of Study: 2024-04-28 07:50:23      Telemetry Scan   Final Result       Telemetry Scan   Final Result      Telemetry Scan   Final Result      Telemetry Scan   Final Result      Telemetry Scan   Final Result      Telemetry Scan   Final Result      Telemetry Scan   Final Result      Telemetry Scan   Final Result      Telemetry Scan   Final Result      Telemetry Scan   Final Result      Telemetry Scan   Final Result      Telemetry Scan   Final Result      Telemetry Scan   Final Result      Telemetry Scan   Final Result      Telemetry Scan   Final Result      Telemetry Scan   Final Result      Telemetry Scan   Final Result      Telemetry Scan   Final Result      Telemetry Scan   Final Result      Telemetry Scan   Final Result      Telemetry Scan   Final Result      Telemetry Scan   Final Result      Telemetry Scan   Final Result      Telemetry Scan   Final Result      Telemetry Scan   Final Result      Telemetry Scan   Final Result      Telemetry Scan   Final Result      Telemetry Scan   Final Result      Telemetry Scan   Final Result      Telemetry Scan   Final Result      Telemetry Scan   Final Result      Telemetry Scan   Final Result      Telemetry Scan   Final Result      Telemetry Scan   Final Result      Telemetry Scan   Final Result      Telemetry Scan   Final Result      Telemetry Scan   Final Result      Telemetry Scan   Final Result      Telemetry Scan   Final Result      Telemetry Scan   Final Result      Telemetry Scan   Final Result      Telemetry Scan   Final Result      Telemetry Scan   Final Result      Telemetry Scan   Final Result      Telemetry Scan   Final Result      Telemetry Scan   Final Result            Echocardiogram:    Results for orders placed in visit on 03/18/24    Adult Transthoracic Echo Limited W/ Cont if Necessary Per Protocol    Interpretation Summary    Left ventricular ejection fraction appears to be 61 - 65%.    The right ventricular cavity is dilated.    There is a small (<1cm) pericardial effusion adjacent to the left ventricle. There is no  evidence of cardiac tamponade.    IVC is 2.1 cm.        Stress Test:  Results for orders placed in visit on 09/13/22    Stress Test With Myocardial Perfusion One Day    Interpretation Summary    Left ventricular ejection fraction is hyperdynamic (Calculated EF > 70%). .    Myocardial perfusion imaging indicates a normal myocardial perfusion study with no evidence of ischemia.    Impressions are consistent with a low risk study.    Findings consistent with a normal ECG stress test.         Cardiac Catheterization:  Results for orders placed during the hospital encounter of 03/03/23    Cardiac Catheterization/Vascular Study    Conclusion  OPERATORS  Richardson Willis M.D. (Attending Cardiologist)      PROCEDURE PERFORMED  Ultrasound guided vascular access  Right heart catheterization  Coronary Angiogram  Left Heart Catheterization 98408  Moderate Sedation    INDICATIONS FOR PROCEDURE  83-year-old woman with multiple cardiovascular risk factors, abnormal stress test presented with worsening shortness of breath.  After discussing the risk and benefit of the procedure she was brought in for elective right and left heart cath.    PROCEDURE IN DETAIL  Informed consent was obtained from the patient after explaining the risks, benefits, and alternative options of the procedure. After obtaining informed consent, the patient was brought to the cath lab and was prepped in a sterile fashion. Lidocaine 2% was used for local anesthesia into the right femoral venous access site. Right femoral vein was accessed using the micropuncture needle under ultrasound guidance and micropuncture wire advanced under flouroscopy. A 7 Algerian vascular sheath was put into place percutaneously over guide-wire. Guide wires were removed. A 6Fr swan sheryl catheter was advanced to wedge position. RA, RV and PA and wedge pressures were recorded.  PA sat and arterial sats recorded.  The patient tolerated the procedure well without any  complications.    Lidocaine 2% was used for local anesthesia into the right femoral arterial access site. The right femoral artery was accessed with a micropuncture needle via modified Seldinger technique under ultrasound guidance. A 6F was inserted successfully.  Afterwards, 6F JR4 and JL4 diagnostic catheters were advanced over a wire into the ascending aorta and were used to engage the ostia of the left main and RCA respectively. JR4 used to cross the AV and obtain LV pressures and gradient across the AV measured via pullback technique. Images of the right and left coronary systems were obtained. All the catheters were exchanged over a wire and subsequently removed. Angiogram of the femoral access site was obtained and did not show complications. The patient tolerated the procedure well without any complications. The pictures were reviewed at the end of the procedure. A Mynx closure device was applied.    HEMODYNAMICS    RHC  RA 6/5, 4 mmHg  RV 74/3, 5 mmHg  PA 71/25, 44 mmHg  PCW 12 mmHg  AO Sat 92%  PA Sat 78%    Jose CO: 7.89 L/min    Jose CI: 4.69 L/min/m²    LHC  LV: 134/3, 20 mmHg  AO: 131/56, 87 mmHg  No significant gradient across the aortic valve during pullback of JR4 catheter.    FINDINGS  Coronary Angiogram  All vessels are heavily calcified  She also has heavily calcified peripheral arterial disease.  Right dominant circulation    Left main: Left main is a large caliber vessel which gives rise to the Left Anterior Descending and the Left circumflex.  Left main is angiographically free from any significant disease.    Left Anterior Descending Artery: LAD is a heavily calcified medium caliber vessel which gives rise to diagonals.  The vessel is highly tortuous and has 50 to 60% mid vessel stenosis best seen in Mongolian cranial view.    Left Circumflex: Heavily calcified vessel with 50% proximal segment stenosis.    Right Coronary Artery: The RCA is a large caliber vessel which is heavily calcified and  tortuous.  It has diffuse luminal irregularities and 30 to 40% stenosis in the midsegment around the bend.    ESTIMATED BLOOD LOSS:  10 ml    COMPLICATIONS:  None    PROCEDURE DATA:  Sedation Time: 25 minutes    IMPRESSIONS  Heavily calcified, tortuous nonobstructive coronary disease  Severe pulmonary hypertension with PA systolic pressure in the 70s  Mean PA pressure 44 mmHg    RECOMMENDATIONS  -Continue aggressive medical management of CAD  -Referral to pulmonology for treatment of pulmonary hypertension         Other:         ASSESSMENT & PLAN:    Principal Problem:    Dyspnea  Active Problems:    Moderate malnutrition    COPD/Chronic respiratory failure/shortness of breath  Severe pulmonary hypertension  HFpEF  Back at baseline now requiring 3 L of oxygen via nasal cannula.  Has known pulmonary hypertension with mean PA pressure of 44 and peak PA pressure of 71 mmHg with normal wedge pressure  She is on sildenafil  Echocardiogram shows preserved LV function with mildly elevated RVSP.  Small to moderate pericardial effusion: No signs of tamponade  proBNP of 9200 compared to 10,000 last month.  Continue low-dose Bumex  We will also connect her with heart failure clinic for intermittent IV diuresis     Atrial fibrillation  She has paroxysmal atrial fibrillation  [id number] score is 6  Continue Eliquis for atrial fibrillation as well as for history of DVT/PE  Continue Coreg for heart rate control  H&H 10.3/34.7     History of venous thrombosis and embolism  Low-dose Eliquis due to renal dysfunction and age.  She also has an IVC filter in place.     Primary hypertension, chronic  Blood pressure has improved and is currently normal  Continue Coreg for blood pressure control  Amlodipine can be added if better blood pressure control is desired.  Echo shows preserved LV function, reduced RV function and mildly elevated RVSP.  Pericardial effusion is not significant with no signs of tamponade.  Continue low-dose Bumex      Coronary artery disease  Continue beta-blocker  Already on Eliquis low-dose  She will be started on a statin  Previous cardiac catheterization showed heavily calcified coronaries but nonobstructive.  No chest pain and stable from cardiac standpoint.     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  Continue high intensity statin and anticoagulation with Eliquis 2.5 mg p.o. twice daily due to renal dysfunction and advanced age.     Stage IIIb chronic kidney disease  Creatinine has improved 1.73, GFR is 29  Continue low-dose Bumex  Nephrology is also following     Recent altered mental status  Previously precipitated by mastitis/PICC line infection  Previous CT head and MRI of the brain unremarkable with no acute findings  Mental status during this admission has been at baseline.      Richardson Willis MD  05/06/24  06:53 EDT

## 2024-05-06 NOTE — PROGRESS NOTES
"NEPHROLOGY PROGRESS NOTE------[facility]    [facility]  [phone]  Luke Fleming MD      Patient Care Team:  Paul Granados MD as PCP - General (Family Medicine)  Richardson Willis MD as Cardiologist (Cardiology)  Rafy Rodriguez MD as Consulting Physician (Hematology and Oncology)  Christianne Phillips RN as Licensed Practical Nurse  Salome Fleming MD as Consulting Physician (Nephrology)      Provider:  Luke Fleming MD  Patient Name: Gabrielle Lyles  :  1940    SUBJECTIVE:    F/U ARF/JULIAN/CRF/CKD    Up at side of bed. No real SOB, CP, n/v/d. No dysuria. Appetite ok  .     Medication:  apixaban, 2.5 mg, Oral, Q12H  bumetanide, 1 mg, Oral, Daily  carvedilol, 6.25 mg, Oral, BID With Meals  febuxostat, 40 mg, Oral, Daily  ipratropium-albuterol, 3 mL, Nebulization, BID - RT  levothyroxine, 75 mcg, Oral, Q AM  midodrine, 5 mg, Oral, TID AC  pantoprazole, 40 mg, Oral, BID AC  potassium chloride, 20 mEq, Oral, Daily  sildenafil, 20 mg, Oral, TID  sodium chloride, 10 mL, Intravenous, Q12H           OBJECTIVE    Vital Sign Min/Max for last 24 hours  Temp  Min: 97.4 °F (36.3 °C)  Max: 98.8 °F (37.1 °C)   BP  Min: 87/41  Max: 121/68   Pulse  Min: 71  Max: 83   Resp  Min: 14  Max: 22   SpO2  Min: 95 %  Max: 100 %   No data recorded   Weight  Min: 67 kg (147 lb 11.3 oz)  Max: 67 kg (147 lb 11.3 oz)     Flowsheet Rows      Flowsheet Row First Filed Value   Admission Height 162.6 cm (64\") Documented at 2024 0711   Admission Weight 74 kg (163 lb 2.3 oz) Documented at 2024 6586            I/O this shift:  In: 240 [P.O.:240]  Out: -   I/O last 3 completed shifts:  In: 1160 [P.O.:1160]  Out: -     Physical Exam:  General Appearance: alert, appears stated age and cooperative  Head: normocephalic, without obvious abnormality and atraumatic  Eyes: conjunctivae and sclerae normal and no icterus  Neck: supple and NO GROSS JVD " "  Lungs: Diminished breath sounds  Heart: IRREG IRREG +TAYE  Chest Wall: no abnormalities observed  Abdomen: normal bowel sounds and soft, nontender  Extremities: moves extremities well,1+ BILAT PRETIBIAL EDEMA, no cyanosis  Skin: no bleeding, bruising or rash  Neurologic: Alert, and oriented. No focal deficits    Labs:    WBC WBC   Date Value Ref Range Status   05/04/2024 10.43 3.40 - 10.80 10*3/mm3 Final      HGB Hemoglobin   Date Value Ref Range Status   05/04/2024 10.3 (L) 12.0 - 15.9 g/dL Final      HCT Hematocrit   Date Value Ref Range Status   05/04/2024 34.7 34.0 - 46.6 % Final      Platelets No results found for: \"LABPLAT\"   MCV MCV   Date Value Ref Range Status   05/04/2024 87.4 79.0 - 97.0 fL Final          Sodium Sodium   Date Value Ref Range Status   05/05/2024 146 (H) 136 - 145 mmol/L Final   05/04/2024 144 136 - 145 mmol/L Final      Potassium Potassium   Date Value Ref Range Status   05/05/2024 4.6 3.5 - 5.2 mmol/L Final   05/04/2024 4.0 3.5 - 5.2 mmol/L Final      Chloride Chloride   Date Value Ref Range Status   05/05/2024 110 (H) 98 - 107 mmol/L Final   05/04/2024 108 (H) 98 - 107 mmol/L Final      CO2 CO2   Date Value Ref Range Status   05/05/2024 26.0 22.0 - 29.0 mmol/L Final   05/04/2024 26.0 22.0 - 29.0 mmol/L Final      BUN BUN   Date Value Ref Range Status   05/05/2024 42 (H) 8 - 23 mg/dL Final   05/04/2024 45 (H) 8 - 23 mg/dL Final      Creatinine Creatinine   Date Value Ref Range Status   05/05/2024 1.73 (H) 0.57 - 1.00 mg/dL Final   05/04/2024 1.77 (H) 0.57 - 1.00 mg/dL Final      Calcium Calcium   Date Value Ref Range Status   05/05/2024 8.5 (L) 8.6 - 10.5 mg/dL Final   05/04/2024 8.9 8.6 - 10.5 mg/dL Final      PO4 No components found for: \"PO4\"   Albumin Albumin   Date Value Ref Range Status   05/05/2024 3.2 (L) 3.5 - 5.2 g/dL Final   05/04/2024 3.2 (L) 3.5 - 5.2 g/dL Final        Magnesium Magnesium   Date Value Ref Range Status   05/04/2024 1.9 1.6 - 2.4 mg/dL Final      Uric Acid No " "components found for: \"URIC ACID\"     Imaging Results (Last 72 Hours)       ** No results found for the last 72 hours. **            Results for orders placed during the hospital encounter of 04/25/24    XR Chest 1 View    Narrative  XR CHEST 1 VW    Date of Exam: 4/28/2024 9:45 AM EDT    Indication: CHF    Comparison: 4/25/2020    Findings:  Heart size and pulmonary vasculature are stable. No gross pulmonary edema is demonstrated. There is strandy opacity in the left lung base.    Impression  Impression:    1. Cardiomegaly with no evidence of pulmonary edema  2. Left basilar atelectasis      Electronically Signed: Darnell Kennedy  4/28/2024 11:33 AM EDT  Workstation ID: [id number]      XR Chest 1 View    Narrative  XR CHEST 1 VW    Date of Exam: 4/25/2024 3:38 PM EDT    Indication: SOB    Comparison: 3/21/2024.    Findings:  There is stable mild cardiomegaly. There is pulmonary vascular congestion with increasing interstitial prominence bilaterally. There may be a small right effusion.    Impression  Impression:  Findings suggest mild CHF superimposed over chronic lung disease.      Electronically Signed: Mary Ivan MD  4/25/2024 3:52 PM EDT  Workstation ID: [id number]      Results for orders placed during the hospital encounter of 03/21/24    XR Chest 1 View    Narrative  XR CHEST 1 VW    Date of Exam: 3/21/2024 1:20 PM EDT    Indication: Dyspnea    Comparison: 2/9/2024.    Findings:  There is stable mild cardiomegaly. Lung fields appear clear of acute infiltrates or effusions. Mild diffuse bilateral reticular lung thickening is stable. Right PICC line has been removed.    Impression  Impression:  Chronic findings. No acute process.      Electronically Signed: Mary Ivan MD  3/21/2024 1:48 PM EDT  Workstation ID: [id number]      Results for orders placed during the hospital encounter of 02/05/24    Duplex Venous Upper Extremity - Right CAR    Interpretation Summary    Normal right upper extremity venous duplex " scan.        ASSESSMENT / PLAN      Dyspnea    Moderate malnutrition    ARF/JULIAN/CRF/CKD------Nonoliguric. +ARF/JULIAN on top of CRF/CKD STG 3A   with a baseline serum Creatinine of about 1.3-1.4.  CRF/CKD STG 3A is secondary to HTN NS. +ARF/JULIAN is secondary to prerenal/hemodynamic fluctuation from decompensated CHF (Cardiorenal). Hold Bumex today given poor po intake and nausea/vomiting. Avoid hypotension.   It appears that in the long run we may have to accept a higher baseline serum creatinine in order to keep euvolemic. No NSAIDs or IV dye. Dose meds for CrCl less than 10 cc/min. Azotemia up from steroid exposure.     2. ANEMIA OF CKD------S/P IV iron for ROME. Follow for EPO need     3. HTN WITH CKD-----BP low. Back down Coreg. On Midodrine. Avoid hypotension. No ACE/ARB/DRI for now     4. OA/DJD/HYPERURICEMIA------No NSAIDs. Added Uloric     5. VOLUME OVERLOAD---Clinically better. Back down Bumex to 0.5 mg daily and follow     6. HYPERGLYCEMIA------Glucometers, SSI     7.  PULMONARY HTN--------Per , Pulmonary     8. HYPOKALEMIA-------Replaced     9. CAD-----per , Cardiology    10. DIARRHEA-----C.Diff negative.      11. DECONDITIONING------PT/OT/Rehab    12. PULMONARY HTN------On Revatio    13. GERD------On PPI. Benefits outweigh risks despite CKD       Luke Fleming MD  [facility]  [phone]  05/06/24  09:04 EDT

## 2024-05-06 NOTE — THERAPY TREATMENT NOTE
"Subjective: Pt agreeable to therapeutic plan of care. Pt would like to make it clear that she does not drive, and daughter provides transportation to appointments.     Objective:     Bed mobility - Modified-Independent positioned self with bed rails, but has been getting self to/from [facility] ad maribell  Transfers - CGA and with rolling walker  Ambulation - 100 feet SBA, CGA, and with rolling walker Pt ambulated in room approx 100 feet, with Rwx pt provided CGA during turns and assistance with line management. Pt with no over LOB this date and no c/o dizziness. Appears improved from last PT session.    Vitals: WNL on 3L O2 (home usage)    Pain: 0 VAS   Location: NA  Intervention for pain: N/A    Education: Provided education on the importance of mobility in the acute care setting, Verbal/Tactile Cues, and Gait Training    Assessment: Gabrielle Lyles presents with functional mobility impairments which indicate the need for skilled intervention. Tolerating session today without incident. Pt tolerated treatment well this date, performing bed mobility, tranfsers and ambulation using Rwx with CGA/SBA. PT provided cueing for safe line management when navigating home environment with O2 line. Pt demonstrates good understanding. Pt ambulated approx 100 feet slowly but safely with Rwx. Pt with c/o SOA but O2 maintained above 90% on 3L. Pt states if she were to walk that far at home she likely would have been SOA as well. PT answered pt questions to satisfaction, and discussed d/c option of returning home with HHPT. Pt appears safe for that at this time. Will continue to follow and progress as tolerated.     Plan/Recommendations:   If medically appropriate, Low Intensity Therapy recommended post-acute care - This is recommended as therapy feels this patient would require 2-3 visits per week. OP or HH would be the best option depending on patient's home bound status. Consider, if the patient has other  \"skilled\" needs such as wounds, " "IV antibiotics, etc. Combined with \"low intensity\" could also equate to a SNF. If patient is medically complex, consider LTAC. Pt requires no DME at discharge.     Pt desires Home with Home Health and Home with family assist at discharge. Pt cooperative; agreeable to therapeutic recommendations and plan of care.         Basic Mobility 6-click:  Rollin = Total, A lot = 2, A little = 3; 4 = None  Supine>Sit:   1 = Total, A lot = 2, A little = 3; 4 = None   Sit>Stand with arms:  1 = Total, A lot = 2, A little = 3; 4 = None  Bed>Chair:   1 = Total, A lot = 2, A little = 3; 4 = None  Ambulate in room:  1 = Total, A lot = 2, A little = 3; 4 = None  3-5 Steps with railin = Total, A lot = 2, A little = 3; 4 = None  Score: 22    Modified [place]: N/A = No pre-op stroke/TIA    Post-Tx Position: Up in Chair and Call light and personal items within reach Did not set falls alarm as pt has been transferring self to bedside commode. Educated that if she feels unsafe or needs assistance to call nursing staff.   PPE: gloves    "

## 2024-05-06 NOTE — PROGRESS NOTES
LOS: 11 days   Patient Care Team:  Paul Granados MD as PCP - General (Family Medicine)  Richardson Willis MD as Cardiologist (Cardiology)  Rafy Rodriguez MD as Consulting Physician (Hematology and Oncology)  Christianne Phillips, AMARILIS as Licensed Practical Nurse  Salome Fleming MD as Consulting Physician (Nephrology)    Subjective:  better    Objective:   Afebrile/weak      Review of Systems:   Review of Systems   Constitutional:  Positive for activity change.   Respiratory:  Positive for shortness of breath.    Neurological:  Positive for weakness.       Vital Signs  Temp:  [97.6 °F (36.4 °C)-98.8 °F (37.1 °C)] 98.3 °F (36.8 °C)  Heart Rate:  [73-83] 79  Resp:  [14-18] 18  BP: ()/(41-62) 123/42    Physical Exam:  Physical Exam  Vitals and nursing note reviewed.   Cardiovascular:      Rate and Rhythm: Normal rate.   Skin:     General: Skin is warm.          Radiology:  No radiology results for the last 7 days       Results Review:     I reviewed the patient's new clinical results.  I reviewed the patient's new imaging results and agree with the interpretation.    Medication Review:   Scheduled Meds:apixaban, 2.5 mg, Oral, Q12H  [START ON 5/7/2024] bumetanide, 0.5 mg, Oral, Daily  carvedilol, 3.125 mg, Oral, BID With Meals  febuxostat, 40 mg, Oral, Daily  ipratropium-albuterol, 3 mL, Nebulization, BID - RT  levothyroxine, 75 mcg, Oral, Q AM  midodrine, 5 mg, Oral, TID AC  pantoprazole, 40 mg, Oral, BID AC  potassium chloride, 20 mEq, Oral, Daily  sildenafil, 20 mg, Oral, TID  sodium chloride, 10 mL, Intravenous, Q12H      Continuous Infusions:   PRN Meds:.  acetaminophen    senna-docusate sodium **AND** polyethylene glycol **AND** bisacodyl **AND** bisacodyl    Calcium Replacement - Follow Nurse / BPA Driven Protocol    loperamide    Magnesium Standard Dose Replacement - Follow Nurse / BPA Driven Protocol    ondansetron ODT **OR** ondansetron    Phosphorus Replacement - Follow Nurse / BPA Driven  "Protocol    Potassium Replacement - Follow Nurse / BPA Driven Protocol    promethazine    promethazine    sodium chloride    sodium chloride    Labs:    CBC    Results from last 7 days   Lab Units 05/04/24  0032 05/03/24 0342 05/02/24 0536 05/01/24 0239 04/30/24  0038   WBC 10*3/mm3 10.43 10.86* 11.74* 10.11 9.09   HEMOGLOBIN g/dL 10.3* 10.7* 11.1* 10.6* 10.7*   PLATELETS 10*3/mm3 172 188 213 223 207     BMP   Results from last 7 days   Lab Units 05/05/24  0105 05/04/24  0032 05/03/24 0342 05/02/24  1805 05/02/24 0536 05/01/24 0239 04/30/24 0038   SODIUM mmol/L 146* 144 144  --  143 145 144   POTASSIUM mmol/L 4.6 4.0 4.0  --  4.3 4.0 3.6   CHLORIDE mmol/L 110* 108* 107  --  107 107 105   CO2 mmol/L 26.0 26.0 25.0  --  26.0 25.0 28.0   BUN mg/dL 42* 45* 50*  --  52* 48* 49*   CREATININE mg/dL 1.73* 1.77* 1.99*  --  1.99* 2.27* 2.21*   GLUCOSE mg/dL 106* 97 99  --  101* 160* 103*   MAGNESIUM mg/dL  --  1.9 2.0  --  2.3 2.1 1.9   PHOSPHORUS mg/dL 2.9 2.9 3.5 3.1 2.1* 2.8 3.3     Cr Clearance Estimated Creatinine Clearance: 23.2 mL/min (A) (by C-G formula based on SCr of 1.73 mg/dL (H)).  Coag     HbA1C   Lab Results   Component Value Date    HGBA1C 5.8 (H) 10/25/2022     Blood Glucose No results found for: \"POCGLU\"  Infection     CMP   Results from last 7 days   Lab Units 05/05/24  0105 05/04/24  0032 05/03/24 0342 05/02/24 0536 05/01/24 0239 04/30/24  0038   SODIUM mmol/L 146* 144 144 143 145 144   POTASSIUM mmol/L 4.6 4.0 4.0 4.3 4.0 3.6   CHLORIDE mmol/L 110* 108* 107 107 107 105   CO2 mmol/L 26.0 26.0 25.0 26.0 25.0 28.0   BUN mg/dL 42* 45* 50* 52* 48* 49*   CREATININE mg/dL 1.73* 1.77* 1.99* 1.99* 2.27* 2.21*   GLUCOSE mg/dL 106* 97 99 101* 160* 103*   ALBUMIN g/dL 3.2* 3.2*  --   --   --   --    BILIRUBIN mg/dL  --  1.6*  --   --   --   --    ALK PHOS U/L  --  62  --   --   --   --    AST (SGOT) U/L  --  20  --   --   --   --    ALT (SGPT) U/L  --  22  --   --   --   --      UA      Radiology(recent) No " radiology results for the last day   Assessment:    Acute gastroenteritis  Acute exacerbation of diastolic congestive heart failure  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  Atrial fibrillation, paroxysmal  Severe pulmonary hypertension  Chronic atelectasis left lower lobe  Acute renal failure with acute kidney injury superimposed upon chronic disease stage IIIa  Left breast anomaly  Hypertension associated chronic kidney disease stage IIIa  Anemia of chronic kidney disease  Hyperuricemia  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  Cerebrovascular disease status post CVA  Chronic oral anticoagulation therapy  Acquired hypothyroidism  Thrombophilia  Autonomic dysfunction syndrome  History of pulmonary embolism  Hypercoagulable state secondary to atrial fibrillation  [id number] score 6  History of IVC filter  Aneurysmal dilatation of abdominal aorta  Chronic mucopurulent bronchitis  Peripheral polyneuropathy  History of breast cancer  Supplemental oxygen dependency  Chronic hypoxic respiratory failure    Plan:    Supportive care//D/C planning        Paul Granados MD  05/06/24  19:40 EDT

## 2024-05-06 NOTE — PLAN OF CARE
Assessment: Gabrielle Lyles presents with functional mobility impairments which indicate the need for skilled intervention. Tolerating session today without incident. Pt tolerated treatment well this date, performing bed mobility, tranfsers and ambulation using Rwx with CGA/SBA. PT provided cueing for safe line management when navigating home environment with O2 line. Pt demonstrates good understanding. Pt ambulated approx 100 feet slowly but safely with Rwx. Pt with c/o SOA but O2 maintained above 90% on 3L. Pt states if she were to walk that far at home she likely would have been SOA as well. PT answered pt questions to satisfaction, and discussed d/c option of returning home with HHPT. Pt appears safe for that at this time. Will continue to follow and progress as tolerated.

## 2024-05-07 ENCOUNTER — READMISSION MANAGEMENT (OUTPATIENT)
Dept: CALL CENTER | Facility: HOSPITAL | Age: 84
End: 2024-05-07
Payer: MEDICARE

## 2024-05-07 VITALS
BODY MASS INDEX: 25.41 KG/M2 | DIASTOLIC BLOOD PRESSURE: 45 MMHG | HEART RATE: 76 BPM | WEIGHT: 148.81 LBS | SYSTOLIC BLOOD PRESSURE: 98 MMHG | TEMPERATURE: 98.7 F | OXYGEN SATURATION: 90 % | RESPIRATION RATE: 25 BRPM | HEIGHT: 64 IN

## 2024-05-07 PROCEDURE — 99232 SBSQ HOSP IP/OBS MODERATE 35: CPT | Performed by: INTERNAL MEDICINE

## 2024-05-07 PROCEDURE — 94799 UNLISTED PULMONARY SVC/PX: CPT

## 2024-05-07 PROCEDURE — 94664 DEMO&/EVAL PT USE INHALER: CPT

## 2024-05-07 PROCEDURE — 94761 N-INVAS EAR/PLS OXIMETRY MLT: CPT

## 2024-05-07 RX ORDER — PANTOPRAZOLE SODIUM 40 MG/1
40 TABLET, DELAYED RELEASE ORAL
Qty: 60 TABLET | Refills: 0 | Status: SHIPPED | OUTPATIENT
Start: 2024-05-07

## 2024-05-07 RX ORDER — LEVOTHYROXINE SODIUM 0.07 MG/1
75 TABLET ORAL
Qty: 30 TABLET | Refills: 3 | Status: SHIPPED | OUTPATIENT
Start: 2024-05-08

## 2024-05-07 RX ORDER — MIDODRINE HYDROCHLORIDE 5 MG/1
5 TABLET ORAL
Qty: 90 TABLET | Refills: 0 | Status: SHIPPED | OUTPATIENT
Start: 2024-05-07

## 2024-05-07 RX ORDER — BUDESONIDE 0.5 MG/2ML
0.5 INHALANT ORAL
Status: DISCONTINUED | OUTPATIENT
Start: 2024-05-07 | End: 2024-05-07 | Stop reason: HOSPADM

## 2024-05-07 RX ORDER — BUMETANIDE 0.5 MG/1
0.5 TABLET ORAL DAILY
Qty: 30 TABLET | Refills: 3 | Status: SHIPPED | OUTPATIENT
Start: 2024-05-08

## 2024-05-07 RX ORDER — POTASSIUM CHLORIDE 20 MEQ/1
20 TABLET, EXTENDED RELEASE ORAL DAILY
Qty: 30 TABLET | Refills: 3 | Status: SHIPPED | OUTPATIENT
Start: 2024-05-08

## 2024-05-07 RX ORDER — CARVEDILOL 3.12 MG/1
3.12 TABLET ORAL 2 TIMES DAILY WITH MEALS
Qty: 60 TABLET | Refills: 3 | Status: SHIPPED | OUTPATIENT
Start: 2024-05-07

## 2024-05-07 RX ORDER — SILDENAFIL CITRATE 20 MG/1
20 TABLET ORAL 3 TIMES DAILY
Qty: 90 TABLET | Refills: 3 | Status: SHIPPED | OUTPATIENT
Start: 2024-05-07

## 2024-05-07 RX ADMIN — PANTOPRAZOLE SODIUM 40 MG: 40 TABLET, DELAYED RELEASE ORAL at 09:38

## 2024-05-07 RX ADMIN — MIDODRINE HYDROCHLORIDE 5 MG: 5 TABLET ORAL at 12:48

## 2024-05-07 RX ADMIN — FEBUXOSTAT 40 MG: 40 TABLET, FILM COATED ORAL at 09:37

## 2024-05-07 RX ADMIN — SILDENAFIL CITRATE 20 MG: 20 TABLET ORAL at 09:38

## 2024-05-07 RX ADMIN — CARVEDILOL 3.12 MG: 3.12 TABLET, FILM COATED ORAL at 09:39

## 2024-05-07 RX ADMIN — BUMETANIDE 0.5 MG: 1 TABLET ORAL at 09:38

## 2024-05-07 RX ADMIN — POTASSIUM CHLORIDE 20 MEQ: 1500 TABLET, EXTENDED RELEASE ORAL at 09:38

## 2024-05-07 RX ADMIN — APIXABAN 2.5 MG: 2.5 TABLET, FILM COATED ORAL at 09:38

## 2024-05-07 RX ADMIN — IPRATROPIUM BROMIDE AND ALBUTEROL SULFATE 3 ML: .5; 3 SOLUTION RESPIRATORY (INHALATION) at 06:35

## 2024-05-07 RX ADMIN — LEVOTHYROXINE SODIUM 75 MCG: 0.07 TABLET ORAL at 05:05

## 2024-05-07 RX ADMIN — MIDODRINE HYDROCHLORIDE 5 MG: 5 TABLET ORAL at 09:38

## 2024-05-07 RX ADMIN — Medication 10 ML: at 12:48

## 2024-05-07 NOTE — PROGRESS NOTES
Referring Provider: Paul Granados MD    Reason for follow-up: Shortness of breath, elevated proBNP     Patient Care Team:  Paul Granados MD as PCP - General (Family Medicine)  Richardson Willis MD as Cardiologist (Cardiology)  Rafy Rodriguez MD as Consulting Physician (Hematology and Oncology)  Christianne Phillips RN as Licensed Practical Nurse  Salome Fleming MD as Consulting Physician (Nephrology)      SUBJECTIVE  Complains of generalized discomfort. Leg swelling.     ROS  Review of all systems negative except as indicated.    Since I have last seen, the patient has been without any chest discomfort, shortness of breath, palpitations, dizziness or syncope.  Denies having any headache, abdominal pain, nausea, vomiting, diarrhea, constipation, loss of weight or loss of appetite.  Denies having any excessive bruising, hematuria or blood in the stool.  ROS      Personal History:    Past Medical History:   Diagnosis Date    Acute exacerbation of chronic obstructive pulmonary disease (COPD)     COPD (chronic obstructive pulmonary disease)     Deep vein thrombosis of bilateral lower extremities 07/24/2019    3/2013    History of pneumonia 06/2012    community acquired pneumonia and right pleural effusion;hospitalized at [facility]    History of pulmonary embolism 03/2013    bilateral PE    Hypertension 2001    Insomnia     on Ambien 5mg at     Lobular breast cancer, left 11/2011    Stage IA left upper lobular breast cancer    Malignant neoplasm of left breast in female, estrogen receptor positive 07/18/2019    Osteopenia 2012    Parainfluenza infection     Parotid duct obstruction     Severe pulmonary hypertension 03/03/2023    Stage 3a chronic kidney disease 07/24/2019    TIA (transient ischemic attack) 10/25/2022       Past Surgical History:   Procedure Laterality Date    CARDIAC CATHETERIZATION N/A 3/3/2023    Procedure: Left and Right Heart Cath with Coronary Angiography;  Surgeon: Richardson Willis,  MD;  Location: [facility] INVASIVE LOCATION;  Service: Cardiovascular;  Laterality: N/A;    CATARACT EXTRACTION      IVC FILTER RETRIEVAL  2014    IVC filter placement by Dr. Card    MAMMO STEREOTACTIC BREAST BX SURGICAL ADD UNI Left 2011    invasive lobular carcinoma-Dr. Cande Daniel    MASTECTOMY, PARTIAL Left 2011    and left axillary sentinel lymph node biopsy by Dr. Uriostegui    TUBAL ABDOMINAL LIGATION         Family History   Problem Relation Age of Onset    Lung cancer Brother        Social History     Tobacco Use    Smoking status: Former     Current packs/day: 0.00     Types: Cigarettes     Quit date:      Years since quittin.3     Passive exposure: Past    Smokeless tobacco: Never    Tobacco comments:     smoked 6 cigarettes a day from 13 years of age to 56 years of age when she quit in    Vaping Use    Vaping status: Never Used   Substance Use Topics    Alcohol use: Not Currently    Drug use: No        Home meds:  Prior to Admission medications    Medication Sig Start Date End Date Taking? Authorizing Provider   apixaban (ELIQUIS) 5 MG tablet tablet Take 1 tablet by mouth Every 12 (Twelve) Hours. Indications: Other - full anticoagulation, history of DVT/PE 10/27/22  Yes Shaylee Mcdaniels MD   budesonide-formoterol (SYMBICORT) 80-4.5 MCG/ACT inhaler Inhale 2 puffs 2 (Two) Times a Day.   Yes ProviderJaylyn MD   carvedilol (COREG) 12.5 MG tablet TAKE 1 TABLET BY MOUTH TWICE DAILY WITH MEALS 23  Yes Richardson Willis MD   Cholecalciferol (Vitamin D3) 50 MCG (2000 UT) capsule Take 1 capsule by mouth Daily.   Yes ProviderJaylyn MD   Folbic 2.5-25-2 MG tablet tablet Take 1 tablet by mouth Daily. 24  Yes Jaylyn Golden MD   furosemide (LASIX) 20 MG tablet Take 1 tablet by mouth Daily. 24  Yes Mansi Becerril APRN   levothyroxine (SYNTHROID, LEVOTHROID) 50 MCG tablet Take 1 tablet by mouth Daily.   Yes Jaylyn Golden MD   O2 (OXYGEN)  "Inhale 2 L/min Continuous. 2/26/24  Yes Provider, MD Jaylyn   potassium chloride (K-DUR,KLOR-CON) 10 MEQ CR tablet Take 1 tablet by mouth Daily. 2/24/23  Yes Richardson Willis MD   sildenafil (REVATIO) 20 MG tablet Take 1 tablet by mouth Every 8 (Eight) Hours. 5/11/23  Yes Manis Becerril APRN   allopurinol (ZYLOPRIM) 100 MG tablet Take 1 tablet by mouth Daily.    Provider, MD Jaylyn       Allergies:  Patient has no known allergies.    Scheduled Meds:apixaban, 2.5 mg, Oral, Q12H  bumetanide, 0.5 mg, Oral, Daily  carvedilol, 3.125 mg, Oral, BID With Meals  febuxostat, 40 mg, Oral, Daily  ipratropium-albuterol, 3 mL, Nebulization, BID - RT  levothyroxine, 75 mcg, Oral, Q AM  midodrine, 5 mg, Oral, TID AC  pantoprazole, 40 mg, Oral, BID AC  potassium chloride, 20 mEq, Oral, Daily  sildenafil, 20 mg, Oral, TID  sodium chloride, 10 mL, Intravenous, Q12H      Continuous Infusions:   PRN Meds:.  acetaminophen    senna-docusate sodium **AND** polyethylene glycol **AND** bisacodyl **AND** bisacodyl    Calcium Replacement - Follow Nurse / BPA Driven Protocol    loperamide    Magnesium Standard Dose Replacement - Follow Nurse / BPA Driven Protocol    ondansetron ODT **OR** ondansetron    Phosphorus Replacement - Follow Nurse / BPA Driven Protocol    Potassium Replacement - Follow Nurse / BPA Driven Protocol    promethazine    promethazine    sodium chloride    sodium chloride      OBJECTIVE    Vital Signs  Vitals:    05/07/24 0500 05/07/24 0548 05/07/24 0635 05/07/24 0638   BP:  111/62     BP Location:  Right arm     Patient Position:  Lying     Pulse:  77 74 79   Resp:  14 18 18   Temp:  97.9 °F (36.6 °C)     TempSrc:  Oral     SpO2:  94% 98% 100%   Weight: 67.5 kg (148 lb 13 oz)      Height:           Flowsheet Rows      Flowsheet Row First Filed Value   Admission Height 162.6 cm (64\") Documented at 04/27/2024 0711   Admission Weight 74 kg (163 lb 2.3 oz) Documented at 04/25/2024 2107              Intake/Output " Summary (Last 24 hours) at 5/7/2024 0801  Last data filed at 5/7/2024 0548  Gross per 24 hour   Intake 720 ml   Output 250 ml   Net 470 ml          Telemetry: Atrial fibrillation with controlled heart rate    Physical Exam:  The patient is alert, oriented and in no distress.  Vital signs as noted above.  Head and neck revealed no carotid bruits or jugular venous distention.  No thyromegaly or lymphadenopathy is present  Lungs clear.  No wheezing.  Breath sounds are normal bilaterally.  Heart normal first and second heart sounds.  No murmur. No precordial rub is present.  No gallop is present.  Abdomen soft and nontender.  No organomegaly is present.  Extremities with good peripheral pulses without any pedal edema.  Skin warm and dry.  Musculoskeletal system is grossly normal.  CNS grossly normal.       Results Review:  I have personally reviewed the results from the time of this admission to 5/7/2024 08:01 EDT and agree with these findings:  []  Laboratory  []  Microbiology  []  Radiology  []  EKG/Telemetry   []  Cardiology/Vascular   []  Pathology  []  Old records  []  Other:    Most notable findings include:    Lab Results (last 24 hours)       ** No results found for the last 24 hours. **            Imaging Results (Last 24 Hours)       ** No results found for the last 24 hours. **            LAB RESULTS (LAST 7 DAYS)    CBC  Results from last 7 days   Lab Units 05/04/24  0032 05/03/24  0342 05/02/24  0536 05/01/24  0239   WBC 10*3/mm3 10.43 10.86* 11.74* 10.11   RBC 10*6/mm3 3.97 4.10 4.24 4.11   HEMOGLOBIN g/dL 10.3* 10.7* 11.1* 10.6*   HEMATOCRIT % 34.7 35.8 37.4 35.9   MCV fL 87.4 87.3 88.2 87.3   PLATELETS 10*3/mm3 172 188 213 223       BMP  Results from last 7 days   Lab Units 05/05/24  0105 05/04/24  0032 05/03/24  0342 05/02/24  1805 05/02/24  0536 05/01/24  0239   SODIUM mmol/L 146* 144 144  --  143 145   POTASSIUM mmol/L 4.6 4.0 4.0  --  4.3 4.0   CHLORIDE mmol/L 110* 108* 107  --  107 107   CO2 mmol/L  26.0 26.0 25.0  --  26.0 25.0   BUN mg/dL 42* 45* 50*  --  52* 48*   CREATININE mg/dL 1.73* 1.77* 1.99*  --  1.99* 2.27*   GLUCOSE mg/dL 106* 97 99  --  101* 160*   MAGNESIUM mg/dL  --  1.9 2.0  --  2.3 2.1   PHOSPHORUS mg/dL 2.9 2.9 3.5 3.1 2.1* 2.8       CMP   Results from last 7 days   Lab Units 05/05/24  0105 05/04/24  0032 05/03/24  0342 05/02/24  0536 05/01/24  0239   SODIUM mmol/L 146* 144 144 143 145   POTASSIUM mmol/L 4.6 4.0 4.0 4.3 4.0   CHLORIDE mmol/L 110* 108* 107 107 107   CO2 mmol/L 26.0 26.0 25.0 26.0 25.0   BUN mg/dL 42* 45* 50* 52* 48*   CREATININE mg/dL 1.73* 1.77* 1.99* 1.99* 2.27*   GLUCOSE mg/dL 106* 97 99 101* 160*   ALBUMIN g/dL 3.2* 3.2*  --   --   --    BILIRUBIN mg/dL  --  1.6*  --   --   --    ALK PHOS U/L  --  62  --   --   --    AST (SGOT) U/L  --  20  --   --   --    ALT (SGPT) U/L  --  22  --   --   --        BNP        TROPONIN          CoAg        Creatinine Clearance  Estimated Creatinine Clearance: 23.3 mL/min (A) (by C-G formula based on SCr of 1.73 mg/dL (H)).    ABG        Radiology  No radiology results for the last day      EKG  I personally viewed and interpreted the patient's EKG/Telemetry data:  ECG 12 Lead Rhythm Change   Final Result   HEART RATE= 72  bpm   RR Interval= 830  ms   NE Interval=   ms   P Horizontal Axis=   deg   P Front Axis=   deg   QRSD Interval= 91  ms   QT Interval= 379  ms   QTcB= 416  ms   QRS Axis= 118  deg   T Wave Axis=   deg   - ABNORMAL ECG -   Atrial fibrillation   Right axis deviation   Low voltage, precordial leads   When compared with ECG of 21-Mar-2024 14:18:17,   No significant change   Electronically Signed By: Richardson Willis ([facility]) 29-Apr-2024 23:17:34   Date and Time of Study: 2024-04-28 07:50:23      Telemetry Scan   Final Result      Telemetry Scan   Final Result      Telemetry Scan   Final Result      Telemetry Scan   Final Result      Telemetry Scan   Final Result      Telemetry Scan   Final Result      Telemetry Scan   Final Result       Telemetry Scan   Final Result      Telemetry Scan   Final Result      Telemetry Scan   Final Result      Telemetry Scan   Final Result      Telemetry Scan   Final Result      Telemetry Scan   Final Result      Telemetry Scan   Final Result      Telemetry Scan   Final Result      Telemetry Scan   Final Result      Telemetry Scan   Final Result      Telemetry Scan   Final Result      Telemetry Scan   Final Result      Telemetry Scan   Final Result      Telemetry Scan   Final Result      Telemetry Scan   Final Result      Telemetry Scan   Final Result      Telemetry Scan   Final Result      Telemetry Scan   Final Result      Telemetry Scan   Final Result      Telemetry Scan   Final Result      Telemetry Scan   Final Result      Telemetry Scan   Final Result      Telemetry Scan   Final Result      Telemetry Scan   Final Result      Telemetry Scan   Final Result      Telemetry Scan   Final Result      Telemetry Scan   Final Result      Telemetry Scan   Final Result      Telemetry Scan   Final Result      Telemetry Scan   Final Result      Telemetry Scan   Final Result      Telemetry Scan   Final Result      Telemetry Scan   Final Result      Telemetry Scan   Final Result      Telemetry Scan   Final Result      Telemetry Scan   Final Result      Telemetry Scan   Final Result      Telemetry Scan   Final Result      Telemetry Scan   Final Result      Telemetry Scan   Final Result      Telemetry Scan   Final Result      Telemetry Scan   Final Result      Telemetry Scan   Final Result      Telemetry Scan   Final Result      Telemetry Scan   Final Result      Telemetry Scan   Final Result      Telemetry Scan   Final Result      Telemetry Scan   Final Result      Telemetry Scan   Final Result      Telemetry Scan   Final Result      Telemetry Scan   Final Result      Telemetry Scan   Final Result      Telemetry Scan   Final Result            Echocardiogram:    Results for orders placed in visit on 03/18/24    Adult  Transthoracic Echo Limited W/ Cont if Necessary Per Protocol    Interpretation Summary    Left ventricular ejection fraction appears to be 61 - 65%.    The right ventricular cavity is dilated.    There is a small (<1cm) pericardial effusion adjacent to the left ventricle. There is no evidence of cardiac tamponade.    IVC is 2.1 cm.        Stress Test:  Results for orders placed in visit on 09/13/22    Stress Test With Myocardial Perfusion One Day    Interpretation Summary    Left ventricular ejection fraction is hyperdynamic (Calculated EF > 70%). .    Myocardial perfusion imaging indicates a normal myocardial perfusion study with no evidence of ischemia.    Impressions are consistent with a low risk study.    Findings consistent with a normal ECG stress test.         Cardiac Catheterization:  Results for orders placed during the hospital encounter of 03/03/23    Cardiac Catheterization/Vascular Study    Conclusion  OPERATORS  Richardson Willis M.D. (Attending Cardiologist)      PROCEDURE PERFORMED  Ultrasound guided vascular access  Right heart catheterization  Coronary Angiogram  Left Heart Catheterization 65067  Moderate Sedation    INDICATIONS FOR PROCEDURE  83-year-old woman with multiple cardiovascular risk factors, abnormal stress test presented with worsening shortness of breath.  After discussing the risk and benefit of the procedure she was brought in for elective right and left heart cath.    PROCEDURE IN DETAIL  Informed consent was obtained from the patient after explaining the risks, benefits, and alternative options of the procedure. After obtaining informed consent, the patient was brought to the cath lab and was prepped in a sterile fashion. Lidocaine 2% was used for local anesthesia into the right femoral venous access site. Right femoral vein was accessed using the micropuncture needle under ultrasound guidance and micropuncture wire advanced under flouroscopy. A 7 Nepali vascular sheath was put into  place percutaneously over guide-wire. Guide wires were removed. A 6Fr swan sheryl catheter was advanced to wedge position. RA, RV and PA and wedge pressures were recorded.  PA sat and arterial sats recorded.  The patient tolerated the procedure well without any complications.    Lidocaine 2% was used for local anesthesia into the right femoral arterial access site. The right femoral artery was accessed with a micropuncture needle via modified Seldinger technique under ultrasound guidance. A 6F was inserted successfully.  Afterwards, 6F JR4 and JL4 diagnostic catheters were advanced over a wire into the ascending aorta and were used to engage the ostia of the left main and RCA respectively. JR4 used to cross the AV and obtain LV pressures and gradient across the AV measured via pullback technique. Images of the right and left coronary systems were obtained. All the catheters were exchanged over a wire and subsequently removed. Angiogram of the femoral access site was obtained and did not show complications. The patient tolerated the procedure well without any complications. The pictures were reviewed at the end of the procedure. A Mynx closure device was applied.    HEMODYNAMICS    RHC  RA 6/5, 4 mmHg  RV 74/3, 5 mmHg  PA 71/25, 44 mmHg  PCW 12 mmHg  AO Sat 92%  PA Sat 78%    Jose CO: 7.89 L/min    Jose CI: 4.69 L/min/m²    LHC  LV: 134/3, 20 mmHg  AO: 131/56, 87 mmHg  No significant gradient across the aortic valve during pullback of JR4 catheter.    FINDINGS  Coronary Angiogram  All vessels are heavily calcified  She also has heavily calcified peripheral arterial disease.  Right dominant circulation    Left main: Left main is a large caliber vessel which gives rise to the Left Anterior Descending and the Left circumflex.  Left main is angiographically free from any significant disease.    Left Anterior Descending Artery: LAD is a heavily calcified medium caliber vessel which gives rise to diagonals.  The vessel is  highly tortuous and has 50 to 60% mid vessel stenosis best seen in VERÓNICA cranial view.    Left Circumflex: Heavily calcified vessel with 50% proximal segment stenosis.    Right Coronary Artery: The RCA is a large caliber vessel which is heavily calcified and tortuous.  It has diffuse luminal irregularities and 30 to 40% stenosis in the midsegment around the bend.    ESTIMATED BLOOD LOSS:  10 ml    COMPLICATIONS:  None    PROCEDURE DATA:  Sedation Time: 25 minutes    IMPRESSIONS  Heavily calcified, tortuous nonobstructive coronary disease  Severe pulmonary hypertension with PA systolic pressure in the 70s  Mean PA pressure 44 mmHg    RECOMMENDATIONS  -Continue aggressive medical management of CAD  -Referral to pulmonology for treatment of pulmonary hypertension         Other:         ASSESSMENT & PLAN:    Principal Problem:    Dyspnea  Active Problems:    Moderate malnutrition    COPD/Chronic respiratory failure/shortness of breath  Severe pulmonary hypertension  HFpEF  Back at baseline now requiring 3 L of oxygen via nasal cannula.  Has known pulmonary hypertension with mean PA pressure of 44 and peak PA pressure of 71 mmHg with normal wedge pressure  She is on sildenafil  Echocardiogram shows preserved LV function with mildly elevated RVSP.  Small to moderate pericardial effusion: No signs of tamponade  proBNP of 9200 compared to 10,000 last month.  Continue low-dose Bumex daily  We will also connect her with outpatient heart failure clinic for intermittent IV diuresis     Atrial fibrillation  She has paroxysmal atrial fibrillation  [id number] score is 6  Continue Eliquis for atrial fibrillation as well as for history of DVT/PE  Continue Coreg for heart rate control  H&H 10.3/34.7     History of venous thrombosis and embolism  Low-dose Eliquis due to renal dysfunction and age.  She also has an IVC filter in place.     Primary hypertension, chronic  Blood pressure has improved and is currently normal  Continue Coreg  for blood pressure control  Amlodipine can be added if better blood pressure control is desired.  Echo shows preserved LV function, reduced RV function and mildly elevated RVSP.  Pericardial effusion is not significant with no signs of tamponade.  Continue low-dose Bumex     Coronary artery disease  Continue beta-blocker  Already on Eliquis low-dose  She will be started on a statin  Previous cardiac catheterization showed heavily calcified coronaries but nonobstructive.  No chest pain and stable from cardiac standpoint.     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  Continue high intensity statin and anticoagulation with Eliquis 2.5 mg p.o. twice daily due to renal dysfunction and advanced age.     Stage IIIb chronic kidney disease  Creatinine has improved 1.73, GFR is 29  Continue low-dose Bumex  Nephrology is also following     Recent altered mental status  Previously precipitated by mastitis/PICC line infection  Previous CT head and MRI of the brain unremarkable with no acute findings  Mental status during this admission has been at baseline.    Ok to dc from cardiac standpoint.      Richardson Willis MD  05/07/24  08:01 EDT

## 2024-05-07 NOTE — NURSING NOTE
Pt refused morning labs.    Valtrex Counseling: I discussed with the patient the risks of valacyclovir including but not limited to kidney damage, nausea, vomiting and severe allergy.  The patient understands that if the infection seems to be worsening or is not improving, they are to call.

## 2024-05-07 NOTE — PLAN OF CARE
Goal Outcome Evaluation:      Pt being d/c'd home. Daughter at BS.  Has portable oxygen from home for ride home.  NAD noted.

## 2024-05-07 NOTE — PROGRESS NOTES
"NEPHROLOGY PROGRESS NOTE------[facility]    [facility]  [phone]  Luke Fleming MD      Patient Care Team:  Paul Granados MD as PCP - General (Family Medicine)  Richardson Willis MD as Cardiologist (Cardiology)  Rfay Rodriguez MD as Consulting Physician (Hematology and Oncology)  Christianne Phillips RN as Licensed Practical Nurse  Salome Fleming MD as Consulting Physician (Nephrology)      Provider:  Luke Fleming MD  Patient Name: Gabrielle Lyles  :  1940    SUBJECTIVE:    F/U ARF/JULIAN/CRF/CKD    Mild LE edema. No angina. No real SOB. No dysuria. Appetite ok  .     Medication:  apixaban, 2.5 mg, Oral, Q12H  bumetanide, 0.5 mg, Oral, Daily  carvedilol, 3.125 mg, Oral, BID With Meals  febuxostat, 40 mg, Oral, Daily  ipratropium-albuterol, 3 mL, Nebulization, BID - RT  levothyroxine, 75 mcg, Oral, Q AM  midodrine, 5 mg, Oral, TID AC  pantoprazole, 40 mg, Oral, BID AC  potassium chloride, 20 mEq, Oral, Daily  sildenafil, 20 mg, Oral, TID  sodium chloride, 10 mL, Intravenous, Q12H           OBJECTIVE    Vital Sign Min/Max for last 24 hours  Temp  Min: 97.6 °F (36.4 °C)  Max: 98.4 °F (36.9 °C)   BP  Min: 87/41  Max: 123/42   Pulse  Min: 74  Max: 86   Resp  Min: 14  Max: 25   SpO2  Min: 94 %  Max: 100 %   No data recorded   Weight  Min: 67.5 kg (148 lb 13 oz)  Max: 67.5 kg (148 lb 13 oz)     Flowsheet Rows      Flowsheet Row First Filed Value   Admission Height 162.6 cm (64\") Documented at 2024 0711   Admission Weight 74 kg (163 lb 2.3 oz) Documented at 2024 2107            No intake/output data recorded.  I/O last 3 completed shifts:  In: 1200 [P.O.:1200]  Out: 250 [Urine:250]    Physical Exam:  General Appearance: alert, appears stated age and cooperative  Head: normocephalic, without obvious abnormality and atraumatic  Eyes: conjunctivae and sclerae normal and no icterus  Neck: supple and NO GROSS JVD   Lungs: " "Diminished breath sounds  Heart: IRREG IRREG +TAYE  Chest Wall: no abnormalities observed  Abdomen: normal bowel sounds and soft, nontender  Extremities: moves extremities well,1+ BILAT PRETIBIAL EDEMA, no cyanosis  Skin: no bleeding, bruising or rash  Neurologic: Alert, and oriented. No focal deficits    Labs:    WBC No results found for: \"WBC\"     HGB No results found for: \"HGB\"     HCT No results found for: \"HCT\"     Platelets No results found for: \"LABPLAT\"   MCV No results found for: \"MCV\"         Sodium Sodium   Date Value Ref Range Status   05/05/2024 146 (H) 136 - 145 mmol/L Final      Potassium Potassium   Date Value Ref Range Status   05/05/2024 4.6 3.5 - 5.2 mmol/L Final      Chloride Chloride   Date Value Ref Range Status   05/05/2024 110 (H) 98 - 107 mmol/L Final      CO2 CO2   Date Value Ref Range Status   05/05/2024 26.0 22.0 - 29.0 mmol/L Final      BUN BUN   Date Value Ref Range Status   05/05/2024 42 (H) 8 - 23 mg/dL Final      Creatinine Creatinine   Date Value Ref Range Status   05/05/2024 1.73 (H) 0.57 - 1.00 mg/dL Final      Calcium Calcium   Date Value Ref Range Status   05/05/2024 8.5 (L) 8.6 - 10.5 mg/dL Final      PO4 No components found for: \"PO4\"   Albumin Albumin   Date Value Ref Range Status   05/05/2024 3.2 (L) 3.5 - 5.2 g/dL Final        Magnesium No results found for: \"MG\"     Uric Acid No components found for: \"URIC ACID\"     Imaging Results (Last 72 Hours)       ** No results found for the last 72 hours. **            Results for orders placed during the hospital encounter of 04/25/24    XR Chest 1 View    Narrative  XR CHEST 1 VW    Date of Exam: 4/28/2024 9:45 AM EDT    Indication: CHF    Comparison: 4/25/2020    Findings:  Heart size and pulmonary vasculature are stable. No gross pulmonary edema is demonstrated. There is strandy opacity in the left lung base.    Impression  Impression:    1. Cardiomegaly with no evidence of pulmonary edema  2. Left basilar " atelectasis      Electronically Signed: Darnell Kennedy  4/28/2024 11:33 AM EDT  Workstation ID: [id number]      XR Chest 1 View    Narrative  XR CHEST 1 VW    Date of Exam: 4/25/2024 3:38 PM EDT    Indication: SOB    Comparison: 3/21/2024.    Findings:  There is stable mild cardiomegaly. There is pulmonary vascular congestion with increasing interstitial prominence bilaterally. There may be a small right effusion.    Impression  Impression:  Findings suggest mild CHF superimposed over chronic lung disease.      Electronically Signed: Mary Ivan MD  4/25/2024 3:52 PM EDT  Workstation ID: [id number]      Results for orders placed during the hospital encounter of 03/21/24    XR Chest 1 View    Narrative  XR CHEST 1 VW    Date of Exam: 3/21/2024 1:20 PM EDT    Indication: Dyspnea    Comparison: 2/9/2024.    Findings:  There is stable mild cardiomegaly. Lung fields appear clear of acute infiltrates or effusions. Mild diffuse bilateral reticular lung thickening is stable. Right PICC line has been removed.    Impression  Impression:  Chronic findings. No acute process.      Electronically Signed: Mary Ivan MD  3/21/2024 1:48 PM EDT  Workstation ID: [id number]      Results for orders placed during the hospital encounter of 02/05/24    Duplex Venous Upper Extremity - Right CAR    Interpretation Summary    Normal right upper extremity venous duplex scan.        ASSESSMENT / PLAN      Dyspnea    Moderate malnutrition    ARF/JULIAN/CRF/CKD------Nonoliguric. +ARF/JULIAN on top of CRF/CKD STG 3A   with a baseline serum Creatinine of about 1.3-1.4.  CRF/CKD STG 3A is secondary to HTN NS. +ARF/JULIAN is secondary to prerenal/hemodynamic fluctuation from decompensated CHF (Cardiorenal). Hold Bumex today given poor po intake and nausea/vomiting. Avoid hypotension.   It appears that in the long run we may have to accept a higher baseline serum creatinine in order to keep euvolemic. No NSAIDs or IV dye. Dose meds for CrCl less than 10  cc/min. Azotemia up from steroid exposure. This AM labs still pending. Will f/u on results     2. ANEMIA OF CKD------S/P IV iron for ROME. Follow for EPO need     3. HTN WITH CKD-----BP low. Back down Coreg. On Midodrine. Avoid hypotension. No ACE/ARB/DRI for now     4. OA/DJD/HYPERURICEMIA------No NSAIDs. Added Uloric     5. VOLUME OVERLOAD---Clinically better. On bumex po     6. HYPERGLYCEMIA------Glucometers, SSI     7.  EDEMA------Bumex     8. HYPOKALEMIA-------Replaced     9. CAD-----per , Cardiology    10. DIARRHEA-----C.Diff negative.      11. DECONDITIONING------PT/OT/Rehab    12. PULMONARY HTN------On Revatio    13. GERD------On PPI. Benefits outweigh risks despite CKD       Luke Fleming MD  [facility]  [phone]  05/07/24  07:57 EDT

## 2024-05-07 NOTE — DISCHARGE SUMMARY
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  Cerebrovascular disease status post CVA  Chronic oral anticoagulation therapy  Acquired hypothyroidism  Thrombophilia  Autonomic dysfunction syndrome  History of pulmonary embolism  Hypercoagulable state secondary to atrial fibrillation  [id number] score 6  History of IVC filter  Aneurysmal dilatation of abdominal aorta  Chronic mucopurulent bronchitis  Peripheral polyneuropathy  History of breast cancer  Supplemental oxygen dependency  Chronic hypoxic respiratory failure    Presenting Problem/History of Present Illness  Active Hospital Problems    Diagnosis  POA    **Dyspnea [R06.00]  Yes    Moderate malnutrition [E44.0]  Yes      Resolved Hospital Problems   No resolved problems to display.          Hospital Course  Patient is a 84 y.o. female who presented with SOB/PEREZ  She was found to have AEOCHF and AEOCOPD and treated aggressively by both pulmonology and cardiology.  Nephrology was asked to participate secondary to ARF and she slowly improved and was deemed stable for D/C with medications per the D/C reconciliation.  F/U will be with all within one month      Procedures Performed         Consults:   Consults       Date and Time Order Name Status Description    4/25/2024  9:58 PM Inpatient Nephrology Consult Completed     4/25/2024  3:47 PM Inpatient Pulmonology Consult Completed     4/25/2024  3:47 PM Inpatient  Cardiology Consult Completed             Pertinent Test Results:No radiology results for the last 7 days    Imaging Results (Last 7 Days)       ** No results found for the last 168 hours. **                Condition on Discharge:  Fair    Vital Signs  Temp:  [97.4 °F (36.3 °C)-98.4 °F (36.9 °C)] 97.4 °F (36.3 °C)  Heart Rate:  [74-87] 76  Resp:  [14-25] 18  BP: (100-123)/(42-63) 117/63    Physical Exam:     General Appearance:    Alert, cooperative, in no acute distress   Head:    Normocephalic, without obvious abnormality, atraumatic   Eyes:           Conjunctivae and sclerae normal, no   icterus, no pallor, corneas clear, PERRLA   Throat:   No oral lesions, no thrush, oral mucosa moist   Neck:   No adenopathy, supple, trachea midline, no thyromegaly, no   carotid bruit, no JVD   Lungs:     Clear to auscultation,respirations regular, even and                  unlabored    Heart:    Regular rhythm and normal rate, normal S1 and S2, no            murmur, no gallop, no rub, no click   Chest Wall:    No abnormalities observed   Abdomen:     Normal bowel sounds, no masses, no organomegaly, soft        non-tender, non-distended, no guarding, no rebound                tenderness   Rectal:     Deferred   Extremities:   Moves all extremities well, no edema, no cyanosis, no             redness   Pulses:   Pulses palpable and equal bilaterally   Skin:   No bleeding, bruising or rash   Lymph nodes:   No palpable adenopathy   Neurologic:   Cranial nerves 2 - 12 grossly intact, sensation intact, DTR       present and equal bilaterally         Discharge Disposition  Home-Health Care Svc    Discharge Medications     Discharge Medications        New Medications        Instructions Start Date   bumetanide 0.5 MG tablet  Commonly known as: BUMEX   0.5 mg, Oral, Daily   Start Date: May 8, 2024     midodrine 5 MG tablet  Commonly known as: PROAMATINE   5 mg, Oral, 3 Times Daily Before Meals      pantoprazole 40 MG EC tablet  Commonly  known as: PROTONIX   40 mg, Oral, 2 Times Daily Before Meals             Changes to Medications        Instructions Start Date   carvedilol 3.125 MG tablet  Commonly known as: COREG  What changed:   medication strength  how much to take   3.125 mg, Oral, 2 Times Daily With Meals      levothyroxine 75 MCG tablet  Commonly known as: SYNTHROID, LEVOTHROID  What changed:   medication strength  how much to take  when to take this   75 mcg, Oral, Every Early Morning   Start Date: May 8, 2024     potassium chloride 20 MEQ CR tablet  Commonly known as: KLOR-CON M20  What changed:   medication strength  how much to take   20 mEq, Oral, Daily   Start Date: May 8, 2024     sildenafil 20 MG tablet  Commonly known as: REVATIO  What changed: when to take this   20 mg, Oral, 3 Times Daily             Continue These Medications        Instructions Start Date   allopurinol 100 MG tablet  Commonly known as: ZYLOPRIM   100 mg, Oral, Daily      budesonide-formoterol 80-4.5 MCG/ACT inhaler  Commonly known as: SYMBICORT   2 puffs, Inhalation, 2 Times Daily - RT      Eliquis 5 MG tablet tablet  Generic drug: apixaban   5 mg, Oral, Every 12 Hours Scheduled      Folbic 2.5-25-2 MG tablet tablet  Generic drug: folic acid-pyridoxine-cyanocobalamin   1 tablet, Oral, Daily      O2  Commonly known as: OXYGEN   2 L/min, Inhalation, Continuous      Vitamin D3 50 MCG (2000 UT) capsule   2,000 Units, Oral, Daily             Stop These Medications      furosemide 20 MG tablet  Commonly known as: LASIX              Discharge Diet:     Activity at Discharge:     Follow-up Appointments  Future Appointments   Date Time Provider Department Center   5/16/2024  2:00 PM Eugene Crawford APRN [facility] None     Additional Instructions for the Follow-ups that You Need to Schedule       Ambulatory Referral to Home Health (Hospital)   As directed      Face to Face Visit Date: 5/6/2024   Follow-up provider for Plan of Care?: I will be treating the patient on an  ongoing basis.  Please send me the Plan of Care for signature.   Follow-up provider: ZAMZAM DE LEÓN [[id number]]   Reason/Clinical Findings: COPD/CHF/physical deconditioing   Describe mobility limitations that make leaving home difficult: weakness/DJD/poor cardiovascular and pulmonary status   Nursing/Therapeutic Services Requested: Skilled Nursing Physical Therapy   Skilled nursing orders: Medication education COPD management   PT orders: Therapeutic exercise Strengthening   Frequency: 1 Week 1                Test Results Pending at Discharge       Zamzam De León MD  05/07/24  13:20 EDT

## 2024-05-07 NOTE — PROGRESS NOTES
"PULMONARY CRITICAL CARE PROGRESS  NOTE      PATIENT IDENTIFICATION:  Name: Gabrielle Lyles  MRN: [id number]  :  1940     Age: 84 y.o.  Sex: female    DATE OF Note:  2024   Referring Physician: Paul Granados MD                  Subjective:   In bed resting,  on O2, no SOB, no chest or abd pain, no bowel or bladder issues       Objective:  tMax 24 hrs: Temp (24hrs), Av.9 °F (36.6 °C), Min:97.4 °F (36.3 °C), Max:98.4 °F (36.9 °C)      Vitals Ranges:   Temp:  [97.4 °F (36.3 °C)-98.4 °F (36.9 °C)] 97.4 °F (36.3 °C)  Heart Rate:  [74-87] 87  Resp:  [14-25] 18  BP: (100-123)/(42-63) 100/43    Intake and Output Last 3 Shifts:   I/O last 3 completed shifts:  In: 1200 [P.O.:1200]  Out: 250 [Urine:250]    Exam:  /43 (BP Location: Right arm, Patient Position: Lying)   Pulse 87   Temp 97.4 °F (36.3 °C) (Oral)   Resp 18   Ht 162.6 cm (64\")   Wt 67.5 kg (148 lb 13 oz)   SpO2 90%   BMI 25.54 kg/m²     General Appearance: Alert   HEENT:  Normocephalic, without obvious abnormality. Conjunctivae/corneas clear.  Normal external ear canals. Nares normal, no drainage     Neck:  Supple, symmetrical, trachea midline. No JVD.  Lungs /Chest wall:   Bilateral basal rhonchi, respirations unlabored, symmetrical wall movement.     Heart:  Regular rate and rhythm, systolic murmur PMI left sternal border  Abdomen: Soft, nontender, no masses, no organomegaly.    Extremities: Trace edema, no clubbing or cyanosis        Medications:  apixaban, 2.5 mg, Oral, Q12H  bumetanide, 0.5 mg, Oral, Daily  carvedilol, 3.125 mg, Oral, BID With Meals  febuxostat, 40 mg, Oral, Daily  ipratropium-albuterol, 3 mL, Nebulization, BID - RT  levothyroxine, 75 mcg, Oral, Q AM  midodrine, 5 mg, Oral, TID AC  pantoprazole, 40 mg, Oral, BID AC  potassium chloride, 20 mEq, Oral, Daily  sildenafil, 20 mg, Oral, TID  sodium chloride, 10 mL, Intravenous, Q12H        Infusion:        PRN:    acetaminophen    senna-docusate sodium **AND** " polyethylene glycol **AND** bisacodyl **AND** bisacodyl    Calcium Replacement - Follow Nurse / BPA Driven Protocol    loperamide    Magnesium Standard Dose Replacement - Follow Nurse / BPA Driven Protocol    ondansetron ODT **OR** ondansetron    Phosphorus Replacement - Follow Nurse / BPA Driven Protocol    Potassium Replacement - Follow Nurse / BPA Driven Protocol    promethazine    promethazine    sodium chloride    sodium chloride  Data Review:  All labs (24hrs): No results found for this or any previous visit (from the past 24 hour(s)).     Imaging:  XR Chest 1 View  Narrative: XR CHEST 1 VW    Date of Exam: 4/28/2024 9:45 AM EDT    Indication: CHF    Comparison: 4/25/2020    Findings:  Heart size and pulmonary vasculature are stable. No gross pulmonary edema is demonstrated. There is strandy opacity in the left lung base.  Impression: Impression:    1. Cardiomegaly with no evidence of pulmonary edema  2. Left basilar atelectasis    Electronically Signed: Darnell Kennedy    4/28/2024 11:33 AM EDT    Workstation ID: [id number]       ASSESSMENT:  Hypoxemia chronic respiratory failure on oxygen  Chronic obstructive airway disease  Moderate pulmonary hypertension  Chronic renal disease  Hypertension  History of PE      PLAN:  Refused labs this am  Continue anticoagulation  Watch MEETA's  Sildenafil   Bronchodilators  Inhaled corticosteroids  Electrolytes/ glycemic control  DVT prophylaxis-Apixaban     Discussed with Dr Jeanine Cruz, JOAQUÍN   5/7/2024  09:24 EDT     I personally have examined  and interviewed the patient. I have reviewed the history, data, problems, assessment and plan with our NP.  Total Critical care time in direct medical management (   ) minutes, This time specifically excludes time spent performing procedures.    Jerry Solorzano MD   5/7/2024  22:03 EDT

## 2024-05-07 NOTE — CASE MANAGEMENT/SOCIAL WORK
Case Management Discharge Note      Final Note: vna           Home Medical Care Coordination complete.      Service Provider Selected Services Address Phone Fax Patient Preferred    [facility] ,  [facility] Rehabilitation [facility], [facility] [address] [id number] [phone] --             Transportation Services  Private: Car    Final Discharge Disposition Code: 06 - home with home health care

## 2024-05-07 NOTE — PLAN OF CARE
Goal Outcome Evaluation:      No acute changes over night. Pt refused morning labs.

## 2024-05-07 NOTE — PROGRESS NOTES
-- Message is from the [facility]--    Patient is requesting a medication refill - the medication was not listed on the patient's active medication list.    Prescribing Provider: Dr. Izabel Fair    RX Name:  Losartan  Dose:  50MG  Quantity:  90  Instruction(s):  1 daily     Duration: 90 days    Pharmacy  [facility] [address] [place] [address] At [facility] [address]    Patient confirmed the above pharmacy as correct?  Yes    Caller Information       Type Contact Phone    02/14/2019 11:58 AM Phone (Incoming) Toan Craig (Self) [phone] (H)          Alternative phone number: na    Turnaround time given to caller:   \"This message will be sent to [state Provider's name]. The clinical team will fulfill your request as soon as they review your message.\"   -- Message is from the [facility]--    Reason for Call: Patients wife called checking status on refill request for Rx: Losartan Potassium 50 MG. She states patient has only 2 tablets left and he has an appointment for Feb. 22/2019.    Caller Information       Type Contact Phone    02/14/2019 11:58 AM Phone (Incoming) Toan Craig (Self) [phone] (H)    02/15/2019 01:25 PM Phone (Outgoing) Toan Craig (Self) [phone] (H)    Left Message           Alternative phone number: [phone]    Turnaround time given to caller:   \"This message will be sent to [state Provider's name]. The clinical team will fulfill your request as soon as they review your message.\"     Called patient and voicemail left that office visit is needed maintain rx refill.   Called patient and voicemail was left that if patient has enough pills to last until appointment on tomorrow then rx request can be addressed then.   Nutrition Services    Patient Name: Gabrielle Lyles  YOB: 1940  MRN: [id number]  Admission date: 4/25/2024    PROGRESS NOTE      Encounter Information: Checking in on PO intake. Noted pt declined AM labs.        PO Diet: Diet: Regular/House; Fluid Consistency: Thin (IDDSI 0)   PO Supplements: -   PO Intake:  75%       Current nutrition support: -   Nutrition support review: -       Labs (reviewed below): 5/5 labs reviewed        GI Function:  + BM on 5/6       Nutrition Intervention Updates: Continue current diet and encourage good PO intake.    Addition of Boost Plus q daily (Provides 360 kcals, 14 g of protein if consumed)   Boost Glucose Control may be substituted for Boost Plus at this time, due to national shortage of many ONS products. If substituted, each Boost Glucose Control will provide 190 kcal and 16g PRO.       Results from last 7 days   Lab Units 05/05/24 0105 05/04/24 0032 05/03/24  0342   SODIUM mmol/L 146* 144 144   POTASSIUM mmol/L 4.6 4.0 4.0   CHLORIDE mmol/L 110* 108* 107   CO2 mmol/L 26.0 26.0 25.0   BUN mg/dL 42* 45* 50*   CREATININE mg/dL 1.73* 1.77* 1.99*   CALCIUM mg/dL 8.5* 8.9 9.5   BILIRUBIN mg/dL  --  1.6*  --    ALK PHOS U/L  --  62  --    ALT (SGPT) U/L  --  22  --    AST (SGOT) U/L  --  20  --    GLUCOSE mg/dL 106* 97 99     Results from last 7 days   Lab Units 05/05/24  0105 05/04/24  0032 05/03/24  0342 05/02/24  1805 05/02/24  0536   MAGNESIUM mg/dL  --  1.9 2.0  --  2.3   PHOSPHORUS mg/dL 2.9 2.9 3.5   < > 2.1*   HEMOGLOBIN g/dL  --  10.3* 10.7*  --  11.1*   HEMATOCRIT %  --  34.7 35.8  --  37.4    < > = values in this interval not displayed.     COVID19   Date Value Ref Range Status   03/21/2024 Not Detected Not Detected - Ref. Range Final     Lab Results   Component Value Date    HGBA1C 5.8 (H) 10/25/2022       RD to follow up per protocol.    Electronically signed by:  Althea Oviedo RD  05/07/24 10:49 EDT     No

## 2024-05-15 PROBLEM — I95.89 CHRONIC HYPOTENSION: Chronic | Status: ACTIVE | Noted: 2024-05-15

## 2024-05-15 PROBLEM — N18.31 CHRONIC KIDNEY DISEASE, STAGE 3A: Status: RESOLVED | Noted: 2024-02-26 | Resolved: 2024-05-15

## 2024-05-15 PROBLEM — I27.20 MILD PULMONARY HYPERTENSION: Chronic | Status: ACTIVE | Noted: 2024-05-15

## 2024-05-15 PROBLEM — N18.2 ANEMIA IN STAGE 2 CHRONIC KIDNEY DISEASE: Status: RESOLVED | Noted: 2024-02-26 | Resolved: 2024-05-15

## 2024-05-15 PROBLEM — N18.4 ANEMIA DUE TO STAGE 4 CHRONIC KIDNEY DISEASE: Chronic | Status: ACTIVE | Noted: 2024-05-15

## 2024-05-15 PROBLEM — I27.20 SEVERE PULMONARY HYPERTENSION: Chronic | Status: RESOLVED | Noted: 2023-03-03 | Resolved: 2024-05-15

## 2024-05-15 PROBLEM — J96.11 CHRONIC HYPOXEMIC RESPIRATORY FAILURE: Status: ACTIVE | Noted: 2023-05-07

## 2024-05-15 PROBLEM — N18.31 STAGE 3A CHRONIC KIDNEY DISEASE: Chronic | Status: RESOLVED | Noted: 2019-07-24 | Resolved: 2024-05-15

## 2024-05-15 PROBLEM — I27.81 COR PULMONALE (CHRONIC): Chronic | Status: ACTIVE | Noted: 2024-05-15

## 2024-05-15 PROBLEM — N18.4 CKD (CHRONIC KIDNEY DISEASE), STAGE IV: Chronic | Status: ACTIVE | Noted: 2024-05-15

## 2024-05-15 PROBLEM — D63.1 ANEMIA DUE TO STAGE 4 CHRONIC KIDNEY DISEASE: Chronic | Status: ACTIVE | Noted: 2024-05-15

## 2024-05-15 PROBLEM — D63.1 ANEMIA IN STAGE 2 CHRONIC KIDNEY DISEASE: Status: RESOLVED | Noted: 2024-02-26 | Resolved: 2024-05-15

## 2024-05-15 NOTE — PROGRESS NOTES
"Cardiology Heart Failure Clinic Note  Eugene \"Jaylen\" Yvette YANES [facility]    Patient ID: Gabrielle Lyles  is a 84 y.o. female.    Encounter Date:05/16/2024    HPI:  84-year-old female patient with Dr. Richardson Willis's with a past medical history of volume overload's from stage IV CKD with an eGFR last annotated at 29 and a creatinine of 1.7.  Cor pulmonale with significantly reduced RV function, mild pulmonary hypertension RVSP 35 to 45 mmHg (pending recommended RHC by Dr. Willis to quantify her pulmonary hypertension), with preserved LV systolic function without any known diastolic dysfunction per TTE 3/19/2024 , paroxysmal atrial fibrillation on anticoagulation significant pulmonary issues including chronic hypoxemic respiratory failure with associated O2 requiring COPD, chronic atelectasis of the left lower lobe, chronic hypotension requiring ProAmatine, H/O malignant neoplasm of breast s/p partial mastectomy, anemia due to her stage IV CKD, moderate malnutrition, chronic low back pain, history of venous thrombosis and embolism, history of a TIA previously, gouty arthritis, history of aortic abdominal aneurysm.,  Ascites, admitted to the hospital 4/25/2024 for 12 days secondary to dyspnea developed an acute respiratory failure while she was here slowly improved found to have had an infected PICC line site treated with antibiotics subsequently discharged to home.  Called from Dr. Willis's office by his nurse practitioner asking us to see the patient urgently being evaluated for her initial visit after having missed visit due to her recent hospitalization here at the heart failure clinic on 5/16/2024 since discharge from the hospital she has continued to swell and feel rather poorly.  She gets dyspneic with minimal exertion.  She not really had any chest pain palpitations no pre or charlie syncopal episodes       Assessment:    Diagnoses and all orders for this visit:    1. CKD (chronic kidney disease), stage IV " (Primary)  Overview:  eGFR 29  5/9/24      2. Cor pulmonale (chronic)  Overview:  Severely reduced RV function (TTE 2/7/2024)      3. Mild pulmonary hypertension  Overview:   RVSP 35-45 mmHg Feb 24      4. Chronic hypoxemic respiratory failure  Overview:  A.  COPD           1. O2 requiring  B.  Mild pulmonary arterial hypertension          1.  RVSP 35-45 mmHg Feb 24  C. Cor Pulmonale      5. Anemia due to stage 4 chronic kidney disease  -     CBC & Differential; Future  -     proBNP; Future  -     CBC & Differential; Standing  -     CBC & Differential  -     proBNP; Standing  -     proBNP    6. 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 Chronic hypotension  Overview:  Requiring midodrine 5/24      8. Paroxysmal atrial fibrillation  Overview:  A. Rate control coreg 3.125mg bid  B. Renal dosed eliquis  C. No history of ablation's or DCCV I could find       9. Shortness of breath  -     Basic Metabolic Panel; Future  -     CBC & Differential; Future  -     proBNP; Future  -     Magnesium; Future  -     Basic Metabolic Panel; Standing  -     Basic Metabolic Panel  -     CBC & Differential; Standing  -     CBC & Differential  -     proBNP; Standing  -     proBNP  -     Magnesium; Standing  -     Magnesium    10. Panlobular emphysema    11. Personal history of malignant neoplasm of breast    12. Moderate malnutrition    13. Low back pain with sciatica, sciatica laterality unspecified, unspecified back pain laterality, unspecified chronicity    14. History of venous thrombosis and embolism    15. History of TIA (transient ischemic attack)    16. Gout, unspecified cause, unspecified chronicity, unspecified site    17. Gastroesophageal reflux disease, unspecified whether esophagitis present    18. Dependence on supplemental oxygen    19. Chronic obstructive pulmonary disease, unspecified COPD type    20. Abdominal aortic aneurysm (AAA) without rupture, unspecified part    Other orders  -     ECG 12 Lead  Dyspnea; Standing  -     ECG 12 Lead Dyspnea  -     Furosemide Cartridge Kit 80 mg  -     potassium chloride (KLOR-CON M20) CR tablet 20 mEq        Plan/discussion    Volume overload    Heart Failure Core Measures addressed    Type of Overload multifactorial  Preserved LV systolic and diastolic function  Severe Cor pulmonale  Mild pulmonary hypertension  Predominantly stage IV CKD    Most recent EF: (TTE 3/19/24) LVEF 61 - 65%. W/o diastolic dysfunction    [place] Heart association Class & Stage : IIIc    HF Meds Pre Visit not applicable w/o systolic or diastolic HF    Beta Blocker: coreg 3.125mg q 12 H  Midodrine 5 mg 3 times daily AC  ARNI/ACE/ARB: Not applicable  SGLT 2 inhibitors: Not applicable  Diuretics: Bumex 0.5 mg daily  Aldosterone Antagonist: Not in use due to stage IV CKD  Digitalis: Not applicable  Vasodilators & Nitrates: Not applicable    * on for PH Revatio 20mg TID    HF Meds Post Visit    Beta Blocker: Coreg 3.125 mg every 12 hours  ProAmatine 5 mg 3 times daily AC routine  ARNI/ACE/ARB: None secondary to CKD  SGLT 2 inhibitors: None   Diuretics: Bumex 0.5 mg daily  Furoscix: While not indicated for an on heart failure patient's.  It is less renal toxic therefore 1 kit was placed today 5/16/2024 on the patient while she was still here in the clinic with a traditional chest tube supplement  Aldosterone Antagonist: Not used due to CKD  Digitalis: Not applicable  Nitrates: Not applicable          Cardiac medicines reviewed with risk, benefits, and necessity of each discussed.       _________________________________________________________    Discussion    No indication since no systolic or diastolic heart failure core measures   Would certainly consider discontinuation of beta-blocker given history of hypotension requiring ProAmatine and given the presence of significant COPD but will defer to her cardiologist Dr. Willis  Predominantly appears to be renal in etiology for the volume overload with  overlapping cor pulmonale given the significantly reduced RV function   Certainly needs of follow-up with the renal service given significant CKD stage 4 with a EGFR last reported <30  eGFR 16 May 2024 increased to 33.4 from 29 previously creatinine at 1.5  proBNP on 16 May 2024 16,757  Obviously volume overloaded today therefore given her renal dysfunction went ahead and used off label Furoscix yet x 1 given it's delivery system seems less nephrotoxic and the fact it is given over 5-hour period.  In hopes of being able to keep the patient from being admitted to the hospital  She was asked to call should you become volume overloaded again and her follow-up with our clinic will be on an as-needed basis unless directed otherwise by Dr. Willis  Needs follow-up with pulmonary service he sees Dr. Roy for pulmonary hypertension  would add volume overload (not heart failure since that her does not seem to be significant evidence of diastolic or systolic heart failure ) nursing interventions including:        A. Fluid restriction at less than 2000 cc daily       B. Daily weights        C.  1 g low-sodium diet        I did the following activities:preparing for the visit, with Gabrielle Lyles  including reviewing tests, once pt arrived in clinic I also performed a medically appropriate examination and/or evaluation , I personally spent considerable time counseling and educating the patient/family/caregiver, ordering medications, tests, or procedures, referring and communicating with other health care professionals , and documenting information in the medical record. I estimate including preparation 45 minutes             Subjective:     Chief Complaint   Patient presents with    Shortness of Breath       ROS:  Constitutional: + weakness, + fatigue basically constantly significantly fatigued, No fever, rigors, chills   Eyes: No recent vision changes, eye pain   ENT/oropharynx: No recent difficulty swallowing, sore throat,  epistaxis, changes in hearing   Cardiovascular: No recent chest pain, chest tightness, palpitations except as noted in HPI, paroxysmal nocturnal dyspnea, orthopnea, diaphoresis, dizziness & no pre or charlie syncopal episodes   Respiratory: At rest shortness of breath he is on supplemental O2 via nasal cannula, + dyspnea on minimal exertion, no significant productive cough, no wheezing and no hemoptysis   Gastrointestinal: No abdominal pain, nausea, vomiting, diarrhea, bloody stools   Genitourinary: No hematuria, dysuria other than increased frequency   Neurological: No headache, tremors, numbness,  or hemiparesis    Musculoskeletal: No change in typical cramps, myalgias, no new joint pain, joint swelling   Integument: No recent rash, + edema      Patient Active Problem List   Diagnosis    Acute hypokalemia    Low back pain    Osteopenia    Chronic obstructive pulmonary disease    Gastroesophageal reflux disease    Gout    History of venous thrombosis and embolism    Primary hypertension    Lumbar radiculopathy    Nausea    Personal history of malignant neoplasm of breast    Shortness of breath    Aortic aneurysm    Bulging lumbar disc    Spinal stenosis of lumbar region    History of TIA (transient ischemic attack)    Chronic hypoxemic respiratory failure    Cellulitis of left foot    Altered mental status    JULIAN (acute kidney injury)    Dyspnea    3A Paroxysmal atrial fibrillation    Moderate malnutrition    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    Panlobular emphysema    Dependence on supplemental oxygen    CKD (chronic kidney disease), stage IV    Cor pulmonale (chronic)    Mild pulmonary hypertension    Anemia due to stage 4 chronic kidney disease    Chronic hypotension       Past Medical History:   Diagnosis Date    Acute exacerbation of chronic obstructive pulmonary disease (COPD)     COPD (chronic obstructive pulmonary disease)     Deep vein thrombosis of bilateral lower extremities  2019    3/2013    History of pneumonia 2012    community acquired pneumonia and right pleural effusion;hospitalized at [facility]    History of pulmonary embolism 2013    bilateral PE    Hypertension     Insomnia     on Ambien 5mg at     Lobular breast cancer, left 2011    Stage IA left upper lobular breast cancer    Malignant neoplasm of left breast in female, estrogen receptor positive 2019    Osteopenia     Parainfluenza infection     Parotid duct obstruction     Severe pulmonary hypertension 2023    Stage 3a chronic kidney disease 2019    TIA (transient ischemic attack) 10/25/2022       Past Surgical History:   Procedure Laterality Date    CARDIAC CATHETERIZATION N/A 3/3/2023    Procedure: Left and Right Heart Cath with Coronary Angiography;  Surgeon: Richardson Willis MD;  Location: [facility] INVAS[facility];  Service: Cardiovascular;  Laterality: N/A;    CATARACT EXTRACTION      IVC FILTER RETRIEVAL  2014    IVC filter placement by Dr. Card    MAMMO STEREOTACTIC BREAST BX SURGICAL ADD UNI Left 2011    invasive lobular carcinoma-Dr. Cande Daniel    MASTECTOMY, PARTIAL Left 2011    and left axillary sentinel lymph node biopsy by Dr. Uriostegui    TUBAL ABDOMINAL LIGATION         Social History     Socioeconomic History    Marital status:    Tobacco Use    Smoking status: Former     Current packs/day: 0.00     Types: Cigarettes     Quit date:      Years since quittin.3     Passive exposure: Past    Smokeless tobacco: Never    Tobacco comments:     smoked 6 cigarettes a day from 13 years of age to 56 years of age when she quit in    Vaping Use    Vaping status: Never Used   Substance and Sexual Activity    Alcohol use: Not Currently    Drug use: No    Sexual activity: Not Currently     Partners: Male       No Known Allergies      Current Outpatient Medications:     allopurinol (ZYLOPRIM) 100 MG tablet, Take 1 tablet by mouth  Daily., Disp: , Rfl:     apixaban (ELIQUIS) 5 MG tablet tablet, Take 1 tablet by mouth Every 12 (Twelve) Hours. Indications: Other - full anticoagulation, history of DVT/PE, Disp: 60 tablet, Rfl: 2    budesonide-formoterol (SYMBICORT) 80-4.5 MCG/ACT inhaler, Inhale 2 puffs 2 (Two) Times a Day., Disp: , Rfl:     bumetanide (BUMEX) 0.5 MG tablet, Take 1 tablet by mouth Daily., Disp: 30 tablet, Rfl: 3    carvedilol (COREG) 3.125 MG tablet, Take 1 tablet by mouth 2 (Two) Times a Day With Meals., Disp: 60 tablet, Rfl: 3    Folbic 2.5-25-2 MG tablet tablet, Take 1 tablet by mouth Daily., Disp: , Rfl:     levothyroxine (SYNTHROID, LEVOTHROID) 75 MCG tablet, Take 1 tablet by mouth Every Morning., Disp: 30 tablet, Rfl: 3    midodrine (PROAMATINE) 5 MG tablet, Take 1 tablet by mouth 3 (Three) Times a Day Before Meals., Disp: 90 tablet, Rfl: 0    O2 (OXYGEN), Inhale 2 L/min Continuous., Disp: , Rfl:     pantoprazole (PROTONIX) 40 MG EC tablet, Take 1 tablet by mouth 2 (Two) Times a Day Before Meals., Disp: 60 tablet, Rfl: 0    sildenafil (REVATIO) 20 MG tablet, Take 1 tablet by mouth 3 (Three) Times a Day., Disp: 90 tablet, Rfl: 3  No current facility-administered medications for this encounter.    Immunization History   Administered Date(s) Administered    Pneumococcal Conjugate 13-Valent (PCV13) 02/18/2016    Pneumococcal Polysaccharide (PPSV23) 10/01/2003    Td (TDVAX) 11/01/2001    Zostavax 01/01/2009       Most recent EKG as reviewed:  Procedures     Most recent echo as reviewed:  Results for orders placed in visit on 03/18/24    Adult Transthoracic Echo Limited W/ Cont if Necessary Per Protocol    Interpretation Summary    Left ventricular ejection fraction appears to be 61 - 65%.    The right ventricular cavity is dilated.    There is a small (<1cm) pericardial effusion adjacent to the left ventricle. There is no evidence of cardiac tamponade.    IVC is 2.1 cm.      Most recent stress test results:  Results for orders  placed in visit on 09/13/22    Stress Test With Myocardial Perfusion One Day    Interpretation Summary    Left ventricular ejection fraction is hyperdynamic (Calculated EF > 70%). .    Myocardial perfusion imaging indicates a normal myocardial perfusion study with no evidence of ischemia.    Impressions are consistent with a low risk study.    Findings consistent with a normal ECG stress test.      Most recent cardiac catheterization results:  Results for orders placed during the hospital encounter of 03/03/23    Cardiac Catheterization/Vascular Study    Conclusion  OPERATORS  Richardson Willis M.D. (Attending Cardiologist)      PROCEDURE PERFORMED  Ultrasound guided vascular access  Right heart catheterization  Coronary Angiogram  Left Heart Catheterization 59298  Moderate Sedation    INDICATIONS FOR PROCEDURE  83-year-old woman with multiple cardiovascular risk factors, abnormal stress test presented with worsening shortness of breath.  After discussing the risk and benefit of the procedure she was brought in for elective right and left heart cath.    PROCEDURE IN DETAIL  Informed consent was obtained from the patient after explaining the risks, benefits, and alternative options of the procedure. After obtaining informed consent, the patient was brought to the cath lab and was prepped in a sterile fashion. Lidocaine 2% was used for local anesthesia into the right femoral venous access site. Right femoral vein was accessed using the micropuncture needle under ultrasound guidance and micropuncture wire advanced under flouroscopy. A 7 [place] of [place] vascular sheath was put into place percutaneously over guide-wire. Guide wires were removed. A 6Fr swan sheryl catheter was advanced to wedge position. RA, RV and PA and wedge pressures were recorded.  PA sat and arterial sats recorded.  The patient tolerated the procedure well without any complications.    Lidocaine 2% was used for local anesthesia into the right femoral arterial  access site. The right femoral artery was accessed with a micropuncture needle via modified Seldinger technique under ultrasound guidance. A 6F was inserted successfully.  Afterwards, 6F JR4 and JL4 diagnostic catheters were advanced over a wire into the ascending aorta and were used to engage the ostia of the left main and RCA respectively. JR4 used to cross the AV and obtain LV pressures and gradient across the AV measured via pullback technique. Images of the right and left coronary systems were obtained. All the catheters were exchanged over a wire and subsequently removed. Angiogram of the femoral access site was obtained and did not show complications. The patient tolerated the procedure well without any complications. The pictures were reviewed at the end of the procedure. A Mynx closure device was applied.    HEMODYNAMICS    RHC  RA 6/5, 4 mmHg  RV 74/3, 5 mmHg  PA 71/25, 44 mmHg  PCW 12 mmHg  AO Sat 92%  PA Sat 78%    Jose CO: 7.89 L/min    Jose CI: 4.69 L/min/m²    LHC  LV: 134/3, 20 mmHg  AO: 131/56, 87 mmHg  No significant gradient across the aortic valve during pullback of JR4 catheter.    FINDINGS  Coronary Angiogram  All vessels are heavily calcified  She also has heavily calcified peripheral arterial disease.  Right dominant circulation    Left main: Left main is a large caliber vessel which gives rise to the Left Anterior Descending and the Left circumflex.  Left main is angiographically free from any significant disease.    Left Anterior Descending Artery: LAD is a heavily calcified medium caliber vessel which gives rise to diagonals.  The vessel is highly tortuous and has 50 to 60% mid vessel stenosis best seen in VERÓNICA cranial view.    Left Circumflex: Heavily calcified vessel with 50% proximal segment stenosis.    Right Coronary Artery: The RCA is a large caliber vessel which is heavily calcified and tortuous.  It has diffuse luminal irregularities and 30 to 40% stenosis in the midsegment around  the bend.    ESTIMATED BLOOD LOSS:  10 ml    COMPLICATIONS:  None    PROCEDURE DATA:  Sedation Time: 25 minutes    IMPRESSIONS  Heavily calcified, tortuous nonobstructive coronary disease  Severe pulmonary hypertension with PA systolic pressure in the 70s  Mean PA pressure 44 mmHg    RECOMMENDATIONS  -Continue aggressive medical management of CAD  -Referral to pulmonology for treatment of pulmonary hypertension        Imaging:    Results for orders placed during the hospital encounter of 04/25/24    XR Chest 1 View    Narrative  XR CHEST 1 VW    Date of Exam: 4/28/2024 9:45 AM EDT    Indication: CHF    Comparison: 4/25/2020    Findings:  Heart size and pulmonary vasculature are stable. No gross pulmonary edema is demonstrated. There is strandy opacity in the left lung base.    Impression  Impression:    1. Cardiomegaly with no evidence of pulmonary edema  2. Left basilar atelectasis      Electronically Signed: Darnell Kennedy  4/28/2024 11:33 AM EDT  Workstation ID: [id number]       Results for orders placed during the hospital encounter of 04/25/24    US Renal Bilateral    Narrative  US RENAL BILATERAL    Date of Exam: 4/26/2024 11:04 AM EDT    Indication: ARF/JULIAN/CRF/CKD.    Comparison: No comparisons available.    Technique: Grayscale and color Doppler ultrasound evaluation of the kidneys and urinary bladder was performed.      Findings:  RIGHT kidney measures 9.8 x 5.6 x 5.2 cm. There is increased echogenicity of the kidney. There is no solid kidney mass.  No echogenic shadowing stone.  No hydronephrosis.    LEFT kidney measures 8.9 x 5.2 x 4.5 cm. There is increased echogenicity of the kidney. There is no solid kidney mass.  No echogenic shadowing stone. There is a 1.9 x 1.6 x 1.8 cm anechoic cyst in the midpole. There is mild dilatation of the left renal  pelvis.    The bladder is empty.    There is ascites.    Impression  Impression:    1. Increased echogenicity of the kidneys suggestive of chronic medical renal  disease.  2. Mild dilatation of the left renal pelvis.        Electronically Signed: Cintia Becker MD  4/26/2024 11:32 AM EDT  Workstation ID: [id number]      Results for orders placed during the hospital encounter of 02/05/24    CT Chest Without Contrast Diagnostic    Narrative  CT CHEST WO CONTRAST DIAGNOSTIC, CT ABDOMEN PELVIS WO CONTRAST    Date of Exam: 2/6/2024 3:14 PM EST    Indication: COPD, exacerbation. History of breast cancer    Comparison: CT chest of 5/8/2023. Prior CT abdomen pelvis of 11/10/2010    Technique: Axial CT images were obtained of the chest without contrast administration.  Sagittal and coronal reconstructions were performed.  Automated exposure control and iterative reconstruction methods were used.      Findings: CT chest:  There are moderate changes of centrilobular emphysema. There is chronic atelectasis of the left lower lobe posteriorly and medially. There are no pulmonary nodules or masses. There is moderately severe cardiomegaly, similar. There are coronary  calcifications. There is atherosclerotic disease of the thoracic aorta. There are calcified nodes in the right hilum. There are no enlarged mediastinal nodes. There are moderately severe degenerative changes in the lower C-spine and mild degenerative  changes in the thoracic spine. There is a new sclerotic focus of the mid manubrium with some overlying cortical thickening which may represent subacute or old fracture although metastatic disease is not excluded.    CT abdomen and pelvis: The infrarenal abdominal aorta measures 3.9 cm transversely as compared to 2.6 cm previously. There is air in the urinary bladder which is partially distended. There is a small amount of free pelvic fluid. There is diverticulosis  without evidence of acute diverticulitis. There is respiratory motion on the exam. There is no large or small bowel dilatation. The liver contour is mildly nodular, increased from the prior exam. The liver size is within  normal limits. The other  nonopacified solid organs are within normal limits in size. There are no renal stones or hydronephrosis. There are cholecystectomy clips. There are moderately severe degenerative changes in the lumbar spine.    Impression  Impression:  Chronic atelectasis of the left lower lobe. Moderately severe emphysema. Lesion of the sternal manubrium as detailed, which could represent subacute or old fracture although metastatic disease is not excluded. Correlation with whole-body bone scan may be  useful.    Aneurysmal dilatation of the abdominal aorta.    Small amount of free pelvic fluid, nonspecific.    Nodular liver contour suggests cirrhosis.    Electronically Signed: Mary Ivan MD  2/6/2024 3:29 PM EST  Workstation ID: [id number]      Historical data copied forward from previous encounters in EMR including the history, exam, and assessment/plan has been reviewed and is unchanged except as I have noted and otherwise indicated.      Objective:         /63   Pulse 82   Resp 16   Wt 63.7 kg (140 lb 6.4 oz)   SpO2 92% Comment: 3L NC  BMI 24.10 kg/m²     Physical Examination    General:  Well-developed, Dresser well-nourished, cooperative, no acute distress, appears stated age if not slightly older    Neuro:  A&O x3. No significantly obvious focal neuro deficet    Psych:  Pleasant - mildly flattened affect became increasingly jovial during encounter   HENT:  Normocephalic, atraumatic, moist mucous membranes , Normal ear placement,Throat not injected   Eyes:  PERRLA, Conjunctivae not injected, EOM's intact, conjunctiva does not appear significantly injected   Neck:  Supple,, no lymph adenopathy nor thyromegaly no JVD or bruit    Lungs:    Symmetrical rise & fall of chest with baseline respiratory pattern. To auscultation lungs bilaterally, with late phase expiratory wheezes, no rhonchi or rales are noted, bases bilaterally are mildly diminished   Chest wall:  No significant tenderness when  palpated. PMI @ 6th ICS MAL but difficult to palpate given both body habitus and positioning in her wheelchair   Heart:  Regular rate and rhythm, S1 and S2 normal, no S3 there is a significant + S4, Gr I/VI systolic ejection murmur best heard at the apex , no obvious rub, click or gallop.    Abdomen:  non-distended, non-tender, bowel sounds noted in the 4 quadrants of the abdomen, very mild adipose tissue identified crops of central abdominal region   Extremities:  Equal color motion temperature and sensitivity, Rapid capillary refill noted within the nailbeds of fingers and toes bilaterally 2+ pitting lower extremity edema.  With the right being greater than the left   Pulses:  2+ and symmetric all extremities, rapid capillary refill    Skin:  No obvious rashes, significant lesions identified, warm dry and of normal turgor, mild notable pretibial redness     In-Office Procedure(s):  ECG 12 Lead Dyspnea   Preliminary Result   HEART RATE= 85  bpm   RR Interval= 703  ms   AK Interval=   ms   P Horizontal Axis=   deg   P Front Axis=   deg   QRSD Interval= 101  ms   QT Interval= 408  ms   QTcB= 487  ms   QRS Axis= 120  deg   T Wave Axis= 92  deg   - ABNORMAL ECG -   Atrial fibrillation   Right axis deviation   Consider anterolateral infarct   When compared with ECG of 28-Apr-2024 7:50:23,   No significant change   Electronically Signed By:    Date and Time of Study: 2024-05-16 15:31:21           Lab Review:   Hospital Outpatient Visit on 05/16/2024   Component Date Value    Glucose 05/16/2024 88     BUN 05/16/2024 30 (H)     Creatinine 05/16/2024 1.54 (H)     Sodium 05/16/2024 141     Potassium 05/16/2024 3.6     Chloride 05/16/2024 100     CO2 05/16/2024 27.0     Calcium 05/16/2024 9.7     BUN/Creatinine Ratio 05/16/2024 19.5     Anion Gap 05/16/2024 14.0     eGFR 05/16/2024 33.4 (L)     proBNP 05/16/2024 16,757.0 (H)     Magnesium 05/16/2024 1.9     WBC 05/16/2024 8.60     RBC 05/16/2024 4.27     Hemoglobin 05/16/2024  11.3 (L)     Hematocrit 05/16/2024 37.9     MCV 05/16/2024 88.8     MCH 05/16/2024 26.5 (L)     MCHC 05/16/2024 29.8 (L)     RDW 05/16/2024 22.9 (H)     RDW-SD 05/16/2024 73.4 (H)     MPV 05/16/2024 10.4     Platelets 05/16/2024 299     Neutrophil % 05/16/2024 76.1 (H)     Lymphocyte % 05/16/2024 12.6 (L)     Monocyte % 05/16/2024 6.6     Eosinophil % 05/16/2024 3.5     Basophil % 05/16/2024 1.0     Immature Grans % 05/16/2024 0.2     Neutrophils, Absolute 05/16/2024 6.54     Lymphocytes, Absolute 05/16/2024 1.08     Monocytes, Absolute 05/16/2024 0.57     Eosinophils, Absolute 05/16/2024 0.30     Basophils, Absolute 05/16/2024 0.09     Immature Grans, Absolute 05/16/2024 0.02     nRBC 05/16/2024 0.0     QT Interval 05/16/2024 408     QTC Interval 05/16/2024 487    No results displayed because visit has over 200 results.      Admission on 03/21/2024, Discharged on 03/21/2024   Component Date Value    Glucose 03/21/2024 97     BUN 03/21/2024 39 (H)     Creatinine 03/21/2024 1.75 (H)     Sodium 03/21/2024 141     Potassium 03/21/2024 4.1     Chloride 03/21/2024 102     CO2 03/21/2024 25.0     Calcium 03/21/2024 9.2     BUN/Creatinine Ratio 03/21/2024 22.3     Anion Gap 03/21/2024 14.0     eGFR 03/21/2024 28.6 (L)     Site 03/21/2024 Right Radial     Elie's Test 03/21/2024 Positive     pH, Arterial 03/21/2024 7.396     pCO2, Arterial 03/21/2024 41.0     pO2, Arterial 03/21/2024 208.5 (H)     HCO3, Arterial 03/21/2024 25.1     Base Excess, Arterial 03/21/2024 0.2     O2 Saturation, Arterial 03/21/2024 99.7 (H)     CO2 Content 03/21/2024 26.4     Barometric Pressure for * 03/21/2024      Modality 03/21/2024 NRB     FIO2 03/21/2024 100     Hemodilution 03/21/2024 No     HS Troponin T 03/21/2024 27 (H)     QT Interval 03/21/2024 385     QTC Interval 03/21/2024 449     ADENOVIRUS, PCR 03/21/2024 Not Detected     Coronavirus 229E 03/21/2024 Not Detected     Coronavirus HKU1 03/21/2024 Not Detected     Coronavirus NL63  03/21/2024 Not Detected     Coronavirus OC43 03/21/2024 Not Detected     COVID19 03/21/2024 Not Detected     Human Metapneumovirus 03/21/2024 Not Detected     Human Rhinovirus/Enterov* 03/21/2024 Not Detected     Influenza A PCR 03/21/2024 Not Detected     Influenza A H1 03/21/2024 Not Detected     Influenza A H1 2009 PCR 03/21/2024 Not Detected     Influenza A H3 03/21/2024 Not Detected     Influenza B PCR 03/21/2024 Not Detected     Parainfluenza Virus 1 03/21/2024 Not Detected     Parainfluenza Virus 2 03/21/2024 Not Detected     Parainfluenza Virus 3 03/21/2024 Not Detected     Parainfluenza Virus 4 03/21/2024 Not Detected     RSV, PCR 03/21/2024 Not Detected     Bordetella pertussis pcr 03/21/2024 Not Detected     Bordetella parapertussis* 03/21/2024 Not Detected     Chlamydophila pneumoniae* 03/21/2024 Not Detected     Mycoplasma pneumo by PCR 03/21/2024 Not Detected     WBC 03/21/2024 10.50     RBC 03/21/2024 4.04     Hemoglobin 03/21/2024 10.8 (L)     Hematocrit 03/21/2024 35.8     MCV 03/21/2024 88.6     MCH 03/21/2024 26.7     MCHC 03/21/2024 30.2 (L)     RDW 03/21/2024 21.2 (H)     RDW-SD 03/21/2024 67.3 (H)     MPV 03/21/2024 10.2     Platelets 03/21/2024 275     Neutrophil % 03/21/2024 79.5 (H)     Lymphocyte % 03/21/2024 8.0 (L)     Monocyte % 03/21/2024 10.3     Eosinophil % 03/21/2024 0.7     Basophil % 03/21/2024 1.1     Immature Grans % 03/21/2024 0.4     Neutrophils, Absolute 03/21/2024 8.35 (H)     Lymphocytes, Absolute 03/21/2024 0.84     Monocytes, Absolute 03/21/2024 1.08 (H)     Eosinophils, Absolute 03/21/2024 0.07     Basophils, Absolute 03/21/2024 0.12     Immature Grans, Absolute 03/21/2024 0.04     nRBC 03/21/2024 0.0     proBNP 03/21/2024 10,374.0 (H)     Extra Tube 03/21/2024 Hold for add-ons.     Extra Tube 03/21/2024 Hold for add-ons.     HS Troponin T 03/21/2024 24 (H)     Troponin T Delta 03/21/2024 -3     QT Interval 03/21/2024 465     QTC Interval 03/21/2024 458     QT Interval  03/21/2024 336     QTC Interval 03/21/2024 404    Lab on 03/18/2024   Component Date Value    Glucose 03/18/2024 125 (H)     BUN 03/18/2024 40 (H)     Creatinine 03/18/2024 1.77 (H)     Sodium 03/18/2024 136     Potassium 03/18/2024 3.9     Chloride 03/18/2024 103     CO2 03/18/2024 21.0 (L)     Calcium 03/18/2024 9.6     Total Protein 03/18/2024 5.9 (L)     Albumin 03/18/2024 3.7     ALT (SGPT) 03/18/2024 31     AST (SGOT) 03/18/2024 27     Alkaline Phosphatase 03/18/2024 78     Total Bilirubin 03/18/2024 2.2 (H)     Globulin 03/18/2024 2.2     A/G Ratio 03/18/2024 1.7     BUN/Creatinine Ratio 03/18/2024 22.6     Anion Gap 03/18/2024 12.0     eGFR 03/18/2024 28.2 (L)     REN Direct 03/18/2024 Negative     REN Direct 03/18/2024 Negative     Antiscleroderma-70 Antib* 03/18/2024 <0.2     Protime 03/18/2024 17.4 (H)     INR 03/18/2024 1.7 (H)     aPTT 03/18/2024 32.0 (H)     APTT 1:1 NP 03/18/2024 TNP     APTT 1:1 Saline 03/18/2024 TNP     Thrombin Time 03/18/2024 18.2     DRVVT Screen Seconds 03/18/2024 155.7 (H)     DRVVT Confirm Seconds 03/18/2024 147.0     DRVVT/Confirm Ratio 03/18/2024 1.0     Hex Phosph Neut Test 03/18/2024 8     Platelet Neutralization 03/18/2024 0.8     Anticardiolipin IgG 03/18/2024 <10     Anticardiolipin IgM 03/18/2024 <10     Beta-2 Glyco 1 IgG 03/18/2024 <10     Beta-2 Glyco 1 IgM 03/18/2024 <10     Beta-2 Glyco 1 IgA 03/18/2024 <10     LAC Interpretation 03/18/2024 Comment     Sjogren's Anti-SS-A 03/18/2024 <0.2     Sjogren's Anti-SS-B 03/18/2024 0.3     proBNP 03/18/2024 10,089.0 (H)     WBC 03/18/2024 9.84     RBC 03/18/2024 4.01     Hemoglobin 03/18/2024 10.4 (L)     Hematocrit 03/18/2024 33.2 (L)     MCV 03/18/2024 82.8     MCH 03/18/2024 25.9 (L)     MCHC 03/18/2024 31.3 (L)     RDW 03/18/2024 17.8 (H)     RDW-SD 03/18/2024 52.5     MPV 03/18/2024 10.6     Platelets 03/18/2024 245     Neutrophil % 03/18/2024 79.3 (H)     Lymphocyte % 03/18/2024 10.0 (L)     Monocyte % 03/18/2024 9.2   "   Eosinophil % 03/18/2024 0.4     Basophil % 03/18/2024 0.7     Immature Grans % 03/18/2024 0.4     Neutrophils, Absolute 03/18/2024 7.80 (H)     Lymphocytes, Absolute 03/18/2024 0.98     Monocytes, Absolute 03/18/2024 0.91 (H)     Eosinophils, Absolute 03/18/2024 0.04     Basophils, Absolute 03/18/2024 0.07     Immature Grans, Absolute 03/18/2024 0.04     nRBC 03/18/2024 0.0    No results displayed because visit has over 200 results.        Recent labs reviewed and interpreted for clinical significance and application    Went over each of the labs with the patient while she was still here in the heart failure clinic            It is a pleasure to be involved in this patient's cardiovascular care relating to their heart failure.  Please feel free to call me with any questions or concerns.    Eugene \"Jaylen\" Yvette YANES, [facility]  Heart failure program clinical provider    JOAQUÍN Cormier   05/15/24  .  "

## 2024-05-16 ENCOUNTER — HOSPITAL ENCOUNTER (OUTPATIENT)
Dept: CARDIOLOGY | Facility: HOSPITAL | Age: 84
Discharge: HOME | End: 2024-05-16
Payer: MEDICARE

## 2024-05-16 ENCOUNTER — HOSPITAL ENCOUNTER (OUTPATIENT)
Facility: HOSPITAL | Age: 84
Discharge: HOME | End: 2024-05-16
Payer: MEDICARE

## 2024-05-16 ENCOUNTER — DISEASE STATE MANAGEMENT VISIT (OUTPATIENT)
Facility: HOSPITAL | Age: 84
End: 2024-05-16
Payer: MEDICARE

## 2024-05-16 VITALS
WEIGHT: 140.4 LBS | HEART RATE: 82 BPM | BODY MASS INDEX: 24.1 KG/M2 | RESPIRATION RATE: 16 BRPM | SYSTOLIC BLOOD PRESSURE: 125 MMHG | OXYGEN SATURATION: 92 % | DIASTOLIC BLOOD PRESSURE: 63 MMHG

## 2024-05-16 DIAGNOSIS — Z86.73 HISTORY OF TIA (TRANSIENT ISCHEMIC ATTACK): ICD-10-CM

## 2024-05-16 DIAGNOSIS — N18.4 ANEMIA DUE TO STAGE 4 CHRONIC KIDNEY DISEASE: ICD-10-CM

## 2024-05-16 DIAGNOSIS — K21.9 GASTROESOPHAGEAL REFLUX DISEASE, UNSPECIFIED WHETHER ESOPHAGITIS PRESENT: ICD-10-CM

## 2024-05-16 DIAGNOSIS — Z99.81 DEPENDENCE ON SUPPLEMENTAL OXYGEN: ICD-10-CM

## 2024-05-16 DIAGNOSIS — Z86.718 HISTORY OF VENOUS THROMBOSIS AND EMBOLISM: ICD-10-CM

## 2024-05-16 DIAGNOSIS — J44.9 CHRONIC OBSTRUCTIVE PULMONARY DISEASE, UNSPECIFIED COPD TYPE: Chronic | ICD-10-CM

## 2024-05-16 DIAGNOSIS — I71.40 ABDOMINAL AORTIC ANEURYSM (AAA) WITHOUT RUPTURE, UNSPECIFIED PART: ICD-10-CM

## 2024-05-16 DIAGNOSIS — J43.1 PANLOBULAR EMPHYSEMA: ICD-10-CM

## 2024-05-16 DIAGNOSIS — I25.119 ATHSCL HEART DISEASE OF NATIVE COR ART W UNSP ANG PCTRS: ICD-10-CM

## 2024-05-16 DIAGNOSIS — R06.02 SHORTNESS OF BREATH: ICD-10-CM

## 2024-05-16 DIAGNOSIS — N18.4 CKD (CHRONIC KIDNEY DISEASE), STAGE IV: Primary | Chronic | ICD-10-CM

## 2024-05-16 DIAGNOSIS — I27.81 COR PULMONALE (CHRONIC): Chronic | ICD-10-CM

## 2024-05-16 DIAGNOSIS — I48.0 PAROXYSMAL ATRIAL FIBRILLATION: ICD-10-CM

## 2024-05-16 DIAGNOSIS — I95.89 CHRONIC HYPOTENSION: Chronic | ICD-10-CM

## 2024-05-16 DIAGNOSIS — I27.20 MILD PULMONARY HYPERTENSION: Chronic | ICD-10-CM

## 2024-05-16 DIAGNOSIS — E44.0 MODERATE MALNUTRITION: ICD-10-CM

## 2024-05-16 DIAGNOSIS — M54.40 LOW BACK PAIN WITH SCIATICA, SCIATICA LATERALITY UNSPECIFIED, UNSPECIFIED BACK PAIN LATERALITY, UNSPECIFIED CHRONICITY: ICD-10-CM

## 2024-05-16 DIAGNOSIS — D63.1 ANEMIA DUE TO STAGE 4 CHRONIC KIDNEY DISEASE: ICD-10-CM

## 2024-05-16 DIAGNOSIS — J96.11 CHRONIC HYPOXEMIC RESPIRATORY FAILURE: ICD-10-CM

## 2024-05-16 DIAGNOSIS — Z85.3 PERSONAL HISTORY OF MALIGNANT NEOPLASM OF BREAST: ICD-10-CM

## 2024-05-16 DIAGNOSIS — M10.9 GOUT, UNSPECIFIED CAUSE, UNSPECIFIED CHRONICITY, UNSPECIFIED SITE: ICD-10-CM

## 2024-05-16 LAB
ANION GAP SERPL CALCULATED.3IONS-SCNC: 14 MMOL/L (ref 5–15)
BASOPHILS # BLD AUTO: 0.09 10*3/MM3 (ref 0–0.2)
BASOPHILS NFR BLD AUTO: 1 % (ref 0–1.5)
BUN SERPL-MCNC: 30 MG/DL (ref 8–23)
BUN/CREAT SERPL: 19.5 (ref 7–25)
CALCIUM SPEC-SCNC: 9.7 MG/DL (ref 8.6–10.5)
CHLORIDE SERPL-SCNC: 100 MMOL/L (ref 98–107)
CO2 SERPL-SCNC: 27 MMOL/L (ref 22–29)
CREAT SERPL-MCNC: 1.54 MG/DL (ref 0.57–1)
DEPRECATED RDW RBC AUTO: 73.4 FL (ref 37–54)
EGFRCR SERPLBLD CKD-EPI 2021: 33.4 ML/MIN/1.73
EOSINOPHIL # BLD AUTO: 0.3 10*3/MM3 (ref 0–0.4)
EOSINOPHIL NFR BLD AUTO: 3.5 % (ref 0.3–6.2)
ERYTHROCYTE [DISTWIDTH] IN BLOOD BY AUTOMATED COUNT: 22.9 % (ref 12.3–15.4)
GLUCOSE SERPL-MCNC: 88 MG/DL (ref 65–99)
HCT VFR BLD AUTO: 37.9 % (ref 34–46.6)
HGB BLD-MCNC: 11.3 G/DL (ref 12–15.9)
IMM GRANULOCYTES # BLD AUTO: 0.02 10*3/MM3 (ref 0–0.05)
IMM GRANULOCYTES NFR BLD AUTO: 0.2 % (ref 0–0.5)
LYMPHOCYTES # BLD AUTO: 1.08 10*3/MM3 (ref 0.7–3.1)
LYMPHOCYTES NFR BLD AUTO: 12.6 % (ref 19.6–45.3)
MAGNESIUM SERPL-MCNC: 1.9 MG/DL (ref 1.6–2.4)
MCH RBC QN AUTO: 26.5 PG (ref 26.6–33)
MCHC RBC AUTO-ENTMCNC: 29.8 G/DL (ref 31.5–35.7)
MCV RBC AUTO: 88.8 FL (ref 79–97)
MONOCYTES # BLD AUTO: 0.57 10*3/MM3 (ref 0.1–0.9)
MONOCYTES NFR BLD AUTO: 6.6 % (ref 5–12)
NEUTROPHILS NFR BLD AUTO: 6.54 10*3/MM3 (ref 1.7–7)
NEUTROPHILS NFR BLD AUTO: 76.1 % (ref 42.7–76)
NRBC BLD AUTO-RTO: 0 /100 WBC (ref 0–0.2)
NT-PROBNP SERPL-MCNC: ABNORMAL PG/ML (ref 0–1800)
PLATELET # BLD AUTO: 299 10*3/MM3 (ref 140–450)
PMV BLD AUTO: 10.4 FL (ref 6–12)
POTASSIUM SERPL-SCNC: 3.6 MMOL/L (ref 3.5–5.2)
QT INTERVAL: 408 MS
QTC INTERVAL: 487 MS
RBC # BLD AUTO: 4.27 10*6/MM3 (ref 3.77–5.28)
SODIUM SERPL-SCNC: 141 MMOL/L (ref 136–145)
WBC NRBC COR # BLD AUTO: 8.6 10*3/MM3 (ref 3.4–10.8)

## 2024-05-16 PROCEDURE — 83735 ASSAY OF MAGNESIUM: CPT | Performed by: NURSE PRACTITIONER

## 2024-05-16 PROCEDURE — 80048 BASIC METABOLIC PNL TOTAL CA: CPT | Performed by: NURSE PRACTITIONER

## 2024-05-16 PROCEDURE — A9270 NON-COVERED ITEM OR SERVICE: HCPCS | Performed by: NURSE PRACTITIONER

## 2024-05-16 PROCEDURE — 93005 ELECTROCARDIOGRAM TRACING: CPT | Performed by: NURSE PRACTITIONER

## 2024-05-16 PROCEDURE — G0463 HOSPITAL OUTPT CLINIC VISIT: HCPCS

## 2024-05-16 PROCEDURE — 83880 ASSAY OF NATRIURETIC PEPTIDE: CPT | Performed by: NURSE PRACTITIONER

## 2024-05-16 PROCEDURE — 85025 COMPLETE CBC W/AUTO DIFF WBC: CPT | Performed by: NURSE PRACTITIONER

## 2024-05-16 PROCEDURE — 63710000001 POTASSIUM CHLORIDE 20 MEQ TABLET CONTROLLED-RELEASE: Performed by: NURSE PRACTITIONER

## 2024-05-16 RX ORDER — POTASSIUM CHLORIDE 20 MEQ/1
20 TABLET, EXTENDED RELEASE ORAL ONCE
Status: COMPLETED | OUTPATIENT
Start: 2024-05-16 | End: 2024-05-16

## 2024-05-16 RX ORDER — POTASSIUM CHLORIDE 750 MG/1
10 TABLET, EXTENDED RELEASE ORAL DAILY
Qty: 90 TABLET | Refills: 2 | Status: CANCELLED | OUTPATIENT
Start: 2024-05-16

## 2024-05-16 RX ADMIN — POTASSIUM CHLORIDE 20 MEQ: 1500 TABLET, EXTENDED RELEASE ORAL at 16:07

## 2024-05-16 NOTE — PROGRESS NOTES
[facility] HEART FAILURE CLINIC - PHARMACY SERVICE    Patient Name: Gabrielle Lyles  :1940  Age: 84 y.o.  Sex: female  Primary Cardiologist: Lazaro (needs follow up patient)  Nephrology: none  PCP: Darwin     HFpEF with EF 61-65% (Last Echo: 3/19/24).        The patient's last EKG was reviewed from 24 and shows a QTcB= 487 ms       CKD: Stage 3b eGFR 30-44 mL/min        -CHF-specific BB:      Pre Visit Dose: Carvedilol 3.125 mg PO BID    Post Visit Dose: Carvedilol 3.125 mg PO BID (BP: 125/63, HR: 82)     - Target Dose: Carvedilol 25 mg PO BID (<85 kg) or 50 mg PO BID (>85 kg).     - Goal HR of 50s to 60s.     - Patient should be seen every 10 to 14 days for a pulse check with plans for up-titration until target heart rate is achieved.        -ACE/ARB/ARNI:     Pre Visit Dose: None    Post Visit Dose: None           -SGLT2 inhibitor therapy:    Pre Visit Dose: None    Post Visit Dose: None       -MRA:     Pre Visit Dose: None    Post Visit Dose: None      - K is < 5 mEq/L and SCr is </= 2 mg/dL in female and eGFR > 30 mL/min/1.73m3      -DIURETIC:     Pre Visit Dose: Bumetanide 0.5 mg daily + KCL 20 mEQ daily ( Dr. Granados started patient on metolazone 2.5 mg daily x 5 days (5/15-)    Post Visit Dose: Bumetanide 0.5 mg daily + KCL 20 mEQ daily ( Dr. Granados started patient on metolazone 2.5 mg daily x 5 days (5/15-)      -MAGNESIUM:     Mag is not > 2 mg/dL (mag is 1.9 today)    Pre Visit Dose: None    Post Visit Dose: None      -ANTICOAGULATION:     apixaban 5 mg BID    [id number] SCORE   [id number] Score for Atrial Fibrillation Stroke Risk  Age in Years: 75+  Sex: Female  CHF History: No  Hypertension History: Yes  Stroke/TIA/Thromboembolism History: Yes  Vascular Disease History: Yes  Diabetes History: No  [id number] Score: 7  Stroke risks -: 7 Points. Risk of ischemic stroke: 11.2%. Risk of stroke/TIA/embolism: 15.7%       -OTHER CV MEDS:     Pre Visit Dose: Midodrine 5 mg TID,  sildenafil 20 mg TID    Post Visit Dose: no changes      -Clinic Administered Medications:     Furoscix 80 mg subcut On Body Infusor over 5 hours, Applied at 16:09 + KCL 20 mEq         Patient Assistance:      None            PLAN:  Initiation/Discontinuation/Dose Adjustment: no med changes  Education provided: none  Coordination of Care: none  Refills: none       Thank you for allowing me to participate in the care of your patient,    Mendy Borja, PharmD  [facility]  05/16/24  15:48 EDT

## 2024-05-16 NOTE — ADDENDUM NOTE
Encounter addended by: Eugene Crawford APRN on: 5/16/2024 5:24 PM   Actions taken: Problem List reviewed, Medication List reviewed, Allergies reviewed, Letter saved

## 2024-05-16 NOTE — LETTER
May 16, 2024     Richardson Willis MD  [address] [facility] [place] [address]    Patient: Gabrielle Lyles   YOB: 1940   Date of Visit: 5/16/2024       Dear Richardson Willis MD:    Thank you for referring Gabrielle Lyles to me for evaluation. Below are the relevant portions of my assessment and plan of care.    84-year-old female patient with Dr. Richardson Willis's with a past medical history of volume overload's from stage IV CKD with an eGFR last annotated at 29 and a creatinine of 1.7.  Cor pulmonale with significantly reduced RV function, mild pulmonary hypertension RVSP 35 to 45 mmHg (pending recommended RHC by Dr. Willis to quantify her pulmonary hypertension), with preserved LV systolic function without any known diastolic dysfunction per TTE 3/19/2024 , paroxysmal atrial fibrillation on anticoagulation significant pulmonary issues including chronic hypoxemic respiratory failure with associated O2 requiring COPD, chronic atelectasis of the left lower lobe, chronic hypotension requiring ProAmatine, H/O malignant neoplasm of breast s/p partial mastectomy, anemia due to her stage IV CKD, moderate malnutrition, chronic low back pain, history of venous thrombosis and embolism, history of a TIA previously, gouty arthritis, history of aortic abdominal aneurysm.,  Ascites, admitted to the hospital 4/25/2024 for 12 days secondary to dyspnea developed an acute respiratory failure while she was here slowly improved found to have had an infected PICC line site treated with antibiotics subsequently discharged to home.  Called from Dr. Willis's office by his nurse practitioner asking us to see the patient urgently being evaluated for her initial visit after having missed visit due to her recent hospitalization here at the heart failure clinic on 5/16/2024 since discharge from the hospital she has continued to swell and feel rather poorly.  She gets dyspneic with minimal exertion.  She not really had any chest pain  palpitations no pre or charlie syncopal episodes     Encounter Diagnosis and Orders:  Diagnoses and all orders for this visit:    1. CKD (chronic kidney disease), stage IV (Primary)    2. Cor pulmonale (chronic)    3. Mild pulmonary hypertension    4. Chronic hypoxemic respiratory failure    5. Anemia due to stage 4 chronic kidney disease  -     CBC & Differential; Future  -     proBNP; Future  -     CBC & Differential; Standing  -     CBC & Differential  -     proBNP; Standing  -     proBNP    6. 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 Chronic hypotension    8. Paroxysmal atrial fibrillation    9. Shortness of breath  -     Basic Metabolic Panel; Future  -     CBC & Differential; Future  -     proBNP; Future  -     Magnesium; Future  -     Basic Metabolic Panel; Standing  -     Basic Metabolic Panel  -     CBC & Differential; Standing  -     CBC & Differential  -     proBNP; Standing  -     proBNP  -     Magnesium; Standing  -     Magnesium    10. Panlobular emphysema    11. Personal history of malignant neoplasm of breast    12. Moderate malnutrition    13. Low back pain with sciatica, sciatica laterality unspecified, unspecified back pain laterality, unspecified chronicity    14. History of venous thrombosis and embolism    15. History of TIA (transient ischemic attack)    16. Gout, unspecified cause, unspecified chronicity, unspecified site    17. Gastroesophageal reflux disease, unspecified whether esophagitis present    18. Dependence on supplemental oxygen    19. Chronic obstructive pulmonary disease, unspecified COPD type    20. Abdominal aortic aneurysm (AAA) without rupture, unspecified part    Other orders  -     ECG 12 Lead Dyspnea; Standing  -     ECG 12 Lead Dyspnea  -     Furosemide Cartridge Kit 80 mg  -     potassium chloride (KLOR-CON M20) CR tablet 20 mEq    Type of Overload multifactorial  Preserved LV systolic and diastolic function  Severe Cor pulmonale  Mild pulmonary  hypertension  Predominantly stage IV CKD     Most recent EF: (TTE 3/19/24) LVEF 61 - 65%. W/o diastolic dysfunction     [place] Heart association Class & Stage : IIIc     HF Meds Pre Visit not applicable w/o systolic or diastolic HF    Discussion     No indication since no systolic or diastolic heart failure core measures   Would certainly consider discontinuation of beta-blocker given history of hypotension requiring ProAmatine and given the presence of significant COPD but will defer to her cardiologist Dr. Willis  Predominantly appears to be renal in etiology since she vacillates between stage IIIb and stage IV CKD for the volume overload with overlapping cor pulmonale given the significantly reduced RV function   Certainly needs of follow-up with the renal service given significant CKD stage 4 with a EGFR last reported <30  eGFR 16 May 2024 increased to 33.4 from 29 previously creatinine at 1.5  proBNP on 16 May 2024 16,757  Obviously volume overloaded today therefore given her renal dysfunction went ahead and used off label Furoscix yet x 1 given it's delivery system seems less nephrotoxic and the fact it is given over 5-hour period.  In hopes of being able to keep the patient from being admitted to the hospital  She was asked to call should you become volume overloaded again and her follow-up with our clinic will be on an as-needed basis unless directed otherwise by Dr. Willis  Needs follow-up with pulmonary service he sees Dr. Roy for pulmonary hypertension  would add volume overload (not heart failure since that her does not seem to be significant evidence of diastolic or systolic heart failure ) nursing interventions including:                          A. Fluid restriction at less than 2000 cc daily                          B. Daily weights                          C.  1 g low-sodium diet            If you have questions, please do not hesitate to call me. I look forward to following Gabrielle along with  you.         Sincerely,        JOAQUÍN Cormier        CC: No Recipients

## 2024-05-17 ENCOUNTER — READMISSION MANAGEMENT (OUTPATIENT)
Dept: CALL CENTER | Facility: HOSPITAL | Age: 84
End: 2024-05-17
Payer: MEDICARE

## 2024-05-17 NOTE — OUTREACH NOTE
Medical Week 2 Survey      Flowsheet Row Responses   [facility] patient discharged from? [facility]   Does the patient have one of the following disease processes/diagnoses(primary or secondary)? Other   Week 2 attempt successful? Yes   Call start time 1417   Discharge diagnosis Dyspnea   Call end time 1423   Meds reviewed with patient/caregiver? Yes   Is the patient having any side effects they believe may be caused by any medication additions or changes? No   Does the patient have all medications ordered at discharge? Yes   Prescription comments Lasix injections added in HF clinic 5/16/24   Is the patient taking all medications as directed (includes completed medication regime)? Yes   Does the patient have a primary care provider?  Yes   Does the patient have an appointment with their PCP within 7 days of discharge? Yes   Has the patient kept scheduled appointments due by today? Yes   What is the Home health agency?  [facility]   Has home health visited the patient within 72 hours of discharge? Yes   Psychosocial issues? No   Did the patient receive a copy of their discharge instructions? Yes   Nursing interventions Reviewed instructions with patient   What is the patient's perception of their health status since discharge? Improving   Is the patient/caregiver able to teach back signs and symptoms related to disease process for when to call PCP? Yes   Is the patient/caregiver able to teach back signs and symptoms related to disease process for when to call 911? Yes   Is the patient/caregiver able to teach back the hierarchy of who to call/visit for symptoms/problems? PCP, Specialist, Home health nurse, Urgent Care, ED, 911 Yes   Additional teach back comments states has lost 5 lbs in water weight in last day since starting subQ Lasix injections   Week 2 Call Completed? Yes   Call end time 1423            Kayla ABDULLAHI - Registered Nurse

## 2024-05-22 ENCOUNTER — TELEPHONE (OUTPATIENT)
Dept: CARDIOLOGY | Facility: CLINIC | Age: 84
End: 2024-05-22
Payer: MEDICARE

## 2024-05-22 NOTE — TELEPHONE ENCOUNTER
Caller: Gabrielle Lyles    Relationship: Self    Best call back number:  [phone]        PATIENT WAS RECENTLY SEEN IN ER, NO DIRECTIONS FOR FOLLOW UP WITH CARDIOLOGY.  PLEASE REACH OUT TO THE PATIENT TO SCHEDULE.

## 2024-05-28 ENCOUNTER — TELEPHONE (OUTPATIENT)
Dept: CARDIOLOGY | Facility: CLINIC | Age: 84
End: 2024-05-28
Payer: MEDICARE

## 2024-05-28 NOTE — TELEPHONE ENCOUNTER
ABRAHAN WITH [facility]  PHONE: [phone]    PATIENT HAS GAINED 5 POUNDS SINCE 5/21. SWELLING IN LEGS. +1 IN RIGHT LEG. LINGS CLEAR.  MOMENTS OF SOB, BUT THAT IS NORMAL FOR PATIENT. PATIENT ON BUMEX 0.5 MG DAILY.

## 2024-05-29 ENCOUNTER — APPOINTMENT (OUTPATIENT)
Dept: GENERAL RADIOLOGY | Facility: HOSPITAL | Age: 84
End: 2024-05-29
Payer: MEDICARE

## 2024-05-29 ENCOUNTER — HOSPITAL ENCOUNTER (OUTPATIENT)
Facility: HOSPITAL | Age: 84
Setting detail: OBSERVATION
Discharge: HOME HEALTH | End: 2024-06-01
Attending: EMERGENCY MEDICINE | Admitting: INTERNAL MEDICINE
Payer: MEDICARE

## 2024-05-29 ENCOUNTER — APPOINTMENT (OUTPATIENT)
Dept: CT IMAGING | Facility: HOSPITAL | Age: 84
End: 2024-05-29
Payer: MEDICARE

## 2024-05-29 DIAGNOSIS — D64.9 NORMOCYTIC ANEMIA: ICD-10-CM

## 2024-05-29 DIAGNOSIS — E86.0 DEHYDRATION: ICD-10-CM

## 2024-05-29 DIAGNOSIS — R55 NEAR SYNCOPE: ICD-10-CM

## 2024-05-29 DIAGNOSIS — J98.11 ATELECTASIS PULMONARY: ICD-10-CM

## 2024-05-29 DIAGNOSIS — R55 SYNCOPE AND COLLAPSE: Primary | ICD-10-CM

## 2024-05-29 DIAGNOSIS — N18.9 CHRONIC KIDNEY DISEASE, UNSPECIFIED CKD STAGE: ICD-10-CM

## 2024-05-29 DIAGNOSIS — J90 PLEURAL EFFUSION, LEFT: ICD-10-CM

## 2024-05-29 DIAGNOSIS — S01.01XA SCALP LACERATION, INITIAL ENCOUNTER: ICD-10-CM

## 2024-05-29 LAB
ALBUMIN SERPL-MCNC: 3.6 G/DL (ref 3.5–5.2)
ALBUMIN/GLOB SERPL: 1.2 G/DL
ALP SERPL-CCNC: 83 U/L (ref 39–117)
ALT SERPL W P-5'-P-CCNC: 5 U/L (ref 1–33)
ANION GAP SERPL CALCULATED.3IONS-SCNC: 9.5 MMOL/L (ref 5–15)
AST SERPL-CCNC: 21 U/L (ref 1–32)
BASOPHILS # BLD AUTO: 0.06 10*3/MM3 (ref 0–0.2)
BASOPHILS NFR BLD AUTO: 0.5 % (ref 0–1.5)
BILIRUB SERPL-MCNC: 1.1 MG/DL (ref 0–1.2)
BILIRUB UR QL STRIP: NEGATIVE
BUN SERPL-MCNC: 40 MG/DL (ref 8–23)
BUN/CREAT SERPL: 21.9 (ref 7–25)
CALCIUM SPEC-SCNC: 9.9 MG/DL (ref 8.6–10.5)
CHLORIDE SERPL-SCNC: 102 MMOL/L (ref 98–107)
CLARITY UR: CLEAR
CO2 SERPL-SCNC: 30.5 MMOL/L (ref 22–29)
COLOR UR: YELLOW
CREAT SERPL-MCNC: 1.83 MG/DL (ref 0.57–1)
DEPRECATED RDW RBC AUTO: 72.9 FL (ref 37–54)
EGFRCR SERPLBLD CKD-EPI 2021: 27.1 ML/MIN/1.73
EOSINOPHIL # BLD AUTO: 0.17 10*3/MM3 (ref 0–0.4)
EOSINOPHIL NFR BLD AUTO: 1.5 % (ref 0.3–6.2)
ERYTHROCYTE [DISTWIDTH] IN BLOOD BY AUTOMATED COUNT: 22.7 % (ref 12.3–15.4)
GLOBULIN UR ELPH-MCNC: 3.1 GM/DL
GLUCOSE SERPL-MCNC: 148 MG/DL (ref 65–99)
GLUCOSE UR STRIP-MCNC: NEGATIVE MG/DL
HCT VFR BLD AUTO: 33.4 % (ref 34–46.6)
HGB BLD-MCNC: 10.2 G/DL (ref 12–15.9)
HGB UR QL STRIP.AUTO: NEGATIVE
IMM GRANULOCYTES # BLD AUTO: 0.07 10*3/MM3 (ref 0–0.05)
IMM GRANULOCYTES NFR BLD AUTO: 0.6 % (ref 0–0.5)
KETONES UR QL STRIP: NEGATIVE
LEUKOCYTE ESTERASE UR QL STRIP.AUTO: NEGATIVE
LYMPHOCYTES # BLD AUTO: 0.79 10*3/MM3 (ref 0.7–3.1)
LYMPHOCYTES NFR BLD AUTO: 6.9 % (ref 19.6–45.3)
MCH RBC QN AUTO: 26.9 PG (ref 26.6–33)
MCHC RBC AUTO-ENTMCNC: 30.5 G/DL (ref 31.5–35.7)
MCV RBC AUTO: 88.1 FL (ref 79–97)
MONOCYTES # BLD AUTO: 0.99 10*3/MM3 (ref 0.1–0.9)
MONOCYTES NFR BLD AUTO: 8.6 % (ref 5–12)
NEUTROPHILS NFR BLD AUTO: 81.9 % (ref 42.7–76)
NEUTROPHILS NFR BLD AUTO: 9.39 10*3/MM3 (ref 1.7–7)
NITRITE UR QL STRIP: NEGATIVE
NRBC BLD AUTO-RTO: 0 /100 WBC (ref 0–0.2)
PH UR STRIP.AUTO: 6 [PH] (ref 5–8)
PLATELET # BLD AUTO: 278 10*3/MM3 (ref 140–450)
PMV BLD AUTO: 10 FL (ref 6–12)
POTASSIUM SERPL-SCNC: 3.9 MMOL/L (ref 3.5–5.2)
PROT SERPL-MCNC: 6.7 G/DL (ref 6–8.5)
PROT UR QL STRIP: NEGATIVE
RBC # BLD AUTO: 3.79 10*6/MM3 (ref 3.77–5.28)
SODIUM SERPL-SCNC: 142 MMOL/L (ref 136–145)
SP GR UR STRIP: 1.01 (ref 1–1.03)
TROPONIN T SERPL HS-MCNC: 28 NG/L
UROBILINOGEN UR QL STRIP: NORMAL
WBC NRBC COR # BLD AUTO: 11.47 10*3/MM3 (ref 3.4–10.8)

## 2024-05-29 PROCEDURE — 81003 URINALYSIS AUTO W/O SCOPE: CPT | Performed by: EMERGENCY MEDICINE

## 2024-05-29 PROCEDURE — 93005 ELECTROCARDIOGRAM TRACING: CPT | Performed by: EMERGENCY MEDICINE

## 2024-05-29 PROCEDURE — 84484 ASSAY OF TROPONIN QUANT: CPT | Performed by: EMERGENCY MEDICINE

## 2024-05-29 PROCEDURE — 25010000002 CEFAZOLIN PER 500 MG: Performed by: EMERGENCY MEDICINE

## 2024-05-29 PROCEDURE — 80053 COMPREHEN METABOLIC PANEL: CPT | Performed by: EMERGENCY MEDICINE

## 2024-05-29 PROCEDURE — 85025 COMPLETE CBC W/AUTO DIFF WBC: CPT | Performed by: EMERGENCY MEDICINE

## 2024-05-29 PROCEDURE — 99285 EMERGENCY DEPT VISIT HI MDM: CPT

## 2024-05-29 PROCEDURE — 71045 X-RAY EXAM CHEST 1 VIEW: CPT

## 2024-05-29 PROCEDURE — G0378 HOSPITAL OBSERVATION PER HR: HCPCS

## 2024-05-29 PROCEDURE — 36415 COLL VENOUS BLD VENIPUNCTURE: CPT

## 2024-05-29 PROCEDURE — 70450 CT HEAD/BRAIN W/O DYE: CPT

## 2024-05-29 PROCEDURE — 25810000003 LACTATED RINGERS SOLUTION: Performed by: EMERGENCY MEDICINE

## 2024-05-29 RX ORDER — SILDENAFIL CITRATE 20 MG/1
20 TABLET ORAL 3 TIMES DAILY
Status: DISCONTINUED | OUTPATIENT
Start: 2024-05-30 | End: 2024-06-01 | Stop reason: HOSPADM

## 2024-05-29 RX ORDER — ONDANSETRON 2 MG/ML
4 INJECTION INTRAMUSCULAR; INTRAVENOUS EVERY 6 HOURS PRN
Status: DISCONTINUED | OUTPATIENT
Start: 2024-05-29 | End: 2024-06-01 | Stop reason: HOSPADM

## 2024-05-29 RX ORDER — SODIUM CHLORIDE 9 MG/ML
40 INJECTION, SOLUTION INTRAVENOUS AS NEEDED
Status: DISCONTINUED | OUTPATIENT
Start: 2024-05-29 | End: 2024-06-01 | Stop reason: HOSPADM

## 2024-05-29 RX ORDER — ALLOPURINOL 100 MG/1
100 TABLET ORAL DAILY
Status: DISCONTINUED | OUTPATIENT
Start: 2024-05-30 | End: 2024-06-01 | Stop reason: HOSPADM

## 2024-05-29 RX ORDER — BUMETANIDE 1 MG/1
0.5 TABLET ORAL DAILY
Status: DISCONTINUED | OUTPATIENT
Start: 2024-05-30 | End: 2024-06-01 | Stop reason: HOSPADM

## 2024-05-29 RX ORDER — LEVOTHYROXINE SODIUM 0.07 MG/1
75 TABLET ORAL
Status: DISCONTINUED | OUTPATIENT
Start: 2024-05-30 | End: 2024-06-01 | Stop reason: HOSPADM

## 2024-05-29 RX ORDER — POLYETHYLENE GLYCOL 3350 17 G/17G
17 POWDER, FOR SOLUTION ORAL DAILY PRN
Status: DISCONTINUED | OUTPATIENT
Start: 2024-05-29 | End: 2024-06-01 | Stop reason: HOSPADM

## 2024-05-29 RX ORDER — AMOXICILLIN 250 MG
2 CAPSULE ORAL 2 TIMES DAILY PRN
Status: DISCONTINUED | OUTPATIENT
Start: 2024-05-29 | End: 2024-06-01 | Stop reason: HOSPADM

## 2024-05-29 RX ORDER — CARVEDILOL 3.12 MG/1
3.12 TABLET ORAL 2 TIMES DAILY WITH MEALS
Status: DISCONTINUED | OUTPATIENT
Start: 2024-05-30 | End: 2024-06-01 | Stop reason: HOSPADM

## 2024-05-29 RX ORDER — MIDODRINE HYDROCHLORIDE 5 MG/1
5 TABLET ORAL
Status: DISCONTINUED | OUTPATIENT
Start: 2024-05-30 | End: 2024-05-30

## 2024-05-29 RX ORDER — SODIUM CHLORIDE 0.9 % (FLUSH) 0.9 %
10 SYRINGE (ML) INJECTION AS NEEDED
Status: DISCONTINUED | OUTPATIENT
Start: 2024-05-29 | End: 2024-06-01 | Stop reason: HOSPADM

## 2024-05-29 RX ORDER — BISACODYL 10 MG
10 SUPPOSITORY, RECTAL RECTAL DAILY PRN
Status: DISCONTINUED | OUTPATIENT
Start: 2024-05-29 | End: 2024-06-01 | Stop reason: HOSPADM

## 2024-05-29 RX ORDER — POTASSIUM CHLORIDE 1.5 G/1.58G
20 POWDER, FOR SOLUTION ORAL DAILY
Status: DISCONTINUED | OUTPATIENT
Start: 2024-05-30 | End: 2024-06-01 | Stop reason: HOSPADM

## 2024-05-29 RX ORDER — PANTOPRAZOLE SODIUM 40 MG/1
40 TABLET, DELAYED RELEASE ORAL
Status: DISCONTINUED | OUTPATIENT
Start: 2024-05-30 | End: 2024-06-01 | Stop reason: HOSPADM

## 2024-05-29 RX ORDER — BUDESONIDE AND FORMOTEROL FUMARATE DIHYDRATE 160; 4.5 UG/1; UG/1
2 AEROSOL RESPIRATORY (INHALATION)
Status: DISCONTINUED | OUTPATIENT
Start: 2024-05-30 | End: 2024-06-01 | Stop reason: HOSPADM

## 2024-05-29 RX ORDER — SODIUM CHLORIDE 0.9 % (FLUSH) 0.9 %
10 SYRINGE (ML) INJECTION EVERY 12 HOURS SCHEDULED
Status: DISCONTINUED | OUTPATIENT
Start: 2024-05-30 | End: 2024-06-01 | Stop reason: HOSPADM

## 2024-05-29 RX ORDER — POTASSIUM CHLORIDE 1.5 G/1.58G
20 POWDER, FOR SOLUTION ORAL DAILY
COMMUNITY

## 2024-05-29 RX ORDER — ACETAMINOPHEN 325 MG/1
650 TABLET ORAL EVERY 4 HOURS PRN
Status: DISCONTINUED | OUTPATIENT
Start: 2024-05-29 | End: 2024-06-01 | Stop reason: HOSPADM

## 2024-05-29 RX ORDER — BISACODYL 5 MG/1
5 TABLET, DELAYED RELEASE ORAL DAILY PRN
Status: DISCONTINUED | OUTPATIENT
Start: 2024-05-29 | End: 2024-06-01 | Stop reason: HOSPADM

## 2024-05-29 RX ADMIN — SODIUM CHLORIDE, POTASSIUM CHLORIDE, SODIUM LACTATE AND CALCIUM CHLORIDE 500 ML: 600; 310; 30; 20 INJECTION, SOLUTION INTRAVENOUS at 21:35

## 2024-05-29 RX ADMIN — SODIUM CHLORIDE 2000 MG: 900 INJECTION INTRAVENOUS at 21:40

## 2024-05-29 NOTE — ED NOTES
Pt reports she has been having bouts of dizzy spells and has an appointment with Dr Granados later this month. Pt reports she was stepping up stairs and felt dizzy causing her to fall backwards and hit her head on the concrete floor, pt unsure if LOC. Pt noted with laceration to posterior scalp. Pt A&O x4. C/o HA.pt does take blood thinners-eliquis

## 2024-05-30 LAB
ANION GAP SERPL CALCULATED.3IONS-SCNC: 9.9 MMOL/L (ref 5–15)
BASOPHILS # BLD AUTO: 0.06 10*3/MM3 (ref 0–0.2)
BASOPHILS NFR BLD AUTO: 0.6 % (ref 0–1.5)
BUN SERPL-MCNC: 38 MG/DL (ref 8–23)
BUN/CREAT SERPL: 21 (ref 7–25)
CALCIUM SPEC-SCNC: 9.5 MG/DL (ref 8.6–10.5)
CHLORIDE SERPL-SCNC: 105 MMOL/L (ref 98–107)
CO2 SERPL-SCNC: 29.1 MMOL/L (ref 22–29)
CREAT SERPL-MCNC: 1.81 MG/DL (ref 0.57–1)
DEPRECATED RDW RBC AUTO: 73.5 FL (ref 37–54)
EGFRCR SERPLBLD CKD-EPI 2021: 27.5 ML/MIN/1.73
EOSINOPHIL # BLD AUTO: 0.21 10*3/MM3 (ref 0–0.4)
EOSINOPHIL NFR BLD AUTO: 1.9 % (ref 0.3–6.2)
ERYTHROCYTE [DISTWIDTH] IN BLOOD BY AUTOMATED COUNT: 23.1 % (ref 12.3–15.4)
GLUCOSE BLDC GLUCOMTR-MCNC: 148 MG/DL (ref 70–105)
GLUCOSE SERPL-MCNC: 114 MG/DL (ref 65–99)
HCT VFR BLD AUTO: 33.1 % (ref 34–46.6)
HGB BLD-MCNC: 10.1 G/DL (ref 12–15.9)
IMM GRANULOCYTES # BLD AUTO: 0.03 10*3/MM3 (ref 0–0.05)
IMM GRANULOCYTES NFR BLD AUTO: 0.3 % (ref 0–0.5)
LYMPHOCYTES # BLD AUTO: 1.2 10*3/MM3 (ref 0.7–3.1)
LYMPHOCYTES NFR BLD AUTO: 11 % (ref 19.6–45.3)
MCH RBC QN AUTO: 27.2 PG (ref 26.6–33)
MCHC RBC AUTO-ENTMCNC: 30.5 G/DL (ref 31.5–35.7)
MCV RBC AUTO: 89 FL (ref 79–97)
MONOCYTES # BLD AUTO: 0.89 10*3/MM3 (ref 0.1–0.9)
MONOCYTES NFR BLD AUTO: 8.2 % (ref 5–12)
NEUTROPHILS NFR BLD AUTO: 78 % (ref 42.7–76)
NEUTROPHILS NFR BLD AUTO: 8.51 10*3/MM3 (ref 1.7–7)
NRBC BLD AUTO-RTO: 0 /100 WBC (ref 0–0.2)
PLATELET # BLD AUTO: 258 10*3/MM3 (ref 140–450)
PMV BLD AUTO: 10.7 FL (ref 6–12)
POTASSIUM SERPL-SCNC: 3.9 MMOL/L (ref 3.5–5.2)
QT INTERVAL: 372 MS
QTC INTERVAL: 470 MS
RBC # BLD AUTO: 3.72 10*6/MM3 (ref 3.77–5.28)
SODIUM SERPL-SCNC: 144 MMOL/L (ref 136–145)
WBC NRBC COR # BLD AUTO: 10.9 10*3/MM3 (ref 3.4–10.8)

## 2024-05-30 PROCEDURE — 97162 PT EVAL MOD COMPLEX 30 MIN: CPT

## 2024-05-30 PROCEDURE — G0378 HOSPITAL OBSERVATION PER HR: HCPCS

## 2024-05-30 PROCEDURE — 96374 THER/PROPH/DIAG INJ IV PUSH: CPT

## 2024-05-30 PROCEDURE — 94640 AIRWAY INHALATION TREATMENT: CPT

## 2024-05-30 PROCEDURE — 94761 N-INVAS EAR/PLS OXIMETRY MLT: CPT

## 2024-05-30 PROCEDURE — 94799 UNLISTED PULMONARY SVC/PX: CPT

## 2024-05-30 PROCEDURE — 96375 TX/PRO/DX INJ NEW DRUG ADDON: CPT

## 2024-05-30 PROCEDURE — 80048 BASIC METABOLIC PNL TOTAL CA: CPT

## 2024-05-30 PROCEDURE — 97530 THERAPEUTIC ACTIVITIES: CPT

## 2024-05-30 PROCEDURE — 96376 TX/PRO/DX INJ SAME DRUG ADON: CPT

## 2024-05-30 PROCEDURE — 25010000002 METHYLPREDNISOLONE PER 40 MG: Performed by: FAMILY MEDICINE

## 2024-05-30 PROCEDURE — 85025 COMPLETE CBC W/AUTO DIFF WBC: CPT

## 2024-05-30 PROCEDURE — 25010000002 ONDANSETRON PER 1 MG

## 2024-05-30 PROCEDURE — 94664 DEMO&/EVAL PT USE INHALER: CPT

## 2024-05-30 PROCEDURE — 82948 REAGENT STRIP/BLOOD GLUCOSE: CPT

## 2024-05-30 RX ORDER — METHYLPREDNISOLONE SODIUM SUCCINATE 40 MG/ML
20 INJECTION, POWDER, LYOPHILIZED, FOR SOLUTION INTRAMUSCULAR; INTRAVENOUS ONCE
Status: COMPLETED | OUTPATIENT
Start: 2024-05-30 | End: 2024-05-30

## 2024-05-30 RX ORDER — MIDODRINE HYDROCHLORIDE 5 MG/1
10 TABLET ORAL
Status: DISCONTINUED | OUTPATIENT
Start: 2024-05-30 | End: 2024-06-01 | Stop reason: HOSPADM

## 2024-05-30 RX ADMIN — APIXABAN 2.5 MG: 2.5 TABLET, FILM COATED ORAL at 22:33

## 2024-05-30 RX ADMIN — BUDESONIDE AND FORMOTEROL FUMARATE DIHYDRATE 2 PUFF: 160; 4.5 AEROSOL RESPIRATORY (INHALATION) at 09:33

## 2024-05-30 RX ADMIN — APIXABAN 5 MG: 5 TABLET, FILM COATED ORAL at 00:43

## 2024-05-30 RX ADMIN — ACETAMINOPHEN 650 MG: 325 TABLET, FILM COATED ORAL at 09:53

## 2024-05-30 RX ADMIN — PANTOPRAZOLE SODIUM 40 MG: 40 TABLET, DELAYED RELEASE ORAL at 06:37

## 2024-05-30 RX ADMIN — MIDODRINE HYDROCHLORIDE 5 MG: 5 TABLET ORAL at 12:23

## 2024-05-30 RX ADMIN — SILDENAFIL CITRATE 20 MG: 20 TABLET ORAL at 09:50

## 2024-05-30 RX ADMIN — Medication 1 TABLET: at 09:50

## 2024-05-30 RX ADMIN — SILDENAFIL CITRATE 20 MG: 20 TABLET ORAL at 17:04

## 2024-05-30 RX ADMIN — SILDENAFIL CITRATE 20 MG: 20 TABLET ORAL at 22:32

## 2024-05-30 RX ADMIN — ACETAMINOPHEN 650 MG: 325 TABLET, FILM COATED ORAL at 00:43

## 2024-05-30 RX ADMIN — Medication 10 ML: at 22:32

## 2024-05-30 RX ADMIN — POTASSIUM CHLORIDE 20 MEQ: 1.5 POWDER, FOR SOLUTION ORAL at 09:50

## 2024-05-30 RX ADMIN — Medication 10 ML: at 00:44

## 2024-05-30 RX ADMIN — BUMETANIDE 0.5 MG: 1 TABLET ORAL at 09:50

## 2024-05-30 RX ADMIN — METHYLPREDNISOLONE SODIUM SUCCINATE 20 MG: 40 INJECTION, POWDER, FOR SOLUTION INTRAMUSCULAR; INTRAVENOUS at 17:04

## 2024-05-30 RX ADMIN — MIDODRINE HYDROCHLORIDE 10 MG: 5 TABLET ORAL at 17:04

## 2024-05-30 RX ADMIN — ALLOPURINOL 100 MG: 100 TABLET ORAL at 09:50

## 2024-05-30 RX ADMIN — MIDODRINE HYDROCHLORIDE 5 MG: 5 TABLET ORAL at 06:37

## 2024-05-30 RX ADMIN — Medication 10 ML: at 09:51

## 2024-05-30 RX ADMIN — APIXABAN 5 MG: 5 TABLET, FILM COATED ORAL at 09:53

## 2024-05-30 RX ADMIN — ONDANSETRON 4 MG: 2 INJECTION INTRAMUSCULAR; INTRAVENOUS at 09:53

## 2024-05-30 RX ADMIN — PANTOPRAZOLE SODIUM 40 MG: 40 TABLET, DELAYED RELEASE ORAL at 17:04

## 2024-05-30 RX ADMIN — ONDANSETRON 4 MG: 2 INJECTION INTRAMUSCULAR; INTRAVENOUS at 02:42

## 2024-05-30 RX ADMIN — LEVOTHYROXINE SODIUM 75 MCG: 0.07 TABLET ORAL at 06:01

## 2024-05-30 NOTE — THERAPY EVALUATION
Patient Name: Gabrielle Lyles  : 1940    MRN: [id number]                              Today's Date: 2024       Admit Date: 2024    Visit Dx:     ICD-10-CM ICD-9-CM   1. Syncope and collapse  R55 780.2   2. Near syncope  R55 780.2   3. Chronic kidney disease, unspecified CKD stage  N18.9 585.9   4. Pleural effusion, left  J90 511.9   5. Atelectasis pulmonary  J98.11 518.0   6. Dehydration  E86.0 276.51   7. Normocytic anemia  D64.9 285.9   8. Scalp laceration, initial encounter  S01.01XA 873.0     Patient Active Problem List   Diagnosis    Acute hypokalemia    Low back pain    Osteopenia    Chronic obstructive pulmonary disease    Gastroesophageal reflux disease    Gout    History of venous thrombosis and embolism    Primary hypertension    Lumbar radiculopathy    Nausea    Personal history of malignant neoplasm of breast    Shortness of breath    Aortic aneurysm    Bulging lumbar disc    Spinal stenosis of lumbar region    History of TIA (transient ischemic attack)    Chronic hypoxemic respiratory failure    Cellulitis of left foot    Altered mental status    JULIAN (acute kidney injury)    Dyspnea    3A Paroxysmal atrial fibrillation    Moderate malnutrition    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    Panlobular emphysema    Dependence on supplemental oxygen    CKD (chronic kidney disease), stage IV    Cor pulmonale (chronic)    Mild pulmonary hypertension    Anemia due to stage 4 chronic kidney disease    Chronic hypotension    Syncope and collapse     Past Medical History:   Diagnosis Date    Acute exacerbation of chronic obstructive pulmonary disease (COPD)     COPD (chronic obstructive pulmonary disease)     Deep vein thrombosis of bilateral lower extremities 2019    3/2013    History of pneumonia 2012    community acquired pneumonia and right pleural effusion;hospitalized at [facility]    History of pulmonary embolism 2013    bilateral PE    Hypertension      Insomnia     on Ambien 5mg at HS    Lobular breast cancer, left 11/2011    Stage IA left upper lobular breast cancer    Malignant neoplasm of left breast in female, estrogen receptor positive 07/18/2019    Osteopenia 2012    Parainfluenza infection     Parotid duct obstruction     Severe pulmonary hypertension 03/03/2023    Stage 3a chronic kidney disease 07/24/2019    TIA (transient ischemic attack) 10/25/2022     Past Surgical History:   Procedure Laterality Date    CARDIAC CATHETERIZATION N/A 3/3/2023    Procedure: Left and Right Heart Cath with Coronary Angiography;  Surgeon: Richardson Willis MD;  Location: [facility] CATH INVASIVE LOCATION;  Service: Cardiovascular;  Laterality: N/A;    CATARACT EXTRACTION  2015    IVC FILTER RETRIEVAL  06/2014    IVC filter placement by Dr. Card    MAMMO STEREOTACTIC BREAST BX SURGICAL ADD UNI Left 11/01/2011    invasive lobular carcinoma-Dr. Castellon Colomb    MASTECTOMY, PARTIAL Left 12/01/2011    and left axillary sentinel lymph node biopsy by Dr. Uriostegui    TUBAL ABDOMINAL LIGATION  1984      General Information       [facility] Name 05/30/24 1544          Physical Therapy Time and Intention    Document Type evaluation  -     Mode of Treatment physical therapy  -[facility] Name 05/30/24 1540          General Information    Patient Profile Reviewed yes  -     Prior Level of Function independent:;all household mobility;gait  uses walker and supplemental O2 (3L) at all times. One fall, leading to this hospitalization. pt c/o dizzy spells for ~2 weeks.  -     Existing Precautions/Restrictions oxygen therapy device and L/min;fall  -     Barriers to Rehab none identified  -[facility] Name 05/30/24 1546          Living Environment    People in Home alone  -[facility] Name 05/30/24 1541          Home Main Entrance    Number of Stairs, Main Entrance two  -     Stair Railings, Main Entrance railings safe and in good condition  -[facility] Name 05/30/24 1541          Stairs  Within Home, Primary    Number of Stairs, Within Home, Primary none  -[facility] Name 05/30/24 1544          Cognition    Orientation Status (Cognition) oriented x 3  -[facility] Name 05/30/24 1544          Safety Issues, Functional Mobility    Impairments Affecting Function (Mobility) balance;endurance/activity tolerance;pain  -               User Key  (r) = Recorded By, (t) = Taken By, (c) = Cosigned By      Initials Name Provider Type     Jeimy Shukla, PT Physical Therapist                   Mobility       Row Name 05/30/24 1547          Bed Mobility    Bed Mobility bed mobility (all) activities  -     All Activities, [place] (Bed Mobility) supervision  -     Comment, (Bed Mobility) supine to/from sit, seated and supine scooting, bilateral rolling.  -AH       Row Name 05/30/24 1547          Sit-Stand Transfer    Sit-Stand [place] (Transfers) supervision  -     Assistive Device (Sit-Stand Transfers) walker, front-wheeled  -AH       Row Name 05/30/24 1547          Gait/Stairs (Locomotion)    [place] Level (Gait) contact guard  -     Assistive Device (Gait) walker, front-wheeled  -     Patient was able to Ambulate yes  -     Distance in Feet (Gait) 10  x2 bouts. standing rest between bouts.  -     Deviations/Abnormal Patterns (Gait) stride length decreased;seth decreased  guarded and slow pace due to pt's fear of falling, anticipating onset of dizziness.  -               User Key  (r) = Recorded By, (t) = Taken By, (c) = Cosigned By      Initials Name Provider Type     Jeimy Shukla, PT Physical Therapist                   Obj/Interventions       [facility] Name 05/30/24 1548          Range of Motion Comprehensive    General Range of Motion no range of motion deficits identified  -AH       Row Name 05/30/24 1548          Strength Comprehensive (MMT)    General Manual Muscle Testing (MMT) Assessment no strength deficits identified  -AH       Row Name 05/30/24 1544           Balance    Balance Assessment sitting static balance;sitting dynamic balance;standing static balance;standing dynamic balance  -     Static Sitting Balance supervision  -     Dynamic Sitting Balance supervision  -     Position, Sitting Balance unsupported;sitting edge of bed  -     Static Standing Balance contact guard  -     Dynamic Standing Balance minimal assist  -     Position/Device Used, Standing Balance supported  arm in arm assist  -       Row Name 05/30/24 1549 05/30/24 1548       Sensory Assessment (Somatosensory)    Sensory Assessment (Somatosensory) --  Vestibular screen  - (+) for R beating horizontal nystagmus with visual scan in horiz plane  with gaze stabilization at ~45 deg. roll test negative to the left. R roll test with presentation similar to visual scanning (R horizontal nystagmus). (-) BPPV.  - sensation intact  -              User Key  (r) = Recorded By, (t) = Taken By, (c) = Cosigned By      Initials Name Provider Type     Jeimy Shukla, PT Physical Therapist                   Goals/Plan       [facility] Name 05/30/24 1636          Bed Mobility Goal 1 (PT)    Activity/Assistive Device (Bed Mobility Goal 1, PT) bed mobility activities, all  -     [place] Level/Cues Needed (Bed Mobility Goal 1, PT) independent  -AH     Time Frame (Bed Mobility Goal 1, PT) long term goal (LTG);2 weeks  -[facility] Name 05/30/24 1636          Transfer Goal 1 (PT)    Activity/Assistive Device (Transfer Goal 1, PT) transfers, all  -     [place] Level/Cues Needed (Transfer Goal 1, PT) modified independence  -AH     Time Frame (Transfer Goal 1, PT) long term goal (LTG);2 weeks  -AH       Row Name 05/30/24 1636          Gait Training Goal 1 (PT)    Activity/Assistive Device (Gait Training Goal 1, PT) gait (walking locomotion);assistive device use  -     [place] Level (Gait Training Goal 1, PT) modified independence  -     Distance (Gait Training Goal 1, PT) 150'  -     Time  Frame (Gait Training Goal 1, PT) long term goal (LTG);2 weeks  -       Row Name 05/30/24 1636          Stairs Goal 1 (PT)    Activity/Assistive Device (Stairs Goal 1, PT) stairs, all skills  -     [place] Level/Cues Needed (Stairs Goal 1, PT) contact guard required  -     Number of Stairs (Stairs Goal 1, PT) 2  -     Time Frame (Stairs Goal 1, PT) long term goal (LTG);2 weeks  -       Row Name 05/30/24 1636          Therapy Assessment/Plan (PT)    Planned Therapy Interventions (PT) balance training;bed mobility training;gait training;neuromuscular re-education;patient/family education;strengthening;transfer training;home exercise program  -               User Key  (r) = Recorded By, (t) = Taken By, (c) = Cosigned By      Initials Name Provider Type     Jeimy Shukla, PT Physical Therapist                   Clinical Impression       Row Name 05/30/24 9062          Pain    Pretreatment Pain Rating 6/10  -     Posttreatment Pain Rating 8/10  -     Pre/Posttreatment Pain Comment posterior head  wound  -     Pain Intervention(s) Repositioned;Therapeutic presence  -       Row Name 05/30/24 9885          Plan of Care Review    Plan of Care Reviewed With patient  -     Outcome Evaluation Pt is an 84 y.o. female who presnted to ED on 5/29/24 after syncopal episode and fall at home. Found to have Left pleural effusion, scalp laceration (closed with staples), and dehydration. Pt lives alone and is independent at baseline with walker and supplemental O2. She has not been driving or going out alone due to dizzy spells.  She is current with home health PT/OT 2x/week. Pt presents for PT evaluation with impaired gait,  standing tolerance, dizziness with visual scanning. Supervision for bed mobility, CGA for transfer and gait x10'. Vestibular screen is positive for R horizontal nystagmus with visual scanning and gaze fixation at ~45 degrees in horizontal plane; possible vestibular neuritis. (-) Vlad Ackerman  [place]. BP is low throughout eval but unchanged with position changes. Pt is hopeful to DC home. PT recommends 24/7 supervision/assist if she goes home due to impaired activity tolerance and increased fall risk, limiting pt's ability to safely care for herself. If needed assistance is not available, PT recommends AIR for short stay to ensure full resolution of symptoms and return to baseline mobility.  PT will follow.  -       Row Name 05/30/24 1552          Therapy Assessment/Plan (PT)    Patient/Family Therapy Goals Statement (PT) get better and go home  -     Rehab Potential (PT) good, to achieve stated therapy goals  -     Criteria for Skilled Interventions Met (PT) yes;meets criteria  -     Therapy Frequency (PT) 5 times/wk  -     Predicted Duration of Therapy Intervention (PT) until DC  -       Row Name 05/30/24 1552          Vital Signs    Pre Systolic BP Rehab 107  -AH     Pre Treatment Diastolic BP 46  -AH     Intra Systolic BP Rehab 107  -AH     Intra Treatment Diastolic BP 56  -AH     Post Systolic BP Rehab 195  -AH     Post Treatment Diastolic BP 52  -AH     Pretreatment Heart Rate (beats/min) 89  -AH     Posttreatment Heart Rate (beats/min) 95  -AH     Pre SpO2 (%) 94  -AH     O2 Delivery Pre Treatment supplemental O2  3L  -AH     Post SpO2 (%) 93  -AH     O2 Delivery Post Treatment supplemental O2  3L  -AH     Pre Patient Position Supine  -     Intra Patient Position Sitting  -     Post Patient Position Standing  -       Row Name 05/30/24 1552          Positioning and Restraints    Post Treatment Position bed  -     In Bed notified nsg;supine;encouraged to call for assist;exit alarm on;call light within reach  -               User Key  (r) = Recorded By, (t) = Taken By, (c) = Cosigned By      Initials Name Provider Type     Jeimy Shukla, PT Physical Therapist                   Outcome Measures       Row Name 05/30/24 8467 05/30/24 1313       How much help from another person do  you currently need...    Turning from your back to your side while in flat bed without using bedrails? 4  - 4  -EW    Moving from lying on back to sitting on the side of a flat bed without bedrails? 4  - 4  -EW    Moving to and from a bed to a chair (including a wheelchair)? 3  - 3  -EW    Standing up from a chair using your arms (e.g., wheelchair, bedside chair)? 3  - 3  -EW    Climbing 3-5 steps with a railing? 2  - 2  -EW    To walk in hospital room? 3  - 3  -EW    AM-PAC 6 Clicks Score (PT) 19  - 19  -EW    Highest Level of Mobility Goal 6 --> Walk 10 steps or more  - 6 --> Walk 10 steps or more  -      Row Name 05/30/24 1637          Functional Assessment    Outcome Measure Options AM-PAC 6 Clicks Basic Mobility (PT)  -               User Key  (r) = Recorded By, (t) = Taken By, (c) = Cosigned By      Initials Name Provider Type     Tahmina Card, RN Registered Nurse     Jeimy Shukla, PT Physical Therapist                                 Physical Therapy Education       Title: PT OT SLP Therapies (Done)       Topic: Physical Therapy (Done)       Point: Mobility training (Done)       Learning Progress Summary             Patient GREGG Freitas VU by  at 5/30/2024 1638                         Point: Home exercise program (Done)       Learning Progress Summary             Patient Fortino E, VU by  at 5/30/2024 1638                         Point: Body mechanics (Done)       Learning Progress Summary             Patient GREGG Freitas, VU by  at 5/30/2024 1638                         Point: Precautions (Done)       Learning Progress Summary             Patient GREGG Freitas VU by  at 5/30/2024 1638                                         User Key       Initials Effective Dates Name Provider Type formerly [facility] 12/04/23 -  Jeimy Shukla, PT Physical Therapist PT                  PT Recommendation and Plan  Planned Therapy Interventions (PT): balance training, bed mobility training, gait  training, neuromuscular re-education, patient/family education, strengthening, transfer training, home exercise program  Plan of Care Reviewed With: patient  Outcome Evaluation: Pt is an 84 y.o. female who presnted to ED on 5/29/24 after syncopal episode and fall at home. Found to have Left pleural effusion, scalp laceration (closed with staples), and dehydration. Pt lives alone and is independent at baseline with walker and supplemental O2. She has not been driving or going out alone due to dizzy spells.  She is current with home health PT/OT 2x/week. Pt presents for PT evaluation with impaired gait,  standing tolerance, dizziness with visual scanning. Supervision for bed mobility, CGA for transfer and gait x10'. Vestibular screen is positive for R horizontal nystagmus with visual scanning and gaze fixation at ~45 degrees in horizontal plane; possible vestibular neuritis. (-) Vlad Ackerman [place]. BP is low throughout eval but unchanged with position changes. Pt is hopeful to DC home. PT recommends 24/7 supervision/assist if she goes home due to impaired activity tolerance and increased fall risk, limiting pt's ability to safely care for herself. If needed assistance is not available, PT recommends AIR for short stay to ensure full resolution of symptoms and return to baseline mobility.  PT will follow.     Time Calculation:         PT Charges       Row Name 05/30/24 1639             Time Calculation    Start Time 1450  -      Stop Time 1540  -      Time Calculation (min) 50 min  -      PT Non-Billable Time (min) 0 min  -      PT Received On 05/30/24  -      PT - Next Appointment 05/31/24  -      PT Goal Re-Cert Due Date 06/13/24  -         Time Calculation- PT    Total Timed Code Minutes- PT 10 minute(s)  -                User Key  (r) = Recorded By, (t) = Taken By, (c) = Cosigned By      Initials Name Provider Type    Jeimy Wilhelm PT Physical Therapist                  Therapy Charges for Today        Code Description Service Date Service Provider Modifiers Qty    99929607993 HC-PT EVAL MOD COMPLEXITY 5 5/30/2024 Jeimy Shukla, PT  1    [id number] HC PT THERAPEUTIC ACT EA 15 MIN 5/30/2024 Jeimy Shukla, PT GP 1            PT G-Codes  Outcome Measure Options: AM-PAC 6 Clicks Basic Mobility (PT)  AM-PAC 6 Clicks Score (PT): 19  PT Discharge Summary  Anticipated Discharge Disposition (PT): inpatient rehabilitation facility    Jeimy Shukla PT  5/30/2024

## 2024-05-30 NOTE — PLAN OF CARE
Goal Outcome Evaluation:  Plan of Care Reviewed With: patient           Outcome Evaluation: Pt is an 84 y.o. female who presnted to ED on 5/29/24 after syncopal episode and fall at home. Found to have Left pleural effusion, scalp laceration (closed with staples), and dehydration. Pt lives alone and is independent at baseline with walker and supplemental O2. She has not been driving or going out alone due to dizzy spells.  She is current with home health PT/OT 2x/week. Pt presents for PT evaluation with impaired gait,  standing tolerance, dizziness with visual scanning. Supervision for bed mobility, CGA for transfer and gait x10'. Vestibular screen is positive for R horizontal nystagmus with visual scanning and gaze fixation at ~45 degrees in horizontal plane; possible vestibular neuritis. (-) Vlad Ackerman [place]. BP is low throughout eval but unchanged with position changes. Pt is hopeful to DC home. PT recommends 24/7 supervision/assist if she goes home due to impaired activity tolerance and increased fall risk, limiting pt's ability to safely care for herself. If needed assistance is not available, PT recommends AIR for short stay to ensure full resolution of symptoms and return to baseline mobility.  PT will follow.      Anticipated Discharge Disposition (PT): inpatient rehabilitation facility

## 2024-05-30 NOTE — H&P
Patient Care Team:  Paul Granados MD as PCP - General (Family Medicine)  Richardson Willis MD as Cardiologist (Cardiology)  Rafy Rodriguez MD as Consulting Physician (Hematology and Oncology)  Christianne Phillips, AMARILIS as Licensed Practical Nurse  Salome Fleming MD as Consulting Physician (Nephrology)    Chief complaint syncopal episode    Subjective     Patient is a 84 y.o. female who presented to the ER with complaints of a syncopal episode earlier today when she was standing on steps and fell and hit the back of her head. She is reporting multiple recent near syncopal episodes. She denies any fever, chills, shortness of breath, chest pain, palpitations. She denies any focal deficits.   In the ER patient had skin closure to back of scalp with staples. CT head with no acute findings. CXR with stable cardiomegaly, trace left basilar pleural effusion with mild left basilar atelectasis. BUN 40, creatinine 1.83 which appears to be similar to her baseline. UA negative for infection, EKG shows atrial fibrillation rate 96.     Onset of symptoms was ongoing    Chart review- Patient with recent 12 day hospital stay  Discharge Summary 5/7/24  Hospital Course  Patient is a 84 y.o. female who presented with SOB/PEREZ  She was found to have AEOCHF and AEOCOPD and treated aggressively by both pulmonology and cardiology.  Nephrology was asked to participate secondary to ARF and she slowly improved and was deemed stable for D/C with medications per the D/C reconciliation.  F/U will be with all within one month      Review of Systems   Eyes: Negative.    Respiratory: Negative.     Cardiovascular: Negative.    Gastrointestinal: Negative.    Endocrine: Negative.    Genitourinary: Negative.    Musculoskeletal: Negative.    Neurological:  Positive for syncope, weakness and headaches.   Psychiatric/Behavioral: Negative.            History  Past Medical History:   Diagnosis Date    Acute exacerbation of chronic obstructive  pulmonary disease (COPD)     COPD (chronic obstructive pulmonary disease)     Deep vein thrombosis of bilateral lower extremities 2019    3/2013    History of pneumonia 2012    community acquired pneumonia and right pleural effusion;hospitalized at [facility]    History of pulmonary embolism 2013    bilateral PE    Hypertension     Insomnia     on Ambien 5mg at     Lobular breast cancer, left 2011    Stage IA left upper lobular breast cancer    Malignant neoplasm of left breast in female, estrogen receptor positive 2019    Osteopenia     Parainfluenza infection     Parotid duct obstruction     Severe pulmonary hypertension 2023    Stage 3a chronic kidney disease 2019    TIA (transient ischemic attack) 10/25/2022     Past Surgical History:   Procedure Laterality Date    CARDIAC CATHETERIZATION N/A 3/3/2023    Procedure: Left and Right Heart Cath with Coronary Angiography;  Surgeon: Richardson Willis MD;  Location: [facility] INVASIVE LOCATION;  Service: Cardiovascular;  Laterality: N/A;    CATARACT EXTRACTION      IVC FILTER RETRIEVAL  2014    IVC filter placement by Dr. Card    MAMMO STEREOTACTIC BREAST BX SURGICAL ADD UNI Left 2011    invasive lobular carcinoma-Dr. Cande Daniel    MASTECTOMY, PARTIAL Left 2011    and left axillary sentinel lymph node biopsy by Dr. Uriostegui    TUBAL ABDOMINAL LIGATION       Family History   Problem Relation Age of Onset    Lung cancer Brother      Social History     Tobacco Use    Smoking status: Former     Current packs/day: 0.00     Types: Cigarettes     Quit date:      Years since quittin.4     Passive exposure: Past    Smokeless tobacco: Never    Tobacco comments:     smoked 6 cigarettes a day from 13 years of age to 56 years of age when she quit in    Vaping Use    Vaping status: Never Used   Substance Use Topics    Alcohol use: Not Currently    Drug use: No     (Not in a hospital  admission)    Allergies:  Patient has no known allergies.    Objective     Vital Signs  Temp:  [97.3 °F (36.3 °C)] 97.3 °F (36.3 °C)  Heart Rate:  [84-96] 92  Resp:  [15-18] 18  BP: (105-156)/(50-86) 117/67     Physical Exam:      General Appearance:    Alert, cooperative, in no acute distress   Head:    Normocephalic, without obvious abnormality, atraumatic   Eyes:            Lids and lashes normal, conjunctivae and sclerae normal, no   icterus, no pallor, corneas clear, PERRLA   Ears:    Ears appear intact with no abnormalities noted   Throat:   No oral lesions, no thrush, oral mucosa moist   Neck:   No adenopathy, supple, trachea midline, no thyromegaly, no   carotid bruit, no JVD   Lungs:     Clear to auscultation,respirations regular, even and                  unlabored    Heart:    Regular rhythm and normal rate, normal S1 and S2, no            murmur, no gallop, no rub, no click   Chest Wall:    No abnormalities observed   Abdomen:     Normal bowel sounds, no masses, no organomegaly, soft        non-tender, non-distended, no guarding, no rebound                tenderness   Extremities:   Moves all extremities well, no edema, no cyanosis, no             redness   Pulses:   Pulses palpable and equal bilaterally   Skin:   No bleeding, bruising or rash   Lymph nodes:   No palpable adenopathy   Neurologic:   No focal deficits noted       Results Review:     Imaging Results (Last 24 Hours)       Procedure Component Value Units Date/Time    CT Head Without Contrast [[id number]] Collected: 05/29/24 1900     Updated: 05/29/24 1904    Narrative:      CT HEAD WO CONTRAST    Date of Exam: 5/29/2024 6:15 PM EDT    Indication: Head positive LOC.    Comparison: 2/5/2024    Technique: Axial CT images were obtained of the head without contrast administration.  Coronal reconstructions were performed.  Automated exposure control and iterative reconstruction methods were used.    Findings: No intracranial hemorrhage. Gray-white  matter differentiation is maintained without evidence of an acute infarction. Multiple foci of decreased attenuation are present within the subcortical, deep cerebral, and periventricular white matter   consistent with chronic small vessel/microangiopathic ischemic changes. No extra-axial mass or collection. The ventricles and sulci are prominent commensurate with involutional changes. The posterior fossa appears grossly normal. Sellar and suprasellar   structures are normal.    Orbital and periorbital soft tissues are normal. The paranasal sinuses, ethmoid air cells, and mastoid air cells are aerated. The bony calvarium is intact. Right parietal scalp laceration.      Impression:      Impression: No acute intracranial pathology.          Electronically Signed: Juancarlos Santana MD    5/29/2024 7:02 PM EDT    Workstation ID: [id number]    XR Chest 1 View [[id number]] Collected: 05/29/24 1637     Updated: 05/29/24 1640    Narrative:      XR CHEST 1 VW    Date of Exam: 5/29/2024 4:25 PM EDT    Indication: Dizziness and near syncope with shortness of breath    Comparison: AP portable chest 4/20/2024    Findings:  Stable mild to moderate cardiac enlargement. Trace left basilar pleural effusion with mild left basilar atelectasis is present. No overt features of pulmonary edema. Right lung appears clear. Mild calcific atherosclerosis within the thoracic aorta. No   acute osseous abnormality      Impression:      Impression:    1. Stable cardiomegaly. No overt pulmonary edema.  2. Trace left basilar pleural effusion with mild left basilar atelectasis.      Electronically Signed: Tiffanie Cheung MD    5/29/2024 4:37 PM EDT    Workstation ID: [id number]             Lab Results (last 24 hours)       Procedure Component Value Units Date/Time    Comprehensive Metabolic Panel [[id number]]  (Abnormal) Collected: 05/29/24 1734    Specimen: Blood from Arm, Right Updated: 05/29/24 1825     Glucose 148 mg/dL      BUN 40 mg/dL      Creatinine  1.83 mg/dL      Sodium 142 mmol/L      Potassium 3.9 mmol/L      Chloride 102 mmol/L      CO2 30.5 mmol/L      Calcium 9.9 mg/dL      Total Protein 6.7 g/dL      Albumin 3.6 g/dL      ALT (SGPT) 5 U/L      AST (SGOT) 21 U/L      Alkaline Phosphatase 83 U/L      Total Bilirubin 1.1 mg/dL      Globulin 3.1 gm/dL      A/G Ratio 1.2 g/dL      BUN/Creatinine Ratio 21.9     Anion Gap 9.5 mmol/L      eGFR 27.1 mL/min/1.73     Narrative:      GFR Normal >60  Chronic Kidney Disease <60  Kidney Failure <15    The GFR formula is only valid for adults with stable renal function between ages 18 and 70.    Single High Sensitivity Troponin T [[id number]]  (Abnormal) Collected: 05/29/24 1734    Specimen: Blood from Arm, Right Updated: 05/29/24 1821     HS Troponin T 28 ng/L     Narrative:      High Sensitive Troponin T Reference Range:  <14.0 ng/L- Negative Female for AMI  <22.0 ng/L- Negative Male for AMI  >=14 - Abnormal Female indicating possible myocardial injury.  >=22 - Abnormal Male indicating possible myocardial injury.   Clinicians would have to utilize clinical acumen, EKG, Troponin, and serial changes to determine if it is an Acute Myocardial Infarction or myocardial injury due to an underlying chronic condition.         Urinalysis With Culture If Indicated - Urine, Clean Catch [[id number]]  (Normal) Collected: 05/29/24 1744    Specimen: Urine, Clean Catch Updated: 05/29/24 1812     Color, UA Yellow     Appearance, UA Clear     pH, UA 6.0     Specific Gravity, UA 1.010     Glucose, UA Negative     Ketones, UA Negative     Bilirubin, UA Negative     Blood, UA Negative     Protein, UA Negative     Leuk Esterase, UA Negative     Nitrite, UA Negative     Urobilinogen, UA 1.0 E.U./dL    Narrative:      In absence of clinical symptoms, the presence of pyuria, bacteria, and/or nitrites on the urinalysis result does not correlate with infection.  Urine microscopic not indicated.    CBC & Differential [[id number]]  (Abnormal)  Collected: 05/29/24 1734    Specimen: Blood from Arm, Right Updated: 05/29/24 1743    Narrative:      The following orders were created for panel order CBC & Differential.  Procedure                               Abnormality         Status                     ---------                               -----------         ------                     CBC Auto Differential[[id number]]        Abnormal            Final result                 Please view results for these tests on the individual orders.    CBC Auto Differential [[id number]]  (Abnormal) Collected: 05/29/24 1734    Specimen: Blood from Arm, Right Updated: 05/29/24 1743     WBC 11.47 10*3/mm3      RBC 3.79 10*6/mm3      Hemoglobin 10.2 g/dL      Hematocrit 33.4 %      MCV 88.1 fL      MCH 26.9 pg      MCHC 30.5 g/dL      RDW 22.7 %      RDW-SD 72.9 fl      MPV 10.0 fL      Platelets 278 10*3/mm3      Neutrophil % 81.9 %      Lymphocyte % 6.9 %      Monocyte % 8.6 %      Eosinophil % 1.5 %      Basophil % 0.5 %      Immature Grans % 0.6 %      Neutrophils, Absolute 9.39 10*3/mm3      Lymphocytes, Absolute 0.79 10*3/mm3      Monocytes, Absolute 0.99 10*3/mm3      Eosinophils, Absolute 0.17 10*3/mm3      Basophils, Absolute 0.06 10*3/mm3      Immature Grans, Absolute 0.07 10*3/mm3      nRBC 0.0 /100 WBC              I reviewed the patient's new clinical results.    Assessment & Plan       Syncope and collapse  Near syncope  Left pleural effusion  -EKG afib rate 98  -Echo pending  -Cardiology consulted  -CT head with no acute findings    Chronic kidney disease  Dehydration  -IV fluids for hydration  -UA negative for infection    Scalp laceration  -repaired with staples in the ER, will need removed in one week    DVT prophylaxis- SCD's  GI prophylaxis- ppi    I discussed the patient's findings and my recommendations with patient.     Tina Alvarez, APRN  05/29/24  22:50 EDT

## 2024-05-30 NOTE — PROGRESS NOTES
LOS: 0 days   Patient Care Team:  Paul Granados MD as PCP - General (Family Medicine)  Richardson Willis MD as Cardiologist (Cardiology)  Rafy Rodriguez MD as Consulting Physician (Hematology and Oncology)  Christianne Phillips, AMARILIS as Licensed Practical Nurse  Salome Fleming MD as Consulting Physician (Nephrology)    Subjective:  weak    Objective:   afebrile      Review of Systems:   Review of Systems   Constitutional:  Positive for activity change.   Neurological:  Positive for syncope.           Vital Signs  Temp:  [97.3 °F (36.3 °C)-98.2 °F (36.8 °C)] 98.2 °F (36.8 °C)  Heart Rate:  [] 86  Resp:  [15-24] 23  BP: ()/(50-86) 137/70    Physical Exam:  Physical Exam  Vitals and nursing note reviewed.   Cardiovascular:      Rate and Rhythm: Rhythm irregular.      Heart sounds: Normal heart sounds.   Pulmonary:      Breath sounds: Normal breath sounds.   Skin:     General: Skin is warm.   Neurological:      Mental Status: She is alert.          Radiology:  CT Head Without Contrast    Result Date: 5/29/2024  Impression: No acute intracranial pathology. Electronically Signed: Juancarlos Santana MD  5/29/2024 7:02 PM EDT  Workstation ID: [id number]    XR Chest 1 View    Result Date: 5/29/2024  Impression: 1. Stable cardiomegaly. No overt pulmonary edema. 2. Trace left basilar pleural effusion with mild left basilar atelectasis. Electronically Signed: Tiffanie Cheung MD  5/29/2024 4:37 PM EDT  Workstation ID: [id number]        Results Review:     I reviewed the patient's new clinical results.  I reviewed the patient's new imaging results and agree with the interpretation.    Medication Review:   Scheduled Meds:allopurinol, 100 mg, Oral, Daily  apixaban, 2.5 mg, Oral, Q12H  budesonide-formoterol, 2 puff, Inhalation, BID - RT  bumetanide, 0.5 mg, Oral, Daily  carvedilol, 3.125 mg, Oral, BID With Meals  folic acid-pyridoxine-cyanocobalamin, 1 tablet, Oral, Daily  levothyroxine, 75 mcg, Oral, Q  "AM  methylPREDNISolone sodium succinate, 20 mg, Intravenous, Once  midodrine, 10 mg, Oral, BID AC  pantoprazole, 40 mg, Oral, BID AC  potassium chloride, 20 mEq, Oral, Daily  sildenafil, 20 mg, Oral, TID  sodium chloride, 10 mL, Intravenous, Q12H      Continuous Infusions:   PRN Meds:.  acetaminophen    senna-docusate sodium **AND** polyethylene glycol **AND** bisacodyl **AND** bisacodyl    ondansetron    sodium chloride    sodium chloride    Labs:    CBC    Results from last 7 days   Lab Units 05/30/24  0603 05/29/24  1734   WBC 10*3/mm3 10.90* 11.47*   HEMOGLOBIN g/dL 10.1* 10.2*   PLATELETS 10*3/mm3 258 278     BMP   Results from last 7 days   Lab Units 05/30/24  0647 05/29/24  1734   SODIUM mmol/L 144 142   POTASSIUM mmol/L 3.9 3.9   CHLORIDE mmol/L 105 102   CO2 mmol/L 29.1* 30.5*   BUN mg/dL 38* 40*   CREATININE mg/dL 1.81* 1.83*   GLUCOSE mg/dL 114* 148*     Cr Clearance Estimated Creatinine Clearance: 23.6 mL/min (A) (by C-G formula based on SCr of 1.81 mg/dL (H)).  Coag     HbA1C   Lab Results   Component Value Date    HGBA1C 5.8 (H) 10/25/2022     Blood Glucose No results found for: \"POCGLU\"  Infection     CMP   Results from last 7 days   Lab Units 05/30/24  0647 05/29/24  1734   SODIUM mmol/L 144 142   POTASSIUM mmol/L 3.9 3.9   CHLORIDE mmol/L 105 102   CO2 mmol/L 29.1* 30.5*   BUN mg/dL 38* 40*   CREATININE mg/dL 1.81* 1.83*   GLUCOSE mg/dL 114* 148*   ALBUMIN g/dL  --  3.6   BILIRUBIN mg/dL  --  1.1   ALK PHOS U/L  --  83   AST (SGOT) U/L  --  21   ALT (SGPT) U/L  --  5     UA    Results from last 7 days   Lab Units 05/29/24  1744   NITRITE UA  Negative     Radiology(recent) CT Head Without Contrast    Result Date: 5/29/2024  Impression: No acute intracranial pathology. Electronically Signed: Juancarlos Santana MD  5/29/2024 7:02 PM EDT  Workstation ID: [id number]    XR Chest 1 View    Result Date: 5/29/2024  Impression: 1. Stable cardiomegaly. No overt pulmonary edema. 2. Trace left basilar pleural effusion " with mild left basilar atelectasis. Electronically Signed: Tiffanie Cheung MD  5/29/2024 4:37 PM EDT  Workstation ID: [id number]    Assessment:    Orthostatic hypotension  Autonomic dysfunction syndrome  Chronic diastolic congestive heart failure  Panlobular COPD with emphysema  Atrial fibrillation, paroxysmal  Severe pulmonary hypertension  Chronic atelectasis left lower lobe  chronic kidney disease stage IIIa  Left breast anomaly  Hypertension associated chronic kidney disease stage IIIa  Anemia of chronic kidney disease  Hyperuricemia  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  Cerebrovascular disease status post CVA  Chronic oral anticoagulation therapy  Acquired hypothyroidism  Thrombophilia  Autonomic dysfunction syndrome  History of pulmonary embolism  Hypercoagulable state secondary to atrial fibrillation  [id number] score 6  History of IVC filter  Aneurysmal dilatation of abdominal aorta  Chronic mucopurulent bronchitis  Peripheral polyneuropathy  History of breast cancer  Supplemental oxygen dependency  Chronic hypoxic respiratory failure    Plan:  Medication modification//BP support//follow orthostatics        Paul Granados MD  05/30/24  13:38 EDT

## 2024-05-30 NOTE — ED PROVIDER NOTES
Subjective   History of Present Illness  9-year-old female reports that she was on some steps and had a syncopal episode today she states she hit the back of her head.  She reports multiple recent episodes of near syncope.  She reports she has no neck pain or stiffness.  She denies chest pain shortness of breath or palpitations.  She reports no abdominal pain or nausea.  The patient reports that she has had no focal neurologic defects or lateralizing neurologic signs.  Patient is known to have chronic kidney disease but the family thinks that the patient does not take in enough liquids      Review of Systems   Respiratory:  Negative for shortness of breath.    Cardiovascular:  Negative for chest pain and palpitations.   Neurological:  Positive for syncope, weakness and headaches.   All other systems reviewed and are negative.      Past Medical History:   Diagnosis Date    Acute exacerbation of chronic obstructive pulmonary disease (COPD)     COPD (chronic obstructive pulmonary disease)     Deep vein thrombosis of bilateral lower extremities 07/24/2019    3/2013    History of pneumonia 06/2012    community acquired pneumonia and right pleural effusion;hospitalized at [facility]    History of pulmonary embolism 03/2013    bilateral PE    Hypertension 2001    Insomnia     on Ambien 5mg at     Lobular breast cancer, left 11/2011    Stage IA left upper lobular breast cancer    Malignant neoplasm of left breast in female, estrogen receptor positive 07/18/2019    Osteopenia 2012    Parainfluenza infection     Parotid duct obstruction     Severe pulmonary hypertension 03/03/2023    Stage 3a chronic kidney disease 07/24/2019    TIA (transient ischemic attack) 10/25/2022   Tetanus status reported in the past as up-to-date    No Known Allergies    Past Surgical History:   Procedure Laterality Date    CARDIAC CATHETERIZATION N/A 3/3/2023    Procedure: Left and Right Heart Cath with Coronary Angiography;  Surgeon: Lazaro  MD Richardson;  Location: [facility] INVASIVE LOCATION;  Service: Cardiovascular;  Laterality: N/A;    CATARACT EXTRACTION      IVC FILTER RETRIEVAL  2014    IVC filter placement by Dr. Card    MAMMO STEREOTACTIC BREAST BX SURGICAL ADD UNI Left 2011    invasive lobular carcinoma-Dr. Cande Daniel    MASTECTOMY, PARTIAL Left 2011    and left axillary sentinel lymph node biopsy by Dr. Uriostegui    TUBAL ABDOMINAL LIGATION         Family History   Problem Relation Age of Onset    Lung cancer Brother        Social History     Socioeconomic History    Marital status:    Tobacco Use    Smoking status: Former     Current packs/day: 0.00     Types: Cigarettes     Quit date:      Years since quittin.4     Passive exposure: Past    Smokeless tobacco: Never    Tobacco comments:     smoked 6 cigarettes a day from 13 years of age to 56 years of age when she quit in    Vaping Use    Vaping status: Never Used   Substance and Sexual Activity    Alcohol use: Not Currently    Drug use: No    Sexual activity: Not Currently     Partners: Male     Reports no safety issues      Objective   Physical Exam  Alert [place] Coma Scale 15   HEENT: Pupils equal and reactive to light. Conjunctivae are not injected. Normal tympanic membranes. Oropharynx and nares are normal.  There is an occipital laceration 3 cm in length there is no palpable fracture or step-off   Neck: Supple. Midline trachea. No JVD. No goiter.   Chest: Expiratory wheeze bilaterally and equal breath sounds bilaterally, regular rate and rhythm without murmur or rub.   Abdomen: Positive bowel sounds, nontender, nondistended. No rebound or peritoneal signs. No CVA tenderness.   Extremities no clubbing. cyanosis or edema. Motor sensory exam is normal. The full range of motion is intact   Skin: Warm and dry, no rashes or petechia.   Lymphatic: No regional lymphadenopathy. No calf pain, swelling or Homans sign    Procedures     After timeout  and consent the area was anesthetized with Xylocaine 2% after being prepped with chlorhexidine the patient was irrigated with saline the patient had meticulous skin closure with skin staples patient tolerated the procedure well      ED Course  ED Course as of 05/29/24 2114   Wed May 29, 2024   1819 Due to significant overcrowding in the emergency department patient was evaluated by myself in a hallway bed.  This examination might be limited by privacy concerns, noise, exposure issues, and the patient not wearing a hospital gown.  Explained to the patient our limitations and our overcrowding.  They were in agreement to continue the exam and treatment at this time. [TH]      ED Course User Index  [TH] Alan Baumann MD      Labs Reviewed   COMPREHENSIVE METABOLIC PANEL - Abnormal; Notable for the following components:       Result Value    Glucose 148 (*)     BUN 40 (*)     Creatinine 1.83 (*)     CO2 30.5 (*)     eGFR 27.1 (*)     All other components within normal limits    Narrative:     GFR Normal >60  Chronic Kidney Disease <60  Kidney Failure <15    The GFR formula is only valid for adults with stable renal function between ages 18 and 70.   SINGLE HS TROPONIN T - Abnormal; Notable for the following components:    HS Troponin T 28 (*)     All other components within normal limits    Narrative:     High Sensitive Troponin T Reference Range:  <14.0 ng/L- Negative Female for AMI  <22.0 ng/L- Negative Male for AMI  >=14 - Abnormal Female indicating possible myocardial injury.  >=22 - Abnormal Male indicating possible myocardial injury.   Clinicians would have to utilize clinical acumen, EKG, Troponin, and serial changes to determine if it is an Acute Myocardial Infarction or myocardial injury due to an underlying chronic condition.        CBC WITH AUTO DIFFERENTIAL - Abnormal; Notable for the following components:    WBC 11.47 (*)     Hemoglobin 10.2 (*)     Hematocrit 33.4 (*)     MCHC 30.5 (*)     RDW 22.7 (*)      RDW-SD 72.9 (*)     Neutrophil % 81.9 (*)     Lymphocyte % 6.9 (*)     Immature Grans % 0.6 (*)     Neutrophils, Absolute 9.39 (*)     Monocytes, Absolute 0.99 (*)     Immature Grans, Absolute 0.07 (*)     All other components within normal limits   URINALYSIS W/ CULTURE IF INDICATED - Normal    Narrative:     In absence of clinical symptoms, the presence of pyuria, bacteria, and/or nitrites on the urinalysis result does not correlate with infection.  Urine microscopic not indicated.   CBC AND DIFFERENTIAL    Narrative:     The following orders were created for panel order CBC & Differential.  Procedure                               Abnormality         Status                     ---------                               -----------         ------                     CBC Auto Differential[[id number]]        Abnormal            Final result                 Please view results for these tests on the individual orders.     Medications   ceFAZolin 2000 mg IVPB in 100 mL NS (MBP) (has no administration in time range)   lactated ringers bolus 500 mL (has no administration in time range)     CT Head Without Contrast    Result Date: 5/29/2024  Impression: No acute intracranial pathology. Electronically Signed: Juancarols Santana MD  5/29/2024 7:02 PM EDT  Workstation ID: [id number]    XR Chest 1 View    Result Date: 5/29/2024  Impression: 1. Stable cardiomegaly. No overt pulmonary edema. 2. Trace left basilar pleural effusion with mild left basilar atelectasis. Electronically Signed: Tiffanie Cheung MD  5/29/2024 4:37 PM EDT  Workstation ID: [id number]                                          Medical Decision Making  Patient will be admitted and cautiously hydrated the patient will need a follow-up CBC to evaluate as family reported large amount of blood was noted at the scene.  The patient was hemodynamically stable in the emergency department should be noted.  The patient will need staples removed in 7 to 8 days.  The patient was  stable and agreeable to hospitalization    Amount and/or Complexity of Data Reviewed  Labs: ordered. Decision-making details documented in ED Course.  Radiology: ordered and independent interpretation performed.  Discussion of management or test interpretation with external provider(s): [employer] service    Risk  OTC drugs.  Prescription drug management.  Decision regarding hospitalization.        Final diagnoses:   Syncope and collapse   Near syncope   Chronic kidney disease, unspecified CKD stage   Pleural effusion, left   Atelectasis pulmonary   Dehydration   Normocytic anemia   Scalp laceration, initial encounter       ED Disposition  ED Disposition       ED Disposition   Decision to Admit    Condition   --    Comment   Level of Care: Telemetry [5]   Diagnosis: Syncope and collapse [780.2.ICD-9-CM]   Admitting Physician: RUSH WATERMAN [[id number]]   Attending Physician: RUSH WATERMAN [[id number]]                 No follow-up provider specified.       Medication List      No changes were made to your prescriptions during this visit.            Alan Baumann MD  05/29/24 0872

## 2024-05-31 PROBLEM — E44.0 MODERATE PROTEIN-CALORIE MALNUTRITION: Status: ACTIVE | Noted: 2024-05-10

## 2024-05-31 PROBLEM — N18.2 ANEMIA IN STAGE 2 CHRONIC KIDNEY DISEASE: Status: ACTIVE | Noted: 2024-05-10

## 2024-05-31 PROBLEM — D63.1 ANEMIA IN STAGE 2 CHRONIC KIDNEY DISEASE: Status: ACTIVE | Noted: 2024-05-10

## 2024-05-31 PROBLEM — I25.10 ATHSCL HEART DISEASE OF NATIVE CORONARY ARTERY W/O ANG PCTRS: Status: ACTIVE | Noted: 2024-05-10

## 2024-05-31 PROBLEM — J41.1 MUCOPURULENT CHRONIC BRONCHITIS: Status: ACTIVE | Noted: 2024-05-10

## 2024-05-31 PROBLEM — J44.1 CHRONIC OBSTRUCTIVE PULMONARY DISEASE WITH ACUTE EXACERBATION: Status: ACTIVE | Noted: 2024-05-10

## 2024-05-31 PROBLEM — G90.9 DISORDER OF THE AUTONOMIC NERVOUS SYSTEM, UNSPECIFIED: Status: ACTIVE | Noted: 2024-05-10

## 2024-05-31 PROBLEM — N18.31 CHRONIC KIDNEY DISEASE, STAGE 3A: Status: ACTIVE | Noted: 2024-05-10

## 2024-05-31 LAB
ANION GAP SERPL CALCULATED.3IONS-SCNC: 13.5 MMOL/L (ref 5–15)
BASOPHILS # BLD AUTO: 0.02 10*3/MM3 (ref 0–0.2)
BASOPHILS NFR BLD AUTO: 0.2 % (ref 0–1.5)
BUN SERPL-MCNC: 37 MG/DL (ref 8–23)
BUN/CREAT SERPL: 22.8 (ref 7–25)
CALCIUM SPEC-SCNC: 10 MG/DL (ref 8.6–10.5)
CHLORIDE SERPL-SCNC: 101 MMOL/L (ref 98–107)
CO2 SERPL-SCNC: 24.5 MMOL/L (ref 22–29)
CREAT SERPL-MCNC: 1.62 MG/DL (ref 0.57–1)
DEPRECATED RDW RBC AUTO: 74.4 FL (ref 37–54)
EGFRCR SERPLBLD CKD-EPI 2021: 31.4 ML/MIN/1.73
EOSINOPHIL # BLD AUTO: 0 10*3/MM3 (ref 0–0.4)
EOSINOPHIL NFR BLD AUTO: 0 % (ref 0.3–6.2)
ERYTHROCYTE [DISTWIDTH] IN BLOOD BY AUTOMATED COUNT: 22.7 % (ref 12.3–15.4)
GLUCOSE SERPL-MCNC: 131 MG/DL (ref 65–99)
HCT VFR BLD AUTO: 34.1 % (ref 34–46.6)
HGB BLD-MCNC: 10.3 G/DL (ref 12–15.9)
IMM GRANULOCYTES # BLD AUTO: 0.07 10*3/MM3 (ref 0–0.05)
IMM GRANULOCYTES NFR BLD AUTO: 0.6 % (ref 0–0.5)
LYMPHOCYTES # BLD AUTO: 0.73 10*3/MM3 (ref 0.7–3.1)
LYMPHOCYTES NFR BLD AUTO: 6.3 % (ref 19.6–45.3)
MCH RBC QN AUTO: 27 PG (ref 26.6–33)
MCHC RBC AUTO-ENTMCNC: 30.2 G/DL (ref 31.5–35.7)
MCV RBC AUTO: 89.3 FL (ref 79–97)
MONOCYTES # BLD AUTO: 0.17 10*3/MM3 (ref 0.1–0.9)
MONOCYTES NFR BLD AUTO: 1.5 % (ref 5–12)
NEUTROPHILS NFR BLD AUTO: 10.52 10*3/MM3 (ref 1.7–7)
NEUTROPHILS NFR BLD AUTO: 91.4 % (ref 42.7–76)
NRBC BLD AUTO-RTO: 0 /100 WBC (ref 0–0.2)
PLATELET # BLD AUTO: 285 10*3/MM3 (ref 140–450)
PMV BLD AUTO: 10.6 FL (ref 6–12)
POTASSIUM SERPL-SCNC: 4.3 MMOL/L (ref 3.5–5.2)
RBC # BLD AUTO: 3.82 10*6/MM3 (ref 3.77–5.28)
SODIUM SERPL-SCNC: 139 MMOL/L (ref 136–145)
WBC NRBC COR # BLD AUTO: 11.51 10*3/MM3 (ref 3.4–10.8)

## 2024-05-31 PROCEDURE — G0378 HOSPITAL OBSERVATION PER HR: HCPCS

## 2024-05-31 PROCEDURE — 80048 BASIC METABOLIC PNL TOTAL CA: CPT | Performed by: FAMILY MEDICINE

## 2024-05-31 PROCEDURE — 97530 THERAPEUTIC ACTIVITIES: CPT

## 2024-05-31 PROCEDURE — 96376 TX/PRO/DX INJ SAME DRUG ADON: CPT

## 2024-05-31 PROCEDURE — 94761 N-INVAS EAR/PLS OXIMETRY MLT: CPT

## 2024-05-31 PROCEDURE — 94799 UNLISTED PULMONARY SVC/PX: CPT

## 2024-05-31 PROCEDURE — 94664 DEMO&/EVAL PT USE INHALER: CPT

## 2024-05-31 PROCEDURE — 25010000002 METHYLPREDNISOLONE PER 40 MG: Performed by: FAMILY MEDICINE

## 2024-05-31 PROCEDURE — 97116 GAIT TRAINING THERAPY: CPT

## 2024-05-31 PROCEDURE — 85025 COMPLETE CBC W/AUTO DIFF WBC: CPT | Performed by: FAMILY MEDICINE

## 2024-05-31 RX ORDER — METHYLPREDNISOLONE SODIUM SUCCINATE 40 MG/ML
20 INJECTION, POWDER, LYOPHILIZED, FOR SOLUTION INTRAMUSCULAR; INTRAVENOUS ONCE
Status: COMPLETED | OUTPATIENT
Start: 2024-05-31 | End: 2024-05-31

## 2024-05-31 RX ORDER — MIDODRINE HYDROCHLORIDE 10 MG/1
10 TABLET ORAL
Qty: 60 TABLET | Refills: 2 | Status: SHIPPED | OUTPATIENT
Start: 2024-05-31

## 2024-05-31 RX ADMIN — METHYLPREDNISOLONE SODIUM SUCCINATE 20 MG: 40 INJECTION, POWDER, FOR SOLUTION INTRAMUSCULAR; INTRAVENOUS at 13:41

## 2024-05-31 RX ADMIN — APIXABAN 2.5 MG: 2.5 TABLET, FILM COATED ORAL at 21:00

## 2024-05-31 RX ADMIN — SILDENAFIL CITRATE 20 MG: 20 TABLET ORAL at 17:22

## 2024-05-31 RX ADMIN — CARVEDILOL 3.12 MG: 3.12 TABLET, FILM COATED ORAL at 17:22

## 2024-05-31 RX ADMIN — LEVOTHYROXINE SODIUM 75 MCG: 0.07 TABLET ORAL at 06:41

## 2024-05-31 RX ADMIN — ACETAMINOPHEN 650 MG: 325 TABLET, FILM COATED ORAL at 23:48

## 2024-05-31 RX ADMIN — SILDENAFIL CITRATE 20 MG: 20 TABLET ORAL at 08:48

## 2024-05-31 RX ADMIN — APIXABAN 2.5 MG: 2.5 TABLET, FILM COATED ORAL at 08:49

## 2024-05-31 RX ADMIN — POTASSIUM CHLORIDE 20 MEQ: 1.5 POWDER, FOR SOLUTION ORAL at 08:48

## 2024-05-31 RX ADMIN — MIDODRINE HYDROCHLORIDE 10 MG: 5 TABLET ORAL at 08:47

## 2024-05-31 RX ADMIN — BUMETANIDE 0.5 MG: 1 TABLET ORAL at 08:50

## 2024-05-31 RX ADMIN — ACETAMINOPHEN 650 MG: 325 TABLET, FILM COATED ORAL at 02:34

## 2024-05-31 RX ADMIN — BUDESONIDE AND FORMOTEROL FUMARATE DIHYDRATE 2 PUFF: 160; 4.5 AEROSOL RESPIRATORY (INHALATION) at 20:31

## 2024-05-31 RX ADMIN — BUDESONIDE AND FORMOTEROL FUMARATE DIHYDRATE 2 PUFF: 160; 4.5 AEROSOL RESPIRATORY (INHALATION) at 07:19

## 2024-05-31 RX ADMIN — Medication 10 ML: at 21:00

## 2024-05-31 RX ADMIN — SILDENAFIL CITRATE 20 MG: 20 TABLET ORAL at 21:00

## 2024-05-31 RX ADMIN — ALLOPURINOL 100 MG: 100 TABLET ORAL at 08:51

## 2024-05-31 RX ADMIN — Medication 1 TABLET: at 08:47

## 2024-05-31 RX ADMIN — CARVEDILOL 3.12 MG: 3.12 TABLET, FILM COATED ORAL at 08:48

## 2024-05-31 RX ADMIN — PANTOPRAZOLE SODIUM 40 MG: 40 TABLET, DELAYED RELEASE ORAL at 08:49

## 2024-05-31 RX ADMIN — PANTOPRAZOLE SODIUM 40 MG: 40 TABLET, DELAYED RELEASE ORAL at 17:22

## 2024-05-31 NOTE — THERAPY TREATMENT NOTE
Subjective:  Pt was excited to walk around, but nervous about another dizzy spell and potentially falling.     Objective:     Bed mobility - Independent  Transfers - CGA  Ambulation - 40 feet CGA with RW        Vitals: 145/86 supine, 135/67 sitting EOB, 133/63 standing, 118/43 after ambulation     Pain: 0 VAS       Education: Provided education on the importance of mobility in the acute care setting    Assessment: Gabrielle Lyles is an 84 y.o. female who presented to ED on 5/29/24 after syncopal episode and fall at home. Found to have Left pleural effusion, scalp laceration (closed with staples), and dehydration. Today, pt reports no increase in pain or dizziness. During treatment pt ambulated 40' with CGA and RW and reported some mild dizziness that quickly subsided. Vitals during treatment were WNL and pt did not report she felt dizzy with position changes. Roll test was completed to rule out BPPV and found to be negative. Pt would continue to benefit from PT as she is far below baseline and needs to regain more strength and endurance. Will continue to progress as tolerated while here.  PT recommendation is IRF  and pt agrees with this.       Plan/Recommendations:   If medically appropriate, High Intensity Therapy recommended post-acute care. This is recommended as therapy feels the patient would require 5-6 days per week, 2-3 hours per day. At this time, inpatient rehabilitation (acute rehab) would be the first choice and SNF would be second. Pt requires no DME at discharge.     Pt desires Inpatient Rehabilitation placement at discharge. Pt cooperative; agreeable to therapeutic recommendations and plan of care.             Post-Tx Position: Supine with HOB Elevated  PPE: gloves

## 2024-05-31 NOTE — PLAN OF CARE
Problem: Fall Injury Risk  Goal: Absence of Fall and Fall-Related Injury  Outcome: Ongoing, Progressing  Intervention: Identify and Manage Contributors  Recent Flowsheet Documentation  Taken 5/30/2024 2200 by Gabrielle Sharma LPN  Medication Review/Management: medications reviewed  Intervention: Promote Injury-Free Environment  Recent Flowsheet Documentation  Taken 5/30/2024 2200 by Mersmann, Gabrielle, LPN  Safety Promotion/Fall Prevention:   safety round/check completed   activity supervised   assistive device/personal items within reach   clutter free environment maintained   nonskid shoes/slippers when out of bed   lighting adjusted     Problem: Adult Inpatient Plan of Care  Goal: Plan of Care Review  Outcome: Ongoing, Progressing  Flowsheets (Taken 5/30/2024 1552 by Jeimy Shukla, PT)  Outcome Evaluation:   Pt is an 84 y.o. female who presnted to ED on 5/29/24 after syncopal episode and fall at home. Found to have Left pleural effusion, scalp laceration (closed with staples), and dehydration. Pt lives alone and is independent at baseline with walker and supplemental O2. She has not been driving or going out alone due to dizzy spells.  She is current with home health PT/OT 2x/week. Pt presents for PT evaluation with impaired gait,  standing tolerance, dizziness with visual scanning. Supervision for bed mobility, CGA for transfer and gait x10'. Vestibular screen is positive for R horizontal nystagmus with visual scanning and gaze fixation at ~45 degrees in horizontal plane   possible vestibular neuritis. (-) Vlad Ackerman [place]. BP is low throughout eval but unchanged with position changes. Pt is hopeful to DC home. PT recommends 24/7 supervision/assist if she goes home due to impaired activity tolerance and increased fall risk, limiting pt's ability to safely care for herself. If needed assistance is not available, PT recommends AIR for short stay to ensure full resolution of symptoms and return to baseline  mobility.  PT will follow.  Goal: Patient-Specific Goal (Individualized)  Outcome: Ongoing, Progressing  Goal: Absence of Hospital-Acquired Illness or Injury  Outcome: Ongoing, Progressing  Intervention: Identify and Manage Fall Risk  Recent Flowsheet Documentation  Taken 5/30/2024 2200 by Mersmann, Gabrielle, LPN  Safety Promotion/Fall Prevention:   safety round/check completed   activity supervised   assistive device/personal items within reach   clutter free environment maintained   nonskid shoes/slippers when out of bed   lighting adjusted  Intervention: Prevent Skin Injury  Recent Flowsheet Documentation  Taken 5/30/2024 2200 by Gabrielle Sharma LPN  Body Position: position changed independently  Intervention: Prevent and Manage VTE (Venous Thromboembolism) Risk  Recent Flowsheet Documentation  Taken 5/30/2024 2200 by Gabrielle Sharma LPN  Activity Management: activity minimized  Goal: Optimal Comfort and Wellbeing  Outcome: Ongoing, Progressing  Intervention: Provide Person-Centered Care  Recent Flowsheet Documentation  Taken 5/30/2024 2200 by Gabrielle Sharma LPN  Trust Relationship/Rapport:   care explained   choices provided   reassurance provided   thoughts/feelings acknowledged  Goal: Readiness for Transition of Care  Outcome: Ongoing, Progressing     Problem: Hypertension Comorbidity  Goal: Blood Pressure in Desired Range  Outcome: Ongoing, Progressing  Intervention: Maintain Blood Pressure Management  Recent Flowsheet Documentation  Taken 5/30/2024 2200 by Gabrielle Sharma LPN  Medication Review/Management: medications reviewed   Goal Outcome Evaluation:

## 2024-05-31 NOTE — PLAN OF CARE
Goal Outcome Evaluation:                 Problem: Fall Injury Risk  Goal: Absence of Fall and Fall-Related Injury  Outcome: Ongoing, Progressing  Intervention: Identify and Manage Contributors  Recent Flowsheet Documentation  Taken 5/31/2024 1500 by Shahab Dhaliwal RN  Medication Review/Management: medications reviewed  Taken 5/31/2024 1406 by Shahab Dhaliwal RN  Medication Review/Management: medications reviewed  Taken 5/31/2024 1300 by Shahab Dhaliwal RN  Medication Review/Management: medications reviewed  Taken 5/31/2024 1200 by Shahab Dhaliwal RN  Medication Review/Management: medications reviewed  Self-Care Promotion:   independence encouraged   BADL personal objects within reach   BADL personal routines maintained  Taken 5/31/2024 1000 by Shahab Dhaliwal RN  Medication Review/Management: medications reviewed  Taken 5/31/2024 0800 by Shahab Dhaliwal RN  Medication Review/Management: medications reviewed  Self-Care Promotion:   independence encouraged   BADL personal objects within reach   BADL personal routines maintained  Intervention: Promote Injury-Free Environment  Recent Flowsheet Documentation  Taken 5/31/2024 1500 by Shahab Dhaliwal RN  Safety Promotion/Fall Prevention:   activity supervised   assistive device/personal items within reach   clutter free environment maintained   fall prevention program maintained   nonskid shoes/slippers when out of bed   room organization consistent   safety round/check completed  Taken 5/31/2024 1406 by Shahab Dhaliwal RN  Safety Promotion/Fall Prevention:   activity supervised   assistive device/personal items within reach   clutter free environment maintained   fall prevention program maintained   nonskid shoes/slippers when out of bed   room organization consistent   safety round/check completed  Taken 5/31/2024 1300 by Shahab Dhaliwal RN  Safety Promotion/Fall Prevention:   activity supervised   assistive device/personal items within reach   clutter  free environment maintained   fall prevention program maintained   nonskid shoes/slippers when out of bed   room organization consistent   safety round/check completed  Taken 5/31/2024 1200 by Shhaab Dhaliwal RN  Safety Promotion/Fall Prevention:   activity supervised   assistive device/personal items within reach   clutter free environment maintained   fall prevention program maintained   nonskid shoes/slippers when out of bed   room organization consistent   safety round/check completed  Taken 5/31/2024 1000 by Shahab Dhaliwal RN  Safety Promotion/Fall Prevention:   activity supervised   assistive device/personal items within reach   clutter free environment maintained   fall prevention program maintained   nonskid shoes/slippers when out of bed   room organization consistent   safety round/check completed  Taken 5/31/2024 0800 by Shahab Dhaliwal RN  Safety Promotion/Fall Prevention:   activity supervised   assistive device/personal items within reach   clutter free environment maintained   fall prevention program maintained   nonskid shoes/slippers when out of bed   room organization consistent   safety round/check completed     Problem: Adult Inpatient Plan of Care  Goal: Plan of Care Review  Outcome: Ongoing, Progressing  Goal: Patient-Specific Goal (Individualized)  Outcome: Ongoing, Progressing  Goal: Absence of Hospital-Acquired Illness or Injury  Outcome: Ongoing, Progressing  Intervention: Identify and Manage Fall Risk  Recent Flowsheet Documentation  Taken 5/31/2024 1500 by Shahab Dhaliwal RN  Safety Promotion/Fall Prevention:   activity supervised   assistive device/personal items within reach   clutter free environment maintained   fall prevention program maintained   nonskid shoes/slippers when out of bed   room organization consistent   safety round/check completed  Taken 5/31/2024 1406 by Shahab Dhaliwal RN  Safety Promotion/Fall Prevention:   activity supervised   assistive device/personal  items within reach   clutter free environment maintained   fall prevention program maintained   nonskid shoes/slippers when out of bed   room organization consistent   safety round/check completed  Taken 5/31/2024 1300 by Shahab Dhaliwal RN  Safety Promotion/Fall Prevention:   activity supervised   assistive device/personal items within reach   clutter free environment maintained   fall prevention program maintained   nonskid shoes/slippers when out of bed   room organization consistent   safety round/check completed  Taken 5/31/2024 1200 by Shahab Dhaliwal RN  Safety Promotion/Fall Prevention:   activity supervised   assistive device/personal items within reach   clutter free environment maintained   fall prevention program maintained   nonskid shoes/slippers when out of bed   room organization consistent   safety round/check completed  Taken 5/31/2024 1000 by Shahab Dhaliwal RN  Safety Promotion/Fall Prevention:   activity supervised   assistive device/personal items within reach   clutter free environment maintained   fall prevention program maintained   nonskid shoes/slippers when out of bed   room organization consistent   safety round/check completed  Taken 5/31/2024 0800 by Shahab Dhaliwal RN  Safety Promotion/Fall Prevention:   activity supervised   assistive device/personal items within reach   clutter free environment maintained   fall prevention program maintained   nonskid shoes/slippers when out of bed   room organization consistent   safety round/check completed  Intervention: Prevent Skin Injury  Recent Flowsheet Documentation  Taken 5/31/2024 1200 by Shahab Dhaliwal RN  Body Position: position changed independently  Skin Protection:   adhesive use limited   incontinence pads utilized   transparent dressing maintained   tubing/devices free from skin contact  Taken 5/31/2024 0800 by Shahab Dhaliwal RN  Body Position: position changed independently  Skin Protection:   adhesive use limited    incontinence pads utilized   transparent dressing maintained   tubing/devices free from skin contact  Intervention: Prevent and Manage VTE (Venous Thromboembolism) Risk  Recent Flowsheet Documentation  Taken 5/31/2024 1200 by Shahab Dhaliwal RN  Activity Management: activity encouraged  Range of Motion: active ROM (range of motion) encouraged  Taken 5/31/2024 0800 by Shahab Dhaliwal RN  Activity Management: activity encouraged  Range of Motion: active ROM (range of motion) encouraged  Intervention: Prevent Infection  Recent Flowsheet Documentation  Taken 5/31/2024 1500 by Shahab Dhaliwal RN  Infection Prevention:   environmental surveillance performed   hand hygiene promoted   rest/sleep promoted   single patient room provided  Taken 5/31/2024 1406 by Shahab Dhaliwal RN  Infection Prevention:   environmental surveillance performed   hand hygiene promoted   rest/sleep promoted   single patient room provided  Taken 5/31/2024 1300 by Shahab Dhaliwal RN  Infection Prevention:   environmental surveillance performed   hand hygiene promoted   rest/sleep promoted   single patient room provided  Taken 5/31/2024 1200 by Shahab Dhaliwal RN  Infection Prevention:   environmental surveillance performed   hand hygiene promoted   rest/sleep promoted   single patient room provided  Taken 5/31/2024 1000 by Shahab Dhaliwal RN  Infection Prevention:   environmental surveillance performed   hand hygiene promoted   rest/sleep promoted   single patient room provided  Taken 5/31/2024 0800 by Shahab Dhaliwal RN  Infection Prevention:   environmental surveillance performed   hand hygiene promoted   rest/sleep promoted   single patient room provided  Goal: Optimal Comfort and Wellbeing  Outcome: Ongoing, Progressing  Intervention: Provide Person-Centered Care  Recent Flowsheet Documentation  Taken 5/31/2024 1200 by Shahab Dhaliwal RN  Trust Relationship/Rapport:   care explained   choices provided   emotional support  provided   empathic listening provided   questions encouraged   questions answered   reassurance provided   thoughts/feelings acknowledged  Taken 5/31/2024 0800 by Shahab Dhaliwal RN  Trust Relationship/Rapport:   care explained   choices provided   empathic listening provided   questions answered   emotional support provided   questions encouraged   thoughts/feelings acknowledged   reassurance provided  Goal: Readiness for Transition of Care  Outcome: Ongoing, Progressing     Problem: Hypertension Comorbidity  Goal: Blood Pressure in Desired Range  Outcome: Ongoing, Progressing  Intervention: Maintain Blood Pressure Management  Recent Flowsheet Documentation  Taken 5/31/2024 1500 by Shahab Dhaliwal RN  Medication Review/Management: medications reviewed  Taken 5/31/2024 1406 by Shahab Dhaliwal RN  Medication Review/Management: medications reviewed  Taken 5/31/2024 1300 by Shahab Dhaliwal RN  Medication Review/Management: medications reviewed  Taken 5/31/2024 1200 by Shahab Dhaliwal RN  Syncope Management: position changed slowly  Medication Review/Management: medications reviewed  Taken 5/31/2024 1000 by Shahab Dhaliwal RN  Medication Review/Management: medications reviewed  Taken 5/31/2024 0800 by Shahab Dhaliwal RN  Syncope Management: position changed slowly  Medication Review/Management: medications reviewed     Problem: Skin Injury Risk Increased  Goal: Skin Health and Integrity  Outcome: Ongoing, Progressing  Intervention: Optimize Skin Protection  Recent Flowsheet Documentation  Taken 5/31/2024 1200 by Shahab Dhaliwal RN  Pressure Reduction Techniques:   frequent weight shift encouraged   weight shift assistance provided  Head of Bed (HOB) Positioning: HOB elevated  Pressure Reduction Devices: pressure-redistributing mattress utilized  Skin Protection:   adhesive use limited   incontinence pads utilized   transparent dressing maintained   tubing/devices free from skin contact  Taken 5/31/2024  0800 by Shahab Dhaliwal, AMARILIS  Pressure Reduction Techniques:   frequent weight shift encouraged   weight shift assistance provided  Head of Bed (HOB) Positioning: HOB elevated  Pressure Reduction Devices: pressure-redistributing mattress utilized  Skin Protection:   adhesive use limited   incontinence pads utilized   transparent dressing maintained   tubing/devices free from skin contact

## 2024-05-31 NOTE — PLAN OF CARE
Problem: Fall Injury Risk  Goal: Absence of Fall and Fall-Related Injury  5/31/2024 1542 by Shahab Dhaliwal RN  Outcome: Ongoing, Progressing  5/31/2024 1541 by Shahab Dhaliwal RN  Outcome: Ongoing, Progressing  Intervention: Identify and Manage Contributors  Recent Flowsheet Documentation  Taken 5/31/2024 1500 by Shahab Dhaliwal RN  Medication Review/Management: medications reviewed  Taken 5/31/2024 1406 by Shahab Dhaliwal RN  Medication Review/Management: medications reviewed  Taken 5/31/2024 1300 by Shahab Dhaliwal RN  Medication Review/Management: medications reviewed  Taken 5/31/2024 1200 by Shahab Dhaliwal RN  Medication Review/Management: medications reviewed  Self-Care Promotion:   independence encouraged   BADL personal objects within reach   BADL personal routines maintained  Taken 5/31/2024 1000 by Shahab Dhaliwal RN  Medication Review/Management: medications reviewed  Taken 5/31/2024 0800 by Shahab Dhaliwal RN  Medication Review/Management: medications reviewed  Self-Care Promotion:   independence encouraged   BADL personal objects within reach   BADL personal routines maintained  Intervention: Promote Injury-Free Environment  Recent Flowsheet Documentation  Taken 5/31/2024 1500 by Shahab Dhaliwal RN  Safety Promotion/Fall Prevention:   activity supervised   assistive device/personal items within reach   clutter free environment maintained   fall prevention program maintained   nonskid shoes/slippers when out of bed   room organization consistent   safety round/check completed  Taken 5/31/2024 1406 by Shahab Dhaliwal RN  Safety Promotion/Fall Prevention:   activity supervised   assistive device/personal items within reach   clutter free environment maintained   fall prevention program maintained   nonskid shoes/slippers when out of bed   room organization consistent   safety round/check completed  Taken 5/31/2024 1300 by Shahab Dhaliwal RN  Safety Promotion/Fall Prevention:    activity supervised   assistive device/personal items within reach   clutter free environment maintained   fall prevention program maintained   nonskid shoes/slippers when out of bed   room organization consistent   safety round/check completed  Taken 5/31/2024 1200 by Shahab Dhaliwal RN  Safety Promotion/Fall Prevention:   activity supervised   assistive device/personal items within reach   clutter free environment maintained   fall prevention program maintained   nonskid shoes/slippers when out of bed   room organization consistent   safety round/check completed  Taken 5/31/2024 1000 by Shahab Dhaliwal RN  Safety Promotion/Fall Prevention:   activity supervised   assistive device/personal items within reach   clutter free environment maintained   fall prevention program maintained   nonskid shoes/slippers when out of bed   room organization consistent   safety round/check completed  Taken 5/31/2024 0800 by Shahab Dhaliwal RN  Safety Promotion/Fall Prevention:   activity supervised   assistive device/personal items within reach   clutter free environment maintained   fall prevention program maintained   nonskid shoes/slippers when out of bed   room organization consistent   safety round/check completed     Problem: Adult Inpatient Plan of Care  Goal: Plan of Care Review  5/31/2024 1542 by Shahab Dhaliwal RN  Outcome: Ongoing, Progressing  5/31/2024 1541 by Shahab Dhaliwal RN  Outcome: Ongoing, Progressing  Goal: Patient-Specific Goal (Individualized)  5/31/2024 1542 by Shahab Dhaliwal RN  Outcome: Ongoing, Progressing  5/31/2024 1541 by Shahab Dhaliwal RN  Outcome: Ongoing, Progressing  Goal: Absence of Hospital-Acquired Illness or Injury  5/31/2024 1542 by Shahab Dhaliwal RN  Outcome: Ongoing, Progressing  5/31/2024 1541 by Shahab Dhaliwal RN  Outcome: Ongoing, Progressing  Intervention: Identify and Manage Fall Risk  Recent Flowsheet Documentation  Taken 5/31/2024 1500 by Shahab Dhaliwal  RN  Safety Promotion/Fall Prevention:   activity supervised   assistive device/personal items within reach   clutter free environment maintained   fall prevention program maintained   nonskid shoes/slippers when out of bed   room organization consistent   safety round/check completed  Taken 5/31/2024 1406 by Shahab Dhaliwal RN  Safety Promotion/Fall Prevention:   activity supervised   assistive device/personal items within reach   clutter free environment maintained   fall prevention program maintained   nonskid shoes/slippers when out of bed   room organization consistent   safety round/check completed  Taken 5/31/2024 1300 by Shahab Dhaliwal RN  Safety Promotion/Fall Prevention:   activity supervised   assistive device/personal items within reach   clutter free environment maintained   fall prevention program maintained   nonskid shoes/slippers when out of bed   room organization consistent   safety round/check completed  Taken 5/31/2024 1200 by Shahab Dhaliwal RN  Safety Promotion/Fall Prevention:   activity supervised   assistive device/personal items within reach   clutter free environment maintained   fall prevention program maintained   nonskid shoes/slippers when out of bed   room organization consistent   safety round/check completed  Taken 5/31/2024 1000 by Shahab Dhaliwal RN  Safety Promotion/Fall Prevention:   activity supervised   assistive device/personal items within reach   clutter free environment maintained   fall prevention program maintained   nonskid shoes/slippers when out of bed   room organization consistent   safety round/check completed  Taken 5/31/2024 0800 by Shahab Dhaliwal, RN  Safety Promotion/Fall Prevention:   activity supervised   assistive device/personal items within reach   clutter free environment maintained   fall prevention program maintained   nonskid shoes/slippers when out of bed   room organization consistent   safety round/check completed  Intervention: Prevent  Skin Injury  Recent Flowsheet Documentation  Taken 5/31/2024 1200 by Shahab Dhaliwal RN  Body Position: position changed independently  Skin Protection:   adhesive use limited   incontinence pads utilized   transparent dressing maintained   tubing/devices free from skin contact  Taken 5/31/2024 0800 by Shahab Dhaliwal RN  Body Position: position changed independently  Skin Protection:   adhesive use limited   incontinence pads utilized   transparent dressing maintained   tubing/devices free from skin contact  Intervention: Prevent and Manage VTE (Venous Thromboembolism) Risk  Recent Flowsheet Documentation  Taken 5/31/2024 1200 by Shahab Dhaliwal RN  Activity Management: activity encouraged  Range of Motion: active ROM (range of motion) encouraged  Taken 5/31/2024 0800 by Shahab Dhaliwal RN  Activity Management: activity encouraged  Range of Motion: active ROM (range of motion) encouraged  Intervention: Prevent Infection  Recent Flowsheet Documentation  Taken 5/31/2024 1500 by Shahab Dhaliwal RN  Infection Prevention:   environmental surveillance performed   hand hygiene promoted   rest/sleep promoted   single patient room provided  Taken 5/31/2024 1406 by Shahab Dhaliwal RN  Infection Prevention:   environmental surveillance performed   hand hygiene promoted   rest/sleep promoted   single patient room provided  Taken 5/31/2024 1300 by Shahab Dhaliwal RN  Infection Prevention:   environmental surveillance performed   hand hygiene promoted   rest/sleep promoted   single patient room provided  Taken 5/31/2024 1200 by Shahab Dhaliwal RN  Infection Prevention:   environmental surveillance performed   hand hygiene promoted   rest/sleep promoted   single patient room provided  Taken 5/31/2024 1000 by Shahab Dhaliwal RN  Infection Prevention:   environmental surveillance performed   hand hygiene promoted   rest/sleep promoted   single patient room provided  Taken 5/31/2024 0800 by Shahab Dhaliwal  RN  Infection Prevention:   environmental surveillance performed   hand hygiene promoted   rest/sleep promoted   single patient room provided  Goal: Optimal Comfort and Wellbeing  5/31/2024 1542 by Shahab Dhaliwal RN  Outcome: Ongoing, Progressing  5/31/2024 1541 by Shahab Dhaliwal RN  Outcome: Ongoing, Progressing  Intervention: Provide Person-Centered Care  Recent Flowsheet Documentation  Taken 5/31/2024 1200 by Shahab Dhaliwal RN  Trust Relationship/Rapport:   care explained   choices provided   emotional support provided   empathic listening provided   questions encouraged   questions answered   reassurance provided   thoughts/feelings acknowledged  Taken 5/31/2024 0800 by Shahab Dhaliwal RN  Trust Relationship/Rapport:   care explained   choices provided   empathic listening provided   questions answered   emotional support provided   questions encouraged   thoughts/feelings acknowledged   reassurance provided  Goal: Readiness for Transition of Care  5/31/2024 1542 by Shahab Dhaliwal RN  Outcome: Ongoing, Progressing  5/31/2024 1541 by Shahab Dhaliwal RN  Outcome: Ongoing, Progressing     Problem: Hypertension Comorbidity  Goal: Blood Pressure in Desired Range  5/31/2024 1542 by Shahab Dhaliwal RN  Outcome: Ongoing, Progressing  5/31/2024 1541 by Shahab Dhaliwal RN  Outcome: Ongoing, Progressing  Intervention: Maintain Blood Pressure Management  Recent Flowsheet Documentation  Taken 5/31/2024 1500 by Shahab Dhaliwal RN  Medication Review/Management: medications reviewed  Taken 5/31/2024 1406 by Shahab Dhaliwal RN  Medication Review/Management: medications reviewed  Taken 5/31/2024 1300 by Shahab Dhaliwal RN  Medication Review/Management: medications reviewed  Taken 5/31/2024 1200 by Shahab Dhaliwal RN  Syncope Management: position changed slowly  Medication Review/Management: medications reviewed  Taken 5/31/2024 1000 by Shahab Dhaliwal RN  Medication Review/Management: medications  reviewed  Taken 5/31/2024 0800 by Shahab Dhaliwal RN  Syncope Management: position changed slowly  Medication Review/Management: medications reviewed     Problem: Skin Injury Risk Increased  Goal: Skin Health and Integrity  5/31/2024 1542 by Shahab Dhaliwal RN  Outcome: Ongoing, Progressing  5/31/2024 1541 by Shahab Dhaliwal RN  Outcome: Ongoing, Progressing  Intervention: Optimize Skin Protection  Recent Flowsheet Documentation  Taken 5/31/2024 1200 by Shahab Dhaliwal RN  Pressure Reduction Techniques:   frequent weight shift encouraged   weight shift assistance provided  Head of Bed (HOB) Positioning: [facility] elevated  Pressure Reduction Devices: pressure-redistributing mattress utilized  Skin Protection:   adhesive use limited   incontinence pads utilized   transparent dressing maintained   tubing/devices free from skin contact  Taken 5/31/2024 0800 by Shahab Dhaliwal RN  Pressure Reduction Techniques:   frequent weight shift encouraged   weight shift assistance provided  Head of Bed (HOB) Positioning: [facility] elevated  Pressure Reduction Devices: pressure-redistributing mattress utilized  Skin Protection:   adhesive use limited   incontinence pads utilized   transparent dressing maintained   tubing/devices free from skin contact   Goal Outcome Evaluation:

## 2024-05-31 NOTE — PLAN OF CARE
Goal Outcome Evaluation:              Gabrielle Lyles is an 84 y.o. female who presented to ED on 5/29/24 after syncopal episode and fall at home. Found to have Left pleural effusion, scalp laceration (closed with staples), and dehydration. Today, pt reports no increase in pain or dizziness. During treatment pt ambulated 40' with CGA and RW and reported some mild dizziness that quickly subsided. Vitals during treatment were WNL and pt did not report she felt dizzy with position changes. Roll test was completed to rule out BPPV and found to be negative. Pt would continue to benefit from PT as she is far below baseline and needs to regain more strength and endurance. Will continue to progress as tolerated while here.  PT recommendation is IRF  and pt agrees with this.

## 2024-05-31 NOTE — DISCHARGE SUMMARY
Date of Discharge:  5/31/2024    Discharge Diagnosis:     Orthostatic hypotension  Autonomic dysfunction syndrome  Chronic diastolic congestive heart failure  Panlobular COPD with emphysema  Atrial fibrillation, paroxysmal  Severe pulmonary hypertension  Chronic atelectasis left lower lobe  chronic kidney disease stage IIIa  Left breast anomaly  Hypertension associated chronic kidney disease stage IIIa  Anemia of chronic kidney disease  Hyperuricemia  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  Cerebrovascular disease status post CVA  Chronic oral anticoagulation therapy  Acquired hypothyroidism  Thrombophilia  Autonomic dysfunction syndrome  History of pulmonary embolism  Hypercoagulable state secondary to atrial fibrillation  [id number] score 6  History of IVC filter  Aneurysmal dilatation of abdominal aorta  Chronic mucopurulent bronchitis  Peripheral polyneuropathy  History of breast cancer  Supplemental oxygen dependency  Chronic hypoxic respiratory failure    Presenting Problem/History of Present Illness  Active Hospital Problems    Diagnosis  POA    **Syncope and collapse [R55]  Yes      Resolved Hospital Problems   No resolved problems to display.          Hospital Course  Patient is a 84 y.o. female who presented with with acute orthostasis and acute near syncope.  Patient was observed and medications were modified as she does have a known history of autonomic dysfunction syndrome and she did well overall and was deemed stable for discharge home to resume home health care services and home physical therapy.  She will follow-up with us in the office within 1 to 2 weeks time and she will call with any questions or concerns.    Procedures Performed         Consults:   Consults       Date and Time Order Name Status Description    4/25/2024  9:58 PM Inpatient Nephrology Consult Completed     4/25/2024  3:47 PM Inpatient Pulmonology Consult Completed     4/25/2024   3:47 PM Inpatient Cardiology Consult Completed             Pertinent Test Results:CT Head Without Contrast    Result Date: 5/29/2024  Impression: No acute intracranial pathology. Electronically Signed: Juancarlos Santana MD  5/29/2024 7:02 PM EDT  Workstation ID: [id number]    XR Chest 1 View    Result Date: 5/29/2024  Impression: 1. Stable cardiomegaly. No overt pulmonary edema. 2. Trace left basilar pleural effusion with mild left basilar atelectasis. Electronically Signed: Tiffanie Cheung MD  5/29/2024 4:37 PM EDT  Workstation ID: [id number]     Imaging Results (Last 7 Days)       Procedure Component Value Units Date/Time    CT Head Without Contrast [[id number]] Collected: 05/29/24 1900     Updated: 05/29/24 1904    Narrative:      CT HEAD WO CONTRAST    Date of Exam: 5/29/2024 6:15 PM EDT    Indication: Head positive LOC.    Comparison: 2/5/2024    Technique: Axial CT images were obtained of the head without contrast administration.  Coronal reconstructions were performed.  Automated exposure control and iterative reconstruction methods were used.    Findings: No intracranial hemorrhage. Gray-white matter differentiation is maintained without evidence of an acute infarction. Multiple foci of decreased attenuation are present within the subcortical, deep cerebral, and periventricular white matter   consistent with chronic small vessel/microangiopathic ischemic changes. No extra-axial mass or collection. The ventricles and sulci are prominent commensurate with involutional changes. The posterior fossa appears grossly normal. Sellar and suprasellar   structures are normal.    Orbital and periorbital soft tissues are normal. The paranasal sinuses, ethmoid air cells, and mastoid air cells are aerated. The bony calvarium is intact. Right parietal scalp laceration.      Impression:      Impression: No acute intracranial pathology.          Electronically Signed: Juancarlos Santana MD    5/29/2024 7:02 PM EDT    Workstation ID: [id number]     XR Chest 1 View [[id number]] Collected: 05/29/24 1637     Updated: 05/29/24 1640    Narrative:      XR CHEST 1 VW    Date of Exam: 5/29/2024 4:25 PM EDT    Indication: Dizziness and near syncope with shortness of breath    Comparison: AP portable chest 4/20/2024    Findings:  Stable mild to moderate cardiac enlargement. Trace left basilar pleural effusion with mild left basilar atelectasis is present. No overt features of pulmonary edema. Right lung appears clear. Mild calcific atherosclerosis within the thoracic aorta. No   acute osseous abnormality      Impression:      Impression:    1. Stable cardiomegaly. No overt pulmonary edema.  2. Trace left basilar pleural effusion with mild left basilar atelectasis.      Electronically Signed: Tiffanie Cheung MD    5/29/2024 4:37 PM EDT    Workstation ID: [id number]                Condition on Discharge:  Fair    Vital Signs  Temp:  [97.2 °F (36.2 °C)-98.2 °F (36.8 °C)] 97.2 °F (36.2 °C)  Heart Rate:  [81-99] 81  Resp:  [12-27] 12  BP: ()/(48-87) 129/74    Physical Exam:     General Appearance:    Alert, cooperative, in no acute distress   Head:    Normocephalic, without obvious abnormality, atraumatic   Eyes:           Conjunctivae and sclerae normal, no   icterus, no pallor, corneas clear, PERRLA   Throat:   No oral lesions, no thrush, oral mucosa moist   Neck:   No adenopathy, supple, trachea midline, no thyromegaly, no   carotid bruit, no JVD   Lungs:     Clear to auscultation,respirations regular, even and                  unlabored    Heart:    Regular rhythm and normal rate, normal S1 and S2, no            murmur, no gallop, no rub, no click   Chest Wall:    No abnormalities observed   Abdomen:     Normal bowel sounds, no masses, no organomegaly, soft        non-tender, non-distended, no guarding, no rebound                tenderness   Rectal:     Deferred   Extremities:   Moves all extremities well, no edema, no cyanosis, no             redness   Pulses:    Pulses palpable and equal bilaterally   Skin:   No bleeding, bruising or rash   Lymph nodes:   No palpable adenopathy   Neurologic:   Cranial nerves 2 - 12 grossly intact, sensation intact, DTR       present and equal bilaterally         Discharge Disposition  Home-Health Care c    Discharge Medications     Discharge Medications        Changes to Medications        Instructions Start Date   midodrine 10 MG tablet  Commonly known as: PROAMATINE  What changed:   medication strength  how much to take  when to take this   10 mg, Oral, 2 Times Daily Before Meals             Continue These Medications        Instructions Start Date   allopurinol 100 MG tablet  Commonly known as: ZYLOPRIM   100 mg, Oral, Daily      budesonide-formoterol 80-4.5 MCG/ACT inhaler  Commonly known as: SYMBICORT   2 puffs, Inhalation, 2 Times Daily - RT      bumetanide 0.5 MG tablet  Commonly known as: BUMEX   0.5 mg, Oral, Daily      carvedilol 3.125 MG tablet  Commonly known as: COREG   3.125 mg, Oral, 2 Times Daily With Meals      Eliquis 5 MG tablet tablet  Generic drug: apixaban   5 mg, Oral, Every 12 Hours Scheduled      Folbic 2.5-25-2 MG tablet tablet  Generic drug: folic acid-pyridoxine-cyanocobalamin   1 tablet, Oral, Daily      levothyroxine 75 MCG tablet  Commonly known as: SYNTHROID, LEVOTHROID   75 mcg, Oral, Every Early Morning      O2  Commonly known as: OXYGEN   3 L/min, Inhalation, Continuous      pantoprazole 40 MG EC tablet  Commonly known as: PROTONIX   40 mg, Oral, 2 Times Daily Before Meals      potassium chloride 20 MEQ packet  Commonly known as: KLOR-CON   20 mEq, Oral, Daily      sildenafil 20 MG tablet  Commonly known as: REVATIO   20 mg, Oral, 3 Times Daily               Discharge Diet:   Regular  Activity at Discharge:   As tolerated  Follow-up Appointments  Future Appointments   Date Time Provider Department Center   6/21/2024  3:00 PM Richardson Willis MD [facility]         Test Results Pending at  Discharge       Paul Granados MD  05/31/24  09:20 EDT

## 2024-05-31 NOTE — CASE MANAGEMENT/SOCIAL WORK
Discharge Planning Assessment   Kenny     Patient Name: Gabrielle Lyles  MRN: [id number]  Today's Date: 5/31/2024    Admit Date: 5/29/2024    Plan: D/C Plan: Home with resumption of [facility] home care. Transport by family car.   Discharge Needs Assessment       Row Name 05/31/24 1407       Living Environment    People in Home alone    Current Living Arrangements home    Potentially Unsafe Housing Conditions none    In the past 12 months has the electric, gas, oil, or water company threatened to shut off services in your home? No    Primary Care Provided by self    Provides Primary Care For no one    Family Caregiver if Needed child(blake), adult    Family Caregiver Names Darby-Dtr; Has 2 other dtrs locally    Quality of Family Relationships unable to assess    Able to Return to Prior Arrangements yes       Resource/Environmental Concerns    Resource/Environmental Concerns none    Transportation Concerns none       Transportation Needs    In the past 12 months, has lack of transportation kept you from medical appointments or from getting medications? no    In the past 12 months, has lack of transportation kept you from meetings, work, or from getting things needed for daily living? No       Food Insecurity    Within the past 12 months, you worried that your food would run out before you got the money to buy more. Never true    Within the past 12 months, the food you bought just didn't last and you didn't have money to get more. Never true       Transition Planning    Patient/Family Anticipates Transition to home;home with help/services    Patient/Family Anticipated Services at Transition none;home health care    Transportation Anticipated family or friend will provide       Discharge Needs Assessment    Readmission Within the Last 30 Days no previous admission in last 30 days    Current Outpatient/Agency/Support Group homecare agency    Equipment Currently Used at Home oxygen;cane, straight;walker, rolling    Concerns  to be Addressed discharge planning    Anticipated Changes Related to Illness none    Equipment Needed After Discharge none    Outpatient/Agency/Support Group Needs homecare agency    Discharge Facility/Level of Care Needs home with home health    Patient's Choice of Community Agency(s) [facility]    Current Discharge Risk lives alone                   Discharge Plan       Row Name 05/31/24 1409       Plan    Plan D/C Plan: Home with [facility] of [facility]. Transport by family car.    Plan Comments Met with pt at bedside. Confirmed PCP and Pharm. No financial issues paying for medications. Lives at home alone, but does not drive. Is mod.ind in home with use of 02 at 3L ([place]) and walker or cane as needed. 3 supportive dtrs who live locally and provide transportation. Pt will return home with resumption of H[facility]H thru [facility] who is current with services. Barrier to D/C: Medical clearance; Dizziness/Vertigo                  Continued Care and Services - Admitted Since 5/29/2024       Home Medical Care       Service Provider Request Status Selected Services Address Phone Fax Patient Preferred    [facility] Pending - Request Sent N/A [phone] [address] [phone] --                  Selected Continued Care - Prior Encounters Includes continued care and service providers with selected services from prior encounters from 2/29/2024 to 5/31/2024      Discharged on 5/7/2024 Admission date: 4/25/2024 - Discharge disposition: Home-Health Care S[facility]      Home Medical Care       Service Provider Selected Services Address Phone Fax Patient Preferred    [facility] ,  [facility] [address], [address] [address] [phone] --                          Expected Discharge Date and Time       Expected Discharge Date Expected Discharge Time    Jun 2, 2024            Demographic Summary        Row Name 05/31/24 1406       General Information    Admission Type observation    Arrived From emergency department    Required Notices Provided Observation Status Notice    Referral Source admission list    Reason for Consult discharge planning    Preferred Language English                   Functional Status       Row Name 05/31/24 1406       Functional Status    Usual Activity Tolerance good    Current Activity Tolerance moderate       Functional Status, IADL    Medications independent    Meal Preparation independent    Housekeeping independent    Laundry independent    Shopping assistive person       Mental Status    General Appearance WDL WDL       Mental Status Summary    Recent Changes in Mental Status/Cognitive Functioning no changes       Employment/    Employment Status retired    Current or Previous Occupation not applicable                        Luz Elena Arellano RN

## 2024-06-01 ENCOUNTER — READMISSION MANAGEMENT (OUTPATIENT)
Dept: CALL CENTER | Facility: HOSPITAL | Age: 84
End: 2024-06-01
Payer: MEDICARE

## 2024-06-01 VITALS
SYSTOLIC BLOOD PRESSURE: 134 MMHG | BODY MASS INDEX: 23.9 KG/M2 | HEART RATE: 90 BPM | DIASTOLIC BLOOD PRESSURE: 73 MMHG | TEMPERATURE: 98.3 F | RESPIRATION RATE: 13 BRPM | HEIGHT: 64 IN | WEIGHT: 140 LBS | OXYGEN SATURATION: 79 %

## 2024-06-01 LAB
ANION GAP SERPL CALCULATED.3IONS-SCNC: 13.3 MMOL/L (ref 5–15)
BASOPHILS # BLD AUTO: 0.01 10*3/MM3 (ref 0–0.2)
BASOPHILS NFR BLD AUTO: 0.1 % (ref 0–1.5)
BUN SERPL-MCNC: 43 MG/DL (ref 8–23)
BUN/CREAT SERPL: 21.8 (ref 7–25)
CALCIUM SPEC-SCNC: 9.8 MG/DL (ref 8.6–10.5)
CHLORIDE SERPL-SCNC: 99 MMOL/L (ref 98–107)
CO2 SERPL-SCNC: 26.7 MMOL/L (ref 22–29)
CREAT SERPL-MCNC: 1.97 MG/DL (ref 0.57–1)
DEPRECATED RDW RBC AUTO: 73 FL (ref 37–54)
EGFRCR SERPLBLD CKD-EPI 2021: 24.8 ML/MIN/1.73
EOSINOPHIL # BLD AUTO: 0 10*3/MM3 (ref 0–0.4)
EOSINOPHIL NFR BLD AUTO: 0 % (ref 0.3–6.2)
ERYTHROCYTE [DISTWIDTH] IN BLOOD BY AUTOMATED COUNT: 22.5 % (ref 12.3–15.4)
GLUCOSE SERPL-MCNC: 173 MG/DL (ref 65–99)
HCT VFR BLD AUTO: 33.3 % (ref 34–46.6)
HGB BLD-MCNC: 10.1 G/DL (ref 12–15.9)
IMM GRANULOCYTES # BLD AUTO: 0.12 10*3/MM3 (ref 0–0.05)
IMM GRANULOCYTES NFR BLD AUTO: 0.8 % (ref 0–0.5)
LYMPHOCYTES # BLD AUTO: 0.86 10*3/MM3 (ref 0.7–3.1)
LYMPHOCYTES NFR BLD AUTO: 5.8 % (ref 19.6–45.3)
MCH RBC QN AUTO: 27 PG (ref 26.6–33)
MCHC RBC AUTO-ENTMCNC: 30.3 G/DL (ref 31.5–35.7)
MCV RBC AUTO: 89 FL (ref 79–97)
MONOCYTES # BLD AUTO: 0.57 10*3/MM3 (ref 0.1–0.9)
MONOCYTES NFR BLD AUTO: 3.8 % (ref 5–12)
NEUTROPHILS NFR BLD AUTO: 13.34 10*3/MM3 (ref 1.7–7)
NEUTROPHILS NFR BLD AUTO: 89.5 % (ref 42.7–76)
NRBC BLD AUTO-RTO: 0.2 /100 WBC (ref 0–0.2)
PLATELET # BLD AUTO: 302 10*3/MM3 (ref 140–450)
PMV BLD AUTO: 10.7 FL (ref 6–12)
POTASSIUM SERPL-SCNC: 4.1 MMOL/L (ref 3.5–5.2)
RBC # BLD AUTO: 3.74 10*6/MM3 (ref 3.77–5.28)
SODIUM SERPL-SCNC: 139 MMOL/L (ref 136–145)
WBC NRBC COR # BLD AUTO: 14.9 10*3/MM3 (ref 3.4–10.8)

## 2024-06-01 PROCEDURE — 85025 COMPLETE CBC W/AUTO DIFF WBC: CPT | Performed by: FAMILY MEDICINE

## 2024-06-01 PROCEDURE — 94761 N-INVAS EAR/PLS OXIMETRY MLT: CPT

## 2024-06-01 PROCEDURE — 94664 DEMO&/EVAL PT USE INHALER: CPT

## 2024-06-01 PROCEDURE — 94799 UNLISTED PULMONARY SVC/PX: CPT

## 2024-06-01 PROCEDURE — 25010000002 METHYLPREDNISOLONE PER 80 MG: Performed by: FAMILY MEDICINE

## 2024-06-01 PROCEDURE — 96372 THER/PROPH/DIAG INJ SC/IM: CPT

## 2024-06-01 PROCEDURE — G0378 HOSPITAL OBSERVATION PER HR: HCPCS

## 2024-06-01 PROCEDURE — 80048 BASIC METABOLIC PNL TOTAL CA: CPT | Performed by: FAMILY MEDICINE

## 2024-06-01 RX ORDER — MECLIZINE HYDROCHLORIDE 25 MG/1
25 TABLET ORAL 3 TIMES DAILY PRN
Status: DISCONTINUED | OUTPATIENT
Start: 2024-06-01 | End: 2024-06-01 | Stop reason: HOSPADM

## 2024-06-01 RX ORDER — METHYLPREDNISOLONE ACETATE 80 MG/ML
80 INJECTION, SUSPENSION INTRA-ARTICULAR; INTRALESIONAL; INTRAMUSCULAR; SOFT TISSUE ONCE
Status: COMPLETED | OUTPATIENT
Start: 2024-06-01 | End: 2024-06-01

## 2024-06-01 RX ORDER — MECLIZINE HYDROCHLORIDE 25 MG/1
25 TABLET ORAL 3 TIMES DAILY PRN
Qty: 30 TABLET | Refills: 0 | Status: SHIPPED | OUTPATIENT
Start: 2024-06-01

## 2024-06-01 RX ADMIN — BUMETANIDE 0.5 MG: 1 TABLET ORAL at 09:17

## 2024-06-01 RX ADMIN — Medication 10 ML: at 09:18

## 2024-06-01 RX ADMIN — APIXABAN 2.5 MG: 2.5 TABLET, FILM COATED ORAL at 09:17

## 2024-06-01 RX ADMIN — BUDESONIDE AND FORMOTEROL FUMARATE DIHYDRATE 2 PUFF: 160; 4.5 AEROSOL RESPIRATORY (INHALATION) at 08:54

## 2024-06-01 RX ADMIN — ALLOPURINOL 100 MG: 100 TABLET ORAL at 09:17

## 2024-06-01 RX ADMIN — SILDENAFIL CITRATE 20 MG: 20 TABLET ORAL at 09:18

## 2024-06-01 RX ADMIN — Medication 1 TABLET: at 09:17

## 2024-06-01 RX ADMIN — METHYLPREDNISOLONE ACETATE 80 MG: 80 INJECTION, SUSPENSION INTRA-ARTICULAR; INTRALESIONAL; INTRAMUSCULAR; SOFT TISSUE at 12:24

## 2024-06-01 RX ADMIN — LEVOTHYROXINE SODIUM 75 MCG: 0.07 TABLET ORAL at 05:59

## 2024-06-01 RX ADMIN — PANTOPRAZOLE SODIUM 40 MG: 40 TABLET, DELAYED RELEASE ORAL at 09:17

## 2024-06-01 RX ADMIN — MIDODRINE HYDROCHLORIDE 10 MG: 5 TABLET ORAL at 09:17

## 2024-06-01 RX ADMIN — POTASSIUM CHLORIDE 20 MEQ: 1.5 POWDER, FOR SOLUTION ORAL at 09:17

## 2024-06-01 RX ADMIN — CARVEDILOL 3.12 MG: 3.12 TABLET, FILM COATED ORAL at 09:17

## 2024-06-01 NOTE — PLAN OF CARE
Problem: Fall Injury Risk  Goal: Absence of Fall and Fall-Related Injury  Outcome: Ongoing, Progressing  Intervention: Identify and Manage Contributors  Recent Flowsheet Documentation  Taken 6/1/2024 0022 by Gabrielle Sharma LPN  Self-Care Promotion: independence encouraged  Taken 5/31/2024 2000 by Gabrielle Sharma LPN  Medication Review/Management: medications reviewed  Self-Care Promotion: independence encouraged  Intervention: Promote Injury-Free Environment  Recent Flowsheet Documentation  Taken 6/1/2024 0022 by Mersmann, Gabrielle, LPN  Safety Promotion/Fall Prevention: safety round/check completed  Taken 5/31/2024 2000 by Mersmann, Gabrielle, LPN  Safety Promotion/Fall Prevention: activity supervised     Problem: Adult Inpatient Plan of Care  Goal: Plan of Care Review  Outcome: Ongoing, Progressing  Goal: Patient-Specific Goal (Individualized)  Outcome: Ongoing, Progressing  Goal: Absence of Hospital-Acquired Illness or Injury  Outcome: Ongoing, Progressing  Intervention: Identify and Manage Fall Risk  Recent Flowsheet Documentation  Taken 6/1/2024 0022 by Mersmann, Gabrielle, LPN  Safety Promotion/Fall Prevention: safety round/check completed  Taken 5/31/2024 2000 by Mersmann, Gabrielle, LPN  Safety Promotion/Fall Prevention: activity supervised  Intervention: Prevent Skin Injury  Recent Flowsheet Documentation  Taken 6/1/2024 0022 by Gabrielle Sharma LPN  Body Position: position changed independently  Skin Protection:   adhesive use limited   tubing/devices free from skin contact  Taken 5/31/2024 2000 by Mersmann, Gabrielle, LPN  Body Position: position changed independently  Skin Protection:   adhesive use limited   tubing/devices free from skin contact  Intervention: Prevent and Manage VTE (Venous Thromboembolism) Risk  Recent Flowsheet Documentation  Taken 6/1/2024 0022 by Gabrielle Sharma LPN  Activity Management: activity encouraged  Taken 5/31/2024 2000 by Gabrielle Sharma LPN  Activity  Management: activity encouraged  Intervention: Prevent Infection  Recent Flowsheet Documentation  Taken 6/1/2024 0022 by Gabrielle Sharma LPN  Infection Prevention: environmental surveillance performed  Taken 5/31/2024 2000 by Gabrielle Sharma LPN  Infection Prevention: environmental surveillance performed  Goal: Optimal Comfort and Wellbeing  Outcome: Ongoing, Progressing  Goal: Readiness for Transition of Care  Outcome: Ongoing, Progressing     Problem: Hypertension Comorbidity  Goal: Blood Pressure in Desired Range  Outcome: Ongoing, Progressing  Intervention: Maintain Blood Pressure Management  Recent Flowsheet Documentation  Taken 5/31/2024 2000 by Gabrielle Sharma LPN  Syncope Management: position changed slowly  Medication Review/Management: medications reviewed     Problem: Skin Injury Risk Increased  Goal: Skin Health and Integrity  Outcome: Ongoing, Progressing  Intervention: Optimize Skin Protection  Recent Flowsheet Documentation  Taken 6/1/2024 0022 by Gabrielle Sharma LPN  Pressure Reduction Techniques: frequent weight shift encouraged  Head of Bed (HOB) Positioning: HOB elevated  Pressure Reduction Devices: pressure-redistributing mattress utilized  Skin Protection:   adhesive use limited   tubing/devices free from skin contact  Taken 5/31/2024 2000 by Gabrielle Sharma LPN  Pressure Reduction Techniques: frequent weight shift encouraged  Head of Bed (HOB) Positioning: HOB elevated  Pressure Reduction Devices: pressure-redistributing mattress utilized  Skin Protection:   adhesive use limited   tubing/devices free from skin contact   Goal Outcome Evaluation:

## 2024-06-01 NOTE — SIGNIFICANT NOTE
06/01/24 1304   OTHER   Discipline physical therapy assistant   Rehab Time/Intention   Session Not Performed other (see comments)  (patient supposed to dc today)   Recommendation   PT - Next Appointment 06/03/24

## 2024-06-01 NOTE — CASE MANAGEMENT/SOCIAL WORK
Continued Stay Note   Kenny     Patient Name: Gabrielle Lyles  MRN: [id number]  Today's Date: 6/1/2024    Admit Date: 5/29/2024    Plan: D/C Plan: Home with [facility] of [facility]. Transport by family car.   Discharge Plan       Row Name 06/01/24 1209       Plan    Plan Comments notified weekend liasion Patricia of pt's discharge orders for today.                    Expected Discharge Date and Time       Expected Discharge Date Expected Discharge Time    Jun 1, 2024               Saira Barone RN

## 2024-06-01 NOTE — DISCHARGE SUMMARY
Date of Discharge:  6/1/2024    Discharge Diagnosis:     Orthostatic hypotension  Autonomic dysfunction syndrome  Acute vertigo likely secondary to ETD  Chronic diastolic congestive heart failure  Panlobular COPD with emphysema  Atrial fibrillation, paroxysmal  Severe pulmonary hypertension  Chronic atelectasis left lower lobe  chronic kidney disease stage IIIa  Left breast anomaly  Hypertension associated chronic kidney disease stage IIIa  Anemia of chronic kidney disease  Hyperuricemia  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  Cerebrovascular disease status post CVA  Chronic oral anticoagulation therapy  Acquired hypothyroidism  Thrombophilia  Autonomic dysfunction syndrome  History of pulmonary embolism  Hypercoagulable state secondary to atrial fibrillation  [id number] score 6  History of IVC filter  Aneurysmal dilatation of abdominal aorta  Chronic mucopurulent bronchitis  Peripheral polyneuropathy  History of breast cancer  Supplemental oxygen dependency  Chronic hypoxic respiratory failure    Presenting Problem/History of Present Illness  Active Hospital Problems    Diagnosis  POA    **Syncope and collapse [R55]  Yes      Resolved Hospital Problems   No resolved problems to display.          Hospital Course  Patient is a 84 y.o. female who presented with acute orthostasis and acute near syncope.  The patient was observed and medication were modified and did well overall and deemed stable for discharge home to resume home health care services and home physical therapy.  She did have an episode of vertigo which responded well to Solu-Medrol administration.  She will be given Depo-Medrol 80 mg IM x 1 and a prescription for Antivert at the time of discharge.  We did modify her midodrine given her known history of autonomic dysfunction syndrome and she will take care with positional change.  We will follow-up with her in the office within 1 week.      Procedures  Performed         Consults:   Consults       Date and Time Order Name Status Description    4/25/2024  9:58 PM Inpatient Nephrology Consult Completed     4/25/2024  3:47 PM Inpatient Pulmonology Consult Completed     4/25/2024  3:47 PM Inpatient Cardiology Consult Completed             Pertinent Test Results:CT Head Without Contrast    Result Date: 5/29/2024  Impression: No acute intracranial pathology. Electronically Signed: Juancarlos Santana MD  5/29/2024 7:02 PM EDT  Workstation ID: [id number]    XR Chest 1 View    Result Date: 5/29/2024  Impression: 1. Stable cardiomegaly. No overt pulmonary edema. 2. Trace left basilar pleural effusion with mild left basilar atelectasis. Electronically Signed: Tiffanie Cheung MD  5/29/2024 4:37 PM EDT  Workstation ID: [id number]     Imaging Results (Last 7 Days)       Procedure Component Value Units Date/Time    CT Head Without Contrast [[id number]] Collected: 05/29/24 1900     Updated: 05/29/24 1904    Narrative:      CT HEAD WO CONTRAST    Date of Exam: 5/29/2024 6:15 PM EDT    Indication: Head positive LOC.    Comparison: 2/5/2024    Technique: Axial CT images were obtained of the head without contrast administration.  Coronal reconstructions were performed.  Automated exposure control and iterative reconstruction methods were used.    Findings: No intracranial hemorrhage. Gray-white matter differentiation is maintained without evidence of an acute infarction. Multiple foci of decreased attenuation are present within the subcortical, deep cerebral, and periventricular white matter   consistent with chronic small vessel/microangiopathic ischemic changes. No extra-axial mass or collection. The ventricles and sulci are prominent commensurate with involutional changes. The posterior fossa appears grossly normal. Sellar and suprasellar   structures are normal.    Orbital and periorbital soft tissues are normal. The paranasal sinuses, ethmoid air cells, and mastoid air cells are aerated. The  bony calvarium is intact. Right parietal scalp laceration.      Impression:      Impression: No acute intracranial pathology.          Electronically Signed: Juancarlos Santana MD    5/29/2024 7:02 PM EDT    Workstation ID: [id number]    XR Chest 1 View [[id number]] Collected: 05/29/24 1637     Updated: 05/29/24 1640    Narrative:      XR CHEST 1 VW    Date of Exam: 5/29/2024 4:25 PM EDT    Indication: Dizziness and near syncope with shortness of breath    Comparison: AP portable chest 4/20/2024    Findings:  Stable mild to moderate cardiac enlargement. Trace left basilar pleural effusion with mild left basilar atelectasis is present. No overt features of pulmonary edema. Right lung appears clear. Mild calcific atherosclerosis within the thoracic aorta. No   acute osseous abnormality      Impression:      Impression:    1. Stable cardiomegaly. No overt pulmonary edema.  2. Trace left basilar pleural effusion with mild left basilar atelectasis.      Electronically Signed: Tiffanie Cheung MD    5/29/2024 4:37 PM EDT    Workstation ID: [id number]                Condition on Discharge:  Fair    Vital Signs  Temp:  [97.5 °F (36.4 °C)-98.5 °F (36.9 °C)] 98.4 °F (36.9 °C)  Heart Rate:  [85-96] 89  Resp:  [14-20] 15  BP: (111-152)/(64-90) 111/66    Physical Exam:     General Appearance:    Alert, cooperative, in no acute distress   Head:    Normocephalic, without obvious abnormality, atraumatic   Eyes:           Conjunctivae and sclerae normal, no   icterus, no pallor, corneas clear, PERRLA   Throat:   No oral lesions, no thrush, oral mucosa moist   Neck:   No adenopathy, supple, trachea midline, no thyromegaly, no   carotid bruit, no JVD   Lungs:     Clear to auscultation,respirations regular, even and                  unlabored    Heart:    Regular rhythm and normal rate, normal S1 and S2, no            murmur, no gallop, no rub, no click   Chest Wall:    No abnormalities observed   Abdomen:     Normal bowel sounds, no masses, no  organomegaly, soft        non-tender, non-distended, no guarding, no rebound                tenderness   Rectal:     Deferred   Extremities:   Moves all extremities well, no edema, no cyanosis, no             redness   Pulses:   Pulses palpable and equal bilaterally   Skin:   No bleeding, bruising or rash   Lymph nodes:   No palpable adenopathy   Neurologic:   Cranial nerves 2 - 12 grossly intact, sensation intact, DTR       present and equal bilaterally         Discharge Disposition  Home-Health Care c    Discharge Medications     Discharge Medications        New Medications        Instructions Start Date   meclizine 25 MG tablet  Commonly known as: ANTIVERT   25 mg, Oral, 3 Times Daily PRN             Changes to Medications        Instructions Start Date   midodrine 10 MG tablet  Commonly known as: PROAMATINE  What changed:   medication strength  how much to take  when to take this   10 mg, Oral, 2 Times Daily Before Meals             Continue These Medications        Instructions Start Date   allopurinol 100 MG tablet  Commonly known as: ZYLOPRIM   100 mg, Oral, Daily      budesonide-formoterol 80-4.5 MCG/ACT inhaler  Commonly known as: SYMBICORT   2 puffs, Inhalation, 2 Times Daily - RT      bumetanide 0.5 MG tablet  Commonly known as: BUMEX   0.5 mg, Oral, Daily      carvedilol 3.125 MG tablet  Commonly known as: COREG   3.125 mg, Oral, 2 Times Daily With Meals      Eliquis 5 MG tablet tablet  Generic drug: apixaban   5 mg, Oral, Every 12 Hours Scheduled      Folbic 2.5-25-2 MG tablet tablet  Generic drug: folic acid-pyridoxine-cyanocobalamin   1 tablet, Oral, Daily      levothyroxine 75 MCG tablet  Commonly known as: SYNTHROID, LEVOTHROID   75 mcg, Oral, Every Early Morning      O2  Commonly known as: OXYGEN   3 L/min, Inhalation, Continuous      pantoprazole 40 MG EC tablet  Commonly known as: PROTONIX   40 mg, Oral, 2 Times Daily Before Meals      potassium chloride 20 MEQ packet  Commonly known as:  KLOR-CON   20 mEq, Oral, Daily      sildenafil 20 MG tablet  Commonly known as: REVATIO   20 mg, Oral, 3 Times Daily               Discharge Diet:   Regular  Activity at Discharge:   As tolerated  Follow-up Appointments  Future Appointments   Date Time Provider Department Center   6/21/2024  3:00 PM Richardson Willis MD [facility]         Test Results Pending at Discharge       Paul Granados MD  06/01/24  10:35 EDT

## 2024-06-01 NOTE — OUTREACH NOTE
Prep Survey      Flowsheet Row Responses   [facility] discharged from? [facility]   Is LACE score < 7 ? No   Eligibility Readm Mgmt   Discharge diagnosis Syncope and collapse   Does the patient have one of the following disease processes/diagnoses(primary or secondary)? Other   Does the patient have Home health ordered? Yes   [facility]   Prep survey completed? Yes            Cande BARBOSA - Registered Nurse

## 2024-06-01 NOTE — PLAN OF CARE
Goal Outcome Evaluation:                   Problem: Fall Injury Risk  Goal: Absence of Fall and Fall-Related Injury  Outcome: Ongoing, Progressing  Intervention: Identify and Manage Contributors  Recent Flowsheet Documentation  Taken 6/1/2024 0800 by Shahab Dhaliwal RN  Medication Review/Management: medications reviewed  Self-Care Promotion:   independence encouraged   BADL personal objects within reach   BADL personal routines maintained  Intervention: Promote Injury-Free Environment  Recent Flowsheet Documentation  Taken 6/1/2024 0800 by Shahab Dhaliwal RN  Safety Promotion/Fall Prevention:   activity supervised   assistive device/personal items within reach   clutter free environment maintained   fall prevention program maintained   nonskid shoes/slippers when out of bed   room organization consistent   safety round/check completed     Problem: Adult Inpatient Plan of Care  Goal: Plan of Care Review  Outcome: Ongoing, Progressing  Goal: Patient-Specific Goal (Individualized)  Outcome: Ongoing, Progressing  Goal: Absence of Hospital-Acquired Illness or Injury  Outcome: Ongoing, Progressing  Intervention: Identify and Manage Fall Risk  Recent Flowsheet Documentation  Taken 6/1/2024 0800 by Shahab Dhaliwal RN  Safety Promotion/Fall Prevention:   activity supervised   assistive device/personal items within reach   clutter free environment maintained   fall prevention program maintained   nonskid shoes/slippers when out of bed   room organization consistent   safety round/check completed  Intervention: Prevent Skin Injury  Recent Flowsheet Documentation  Taken 6/1/2024 0800 by Shahab Dhaliwal RN  Body Position: position changed independently  Skin Protection:   adhesive use limited   incontinence pads utilized   tubing/devices free from skin contact   transparent dressing maintained  Intervention: Prevent and Manage VTE (Venous Thromboembolism) Risk  Recent Flowsheet Documentation  Taken 6/1/2024 0800 by  Shahab Dhaliwal RN  Activity Management: activity encouraged  Intervention: Prevent Infection  Recent Flowsheet Documentation  Taken 6/1/2024 0800 by Shahab Dhaliwal RN  Infection Prevention:   environmental surveillance performed   hand hygiene promoted   rest/sleep promoted   single patient room provided  Goal: Optimal Comfort and Wellbeing  Outcome: Ongoing, Progressing  Intervention: Provide Person-Centered Care  Recent Flowsheet Documentation  Taken 6/1/2024 0800 by Shahab Dhaliwal RN  Trust Relationship/Rapport:   care explained   choices provided   thoughts/feelings acknowledged   reassurance provided   questions answered   questions encouraged   emotional support provided   empathic listening provided  Goal: Readiness for Transition of Care  Outcome: Ongoing, Progressing     Problem: Hypertension Comorbidity  Goal: Blood Pressure in Desired Range  Outcome: Ongoing, Progressing  Intervention: Maintain Blood Pressure Management  Recent Flowsheet Documentation  Taken 6/1/2024 0800 by Shahab Dhaliwal RN  Medication Review/Management: medications reviewed     Problem: Skin Injury Risk Increased  Goal: Skin Health and Integrity  Outcome: Ongoing, Progressing  Intervention: Optimize Skin Protection  Recent Flowsheet Documentation  Taken 6/1/2024 0800 by Shahab Dhaliwal RN  Pressure Reduction Techniques:   frequent weight shift encouraged   weight shift assistance provided  Head of Bed (HOB) Positioning: HOB elevated  Pressure Reduction Devices: pressure-redistributing mattress utilized  Skin Protection:   adhesive use limited   incontinence pads utilized   tubing/devices free from skin contact   transparent dressing maintained

## 2024-06-02 NOTE — CASE MANAGEMENT/SOCIAL WORK
Case Management Discharge Note      Final Note: home with [facility]         Selected Continued Care - Discharged on 6/1/2024 Admission date: 5/29/2024 - Discharge disposition: Home-Health Care [facility]          Home Medical Care Coordination complete.      Service Provider Selected Services Address Phone Fax Patient Preferred    [facility], [address] [address] [phone] --                 Selected Continued Care - Prior Encounters Includes continued care and service providers with selected services from prior encounters from 2/29/2024 to 6/1/2024      Discharged on 5/7/2024 Admission date: 4/25/2024 - Discharge disposition: Home-Health Care [facility]      Home Medical Care       Service Provider Selected Services Address Phone Fax Patient Preferred    [facility] ,  Home [facility] [address] [facility], [address] [address] [phone] --                          Transportation Services  Private: Car    Final Discharge Disposition Code: 06 - home with home health care

## 2024-06-07 ENCOUNTER — READMISSION MANAGEMENT (OUTPATIENT)
Dept: CALL CENTER | Facility: HOSPITAL | Age: 84
End: 2024-06-07
Payer: MEDICARE

## 2024-06-07 NOTE — OUTREACH NOTE
Medical Week 1 Survey      Flowsheet Row Responses   [facility] facility patient discharged from? [facility]   Does the patient have one of the following disease processes/diagnoses(primary or secondary)? Other   Week 1 attempt successful? No   Unsuccessful attempts Attempt 1            Virgen ESPINOZA - Registered Nurse

## 2024-06-10 ENCOUNTER — READMISSION MANAGEMENT (OUTPATIENT)
Dept: CALL CENTER | Facility: HOSPITAL | Age: 84
End: 2024-06-10
Payer: MEDICARE

## 2024-06-10 NOTE — OUTREACH NOTE
Medical Week 1 Survey      Flowsheet Row Responses   [facility] patient discharged from? [facility]   Does the patient have one of the following disease processes/diagnoses(primary or secondary)? Other   Week 1 attempt successful? Yes   Call start time 0947   Call end time 0951   Is patient permission given to speak with other caregiver? Yes   Person spoke with today (if not patient) and relationship Darby-daughter.   Meds reviewed with patient/caregiver? Yes   Is the patient having any side effects they believe may be caused by any medication additions or changes? No   Does the patient have all medications ordered at discharge? Yes   Is the patient taking all medications as directed (includes completed medication regime)? Yes   Does the patient have a primary care provider?  Yes   Does the patient have an appointment with their PCP within 7 days of discharge? Yes   Has the patient kept scheduled appointments due by today? Yes   Has home health visited the patient within 72 hours of discharge? Yes   Psychosocial issues? No   Did the patient receive a copy of their discharge instructions? Yes   Nursing interventions Reviewed instructions with patient   What is the patient's perception of their health status since discharge? Improving   Is the patient/caregiver able to teach back signs and symptoms related to disease process for when to call PCP? Yes   Is the patient/caregiver able to teach back signs and symptoms related to disease process for when to call 911? Yes   Is the patient/caregiver able to teach back the hierarchy of who to call/visit for symptoms/problems? PCP, Specialist, Home health nurse, Urgent Care, ED, 911 Yes   If the patient is a current smoker, are they able to teach back resources for cessation? Not a smoker   Week 1 call completed? Yes   Would this patient benefit from a Referral to Amb Social Work? No   Is the patient interested in additional calls from an ambulatory ? No   Wrap up  additional comments Patient's daughter states patient is improving. States has already followed up with her PCP. Denies any known needs today. Requested that one more f/u call be placed to patient next week.   Call end time [phone]            Louise BARBOSA - Registered Nurse

## 2024-06-15 NOTE — PROGRESS NOTES
Encounter Date:06/21/2024        Patient ID: Gabrielle Lyles is a 84 y.o. female.      Chief Complaint:      History of Present Illness  Gabrielle Lyles is a 84 y.o. female  with COPD, pulmonary hypertension, chronic respiratory failure on oxygen, TIA, DVT and pulmonary embolism, chronic kidney disease, hypertension. She has had multiple previous hospital admissions for shortness of breath and also has chronic kidney disease.    Current cardiac medications include Eliquis, Bumex, Coreg, midodrine, sildenafil.    Patient recently ate a bunch of fast food high in salt including [employer] and [employer].  She now complains of bilateral lower extremity edema.        The following portions of the patient's history were reviewed and updated as appropriate: allergies, current medications, past family history, past medical history, past social history, past surgical history, and problem list.    Review of Systems   Constitutional: Positive for malaise/fatigue.   Cardiovascular:  Positive for dyspnea on exertion and leg swelling. Negative for chest pain and palpitations.   Respiratory:  Negative for cough and shortness of breath.    Gastrointestinal:  Negative for abdominal pain, nausea and vomiting.   Neurological:  Positive for dizziness and light-headedness. Negative for focal weakness, headaches and numbness.   All other systems reviewed and are negative.        Current Outpatient Medications:     allopurinol (ZYLOPRIM) 100 MG tablet, Take 1 tablet by mouth Daily., Disp: , Rfl:     apixaban (ELIQUIS) 5 MG tablet tablet, Take 1 tablet by mouth Every 12 (Twelve) Hours. Indications: Other - full anticoagulation, history of DVT/PE, Disp: 60 tablet, Rfl: 2    budesonide-formoterol (SYMBICORT) 80-4.5 MCG/ACT inhaler, Inhale 2 puffs 2 (Two) Times a Day., Disp: , Rfl:     bumetanide (BUMEX) 0.5 MG tablet, Take 1 tablet by mouth Daily., Disp: 30 tablet, Rfl: 3    carvedilol (COREG) 3.125 MG tablet, Take 1 tablet by  mouth 2 (Two) Times a Day With Meals., Disp: 60 tablet, Rfl: 3    Folbic 2.5-25-2 MG tablet tablet, Take 1 tablet by mouth Daily., Disp: , Rfl:     levothyroxine (SYNTHROID, LEVOTHROID) 75 MCG tablet, Take 1 tablet by mouth Every Morning., Disp: 30 tablet, Rfl: 3    meclizine (ANTIVERT) 25 MG tablet, Take 1 tablet by mouth 3 (Three) Times a Day As Needed for Dizziness., Disp: 30 tablet, Rfl: 0    midodrine (PROAMATINE) 10 MG tablet, Take 1 tablet by mouth 2 (Two) Times a Day Before Meals., Disp: 60 tablet, Rfl: 2    O2 (OXYGEN), Inhale 3 L/min Continuous., Disp: , Rfl:     pantoprazole (PROTONIX) 40 MG EC tablet, Take 1 tablet by mouth 2 (Two) Times a Day Before Meals., Disp: 60 tablet, Rfl: 0    potassium chloride (KLOR-CON) 20 MEQ packet, Take 20 mEq by mouth Daily., Disp: , Rfl:     sildenafil (REVATIO) 20 MG tablet, Take 1 tablet by mouth 3 (Three) Times a Day., Disp: 90 tablet, Rfl: 3    Current outpatient and discharge medications have been reconciled for the patient.  Reviewed by: Richardson Willis MD       No Known Allergies    Family History   Problem Relation Age of Onset    Lung cancer Brother        Past Surgical History:   Procedure Laterality Date    CARDIAC CATHETERIZATION N/A 3/3/2023    Procedure: Left and Right Heart Cath with Coronary Angiography;  Surgeon: Richardson Willis MD;  Location: [facility] CATH INVASIVE [facility];  Service: Cardiovascular;  Laterality: N/A;    CATARACT EXTRACTION  2015    IVC FILTER RETRIEVAL  06/2014    IVC filter placement by Dr. Card    MAMMO STEREOTACTIC BREAST BX SURGICAL ADD UNI Left 11/01/2011    invasive lobular carcinoma-Dr. Cande Daniel    MASTECTOMY, PARTIAL Left 12/01/2011    and left axillary sentinel lymph node biopsy by Dr. Uriostegui    TUBAL ABDOMINAL LIGATION  1984       Past Medical History:   Diagnosis Date    Acute exacerbation of chronic obstructive pulmonary disease (COPD)     COPD (chronic obstructive pulmonary disease)     Deep vein thrombosis of bilateral  "lower extremities 2019    3/2013    History of pneumonia 2012    community acquired pneumonia and right pleural effusion;hospitalized at [facility]    History of pulmonary embolism 2013    bilateral PE    Hypertension     Insomnia     on Ambien 5mg at HS    Lobular breast cancer, left 2011    Stage IA left upper lobular breast cancer    Malignant neoplasm of left breast in female, estrogen receptor positive 2019    Osteopenia     Parainfluenza infection     Parotid duct obstruction     Severe pulmonary hypertension 2023    Stage 3a chronic kidney disease 2019    TIA (transient ischemic attack) 10/25/2022       Family History   Problem Relation Age of Onset    Lung cancer Brother        Social History     Socioeconomic History    Marital status:    Tobacco Use    Smoking status: Former     Current packs/day: 0.00     Types: Cigarettes     Quit date:      Years since quittin.     Passive exposure: Past    Smokeless tobacco: Never    Tobacco comments:     smoked 6 cigarettes a day from 13 years of age to 56 years of age when she quit in    Vaping Use    Vaping status: Never Used   Substance and Sexual Activity    Alcohol use: Not Currently    Drug use: No    Sexual activity: Not Currently     Partners: Male               Objective:       Physical Exam    /60 (BP Location: Right arm, Patient Position: Sitting, Cuff Size: Adult)   Pulse 83   Ht 162.6 cm (64\")   Wt 62.1 kg (137 lb)   SpO2 92%   BMI 23.52 kg/m²   The patient is alert, oriented and in no distress.    Vital signs as noted above.    Head and neck revealed no carotid bruits or jugular venous distension.  No thyromegaly or lymphadenopathy is present.    Lungs clear.  No wheezing.  Breath sounds are normal bilaterally.    Heart normal first and second heart sounds.  No murmur..  No pericardial rub is present.  No gallop is present.    Abdomen soft and nontender.  No organomegaly is " present.    Extremities revealed good peripheral pulses without any pedal edema.    Skin warm and dry.    Musculoskeletal system is grossly normal.    CNS grossly normal.           Diagnosis Plan   1. Chronic obstructive pulmonary disease, unspecified COPD type        2. Cor pulmonale (chronic)        3. Chronic hypotension        4. Paroxysmal atrial fibrillation        5. Shortness of breath        6. CKD (chronic kidney disease), stage IV        7. Pulmonary hypertension        8. Deep vein thrombosis (DVT) of proximal vein of both lower extremities, unspecified chronicity        9. Stage 3 chronic kidney disease, unspecified whether stage 3a or 3b CKD        10. DVT, recurrent, lower extremity, acute, bilateral        11. PAD (peripheral artery disease)        LAB RESULTS (LAST 7 DAYS)    CBC        BMP        CMP         BNP        TROPONIN        CoAg        Creatinine Clearance  Estimated Creatinine Clearance: 21.2 mL/min (A) (by C-G formula based on SCr of 1.97 mg/dL (H)).    ABG        Radiology  No radiology results for the last day    EKG  Procedures    Stress test  Results for orders placed in visit on 09/13/22    Stress Test With Myocardial Perfusion One Day    Interpretation Summary    Left ventricular ejection fraction is hyperdynamic (Calculated EF > 70%). .    Myocardial perfusion imaging indicates a normal myocardial perfusion study with no evidence of ischemia.    Impressions are consistent with a low risk study.    Findings consistent with a normal ECG stress test.      Echocardiogram  Results for orders placed in visit on 03/18/24    Adult Transthoracic Echo Limited W/ Cont if Necessary Per Protocol    Interpretation Summary    Left ventricular ejection fraction appears to be 61 - 65%.    The right ventricular cavity is dilated.    There is a small (<1cm) pericardial effusion adjacent to the left ventricle. There is no evidence of cardiac tamponade.    IVC is 2.1 cm.      Cardiac  catheterization  Results for orders placed during the hospital encounter of 03/03/23    Cardiac Catheterization/Vascular Study    Conclusion  OPERATORS  Richardson Willis M.D. (Attending Cardiologist)      PROCEDURE PERFORMED  Ultrasound guided vascular access  Right heart catheterization  Coronary Angiogram  Left Heart Catheterization 87105  Moderate Sedation    INDICATIONS FOR PROCEDURE  83-year-old woman with multiple cardiovascular risk factors, abnormal stress test presented with worsening shortness of breath.  After discussing the risk and benefit of the procedure she was brought in for elective right and left heart cath.    PROCEDURE IN DETAIL  Informed consent was obtained from the patient after explaining the risks, benefits, and alternative options of the procedure. After obtaining informed consent, the patient was brought to the cath lab and was prepped in a sterile fashion. Lidocaine 2% was used for local anesthesia into the right femoral venous access site. Right femoral vein was accessed using the micropuncture needle under ultrasound guidance and micropuncture wire advanced under flouroscopy. A 7 Maori vascular sheath was put into place percutaneously over guide-wire. Guide wires were removed. A 6Fr swan sheryl catheter was advanced to wedge position. RA, RV and PA and wedge pressures were recorded.  PA sat and arterial sats recorded.  The patient tolerated the procedure well without any complications.    Lidocaine 2% was used for local anesthesia into the right femoral arterial access site. The right femoral artery was accessed with a micropuncture needle via modified Seldinger technique under ultrasound guidance. A 6F was inserted successfully.  Afterwards, 6F JR4 and JL4 diagnostic catheters were advanced over a wire into the ascending aorta and were used to engage the ostia of the left main and RCA respectively. JR4 used to cross the AV and obtain LV pressures and gradient across the AV measured via  pullback technique. Images of the right and left coronary systems were obtained. All the catheters were exchanged over a wire and subsequently removed. Angiogram of the femoral access site was obtained and did not show complications. The patient tolerated the procedure well without any complications. The pictures were reviewed at the end of the procedure. A Mynx closure device was applied.    HEMODYNAMICS    RHC  RA 6/5, 4 mmHg  RV 74/3, 5 mmHg  PA 71/25, 44 mmHg  PCW 12 mmHg  AO Sat 92%  PA Sat 78%    Jose CO: 7.89 L/min    Jose CI: 4.69 L/min/m²    LHC  LV: 134/3, 20 mmHg  AO: 131/56, 87 mmHg  No significant gradient across the aortic valve during pullback of JR4 catheter.    FINDINGS  Coronary Angiogram  All vessels are heavily calcified  She also has heavily calcified peripheral arterial disease.  Right dominant circulation    Left main: Left main is a large caliber vessel which gives rise to the Left Anterior Descending and the Left circumflex.  Left main is angiographically free from any significant disease.    Left Anterior Descending Artery: LAD is a heavily calcified medium caliber vessel which gives rise to diagonals.  The vessel is highly tortuous and has 50 to 60% mid vessel stenosis best seen in [place] cranial view.    Left Circumflex: Heavily calcified vessel with 50% proximal segment stenosis.    Right Coronary Artery: The RCA is a large caliber vessel which is heavily calcified and tortuous.  It has diffuse luminal irregularities and 30 to 40% stenosis in the midsegment around the bend.    ESTIMATED BLOOD LOSS:  10 ml    COMPLICATIONS:  None    PROCEDURE DATA:  Sedation Time: 25 minutes    IMPRESSIONS  Heavily calcified, tortuous nonobstructive coronary disease  Severe pulmonary hypertension with PA systolic pressure in the 70s  Mean PA pressure 44 mmHg    RECOMMENDATIONS  -Continue aggressive medical management of CAD  -Referral to pulmonology for treatment of pulmonary hypertension          Assessment  and Plan       Diagnoses and all orders for this visit:    1. Chronic obstructive pulmonary disease, unspecified COPD type (Primary)    2. Cor pulmonale (chronic)  Overview:  Severely reduced RV function (TTE 2/7/2024)      3. Chronic hypotension  Overview:  Requiring midodrine 5/24      4. Paroxysmal atrial fibrillation  Overview:  A. Rate control coreg 3.125mg bid  B. Renal dosed eliquis  C. No history of ablation's or DCCV I could find       5. Shortness of breath    6. CKD (chronic kidney disease), stage IV  Overview:  eGFR 29  5/9/24      7. Pulmonary hypertension    8. Deep vein thrombosis (DVT) of proximal vein of both lower extremities, unspecified chronicity    9. Stage 3 chronic kidney disease, unspecified whether stage 3a or 3b CKD    10. DVT, recurrent, lower extremity, acute, bilateral    11. PAD (peripheral artery disease)         COPD/Chronic respiratory failure/shortness of breath  Severe pulmonary hypertension  HFpEF  On home oxygen 3 L  Has known pulmonary hypertension with mean PA pressure of 44 and peak PA pressure of 71 mmHg with normal wedge pressure  She is on sildenafil  Echocardiogram shows preserved LV function with mildly elevated RVSP.  Small to moderate pericardial effusion: No signs of tamponade  proBNP of 9200   Continue low-dose Bumex daily  Encouraged low-salt diet  I will double the dose of Bumex for the next 3 days.  Encouraged follow-up with outpatient heart failure clinic for intermittent IV diuresis     Atrial fibrillation  She has paroxysmal atrial fibrillation  [id number] score is 6  Continue Eliquis for atrial fibrillation as well as for history of DVT/PE  Continue Coreg for heart rate control  H&H 10.3/34.7     History of venous thrombosis and embolism  Low-dose Eliquis due to renal dysfunction and age.  She also has an IVC filter in place.     Primary hypertension, chronic  Blood pressure has improved and is currently normal  Continue Coreg for blood pressure  control  Echo shows preserved LV function, reduced RV function and mildly elevated RVSP.  Pericardial effusion is not significant with no signs of tamponade.  Continue Bumex  Will down titrate midodrine to once a day now     Coronary artery disease  Continue beta-blocker  Already on Eliquis low-dose  Recommended starting a statin.  Previous cardiac catheterization showed heavily calcified coronaries but nonobstructive.  No chest pain and stable from cardiac standpoint.     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  Continue high intensity statin and anticoagulation with Eliquis 2.5 mg p.o. twice daily due to renal dysfunction and advanced age.     Stage IIIb chronic kidney disease  Creatinine 1.97, GFR is 25  Continue low-dose Bumex  Follow-up with nephrology: I will make a referral today     Recent altered mental status  Previously precipitated by mastitis/PICC line infection  Previous CT head and MRI of the brain unremarkable with no acute findings

## 2024-06-21 ENCOUNTER — OFFICE VISIT (OUTPATIENT)
Dept: CARDIOLOGY | Facility: CLINIC | Age: 84
End: 2024-06-21
Payer: MEDICARE

## 2024-06-21 ENCOUNTER — READMISSION MANAGEMENT (OUTPATIENT)
Dept: CALL CENTER | Facility: HOSPITAL | Age: 84
End: 2024-06-21
Payer: MEDICARE

## 2024-06-21 VITALS
DIASTOLIC BLOOD PRESSURE: 60 MMHG | BODY MASS INDEX: 23.39 KG/M2 | HEART RATE: 83 BPM | SYSTOLIC BLOOD PRESSURE: 121 MMHG | HEIGHT: 64 IN | OXYGEN SATURATION: 92 % | WEIGHT: 137 LBS

## 2024-06-21 DIAGNOSIS — I95.89 CHRONIC HYPOTENSION: ICD-10-CM

## 2024-06-21 DIAGNOSIS — I82.4Y3 DEEP VEIN THROMBOSIS (DVT) OF PROXIMAL VEIN OF BOTH LOWER EXTREMITIES, UNSPECIFIED CHRONICITY: ICD-10-CM

## 2024-06-21 DIAGNOSIS — I48.0 PAROXYSMAL ATRIAL FIBRILLATION: ICD-10-CM

## 2024-06-21 DIAGNOSIS — N18.30 STAGE 3 CHRONIC KIDNEY DISEASE, UNSPECIFIED WHETHER STAGE 3A OR 3B CKD: ICD-10-CM

## 2024-06-21 DIAGNOSIS — I27.20 PULMONARY HYPERTENSION: ICD-10-CM

## 2024-06-21 DIAGNOSIS — N18.4 CKD (CHRONIC KIDNEY DISEASE), STAGE IV: ICD-10-CM

## 2024-06-21 DIAGNOSIS — J44.9 CHRONIC OBSTRUCTIVE PULMONARY DISEASE, UNSPECIFIED COPD TYPE: Primary | ICD-10-CM

## 2024-06-21 DIAGNOSIS — I82.403 DVT, RECURRENT, LOWER EXTREMITY, ACUTE, BILATERAL: ICD-10-CM

## 2024-06-21 DIAGNOSIS — I27.81 COR PULMONALE (CHRONIC): ICD-10-CM

## 2024-06-21 DIAGNOSIS — R06.02 SHORTNESS OF BREATH: ICD-10-CM

## 2024-06-21 DIAGNOSIS — I73.9 PAD (PERIPHERAL ARTERY DISEASE): ICD-10-CM

## 2024-06-21 NOTE — OUTREACH NOTE
Medical Week 2 Survey      Flowsheet Row Responses   [facility] patient discharged from? [facility]   Does the patient have one of the following disease processes/diagnoses(primary or secondary)? Other   Week 2 attempt successful? Yes   Call start time 1047   Discharge diagnosis Syncope and collapse   Call end time 1049   Meds reviewed with patient/caregiver? Yes   Is the patient having any side effects they believe may be caused by any medication additions or changes? No   Does the patient have all medications ordered at discharge? Yes   Is the patient taking all medications as directed (includes completed medication regime)? Yes   Does the patient have a primary care provider?  Yes   Does the patient have an appointment with their PCP within 7 days of discharge? Yes   Has the patient kept scheduled appointments due by today? Yes   What is the Home health agency?  [facility]   Has home health visited the patient within 72 hours of discharge? Yes   Psychosocial issues? No   Did the patient receive a copy of their discharge instructions? Yes   Nursing interventions Reviewed instructions with patient   What is the patient's perception of their health status since discharge? Improving   Is the patient/caregiver able to teach back signs and symptoms related to disease process for when to call PCP? Yes   Is the patient/caregiver able to teach back signs and symptoms related to disease process for when to call 911? Yes   Is the patient/caregiver able to teach back the hierarchy of who to call/visit for symptoms/problems? PCP, Specialist, Home health nurse, Urgent Care, ED, 911 Yes   Additional teach back comments denies dizziness, states vertigo has resolved   Week 2 Call Completed? Yes   Call end time 1049            Kayla ABDULLAHI - Registered Nurse

## 2024-08-15 PROBLEM — I48.91 ATRIAL FIBRILLATION WITH RVR: Status: ACTIVE | Noted: 2024-01-01

## 2024-08-15 NOTE — PLAN OF CARE
Goal Outcome Evaluation:  Plan of Care Reviewed With: patient, family        Progress: improving  Outcome Evaluation: Patient came into the hospital via direct admit with malaise. She has chronic a-fib. PICC team called to insert an IV since she will need fluids as per Dr. Granados. She uses a rollator but can get up to the toilet. Assist x1 is best for patient for right now. She has a rule out for candida auris and COVID. Patient does not appear to have any COVID symptoms currently. She has no known allergies. She has a heart healthy diet because she's a cardiac patient. She has a history of pulmonary hyptertension, a-fib, CKD, and Lt-sided breasat cancer, so her left arm is a restricted limb. She is alert and oriented x4. Her lung sounds and heart sounds are clear. Her bowels are very hyperactive and reported having diarrhea for the last couple weeks. Full code.

## 2024-08-15 NOTE — H&P
Patient Care Team:  Paul Granados MD as PCP - General (Family Medicine)  Richardson Willis MD as Cardiologist (Cardiology)  Rafy Rodriguez MD as Consulting Physician (Hematology and Oncology)  Christianne Phillips, AMARILIS as Licensed Practical Nurse  Salome Fleming MD as Consulting Physician (Nephrology)    Chief Complaint  Subjective     The patient is a 84 y.o. female who presents with AFIB/RVR    HPI  Several day history of SOB with PEREZ and evidence of AFIB with RVR upon office visit to F/U with progressive decompensation including hypotension.  No SSCP.  No L arm parestheias    +loose stools   No hyperpyrexia   No hermoptysis  Review of Systems  Review of Systems   Respiratory:  Positive for cough and shortness of breath.    Cardiovascular:  Positive for palpitations. Negative for chest pain.   Neurological:  Positive for weakness.       History  Past Medical History:   Diagnosis Date    Acute exacerbation of chronic obstructive pulmonary disease (COPD)     COPD (chronic obstructive pulmonary disease)     Deep vein thrombosis of bilateral lower extremities 07/24/2019    3/2013    History of pneumonia 06/2012    community acquired pneumonia and right pleural effusion;hospitalized at [facility]    History of pulmonary embolism 03/2013    bilateral PE    Hypertension 2001    Insomnia     on Ambien 5mg at     Lobular breast cancer, left 11/2011    Stage IA left upper lobular breast cancer    Malignant neoplasm of left breast in female, estrogen receptor positive 07/18/2019    Osteopenia 2012    Parainfluenza infection     Parotid duct obstruction     Severe pulmonary hypertension 03/03/2023    Stage 3a chronic kidney disease 07/24/2019    TIA (transient ischemic attack) 10/25/2022     Past Surgical History:   Procedure Laterality Date    CARDIAC CATHETERIZATION N/A 3/3/2023    Procedure: Left and Right Heart Cath with Coronary Angiography;  Surgeon: Richardson Willis MD;  Location: [facility] INVASIVE  "LOCATION;  Service: Cardiovascular;  Laterality: N/A;    CATARACT EXTRACTION      IVC FILTER RETRIEVAL  2014    IVC filter placement by Dr. Card    MAMMO STEREOTACTIC BREAST BX SURGICAL ADD UNI Left 2011    invasive lobular carcinoma-Dr. Cande Daniel    MASTECTOMY, PARTIAL Left 2011    and left axillary sentinel lymph node biopsy by Dr. Uriostegui    TUBAL ABDOMINAL LIGATION       Family History   Problem Relation Age of Onset    Lung cancer Brother      Social History     Tobacco Use    Smoking status: Former     Current packs/day: 0.00     Types: Cigarettes     Quit date:      Years since quittin.6     Passive exposure: Past    Smokeless tobacco: Never    Tobacco comments:     smoked 6 cigarettes a day from 13 years of age to 56 years of age when she quit in    Vaping Use    Vaping status: Never Used   Substance Use Topics    Alcohol use: Not Currently    Drug use: No     Allergies:  Patient has no known allergies.    Objective     Vital Signs  Temp:  [98 °F (36.7 °C)] 98 °F (36.7 °C)  Heart Rate:  [] 90  Resp:  [13] 13  BP: (131)/(72) 131/72      Physical Exam:   Physical Exam  Vitals and nursing note reviewed.   Cardiovascular:      Rate and Rhythm: Tachycardia present. Rhythm irregular.      Heart sounds: Normal heart sounds.   Pulmonary:      Breath sounds: Wheezing present.   Skin:     General: Skin is warm.   Neurological:      Mental Status: She is alert.              Results Review:   CBC      BMP     Cr Clearance CrCl cannot be calculated (Patient's most recent lab result is older than the maximum 30 days allowed.).  Coag     HbA1C   Lab Results   Component Value Date    HGBA1C 5.8 (H) 10/25/2022     Blood Glucose No results found for: \"POCGLU\"  Infection     CMP     UA      Radiology(recent) No radiology results for the last day     Assessment:      Atrial fibrillation with RVR    AFIB + RVR  SOB  Autonomic dysfunction syndrome  Chronic diastolic congestive heart " failure  Panlobular COPD with emphysema  Severe pulmonary hypertension  Chronic atelectasis left lower lobe  chronic kidney disease stage IIIa  Left breast anomaly  Hypertension associated chronic kidney disease stage IIIa  Anemia of chronic kidney disease  Hyperuricemia  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  Cerebrovascular disease status post CVA  Chronic oral anticoagulation therapy  Acquired hypothyroidism  Thrombophilia  Autonomic dysfunction syndrome  History of pulmonary embolism  Hypercoagulable state secondary to atrial fibrillation  [id number] score 6  History of IVC filter  Aneurysmal dilatation of abdominal aorta  Chronic mucopurulent bronchitis  Peripheral polyneuropathy  History of breast cancer  Supplemental oxygen dependency  Chronic hypoxic respiratory failure            Plan:  Rate control//stabilization  Expected Length of Stay 2 days    I discussed the patient's findings and my recommendations with patient and nursing staff.     Paul Granados MD  08/15/24  18:30 EDT

## 2024-08-16 PROBLEM — J44.1 CHRONIC OBSTRUCTIVE PULMONARY DISEASE WITH ACUTE EXACERBATION: Status: RESOLVED | Noted: 2024-01-01 | Resolved: 2024-01-01

## 2024-08-16 PROBLEM — R41.82 ALTERED MENTAL STATUS: Status: RESOLVED | Noted: 2024-01-01 | Resolved: 2024-01-01

## 2024-08-16 PROBLEM — E87.6 ACUTE HYPOKALEMIA: Status: RESOLVED | Noted: 2024-01-01 | Resolved: 2024-01-01

## 2024-08-16 PROBLEM — N17.9 AKI (ACUTE KIDNEY INJURY): Status: RESOLVED | Noted: 2024-01-01 | Resolved: 2024-01-01

## 2024-08-16 PROBLEM — N18.31 CHRONIC KIDNEY DISEASE, STAGE 3A: Status: RESOLVED | Noted: 2024-01-01 | Resolved: 2024-01-01
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
## 2024-08-16 NOTE — DISCHARGE PLACEMENT REQUEST
"Gabrielle Lyles (84 y.o. Female)       Date of Birth   1940    Social Security Number       Address   [address] [place] [address] [place] [address]    Home Phone   [phone]    MRN   [id number]       Rastafari   Rastafarian    Marital Status                               Admission Date   8/15/24    Admission Type   Urgent    Admitting Provider   Paul Granados MD    Attending Provider   Shaylee Mcdaniels MD    Department, Room/Bed   [facility] 2D, 273/2       Discharge Date       Discharge Disposition       Discharge Destination                                 Attending Provider: Shaylee Mcdaniels MD    Allergies: No Known Allergies    Isolation: None   Infection: None   Code Status: Prior    Ht: 162.6 cm (64\")   Wt: 67 kg (147 lb 11.3 oz)    Admission Cmt: None   Principal Problem: Atrial fibrillation with RVR [I48.91]                   Active Insurance as of 8/15/2024       Primary Coverage       Payor Plan Insurance Group Employer/Plan Group    HUMANA MEDICARE REPLACEMENT HUMANA MED ADV GROUP [id number]       Payor Plan Address Payor Plan Phone Number Payor Plan Fax Number Effective Dates    [address] [address] [phone]  1/1/2018 - None Entered    [facility] [id number]         Subscriber Name Subscriber Birth Date Member ID       GABRIELLE LYLES 1940 [id number]                     Emergency Contacts        (Rel.) Home Phone Work Phone Mobile Phone    MARVIN DURANT (Daughter) [phone] -- --                 History & Physical        Paul Granados MD at 08/15/24 1830          Patient Care Team:  Paul Granados MD as PCP - General (Family Medicine)  Richardson Willis MD as Cardiologist (Cardiology)  Rafy Rodriguez MD as Consulting Physician (Hematology and Oncology)  Christianne Phillips RN as Licensed Practical Nurse  Salome Fleming MD as Consulting Physician (Nephrology)    Chief Complaint  Subjective    The patient is a 84 y.o. female who presents " with AFIB/RVR    HPI  Several day history of SOB with PEREZ and evidence of AFIB with RVR upon office visit to F/U with progressive decompensation including hypotension.  No SSCP.  No L arm parestheias    +loose stools   No hyperpyrexia   No hermoptysis  Review of Systems  Review of Systems   Respiratory:  Positive for cough and shortness of breath.    Cardiovascular:  Positive for palpitations. Negative for chest pain.   Neurological:  Positive for weakness.       History  Past Medical History:   Diagnosis Date    Acute exacerbation of chronic obstructive pulmonary disease (COPD)     COPD (chronic obstructive pulmonary disease)     Deep vein thrombosis of bilateral lower extremities 07/24/2019    3/2013    History of pneumonia 06/2012    community acquired pneumonia and right pleural effusion;hospitalized at [facility]    History of pulmonary embolism 03/2013    bilateral PE    Hypertension 2001    Insomnia     on Ambien 5mg at     Lobular breast cancer, left 11/2011    Stage IA left upper lobular breast cancer    Malignant neoplasm of left breast in female, estrogen receptor positive 07/18/2019    Osteopenia 2012    Parainfluenza infection     Parotid duct obstruction     Severe pulmonary hypertension 03/03/2023    Stage 3a chronic kidney disease 07/24/2019    TIA (transient ischemic attack) 10/25/2022     Past Surgical History:   Procedure Laterality Date    CARDIAC CATHETERIZATION N/A 3/3/2023    Procedure: Left and Right Heart Cath with Coronary Angiography;  Surgeon: Richardson Willis MD;  Location: [facility] INVASIVE LOCATION;  Service: Cardiovascular;  Laterality: N/A;    CATARACT EXTRACTION  2015    IVC FILTER RETRIEVAL  06/2014    IVC filter placement by Dr. Card    MAMMO STEREOTACTIC BREAST BX SURGICAL ADD UNI Left 11/01/2011    invasive lobular carcinoma-Dr. Cande Daniel    MASTECTOMY, PARTIAL Left 12/01/2011    and left axillary sentinel lymph node biopsy by Dr. Uriostegui    TUBAL ABDOMINAL LIGATION   "     Family History   Problem Relation Age of Onset    Lung cancer Brother      Social History     Tobacco Use    Smoking status: Former     Current packs/day: 0.00     Types: Cigarettes     Quit date:      Years since quittin.6     Passive exposure: Past    Smokeless tobacco: Never    Tobacco comments:     smoked 6 cigarettes a day from 13 years of age to 56 years of age when she quit in    Vaping Use    Vaping status: Never Used   Substance Use Topics    Alcohol use: Not Currently    Drug use: No     Allergies:  Patient has no known allergies.    Objective    Vital Signs  Temp:  [98 °F (36.7 °C)] 98 °F (36.7 °C)  Heart Rate:  [] 90  Resp:  [13] 13  BP: (131)/(72) 131/72      Physical Exam:   Physical Exam  Vitals and nursing note reviewed.   Cardiovascular:      Rate and Rhythm: Tachycardia present. Rhythm irregular.      Heart sounds: Normal heart sounds.   Pulmonary:      Breath sounds: Wheezing present.   Skin:     General: Skin is warm.   Neurological:      Mental Status: She is alert.              Results Review:   CBC      BMP     Cr Clearance CrCl cannot be calculated (Patient's most recent lab result is older than the maximum 30 days allowed.).  Coag     HbA1C   Lab Results   Component Value Date    HGBA1C 5.8 (H) 10/25/2022     Blood Glucose No results found for: \"POCGLU\"  Infection     CMP     UA      Radiology(recent) No radiology results for the last day     Assessment:      Atrial fibrillation with RVR    AFIB + RVR  SOB  Autonomic dysfunction syndrome  Chronic diastolic congestive heart failure  Panlobular COPD with emphysema  Severe pulmonary hypertension  Chronic atelectasis left lower lobe  chronic kidney disease stage IIIa  Left breast anomaly  Hypertension associated chronic kidney disease stage IIIa  Anemia of chronic kidney disease  Hyperuricemia  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  Cerebrovascular disease status post " CVA  Chronic oral anticoagulation therapy  Acquired hypothyroidism  Thrombophilia  Autonomic dysfunction syndrome  History of pulmonary embolism  Hypercoagulable state secondary to atrial fibrillation  [id number] score 6  History of IVC filter  Aneurysmal dilatation of abdominal aorta  Chronic mucopurulent bronchitis  Peripheral polyneuropathy  History of breast cancer  Supplemental oxygen dependency  Chronic hypoxic respiratory failure            Plan:  Rate control//stabilization  Expected Length of Stay 2 days    I discussed the patient's findings and my recommendations with patient and nursing staff.     Paul Granados MD  08/15/24  18:30 EDT          Electronically signed by Paul Granados MD at 08/15/24 1833       Physician Progress Notes (last 24 hours)  Notes from 08/15/24 1427 through 08/16/24 1427   No notes of this type exist for this encounter.          Consult Notes (last 24 hours)        Richardson Willis MD at 08/16/24 0721        Consult Orders    1. Inpatient Cardiology Consult [[id number]] ordered by Paul Granados MD at 08/15/24 1828                   Referring Provider: Paul Granados MD    Reason for Consultation: Atrial fibrillation with rapid ventricular rate      Patient Care Team:  Paul Granados MD as PCP - General (Family Medicine)  Richardson Willis MD as Cardiologist (Cardiology)  Rafy Rodriguez MD as Consulting Physician (Hematology and Oncology)  Christianne Phillips, AMARILIS as Licensed Practical Nurse  Salome Fleming MD as Consulting Physician (Nephrology)      SUBJECTIVE     Chief Complaint: Shortness of breath and dyspnea on exertion    History of present illness:  Gabrielle Lyles is a 84 y.o. female with pulmonary hypertension, COPD, chronic respiratory failure on home oxygen, TIA, DVT/PE, chronic kidney disease, hypertension who has had multiple previous hospital admissions for shortness of breath.  She has been admitted with A-fib RVR.  Her home cardiac medications  include Eliquis, Bumex, Coreg, midodrine and sildenafil.    Review of systems:    Constitutional: + weakness, fatigue, fever, rigors, chills   Eyes: No vision changes, eye pain   ENT/oropharynx: No difficulty swallowing, sore throat, epistaxis, changes in hearing   Cardiovascular: No chest pain, chest tightness, palpitations, paroxysmal nocturnal dyspnea, orthopnea, diaphoresis, dizziness / syncopal episode   Respiratory: + shortness of breath, dyspnea on exertion, cough, wheezing, hemoptysis   Gastrointestinal: No abdominal pain, nausea, vomiting, diarrhea, bloody stools   Genitourinary: No hematuria, dysuria   Neurological: No headache, tremors, numbness, one-sided weakness    Musculoskeletal: No cramps, myalgias, joint pain, joint swelling   Integument: No rash, edema        Personal History:      Past Medical History:   Diagnosis Date    Acute exacerbation of chronic obstructive pulmonary disease (COPD)     COPD (chronic obstructive pulmonary disease)     Deep vein thrombosis of bilateral lower extremities 07/24/2019    3/2013    History of pneumonia 06/2012    community acquired pneumonia and right pleural effusion;hospitalized at [facility]    History of pulmonary embolism 03/2013    bilateral PE    Hypertension 2001    Insomnia     on Ambien 5mg at     Lobular breast cancer, left 11/2011    Stage IA left upper lobular breast cancer    Malignant neoplasm of left breast in female, estrogen receptor positive 07/18/2019    Osteopenia 2012    Parainfluenza infection     Parotid duct obstruction     Severe pulmonary hypertension 03/03/2023    Stage 3a chronic kidney disease 07/24/2019    TIA (transient ischemic attack) 10/25/2022       Past Surgical History:   Procedure Laterality Date    CARDIAC CATHETERIZATION N/A 3/3/2023    Procedure: Left and Right Heart Cath with Coronary Angiography;  Surgeon: Richardson Willis MD;  Location: [facility] INVASIVE LOCATION;  Service: Cardiovascular;  Laterality: N/A;     CATARACT EXTRACTION      IVC FILTER RETRIEVAL  2014    IVC filter placement by Dr. Card    MAMMO STEREOTACTIC BREAST BX SURGICAL ADD UNI Left 2011    invasive lobular carcinoma-Dr. Cande Daniel    MASTECTOMY, PARTIAL Left 2011    and left axillary sentinel lymph node biopsy by Dr. Uriostegui    TUBAL ABDOMINAL LIGATION         Family History   Problem Relation Age of Onset    Lung cancer Brother        Social History     Tobacco Use    Smoking status: Former     Current packs/day: 0.00     Types: Cigarettes     Quit date:      Years since quittin.6     Passive exposure: Past    Smokeless tobacco: Never    Tobacco comments:     smoked 6 cigarettes a day from 13 years of age to 56 years of age when she quit in    Vaping Use    Vaping status: Never Used   Substance Use Topics    Alcohol use: Not Currently    Drug use: No        Home meds:  Prior to Admission medications    Medication Sig Start Date End Date Taking? Authorizing Provider   allopurinol (ZYLOPRIM) 100 MG tablet Take 1 tablet by mouth Daily.   Yes ProviderJaylyn MD   apixaban (ELIQUIS) 5 MG tablet tablet Take 1 tablet by mouth Every 12 (Twelve) Hours. Indications: Other - full anticoagulation, history of DVT/PE 10/27/22  Yes Shaylee Mcdaniels MD   budesonide-formoterol (SYMBICORT) 80-4.5 MCG/ACT inhaler Inhale 2 puffs 2 (Two) Times a Day.   Yes ProviderJaylyn MD   bumetanide (BUMEX) 0.5 MG tablet Take 1 tablet by mouth Daily. 24  Yes Paul Granados MD   carvedilol (COREG) 3.125 MG tablet Take 1 tablet by mouth 2 (Two) Times a Day With Meals.  Patient taking differently: Take 1 tablet by mouth 2 (Two) Times a Day With Meals. Patient stopped this medication on 24  Yes Paul Granados MD   levothyroxine (SYNTHROID, LEVOTHROID) 75 MCG tablet Take 1 tablet by mouth Every Morning. 24  Yes Paul Granados MD   meclizine (ANTIVERT) 25 MG tablet Take 1 tablet by mouth 3 (Three) Times a  "Day As Needed for Dizziness. 6/1/24  Yes Paul Granados MD   midodrine (PROAMATINE) 10 MG tablet Take 1 tablet by mouth 2 (Two) Times a Day Before Meals. 5/31/24  Yes Paul Granados MD   O2 (OXYGEN) Inhale 3 L/min Continuous. 2/26/24  Yes ProviderJaylyn MD   pantoprazole (PROTONIX) 40 MG EC tablet Take 1 tablet by mouth 2 (Two) Times a Day Before Meals. 5/7/24  Yes Paul Granados MD   potassium chloride (KLOR-CON) 20 MEQ packet Take 20 mEq by mouth Daily.   Yes ProviderJaylyn MD   sildenafil (REVATIO) 20 MG tablet Take 1 tablet by mouth 3 (Three) Times a Day.  Patient taking differently: Take 1 tablet by mouth 3 (Three) Times a Day. Patient stopped taking per MD on 8/12/24 5/7/24  Yes Paul Granados MD   Folbic 2.5-25-2 MG tablet tablet Take 1 tablet by mouth Daily. 2/28/24   ProviderJaylyn MD       Allergies:     Patient has no known allergies.    Scheduled Meds:allopurinol, 100 mg, Oral, Daily  apixaban, 5 mg, Oral, Q12H  carvedilol, 3.125 mg, Oral, BID With Meals  folic acid-pyridoxine-cyanocobalamin, 1 tablet, Oral, Daily  ipratropium-albuterol, 3 mL, Nebulization, 4x Daily - RT  levothyroxine, 75 mcg, Oral, Q AM  pantoprazole, 40 mg, Oral, BID AC      Continuous Infusions:dilTIAZem, 5-15 mg/hr, Last Rate: Stopped (08/16/24 0049)      PRN Meds:      OBJECTIVE    Vital Signs  Vitals:    08/16/24 0045 08/16/24 0049 08/16/24 0050 08/16/24 0331   BP:  120/67  129/76   BP Location:    Right arm   Patient Position:    Lying   Pulse: 77 76 80 95   Resp:    21   Temp:    98.1 °F (36.7 °C)   TempSrc:    Oral   SpO2: 93% 96% 95% 95%   Weight:    67 kg (147 lb 11.3 oz)   Height:           Flowsheet Rows      Flowsheet Row First Filed Value   Admission Height 162.6 cm (64\") Documented at 08/15/2024 1638   Admission Weight 64.4 kg (142 lb) Documented at 08/15/2024 1638              Intake/Output Summary (Last 24 hours) at 8/16/2024 0721  Last data filed at 8/16/2024 0331  Gross per 24 hour "   Intake 340 ml   Output 200 ml   Net 140 ml        Telemetry: Atrial fibrillation    Physical Exam:  The patient is alert, oriented and in no distress.  Vital signs as noted above.  Head and neck revealed no carotid bruits or jugular venous distention.  No thyromegaly or lymphadenopathy is present  Distant and diminished breath sounds  Heart: Normal first and second heart sounds. No murmur.  No precordial rub is present.  No gallop is present.  Abdomen: Soft and nontender.  No organomegaly is present.  Extremities with good peripheral pulses without any pedal edema.  Skin: Warm and dry.  Musculoskeletal system is grossly normal.  CNS grossly normal.       Results Review:  I have personally reviewed the results from the time of this admission to 8/16/2024 07:21 EDT and agree with these findings:  []  Laboratory  []  Microbiology  []  Radiology  []  EKG/Telemetry   []  Cardiology/Vascular   []  Pathology  []  Old records  []  Other:    Most notable findings include:     Lab Results (last 24 hours)       Procedure Component Value Units Date/Time    CBC (No Diff) [[id number]]  (Abnormal) Collected: 08/16/24 0514    Specimen: Blood Updated: 08/16/24 0555     WBC 11.49 10*3/mm3      RBC 3.78 10*6/mm3      Hemoglobin 9.6 g/dL      Hematocrit 32.0 %      MCV 84.7 fL      MCH 25.4 pg      MCHC 30.0 g/dL      RDW 20.1 %      RDW-SD 61.5 fl      MPV 11.2 fL      Platelets 249 10*3/mm3     High Sensitivity Troponin T 2Hr [[id number]]  (Abnormal) Collected: 08/15/24 2231    Specimen: Blood Updated: 08/15/24 2312     HS Troponin T 35 ng/L      Troponin T Delta 2 ng/L     Narrative:      High Sensitive Troponin T Reference Range:  <14.0 ng/L- Negative Female for AMI  <22.0 ng/L- Negative Male for AMI  >=14 - Abnormal Female indicating possible myocardial injury.  >=22 - Abnormal Male indicating possible myocardial injury.   Clinicians would have to utilize clinical acumen, EKG, Troponin, and serial changes to determine if it is an  Acute Myocardial Infarction or myocardial injury due to an underlying chronic condition.         High Sensitivity Troponin T [[id number]]  (Abnormal) Collected: 08/15/24 2002    Specimen: Blood Updated: 08/15/24 2042     HS Troponin T 33 ng/L     Narrative:      High Sensitive Troponin T Reference Range:  <14.0 ng/L- Negative Female for AMI  <22.0 ng/L- Negative Male for AMI  >=14 - Abnormal Female indicating possible myocardial injury.  >=22 - Abnormal Male indicating possible myocardial injury.   Clinicians would have to utilize clinical acumen, EKG, Troponin, and serial changes to determine if it is an Acute Myocardial Infarction or myocardial injury due to an underlying chronic condition.         Comprehensive Metabolic Panel [[id number]]  (Abnormal) Collected: 08/15/24 2002    Specimen: Blood Updated: 08/15/24 2042     Glucose 130 mg/dL      BUN 50 mg/dL      Creatinine 2.14 mg/dL      Sodium 141 mmol/L      Potassium 4.2 mmol/L      Chloride 101 mmol/L      CO2 26.4 mmol/L      Calcium 9.7 mg/dL      Total Protein 6.3 g/dL      Albumin 3.8 g/dL      ALT (SGPT) 207 U/L      AST (SGOT) 304 U/L      Alkaline Phosphatase 86 U/L      Total Bilirubin 3.2 mg/dL      Globulin 2.5 gm/dL      A/G Ratio 1.5 g/dL      BUN/Creatinine Ratio 23.4     Anion Gap 13.6 mmol/L      eGFR 22.5 mL/min/1.73     Narrative:      GFR Normal >60  Chronic Kidney Disease <60  Kidney Failure <15    The GFR formula is only valid for adults with stable renal function between ages 18 and 70.    CBC & Differential [[id number]]  (Abnormal) Collected: 08/15/24 2002    Specimen: Blood Updated: 08/15/24 2017    Narrative:      The following orders were created for panel order CBC & Differential.  Procedure                               Abnormality         Status                     ---------                               -----------         ------                     CBC Auto Differential[[id number]]        Abnormal            Final result                  Please view results for these tests on the individual orders.    CBC Auto Differential [[id number]]  (Abnormal) Collected: 08/15/24 2002    Specimen: Blood Updated: 08/15/24 2017     WBC 11.22 10*3/mm3      RBC 3.92 10*6/mm3      Hemoglobin 10.1 g/dL      Hematocrit 33.9 %      MCV 86.5 fL      MCH 25.8 pg      MCHC 29.8 g/dL      RDW 20.4 %      RDW-SD 62.8 fl      MPV 10.6 fL      Platelets 267 10*3/mm3      Neutrophil % 75.2 %      Lymphocyte % 8.6 %      Monocyte % 13.7 %      Eosinophil % 0.9 %      Basophil % 1.1 %      Immature Grans % 0.5 %      Neutrophils, Absolute 8.44 10*3/mm3      Lymphocytes, Absolute 0.96 10*3/mm3      Monocytes, Absolute 1.54 10*3/mm3      Eosinophils, Absolute 0.10 10*3/mm3      Basophils, Absolute 0.12 10*3/mm3      Immature Grans, Absolute 0.06 10*3/mm3      nRBC 0.3 /100 WBC     COVID-19, FLU A/B, RSV PCR 1 HR TAT - Swab, Nasopharynx [[id number]]  (Normal) Collected: 08/15/24 1618    Specimen: Swab from Nasopharynx Updated: 08/15/24 1747     COVID19 Not Detected     Influenza A PCR Not Detected     Influenza B PCR Not Detected     RSV, PCR Not Detected    Narrative:      Fact sheet for providers: https://www.fda.gov/media/854401/download    Fact sheet for patients: https://www.fda.gov/media/820113/download    Test performed by PCR.    CANDIDA AURIS PCR - Swab, Axilla Right, Axilla Left and Groin [[id number]] Collected: 08/15/24 1628    Specimen: Swab from Axilla Right, Axilla Left and Groin Updated: 08/15/24 1700            Imaging Results (Last 24 Hours)       Procedure Component Value Units Date/Time    XR Chest 1 View [[id number]] Collected: 08/15/24 2130     Updated: 08/15/24 2133    Narrative:      XR CHEST 1 VW    Date of Exam: 8/15/2024 7:16 PM EDT    Indication: SOB/AFIB+RVR/COPD    Comparison: 5/29/2024    Findings:  Stable cardiomegaly. Mild left basilar airspace disease similar to prior study which could relate to atelectasis or scarring. No significant effusion.  Negative for pneumothorax. The right lung is clear. Dense aortic arch atherosclerosis. Osseous   structures appear intact.      Impression:      Impression:  1. Stable cardiomegaly.  2. Mild left basilar airspace disease similar to prior study may relate to chronic atelectasis or scarring.        Electronically Signed: Randall Zarco MD    8/15/2024 9:31 PM EDT    Workstation ID: [id number]            LAB RESULTS (LAST 7 DAYS)    CBC  Results from last 7 days   Lab Units 08/16/24  0514 08/15/24  2002   WBC 10*3/mm3 11.49* 11.22*   RBC 10*6/mm3 3.78 3.92   HEMOGLOBIN g/dL 9.6* 10.1*   HEMATOCRIT % 32.0* 33.9*   MCV fL 84.7 86.5   PLATELETS 10*3/mm3 249 267       BMP  Results from last 7 days   Lab Units 08/15/24  2002   SODIUM mmol/L 141   POTASSIUM mmol/L 4.2   CHLORIDE mmol/L 101   CO2 mmol/L 26.4   BUN mg/dL 50*   CREATININE mg/dL 2.14*   GLUCOSE mg/dL 130*       CMP   Results from last 7 days   Lab Units 08/15/24  2002   SODIUM mmol/L 141   POTASSIUM mmol/L 4.2   CHLORIDE mmol/L 101   CO2 mmol/L 26.4   BUN mg/dL 50*   CREATININE mg/dL 2.14*   GLUCOSE mg/dL 130*   ALBUMIN g/dL 3.8   BILIRUBIN mg/dL 3.2*   ALK PHOS U/L 86   AST (SGOT) U/L 304*   ALT (SGPT) U/L 207*       BNP        TROPONIN  Results from last 7 days   Lab Units 08/15/24  2231   HSTROP T ng/L 35*       CoAg        Creatinine Clearance  Estimated Creatinine Clearance: 18.7 mL/min (A) (by C-G formula based on SCr of 2.14 mg/dL (H)).    ABG          Radiology  XR Chest 1 View    Result Date: 8/15/2024  Impression: 1. Stable cardiomegaly. 2. Mild left basilar airspace disease similar to prior study may relate to chronic atelectasis or scarring. Electronically Signed: Randall Zarco MD  8/15/2024 9:31 PM EDT  Workstation ID: [id number]       EKG  I personally viewed and interpreted the patient's EKG/Telemetry data:  ECG 12 Lead Tachycardia   Preliminary Result   HEART RATE=89  bpm   RR Mvrddaoy=181  ms   MO Interval=  ms   P Horizontal Axis=  deg   P Front Axis=   deg   QRSD Interval=83  ms   QT Zrwfgznk=086  ms   AMnC=733  ms   QRS Axis=131  deg   T Wave Axis=-63  deg   - ABNORMAL ECG -   Atrial fibrillation   Right axis deviation   Low voltage, precordial leads   Nonspecific repol abnormality, diffuse leads   Date and Time of Study:2024-08-15 19:11:40      Telemetry Scan   Final Result      Telemetry Scan   Final Result            Echocardiogram:    Results for orders placed in visit on 03/18/24    Adult Transthoracic Echo Limited W/ Cont if Necessary Per Protocol    Interpretation Summary    Left ventricular ejection fraction appears to be 61 - 65%.    The right ventricular cavity is dilated.    There is a small (<1cm) pericardial effusion adjacent to the left ventricle. There is no evidence of cardiac tamponade.    IVC is 2.1 cm.        Stress Test:  Results for orders placed in visit on 09/13/22    Stress Test With Myocardial Perfusion One Day    Interpretation Summary    Left ventricular ejection fraction is hyperdynamic (Calculated EF > 70%). .    Myocardial perfusion imaging indicates a normal myocardial perfusion study with no evidence of ischemia.    Impressions are consistent with a low risk study.    Findings consistent with a normal ECG stress test.        Cardiac Catheterization:  Results for orders placed during the hospital encounter of 03/03/23    Cardiac Catheterization/Vascular Study    Conclusion  OPERATORS  Richardson Willis M.D. (Attending Cardiologist)      PROCEDURE PERFORMED  Ultrasound guided vascular access  Right heart catheterization  Coronary Angiogram  Left Heart Catheterization [id number]  Moderate Sedation    INDICATIONS FOR PROCEDURE  83-year-old woman with multiple cardiovascular risk factors, abnormal stress test presented with worsening shortness of breath.  After discussing the risk and benefit of the procedure she was brought in for elective right and left heart cath.    PROCEDURE IN DETAIL  Informed consent was obtained from the patient after  explaining the risks, benefits, and alternative options of the procedure. After obtaining informed consent, the patient was brought to the cath lab and was prepped in a sterile fashion. Lidocaine 2% was used for local anesthesia into the right femoral venous access site. Right femoral vein was accessed using the micropuncture needle under ultrasound guidance and micropuncture wire advanced under flouroscopy. A 7 [place] vascular sheath was put into place percutaneously over guide-wire. Guide wires were removed. A 6Fr swan sheryl catheter was advanced to wedge position. RA, RV and PA and wedge pressures were recorded.  PA sat and arterial sats recorded.  The patient tolerated the procedure well without any complications.    Lidocaine 2% was used for local anesthesia into the right femoral arterial access site. The right femoral artery was accessed with a micropuncture needle via modified Seldinger technique under ultrasound guidance. A 6F was inserted successfully.  Afterwards, 6F JR4 and JL4 diagnostic catheters were advanced over a wire into the ascending aorta and were used to engage the ostia of the left main and RCA respectively. JR4 used to cross the AV and obtain LV pressures and gradient across the AV measured via pullback technique. Images of the right and left coronary systems were obtained. All the catheters were exchanged over a wire and subsequently removed. Angiogram of the femoral access site was obtained and did not show complications. The patient tolerated the procedure well without any complications. The pictures were reviewed at the end of the procedure. A Mynx closure device was applied.    HEMODYNAMICS    RHC  RA 6/5, 4 mmHg  RV 74/3, 5 mmHg  PA 71/25, 44 mmHg  PCW 12 mmHg  AO Sat 92%  PA Sat 78%    Jose CO: 7.89 L/min    Jose CI: 4.69 L/min/m²    LHC  LV: 134/3, 20 mmHg  AO: 131/56, 87 mmHg  No significant gradient across the aortic valve during pullback of JR4 catheter.    FINDINGS  Coronary  Angiogram  All vessels are heavily calcified  She also has heavily calcified peripheral arterial disease.  Right dominant circulation    Left main: Left main is a large caliber vessel which gives rise to the Left Anterior Descending and the Left circumflex.  Left main is angiographically free from any significant disease.    Left Anterior Descending Artery: LAD is a heavily calcified medium caliber vessel which gives rise to diagonals.  The vessel is highly tortuous and has 50 to 60% mid vessel stenosis best seen in VERÓNICA cranial view.    Left Circumflex: Heavily calcified vessel with 50% proximal segment stenosis.    Right Coronary Artery: The RCA is a large caliber vessel which is heavily calcified and tortuous.  It has diffuse luminal irregularities and 30 to 40% stenosis in the midsegment around the bend.    ESTIMATED BLOOD LOSS:  10 ml    COMPLICATIONS:  None    PROCEDURE DATA:  Sedation Time: 25 minutes    IMPRESSIONS  Heavily calcified, tortuous nonobstructive coronary disease  Severe pulmonary hypertension with PA systolic pressure in the 70s  Mean PA pressure 44 mmHg    RECOMMENDATIONS  -Continue aggressive medical management of CAD  -Referral to pulmonology for treatment of pulmonary hypertension        Other:      ASSESSMENT & PLAN:    Principal Problem:    Atrial fibrillation with RVR    Atrial fibrillation  She has known history of paroxysmal atrial fibrillation  [id number] score is 6  Continue Eliquis for atrial fibrillation as well as for history of DVT/PE  Discontinue Coreg  Start Toprol-XL 25 mg p.o. twice daily    COPD/Chronic respiratory failure/shortness of breath  Severe pulmonary hypertension  HFpEF  On home oxygen 3 L  Has known pulmonary hypertension with mean PA pressure of 44 and peak PA pressure of 71 mmHg with normal wedge pressure  She is on sildenafil  Echocardiogram shows preserved LV function with mildly elevated RVSP.  Small to moderate pericardial effusion: No signs of  tamponade  Previous proBNP was more than 15,000  Continue Bumex  Encouraged low-salt diet  Encouraged follow-up with outpatient heart failure clinic for intermittent IV diuresis    History of venous thrombosis and embolism  Switching to low-dose Eliquis due to age and renal dysfunction.  She also has an IVC filter in place.     Primary hypertension, chronic  She has chronically low blood pressure.  Continue midodrine.  Stopping Coreg and starting Toprol-XL.  Echo shows preserved LV function, reduced RV function and mildly elevated RVSP.  Pericardial effusion is not significant with no signs of tamponade.  Continue Bumex    Coronary artery disease  Continue beta-blocker  Already on Eliquis low-dose  Recommended starting a statin.  Previous cardiac catheterization showed heavily calcified coronaries but nonobstructive.  No chest pain and stable from cardiac standpoint.     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  Continue high intensity statin and anticoagulation with Eliquis 2.5 mg p.o. twice daily due to renal dysfunction and advanced age.     Stage IIIb chronic kidney disease  Creatinine 1.6, GFR is 31.4  Continue low-dose Bumex  Follow-up with nephrology outpatient       Richardson Willis MD  24  07:21 EDT                Electronically signed by Richardson Willis MD at 24 0907       Physical Therapy Notes (last 24 hours)  Notes from 08/15/24 1427 through 24 1427   No notes exist for this encounter.          Occupational Therapy Notes (last 24 hours)        Katiana Salas OT at 24 1200          Patient Name: Gabrielle Lyles  : 1940    MRN: [id number]                              Today's Date: 2024       Admit Date: 8/15/2024    Visit Dx: No diagnosis found.  Patient Active Problem List   Diagnosis    Low back  pain    Osteopenia    Chronic obstructive pulmonary disease    Gastroesophageal reflux disease    Gout    History of venous thrombosis and embolism    Primary hypertension    Lumbar radiculopathy    Nausea    Personal history of malignant neoplasm of breast    Shortness of breath    Aortic aneurysm    Bulging lumbar disc    Spinal stenosis of lumbar region    History of TIA (transient ischemic attack)    Chronic hypoxemic respiratory failure    Cellulitis of left foot    Dyspnea    3A Paroxysmal atrial fibrillation    Moderate malnutrition    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    Panlobular emphysema    Dependence on supplemental oxygen    CKD (chronic kidney disease), stage IV    Cor pulmonale (chronic)    Mild pulmonary hypertension    Anemia due to stage 4 chronic kidney disease    Chronic hypotension    Syncope and collapse    Anemia in stage 2 chronic kidney disease    Mucopurulent chronic bronchitis    Moderate protein-calorie malnutrition    Disorder of the autonomic nervous system, unspecified    Athscl heart disease of native coronary artery w/o ang pctrs    Atrial fibrillation with RVR     Past Medical History:   Diagnosis Date    Acute exacerbation of chronic obstructive pulmonary disease (COPD)     COPD (chronic obstructive pulmonary disease)     Deep vein thrombosis of bilateral lower extremities 07/24/2019    3/2013    History of pneumonia 06/2012    community acquired pneumonia and right pleural effusion;hospitalized at [facility]    History of pulmonary embolism 03/2013    bilateral PE    Hypertension 2001    Insomnia     on Ambien 5mg at     Lobular breast cancer, left 11/2011    Stage IA left upper lobular breast cancer    Malignant neoplasm of left breast in female, estrogen receptor positive 07/18/2019    Osteopenia 2012    Parainfluenza infection     Parotid duct obstruction     Severe pulmonary hypertension 03/03/2023    Stage 3a chronic kidney disease 07/24/2019     TIA (transient ischemic attack) 10/25/2022     Past Surgical History:   Procedure Laterality Date    CARDIAC CATHETERIZATION N/A 3/3/2023    Procedure: Left and Right Heart Cath with Coronary Angiography;  Surgeon: Richardson Willis MD;  Location: [facility] CATH INVASIVE LOC[facility];  Service: Cardiovascular;  Laterality: N/A;    CATARACT EXTRACTION  2015    IVC FILTER RETRIEVAL  06/2014    IVC filter placement by Dr. Card    MAMMO STEREOTACTIC BREAST BX SURGICAL ADD UNI Left 11/01/2011    invasive lobular carcinoma-Dr. Cande Daniel    MASTECTOMY, PARTIAL Left 12/01/2011    and left axillary sentinel lymph node biopsy by Dr. Uriostegui    TUBAL ABDOMINAL LIGATION  1984      General Information       Row Name 08/16/24 1140          General Information    Prior Level of Function independent:;ADL's;all household mobility  uses walker or furniture walks. on 4L o2 24/7. Dtr visits & drives her. ADmission in May falling backwards & hitting head. Dx with possible neuritis at that time for chronic vertigo. Pt reports issues with this are ongoing but she has not fallen since.  -     Existing Precautions/Restrictions fall;oxygen therapy device and L/min  -     Barriers to Rehab medically complex;previous functional deficit  -       Row Name 08/16/24 1140          Living Environment    People in Home alone  -       Row Name 08/16/24 1140          Home Main Entrance    Number of Stairs, Main Entrance two  -       Row Name 08/16/24 1140          Stairs Within Home, Primary    Number of Stairs, Within Home, Primary none  -       Row Name 08/16/24 1140          Cognition    Orientation Status (Cognition) oriented x 4  -       Row Name 08/16/24 1140          Safety Issues, Functional Mobility    Impairments Affecting Function (Mobility) endurance/activity tolerance;visual/perceptual  -               User Key  (r) = Recorded By, (t) = Taken By, (c) = Cosigned By      Initials Name Provider Type     Katiana Salas, OT  Occupational Therapist                     Mobility/ADL's       [facility] Name 08/16/24 1142          Bed Mobility    Bed Mobility supine-sit  -     Supine-Sit [place] (Bed Mobility) set up;standby assist  -     Assistive Device (Bed Mobility) head of bed elevated;bed rails  -[facility] Name 08/16/24 1142          Transfers    Transfers sit-stand transfer;stand-sit transfer;bed-chair transfer  -[facility] Name 08/16/24 1142          Bed-Chair Transfer    Bed-Chair [place] (Transfers) set up;supervision  -[facility] Name 08/16/24 1142          Sit-Stand Transfer    Sit-Stand [place] (Transfers) set up;supervision  -[facility] Name 08/16/24 1142          Stand-Sit Transfer    Stand-Sit [place] (Transfers) set up;supervision  -[facility] Name 08/16/24 1142          Functional Mobility    Functional Mobility- Ind. Level set up required;supervision required  -     Functional Mobility-Distance (Feet) 75  -     Functional Mobility- Safety Issues supplemental O2  -     Functional Mobility- Comment needed line mgmt. HR, O2, Resp rate stable thoroughout.  -     Patient was able to Ambulate yes  -[facility] Name 08/16/24 1142          Activities of Daily Living    BADL Assessment/Intervention bathing;upper body dressing;grooming  -MH       Row Name 08/16/24 1142          Lower Body Dressing Assessment/Training    [place] Level (Lower Body Dressing) lower body dressing skills;set up  -MH       Row Name 08/16/24 1142          Bathing Assessment/Intervention    [place] Level (Bathing) bathing skills;set up  -MH       Row Name 08/16/24 1142          Upper Body Dressing Assessment/Training    [place] Level (Upper Body Dressing) don;front opening garment;set up  -MH       Row Name 08/16/24 1142          Grooming Assessment/Training    [place] Level (Grooming) grooming skills;set up  -               User Key  (r) = Recorded By, (t) = Taken By, (c) = Cosigned By      Initials  Name Provider Type     Katiana Salas, OT Occupational Therapist                   Obj/Interventions       Row Name 08/16/24 1145          Vision Assessment/Intervention    Visual Impairment/Limitations WFL  -       Row Name 08/16/24 1145          Range of Motion Comprehensive    General Range of Motion no range of motion deficits identified  -       Row Name 08/16/24 1145          Strength Comprehensive (MMT)    Comment, General Manual Muscle Testing (MMT) Assessment mild global weakness due to her chronic limitations in activity tolerance.  -               User Key  (r) = Recorded By, (t) = Taken By, (c) = Cosigned By      Initials Name Provider Type     Katiana Salas, OT Occupational Therapist                   Goals/Plan       Row Name 08/16/24 1157          Transfer Goal 1 (OT)    Activity/Assistive Device (Transfer Goal 1, OT) transfers, all  -     [place] Level/Cues Needed (Transfer Goal 1, OT) modified independence  -MH     Time Frame (Transfer Goal 1, OT) 3 days  -       Row Name 08/16/24 1157          Bathing Goal 1 (OT)    Activity/Device (Bathing Goal 1, OT) bathing skills, all  -     [place] Level/Cues Needed (Bathing Goal 1, OT) modified independence  -MH     Time Frame (Bathing Goal 1, OT) 2 weeks  -       Row Name 08/16/24 1157          Dressing Goal 1 (OT)    Activity/Device (Dressing Goal 1, OT) dressing skills, all  -MH     [place]/Cues Needed (Dressing Goal 1, OT) modified independence  -MH     Time Frame (Dressing Goal 1, OT) 1 week  -       Row Name 08/16/24 1157          Therapy Assessment/Plan (OT)    Planned Therapy Interventions (OT) activity tolerance training;patient/caregiver education/training;occupation/activity based interventions;ROM/therapeutic exercise  -               User Key  (r) = Recorded By, (t) = Taken By, (c) = Cosigned By      Initials Name Provider Type     Katiana Salas, OT Occupational Therapist                   Clinical  Impression       Row Name 08/16/24 1146          Pain Assessment    Pretreatment Pain Rating 0/10 - no pain  -     Posttreatment Pain Rating 0/10 - no pain  -[facility] Name 08/16/24 1146          Plan of Care Review    Plan of Care Reviewed With patient  -     Progress improving  -     Outcome Evaluation Pt is an 82 yo female brought to ED 2/5/24 with c/o malaise, diarrhea. Pt Dx with AE afib & started on rate control IV.  PMHx significant for CKD III, COPD, TIA, breast CA, DVT, PE, IVC Filter.    At baseline, pt resides alone in [facility] with 2 TAYL[place]. She is independent but stopped driving alone in May when she fell and suffered scalp laceration. Pt had been experiencing codie dizzines and pre-syncopal episodes at that time as well and was Dx w/ possible vestibular neuritis. Pt reports this is improved but still she has dizzy spells so she uses a Rolator and her dtr drives her. Pt oriented X4 and pleasant. Able to walk in the saez and access the bathroom for ADL with setup & supervision. Anticipate pt will do well at home though may wish to have [facility] for exercise and home safety assessment.  -[facility] Name 08/16/24 1146          Therapy Assessment/Plan (OT)    Rehab Potential (OT) good, to achieve stated therapy goals  -     Criteria for Skilled Therapeutic Interventions Met (OT) skilled treatment is necessary  -     Therapy Frequency (OT) 3 times/wk  -     Predicted Duration of Therapy Intervention (OT) until d/c  -[facility] Name 08/16/24 1146          Therapy Plan Review/Discharge Plan (OT)    Anticipated Discharge Disposition (OT) home with home health  -[facility] Name 08/16/24 1146          Vital Signs    Pretreatment Heart Rate (beats/min) 89  -     Intratreatment Heart Rate (beats/min) 89  -     Posttreatment Heart Rate (beats/min) 90  -     O2 Delivery Pre Treatment supplemental O2  -     Intra SpO2 (%) 93  -     O2 Delivery Intra Treatment supplemental O2  -     Post SpO2 (%) 94   -MH     O2 Delivery Post Treatment supplemental O2  -MH     Pre Patient Position Supine  -MH     Intra Patient Position Standing  -MH     Post Patient Position Sitting  -MH       Row Name 08/16/24 1146          Positioning and Restraints    Pre-Treatment Position in bed  -MH     Post Treatment Position chair  -MH     In Chair notified nsg;sitting;call light within reach;encouraged to call for assist  -MH               User Key  (r) = Recorded By, (t) = Taken By, (c) = Cosigned By      Initials Name Provider Type     Katiana Salas OT Occupational Therapist                   Outcome Measures       Row Name 08/16/24 0745 08/16/24 0400       How much help from another person do you currently need...    Turning from your back to your side while in flat bed without using bedrails? 4  -SH 4  -CN    Moving from lying on back to sitting on the side of a flat bed without bedrails? 4  -SH 4  -CN    Moving to and from a bed to a chair (including a wheelchair)? 4  -SH 4  -CN    Standing up from a chair using your arms (e.g., wheelchair, bedside chair)? 4  -SH 4  -CN    Climbing 3-5 steps with a railing? 2  -SH 2  -CN    To walk in hospital room? 3  -SH 3  -CN    AM-PAC 6 Clicks Score (PT) 21  -SH 21  -CN    Highest Level of Mobility Goal 6 --> Walk 10 steps or more  -SH 6 --> Walk 10 steps or more  -CN              User Key  (r) = Recorded By, (t) = Taken By, (c) = Cosigned By      Initials Name Provider Type    Nadir Pickering, RN Registered Nurse     Alejandra Luong RN Registered Nurse                    Occupational Therapy Education       Title: PT OT SLP Therapies (Done)       Topic: Occupational Therapy (Done)       Point: ADL training (Done)       Description:   Instruct learner(s) on proper safety adaptation and remediation techniques during self care or transfers.   Instruct in proper use of assistive devices.                  Learning Progress Summary             Patient Acceptance, E,TB,D, VU,DU,NR by  at  8/16/2024 1159                         Point: Home exercise program (Done)       Description:   Instruct learner(s) on appropriate technique for monitoring, assisting and/or progressing therapeutic exercises/activities.                  Learning Progress Summary             Patient Acceptance, E,TB,D, VU,DU,NR by  at 8/16/2024 1159                         Point: Precautions (Done)       Description:   Instruct learner(s) on prescribed precautions during self-care and functional transfers.                  Learning Progress Summary             Patient Acceptance, E,TB,D, VU,DU,NR by  at 8/16/2024 1159                         Point: Body mechanics (Done)       Description:   Instruct learner(s) on proper positioning and spine alignment during self-care, functional mobility activities and/or exercises.                  Learning Progress Summary             Patient Acceptance, E,TB,D, VU,DU,NR by  at 8/16/2024 1159                                         User Key       Initials Effective Dates Name Provider Type Discipline     06/16/21 -  Katiana Salas, OT Occupational Therapist OT                  OT Recommendation and Plan  Planned Therapy Interventions (OT): activity tolerance training, patient/caregiver education/training, occupation/activity based interventions, ROM/therapeutic exercise  Therapy Frequency (OT): 3 times/wk  Plan of Care Review  Plan of Care Reviewed With: patient  Progress: improving  Outcome Evaluation: Pt is an 82 yo female brought to ED 2/5/24 with c/o malaise, diarrhea. Pt Dx with AE afib & started on rate control IV.  PMHx significant for CKD III, COPD, TIA, breast CA, DVT, PE, IVC Filter.    At baseline, pt resides alone in [facility] with 2 [place]AYL[place]. She is independent but stopped driving alone in May when she fell and suffered scalp laceration. Pt had been experiencing codie dizzines and pre-syncopal episodes at that time as well and was Dx w/ possible vestibular neuritis. Pt reports this is  improved but still she has dizzy spells so she uses a Rolator and her dtr drives her. Pt oriented X4 and pleasant. Able to walk in the saez and access the bathroom for ADL with setup & supervision. Anticipate pt will do well at home though may wish to have [facility] for exercise and home safety assessment.     Time Calculation:   Evaluation Complexity (OT)  Review Occupational Profile/Medical/Therapy History Complexity: expanded/moderate complexity  Assessment, Occupational Performance/Identification of Deficit Complexity: 3-5 performance deficits  Clinical Decision Making Complexity (OT): problem focused assessment/low complexity  Overall Complexity of Evaluation (OT): low complexity     Time Calculation- OT       Row Name 08/16/24 1159             Time Calculation-     OT Start Time 0950  -      OT Stop Time 1020  -      OT Time Calculation (min) 30 min  -      Total Timed Code Minutes- OT 0 minute(s)  -      OT Received On 08/16/24  -      OT - Next Appointment 08/19/24  -      OT Goal Re-Cert Due Date 08/30/24  -                User Key  (r) = Recorded By, (t) = Taken By, (c) = Cosigned By      Initials Name Provider Type     Katiana Salas OT Occupational Therapist                  Therapy Charges for Today       Code Description Service Date Service Provider Modifiers Qty    [id number] HC OT EVAL LOW COMPLEXITY 3 8/16/2024 Katiana Salas OT GO 1                 Katiana Salas OT  8/16/2024    Electronically signed by Katiana Salas OT at 08/16/24 1200       Katiana Salas OT at 08/16/24 1159          Goal Outcome Evaluation:  Plan of Care Reviewed With: patient        Progress: improving  Outcome Evaluation: Pt is an 84 yo female brought to ED 2/5/24 with c/o malaise, diarrhea. Pt Dx with AE afib & started on rate control IV.  PMHx significant for CKD III, COPD, TIA, breast CA, DVT, PE, IVC Filter.    At baseline, pt resides alone in [facility] with 2 TAYL[place]. She is independent but stopped driving alone  in May when she fell and suffered scalp laceration. Pt had been experiencing codie dizzines and pre-syncopal episodes at that time as well and was Dx w/ possible vestibular neuritis. Pt reports this is improved but still she has dizzy spells so she uses a Rolator and her dtr drives her. Pt oriented X4 and pleasant. Able to walk in the saez and access the bathroom for ADL with setup & supervision. Anticipate pt will do well at home though may wish to have [facility] for exercise and home safety assessment.      Anticipated Discharge Disposition (OT): home with home health                          Electronically signed by Katiana Salas, [email or web address] at 08/16/24 2326

## 2024-08-16 NOTE — PROGRESS NOTES
LOS: 0 days   Patient Care Team:  Paul Granados MD as PCP - General (Family Medicine)  Richardson Willis MD as Cardiologist (Cardiology)  Rafy Rodriguez MD as Consulting Physician (Hematology and Oncology)  Christianne Phillips, AMARILIS as Licensed Practical Nurse  Salome Fleming MD as Consulting Physician (Nephrology)    Subjective     Interval History:     Patient Complaints: feeling a little better    History taken from: patient    Review of Systems   Constitutional:  Positive for activity change, appetite change and fatigue.   HENT: Negative.     Respiratory:  Positive for shortness of breath.    Cardiovascular:  Positive for palpitations. Negative for chest pain and leg swelling.   Gastrointestinal: Negative.    Musculoskeletal:  Positive for arthralgias.   Neurological:  Positive for weakness.           Objective     Vital Signs  Temp:  [97.7 °F (36.5 °C)-98.1 °F (36.7 °C)] 98.1 °F (36.7 °C)  Heart Rate:  [] 102  Resp:  [13-25] 21  BP: ()/(52-96) 118/96    Physical Exam:     General Appearance:    Alert, cooperative, in no acute distress   Head:    Normocephalic, without obvious abnormality, atraumatic   Eyes:            Lids and lashes normal, conjunctivae and sclerae normal, no   icterus, no pallor, corneas clear, PERRLA   Ears:    Ears appear intact with no abnormalities noted   Throat:   No oral lesions, no thrush, oral mucosa moist   Neck:   No adenopathy, supple, trachea midline, no thyromegaly, no   carotid bruit, no JVD   Lungs:     Clear to auscultation,respirations regular, even and                  unlabored    Heart:    Irreg irreg   Chest Wall:    No abnormalities observed   Abdomen:     Normal bowel sounds, no masses, no organomegaly, soft        non-tender, non-distended, no guarding, no rebound                tenderness   Extremities:   Moves all extremities well, no edema, no cyanosis, no             redness   Pulses:   Pulses palpable and equal bilaterally   Skin:   No  bleeding, bruising or rash   Lymph nodes:   No palpable adenopathy   Neurologic:   Cranial nerves 2 - 12 grossly intact, sensation intact, DTR       present and equal bilaterally        Results Review:    Lab Results (last 24 hours)       Procedure Component Value Units Date/Time    CBC (No Diff) [[id number]]  (Abnormal) Collected: 08/16/24 0514    Specimen: Blood Updated: 08/16/24 0555     WBC 11.49 10*3/mm3      RBC 3.78 10*6/mm3      Hemoglobin 9.6 g/dL      Hematocrit 32.0 %      MCV 84.7 fL      MCH 25.4 pg      MCHC 30.0 g/dL      RDW 20.1 %      RDW-SD 61.5 fl      MPV 11.2 fL      Platelets 249 10*3/mm3     High Sensitivity Troponin T 2Hr [[id number]]  (Abnormal) Collected: 08/15/24 2231    Specimen: Blood Updated: 08/15/24 2312     HS Troponin T 35 ng/L      Troponin T Delta 2 ng/L     Narrative:      High Sensitive Troponin T Reference Range:  <14.0 ng/L- Negative Female for AMI  <22.0 ng/L- Negative Male for AMI  >=14 - Abnormal Female indicating possible myocardial injury.  >=22 - Abnormal Male indicating possible myocardial injury.   Clinicians would have to utilize clinical acumen, EKG, Troponin, and serial changes to determine if it is an Acute Myocardial Infarction or myocardial injury due to an underlying chronic condition.         High Sensitivity Troponin T [[id number]]  (Abnormal) Collected: 08/15/24 2002    Specimen: Blood Updated: 08/15/24 2042     HS Troponin T 33 ng/L     Narrative:      High Sensitive Troponin T Reference Range:  <14.0 ng/L- Negative Female for AMI  <22.0 ng/L- Negative Male for AMI  >=14 - Abnormal Female indicating possible myocardial injury.  >=22 - Abnormal Male indicating possible myocardial injury.   Clinicians would have to utilize clinical acumen, EKG, Troponin, and serial changes to determine if it is an Acute Myocardial Infarction or myocardial injury due to an underlying chronic condition.         Comprehensive Metabolic Panel [[id number]]  (Abnormal) Collected:  08/15/24 2002    Specimen: Blood Updated: 08/15/24 2042     Glucose 130 mg/dL      BUN 50 mg/dL      Creatinine 2.14 mg/dL      Sodium 141 mmol/L      Potassium 4.2 mmol/L      Chloride 101 mmol/L      CO2 26.4 mmol/L      Calcium 9.7 mg/dL      Total Protein 6.3 g/dL      Albumin 3.8 g/dL      ALT (SGPT) 207 U/L      AST (SGOT) 304 U/L      Alkaline Phosphatase 86 U/L      Total Bilirubin 3.2 mg/dL      Globulin 2.5 gm/dL      A/G Ratio 1.5 g/dL      BUN/Creatinine Ratio 23.4     Anion Gap 13.6 mmol/L      eGFR 22.5 mL/min/1.73     Narrative:      GFR Normal >60  Chronic Kidney Disease <60  Kidney Failure <15    The GFR formula is only valid for adults with stable renal function between ages 18 and 70.    CBC & Differential [[id number]]  (Abnormal) Collected: 08/15/24 2002    Specimen: Blood Updated: 08/15/24 2017    Narrative:      The following orders were created for panel order CBC & Differential.  Procedure                               Abnormality         Status                     ---------                               -----------         ------                     CBC Auto Differential[[id number]]        Abnormal            Final result                 Please view results for these tests on the individual orders.    CBC Auto Differential [[id number]]  (Abnormal) Collected: 08/15/24 2002    Specimen: Blood Updated: 08/15/24 2017     WBC 11.22 10*3/mm3      RBC 3.92 10*6/mm3      Hemoglobin 10.1 g/dL      Hematocrit 33.9 %      MCV 86.5 fL      MCH 25.8 pg      MCHC 29.8 g/dL      RDW 20.4 %      RDW-SD 62.8 fl      MPV 10.6 fL      Platelets 267 10*3/mm3      Neutrophil % 75.2 %      Lymphocyte % 8.6 %      Monocyte % 13.7 %      Eosinophil % 0.9 %      Basophil % 1.1 %      Immature Grans % 0.5 %      Neutrophils, Absolute 8.44 10*3/mm3      Lymphocytes, Absolute 0.96 10*3/mm3      Monocytes, Absolute 1.54 10*3/mm3      Eosinophils, Absolute 0.10 10*3/mm3      Basophils, Absolute 0.12 10*3/mm3      Immature  Grans, Absolute 0.06 10*3/mm3      nRBC 0.3 /100 WBC     COVID-19, FLU A/B, RSV PCR 1 HR TAT - Swab, Nasopharynx [[id number]]  (Normal) Collected: 08/15/24 1618    Specimen: Swab from Nasopharynx Updated: 08/15/24 1747     COVID19 Not Detected     Influenza A PCR Not Detected     Influenza B PCR Not Detected     RSV, PCR Not Detected    Narrative:      Fact sheet for providers: https://www.fda.gov/media/748904/download    Fact sheet for patients: https://www.fda.gov/media/224763/download    Test performed by PCR.    [facility] PCR - Swab, Axilla Right, Axilla Left and Groin [[id number]] Collected: 08/15/24 1628    Specimen: Swab from Axilla Right, Axilla Left and Groin Updated: 08/15/24 1700             Imaging Results (Last 24 Hours)       Procedure Component Value Units Date/Time    XR Chest 1 View [[id number]] Collected: 08/15/24 2130     Updated: 08/15/24 2133    Narrative:      XR CHEST 1 VW    Date of Exam: 8/15/2024 7:16 PM EDT    Indication: SOB/AFIB+RVR/COPD    Comparison: 5/29/2024    Findings:  Stable cardiomegaly. Mild left basilar airspace disease similar to prior study which could relate to atelectasis or scarring. No significant effusion. Negative for pneumothorax. The right lung is clear. Dense aortic arch atherosclerosis. Osseous   structures appear intact.      Impression:      Impression:  1. Stable cardiomegaly.  2. Mild left basilar airspace disease similar to prior study may relate to chronic atelectasis or scarring.        Electronically Signed: Randall Zarco MD    8/15/2024 9:31 PM EDT    Workstation ID: [id number]                 I reviewed the patient's new clinical results.    Medication Review:   Scheduled Meds:allopurinol, 100 mg, Oral, Daily  apixaban, 2.5 mg, Oral, Q12H  folic acid-pyridoxine-cyanocobalamin, 1 tablet, Oral, Daily  ipratropium-albuterol, 3 mL, Nebulization, 4x Daily - RT  levothyroxine, 75 mcg, Oral, Q AM  metoprolol succinate XL, 25 mg, Oral, Q12H  pantoprazole, 40 mg,  Oral, BID AC      Continuous Infusions:   PRN Meds:.  acetaminophen     Assessment & Plan       Atrial fibrillation with RVR    Chronic obstructive pulmonary disease    Primary hypertension    Chronic hypoxemic respiratory failure    3A Paroxysmal atrial fibrillation    CKD (chronic kidney disease), stage IV    Cor pulmonale (chronic)    Mild pulmonary hypertension    Anemia due to stage 4 chronic kidney disease    Chronic hypotension    AFIB + RVR  SOB  Autonomic dysfunction syndrome  Chronic diastolic congestive heart failure  Panlobular COPD with emphysema  Severe pulmonary hypertension  Chronic atelectasis left lower lobe  chronic kidney disease stage IIIa  Left breast anomaly  Hypertension associated chronic kidney disease stage IIIa  Anemia of chronic kidney disease  Hyperuricemia  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 On eliquis.  Coreg stopped and she was started on toprol.  Continue other meds including bumex.  Nephrology also consulted.     Plan for disposition:DIANNE Mcdaniels MD  08/16/24  09:16 EDT
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
## 2024-08-16 NOTE — PLAN OF CARE
Goal Outcome Evaluation:               No complaints voiced. States she is feeling better. Will continue to monitor.

## 2024-08-16 NOTE — CASE MANAGEMENT/SOCIAL WORK
Discharge Planning Assessment   Kenny     Patient Name: Gabrielle Lyles  MRN: [id number]  Today's Date: 8/16/2024    Admit Date: 8/15/2024    Plan: Return home alone.  [facility] accepted, need order. Current 3.5 L O2 with [facility].   Discharge Needs Assessment       Row Name 08/16/24 1512       Living Environment    People in Home alone    Current Living Arrangements home    Potentially Unsafe Housing Conditions none    In the past 12 months has the electric, gas, oil, or water company threatened to shut off services in your home? No    Primary Care Provided by self    Provides Primary Care For no one    Quality of Family Relationships helpful;involved;supportive    Able to Return to Prior Arrangements yes       Resource/Environmental Concerns    Resource/Environmental Concerns none    Transportation Concerns none       Transportation Needs    In the past 12 months, has lack of transportation kept you from medical appointments or from getting medications? no    In the past 12 months, has lack of transportation kept you from meetings, work, or from getting things needed for daily living? No       Food Insecurity    Within the past 12 months, you worried that your food would run out before you got the money to buy more. Never true    Within the past 12 months, the food you bought just didn't last and you didn't have money to get more. Never true       Transition Planning    Patient/Family Anticipates Transition to home    Patient/Family Anticipated Services at Transition none    Transportation Anticipated family or friend will provide       Discharge Needs Assessment    Readmission Within the Last 30 Days no previous admission in last 30 days    Equipment Currently Used at Home oxygen    Anticipated Changes Related to Illness none    Equipment Needed After Discharge none                   Discharge Plan       Row Name 08/16/24 6905       Plan    Plan Return home alone.  [facility] accepted, need order. Current 3.5 L O2  with [place].    Patient/Family in Agreement with Plan yes    Plan Comments Met with patient at bedside. Lives at home alone. IADL. PT worked with patient,  reccomendation for HH. discussed with patient, agreeable with HH. Has used [facility] in the past.  Referral sent to [facility], accepted.  Requested order from MD. Verified PCP. Pharmacy [facility]. Barriers to discharge: Cardizem gtt stopped. Cardio consult.                  Continued Care and Services - Admitted Since 8/15/2024       Home Medical Care       Service Provider Request Status Selected Services Address Phone Fax Patient Preferred    [facility] - Request Sent N/A [address], [address] [id number] [phone] --                  Selected Continued Care - Prior Encounters Includes continued care and service providers with selected services from prior encounters from 5/17/2024 to 8/16/2024      Discharged on 6/1/2024 Admission date: 5/29/2024 - Discharge disposition: Home-Health Care [facility]      Home Medical Care       Service Provider Selected Services Address Phone Fax Patient Preferred    [facility] [address], [address] [address] [phone] --                          Expected Discharge Date and Time       Expected Discharge Date Expected Discharge Time    Aug 17, 2024            Demographic Summary       Row Name 08/16/24 1511       General Information    Admission Type inpatient    Arrived From emergency department    Required Notices Provided Important Message from Medicare    Referral Source admission list    Reason for Consult discharge planning    Preferred Language English                   Functional Status       Row Name 08/16/24 1511       Functional Status    Usual Activity Tolerance good    Current Activity Tolerance good       Functional Status, IADL    Medications independent    Meal  Preparation independent    Housekeeping independent    Laundry independent    Shopping independent       Mental Status    General Appearance WDL WDL       Mental Status Summary    Recent Changes in Mental Status/Cognitive Functioning no changes                   Psychosocial    No documentation.              Brook Carolina RN    Office Phone [phone]  Office Cell [phone]

## 2024-08-16 NOTE — CONSULTS
NEPHROLOGY CONSULTATION-----[facility]    [facility]  [phone]  Luke Fleming MD    Patient Care Team:  Paul Granados MD as PCP - General (Family Medicine)  Richardson Willis MD as Cardiologist (Cardiology)  Rafy Rodriguez MD as Consulting Physician (Hematology and Oncology)  Christianne Phillips, AMARILIS as Licensed Practical Nurse  Salome Fleming MD as Consulting Physician (Nephrology)    CC/REASON FOR CONSULTATION: RENAL FAILURE/ELEVATED SERUM CREATININE    PHYSICIAN REQUESTING CONSULTATION:     History of Present Illness    Patient is a 84 y.o. WF, very well known to me, whom I was asked to see in consultation for evaluation and management of renal failure/elevated serum creatinine. Patient was admitted with SOB and PEREZ and atrial fibrillation with RVR.  Patient with known CRF/CKD STG 3B. No NSAIDs or recent IV dye exposure. No known h/o hepatitis, TB, rheumatic fever, jaundice, SLE. Does bleed/bruise easily as she is chronically anticoagulated on Eliquis.  No urinary sx outside of decreased urine output.    +Compliance with home meds. Was on diuretics in the form of Bumex prior to admission. Was not on ACE-I/ARB prior to admission. No herbal med use .  +Diarrhea x 2 weeks.    Review of Systems   Constitutional:  Positive for activity change, appetite change and fatigue. Negative for chills, diaphoresis, fever and unexpected weight change.   HENT:  Negative for congestion, dental problem, drooling, ear discharge, ear pain, facial swelling, hearing loss, mouth sores, nosebleeds, postnasal drip, rhinorrhea, sinus pressure, sinus pain, sneezing, sore throat, tinnitus, trouble swallowing and voice change.    Eyes:  Negative for photophobia, pain, discharge, redness, itching and visual disturbance.   Respiratory:  Positive for shortness of breath. Negative for apnea, cough, choking, chest tightness, wheezing and stridor.     Cardiovascular:  Positive for palpitations. Negative for chest pain and leg swelling.   Gastrointestinal:  Positive for diarrhea. Negative for abdominal distention, abdominal pain, anal bleeding, blood in stool, constipation, nausea, rectal pain and vomiting.   Endocrine: Negative for cold intolerance, heat intolerance, polydipsia, polyphagia and polyuria.   Genitourinary:  Positive for decreased urine volume. Negative for difficulty urinating, dysuria, enuresis, flank pain, frequency, genital sores, hematuria and urgency.   Musculoskeletal:  Positive for arthralgias and back pain. Negative for gait problem, joint swelling, myalgias, neck pain and neck stiffness.   Skin:  Negative for color change, pallor, rash and wound.   Allergic/Immunologic: Negative for environmental allergies, food allergies and immunocompromised state.   Neurological:  Positive for weakness. Negative for dizziness, tremors, seizures, syncope, facial asymmetry, speech difficulty, light-headedness, numbness and headaches.   Hematological:  Negative for adenopathy. Bruises/bleeds easily.   Psychiatric/Behavioral:  Negative for agitation, behavioral problems, confusion, decreased concentration, dysphoric mood, hallucinations, self-injury, sleep disturbance and suicidal ideas. The patient is not nervous/anxious and is not hyperactive.           Past Medical History:   Diagnosis Date    Acute exacerbation of chronic obstructive pulmonary disease (COPD)     COPD (chronic obstructive pulmonary disease)     Deep vein thrombosis of bilateral lower extremities 07/24/2019    3/2013    History of pneumonia 06/2012    community acquired pneumonia and right pleural effusion;hospital[facility] at [facility]    History of pulmonary embolism 03/2013    bilateral PE    Hypertension 2001    Insomnia     on Ambien 5mg at     Lobular breast cancer, left 11/2011    Stage IA left upper lobular breast cancer    Malignant neoplasm of left breast in female, estrogen  receptor positive 2019    Osteopenia 2012    Parainfluenza infection     Parotid duct obstruction     Severe pulmonary hypertension 2023    Stage 3a chronic kidney disease 2019    TIA (transient ischemic attack) 10/25/2022       Past Surgical History:   Procedure Laterality Date    CARDIAC CATHETERIZATION N/A 3/3/2023    Procedure: Left and Right Heart Cath with Coronary Angiography;  Surgeon: Richardson Willis MD;  Location: [facility] CATH INVASIVE LOCATION;  Service: Cardiovascular;  Laterality: N/A;    CATARACT EXTRACTION      IVC FILTER RETRIEVAL  2014    IVC filter placement by Dr. Card    MAMMO STEREOTACTIC BREAST BX SURGICAL ADD UNI Left 2011    invasive lobular carcinoma-Dr. Castellon Colomb    MASTECTOMY, PARTIAL Left 2011    and left axillary sentinel lymph node biopsy by Dr. Uriostegui    TUBAL ABDOMINAL LIGATION         Family History   Problem Relation Age of Onset    Lung cancer Brother        Social History     Tobacco Use    Smoking status: Former     Current packs/day: 0.00     Types: Cigarettes     Quit date:      Years since quittin.6     Passive exposure: Past    Smokeless tobacco: Never    Tobacco comments:     smoked 6 cigarettes a day from 13 years of age to 56 years of age when she quit in    Vaping Use    Vaping status: Never Used   Substance Use Topics    Alcohol use: Not Currently    Drug use: No       Home Meds:   Medications Prior to Admission   Medication Sig Dispense Refill Last Dose    allopurinol (ZYLOPRIM) 100 MG tablet Take 1 tablet by mouth Daily.   8/15/2024    apixaban (ELIQUIS) 5 MG tablet tablet Take 1 tablet by mouth Every 12 (Twelve) Hours. Indications: Other - full anticoagulation, history of DVT/PE 60 tablet 2 8/15/2024    budesonide-formoterol (SYMBICORT) 80-4.5 MCG/ACT inhaler Inhale 2 puffs 2 (Two) Times a Day.   8/15/2024    bumetanide (BUMEX) 0.5 MG tablet Take 1 tablet by mouth Daily. 30 tablet 3 8/15/2024    carvedilol  (COREG) 3.125 MG tablet Take 1 tablet by mouth 2 (Two) Times a Day With Meals. (Patient taking differently: Take 1 tablet by mouth 2 (Two) Times a Day With Meals. Patient stopped this medication on Monday 8/12/24) 60 tablet 3 Patient Taking Differently    levothyroxine (SYNTHROID, LEVOTHROID) 75 MCG tablet Take 1 tablet by mouth Every Morning. 30 tablet 3 8/15/2024    meclizine (ANTIVERT) 25 MG tablet Take 1 tablet by mouth 3 (Three) Times a Day As Needed for Dizziness. 30 tablet 0 Past Month    midodrine (PROAMATINE) 10 MG tablet Take 1 tablet by mouth 2 (Two) Times a Day Before Meals. 60 tablet 2 8/15/2024    O2 (OXYGEN) Inhale 3 L/min Continuous.   8/15/2024    pantoprazole (PROTONIX) 40 MG EC tablet Take 1 tablet by mouth 2 (Two) Times a Day Before Meals. 60 tablet 0 Past Month    potassium chloride (KLOR-CON) 20 MEQ packet Take 20 mEq by mouth Daily.   8/15/2024    sildenafil (REVATIO) 20 MG tablet Take 1 tablet by mouth 3 (Three) Times a Day. (Patient taking differently: Take 1 tablet by mouth 3 (Three) Times a Day. Patient stopped taking per MD on 8/12/24) 90 tablet 3 Patient Taking Differently    Folbic 2.5-25-2 MG tablet tablet Take 1 tablet by mouth Daily.          Scheduled Meds:  allopurinol, 100 mg, Oral, Daily  apixaban, 2.5 mg, Oral, Q12H  folic acid-pyridoxine-cyanocobalamin, 1 tablet, Oral, Daily  ipratropium-albuterol, 3 mL, Nebulization, 4x Daily - RT  levothyroxine, 75 mcg, Oral, Q AM  metoprolol succinate XL, 25 mg, Oral, Q12H  pantoprazole, 40 mg, Oral, BID AC        Continuous Infusions:       PRN Meds:    acetaminophen    Allergies:  Patient has no known allergies.    OBJECTIVE    Vital Signs  Temp:  [97.4 °F (36.3 °C)-98.1 °F (36.7 °C)] 97.4 °F (36.3 °C)  Heart Rate:  [] 84  Resp:  [12-25] 20  BP: ()/(52-96) 110/68    I/O this shift:  In: 240 [P.O.:240]  Out: -   I/O last 3 completed shifts:  In: 580 [P.O.:580]  Out: 200 [Urine:200]    Physical Exam:  General Appearance: alert,  "appears stated age and cooperative  Head: normocephalic, without obvious abnormality and atraumatic. +DRY OP  Eyes: conjunctivae and sclerae normal and no icterus  Neck: supple and no JVD  Lungs: clear to auscultation and respirations regular  Heart: IRREG IRREG +TAYE. NO GALOP, RUB, OR S3  Chest Wall: no abnormalities observed  Abdomen: normal bowel sounds and soft, nontender  Extremities: moves extremities well, no edema, no cyanosis  Skin: +DECREASED SKIN TURGOR AND FEW SCATTERED ECCHYMOSIS  Neurologic: Alert, and oriented. No focal deficits    Results Review:    I reviewed the patient's new clinical results.    WBC WBC   Date Value Ref Range Status   08/16/2024 11.49 (H) 3.40 - 10.80 10*3/mm3 Final   08/15/2024 11.22 (H) 3.40 - 10.80 10*3/mm3 Final      HGB Hemoglobin   Date Value Ref Range Status   08/16/2024 9.6 (L) 12.0 - 15.9 g/dL Final   08/15/2024 10.1 (L) 12.0 - 15.9 g/dL Final      HCT Hematocrit   Date Value Ref Range Status   08/16/2024 32.0 (L) 34.0 - 46.6 % Final   08/15/2024 33.9 (L) 34.0 - 46.6 % Final      Platelets No results found for: \"LABPLAT\"   MCV MCV   Date Value Ref Range Status   08/16/2024 84.7 79.0 - 97.0 fL Final   08/15/2024 86.5 79.0 - 97.0 fL Final          Sodium Sodium   Date Value Ref Range Status   08/15/2024 141 136 - 145 mmol/L Final      Potassium Potassium   Date Value Ref Range Status   08/15/2024 4.2 3.5 - 5.2 mmol/L Final      Chloride Chloride   Date Value Ref Range Status   08/15/2024 101 98 - 107 mmol/L Final      CO2 CO2   Date Value Ref Range Status   08/15/2024 26.4 22.0 - 29.0 mmol/L Final      BUN BUN   Date Value Ref Range Status   08/15/2024 50 (H) 8 - 23 mg/dL Final      Creatinine Creatinine   Date Value Ref Range Status   08/15/2024 2.14 (H) 0.57 - 1.00 mg/dL Final      Calcium Calcium   Date Value Ref Range Status   08/15/2024 9.7 8.6 - 10.5 mg/dL Final      PO4 No results found for: \"CAPO4\"   Albumin Albumin   Date Value Ref Range Status   08/15/2024 3.8 3.5 " "- 5.2 g/dL Final      Magnesium No results found for: \"MG\"   Uric Acid No results found for: \"URICACID\"       Imaging Results (Last 72 Hours)       Procedure Component Value Units Date/Time    XR Chest 1 View [[id number]] Collected: 08/15/24 2130     Updated: 08/15/24 2133    Narrative:      XR CHEST 1 VW    Date of Exam: 8/15/2024 7:16 PM EDT    Indication: SOB/AFIB+RVR/COPD    Comparison: 5/29/2024    Findings:  Stable cardiomegaly. Mild left basilar airspace disease similar to prior study which could relate to atelectasis or scarring. No significant effusion. Negative for pneumothorax. The right lung is clear. Dense aortic arch atherosclerosis. Osseous   structures appear intact.      Impression:      Impression:  1. Stable cardiomegaly.  2. Mild left basilar airspace disease similar to prior study may relate to chronic atelectasis or scarring.        Electronically Signed: Randall Zarco MD    8/15/2024 9:31 PM EDT    Workstation ID: [id number]              Results for orders placed during the hospital encounter of 08/15/24    XR Chest 1 View    Narrative  XR CHEST 1 VW    Date of Exam: 8/15/2024 7:16 PM EDT    Indication: SOB/AFIB+RVR/COPD    Comparison: 5/29/2024    Findings:  Stable cardiomegaly. Mild left basilar airspace disease similar to prior study which could relate to atelectasis or scarring. No significant effusion. Negative for pneumothorax. The right lung is clear. Dense aortic arch atherosclerosis. Osseous  structures appear intact.    Impression  Impression:  1. Stable cardiomegaly.  2. Mild left basilar airspace disease similar to prior study may relate to chronic atelectasis or scarring.        Electronically Signed: Randall Zarco MD  8/15/2024 9:31 PM EDT  Workstation ID: [id number]      Results for orders placed during the hospital encounter of 05/29/24    XR Chest 1 View    Narrative  XR CHEST 1 VW    Date of Exam: 5/29/2024 4:25 PM EDT    Indication: Dizziness and near syncope with shortness of " breath    Comparison: AP portable chest 4/20/2024    Findings:  Stable mild to moderate cardiac enlargement. Trace left basilar pleural effusion with mild left basilar atelectasis is present. No overt features of pulmonary edema. Right lung appears clear. Mild calcific atherosclerosis within the thoracic aorta. No  acute osseous abnormality    Impression  Impression:    1. Stable cardiomegaly. No overt pulmonary edema.  2. Trace left basilar pleural effusion with mild left basilar atelectasis.      Electronically Signed: Tiffanie Cheung MD  5/29/2024 4:37 PM EDT  Workstation ID: [id number]      Results for orders placed during the hospital encounter of 04/25/24    XR Chest 1 View    Narrative  XR CHEST 1 VW    Date of Exam: 4/28/2024 9:45 AM EDT    Indication: CHF    Comparison: 4/25/2020    Findings:  Heart size and pulmonary vasculature are stable. No gross pulmonary edema is demonstrated. There is strandy opacity in the left lung base.    Impression  Impression:    1. Cardiomegaly with no evidence of pulmonary edema  2. Left basilar atelectasis      Electronically Signed: Darnell Kennedy  4/28/2024 11:33 AM EDT  Workstation ID: [id number]        Results for orders placed during the hospital encounter of 02/05/24    Duplex Venous Upper Extremity - Right CAR    Interpretation Summary    Normal right upper extremity venous duplex scan.      ASSESSMENT / PLAN      Atrial fibrillation with RVR    Chronic obstructive pulmonary disease    Primary hypertension    Chronic hypoxemic respiratory failure    3A Paroxysmal atrial fibrillation    CKD (chronic kidney disease), stage IV    Cor pulmonale (chronic)    Mild pulmonary hypertension    Anemia due to stage 4 chronic kidney disease    Chronic hypotension      RENAL FAILURE------Nonoliguric. +ARF/JULIAN on top of known CRF/CKD STG 3B, with a usual euvolemic baseline serum Creatinine of about 1.6-1.7. CRF/CKD STG 3B is secondary to HTN NS. +ARF/JULIAN is secondary to prerenal state and  effective intravascular volume depletion from diarrhea with concomitant diuretic use and also with contribution from ATN from hypotension.  Avoid hypotension. Check urine and serum studies and renal US and PVR. Hold Bumex. Hydrate very gently with Normal Saline. No NSAIDs or IV dye. Dose meds for CrCl less than 10 cc/min until ARF/JULIAN is resolved     2. H/O CHF-----Currently prerenal. Hold Bumex and give back little IVFs very gently.     3. HTN WITH CKD-----Avoid hypotension. No ACE/ARB/DRI for now. Diuretic on hold. Add low dose Midodrine     4. OA/DJD/HYPERURICEMIA------No NSAIDs. Check uric acid levels     5. ANEMIA OF CKD-----Normocytic with elevated RDW. Check B12, Folate, and Check iron studies and follow for IV iron/EPO need     6. HYPERGLYCEMIA------Glucometers, SSI     7.  PULMONARY HTN--------Per , Pulmonary. On Revatio at home     8. HYPOKALEMIA-------Replace po     9. CAD-----per , Cardiology    10. ELEVATED LFTS/TRANSAMINASES    11. HYPOTHYROIDISM------On Synthroid. Check TSH    12. GERD/PUD PROPHYLAXIS-----On PPI. Benefits outweigh risks despite CKD    13. COPD-----Oxygen, nebs, pulmonary toilet    14. H/O BREAST CA    15. DVT PROPHYLAXIS-----Anticoagulated on Eliquis    16. ATRIAL FIBRILLATION WITH RVR-------Rate controlled now and anticoagulated. Per , Cardiology      I discussed the patient's findings and my recommendations with patient and nursing staff    Will follow along closely. Thank you for allowing us to see this patient in renal consultation.    [facility]  [phone]  MD Luke Barton MD  08/16/24  19:24 EDT

## 2024-08-16 NOTE — PLAN OF CARE
Goal Outcome Evaluation:      Patient started on cardizem gtt but had to be stopped due to systolic BP < 100.  Patient has been in A. Fib with a controlled rate of < 100.  Denies any pain or shortness of air.  Sats were 88% on 3L - increased to 5L and sats 92%.  Awaiting cardiology consult.

## 2024-08-16 NOTE — CONSULTS
Referring Provider: Paul Granados MD    Reason for Consultation: Atrial fibrillation with rapid ventricular rate      Patient Care Team:  Paul Granados MD as PCP - General (Family Medicine)  Richardson Willis MD as Cardiologist (Cardiology)  Rafy Rodriguez MD as Consulting Physician (Hematology and Oncology)  Christianne Phillips, AMARILIS as Licensed Practical Nurse  Salome Fleming MD as Consulting Physician (Nephrology)      SUBJECTIVE     Chief Complaint: Shortness of breath and dyspnea on exertion    History of present illness:  Gabrielle Lyles is a 84 y.o. female with pulmonary hypertension, COPD, chronic respiratory failure on home oxygen, TIA, DVT/PE, chronic kidney disease, hypertension who has had multiple previous hospital admissions for shortness of breath.  She has been admitted with A-fib RVR.  Her home cardiac medications include Eliquis, Bumex, Coreg, midodrine and sildenafil.    Review of systems:    Constitutional: + weakness, fatigue, fever, rigors, chills   Eyes: No vision changes, eye pain   ENT/oropharynx: No difficulty swallowing, sore throat, epistaxis, changes in hearing   Cardiovascular: No chest pain, chest tightness, palpitations, paroxysmal nocturnal dyspnea, orthopnea, diaphoresis, dizziness / syncopal episode   Respiratory: + shortness of breath, dyspnea on exertion, cough, wheezing, hemoptysis   Gastrointestinal: No abdominal pain, nausea, vomiting, diarrhea, bloody stools   Genitourinary: No hematuria, dysuria   Neurological: No headache, tremors, numbness, one-sided weakness    Musculoskeletal: No cramps, myalgias, joint pain, joint swelling   Integument: No rash, edema        Personal History:      Past Medical History:   Diagnosis Date    Acute exacerbation of chronic obstructive pulmonary disease (COPD)     COPD (chronic obstructive pulmonary disease)     Deep vein thrombosis of bilateral lower extremities 07/24/2019    3/2013    History of pneumonia 06/2012     community acquired pneumonia and right pleural effusion;hospitalized at [facility]    History of pulmonary embolism 2013    bilateral PE    Hypertension     Insomnia     on Ambien 5mg at     Lobular breast cancer, left 2011    Stage IA left upper lobular breast cancer    Malignant neoplasm of left breast in female, estrogen receptor positive 2019    Osteopenia     Parainfluenza infection     Parotid duct obstruction     Severe pulmonary hypertension 2023    Stage 3a chronic kidney disease 2019    TIA (transient ischemic attack) 10/25/2022       Past Surgical History:   Procedure Laterality Date    CARDIAC CATHETERIZATION N/A 3/3/2023    Procedure: Left and Right Heart Cath with Coronary Angiography;  Surgeon: Richardson Willis MD;  Location: [facility] INVASIVE LOCATION;  Service: Cardiovascular;  Laterality: N/A;    CATARACT EXTRACTION      IVC FILTER RETRIEVAL  2014    IVC filter placement by Dr. Card    MAMMO STEREOTACTIC BREAST BX SURGICAL ADD UNI Left 2011    invasive lobular carcinoma-Dr. Cande Daniel    MASTECTOMY, PARTIAL Left 2011    and left axillary sentinel lymph node biopsy by Dr. Uriostegui    TUBAL ABDOMINAL LIGATION         Family History   Problem Relation Age of Onset    Lung cancer Brother        Social History     Tobacco Use    Smoking status: Former     Current packs/day: 0.00     Types: Cigarettes     Quit date:      Years since quittin.6     Passive exposure: Past    Smokeless tobacco: Never    Tobacco comments:     smoked 6 cigarettes a day from 13 years of age to 56 years of age when she quit in    Vaping Use    Vaping status: Never Used   Substance Use Topics    Alcohol use: Not Currently    Drug use: No        Home meds:  Prior to Admission medications    Medication Sig Start Date End Date Taking? Authorizing Provider   allopurinol (ZYLOPRIM) 100 MG tablet Take 1 tablet by mouth Daily.   Yes Provider, MD Jaylyn    apixaban (ELIQUIS) 5 MG tablet tablet Take 1 tablet by mouth Every 12 (Twelve) Hours. Indications: Other - full anticoagulation, history of DVT/PE 10/27/22  Yes Shaylee Mcdaniels MD   budesonide-formoterol (SYMBICORT) 80-4.5 MCG/ACT inhaler Inhale 2 puffs 2 (Two) Times a Day.   Yes Jaylyn Golden MD   bumetanide (BUMEX) 0.5 MG tablet Take 1 tablet by mouth Daily. 5/8/24  Yes Paul Granados MD   carvedilol (COREG) 3.125 MG tablet Take 1 tablet by mouth 2 (Two) Times a Day With Meals.  Patient taking differently: Take 1 tablet by mouth 2 (Two) Times a Day With Meals. Patient stopped this medication on Monday 8/12/24 5/7/24  Yes Paul Granados MD   levothyroxine (SYNTHROID, LEVOTHROID) 75 MCG tablet Take 1 tablet by mouth Every Morning. 5/8/24  Yes Paul Granados MD   meclizine (ANTIVERT) 25 MG tablet Take 1 tablet by mouth 3 (Three) Times a Day As Needed for Dizziness. 6/1/24  Yes Paul Granados MD   midodrine (PROAMATINE) 10 MG tablet Take 1 tablet by mouth 2 (Two) Times a Day Before Meals. 5/31/24  Yes Paul Granados MD   O2 (OXYGEN) Inhale 3 L/min Continuous. 2/26/24  Yes Jaylyn Golden MD   pantoprazole (PROTONIX) 40 MG EC tablet Take 1 tablet by mouth 2 (Two) Times a Day Before Meals. 5/7/24  Yes Paul Granados MD   potassium chloride (KLOR-CON) 20 MEQ packet Take 20 mEq by mouth Daily.   Yes Jaylyn Golden MD   sildenafil (REVATIO) 20 MG tablet Take 1 tablet by mouth 3 (Three) Times a Day.  Patient taking differently: Take 1 tablet by mouth 3 (Three) Times a Day. Patient stopped taking per MD on 8/12/24 5/7/24  Yes Paul Granados MD   Folbic 2.5-25-2 MG tablet tablet Take 1 tablet by mouth Daily. 2/28/24   ProviderJaylyn MD       Allergies:     Patient has no known allergies.    Scheduled Meds:allopurinol, 100 mg, Oral, Daily  apixaban, 5 mg, Oral, Q12H  carvedilol, 3.125 mg, Oral, BID With Meals  folic acid-pyridoxine-cyanocobalamin, 1 tablet, Oral,  "Daily  ipratropium-albuterol, 3 mL, Nebulization, 4x Daily - RT  levothyroxine, 75 mcg, Oral, Q AM  pantoprazole, 40 mg, Oral, BID AC      Continuous Infusions:dilTIAZem, 5-15 mg/hr, Last Rate: Stopped (08/16/24 0049)      PRN Meds:      OBJECTIVE    Vital Signs  Vitals:    08/16/24 0045 08/16/24 0049 08/16/24 0050 08/16/24 0331   BP:  120/67  129/76   BP Location:    Right arm   Patient Position:    Lying   Pulse: 77 76 80 95   Resp:    21   Temp:    98.1 °F (36.7 °C)   TempSrc:    Oral   SpO2: 93% 96% 95% 95%   Weight:    67 kg (147 lb 11.3 oz)   Height:           Flowsheet Rows      Flowsheet Row First Filed Value   Admission Height 162.6 cm (64\") Documented at 08/15/2024 1638   Admission Weight 64.4 kg (142 lb) Documented at 08/15/2024 1638              Intake/Output Summary (Last 24 hours) at 8/16/2024 0721  Last data filed at 8/16/2024 0331  Gross per 24 hour   Intake 340 ml   Output 200 ml   Net 140 ml        Telemetry: Atrial fibrillation    Physical Exam:  The patient is alert, oriented and in no distress.  Vital signs as noted above.  Head and neck revealed no carotid bruits or jugular venous distention.  No thyromegaly or lymphadenopathy is present  Distant and diminished breath sounds  Heart: Normal first and second heart sounds. No murmur.  No precordial rub is present.  No gallop is present.  Abdomen: Soft and nontender.  No organomegaly is present.  Extremities with good peripheral pulses without any pedal edema.  Skin: Warm and dry.  Musculoskeletal system is grossly normal.  CNS grossly normal.       Results Review:  I have personally reviewed the results from the time of this admission to 8/16/2024 07:21 EDT and agree with these findings:  []  Laboratory  []  Microbiology  []  Radiology  []  EKG/Telemetry   []  Cardiology/Vascular   []  Pathology  []  Old records  []  Other:    Most notable findings include:     Lab Results (last 24 hours)       Procedure Component Value Units Date/Time    CBC (No " Diff) [[id number]]  (Abnormal) Collected: 08/16/24 0514    Specimen: Blood Updated: 08/16/24 0555     WBC 11.49 10*3/mm3      RBC 3.78 10*6/mm3      Hemoglobin 9.6 g/dL      Hematocrit 32.0 %      MCV 84.7 fL      MCH 25.4 pg      MCHC 30.0 g/dL      RDW 20.1 %      RDW-SD 61.5 fl      MPV 11.2 fL      Platelets 249 10*3/mm3     High Sensitivity Troponin T 2Hr [[id number]]  (Abnormal) Collected: 08/15/24 2231    Specimen: Blood Updated: 08/15/24 2312     HS Troponin T 35 ng/L      Troponin T Delta 2 ng/L     Narrative:      High Sensitive Troponin T Reference Range:  <14.0 ng/L- Negative Female for AMI  <22.0 ng/L- Negative Male for AMI  >=14 - Abnormal Female indicating possible myocardial injury.  >=22 - Abnormal Male indicating possible myocardial injury.   Clinicians would have to utilize clinical acumen, EKG, Troponin, and serial changes to determine if it is an Acute Myocardial Infarction or myocardial injury due to an underlying chronic condition.         High Sensitivity Troponin T [[id number]]  (Abnormal) Collected: 08/15/24 2002    Specimen: Blood Updated: 08/15/24 2042     HS Troponin T 33 ng/L     Narrative:      High Sensitive Troponin T Reference Range:  <14.0 ng/L- Negative Female for AMI  <22.0 ng/L- Negative Male for AMI  >=14 - Abnormal Female indicating possible myocardial injury.  >=22 - Abnormal Male indicating possible myocardial injury.   Clinicians would have to utilize clinical acumen, EKG, Troponin, and serial changes to determine if it is an Acute Myocardial Infarction or myocardial injury due to an underlying chronic condition.         Comprehensive Metabolic Panel [[id number]]  (Abnormal) Collected: 08/15/24 2002    Specimen: Blood Updated: 08/15/24 2042     Glucose 130 mg/dL      BUN 50 mg/dL      Creatinine 2.14 mg/dL      Sodium 141 mmol/L      Potassium 4.2 mmol/L      Chloride 101 mmol/L      CO2 26.4 mmol/L      Calcium 9.7 mg/dL      Total Protein 6.3 g/dL      Albumin 3.8 g/dL       ALT (SGPT) 207 U/L      AST (SGOT) 304 U/L      Alkaline Phosphatase 86 U/L      Total Bilirubin 3.2 mg/dL      Globulin 2.5 gm/dL      A/G Ratio 1.5 g/dL      BUN/Creatinine Ratio 23.4     Anion Gap 13.6 mmol/L      eGFR 22.5 mL/min/1.73     Narrative:      GFR Normal >60  Chronic Kidney Disease <60  Kidney Failure <15    The GFR formula is only valid for adults with stable renal function between ages 18 and 70.    CBC & Differential [[id number]]  (Abnormal) Collected: 08/15/24 2002    Specimen: Blood Updated: 08/15/24 2017    Narrative:      The following orders were created for panel order CBC & Differential.  Procedure                               Abnormality         Status                     ---------                               -----------         ------                     CBC Auto Differential[[id number]]        Abnormal            Final result                 Please view results for these tests on the individual orders.    CBC Auto Differential [[id number]]  (Abnormal) Collected: 08/15/24 2002    Specimen: Blood Updated: 08/15/24 2017     WBC 11.22 10*3/mm3      RBC 3.92 10*6/mm3      Hemoglobin 10.1 g/dL      Hematocrit 33.9 %      MCV 86.5 fL      MCH 25.8 pg      MCHC 29.8 g/dL      RDW 20.4 %      RDW-SD 62.8 fl      MPV 10.6 fL      Platelets 267 10*3/mm3      Neutrophil % 75.2 %      Lymphocyte % 8.6 %      Monocyte % 13.7 %      Eosinophil % 0.9 %      Basophil % 1.1 %      Immature Grans % 0.5 %      Neutrophils, Absolute 8.44 10*3/mm3      Lymphocytes, Absolute 0.96 10*3/mm3      Monocytes, Absolute 1.54 10*3/mm3      Eosinophils, Absolute 0.10 10*3/mm3      Basophils, Absolute 0.12 10*3/mm3      Immature Grans, Absolute 0.06 10*3/mm3      nRBC 0.3 /100 WBC     COVID-19, FLU A/B, RSV PCR 1 HR TAT - Swab, Nasopharynx [[id number]]  (Normal) Collected: 08/15/24 1618    Specimen: Swab from Nasopharynx Updated: 08/15/24 3295     COVID19 Not Detected     Influenza A PCR Not Detected     Influenza B PCR  Not Detected     RSV, PCR Not Detected    Narrative:      Fact sheet for providers: https://www.fda.gov/media/165088/download    Fact sheet for patients: https://www.fda.gov/media/749292/download    Test performed by PCR.    CANDIDA AURIS PCR - Swab, Axilla Right, Axilla Left and Groin [[id number]] Collected: 08/15/24 1628    Specimen: Swab from Axilla Right, Axilla Left and Groin Updated: 08/15/24 1700            Imaging Results (Last 24 Hours)       Procedure Component Value Units Date/Time    XR Chest 1 View [[id number]] Collected: 08/15/24 2130     Updated: 08/15/24 2133    Narrative:      XR CHEST 1 VW    Date of Exam: 8/15/2024 7:16 PM EDT    Indication: SOB/AFIB+RVR/COPD    Comparison: 5/29/2024    Findings:  Stable cardiomegaly. Mild left basilar airspace disease similar to prior study which could relate to atelectasis or scarring. No significant effusion. Negative for pneumothorax. The right lung is clear. Dense aortic arch atherosclerosis. Osseous   structures appear intact.      Impression:      Impression:  1. Stable cardiomegaly.  2. Mild left basilar airspace disease similar to prior study may relate to chronic atelectasis or scarring.        Electronically Signed: Randall Zarco MD    8/15/2024 9:31 PM EDT    Workstation ID: [id number]            LAB RESULTS (LAST 7 DAYS)    CBC  Results from last 7 days   Lab Units 08/16/24  0514 08/15/24  2002   WBC 10*3/mm3 11.49* 11.22*   RBC 10*6/mm3 3.78 3.92   HEMOGLOBIN g/dL 9.6* 10.1*   HEMATOCRIT % 32.0* 33.9*   MCV fL 84.7 86.5   PLATELETS 10*3/mm3 249 267       BMP  Results from last 7 days   Lab Units 08/15/24  2002   SODIUM mmol/L 141   POTASSIUM mmol/L 4.2   CHLORIDE mmol/L 101   CO2 mmol/L 26.4   BUN mg/dL 50*   CREATININE mg/dL 2.14*   GLUCOSE mg/dL 130*       CMP   Results from last 7 days   Lab Units 08/15/24  2002   SODIUM mmol/L 141   POTASSIUM mmol/L 4.2   CHLORIDE mmol/L 101   CO2 mmol/L 26.4   BUN mg/dL 50*   CREATININE mg/dL 2.14*   GLUCOSE mg/dL  130*   ALBUMIN g/dL 3.8   BILIRUBIN mg/dL 3.2*   ALK PHOS U/L 86   AST (SGOT) U/L 304*   ALT (SGPT) U/L 207*       BNP        TROPONIN  Results from last 7 days   Lab Units 08/15/24  2231   HSTROP T ng/L 35*       CoAg        Creatinine Clearance  Estimated Creatinine Clearance: 18.7 mL/min (A) (by C-G formula based on SCr of 2.14 mg/dL (H)).    ABG          Radiology  XR Chest 1 View    Result Date: 8/15/2024  Impression: 1. Stable cardiomegaly. 2. Mild left basilar airspace disease similar to prior study may relate to chronic atelectasis or scarring. Electronically Signed: Randall Zarco MD  8/15/2024 9:31 PM EDT  Workstation ID: [id number]       EKG  I personally viewed and interpreted the patient's EKG/Telemetry data:  ECG 12 Lead Tachycardia   Preliminary Result   HEART RATE=89  bpm   RR Xozqyivj=515  ms   ID Interval=  ms   P Horizontal Axis=  deg   P Front Axis=  deg   QRSD Interval=83  ms   QT Afycxkdg=564  ms   TPoD=311  ms   QRS Axis=131  deg   T Wave Axis=-63  deg   - ABNORMAL ECG -   Atrial fibrillation   Right axis deviation   Low voltage, precordial leads   Nonspecific repol abnormality, diffuse leads   Date and Time of Study:2024-08-15 19:11:40      Telemetry Scan   Final Result      Telemetry Scan   Final Result            Echocardiogram:    Results for orders placed in visit on 03/18/24    Adult Transthoracic Echo Limited W/ Cont if Necessary Per Protocol    Interpretation Summary    Left ventricular ejection fraction appears to be 61 - 65%.    The right ventricular cavity is dilated.    There is a small (<1cm) pericardial effusion adjacent to the left ventricle. There is no evidence of cardiac tamponade.    IVC is 2.1 cm.        Stress Test:  Results for orders placed in visit on 09/13/22    Stress Test With Myocardial Perfusion One Day    Interpretation Summary    Left ventricular ejection fraction is hyperdynamic (Calculated EF > 70%). .    Myocardial perfusion imaging indicates a normal myocardial  perfusion study with no evidence of ischemia.    Impressions are consistent with a low risk study.    Findings consistent with a normal ECG stress test.        Cardiac Catheterization:  Results for orders placed during the hospital encounter of 03/03/23    Cardiac Catheterization/Vascular Study    Conclusion  OPERATORS  Richardson Willis M.D. (Attending Cardiologist)      PROCEDURE PERFORMED  Ultrasound guided vascular access  Right heart catheterization  Coronary Angiogram  Left Heart Catheterization 17820  Moderate Sedation    INDICATIONS FOR PROCEDURE  83-year-old woman with multiple cardiovascular risk factors, abnormal stress test presented with worsening shortness of breath.  After discussing the risk and benefit of the procedure she was brought in for elective right and left heart cath.    PROCEDURE IN DETAIL  Informed consent was obtained from the patient after explaining the risks, benefits, and alternative options of the procedure. After obtaining informed consent, the patient was brought to the cath lab and was prepped in a sterile fashion. Lidocaine 2% was used for local anesthesia into the right femoral venous access site. Right femoral vein was accessed using the micropuncture needle under ultrasound guidance and micropuncture wire advanced under flouroscopy. A 7 Sinhala vascular sheath was put into place percutaneously over guide-wire. Guide wires were removed. A 6Fr swan sherly catheter was advanced to wedge position. RA, RV and PA and wedge pressures were recorded.  PA sat and arterial sats recorded.  The patient tolerated the procedure well without any complications.    Lidocaine 2% was used for local anesthesia into the right femoral arterial access site. The right femoral artery was accessed with a micropuncture needle via modified Seldinger technique under ultrasound guidance. A 6F was inserted successfully.  Afterwards, 6F JR4 and JL4 diagnostic catheters were advanced over a wire into the ascending  aorta and were used to engage the ostia of the left main and RCA respectively. JR4 used to cross the AV and obtain LV pressures and gradient across the AV measured via pullback technique. Images of the right and left coronary systems were obtained. All the catheters were exchanged over a wire and subsequently removed. Angiogram of the femoral access site was obtained and did not show complications. The patient tolerated the procedure well without any complications. The pictures were reviewed at the end of the procedure. A Mynx closure device was applied.    HEMODYNAMICS    RHC  RA 6/5, 4 mmHg  RV 74/3, 5 mmHg  PA 71/25, 44 mmHg  PCW 12 mmHg  AO Sat 92%  PA Sat 78%    Jose CO: 7.89 L/min    Jose CI: 4.69 L/min/m²    LHC  LV: 134/3, 20 mmHg  AO: 131/56, 87 mmHg  No significant gradient across the aortic valve during pullback of JR4 catheter.    FINDINGS  Coronary Angiogram  All vessels are heavily calcified  She also has heavily calcified peripheral arterial disease.  Right dominant circulation    Left main: Left main is a large caliber vessel which gives rise to the Left Anterior Descending and the Left circumflex.  Left main is angiographically free from any significant disease.    Left Anterior Descending Artery: LAD is a heavily calcified medium caliber vessel which gives rise to diagonals.  The vessel is highly tortuous and has 50 to 60% mid vessel stenosis best seen in Welsh cranial view.    Left Circumflex: Heavily calcified vessel with 50% proximal segment stenosis.    Right Coronary Artery: The RCA is a large caliber vessel which is heavily calcified and tortuous.  It has diffuse luminal irregularities and 30 to 40% stenosis in the midsegment around the bend.    ESTIMATED BLOOD LOSS:  10 ml    COMPLICATIONS:  None    PROCEDURE DATA:  Sedation Time: 25 minutes    IMPRESSIONS  Heavily calcified, tortuous nonobstructive coronary disease  Severe pulmonary hypertension with PA systolic pressure in the 70s  Mean PA  pressure 44 mmHg    RECOMMENDATIONS  -Continue aggressive medical management of CAD  -Referral to pulmonology for treatment of pulmonary hypertension        Other:      ASSESSMENT & PLAN:    Principal Problem:    Atrial fibrillation with RVR    Atrial fibrillation  She has known history of paroxysmal atrial fibrillation  [id number] score is 6  Continue Eliquis for atrial fibrillation as well as for history of DVT/PE  Discontinue Coreg  Start Toprol-XL 25 mg p.o. twice daily    COPD/Chronic respiratory failure/shortness of breath  Severe pulmonary hypertension  HFpEF  On home oxygen 3 L  Has known pulmonary hypertension with mean PA pressure of 44 and peak PA pressure of 71 mmHg with normal wedge pressure  She is on sildenafil  Echocardiogram shows preserved LV function with mildly elevated RVSP.  Small to moderate pericardial effusion: No signs of tamponade  Previous proBNP was more than 15,000  Continue Bumex  Encouraged low-salt diet  Encouraged follow-up with outpatient heart failure clinic for intermittent IV diuresis    History of venous thrombosis and embolism  Switching to low-dose Eliquis due to age and renal dysfunction.  She also has an IVC filter in place.     Primary hypertension, chronic  She has chronically low blood pressure.  Continue midodrine.  Stopping Coreg and starting Toprol-XL.  Echo shows preserved LV function, reduced RV function and mildly elevated RVSP.  Pericardial effusion is not significant with no signs of tamponade.  Continue Bumex    Coronary artery disease  Continue beta-blocker  Already on Eliquis low-dose  Recommended starting a statin.  Previous cardiac catheterization showed heavily calcified coronaries but nonobstructive.  No chest pain and stable from cardiac standpoint.     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  Continue high intensity statin and anticoagulation with Eliquis 2.5 mg p.o. twice daily due to renal dysfunction and advanced age.     Stage IIIb chronic kidney disease  Creatinine 1.6, GFR is 31.4  Continue low-dose Bumex  Follow-up with nephrology outpatient       Richardson Willis MD  08/16/24  07:21 EDT

## 2024-08-16 NOTE — THERAPY EVALUATION
Patient Name: Gabrielle Lyles  : 1940    MRN: [id number]                              Today's Date: 2024       Admit Date: 8/15/2024    Visit Dx: No diagnosis found.  Patient Active Problem List   Diagnosis    Low back pain    Osteopenia    Chronic obstructive pulmonary disease    Gastroesophageal reflux disease    Gout    History of venous thrombosis and embolism    Primary hypertension    Lumbar radiculopathy    Nausea    Personal history of malignant neoplasm of breast    Shortness of breath    Aortic aneurysm    Bulging lumbar disc    Spinal stenosis of lumbar region    History of TIA (transient ischemic attack)    Chronic hypoxemic respiratory failure    Cellulitis of left foot    Dyspnea    3A Paroxysmal atrial fibrillation    Moderate malnutrition    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    Panlobular emphysema    Dependence on supplemental oxygen    CKD (chronic kidney disease), stage IV    Cor pulmonale (chronic)    Mild pulmonary hypertension    Anemia due to stage 4 chronic kidney disease    Chronic hypotension    Syncope and collapse    Anemia in stage 2 chronic kidney disease    Mucopurulent chronic bronchitis    Moderate protein-calorie malnutrition    Disorder of the autonomic nervous system, unspecified    Athscl heart disease of native coronary artery w/o ang pctrs    Atrial fibrillation with RVR     Past Medical History:   Diagnosis Date    Acute exacerbation of chronic obstructive pulmonary disease (COPD)     COPD (chronic obstructive pulmonary disease)     Deep vein thrombosis of bilateral lower extremities 2019    3/2013    History of pneumonia 2012    community acquired pneumonia and right pleural effusion;hospitalized at [facility]    History of pulmonary embolism 2013    bilateral PE    Hypertension 2001    Insomnia     on Ambien 5mg at     Lobular breast cancer, left 2011    Stage IA left upper lobular breast cancer    Malignant neoplasm  of left breast in female, estrogen receptor positive 07/18/2019    Osteopenia 2012    Parainfluenza infection     Parotid duct obstruction     Severe pulmonary hypertension 03/03/2023    Stage 3a chronic kidney disease 07/24/2019    TIA (transient ischemic attack) 10/25/2022     Past Surgical History:   Procedure Laterality Date    CARDIAC CATHETERIZATION N/A 3/3/2023    Procedure: Left and Right Heart Cath with Coronary Angiography;  Surgeon: Richardson Willis MD;  Location: [facility] CATH INVASIVE LOCATION;  Service: Cardiovascular;  Laterality: N/A;    CATARACT EXTRACTION  2015    IVC FILTER RETRIEVAL  06/2014    IVC filter placement by Dr. Card    MAMMO STEREOTACTIC BREAST BX SURGICAL ADD UNI Left 11/01/2011    invasive lobular carcinoma-Dr. Castellon Colomb    MASTECTOMY, PARTIAL Left 12/01/2011    and left axillary sentinel lymph node biopsy by Dr. Uriostegui    TUBAL ABDOMINAL LIGATION  1984      General Information       Row Name 08/16/24 1457          Physical Therapy Time and Intention    Document Type evaluation  -     Mode of Treatment physical therapy  -       Row Name 08/16/24 1457          General Information    Prior Level of Function independent:;all household mobility  furniture walks in her home at times and uses RW. Pt reports she uses 3.3L home O2. DME available: rollator, RW  -CARLY     Existing Precautions/Restrictions fall;oxygen therapy device and L/min  -CARLY     Barriers to Rehab medically complex;previous functional deficit  -       Row Name 08/16/24 1457          Living Environment    People in Home alone  -       Row Name 08/16/24 1457          Home Main Entrance    Number of Stairs, Main Entrance two  -     Stair Railings, Main Entrance railings safe and in good condition;railings on both sides of stairs  -       Row Name 08/16/24 1457          Stairs Within Home, Primary    Number of Stairs, Within Home, Primary none  -       Row Name 08/16/24 1457          Cognition    Orientation  Status (Cognition) oriented x 4  -CARLY       Row Name 08/16/24 1457          Safety Issues, Functional Mobility    Impairments Affecting Function (Mobility) endurance/activity tolerance;visual/perceptual  -               User Key  (r) = Recorded By, (t) = Taken By, (c) = Cosigned By      Initials Name Provider Type    Lexi Chavis, PT Physical Therapist                   Mobility       Row Name 08/16/24 1459          Bed Mobility    Comment, (Bed Mobility) pt was up in the bathroom, alone at entry. Discussed benefits of using call light and getting assist before getting up. Pt was agreeable.  -       Row Name 08/16/24 1459          Sit-Stand Transfer    Sit-Stand [place] (Transfers) standby assist  -     Assistive Device (Sit-Stand Transfers) walker, front-wheeled  -       Row Name 08/16/24 1459          Gait/Stairs (Locomotion)    [place] Level (Gait) standby assist  -     Assistive Device (Gait) walker, front-wheeled  -     Distance in Feet (Gait) 60  -CARLY     Deviations/Abnormal Patterns (Gait) gait speed decreased  -     Bilateral Gait Deviations forward flexed posture  -     Comment, (Gait/Stairs) Pt demos no LOB throughout and is able to manage O2 tubing in the room. On 4L, SpO2 94%; pt mildly SOA on return  -               User Key  (r) = Recorded By, (t) = Taken By, (c) = Cosigned By      Initials Name Provider Type    Lexi Chavis, PT Physical Therapist                   Obj/Interventions       Row Name 08/16/24 1522          Range of Motion Comprehensive    General Range of Motion bilateral lower extremity ROM WFL  -       Row Name 08/16/24 1522          Strength Comprehensive (MMT)    Comment, General Manual Muscle Testing (MMT) Assessment BLE grossly 4-/5  -CARLY       Row Name 08/16/24 1522          Balance    Balance Assessment sitting static balance;sitting dynamic balance;standing static balance;standing dynamic balance  -     Static Sitting Balance independent   -CARLY     Dynamic Sitting Balance independent  -CARLY     Position, Sitting Balance sitting in chair;supported  -CARLY     Static Standing Balance modified independence  -     Dynamic Standing Balance supervision  -     Position/Device Used, Standing Balance walker, rolling  -[facility] Name 08/16/24 1522          Sensory Assessment (Somatosensory)    Sensory Assessment (Somatosensory) sensation intact  -               User Key  (r) = Recorded By, (t) = Taken By, (c) = Cosigned By      Initials Name Provider Type    CARLY Lexi Moss, PT Physical Therapist                   Goals/Plan       Row Name 08/16/24 1524          Bed Mobility Goal 1 (PT)    Activity/Assistive Device (Bed Mobility Goal 1, PT) bed mobility activities, all  -CARLY     [place] Level/Cues Needed (Bed Mobility Goal 1, PT) independent  -CARLY     Time Frame (Bed Mobility Goal 1, PT) long term goal (LTG);2 weeks  -CARLY       Row Name 08/16/24 1524          Transfer Goal 1 (PT)    Activity/Assistive Device (Transfer Goal 1, PT) sit-to-stand/stand-to-sit;bed-to-chair/chair-to-bed  -CARLY     [place] Level/Cues Needed (Transfer Goal 1, PT) modified independence  -CARLY     Time Frame (Transfer Goal 1, PT) long term goal (LTG);2 weeks  -[facility] Name 08/16/24 1524          Gait Training Goal 1 (PT)    Activity/Assistive Device (Gait Training Goal 1, PT) gait (walking locomotion)  -     [place] Level (Gait Training Goal 1, PT) modified independence  -CARLY     Distance (Gait Training Goal 1, PT) 100ft  -     Time Frame (Gait Training Goal 1, PT) long term goal (LTG);2 weeks  -CARLY       Row Name 08/16/24 1524          Stairs Goal 1 (PT)    Activity/Assistive Device (Stairs Goal 1, PT) stairs, all skills  -     [place] Level/Cues Needed (Stairs Goal 1, PT) standby assist  -     Number of Stairs (Stairs Goal 1, PT) 2 steps  -     Time Frame (Stairs Goal 1, PT) long term goal (LTG);2 weeks  -[facility] Name 08/16/24 1524          Therapy  Assessment/Plan (PT)    Planned Therapy Interventions (PT) balance training;bed mobility training;gait training;home exercise program;patient/family education;stair training;strengthening;neuromuscular re-education;transfer training  -               User Key  (r) = Recorded By, (t) = Taken By, (c) = Cosigned By      Initials Name Provider Type    Lexi Chavis, PT Physical Therapist                   Clinical Impression       Row Name 08/16/24 1522          Pain    Pretreatment Pain Rating 0/10 - no pain  -     Posttreatment Pain Rating 0/10 - no pain  -       Row Name 08/16/24 1522          Plan of Care Review    Plan of Care Reviewed With patient  -     Outcome Evaluation Pt is an 85 y/o female who presents to [facility] with worsening dyspnea on exertion. Pt here from primary care office visit when noted to have Afib with RVR. PMH significant for panlobular COPD w/ emphysema, L breast CA, DVT, PE, IVC filter present, TIA, and aneurysm. At [facility] pt lives alone in a SSH with with two steps. She uses a RW and furniture walks at times as pt expresses the need to get away from her RW. At this time pt completes transfers and ambulation x60ft with SBA using a RW on 4L O2. She presents with generalized weakness and mild SOA with ambulation though SpO2 94% following. Pt declines any dizziness during session. She appears at/very near her baseline, functionally and would benefit from HHPT to address activity tolerance and improve dynamic balance as she would like to progress off of her RW.  -       Row Name 08/16/24 1529          Therapy Assessment/Plan (PT)    Rehab Potential (PT) good, to achieve stated therapy goals  -     Criteria for Skilled Interventions Met (PT) yes;meets criteria  -     Therapy Frequency (PT) 5 times/wk  -CARLY     Predicted Duration of Therapy Intervention (PT) Until d/c  -       Row Name 08/16/24 1522          Vital Signs    Pre Systolic BP Rehab 96  on return from the bathroom  -      Pre Treatment Diastolic BP 71  -CARLY     Intra Systolic BP Rehab 110  -CARLY     Intra Treatment Diastolic BP 70  -CARLY     Post Systolic BP Rehab 115  -CARLY     Post Treatment Diastolic BP 76  -CARLY     Intratreatment Heart Rate (beats/min) 94  -CARLY     O2 Delivery Pre Treatment supplemental O2  -CARLY     Intra SpO2 (%) 92  -CARLY     O2 Delivery Intra Treatment supplemental O2  -CARLY     Post SpO2 (%) 94  -CARLY     O2 Delivery Post Treatment supplemental O2  -CARLY       Row Name 08/16/24 1522          Positioning and Restraints    Pre-Treatment Position bathroom  -CARLY     Post Treatment Position chair  -CARLY     In Chair reclined;call light within reach;encouraged to call for assist  pt refuses chair alarm at this time though agreeable to utilize the call light  -CARLY               User Key  (r) = Recorded By, (t) = Taken By, (c) = Cosigned By      Initials Name Provider Type    Lexi Chavis, PT Physical Therapist                   Outcome Measures       Row Name 08/16/24 1525 08/16/24 0745       How much help from another person do you currently need...    Turning from your back to your side while in flat bed without using bedrails? 4  -CARLY 4  -SH    Moving from lying on back to sitting on the side of a flat bed without bedrails? 4  -CARLY 4  -SH    Moving to and from a bed to a chair (including a wheelchair)? 4  -CARLY 4  -SH    Standing up from a chair using your arms (e.g., wheelchair, bedside chair)? 4  -CARLY 4  -SH    Climbing 3-5 steps with a railing? 3  -CARLY 2  -SH    To walk in hospital room? 3  -CARLY 3  -SH    AM-PAC 6 Clicks Score (PT) 22  -CARLY 21  -SH    Highest Level of Mobility Goal 7 --> Walk 25 feet or more  -CARLY 6 --> Walk 10 steps or more  -SH      Row Name 08/16/24 0400          How much help from another person do you currently need...    Turning from your back to your side while in flat bed without using bedrails? 4  -CN     Moving from lying on back to sitting on the side of a flat bed without bedrails? 4  -CN     Moving to and  from a bed to a chair (including a wheelchair)? 4  -CN     Standing up from a chair using your arms (e.g., wheelchair, bedside chair)? 4  -CN     Climbing 3-5 steps with a railing? 2  -CN     To walk in hospital room? 3  -CN     AM-PAC 6 Clicks Score (PT) 21  -CN     Highest Level of Mobility Goal 6 --> Walk 10 steps or more  -CN       Row Name 08/16/24 1525          Functional Assessment    Outcome Measure Options AM-PAC 6 Clicks Basic Mobility (PT)  -CARLY               User Key  (r) = Recorded By, (t) = Taken By, (c) = Cosigned By      Initials Name Provider Type    Nadir Pickering, RN Registered Nurse    Alejandra Julian RN Registered Nurse    Lexi Chavis, PT Physical Therapist                                 Physical Therapy Education       Title: PT OT SLP Therapies (In Progress)       Topic: Physical Therapy (In Progress)       Point: Mobility training (Done)       Learning Progress Summary             Patient Acceptance, E,TB, VU by CARLY at 8/16/2024 1525                         Point: Home exercise program (Not Started)       Learner Progress:  Not documented in this visit.              Point: Body mechanics (Done)       Learning Progress Summary             Patient Acceptance, E,TB, VU by CARLY at 8/16/2024 1525                         Point: Precautions (Done)       Learning Progress Summary             Patient Acceptance, E,TB, VU by CARLY at 8/16/2024 1525                                         User Key       Initials Effective Dates Name Provider Type Discipline    CARLY 08/23/21 -  Lexi Moss, PT Physical Therapist PT                  PT Recommendation and Plan  Planned Therapy Interventions (PT): balance training, bed mobility training, gait training, home exercise program, patient/family education, stair training, strengthening, neuromuscular re-education, transfer training  Plan of Care Reviewed With: patient  Outcome Evaluation: Pt is an 85 y/o female who presents to [facility] with worsening  dyspnea on exertion. Pt here from primary care office visit when noted to have Afib with RVR. PMH significant for panlobular COPD w/ emphysema, L breast CA, DVT, PE, IVC filter present, TIA, and aneurysm. At [facility] pt lives alone in a SSH with with two steps. She uses a RW and furniture walks at times as pt expresses the need to get away from her RW. At this time pt completes transfers and ambulation x60ft with SBA using a RW on 4L O2. She presents with generalized weakness and mild SOA with ambulation though SpO2 94% following. Pt declines any dizziness during session. She appears at/very near her baseline, functionally and would benefit from HHPT to address activity tolerance and improve dynamic balance as she would like to progress off of her RW.     Time Calculation:         PT Charges       Row Name 08/16/24 1525             Time Calculation    Start Time 1305  -CARLY      Stop Time 1338  -CARLY      Time Calculation (min) 33 min  -CARLY      PT Received On 08/16/24  -CARLY      PT - Next Appointment 08/18/24  -CARLY      PT Goal Re-Cert Due Date 08/30/24  -CARLY                User Key  (r) = Recorded By, (t) = Taken By, (c) = Cosigned By      Initials Name Provider Type    Lexi Chavis, ROGELIO Physical Therapist                  Therapy Charges for Today       Code Description Service Date Service Provider Modifiers Qty    89816987162 HC PT EVAL MOD COMPLEXITY 4 8/16/2024 Lexi Moss, PT GP 1            PT G-Codes  Outcome Measure Options: AM-PAC 6 Clicks Basic Mobility (PT)  AM-PAC 6 Clicks Score (PT): 22  PT Discharge Summary  Anticipated Discharge Disposition (PT): home with home health, home with assist    Lexi Moss PT  8/16/2024

## 2024-08-16 NOTE — PLAN OF CARE
Goal Outcome Evaluation:     Pt is an 85 y/o female who presents to [facility] with worsening dyspnea on exertion. Pt here from primary care office visit when noted to have Afib with RVR. PMH significant for panlobular COPD w/ emphysema, L breast CA, DVT, PE, IVC filter present, TIA, and aneurysm. At [facility] pt lives alone in a SSH with with two steps. She uses a RW and furniture walks at times as pt expresses the need to get away from her RW. At this time pt completes transfers and ambulation x60ft with SBA using a RW on 4L O2. She presents with generalized weakness and mild SOA with ambulation though SpO2 94% following. She appears at/very near her baseline, functionally and would benefit from HHPT to address activity tolerance and improve dynamic balance as she would like to progress off of her RW.

## 2024-08-17 NOTE — PLAN OF CARE
Goal Outcome Evaluation:            Completed US renal. Walks to bathroom with standby assist

## 2024-08-17 NOTE — PROGRESS NOTES
LOS: 1 day   Patient Care Team:  Paul Granados MD as PCP - General (Family Medicine)  Richardson Willis MD as Cardiologist (Cardiology)  Rafy Rodriguez MD as Consulting Physician (Hematology and Oncology)  Christianne Phillips, AMARILIS as Licensed Practical Nurse  Salome Fleming MD as Consulting Physician (Nephrology)    Subjective     Interval History:     Patient Complaints: Breathing has improved but does report fatigue    History taken from: patient    Review of Systems   Constitutional:  Positive for activity change, appetite change and fatigue.   HENT: Negative.     Respiratory:  Positive for shortness of breath.    Cardiovascular:  Positive for palpitations. Negative for chest pain and leg swelling.   Gastrointestinal: Negative.    Musculoskeletal:  Positive for arthralgias.   Neurological:  Positive for weakness.           Objective     Vital Signs  Temp:  [97.4 °F (36.3 °C)-98 °F (36.7 °C)] 98 °F (36.7 °C)  Heart Rate:  [79-95] 87  Resp:  [11-22] 16  BP: (104-132)/(60-81) 112/61    Physical Exam:     General Appearance:    Alert, cooperative, in no acute distress   Head:    Normocephalic, without obvious abnormality, atraumatic   Eyes:            Lids and lashes normal, conjunctivae and sclerae normal, no   icterus, no pallor, corneas clear, PERRLA   Ears:    Ears appear intact with no abnormalities noted   Throat:   No oral lesions, no thrush, oral mucosa moist   Neck:   No adenopathy, supple, trachea midline, no thyromegaly, no   carotid bruit, no JVD   Lungs:     Clear to auscultation,respirations regular, even and                  unlabored    Heart:    Irreg irreg   Chest Wall:    No abnormalities observed   Abdomen:     Normal bowel sounds, no masses, no organomegaly, soft        non-tender, non-distended, no guarding, no rebound                tenderness   Extremities:   Moves all extremities well, no edema, no cyanosis, no             redness   Pulses:   Pulses palpable and equal bilaterally    Skin:   No bleeding, bruising or rash   Lymph nodes:   No palpable adenopathy   Neurologic:   Cranial nerves 2 - 12 grossly intact, sensation intact, DTR       present and equal bilaterally        Results Review:    Lab Results (last 24 hours)       Procedure Component Value Units Date/Time    Urinalysis, Microscopic Only - Urine, Clean Catch [[id number]]  (Abnormal) Collected: 08/17/24 0820    Specimen: Urine, Clean Catch Updated: 08/17/24 0923     RBC, UA 3-5 /HPF      WBC, UA 6-10 /HPF      Bacteria, UA Trace /HPF      Squamous Epithelial Cells, UA 21-30 /HPF      Hyaline Casts, UA 0-2 /LPF      Methodology Manual Light Microscopy    Urine Culture - Urine, Urine, Clean Catch [[id number]] Collected: 08/17/24 0820    Specimen: Urine, Clean Catch Updated: 08/17/24 0923    Urinalysis With Culture If Indicated - Urine, Clean Catch [[id number]]  (Abnormal) Collected: 08/17/24 0820    Specimen: Urine, Clean Catch Updated: 08/17/24 0913     Color, UA Dark Yellow     Appearance, UA Hazy     pH, UA 5.5     Specific Gravity, UA 1.017     Glucose, UA Negative     Ketones, UA Negative     Bilirubin, UA Negative     Blood, UA Negative     Protein, UA 30 mg/dL (1+)     Leuk Esterase, UA Small (1+)     Nitrite, UA Negative     Urobilinogen, UA 1.0 E.U./dL    Narrative:      In absence of clinical symptoms, the presence of pyuria, bacteria, and/or nitrites on the urinalysis result does not correlate with infection.    Sodium, Urine, Random - Urine, Clean Catch [[id number]] Collected: 08/17/24 0819    Specimen: Urine, Clean Catch Updated: 08/17/24 0911     Sodium, Urine 20 mmol/L     Narrative:      Reference intervals for random urine have not been established.  Clinical usage is dependent upon physician's interpretation in combination with other laboratory tests.       Eosinophil Smear - Urine, Urine, Clean Catch [[id number]] Collected: 08/17/24 0819    Specimen: Urine, Clean Catch Updated: 08/17/24 0900    Calcium, Ionized  [[id number]]  (Normal) Collected: 08/17/24 0555    Specimen: Blood Updated: 08/17/24 0737     Ionized Calcium 1.16 mmol/L     Comprehensive Metabolic Panel [[id number]]  (Abnormal) Collected: 08/17/24 0555    Specimen: Blood Updated: 08/17/24 0652     Glucose 91 mg/dL      BUN 49 mg/dL      Creatinine 2.19 mg/dL      Sodium 141 mmol/L      Potassium 3.9 mmol/L      Chloride 103 mmol/L      CO2 25.1 mmol/L      Calcium 9.7 mg/dL      Total Protein 6.2 g/dL      Albumin 3.6 g/dL      ALT (SGPT) 217 U/L      AST (SGOT) 214 U/L      Alkaline Phosphatase 85 U/L      Total Bilirubin 2.6 mg/dL      Globulin 2.6 gm/dL      A/G Ratio 1.4 g/dL      BUN/Creatinine Ratio 22.4     Anion Gap 12.9 mmol/L      eGFR 21.9 mL/min/1.73     Narrative:      GFR Normal >60  Chronic Kidney Disease <60  Kidney Failure <15    The GFR formula is only valid for adults with stable renal function between ages 18 and 70.    Magnesium [[id number]]  (Normal) Collected: 08/17/24 0555    Specimen: Blood Updated: 08/17/24 0652     Magnesium 2.2 mg/dL     Phosphorus [[id number]]  (Normal) Collected: 08/17/24 0555    Specimen: Blood Updated: 08/17/24 0652     Phosphorus 3.7 mg/dL     CBC (No Diff) [[id number]]  (Abnormal) Collected: 08/17/24 0555    Specimen: Blood Updated: 08/17/24 0612     WBC 10.42 10*3/mm3      RBC 3.71 10*6/mm3      Hemoglobin 9.7 g/dL      Hematocrit 31.6 %      MCV 85.2 fL      MCH 26.1 pg      MCHC 30.7 g/dL      RDW 20.3 %      RDW-SD 61.1 fl      MPV 10.3 fL      Platelets 220 10*3/mm3     Vitamin B12 [[id number]] Collected: 08/17/24 0555    Specimen: Blood Updated: 08/17/24 0608    Folate [[id number]] Collected: 08/17/24 0555    Specimen: Blood Updated: 08/17/24 0608    TSH [[id number]]  (Normal) Collected: 08/15/24 2231    Specimen: Blood Updated: 08/16/24 1956     TSH 3.960 uIU/mL     CK [[id number]]  (Normal) Collected: 08/15/24 2231    Specimen: Blood Updated: 08/16/24 1956     Creatine Kinase 129 U/L     Uric Acid [[id number]]   (Abnormal) Collected: 08/15/24 2231    Specimen: Blood Updated: 08/16/24 1956     Uric Acid 8.2 mg/dL     Iron Profile [[id number]]  (Abnormal) Collected: 08/15/24 2231    Specimen: Blood Updated: 08/16/24 1956     Iron 23 mcg/dL      Iron Saturation (TSAT) 5 %      Transferrin 326 mg/dL      TIBC 486 mcg/dL     Ferritin [[id number]]  (Normal) Collected: 08/15/24 2231    Specimen: Blood Updated: 08/16/24 1956     Ferritin 31.40 ng/mL     Narrative:      Results may be falsely decreased if patient taking Biotin.      CANDIDA AURIS PCR - Swab, Axilla Right, Axilla Left and Groin [[id number]]  (Normal) Collected: 08/15/24 1628    Specimen: Swab from Axilla Right, Axilla Left and Groin Updated: 08/16/24 1356     KATHY AURIS PCR Not Detected             Imaging Results (Last 24 Hours)       ** No results found for the last 24 hours. **                 I reviewed the patient's new clinical results.    Medication Review:   Scheduled Meds:allopurinol, 200 mg, Oral, Daily  apixaban, 2.5 mg, Oral, Q12H  ferric gluconate, 125 mg, Intravenous, Daily  folic acid-pyridoxine-cyanocobalamin, 1 tablet, Oral, Daily  ipratropium-albuterol, 3 mL, Nebulization, 4x Daily - RT  levothyroxine, 75 mcg, Oral, Q AM  metoprolol succinate XL, 25 mg, Oral, Q12H  midodrine, 5 mg, Oral, TID AC  pantoprazole, 40 mg, Oral, BID AC      Continuous Infusions:sodium chloride, 60 mL/hr, Last Rate: 60 mL/hr (08/16/24 2052)      PRN Meds:.  acetaminophen     Assessment & Plan       Atrial fibrillation with RVR    Chronic obstructive pulmonary disease    Primary hypertension    Chronic hypoxemic respiratory failure    3A Paroxysmal atrial fibrillation    CKD (chronic kidney disease), stage IV    Cor pulmonale (chronic)    Mild pulmonary hypertension    Anemia due to stage 4 chronic kidney disease    Chronic hypotension    AFIB + RVR  SOB  Autonomic dysfunction syndrome  Chronic diastolic congestive heart failure  Panlobular COPD with emphysema  Severe  pulmonary hypertension  Chronic atelectasis left lower lobe  chronic kidney disease stage IIIa  Left breast anomaly  Hypertension associated chronic kidney disease stage IIIa  Anemia of chronic kidney disease  Hyperuricemia  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  Cerebrovascular disease status post CVA  Chronic oral anticoagulation therapy  Acquired hypothyroidism  Thrombophilia  Autonomic dysfunction syndrome  History of pulmonary embolism  Hypercoagulable state secondary to atrial fibrillation  [id number] score 6  History of IVC filter  Aneurysmal dilatation of abdominal aorta  Chronic mucopurulent bronchitis  Peripheral polyneuropathy  History of breast cancer  Supplemental oxygen dependency  Chronic hypoxic respiratory failure     Cardiology following. On eliquis.  Coreg stopped and she was started on toprol.  Noted increase in creatinine.  Diuretic has been held, gentle hydration..  Nephrology following.  Saved iron today    Plan for disposition:DIANNE Lane, JOAQUÍN  08/17/24  09:33 EDT

## 2024-08-17 NOTE — PROGRESS NOTES
"NEPHROLOGY PROGRESS NOTE------[facility]    [facility]  [phone]  Luke Fleming MD      Patient Care Team:  Paul Granados MD as PCP - General (Family Medicine)  Richardson Willis MD as Cardiologist (Cardiology)  Rafy Rodriguez MD as Consulting Physician (Hematology and Oncology)  Christianne Phillips RN as Licensed Practical Nurse  Salome Fleming MD as Consulting Physician (Nephrology)      Provider:  Luke Fleming MD  Patient Name: Gabrielle Lyles  :  1940    SUBJECTIVE:    F/U ARF/JULIAN/CRF/CKD    Still weak and hypotensive. No angina. No dysuria.     Medication:  allopurinol, 100 mg, Oral, Daily  apixaban, 2.5 mg, Oral, Q12H  folic acid-pyridoxine-cyanocobalamin, 1 tablet, Oral, Daily  ipratropium-albuterol, 3 mL, Nebulization, 4x Daily - RT  levothyroxine, 75 mcg, Oral, Q AM  metoprolol succinate XL, 25 mg, Oral, Q12H  midodrine, 5 mg, Oral, TID AC  pantoprazole, 40 mg, Oral, BID AC      sodium chloride, 60 mL/hr, Last Rate: 60 mL/hr (24)        OBJECTIVE    Vital Sign Min/Max for last 24 hours  Temp  Min: 97.4 °F (36.3 °C)  Max: 97.6 °F (36.4 °C)   BP  Min: 104/60  Max: 132/81   Pulse  Min: 79  Max: 102   Resp  Min: 12  Max: 22   SpO2  Min: 96 %  Max: 100 %   No data recorded   Weight  Min: 71.2 kg (156 lb 15.5 oz)  Max: 71.2 kg (156 lb 15.5 oz)     Flowsheet Rows      Flowsheet Row First Filed Value   Admission Height 162.6 cm (64\") Documented at 08/15/2024 1638   Admission Weight 64.4 kg (142 lb) Documented at 08/15/2024 1638            No intake/output data recorded.  I/O last 3 completed shifts:  In: 1300 [P.O.:1300]  Out: 450 [Urine:450]      Physical Exam:  General Appearance: alert, appears stated age and cooperative  Head: normocephalic, without obvious abnormality and atraumatic. +DRY OP  Eyes: conjunctivae and sclerae normal and no icterus  Neck: supple and no JVD  Lungs: clear to auscultation " "and respirations regular  Heart: IRREG IRREG +TAYE. NO GALOP, RUB, OR S3  Chest Wall: no abnormalities observed  Abdomen: normal bowel sounds and soft, nontender  Extremities: moves extremities well, no edema, no cyanosis  Skin: +DECREASED SKIN TURGOR AND FEW SCATTERED ECCHYMOSIS  Neurologic: Alert, and oriented. No focal deficits    Labs:    WBC WBC   Date Value Ref Range Status   08/17/2024 10.42 3.40 - 10.80 10*3/mm3 Final   08/16/2024 11.49 (H) 3.40 - 10.80 10*3/mm3 Final   08/15/2024 11.22 (H) 3.40 - 10.80 10*3/mm3 Final      HGB Hemoglobin   Date Value Ref Range Status   08/17/2024 9.7 (L) 12.0 - 15.9 g/dL Final   08/16/2024 9.6 (L) 12.0 - 15.9 g/dL Final   08/15/2024 10.1 (L) 12.0 - 15.9 g/dL Final      HCT Hematocrit   Date Value Ref Range Status   08/17/2024 31.6 (L) 34.0 - 46.6 % Final   08/16/2024 32.0 (L) 34.0 - 46.6 % Final   08/15/2024 33.9 (L) 34.0 - 46.6 % Final      Platelets No results found for: \"LABPLAT\"   MCV MCV   Date Value Ref Range Status   08/17/2024 85.2 79.0 - 97.0 fL Final   08/16/2024 84.7 79.0 - 97.0 fL Final   08/15/2024 86.5 79.0 - 97.0 fL Final          Sodium Sodium   Date Value Ref Range Status   08/17/2024 141 136 - 145 mmol/L Final   08/15/2024 141 136 - 145 mmol/L Final      Potassium Potassium   Date Value Ref Range Status   08/17/2024 3.9 3.5 - 5.2 mmol/L Final   08/15/2024 4.2 3.5 - 5.2 mmol/L Final      Chloride Chloride   Date Value Ref Range Status   08/17/2024 103 98 - 107 mmol/L Final   08/15/2024 101 98 - 107 mmol/L Final      CO2 CO2   Date Value Ref Range Status   08/17/2024 25.1 22.0 - 29.0 mmol/L Final   08/15/2024 26.4 22.0 - 29.0 mmol/L Final      BUN BUN   Date Value Ref Range Status   08/17/2024 49 (H) 8 - 23 mg/dL Final   08/15/2024 50 (H) 8 - 23 mg/dL Final      Creatinine Creatinine   Date Value Ref Range Status   08/17/2024 2.19 (H) 0.57 - 1.00 mg/dL Final   08/15/2024 2.14 (H) 0.57 - 1.00 mg/dL Final      Calcium Calcium   Date Value Ref Range Status " "  08/17/2024 9.7 8.6 - 10.5 mg/dL Final   08/15/2024 9.7 8.6 - 10.5 mg/dL Final      PO4 No components found for: \"PO4\"   Albumin Albumin   Date Value Ref Range Status   08/17/2024 3.6 3.5 - 5.2 g/dL Final   08/15/2024 3.8 3.5 - 5.2 g/dL Final      Magnesium Magnesium   Date Value Ref Range Status   08/17/2024 2.2 1.6 - 2.4 mg/dL Final      Uric Acid No components found for: \"URIC ACID\"     Imaging Results (Last 72 Hours)       Procedure Component Value Units Date/Time    XR Chest 1 View [[id number]] Collected: 08/15/24 2130     Updated: 08/15/24 2133    Narrative:      XR CHEST 1 VW    Date of Exam: 8/15/2024 7:16 PM EDT    Indication: SOB/AFIB+RVR/COPD    Comparison: 5/29/2024    Findings:  Stable cardiomegaly. Mild left basilar airspace disease similar to prior study which could relate to atelectasis or scarring. No significant effusion. Negative for pneumothorax. The right lung is clear. Dense aortic arch atherosclerosis. Osseous   structures appear intact.      Impression:      Impression:  1. Stable cardiomegaly.  2. Mild left basilar airspace disease similar to prior study may relate to chronic atelectasis or scarring.        Electronically Signed: Randall Zarco MD    8/15/2024 9:31 PM EDT    Workstation ID: [id number]            Results for orders placed during the hospital encounter of 08/15/24    XR Chest 1 View    Narrative  XR CHEST 1 VW    Date of Exam: 8/15/2024 7:16 PM EDT    Indication: SOB/AFIB+RVR/COPD    Comparison: 5/29/2024    Findings:  Stable cardiomegaly. Mild left basilar airspace disease similar to prior study which could relate to atelectasis or scarring. No significant effusion. Negative for pneumothorax. The right lung is clear. Dense aortic arch atherosclerosis. Osseous  structures appear intact.    Impression  Impression:  1. Stable cardiomegaly.  2. Mild left basilar airspace disease similar to prior study may relate to chronic atelectasis or scarring.        Electronically Signed: Randall" MD Carolee  8/15/2024 9:31 PM EDT  Workstation ID: [id number]      Results for orders placed during the hospital encounter of 05/29/24    XR Chest 1 View    Narrative  XR CHEST 1 VW    Date of Exam: 5/29/2024 4:25 PM EDT    Indication: Dizziness and near syncope with shortness of breath    Comparison: AP portable chest 4/20/2024    Findings:  Stable mild to moderate cardiac enlargement. Trace left basilar pleural effusion with mild left basilar atelectasis is present. No overt features of pulmonary edema. Right lung appears clear. Mild calcific atherosclerosis within the thoracic aorta. No  acute osseous abnormality    Impression  Impression:    1. Stable cardiomegaly. No overt pulmonary edema.  2. Trace left basilar pleural effusion with mild left basilar atelectasis.      Electronically Signed: Tiffanie Cheung MD  5/29/2024 4:37 PM EDT  Workstation ID: [id number]      Results for orders placed during the hospital encounter of 04/25/24    XR Chest 1 View    Narrative  XR CHEST 1 VW    Date of Exam: 4/28/2024 9:45 AM EDT    Indication: CHF    Comparison: 4/25/2020    Findings:  Heart size and pulmonary vasculature are stable. No gross pulmonary edema is demonstrated. There is strandy opacity in the left lung base.    Impression  Impression:    1. Cardiomegaly with no evidence of pulmonary edema  2. Left basilar atelectasis      Electronically Signed: Darnell Kennedy  4/28/2024 11:33 AM EDT  Workstation ID: [id number]      Results for orders placed during the hospital encounter of 02/05/24    Duplex Venous Upper Extremity - Right CAR    Interpretation Summary    Normal right upper extremity venous duplex scan.        ASSESSMENT / PLAN      Atrial fibrillation with RVR    Chronic obstructive pulmonary disease    Primary hypertension    Chronic hypoxemic respiratory failure    3A Paroxysmal atrial fibrillation    CKD (chronic kidney disease), stage IV    Cor pulmonale (chronic)    Mild pulmonary hypertension    Anemia due to  stage 4 chronic kidney disease    Chronic hypotension    ARF/JULIAN/CRF/CKD-----Nonoliguric. +ARF/JULIAN on top of known CRF/CKD   STG 3B, with a usual euvolemic baseline serum Creatinine of about 1.6-1.7. CRF/CKD STG 3B is secondary to HTN NS. +ARF/JULIAN is secondary to prerenal state and effective intravascular volume depletion from diarrhea with concomitant diuretic use and also with contribution from ATN from hypotension.  Avoid hypotension. Check urine and serum studies and renal US and PVR. Hold Bumex. Hydrate very gently with Normal Saline. No NSAIDs or IV dye. Dose meds for CrCl less than 10 cc/min until ARF/JULIAN is resolved     2. H/O CHF-----Currently prerenal. Hold Bumex and give back little IVFs very gently.     3. HTN WITH CKD-----Avoid hypotension. No ACE/ARB/DRI for now. Diuretic on hold. On low dose Midodrine     4. OA/DJD/HYPERURICEMIA------No NSAIDs.  Increase Allopurinol     5. ANEMIA OF CKD-----Normocytic with elevated RDW. IV iron for ROME     6. HYPERGLYCEMIA------Glucometers, SSI     7.  PULMONARY HTN--------Per , Pulmonary. On Revatio at home     8. HYPOKALEMIA-------Replace po     9. CAD-----per , Cardiology     10. ELEVATED LFTS/TRANSAMINASES     11. HYPOTHYROIDISM------On Synthroid. TSH normal     12. GERD/PUD PROPHYLAXIS-----On PPI. Benefits outweigh risks despite CKD     13. COPD-----Oxygen, nebs, pulmonary toilet     14. H/O BREAST CA     15. DVT PROPHYLAXIS-----Anticoagulated on Eliquis     16. ATRIAL FIBRILLATION WITH RVR-------Rate controlled now and anticoagulated. Per , Cardiology        Luke Fleming MD  [facility]  [phone]  08/17/24  07:21 EDT

## 2024-08-18 NOTE — PROGRESS NOTES
LOS: 2 days   Patient Care Team:  Paul Granados MD as PCP - General (Family Medicine)  Richardson Willis MD as Cardiologist (Cardiology)  Rafy Rodriguez MD as Consulting Physician (Hematology and Oncology)  Christianne Phillips, AMARILIS as Licensed Practical Nurse  Salome Fleming MD as Consulting Physician (Nephrology)    Subjective     Interval History: Confusion noted overnight.    Patient Complaints: Complains of nausea.  Denies shortness of breath or chest pain    History taken from: patient    Review of Systems   Constitutional:  Positive for activity change, appetite change and fatigue.   HENT: Negative.     Respiratory:  Positive for shortness of breath.    Cardiovascular:  Positive for palpitations. Negative for chest pain and leg swelling.   Gastrointestinal: Negative.    Musculoskeletal:  Positive for arthralgias.   Neurological:  Positive for weakness.           Objective     Vital Signs  Temp:  [97.4 °F (36.3 °C)-98.5 °F (36.9 °C)] 97.8 °F (36.6 °C)  Heart Rate:  [82-93] 85  Resp:  [10-22] 19  BP: ()/() 94/59    Physical Exam:     General Appearance:    Alert, cooperative, in no acute distress   Head:    Normocephalic, without obvious abnormality, atraumatic   Eyes:            Lids and lashes normal, conjunctivae and sclerae normal, no   icterus, no pallor, corneas clear, PERRLA   Ears:    Ears appear intact with no abnormalities noted   Throat:   No oral lesions, no thrush, oral mucosa moist   Neck:   No adenopathy, supple, trachea midline, no thyromegaly, no   carotid bruit, no JVD   Lungs:     Clear to auscultation,respirations regular, even and                  unlabored    Heart:    Irreg irreg   Chest Wall:    No abnormalities observed   Abdomen:     Normal bowel sounds, no masses, no organomegaly, soft        non-tender, non-distended, no guarding, no rebound                tenderness   Extremities:   Moves all extremities well, no edema, no cyanosis, no             redness    Pulses:   Pulses palpable and equal bilaterally   Skin:   No bleeding, bruising or rash   Lymph nodes:   No palpable adenopathy   Neurologic:   Cranial nerves 2 - 12 grossly intact, sensation intact, DTR       present and equal bilaterally        Results Review:    Lab Results (last 24 hours)       Procedure Component Value Units Date/Time    Vitamin B12 [[id number]]  (Abnormal) Collected: 08/17/24 0555    Specimen: Blood Updated: 08/17/24 1301     Vitamin B-12 >2,000 pg/mL     Narrative:      Results may be falsely increased if patient taking Biotin.      Folate [[id number]]  (Normal) Collected: 08/17/24 0555    Specimen: Blood Updated: 08/17/24 1301     Folate >20.00 ng/mL     Narrative:      Results may be falsely increased if patient taking Biotin.      Eosinophil Smear - Urine, Urine, Clean Catch [[id number]]  (Normal) Collected: 08/17/24 0819    Specimen: Urine, Clean Catch Updated: 08/17/24 1007     Eosinophil Smear 0 % EOS/100 Cells     Urinalysis, Microscopic Only - Urine, Clean Catch [[id number]]  (Abnormal) Collected: 08/17/24 0820    Specimen: Urine, Clean Catch Updated: 08/17/24 0923     RBC, UA 3-5 /HPF      WBC, UA 6-10 /HPF      Bacteria, UA Trace /HPF      Squamous Epithelial Cells, UA 21-30 /HPF      Hyaline Casts, UA 0-2 /LPF      Methodology Manual Light Microscopy    Urine Culture - Urine, Urine, Clean Catch [[id number]] Collected: 08/17/24 0820    Specimen: Urine, Clean Catch Updated: 08/17/24 0923    Urinalysis With Culture If Indicated - Urine, Clean Catch [[id number]]  (Abnormal) Collected: 08/17/24 0820    Specimen: Urine, Clean Catch Updated: 08/17/24 0913     Color, UA Dark Yellow     Appearance, UA Hazy     pH, UA 5.5     Specific Gravity, UA 1.017     Glucose, UA Negative     Ketones, UA Negative     Bilirubin, UA Negative     Blood, UA Negative     Protein, UA 30 mg/dL (1+)     Leuk Esterase, UA Small (1+)     Nitrite, UA Negative     Urobilinogen, UA 1.0 E.U./dL    Narrative:      In  absence of clinical symptoms, the presence of pyuria, bacteria, and/or nitrites on the urinalysis result does not correlate with infection.    Sodium, Urine, Random - Urine, Clean Catch [[id number]] Collected: 08/17/24 0819    Specimen: Urine, Clean Catch Updated: 08/17/24 0911     Sodium, Urine 20 mmol/L     Narrative:      Reference intervals for random urine have not been established.  Clinical usage is dependent upon physician's interpretation in combination with other laboratory tests.                Imaging Results (Last 24 Hours)       Procedure Component Value Units Date/Time    US Renal Bilateral [[id number]] Collected: 08/17/24 1134     Updated: 08/17/24 1139    Narrative:      US RENAL BILATERAL    Date of Exam: 8/17/2024 10:00 AM EDT    Indication: ARF/JULIAN/CRF/CKD.    Comparison: Renal ultrasound 4/26/2024.    Technique: Grayscale and color Doppler ultrasound evaluation of the kidneys and urinary bladder was performed.      Findings:  Right Kidney: Right kidney measures 9.3 cm in long axis. Mildly increased renal cortical echogenicity. No suspicious appearing lesions.No hydronephrosis.No sonographically evident nephrolithiasis.    Left Kidney:  Left kidney measures 8.6 cm in long axis. Mildly increased renal cortical echogenicity. No suspicious appearing lesions. Similar interpolar simple cyst measuring up to 1.6 cm. No hydronephrosis.No sonographically evident nephrolithiasis.    Bladder: The bladder appears unremarkable.    Additional findings: Intra-abdominal ascites.      Impression:      Impression:  No hydronephrosis.    Increased renal cortical echogenicity bilaterally which can be seen in medical renal disease.    Ascites.        Electronically Signed: Braxton Martinez    8/17/2024 11:37 AM EDT    Workstation ID: [id number]                 I reviewed the patient's new clinical results.    Medication Review:   Scheduled Meds:allopurinol, 200 mg, Oral, Daily  apixaban, 2.5 mg, Oral, Q12H  ferric  gluconate, 125 mg, Intravenous, Daily  folic acid-pyridoxine-cyanocobalamin, 1 tablet, Oral, Daily  ipratropium-albuterol, 3 mL, Nebulization, 4x Daily - RT  levothyroxine, 75 mcg, Oral, Q AM  metoprolol succinate XL, 25 mg, Oral, Q12H  midodrine, 5 mg, Oral, TID AC  pantoprazole, 40 mg, Oral, BID AC      Continuous Infusions:sodium chloride, 60 mL/hr, Last Rate: 60 mL/hr (08/17/24 6648)      PRN Meds:.  acetaminophen    ondansetron    ondansetron ODT     Assessment & Plan       Atrial fibrillation with RVR    Chronic obstructive pulmonary disease    Primary hypertension    Chronic hypoxemic respiratory failure    3A Paroxysmal atrial fibrillation    CKD (chronic kidney disease), stage IV    Cor pulmonale (chronic)    Mild pulmonary hypertension    Anemia due to stage 4 chronic kidney disease    Chronic hypotension    AFIB + RVR  SOB  Autonomic dysfunction syndrome  Chronic diastolic congestive heart failure  Panlobular COPD with emphysema  Severe pulmonary hypertension  Chronic atelectasis left lower lobe  chronic kidney disease stage IIIa  Left breast anomaly  Hypertension associated chronic kidney disease stage IIIa  Anemia of chronic kidney disease  Hyperuricemia  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 On eliquis.  Cotntinue BB, Noted increase in creatinine.  Diuretic has been held, gentle hydration..  Nephrology following.  Receiving IV iron x3 days,     Plan for  disposition:DIANNE Lane, APRN  08/18/24  08:34 EDT

## 2024-08-18 NOTE — NURSING NOTE
Patient got confused when sleeping and was all wrapped up in wires and tubing, she had kinked and pulled out some of her midline, was unable to replace, it had to be removed.  Patient refused to be stuck by anyone but PICC team, she said she was a hard stick and PICC is the only one who can get her.  Called and left message for PICC advising of situation.

## 2024-08-18 NOTE — PLAN OF CARE
Goal Outcome Evaluation:  Plan of Care Reviewed With: patient        Progress: no change       Patient pulled out midline moving around in bed. She refused to have staff try and stick her she said PICC team put that in and she is a hard stick and only wants them to stick her. Message left for PICC team.

## 2024-08-18 NOTE — PLAN OF CARE
Goal Outcome Evaluation:     Pt nauseated intermittently. pt stated Zofran didn't work for her. Administered Compazine iv for nausea.

## 2024-08-18 NOTE — PROGRESS NOTES
"NEPHROLOGY PROGRESS NOTE------[facility]    [facility]  [phone]  Luke Fleming MD      Patient Care Team:  Paul Granados MD as PCP - General (Family Medicine)  Richardson Willis MD as Cardiologist (Cardiology)  Rafy Rodriguez MD as Consulting Physician (Hematology and Oncology)  Christianne Phillips RN as Licensed Practical Nurse  Salome Fleming MD as Consulting Physician (Nephrology)      Provider:  Luke Fleming MD  Patient Name: Gabrielle Lyles  :  1940    SUBJECTIVE:    F/U ARF/JULIAN/CRF/CKD    C/O nausea. No real SOB. No CP. No dysuria.     Medication:  allopurinol, 200 mg, Oral, Daily  apixaban, 2.5 mg, Oral, Q12H  ferric gluconate, 125 mg, Intravenous, Daily  folic acid-pyridoxine-cyanocobalamin, 1 tablet, Oral, Daily  ipratropium-albuterol, 3 mL, Nebulization, 4x Daily - RT  levothyroxine, 75 mcg, Oral, Q AM  metoprolol succinate XL, 25 mg, Oral, Q12H  midodrine, 5 mg, Oral, TID AC  pantoprazole, 40 mg, Oral, BID AC      sodium chloride, 60 mL/hr, Last Rate: 60 mL/hr (24 1548)        OBJECTIVE    Vital Sign Min/Max for last 24 hours  Temp  Min: 97.4 °F (36.3 °C)  Max: 98.5 °F (36.9 °C)   BP  Min: 94/59  Max: 141/86   Pulse  Min: 82  Max: 93   Resp  Min: 10  Max: 22   SpO2  Min: 89 %  Max: 100 %   No data recorded   No data recorded     Flowsheet Rows      Flowsheet Row First Filed Value   Admission Height 162.6 cm (64\") Documented at 08/15/2024 1638   Admission Weight 64.4 kg (142 lb) Documented at 08/15/2024 1638            No intake/output data recorded.  I/O last 3 completed shifts:  In: 480 [P.O.:480]  Out: 250 [Urine:250]      Physical Exam:  General Appearance: alert, appears stated age and cooperative  Head: normocephalic, without obvious abnormality and atraumatic.    Eyes: conjunctivae and sclerae normal and no icterus  Neck: supple and no JVD  Lungs: clear to auscultation and respirations " "regular  Heart: IRREG IRREG +TAYE. NO GALOP, RUB, OR S3  Chest Wall: no abnormalities observed  Abdomen: normal bowel sounds and soft, nontender  Extremities: moves extremities well, no edema, no cyanosis  Skin: +FEW SCATTERED ECCHYMOSIS  Neurologic: Alert, and oriented. No focal deficits    Labs:    WBC WBC   Date Value Ref Range Status   08/17/2024 10.42 3.40 - 10.80 10*3/mm3 Final   08/16/2024 11.49 (H) 3.40 - 10.80 10*3/mm3 Final   08/15/2024 11.22 (H) 3.40 - 10.80 10*3/mm3 Final      HGB Hemoglobin   Date Value Ref Range Status   08/17/2024 9.7 (L) 12.0 - 15.9 g/dL Final   08/16/2024 9.6 (L) 12.0 - 15.9 g/dL Final   08/15/2024 10.1 (L) 12.0 - 15.9 g/dL Final      HCT Hematocrit   Date Value Ref Range Status   08/17/2024 31.6 (L) 34.0 - 46.6 % Final   08/16/2024 32.0 (L) 34.0 - 46.6 % Final   08/15/2024 33.9 (L) 34.0 - 46.6 % Final      Platelets No results found for: \"LABPLAT\"   MCV MCV   Date Value Ref Range Status   08/17/2024 85.2 79.0 - 97.0 fL Final   08/16/2024 84.7 79.0 - 97.0 fL Final   08/15/2024 86.5 79.0 - 97.0 fL Final          Sodium Sodium   Date Value Ref Range Status   08/17/2024 141 136 - 145 mmol/L Final   08/15/2024 141 136 - 145 mmol/L Final      Potassium Potassium   Date Value Ref Range Status   08/17/2024 3.9 3.5 - 5.2 mmol/L Final   08/15/2024 4.2 3.5 - 5.2 mmol/L Final      Chloride Chloride   Date Value Ref Range Status   08/17/2024 103 98 - 107 mmol/L Final   08/15/2024 101 98 - 107 mmol/L Final      CO2 CO2   Date Value Ref Range Status   08/17/2024 25.1 22.0 - 29.0 mmol/L Final   08/15/2024 26.4 22.0 - 29.0 mmol/L Final      BUN BUN   Date Value Ref Range Status   08/17/2024 49 (H) 8 - 23 mg/dL Final   08/15/2024 50 (H) 8 - 23 mg/dL Final      Creatinine Creatinine   Date Value Ref Range Status   08/17/2024 2.19 (H) 0.57 - 1.00 mg/dL Final   08/15/2024 2.14 (H) 0.57 - 1.00 mg/dL Final      Calcium Calcium   Date Value Ref Range Status   08/17/2024 9.7 8.6 - 10.5 mg/dL Final " "  08/15/2024 9.7 8.6 - 10.5 mg/dL Final      PO4 No components found for: \"PO4\"   Albumin Albumin   Date Value Ref Range Status   08/17/2024 3.6 3.5 - 5.2 g/dL Final   08/15/2024 3.8 3.5 - 5.2 g/dL Final      Magnesium Magnesium   Date Value Ref Range Status   08/17/2024 2.2 1.6 - 2.4 mg/dL Final      Uric Acid No components found for: \"URIC ACID\"     Imaging Results (Last 72 Hours)       Procedure Component Value Units Date/Time    US Renal Bilateral [[id number]] Collected: 08/17/24 1134     Updated: 08/17/24 1139    Narrative:      US RENAL BILATERAL    Date of Exam: 8/17/2024 10:00 AM EDT    Indication: ARF/JULIAN/CRF/CKD.    Comparison: Renal ultrasound 4/26/2024.    Technique: Grayscale and color Doppler ultrasound evaluation of the kidneys and urinary bladder was performed.      Findings:  Right Kidney: Right kidney measures 9.3 cm in long axis. Mildly increased renal cortical echogenicity. No suspicious appearing lesions.No hydronephrosis.No sonographically evident nephrolithiasis.    Left Kidney:  Left kidney measures 8.6 cm in long axis. Mildly increased renal cortical echogenicity. No suspicious appearing lesions. Similar interpolar simple cyst measuring up to 1.6 cm. No hydronephrosis.No sonographically evident nephrolithiasis.    Bladder: The bladder appears unremarkable.    Additional findings: Intra-abdominal ascites.      Impression:      Impression:  No hydronephrosis.    Increased renal cortical echogenicity bilaterally which can be seen in medical renal disease.    Ascites.        Electronically Signed: Braxton Martinez    8/17/2024 11:37 AM EDT    Workstation ID: [id number]    XR Chest 1 View [[id number]] Collected: 08/15/24 2130     Updated: 08/15/24 2133    Narrative:      XR CHEST 1 VW    Date of Exam: 8/15/2024 7:16 PM EDT    Indication: SOB/AFIB+RVR/COPD    Comparison: 5/29/2024    Findings:  Stable cardiomegaly. Mild left basilar airspace disease similar to prior study which could relate to " atelectasis or scarring. No significant effusion. Negative for pneumothorax. The right lung is clear. Dense aortic arch atherosclerosis. Osseous   structures appear intact.      Impression:      Impression:  1. Stable cardiomegaly.  2. Mild left basilar airspace disease similar to prior study may relate to chronic atelectasis or scarring.        Electronically Signed: Randall Zarco MD    8/15/2024 9:31 PM EDT    Workstation ID: [id number]            Results for orders placed during the hospital encounter of 08/15/24    XR Chest 1 View    Narrative  XR CHEST 1 VW    Date of Exam: 8/15/2024 7:16 PM EDT    Indication: SOB/AFIB+RVR/COPD    Comparison: 5/29/2024    Findings:  Stable cardiomegaly. Mild left basilar airspace disease similar to prior study which could relate to atelectasis or scarring. No significant effusion. Negative for pneumothorax. The right lung is clear. Dense aortic arch atherosclerosis. Osseous  structures appear intact.    Impression  Impression:  1. Stable cardiomegaly.  2. Mild left basilar airspace disease similar to prior study may relate to chronic atelectasis or scarring.        Electronically Signed: Randall Zarco MD  8/15/2024 9:31 PM EDT  Workstation ID: [id number]      Results for orders placed during the hospital encounter of 05/29/24    XR Chest 1 View    Narrative  XR CHEST 1 VW    Date of Exam: 5/29/2024 4:25 PM EDT    Indication: Dizziness and near syncope with shortness of breath    Comparison: AP portable chest 4/20/2024    Findings:  Stable mild to moderate cardiac enlargement. Trace left basilar pleural effusion with mild left basilar atelectasis is present. No overt features of pulmonary edema. Right lung appears clear. Mild calcific atherosclerosis within the thoracic aorta. No  acute osseous abnormality    Impression  Impression:    1. Stable cardiomegaly. No overt pulmonary edema.  2. Trace left basilar pleural effusion with mild left basilar atelectasis.      Electronically  Signed: Tiffanie Cheung MD  5/29/2024 4:37 PM EDT  Workstation ID: [id number]      Results for orders placed during the hospital encounter of 04/25/24    XR Chest 1 View    Narrative  XR CHEST 1 VW    Date of Exam: 4/28/2024 9:45 AM EDT    Indication: CHF    Comparison: 4/25/2020    Findings:  Heart size and pulmonary vasculature are stable. No gross pulmonary edema is demonstrated. There is strandy opacity in the left lung base.    Impression  Impression:    1. Cardiomegaly with no evidence of pulmonary edema  2. Left basilar atelectasis      Electronically Signed: Darnell Brent  4/28/2024 11:33 AM EDT  Workstation ID: [id number]      Results for orders placed during the hospital encounter of 02/05/24    Duplex Venous Upper Extremity - Right CAR    Interpretation Summary    Normal right upper extremity venous duplex scan.        ASSESSMENT / PLAN      Atrial fibrillation with RVR    Chronic obstructive pulmonary disease    Primary hypertension    Chronic hypoxemic respiratory failure    3A Paroxysmal atrial fibrillation    CKD (chronic kidney disease), stage IV    Cor pulmonale (chronic)    Mild pulmonary hypertension    Anemia due to stage 4 chronic kidney disease    Chronic hypotension    ARF/JULIAN/CRF/CKD-----Nonoliguric. +ARF/JULIAN on top of known CRF/CKD   STG 3B, with a usual euvolemic baseline serum Creatinine of about 1.6-1.7. CRF/CKD STG 3B is secondary to HTN NS. +ARF/JULIAN is secondary to prerenal state and effective intravascular volume depletion from diarrhea with concomitant diuretic use and also with contribution from ATN from hypotension.  Avoid hypotension.   Hold Bumex. Finish IVFs post current bag and follow. No NSAIDs or IV dye. Dose meds for CrCl less than 10 cc/min until ARF/JULIAN is resolved     2. H/O CHF-----Currently prerenal. Holding  Bumex and given back little IVFs very gently.     3. HTN WITH CKD-----Avoid hypotension. No ACE/ARB/DRI for now. Diuretic on hold. On low dose Midodrine     4.  OA/DJD/HYPERURICEMIA------No NSAIDs.  Increase Allopurinol     5. ANEMIA OF CKD-----Normocytic with elevated RDW. IV iron for ROME. Add EPO     6. HYPERGLYCEMIA------Glucometers, SSI     7.  PULMONARY HTN--------Per , Pulmonary. On Revatio at home     8. HYPOKALEMIA-------Replace po     9. CAD-----per , Cardiology     10. ELEVATED LFTS/TRANSAMINASES     11. HYPOTHYROIDISM------On Synthroid. TSH normal     12. GERD/PUD PROPHYLAXIS-----On PPI. Benefits outweigh risks despite CKD     13. COPD-----Oxygen, nebs, pulmonary toilet     14. H/O BREAST CA     15. DVT PROPHYLAXIS-----Anticoagulated on Eliquis     16. ATRIAL FIBRILLATION WITH RVR-------Rate controlled now and anticoagulated. Per , Cardiology        Luke Fleming MD  [facility]  [phone]  08/18/24  08:43 EDT

## 2024-08-19 NOTE — PROGRESS NOTES
Referring Provider: Paul Granados MD    Reason for follow-up: Management of atrial fibrillation     Patient Care Team:  Paul Granados MD as PCP - General (Family Medicine)  Richardson Willis MD as Cardiologist (Cardiology)  Rafy Rodriguez MD as Consulting Physician (Hematology and Oncology)  Christianne Phillips, RN as Licensed Practical Nurse  Salome Fleming MD as Consulting Physician (Nephrology)      SUBJECTIVE  Resting comfortably in bed.  On 3 L of oxygen     ROS  Review of all systems negative except as indicated.    Since I have last seen, the patient has been without any chest discomfort, shortness of breath, palpitations, dizziness or syncope.  Denies having any headache, abdominal pain, nausea, vomiting, diarrhea, constipation, loss of weight or loss of appetite.  Denies having any excessive bruising, hematuria or blood in the stool.        Personal History:    Past Medical History:   Diagnosis Date    Acute exacerbation of chronic obstructive pulmonary disease (COPD)     COPD (chronic obstructive pulmonary disease)     Deep vein thrombosis of bilateral lower extremities 07/24/2019    3/2013    History of pneumonia 06/2012    community acquired pneumonia and right pleural effusion;hospitalized at [facility]    History of pulmonary embolism 03/2013    bilateral PE    Hypertension 2001    Insomnia     on Ambien 5mg at     Lobular breast cancer, left 11/2011    Stage IA left upper lobular breast cancer    Malignant neoplasm of left breast in female, estrogen receptor positive 07/18/2019    Osteopenia 2012    Parainfluenza infection     Parotid duct obstruction     Severe pulmonary hypertension 03/03/2023    Stage 3a chronic kidney disease 07/24/2019    TIA (transient ischemic attack) 10/25/2022       Past Surgical History:   Procedure Laterality Date    CARDIAC CATHETERIZATION N/A 3/3/2023    Procedure: Left and Right Heart Cath with Coronary Angiography;  Surgeon: Richardson Willis MD;   Location: [facility] INVASIVE LOCATION;  Service: Cardiovascular;  Laterality: N/A;    CATARACT EXTRACTION      IVC FILTER RETRIEVAL  2014    IVC filter placement by Dr. Card    MAMMO STEREOTACTIC BREAST BX SURGICAL ADD UNI Left 2011    invasive lobular carcinoma-Dr. Cande Daniel    MASTECTOMY, PARTIAL Left 2011    and left axillary sentinel lymph node biopsy by Dr. Uriostegui    TUBAL ABDOMINAL LIGATION         Family History   Problem Relation Age of Onset    Lung cancer Brother        Social History     Tobacco Use    Smoking status: Former     Current packs/day: 0.00     Types: Cigarettes     Quit date:      Years since quittin.6     Passive exposure: Past    Smokeless tobacco: Never    Tobacco comments:     smoked 6 cigarettes a day from 13 years of age to 56 years of age when she quit in    Vaping Use    Vaping status: Never Used   Substance Use Topics    Alcohol use: Not Currently    Drug use: No        Home meds:  Prior to Admission medications    Medication Sig Start Date End Date Taking? Authorizing Provider   allopurinol (ZYLOPRIM) 100 MG tablet Take 1 tablet by mouth Daily.   Yes Provider, MD Jaylyn   apixaban (ELIQUIS) 5 MG tablet tablet Take 1 tablet by mouth Every 12 (Twelve) Hours. Indications: Other - full anticoagulation, history of DVT/PE 10/27/22  Yes Shaylee Mcdaniels MD   budesonide-formoterol (SYMBICORT) 80-4.5 MCG/ACT inhaler Inhale 2 puffs 2 (Two) Times a Day.   Yes Provider, MD Jaylyn   bumetanide (BUMEX) 0.5 MG tablet Take 1 tablet by mouth Daily. 24  Yes Paul Granados MD   carvedilol (COREG) 3.125 MG tablet Take 1 tablet by mouth 2 (Two) Times a Day With Meals.  Patient taking differently: Take 1 tablet by mouth 2 (Two) Times a Day With Meals. Patient stopped this medication on 24  Yes Paul Granados MD   levothyroxine (SYNTHROID, LEVOTHROID) 75 MCG tablet Take 1 tablet by mouth Every Morning. 24  Yes Darwin  Paul ESPINOZA MD   meclizine (ANTIVERT) 25 MG tablet Take 1 tablet by mouth 3 (Three) Times a Day As Needed for Dizziness. 6/1/24  Yes Paul Granados MD   midodrine (PROAMATINE) 10 MG tablet Take 1 tablet by mouth 2 (Two) Times a Day Before Meals. 5/31/24  Yes Paul Granados MD   O2 (OXYGEN) Inhale 3 L/min Continuous. 2/26/24  Yes Jaylyn Golden MD   pantoprazole (PROTONIX) 40 MG EC tablet Take 1 tablet by mouth 2 (Two) Times a Day Before Meals. 5/7/24  Yes Paul Granados MD   potassium chloride (KLOR-CON) 20 MEQ packet Take 20 mEq by mouth Daily.   Yes Jaylyn Golden MD   sildenafil (REVATIO) 20 MG tablet Take 1 tablet by mouth 3 (Three) Times a Day.  Patient taking differently: Take 1 tablet by mouth 3 (Three) Times a Day. Patient stopped taking per MD on 8/12/24 5/7/24  Yes Paul Granados MD   Folbic 2.5-25-2 MG tablet tablet Take 1 tablet by mouth Daily. 2/28/24   ProviderJaylyn MD       Allergies:  Patient has no known allergies.    Scheduled Meds:allopurinol, 200 mg, Oral, Daily  apixaban, 2.5 mg, Oral, Q12H  epoetin justin/justin-epbx, 20,000 Units, Subcutaneous, Q14 Days  ferric gluconate, 125 mg, Intravenous, Daily  folic acid-pyridoxine-cyanocobalamin, 1 tablet, Oral, Daily  ipratropium-albuterol, 3 mL, Nebulization, 4x Daily - RT  levothyroxine, 75 mcg, Oral, Q AM  metoprolol succinate XL, 25 mg, Oral, Q12H  midodrine, 5 mg, Oral, TID AC  pantoprazole, 40 mg, Oral, BID AC  sodium bicarbonate, 650 mg, Oral, Daily      Continuous Infusions:   PRN Meds:.  acetaminophen    albuterol    ondansetron    ondansetron ODT    prochlorperazine      OBJECTIVE    Vital Signs  Vitals:    08/18/24 2041 08/18/24 2339 08/19/24 0100 08/19/24 0401   BP: 130/80 128/74  121/65   BP Location: Right arm Right arm  Right arm   Patient Position: Lying Lying  Lying   Pulse: 93 102 85 87   Resp: 19 23 19 20   Temp: 97.3 °F (36.3 °C) 97.4 °F (36.3 °C)  97.1 °F (36.2 °C)   TempSrc: Oral Oral  Oral   SpO2: 93% 92%  "92% 90%   Weight:       Height:           Flowsheet Rows      Flowsheet Row First Filed Value   Admission Height 162.6 cm (64\") Documented at 08/15/2024 1638   Admission Weight 64.4 kg (142 lb) Documented at 08/15/2024 1638              Intake/Output Summary (Last 24 hours) at 8/19/2024 0712  Last data filed at 8/18/2024 0900  Gross per 24 hour   Intake 240 ml   Output --   Net 240 ml          Telemetry: Atrial fibrillation    Physical Exam:  The patient is alert, oriented and in no distress.  Vital signs as noted above.  Head and neck revealed no carotid bruits or jugular venous distention.  No thyromegaly or lymphadenopathy is present  Lungs clear.  No wheezing.  Breath sounds are normal bilaterally.  Heart normal first and second heart sounds.  No murmur. No precordial rub is present.  No gallop is present.  Abdomen soft and nontender.  No organomegaly is present.  Extremities with good peripheral pulses without any pedal edema.  Skin warm and dry.  Musculoskeletal system is grossly normal.  CNS grossly normal.       Results Review:  I have personally reviewed the results from the time of this admission to 8/19/2024 07:12 EDT and agree with these findings:  []  Laboratory  []  Microbiology  []  Radiology  []  EKG/Telemetry   []  Cardiology/Vascular   []  Pathology  []  Old records  []  Other:    Most notable findings include:    Lab Results (last 24 hours)       Procedure Component Value Units Date/Time    Magnesium [[id number]]  (Normal) Collected: 08/18/24 2355    Specimen: Blood Updated: 08/19/24 0112     Magnesium 2.2 mg/dL     Basic Metabolic Panel [[id number]]  (Abnormal) Collected: 08/18/24 2355    Specimen: Blood Updated: 08/19/24 0112     Glucose 93 mg/dL      BUN 47 mg/dL      Creatinine 2.01 mg/dL      Sodium 139 mmol/L      Potassium 4.4 mmol/L      Chloride 103 mmol/L      CO2 20.5 mmol/L      Calcium 9.1 mg/dL      BUN/Creatinine Ratio 23.4     Anion Gap 15.5 mmol/L      eGFR 24.2 mL/min/1.73     " Narrative:      GFR Normal >60  Chronic Kidney Disease <60  Kidney Failure <15    The GFR formula is only valid for adults with stable renal function between ages 18 and 70.    Phosphorus [[id number]]  (Normal) Collected: 08/18/24 2355    Specimen: Blood Updated: 08/19/24 0112     Phosphorus 3.9 mg/dL     CBC (No Diff) [[id number]]  (Abnormal) Collected: 08/18/24 2355    Specimen: Blood Updated: 08/19/24 0042     WBC 11.28 10*3/mm3      RBC 3.99 10*6/mm3      Hemoglobin 10.4 g/dL      Hematocrit 34.9 %      MCV 87.5 fL      MCH 26.1 pg      MCHC 29.8 g/dL      RDW 21.2 %      RDW-SD 62.8 fl      MPV 10.7 fL      Platelets 231 10*3/mm3     POC Glucose Once [[id number]]  (Abnormal) Collected: 08/18/24 2049    Specimen: Blood Updated: 08/18/24 2054     Glucose 112 mg/dL      Comment: Serial Number: [id number]:  [id number]       Comprehensive Metabolic Panel [[id number]]  (Abnormal) Collected: 08/18/24 1724    Specimen: Blood from Arm, Right Updated: 08/18/24 1810     Glucose 173 mg/dL      BUN 44 mg/dL      Creatinine 2.00 mg/dL      Sodium 143 mmol/L      Potassium 4.0 mmol/L      Comment: Slight hemolysis detected by analyzer. Result may be falsely elevated.        Chloride 106 mmol/L      CO2 22.2 mmol/L      Calcium 9.1 mg/dL      Total Protein 6.2 g/dL      Albumin 3.6 g/dL      ALT (SGPT) 234 U/L      AST (SGOT) 204 U/L      Alkaline Phosphatase 91 U/L      Total Bilirubin 3.4 mg/dL      Globulin 2.6 gm/dL      A/G Ratio 1.4 g/dL      BUN/Creatinine Ratio 22.0     Anion Gap 14.8 mmol/L      eGFR 24.4 mL/min/1.73     Narrative:      GFR Normal >60  Chronic Kidney Disease <60  Kidney Failure <15    The GFR formula is only valid for adults with stable renal function between ages 18 and 70.    Urine Culture - Urine, Urine, Clean Catch [[id number]] Collected: 08/17/24 0820    Specimen: Urine, Clean Catch Updated: 08/18/24 1114     Urine Culture >100,000 CFU/mL Normal Urogenital Jess    Narrative:      Colonization  of the urinary tract without infection is common. Treatment is discouraged unless the patient is symptomatic, pregnant, or undergoing an invasive urologic procedure.    CBC & Differential [[id number]]  (Abnormal) Collected: 08/18/24 1043    Specimen: Blood from Arm, Right Updated: 08/18/24 1052    Narrative:      The following orders were created for panel order CBC & Differential.  Procedure                               Abnormality         Status                     ---------                               -----------         ------                     CBC Auto Differential[[id number]]        Abnormal            Final result                 Please view results for these tests on the individual orders.    CBC Auto Differential [[id number]]  (Abnormal) Collected: 08/18/24 1043    Specimen: Blood from Arm, Right Updated: 08/18/24 1052     WBC 9.44 10*3/mm3      RBC 3.49 10*6/mm3      Hemoglobin 9.2 g/dL      Hematocrit 30.5 %      MCV 87.4 fL      MCH 26.4 pg      MCHC 30.2 g/dL      RDW 21.1 %      RDW-SD 64.6 fl      MPV 11.7 fL      Platelets 229 10*3/mm3      Neutrophil % 73.5 %      Lymphocyte % 10.2 %      Monocyte % 12.2 %      Eosinophil % 2.8 %      Basophil % 1.0 %      Immature Grans % 0.3 %      Neutrophils, Absolute 6.95 10*3/mm3      Lymphocytes, Absolute 0.96 10*3/mm3      Monocytes, Absolute 1.15 10*3/mm3      Eosinophils, Absolute 0.26 10*3/mm3      Basophils, Absolute 0.09 10*3/mm3      Immature Grans, Absolute 0.03 10*3/mm3      nRBC 1.1 /100 WBC             Imaging Results (Last 24 Hours)       ** No results found for the last 24 hours. **            LAB RESULTS (LAST 7 DAYS)    CBC  Results from last 7 days   Lab Units 08/18/24  2355 08/18/24  1043 08/17/24  0555 08/16/24  0514 08/15/24  2002   WBC 10*3/mm3 11.28* 9.44 10.42 11.49* 11.22*   RBC 10*6/mm3 3.99 3.49* 3.71* 3.78 3.92   HEMOGLOBIN g/dL 10.4* 9.2* 9.7* 9.6* 10.1*   HEMATOCRIT % 34.9 30.5* 31.6* 32.0* 33.9*   MCV fL 87.5 87.4 85.2 84.7 86.5    PLATELETS 10*3/mm3 231 229 220 249 267       BMP  Results from last 7 days   Lab Units 08/18/24 2355 08/18/24  1724 08/17/24  0555 08/15/24  2002   SODIUM mmol/L 139 143 141 141   POTASSIUM mmol/L 4.4 4.0 3.9 4.2   CHLORIDE mmol/L 103 106 103 101   CO2 mmol/L 20.5* 22.2 25.1 26.4   BUN mg/dL 47* 44* 49* 50*   CREATININE mg/dL 2.01* 2.00* 2.19* 2.14*   GLUCOSE mg/dL 93 173* 91 130*   MAGNESIUM mg/dL 2.2  --  2.2  --    PHOSPHORUS mg/dL 3.9  --  3.7  --        CMP   Results from last 7 days   Lab Units 08/18/24  2355 08/18/24  1724 08/17/24  0555 08/15/24  2002   SODIUM mmol/L 139 143 141 141   POTASSIUM mmol/L 4.4 4.0 3.9 4.2   CHLORIDE mmol/L 103 106 103 101   CO2 mmol/L 20.5* 22.2 25.1 26.4   BUN mg/dL 47* 44* 49* 50*   CREATININE mg/dL 2.01* 2.00* 2.19* 2.14*   GLUCOSE mg/dL 93 173* 91 130*   ALBUMIN g/dL  --  3.6 3.6 3.8   BILIRUBIN mg/dL  --  3.4* 2.6* 3.2*   ALK PHOS U/L  --  91 85 86   AST (SGOT) U/L  --  204* 214* 304*   ALT (SGPT) U/L  --  234* 217* 207*       BNP        TROPONIN  Results from last 7 days   Lab Units 08/15/24  2231   CK TOTAL U/L 129   HSTROP T ng/L 35*       CoAg        Creatinine Clearance  Estimated Creatinine Clearance: 20.5 mL/min (A) (by C-G formula based on SCr of 2.01 mg/dL (H)).    ABG        Radiology  US Renal Bilateral    Result Date: 8/17/2024  Impression: No hydronephrosis. Increased renal cortical echogenicity bilaterally which can be seen in medical renal disease. Ascites. Electronically Signed: Braxton Martinez  8/17/2024 11:37 AM EDT  Workstation ID: [id number]       EKG  I personally viewed and interpreted the patient's EKG/Telemetry data:  ECG 12 Lead Tachycardia   Preliminary Result   HEART RATE=89  bpm   RR Mzskocdw=506  ms   MD Interval=  ms   P Horizontal Axis=  deg   P Front Axis=  deg   QRSD Interval=83  ms   QT Tommqgff=748  ms   QKtL=398  ms   QRS Axis=131  deg   T Wave Axis=-63  deg   - ABNORMAL ECG -   Atrial fibrillation   Right axis deviation   Low voltage,  precordial leads   Nonspecific repol abnormality, diffuse leads   Date and Time of Study:2024-08-15 19:11:40      Telemetry Scan   Final Result      Telemetry Scan   Final Result      Telemetry Scan   Final Result      Telemetry Scan   Final Result      Telemetry Scan   Final Result      Telemetry Scan   Final Result      Telemetry Scan   Final Result      Telemetry Scan   Final Result      Telemetry Scan   Final Result      Telemetry Scan   Final Result      Telemetry Scan   Final Result      Telemetry Scan   Final Result      Telemetry Scan   Final Result      Telemetry Scan   Final Result      Telemetry Scan   Final Result            Echocardiogram:    Results for orders placed in visit on 03/18/24    Adult Transthoracic Echo Limited W/ Cont if Necessary Per Protocol    Interpretation Summary    Left ventricular ejection fraction appears to be 61 - 65%.    The right ventricular cavity is dilated.    There is a small (<1cm) pericardial effusion adjacent to the left ventricle. There is no evidence of cardiac tamponade.    IVC is 2.1 cm.        Stress Test:  Results for orders placed in visit on 09/13/22    Stress Test With Myocardial Perfusion One Day    Interpretation Summary    Left ventricular ejection fraction is hyperdynamic (Calculated EF > 70%). .    Myocardial perfusion imaging indicates a normal myocardial perfusion study with no evidence of ischemia.    Impressions are consistent with a low risk study.    Findings consistent with a normal ECG stress test.         Cardiac Catheterization:  Results for orders placed during the hospital encounter of 03/03/23    Cardiac Catheterization/Vascular Study    Conclusion  OPERATORS  Richardson Willis M.D. (Attending Cardiologist)      PROCEDURE PERFORMED  Ultrasound guided vascular access  Right heart catheterization  Coronary Angiogram  Left Heart Catheterization 10835  Moderate Sedation    INDICATIONS FOR PROCEDURE  83-year-old woman with multiple cardiovascular risk  factors, abnormal stress test presented with worsening shortness of breath.  After discussing the risk and benefit of the procedure she was brought in for elective right and left heart cath.    PROCEDURE IN DETAIL  Informed consent was obtained from the patient after explaining the risks, benefits, and alternative options of the procedure. After obtaining informed consent, the patient was brought to the cath lab and was prepped in a sterile fashion. Lidocaine 2% was used for local anesthesia into the right femoral venous access site. Right femoral vein was accessed using the micropuncture needle under ultrasound guidance and micropuncture wire advanced under flouroscopy. A 7 Lithuanian vascular sheath was put into place percutaneously over guide-wire. Guide wires were removed. A 6Fr swan sheryl catheter was advanced to wedge position. RA, RV and PA and wedge pressures were recorded.  PA sat and arterial sats recorded.  The patient tolerated the procedure well without any complications.    Lidocaine 2% was used for local anesthesia into the right femoral arterial access site. The right femoral artery was accessed with a micropuncture needle via modified Seldinger technique under ultrasound guidance. A 6F was inserted successfully.  Afterwards, 6F JR4 and JL4 diagnostic catheters were advanced over a wire into the ascending aorta and were used to engage the ostia of the left main and RCA respectively. JR4 used to cross the AV and obtain LV pressures and gradient across the AV measured via pullback technique. Images of the right and left coronary systems were obtained. All the catheters were exchanged over a wire and subsequently removed. Angiogram of the femoral access site was obtained and did not show complications. The patient tolerated the procedure well without any complications. The pictures were reviewed at the end of the procedure. A Mynx closure device was applied.    HEMODYNAMICS    RHC  RA 6/5, 4 mmHg  RV 74/3, 5  mmHg  PA 71/25, 44 mmHg  PCW 12 mmHg  AO Sat 92%  PA Sat 78%    Jose CO: 7.89 L/min    Jose CI: 4.69 L/min/m²    LHC  LV: 134/3, 20 mmHg  AO: 131/56, 87 mmHg  No significant gradient across the aortic valve during pullback of JR4 catheter.    FINDINGS  Coronary Angiogram  All vessels are heavily calcified  She also has heavily calcified peripheral arterial disease.  Right dominant circulation    Left main: Left main is a large caliber vessel which gives rise to the Left Anterior Descending and the Left circumflex.  Left main is angiographically free from any significant disease.    Left Anterior Descending Artery: LAD is a heavily calcified medium caliber vessel which gives rise to diagonals.  The vessel is highly tortuous and has 50 to 60% mid vessel stenosis best seen in Bahamian cranial view.    Left Circumflex: Heavily calcified vessel with 50% proximal segment stenosis.    Right Coronary Artery: The RCA is a large caliber vessel which is heavily calcified and tortuous.  It has diffuse luminal irregularities and 30 to 40% stenosis in the midsegment around the bend.    ESTIMATED BLOOD LOSS:  10 ml    COMPLICATIONS:  None    PROCEDURE DATA:  Sedation Time: 25 minutes    IMPRESSIONS  Heavily calcified, tortuous nonobstructive coronary disease  Severe pulmonary hypertension with PA systolic pressure in the 70s  Mean PA pressure 44 mmHg    RECOMMENDATIONS  -Continue aggressive medical management of CAD  -Referral to pulmonology for treatment of pulmonary hypertension         Other:         ASSESSMENT & PLAN:    Principal Problem:    Atrial fibrillation with RVR  Active Problems:    Chronic obstructive pulmonary disease    Primary hypertension    Chronic hypoxemic respiratory failure    3A Paroxysmal atrial fibrillation    CKD (chronic kidney disease), stage IV    Cor pulmonale (chronic)    Mild pulmonary hypertension    Anemia due to stage 4 chronic kidney disease    Chronic hypotension    Atrial fibrillation  She has  known history of paroxysmal atrial fibrillation  [id number] score is 6  Continue Eliquis for atrial fibrillation as well as for history of DVT/PE  We have discontinued Coreg  Continue metoprolol     COPD/Chronic respiratory failure/shortness of breath  Severe pulmonary hypertension  HFpEF  On home oxygen 3 L  Has known pulmonary hypertension with mean PA pressure of 44 and peak PA pressure of 71 mmHg with normal wedge pressure  Sildenafil has been discontinued  Echocardiogram shows preserved LV function with mildly elevated RVSP.  Small to moderate pericardial effusion: No signs of tamponade  Previous proBNP was more than 15,000  Bumex is currently on hold due to worsening renal function  Encouraged low-salt diet  Encouraged follow-up with outpatient heart failure clinic for intermittent IV diuresis     History of venous thrombosis and embolism  Switching to low-dose Eliquis due to age and renal dysfunction.  She also has an IVC filter in place.     Primary hypertension, chronic  She has chronically low blood pressure.  Continue midodrine  Continue metoprolol  Echo shows preserved LV function, reduced RV function and mildly elevated RVSP.  Pericardial effusion is not significant with no signs of tamponade.  Bumex on hold     Coronary artery disease  Continue beta-blocker  Already on Eliquis low-dose  Recommended starting a statin.  Outpatient  Previous cardiac catheterization showed heavily calcified coronaries but nonobstructive.  No chest pain and stable from cardiac standpoint.     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  Continue high intensity statin and anticoagulation with Eliquis 2.5 mg p.o. twice daily due to renal dysfunction and advanced age.     Stage IIIb chronic kidney disease  Creatinine 2.01, GFR is 24.2  AST/ALT  204/234.  Bumex on hold  Nephrology is managing diuretics      Richardson Willis MD  08/19/24  07:12 EDT

## 2024-08-19 NOTE — PLAN OF CARE
Goal Outcome Evaluation:      Pt complaints of nausea and weakness. Pt safety and fall precautions maintained.

## 2024-08-19 NOTE — THERAPY TREATMENT NOTE
Subjective: Pt agreeable to therapeutic plan of care.  Patient is cleared for PT by nursing.  Patient is agreeable with encouragement.  Reports she has not been doing well with walking and gets light headed after moving around for a bit.    Objective:     Precautions - kya, monitor vitals; terrellen autumn 4L oxygen    Bed mobility - Supervision  Transfers - CGA  Ambulation - 20 feetx2 CGA with RW, cues for safety and RW management, dependent to manage oxygen and environmental barriers.  Patient reports no dizziness with initial bout of amb; reports dizziness after return to sitting on 2nd bout.  All vitals WNL throughout and BP found to be 112/68mmHG supine after complaints of dizziness.  RN notified of episode and patient left supine in bed with symptoms resolved.    Patient instructed in functional dynamic standing reaching within BRADLEY and across body with close supervision-CGA for balance x1 minute and x30s with rest breaks between    Therapeutic Exercise - Adivsed in AP, heel slides, hip abd while inactive and alone in the bed, she verbalized understanding.    Vitals: WNL    Pain: 0 VAS     Education: Provided education on the importance of mobility in the acute care setting, Transfer Training, Gait Training, and Energy conservation strategies    Assessment: Gabrielle Lyles presents with functional mobility impairments which indicate the need for skilled intervention. Patient with very low tolerance to upright mobility this date.  Reports dizziness with activity, vitals are assessed in supine after complain and found to be WNL. RN is notified and discussed barriers to d/c with patient.  Patient is agreeable to subacute referral due to limited support and poor ability to complete MRADLs. Tolerating session today without incident. Will continue to follow and progress as tolerated.     Plan/Recommendations:   If medically appropriate, Moderate Intensity Therapy recommended post-acute care. This is recommended as  "therapy feels the patient would require 3-4 days per week and wouldn't tolerate \"3 hour daily\" rehab intensity. SNF would be the preferred choice. If the patient does not agree to SNF, arrange HH or OP depending on home bound status. If patient is medically complex, consider LTACH. Pt requires no DME at discharge.     Pt desires Skilled Rehab placement at discharge. Pt cooperative; agreeable to therapeutic recommendations and plan of care.         Basic Mobility 6-click:  Rollin = Total, A lot = 2, A little = 3; 4 = None  Supine>Sit:   1 = Total, A lot = 2, A little = 3; 4 = None   Sit>Stand with arms:  1 = Total, A lot = 2, A little = 3; 4 = None  Bed>Chair:   1 = Total, A lot = 2, A little = 3; 4 = None  Ambulate in room:  1 = Total, A lot = 2, A little = 3; 4 = None  3-5 Steps with railin = Total, A lot = 2, A little = 3; 4 = None  Score: 17    Modified [place]: N/A = No pre-op stroke/TIA    Post-Tx Position: Supine with HOB Elevated, Alarms activated, and Call light and personal items within reach  PPE: gloves   "

## 2024-08-19 NOTE — PROGRESS NOTES
"NEPHROLOGY PROGRESS NOTE------[facility]    [facility]  [phone]  Luke Fleming MD      Patient Care Team:  Paul Granados MD as PCP - General (Family Medicine)  Richardson Willis MD as Cardiologist (Cardiology)  Rafy Rodriguez MD as Consulting Physician (Hematology and Oncology)  Christianne Phillips RN as Licensed Practical Nurse  Salome Fleming MD as Consulting Physician (Nephrology)      Provider:  uLke Fleming MD  Patient Name: Gabrielle Lyles  :  1940    SUBJECTIVE:    F/U ARF/JULIAN/CRF/CKD    Breathing ok. Still with borderline hypotension and some dizziness. No angina. No dysuria.     Medication:  allopurinol, 200 mg, Oral, Daily  apixaban, 2.5 mg, Oral, Q12H  epoetin justin/justin-epbx, 20,000 Units, Subcutaneous, Q14 Days  ferric gluconate, 125 mg, Intravenous, Daily  folic acid-pyridoxine-cyanocobalamin, 1 tablet, Oral, Daily  ipratropium-albuterol, 3 mL, Nebulization, 4x Daily - RT  levothyroxine, 75 mcg, Oral, Q AM  metoprolol succinate XL, 25 mg, Oral, Q12H  midodrine, 5 mg, Oral, TID AC  pantoprazole, 40 mg, Oral, BID AC      sodium chloride, 60 mL/hr, Last Rate: 60 mL/hr (24 1037)        OBJECTIVE    Vital Sign Min/Max for last 24 hours  Temp  Min: 97.1 °F (36.2 °C)  Max: 97.8 °F (36.6 °C)   BP  Min: 94/59  Max: 130/80   Pulse  Min: 83  Max: 102   Resp  Min: 15  Max: 23   SpO2  Min: 89 %  Max: 96 %   No data recorded   No data recorded     Flowsheet Rows      Flowsheet Row First Filed Value   Admission Height 162.6 cm (64\") Documented at 08/15/2024 1638   Admission Weight 64.4 kg (142 lb) Documented at 08/15/2024 1638            No intake/output data recorded.  I/O last 3 completed shifts:  In: 240 [P.O.:240]  Out: -       Physical Exam:  General Appearance: alert, appears stated age and cooperative  Head: normocephalic, without obvious abnormality and atraumatic.    Eyes: conjunctivae and sclerae " "normal and no icterus  Neck: supple and no JVD  Lungs: clear to auscultation and respirations regular  Heart: IRREG IRREG +TAYE. NO GALOP, RUB, OR S3  Chest Wall: no abnormalities observed  Abdomen: normal bowel sounds and soft, nontender  Extremities: moves extremities well, no edema, no cyanosis  Skin: +FEW SCATTERED ECCHYMOSIS  Neurologic: Alert, and oriented. No focal deficits    Labs:    WBC WBC   Date Value Ref Range Status   08/18/2024 11.28 (H) 3.40 - 10.80 10*3/mm3 Final   08/18/2024 9.44 3.40 - 10.80 10*3/mm3 Final   08/17/2024 10.42 3.40 - 10.80 10*3/mm3 Final      HGB Hemoglobin   Date Value Ref Range Status   08/18/2024 10.4 (L) 12.0 - 15.9 g/dL Final   08/18/2024 9.2 (L) 12.0 - 15.9 g/dL Final   08/17/2024 9.7 (L) 12.0 - 15.9 g/dL Final      HCT Hematocrit   Date Value Ref Range Status   08/18/2024 34.9 34.0 - 46.6 % Final   08/18/2024 30.5 (L) 34.0 - 46.6 % Final   08/17/2024 31.6 (L) 34.0 - 46.6 % Final      Platelets No results found for: \"LABPLAT\"   MCV MCV   Date Value Ref Range Status   08/18/2024 87.5 79.0 - 97.0 fL Final   08/18/2024 87.4 79.0 - 97.0 fL Final   08/17/2024 85.2 79.0 - 97.0 fL Final          Sodium Sodium   Date Value Ref Range Status   08/18/2024 139 136 - 145 mmol/L Final   08/18/2024 143 136 - 145 mmol/L Final   08/17/2024 141 136 - 145 mmol/L Final      Potassium Potassium   Date Value Ref Range Status   08/18/2024 4.4 3.5 - 5.2 mmol/L Final   08/18/2024 4.0 3.5 - 5.2 mmol/L Final     Comment:     Slight hemolysis detected by analyzer. Result may be falsely elevated.   08/17/2024 3.9 3.5 - 5.2 mmol/L Final      Chloride Chloride   Date Value Ref Range Status   08/18/2024 103 98 - 107 mmol/L Final   08/18/2024 106 98 - 107 mmol/L Final   08/17/2024 103 98 - 107 mmol/L Final      CO2 CO2   Date Value Ref Range Status   08/18/2024 20.5 (L) 22.0 - 29.0 mmol/L Final   08/18/2024 22.2 22.0 - 29.0 mmol/L Final   08/17/2024 25.1 22.0 - 29.0 mmol/L Final      BUN BUN   Date Value Ref " "Range Status   08/18/2024 47 (H) 8 - 23 mg/dL Final   08/18/2024 44 (H) 8 - 23 mg/dL Final   08/17/2024 49 (H) 8 - 23 mg/dL Final      Creatinine Creatinine   Date Value Ref Range Status   08/18/2024 2.01 (H) 0.57 - 1.00 mg/dL Final   08/18/2024 2.00 (H) 0.57 - 1.00 mg/dL Final   08/17/2024 2.19 (H) 0.57 - 1.00 mg/dL Final      Calcium Calcium   Date Value Ref Range Status   08/18/2024 9.1 8.6 - 10.5 mg/dL Final   08/18/2024 9.1 8.6 - 10.5 mg/dL Final   08/17/2024 9.7 8.6 - 10.5 mg/dL Final      PO4 No components found for: \"PO4\"   Albumin Albumin   Date Value Ref Range Status   08/18/2024 3.6 3.5 - 5.2 g/dL Final   08/17/2024 3.6 3.5 - 5.2 g/dL Final      Magnesium Magnesium   Date Value Ref Range Status   08/18/2024 2.2 1.6 - 2.4 mg/dL Final   08/17/2024 2.2 1.6 - 2.4 mg/dL Final      Uric Acid No components found for: \"URIC ACID\"     Imaging Results (Last 72 Hours)       Procedure Component Value Units Date/Time    US Renal Bilateral [[id number]] Collected: 08/17/24 1134     Updated: 08/17/24 1139    Narrative:      US RENAL BILATERAL    Date of Exam: 8/17/2024 10:00 AM EDT    Indication: ARF/JULIAN/CRF/CKD.    Comparison: Renal ultrasound 4/26/2024.    Technique: Grayscale and color Doppler ultrasound evaluation of the kidneys and urinary bladder was performed.      Findings:  Right Kidney: Right kidney measures 9.3 cm in long axis. Mildly increased renal cortical echogenicity. No suspicious appearing lesions.No hydronephrosis.No sonographically evident nephrolithiasis.    Left Kidney:  Left kidney measures 8.6 cm in long axis. Mildly increased renal cortical echogenicity. No suspicious appearing lesions. Similar interpolar simple cyst measuring up to 1.6 cm. No hydronephrosis.No sonographically evident nephrolithiasis.    Bladder: The bladder appears unremarkable.    Additional findings: Intra-abdominal ascites.      Impression:      Impression:  No hydronephrosis.    Increased renal cortical echogenicity " bilaterally which can be seen in medical renal disease.    Ascites.        Electronically Signed: Braxton CHELSEY Martinez    8/17/2024 11:37 AM EDT    Workstation ID: [id number]            Results for orders placed during the hospital encounter of 08/15/24    XR Chest 1 View    Narrative  XR CHEST 1 VW    Date of Exam: 8/15/2024 7:16 PM EDT    Indication: SOB/AFIB+RVR/COPD    Comparison: 5/29/2024    Findings:  Stable cardiomegaly. Mild left basilar airspace disease similar to prior study which could relate to atelectasis or scarring. No significant effusion. Negative for pneumothorax. The right lung is clear. Dense aortic arch atherosclerosis. Osseous  structures appear intact.    Impression  Impression:  1. Stable cardiomegaly.  2. Mild left basilar airspace disease similar to prior study may relate to chronic atelectasis or scarring.        Electronically Signed: Randall Zarco MD  8/15/2024 9:31 PM EDT  Workstation ID: [id number]      Results for orders placed during the hospital encounter of 05/29/24    XR Chest 1 View    Narrative  XR CHEST 1 VW    Date of Exam: 5/29/2024 4:25 PM EDT    Indication: Dizziness and near syncope with shortness of breath    Comparison: AP portable chest 4/20/2024    Findings:  Stable mild to moderate cardiac enlargement. Trace left basilar pleural effusion with mild left basilar atelectasis is present. No overt features of pulmonary edema. Right lung appears clear. Mild calcific atherosclerosis within the thoracic aorta. No  acute osseous abnormality    Impression  Impression:    1. Stable cardiomegaly. No overt pulmonary edema.  2. Trace left basilar pleural effusion with mild left basilar atelectasis.      Electronically Signed: Tiffanie Cheung MD  5/29/2024 4:37 PM EDT  Workstation ID: [id number]      Results for orders placed during the hospital encounter of 04/25/24    XR Chest 1 View    Narrative  XR CHEST 1 VW    Date of Exam: 4/28/2024 9:45 AM EDT    Indication: CHF    Comparison:  4/25/2020    Findings:  Heart size and pulmonary vasculature are stable. No gross pulmonary edema is demonstrated. There is strandy opacity in the left lung base.    Impression  Impression:    1. Cardiomegaly with no evidence of pulmonary edema  2. Left basilar atelectasis      Electronically Signed: Darnell Kennedy  4/28/2024 11:33 AM EDT  Workstation ID: [id number]      Results for orders placed during the hospital encounter of 02/05/24    Duplex Venous Upper Extremity - Right CAR    Interpretation Summary    Normal right upper extremity venous duplex scan.        ASSESSMENT / PLAN      Atrial fibrillation with RVR    Chronic obstructive pulmonary disease    Primary hypertension    Chronic hypoxemic respiratory failure    3A Paroxysmal atrial fibrillation    CKD (chronic kidney disease), stage IV    Cor pulmonale (chronic)    Mild pulmonary hypertension    Anemia due to stage 4 chronic kidney disease    Chronic hypotension    ARF/JULIAN/CRF/CKD-----Nonoliguric. +ARF/JULIAN on top of known CRF/CKD   STG 3B, with a usual euvolemic baseline serum Creatinine of about 1.6-1.7. CRF/CKD STG 3B is secondary to HTN NS. +ARF/JULIAN is secondary to prerenal state and effective intravascular volume depletion from diarrhea with concomitant diuretic use and also with contribution from ATN from hypotension.  Avoid hypotension.   Hold Bumex. Off IVFs. No NSAIDs or IV dye. Dose meds for CrCl less than 10 cc/min until ARF/JULIAN is resolved     2. H/O CHF-----Currently prerenal. Holding  Bumex and given back little IVFs very gently.     3. HTN WITH CKD-----Avoid hypotension. No ACE/ARB/DRI for now. Diuretic on hold. Increase Middorine and follow     4. OA/DJD/HYPERURICEMIA------No NSAIDs.  Increase Allopurinol     5. ANEMIA OF CKD-----Normocytic with elevated RDW. IV iron for ROME. Add EPO     6. HYPERGLYCEMIA------Glucometers, SSI     7.  PULMONARY HTN--------Per Dr.Draw Pulmonary. On Revatio at home     8. HYPOKALEMIA-------Replace po     9.  CAD-----per , Cardiology     10. ELEVATED LFTS/TRANSAMINASES     11. HYPOTHYROIDISM------On Synthroid. TSH normal     12. GERD/PUD PROPHYLAXIS-----On PPI. Benefits outweigh risks despite CKD     13. COPD-----Oxygen, nebs, pulmonary toilet     14. H/O BREAST CA     15. DVT PROPHYLAXIS-----Anticoagulated on Eliquis     16. ATRIAL FIBRILLATION WITH RVR-------Rate controlled now and anticoagulated. Per , Cardiology        Luke Fleming MD  [facility]  [phone]  08/19/24  06:42 EDT

## 2024-08-19 NOTE — PROGRESS NOTES
LOS: 3 days   Patient Care Team:  Paul Granados MD as PCP - General (Family Medicine)  Richardson Willis MD as Cardiologist (Cardiology)  Rafy Rodriguez MD as Consulting Physician (Hematology and Oncology)  Christianne Phillips RN as Licensed Practical Nurse  Salome Fleming MD as Consulting Physician (Nephrology)    Subjective:  Weak    Objective:   Afebrile      Review of Systems:   Review of Systems   Constitutional:  Positive for activity change.   Neurological:  Positive for weakness.           Vital Signs  Temp:  [97.1 °F (36.2 °C)-97.9 °F (36.6 °C)] 97.7 °F (36.5 °C)  Heart Rate:  [] 105  Resp:  [18-27] 19  BP: (107-131)/(57-80) 131/78    Physical Exam:  Physical Exam     Radiology:  US Renal Bilateral    Result Date: 8/17/2024  Impression: No hydronephrosis. Increased renal cortical echogenicity bilaterally which can be seen in medical renal disease. Ascites. Electronically Signed: Braxton Martinez  8/17/2024 11:37 AM EDT  Workstation ID: [id number]    XR Chest 1 View    Result Date: 8/15/2024  Impression: 1. Stable cardiomegaly. 2. Mild left basilar airspace disease similar to prior study may relate to chronic atelectasis or scarring. Electronically Signed: Randall Zarco MD  8/15/2024 9:31 PM EDT  Workstation ID: [id number]        Results Review:     I reviewed the patient's new clinical results.  I reviewed the patient's new imaging results and agree with the interpretation.    Medication Review:   Scheduled Meds:allopurinol, 200 mg, Oral, Daily  apixaban, 2.5 mg, Oral, Q12H  epoetin justin/justin-epbx, 20,000 Units, Subcutaneous, Q14 Days  folic acid-pyridoxine-cyanocobalamin, 1 tablet, Oral, Daily  ipratropium-albuterol, 3 mL, Nebulization, 4x Daily - RT  levothyroxine, 75 mcg, Oral, Q AM  metoprolol succinate XL, 25 mg, Oral, Q12H  midodrine, 10 mg, Oral, TID AC  pantoprazole, 40 mg, Oral, BID AC  sodium bicarbonate, 650 mg, Oral, Daily      Continuous Infusions:   PRN Meds:.  acetaminophen     albuterol    ondansetron    ondansetron ODT    prochlorperazine    Labs:    CBC    Results from last 7 days   Lab Units 08/18/24  2355 08/18/24  1043 08/17/24  0555 08/16/24  0514 08/15/24  2002   WBC 10*3/mm3 11.28* 9.44 10.42 11.49* 11.22*   HEMOGLOBIN g/dL 10.4* 9.2* 9.7* 9.6* 10.1*   PLATELETS 10*3/mm3 231 229 220 249 267     BMP   Results from last 7 days   Lab Units 08/18/24  2355 08/18/24  1724 08/17/24  0555 08/15/24  2002   SODIUM mmol/L 139 143 141 141   POTASSIUM mmol/L 4.4 4.0 3.9 4.2   CHLORIDE mmol/L 103 106 103 101   CO2 mmol/L 20.5* 22.2 25.1 26.4   BUN mg/dL 47* 44* 49* 50*   CREATININE mg/dL 2.01* 2.00* 2.19* 2.14*   GLUCOSE mg/dL 93 173* 91 130*   MAGNESIUM mg/dL 2.2  --  2.2  --    PHOSPHORUS mg/dL 3.9  --  3.7  --      Cr Clearance Estimated Creatinine Clearance: 20.5 mL/min (A) (by C-G formula based on SCr of 2.01 mg/dL (H)).  Coag     HbA1C   Lab Results   Component Value Date    HGBA1C 5.8 (H) 10/25/2022     Blood Glucose   Glucose   Date/Time Value Ref Range Status   08/18/2024 2049 112 (H) 70 - 105 mg/dL Final     Comment:     Serial Number: [id number]:  [id number]     Infection   Results from last 7 days   Lab Units 08/17/24  0820   URINECX  >100,000 CFU/mL Normal Urogenital Jess     CMP   Results from last 7 days   Lab Units 08/18/24  2355 08/18/24  1724 08/17/24  0555 08/15/24  2002   SODIUM mmol/L 139 143 141 141   POTASSIUM mmol/L 4.4 4.0 3.9 4.2   CHLORIDE mmol/L 103 106 103 101   CO2 mmol/L 20.5* 22.2 25.1 26.4   BUN mg/dL 47* 44* 49* 50*   CREATININE mg/dL 2.01* 2.00* 2.19* 2.14*   GLUCOSE mg/dL 93 173* 91 130*   ALBUMIN g/dL  --  3.6 3.6 3.8   BILIRUBIN mg/dL  --  3.4* 2.6* 3.2*   ALK PHOS U/L  --  91 85 86   AST (SGOT) U/L  --  204* 214* 304*   ALT (SGPT) U/L  --  234* 217* 207*     UA    Results from last 7 days   Lab Units 08/17/24  0820   NITRITE UA  Negative   WBC UA /HPF 6-10*   BACTERIA UA /HPF Trace*   SQUAM EPITHEL UA /HPF 21-30*   URINECX  >100,000 CFU/mL Normal  Urogenital Jess     Radiology(recent) No radiology results for the last day   Assessment:       AFIB + RVR  SOB  JULIAN  ATN  Autonomic dysfunction syndrome  Chronic diastolic congestive heart failure  Panlobular COPD with emphysema  Severe pulmonary hypertension  Chronic atelectasis left lower lobe  ARF superimposed upon chronic kidney disease stage IIIa  Left breast anomaly  Hypertension associated chronic kidney disease stage IIIa  Anemia of chronic kidney disease  Hyperuricemia  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  Cerebrovascular disease status post CVA  Chronic oral anticoagulation therapy  Acquired hypothyroidism  Thrombophilia  Autonomic dysfunction syndrome  History of pulmonary embolism  Hypercoagulable state secondary to atrial fibrillation  [id number] score 6  History of IVC filter  Aneurysmal dilatation of abdominal aorta  Chronic mucopurulent bronchitis  Peripheral polyneuropathy  History of breast cancer  Supplemental oxygen dependency  Chronic hypoxic respiratory failure      Plan:  PT/renal support        Paul Granados MD  08/19/24  18:40 EDT

## 2024-08-19 NOTE — PLAN OF CARE
Problem: Adult Inpatient Plan of Care  Goal: Absence of Hospital-Acquired Illness or Injury  Intervention: Identify and Manage Fall Risk  Recent Flowsheet Documentation  Taken 8/19/2024 0600 by Tio Mc, RN  Safety Promotion/Fall Prevention:   safety round/check completed   room organization consistent   nonskid shoes/slippers when out of bed   fall prevention program maintained   clutter free environment maintained   assistive device/personal items within reach  Taken 8/19/2024 0400 by Tio Mc, RN  Safety Promotion/Fall Prevention:   safety round/check completed   room organization consistent   nonskid shoes/slippers when out of bed   fall prevention program maintained   clutter free environment maintained   assistive device/personal items within reach  Taken 8/19/2024 0200 by Tio Mc, RN  Safety Promotion/Fall Prevention:   safety round/check completed   nonskid shoes/slippers when out of bed   fall prevention program maintained   clutter free environment maintained   assistive device/personal items within reach   room organization consistent  Taken 8/19/2024 0000 by Tio Mc, RN  Safety Promotion/Fall Prevention:   safety round/check completed   room organization consistent   nonskid shoes/slippers when out of bed   fall prevention program maintained   clutter free environment maintained   assistive device/personal items within reach  Taken 8/18/2024 2200 by Tio Mc, RN  Safety Promotion/Fall Prevention:   safety round/check completed   room organization consistent   nonskid shoes/slippers when out of bed   fall prevention program maintained   clutter free environment maintained   assistive device/personal items within reach  Taken 8/18/2024 2000 by Tio Mc, RN  Safety Promotion/Fall Prevention:   safety round/check completed   room organization consistent   nonskid shoes/slippers when out of bed   fall prevention program maintained   clutter free environment  maintained   assistive device/personal items within reach  Intervention: Prevent Skin Injury  Recent Flowsheet Documentation  Taken 8/19/2024 0400 by Tio Mc RN  Body Position: position changed independently  Taken 8/19/2024 0000 by Tio Mc RN  Body Position: position changed independently  Taken 8/18/2024 2000 by Tio Mc RN  Body Position: position changed independently  Skin Protection: adhesive use limited  Intervention: Prevent and Manage VTE (Venous Thromboembolism) Risk  Recent Flowsheet Documentation  Taken 8/18/2024 2000 by Tio Mc RN  VTE Prevention/Management:   sequential compression devices off   patient refused intervention  Range of Motion: active ROM (range of motion) encouraged  Intervention: Prevent Infection  Recent Flowsheet Documentation  Taken 8/19/2024 0400 by Tio Mc RN  Infection Prevention: (Patient was not in a single room.)   environmental surveillance performed   hand hygiene promoted   personal protective equipment utilized   rest/sleep promoted  Taken 8/19/2024 0000 by Tio Mc RN  Infection Prevention: (Patient not in a single room.)   environmental surveillance performed   hand hygiene promoted   personal protective equipment utilized   rest/sleep promoted  Taken 8/18/2024 2000 by Tio Mc RN  Infection Prevention: (Patient was not in a single room.)   environmental surveillance performed   hand hygiene promoted   personal protective equipment utilized   rest/sleep promoted  Goal: Optimal Comfort and Wellbeing  Intervention: Provide Person-Centered Care  Recent Flowsheet Documentation  Taken 8/18/2024 2000 by Tio Mc RN  Trust Relationship/Rapport: care explained     Problem: COPD (Chronic Obstructive Pulmonary Disease) Comorbidity  Goal: Maintenance of COPD Symptom Control  Intervention: Maintain COPD-Symptom Control  Recent Flowsheet Documentation  Taken 8/19/2024 0400 by Tio Mc RN  Medication  Review/Management: medications reviewed     Problem: Heart Failure Comorbidity  Goal: Maintenance of Heart Failure Symptom Control  Intervention: Maintain Heart Failure-Management  Recent Flowsheet Documentation  Taken 8/19/2024 0400 by Tio Mc RN  Medication Review/Management: medications reviewed     Problem: Hypertension Comorbidity  Goal: Blood Pressure in Desired Range  Intervention: Maintain Blood Pressure Management  Recent Flowsheet Documentation  Taken 8/19/2024 0400 by Tio Mc RN  Medication Review/Management: medications reviewed     Problem: Dysrhythmia  Goal: Normalized Cardiac Rhythm  Intervention: Monitor and Manage Cardiac Rhythm Effect  Recent Flowsheet Documentation  Taken 8/18/2024 2000 by Tio Mc RN  VTE Prevention/Management:   sequential compression devices off   patient refused intervention     Problem: Fall Injury Risk  Goal: Absence of Fall and Fall-Related Injury  Intervention: Identify and Manage Contributors  Recent Flowsheet Documentation  Taken 8/19/2024 0400 by Tio Mc RN  Medication Review/Management: medications reviewed  Intervention: Promote Injury-Free Environment  Recent Flowsheet Documentation  Taken 8/19/2024 0600 by Tio Mc RN  Safety Promotion/Fall Prevention:   safety round/check completed   room organization consistent   nonskid shoes/slippers when out of bed   fall prevention program maintained   clutter free environment maintained   assistive device/personal items within reach  Taken 8/19/2024 0400 by Tio Mc RN  Safety Promotion/Fall Prevention:   safety round/check completed   room organization consistent   nonskid shoes/slippers when out of bed   fall prevention program maintained   clutter free environment maintained   assistive device/personal items within reach  Taken 8/19/2024 0200 by Tio Mc RN  Safety Promotion/Fall Prevention:   safety round/check completed   nonskid shoes/slippers when out of  bed   fall prevention program maintained   clutter free environment maintained   assistive device/personal items within reach   room organization consistent  Taken 8/19/2024 0000 by Tio Mc RN  Safety Promotion/Fall Prevention:   safety round/check completed   room organization consistent   nonskid shoes/slippers when out of bed   fall prevention program maintained   clutter free environment maintained   assistive device/personal items within reach  Taken 8/18/2024 2200 by Tio Mc RN  Safety Promotion/Fall Prevention:   safety round/check completed   room organization consistent   nonskid shoes/slippers when out of bed   fall prevention program maintained   clutter free environment maintained   assistive device/personal items within reach  Taken 8/18/2024 2000 by Tio Mc RN  Safety Promotion/Fall Prevention:   safety round/check completed   room organization consistent   nonskid shoes/slippers when out of bed   fall prevention program maintained   clutter free environment maintained   assistive device/personal items within reach   Goal Outcome Evaluation:         Patient was very lethargic at the beginning of the shift. Patient eventually woke up and was alert and oriented. Patient has been pleasant, calm, and cooperative with care.

## 2024-08-19 NOTE — CASE MANAGEMENT/SOCIAL WORK
Continued Stay Note  LIDYA Cox     Patient Name: Gabrielle Lyles  MRN: [id number]  Today's Date: 8/19/2024    Admit Date: 8/15/2024    Plan: Return home alone. [facility] accepted, need order. Current 3.5 L O2 with [facility].   Discharge Plan       Row Name 08/19/24 6274       Plan    Plan Return home alone. [facility] accepted, need order. Current 3.5 L O2 with [facility].    Patient/Family in Agreement with Plan yes    Plan Comments BArriers to discharge: IV fluids, midodrine increased.  IV iron x 3 days. Nephro and cardio following.             Expected Discharge Date and Time       Expected Discharge Date Expected Discharge Time    Aug 20, 2024           Brook Carolina RN     Office Phone [phone]  Office Cell [phone]

## 2024-08-19 NOTE — SIGNIFICANT NOTE
08/19/24 1630   OTHER   Discipline occupational therapist   Rehab Time/Intention   Session Not Performed patient/family declined treatment  (pt reported she was fatigued from already getting up X3 today. OT to FU tomorrow.)   Recommendation   OT - Next Appointment 08/20/24

## 2024-08-19 NOTE — PLAN OF CARE
"Goal Outcome Evaluation:         Assessment: Gabrielle Lyles presents with functional mobility impairments which indicate the need for skilled intervention. Patient with very low tolerance to upright mobility this date.  Reports dizziness with activity, vitals are assessed in supine after complain and found to be WNL. RN is notified and discussed barriers to d/c with patient.  Patient is agreeable to subacute referral due to limited support and poor ability to complete MRADLs. Tolerating session today without incident. Will continue to follow and progress as tolerated.     Plan/Recommendations:   If medically appropriate, Moderate Intensity Therapy recommended post-acute care. This is recommended as therapy feels the patient would require 3-4 days per week and wouldn't tolerate \"3 hour daily\" rehab intensity. SNF would be the preferred choice. If the patient does not agree to SNF, arrange HH or OP depending on home bound status. If patient is medically complex, consider LTACH. Pt requires no DME at discharge.             Anticipated Discharge Disposition (PT): skilled nursing facility, sub acute care setting                        "

## 2024-08-20 NOTE — PLAN OF CARE
Problem: Adult Inpatient Plan of Care  Goal: Absence of Hospital-Acquired Illness or Injury  Intervention: Identify and Manage Fall Risk  Recent Flowsheet Documentation  Taken 8/20/2024 0600 by Tio Mc, RN  Safety Promotion/Fall Prevention:   safety round/check completed   room organization consistent   nonskid shoes/slippers when out of bed   fall prevention program maintained   clutter free environment maintained   assistive device/personal items within reach  Taken 8/20/2024 0400 by Tio Mc, RN  Safety Promotion/Fall Prevention:   safety round/check completed   room organization consistent   nonskid shoes/slippers when out of bed   fall prevention program maintained   assistive device/personal items within reach   clutter free environment maintained  Taken 8/20/2024 0200 by Tio Mc, RN  Safety Promotion/Fall Prevention:   safety round/check completed   room organization consistent   nonskid shoes/slippers when out of bed   fall prevention program maintained   clutter free environment maintained   assistive device/personal items within reach  Taken 8/20/2024 0000 by Tio Mc, RN  Safety Promotion/Fall Prevention:   safety round/check completed   room organization consistent   nonskid shoes/slippers when out of bed   fall prevention program maintained   clutter free environment maintained   assistive device/personal items within reach  Taken 8/19/2024 2200 by Tio Mc, RN  Safety Promotion/Fall Prevention:   safety round/check completed   room organization consistent   nonskid shoes/slippers when out of bed   fall prevention program maintained   clutter free environment maintained   assistive device/personal items within reach  Taken 8/19/2024 2000 by Tio Mc, RN  Safety Promotion/Fall Prevention:   safety round/check completed   room organization consistent   nonskid shoes/slippers when out of bed   fall prevention program maintained   assistive device/personal  items within reach   clutter free environment maintained  Intervention: Prevent Skin Injury  Recent Flowsheet Documentation  Taken 8/20/2024 0400 by Tio Mc RN  Body Position: position changed independently  Taken 8/20/2024 0000 by Tio Mc RN  Body Position: position changed independently  Taken 8/19/2024 2000 by Tio Mc RN  Body Position: position changed independently  Skin Protection:   adhesive use limited   tubing/devices free from skin contact  Intervention: Prevent and Manage VTE (Venous Thromboembolism) Risk  Recent Flowsheet Documentation  Taken 8/19/2024 2000 by Tio Mc RN  VTE Prevention/Management: sequential compression devices off  Intervention: Prevent Infection  Recent Flowsheet Documentation  Taken 8/20/2024 0400 by Tio Mc RN  Infection Prevention:   environmental surveillance performed   hand hygiene promoted   personal protective equipment utilized   rest/sleep promoted  Taken 8/20/2024 0000 by Tio Mc RN  Infection Prevention:   environmental surveillance performed   hand hygiene promoted   personal protective equipment utilized   single patient room provided   rest/sleep promoted  Taken 8/19/2024 2000 by Tio Mc RN  Infection Prevention:   environmental surveillance performed   hand hygiene promoted   personal protective equipment utilized   rest/sleep promoted  Goal: Optimal Comfort and Wellbeing  Intervention: Provide Person-Centered Care  Recent Flowsheet Documentation  Taken 8/19/2024 2000 by Tio Mc RN  Trust Relationship/Rapport: care explained     Problem: COPD (Chronic Obstructive Pulmonary Disease) Comorbidity  Goal: Maintenance of COPD Symptom Control  Intervention: Maintain COPD-Symptom Control  Recent Flowsheet Documentation  Taken 8/20/2024 0000 by Tio Mc RN  Medication Review/Management: medications reviewed     Problem: Heart Failure Comorbidity  Goal: Maintenance of Heart Failure Symptom  Control  Intervention: Maintain Heart Failure-Management  Recent Flowsheet Documentation  Taken 8/20/2024 0000 by Tio Mc RN  Medication Review/Management: medications reviewed     Problem: Hypertension Comorbidity  Goal: Blood Pressure in Desired Range  Intervention: Maintain Blood Pressure Management  Recent Flowsheet Documentation  Taken 8/20/2024 0000 by Tio Mc RN  Medication Review/Management: medications reviewed     Problem: Dysrhythmia  Goal: Normalized Cardiac Rhythm  Intervention: Monitor and Manage Cardiac Rhythm Effect  Recent Flowsheet Documentation  Taken 8/19/2024 2000 by Tio Mc RN  VTE Prevention/Management: sequential compression devices off     Problem: Fall Injury Risk  Goal: Absence of Fall and Fall-Related Injury  Intervention: Identify and Manage Contributors  Recent Flowsheet Documentation  Taken 8/20/2024 0000 by Tio Mc RN  Medication Review/Management: medications reviewed  Intervention: Promote Injury-Free Environment  Recent Flowsheet Documentation  Taken 8/20/2024 0600 by Tio Mc RN  Safety Promotion/Fall Prevention:   safety round/check completed   room organization consistent   nonskid shoes/slippers when out of bed   fall prevention program maintained   clutter free environment maintained   assistive device/personal items within reach  Taken 8/20/2024 0400 by Tio Mc RN  Safety Promotion/Fall Prevention:   safety round/check completed   room organization consistent   nonskid shoes/slippers when out of bed   fall prevention program maintained   assistive device/personal items within reach   clutter free environment maintained  Taken 8/20/2024 0200 by Tio Mc RN  Safety Promotion/Fall Prevention:   safety round/check completed   room organization consistent   nonskid shoes/slippers when out of bed   fall prevention program maintained   clutter free environment maintained   assistive device/personal items within  reach  Taken 8/20/2024 0000 by Tio Mc, RN  Safety Promotion/Fall Prevention:   safety round/check completed   room organization consistent   nonskid shoes/slippers when out of bed   fall prevention program maintained   clutter free environment maintained   assistive device/personal items within reach  Taken 8/19/2024 2200 by Tio Mc, RN  Safety Promotion/Fall Prevention:   safety round/check completed   room organization consistent   nonskid shoes/slippers when out of bed   fall prevention program maintained   clutter free environment maintained   assistive device/personal items within reach  Taken 8/19/2024 2000 by Tio Mc, RN  Safety Promotion/Fall Prevention:   safety round/check completed   room organization consistent   nonskid shoes/slippers when out of bed   fall prevention program maintained   assistive device/personal items within reach   clutter free environment maintained   Goal Outcome Evaluation:      Patient has been lethargic. Patient's HR and RR have been elevated. The on call provider was messaged and notified of patient's condition.

## 2024-08-20 NOTE — PROGRESS NOTES
"NEPHROLOGY PROGRESS NOTE------[facility]    [facility]  [phone]  Anirudh Aleman MD      Patient Care Team:  Paul Granados MD as PCP - General (Family Medicine)  Richardson Willis MD as Cardiologist (Cardiology)  Rafy Rodriguez MD as Consulting Physician (Hematology and Oncology)  Christianne Phillips RN as Licensed Practical Nurse  Salome Fleming MD as Consulting Physician (Nephrology)      Provider:  Anirudh Aleman MD  Patient Name: Gabrielle Lyles  :  1940    SUBJECTIVE:    F/U ARF/JULIAN/CRF/CKD  Blood pressure better but remains soft    Medication:  allopurinol, 200 mg, Oral, Daily  apixaban, 2.5 mg, Oral, Q12H  epoetin justin/justin-epbx, 20,000 Units, Subcutaneous, Q14 Days  folic acid-pyridoxine-cyanocobalamin, 1 tablet, Oral, Daily  ipratropium-albuterol, 3 mL, Nebulization, 4x Daily - RT  levothyroxine, 75 mcg, Oral, Q AM  metoprolol succinate XL, 25 mg, Oral, Q12H  midodrine, 10 mg, Oral, TID AC  pantoprazole, 40 mg, Oral, BID AC  sodium bicarbonate, 650 mg, Oral, Daily             OBJECTIVE    Vital Sign Min/Max for last 24 hours  Temp  Min: 97 °F (36.1 °C)  Max: 98.4 °F (36.9 °C)   BP  Min: 107/57  Max: 131/78   Pulse  Min: 97  Max: 107   Resp  Min: 17  Max: 28   SpO2  Min: 89 %  Max: 95 %   No data recorded   No data recorded     Flowsheet Rows      Flowsheet Row First Filed Value   Admission Height 162.6 cm (64\") Documented at 08/15/2024 1638   Admission Weight 64.4 kg (142 lb) Documented at 08/15/2024 1638            No intake/output data recorded.  I/O last 3 completed shifts:  In: 1422 [P.O.:702; I.V.:720]  Out: -       Physical Exam:  General Appearance: alert, appears stated age and cooperative  Head: normocephalic, without obvious abnormality and atraumatic.    Eyes: conjunctivae and sclerae normal and no icterus  Neck: supple and no JVD  Lungs: clear to auscultation and respirations regular  Heart: IRREG IRREG +TAYE. " "NO GALOP, RUB, OR S3  Chest Wall: no abnormalities observed  Abdomen: normal bowel sounds and soft, nontender  Extremities: moves extremities well, no edema, no cyanosis  Skin: +FEW SCATTERED ECCHYMOSIS  Neurologic: Alert, and oriented. No focal deficits    Labs:    WBC WBC   Date Value Ref Range Status   08/20/2024 12.07 (H) 3.40 - 10.80 10*3/mm3 Final   08/18/2024 11.28 (H) 3.40 - 10.80 10*3/mm3 Final   08/18/2024 9.44 3.40 - 10.80 10*3/mm3 Final      HGB Hemoglobin   Date Value Ref Range Status   08/20/2024 10.3 (L) 12.0 - 15.9 g/dL Final   08/18/2024 10.4 (L) 12.0 - 15.9 g/dL Final   08/18/2024 9.2 (L) 12.0 - 15.9 g/dL Final      HCT Hematocrit   Date Value Ref Range Status   08/20/2024 35.5 34.0 - 46.6 % Final   08/18/2024 34.9 34.0 - 46.6 % Final   08/18/2024 30.5 (L) 34.0 - 46.6 % Final      Platelets No results found for: \"LABPLAT\"   MCV MCV   Date Value Ref Range Status   08/20/2024 90.1 79.0 - 97.0 fL Final   08/18/2024 87.5 79.0 - 97.0 fL Final   08/18/2024 87.4 79.0 - 97.0 fL Final          Sodium Sodium   Date Value Ref Range Status   08/20/2024 138 136 - 145 mmol/L Final   08/18/2024 139 136 - 145 mmol/L Final   08/18/2024 143 136 - 145 mmol/L Final      Potassium Potassium   Date Value Ref Range Status   08/20/2024 4.7 3.5 - 5.2 mmol/L Final     Comment:     Specimen hemolyzed.  Result may be falsely elevated.   08/18/2024 4.4 3.5 - 5.2 mmol/L Final   08/18/2024 4.0 3.5 - 5.2 mmol/L Final     Comment:     Slight hemolysis detected by analyzer. Result may be falsely elevated.      Chloride Chloride   Date Value Ref Range Status   08/20/2024 103 98 - 107 mmol/L Final   08/18/2024 103 98 - 107 mmol/L Final   08/18/2024 106 98 - 107 mmol/L Final      CO2 CO2   Date Value Ref Range Status   08/20/2024 18.1 (L) 22.0 - 29.0 mmol/L Final   08/18/2024 20.5 (L) 22.0 - 29.0 mmol/L Final   08/18/2024 22.2 22.0 - 29.0 mmol/L Final      BUN BUN   Date Value Ref Range Status   08/20/2024 53 (H) 8 - 23 mg/dL Final " "  08/18/2024 47 (H) 8 - 23 mg/dL Final   08/18/2024 44 (H) 8 - 23 mg/dL Final      Creatinine Creatinine   Date Value Ref Range Status   08/20/2024 2.69 (H) 0.57 - 1.00 mg/dL Final   08/18/2024 2.01 (H) 0.57 - 1.00 mg/dL Final   08/18/2024 2.00 (H) 0.57 - 1.00 mg/dL Final      Calcium Calcium   Date Value Ref Range Status   08/20/2024 9.6 8.6 - 10.5 mg/dL Final   08/18/2024 9.1 8.6 - 10.5 mg/dL Final   08/18/2024 9.1 8.6 - 10.5 mg/dL Final      PO4 No components found for: \"PO4\"   Albumin Albumin   Date Value Ref Range Status   08/18/2024 3.6 3.5 - 5.2 g/dL Final      Magnesium Magnesium   Date Value Ref Range Status   08/20/2024 2.4 1.6 - 2.4 mg/dL Final   08/18/2024 2.2 1.6 - 2.4 mg/dL Final      Uric Acid No components found for: \"URIC ACID\"     Imaging Results (Last 72 Hours)       Procedure Component Value Units Date/Time    US Renal Bilateral [[id number]] Collected: 08/17/24 1134     Updated: 08/17/24 1139    Narrative:      US RENAL BILATERAL    Date of Exam: 8/17/2024 10:00 AM EDT    Indication: ARF/JULIAN/CRF/CKD.    Comparison: Renal ultrasound 4/26/2024.    Technique: Grayscale and color Doppler ultrasound evaluation of the kidneys and urinary bladder was performed.      Findings:  Right Kidney: Right kidney measures 9.3 cm in long axis. Mildly increased renal cortical echogenicity. No suspicious appearing lesions.No hydronephrosis.No sonographically evident nephrolithiasis.    Left Kidney:  Left kidney measures 8.6 cm in long axis. Mildly increased renal cortical echogenicity. No suspicious appearing lesions. Similar interpolar simple cyst measuring up to 1.6 cm. No hydronephrosis.No sonographically evident nephrolithiasis.    Bladder: The bladder appears unremarkable.    Additional findings: Intra-abdominal ascites.      Impression:      Impression:  No hydronephrosis.    Increased renal cortical echogenicity bilaterally which can be seen in medical renal disease.    Ascites.        Electronically Signed: " Braxton Martinez    8/17/2024 11:37 AM EDT    Workstation ID: [id number]            Results for orders placed during the hospital encounter of 08/15/24    XR Chest 1 View    Narrative  XR CHEST 1 VW    Date of Exam: 8/15/2024 7:16 PM EDT    Indication: SOB/AFIB+RVR/COPD    Comparison: 5/29/2024    Findings:  Stable cardiomegaly. Mild left basilar airspace disease similar to prior study which could relate to atelectasis or scarring. No significant effusion. Negative for pneumothorax. The right lung is clear. Dense aortic arch atherosclerosis. Osseous  structures appear intact.    Impression  Impression:  1. Stable cardiomegaly.  2. Mild left basilar airspace disease similar to prior study may relate to chronic atelectasis or scarring.        Electronically Signed: Randall Zarco MD  8/15/2024 9:31 PM EDT  Workstation ID: [id number]      Results for orders placed during the hospital encounter of 05/29/24    XR Chest 1 View    Narrative  XR CHEST 1 VW    Date of Exam: 5/29/2024 4:25 PM EDT    Indication: Dizziness and near syncope with shortness of breath    Comparison: AP portable chest 4/20/2024    Findings:  Stable mild to moderate cardiac enlargement. Trace left basilar pleural effusion with mild left basilar atelectasis is present. No overt features of pulmonary edema. Right lung appears clear. Mild calcific atherosclerosis within the thoracic aorta. No  acute osseous abnormality    Impression  Impression:    1. Stable cardiomegaly. No overt pulmonary edema.  2. Trace left basilar pleural effusion with mild left basilar atelectasis.      Electronically Signed: Tiffanie Cheung MD  5/29/2024 4:37 PM EDT  Workstation ID: [id number]      Results for orders placed during the hospital encounter of 04/25/24    XR Chest 1 View    Narrative  XR CHEST 1 VW    Date of Exam: 4/28/2024 9:45 AM EDT    Indication: CHF    Comparison: 4/25/2020    Findings:  Heart size and pulmonary vasculature are stable. No gross pulmonary edema is  demonstrated. There is strandy opacity in the left lung base.    Impression  Impression:    1. Cardiomegaly with no evidence of pulmonary edema  2. Left basilar atelectasis      Electronically Signed: Darnell Kennedy  4/28/2024 11:33 AM EDT  Workstation ID: [id number]      Results for orders placed during the hospital encounter of 02/05/24    Duplex Venous Upper Extremity - Right CAR    Interpretation Summary    Normal right upper extremity venous duplex scan.        ASSESSMENT / PLAN      Atrial fibrillation with RVR    Chronic obstructive pulmonary disease    Primary hypertension    Chronic hypoxemic respiratory failure    3A Paroxysmal atrial fibrillation    CKD (chronic kidney disease), stage IV    Cor pulmonale (chronic)    Mild pulmonary hypertension    Anemia due to stage 4 chronic kidney disease    Chronic hypotension    ARF/JULIAN/CRF/CKD-----Nonoliguric. +ARF/JULIAN on top of known CRF/CKD   STG 3B, with a usual euvolemic baseline serum Creatinine of about 1.6-1.7. CRF/CKD STG 3B is secondary to HTN NS. +ARF/JULIAN is secondary to prerenal state and effective intravascular volume depletion from diarrhea with concomitant diuretic use and also with contribution from ATN from hypotension.  Avoid hypotension.   Hold Bumex. s. No NSAIDs or IV dye. Dose meds for CrCl less than 10 cc/min until ARF/JULIAN is resolved     2. H/O CHF-----Currently prerenal. Holding  Bumex   3. HTN WITH CKD-----Avoid hypotension. No ACE/ARB/DRI for now. Diuretic on hold. Increase Middorine and follow     4. OA/DJD/HYPERURICEMIA------No NSAIDs.  Increase Allopurinol     5. ANEMIA OF CKD-----Normocytic with elevated RDW. IV iron for ROME. Add EPO     6. HYPERGLYCEMIA------Glucometers, SSI     7.  PULMONARY HTN--------Per , Pulmonary. On Revatio at home     8. HYPOKALEMIA-------Replace po     9. CAD-----per , Cardiology     10. ELEVATED LFTS/TRANSAMINASES     11. HYPOTHYROIDISM------On Synthroid. TSH normal     12. GERD/PUD  PROPHYLAXIS-----On PPI. Benefits outweigh risks despite CKD     13. COPD-----Oxygen, nebs, pulmonary toilet     14. H/O BREAST CA     15. DVT PROPHYLAXIS-----Anticoagulated on Eliquis     16. ATRIAL FIBRILLATION WITH RVR-------Rate controlled now and anticoagulated. Per , Cardiology    Creatinine trending up  Blood pressure remains soft  Will give a little fluids  Continue midodrine  Add p.o. sodium bicarb  Labs in a.m.    Anirudh Aleman MD  [facility]  [phone]  08/20/24  09:50 EDT

## 2024-08-20 NOTE — PROGRESS NOTES
LOS: 4 days   Patient Care Team:  Paul Granados MD as PCP - General (Family Medicine)  Richardson Willis MD as Cardiologist (Cardiology)  Rafy Rodriguez MD as Consulting Physician (Hematology and Oncology)  Christianne Phillips, AMARILIS as Licensed Practical Nurse  Salome Fleming MD as Consulting Physician (Nephrology)    Subjective:  Somewhat apathetic    Objective:   Afebrile      Review of Systems:   Review of Systems   Constitutional:  Positive for activity change.   Respiratory:  Positive for shortness of breath.    Neurological:  Positive for weakness.           Vital Signs  Temp:  [97.7 °F (36.5 °C)-98.4 °F (36.9 °C)] 98.4 °F (36.9 °C)  Heart Rate:  [] 99  Resp:  [18-28] 18  BP: (107-131)/(57-83) 125/76    Physical Exam:  Physical Exam  Vitals reviewed.   Cardiovascular:      Rate and Rhythm: Rhythm irregular.      Heart sounds: Normal heart sounds.   Pulmonary:      Breath sounds: Normal breath sounds.   Neurological:      Mental Status: She is alert.          Radiology:  US Renal Bilateral    Result Date: 8/17/2024  Impression: No hydronephrosis. Increased renal cortical echogenicity bilaterally which can be seen in medical renal disease. Ascites. Electronically Signed: Braxton Martinez  8/17/2024 11:37 AM EDT  Workstation ID: [id number]    XR Chest 1 View    Result Date: 8/15/2024  Impression: 1. Stable cardiomegaly. 2. Mild left basilar airspace disease similar to prior study may relate to chronic atelectasis or scarring. Electronically Signed: Randall Zarco MD  8/15/2024 9:31 PM EDT  Workstation ID: [id number]        Results Review:     I reviewed the patient's new clinical results.  I reviewed the patient's new imaging results and agree with the interpretation.    Medication Review:   Scheduled Meds:allopurinol, 200 mg, Oral, Daily  apixaban, 2.5 mg, Oral, Q12H  epoetin justin/justin-epbx, 20,000 Units, Subcutaneous, Q14 Days  folic acid-pyridoxine-cyanocobalamin, 1 tablet, Oral,  Daily  ipratropium-albuterol, 3 mL, Nebulization, 4x Daily - RT  levothyroxine, 75 mcg, Oral, Q AM  metoprolol succinate XL, 25 mg, Oral, Q12H  midodrine, 10 mg, Oral, TID AC  pantoprazole, 40 mg, Oral, BID AC  sodium bicarbonate, 650 mg, Oral, Daily      Continuous Infusions:   PRN Meds:.  acetaminophen    albuterol    ondansetron    ondansetron ODT    Labs:    CBC    Results from last 7 days   Lab Units 08/20/24  0133 08/18/24  2355 08/18/24  1043 08/17/24  0555 08/16/24  0514 08/15/24  2002   WBC 10*3/mm3 12.07* 11.28* 9.44 10.42 11.49* 11.22*   HEMOGLOBIN g/dL 10.3* 10.4* 9.2* 9.7* 9.6* 10.1*   PLATELETS 10*3/mm3 241 231 229 220 249 267     BMP   Results from last 7 days   Lab Units 08/20/24  0230 08/18/24  2355 08/18/24  1724 08/17/24  0555 08/15/24  2002   SODIUM mmol/L 138 139 143 141 141   POTASSIUM mmol/L 4.7 4.4 4.0 3.9 4.2   CHLORIDE mmol/L 103 103 106 103 101   CO2 mmol/L 18.1* 20.5* 22.2 25.1 26.4   BUN mg/dL 53* 47* 44* 49* 50*   CREATININE mg/dL 2.69* 2.01* 2.00* 2.19* 2.14*   GLUCOSE mg/dL 96 93 173* 91 130*   MAGNESIUM mg/dL 2.4 2.2  --  2.2  --    PHOSPHORUS mg/dL 4.8* 3.9  --  3.7  --      Cr Clearance Estimated Creatinine Clearance: 15.3 mL/min (A) (by C-G formula based on SCr of 2.69 mg/dL (H)).  Coag     HbA1C   Lab Results   Component Value Date    HGBA1C 5.8 (H) 10/25/2022     Blood Glucose   Glucose   Date/Time Value Ref Range Status   08/18/2024 2049 112 (H) 70 - 105 mg/dL Final     Comment:     Serial Number: [id number]:  [id number]     Infection   Results from last 7 days   Lab Units 08/17/24  0820   URINECX  >100,000 CFU/mL Normal Urogenital Jess     CMP   Results from last 7 days   Lab Units 08/20/24  0230 08/18/24  2355 08/18/24  1724 08/17/24  0555 08/15/24  2002   SODIUM mmol/L 138 139 143 141 141   POTASSIUM mmol/L 4.7 4.4 4.0 3.9 4.2   CHLORIDE mmol/L 103 103 106 103 101   CO2 mmol/L 18.1* 20.5* 22.2 25.1 26.4   BUN mg/dL 53* 47* 44* 49* 50*   CREATININE mg/dL 2.69* 2.01*  2.00* 2.19* 2.14*   GLUCOSE mg/dL 96 93 173* 91 130*   ALBUMIN g/dL  --   --  3.6 3.6 3.8   BILIRUBIN mg/dL  --   --  3.4* 2.6* 3.2*   ALK PHOS U/L  --   --  91 85 86   AST (SGOT) U/L  --   --  204* 214* 304*   ALT (SGPT) U/L  --   --  234* 217* 207*     UA    Results from last 7 days   Lab Units 08/17/24  0820   NITRITE UA  Negative   WBC UA /HPF 6-10*   BACTERIA UA /HPF Trace*   SQUAM EPITHEL UA /HPF 21-30*   URINECX  >100,000 CFU/mL Normal Urogenital Jess     Radiology(recent) No radiology results for the last day   Assessment:    AFIB + RVR  SOB  JULIAN  ATN  Autonomic dysfunction syndrome  Chronic diastolic congestive heart failure  Panlobular COPD with emphysema  Severe pulmonary hypertension  Chronic atelectasis left lower lobe  ARF superimposed upon chronic kidney disease stage IIIa  Left breast anomaly  Hypertension associated chronic kidney disease stage IIIa  Anemia of chronic kidney disease  Hyperuricemia  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  Cerebrovascular disease status post CVA  Chronic oral anticoagulation therapy  Acquired hypothyroidism  Thrombophilia  Autonomic dysfunction syndrome  History of pulmonary embolism  Hypercoagulable state secondary to atrial fibrillation  [id number]-AHDh score 6  History of IVC filter  Aneurysmal dilatation of abdominal aorta  Chronic mucopurulent bronchitis  Peripheral polyneuropathy  History of breast cancer  Supplemental oxygen dependency  Chronic hypoxic respiratory failure    Plan:  Discharge planning//renal support        Paul Granados MD  08/20/24  08:23 EDT

## 2024-08-20 NOTE — PROGRESS NOTES
Referring Provider: Paul Granados MD    Reason for follow-up: Management of atrial fibrillation     Patient Care Team:  Paul Granados MD as PCP - General (Family Medicine)  Richardson Willis MD as Cardiologist (Cardiology)  Rafy Rodriguez MD as Consulting Physician (Hematology and Oncology)  Christianne Phillips, RN as Licensed Practical Nurse  Salome Fleming MD as Consulting Physician (Nephrology)      SUBJECTIVE  Resting comfortably in bed.  On 3 L of oxygen     ROS  Review of all systems negative except as indicated.    Since I have last seen, the patient has been without any chest discomfort, shortness of breath, palpitations, dizziness or syncope.  Denies having any headache, abdominal pain, nausea, vomiting, diarrhea, constipation, loss of weight or loss of appetite.  Denies having any excessive bruising, hematuria or blood in the stool.        Personal History:    Past Medical History:   Diagnosis Date    Acute exacerbation of chronic obstructive pulmonary disease (COPD)     COPD (chronic obstructive pulmonary disease)     Deep vein thrombosis of bilateral lower extremities 07/24/2019    3/2013    History of pneumonia 06/2012    community acquired pneumonia and right pleural effusion;hospitalized at [facility]    History of pulmonary embolism 03/2013    bilateral PE    Hypertension 2001    Insomnia     on Ambien 5mg at     Lobular breast cancer, left 11/2011    Stage IA left upper lobular breast cancer    Malignant neoplasm of left breast in female, estrogen receptor positive 07/18/2019    Osteopenia 2012    Parainfluenza infection     Parotid duct obstruction     Severe pulmonary hypertension 03/03/2023    Stage 3a chronic kidney disease 07/24/2019    TIA (transient ischemic attack) 10/25/2022       Past Surgical History:   Procedure Laterality Date    CARDIAC CATHETERIZATION N/A 3/3/2023    Procedure: Left and Right Heart Cath with Coronary Angiography;  Surgeon: Richardson Willis MD;   Location: [facility] INVASIVE LOCATION;  Service: Cardiovascular;  Laterality: N/A;    CATARACT EXTRACTION      IVC FILTER RETRIEVAL  2014    IVC filter placement by Dr. Card    MAMMO STEREOTACTIC BREAST BX SURGICAL ADD UNI Left 2011    invasive lobular carcinoma-Dr. Cande Daniel    MASTECTOMY, PARTIAL Left 2011    and left axillary sentinel lymph node biopsy by Dr. Uriostegui    TUBAL ABDOMINAL LIGATION         Family History   Problem Relation Age of Onset    Lung cancer Brother        Social History     Tobacco Use    Smoking status: Former     Current packs/day: 0.00     Types: Cigarettes     Quit date:      Years since quittin.6     Passive exposure: Past    Smokeless tobacco: Never    Tobacco comments:     smoked 6 cigarettes a day from 13 years of age to 56 years of age when she quit in    Vaping Use    Vaping status: Never Used   Substance Use Topics    Alcohol use: Not Currently    Drug use: No        Home meds:  Prior to Admission medications    Medication Sig Start Date End Date Taking? Authorizing Provider   allopurinol (ZYLOPRIM) 100 MG tablet Take 1 tablet by mouth Daily.   Yes Provider, MD Jaylyn   apixaban (ELIQUIS) 5 MG tablet tablet Take 1 tablet by mouth Every 12 (Twelve) Hours. Indications: Other - full anticoagulation, history of DVT/PE 10/27/22  Yes Shaylee Mcdaniels MD   budesonide-formoterol (SYMBICORT) 80-4.5 MCG/ACT inhaler Inhale 2 puffs 2 (Two) Times a Day.   Yes Provider, MD Jaylyn   bumetanide (BUMEX) 0.5 MG tablet Take 1 tablet by mouth Daily. 24  Yes Paul Granados MD   carvedilol (COREG) 3.125 MG tablet Take 1 tablet by mouth 2 (Two) Times a Day With Meals.  Patient taking differently: Take 1 tablet by mouth 2 (Two) Times a Day With Meals. Patient stopped this medication on 24  Yes Paul Granados MD   levothyroxine (SYNTHROID, LEVOTHROID) 75 MCG tablet Take 1 tablet by mouth Every Morning. 24  Yes Darwin  Paul EPSINOZA MD   meclizine (ANTIVERT) 25 MG tablet Take 1 tablet by mouth 3 (Three) Times a Day As Needed for Dizziness. 6/1/24  Yes Paul Granados MD   midodrine (PROAMATINE) 10 MG tablet Take 1 tablet by mouth 2 (Two) Times a Day Before Meals. 5/31/24  Yes Paul Granados MD   O2 (OXYGEN) Inhale 3 L/min Continuous. 2/26/24  Yes Jaylyn Golden MD   pantoprazole (PROTONIX) 40 MG EC tablet Take 1 tablet by mouth 2 (Two) Times a Day Before Meals. 5/7/24  Yes Paul Granados MD   potassium chloride (KLOR-CON) 20 MEQ packet Take 20 mEq by mouth Daily.   Yes Jaylyn Golden MD   sildenafil (REVATIO) 20 MG tablet Take 1 tablet by mouth 3 (Three) Times a Day.  Patient taking differently: Take 1 tablet by mouth 3 (Three) Times a Day. Patient stopped taking per MD on 8/12/24 5/7/24  Yes Paul Granados MD   Folbic 2.5-25-2 MG tablet tablet Take 1 tablet by mouth Daily. 2/28/24   ProviderJaylyn MD       Allergies:  Patient has no known allergies.    Scheduled Meds:allopurinol, 200 mg, Oral, Daily  apixaban, 2.5 mg, Oral, Q12H  epoetin justin/justin-epbx, 20,000 Units, Subcutaneous, Q14 Days  folic acid-pyridoxine-cyanocobalamin, 1 tablet, Oral, Daily  ipratropium-albuterol, 3 mL, Nebulization, 4x Daily - RT  levothyroxine, 75 mcg, Oral, Q AM  metoprolol succinate XL, 25 mg, Oral, Q12H  midodrine, 10 mg, Oral, TID AC  pantoprazole, 40 mg, Oral, BID AC  sodium bicarbonate, 650 mg, Oral, Daily      Continuous Infusions:   PRN Meds:.  acetaminophen    albuterol    ondansetron    ondansetron ODT      OBJECTIVE    Vital Signs  Vitals:    08/19/24 2031 08/19/24 2300 08/20/24 0004 08/20/24 0429   BP:   125/83 125/76   BP Location:   Right arm Right arm   Patient Position:   Lying Sitting   Pulse:   105 102   Resp:  24 28 22   Temp:   98 °F (36.7 °C) 98.4 °F (36.9 °C)   TempSrc:   Oral Oral   SpO2: 93%  94% 94%   Weight:       Height:           Flowsheet Rows      Flowsheet Row First Filed Value   Admission Height  "162.6 cm (64\") Documented at 08/15/2024 1638   Admission Weight 64.4 kg (142 lb) Documented at 08/15/2024 1638              Intake/Output Summary (Last 24 hours) at 8/20/2024 0742  Last data filed at 8/19/2024 2100  Gross per 24 hour   Intake 702 ml   Output --   Net 702 ml          Telemetry: Atrial fibrillation    Physical Exam:  The patient is alert, oriented and in no distress.  Vital signs as noted above.  Head and neck revealed no carotid bruits or jugular venous distention.  No thyromegaly or lymphadenopathy is present  Lungs clear.  No wheezing.  Breath sounds are normal bilaterally.  Heart normal first and second heart sounds.  No murmur. No precordial rub is present.  No gallop is present.  Abdomen soft and nontender.  No organomegaly is present.  Extremities with good peripheral pulses without any pedal edema.  Skin warm and dry.  Musculoskeletal system is grossly normal.  CNS grossly normal.       Results Review:  I have personally reviewed the results from the time of this admission to 8/20/2024 07:42 EDT and agree with these findings:  []  Laboratory  []  Microbiology  []  Radiology  []  EKG/Telemetry   []  Cardiology/Vascular   []  Pathology  []  Old records  []  Other:    Most notable findings include:    Lab Results (last 24 hours)       Procedure Component Value Units Date/Time    Basic Metabolic Panel [[id number]]  (Abnormal) Collected: 08/20/24 0230    Specimen: Blood from Arm, Right Updated: 08/20/24 0327     Glucose 96 mg/dL      BUN 53 mg/dL      Creatinine 2.69 mg/dL      Sodium 138 mmol/L      Potassium 4.7 mmol/L      Comment: Specimen hemolyzed.  Result may be falsely elevated.        Chloride 103 mmol/L      CO2 18.1 mmol/L      Calcium 9.6 mg/dL      BUN/Creatinine Ratio 19.7     Anion Gap 16.9 mmol/L      eGFR 17.1 mL/min/1.73     Narrative:      GFR Normal >60  Chronic Kidney Disease <60  Kidney Failure <15    The GFR formula is only valid for adults with stable renal function between " ages 18 and 70.    Magnesium [[id number]]  (Normal) Collected: 08/20/24 0230    Specimen: Blood from Arm, Right Updated: 08/20/24 0327     Magnesium 2.4 mg/dL     Phosphorus [[id number]]  (Abnormal) Collected: 08/20/24 0230    Specimen: Blood from Arm, Right Updated: 08/20/24 0319     Phosphorus 4.8 mg/dL     CBC (No Diff) [[id number]]  (Abnormal) Collected: 08/20/24 0133    Specimen: Blood from Arm, Right Updated: 08/20/24 0142     WBC 12.07 10*3/mm3      RBC 3.94 10*6/mm3      Hemoglobin 10.3 g/dL      Hematocrit 35.5 %      MCV 90.1 fL      MCH 26.1 pg      MCHC 29.0 g/dL      RDW 21.6 %      RDW-SD 65.6 fl      MPV 11.9 fL      Platelets 241 10*3/mm3             Imaging Results (Last 24 Hours)       ** No results found for the last 24 hours. **            LAB RESULTS (LAST 7 DAYS)    CBC  Results from last 7 days   Lab Units 08/20/24  0133 08/18/24  2355 08/18/24  1043 08/17/24  0555 08/16/24  0514 08/15/24  2002   WBC 10*3/mm3 12.07* 11.28* 9.44 10.42 11.49* 11.22*   RBC 10*6/mm3 3.94 3.99 3.49* 3.71* 3.78 3.92   HEMOGLOBIN g/dL 10.3* 10.4* 9.2* 9.7* 9.6* 10.1*   HEMATOCRIT % 35.5 34.9 30.5* 31.6* 32.0* 33.9*   MCV fL 90.1 87.5 87.4 85.2 84.7 86.5   PLATELETS 10*3/mm3 241 231 229 220 249 267       BMP  Results from last 7 days   Lab Units 08/20/24  0230 08/18/24  2355 08/18/24  1724 08/17/24  0555 08/15/24  2002   SODIUM mmol/L 138 139 143 141 141   POTASSIUM mmol/L 4.7 4.4 4.0 3.9 4.2   CHLORIDE mmol/L 103 103 106 103 101   CO2 mmol/L 18.1* 20.5* 22.2 25.1 26.4   BUN mg/dL 53* 47* 44* 49* 50*   CREATININE mg/dL 2.69* 2.01* 2.00* 2.19* 2.14*   GLUCOSE mg/dL 96 93 173* 91 130*   MAGNESIUM mg/dL 2.4 2.2  --  2.2  --    PHOSPHORUS mg/dL 4.8* 3.9  --  3.7  --        CMP   Results from last 7 days   Lab Units 08/20/24  0230 08/18/24  2355 08/18/24  1724 08/17/24  0555 08/15/24  2002   SODIUM mmol/L 138 139 143 141 141   POTASSIUM mmol/L 4.7 4.4 4.0 3.9 4.2   CHLORIDE mmol/L 103 103 106 103 101   CO2 mmol/L 18.1* 20.5*  22.2 25.1 26.4   BUN mg/dL 53* 47* 44* 49* 50*   CREATININE mg/dL 2.69* 2.01* 2.00* 2.19* 2.14*   GLUCOSE mg/dL 96 93 173* 91 130*   ALBUMIN g/dL  --   --  3.6 3.6 3.8   BILIRUBIN mg/dL  --   --  3.4* 2.6* 3.2*   ALK PHOS U/L  --   --  91 85 86   AST (SGOT) U/L  --   --  204* 214* 304*   ALT (SGPT) U/L  --   --  234* 217* 207*       BNP        TROPONIN  Results from last 7 days   Lab Units 08/15/24  2231   CK TOTAL U/L 129   HSTROP T ng/L 35*       CoAg        Creatinine Clearance  Estimated Creatinine Clearance: 15.3 mL/min (A) (by C-G formula based on SCr of 2.69 mg/dL (H)).    ABG        Radiology  No radiology results for the last day      EKG  I personally viewed and interpreted the patient's EKG/Telemetry data:  ECG 12 Lead Tachycardia   Preliminary Result   HEART RATE=89  bpm   RR Dbzuudmr=201  ms   DC Interval=  ms   P Horizontal Axis=  deg   P Front Axis=  deg   QRSD Interval=83  ms   QT Judwjjnw=295  ms   SQsA=635  ms   QRS Axis=131  deg   T Wave Axis=-63  deg   - ABNORMAL ECG -   Atrial fibrillation   Right axis deviation   Low voltage, precordial leads   Nonspecific repol abnormality, diffuse leads   Date and Time of Study:2024-08-15 19:11:40      Telemetry Scan   Final Result      Telemetry Scan   Final Result      Telemetry Scan   Final Result      Telemetry Scan   Final Result      Telemetry Scan   Final Result      Telemetry Scan   Final Result      Telemetry Scan   Final Result      Telemetry Scan   Final Result      Telemetry Scan   Final Result      Telemetry Scan   Final Result      Telemetry Scan   Final Result      Telemetry Scan   Final Result      Telemetry Scan   Final Result      Telemetry Scan   Final Result      Telemetry Scan   Final Result      Telemetry Scan   Final Result      Telemetry Scan   Final Result      Telemetry Scan   Final Result      Telemetry Scan   Final Result      Telemetry Scan   Final Result            Echocardiogram:    Results for orders placed in visit on  03/18/24    Adult Transthoracic Echo Limited W/ Cont if Necessary Per Protocol    Interpretation Summary    Left ventricular ejection fraction appears to be 61 - 65%.    The right ventricular cavity is dilated.    There is a small (<1cm) pericardial effusion adjacent to the left ventricle. There is no evidence of cardiac tamponade.    IVC is 2.1 cm.        Stress Test:  Results for orders placed in visit on 09/13/22    Stress Test With Myocardial Perfusion One Day    Interpretation Summary    Left ventricular ejection fraction is hyperdynamic (Calculated EF > 70%). .    Myocardial perfusion imaging indicates a normal myocardial perfusion study with no evidence of ischemia.    Impressions are consistent with a low risk study.    Findings consistent with a normal ECG stress test.         Cardiac Catheterization:  Results for orders placed during the hospital encounter of 03/03/23    Cardiac Catheterization/Vascular Study    Conclusion  OPERATORS  Richardson Willis M.D. (Attending Cardiologist)      PROCEDURE PERFORMED  Ultrasound guided vascular access  Right heart catheterization  Coronary Angiogram  Left Heart Catheterization 01977  Moderate Sedation    INDICATIONS FOR PROCEDURE  83-year-old woman with multiple cardiovascular risk factors, abnormal stress test presented with worsening shortness of breath.  After discussing the risk and benefit of the procedure she was brought in for elective right and left heart cath.    PROCEDURE IN DETAIL  Informed consent was obtained from the patient after explaining the risks, benefits, and alternative options of the procedure. After obtaining informed consent, the patient was brought to the cath lab and was prepped in a sterile fashion. Lidocaine 2% was used for local anesthesia into the right femoral venous access site. Right femoral vein was accessed using the micropuncture needle under ultrasound guidance and micropuncture wire advanced under flouroscopy. A 7 [place] vascular  sheath was put into place percutaneously over guide-wire. Guide wires were removed. A 6Fr swan sheryl catheter was advanced to wedge position. RA, RV and PA and wedge pressures were recorded.  PA sat and arterial sats recorded.  The patient tolerated the procedure well without any complications.    Lidocaine 2% was used for local anesthesia into the right femoral arterial access site. The right femoral artery was accessed with a micropuncture needle via modified Seldinger technique under ultrasound guidance. A 6F was inserted successfully.  Afterwards, 6F JR4 and JL4 diagnostic catheters were advanced over a wire into the ascending aorta and were used to engage the ostia of the left main and RCA respectively. JR4 used to cross the AV and obtain LV pressures and gradient across the AV measured via pullback technique. Images of the right and left coronary systems were obtained. All the catheters were exchanged over a wire and subsequently removed. Angiogram of the femoral access site was obtained and did not show complications. The patient tolerated the procedure well without any complications. The pictures were reviewed at the end of the procedure. A Mynx closure device was applied.    HEMODYNAMICS    RHC  RA 6/5, 4 mmHg  RV 74/3, 5 mmHg  PA 71/25, 44 mmHg  PCW 12 mmHg  AO Sat 92%  PA Sat 78%    Jose CO: 7.89 L/min    Jose CI: 4.69 L/min/m²    LHC  LV: 134/3, 20 mmHg  AO: 131/56, 87 mmHg  No significant gradient across the aortic valve during pullback of JR4 catheter.    FINDINGS  Coronary Angiogram  All vessels are heavily calcified  She also has heavily calcified peripheral arterial disease.  Right dominant circulation    Left main: Left main is a large caliber vessel which gives rise to the Left Anterior Descending and the Left circumflex.  Left main is angiographically free from any significant disease.    Left Anterior Descending Artery: LAD is a heavily calcified medium caliber vessel which gives rise to  diagonals.  The vessel is highly tortuous and has 50 to 60% mid vessel stenosis best seen in Tamazight cranial view.    Left Circumflex: Heavily calcified vessel with 50% proximal segment stenosis.    Right Coronary Artery: The RCA is a large caliber vessel which is heavily calcified and tortuous.  It has diffuse luminal irregularities and 30 to 40% stenosis in the midsegment around the bend.    ESTIMATED BLOOD LOSS:  10 ml    COMPLICATIONS:  None    PROCEDURE DATA:  Sedation Time: 25 minutes    IMPRESSIONS  Heavily calcified, tortuous nonobstructive coronary disease  Severe pulmonary hypertension with PA systolic pressure in the 70s  Mean PA pressure 44 mmHg    RECOMMENDATIONS  -Continue aggressive medical management of CAD  -Referral to pulmonology for treatment of pulmonary hypertension         Other:         ASSESSMENT & PLAN:    Principal Problem:    Atrial fibrillation with RVR  Active Problems:    Chronic obstructive pulmonary disease    Primary hypertension    Chronic hypoxemic respiratory failure    3A Paroxysmal atrial fibrillation    CKD (chronic kidney disease), stage IV    Cor pulmonale (chronic)    Mild pulmonary hypertension    Anemia due to stage 4 chronic kidney disease    Chronic hypotension    Atrial fibrillation  She has known history of paroxysmal atrial fibrillation  [id number] score is 6  Continue Eliquis for atrial fibrillation as well as for history of DVT/PE  We have discontinued Coreg  We have added Toprol-XL twice daily for better heart rate control     COPD/Chronic respiratory failure/shortness of breath  Severe pulmonary hypertension  HFpEF  On home oxygen 3 L  Has known pulmonary hypertension with mean PA pressure of 44 and peak PA pressure of 71 mmHg with normal wedge pressure  Sildenafil has been discontinued  Echocardiogram shows preserved LV function with mildly elevated RVSP.  Small to moderate pericardial effusion: No signs of tamponade  Previous proBNP was more than 15,000  Bumex is  currently on hold due to worsening renal function  Encouraged low-salt diet  Encouraged follow-up with outpatient heart failure clinic for intermittent IV diuresis     History of venous thrombosis and embolism  Switching to low-dose Eliquis due to age and renal dysfunction.  She also has an IVC filter in place.     Primary hypertension, chronic  She has chronically low blood pressure.  Continue midodrine  Continue metoprolol: Tolerating without drop in blood pressure  Echo shows preserved LV function, reduced RV function and mildly elevated RVSP.  Pericardial effusion is not significant with no signs of tamponade.  Bumex on hold     Coronary artery disease  Continue beta-blocker  Already on Eliquis low-dose  Recommended starting a statin.  Outpatient  Previous cardiac catheterization showed heavily calcified coronaries but nonobstructive.  No chest pain and stable from cardiac standpoint.     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  Continue high intensity statin and anticoagulation with Eliquis 2.5 mg p.o. twice daily due to renal dysfunction and advanced age.     Stage IIIb chronic kidney disease  Creatinine 2.7, GFR is 17.1  AST//234.  Bumex on hold: Encouraged hydration  Nephrology is managing diuretics      Richardson Willis MD  08/20/24  07:42 EDT

## 2024-08-20 NOTE — SIGNIFICANT NOTE
08/20/24 1143   OTHER   Discipline physical therapist   Rehab Time/Intention   Session Not Performed patient/family declined, not feeling well  (attempted PT, patient lethargic and unable to stay awake.  Vitals WNL wit mild tachycardia noted 106bpm.  RN is notified.  Will check back as able.)   Therapy Assessment/Plan (PT)   Criteria for Skilled Interventions Met (PT) yes;meets criteria;skilled treatment is necessary   Recommendation   PT - Next Appointment 08/21/24

## 2024-08-20 NOTE — PLAN OF CARE
Goal Outcome Evaluation:         Nurse called MD about pt being lethargic. Ordered ABG and Ammonia. Labs pending.

## 2024-08-21 NOTE — SIGNIFICANT NOTE
Patient emergently intubated after rapid response was called.  Patient was obtunded and cyanotic around the lips.  After intubation patient was being transferred to the ICU and lost pulse.  CPR was started and route.  Continued resuscitative efforts once arriving to ICU bed.  See code documentation.  Patient received CPR and ACLS medications as well as defibrillation of ventricular fibrillation.  ROSC was obtained.  Family called and on way to hospital per Dr. Jin.

## 2024-08-21 NOTE — NURSING NOTE
X3 attempts to contact daughter with voicemail left requesting urgent call back. No answer or return call at this time

## 2024-08-21 NOTE — PROCEDURES
"Insert Central Line At Bedside    Date/Time: 8/21/2024 7:15 AM    Performed by: Chema Fish APRN  Authorized by: Chema Fish APRN  Consent: The procedure was performed in an emergent situation.  Patient understanding: patient states understanding of the procedure being performed  Patient consent: the patient's understanding of the procedure matches consent given  Procedure consent: procedure consent matches procedure scheduled  Relevant documents: relevant documents present and verified  Test results: test results available and properly labeled  Site marked: the operative site was marked  Imaging studies: imaging studies available  Required items: required blood products, implants, devices, and special equipment available  Patient identity confirmed: arm band, hospital-assigned identification number and anonymous protocol, patient vented/unresponsive  Time out: Immediately prior to procedure a \"time out\" was called to verify the correct patient, procedure, equipment, support staff and site/side marked as required.  Indications: vascular access  Anesthesia: local infiltration    Anesthesia:  Local Anesthetic: lidocaine 1% without epinephrine  Anesthetic total: 5 mL    Sedation:  Patient sedated: no    Preparation: skin prepped with ChloraPrep  Skin prep agent dried: skin prep agent completely dried prior to procedure  Sterile barriers: all five maximum sterile barriers used - cap, mask, sterile gown, sterile gloves, and large sterile sheet  Hand hygiene: hand hygiene performed prior to central venous catheter insertion  Location details: right femoral  Patient position: flat  Catheter type: triple lumen  Catheter size: 7 Fr  Ultrasound guidance: yes  Sterile ultrasound techniques: sterile gel and sterile probe covers were used  Number of attempts: 1  Successful placement: yes  Post-procedure: dressing applied  Assessment: blood return through all ports, free fluid flow and placement verified by " x-ray  Patient tolerance: Patient tolerated the procedure well with no immediate complications        Electronically signed by JOAQUÍN Irizarry, 08/21/24, 8:13 AM EDT.

## 2024-08-21 NOTE — TELEPHONE ENCOUNTER
[place] closed out referral for nephrology. Called and patient was a no show and didn't want to  do referral.

## 2024-08-21 NOTE — CODE DOCUMENTATION
Rapid response called by primary nurse due to new onset unresponsive and agonal breathing. Upon arrival of ICU team, patient was on nonrebreather mask at 15L. Unable to get BP automatic or manual. Atropine given x2. Family was contacted via telephone by Dr. Jin, confirmed full code/interventions. Patient was intubated successfully by JOAQUÍN Meredith. HR began dropping again, 1mg epinephrine IV was given. Patient was then transported to ICU by RN, RT, and APRN. Ambu bag to ETT during transport.     During transport, defibrillator showed HR dropping to 30. Patient lost pulse. CPR started en route to ICU 2307. See code charting for details. During code, bloody secretions were suctioned out of ETT multiple times. CXR completed to confirm ETT placement.     Patient had code blue x2. Family arrived, update given. Spoke with daughter and grandchildren. Decision to make patient DNR at this time. JOAQUÍN Louie at bedside placing central line. Family then led to room to see patient.

## 2024-08-21 NOTE — SIGNIFICANT NOTE
Patient was made DNR, ultimately had 2 cardiac arrests and was made a DNR, patient was no longer responding to vasopressors and continued to have pulmonary edema with profound hypotension and bradycardia.  Patient had been started on epinephrine drip and Levophed drip, noted hyperkalemia was treated with 2 A of D50 and 10 units IV insulin, additionally given 3 A of sodium bicarb and 4 profound acidosis.  Patient ultimately became asystolic at 727, and patient's family was informed that patient had passed away.  Discussed with Dr. Jin, who states that she will complete discharge summary, as we did not have time to consult for this patient, we were only were able to respond for emergency rescue.  Critical care signing off.    Electronically signed by JOAQUÍN Irizarry, 08/21/24, 8:16 AM EDT.

## 2024-08-21 NOTE — SIGNIFICANT NOTE
Case Management Discharge Note      Final Note: (P)          Selected Continued Care - Discharged on 2024 Admission date: 8/15/2024 - Discharge disposition:                Final Discharge Disposition Code: (P) 41 -  in medical facility

## 2024-08-21 NOTE — SIGNIFICANT NOTE
Patient had decline in condition. Unresponsive. Low temperature of 94.7. Dr. Jin notified. Rapid response called. Patient intubated and transferred to ICU.

## 2024-08-21 NOTE — PROCEDURES
Intubation    Date/Time: 8/21/2024 6:55 AM    Performed by: La Nena Johnson APRN  Authorized by: La Nena Johnson APRN  Consent: The procedure was performed in an emergent situation.  Indications: respiratory failure  Preoxygenation: BVM  Laryngoscope size: Mac 3  Tube size: 7.5 mm  Tube type: cuffed  Number of attempts: 1  Ventilation between attempts: BVM  Cricoid pressure: no  Cords visualized: yes  Post-procedure assessment: chest rise  Breath sounds: equal  ETT to lip: 23 cm  Comments: Patient intubated emergently.  CXR pending at time of note.

## 2024-08-21 NOTE — NURSING NOTE
RN attempted to call pt's daughter Darby for urgent update x4 and left voicemail asking daughter to call back for update.

## 2024-08-21 NOTE — PLAN OF CARE
Goal Outcome Evaluation:  Plan of Care Reviewed With: patient        Progress: declining  Outcome Evaluation: Remains lethargic. Awakens to voice/touch. Jaundice noted  to sclera and skin. O2 at 4L in use. MD notified of ammonia level, ABGS, and UA results. Lost IV access and unable to restart. Will notify PICC team in am. Await am labs. Will continue to monitor.

## 2024-08-22 LAB
QT INTERVAL: 341 MS
QTC INTERVAL: 416 MS

## 2024-08-29 NOTE — DISCHARGE SUMMARY
Date of death 8/21/24    Final diagnoses:    Asystole  Acute cardiac arrest  Acute pulmonary edema  Acute distributive shock  S/P rapid sequence intubation with ACLS protocol  AFIB + RVR  Acute hepatic insufficiency  Hyperammonemia  Acute hypoxic respiratory failure  SOB  JULIAN  ATN  Autonomic dysfunction syndrome  Chronic diastolic congestive heart failure  Panlobular COPD with emphysema  Severe pulmonary hypertension  Chronic atelectasis left lower lobe  ARF superimposed upon chronic kidney disease stage IIIa  Left breast anomaly  Hypertension associated chronic kidney disease stage IIIa  Anemia of chronic kidney disease  Hyperuricemia  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  Cerebrovascular disease status post CVA  Chronic oral anticoagulation therapy  Acquired hypothyroidism  Thrombophilia  Autonomic dysfunction syndrome  History of pulmonary embolism  Hypercoagulable state secondary to atrial fibrillation  [id number] score 6  History of IVC filter  Aneurysmal dilatation of abdominal aorta  Chronic mucopurulent bronchitis  Peripheral polyneuropathy  History of breast cancer  Supplemental oxygen dependency  Chronic hypoxic respiratory failure        Treatment and the hospital course:    The patient was admitted with progressive SOB and PEREZ.  She was found to have JULIAN/ARF/ATN with AFIB+RVR.  Cardiology and Nephrology were asked to evaluate the patient and rate control was initiated.  Renal support was provided and aggressive pulmonary toilet was provided.  LAMA/LABA/PCS therapy was initiated but she became progressively lethargic.  Azotemia, hyperammonemia, and acidosis were identified and treatments were underway but she was found to rapidly decompensate with respiratory failure, profound bradycardia, hypotension, and MOSD.  She was rapid sequence intubated and underwent ACLS protocol but became asystolic and passed away in spite of best efforts.

## (undated) DEVICE — SWAN-GANZ THERMODILUTION CATHETER: Brand: SWAN-GANZ

## (undated) DEVICE — ELECTRD DEFIB M/FUNC PROPADZ RADIOL 2PK

## (undated) DEVICE — PINNACLE INTRODUCER SHEATH: Brand: PINNACLE

## (undated) DEVICE — ST ACC MICROPUNCTURE STFF/CANN PLAT/TP 4F 21G 40CM

## (undated) DEVICE — GW PTFE EMERALD HEPCOAT FC J TIP STD .035 3MM 150CM

## (undated) DEVICE — CVR PROB ULTRASND CIVFLEX GEN/PURP TELESCP/FOLD 5.5X24IN LF

## (undated) DEVICE — MYNXGRIP 6F/7F: Brand: MYNXGRIP

## (undated) DEVICE — ANGIO-SEAL STS PLUS VASCULAR CLOSURE DEVICE: Brand: ANGIO-SEAL

## (undated) DEVICE — GW FC J TFE/COAT .025 3MM 180CM

## (undated) DEVICE — PK TRY HEART CATH 50

## (undated) DEVICE — CATH DIAG IMPULSE FL4 6F 100CM

## (undated) DEVICE — CATH DIAG IMPULSE FR4 6F 100CM